# Patient Record
Sex: FEMALE | Race: WHITE | Employment: OTHER | URBAN - METROPOLITAN AREA
[De-identification: names, ages, dates, MRNs, and addresses within clinical notes are randomized per-mention and may not be internally consistent; named-entity substitution may affect disease eponyms.]

---

## 2017-01-24 ENCOUNTER — TELEPHONE (OUTPATIENT)
Dept: INTERNAL MEDICINE CLINIC | Age: 72
End: 2017-01-24

## 2017-01-24 RX ORDER — OXYCODONE HYDROCHLORIDE 10 MG/1
10 TABLET ORAL 2 TIMES DAILY PRN
Qty: 60 TABLET | Refills: 0 | Status: SHIPPED | OUTPATIENT
Start: 2017-01-24 | End: 2017-02-28 | Stop reason: SDUPTHER

## 2017-02-02 ENCOUNTER — OFFICE VISIT (OUTPATIENT)
Dept: INTERNAL MEDICINE CLINIC | Age: 72
End: 2017-02-02

## 2017-02-02 VITALS
DIASTOLIC BLOOD PRESSURE: 50 MMHG | TEMPERATURE: 99.4 F | RESPIRATION RATE: 16 BRPM | HEIGHT: 62 IN | BODY MASS INDEX: 25.98 KG/M2 | WEIGHT: 141.2 LBS | SYSTOLIC BLOOD PRESSURE: 100 MMHG | HEART RATE: 57 BPM | OXYGEN SATURATION: 98 %

## 2017-02-02 DIAGNOSIS — J40 BRONCHITIS: Primary | ICD-10-CM

## 2017-02-02 PROCEDURE — G8400 PT W/DXA NO RESULTS DOC: HCPCS | Performed by: NURSE PRACTITIONER

## 2017-02-02 PROCEDURE — G8484 FLU IMMUNIZE NO ADMIN: HCPCS | Performed by: NURSE PRACTITIONER

## 2017-02-02 PROCEDURE — G8598 ASA/ANTIPLAT THER USED: HCPCS | Performed by: NURSE PRACTITIONER

## 2017-02-02 PROCEDURE — 1036F TOBACCO NON-USER: CPT | Performed by: NURSE PRACTITIONER

## 2017-02-02 PROCEDURE — 3014F SCREEN MAMMO DOC REV: CPT | Performed by: NURSE PRACTITIONER

## 2017-02-02 PROCEDURE — 4040F PNEUMOC VAC/ADMIN/RCVD: CPT | Performed by: NURSE PRACTITIONER

## 2017-02-02 PROCEDURE — 1090F PRES/ABSN URINE INCON ASSESS: CPT | Performed by: NURSE PRACTITIONER

## 2017-02-02 PROCEDURE — 99214 OFFICE O/P EST MOD 30 MIN: CPT | Performed by: NURSE PRACTITIONER

## 2017-02-02 PROCEDURE — 3017F COLORECTAL CA SCREEN DOC REV: CPT | Performed by: NURSE PRACTITIONER

## 2017-02-02 PROCEDURE — 1123F ACP DISCUSS/DSCN MKR DOCD: CPT | Performed by: NURSE PRACTITIONER

## 2017-02-02 PROCEDURE — G8427 DOCREV CUR MEDS BY ELIG CLIN: HCPCS | Performed by: NURSE PRACTITIONER

## 2017-02-02 PROCEDURE — G8420 CALC BMI NORM PARAMETERS: HCPCS | Performed by: NURSE PRACTITIONER

## 2017-02-02 RX ORDER — DOXYCYCLINE HYCLATE 100 MG
100 TABLET ORAL 2 TIMES DAILY
Qty: 20 TABLET | Refills: 0 | Status: SHIPPED | OUTPATIENT
Start: 2017-02-02 | End: 2017-02-12

## 2017-02-02 RX ORDER — DOXYCYCLINE HYCLATE 100 MG
100 TABLET ORAL 2 TIMES DAILY
Qty: 14 TABLET | Refills: 0 | Status: SHIPPED | OUTPATIENT
Start: 2017-02-02 | End: 2017-02-02

## 2017-02-02 ASSESSMENT — ENCOUNTER SYMPTOMS
CHEST TIGHTNESS: 1
SHORTNESS OF BREATH: 1
COUGH: 1
SINUS PRESSURE: 0
WHEEZING: 1
SORE THROAT: 1

## 2017-02-03 RX ORDER — ALBUTEROL SULFATE 90 UG/1
2 AEROSOL, METERED RESPIRATORY (INHALATION) 4 TIMES DAILY
COMMUNITY
End: 2017-08-02 | Stop reason: SDUPTHER

## 2017-02-15 RX ORDER — AMIODARONE HYDROCHLORIDE 200 MG/1
TABLET ORAL
Qty: 30 TABLET | Refills: 6 | Status: SHIPPED | OUTPATIENT
Start: 2017-02-15 | End: 2018-01-19 | Stop reason: SDUPTHER

## 2017-02-28 ENCOUNTER — TELEPHONE (OUTPATIENT)
Dept: INTERNAL MEDICINE CLINIC | Age: 72
End: 2017-02-28

## 2017-02-28 RX ORDER — OXYCODONE HYDROCHLORIDE 10 MG/1
10 TABLET ORAL 2 TIMES DAILY PRN
Qty: 60 TABLET | Refills: 0 | Status: SHIPPED | OUTPATIENT
Start: 2017-02-28 | End: 2017-03-27 | Stop reason: SDUPTHER

## 2017-03-01 ENCOUNTER — ANTI-COAG VISIT (OUTPATIENT)
Dept: INTERNAL MEDICINE CLINIC | Age: 72
End: 2017-03-01

## 2017-03-01 DIAGNOSIS — I48.20 CHRONIC ATRIAL FIBRILLATION (HCC): ICD-10-CM

## 2017-03-01 LAB
INR BLD: 1.36 (ref 0.85–1.15)
PROTHROMBIN TIME: 15.4 SEC (ref 9.6–13)

## 2017-03-09 ENCOUNTER — ANTI-COAG VISIT (OUTPATIENT)
Dept: INTERNAL MEDICINE CLINIC | Age: 72
End: 2017-03-09

## 2017-03-09 DIAGNOSIS — I48.0 PAROXYSMAL ATRIAL FIBRILLATION (HCC): ICD-10-CM

## 2017-03-09 LAB
INR BLD: 2.23 (ref 0.85–1.15)
PROTHROMBIN TIME: 25.2 SEC (ref 9.6–13)

## 2017-03-16 ENCOUNTER — ANTI-COAG VISIT (OUTPATIENT)
Dept: INTERNAL MEDICINE CLINIC | Age: 72
End: 2017-03-16

## 2017-03-16 DIAGNOSIS — I48.0 PAROXYSMAL ATRIAL FIBRILLATION (HCC): ICD-10-CM

## 2017-03-16 LAB
INR BLD: 2.63 (ref 0.85–1.15)
PROTHROMBIN TIME: 29.7 SEC (ref 9.6–13)

## 2017-03-17 RX ORDER — CARVEDILOL 6.25 MG/1
TABLET ORAL
Qty: 60 TABLET | Refills: 0 | Status: SHIPPED | OUTPATIENT
Start: 2017-03-17 | End: 2017-04-14 | Stop reason: SDUPTHER

## 2017-03-27 ENCOUNTER — TELEPHONE (OUTPATIENT)
Dept: INTERNAL MEDICINE CLINIC | Age: 72
End: 2017-03-27

## 2017-03-27 RX ORDER — OXYCODONE HYDROCHLORIDE 10 MG/1
10 TABLET ORAL 2 TIMES DAILY PRN
Qty: 60 TABLET | Refills: 0 | Status: SHIPPED | OUTPATIENT
Start: 2017-03-27 | End: 2017-04-24 | Stop reason: SDUPTHER

## 2017-03-28 ENCOUNTER — TELEPHONE (OUTPATIENT)
Dept: INTERNAL MEDICINE CLINIC | Age: 72
End: 2017-03-28

## 2017-03-30 ENCOUNTER — ANTI-COAG VISIT (OUTPATIENT)
Dept: INTERNAL MEDICINE CLINIC | Age: 72
End: 2017-03-30

## 2017-03-30 DIAGNOSIS — I48.20 CHRONIC ATRIAL FIBRILLATION (HCC): ICD-10-CM

## 2017-03-30 LAB
INR BLD: 2.71 (ref 0.85–1.15)
PROTHROMBIN TIME: 30.6 SEC (ref 9.6–13)

## 2017-04-14 RX ORDER — CARVEDILOL 6.25 MG/1
TABLET ORAL
Qty: 60 TABLET | Refills: 3 | Status: SHIPPED | OUTPATIENT
Start: 2017-04-14 | End: 2017-08-14 | Stop reason: SDUPTHER

## 2017-04-20 DIAGNOSIS — I48.0 PAROXYSMAL ATRIAL FIBRILLATION (HCC): ICD-10-CM

## 2017-04-20 LAB
INR BLD: 2.49 (ref 0.85–1.15)
PROTHROMBIN TIME: 28.1 SEC (ref 9.6–13)

## 2017-04-21 ENCOUNTER — ANTI-COAG VISIT (OUTPATIENT)
Dept: INTERNAL MEDICINE CLINIC | Age: 72
End: 2017-04-21

## 2017-04-24 ENCOUNTER — TELEPHONE (OUTPATIENT)
Dept: INTERNAL MEDICINE CLINIC | Age: 72
End: 2017-04-24

## 2017-04-24 RX ORDER — OXYCODONE HYDROCHLORIDE 10 MG/1
10 TABLET ORAL 2 TIMES DAILY PRN
Qty: 60 TABLET | Refills: 0 | Status: SHIPPED | OUTPATIENT
Start: 2017-04-27 | End: 2017-06-05 | Stop reason: SDUPTHER

## 2017-05-05 RX ORDER — ATORVASTATIN CALCIUM 80 MG/1
TABLET, FILM COATED ORAL
Qty: 30 TABLET | Refills: 3 | Status: SHIPPED | OUTPATIENT
Start: 2017-05-05 | End: 2017-08-28 | Stop reason: SDUPTHER

## 2017-05-14 PROBLEM — D50.9 MICROCYTIC ANEMIA: Status: ACTIVE | Noted: 2017-05-14

## 2017-05-15 PROBLEM — D64.9 SYMPTOMATIC ANEMIA: Status: ACTIVE | Noted: 2017-05-14

## 2017-05-17 ENCOUNTER — TELEPHONE (OUTPATIENT)
Dept: CARDIOLOGY CLINIC | Age: 72
End: 2017-05-17

## 2017-05-17 ENCOUNTER — CARE COORDINATION (OUTPATIENT)
Dept: CASE MANAGEMENT | Age: 72
End: 2017-05-17

## 2017-05-18 ENCOUNTER — OFFICE VISIT (OUTPATIENT)
Age: 72
End: 2017-05-18

## 2017-05-18 VITALS
SYSTOLIC BLOOD PRESSURE: 126 MMHG | BODY MASS INDEX: 24.99 KG/M2 | DIASTOLIC BLOOD PRESSURE: 60 MMHG | OXYGEN SATURATION: 98 % | HEIGHT: 62 IN | HEART RATE: 50 BPM | WEIGHT: 135.8 LBS

## 2017-05-18 DIAGNOSIS — I48.0 PAF (PAROXYSMAL ATRIAL FIBRILLATION) (HCC): ICD-10-CM

## 2017-05-18 DIAGNOSIS — I50.32 CHRONIC DIASTOLIC CHF (CONGESTIVE HEART FAILURE), NYHA CLASS 2 (HCC): Primary | ICD-10-CM

## 2017-05-18 DIAGNOSIS — I25.10 CORONARY ARTERY DISEASE INVOLVING NATIVE CORONARY ARTERY OF NATIVE HEART WITHOUT ANGINA PECTORIS: ICD-10-CM

## 2017-05-18 DIAGNOSIS — E78.5 HYPERLIPIDEMIA LDL GOAL <70: ICD-10-CM

## 2017-05-18 PROCEDURE — 4040F PNEUMOC VAC/ADMIN/RCVD: CPT | Performed by: INTERNAL MEDICINE

## 2017-05-18 PROCEDURE — 1036F TOBACCO NON-USER: CPT | Performed by: INTERNAL MEDICINE

## 2017-05-18 PROCEDURE — G8420 CALC BMI NORM PARAMETERS: HCPCS | Performed by: INTERNAL MEDICINE

## 2017-05-18 PROCEDURE — 3017F COLORECTAL CA SCREEN DOC REV: CPT | Performed by: INTERNAL MEDICINE

## 2017-05-18 PROCEDURE — 1123F ACP DISCUSS/DSCN MKR DOCD: CPT | Performed by: INTERNAL MEDICINE

## 2017-05-18 PROCEDURE — G8427 DOCREV CUR MEDS BY ELIG CLIN: HCPCS | Performed by: INTERNAL MEDICINE

## 2017-05-18 PROCEDURE — 1090F PRES/ABSN URINE INCON ASSESS: CPT | Performed by: INTERNAL MEDICINE

## 2017-05-18 PROCEDURE — 99214 OFFICE O/P EST MOD 30 MIN: CPT | Performed by: INTERNAL MEDICINE

## 2017-05-18 PROCEDURE — 3014F SCREEN MAMMO DOC REV: CPT | Performed by: INTERNAL MEDICINE

## 2017-05-18 PROCEDURE — 1111F DSCHRG MED/CURRENT MED MERGE: CPT | Performed by: INTERNAL MEDICINE

## 2017-05-18 PROCEDURE — G8598 ASA/ANTIPLAT THER USED: HCPCS | Performed by: INTERNAL MEDICINE

## 2017-05-18 PROCEDURE — 93000 ELECTROCARDIOGRAM COMPLETE: CPT | Performed by: INTERNAL MEDICINE

## 2017-05-18 PROCEDURE — G8400 PT W/DXA NO RESULTS DOC: HCPCS | Performed by: INTERNAL MEDICINE

## 2017-05-19 ENCOUNTER — OFFICE VISIT (OUTPATIENT)
Dept: INTERNAL MEDICINE CLINIC | Age: 72
End: 2017-05-19

## 2017-05-19 VITALS
DIASTOLIC BLOOD PRESSURE: 50 MMHG | BODY MASS INDEX: 24.73 KG/M2 | WEIGHT: 134.4 LBS | SYSTOLIC BLOOD PRESSURE: 140 MMHG | HEART RATE: 51 BPM | OXYGEN SATURATION: 96 % | HEIGHT: 62 IN

## 2017-05-19 DIAGNOSIS — D50.0 ANEMIA DUE TO GI BLOOD LOSS: Primary | ICD-10-CM

## 2017-05-19 DIAGNOSIS — I73.9 PVD (PERIPHERAL VASCULAR DISEASE) (HCC): ICD-10-CM

## 2017-05-19 DIAGNOSIS — I65.21 STENOSIS OF RIGHT CAROTID ARTERY: ICD-10-CM

## 2017-05-19 DIAGNOSIS — J43.2 CENTRILOBULAR EMPHYSEMA (HCC): ICD-10-CM

## 2017-05-19 PROCEDURE — 1036F TOBACCO NON-USER: CPT | Performed by: INTERNAL MEDICINE

## 2017-05-19 PROCEDURE — 1111F DSCHRG MED/CURRENT MED MERGE: CPT | Performed by: INTERNAL MEDICINE

## 2017-05-19 PROCEDURE — 3023F SPIROM DOC REV: CPT | Performed by: INTERNAL MEDICINE

## 2017-05-19 PROCEDURE — 99214 OFFICE O/P EST MOD 30 MIN: CPT | Performed by: INTERNAL MEDICINE

## 2017-05-19 PROCEDURE — 3017F COLORECTAL CA SCREEN DOC REV: CPT | Performed by: INTERNAL MEDICINE

## 2017-05-19 PROCEDURE — G8420 CALC BMI NORM PARAMETERS: HCPCS | Performed by: INTERNAL MEDICINE

## 2017-05-19 PROCEDURE — G8427 DOCREV CUR MEDS BY ELIG CLIN: HCPCS | Performed by: INTERNAL MEDICINE

## 2017-05-19 PROCEDURE — 3014F SCREEN MAMMO DOC REV: CPT | Performed by: INTERNAL MEDICINE

## 2017-05-19 PROCEDURE — G8598 ASA/ANTIPLAT THER USED: HCPCS | Performed by: INTERNAL MEDICINE

## 2017-05-19 PROCEDURE — 4040F PNEUMOC VAC/ADMIN/RCVD: CPT | Performed by: INTERNAL MEDICINE

## 2017-05-19 PROCEDURE — 1123F ACP DISCUSS/DSCN MKR DOCD: CPT | Performed by: INTERNAL MEDICINE

## 2017-05-19 PROCEDURE — G8926 SPIRO NO PERF OR DOC: HCPCS | Performed by: INTERNAL MEDICINE

## 2017-05-19 PROCEDURE — 1090F PRES/ABSN URINE INCON ASSESS: CPT | Performed by: INTERNAL MEDICINE

## 2017-05-19 PROCEDURE — G8400 PT W/DXA NO RESULTS DOC: HCPCS | Performed by: INTERNAL MEDICINE

## 2017-05-19 ASSESSMENT — PATIENT HEALTH QUESTIONNAIRE - PHQ9
1. LITTLE INTEREST OR PLEASURE IN DOING THINGS: 0
SUM OF ALL RESPONSES TO PHQ QUESTIONS 1-9: 0
SUM OF ALL RESPONSES TO PHQ9 QUESTIONS 1 & 2: 0
2. FEELING DOWN, DEPRESSED OR HOPELESS: 0

## 2017-05-22 ASSESSMENT — ENCOUNTER SYMPTOMS: SHORTNESS OF BREATH: 1

## 2017-05-23 ENCOUNTER — CARE COORDINATION (OUTPATIENT)
Dept: CASE MANAGEMENT | Age: 72
End: 2017-05-23

## 2017-05-25 ENCOUNTER — HOSPITAL ENCOUNTER (OUTPATIENT)
Dept: VASCULAR LAB | Age: 72
Discharge: HOME | End: 2017-05-25
Attending: INTERNAL MEDICINE | Admitting: INTERNAL MEDICINE

## 2017-05-25 ENCOUNTER — ANTI-COAG VISIT (OUTPATIENT)
Dept: INTERNAL MEDICINE CLINIC | Age: 72
End: 2017-05-25

## 2017-05-25 DIAGNOSIS — I48.0 PAROXYSMAL ATRIAL FIBRILLATION (HCC): ICD-10-CM

## 2017-05-25 DIAGNOSIS — I73.9 PERIPHERAL VASCULAR DISEASE (HCC): ICD-10-CM

## 2017-05-25 LAB
INR BLD: 1.29 (ref 0.85–1.15)
PROTHROMBIN TIME: 14.6 SEC (ref 9.6–13)

## 2017-05-26 ENCOUNTER — CARE COORDINATION (OUTPATIENT)
Dept: CASE MANAGEMENT | Age: 72
End: 2017-05-26

## 2017-05-31 ENCOUNTER — TELEPHONE (OUTPATIENT)
Dept: INTERNAL MEDICINE CLINIC | Age: 72
End: 2017-05-31

## 2017-06-05 ENCOUNTER — TELEPHONE (OUTPATIENT)
Dept: INTERNAL MEDICINE CLINIC | Age: 72
End: 2017-06-05

## 2017-06-05 DIAGNOSIS — I48.0 PAROXYSMAL ATRIAL FIBRILLATION (HCC): ICD-10-CM

## 2017-06-05 LAB
INR BLD: 1.9 (ref 0.85–1.15)
PROTHROMBIN TIME: 21.5 SEC (ref 9.6–13)

## 2017-06-05 RX ORDER — OXYCODONE HYDROCHLORIDE 10 MG/1
10 TABLET ORAL 2 TIMES DAILY PRN
Qty: 60 TABLET | Refills: 0 | Status: SHIPPED | OUTPATIENT
Start: 2017-06-05 | End: 2017-06-29 | Stop reason: SDUPTHER

## 2017-06-05 RX ORDER — OXYCODONE HYDROCHLORIDE 10 MG/1
10 TABLET ORAL 2 TIMES DAILY PRN
Qty: 60 TABLET | Refills: 0 | Status: CANCELLED | OUTPATIENT
Start: 2017-06-05 | End: 2017-07-05

## 2017-06-07 ENCOUNTER — TELEPHONE (OUTPATIENT)
Dept: CARDIOLOGY CLINIC | Age: 72
End: 2017-06-07

## 2017-06-08 ENCOUNTER — ANTI-COAG VISIT (OUTPATIENT)
Dept: INTERNAL MEDICINE CLINIC | Age: 72
End: 2017-06-08

## 2017-06-16 RX ORDER — PANTOPRAZOLE SODIUM 40 MG/1
TABLET, DELAYED RELEASE ORAL
Qty: 30 TABLET | Refills: 5 | Status: SHIPPED | OUTPATIENT
Start: 2017-06-16 | End: 2017-12-11 | Stop reason: SDUPTHER

## 2017-06-16 RX ORDER — WARFARIN SODIUM 2 MG/1
TABLET ORAL
Qty: 60 TABLET | Refills: 5 | Status: SHIPPED | OUTPATIENT
Start: 2017-06-16 | End: 2017-12-11 | Stop reason: SDUPTHER

## 2017-06-22 ENCOUNTER — HOSPITAL ENCOUNTER (OUTPATIENT)
Dept: ENDOSCOPY | Age: 72
Discharge: HOME | End: 2017-06-22
Attending: INTERNAL MEDICINE | Admitting: INTERNAL MEDICINE

## 2017-06-22 VITALS
WEIGHT: 125.66 LBS | TEMPERATURE: 97.8 F | OXYGEN SATURATION: 97 % | RESPIRATION RATE: 18 BRPM | HEART RATE: 54 BPM | BODY MASS INDEX: 23.12 KG/M2 | DIASTOLIC BLOOD PRESSURE: 87 MMHG | SYSTOLIC BLOOD PRESSURE: 152 MMHG | HEIGHT: 62 IN

## 2017-06-22 ASSESSMENT — PAIN - FUNCTIONAL ASSESSMENT: PAIN_FUNCTIONAL_ASSESSMENT: 0-10

## 2017-06-22 ASSESSMENT — PAIN DESCRIPTION - DESCRIPTORS: DESCRIPTORS: ACHING

## 2017-06-29 ENCOUNTER — OFFICE VISIT (OUTPATIENT)
Age: 72
End: 2017-06-29

## 2017-06-29 VITALS
BODY MASS INDEX: 24.48 KG/M2 | SYSTOLIC BLOOD PRESSURE: 130 MMHG | HEIGHT: 62 IN | DIASTOLIC BLOOD PRESSURE: 70 MMHG | HEART RATE: 58 BPM | OXYGEN SATURATION: 98 % | WEIGHT: 133 LBS

## 2017-06-29 DIAGNOSIS — Z95.2 S/P AORTIC VALVE REPLACEMENT: ICD-10-CM

## 2017-06-29 DIAGNOSIS — I50.32 CHRONIC DIASTOLIC CHF (CONGESTIVE HEART FAILURE), NYHA CLASS 2 (HCC): Primary | ICD-10-CM

## 2017-06-29 DIAGNOSIS — I10 ESSENTIAL HYPERTENSION: ICD-10-CM

## 2017-06-29 DIAGNOSIS — E78.5 HYPERLIPIDEMIA LDL GOAL <70: ICD-10-CM

## 2017-06-29 DIAGNOSIS — I25.10 CORONARY ARTERY DISEASE INVOLVING NATIVE CORONARY ARTERY OF NATIVE HEART WITHOUT ANGINA PECTORIS: ICD-10-CM

## 2017-06-29 DIAGNOSIS — I48.0 PAF (PAROXYSMAL ATRIAL FIBRILLATION) (HCC): ICD-10-CM

## 2017-06-29 DIAGNOSIS — I48.0 PAROXYSMAL ATRIAL FIBRILLATION (HCC): ICD-10-CM

## 2017-06-29 LAB
INR BLD: 2.36 (ref 0.85–1.15)
PROTHROMBIN TIME: 26.7 SEC (ref 9.6–13)

## 2017-06-29 PROCEDURE — 1090F PRES/ABSN URINE INCON ASSESS: CPT | Performed by: INTERNAL MEDICINE

## 2017-06-29 PROCEDURE — G8420 CALC BMI NORM PARAMETERS: HCPCS | Performed by: INTERNAL MEDICINE

## 2017-06-29 PROCEDURE — 3017F COLORECTAL CA SCREEN DOC REV: CPT | Performed by: INTERNAL MEDICINE

## 2017-06-29 PROCEDURE — 93000 ELECTROCARDIOGRAM COMPLETE: CPT | Performed by: INTERNAL MEDICINE

## 2017-06-29 PROCEDURE — G8427 DOCREV CUR MEDS BY ELIG CLIN: HCPCS | Performed by: INTERNAL MEDICINE

## 2017-06-29 PROCEDURE — G8598 ASA/ANTIPLAT THER USED: HCPCS | Performed by: INTERNAL MEDICINE

## 2017-06-29 PROCEDURE — 4040F PNEUMOC VAC/ADMIN/RCVD: CPT | Performed by: INTERNAL MEDICINE

## 2017-06-29 PROCEDURE — 99214 OFFICE O/P EST MOD 30 MIN: CPT | Performed by: INTERNAL MEDICINE

## 2017-06-29 PROCEDURE — G8400 PT W/DXA NO RESULTS DOC: HCPCS | Performed by: INTERNAL MEDICINE

## 2017-06-29 PROCEDURE — 1036F TOBACCO NON-USER: CPT | Performed by: INTERNAL MEDICINE

## 2017-06-29 PROCEDURE — 1123F ACP DISCUSS/DSCN MKR DOCD: CPT | Performed by: INTERNAL MEDICINE

## 2017-06-29 PROCEDURE — 3014F SCREEN MAMMO DOC REV: CPT | Performed by: INTERNAL MEDICINE

## 2017-06-30 ENCOUNTER — ANTI-COAG VISIT (OUTPATIENT)
Dept: INTERNAL MEDICINE CLINIC | Age: 72
End: 2017-06-30

## 2017-06-30 RX ORDER — OXYCODONE HYDROCHLORIDE 10 MG/1
10 TABLET ORAL 2 TIMES DAILY PRN
Qty: 60 TABLET | Refills: 0 | Status: SHIPPED | OUTPATIENT
Start: 2017-06-30 | End: 2017-07-28 | Stop reason: SDUPTHER

## 2017-06-30 RX ORDER — ISOSORBIDE MONONITRATE 30 MG/1
TABLET, EXTENDED RELEASE ORAL
Qty: 60 TABLET | Refills: 11 | Status: SHIPPED | OUTPATIENT
Start: 2017-06-30 | End: 2017-12-14 | Stop reason: ALTCHOICE

## 2017-07-28 ENCOUNTER — TELEPHONE (OUTPATIENT)
Dept: INTERNAL MEDICINE CLINIC | Age: 72
End: 2017-07-28

## 2017-07-28 RX ORDER — OXYCODONE HYDROCHLORIDE 10 MG/1
10 TABLET ORAL 2 TIMES DAILY PRN
Qty: 60 TABLET | Refills: 0 | Status: SHIPPED | OUTPATIENT
Start: 2017-07-28 | End: 2017-08-28 | Stop reason: SDUPTHER

## 2017-08-02 ENCOUNTER — ANTI-COAG VISIT (OUTPATIENT)
Dept: INTERNAL MEDICINE CLINIC | Age: 72
End: 2017-08-02

## 2017-08-02 DIAGNOSIS — I48.0 PAROXYSMAL ATRIAL FIBRILLATION (HCC): ICD-10-CM

## 2017-08-02 LAB
INR BLD: 2.16 (ref 0.85–1.15)
PROTHROMBIN TIME: 24.4 SEC (ref 9.6–13)

## 2017-08-14 RX ORDER — CARVEDILOL 6.25 MG/1
TABLET ORAL
Qty: 60 TABLET | Refills: 3 | Status: SHIPPED | OUTPATIENT
Start: 2017-08-14 | End: 2017-12-11 | Stop reason: SDUPTHER

## 2017-08-28 ENCOUNTER — ANTI-COAG VISIT (OUTPATIENT)
Dept: INTERNAL MEDICINE CLINIC | Age: 72
End: 2017-08-28

## 2017-08-28 ENCOUNTER — OFFICE VISIT (OUTPATIENT)
Dept: INTERNAL MEDICINE CLINIC | Age: 72
End: 2017-08-28

## 2017-08-28 VITALS
WEIGHT: 135.8 LBS | OXYGEN SATURATION: 98 % | HEIGHT: 62 IN | DIASTOLIC BLOOD PRESSURE: 68 MMHG | TEMPERATURE: 98 F | BODY MASS INDEX: 24.99 KG/M2 | HEART RATE: 49 BPM | SYSTOLIC BLOOD PRESSURE: 150 MMHG

## 2017-08-28 DIAGNOSIS — I65.23 BILATERAL CAROTID ARTERY STENOSIS: ICD-10-CM

## 2017-08-28 DIAGNOSIS — H43.811 PVD (POSTERIOR VITREOUS DETACHMENT), RIGHT: ICD-10-CM

## 2017-08-28 DIAGNOSIS — D50.0 IRON DEFICIENCY ANEMIA DUE TO CHRONIC BLOOD LOSS: Primary | ICD-10-CM

## 2017-08-28 DIAGNOSIS — I48.20 CHRONIC ATRIAL FIBRILLATION (HCC): ICD-10-CM

## 2017-08-28 DIAGNOSIS — E78.00 HIGH CHOLESTEROL: ICD-10-CM

## 2017-08-28 DIAGNOSIS — E78.00 HYPERCHOLESTEROLEMIA: ICD-10-CM

## 2017-08-28 LAB
A/G RATIO: 1.7 (ref 1.1–2.2)
ALBUMIN SERPL-MCNC: 4.3 G/DL (ref 3.4–5)
ALP BLD-CCNC: 110 U/L (ref 40–129)
ALT SERPL-CCNC: 23 U/L (ref 10–40)
ANION GAP SERPL CALCULATED.3IONS-SCNC: 13 MMOL/L (ref 3–16)
AST SERPL-CCNC: 24 U/L (ref 15–37)
BASOPHILS ABSOLUTE: 0.1 K/UL (ref 0–0.2)
BASOPHILS RELATIVE PERCENT: 0.9 %
BILIRUB SERPL-MCNC: <0.2 MG/DL (ref 0–1)
BUN BLDV-MCNC: 20 MG/DL (ref 7–20)
CALCIUM SERPL-MCNC: 9.3 MG/DL (ref 8.3–10.6)
CHLORIDE BLD-SCNC: 99 MMOL/L (ref 99–110)
CHOLESTEROL, TOTAL: 208 MG/DL (ref 0–199)
CO2: 29 MMOL/L (ref 21–32)
CREAT SERPL-MCNC: 0.7 MG/DL (ref 0.6–1.2)
EOSINOPHILS ABSOLUTE: 0.4 K/UL (ref 0–0.6)
EOSINOPHILS RELATIVE PERCENT: 5.9 %
FERRITIN: 50.8 NG/ML (ref 15–150)
GFR AFRICAN AMERICAN: >60
GFR NON-AFRICAN AMERICAN: >60
GLOBULIN: 2.5 G/DL
GLUCOSE BLD-MCNC: 105 MG/DL (ref 70–99)
HCT VFR BLD CALC: 38 % (ref 36–48)
HDLC SERPL-MCNC: 61 MG/DL (ref 40–60)
HEMOGLOBIN: 12.6 G/DL (ref 12–16)
INR BLD: 1.9 (ref 0.85–1.15)
IRON SATURATION: 30 % (ref 15–50)
IRON: 99 UG/DL (ref 37–145)
LDL CHOLESTEROL CALCULATED: 133 MG/DL
LYMPHOCYTES ABSOLUTE: 1.9 K/UL (ref 1–5.1)
LYMPHOCYTES RELATIVE PERCENT: 26.5 %
MCH RBC QN AUTO: 29.4 PG (ref 26–34)
MCHC RBC AUTO-ENTMCNC: 33.1 G/DL (ref 31–36)
MCV RBC AUTO: 88.8 FL (ref 80–100)
MONOCYTES ABSOLUTE: 0.8 K/UL (ref 0–1.3)
MONOCYTES RELATIVE PERCENT: 10.4 %
NEUTROPHILS ABSOLUTE: 4.1 K/UL (ref 1.7–7.7)
NEUTROPHILS RELATIVE PERCENT: 56.3 %
PDW BLD-RTO: 17.7 % (ref 12.4–15.4)
PLATELET # BLD: 222 K/UL (ref 135–450)
PMV BLD AUTO: 8.9 FL (ref 5–10.5)
POTASSIUM SERPL-SCNC: 4.1 MMOL/L (ref 3.5–5.1)
PROTHROMBIN TIME: 21.5 SEC (ref 9.6–13)
RBC # BLD: 4.28 M/UL (ref 4–5.2)
SODIUM BLD-SCNC: 141 MMOL/L (ref 136–145)
TOTAL IRON BINDING CAPACITY: 327 UG/DL (ref 260–445)
TOTAL PROTEIN: 6.8 G/DL (ref 6.4–8.2)
TRIGL SERPL-MCNC: 70 MG/DL (ref 0–150)
TSH SERPL DL<=0.05 MIU/L-ACNC: 3.72 UIU/ML (ref 0.27–4.2)
VLDLC SERPL CALC-MCNC: 14 MG/DL
WBC # BLD: 7.2 K/UL (ref 4–11)

## 2017-08-28 PROCEDURE — 3017F COLORECTAL CA SCREEN DOC REV: CPT | Performed by: INTERNAL MEDICINE

## 2017-08-28 PROCEDURE — G8400 PT W/DXA NO RESULTS DOC: HCPCS | Performed by: INTERNAL MEDICINE

## 2017-08-28 PROCEDURE — 3014F SCREEN MAMMO DOC REV: CPT | Performed by: INTERNAL MEDICINE

## 2017-08-28 PROCEDURE — 4004F PT TOBACCO SCREEN RCVD TLK: CPT | Performed by: INTERNAL MEDICINE

## 2017-08-28 PROCEDURE — 1123F ACP DISCUSS/DSCN MKR DOCD: CPT | Performed by: INTERNAL MEDICINE

## 2017-08-28 PROCEDURE — 1090F PRES/ABSN URINE INCON ASSESS: CPT | Performed by: INTERNAL MEDICINE

## 2017-08-28 PROCEDURE — G8428 CUR MEDS NOT DOCUMENT: HCPCS | Performed by: INTERNAL MEDICINE

## 2017-08-28 PROCEDURE — G8598 ASA/ANTIPLAT THER USED: HCPCS | Performed by: INTERNAL MEDICINE

## 2017-08-28 PROCEDURE — 99214 OFFICE O/P EST MOD 30 MIN: CPT | Performed by: INTERNAL MEDICINE

## 2017-08-28 PROCEDURE — 4040F PNEUMOC VAC/ADMIN/RCVD: CPT | Performed by: INTERNAL MEDICINE

## 2017-08-28 PROCEDURE — G8420 CALC BMI NORM PARAMETERS: HCPCS | Performed by: INTERNAL MEDICINE

## 2017-08-28 PROCEDURE — 90670 PCV13 VACCINE IM: CPT | Performed by: INTERNAL MEDICINE

## 2017-08-28 PROCEDURE — 90471 IMMUNIZATION ADMIN: CPT | Performed by: INTERNAL MEDICINE

## 2017-08-28 RX ORDER — OXYCODONE HYDROCHLORIDE 10 MG/1
10 TABLET ORAL 2 TIMES DAILY PRN
Qty: 60 TABLET | Refills: 0 | Status: SHIPPED | OUTPATIENT
Start: 2017-08-28 | End: 2017-09-27 | Stop reason: SDUPTHER

## 2017-08-28 RX ORDER — TORSEMIDE 20 MG/1
TABLET ORAL
Qty: 60 TABLET | Refills: 1 | Status: SHIPPED | OUTPATIENT
Start: 2017-08-28 | End: 2017-10-23 | Stop reason: SDUPTHER

## 2017-08-28 RX ORDER — ATORVASTATIN CALCIUM 80 MG/1
TABLET, FILM COATED ORAL
Qty: 30 TABLET | Refills: 3 | Status: ON HOLD | OUTPATIENT
Start: 2017-08-28 | End: 2017-12-19 | Stop reason: HOSPADM

## 2017-08-28 RX ORDER — LEVOTHYROXINE SODIUM 0.07 MG/1
TABLET ORAL
Qty: 30 TABLET | Refills: 1 | Status: SHIPPED | OUTPATIENT
Start: 2017-08-28 | End: 2017-10-23 | Stop reason: SDUPTHER

## 2017-08-28 RX ORDER — DOCUSATE SODIUM 100 MG/1
CAPSULE ORAL
Qty: 60 CAPSULE | Refills: 1 | Status: SHIPPED | OUTPATIENT
Start: 2017-08-28 | End: 2017-10-23 | Stop reason: SDUPTHER

## 2017-08-28 RX ORDER — DOXYCYCLINE HYCLATE 50 MG/1
324 CAPSULE, GELATIN COATED ORAL
Qty: 90 TABLET | Refills: 11
Start: 2017-08-28 | End: 2017-09-11 | Stop reason: SDUPTHER

## 2017-09-11 RX ORDER — DOXYCYCLINE HYCLATE 50 MG/1
324 CAPSULE, GELATIN COATED ORAL
Qty: 90 TABLET | Refills: 11 | Status: SHIPPED | OUTPATIENT
Start: 2017-09-11 | End: 2018-10-24 | Stop reason: SDUPTHER

## 2017-09-27 RX ORDER — OXYCODONE HYDROCHLORIDE 10 MG/1
10 TABLET ORAL 2 TIMES DAILY PRN
Qty: 60 TABLET | Refills: 0 | Status: SHIPPED | OUTPATIENT
Start: 2017-09-27 | End: 2017-10-27 | Stop reason: SDUPTHER

## 2017-09-28 ENCOUNTER — ANTI-COAG VISIT (OUTPATIENT)
Dept: INTERNAL MEDICINE CLINIC | Age: 72
End: 2017-09-28

## 2017-09-28 DIAGNOSIS — I48.0 PAROXYSMAL ATRIAL FIBRILLATION (HCC): ICD-10-CM

## 2017-09-28 LAB
INR BLD: 2.42 (ref 0.85–1.15)
PROTHROMBIN TIME: 27.4 SEC (ref 9.6–13)

## 2017-10-23 RX ORDER — LEVOTHYROXINE SODIUM 0.07 MG/1
TABLET ORAL
Qty: 30 TABLET | Refills: 5 | Status: SHIPPED | OUTPATIENT
Start: 2017-10-23 | End: 2018-09-06

## 2017-10-23 RX ORDER — DOCUSATE SODIUM 100 MG/1
CAPSULE ORAL
Qty: 60 CAPSULE | Refills: 5 | Status: SHIPPED | OUTPATIENT
Start: 2017-10-23 | End: 2018-04-19 | Stop reason: SDUPTHER

## 2017-10-23 RX ORDER — TORSEMIDE 20 MG/1
TABLET ORAL
Qty: 60 TABLET | Refills: 5 | Status: SHIPPED | OUTPATIENT
Start: 2017-10-23 | End: 2018-04-10 | Stop reason: SDUPTHER

## 2017-10-27 ENCOUNTER — TELEPHONE (OUTPATIENT)
Dept: INTERNAL MEDICINE CLINIC | Age: 72
End: 2017-10-27

## 2017-10-27 RX ORDER — OXYCODONE HYDROCHLORIDE 10 MG/1
10 TABLET ORAL 2 TIMES DAILY PRN
Qty: 60 TABLET | Refills: 0 | Status: SHIPPED | OUTPATIENT
Start: 2017-10-27 | End: 2017-11-29 | Stop reason: SDUPTHER

## 2017-10-30 ENCOUNTER — ANTI-COAG VISIT (OUTPATIENT)
Dept: INTERNAL MEDICINE CLINIC | Age: 72
End: 2017-10-30

## 2017-10-30 DIAGNOSIS — I48.20 CHRONIC ATRIAL FIBRILLATION (HCC): ICD-10-CM

## 2017-10-30 LAB
INR BLD: 2.56 (ref 0.85–1.15)
PROTHROMBIN TIME: 28.9 SEC (ref 9.6–13)

## 2017-11-27 ENCOUNTER — TELEPHONE (OUTPATIENT)
Dept: INTERNAL MEDICINE CLINIC | Age: 72
End: 2017-11-27

## 2017-11-27 NOTE — TELEPHONE ENCOUNTER
Pt is requesting a written script for her pain med listed below, pt can be reached at [phone] when script is ready, pt states she has to pick it up at the office.     oxyCODONE HCl (OXY-IR) 10 MG immediate release tablet Take 1 tablet by mouth 2 times daily as needed for Pain

## 2017-11-29 ENCOUNTER — TELEPHONE (OUTPATIENT)
Dept: INTERNAL MEDICINE CLINIC | Age: 72
End: 2017-11-29

## 2017-11-29 ENCOUNTER — OFFICE VISIT (OUTPATIENT)
Dept: INTERNAL MEDICINE CLINIC | Age: 72
End: 2017-11-29

## 2017-11-29 VITALS
OXYGEN SATURATION: 98 % | HEART RATE: 59 BPM | BODY MASS INDEX: 25.51 KG/M2 | HEIGHT: 62 IN | DIASTOLIC BLOOD PRESSURE: 72 MMHG | TEMPERATURE: 98 F | WEIGHT: 138.6 LBS | SYSTOLIC BLOOD PRESSURE: 120 MMHG

## 2017-11-29 DIAGNOSIS — I48.0 PAROXYSMAL ATRIAL FIBRILLATION (HCC): ICD-10-CM

## 2017-11-29 DIAGNOSIS — I50.32 CHRONIC DIASTOLIC CONGESTIVE HEART FAILURE (HCC): ICD-10-CM

## 2017-11-29 DIAGNOSIS — J44.1 COPD EXACERBATION (HCC): Primary | ICD-10-CM

## 2017-11-29 DIAGNOSIS — I10 BENIGN ESSENTIAL HTN: ICD-10-CM

## 2017-11-29 LAB
INR BLD: 2.81 (ref 0.85–1.15)
PROTHROMBIN TIME: 31.7 SEC (ref 9.6–13)

## 2017-11-29 PROCEDURE — 1123F ACP DISCUSS/DSCN MKR DOCD: CPT | Performed by: INTERNAL MEDICINE

## 2017-11-29 PROCEDURE — G8427 DOCREV CUR MEDS BY ELIG CLIN: HCPCS | Performed by: INTERNAL MEDICINE

## 2017-11-29 PROCEDURE — 3017F COLORECTAL CA SCREEN DOC REV: CPT | Performed by: INTERNAL MEDICINE

## 2017-11-29 PROCEDURE — G8400 PT W/DXA NO RESULTS DOC: HCPCS | Performed by: INTERNAL MEDICINE

## 2017-11-29 PROCEDURE — 99214 OFFICE O/P EST MOD 30 MIN: CPT | Performed by: INTERNAL MEDICINE

## 2017-11-29 PROCEDURE — G8926 SPIRO NO PERF OR DOC: HCPCS | Performed by: INTERNAL MEDICINE

## 2017-11-29 PROCEDURE — G8598 ASA/ANTIPLAT THER USED: HCPCS | Performed by: INTERNAL MEDICINE

## 2017-11-29 PROCEDURE — 3014F SCREEN MAMMO DOC REV: CPT | Performed by: INTERNAL MEDICINE

## 2017-11-29 PROCEDURE — 4040F PNEUMOC VAC/ADMIN/RCVD: CPT | Performed by: INTERNAL MEDICINE

## 2017-11-29 PROCEDURE — 4004F PT TOBACCO SCREEN RCVD TLK: CPT | Performed by: INTERNAL MEDICINE

## 2017-11-29 PROCEDURE — 3023F SPIROM DOC REV: CPT | Performed by: INTERNAL MEDICINE

## 2017-11-29 PROCEDURE — G8419 CALC BMI OUT NRM PARAM NOF/U: HCPCS | Performed by: INTERNAL MEDICINE

## 2017-11-29 PROCEDURE — G8484 FLU IMMUNIZE NO ADMIN: HCPCS | Performed by: INTERNAL MEDICINE

## 2017-11-29 PROCEDURE — 1090F PRES/ABSN URINE INCON ASSESS: CPT | Performed by: INTERNAL MEDICINE

## 2017-11-29 RX ORDER — AZITHROMYCIN 250 MG/1
TABLET, FILM COATED ORAL
Qty: 10 TABLET | Refills: 0 | Status: SHIPPED | OUTPATIENT
Start: 2017-11-29 | End: 2017-12-14 | Stop reason: ALTCHOICE

## 2017-11-29 RX ORDER — DOXYCYCLINE HYCLATE 100 MG
100 TABLET ORAL 2 TIMES DAILY
Qty: 20 TABLET | Refills: 0 | Status: SHIPPED | OUTPATIENT
Start: 2017-11-29 | End: 2017-12-09

## 2017-11-29 RX ORDER — PREDNISONE 10 MG/1
TABLET ORAL
Qty: 37 TABLET | Refills: 0 | Status: SHIPPED | OUTPATIENT
Start: 2017-11-29 | End: 2017-12-14 | Stop reason: ALTCHOICE

## 2017-11-29 RX ORDER — OXYCODONE HYDROCHLORIDE 10 MG/1
10 TABLET ORAL 2 TIMES DAILY PRN
Qty: 75 TABLET | Refills: 0 | Status: SHIPPED | OUTPATIENT
Start: 2017-11-29 | End: 2018-01-02 | Stop reason: SDUPTHER

## 2017-11-29 ASSESSMENT — ENCOUNTER SYMPTOMS: COUGH: 1

## 2017-11-29 NOTE — TELEPHONE ENCOUNTER
[facility] at [phone] has questions regarding 3 medications that were e-scribed this morning regarding pt.

## 2017-11-29 NOTE — PROGRESS NOTES
Smoker     Packs/day: 0.25     Years: 45.00     Types: Cigarettes     Last attempt to quit: 6/11/2015    Smokeless tobacco: Never Used    Alcohol use No      Comment: Socially      Family History   Problem Relation Age of Onset    Breast Cancer Daughter           Review of Systems   Respiratory: Positive for cough. Cardiovascular: Positive for leg swelling. All other systems reviewed and are negative. Objective:   Physical Exam   Constitutional: She is oriented to person, place, and time and well-developed, well-nourished, and in no distress. No distress. HENT:   Head: Normocephalic and atraumatic. Right Ear: External ear normal.   Left Ear: External ear normal.   Nose: Nose normal.   Mouth/Throat: No oropharyngeal exudate. Eyes: Conjunctivae and EOM are normal. Pupils are equal, round, and reactive to light. Right eye exhibits no discharge. Left eye exhibits no discharge. No scleral icterus. Neck: Normal range of motion. Neck supple. No JVD present. No tracheal deviation present. No thyromegaly present. Cardiovascular: Normal rate, regular rhythm and normal heart sounds. Exam reveals no gallop. No murmur heard. Normal neck veins   Pulmonary/Chest: Effort normal and breath sounds normal. No stridor. No respiratory distress. She has no wheezes. She has no rales. She exhibits no tenderness. RR increased no accessory muscles  Decrease bs and insp and exp wheezing no crackles   Abdominal: Soft. Bowel sounds are normal. She exhibits no distension. There is no tenderness. Musculoskeletal: Normal range of motion. She exhibits no edema or tenderness. Lymphadenopathy:     She has no cervical adenopathy. Neurological: She is alert and oriented to person, place, and time. She has normal reflexes. No cranial nerve deficit. She exhibits normal muscle tone. Gait normal. Coordination normal. GCS score is 15. Skin: Skin is warm and dry. No rash noted. She is not diaphoretic. No pallor. Psychiatric: Mood, memory, affect and judgment normal.   Nursing note and vitals reviewed. Assessment/Plan     1. COPD exacerbation: will treat   zithromycin and steroids  2. CHF diastolic , not in chf now  Plan : cont same  3. HTN severe controlled, cont same  4.  Chronic back pain : pain not controlled on current  Agreed to increase 75 a month        Raven Love MD

## 2017-11-30 ENCOUNTER — ANTI-COAG VISIT (OUTPATIENT)
Dept: INTERNAL MEDICINE CLINIC | Age: 72
End: 2017-11-30

## 2017-12-11 RX ORDER — PANTOPRAZOLE SODIUM 40 MG/1
TABLET, DELAYED RELEASE ORAL
Qty: 30 TABLET | Refills: 5 | Status: ON HOLD | OUTPATIENT
Start: 2017-12-11 | End: 2017-12-19 | Stop reason: HOSPADM

## 2017-12-11 RX ORDER — CARVEDILOL 6.25 MG/1
TABLET ORAL
Qty: 60 TABLET | Refills: 3 | Status: ON HOLD | OUTPATIENT
Start: 2017-12-11 | End: 2018-02-21 | Stop reason: HOSPADM

## 2017-12-11 RX ORDER — WARFARIN SODIUM 2 MG/1
TABLET ORAL
Qty: 60 TABLET | Refills: 5 | Status: ON HOLD | OUTPATIENT
Start: 2017-12-11 | End: 2017-12-19

## 2017-12-14 ENCOUNTER — OFFICE VISIT (OUTPATIENT)
Age: 72
End: 2017-12-14

## 2017-12-14 VITALS
HEIGHT: 62 IN | SYSTOLIC BLOOD PRESSURE: 134 MMHG | HEART RATE: 62 BPM | OXYGEN SATURATION: 94 % | DIASTOLIC BLOOD PRESSURE: 62 MMHG | BODY MASS INDEX: 26.39 KG/M2 | WEIGHT: 143.38 LBS

## 2017-12-14 DIAGNOSIS — I10 ESSENTIAL HYPERTENSION: ICD-10-CM

## 2017-12-14 DIAGNOSIS — I25.10 CORONARY ARTERY DISEASE INVOLVING NATIVE HEART WITHOUT ANGINA PECTORIS, UNSPECIFIED VESSEL OR LESION TYPE: Primary | ICD-10-CM

## 2017-12-14 DIAGNOSIS — Z95.2 S/P AORTIC VALVE REPLACEMENT: ICD-10-CM

## 2017-12-14 DIAGNOSIS — E78.2 MIXED HYPERLIPIDEMIA: ICD-10-CM

## 2017-12-14 DIAGNOSIS — I50.32 CHRONIC DIASTOLIC HF (HEART FAILURE) (HCC): ICD-10-CM

## 2017-12-14 PROCEDURE — 4004F PT TOBACCO SCREEN RCVD TLK: CPT | Performed by: INTERNAL MEDICINE

## 2017-12-14 PROCEDURE — 4040F PNEUMOC VAC/ADMIN/RCVD: CPT | Performed by: INTERNAL MEDICINE

## 2017-12-14 PROCEDURE — 99212 OFFICE O/P EST SF 10 MIN: CPT | Performed by: INTERNAL MEDICINE

## 2017-12-14 PROCEDURE — G8419 CALC BMI OUT NRM PARAM NOF/U: HCPCS | Performed by: INTERNAL MEDICINE

## 2017-12-14 PROCEDURE — G8598 ASA/ANTIPLAT THER USED: HCPCS | Performed by: INTERNAL MEDICINE

## 2017-12-14 PROCEDURE — 93000 ELECTROCARDIOGRAM COMPLETE: CPT | Performed by: INTERNAL MEDICINE

## 2017-12-14 PROCEDURE — G8484 FLU IMMUNIZE NO ADMIN: HCPCS | Performed by: INTERNAL MEDICINE

## 2017-12-14 PROCEDURE — 3017F COLORECTAL CA SCREEN DOC REV: CPT | Performed by: INTERNAL MEDICINE

## 2017-12-14 PROCEDURE — 1090F PRES/ABSN URINE INCON ASSESS: CPT | Performed by: INTERNAL MEDICINE

## 2017-12-14 PROCEDURE — G8400 PT W/DXA NO RESULTS DOC: HCPCS | Performed by: INTERNAL MEDICINE

## 2017-12-14 PROCEDURE — 3014F SCREEN MAMMO DOC REV: CPT | Performed by: INTERNAL MEDICINE

## 2017-12-14 PROCEDURE — 1123F ACP DISCUSS/DSCN MKR DOCD: CPT | Performed by: INTERNAL MEDICINE

## 2017-12-14 PROCEDURE — G8427 DOCREV CUR MEDS BY ELIG CLIN: HCPCS | Performed by: INTERNAL MEDICINE

## 2017-12-14 NOTE — PROGRESS NOTES
[address]   Office Note            Ms. Khushi Hernandez is a 71 y.o. patient with a past medical history significant for atrial fibrillation, pulmonary hypertension, aortic stenosis and CAD s/p CHILANGO to mid circ 5/2014. She is s/p AVR, PVI and RENETTA exclusion by Dr Marilou Arauz on 6/16. Pre-op angiogram revealed no significant restenosis in the prior stents. She returns to the office today in follow-up. Since we last saw Deboraha Opitz she reports that she is feeling \"a little depressed. \" She has been compensating with eating \"too many cookies. \" She has gained some weight which she is unsure if it is related to the cookies or not. She reports feeling very fatigued, lightheaded and short of breath today. She has resumed smoking again. She continues to work but once she gets back home she has to rest. She is very tearful on presentation and states that she \"feels horrible. \" She believes that she may need to be seen in the ED for further evaluation. She denies any chest pain or shortness of breath at this time. She states she can not lay flat to sleep but never has been able to. She has had no awareness of her heart racing. There is no swelling in her legs. She reports that she is working full time with no restrictions. She is monitoring the salt in her diet. She is not participating in any regular aerobic exercise activity. Current Outpatient Prescriptions   Medication Sig Dispense Refill    pantoprazole (PROTONIX) 40 MG tablet TAKE ONE TABLET BY MOUTH DAILY 30 tablet 5    carvedilol (COREG) 6.25 MG tablet TAKE ONE TABLET BY MOUTH TWO TIMES A DAY WITH MEALS 60 tablet 3    warfarin (COUMADIN) 2 MG tablet TAKE 2 TABLETS BY MOUTH DAILY OR AS DIRECTED. 60 tablet 5    oxyCODONE HCl (OXY-IR) 10 MG immediate release tablet Take 1 tablet by mouth 2 times daily as needed for Pain .  75 tablet 0    azithromycin (ZITHROMAX Z-MAGUE) 250 MG tablet One a day for 10 days 10 tablet 0    predniSONE (DELTASONE) 10 MG tablet 3 tabs bid x 3d then 2 tabs bid x 3 d then 1 tab bid x 3d then 1 tab for 1 d 37 tablet 0    torsemide (DEMADEX) 20 MG tablet TAKE ONE TABLET BY MOUTH TWO TIMES A DAY 60 tablet 5    levothyroxine (SYNTHROID) 75 MCG tablet TAKE 1 TABLET BY MOUTH DAILY FOR THYROID *INSTEAD OF THE 100MCGS* 30 tablet 5    DOCQLACE 100 MG capsule TAKE ONE CAPSULE BY MOUTH TWO TIMES A DAY 60 capsule 5    ferrous gluconate (FERGON) 324 (38 Fe) MG tablet Take 1 tablet by mouth 3 times daily (with meals) 90 tablet 11    atorvastatin (LIPITOR) 80 MG tablet TAKE 1 TABLET BY MOUTH EVERY NIGHT 30 tablet 3    PROAIR  (90 Base) MCG/ACT inhaler INHALE 2 PUFFS INTO THE LUNGS EVERY 6 HOURS AS NEEDED FOR WHEEZING 8.5 g 0    isosorbide mononitrate (IMDUR) 30 MG extended release tablet TAKE ONE TABLET BY MOUTH TWO TIMES A DAY FOR SYSTOLIC BLOOD PRESSURE (TOP NUMBER) OVER 140 60 tablet 11    potassium chloride (KLOR-CON M) 10 MEQ extended release tablet Take 1 tablet by mouth 2 times daily 60 tablet 11    amiodarone (CORDARONE) 200 MG tablet TAKE ONE-HALF TABLET BY MOUTH DAILY 30 tablet 6    cyclobenzaprine (FLEXERIL) 5 MG tablet One po tid for muscle pain/spasm instead of tizanadine 90 tablet 3    aspirin 81 MG tablet Take 1 tablet by mouth daily 30 tablet 0     No current facility-administered medications for this visit. Objective:   Vitals:    12/14/17 1548   BP: 134/62   Site: Left Arm   Position: Sitting   Cuff Size: Large Adult   Pulse: 62   SpO2: 94%   Weight: 143 lb 6 oz (65 kg)   Height: 5' 2\" (1.575 m)     Physical Exam:  General:  Awake, alert, oriented in NAD  Skin:  Warm and dry, color pale  Neck:  Supple.  No JVD or carotid bruits  Chest:  CTA bilaterally, No wheezes/crackles or ronchi  Cardiovascular:  Regular rate, rhythm, M3/W4, + 1/6 Systolic ejection murmur  Abdomen:  Soft, nontender, +bowel sounds  Extremities:  Trace bilateral pedal edema  Neurological: Grossly intact        Lab Results   Component Value Date CHOL 208 08/28/2017    HDL 61 08/28/2017    HDL 42 06/21/2010    TRIG 70 08/28/2017     Lab Results   Component Value Date     08/28/2017     Lab Results   Component Value Date    K 4.1 08/28/2017       Lab Results   Component Value Date    BUN 20 08/28/2017    CREATININE 0.7 08/28/2017     Diagnostics:    EKG 10/22/2015: Sinus bradycardia with LAFB  EKG 5/18/17: Sinus rhythm with PAC, PVC's, LAFB      ECHO 7/30/2016:  Left ventricle size is normal. Normal left ventricular wall thickness. Ejection fraction is visually estimated to be 55-60%. Diastolic filling parameters suggests grade III (restrictive) diastolic dysfunction. The right ventricle appears mildly enlarged. Right ventricular systolic function is normal.  The left atrium is severely dilated. The right atrium is mildly dilated.   At least moderate mitral regurgitation with a central and eccentric jet.   Mild mitral stenosis. BP reported as 171/51 at time of imaging.   A bioprosthetic aortic valve has a maximum velocity of 2.8 m/s and a mean gradient of 17 mmHg. Para-valvular regurgitation. There is moderate tricuspid regurgitation.   Mild pulmonic regurgitation.   Estimated pulmonary artery systolic pressure is 72 mmHg assuming a rightatrial pressure of 10 mmHg.     [facility] 6/15/2015:   1. Right dominant coronary arterial system with mild calcification of the RCA. There is 40% serial lesions in the mid RCA. In the left system there is 25% distal left main disease. There is 50% mid LAD disease and 50% ostial second diagonal branch disease. There is no significant restenosis identified in the prior previously placed mid circumflex stent. 2. Systemic hypertension. Lower Extremity Arterial Studies 5/25/17:  Right Impression   There is an NIKOS of .70 in the DP and .77 in the PT. on the right. This is in   the mild claudication range. Occlusion of the SFA artery in the right lower   extremity.    Left Impression   There is an NIKOS of 1.06 in the DP and 1.08 in the PT. on the left. This is in   the normal range. Elevated velocity of the SFA. Carotid Studies 5/25/17:  Right Impression   The right internal carotid artery appears to have a calcified 50-79% diameter   reducing stenosis based on velocity criteria. Left Impression   The left internal carotid artery appears to have a <50% diameter reducing   stenosis based on velocity criteria.           Assessment/Plan:    1. CAD of native coronary arteries without angina  2. S/p Aortic Valve Replacement with bioprosthesis for nonrheumatic aortic stenosis  3. Chronic Diastolic CHF, class 2  4. Hyperlipidemia with goal LDL <70mg/dL  5. Essential Hypertension    Ms. Srinivas Seay feels strongly that something is not right with her and she feels that she needs to be evaluated in the ED. I see nothing that indicates an acute issue, however, I am in agreement that further workup may be indicated. At this time, I will not make any changes in her medical regimen. I will see her back in the office in follow-up in 6 months.

## 2017-12-14 NOTE — PATIENT INSTRUCTIONS
help certain people lower their risk of a heart attack or stroke. But taking aspirin isn't right for everyone, because it can cause serious bleeding. Do not start taking daily aspirin unless your doctor knows about it. How is coronary artery disease treated? · Your doctor will suggest that you make lifestyle changes. For example, your doctor may ask you to eat healthy foods, quit smoking, lose extra weight, and be more active. · You will have to take medicines. · Your doctor may suggest a procedure to open narrowed or blocked arteries. This is called angioplasty. Or your doctor may suggest using healthy blood vessels to create detours around narrowed or blocked arteries. This is called bypass surgery. Follow-up care is a key part of your treatment and safety. Be sure to make and go to all appointments, and call your doctor if you are having problems. It's also a good idea to know your test results and keep a list of the medicines you take. Where can you learn more? Go to https://Accumulate.[employer]lex. org and sign in to your [employer] account. Enter [phone] in the Inside Social box to learn more about \"Learning About Coronary Artery Disease (CAD). \"     If you do not have an account, please click on the \"Sign Up Now\" link. Current as of: September 21, 2016  Content Version: 11.4  © 3695-2059 Healthwise, Incorporated. Care instructions adapted under license by [facility] ([facility]). If you have questions about a medical condition or this instruction, always ask your healthcare professional. [place] [address] any warranty or liability for your use of this information.

## 2017-12-15 PROBLEM — D50.9 IRON DEFICIENCY ANEMIA: Status: ACTIVE | Noted: 2017-12-15

## 2017-12-16 PROBLEM — D50.0 IRON DEFICIENCY ANEMIA DUE TO CHRONIC BLOOD LOSS: Status: ACTIVE | Noted: 2017-12-15

## 2017-12-18 PROBLEM — J98.11 ATELECTASIS OF LEFT LUNG: Status: ACTIVE | Noted: 2017-12-18

## 2017-12-20 ENCOUNTER — CARE COORDINATION (OUTPATIENT)
Dept: CASE MANAGEMENT | Age: 72
End: 2017-12-20

## 2017-12-20 RX ORDER — AZITHROMYCIN 250 MG/1
TABLET, FILM COATED ORAL
Qty: 1 PACKET | Refills: 0 | Status: SHIPPED | OUTPATIENT
Start: 2017-12-20 | End: 2018-03-19 | Stop reason: SDUPTHER

## 2017-12-20 NOTE — CARE COORDINATION
[facility] [facility] Transitions Initial Follow Up Call    Call within 2 business days of discharge: Yes    Patient: Marylen Cadet Patient : 1945   MRN: [id number]  Reason for Admission: There are no discharge diagnoses documented for the most recent discharge. Discharge Date: 17 RARS: [place]er Risk Score: 26         Facility: [facility] Transitions 24 Hour Call    Do you have all of your prescriptions and are they filled?:  Yes  Care Transitions Interventions         Follow Up : Attempted to reach patient for initial care transition call. Unable to reach patient, left a voice message with my contact information and return call requested. No future appointments.     Ghislaine Jin RN

## 2017-12-21 ENCOUNTER — CARE COORDINATION (OUTPATIENT)
Dept: CASE MANAGEMENT | Age: 72
End: 2017-12-21

## 2017-12-21 NOTE — CARE COORDINATION
[facility] Transitions Initial Follow Up Call    Call within 2 business days of discharge: Yes    Patient: Indira Saldivar Patient : 1945   MRN: <[id number]>  Reason for Admission: anemia  Discharge Date: 17 RARS: Geisinger Risk Score: 32     Spoke with: Kai Clay (the patient)    Facility: [place]    Non-face-to-face services provided:  Obtained and reviewed discharge summary and/or continuity of care documents      Care Transitions 24 Hour Call    Schedule Follow Up Appointment with PCP:  Completed  Do you have any ongoing symptoms?:  No  Do you have a copy of your discharge instructions?:  Yes  Do you have all of your prescriptions and are they filled?:  Yes  Have you been contacted by a [facility] Pharmacist?:  No  Have you scheduled your follow up appointment?:  Yes  How are you going to get to your appointment?:  Car - drive self  Were you discharged with any Home Care or Post Acute Services:  No  Do you feel like you have everything you need to keep you well at home?:  Yes  Care Transitions Interventions  No Identified Needs       Reached patient on first attempt. She asked me to call back later because she is driving. Reached patient later per her request. Reports she is \"95% better\". Denies s/s bleeding (all reviewed with her). Denies SOB. Her UE is not as painful but still sore, red, hard. She is applying ice and taking pain med prn. She is able to state all the changes to her medications and the rational for it. Her PCP called her after her discharge and notified her to decrease her KCl to one tab/day. She has a scale but has not weighed daily but says she will start. Without prompting, she is able to state the rationale and weight gain parameters, low sodium diet and fluid restriction. She admits to having h/o ignoring symptoms and hoping they'll go away. We discussed that she has plan now with PCP to monitor h/h monthly and she is able to state s/s to report.  She was informed that iron and blood transfusions can be scheduled outpatient to avoid ED/hospital visits. She is thankful for this. Denies needs during this call. Sees GI on 12/28/2017. Follow Up  No future appointments.     Daria Chung RN

## 2017-12-28 DIAGNOSIS — E78.00 HYPERCHOLESTEROLEMIA: ICD-10-CM

## 2017-12-28 DIAGNOSIS — D50.0 IRON DEFICIENCY ANEMIA DUE TO CHRONIC BLOOD LOSS: ICD-10-CM

## 2017-12-28 DIAGNOSIS — I48.0 PAROXYSMAL ATRIAL FIBRILLATION (HCC): ICD-10-CM

## 2017-12-28 LAB
A/G RATIO: 1.7 (ref 1.1–2.2)
ALBUMIN SERPL-MCNC: 3.9 G/DL (ref 3.4–5)
ALP BLD-CCNC: 177 U/L (ref 40–129)
ALT SERPL-CCNC: 44 U/L (ref 10–40)
ANION GAP SERPL CALCULATED.3IONS-SCNC: 15 MMOL/L (ref 3–16)
AST SERPL-CCNC: 23 U/L (ref 15–37)
BASOPHILS ABSOLUTE: 0 K/UL (ref 0–0.2)
BASOPHILS RELATIVE PERCENT: 0.7 %
BILIRUB SERPL-MCNC: <0.2 MG/DL (ref 0–1)
BUN BLDV-MCNC: 13 MG/DL (ref 7–20)
CALCIUM SERPL-MCNC: 8.8 MG/DL (ref 8.3–10.6)
CHLORIDE BLD-SCNC: 100 MMOL/L (ref 99–110)
CHOLESTEROL, TOTAL: 194 MG/DL (ref 0–199)
CO2: 28 MMOL/L (ref 21–32)
CREAT SERPL-MCNC: 0.8 MG/DL (ref 0.6–1.2)
EOSINOPHILS ABSOLUTE: 0.3 K/UL (ref 0–0.6)
EOSINOPHILS RELATIVE PERCENT: 5.4 %
GFR AFRICAN AMERICAN: >60
GFR NON-AFRICAN AMERICAN: >60
GLOBULIN: 2.3 G/DL
GLUCOSE BLD-MCNC: 103 MG/DL (ref 70–99)
HCT VFR BLD CALC: 35.4 % (ref 36–48)
HDLC SERPL-MCNC: 43 MG/DL (ref 40–60)
HEMOGLOBIN: 11.5 G/DL (ref 12–16)
INR BLD: 1.11 (ref 0.85–1.15)
LDL CHOLESTEROL CALCULATED: 132 MG/DL
LYMPHOCYTES ABSOLUTE: 1.5 K/UL (ref 1–5.1)
LYMPHOCYTES RELATIVE PERCENT: 23.3 %
MCH RBC QN AUTO: 31.8 PG (ref 26–34)
MCHC RBC AUTO-ENTMCNC: 32.5 G/DL (ref 31–36)
MCV RBC AUTO: 97.7 FL (ref 80–100)
MONOCYTES ABSOLUTE: 0.9 K/UL (ref 0–1.3)
MONOCYTES RELATIVE PERCENT: 13.3 %
NEUTROPHILS ABSOLUTE: 3.7 K/UL (ref 1.7–7.7)
NEUTROPHILS RELATIVE PERCENT: 57.3 %
PDW BLD-RTO: 15.9 % (ref 12.4–15.4)
PLATELET # BLD: 330 K/UL (ref 135–450)
PMV BLD AUTO: 9.2 FL (ref 5–10.5)
POTASSIUM SERPL-SCNC: 4.2 MMOL/L (ref 3.5–5.1)
PROTHROMBIN TIME: 12.5 SEC (ref 9.6–13)
RBC # BLD: 3.62 M/UL (ref 4–5.2)
SODIUM BLD-SCNC: 143 MMOL/L (ref 136–145)
TOTAL PROTEIN: 6.2 G/DL (ref 6.4–8.2)
TRIGL SERPL-MCNC: 93 MG/DL (ref 0–150)
TSH SERPL DL<=0.05 MIU/L-ACNC: 2.68 UIU/ML (ref 0.27–4.2)
VLDLC SERPL CALC-MCNC: 19 MG/DL
WBC # BLD: 6.4 K/UL (ref 4–11)

## 2017-12-29 ENCOUNTER — ANTI-COAG VISIT (OUTPATIENT)
Dept: INTERNAL MEDICINE CLINIC | Age: 72
End: 2017-12-29

## 2018-01-02 ENCOUNTER — OFFICE VISIT (OUTPATIENT)
Dept: INTERNAL MEDICINE CLINIC | Age: 73
End: 2018-01-02

## 2018-01-02 VITALS
TEMPERATURE: 98.1 F | OXYGEN SATURATION: 96 % | HEART RATE: 61 BPM | HEIGHT: 62 IN | SYSTOLIC BLOOD PRESSURE: 160 MMHG | DIASTOLIC BLOOD PRESSURE: 62 MMHG | BODY MASS INDEX: 26.06 KG/M2 | WEIGHT: 141.6 LBS

## 2018-01-02 DIAGNOSIS — J44.0 COPD (CHRONIC OBSTRUCTIVE PULMONARY DISEASE) WITH ACUTE BRONCHITIS (HCC): ICD-10-CM

## 2018-01-02 DIAGNOSIS — I50.22 CHRONIC SYSTOLIC CONGESTIVE HEART FAILURE (HCC): ICD-10-CM

## 2018-01-02 DIAGNOSIS — Q27.30 AVM (ARTERIOVENOUS MALFORMATION): ICD-10-CM

## 2018-01-02 DIAGNOSIS — R06.09 DOE (DYSPNEA ON EXERTION): Primary | ICD-10-CM

## 2018-01-02 DIAGNOSIS — I73.9 PERIPHERAL VASCULAR DISEASE (HCC): ICD-10-CM

## 2018-01-02 DIAGNOSIS — I48.20 CHRONIC ATRIAL FIBRILLATION (HCC): ICD-10-CM

## 2018-01-02 DIAGNOSIS — I71.20 THORACIC AORTIC ANEURYSM WITHOUT RUPTURE: ICD-10-CM

## 2018-01-02 DIAGNOSIS — J20.9 COPD (CHRONIC OBSTRUCTIVE PULMONARY DISEASE) WITH ACUTE BRONCHITIS (HCC): ICD-10-CM

## 2018-01-02 PROCEDURE — 3014F SCREEN MAMMO DOC REV: CPT | Performed by: INTERNAL MEDICINE

## 2018-01-02 PROCEDURE — 1111F DSCHRG MED/CURRENT MED MERGE: CPT | Performed by: INTERNAL MEDICINE

## 2018-01-02 PROCEDURE — 1090F PRES/ABSN URINE INCON ASSESS: CPT | Performed by: INTERNAL MEDICINE

## 2018-01-02 PROCEDURE — G8926 SPIRO NO PERF OR DOC: HCPCS | Performed by: INTERNAL MEDICINE

## 2018-01-02 PROCEDURE — 4040F PNEUMOC VAC/ADMIN/RCVD: CPT | Performed by: INTERNAL MEDICINE

## 2018-01-02 PROCEDURE — 3017F COLORECTAL CA SCREEN DOC REV: CPT | Performed by: INTERNAL MEDICINE

## 2018-01-02 PROCEDURE — G8598 ASA/ANTIPLAT THER USED: HCPCS | Performed by: INTERNAL MEDICINE

## 2018-01-02 PROCEDURE — 99214 OFFICE O/P EST MOD 30 MIN: CPT | Performed by: INTERNAL MEDICINE

## 2018-01-02 PROCEDURE — 1123F ACP DISCUSS/DSCN MKR DOCD: CPT | Performed by: INTERNAL MEDICINE

## 2018-01-02 PROCEDURE — 4004F PT TOBACCO SCREEN RCVD TLK: CPT | Performed by: INTERNAL MEDICINE

## 2018-01-02 PROCEDURE — G8400 PT W/DXA NO RESULTS DOC: HCPCS | Performed by: INTERNAL MEDICINE

## 2018-01-02 PROCEDURE — G8427 DOCREV CUR MEDS BY ELIG CLIN: HCPCS | Performed by: INTERNAL MEDICINE

## 2018-01-02 PROCEDURE — 93000 ELECTROCARDIOGRAM COMPLETE: CPT | Performed by: INTERNAL MEDICINE

## 2018-01-02 PROCEDURE — 3023F SPIROM DOC REV: CPT | Performed by: INTERNAL MEDICINE

## 2018-01-02 PROCEDURE — G8419 CALC BMI OUT NRM PARAM NOF/U: HCPCS | Performed by: INTERNAL MEDICINE

## 2018-01-02 PROCEDURE — G8484 FLU IMMUNIZE NO ADMIN: HCPCS | Performed by: INTERNAL MEDICINE

## 2018-01-02 RX ORDER — ESCITALOPRAM OXALATE 10 MG/1
TABLET ORAL
Qty: 30 TABLET | Refills: 3 | Status: SHIPPED | OUTPATIENT
Start: 2018-01-02 | End: 2018-07-16

## 2018-01-02 RX ORDER — OXYCODONE HYDROCHLORIDE 10 MG/1
10 TABLET ORAL 2 TIMES DAILY PRN
Qty: 75 TABLET | Refills: 0 | Status: SHIPPED | OUTPATIENT
Start: 2018-01-02 | End: 2018-02-02 | Stop reason: SDUPTHER

## 2018-01-02 ASSESSMENT — ENCOUNTER SYMPTOMS: BACK PAIN: 1

## 2018-01-02 NOTE — PROGRESS NOTES
left    Sacral fracture, closed ([address] 2014    Syncope 5/2014     Past Surgical History:   Procedure Laterality Date    AORTIC VALVE REPLACEMENT  6/16/15    Dr. Venecia Randle. Hernandez - PVI, RENETTA exclusion. 19mm Maki pericardial PFAFFING    APPENDECTOMY      CYSTOSCOPY  Feb 2014    dr Navi Queen      THR Right    UPPER GASTROINTESTINAL ENDOSCOPY  12/15/2017     Social History   Substance Use Topics    Smoking status: Current Some Day Smoker     Packs/day: 0.25     Years: 45.00     Types: Cigarettes     Last attempt to quit: 6/11/2015    Smokeless tobacco: Current User    Alcohol use No      Comment: Socially      Family History   Problem Relation Age of Onset    Breast Cancer Daughter           Review of Systems   Cardiovascular: Positive for palpitations. Musculoskeletal: Positive for back pain. Psychiatric/Behavioral: Positive for depression. She has been more sad, crying more when she doesn't feel good   All other systems reviewed and are negative. pain meds not helping for longer than 3 years    Objective:   Physical Exam   Constitutional: She is oriented to person, place, and time and well-developed, well-nourished, and in no distress. No distress. HENT:   Head: Normocephalic and atraumatic. Right Ear: External ear normal.   Left Ear: External ear normal.   Nose: Nose normal.   Mouth/Throat: No oropharyngeal exudate. Eyes: Conjunctivae and EOM are normal. Pupils are equal, round, and reactive to light. Right eye exhibits no discharge. Left eye exhibits no discharge. No scleral icterus. Neck: Normal range of motion. Neck supple. No JVD present. No tracheal deviation present. No thyromegaly present. Cardiovascular: Normal rate, regular rhythm and normal heart sounds. Exam reveals no gallop. No murmur heard. Regularly irregular, pos sys and mayers murmur   Pulmonary/Chest: Effort normal and breath sounds normal. No stridor. No respiratory distress. She has no wheezes.  She has no rales. She exhibits no tenderness. Abdominal: Soft. Bowel sounds are normal. She exhibits no distension. There is no tenderness. Musculoskeletal: Normal range of motion. She exhibits no edema or tenderness. Lymphadenopathy:     She has no cervical adenopathy. Neurological: She is alert and oriented to person, place, and time. She has normal reflexes. No cranial nerve deficit. She exhibits normal muscle tone. Gait normal. Coordination normal. GCS score is 15. Skin: Skin is warm and dry. No rash noted. She is not diaphoretic. No pallor. Psychiatric: Mood, memory, affect and judgment normal.   Nursing note and vitals reviewed. Assessment/Plan     1. Increase HOLLOWAY and SOB:  May be due to arrhythmia,due for repeat Psychiatric holter  Plan : check EKG to determine current rhythm  2. AVMs last cbc good : cont to follow closely  3. History Afib. Coumadin been too low, follow inr closely  4.  Depression:  Seems to be getting depressed, lexapro 5mg try          Melba Caldwell MD

## 2018-01-03 ENCOUNTER — CARE COORDINATION (OUTPATIENT)
Dept: CASE MANAGEMENT | Age: 73
End: 2018-01-03

## 2018-01-04 ENCOUNTER — TELEPHONE (OUTPATIENT)
Dept: CARDIOLOGY CLINIC | Age: 73
End: 2018-01-04

## 2018-01-04 NOTE — TELEPHONE ENCOUNTER
Patient feels fine at the moment. Would like to take an extra Torsemide for the next 2 days ( she has done this in the past) I adv that is fine, however if continues to have swelling needs to call.

## 2018-01-08 ENCOUNTER — HOSPITAL ENCOUNTER (OUTPATIENT)
Dept: NON INVASIVE DIAGNOSTICS | Age: 73
Discharge: HOME | End: 2018-01-08
Attending: INTERNAL MEDICINE | Admitting: INTERNAL MEDICINE

## 2018-01-08 DIAGNOSIS — R06.09 OTHER FORMS OF DYSPNEA: ICD-10-CM

## 2018-01-08 LAB
LV EF: 60 %
LVEF MODALITY: NORMAL

## 2018-01-15 ENCOUNTER — TELEPHONE (OUTPATIENT)
Dept: INTERNAL MEDICINE CLINIC | Age: 73
End: 2018-01-15

## 2018-01-15 ENCOUNTER — TELEPHONE (OUTPATIENT)
Dept: CARDIOLOGY CLINIC | Age: 73
End: 2018-01-15

## 2018-01-15 DIAGNOSIS — I50.32 CHRONIC DIASTOLIC CHF (CONGESTIVE HEART FAILURE), NYHA CLASS 2 (HCC): Primary | ICD-10-CM

## 2018-01-15 LAB
ACQUISITION DURATION: NORMAL S
AVERAGE HEART RATE: 44 BPM
EKG DIAGNOSIS: NORMAL
FASTEST SUPRAVENTRICULAR RATE: 76 BPM
FASTEST VENTRICULAR RATE: 126 BPM
HOLTER MAX HEART RATE: 68 BPM
HOOKUP DATE: NORMAL
HOOKUP TIME: NORMAL
LONGEST SUPRAVENTRICULAR RATE: 61 BPM
LONGEST VE RUN RATE: 126 BPM
Lab: NORMAL
MAX HEART RATE TIME/DATE: NORMAL
MIN HEART RATE TIME/DATE: NORMAL
MIN HEART RATE: 38 BPM
NUMBER OF FASTEST SUPRAVENTRICULAR BEATS: 3
NUMBER OF FASTEST VENTRICULAR BEATS: 3
NUMBER OF LONGEST SUPRAVENTRICULAR BEATS: 3
NUMBER OF LONGEST VENTRICULAR BEATS: 3
NUMBER OF QRS COMPLEXES: NORMAL
NUMBER OF SUPRAVENTRICULAR BEATS IN RUNS: 69
NUMBER OF SUPRAVENTRICULAR COUPLETS: 209
NUMBER OF SUPRAVENTRICULAR ECTOPICS: 1642
NUMBER OF SUPRAVENTRICULAR ISOLATED BEATS: 1152
NUMBER OF SUPRAVENTRICULAR RUNS: 23
NUMBER OF VENTRICULAR BEATS IN RUNS: 3
NUMBER OF VENTRICULAR BIGEMINAL CYCLES: 1323
NUMBER OF VENTRICULAR COUPLETS: 74
NUMBER OF VENTRICULAR ECTOPICS: NORMAL
NUMBER OF VENTRICULAR ISOLATED BEATS: NORMAL
NUMBER OF VENTRICULAR RUNS: 1

## 2018-01-15 NOTE — TELEPHONE ENCOUNTER
I would have Shonda increase her morning torsemide to 40mg for the next three days and keep the evening at 20mg daily. She should resume 20mg bid thereafter. She is hypertensive and this will make her mitral regurgitation worse. She should be seen by either myself or Jeremy Busby in the office in the next week or so for this and repeat BMP/BNP.

## 2018-01-15 NOTE — TELEPHONE ENCOUNTER
Dr. Ni Tejada     Patient had echo done 1/8/18: Summary   Normal LV size and systolic function. Estimated ejection fraction is 60%. Moderate to severe mitral regurgitation is present. Severely dilated left atrium. Normally functioning bioprosthetic valve in aortic position. Trivial   regurgitation noted. The right ventricle is mildly enlarged. Moderate tricuspid regurgitation. Doppler derived PA systolic pressure is 52 mm Hg suggestive of moderate   pulmonary hypertension. Saw Dr. Manolo Clark in office 1/2/18 and stated she was having increased shortness of breath with exertion. Dr. Manolo Clark called patient and informed her of moderate to severe MR seen on echo. Chaney Meigs would like to know what if anything you would like to do at this point.

## 2018-01-18 DIAGNOSIS — I50.32 CHRONIC DIASTOLIC CHF (CONGESTIVE HEART FAILURE), NYHA CLASS 2 (HCC): ICD-10-CM

## 2018-01-18 LAB
ANION GAP SERPL CALCULATED.3IONS-SCNC: 15 MMOL/L (ref 3–16)
BUN BLDV-MCNC: 14 MG/DL (ref 7–20)
CALCIUM SERPL-MCNC: 9.1 MG/DL (ref 8.3–10.6)
CHLORIDE BLD-SCNC: 98 MMOL/L (ref 99–110)
CO2: 28 MMOL/L (ref 21–32)
CREAT SERPL-MCNC: 0.9 MG/DL (ref 0.6–1.2)
GFR AFRICAN AMERICAN: >60
GFR NON-AFRICAN AMERICAN: >60
GLUCOSE BLD-MCNC: 104 MG/DL (ref 70–99)
POTASSIUM SERPL-SCNC: 4 MMOL/L (ref 3.5–5.1)
PRO-BNP: 849 PG/ML (ref 0–124)
SODIUM BLD-SCNC: 141 MMOL/L (ref 136–145)

## 2018-01-18 NOTE — TELEPHONE ENCOUNTER
Called [place] [facility] and left a message to remind her to complete labs today and of her appointment with Dr. Slim Hurd tomorrow 1/19/18 at 10:15am.

## 2018-01-19 ENCOUNTER — OFFICE VISIT (OUTPATIENT)
Dept: CARDIOLOGY CLINIC | Age: 73
End: 2018-01-19

## 2018-01-19 VITALS
WEIGHT: 133 LBS | BODY MASS INDEX: 24.48 KG/M2 | DIASTOLIC BLOOD PRESSURE: 80 MMHG | OXYGEN SATURATION: 95 % | HEIGHT: 62 IN | HEART RATE: 52 BPM | SYSTOLIC BLOOD PRESSURE: 138 MMHG

## 2018-01-19 DIAGNOSIS — I34.0 NON-RHEUMATIC MITRAL REGURGITATION: ICD-10-CM

## 2018-01-19 DIAGNOSIS — I25.10 CORONARY ARTERY DISEASE INVOLVING NATIVE CORONARY ARTERY OF NATIVE HEART WITHOUT ANGINA PECTORIS: ICD-10-CM

## 2018-01-19 DIAGNOSIS — I50.32 CHRONIC DIASTOLIC CHF (CONGESTIVE HEART FAILURE), NYHA CLASS 2 (HCC): Primary | ICD-10-CM

## 2018-01-19 DIAGNOSIS — I10 ESSENTIAL HYPERTENSION: ICD-10-CM

## 2018-01-19 DIAGNOSIS — E78.5 HYPERLIPIDEMIA LDL GOAL <70: ICD-10-CM

## 2018-01-19 DIAGNOSIS — I50.32 CHRONIC DIASTOLIC CHF (CONGESTIVE HEART FAILURE), NYHA CLASS 2 (HCC): ICD-10-CM

## 2018-01-19 DIAGNOSIS — Z95.2 S/P AORTIC VALVE REPLACEMENT: ICD-10-CM

## 2018-01-19 LAB
ANION GAP SERPL CALCULATED.3IONS-SCNC: 15 MMOL/L (ref 3–16)
BUN BLDV-MCNC: 15 MG/DL (ref 7–20)
CALCIUM SERPL-MCNC: 9.2 MG/DL (ref 8.3–10.6)
CHLORIDE BLD-SCNC: 100 MMOL/L (ref 99–110)
CO2: 28 MMOL/L (ref 21–32)
CREAT SERPL-MCNC: 0.8 MG/DL (ref 0.6–1.2)
GFR AFRICAN AMERICAN: >60
GFR NON-AFRICAN AMERICAN: >60
GLUCOSE BLD-MCNC: 113 MG/DL (ref 70–99)
HCT VFR BLD CALC: 41.1 % (ref 36–48)
HEMOGLOBIN: 13.8 G/DL (ref 12–16)
INR BLD: 2.82 (ref 0.85–1.15)
MAGNESIUM: 2.4 MG/DL (ref 1.8–2.4)
POTASSIUM SERPL-SCNC: 3.8 MMOL/L (ref 3.5–5.1)
PROTHROMBIN TIME: 31.9 SEC (ref 9.6–13)
SODIUM BLD-SCNC: 143 MMOL/L (ref 136–145)

## 2018-01-19 PROCEDURE — G8427 DOCREV CUR MEDS BY ELIG CLIN: HCPCS | Performed by: INTERNAL MEDICINE

## 2018-01-19 PROCEDURE — 4040F PNEUMOC VAC/ADMIN/RCVD: CPT | Performed by: INTERNAL MEDICINE

## 2018-01-19 PROCEDURE — G8400 PT W/DXA NO RESULTS DOC: HCPCS | Performed by: INTERNAL MEDICINE

## 2018-01-19 PROCEDURE — 3017F COLORECTAL CA SCREEN DOC REV: CPT | Performed by: INTERNAL MEDICINE

## 2018-01-19 PROCEDURE — G8598 ASA/ANTIPLAT THER USED: HCPCS | Performed by: INTERNAL MEDICINE

## 2018-01-19 PROCEDURE — 1090F PRES/ABSN URINE INCON ASSESS: CPT | Performed by: INTERNAL MEDICINE

## 2018-01-19 PROCEDURE — 99214 OFFICE O/P EST MOD 30 MIN: CPT | Performed by: INTERNAL MEDICINE

## 2018-01-19 PROCEDURE — G8420 CALC BMI NORM PARAMETERS: HCPCS | Performed by: INTERNAL MEDICINE

## 2018-01-19 PROCEDURE — G8484 FLU IMMUNIZE NO ADMIN: HCPCS | Performed by: INTERNAL MEDICINE

## 2018-01-19 PROCEDURE — 3014F SCREEN MAMMO DOC REV: CPT | Performed by: INTERNAL MEDICINE

## 2018-01-19 PROCEDURE — 1123F ACP DISCUSS/DSCN MKR DOCD: CPT | Performed by: INTERNAL MEDICINE

## 2018-01-19 PROCEDURE — 4004F PT TOBACCO SCREEN RCVD TLK: CPT | Performed by: INTERNAL MEDICINE

## 2018-01-19 RX ORDER — AMIODARONE HYDROCHLORIDE 200 MG/1
200 TABLET ORAL DAILY
Qty: 30 TABLET | Refills: 6 | Status: SHIPPED | OUTPATIENT
Start: 2018-01-19 | End: 2018-08-21 | Stop reason: SDUPTHER

## 2018-01-19 NOTE — PROGRESS NOTES
01/18/2018    CREATININE 0.9 01/18/2018 12/28/17: TSH and LFT's within normal limits      Diagnostics:    EKG 10/22/2015: Sinus bradycardia with LAFB  EKG 5/18/17: Sinus rhythm with PAC, PVC's, LAFB      ECHO 7/30/2016:  Left ventricle size is normal. Normal left ventricular wall thickness. Ejection fraction is visually estimated to be 55-60%. Diastolic filling parameters suggests grade III (restrictive) diastolic dysfunction. The right ventricle appears mildly enlarged. Right ventricular systolic function is normal.  The left atrium is severely dilated. The right atrium is mildly dilated.   At least moderate mitral regurgitation with a central and eccentric jet.   Mild mitral stenosis. BP reported as 171/51 at time of imaging.   A bioprosthetic aortic valve has a maximum velocity of 2.8 m/s and a mean gradient of 17 mmHg. Para-valvular regurgitation. There is moderate tricuspid regurgitation.   Mild pulmonic regurgitation.   Estimated pulmonary artery systolic pressure is 72 mmHg assuming a rightatrial pressure of 10 mmHg.     [facility] 6/15/2015:   1. Right dominant coronary arterial system with mild calcification of the RCA. There is 40% serial lesions in the mid RCA. In the left system there is 25% distal left main disease. There is 50% mid LAD disease and 50% ostial second diagonal branch disease. There is no significant restenosis identified in the prior previously placed mid circumflex stent. 2. Systemic hypertension. Lower Extremity Arterial Studies 5/25/17:  Right Impression   There is an NIKOS of .70 in the DP and .77 in the PT. on the right. This is in   the mild claudication range. Occlusion of the SFA artery in the right lower   extremity. Left Impression   There is an NIKOS of 1.06 in the DP and 1.08 in the PT. on the left. This is in   the normal range. Elevated velocity of the SFA.      Carotid Studies 5/25/17:  Right Impression   The right internal carotid artery appears to have a calcified also have other routine labs completed today. Given the palpitations she notices, I will increase her amiodarone to 200 mg daily. Her mitral regurgitation has probably not worsened as the echo suggests but represents fluid overload and hypertension. The degree of regurgitation is likely better now. Her dry weight should be <135 pounds. I will see her in office for follow up in 6-8 weeks.

## 2018-02-02 ENCOUNTER — TELEPHONE (OUTPATIENT)
Dept: INTERNAL MEDICINE CLINIC | Age: 73
End: 2018-02-02

## 2018-02-02 DIAGNOSIS — R06.09 DOE (DYSPNEA ON EXERTION): ICD-10-CM

## 2018-02-02 RX ORDER — OXYCODONE HYDROCHLORIDE 10 MG/1
10 TABLET ORAL 2 TIMES DAILY PRN
Qty: 75 TABLET | Refills: 0 | Status: SHIPPED | OUTPATIENT
Start: 2018-02-02 | End: 2018-03-08 | Stop reason: SDUPTHER

## 2018-02-02 NOTE — TELEPHONE ENCOUNTER
Pt is requesting a written script for her pain med listed below, pt can be reached at [phone] when script is ready, pt states she has to pick it up at the office.     oxyCODONE HCl (OXY-IR) 10 MG immediate release tablet Take 1 tablet by mouth 2 times daily as needed for Pain          LOV 1/2/18

## 2018-02-18 PROBLEM — K62.5 BRIGHT RED RECTAL BLEEDING: Status: ACTIVE | Noted: 2018-02-18

## 2018-02-18 PROBLEM — K62.5 RECTAL BLEEDING: Status: ACTIVE | Noted: 2018-02-18

## 2018-02-18 PROBLEM — D62 ACUTE BLOOD LOSS ANEMIA: Status: ACTIVE | Noted: 2018-02-18

## 2018-02-22 ENCOUNTER — CARE COORDINATION (OUTPATIENT)
Dept: CASE MANAGEMENT | Age: 73
End: 2018-02-22

## 2018-02-22 DIAGNOSIS — K92.2 ACUTE LOWER GASTROINTESTINAL BLEEDING: Primary | ICD-10-CM

## 2018-02-22 PROCEDURE — 1111F DSCHRG MED/CURRENT MED MERGE: CPT

## 2018-02-27 ENCOUNTER — CARE COORDINATION (OUTPATIENT)
Dept: CASE MANAGEMENT | Age: 73
End: 2018-02-27

## 2018-02-28 ENCOUNTER — TELEPHONE (OUTPATIENT)
Dept: INTERNAL MEDICINE CLINIC | Age: 73
End: 2018-02-28

## 2018-02-28 NOTE — TELEPHONE ENCOUNTER
Patient is calling regarding insurance will not cover inhalers ALBUTEROL and ATROVENT that was ordered. Patient is checking with [facility] if they have inhalers that her insurance will cover that are less expensive. Patient is seeing if Dr. Autumn Hicks have any inhalers in office ( not samples)    [id number] [facility] [place] [address]osey     Please advise: [phone]

## 2018-03-08 DIAGNOSIS — R06.09 DOE (DYSPNEA ON EXERTION): ICD-10-CM

## 2018-03-08 RX ORDER — OXYCODONE HYDROCHLORIDE 10 MG/1
10 TABLET ORAL 2 TIMES DAILY PRN
Qty: 75 TABLET | Refills: 0 | Status: SHIPPED | OUTPATIENT
Start: 2018-03-08 | End: 2018-03-09 | Stop reason: SDUPTHER

## 2018-03-09 ENCOUNTER — TELEPHONE (OUTPATIENT)
Dept: INTERNAL MEDICINE CLINIC | Age: 73
End: 2018-03-09

## 2018-03-09 DIAGNOSIS — R06.09 DOE (DYSPNEA ON EXERTION): ICD-10-CM

## 2018-03-09 RX ORDER — OXYCODONE HYDROCHLORIDE 10 MG/1
TABLET ORAL
Qty: 75 TABLET | Refills: 0 | Status: SHIPPED | OUTPATIENT
Start: 2018-03-09 | End: 2018-04-10 | Stop reason: SDUPTHER

## 2018-03-09 NOTE — TELEPHONE ENCOUNTER
[facility] called patient they will not refill  Oxycodone  10 mg prescription   Pharmacy states it reads incorrectly  It should read 2 or 3 times a day as needed    Please advise

## 2018-03-15 ENCOUNTER — OFFICE VISIT (OUTPATIENT)
Age: 73
End: 2018-03-15

## 2018-03-15 VITALS
DIASTOLIC BLOOD PRESSURE: 50 MMHG | BODY MASS INDEX: 24.84 KG/M2 | HEART RATE: 47 BPM | HEIGHT: 62 IN | WEIGHT: 135 LBS | OXYGEN SATURATION: 98 % | SYSTOLIC BLOOD PRESSURE: 130 MMHG

## 2018-03-15 DIAGNOSIS — E78.00 HYPERCHOLESTEROLEMIA: ICD-10-CM

## 2018-03-15 DIAGNOSIS — E78.5 HYPERLIPIDEMIA LDL GOAL <70: ICD-10-CM

## 2018-03-15 DIAGNOSIS — I10 ESSENTIAL HYPERTENSION: ICD-10-CM

## 2018-03-15 DIAGNOSIS — Z95.2 S/P AVR (AORTIC VALVE REPLACEMENT): ICD-10-CM

## 2018-03-15 DIAGNOSIS — I25.10 CORONARY ARTERY DISEASE INVOLVING NATIVE CORONARY ARTERY OF NATIVE HEART WITHOUT ANGINA PECTORIS: Primary | ICD-10-CM

## 2018-03-15 DIAGNOSIS — I50.32 CHRONIC DIASTOLIC CHF (CONGESTIVE HEART FAILURE), NYHA CLASS 2 (HCC): ICD-10-CM

## 2018-03-15 DIAGNOSIS — I48.0 PAROXYSMAL ATRIAL FIBRILLATION (HCC): ICD-10-CM

## 2018-03-15 DIAGNOSIS — D50.0 IRON DEFICIENCY ANEMIA DUE TO CHRONIC BLOOD LOSS: ICD-10-CM

## 2018-03-15 LAB
A/G RATIO: 1.9 (ref 1.1–2.2)
ALBUMIN SERPL-MCNC: 4.3 G/DL (ref 3.4–5)
ALP BLD-CCNC: 82 U/L (ref 40–129)
ALT SERPL-CCNC: 11 U/L (ref 10–40)
ANION GAP SERPL CALCULATED.3IONS-SCNC: 15 MMOL/L (ref 3–16)
AST SERPL-CCNC: 17 U/L (ref 15–37)
BASOPHILS ABSOLUTE: 0.1 K/UL (ref 0–0.2)
BASOPHILS RELATIVE PERCENT: 1.3 %
BILIRUB SERPL-MCNC: 0.3 MG/DL (ref 0–1)
BUN BLDV-MCNC: 21 MG/DL (ref 7–20)
CALCIUM SERPL-MCNC: 9.2 MG/DL (ref 8.3–10.6)
CHLORIDE BLD-SCNC: 100 MMOL/L (ref 99–110)
CHOLESTEROL, TOTAL: 263 MG/DL (ref 0–199)
CO2: 27 MMOL/L (ref 21–32)
CREAT SERPL-MCNC: 1 MG/DL (ref 0.6–1.2)
EOSINOPHILS ABSOLUTE: 0.8 K/UL (ref 0–0.6)
EOSINOPHILS RELATIVE PERCENT: 10.8 %
GFR AFRICAN AMERICAN: >60
GFR NON-AFRICAN AMERICAN: 54
GLOBULIN: 2.3 G/DL
GLUCOSE BLD-MCNC: 112 MG/DL (ref 70–99)
HCT VFR BLD CALC: 38.2 % (ref 36–48)
HDLC SERPL-MCNC: 64 MG/DL (ref 40–60)
HEMOGLOBIN: 12.8 G/DL (ref 12–16)
LDL CHOLESTEROL CALCULATED: 180 MG/DL
LYMPHOCYTES ABSOLUTE: 1.7 K/UL (ref 1–5.1)
LYMPHOCYTES RELATIVE PERCENT: 23.2 %
MCH RBC QN AUTO: 31.2 PG (ref 26–34)
MCHC RBC AUTO-ENTMCNC: 33.6 G/DL (ref 31–36)
MCV RBC AUTO: 92.9 FL (ref 80–100)
MONOCYTES ABSOLUTE: 0.9 K/UL (ref 0–1.3)
MONOCYTES RELATIVE PERCENT: 12.3 %
NEUTROPHILS ABSOLUTE: 3.8 K/UL (ref 1.7–7.7)
NEUTROPHILS RELATIVE PERCENT: 52.4 %
PDW BLD-RTO: 15.7 % (ref 12.4–15.4)
PLATELET # BLD: 240 K/UL (ref 135–450)
PMV BLD AUTO: 9.1 FL (ref 5–10.5)
POTASSIUM SERPL-SCNC: 4.7 MMOL/L (ref 3.5–5.1)
RBC # BLD: 4.11 M/UL (ref 4–5.2)
SODIUM BLD-SCNC: 142 MMOL/L (ref 136–145)
TOTAL PROTEIN: 6.6 G/DL (ref 6.4–8.2)
TRIGL SERPL-MCNC: 97 MG/DL (ref 0–150)
TSH SERPL DL<=0.05 MIU/L-ACNC: 4.75 UIU/ML (ref 0.27–4.2)
VLDLC SERPL CALC-MCNC: 19 MG/DL
WBC # BLD: 7.2 K/UL (ref 4–11)

## 2018-03-15 PROCEDURE — 93000 ELECTROCARDIOGRAM COMPLETE: CPT | Performed by: INTERNAL MEDICINE

## 2018-03-15 PROCEDURE — 3017F COLORECTAL CA SCREEN DOC REV: CPT | Performed by: INTERNAL MEDICINE

## 2018-03-15 PROCEDURE — 4004F PT TOBACCO SCREEN RCVD TLK: CPT | Performed by: INTERNAL MEDICINE

## 2018-03-15 PROCEDURE — 1111F DSCHRG MED/CURRENT MED MERGE: CPT | Performed by: INTERNAL MEDICINE

## 2018-03-15 PROCEDURE — G8427 DOCREV CUR MEDS BY ELIG CLIN: HCPCS | Performed by: INTERNAL MEDICINE

## 2018-03-15 PROCEDURE — G8484 FLU IMMUNIZE NO ADMIN: HCPCS | Performed by: INTERNAL MEDICINE

## 2018-03-15 PROCEDURE — 4040F PNEUMOC VAC/ADMIN/RCVD: CPT | Performed by: INTERNAL MEDICINE

## 2018-03-15 PROCEDURE — 3014F SCREEN MAMMO DOC REV: CPT | Performed by: INTERNAL MEDICINE

## 2018-03-15 PROCEDURE — 1123F ACP DISCUSS/DSCN MKR DOCD: CPT | Performed by: INTERNAL MEDICINE

## 2018-03-15 PROCEDURE — 99214 OFFICE O/P EST MOD 30 MIN: CPT | Performed by: INTERNAL MEDICINE

## 2018-03-15 PROCEDURE — G8598 ASA/ANTIPLAT THER USED: HCPCS | Performed by: INTERNAL MEDICINE

## 2018-03-15 PROCEDURE — G8420 CALC BMI NORM PARAMETERS: HCPCS | Performed by: INTERNAL MEDICINE

## 2018-03-15 PROCEDURE — G8400 PT W/DXA NO RESULTS DOC: HCPCS | Performed by: INTERNAL MEDICINE

## 2018-03-15 PROCEDURE — 1090F PRES/ABSN URINE INCON ASSESS: CPT | Performed by: INTERNAL MEDICINE

## 2018-03-15 NOTE — PROGRESS NOTES
limits      Diagnostics:    EKG 10/22/2015: Sinus bradycardia with LAFB  EKG 3/15/18: Sinus rhonda, LAFB      ECHO 7/30/2016:  Left ventricle size is normal. Normal left ventricular wall thickness. Ejection fraction is visually estimated to be 55-60%. Diastolic filling parameters suggests grade III (restrictive) diastolic dysfunction. The right ventricle appears mildly enlarged. Right ventricular systolic function is normal.  The left atrium is severely dilated. The right atrium is mildly dilated.   At least moderate mitral regurgitation with a central and eccentric jet.   Mild mitral stenosis. BP reported as 171/51 at time of imaging.   A bioprosthetic aortic valve has a maximum velocity of 2.8 m/s and a mean gradient of 17 mmHg. Para-valvular regurgitation. There is moderate tricuspid regurgitation.   Mild pulmonic regurgitation.   Estimated pulmonary artery systolic pressure is 72 mmHg assuming a rightatrial pressure of 10 mmHg.     [facility] 6/15/2015:   1. Right dominant coronary arterial system with mild calcification of the RCA. There is 40% serial lesions in the mid RCA. In the left system there is 25% distal left main disease. There is 50% mid LAD disease and 50% ostial second diagonal branch disease. There is no significant restenosis identified in the prior previously placed mid circumflex stent. 2. Systemic hypertension. Lower Extremity Arterial Studies 5/25/17:  Right Impression   There is an NIKOS of .70 in the DP and .77 in the PT. on the right. This is in   the mild claudication range. Occlusion of the SFA artery in the right lower   extremity. Left Impression   There is an NIKOS of 1.06 in the DP and 1.08 in the PT. on the left. This is in   the normal range. Elevated velocity of the SFA. Carotid Studies 5/25/17:  Right Impression   The right internal carotid artery appears to have a calcified 50-79% diameter   reducing stenosis based on velocity criteria.    Left Impression   The left closure at the time of her open heart surgery. She is still a bit anemic as well. She appears euvolemic on exam and is not endorsing any symptoms of CHF or angina at the present time. I will see her in office for follow up in 6 months.

## 2018-03-19 ENCOUNTER — OFFICE VISIT (OUTPATIENT)
Dept: INTERNAL MEDICINE CLINIC | Age: 73
End: 2018-03-19

## 2018-03-19 ENCOUNTER — TELEPHONE (OUTPATIENT)
Dept: INTERNAL MEDICINE CLINIC | Age: 73
End: 2018-03-19

## 2018-03-19 VITALS
BODY MASS INDEX: 25.4 KG/M2 | DIASTOLIC BLOOD PRESSURE: 60 MMHG | WEIGHT: 138 LBS | HEART RATE: 52 BPM | TEMPERATURE: 98 F | OXYGEN SATURATION: 93 % | HEIGHT: 62 IN | SYSTOLIC BLOOD PRESSURE: 138 MMHG

## 2018-03-19 DIAGNOSIS — I48.0 PAF (PAROXYSMAL ATRIAL FIBRILLATION) (HCC): ICD-10-CM

## 2018-03-19 DIAGNOSIS — E03.2 HYPOTHYROIDISM DUE TO MEDICATION: ICD-10-CM

## 2018-03-19 DIAGNOSIS — D50.0 IRON DEFICIENCY ANEMIA DUE TO CHRONIC BLOOD LOSS: ICD-10-CM

## 2018-03-19 DIAGNOSIS — J44.1 COPD EXACERBATION (HCC): Primary | ICD-10-CM

## 2018-03-19 DIAGNOSIS — E78.00 HIGH CHOLESTEROL: ICD-10-CM

## 2018-03-19 DIAGNOSIS — I10 ESSENTIAL HYPERTENSION: ICD-10-CM

## 2018-03-19 LAB
IRON SATURATION: 23 % (ref 15–50)
IRON: 84 UG/DL (ref 37–145)
TOTAL IRON BINDING CAPACITY: 363 UG/DL (ref 260–445)

## 2018-03-19 PROCEDURE — 1090F PRES/ABSN URINE INCON ASSESS: CPT | Performed by: INTERNAL MEDICINE

## 2018-03-19 PROCEDURE — G8427 DOCREV CUR MEDS BY ELIG CLIN: HCPCS | Performed by: INTERNAL MEDICINE

## 2018-03-19 PROCEDURE — G8419 CALC BMI OUT NRM PARAM NOF/U: HCPCS | Performed by: INTERNAL MEDICINE

## 2018-03-19 PROCEDURE — 4004F PT TOBACCO SCREEN RCVD TLK: CPT | Performed by: INTERNAL MEDICINE

## 2018-03-19 PROCEDURE — G8400 PT W/DXA NO RESULTS DOC: HCPCS | Performed by: INTERNAL MEDICINE

## 2018-03-19 PROCEDURE — G8598 ASA/ANTIPLAT THER USED: HCPCS | Performed by: INTERNAL MEDICINE

## 2018-03-19 PROCEDURE — G8484 FLU IMMUNIZE NO ADMIN: HCPCS | Performed by: INTERNAL MEDICINE

## 2018-03-19 PROCEDURE — 3023F SPIROM DOC REV: CPT | Performed by: INTERNAL MEDICINE

## 2018-03-19 PROCEDURE — 1111F DSCHRG MED/CURRENT MED MERGE: CPT | Performed by: INTERNAL MEDICINE

## 2018-03-19 PROCEDURE — 3017F COLORECTAL CA SCREEN DOC REV: CPT | Performed by: INTERNAL MEDICINE

## 2018-03-19 PROCEDURE — G8926 SPIRO NO PERF OR DOC: HCPCS | Performed by: INTERNAL MEDICINE

## 2018-03-19 PROCEDURE — 3014F SCREEN MAMMO DOC REV: CPT | Performed by: INTERNAL MEDICINE

## 2018-03-19 PROCEDURE — 1123F ACP DISCUSS/DSCN MKR DOCD: CPT | Performed by: INTERNAL MEDICINE

## 2018-03-19 PROCEDURE — 4040F PNEUMOC VAC/ADMIN/RCVD: CPT | Performed by: INTERNAL MEDICINE

## 2018-03-19 PROCEDURE — 99214 OFFICE O/P EST MOD 30 MIN: CPT | Performed by: INTERNAL MEDICINE

## 2018-03-19 RX ORDER — AZITHROMYCIN 250 MG/1
TABLET, FILM COATED ORAL
Qty: 10 TABLET | Refills: 0 | Status: SHIPPED | OUTPATIENT
Start: 2018-03-19 | End: 2018-03-29

## 2018-03-19 RX ORDER — ATORVASTATIN CALCIUM 80 MG/1
80 TABLET, FILM COATED ORAL NIGHTLY
Qty: 30 TABLET | Refills: 5 | Status: SHIPPED | OUTPATIENT
Start: 2018-03-19 | End: 2018-09-06 | Stop reason: SDUPTHER

## 2018-03-19 RX ORDER — PREDNISONE 20 MG/1
TABLET ORAL
Qty: 13 TABLET | Refills: 0 | Status: SHIPPED | OUTPATIENT
Start: 2018-03-19 | End: 2018-12-20 | Stop reason: CLARIF

## 2018-03-19 RX ORDER — LEVOTHYROXINE SODIUM 88 UG/1
88 TABLET ORAL DAILY
Qty: 30 TABLET | Refills: 5 | Status: SHIPPED | OUTPATIENT
Start: 2018-03-19 | End: 2018-09-06 | Stop reason: SDUPTHER

## 2018-03-19 RX ORDER — ALBUTEROL SULFATE 90 UG/1
2 AEROSOL, METERED RESPIRATORY (INHALATION) 4 TIMES DAILY
Qty: 1 INHALER | Refills: 3 | Status: SHIPPED | OUTPATIENT
Start: 2018-03-19 | End: 2018-12-20 | Stop reason: CLARIF

## 2018-03-19 ASSESSMENT — ENCOUNTER SYMPTOMS: BACK PAIN: 1

## 2018-03-19 NOTE — PROGRESS NOTES
MCG/ACT inhaler Inhale 2 puffs into the lungs 4 times daily 1 Inhaler 3    ipratropium (ATROVENT HFA) 17 MCG/ACT inhaler Inhale 2 puffs into the lungs 4 times daily 1 Inhaler 3    pantoprazole (PROTONIX) 40 MG tablet Take 1 tablet by mouth every morning (before breakfast) 30 tablet 3    isosorbide mononitrate (IMDUR) 30 MG extended release tablet Take 30 mg by mouth 2 times daily      torsemide (DEMADEX) 20 MG tablet TAKE ONE TABLET BY MOUTH TWO TIMES A DAY 60 tablet 5    levothyroxine (SYNTHROID) 75 MCG tablet TAKE 1 TABLET BY MOUTH DAILY FOR THYROID *INSTEAD OF THE 100MCGS* 30 tablet 5    DOCQLACE 100 MG capsule TAKE ONE CAPSULE BY MOUTH TWO TIMES A DAY 60 capsule 5    ferrous gluconate (FERGON) 324 (38 Fe) MG tablet Take 1 tablet by mouth 3 times daily (with meals) 90 tablet 11    potassium chloride (KLOR-CON M) 10 MEQ extended release tablet Take 1 tablet by mouth 2 times daily 60 tablet 11    aspirin 81 MG tablet Take 1 tablet by mouth daily 30 tablet 0     No current facility-administered medications for this visit. 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 since 26.     Chronic back pain     Chronic diastolic CHF (congestive heart failure) ([address] 2016    grade II    COPD, mild (HCC)     CVA (cerebral infarction) 2013    left cerebral cortex x2/expressive

## 2018-04-10 DIAGNOSIS — R06.09 DOE (DYSPNEA ON EXERTION): ICD-10-CM

## 2018-04-10 RX ORDER — TORSEMIDE 20 MG/1
TABLET ORAL
Qty: 60 TABLET | Refills: 5 | Status: SHIPPED | OUTPATIENT
Start: 2018-04-10 | End: 2018-09-26 | Stop reason: SDUPTHER

## 2018-04-10 RX ORDER — OXYCODONE HYDROCHLORIDE 10 MG/1
TABLET ORAL
Qty: 75 TABLET | Refills: 0 | Status: SHIPPED | OUTPATIENT
Start: 2018-04-10 | End: 2018-05-08 | Stop reason: SDUPTHER

## 2018-04-20 RX ORDER — DOCUSATE SODIUM 100 MG/1
CAPSULE, LIQUID FILLED ORAL
Qty: 60 CAPSULE | Refills: 5 | Status: SHIPPED | OUTPATIENT
Start: 2018-04-20 | End: 2021-10-26

## 2018-05-08 ENCOUNTER — HOSPITAL ENCOUNTER (OUTPATIENT)
Dept: OTHER | Age: 73
Discharge: HOME | End: 2018-05-08
Attending: INTERNAL MEDICINE | Admitting: INTERNAL MEDICINE

## 2018-05-08 ENCOUNTER — OFFICE VISIT (OUTPATIENT)
Dept: INTERNAL MEDICINE CLINIC | Age: 73
End: 2018-05-08

## 2018-05-08 VITALS
WEIGHT: 140 LBS | OXYGEN SATURATION: 97 % | TEMPERATURE: 97.9 F | BODY MASS INDEX: 25.76 KG/M2 | HEIGHT: 62 IN | SYSTOLIC BLOOD PRESSURE: 140 MMHG | HEART RATE: 49 BPM | DIASTOLIC BLOOD PRESSURE: 70 MMHG

## 2018-05-08 DIAGNOSIS — M54.9 MID-BACK PAIN, ACUTE: ICD-10-CM

## 2018-05-08 DIAGNOSIS — R06.09 DOE (DYSPNEA ON EXERTION): ICD-10-CM

## 2018-05-08 DIAGNOSIS — M54.9 MID-BACK PAIN, ACUTE: Primary | ICD-10-CM

## 2018-05-08 DIAGNOSIS — Z87.19 HISTORY OF GI BLEED: ICD-10-CM

## 2018-05-08 DIAGNOSIS — J43.2 CENTRILOBULAR EMPHYSEMA (HCC): ICD-10-CM

## 2018-05-08 LAB
BASOPHILS ABSOLUTE: 0.1 K/UL (ref 0–0.2)
BASOPHILS RELATIVE PERCENT: 1 %
EOSINOPHILS ABSOLUTE: 0.7 K/UL (ref 0–0.6)
EOSINOPHILS RELATIVE PERCENT: 5 %
HCT VFR BLD CALC: 37.7 % (ref 36–48)
HEMOGLOBIN: 12.6 G/DL (ref 12–16)
LYMPHOCYTES ABSOLUTE: 1.9 K/UL (ref 1–5.1)
LYMPHOCYTES RELATIVE PERCENT: 14 %
MCH RBC QN AUTO: 31.3 PG (ref 26–34)
MCHC RBC AUTO-ENTMCNC: 33.3 G/DL (ref 31–36)
MCV RBC AUTO: 93.9 FL (ref 80–100)
MONOCYTES ABSOLUTE: 1.1 K/UL (ref 0–1.3)
MONOCYTES RELATIVE PERCENT: 8 %
NEUTROPHILS ABSOLUTE: 9.9 K/UL (ref 1.7–7.7)
NEUTROPHILS RELATIVE PERCENT: 72 %
OVALOCYTES: ABNORMAL
PDW BLD-RTO: 14.6 % (ref 12.4–15.4)
PLATELET # BLD: 213 K/UL (ref 135–450)
PMV BLD AUTO: 8.8 FL (ref 5–10.5)
POIKILOCYTES: ABNORMAL
RBC # BLD: 4.02 M/UL (ref 4–5.2)
WBC # BLD: 13.8 K/UL (ref 4–11)

## 2018-05-08 PROCEDURE — 99214 OFFICE O/P EST MOD 30 MIN: CPT | Performed by: INTERNAL MEDICINE

## 2018-05-08 PROCEDURE — 3017F COLORECTAL CA SCREEN DOC REV: CPT | Performed by: INTERNAL MEDICINE

## 2018-05-08 PROCEDURE — G8400 PT W/DXA NO RESULTS DOC: HCPCS | Performed by: INTERNAL MEDICINE

## 2018-05-08 PROCEDURE — 4040F PNEUMOC VAC/ADMIN/RCVD: CPT | Performed by: INTERNAL MEDICINE

## 2018-05-08 PROCEDURE — 4004F PT TOBACCO SCREEN RCVD TLK: CPT | Performed by: INTERNAL MEDICINE

## 2018-05-08 PROCEDURE — 1123F ACP DISCUSS/DSCN MKR DOCD: CPT | Performed by: INTERNAL MEDICINE

## 2018-05-08 PROCEDURE — G8926 SPIRO NO PERF OR DOC: HCPCS | Performed by: INTERNAL MEDICINE

## 2018-05-08 PROCEDURE — 3023F SPIROM DOC REV: CPT | Performed by: INTERNAL MEDICINE

## 2018-05-08 PROCEDURE — 1090F PRES/ABSN URINE INCON ASSESS: CPT | Performed by: INTERNAL MEDICINE

## 2018-05-08 PROCEDURE — G8427 DOCREV CUR MEDS BY ELIG CLIN: HCPCS | Performed by: INTERNAL MEDICINE

## 2018-05-08 PROCEDURE — G8598 ASA/ANTIPLAT THER USED: HCPCS | Performed by: INTERNAL MEDICINE

## 2018-05-08 PROCEDURE — G8419 CALC BMI OUT NRM PARAM NOF/U: HCPCS | Performed by: INTERNAL MEDICINE

## 2018-05-08 RX ORDER — DOXYCYCLINE HYCLATE 100 MG
100 TABLET ORAL 2 TIMES DAILY
Qty: 20 TABLET | Refills: 0 | Status: SHIPPED | OUTPATIENT
Start: 2018-05-08 | End: 2018-10-15 | Stop reason: SDUPTHER

## 2018-05-08 RX ORDER — OXYCODONE HYDROCHLORIDE 10 MG/1
TABLET ORAL
Qty: 75 TABLET | Refills: 0 | Status: SHIPPED | OUTPATIENT
Start: 2018-05-08 | End: 2018-06-08

## 2018-05-08 ASSESSMENT — ENCOUNTER SYMPTOMS: COUGH: 1

## 2018-05-10 ENCOUNTER — TELEPHONE (OUTPATIENT)
Dept: INTERNAL MEDICINE CLINIC | Age: 73
End: 2018-05-10

## 2018-06-14 DIAGNOSIS — G89.29 CHRONIC MIDLINE LOW BACK PAIN, WITH SCIATICA PRESENCE UNSPECIFIED: Primary | ICD-10-CM

## 2018-06-14 DIAGNOSIS — M54.5 CHRONIC MIDLINE LOW BACK PAIN, WITH SCIATICA PRESENCE UNSPECIFIED: Primary | ICD-10-CM

## 2018-06-15 RX ORDER — VALSARTAN 80 MG/1
TABLET ORAL
Qty: 60 TABLET | Refills: 3 | Status: SHIPPED | OUTPATIENT
Start: 2018-06-15 | End: 2018-06-18 | Stop reason: SDUPTHER

## 2018-06-15 RX ORDER — POTASSIUM CHLORIDE 750 MG/1
TABLET, FILM COATED, EXTENDED RELEASE ORAL
Qty: 60 TABLET | Refills: 11 | Status: SHIPPED | OUTPATIENT
Start: 2018-06-15 | End: 2019-06-26 | Stop reason: SDUPTHER

## 2018-06-15 RX ORDER — OXYCODONE HYDROCHLORIDE 10 MG/1
TABLET ORAL
Qty: 75 TABLET | Refills: 0 | Status: SHIPPED | OUTPATIENT
Start: 2018-06-15 | End: 2018-07-13 | Stop reason: SDUPTHER

## 2018-06-15 RX ORDER — CARVEDILOL 3.12 MG/1
TABLET ORAL
Qty: 60 TABLET | Refills: 3 | Status: SHIPPED | OUTPATIENT
Start: 2018-06-15 | End: 2018-10-05 | Stop reason: SDUPTHER

## 2018-06-15 RX ORDER — ISOSORBIDE MONONITRATE 30 MG/1
TABLET, EXTENDED RELEASE ORAL
Qty: 60 TABLET | Refills: 11 | Status: SHIPPED | OUTPATIENT
Start: 2018-06-15 | End: 2019-06-26 | Stop reason: SDUPTHER

## 2018-06-18 ENCOUNTER — OFFICE VISIT (OUTPATIENT)
Dept: INTERNAL MEDICINE CLINIC | Age: 73
End: 2018-06-18

## 2018-06-18 VITALS
BODY MASS INDEX: 25.96 KG/M2 | SYSTOLIC BLOOD PRESSURE: 158 MMHG | HEART RATE: 69 BPM | OXYGEN SATURATION: 99 % | WEIGHT: 141.1 LBS | DIASTOLIC BLOOD PRESSURE: 62 MMHG | HEIGHT: 62 IN

## 2018-06-18 DIAGNOSIS — F17.218 NICOTINE DEPENDENCE, CIGARETTES, WITH OTHER NICOTINE-INDUCED DISORDERS: ICD-10-CM

## 2018-06-18 DIAGNOSIS — J44.1 COPD EXACERBATION (HCC): Primary | ICD-10-CM

## 2018-06-18 DIAGNOSIS — E78.00 HIGH CHOLESTEROL: ICD-10-CM

## 2018-06-18 DIAGNOSIS — H35.30 MACULAR DEGENERATION: ICD-10-CM

## 2018-06-18 PROCEDURE — G8400 PT W/DXA NO RESULTS DOC: HCPCS | Performed by: INTERNAL MEDICINE

## 2018-06-18 PROCEDURE — 4004F PT TOBACCO SCREEN RCVD TLK: CPT | Performed by: INTERNAL MEDICINE

## 2018-06-18 PROCEDURE — G8598 ASA/ANTIPLAT THER USED: HCPCS | Performed by: INTERNAL MEDICINE

## 2018-06-18 PROCEDURE — G8427 DOCREV CUR MEDS BY ELIG CLIN: HCPCS | Performed by: INTERNAL MEDICINE

## 2018-06-18 PROCEDURE — 99213 OFFICE O/P EST LOW 20 MIN: CPT | Performed by: INTERNAL MEDICINE

## 2018-06-18 PROCEDURE — 4040F PNEUMOC VAC/ADMIN/RCVD: CPT | Performed by: INTERNAL MEDICINE

## 2018-06-18 PROCEDURE — G8419 CALC BMI OUT NRM PARAM NOF/U: HCPCS | Performed by: INTERNAL MEDICINE

## 2018-06-18 PROCEDURE — 1090F PRES/ABSN URINE INCON ASSESS: CPT | Performed by: INTERNAL MEDICINE

## 2018-06-18 PROCEDURE — 3017F COLORECTAL CA SCREEN DOC REV: CPT | Performed by: INTERNAL MEDICINE

## 2018-06-18 PROCEDURE — G0296 VISIT TO DETERM LDCT ELIG: HCPCS | Performed by: INTERNAL MEDICINE

## 2018-06-18 PROCEDURE — G8926 SPIRO NO PERF OR DOC: HCPCS | Performed by: INTERNAL MEDICINE

## 2018-06-18 PROCEDURE — 3023F SPIROM DOC REV: CPT | Performed by: INTERNAL MEDICINE

## 2018-06-18 PROCEDURE — 1123F ACP DISCUSS/DSCN MKR DOCD: CPT | Performed by: INTERNAL MEDICINE

## 2018-06-18 RX ORDER — AMOXICILLIN AND CLAVULANATE POTASSIUM 875; 125 MG/1; MG/1
0.5 TABLET, FILM COATED ORAL 2 TIMES DAILY
Qty: 10 TABLET | Refills: 0 | Status: SHIPPED | OUTPATIENT
Start: 2018-06-18 | End: 2018-06-28

## 2018-06-18 RX ORDER — VALSARTAN 160 MG/1
TABLET ORAL
Qty: 60 TABLET | Refills: 3 | Status: SHIPPED | OUTPATIENT
Start: 2018-06-18 | End: 2018-10-15 | Stop reason: SDUPTHER

## 2018-06-18 ASSESSMENT — PATIENT HEALTH QUESTIONNAIRE - PHQ9
2. FEELING DOWN, DEPRESSED OR HOPELESS: 0
1. LITTLE INTEREST OR PLEASURE IN DOING THINGS: 0
SUM OF ALL RESPONSES TO PHQ QUESTIONS 1-9: 0
SUM OF ALL RESPONSES TO PHQ9 QUESTIONS 1 & 2: 0

## 2018-06-21 DIAGNOSIS — E78.00 HIGH CHOLESTEROL: ICD-10-CM

## 2018-06-21 LAB
CHOLESTEROL, TOTAL: 193 MG/DL (ref 0–199)
HDLC SERPL-MCNC: 61 MG/DL (ref 40–60)
LDL CHOLESTEROL CALCULATED: 116 MG/DL
TRIGL SERPL-MCNC: 81 MG/DL (ref 0–150)
VLDLC SERPL CALC-MCNC: 16 MG/DL

## 2018-07-13 DIAGNOSIS — M54.5 CHRONIC MIDLINE LOW BACK PAIN, WITH SCIATICA PRESENCE UNSPECIFIED: ICD-10-CM

## 2018-07-13 DIAGNOSIS — G89.29 CHRONIC MIDLINE LOW BACK PAIN, WITH SCIATICA PRESENCE UNSPECIFIED: ICD-10-CM

## 2018-07-13 RX ORDER — OXYCODONE HYDROCHLORIDE 10 MG/1
TABLET ORAL
Qty: 75 TABLET | Refills: 0 | Status: SHIPPED | OUTPATIENT
Start: 2018-07-13 | End: 2018-08-17 | Stop reason: SDUPTHER

## 2018-07-16 ENCOUNTER — OFFICE VISIT (OUTPATIENT)
Dept: INTERNAL MEDICINE CLINIC | Age: 73
End: 2018-07-16

## 2018-07-16 VITALS
OXYGEN SATURATION: 97 % | TEMPERATURE: 98 F | DIASTOLIC BLOOD PRESSURE: 60 MMHG | BODY MASS INDEX: 26.2 KG/M2 | HEIGHT: 62 IN | SYSTOLIC BLOOD PRESSURE: 152 MMHG | WEIGHT: 142.4 LBS | HEART RATE: 46 BPM

## 2018-07-16 DIAGNOSIS — J43.2 CENTRILOBULAR EMPHYSEMA (HCC): ICD-10-CM

## 2018-07-16 DIAGNOSIS — J44.9 CHRONIC OBSTRUCTIVE PULMONARY DISEASE, UNSPECIFIED COPD TYPE (HCC): ICD-10-CM

## 2018-07-16 DIAGNOSIS — I48.0 PAF (PAROXYSMAL ATRIAL FIBRILLATION) (HCC): ICD-10-CM

## 2018-07-16 DIAGNOSIS — F43.21 ADJUSTMENT DISORDER WITH DEPRESSED MOOD: ICD-10-CM

## 2018-07-16 DIAGNOSIS — I50.42 CHRONIC COMBINED SYSTOLIC AND DIASTOLIC CHF, NYHA CLASS 2 (HCC): ICD-10-CM

## 2018-07-16 DIAGNOSIS — I10 BENIGN ESSENTIAL HTN: Primary | ICD-10-CM

## 2018-07-16 PROCEDURE — 4004F PT TOBACCO SCREEN RCVD TLK: CPT | Performed by: INTERNAL MEDICINE

## 2018-07-16 PROCEDURE — 3017F COLORECTAL CA SCREEN DOC REV: CPT | Performed by: INTERNAL MEDICINE

## 2018-07-16 PROCEDURE — G8419 CALC BMI OUT NRM PARAM NOF/U: HCPCS | Performed by: INTERNAL MEDICINE

## 2018-07-16 PROCEDURE — 3023F SPIROM DOC REV: CPT | Performed by: INTERNAL MEDICINE

## 2018-07-16 PROCEDURE — 99214 OFFICE O/P EST MOD 30 MIN: CPT | Performed by: INTERNAL MEDICINE

## 2018-07-16 PROCEDURE — 1123F ACP DISCUSS/DSCN MKR DOCD: CPT | Performed by: INTERNAL MEDICINE

## 2018-07-16 PROCEDURE — G8598 ASA/ANTIPLAT THER USED: HCPCS | Performed by: INTERNAL MEDICINE

## 2018-07-16 PROCEDURE — 1101F PT FALLS ASSESS-DOCD LE1/YR: CPT | Performed by: INTERNAL MEDICINE

## 2018-07-16 PROCEDURE — G8427 DOCREV CUR MEDS BY ELIG CLIN: HCPCS | Performed by: INTERNAL MEDICINE

## 2018-07-16 PROCEDURE — 4040F PNEUMOC VAC/ADMIN/RCVD: CPT | Performed by: INTERNAL MEDICINE

## 2018-07-16 PROCEDURE — G8400 PT W/DXA NO RESULTS DOC: HCPCS | Performed by: INTERNAL MEDICINE

## 2018-07-16 PROCEDURE — 1090F PRES/ABSN URINE INCON ASSESS: CPT | Performed by: INTERNAL MEDICINE

## 2018-07-16 PROCEDURE — G8926 SPIRO NO PERF OR DOC: HCPCS | Performed by: INTERNAL MEDICINE

## 2018-07-16 NOTE — PROGRESS NOTES
([address] 2013    at presentation of cva. since 26.  Chronic back pain     Chronic diastolic CHF (congestive heart failure) ([address] 2016    grade II    COPD, mild (HCC)     CVA (cerebral infarction) 2013    left cerebral cortex x2/expressive aphasia/resolved    Diverticulosis     Epistaxis, recurrent     when inr high. last 3-4 months ago    Former smoker     Gallbladder sludge     HTN (hypertension)     Hyperlipidemia     Hypothyroidism     Macular degeneration 2018    left worse than right , dry : progressive loss of sight    Neuropathy ([address]     Osteoarthritis     Pulmonary HTN 2014    primary, responsive to nitrates    PVD (peripheral vascular disease) ([address]     occluded right SFA::: asymptomatic NIKOS 0.7 right and 1.0 left    Sacral fracture, closed ([address] 2014    Syncope 5/2014     Past Surgical History:   Procedure Laterality Date    AORTIC VALVE REPLACEMENT  6/16/15     [address] [place] [address]. Hernandez - PVI, RENETTA exclusion. 19mm Maki pericardial PFAFFING    APPENDECTOMY      CYSTOSCOPY  Feb 2014    dr Woodruff Shape      THR Right    OTHER SURGICAL HISTORY  02/20/2018     EGD and colonoscopy.  UPPER GASTROINTESTINAL ENDOSCOPY  12/15/2017     Social History   Substance Use Topics    Smoking status: Current Some Day Smoker     Packs/day: 0.25     Years: 45.00     Types: Cigarettes     Last attempt to quit: 6/11/2015    Smokeless tobacco: Current User    Alcohol use No      Comment: Socially      Family History   Problem Relation Age of Onset    Breast Cancer Daughter           Review of Systems   All other systems reviewed and are negative. Objective:   Physical Exam   Constitutional: She is oriented to person, place, and time and well-developed, well-nourished, and in no distress. No distress. HENT:   Head: Normocephalic and atraumatic. Right Ear: External ear normal.   Left Ear: External ear normal.   Nose: Nose normal.   Mouth/Throat: No oropharyngeal exudate.    Eyes:

## 2018-08-07 ENCOUNTER — TELEPHONE (OUTPATIENT)
Dept: INTERNAL MEDICINE CLINIC | Age: 73
End: 2018-08-07

## 2018-08-07 DIAGNOSIS — R91.1 LUNG NODULE: Primary | ICD-10-CM

## 2018-08-17 DIAGNOSIS — G89.29 CHRONIC MIDLINE LOW BACK PAIN, WITH SCIATICA PRESENCE UNSPECIFIED: ICD-10-CM

## 2018-08-17 DIAGNOSIS — M54.5 CHRONIC MIDLINE LOW BACK PAIN, WITH SCIATICA PRESENCE UNSPECIFIED: ICD-10-CM

## 2018-08-17 RX ORDER — OXYCODONE HYDROCHLORIDE 10 MG/1
TABLET ORAL
Qty: 75 TABLET | Refills: 0 | Status: SHIPPED | OUTPATIENT
Start: 2018-08-17 | End: 2018-09-18 | Stop reason: SDUPTHER

## 2018-08-21 RX ORDER — AMIODARONE HYDROCHLORIDE 200 MG/1
200 TABLET ORAL DAILY
Qty: 90 TABLET | Refills: 2 | Status: SHIPPED | OUTPATIENT
Start: 2018-08-21 | End: 2018-12-21 | Stop reason: SDUPTHER

## 2018-08-30 RX ORDER — AMIODARONE HYDROCHLORIDE 200 MG/1
200 TABLET ORAL DAILY
Qty: 30 TABLET | Refills: 5 | Status: SHIPPED | OUTPATIENT
Start: 2018-08-30 | End: 2018-12-20 | Stop reason: CLARIF

## 2018-09-06 RX ORDER — ATORVASTATIN CALCIUM 80 MG/1
80 TABLET, FILM COATED ORAL NIGHTLY
Qty: 30 TABLET | Refills: 11 | Status: SHIPPED | OUTPATIENT
Start: 2018-09-06 | End: 2018-12-20 | Stop reason: ALTCHOICE

## 2018-09-06 RX ORDER — LEVOTHYROXINE SODIUM 88 UG/1
88 TABLET ORAL DAILY
Qty: 30 TABLET | Refills: 11 | Status: SHIPPED | OUTPATIENT
Start: 2018-09-06 | End: 2019-08-26 | Stop reason: SDUPTHER

## 2018-09-26 RX ORDER — TORSEMIDE 20 MG/1
TABLET ORAL
Qty: 60 TABLET | Refills: 11 | Status: SHIPPED | OUTPATIENT
Start: 2018-09-26 | End: 2019-01-11 | Stop reason: SDUPTHER

## 2018-10-05 RX ORDER — PANTOPRAZOLE SODIUM 40 MG/1
TABLET, DELAYED RELEASE ORAL
Qty: 30 TABLET | Refills: 11 | Status: SHIPPED | OUTPATIENT
Start: 2018-10-05 | End: 2019-09-26 | Stop reason: SDUPTHER

## 2018-10-05 RX ORDER — CARVEDILOL 3.12 MG/1
TABLET ORAL
Qty: 60 TABLET | Refills: 11 | Status: SHIPPED | OUTPATIENT
Start: 2018-10-05 | End: 2019-09-26 | Stop reason: SDUPTHER

## 2018-10-25 RX ORDER — FERROUS GLUCONATE 324(37.5)
324 TABLET ORAL
Qty: 90 TABLET | Refills: 11 | Status: ON HOLD | OUTPATIENT
Start: 2018-10-25 | End: 2020-07-06

## 2018-12-20 ENCOUNTER — OFFICE VISIT (OUTPATIENT)
Dept: CARDIOLOGY CLINIC | Age: 73
End: 2018-12-20
Payer: COMMERCIAL

## 2018-12-20 VITALS
SYSTOLIC BLOOD PRESSURE: 120 MMHG | BODY MASS INDEX: 25.95 KG/M2 | HEART RATE: 48 BPM | DIASTOLIC BLOOD PRESSURE: 64 MMHG | HEIGHT: 62 IN | WEIGHT: 141 LBS | OXYGEN SATURATION: 94 %

## 2018-12-20 DIAGNOSIS — I25.10 CORONARY ARTERY DISEASE INVOLVING NATIVE CORONARY ARTERY OF NATIVE HEART WITHOUT ANGINA PECTORIS: ICD-10-CM

## 2018-12-20 DIAGNOSIS — E78.5 HYPERLIPIDEMIA LDL GOAL <70: ICD-10-CM

## 2018-12-20 DIAGNOSIS — I10 ESSENTIAL HYPERTENSION: ICD-10-CM

## 2018-12-20 DIAGNOSIS — Z95.2 S/P AVR: ICD-10-CM

## 2018-12-20 DIAGNOSIS — I50.32 CHRONIC DIASTOLIC CHF (CONGESTIVE HEART FAILURE), NYHA CLASS 2 (HCC): ICD-10-CM

## 2018-12-20 DIAGNOSIS — I48.0 PAF (PAROXYSMAL ATRIAL FIBRILLATION) (HCC): Primary | ICD-10-CM

## 2018-12-20 PROCEDURE — G8400 PT W/DXA NO RESULTS DOC: HCPCS | Performed by: INTERNAL MEDICINE

## 2018-12-20 PROCEDURE — G8419 CALC BMI OUT NRM PARAM NOF/U: HCPCS | Performed by: INTERNAL MEDICINE

## 2018-12-20 PROCEDURE — G8598 ASA/ANTIPLAT THER USED: HCPCS | Performed by: INTERNAL MEDICINE

## 2018-12-20 PROCEDURE — 99214 OFFICE O/P EST MOD 30 MIN: CPT | Performed by: INTERNAL MEDICINE

## 2018-12-20 PROCEDURE — G8484 FLU IMMUNIZE NO ADMIN: HCPCS | Performed by: INTERNAL MEDICINE

## 2018-12-20 PROCEDURE — 1123F ACP DISCUSS/DSCN MKR DOCD: CPT | Performed by: INTERNAL MEDICINE

## 2018-12-20 PROCEDURE — G8427 DOCREV CUR MEDS BY ELIG CLIN: HCPCS | Performed by: INTERNAL MEDICINE

## 2018-12-20 PROCEDURE — 3017F COLORECTAL CA SCREEN DOC REV: CPT | Performed by: INTERNAL MEDICINE

## 2018-12-20 PROCEDURE — 1090F PRES/ABSN URINE INCON ASSESS: CPT | Performed by: INTERNAL MEDICINE

## 2018-12-20 PROCEDURE — 1101F PT FALLS ASSESS-DOCD LE1/YR: CPT | Performed by: INTERNAL MEDICINE

## 2018-12-20 PROCEDURE — 4004F PT TOBACCO SCREEN RCVD TLK: CPT | Performed by: INTERNAL MEDICINE

## 2018-12-20 PROCEDURE — 4040F PNEUMOC VAC/ADMIN/RCVD: CPT | Performed by: INTERNAL MEDICINE

## 2018-12-20 PROCEDURE — 93000 ELECTROCARDIOGRAM COMPLETE: CPT | Performed by: INTERNAL MEDICINE

## 2018-12-20 RX ORDER — ROSUVASTATIN CALCIUM 40 MG/1
40 TABLET, COATED ORAL DAILY
Qty: 30 TABLET | Refills: 3 | Status: SHIPPED | OUTPATIENT
Start: 2018-12-20 | End: 2019-04-15 | Stop reason: SDUPTHER

## 2018-12-20 NOTE — PROGRESS NOTES
[facility]   Office Note    CC: CAD    Ms. Ho Lopez is a 71 y.o. patient with a past medical history significant for atrial fibrillation, pulmonary hypertension, aortic stenosis and CAD s/p CHILANGO to mid circ 5/2014. She is s/p AVR, PVI and RENETTA exclusion by Dr Sita Goldman on 6/16. Pre-op angiogram revealed no significant restenosis in the prior stents. She was hospitalized in December 2017 with fatigue. She was anemic to a hemoglobin of 8 and received PRBC transfusions. Repeat EGD demonstrated bleeding AVM's which were cauterized. Her coumadin was restarted. She returns to the office today in follow-up. Since we last saw Kt Push she reports that she is feeling well. Her breathing has been well overall. She denies any chest pain. She attributes the weight gain to her dietary choices. She has had no chest pain or shortness of breath. She still works two jobs. She is not currently on any anticoagulation due to her chronic anemia and GI bleeds. She reports no blood in her stool or black tarry stools. She is able to lay flat to sleep. She is monitoring the salt in her diet. She is not participating in any regular aerobic exercise activity. She did start smoking again. Current Outpatient Prescriptions   Medication Sig Dispense Refill    oxyCODONE HCl (OXY-IR) 10 MG immediate release tablet TAKE ONE TABLET BY MOUTH THREE TIMES A DAY IF NEEDED FOR PAIN.  75 tablet 0    ferrous gluconate 324 (37.5 Fe) MG TABS TAKE 1 TABLET BY MOUTH 3 TIMES DAILY (WITH MEALS) 90 tablet 11    valsartan (DIOVAN) 160 MG tablet One a day 30 tablet 3    carvedilol (COREG) 3.125 MG tablet TAKE 1 TABLET BY MOUTH 2 TIMES DAILY (WITH MEALS) (LOWER DOSE OF COREG) 60 tablet 11    pantoprazole (PROTONIX) 40 MG tablet TAKE ONE TABLET BY MOUTH DAILY 30 tablet 11    torsemide (DEMADEX) 20 MG tablet TAKE ONE TABLET BY MOUTH TWO TIMES A DAY 60 tablet 11    levothyroxine (SYNTHROID) 88 MCG tablet TAKE 1 TABLET BY MOUTH DAILY 30 fasting lipid profile as well as AST and ALT in 2-3 months. She remains in a regular rhythm today and is stable from a CHF standpoint. We will reduce her amiodarone to 100mg daily given her bradycardia. I will see her in office for follow up in 6 months. This note was scribed in the presence of Steven Ballesteros MD by [employer], RN. Physician Attestation:  The scribes documentation has been prepared under my direction and personally reviewed by me in its entirety. I, Dr. Susi Bolivar personally performed the services described in this documentation as scribed by my RN,  [employer] in my presence, and I confirm that the note above accurately reflects all work, treatment, procedures, and medical decision making performed by me.

## 2018-12-21 ENCOUNTER — TELEPHONE (OUTPATIENT)
Dept: FAMILY MEDICINE CLINIC | Age: 73
End: 2018-12-21

## 2018-12-21 ENCOUNTER — TELEPHONE (OUTPATIENT)
Dept: CARDIOLOGY CLINIC | Age: 73
End: 2018-12-21

## 2018-12-21 RX ORDER — AMIODARONE HYDROCHLORIDE 200 MG/1
100 TABLET ORAL DAILY
Qty: 90 TABLET | Refills: 2
Start: 2018-12-21 | End: 2019-04-15 | Stop reason: CLARIF

## 2018-12-21 NOTE — TELEPHONE ENCOUNTER
----- Message from Guido Pena MD sent at 12/21/2018  7:59 AM EST -----  Can we have Shonda reduce her amiodarone to 100mg daily with her bradycardia?  Thanks

## 2019-01-09 DIAGNOSIS — E78.5 HYPERLIPIDEMIA LDL GOAL <70: ICD-10-CM

## 2019-01-09 LAB
ALT SERPL-CCNC: 17 U/L (ref 10–40)
AST SERPL-CCNC: 22 U/L (ref 15–37)
CHOLESTEROL, FASTING: 222 MG/DL (ref 0–199)
HDLC SERPL-MCNC: 66 MG/DL (ref 40–60)
LDL CHOLESTEROL CALCULATED: 143 MG/DL
TRIGLYCERIDE, FASTING: 65 MG/DL (ref 0–150)
VLDLC SERPL CALC-MCNC: 13 MG/DL

## 2019-01-11 RX ORDER — TORSEMIDE 20 MG/1
TABLET ORAL
Qty: 60 TABLET | Refills: 11 | Status: SHIPPED | OUTPATIENT
Start: 2019-01-11 | End: 2019-04-15 | Stop reason: ALTCHOICE

## 2019-01-17 ENCOUNTER — HOSPITAL ENCOUNTER (OUTPATIENT)
Dept: MRI IMAGING | Age: 74
Discharge: HOME | End: 2019-01-17
Payer: COMMERCIAL

## 2019-01-17 DIAGNOSIS — M47.816 OSTEOARTHRITIS OF LUMBAR SPINE, UNSPECIFIED SPINAL OSTEOARTHRITIS COMPLICATION STATUS: ICD-10-CM

## 2019-01-17 DIAGNOSIS — M47.894 OTHER OSTEOARTHRITIS OF SPINE, THORACIC REGION: ICD-10-CM

## 2019-01-17 PROCEDURE — 72146 MRI CHEST SPINE W/O DYE: CPT

## 2019-01-17 PROCEDURE — 72148 MRI LUMBAR SPINE W/O DYE: CPT

## 2019-01-24 ENCOUNTER — TELEPHONE (OUTPATIENT)
Dept: CARDIOLOGY CLINIC | Age: 74
End: 2019-01-24

## 2019-02-15 ENCOUNTER — TELEPHONE (OUTPATIENT)
Dept: CARDIOLOGY CLINIC | Age: 74
End: 2019-02-15

## 2019-02-20 RX ORDER — EZETIMIBE 10 MG/1
10 TABLET ORAL DAILY
Qty: 30 TABLET | Refills: 5 | Status: SHIPPED | OUTPATIENT
Start: 2019-02-20 | End: 2019-08-08 | Stop reason: SDUPTHER

## 2019-03-15 ENCOUNTER — HOSPITAL ENCOUNTER (OUTPATIENT)
Age: 74
Setting detail: OUTPATIENT SURGERY
Discharge: HOME | End: 2019-03-15
Attending: ANESTHESIOLOGY | Admitting: ANESTHESIOLOGY
Payer: COMMERCIAL

## 2019-03-15 ENCOUNTER — APPOINTMENT (OUTPATIENT)
Dept: GENERAL RADIOLOGY | Age: 74
End: 2019-03-15
Attending: ANESTHESIOLOGY
Payer: COMMERCIAL

## 2019-03-15 ENCOUNTER — ANESTHESIA EVENT (OUTPATIENT)
Dept: OPERATING ROOM | Age: 74
End: 2019-03-15
Payer: COMMERCIAL

## 2019-03-15 ENCOUNTER — ANESTHESIA (OUTPATIENT)
Dept: OPERATING ROOM | Age: 74
End: 2019-03-15
Payer: COMMERCIAL

## 2019-03-15 VITALS
HEIGHT: 62 IN | OXYGEN SATURATION: 100 % | SYSTOLIC BLOOD PRESSURE: 185 MMHG | BODY MASS INDEX: 26.31 KG/M2 | DIASTOLIC BLOOD PRESSURE: 67 MMHG | RESPIRATION RATE: 14 BRPM | HEART RATE: 58 BPM | TEMPERATURE: 99 F | WEIGHT: 143 LBS

## 2019-03-15 VITALS — DIASTOLIC BLOOD PRESSURE: 52 MMHG | SYSTOLIC BLOOD PRESSURE: 108 MMHG | OXYGEN SATURATION: 91 %

## 2019-03-15 DIAGNOSIS — R52 PAIN: ICD-10-CM

## 2019-03-15 LAB
ANION GAP SERPL CALCULATED.3IONS-SCNC: 12 MMOL/L (ref 3–16)
BUN BLDV-MCNC: 14 MG/DL (ref 7–20)
CALCIUM SERPL-MCNC: 9.1 MG/DL (ref 8.3–10.6)
CHLORIDE BLD-SCNC: 107 MMOL/L (ref 99–110)
CO2: 25 MMOL/L (ref 21–32)
CREAT SERPL-MCNC: 0.9 MG/DL (ref 0.6–1.2)
GFR AFRICAN AMERICAN: >60
GFR NON-AFRICAN AMERICAN: >60
GLUCOSE BLD-MCNC: 108 MG/DL (ref 70–99)
HCT VFR BLD CALC: 38.6 % (ref 36–48)
HEMOGLOBIN: 13 G/DL (ref 12–16)
MCH RBC QN AUTO: 30.9 PG (ref 26–34)
MCHC RBC AUTO-ENTMCNC: 33.6 G/DL (ref 31–36)
MCV RBC AUTO: 92 FL (ref 80–100)
PDW BLD-RTO: 14.2 % (ref 12.4–15.4)
PLATELET # BLD: 199 K/UL (ref 135–450)
PMV BLD AUTO: 8.3 FL (ref 5–10.5)
POTASSIUM SERPL-SCNC: 4.2 MMOL/L (ref 3.5–5.1)
RBC # BLD: 4.2 M/UL (ref 4–5.2)
SODIUM BLD-SCNC: 144 MMOL/L (ref 136–145)
WBC # BLD: 8.4 K/UL (ref 4–11)

## 2019-03-15 PROCEDURE — 85027 COMPLETE CBC AUTOMATED: CPT

## 2019-03-15 PROCEDURE — 7100000001 HC PACU RECOVERY - ADDTL 15 MIN: Performed by: ANESTHESIOLOGY

## 2019-03-15 PROCEDURE — 6360000002 HC RX W HCPCS: Performed by: ANESTHESIOLOGY

## 2019-03-15 PROCEDURE — 3600000012 HC SURGERY LEVEL 2 ADDTL 15MIN: Performed by: ANESTHESIOLOGY

## 2019-03-15 PROCEDURE — 3600000002 HC SURGERY LEVEL 2 BASE: Performed by: ANESTHESIOLOGY

## 2019-03-15 PROCEDURE — 2500000003 HC RX 250 WO HCPCS: Performed by: NURSE ANESTHETIST, CERTIFIED REGISTERED

## 2019-03-15 PROCEDURE — 6370000000 HC RX 637 (ALT 250 FOR IP): Performed by: ANESTHESIOLOGY

## 2019-03-15 PROCEDURE — 3700000001 HC ADD 15 MINUTES (ANESTHESIA): Performed by: ANESTHESIOLOGY

## 2019-03-15 PROCEDURE — 7100000010 HC PHASE II RECOVERY - FIRST 15 MIN: Performed by: ANESTHESIOLOGY

## 2019-03-15 PROCEDURE — 3700000000 HC ANESTHESIA ATTENDED CARE: Performed by: ANESTHESIOLOGY

## 2019-03-15 PROCEDURE — 7100000011 HC PHASE II RECOVERY - ADDTL 15 MIN: Performed by: ANESTHESIOLOGY

## 2019-03-15 PROCEDURE — 6360000002 HC RX W HCPCS: Performed by: NURSE ANESTHETIST, CERTIFIED REGISTERED

## 2019-03-15 PROCEDURE — 2580000003 HC RX 258: Performed by: ANESTHESIOLOGY

## 2019-03-15 PROCEDURE — 80048 BASIC METABOLIC PNL TOTAL CA: CPT

## 2019-03-15 PROCEDURE — 2580000003 HC RX 258: Performed by: NURSE ANESTHETIST, CERTIFIED REGISTERED

## 2019-03-15 PROCEDURE — 2780000010 HC IMPLANT OTHER: Performed by: ANESTHESIOLOGY

## 2019-03-15 PROCEDURE — 2500000003 HC RX 250 WO HCPCS: Performed by: ANESTHESIOLOGY

## 2019-03-15 PROCEDURE — 3209999900 FLUORO FOR SURGICAL PROCEDURES

## 2019-03-15 PROCEDURE — 2709999900 HC NON-CHARGEABLE SUPPLY: Performed by: ANESTHESIOLOGY

## 2019-03-15 PROCEDURE — 7100000000 HC PACU RECOVERY - FIRST 15 MIN: Performed by: ANESTHESIOLOGY

## 2019-03-15 DEVICE — IMPLANTABLE DEVICE: Type: IMPLANTABLE DEVICE | Site: BACK | Status: FUNCTIONAL

## 2019-03-15 RX ORDER — OXYCODONE HYDROCHLORIDE AND ACETAMINOPHEN 5; 325 MG/1; MG/1
1 TABLET ORAL EVERY 4 HOURS PRN
Status: DISCONTINUED | OUTPATIENT
Start: 2019-03-15 | End: 2019-03-15 | Stop reason: HOSPADM

## 2019-03-15 RX ORDER — CEFAZOLIN SODIUM 2 G/100ML
2 INJECTION, SOLUTION INTRAVENOUS ONCE
Status: COMPLETED | OUTPATIENT
Start: 2019-03-15 | End: 2019-03-15

## 2019-03-15 RX ORDER — HYDROCODONE BITARTRATE AND ACETAMINOPHEN 5; 325 MG/1; MG/1
1 TABLET ORAL
Status: DISCONTINUED | OUTPATIENT
Start: 2019-03-15 | End: 2019-03-15

## 2019-03-15 RX ORDER — HYDROMORPHONE HCL 110MG/55ML
0.5 PATIENT CONTROLLED ANALGESIA SYRINGE INTRAVENOUS EVERY 5 MIN PRN
Status: DISCONTINUED | OUTPATIENT
Start: 2019-03-15 | End: 2019-03-15 | Stop reason: HOSPADM

## 2019-03-15 RX ORDER — LIDOCAINE HYDROCHLORIDE 10 MG/ML
1 INJECTION, SOLUTION EPIDURAL; INFILTRATION; INTRACAUDAL; PERINEURAL
Status: DISCONTINUED | OUTPATIENT
Start: 2019-03-15 | End: 2019-03-15 | Stop reason: HOSPADM

## 2019-03-15 RX ORDER — SODIUM CHLORIDE 9 MG/ML
INJECTION, SOLUTION INTRAVENOUS CONTINUOUS PRN
Status: DISCONTINUED | OUTPATIENT
Start: 2019-03-15 | End: 2019-03-15 | Stop reason: SDUPTHER

## 2019-03-15 RX ORDER — LIDOCAINE HYDROCHLORIDE 20 MG/ML
INJECTION, SOLUTION INFILTRATION; PERINEURAL PRN
Status: DISCONTINUED | OUTPATIENT
Start: 2019-03-15 | End: 2019-03-15 | Stop reason: SDUPTHER

## 2019-03-15 RX ORDER — EPHEDRINE SULFATE 50 MG/ML
INJECTION INTRAVENOUS PRN
Status: DISCONTINUED | OUTPATIENT
Start: 2019-03-15 | End: 2019-03-15 | Stop reason: SDUPTHER

## 2019-03-15 RX ORDER — ONDANSETRON 2 MG/ML
4 INJECTION INTRAMUSCULAR; INTRAVENOUS
Status: DISCONTINUED | OUTPATIENT
Start: 2019-03-15 | End: 2019-03-15 | Stop reason: HOSPADM

## 2019-03-15 RX ORDER — BACITRACIN 50000 [USP'U]/1
INJECTION, POWDER, LYOPHILIZED, FOR SOLUTION INTRAMUSCULAR
Status: COMPLETED | OUTPATIENT
Start: 2019-03-15 | End: 2019-03-15

## 2019-03-15 RX ORDER — VALSARTAN 160 MG/1
320 TABLET ORAL ONCE
Status: COMPLETED | OUTPATIENT
Start: 2019-03-15 | End: 2019-03-15

## 2019-03-15 RX ORDER — HYDROMORPHONE HCL 110MG/55ML
0.25 PATIENT CONTROLLED ANALGESIA SYRINGE INTRAVENOUS EVERY 5 MIN PRN
Status: DISCONTINUED | OUTPATIENT
Start: 2019-03-15 | End: 2019-03-15 | Stop reason: HOSPADM

## 2019-03-15 RX ORDER — SODIUM CHLORIDE 9 MG/ML
INJECTION, SOLUTION INTRAVENOUS CONTINUOUS
Status: DISCONTINUED | OUTPATIENT
Start: 2019-03-15 | End: 2019-03-15 | Stop reason: HOSPADM

## 2019-03-15 RX ORDER — PROPOFOL 10 MG/ML
INJECTION, EMULSION INTRAVENOUS CONTINUOUS PRN
Status: DISCONTINUED | OUTPATIENT
Start: 2019-03-15 | End: 2019-03-15 | Stop reason: SDUPTHER

## 2019-03-15 RX ORDER — OXYCODONE HYDROCHLORIDE AND ACETAMINOPHEN 5; 325 MG/1; MG/1
TABLET ORAL
Status: DISCONTINUED
Start: 2019-03-15 | End: 2019-03-15 | Stop reason: HOSPADM

## 2019-03-15 RX ADMIN — PROPOFOL 140 MCG/KG/MIN: 10 INJECTION, EMULSION INTRAVENOUS at 09:33

## 2019-03-15 RX ADMIN — HYDROMORPHONE HYDROCHLORIDE 0.5 MG: 2 INJECTION, SOLUTION INTRAMUSCULAR; INTRAVENOUS; SUBCUTANEOUS at 10:45

## 2019-03-15 RX ADMIN — CEFAZOLIN SODIUM 2 G: 2 INJECTION, SOLUTION INTRAVENOUS at 09:24

## 2019-03-15 RX ADMIN — OXYCODONE AND ACETAMINOPHEN 1 TABLET: 5; 325 TABLET ORAL at 11:02

## 2019-03-15 RX ADMIN — HYDROMORPHONE HYDROCHLORIDE 0.5 MG: 2 INJECTION, SOLUTION INTRAMUSCULAR; INTRAVENOUS; SUBCUTANEOUS at 10:28

## 2019-03-15 RX ADMIN — VALSARTAN 320 MG: 160 TABLET, FILM COATED ORAL at 08:52

## 2019-03-15 RX ADMIN — SODIUM CHLORIDE: 9 INJECTION, SOLUTION INTRAVENOUS at 09:42

## 2019-03-15 RX ADMIN — LIDOCAINE HYDROCHLORIDE 100 MG: 20 INJECTION, SOLUTION INFILTRATION; PERINEURAL at 09:34

## 2019-03-15 RX ADMIN — HYDROMORPHONE HYDROCHLORIDE 0.5 MG: 2 INJECTION, SOLUTION INTRAMUSCULAR; INTRAVENOUS; SUBCUTANEOUS at 11:11

## 2019-03-15 RX ADMIN — SODIUM CHLORIDE: 9 INJECTION, SOLUTION INTRAVENOUS at 09:13

## 2019-03-15 RX ADMIN — EPHEDRINE SULFATE 10 MG: 50 INJECTION, SOLUTION INTRAVENOUS at 09:56

## 2019-03-15 ASSESSMENT — LIFESTYLE VARIABLES: SMOKING_STATUS: 1

## 2019-03-15 ASSESSMENT — PAIN SCALES - GENERAL
PAINLEVEL_OUTOF10: 6
PAINLEVEL_OUTOF10: 7
PAINLEVEL_OUTOF10: 8
PAINLEVEL_OUTOF10: 6
PAINLEVEL_OUTOF10: 8

## 2019-03-15 ASSESSMENT — PULMONARY FUNCTION TESTS
PIF_VALUE: 1
PIF_VALUE: 0
PIF_VALUE: 1
PIF_VALUE: 0
PIF_VALUE: 1
PIF_VALUE: 1

## 2019-03-15 ASSESSMENT — PAIN - FUNCTIONAL ASSESSMENT: PAIN_FUNCTIONAL_ASSESSMENT: 0-10

## 2019-03-15 ASSESSMENT — PAIN DESCRIPTION - ORIENTATION
ORIENTATION: LOWER
ORIENTATION: LOWER

## 2019-03-15 ASSESSMENT — PAIN DESCRIPTION - PAIN TYPE
TYPE: CHRONIC PAIN
TYPE: CHRONIC PAIN

## 2019-03-15 ASSESSMENT — PAIN DESCRIPTION - LOCATION
LOCATION: BACK
LOCATION: BACK

## 2019-03-18 RX ORDER — AMIODARONE HYDROCHLORIDE 200 MG/1
200 TABLET ORAL DAILY
Qty: 30 TABLET | Refills: 11 | Status: SHIPPED | OUTPATIENT
Start: 2019-03-18 | End: 2019-06-20

## 2019-03-19 DIAGNOSIS — I25.10 CORONARY ARTERY DISEASE INVOLVING NATIVE CORONARY ARTERY OF NATIVE HEART WITHOUT ANGINA PECTORIS: Primary | ICD-10-CM

## 2019-04-15 RX ORDER — FUROSEMIDE 40 MG/1
TABLET ORAL
Qty: 64 TABLET | Refills: 5 | Status: ON HOLD | OUTPATIENT
Start: 2019-04-15 | End: 2019-04-26 | Stop reason: SDUPTHER

## 2019-04-15 RX ORDER — FUROSEMIDE 40 MG/1
40 TABLET ORAL 2 TIMES DAILY
COMMUNITY
End: 2019-04-15 | Stop reason: SDUPTHER

## 2019-04-15 RX ORDER — ROSUVASTATIN CALCIUM 40 MG/1
40 TABLET, COATED ORAL DAILY
Qty: 30 TABLET | Refills: 11 | Status: SHIPPED | OUTPATIENT
Start: 2019-04-15 | End: 2020-04-07

## 2019-04-15 NOTE — TELEPHONE ENCOUNTER
Geovanna    I would have her increase her dose of Lasix to 60 mg BID for the next 2-3 days depending on her weight. She can monitor this and decide if she needs the increased dose on the third day. I would have her repeat BMP by the end of the week and call with an update.

## 2019-04-15 NOTE — TELEPHONE ENCOUNTER
Patient states she has gained 8 pds in the past 2 weeks, she is asymptomatic, but  also states she has not been moving around much due to her back. She is taking furosemide 40 mg BID. Please call her at [phone].   Thank you

## 2019-04-15 NOTE — TELEPHONE ENCOUNTER
Last OV 12/20/18 PAF/CAD/CHF/HLD  Echo 1/8/18  Estimated ejection fraction is 60%. Currently taking Amiodarone, Zetia, Valsartan, Asa, Imdur, Potassium, Carvedilol, Furosemide 40 BID    States she has some abdominal tightness/swelling  She eats foods such as yogurt, canned fruits/ vegetables, tv dinners every other night    Please advise per further recommendations?

## 2019-04-24 ENCOUNTER — TELEPHONE (OUTPATIENT)
Dept: CARDIOLOGY CLINIC | Age: 74
End: 2019-04-24

## 2019-04-24 ENCOUNTER — HOSPITAL ENCOUNTER (INPATIENT)
Age: 74
LOS: 2 days | Discharge: HOME | DRG: 418 | End: 2019-04-26
Attending: EMERGENCY MEDICINE | Admitting: INTERNAL MEDICINE
Payer: COMMERCIAL

## 2019-04-24 ENCOUNTER — APPOINTMENT (OUTPATIENT)
Dept: CT IMAGING | Age: 74
DRG: 418 | End: 2019-04-24
Payer: COMMERCIAL

## 2019-04-24 ENCOUNTER — APPOINTMENT (OUTPATIENT)
Dept: GENERAL RADIOLOGY | Age: 74
DRG: 418 | End: 2019-04-24
Payer: COMMERCIAL

## 2019-04-24 DIAGNOSIS — K81.9 CHOLECYSTITIS: Primary | ICD-10-CM

## 2019-04-24 DIAGNOSIS — K81.0 ACUTE CHOLECYSTITIS: ICD-10-CM

## 2019-04-24 PROBLEM — R10.13 EPIGASTRIC PAIN: Status: ACTIVE | Noted: 2019-04-24

## 2019-04-24 LAB
A/G RATIO: 1.6 (ref 1.1–2.2)
ALBUMIN SERPL-MCNC: 4.4 G/DL (ref 3.4–5)
ALP BLD-CCNC: 62 U/L (ref 40–129)
ALT SERPL-CCNC: 21 U/L (ref 10–40)
ANION GAP SERPL CALCULATED.3IONS-SCNC: 11 MMOL/L (ref 3–16)
AST SERPL-CCNC: 23 U/L (ref 15–37)
BACTERIA: ABNORMAL /HPF
BASOPHILS ABSOLUTE: 0.1 K/UL (ref 0–0.2)
BASOPHILS RELATIVE PERCENT: 1.1 %
BILIRUB SERPL-MCNC: 0.3 MG/DL (ref 0–1)
BILIRUBIN URINE: NEGATIVE
BLOOD, URINE: NEGATIVE
BUN BLDV-MCNC: 25 MG/DL (ref 7–20)
CALCIUM SERPL-MCNC: 9.6 MG/DL (ref 8.3–10.6)
CHLORIDE BLD-SCNC: 103 MMOL/L (ref 99–110)
CLARITY: CLEAR
CO2: 28 MMOL/L (ref 21–32)
COLOR: YELLOW
CREAT SERPL-MCNC: 1 MG/DL (ref 0.6–1.2)
EOSINOPHILS ABSOLUTE: 1.2 K/UL (ref 0–0.6)
EOSINOPHILS RELATIVE PERCENT: 12.8 %
EPITHELIAL CELLS, UA: ABNORMAL /HPF
GFR AFRICAN AMERICAN: >60
GFR NON-AFRICAN AMERICAN: 54
GLOBULIN: 2.8 G/DL
GLUCOSE BLD-MCNC: 101 MG/DL (ref 70–99)
GLUCOSE URINE: NEGATIVE MG/DL
HCT VFR BLD CALC: 37.3 % (ref 36–48)
HEMOGLOBIN: 12.5 G/DL (ref 12–16)
KETONES, URINE: NEGATIVE MG/DL
LACTIC ACID: 0.8 MMOL/L (ref 0.4–2)
LEUKOCYTE ESTERASE, URINE: ABNORMAL
LIPASE: 59 U/L (ref 13–60)
LYMPHOCYTES ABSOLUTE: 2.1 K/UL (ref 1–5.1)
LYMPHOCYTES RELATIVE PERCENT: 22.7 %
MCH RBC QN AUTO: 30.9 PG (ref 26–34)
MCHC RBC AUTO-ENTMCNC: 33.5 G/DL (ref 31–36)
MCV RBC AUTO: 92.3 FL (ref 80–100)
MICROSCOPIC EXAMINATION: YES
MONOCYTES ABSOLUTE: 0.9 K/UL (ref 0–1.3)
MONOCYTES RELATIVE PERCENT: 9.9 %
NEUTROPHILS ABSOLUTE: 4.9 K/UL (ref 1.7–7.7)
NEUTROPHILS RELATIVE PERCENT: 53.5 %
NITRITE, URINE: NEGATIVE
PDW BLD-RTO: 14.5 % (ref 12.4–15.4)
PH UA: 7.5 (ref 5–8)
PLATELET # BLD: 200 K/UL (ref 135–450)
PMV BLD AUTO: 8.4 FL (ref 5–10.5)
POTASSIUM REFLEX MAGNESIUM: 4.5 MMOL/L (ref 3.5–5.1)
PROTEIN UA: NEGATIVE MG/DL
RBC # BLD: 4.04 M/UL (ref 4–5.2)
RBC UA: ABNORMAL /HPF (ref 0–2)
SODIUM BLD-SCNC: 142 MMOL/L (ref 136–145)
SPECIFIC GRAVITY UA: 1.01 (ref 1–1.03)
TOTAL PROTEIN: 7.2 G/DL (ref 6.4–8.2)
TROPONIN: <0.01 NG/ML
URINE REFLEX TO CULTURE: YES
URINE TYPE: ABNORMAL
UROBILINOGEN, URINE: 0.2 E.U./DL
WBC # BLD: 9.2 K/UL (ref 4–11)
WBC UA: ABNORMAL /HPF (ref 0–5)

## 2019-04-24 PROCEDURE — 81001 URINALYSIS AUTO W/SCOPE: CPT

## 2019-04-24 PROCEDURE — 87086 URINE CULTURE/COLONY COUNT: CPT

## 2019-04-24 PROCEDURE — 6360000004 HC RX CONTRAST MEDICATION: Performed by: EMERGENCY MEDICINE

## 2019-04-24 PROCEDURE — 99285 EMERGENCY DEPT VISIT HI MDM: CPT

## 2019-04-24 PROCEDURE — 2580000003 HC RX 258: Performed by: INTERNAL MEDICINE

## 2019-04-24 PROCEDURE — 80053 COMPREHEN METABOLIC PANEL: CPT

## 2019-04-24 PROCEDURE — 93010 ELECTROCARDIOGRAM REPORT: CPT | Performed by: INTERNAL MEDICINE

## 2019-04-24 PROCEDURE — 83605 ASSAY OF LACTIC ACID: CPT

## 2019-04-24 PROCEDURE — 85025 COMPLETE CBC W/AUTO DIFF WBC: CPT

## 2019-04-24 PROCEDURE — 6360000002 HC RX W HCPCS: Performed by: SURGERY

## 2019-04-24 PROCEDURE — 96374 THER/PROPH/DIAG INJ IV PUSH: CPT

## 2019-04-24 PROCEDURE — 2580000003 HC RX 258: Performed by: SURGERY

## 2019-04-24 PROCEDURE — 71046 X-RAY EXAM CHEST 2 VIEWS: CPT

## 2019-04-24 PROCEDURE — 84484 ASSAY OF TROPONIN QUANT: CPT

## 2019-04-24 PROCEDURE — 74177 CT ABD & PELVIS W/CONTRAST: CPT

## 2019-04-24 PROCEDURE — 6370000000 HC RX 637 (ALT 250 FOR IP): Performed by: INTERNAL MEDICINE

## 2019-04-24 PROCEDURE — 2060000000 HC ICU INTERMEDIATE R&B

## 2019-04-24 PROCEDURE — 99222 1ST HOSP IP/OBS MODERATE 55: CPT | Performed by: SURGERY

## 2019-04-24 PROCEDURE — APPSS60 APP SPLIT SHARED TIME 46-60 MINUTES: Performed by: PHYSICIAN ASSISTANT

## 2019-04-24 PROCEDURE — 83690 ASSAY OF LIPASE: CPT

## 2019-04-24 PROCEDURE — 93005 ELECTROCARDIOGRAM TRACING: CPT | Performed by: EMERGENCY MEDICINE

## 2019-04-24 PROCEDURE — APPNB60 APP NON BILLABLE TIME 46-60 MINS: Performed by: PHYSICIAN ASSISTANT

## 2019-04-24 PROCEDURE — 6360000002 HC RX W HCPCS: Performed by: EMERGENCY MEDICINE

## 2019-04-24 PROCEDURE — 6360000002 HC RX W HCPCS: Performed by: INTERNAL MEDICINE

## 2019-04-24 RX ORDER — ROSUVASTATIN CALCIUM 10 MG/1
40 TABLET, COATED ORAL DAILY
Status: DISCONTINUED | OUTPATIENT
Start: 2019-04-25 | End: 2019-04-26 | Stop reason: HOSPADM

## 2019-04-24 RX ORDER — ASPIRIN 81 MG/1
81 TABLET, CHEWABLE ORAL DAILY
Status: DISCONTINUED | OUTPATIENT
Start: 2019-04-25 | End: 2019-04-26 | Stop reason: HOSPADM

## 2019-04-24 RX ORDER — ALBUTEROL SULFATE 90 UG/1
2 AEROSOL, METERED RESPIRATORY (INHALATION) 4 TIMES DAILY
Status: DISCONTINUED | OUTPATIENT
Start: 2019-04-24 | End: 2019-04-26 | Stop reason: HOSPADM

## 2019-04-24 RX ORDER — FUROSEMIDE 40 MG/1
40 TABLET ORAL 2 TIMES DAILY
Status: DISCONTINUED | OUTPATIENT
Start: 2019-04-25 | End: 2019-04-26

## 2019-04-24 RX ORDER — SODIUM CHLORIDE 0.9 % (FLUSH) 0.9 %
10 SYRINGE (ML) INJECTION EVERY 12 HOURS SCHEDULED
Status: DISCONTINUED | OUTPATIENT
Start: 2019-04-24 | End: 2019-04-26 | Stop reason: HOSPADM

## 2019-04-24 RX ORDER — MORPHINE SULFATE 2 MG/ML
2 INJECTION, SOLUTION INTRAMUSCULAR; INTRAVENOUS
Status: DISCONTINUED | OUTPATIENT
Start: 2019-04-24 | End: 2019-04-26 | Stop reason: HOSPADM

## 2019-04-24 RX ORDER — PANTOPRAZOLE SODIUM 40 MG/1
40 TABLET, DELAYED RELEASE ORAL DAILY
Status: DISCONTINUED | OUTPATIENT
Start: 2019-04-25 | End: 2019-04-26 | Stop reason: HOSPADM

## 2019-04-24 RX ORDER — CARVEDILOL 3.12 MG/1
3.12 TABLET ORAL 2 TIMES DAILY WITH MEALS
Status: DISCONTINUED | OUTPATIENT
Start: 2019-04-25 | End: 2019-04-26 | Stop reason: HOSPADM

## 2019-04-24 RX ORDER — MORPHINE SULFATE 2 MG/ML
2 INJECTION, SOLUTION INTRAMUSCULAR; INTRAVENOUS ONCE
Status: COMPLETED | OUTPATIENT
Start: 2019-04-24 | End: 2019-04-24

## 2019-04-24 RX ORDER — ONDANSETRON 2 MG/ML
4 INJECTION INTRAMUSCULAR; INTRAVENOUS EVERY 6 HOURS PRN
Status: DISCONTINUED | OUTPATIENT
Start: 2019-04-24 | End: 2019-04-26 | Stop reason: HOSPADM

## 2019-04-24 RX ORDER — FLUTICASONE PROPIONATE 110 UG/1
1 AEROSOL, METERED RESPIRATORY (INHALATION) 2 TIMES DAILY
Status: DISCONTINUED | OUTPATIENT
Start: 2019-04-25 | End: 2019-04-26 | Stop reason: HOSPADM

## 2019-04-24 RX ORDER — TIZANIDINE 4 MG/1
4 TABLET ORAL 3 TIMES DAILY
Status: DISCONTINUED | OUTPATIENT
Start: 2019-04-24 | End: 2019-04-26 | Stop reason: HOSPADM

## 2019-04-24 RX ORDER — VALSARTAN 160 MG/1
320 TABLET ORAL DAILY
Status: DISCONTINUED | OUTPATIENT
Start: 2019-04-25 | End: 2019-04-26 | Stop reason: HOSPADM

## 2019-04-24 RX ORDER — AMIODARONE HYDROCHLORIDE 200 MG/1
200 TABLET ORAL DAILY
Status: DISCONTINUED | OUTPATIENT
Start: 2019-04-25 | End: 2019-04-26 | Stop reason: HOSPADM

## 2019-04-24 RX ORDER — ACETAMINOPHEN 325 MG/1
650 TABLET ORAL EVERY 4 HOURS PRN
Status: DISCONTINUED | OUTPATIENT
Start: 2019-04-24 | End: 2019-04-26 | Stop reason: HOSPADM

## 2019-04-24 RX ORDER — FORMOTEROL FUMARATE 20 UG/2ML
20 SOLUTION RESPIRATORY (INHALATION) 2 TIMES DAILY
Status: DISCONTINUED | OUTPATIENT
Start: 2019-04-25 | End: 2019-04-26 | Stop reason: HOSPADM

## 2019-04-24 RX ORDER — LEVOTHYROXINE SODIUM 88 UG/1
88 TABLET ORAL DAILY
Status: DISCONTINUED | OUTPATIENT
Start: 2019-04-25 | End: 2019-04-26 | Stop reason: HOSPADM

## 2019-04-24 RX ORDER — SODIUM CHLORIDE 0.9 % (FLUSH) 0.9 %
10 SYRINGE (ML) INJECTION PRN
Status: DISCONTINUED | OUTPATIENT
Start: 2019-04-24 | End: 2019-04-26 | Stop reason: HOSPADM

## 2019-04-24 RX ORDER — SODIUM CHLORIDE 9 MG/ML
INJECTION, SOLUTION INTRAVENOUS CONTINUOUS
Status: DISCONTINUED | OUTPATIENT
Start: 2019-04-24 | End: 2019-04-25

## 2019-04-24 RX ADMIN — MORPHINE SULFATE 2 MG: 2 INJECTION, SOLUTION INTRAMUSCULAR; INTRAVENOUS at 23:19

## 2019-04-24 RX ADMIN — TIZANIDINE 4 MG: 4 TABLET ORAL at 23:07

## 2019-04-24 RX ADMIN — SODIUM CHLORIDE: 9 INJECTION, SOLUTION INTRAVENOUS at 23:11

## 2019-04-24 RX ADMIN — MORPHINE SULFATE 2 MG: 2 INJECTION, SOLUTION INTRAMUSCULAR; INTRAVENOUS at 16:28

## 2019-04-24 RX ADMIN — PIPERACILLIN SODIUM,TAZOBACTAM SODIUM 3.38 G: 3; .375 INJECTION, POWDER, FOR SOLUTION INTRAVENOUS at 23:07

## 2019-04-24 RX ADMIN — Medication 10 ML: at 23:12

## 2019-04-24 RX ADMIN — IOPAMIDOL 75 ML: 755 INJECTION, SOLUTION INTRAVENOUS at 15:12

## 2019-04-24 ASSESSMENT — PAIN DESCRIPTION - DESCRIPTORS
DESCRIPTORS: DISCOMFORT
DESCRIPTORS: PRESSURE;DISCOMFORT
DESCRIPTORS: SHARP
DESCRIPTORS: PRESSURE;DISCOMFORT

## 2019-04-24 ASSESSMENT — PAIN DESCRIPTION - PAIN TYPE
TYPE: ACUTE PAIN

## 2019-04-24 ASSESSMENT — PAIN - FUNCTIONAL ASSESSMENT
PAIN_FUNCTIONAL_ASSESSMENT: PREVENTS OR INTERFERES SOME ACTIVE ACTIVITIES AND ADLS
PAIN_FUNCTIONAL_ASSESSMENT: PREVENTS OR INTERFERES SOME ACTIVE ACTIVITIES AND ADLS
PAIN_FUNCTIONAL_ASSESSMENT: ACTIVITIES ARE NOT PREVENTED

## 2019-04-24 ASSESSMENT — PAIN DESCRIPTION - LOCATION
LOCATION: ABDOMEN

## 2019-04-24 ASSESSMENT — PAIN DESCRIPTION - ONSET
ONSET: ON-GOING
ONSET: ON-GOING

## 2019-04-24 ASSESSMENT — PAIN SCALES - GENERAL
PAINLEVEL_OUTOF10: 8
PAINLEVEL_OUTOF10: 0
PAINLEVEL_OUTOF10: 3
PAINLEVEL_OUTOF10: 4
PAINLEVEL_OUTOF10: 3
PAINLEVEL_OUTOF10: 7
PAINLEVEL_OUTOF10: 4
PAINLEVEL_OUTOF10: 4

## 2019-04-24 ASSESSMENT — PAIN DESCRIPTION - FREQUENCY
FREQUENCY: CONTINUOUS

## 2019-04-24 ASSESSMENT — PAIN DESCRIPTION - PROGRESSION
CLINICAL_PROGRESSION: NOT CHANGED
CLINICAL_PROGRESSION: NOT CHANGED

## 2019-04-24 ASSESSMENT — PAIN DESCRIPTION - ORIENTATION
ORIENTATION: MID
ORIENTATION: ANTERIOR

## 2019-04-24 NOTE — CONSULTS
Consults     Surgery Consult Note     Corie Garcias PA-C  Pt Name: Alfredo Costa  MRN: [id number]  Jesusgfurt: 1945  Date of evaluation: 4/24/2019  Primary Care Physician: Bienvenido Meyer MD  IMPRESSIONS:   1. Epigastric abdominal pain  2. Possible cholecystitis  3. WBC count WNL  4. LFTs WNL  5. Back pain when lying flat: Had an insertion of an interspinous spacer at lumbar 4 - lumbar 5 on 3/15/19  6. has Hyperlipidemia LDL goal <70; Epistaxis, recurrent; Pulmonary emphysema ([address] Closed sacral fracture ([address] Stenosis of right carotid artery; CAD (coronary artery disease); Intermittent claudication ([address] Adjustment reaction with anxiety; Pulmonary HTN ([address] Acute on chronic diastolic heart failure ([address] Left hand weakness; S/P AVR (aortic valve replacement); Acute kidney injury ([address] Acute metabolic encephalopathy; and Sinus bradycardia on their pertinent problem list.  PLANS:   1. Monitor and control pain  2. IVF- will monitor; History of CHF and on lasix  3. Antibiotics  4. NPO  5. Will need clearance for surgery. Should be ok- had clearance last month for back surgery  6. Will plan for cholecystectomy once cleared for surgery, possibly as early as tomorrow. SUBJECTIVE:   History of Chief Complaint:    Alfredo Costa is a 68 y.o. female who presents with abdominal pain. She stated that the pain is located in her epigastric area and radiates up into her chest. She also has had associated nausea. The pain is aggravated after eating. She came into the ED and a CT scan was performed that showed her to have mild distention of the gallbladder with mild biliary ductal dilatation and subtle injection of the fat surrounding the gallbladder raising the question  of underlying cholecystitis. She denies having any other pain. Denies any CP, SOB, cough, lightheadedness, or dizziness.    Past Medical History  Reviewed  has a past medical history of Anticoagulant long-term use, Atrial fibrillation ([address] AVM (arteriovenous malformation) of duodenum, acquired, AVM (arteriovenous malformation) of stomach, acquired, Bladder cancer ([facility] CAD (coronary artery disease), CAP (community acquired pneumonia), Carotid stenosis, Chronic atrial fibrillation ([address] Chronic back pain, Chronic diastolic CHF (congestive heart failure) ([address] COPD, mild ([address] CVA (cerebral infarction), Diverticulosis, Epistaxis, recurrent, Former smoker, Gallbladder sludge, HTN (hypertension), Hyperlipidemia, Hypothyroidism, Macular degeneration, Neuropathy, Osteoarthritis, Pulmonary HTN ([address] PVD (peripheral vascular disease) ([address] Sacral fracture, closed ([address] and Syncope. Past Surgical History  Reviewed has a past surgical history that includes Cystoscopy (Feb 2014); joint replacement; Appendectomy; Aortic valve replacement (6/16/15); Upper gastrointestinal endoscopy (12/15/2017); other surgical history (02/20/2018); Coronary angioplasty (2014); and Spine surgery (N/A, 3/15/2019). Medications  Prior to Admission medications    Medication Sig Start Date End Date Taking?  Authorizing Provider   rosuvastatin (CRESTOR) 40 MG tablet TAKE 1 TABLET BY MOUTH DAILY 4/15/19   Karissa Ochoa MD   ATROVENT HFA 17 MCG/ACT inhaler INHALE TWO PUFFS INTO THE LUNGS FOUR TIMES A DAY. 4/15/19   HOWARD Irving - CNP   furosemide (LASIX) 40 MG tablet Take 60 mg BID or 3 days then resume 40 mg BID 4/15/19   Karissa Ochoa MD   amiodarone (CORDARONE) 200 MG tablet TAKE 1 TABLET BY MOUTH DAILY 3/18/19   Karissa Ochoa MD   Acetaminophen (TYLENOL EXTRA STRENGTH PO) Take 1,500 mg by mouth 3 times daily as needed    Historical Provider, MD   ezetimibe (ZETIA) 10 MG tablet Take 1 tablet by mouth daily 2/20/19   Karissa Ochoa MD   valsartan (DIOVAN) 320 MG tablet Take 1 tablet by mouth daily 2/11/19   Kiran Jacques MD   tiZANidine (ZANAFLEX) 4 MG tablet Take 1 tablet by mouth 3 times daily 1/9/19   Kiran Jacques MD   ferrous gluconate 324 (37.5 Fe) MG TABS TAKE 1 TABLET BY MOUTH 3 TIMES DAILY (WITH MEALS)  Patient taking differently: Take 324 mg by mouth 2 times daily  10/25/18   Francois Melendrez MD   carvedilol (COREG) 3.125 MG tablet TAKE 1 TABLET BY MOUTH 2 TIMES DAILY (WITH MEALS) (LOWER DOSE OF COREG) 10/5/18   Francois Melendrez MD   pantoprazole (PROTONIX) 40 MG tablet TAKE ONE TABLET BY MOUTH DAILY 10/5/18   Francois Melendrez MD   levothyroxine (SYNTHROID) 88 MCG tablet TAKE 1 TABLET BY MOUTH DAILY 9/6/18   HOWARD Hurtado - CNP   Fluticasone-Umeclidin-Vilant (TRELEGY ELLIPTA) 647-23.3-66 MCG/INH AEPB Inhale 100 mcg into the lungs daily Lot# [id number]  Qty 2 7/17/18   Francois Melendrez MD   isosorbide mononitrate (IMDUR) 30 MG extended release tablet TAKE ONE TABLET BY MOUTH TWO TIMES A DAY FOR SYSTOLIC BLOOD PRESSURE (TOP NUMBER) OVER 140 6/15/18   Francois Melendrez MD   potassium chloride (KLOR-CON) 10 MEQ extended release tablet TAKE ONE TABLET BY MOUTH TWO TIMES A DAY. 6/15/18   Francois Melendrez MD    MG capsule TAKE ONE CAPSULE BY MOUTH TWO TIMES A DAY 4/20/18   Francois Melendrez MD   albuterol sulfate  (90 Base) MCG/ACT inhaler Inhale 2 puffs into the lungs 4 times daily 12/19/17   Francois Melendrez MD   aspirin 81 MG tablet Take 1 tablet by mouth daily 5/14/15   Camillo Hashimoto, MD    Scheduled Meds:  Continuous Infusions:  PRN Meds:. Allergies  is allergic to benadryl [diphenhydramine]; doxylamine; mucinex [guaifenesin er]; spiriva handihaler [tiotropium bromide monohydrate]; and adhesive tape. Family History  Reviewedfamily history includes Breast Cancer in her daughter. Social History   reports that she has been smoking cigarettes. She has a 22.50 pack-year smoking history. She uses smokeless tobacco. She reports that she does not drink alcohol or use drugs. EDUCATION  Patient educated about their illness/diagnosis, stated above, and all questions answered.  We discussed the importance of nutrition, medications they are taking, and healthy 67 yo female with acute cholecystitis    Admission per medicine given significant comorbidities. OK for clears, NPO after midnight  Will proceed with laparoscopic cholecystectomy with cholangiogram, possible open tomorrow. The risks, benefits, and alternatives were discussed with the patient and she is willing to consent for the operation.     Zosyn ordered due to drug interactions with Angella Burton MD

## 2019-04-24 NOTE — TELEPHONE ENCOUNTER
Monika Whaley called in the office stating she has been experiencing swelling in her abdomen that is causing her to be uncomfortable and have some pain across her abdomen. She is also having some shortness of breath. She states her weight has gone up some but she has also not been eating much due to feeling uncomfortable. She did call our office last week with similar complaints and her Lasix was increased for 3 days. She denies any relief with this increase. I discussed with her that it was unlikely due to fluid retention in the increased dose of Lasix did not help her. She did state that Dr. Jay Palafox is also OOT this week. I encouraged her to call Dr. Christine Holman office as someone was likely covering her patient's while she was out of town. Instructed to call if anything worsened or she needs more guidance.

## 2019-04-24 NOTE — ED PROVIDER NOTES
CHIEF COMPLAINT  Abdominal Pain (upper abd x 1 week. (+) nausea. no vomiting or diarrhea)      HISTORY OF PRESENT ILLNESS  Vaishali Serrato is a 68 y.o. female with history of CHF, A. fib, pulmonary hypertension and hypertension who presents to the ED complaining of upper abdominal bloating sensation with associated nausea over the past week. States she feels as though her abdomen is distended. Denies any associated vomiting. States she is tolerating oral intake but eating makes her bloating feel worse. Denies any diarrhea or constipation. States normal bowel movement this morning. No previous abdominal surgery. .   No other complaints, modifying factors or associated symptoms. Nursing notes reviewed. 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 since 26.  Chronic back pain     Chronic diastolic CHF (congestive heart failure) ([address] 2016    grade II    COPD, mild (HCC)     CVA (cerebral infarction) 2013    left cerebral cortex x2/expressive aphasia/resolved    Diverticulosis     Epistaxis, recurrent     when inr high.  last 3-4 months ago    Former smoker     Gallbladder sludge     HTN (hypertension)     Hyperlipidemia     Hypothyroidism     Macular degeneration 2018    left worse than right , dry : progressive loss of sight    Neuropathy     Osteoarthritis     [facility] HTN ([facility] 2014    primary, responsive to nitrates    PVD (peripheral vascular disease) [facility]     occluded right SFA::: asymptomatic NIKOS 0.7 right and 1.0 left    Sacral fracture, closed ([facility] 2014    Syncope 5/2014     Past Surgical History:   Procedure Laterality Date    AORTIC VALVE REPLACEMENT  6/16/15    Dr. Celina Worthy. Hernandez - PVI, RENETTA exclusion. 19mm Maki pericardial Magna    APPENDECTOMY      CORONARY ANGIOPLASTY  2014    stent x 1    CYSTOSCOPY  Feb 2014    dr Corinna Jones      THR Right    OTHER SURGICAL HISTORY  02/20/2018     EGD and colonoscopy. 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     Social Needs    Financial resource strain: Not on file    Food insecurity:     Worry: Not on file     Inability: Not on file    Transportation needs:     Medical: Not on file     Non-medical: Not on file   Tobacco Use    Smoking status: Current Some Day Smoker     Packs/day: 0.50     Years: 45.00     Pack years: 22.50     Types: Cigarettes     Last attempt to quit: 6/11/2015     Years since quitting: 3.8    Smokeless tobacco: Current User    Tobacco comment: smoked 1.5 pp for over 30 years  over 45pk year hx   Substance and Sexual Activity    Alcohol use: No     Alcohol/week: 0.0 oz     Comment: Socially     Drug use: No     Comment: never    Sexual activity: Not Currently   Lifestyle    Physical activity:     Days per week: Not on file     Minutes per session: Not on file  Stress: Not on file   Relationships    Social connections:     Talks on phone: Not on file     Gets together: Not on file     Attends [employer] service: Not on file     Active member of club or organization: Not on file     Attends meetings of clubs or organizations: Not on file     Relationship status: Not on file    Intimate partner violence:     Fear of current or ex partner: Not on file     Emotionally abused: Not on file     Physically abused: Not on file     Forced sexual activity: Not on file   Other Topics Concern    Not on file   Social History Narrative    Not on file     No current facility-administered medications for this encounter. Current Outpatient Medications   Medication Sig Dispense Refill    rosuvastatin (CRESTOR) 40 MG tablet TAKE 1 TABLET BY MOUTH DAILY 30 tablet 11    ATROVENT HFA 17 MCG/ACT inhaler INHALE TWO PUFFS INTO THE LUNGS FOUR TIMES A DAY.  12.9 g 3    furosemide (LASIX) 40 MG tablet Take 60 mg BID or 3 days then resume 40 mg BID 64 tablet 5    amiodarone (CORDARONE) 200 MG tablet TAKE 1 TABLET BY MOUTH DAILY 30 tablet 11    ezetimibe (ZETIA) 10 MG tablet Take 1 tablet by mouth daily 30 tablet 5    valsartan (DIOVAN) 320 MG tablet Take 1 tablet by mouth daily 90 tablet 3    tiZANidine (ZANAFLEX) 4 MG tablet Take 1 tablet by mouth 3 times daily 90 tablet 2    ferrous gluconate 324 (37.5 Fe) MG TABS TAKE 1 TABLET BY MOUTH 3 TIMES DAILY (WITH MEALS) (Patient taking differently: Take 324 mg by mouth 2 times daily ) 90 tablet 11    carvedilol (COREG) 3.125 MG tablet TAKE 1 TABLET BY MOUTH 2 TIMES DAILY (WITH MEALS) (LOWER DOSE OF COREG) 60 tablet 11    pantoprazole (PROTONIX) 40 MG tablet TAKE ONE TABLET BY MOUTH DAILY 30 tablet 11    levothyroxine (SYNTHROID) 88 MCG tablet TAKE 1 TABLET BY MOUTH DAILY 30 tablet 11    Fluticasone-Umeclidin-Vilant (TRELEGY ELLIPTA) 100-62.5-25 MCG/INH AEPB Inhale 100 mcg into the lungs daily Lot# [id number]  Qty 2 1 each 0    isosorbide mononitrate (IMDUR) 30 MG extended release tablet TAKE ONE TABLET BY MOUTH TWO TIMES A DAY FOR SYSTOLIC BLOOD PRESSURE (TOP NUMBER) OVER 140 60 tablet 11    potassium chloride (KLOR-CON) 10 MEQ extended release tablet TAKE ONE TABLET BY MOUTH TWO TIMES A DAY. 60 tablet 11     MG capsule TAKE ONE CAPSULE BY MOUTH TWO TIMES A DAY 60 capsule 5    albuterol sulfate  (90 Base) MCG/ACT inhaler Inhale 2 puffs into the lungs 4 times daily 1 Inhaler 3    aspirin 81 MG tablet Take 1 tablet by mouth daily 30 tablet 0     Allergies   Allergen Reactions    Benadryl [Diphenhydramine] Swelling    Doxylamine     Mucinex [Guaifenesin Er]      hallucinations    Spiriva Handihaler [Tiotropium Bromide Monohydrate]      Nausea      Adhesive Tape Rash and Swelling         REVIEW OF SYSTEMS  10 systems reviewed, pertinent positives per HPI otherwise noted to be negative    PHYSICAL EXAM  BP (!) 150/66   Pulse 57   Temp 98.7 °F (37.1 °C)   Resp 14   Ht 5' 1\" (1.549 m)   Wt 152 lb 8.9 oz (69.2 kg)   SpO2 100%   BMI 28.83 kg/m²      CONSTITUTIONAL: AOx4, NAD, cooperative with exam, afebrile   HEAD: normocephalic, atraumatic   EYES: PERRL, EOMI, anicteric sclera   ENT: Moist mucous membranes, uvula midline   NECK: Supple, symmetric, trachea midline   LUNGS: Bilateral breath sounds, CTAB, no rales/ronchi/wheezes   CARDIOVASCULAR: RRR, normal S1/S2, no m/r/g, 2+ pulses throughout   ABDOMEN: Soft, epigastric and suprapubic tenderness, no rebound tenderness or guarding, non-distended, +BS   NEUROLOGIC:  MAEx4, 5/5 strength throughout; fine touch sensation intact throughout; normal gait; GCS 15   MUSCULOSKELETAL: No clubbing, cyanosis or edema   SKIN: No rash, pallor or wounds         RADIOLOGY  X-RAYS:  I have reviewed radiologic plain film image(s). ALL OTHER NON-PLAIN FILM IMAGES SUCH AS CT, ULTRASOUND AND MRI HAVE BEEN READ BY THE RADIOLOGIST.   CT ABDOMEN PELVIS W IV CONTRAST Additional Contrast? None Final Result   Mild distention of the gallbladder with mild biliary ductal dilatation and   subtle injection of the fat surrounding the gallbladder raising the question   of underlying cholecystitis         XR CHEST STANDARD (2 VW)   Final Result   Mild vascular congestion, similar to baseline. No acute cardiopulmonary disease otherwise identified. EKG INTERPRETATION  The Ekg interpreted by me shows  normal sinus rhythm with a rate of 60  Axis is   Left axis deviation  QTc is  normal  Intervals and Durations are unremarkable. ST Segments: nonspecific changes  No significant change from prior EKG dated 12/20/18      PROCEDURES    ED COURSE/MDM  GERD, PUD, pancreatitis, cholecystitis, GB colic, cholangitis, [id number], ACS/ MI, pneumonia, SBO, DKA, AAA, mesenteric ischemia, perforated viscous, acute gastroenteritis, NSAP, pyelonephritis, kidney stone, appendicitis, hernia, UTI, constipation    68year-old nontoxic, afebrile female with epigastric discomfort. Does have some mild distention in the area. No peritoneal signs on abdominal exam.  No vomiting. Patient's complaints are vague. We will obtain basic labs, urinalysis, lipase, troponin, lactic acid, EKG, CT abdomen and pelvis and chest x-ray. 4:20 PM  Consult placed to general surgery to discuss patient's CT abdomen and pelvis. 5:56 PM  Spoke with Dr. Ollie Bosch, surgeon, to discuss care plan. He recommends admission with medicine team and states they will take the patient to the operating room tomorrow for cholecystectomy. Nothing by mouth at midnight. Consult placed the patient's PCP for admission. 6:27 PM  Spoke with Dr. Magdi Hernandez, physician on call for Dr. Shayy Dugan, did accept the admission. Discussed the cholecystitis and the patient will be on scheduled for surgery tomorrow. Admission accepted      Patient was given scripts for the following medications. I counseled patient how to take these medications.    New Prescriptions    No medications on file           CLINICAL IMPRESSION  1. Cholecystitis        Blood pressure (!) 150/66, pulse 57, temperature 98.7 °F (37.1 °C), resp. rate 14, height 5' 1\" (1.549 m), weight 152 lb 8.9 oz (69.2 kg), SpO2 100 %, not currently breastfeeding. DISPOSITION  Admitted- Medicine with surgery consult    Disclaimer: All medical record entries made by [employer] dictation.       (Please note that this note was completed with a voice recognition program. Every attempt was made to edit the dictations, but inevitably there remain words that are mis-transcribed.)          Joe Grant MD  04/24/19 2014

## 2019-04-24 NOTE — ED NOTES
Pharmacy Medication Reconciliation Note     List of medications patient is currently taking is complete. Allergies:  Benadryl [diphenhydramine]; Doxylamine; Mucinex [guaifenesin er]; Spiriva handihaler [tiotropium bromide monohydrate]; and Adhesive tape    Source of information:   1. EMR  2. patient    Notes regarding home medications:   1. Reports taking her AM meds  2. Counseled on using her Trelegy inhaler daily as a maintenance - was only using it as needed.      Denies taking any other OTC or herbal medications    Geraldine Thomas PharmD, BCPS  4/24/2019  6:16 PM

## 2019-04-25 ENCOUNTER — APPOINTMENT (OUTPATIENT)
Dept: GENERAL RADIOLOGY | Age: 74
DRG: 418 | End: 2019-04-25
Payer: COMMERCIAL

## 2019-04-25 ENCOUNTER — ANESTHESIA EVENT (OUTPATIENT)
Dept: OPERATING ROOM | Age: 74
DRG: 418 | End: 2019-04-25
Payer: COMMERCIAL

## 2019-04-25 ENCOUNTER — ANESTHESIA (OUTPATIENT)
Dept: OPERATING ROOM | Age: 74
DRG: 418 | End: 2019-04-25
Payer: COMMERCIAL

## 2019-04-25 VITALS
OXYGEN SATURATION: 100 % | RESPIRATION RATE: 8 BRPM | SYSTOLIC BLOOD PRESSURE: 175 MMHG | TEMPERATURE: 98.2 F | DIASTOLIC BLOOD PRESSURE: 80 MMHG

## 2019-04-25 PROBLEM — I50.32 CHRONIC DIASTOLIC CHF (CONGESTIVE HEART FAILURE) (HCC): Status: ACTIVE | Noted: 2019-04-25

## 2019-04-25 LAB
A/G RATIO: 1.5 (ref 1.1–2.2)
ALBUMIN SERPL-MCNC: 3.8 G/DL (ref 3.4–5)
ALP BLD-CCNC: 57 U/L (ref 40–129)
ALT SERPL-CCNC: 19 U/L (ref 10–40)
ANION GAP SERPL CALCULATED.3IONS-SCNC: 11 MMOL/L (ref 3–16)
AST SERPL-CCNC: 22 U/L (ref 15–37)
BASOPHILS ABSOLUTE: 0.1 K/UL (ref 0–0.2)
BASOPHILS RELATIVE PERCENT: 1 %
BILIRUB SERPL-MCNC: 0.4 MG/DL (ref 0–1)
BUN BLDV-MCNC: 19 MG/DL (ref 7–20)
CALCIUM SERPL-MCNC: 9.3 MG/DL (ref 8.3–10.6)
CHLORIDE BLD-SCNC: 103 MMOL/L (ref 99–110)
CO2: 27 MMOL/L (ref 21–32)
CREAT SERPL-MCNC: 1.1 MG/DL (ref 0.6–1.2)
EKG ATRIAL RATE: 60 BPM
EKG DIAGNOSIS: NORMAL
EKG P AXIS: 84 DEGREES
EKG P-R INTERVAL: 150 MS
EKG Q-T INTERVAL: 454 MS
EKG QRS DURATION: 116 MS
EKG QTC CALCULATION (BAZETT): 454 MS
EKG R AXIS: -35 DEGREES
EKG T AXIS: 101 DEGREES
EKG VENTRICULAR RATE: 60 BPM
EOSINOPHILS ABSOLUTE: 1.2 K/UL (ref 0–0.6)
EOSINOPHILS RELATIVE PERCENT: 14.6 %
GFR AFRICAN AMERICAN: 59
GFR NON-AFRICAN AMERICAN: 49
GLOBULIN: 2.5 G/DL
GLUCOSE BLD-MCNC: 93 MG/DL (ref 70–99)
HCT VFR BLD CALC: 34.9 % (ref 36–48)
HEMOGLOBIN: 11.9 G/DL (ref 12–16)
LYMPHOCYTES ABSOLUTE: 2.2 K/UL (ref 1–5.1)
LYMPHOCYTES RELATIVE PERCENT: 27 %
MCH RBC QN AUTO: 31.6 PG (ref 26–34)
MCHC RBC AUTO-ENTMCNC: 34.2 G/DL (ref 31–36)
MCV RBC AUTO: 92.4 FL (ref 80–100)
MONOCYTES ABSOLUTE: 0.9 K/UL (ref 0–1.3)
MONOCYTES RELATIVE PERCENT: 10.7 %
NEUTROPHILS ABSOLUTE: 3.8 K/UL (ref 1.7–7.7)
NEUTROPHILS RELATIVE PERCENT: 46.7 %
PDW BLD-RTO: 14.1 % (ref 12.4–15.4)
PLATELET # BLD: 177 K/UL (ref 135–450)
PMV BLD AUTO: 8 FL (ref 5–10.5)
POTASSIUM SERPL-SCNC: 4.3 MMOL/L (ref 3.5–5.1)
RBC # BLD: 3.78 M/UL (ref 4–5.2)
SODIUM BLD-SCNC: 141 MMOL/L (ref 136–145)
TOTAL PROTEIN: 6.3 G/DL (ref 6.4–8.2)
URINE CULTURE, ROUTINE: NORMAL
WBC # BLD: 8 K/UL (ref 4–11)

## 2019-04-25 PROCEDURE — 94760 N-INVAS EAR/PLS OXIMETRY 1: CPT

## 2019-04-25 PROCEDURE — 36415 COLL VENOUS BLD VENIPUNCTURE: CPT

## 2019-04-25 PROCEDURE — 6360000002 HC RX W HCPCS: Performed by: INTERNAL MEDICINE

## 2019-04-25 PROCEDURE — 74300 X-RAY BILE DUCTS/PANCREAS: CPT

## 2019-04-25 PROCEDURE — 80053 COMPREHEN METABOLIC PANEL: CPT

## 2019-04-25 PROCEDURE — 6360000004 HC RX CONTRAST MEDICATION: Performed by: SURGERY

## 2019-04-25 PROCEDURE — 88304 TISSUE EXAM BY PATHOLOGIST: CPT

## 2019-04-25 PROCEDURE — 7100000001 HC PACU RECOVERY - ADDTL 15 MIN: Performed by: SURGERY

## 2019-04-25 PROCEDURE — 2580000003 HC RX 258: Performed by: INTERNAL MEDICINE

## 2019-04-25 PROCEDURE — 7100000000 HC PACU RECOVERY - FIRST 15 MIN: Performed by: SURGERY

## 2019-04-25 PROCEDURE — 99223 1ST HOSP IP/OBS HIGH 75: CPT | Performed by: INTERNAL MEDICINE

## 2019-04-25 PROCEDURE — 6360000002 HC RX W HCPCS: Performed by: ANESTHESIOLOGY

## 2019-04-25 PROCEDURE — 2580000003 HC RX 258: Performed by: SURGERY

## 2019-04-25 PROCEDURE — 2500000003 HC RX 250 WO HCPCS

## 2019-04-25 PROCEDURE — 2500000003 HC RX 250 WO HCPCS: Performed by: ANESTHESIOLOGY

## 2019-04-25 PROCEDURE — 0FT44ZZ RESECTION OF GALLBLADDER, PERCUTANEOUS ENDOSCOPIC APPROACH: ICD-10-PCS | Performed by: SURGERY

## 2019-04-25 PROCEDURE — 3700000000 HC ANESTHESIA ATTENDED CARE: Performed by: SURGERY

## 2019-04-25 PROCEDURE — 6360000002 HC RX W HCPCS: Performed by: NURSE ANESTHETIST, CERTIFIED REGISTERED

## 2019-04-25 PROCEDURE — BF101ZZ FLUOROSCOPY OF BILE DUCTS USING LOW OSMOLAR CONTRAST: ICD-10-PCS | Performed by: SURGERY

## 2019-04-25 PROCEDURE — 6360000002 HC RX W HCPCS

## 2019-04-25 PROCEDURE — 2500000003 HC RX 250 WO HCPCS: Performed by: SURGERY

## 2019-04-25 PROCEDURE — 2709999900 HC NON-CHARGEABLE SUPPLY: Performed by: SURGERY

## 2019-04-25 PROCEDURE — 47563 LAPARO CHOLECYSTECTOMY/GRAPH: CPT | Performed by: SURGERY

## 2019-04-25 PROCEDURE — 6360000002 HC RX W HCPCS: Performed by: SURGERY

## 2019-04-25 PROCEDURE — 85025 COMPLETE CBC W/AUTO DIFF WBC: CPT

## 2019-04-25 PROCEDURE — 71045 X-RAY EXAM CHEST 1 VIEW: CPT

## 2019-04-25 PROCEDURE — 6370000000 HC RX 637 (ALT 250 FOR IP): Performed by: SURGERY

## 2019-04-25 PROCEDURE — 3700000001 HC ADD 15 MINUTES (ANESTHESIA): Performed by: SURGERY

## 2019-04-25 PROCEDURE — 3600000004 HC SURGERY LEVEL 4 BASE: Performed by: SURGERY

## 2019-04-25 PROCEDURE — 6370000000 HC RX 637 (ALT 250 FOR IP): Performed by: INTERNAL MEDICINE

## 2019-04-25 PROCEDURE — 94664 DEMO&/EVAL PT USE INHALER: CPT

## 2019-04-25 PROCEDURE — 2500000003 HC RX 250 WO HCPCS: Performed by: NURSE ANESTHETIST, CERTIFIED REGISTERED

## 2019-04-25 PROCEDURE — 1200000000 HC SEMI PRIVATE

## 2019-04-25 PROCEDURE — 3600000014 HC SURGERY LEVEL 4 ADDTL 15MIN: Performed by: SURGERY

## 2019-04-25 PROCEDURE — 94640 AIRWAY INHALATION TREATMENT: CPT

## 2019-04-25 RX ORDER — MEPERIDINE HYDROCHLORIDE 25 MG/ML
12.5 INJECTION INTRAMUSCULAR; INTRAVENOUS; SUBCUTANEOUS EVERY 5 MIN PRN
Status: DISCONTINUED | OUTPATIENT
Start: 2019-04-25 | End: 2019-04-25 | Stop reason: HOSPADM

## 2019-04-25 RX ORDER — EPHEDRINE SULFATE/0.9% NACL/PF 50 MG/5 ML
SYRINGE (ML) INTRAVENOUS PRN
Status: DISCONTINUED | OUTPATIENT
Start: 2019-04-25 | End: 2019-04-25 | Stop reason: SDUPTHER

## 2019-04-25 RX ORDER — SODIUM CHLORIDE 0.9 % (FLUSH) 0.9 %
10 SYRINGE (ML) INJECTION EVERY 12 HOURS SCHEDULED
Status: DISCONTINUED | OUTPATIENT
Start: 2019-04-25 | End: 2019-04-25 | Stop reason: HOSPADM

## 2019-04-25 RX ORDER — TRAMADOL HYDROCHLORIDE 50 MG/1
100 TABLET ORAL EVERY 6 HOURS PRN
Status: DISCONTINUED | OUTPATIENT
Start: 2019-04-25 | End: 2019-04-26 | Stop reason: HOSPADM

## 2019-04-25 RX ORDER — MORPHINE SULFATE 2 MG/ML
1 INJECTION, SOLUTION INTRAMUSCULAR; INTRAVENOUS EVERY 5 MIN PRN
Status: DISCONTINUED | OUTPATIENT
Start: 2019-04-25 | End: 2019-04-25 | Stop reason: HOSPADM

## 2019-04-25 RX ORDER — FENTANYL CITRATE 50 UG/ML
25 INJECTION, SOLUTION INTRAMUSCULAR; INTRAVENOUS EVERY 5 MIN PRN
Status: DISCONTINUED | OUTPATIENT
Start: 2019-04-25 | End: 2019-04-25 | Stop reason: HOSPADM

## 2019-04-25 RX ORDER — FENTANYL CITRATE 50 UG/ML
INJECTION, SOLUTION INTRAMUSCULAR; INTRAVENOUS PRN
Status: DISCONTINUED | OUTPATIENT
Start: 2019-04-25 | End: 2019-04-25 | Stop reason: SDUPTHER

## 2019-04-25 RX ORDER — OXYCODONE HYDROCHLORIDE AND ACETAMINOPHEN 5; 325 MG/1; MG/1
2 TABLET ORAL PRN
Status: DISCONTINUED | OUTPATIENT
Start: 2019-04-25 | End: 2019-04-25 | Stop reason: HOSPADM

## 2019-04-25 RX ORDER — SODIUM CHLORIDE 0.9 % (FLUSH) 0.9 %
10 SYRINGE (ML) INJECTION PRN
Status: DISCONTINUED | OUTPATIENT
Start: 2019-04-25 | End: 2019-04-25 | Stop reason: HOSPADM

## 2019-04-25 RX ORDER — SODIUM CHLORIDE 9 MG/ML
INJECTION, SOLUTION INTRAVENOUS CONTINUOUS
Status: DISCONTINUED | OUTPATIENT
Start: 2019-04-25 | End: 2019-04-25

## 2019-04-25 RX ORDER — FENTANYL CITRATE 50 UG/ML
50 INJECTION, SOLUTION INTRAMUSCULAR; INTRAVENOUS EVERY 5 MIN PRN
Status: COMPLETED | OUTPATIENT
Start: 2019-04-25 | End: 2019-04-25

## 2019-04-25 RX ORDER — ONDANSETRON 2 MG/ML
4 INJECTION INTRAMUSCULAR; INTRAVENOUS
Status: DISCONTINUED | OUTPATIENT
Start: 2019-04-25 | End: 2019-04-25 | Stop reason: HOSPADM

## 2019-04-25 RX ORDER — DEXAMETHASONE SODIUM PHOSPHATE 4 MG/ML
INJECTION, SOLUTION INTRA-ARTICULAR; INTRALESIONAL; INTRAMUSCULAR; INTRAVENOUS; SOFT TISSUE PRN
Status: DISCONTINUED | OUTPATIENT
Start: 2019-04-25 | End: 2019-04-25 | Stop reason: SDUPTHER

## 2019-04-25 RX ORDER — ETOMIDATE 2 MG/ML
INJECTION INTRAVENOUS PRN
Status: DISCONTINUED | OUTPATIENT
Start: 2019-04-25 | End: 2019-04-25 | Stop reason: SDUPTHER

## 2019-04-25 RX ORDER — ROCURONIUM BROMIDE 10 MG/ML
INJECTION, SOLUTION INTRAVENOUS PRN
Status: DISCONTINUED | OUTPATIENT
Start: 2019-04-25 | End: 2019-04-25 | Stop reason: SDUPTHER

## 2019-04-25 RX ORDER — BUPIVACAINE HYDROCHLORIDE 5 MG/ML
INJECTION, SOLUTION EPIDURAL; INTRACAUDAL
Status: COMPLETED | OUTPATIENT
Start: 2019-04-25 | End: 2019-04-25

## 2019-04-25 RX ORDER — OXYCODONE HYDROCHLORIDE AND ACETAMINOPHEN 5; 325 MG/1; MG/1
1 TABLET ORAL PRN
Status: DISCONTINUED | OUTPATIENT
Start: 2019-04-25 | End: 2019-04-25 | Stop reason: HOSPADM

## 2019-04-25 RX ORDER — MORPHINE SULFATE 2 MG/ML
2 INJECTION, SOLUTION INTRAMUSCULAR; INTRAVENOUS EVERY 5 MIN PRN
Status: DISCONTINUED | OUTPATIENT
Start: 2019-04-25 | End: 2019-04-25 | Stop reason: HOSPADM

## 2019-04-25 RX ORDER — GLYCOPYRROLATE 0.2 MG/ML
INJECTION INTRAMUSCULAR; INTRAVENOUS PRN
Status: DISCONTINUED | OUTPATIENT
Start: 2019-04-25 | End: 2019-04-25 | Stop reason: SDUPTHER

## 2019-04-25 RX ORDER — TRAMADOL HYDROCHLORIDE 50 MG/1
50 TABLET ORAL EVERY 6 HOURS PRN
Status: DISCONTINUED | OUTPATIENT
Start: 2019-04-25 | End: 2019-04-26 | Stop reason: HOSPADM

## 2019-04-25 RX ORDER — ONDANSETRON 2 MG/ML
INJECTION INTRAMUSCULAR; INTRAVENOUS PRN
Status: DISCONTINUED | OUTPATIENT
Start: 2019-04-25 | End: 2019-04-25 | Stop reason: SDUPTHER

## 2019-04-25 RX ORDER — SUCCINYLCHOLINE/SOD CL,ISO/PF 200MG/10ML
SYRINGE (ML) INTRAVENOUS PRN
Status: DISCONTINUED | OUTPATIENT
Start: 2019-04-25 | End: 2019-04-25 | Stop reason: SDUPTHER

## 2019-04-25 RX ADMIN — AMIODARONE HYDROCHLORIDE 200 MG: 200 TABLET ORAL at 09:19

## 2019-04-25 RX ADMIN — ROSUVASTATIN CALCIUM 40 MG: 10 TABLET, FILM COATED ORAL at 09:18

## 2019-04-25 RX ADMIN — FLUTICASONE PROPIONATE 1 PUFF: 110 AEROSOL, METERED RESPIRATORY (INHALATION) at 08:26

## 2019-04-25 RX ADMIN — FORMOTEROL FUMARATE DIHYDRATE 20 MCG: 20 SOLUTION RESPIRATORY (INHALATION) at 21:15

## 2019-04-25 RX ADMIN — CARVEDILOL 3.12 MG: 3.12 TABLET, FILM COATED ORAL at 09:19

## 2019-04-25 RX ADMIN — TRAMADOL HYDROCHLORIDE 100 MG: 50 TABLET, FILM COATED ORAL at 20:02

## 2019-04-25 RX ADMIN — LEVOTHYROXINE SODIUM 88 MCG: 88 TABLET ORAL at 06:38

## 2019-04-25 RX ADMIN — MORPHINE SULFATE 2 MG: 2 INJECTION, SOLUTION INTRAMUSCULAR; INTRAVENOUS at 16:48

## 2019-04-25 RX ADMIN — MORPHINE SULFATE 2 MG: 2 INJECTION, SOLUTION INTRAMUSCULAR; INTRAVENOUS at 17:03

## 2019-04-25 RX ADMIN — ROCURONIUM BROMIDE 50 MG: 10 INJECTION INTRAVENOUS at 15:21

## 2019-04-25 RX ADMIN — GLYCOPYRROLATE 0.2 MG: 0.2 INJECTION, SOLUTION INTRAMUSCULAR; INTRAVENOUS at 15:20

## 2019-04-25 RX ADMIN — IPRATROPIUM BROMIDE 2 PUFF: 17 AEROSOL, METERED RESPIRATORY (INHALATION) at 21:14

## 2019-04-25 RX ADMIN — FUROSEMIDE 40 MG: 40 TABLET ORAL at 17:56

## 2019-04-25 RX ADMIN — FENTANYL CITRATE 50 MCG: 50 INJECTION INTRAMUSCULAR; INTRAVENOUS at 15:01

## 2019-04-25 RX ADMIN — IPRATROPIUM BROMIDE 2 PUFF: 17 AEROSOL, METERED RESPIRATORY (INHALATION) at 08:23

## 2019-04-25 RX ADMIN — FORMOTEROL FUMARATE DIHYDRATE 20 MCG: 20 SOLUTION RESPIRATORY (INHALATION) at 08:29

## 2019-04-25 RX ADMIN — FAMOTIDINE 20 MG: 10 INJECTION, SOLUTION INTRAVENOUS at 14:13

## 2019-04-25 RX ADMIN — FENTANYL CITRATE 50 MCG: 50 INJECTION, SOLUTION INTRAMUSCULAR; INTRAVENOUS at 16:11

## 2019-04-25 RX ADMIN — SUGAMMADEX 200 MG: 100 INJECTION, SOLUTION INTRAVENOUS at 15:49

## 2019-04-25 RX ADMIN — MORPHINE SULFATE 2 MG: 2 INJECTION, SOLUTION INTRAMUSCULAR; INTRAVENOUS at 09:19

## 2019-04-25 RX ADMIN — PANTOPRAZOLE SODIUM 40 MG: 40 TABLET, DELAYED RELEASE ORAL at 06:38

## 2019-04-25 RX ADMIN — FENTANYL CITRATE 50 MCG: 50 INJECTION, SOLUTION INTRAMUSCULAR; INTRAVENOUS at 16:18

## 2019-04-25 RX ADMIN — Medication 120 MG: at 15:02

## 2019-04-25 RX ADMIN — ONDANSETRON 4 MG: 2 INJECTION INTRAMUSCULAR; INTRAVENOUS at 15:38

## 2019-04-25 RX ADMIN — PIPERACILLIN SODIUM AND TAZOBACTAM SODIUM 3.38 G: 3; .375 INJECTION, POWDER, FOR SOLUTION INTRAVENOUS at 14:45

## 2019-04-25 RX ADMIN — FENTANYL CITRATE 50 MCG: 50 INJECTION, SOLUTION INTRAMUSCULAR; INTRAVENOUS at 16:30

## 2019-04-25 RX ADMIN — GLYCOPYRROLATE 0.2 MG: 0.2 INJECTION, SOLUTION INTRAMUSCULAR; INTRAVENOUS at 15:17

## 2019-04-25 RX ADMIN — MORPHINE SULFATE 2 MG: 2 INJECTION, SOLUTION INTRAMUSCULAR; INTRAVENOUS at 17:56

## 2019-04-25 RX ADMIN — Medication 10 ML: at 20:10

## 2019-04-25 RX ADMIN — SODIUM CHLORIDE: 9 INJECTION, SOLUTION INTRAVENOUS at 15:25

## 2019-04-25 RX ADMIN — Medication 10 MG: at 15:24

## 2019-04-25 RX ADMIN — ALBUTEROL SULFATE 2 PUFF: 90 AEROSOL, METERED RESPIRATORY (INHALATION) at 08:20

## 2019-04-25 RX ADMIN — IPRATROPIUM BROMIDE 2 PUFF: 17 AEROSOL, METERED RESPIRATORY (INHALATION) at 12:12

## 2019-04-25 RX ADMIN — VALSARTAN 320 MG: 160 TABLET, FILM COATED ORAL at 09:27

## 2019-04-25 RX ADMIN — MORPHINE SULFATE 2 MG: 2 INJECTION, SOLUTION INTRAMUSCULAR; INTRAVENOUS at 21:44

## 2019-04-25 RX ADMIN — ETOMIDATE 11 MG: 2 INJECTION INTRAVENOUS at 15:02

## 2019-04-25 RX ADMIN — FENTANYL CITRATE 50 MCG: 50 INJECTION, SOLUTION INTRAMUSCULAR; INTRAVENOUS at 16:25

## 2019-04-25 RX ADMIN — CARVEDILOL 3.12 MG: 3.12 TABLET, FILM COATED ORAL at 17:56

## 2019-04-25 RX ADMIN — ALBUTEROL SULFATE 2 PUFF: 90 AEROSOL, METERED RESPIRATORY (INHALATION) at 21:14

## 2019-04-25 RX ADMIN — Medication 10 ML: at 09:22

## 2019-04-25 RX ADMIN — DEXAMETHASONE SODIUM PHOSPHATE 8 MG: 4 INJECTION, SOLUTION INTRAMUSCULAR; INTRAVENOUS at 15:10

## 2019-04-25 RX ADMIN — FUROSEMIDE 40 MG: 40 TABLET ORAL at 09:19

## 2019-04-25 RX ADMIN — FENTANYL CITRATE 50 MCG: 50 INJECTION INTRAMUSCULAR; INTRAVENOUS at 15:11

## 2019-04-25 RX ADMIN — ALBUTEROL SULFATE 2 PUFF: 90 AEROSOL, METERED RESPIRATORY (INHALATION) at 12:09

## 2019-04-25 RX ADMIN — FLUTICASONE PROPIONATE 1 PUFF: 110 AEROSOL, METERED RESPIRATORY (INHALATION) at 21:14

## 2019-04-25 RX ADMIN — ASPIRIN 81 MG 81 MG: 81 TABLET ORAL at 09:19

## 2019-04-25 RX ADMIN — MORPHINE SULFATE 2 MG: 2 INJECTION, SOLUTION INTRAMUSCULAR; INTRAVENOUS at 12:17

## 2019-04-25 RX ADMIN — PIPERACILLIN SODIUM,TAZOBACTAM SODIUM 3.38 G: 3; .375 INJECTION, POWDER, FOR SOLUTION INTRAVENOUS at 06:39

## 2019-04-25 ASSESSMENT — PAIN DESCRIPTION - LOCATION
LOCATION: ABDOMEN

## 2019-04-25 ASSESSMENT — PULMONARY FUNCTION TESTS
PIF_VALUE: 34
PIF_VALUE: 25
PIF_VALUE: 30
PIF_VALUE: 29
PIF_VALUE: 18
PIF_VALUE: 28
PIF_VALUE: 3
PIF_VALUE: 0
PIF_VALUE: 1
PIF_VALUE: 35
PIF_VALUE: 33
PIF_VALUE: 35
PIF_VALUE: 34
PIF_VALUE: 35
PIF_VALUE: 36
PIF_VALUE: 2
PIF_VALUE: 35
PIF_VALUE: 0
PIF_VALUE: 21
PIF_VALUE: 26
PIF_VALUE: 25
PIF_VALUE: 32
PIF_VALUE: 1
PIF_VALUE: 34
PIF_VALUE: 36
PIF_VALUE: 34
PIF_VALUE: 18
PIF_VALUE: 29
PIF_VALUE: 33
PIF_VALUE: 2
PIF_VALUE: 0
PIF_VALUE: 35
PIF_VALUE: 22
PIF_VALUE: 1
PIF_VALUE: 25
PIF_VALUE: 0
PIF_VALUE: 0
PIF_VALUE: 1
PIF_VALUE: 28
PIF_VALUE: 21
PIF_VALUE: 37
PIF_VALUE: 1
PIF_VALUE: 35
PIF_VALUE: 28
PIF_VALUE: 0
PIF_VALUE: 36
PIF_VALUE: 26
PIF_VALUE: 0
PIF_VALUE: 30
PIF_VALUE: 39
PIF_VALUE: 1
PIF_VALUE: 36
PIF_VALUE: 0
PIF_VALUE: 38
PIF_VALUE: 1
PIF_VALUE: 22
PIF_VALUE: 25
PIF_VALUE: 27
PIF_VALUE: 27
PIF_VALUE: 0
PIF_VALUE: 21
PIF_VALUE: 18
PIF_VALUE: 35
PIF_VALUE: 0
PIF_VALUE: 27
PIF_VALUE: 28

## 2019-04-25 ASSESSMENT — PAIN DESCRIPTION - PROGRESSION
CLINICAL_PROGRESSION: GRADUALLY WORSENING
CLINICAL_PROGRESSION: GRADUALLY IMPROVING
CLINICAL_PROGRESSION: NOT CHANGED
CLINICAL_PROGRESSION: GRADUALLY WORSENING
CLINICAL_PROGRESSION: GRADUALLY IMPROVING
CLINICAL_PROGRESSION: GRADUALLY IMPROVING
CLINICAL_PROGRESSION: NOT CHANGED
CLINICAL_PROGRESSION: NOT CHANGED
CLINICAL_PROGRESSION: GRADUALLY IMPROVING
CLINICAL_PROGRESSION: GRADUALLY WORSENING
CLINICAL_PROGRESSION: NOT CHANGED
CLINICAL_PROGRESSION: NOT CHANGED
CLINICAL_PROGRESSION: GRADUALLY IMPROVING
CLINICAL_PROGRESSION: NOT CHANGED
CLINICAL_PROGRESSION: GRADUALLY IMPROVING
CLINICAL_PROGRESSION: NOT CHANGED
CLINICAL_PROGRESSION: GRADUALLY WORSENING
CLINICAL_PROGRESSION: GRADUALLY IMPROVING

## 2019-04-25 ASSESSMENT — PAIN DESCRIPTION - DESCRIPTORS
DESCRIPTORS: PENETRATING
DESCRIPTORS: PRESSURE;DISCOMFORT
DESCRIPTORS: STABBING;ACHING
DESCRIPTORS: ACHING;CRAMPING
DESCRIPTORS: DISCOMFORT
DESCRIPTORS: STABBING;ACHING
DESCRIPTORS: ACHING;DISCOMFORT
DESCRIPTORS: ACHING;CRAMPING;DISCOMFORT
DESCRIPTORS: ACHING;CRAMPING;DISCOMFORT
DESCRIPTORS: STABBING;ACHING
DESCRIPTORS: ACHING;CRAMPING
DESCRIPTORS: STABBING
DESCRIPTORS: ACHING;CRAMPING;DISCOMFORT
DESCRIPTORS: DISCOMFORT;PRESSURE
DESCRIPTORS: ACHING;DISCOMFORT
DESCRIPTORS: DISCOMFORT
DESCRIPTORS: PRESSURE;DISCOMFORT
DESCRIPTORS: STABBING;ACHING

## 2019-04-25 ASSESSMENT — PAIN DESCRIPTION - ONSET
ONSET: AWAKENED FROM SLEEP
ONSET: ON-GOING
ONSET: AWAKENED FROM SLEEP
ONSET: ON-GOING
ONSET: AWAKENED FROM SLEEP
ONSET: AWAKENED FROM SLEEP
ONSET: ON-GOING
ONSET: AWAKENED FROM SLEEP
ONSET: AWAKENED FROM SLEEP
ONSET: ON-GOING
ONSET: AWAKENED FROM SLEEP
ONSET: ON-GOING
ONSET: ON-GOING
ONSET: AWAKENED FROM SLEEP
ONSET: AWAKENED FROM SLEEP

## 2019-04-25 ASSESSMENT — PAIN - FUNCTIONAL ASSESSMENT
PAIN_FUNCTIONAL_ASSESSMENT: ACTIVITIES ARE NOT PREVENTED
PAIN_FUNCTIONAL_ASSESSMENT: ACTIVITIES ARE NOT PREVENTED
PAIN_FUNCTIONAL_ASSESSMENT: PREVENTS OR INTERFERES SOME ACTIVE ACTIVITIES AND ADLS
PAIN_FUNCTIONAL_ASSESSMENT: ACTIVITIES ARE NOT PREVENTED
PAIN_FUNCTIONAL_ASSESSMENT: 0-10
PAIN_FUNCTIONAL_ASSESSMENT: ACTIVITIES ARE NOT PREVENTED

## 2019-04-25 ASSESSMENT — PAIN DESCRIPTION - ORIENTATION
ORIENTATION: RIGHT
ORIENTATION: LEFT
ORIENTATION: LEFT
ORIENTATION: MID
ORIENTATION: MID;RIGHT
ORIENTATION: LEFT
ORIENTATION: RIGHT;MID
ORIENTATION: MID
ORIENTATION: RIGHT;MID
ORIENTATION: MID;RIGHT
ORIENTATION: RIGHT;MID
ORIENTATION: MID
ORIENTATION: LEFT
ORIENTATION: RIGHT
ORIENTATION: RIGHT;MID
ORIENTATION: RIGHT;MID
ORIENTATION: MID
ORIENTATION: MID
ORIENTATION: RIGHT
ORIENTATION: RIGHT;MID
ORIENTATION: MID;RIGHT
ORIENTATION: RIGHT;MID

## 2019-04-25 ASSESSMENT — PAIN DESCRIPTION - FREQUENCY
FREQUENCY: CONTINUOUS

## 2019-04-25 ASSESSMENT — PAIN DESCRIPTION - PAIN TYPE
TYPE: ACUTE PAIN
TYPE: SURGICAL PAIN
TYPE: ACUTE PAIN;SURGICAL PAIN
TYPE: ACUTE PAIN;SURGICAL PAIN
TYPE: ACUTE PAIN
TYPE: SURGICAL PAIN
TYPE: ACUTE PAIN
TYPE: SURGICAL PAIN
TYPE: SURGICAL PAIN
TYPE: ACUTE PAIN;SURGICAL PAIN
TYPE: SURGICAL PAIN
TYPE: ACUTE PAIN
TYPE: ACUTE PAIN
TYPE: SURGICAL PAIN
TYPE: ACUTE PAIN;SURGICAL PAIN
TYPE: SURGICAL PAIN
TYPE: ACUTE PAIN;SURGICAL PAIN
TYPE: SURGICAL PAIN

## 2019-04-25 ASSESSMENT — PAIN SCALES - GENERAL
PAINLEVEL_OUTOF10: 7
PAINLEVEL_OUTOF10: 6
PAINLEVEL_OUTOF10: 6
PAINLEVEL_OUTOF10: 7
PAINLEVEL_OUTOF10: 3
PAINLEVEL_OUTOF10: 6
PAINLEVEL_OUTOF10: 8
PAINLEVEL_OUTOF10: 6
PAINLEVEL_OUTOF10: 6
PAINLEVEL_OUTOF10: 7
PAINLEVEL_OUTOF10: 2
PAINLEVEL_OUTOF10: 7
PAINLEVEL_OUTOF10: 5
PAINLEVEL_OUTOF10: 8
PAINLEVEL_OUTOF10: 4
PAINLEVEL_OUTOF10: 8
PAINLEVEL_OUTOF10: 4

## 2019-04-25 ASSESSMENT — LIFESTYLE VARIABLES: SMOKING_STATUS: 1

## 2019-04-25 ASSESSMENT — ENCOUNTER SYMPTOMS: SHORTNESS OF BREATH: 1

## 2019-04-25 NOTE — ED NOTES
Report called to [facility]. All questions answered. Transport request placed.       Leeann Lorenzo RN  04/24/19 2032

## 2019-04-25 NOTE — PLAN OF CARE
Problem: Pain:  Goal: Pain level will decrease  Description  Pain level will decrease  4/25/2019 1106 by Pat Lezama RN  Outcome: Ongoing  Note:   Pain/discomfort being managed with PRN analgesics per MD orders. Pt able to express presence and absence of pain and rate pain appropriately using numerical scale. Problem: Falls - Risk of:  Goal: Will remain free from falls  Description  Will remain free from falls  Outcome: Ongoing  Note:   Bed alarm kept on. Call light in reach. Side rails up x2. Pt reminded to use call light for assistance getting out of bed. Hourly rounding done to anticipate pt needs.

## 2019-04-25 NOTE — ANESTHESIA PRE PROCEDURE
Department of Anesthesiology  Preprocedure Note       Name:  Ankit Penaloza   Age:  68 y.o.  :  1945                                          MRN:  [id number]         Date:  2019      Surgeon: Kevin Farris):  Ray Mueller MD    Procedure: EMERGENT; LAPAROSCOPIC CHOLECYSTECTOMY WITH GRAM (N/A Abdomen)    Medications prior to admission:   Prior to Admission medications    Medication Sig Start Date End Date Taking?  Authorizing Provider   rosuvastatin (CRESTOR) 40 MG tablet TAKE 1 TABLET BY MOUTH DAILY 4/15/19  Yes Juan Small MD   ATROVENT HFA 17 MCG/ACT inhaler INHALE TWO PUFFS INTO THE LUNGS FOUR TIMES A DAY. 4/15/19  Yes Lacey Broussard APRN - CNP   furosemide (LASIX) 40 MG tablet Take 60 mg BID or 3 days then resume 40 mg BID 4/15/19  Yes Juan Small MD   amiodarone (CORDARONE) 200 MG tablet TAKE 1 TABLET BY MOUTH DAILY 3/18/19  Yes Juan Small MD   ezetimibe (ZETIA) 10 MG tablet Take 1 tablet by mouth daily 19  Yes Juan Small MD   valsartan (DIOVAN) 320 MG tablet Take 1 tablet by mouth daily 19  Yes Monika Hwang MD   tiZANidine (ZANAFLEX) 4 MG tablet Take 1 tablet by mouth 3 times daily 19  Yes Monika Hwang MD   ferrous gluconate 324 (37.5 Fe) MG TABS TAKE 1 TABLET BY MOUTH 3 TIMES DAILY (WITH MEALS)  Patient taking differently: Take 324 mg by mouth 2 times daily  10/25/18  Yes Monika Hwang MD   carvedilol (COREG) 3.125 MG tablet TAKE 1 TABLET BY MOUTH 2 TIMES DAILY (WITH MEALS) (LOWER DOSE OF COREG) 10/5/18  Yes Monika Hwang MD   pantoprazole (PROTONIX) 40 MG tablet TAKE ONE TABLET BY MOUTH DAILY 10/5/18  Yes Monika Hwang MD   levothyroxine (SYNTHROID) 88 MCG tablet TAKE 1 TABLET BY MOUTH DAILY 18  Yes Lacey Broussard, APRN - CNP   Fluticasone-Umeclidin-Vilant (TRELEGY ELLIPTA) 100-62.5-25 MCG/INH AEPB Inhale 100 mcg into the lungs daily Lot# [id number]  Qty 2 18  Yes Monika Hwang MD   isosorbide mononitrate (IMDUR) 30 MG extended release tablet TAKE ONE TABLET BY MOUTH TWO TIMES A DAY FOR SYSTOLIC BLOOD PRESSURE (TOP NUMBER) OVER 140 6/15/18  Yes Ainsley Courtney MD   potassium chloride (KLOR-CON) 10 MEQ extended release tablet TAKE ONE TABLET BY MOUTH TWO TIMES A DAY. 6/15/18  Yes Ainsley Courtney MD    MG capsule TAKE ONE CAPSULE BY MOUTH TWO TIMES A DAY 4/20/18  Yes Ainsley Courtney MD   albuterol sulfate  (90 Base) MCG/ACT inhaler Inhale 2 puffs into the lungs 4 times daily 12/19/17  Yes Ainsley Courtney MD   aspirin 81 MG tablet Take 1 tablet by mouth daily 5/14/15  Yes Concetta Crooks MD       Current medications:    Current Facility-Administered Medications   Medication Dose Route Frequency Provider Last Rate Last Dose    0.9 % sodium chloride infusion   Intravenous Continuous Brittney Bhakta MD        sodium chloride flush 0.9 % injection 10 mL  10 mL Intravenous 2 times per day Brittney Bhakta MD        sodium chloride flush 0.9 % injection 10 mL  10 mL Intravenous PRN Brittney Bhakta MD        piperacillin-tazobactam (ZOSYN) 3.375 g in dextrose 5 % 50 mL IVPB (mini-bag)  3.375 g Intravenous Once Brian Head Route, MD        morphine (PF) injection 2 mg  2 mg Intravenous Q3H PRN Cecilia Freitas MD   2 mg at 04/25/19 1217    albuterol sulfate  (90 Base) MCG/ACT inhaler 2 puff  2 puff Inhalation 4x daily Cecilia Freitas MD   2 puff at 04/25/19 1209    amiodarone (CORDARONE) tablet 200 mg  200 mg Oral Daily Cecilia Freitas MD   200 mg at 04/25/19 0919    aspirin chewable tablet 81 mg  81 mg Oral Daily Cecilia Freitas MD   81 mg at 04/25/19 0919    ipratropium (ATROVENT HFA) 17 MCG/ACT inhaler 2 puff  2 puff Inhalation 4x daily Cecilia Freitas MD   2 puff at 04/25/19 1212    carvedilol (COREG) tablet 3.125 mg  3.125 mg Oral BID WC Cecilia Freitas MD   3.125 mg at 04/25/19 0919    furosemide (LASIX) tablet 40 mg  40 mg Oral BID Cecilia Freitas MD   40 mg at 04/25/19 0919    levothyroxine (SYNTHROID) tablet 88 mcg  88 mcg Oral Daily Katya Cam MD   88 mcg at 04/25/19 5161    pantoprazole (PROTONIX) tablet 40 mg  40 mg Oral Daily Katya Cam MD   40 mg at 04/25/19 0596    rosuvastatin (CRESTOR) tablet 40 mg  40 mg Oral Daily Katya Cam MD   40 mg at 04/25/19 9653    tiZANidine (ZANAFLEX) tablet 4 mg  4 mg Oral TID Katya Cam MD   4 mg at 04/24/19 2307    valsartan (DIOVAN) tablet 320 mg  320 mg Oral Daily Katya Cam MD   320 mg at 04/25/19 9463    sodium chloride flush 0.9 % injection 10 mL  10 mL Intravenous 2 times per day Katya Cam MD   10 mL at 04/25/19 7522    sodium chloride flush 0.9 % injection 10 mL  10 mL Intravenous PRN Katya Cam MD        magnesium hydroxide (MILK OF MAGNESIA) 400 MG/5ML suspension 30 mL  30 mL Oral Daily PRN Katya Cam MD        ondansetron [facility]) injection 4 mg  4 mg Intravenous Q6H PRN Katya Cam MD        enoxaparin (LOVENOX) injection 40 mg  40 mg Subcutaneous Daily Katya Cam MD        0.9 % sodium chloride infusion   Intravenous Continuous Katya Cam MD 75 mL/hr at 04/24/19 2311      acetaminophen (TYLENOL) tablet 650 mg  650 mg Oral Q4H PRN Katya Cam MD        piperacillin-tazobactam (ZOSYN) 3.375 g in dextrose 5 % 100 mL IVPB extended infusion (mini-bag)  3.375 g Intravenous Q8H Mamta Dueñas MD   Stopped at 04/25/19 1111    fluticasone (FLOVENT HFA) 110 MCG/ACT inhaler 1 puff  1 puff Inhalation BID Katya Cam MD   1 puff at 04/25/19 0826    formoterol (PERFOROMIST) nebulizer solution 20 mcg  20 mcg Nebulization BID Katya Cam MD   20 mcg at 04/25/19 5018       Allergies:     Allergies   Allergen Reactions    Benadryl [Diphenhydramine] Swelling    Doxylamine     Mucinex [Guaifenesin Er]      hallucinations    Spiriva Handihaler [Tiotropium Bromide Monohydrate]      Nausea      Adhesive Tape Rash and Swelling       Problem List:    Patient Active loss anemia 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    Rectal bleeding K62.5    Sinus bradycardia R00.1    Acute lower gastrointestinal bleeding K92.2    Gastrointestinal hemorrhage K92.2    Macular degeneration H35.30    Epigastric pain R10.13    Acute cholecystitis K81.0    Chronic diastolic CHF (congestive heart failure) (HCC) I50.32    Cholecystitis K81.9    Preop cardiovascular exam Z01.810       Past Medical History:        Diagnosis Date    Anticoagulant long-term use     Atrial fibrillation (HCC)     AVM (arteriovenous malformation) of duodenum, 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 since 26.  Chronic back pain     Chronic diastolic CHF (congestive heart failure) ([address] 2016    grade II    COPD, mild (HCC)     CVA (cerebral infarction) 2013    left cerebral cortex x2/expressive aphasia/resolved    Diverticulosis     Epistaxis, recurrent     when inr high. last 3-4 months ago    Former smoker     Gallbladder sludge     HTN (hypertension)     Hyperlipidemia     Hypothyroidism     Macular degeneration 2018    left worse than right , dry : progressive loss of sight    Neuropathy     Osteoarthritis     Pulmonary HTN ([address] 2014    primary, responsive to nitrates    PVD (peripheral vascular disease) ([address]     occluded right SFA::: asymptomatic NIKOS 0.7 right and 1.0 left    Sacral fracture, closed ([address] 2014    Syncope 5/2014       Past Surgical History:        Procedure Laterality Date    AORTIC VALVE REPLACEMENT  6/16/15    Dr. Farrukh Callahan. [facility] - PVI, RENETTA exclusion. 19mm Maki pericardial Magna    APPENDECTOMY      CORONARY ANGIOPLASTY  2014    stent x 1    CYSTOSCOPY  Feb 2014    dr Coronado Pacer      THR Right    OTHER SURGICAL HISTORY  02/20/2018     EGD and colonoscopy.     SPINE SURGERY N/A 3/15/2019    INSERTION OF INTERSPINOUS SPACER SUPERION VERTIFLEX AT LUMBAR FOUR-LUMBAR FIVE performed by Ramses Day MD at [address] [place] [address]  12/15/2017       Social History:    Social History     Tobacco Use    Smoking status: Current Some Day Smoker     Packs/day: 0.50     Years: 45.00     Pack years: 22.50     Types: Cigarettes     Last attempt to quit: 6/11/2015     Years since quitting: 3.8    Smokeless tobacco: Current User    Tobacco comment: smoked 1.5 pp for over 30 years  over 45pk year hx   Substance Use Topics    Alcohol use: No     Alcohol/week: 0.0 oz     Comment: Socially                                 Ready to quit: No  Counseling given: No  Comment: smoked 1.5 pp for over 30 years  over 45pk year hx      Vital Signs (Current):   Vitals:    04/25/19 0822 04/25/19 1209 04/25/19 1210 04/25/19 1350   BP: 132/61  (!) 154/59 (!) 158/92   Pulse: 54  (!) 49 (!) 46   Resp: 16  20 18   Temp: 98 °F (36.7 °C)  98.3 °F (36.8 °C) 98 °F (36.7 °C)   TempSrc: Oral  Oral Temporal   SpO2: 97% 99% 93% 96%   Weight:       Height:                                                  BP Readings from Last 3 Encounters:   04/25/19 (!) 158/92   03/15/19 (!) 185/67   03/15/19 (!) 108/52       NPO Status: Time of last liquid consumption: 0900(sips with meds)                        Time of last solid consumption: 2350                        Date of last liquid consumption: 04/25/19                        Date of last solid food consumption: 04/24/19    BMI:   Wt Readings from Last 3 Encounters:   04/25/19 152 lb 8.9 oz (69.2 kg)   03/15/19 143 lb (64.9 kg)   02/11/19 145 lb 6.4 oz (66 kg)     Body mass index is 28.36 kg/m². CBC:   Lab Results   Component Value Date    WBC 8.0 04/25/2019    RBC 3.78 04/25/2019    HGB 11.9 04/25/2019    HCT 34.9 04/25/2019    MCV 92.4 04/25/2019    RDW 14.1 04/25/2019     04/25/2019       CMP:   Lab Results   Component Value Date     04/25/2019    K 4.3 04/25/2019    K 4.5 04/24/2019     04/25/2019    CO2 27 04/25/2019    BUN 19 04/25/2019    CREATININE 1.1 04/25/2019    GFRAA 59 04/25/2019    GFRAA >60 04/22/2013    AGRATIO 1.5 04/25/2019    LABGLOM 49 04/25/2019    LABGLOM 84.0 06/20/2011    GLUCOSE 93 04/25/2019    GLUCOSE 106 06/20/2011    PROT 6.3 04/25/2019    PROT 6.8 01/07/2013    CALCIUM 9.3 04/25/2019    BILITOT 0.4 04/25/2019    ALKPHOS 57 04/25/2019    AST 22 04/25/2019    ALT 19 04/25/2019       POC Tests: No results for input(s): POCGLU, POCNA, POCK, POCCL, POCBUN, POCHEMO, POCHCT in the last 72 hours. Coags:   Lab Results   Component Value Date    PROTIME 11.7 02/19/2018    INR 1.04 02/19/2018    APTT 57.6 02/18/2018       HCG (If Applicable): No results found for: PREGTESTUR, PREGSERUM, HCG, HCGQUANT     ABGs:   Lab Results   Component Value Date    PHART 7.435 11/27/2015    PO2ART 58.8 11/27/2015    LPT7VNL 35.5 11/27/2015    GAL6RNW 23.4 11/27/2015    BEART 0.0 11/27/2015    Z6FFHCZE 89.7 11/27/2015        Type & Screen (If Applicable):  Lab Results   Component Value Date    LABABO A 04/20/2013    [address] Positive 04/20/2013       Anesthesia Evaluation  Patient summary reviewed and Nursing notes reviewed no history of anesthetic complications:   Airway: Mallampati: II  TM distance: >3 FB     Mouth opening: > = 3 FB Dental:    (+) upper dentures and lower dentures      Pulmonary: breath sounds clear to auscultation  (+) COPD (NO O2 REQ, PRN INHALER):  shortness of breath:  current smoker (1/2-1 PPD)          Patient did not smoke on day of surgery.                  Cardiovascular:    (+) hypertension:, valvular problems/murmurs (S/P AVR 6/15): MR, CAD: obstructive, CABG/stent (STENTS 2014):, dysrhythmias: atrial fibrillation, CHF: diastolic, HOLLOWAY:, murmur (1/6 SYST.), pulmonary hypertension:, hyperlipidemia    (-) pacemaker    ECG reviewed    Rate: normal           Beta Blocker:  Dose within 24 Hrs         Neuro/Psych:   (+) CVA (DISTANT, NO RESIDUAL):, neuromuscular disease (LBP):, depression/anxiety    (-) seizures           GI/Hepatic/Renal:        (-) GERD, liver disease, no renal disease and no morbid obesity       Endo/Other:    (+) hypothyroidism, blood dyscrasia: anticoagulation therapy, arthritis: OA., malignancy/cancer ([place]AD[place]). Abdominal:   (+) obese,     Abdomen: soft. Vascular:   + PVD, aortic or cerebral, . Anesthesia Plan      general     ASA 3       Induction: intravenous. MIPS: Postoperative opioids intended and Prophylactic antiemetics administered. Anesthetic plan and risks discussed with patient. Plan discussed with CRNA. This pre-anesthesia assessment may be used as a history and physical.    DOS STAFF ADDENDUM:    Pt seen and examined, chart reviewed (including anesthesia, drug and allergy history). No interval changes to history and physical examination. Anesthetic plan, risks, benefits, alternatives, and personnel involved discussed with patient. Patient verbalized an understanding and agrees to proceed.       Urszula Gray MD  April 25, 2019  2:14 PM      Urszula Gray MD   4/25/2019

## 2019-04-25 NOTE — PROGRESS NOTES
Pt here from PACU. Awake and alert, stab wounds clean dry and intact. Pt complains of 6/10 surgical pain. VSS, Pt wanted to sit up in chair. Assisted x 1 up to chair, pt tolerated well. Call light in reach.   Electronically signed by Geovanny Mckeon RN on 4/25/2019 at 7:03 PM

## 2019-04-25 NOTE — PROGRESS NOTES
Patient stated she only took breathing treatments at home as necessary. She stated no shortness of breath. Albuterol and atrovent not given.

## 2019-04-25 NOTE — PROGRESS NOTES
Patient arrived to unit from ED via stretcher. Patient was alert and oriented, no skin issues. Patient was oriented to room, call light within reach, bed in lowest position. Patient preferred to sleep in chair instead of bed. Patient up ad monica and moved about the room independently.

## 2019-04-25 NOTE — H&P
Principal Problem:    Acute cholecystitis  Active Problems:    COPD, mild (HCC)    Chronic diastolic CHF (congestive heart failure) (HCC)    Hypothyroidism    Essential hypertension    CAD (coronary artery disease)    Pulmonary HTN (HCC)    S/P AVR (aortic valve replacement)    Chronic bilateral low back pain with bilateral sciatica    History and Physical Dictated, #   [id number]    Time > 35 minutes reviewing chart and patient data, examining and interviewing patient, and discussing with nursing staff, family, etc.

## 2019-04-25 NOTE — PLAN OF CARE
Problem: Pain:  Goal: Pain level will decrease  Description  Pain level will decrease  Outcome: Ongoing  Goal: Control of acute pain  Description  Control of acute pain  Outcome: Ongoing   Patient complained of pain but refused med. Will continue to monitor and pain medication will be given when requested.

## 2019-04-25 NOTE — PROGRESS NOTES
4 Eyes Skin Assessment     The patient is being assess for  Admission    I agree that 2 RN's have performed a thorough Head to Toe Skin Assessment on the patient. ALL assessment sites listed below have been assessed. Areas assessed by both nurses:  [x]   Head, Face, and Ears   [x]   Shoulders, Back, and Chest  [x]   Arms, Elbows, and Hands   [x]   Coccyx, Sacrum, and IschIum  [x]   Legs, Feet, and Heels        Does the Patient have Skin Breakdown?   No         Dipesh Prevention initiated:  Yes   Wound Care Orders initiated:  No      [employer] nurse consulted for Pressure Injury (Stage 3,4, Unstageable, DTI, NWPT, and Complex wounds), New and Established Ostomies:  No      Nurse 1 eSignature: Electronically signed by Ramses Day RN on 4/25/19 at 12:56 AM    **SHARE this note so that the co-signing nurse is able to place an eSignature**    Nurse 2 eSignature: Electronically signed by Jewels Pearce RN on 4/25/19 at 12:57 AM

## 2019-04-25 NOTE — PROGRESS NOTES
Dr. Raven Flood notified of pt's shortness of breath, Dr. Raven Flood at bedside to see pt. Order received.

## 2019-04-25 NOTE — OP NOTE
Laparoscopic Cholecystectomy Operative Report      Patient: Odessa Williamson MRN: [id number]     YOB: 1945  Age: 68 y.o. Sex: female        Primary Care Physician: Isreal West MD         DATE OF OPERATION: 4/25/2019     PREOPERATIVE DIAGNOSIS: acute cholecystitis    POSTOPERATIVE DIAGNOSIS: same    PROCEDURE PERFORMED: Laparoscopic cholecystectomy with cholangiogram    SURGEON: Luis M Coleman MD    ANESTHESIA: General endotracheal    ASA CLASS: 3    DVT PROPHYLAXIS: bilateral SCDs    ANTIBIOTICS: therapeutic zosyn IV    INDICATIONS: Odessa Williamson presented with a history of epigastric and right upper quadrant pain. She had an CT that showed a distended gallbladder with pericholecystic stranding. The patient's symptoms were felt to be secondary to acute cholecystitis and she presented as an inpatient for removal after the risks, benefits and alternatives were discussed with her. Procedure Details:       After informed consent was obtained, the patient was brought to the operating room and placed on the table in the supine position. The patient was on therapeutic antibiotics. Once under general endotracheal anesthesia, the abdomen was prepped and sterilely draped in the standard fashion. A time-out was performed for patient and procedure verification. A small vertical incision was made just at the umbilicus and a Veress needle inserted into peritoneal cavity. The abdomen was insufflated with carbon dioxide to a pressure of 15 mmHg. A 5mm trocar was introduced and a camera placed. Under direct vision, a 10mm port was placed in the subxiphoid location and two 5 mm ports placed in the right upper quadrant. The dome of the gallbladder was grasped and elevated up and over the liver. The patient had moderate inflammation and adhesions. We bluntly took down the peritoneum over the infundibulum of the gallbladder. The infundibulum was then grasped and retracted laterally exposing the triangle of Calot. We performed our dissection until the anatomy was very clear as we could see two and only two structures entering the gallbladder and completing our critical view of safety. A clip was placed on the gallbladder side of the cystic duct. A small stab incision was made just below the costal margin in the RUQ and the cholangiogram catheter was introduced. A ductotomy was made in the cystic duct and the catheter inserted. Cholangiogram showed contrast flow into the duodenum. Common bile duct, common hepatic duct, cystic duct and junction of the left and right hepatic ducts were well visualized. No filling defects were noted. The catheter was removed. The cystic duct was further cleared circumferentially was clipped proximally with 3 clips and divided. The artery was likewise identified, clipped and divided. The gallbladder was removed from the liver bed with cautery and removed through the epigastric port site. There was a lot of edema in the tissue. The abdomen was reinspected and found to have good hemostasis. The epigastric trocar site fascia was closed with a figure of eight suture of 0-Vicryl using a laparoscopic suture passer. The trocars were withdrawn and the skin closed with 4-0 Vicryl. Skin affix was applied after injecting local anesthetic. The patient was awoken and extubated without complication. All instrument counts were said to be correct.         Findings: distended and inflamed gallbladder, normal cholangiogram    Estimated Blood Loss: less than 50 ml    Complications: none           Specimen: gallbladder and contents                  Electronically signed by Ray Mueller MD on 4/25/2019 at 3:52 PM

## 2019-04-25 NOTE — H&P
[address] [facility]                              HISTORY AND PHYSICAL    PATIENT NAME: Klaudia Ames                          :        1945  MED REC NO:   [id number]                          ROOM:       3405  ACCOUNT NO:   [de-identified]                           ADMIT DATE: 2019  PROVIDER:     Jennifer Vogt MD    REASON FOR ADMISSION:  Acute cholecystitis. HISTORY OF PRESENT ILLNESS:  This is a 27-year-old white female with  multiple medical problems who presented to the emergency room with  approximately one week of epigastric pain. The pain was constant  throughout, did not change with eating, and was escalating and worsening  over the course of the last week. The patient had had some associated  weight gain at this time and had called Dr. Bill Claudio, her cardiologist,  last week, and had her diuretics increased. This did not affect her  weight and her pain did not improve. With weight gain, she denied any  lower extremity edema. She had no PND or orthopnea. No chest pain or  shortness of breath. She just had this discomfort in her epigastrium. Eventually, the patient presented to the emergency room yesterday and  she was found to have inflammatory changes around her gallbladder on CAT  scan. She had no nausea and vomiting. Again, eating did not affect her  pain. No cough or sputum. No dysuria. No diarrhea was noted. Again,  there were no real aggravating or alleviating factors, and the pain just  gradually worsened. REVIEW OF SYSTEMS:  The remainder of the review of systems is negative. PAST MEDICAL HISTORY:  Significant for peripheral vascular disease,  pulmonary hypertension, severe mitral regurgitation, status post an  aortic valve replacement.   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 Chronic diastolic  congestive heart failure. Her last ejection fraction was 60%  approximately one year ago. Bilateral carotid stenosis, history of  community acquired pneumonia in the past, admitted approximately four  years ago, history of coronary artery disease and status post stent  placement. She had claudication in the past.  She had a stroke in 2013  as well. She also has hypothyroidism, hyperlipidemia, hypertension, and  macular degeneration. PAST SURGICAL HISTORY:  Significant for coronary angioplasty with stent  in 2014, a cystoscopy in 2014 as well by Dr. Dariana Chow, aortic valve  replacement in June of 2015, appendectomy in the remote past, right hip  replacement in the remote past, and just a month ago, she received  spinal stimulator placement. ALLERGIES:  The patient is allergic to BENADRYL which causes swelling,  DOXYLAMINE, unclear reaction, ADHESIVE TAPE causes a rash, SPIRIVA  causes nausea, and MUCINEX causes hallucinations. HOME MEDICATIONS:  Crestor 40 mg daily, Atrovent inhaler, Lasix 40 mg  b.i.d., amiodarone 200 mg daily, Zetia 10 mg daily, valsartan 320 mg  daily, tizanidine p.r.n., ferrous sulfate 324 mg three times a day,  Coreg 3.125 mg b.i.d., Protonix 40 mg daily, Synthroid 88 mg daily,  Trelegy inhaler once daily, Imdur 30 mg daily, potassium 10 mEq b.i.d,  Colace twice a day, and aspirin 81 mg daily. SOCIAL HISTORY:  She is a . She has three children. She does not  drink alcohol and she is still about a half a pack a day smoker. FAMILY HISTORY:  Reviewed and noncontributory. PHYSICAL EXAMINATION:  VITAL SIGNS:  The patient has been afebrile since admission. Current  blood pressure is 132/61, pulse 51, respiratory rate 16, and oxygen  saturation is 97% on room air. GENERAL:  Generally speaking, this is an elderly white female, sitting  in the chair, in no acute distress. HEENT:  Pupils are equal, round, and reactive to light. Extraocular  movements are intact. Mucous membranes are moist.  Tongue and uvula are  midline. NECK:  Without lymphadenopathy. There is no carotid bruit. Carotid  upstroke is normal.  Thyroid is without goiter or nodule. CARDIOVASCULAR:  Regular underlying rhythm with occasional premature  beats. PMI is nondisplaced. PULMONARY:  Clear equal breath sounds bilaterally without wheezes,  rhonchi, or crackles. ABDOMEN:  Bowel sounds are positive. The abdomen is soft, nondistended. She has some epigastric tenderness to palpation without peritoneal  signs. EXTREMITIES:  No clubbing, cyanosis, edema, or tenderness. Pulses are  2+ radially. Pedal pulses are not palpable, but her extremities are  warm and well perfused. SKIN:  No rashes or lesions. LYMPH:  No supraclavicular or cervical adenopathy is noted. DIAGNOSTIC STUDIES:  Chest x-ray showed no acute disease, but mild  vascular congestion which was similar to her baseline. White blood cell  count 9.2, hemoglobin 12.5, and platelet count of 667. Urinalysis shows  specific gravity of 1.007. PH is 7.5, but otherwise has been negative  for infection. Lactic acid 0.8. Troponin was undetectable. Lipase was  59. Comprehensive metabolic panel showed a sodium of 142, a potassium  of 4.5, chloride of 103, a CO2 of 28, glucose of 101, BUN 25, and  creatinine of 1.0. Hepatic profile is unremarkable. CT scan of the  abdomen and pelvis showed mild distension of the gallbladder with mild  biliary ductal dilatation and subtle injection of fat surrounding the  gallbladder region with a question of underlying cholecystitis. EKG  showed sinus rhythm with sinus arrhythmia and left axis deviation. CBC  this morning showed white count of 8, hemoglobin 11.9, and platelet  count 404. Comprehensive metabolic panel was unremarkable this morning. Repeat chest x-ray showed no acute cardiopulmonary disease today.   The  patient's last TSH was in October and that was 2.59.  Clinically, she is  euthyroid. Her last echocardiogram as mentioned earlier was in January of 2018 showing moderate-to-severe mitral regurgitation. There was a  normally functioning bioprosthetic aortic valve. There is trivial  regurgitation noted there and her ejection fraction was 60% with  estimated pulmonary artery pressure of 52 mmHg. ASSESSMENT:  1. Acute cholecystitis. 2.  Mild COPD. 3.  Chronic diastolic heart failure without any evidence of any  decompensation. 4.  Hypothyroidism. 5.  Hypertension. 6.  Coronary artery disease without any active signs of acute coronary  syndrome. 7.  Pulmonary hypertension. 8.  Status post aortic valve replacement. 9.  Chronic bilaterally low back pain. PLAN:  At this point, the patient is planned for surgery this afternoon. She is in intermediate risk for an intermediate risk procedure. She has  no signs of acute coronary syndrome or congestive heart failure. She  has no angina. She may proceed with planned surgical intervention  without delay. She will be continued on her home medicines  postoperatively and she will be monitored for fluid volume overload  postop as well. Again, the patient is okay to proceed with the planned  intervention.         Rica Viramontes MD    D: 04/25/2019 9:03:08       T: 04/25/2019 9:41:24     [email or web address]  Job#: [id number]     Doc#: [id number]    CC:  <>

## 2019-04-25 NOTE — CONSULTS
[facility] [address]  Cardiology Consult Note        CC:      Preoperative clearance         HPI:   This is a 68 y.o. female with history of by percentage aortic valve placement single-vessel coronary disease, status post stent who is here for gallbladder disease requiring surgery. We are asked to see the patient for preoperative clearance. The patient has no chest pain or shortness of breath. She is very active, still works and walks 5-7 miles on weekends. 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 since 26.  Chronic back pain     Chronic diastolic CHF (congestive heart failure) ([address] 2016    grade II    COPD, mild (HCC)     CVA (cerebral infarction) 2013    left cerebral cortex x2/expressive aphasia/resolved    Diverticulosis     Epistaxis, recurrent     when inr high.  last 3-4 months ago    Former smoker     Gallbladder sludge     HTN (hypertension)     Hyperlipidemia     Hypothyroidism     Macular degeneration 2018    left worse than right , dry : progressive loss of sight    Neuropathy     Osteoarthritis     Pulmonary HTN ([address] 2014    primary, responsive to nitrates    PVD (peripheral vascular disease) ([address]     occluded right SFA::: asymptomatic NIKOS 0.7 right and 1.0 left    Sacral fracture, closed ([address] 2014    Syncope 5/2014      Past Surgical History:   Procedure Laterality Date    AORTIC VALVE REPLACEMENT  6/16/15    Dr. Danika Rivera. Hernandez - PVI, RENETTA exclusion. 19mm Maki pericardial Magna    APPENDECTOMY      CORONARY ANGIOPLASTY  2014    stent x 1    CYSTOSCOPY  Feb 2014    dr Wendie Galvez      THR Right    OTHER SURGICAL HISTORY  02/20/2018     EGD and colonoscopy.     SPINE SURGERY N/A 3/15/2019    INSERTION OF INTERSPINOUS SPACER SUPERION VERTIFLEX AT LUMBAR FOUR-LUMBAR FIVE performed by Iam Granger MD at [address] [place] [address]  12/15/2017      Family History   Problem Relation Age of Onset    Breast Cancer Daughter       Social History     Tobacco Use    Smoking status: Current Some Day Smoker     Packs/day: 0.50     Years: 45.00     Pack years: 22.50     Types: Cigarettes     Last attempt to quit: 6/11/2015     Years since quitting: 3.8    Smokeless tobacco: Current User    Tobacco comment: smoked 1.5 pp for over 30 years  over 45pk year hx   Substance Use Topics    Alcohol use: No     Alcohol/week: 0.0 oz     Comment: Socially     Drug use: No     Comment: never     Allergies   Allergen Reactions    Benadryl [Diphenhydramine] Swelling    Doxylamine     Mucinex [Guaifenesin Er]      hallucinations    Spiriva Handihaler [Tiotropium Bromide Monohydrate]      Nausea      Adhesive Tape Rash and Swelling      albuterol sulfate HFA  2 puff Inhalation 4x daily    amiodarone  200 mg Oral Daily    aspirin  81 mg Oral Daily    ipratropium  2 puff Inhalation 4x daily    carvedilol  3.125 mg Oral BID WC    furosemide  40 mg Oral BID    levothyroxine  88 mcg Oral Daily    pantoprazole  40 mg Oral Daily    rosuvastatin  40 mg Oral Daily    tiZANidine  4 mg Oral TID    valsartan  320 mg Oral Daily    sodium chloride flush  10 mL Intravenous 2 times per day    enoxaparin  40 mg Subcutaneous Daily    piperacillin-tazobactam  3.375 g Intravenous Q8H    fluticasone  1 puff Inhalation BID  formoterol  20 mcg Nebulization BID       Review of Systems -   Constitutional: Negative for weight gain/loss; malaise, fever  Respiratory: Negative for Asthma;  cough and hemoptysis  Cardiovascular: Negative for palpitations,dizziness   Gastrointestinal: Negative for abd.pain; constipation/diarrhea;    Genitourinary: Negative for stones; hematuria; frequency hesitancy  Integumentt: Negative for rash or pruritis  Hematologic/lymphatic: Negative for blood dyscrasia; leukemia/lymphoma  Musculoskeletal: Negative for Connective tissue disease  Neurological:  Negative for Seizure   Behavioral/Psych:Negative for Bipolar disorder, Schizophrenia; Dementia  Endocrine: negative for thyroid, parathyroid disease      Intake/Output Summary (Last 24 hours) at 4/25/2019 1105  Last data filed at 4/25/2019 0702  Gross per 24 hour   Intake 398.8 ml   Output 850 ml   Net -451.2 ml       Physical Examination:    /61   Pulse 54   Temp 98 °F (36.7 °C) (Oral)   Resp 16   Ht 5' 1.5\" (1.562 m)   Wt 152 lb 8.9 oz (69.2 kg)   SpO2 97%   BMI 28.36 kg/m²    HEENT:  Face: Atraumatic, Conjunctiva: Pink; non icteric,  Mucous Memb:  Moist, No thyromegaly or Lymphadenopathy  Respiratory:  Resp Assessment: normal, Resp Auscultation: clear   Cardiovascular: Auscultation: nl S1 & S2, Palpation:  Nl PMI;  No heaves or thrills, JVP:  normal  Abdomen: Soft, non-tender, Normal bowel sounds,  No organomegaly  Extremities: No Cyanosis or Clubbing; Edema none  Neurological: Oriented to time, place, and person, Non-anxious  Psychiatric: Normal mood and affect  Skin: Warm and dry,  No rash seen      Current Facility-Administered Medications: morphine (PF) injection 2 mg, 2 mg, Intravenous, Q3H PRN  albuterol sulfate  (90 Base) MCG/ACT inhaler 2 puff, 2 puff, Inhalation, 4x daily  amiodarone (CORDARONE) tablet 200 mg, 200 mg, Oral, Daily  aspirin chewable tablet 81 mg, 81 mg, Oral, Daily  ipratropium (ATROVENT HFA) 17 MCG/ACT inhaler 2 sinus arrhythmia  Left axis deviation  Pulmonary disease pattern  Left ventricular hypertrophy with QRS widening and repolarization abnormality  Septal infarct      ECHO:   Normal LV size and systolic function. Estimated ejection fraction is 60%. Moderate to severe mitral regurgitation is present. Severely dilated left atrium. Normally functioning bioprosthetic valve in aortic position. Trivial  regurgitation noted. The right ventricle is mildly enlarged. Moderate tricuspid regurgitation. Doppler derived PA systolic pressure is 52 mm Hg suggestive of moderate  pulmonary hypertension. ASSESSMENT AND PLAN:      63-year-old patient scheduled for gallbladder surgery  Patient is status post aortic valve replacement with a bio-prostatic valve  Has no chest pain, shortness of breath or cardiac symptoms. She is active and walks 5-7 miles on weekends without chest pain    The surgery itself is low risk. Risk of perioperative Event is low.   She may proceed with it      Brittny Lerma M.D  4/25/2019

## 2019-04-25 NOTE — ANESTHESIA POSTPROCEDURE EVALUATION
Department of Anesthesiology  Postprocedure Note    Patient: Narendra Mares  MRN: [id number]  YOB: 1945  Date of evaluation: 4/25/2019  Time:  5:29 PM     Procedure Summary     Date:  04/25/19 Room / Location:  [facility] OR 04 / [facility] OR    Anesthesia Start:  1450 Anesthesia Stop:  9528    Procedure:  EMERGENT; LAPAROSCOPIC CHOLECYSTECTOMY WITH GRAM (N/A Abdomen) Diagnosis:  (cholecystitis)    Surgeon:  Rommie Dakin, MD Responsible Provider:  Steve Melgoza MD    Anesthesia Type:  general ASA Status:  3          Anesthesia Type: general    Stacey Phase I: Stacey Score: 8    Stacey Phase II:      Last vitals: Reviewed and per EMR flowsheets.    Vitals:    04/25/19 1627 04/25/19 1634 04/25/19 1637 04/25/19 1647   BP: (!) 118/106 (!) 164/58 (!) 174/59 (!) 176/70   Pulse: 61 63  59   Resp: 18 17  25   Temp:   96.9 °F (36.1 °C)    TempSrc:       SpO2: 99% 100% 100% 100%   Weight:       Height:           Anesthesia Post Evaluation    Patient location during evaluation: bedside  Patient participation: complete - patient participated  Level of consciousness: awake and alert  Airway patency: patent  Nausea & Vomiting: no nausea  Complications: no  Cardiovascular status: hemodynamically stable  Respiratory status: acceptable  Hydration status: euvolemic

## 2019-04-26 VITALS
BODY MASS INDEX: 27.66 KG/M2 | DIASTOLIC BLOOD PRESSURE: 40 MMHG | WEIGHT: 150.3 LBS | SYSTOLIC BLOOD PRESSURE: 104 MMHG | HEART RATE: 69 BPM | TEMPERATURE: 98.4 F | OXYGEN SATURATION: 94 % | RESPIRATION RATE: 18 BRPM | HEIGHT: 62 IN

## 2019-04-26 LAB
ANION GAP SERPL CALCULATED.3IONS-SCNC: 13 MMOL/L (ref 3–16)
BUN BLDV-MCNC: 15 MG/DL (ref 7–20)
CALCIUM SERPL-MCNC: 9 MG/DL (ref 8.3–10.6)
CHLORIDE BLD-SCNC: 99 MMOL/L (ref 99–110)
CO2: 24 MMOL/L (ref 21–32)
CREAT SERPL-MCNC: 0.8 MG/DL (ref 0.6–1.2)
GFR AFRICAN AMERICAN: >60
GFR NON-AFRICAN AMERICAN: >60
GLUCOSE BLD-MCNC: 113 MG/DL (ref 70–99)
POTASSIUM SERPL-SCNC: 4.4 MMOL/L (ref 3.5–5.1)
SODIUM BLD-SCNC: 136 MMOL/L (ref 136–145)

## 2019-04-26 PROCEDURE — 99239 HOSP IP/OBS DSCHRG MGMT >30: CPT | Performed by: INTERNAL MEDICINE

## 2019-04-26 PROCEDURE — 6370000000 HC RX 637 (ALT 250 FOR IP): Performed by: INTERNAL MEDICINE

## 2019-04-26 PROCEDURE — 6360000002 HC RX W HCPCS: Performed by: SURGERY

## 2019-04-26 PROCEDURE — 94760 N-INVAS EAR/PLS OXIMETRY 1: CPT

## 2019-04-26 PROCEDURE — 36415 COLL VENOUS BLD VENIPUNCTURE: CPT

## 2019-04-26 PROCEDURE — APPNB30 APP NON BILLABLE TIME 0-30 MINS: Performed by: NURSE PRACTITIONER

## 2019-04-26 PROCEDURE — 94640 AIRWAY INHALATION TREATMENT: CPT

## 2019-04-26 PROCEDURE — 6370000000 HC RX 637 (ALT 250 FOR IP): Performed by: SURGERY

## 2019-04-26 PROCEDURE — 80048 BASIC METABOLIC PNL TOTAL CA: CPT

## 2019-04-26 PROCEDURE — 99024 POSTOP FOLLOW-UP VISIT: CPT | Performed by: NURSE PRACTITIONER

## 2019-04-26 PROCEDURE — 6360000002 HC RX W HCPCS: Performed by: INTERNAL MEDICINE

## 2019-04-26 PROCEDURE — APPSS15 APP SPLIT SHARED TIME 0-15 MINUTES: Performed by: NURSE PRACTITIONER

## 2019-04-26 PROCEDURE — 2580000003 HC RX 258: Performed by: SURGERY

## 2019-04-26 PROCEDURE — 99024 POSTOP FOLLOW-UP VISIT: CPT | Performed by: SURGERY

## 2019-04-26 PROCEDURE — 6370000000 HC RX 637 (ALT 250 FOR IP): Performed by: NURSE PRACTITIONER

## 2019-04-26 RX ORDER — HYDROCODONE BITARTRATE AND ACETAMINOPHEN 5; 325 MG/1; MG/1
1 TABLET ORAL EVERY 6 HOURS PRN
Qty: 25 TABLET | Refills: 0 | Status: SHIPPED | OUTPATIENT
Start: 2019-04-26 | End: 2019-05-03

## 2019-04-26 RX ORDER — HYDROCODONE BITARTRATE AND ACETAMINOPHEN 5; 325 MG/1; MG/1
1 TABLET ORAL EVERY 4 HOURS PRN
Status: DISCONTINUED | OUTPATIENT
Start: 2019-04-26 | End: 2019-04-26 | Stop reason: HOSPADM

## 2019-04-26 RX ORDER — FUROSEMIDE 40 MG/1
TABLET ORAL
Qty: 60 TABLET | Refills: 1 | Status: SHIPPED | OUTPATIENT
Start: 2019-04-26 | End: 2019-06-20 | Stop reason: ALTCHOICE

## 2019-04-26 RX ORDER — FUROSEMIDE 10 MG/ML
40 INJECTION INTRAMUSCULAR; INTRAVENOUS ONCE
Status: COMPLETED | OUTPATIENT
Start: 2019-04-26 | End: 2019-04-26

## 2019-04-26 RX ORDER — FUROSEMIDE 40 MG/1
40 TABLET ORAL 2 TIMES DAILY
Status: DISCONTINUED | OUTPATIENT
Start: 2019-04-26 | End: 2019-04-26 | Stop reason: HOSPADM

## 2019-04-26 RX ORDER — DOCUSATE SODIUM 100 MG/1
100 CAPSULE, LIQUID FILLED ORAL 2 TIMES DAILY
Status: DISCONTINUED | OUTPATIENT
Start: 2019-04-26 | End: 2019-04-26 | Stop reason: HOSPADM

## 2019-04-26 RX ORDER — HYDROCODONE BITARTRATE AND ACETAMINOPHEN 5; 325 MG/1; MG/1
2 TABLET ORAL EVERY 4 HOURS PRN
Status: DISCONTINUED | OUTPATIENT
Start: 2019-04-26 | End: 2019-04-26 | Stop reason: HOSPADM

## 2019-04-26 RX ADMIN — ALBUTEROL SULFATE 2 PUFF: 90 AEROSOL, METERED RESPIRATORY (INHALATION) at 09:50

## 2019-04-26 RX ADMIN — FLUTICASONE PROPIONATE 1 PUFF: 110 AEROSOL, METERED RESPIRATORY (INHALATION) at 09:50

## 2019-04-26 RX ADMIN — LEVOTHYROXINE SODIUM 88 MCG: 88 TABLET ORAL at 05:56

## 2019-04-26 RX ADMIN — VALSARTAN 320 MG: 160 TABLET, FILM COATED ORAL at 08:11

## 2019-04-26 RX ADMIN — Medication 10 ML: at 08:14

## 2019-04-26 RX ADMIN — TRAMADOL HYDROCHLORIDE 100 MG: 50 TABLET, FILM COATED ORAL at 03:55

## 2019-04-26 RX ADMIN — DOCUSATE SODIUM 100 MG: 100 CAPSULE, LIQUID FILLED ORAL at 10:36

## 2019-04-26 RX ADMIN — PANTOPRAZOLE SODIUM 40 MG: 40 TABLET, DELAYED RELEASE ORAL at 05:56

## 2019-04-26 RX ADMIN — IPRATROPIUM BROMIDE 2 PUFF: 17 AEROSOL, METERED RESPIRATORY (INHALATION) at 09:50

## 2019-04-26 RX ADMIN — FORMOTEROL FUMARATE DIHYDRATE 20 MCG: 20 SOLUTION RESPIRATORY (INHALATION) at 09:50

## 2019-04-26 RX ADMIN — ENOXAPARIN SODIUM 40 MG: 40 INJECTION SUBCUTANEOUS at 08:13

## 2019-04-26 RX ADMIN — FUROSEMIDE 40 MG: 40 TABLET ORAL at 13:59

## 2019-04-26 RX ADMIN — IPRATROPIUM BROMIDE 2 PUFF: 17 AEROSOL, METERED RESPIRATORY (INHALATION) at 12:49

## 2019-04-26 RX ADMIN — ASPIRIN 81 MG 81 MG: 81 TABLET ORAL at 08:11

## 2019-04-26 RX ADMIN — AMIODARONE HYDROCHLORIDE 100 MG: 200 TABLET ORAL at 08:12

## 2019-04-26 RX ADMIN — CARVEDILOL 3.12 MG: 3.12 TABLET, FILM COATED ORAL at 08:25

## 2019-04-26 RX ADMIN — ALBUTEROL SULFATE 2 PUFF: 90 AEROSOL, METERED RESPIRATORY (INHALATION) at 12:49

## 2019-04-26 RX ADMIN — HYDROCODONE BITARTRATE AND ACETAMINOPHEN 2 TABLET: 5; 325 TABLET ORAL at 13:59

## 2019-04-26 RX ADMIN — HYDROCODONE BITARTRATE AND ACETAMINOPHEN 2 TABLET: 5; 325 TABLET ORAL at 08:12

## 2019-04-26 RX ADMIN — ROSUVASTATIN CALCIUM 40 MG: 10 TABLET, FILM COATED ORAL at 08:11

## 2019-04-26 RX ADMIN — FUROSEMIDE 40 MG: 10 INJECTION, SOLUTION INTRAMUSCULAR; INTRAVENOUS at 08:13

## 2019-04-26 ASSESSMENT — PAIN DESCRIPTION - ORIENTATION
ORIENTATION: RIGHT
ORIENTATION: LEFT
ORIENTATION: LEFT
ORIENTATION: RIGHT

## 2019-04-26 ASSESSMENT — PAIN DESCRIPTION - PAIN TYPE
TYPE: ACUTE PAIN;SURGICAL PAIN

## 2019-04-26 ASSESSMENT — PAIN DESCRIPTION - PROGRESSION
CLINICAL_PROGRESSION: GRADUALLY IMPROVING
CLINICAL_PROGRESSION: NOT CHANGED
CLINICAL_PROGRESSION: NOT CHANGED
CLINICAL_PROGRESSION: GRADUALLY IMPROVING

## 2019-04-26 ASSESSMENT — PAIN DESCRIPTION - LOCATION
LOCATION: ABDOMEN

## 2019-04-26 ASSESSMENT — PAIN DESCRIPTION - DESCRIPTORS
DESCRIPTORS: ACHING;STABBING
DESCRIPTORS: PRESSURE
DESCRIPTORS: STABBING;ACHING
DESCRIPTORS: PRESSURE

## 2019-04-26 ASSESSMENT — PAIN DESCRIPTION - ONSET
ONSET: ON-GOING

## 2019-04-26 ASSESSMENT — PAIN - FUNCTIONAL ASSESSMENT
PAIN_FUNCTIONAL_ASSESSMENT: PREVENTS OR INTERFERES SOME ACTIVE ACTIVITIES AND ADLS
PAIN_FUNCTIONAL_ASSESSMENT: ACTIVITIES ARE NOT PREVENTED

## 2019-04-26 ASSESSMENT — PAIN SCALES - GENERAL
PAINLEVEL_OUTOF10: 7
PAINLEVEL_OUTOF10: 3
PAINLEVEL_OUTOF10: 7
PAINLEVEL_OUTOF10: 3
PAINLEVEL_OUTOF10: 4
PAINLEVEL_OUTOF10: 4
PAINLEVEL_OUTOF10: 7
PAINLEVEL_OUTOF10: 5
PAINLEVEL_OUTOF10: 7
PAINLEVEL_OUTOF10: 4

## 2019-04-26 ASSESSMENT — PAIN DESCRIPTION - FREQUENCY
FREQUENCY: CONTINUOUS

## 2019-04-26 NOTE — PLAN OF CARE
Problem: Pain:  Goal: Pain level will decrease  Description  Pain level will decrease  4/26/2019 1119 by Giovanni Garcia RN  Outcome: Ongoing  Note:   Pain/discomfort being managed with PRN analgesics per MD orders. Pt able to express presence and absence of pain and rate pain appropriately using numerical scale.       Problem: MOBILITY  Goal: Early mobilization is achieved  4/26/2019 1119 by Giovanni Garcia RN  Outcome: Ongoing  Note:         Problem: ELIMINATION  Goal: Elimination patterns are normal or improving  Description  Elimination patterns return to pre-surgery normal patterns  4/26/2019 1119 by Giovanni Garcia RN  Outcome: Ongoing  Note:

## 2019-04-26 NOTE — PROGRESS NOTES
[address] [facility] Internal Medicine Note      Chief Complaint: I feel okay, but I am having a lot of pain    Subjective:    Post-op #1 cholecystectomy. She looks good sitting up in a chair. She complains only of incisional pain and right upper quadrant pain. She denies shortness of breath, denies PND or orthopnea. No new problems overnight. Was able to eat a little dinner last night. Discussed with nursing. No new problems. No chest pain or shortness breath. No cough or sputum. No nausea, vomiting, diarrhea. No abdominal pain. No dysuria. The remainder of the review of systems is negative. PMH, PSH, FH/SH reviewed and unchanged as documented in the H&P personally documented at admission    Medication list reviewed    Objective:    /62   Pulse 54   Temp 98.8 °F (37.1 °C) (Oral)   Resp 18   Ht 5' 1.5\" (1.562 m)   Wt 150 lb 4.8 oz (68.2 kg)   SpO2 93%   BMI 27.94 kg/m²   Temp  Av.6 °F (37 °C)  Min: 96.9 °F (36.1 °C)  Max: 99 °F (37.2 °C)    RRR  Chest-respirations easy, bilateral posterior crackles in the lower half of the lung fields. Abd-bowel sounds positive, soft, nondistended  Ext-no edema noted    The Following Labs Were Reviewed Today:    BMP pending today    No results found for this or any previous visit (from the past 24 hour(s)). ASSESSMENT/PLAN:      Principal Problem:    Acute cholecystitis-doing reasonably well, postop day #1. Continue usual postop care. Active Problems:    COPD, mild (HCC)-no wheezing on exam. Continue with the current respiratory treatments    Chronic diastolic CHF (congestive heart failure) (HCC)-bibasilar crackles today, give a single dose of IV Lasix this morning and monitor. No hypoxia at this point. Hypothyroidism- likely euthyroid. Essential hypertension-blood pressure currently stable. Monitor. CAD (coronary artery disease)-asymptomatic. No chest pain noted    Chronic bilateral low back pain with bilateral sciatica-stable chronic pain.  No changes      Disposition- await surgery's repeat evaluation today. Nursing will call with an update later this morning or this afternoon. She may be mildly volume overloaded, so single dose of IV Lasix today. She is not hypoxic and she is not having any shortness of breath. Possible discharge home later today      Joel Chavez MD, FACP  7:30 AM  4/26/2019     Addendum:  Discussed with Nadira Rubin RN nurse for the patient today. She had a good diuresis and is ambulating about her room on room air. She is not short of breath. She feels well and wants to go home. Surgery is okay with her going as well. Discharge to home on [address].  Follow-up here next week with Dr. Luh Paz and with surgery. Discharge orders entered.     Electronically signed by Joel Chavez MD on 4/26/2019 at 2:07 PM

## 2019-04-26 NOTE — DISCHARGE SUMMARY
[address]                               DISCHARGE SUMMARY    PATIENT NAME: Juana Melo                          :        1945  MED REC NO:   [id number]                          ROOM:       1292  ACCOUNT NO:   [de-identified]                           ADMIT DATE: 2019  PROVIDER:     Cornelia Reddy MD                  DISCHARGE DATE:        DISCHARGE DATE:  2019    REASON FOR ADMISSION:  Acute cholecystitis. HISTORY OF PRESENT ILLNESS:  This is a 72-year-old white female with  multiple medical problems who presented to the emergency room with  approximately 1 week of epigastric pain. The pain was constant  throughout, did not change with eating, and was escalating and worsening  over the course of the last week. The patient had some associated  weight gain at this time. She called her cardiologist and was placed on  an increased dose of diuretics. This did not help her, did not improve  her weight, so she came in to the emergency room for evaluation. Evaluation in the ER revealed acute cholecystitis as this was found on a  CT scan of the abdomen. The patient was admitted for medical clearance  and urgent cholecystectomy. HOSPITAL COURSE:  1. Acute cholecystitis. The patient was seen in consultation by Dr. Jonathan Carter. He cleared the patient for surgery from Cardiology  standpoint. The patient went to surgery yesterday afternoon with Dr. Nilsa Kaplan and had an uneventful postop course. This morning, she  had some crackles in both lungs. She received one IV dose of Lasix 40  mg and had a 1200-mL diuresis. She is doing well and ambulating in her  room. She has no signs of heart failure. She is not having any chest  pain. Her surgical pain is improving and she is ready for discharge  from a surgery standpoint.   From a Medical standpoint, hemodynamically,  she is stable and she

## 2019-04-26 NOTE — PLAN OF CARE
Problem: Pain:  Goal: Pain level will decrease  Description  Pain level will decrease  Outcome: Ongoing  Note:   Pain/discomfort being managed with PRN analgesics per MD orders. Pt able to express presence and absence of pain and rate pain appropriately using numerical scale. Problem: Falls - Risk of:  Goal: Will remain free from falls  Description  Will remain free from falls  Outcome: Ongoing  Note:   Fall risk assessment completed every shift. All precautions in place. Pt has call light within reach at all times. Room clear of clutter. Pt aware to call for assistance when getting up. Problem: MOBILITY  Goal: Early mobilization is achieved  Outcome: Ongoing     Problem: ELIMINATION  Goal: Elimination patterns are normal or improving  Description  Elimination patterns return to pre-surgery normal patterns  Outcome: Ongoing     Problem: SKIN INTEGRITY  Goal: Skin integrity is maintained or improved  Outcome: Ongoing  Note:   Patient educated on pain management for abdominal surgery. Patient educated on splinting for pain, positioning, activity. Patient educated safety and fall precautions. Patient educated on SCD's. Assess pain, vitals, bowel sounds, hydration, I/O's. Assess surgery sites, signs of bleeding, educate on infection prevention. Problem: SAFETY  Goal: Free from accidental physical injury  Outcome: Ongoing  Note:   Patient assessed for fall risk; fall precautions initiated. Patient and family instructed about safety devices. Environment kept free of clutter and adequate lighting provided. Bed locked and in lowest position. Call light within reach. Will continue to monitor.

## 2019-04-26 NOTE — DISCHARGE SUMMARY
Discharge Summary Dictated    Dictation #  [id number]    Time > 30 minutes coordinating discharge,  reviewing chart and patient data, examining and interviewing patient, and discussing with nursing staff, case management/social work, family, etc.

## 2019-04-26 NOTE — PROGRESS NOTES
[facility]                                     Daily Progress Note                                                         Pt Name: Pee Bennett Record Number: [id number]  Date of Birth 1945   Today's Date: 4/26/2019  Chief Complaint   Patient presents with    Abdominal Pain     upper abd x 1 week. (+) nausea. no vomiting or diarrhea        ASSESSMENT/PLAN  Acute cholecystitis  POD # 1 (4/25) lap juju with normal gram.  -RUQ trocar pain. Tramadol not very effective. Trial norco.   -OOB, cough and deep breathe, IS, ambulate  -DVT prophy lovenox, SCDs  -GI prophy-pepcid preop  -afebrile, VSS      COPD    Chronic diastolic CHF  -diuresis per Dr. Cordell Byrnes lasix x 1 dose today      Discharge Planning: Home later if pain controlled and OK with Dr. Sharon Gonzalez  Patient educated about Disease Process, Medications, Smoking Cessation, Oxygenation, Incentive Spirometry and Deep Breath and Cough, Diabetes, Hyperlipidemia, Smoking Cessation, Nutrition, Exercise and Hypertension    SUBJECTIVE  Wesley Epperson has improved from yesterday. Sitting up to chair. No distress. No dyspnea. Pain is not well controlled. She has no nausea and no vomiting. She has passed flatus and has not had a bowel movement. She is tolerating regular diet. Current activity is up with assistance    OBJECTIVE  VITALS:  height is 5' 1.5\" (1.562 m) and weight is 150 lb 4.8 oz (68.2 kg). Her oral temperature is 98.5 °F (36.9 °C). Her blood pressure is 118/47 (abnormal) and her pulse is 52. Her respiration is 18 and oxygen saturation is 94%. INTAKE/OUTPUT:    Intake/Output Summary (Last 24 hours) at 4/26/2019 0917  Last data filed at 4/26/2019 0557  Gross per 24 hour   Intake 1610 ml   Output 1250 ml   Net 360 ml     GENERAL: alert, no distress  LUNGS: clear to ausculation, without wheezes, rales or rhonci. Fine crackles BLL.    HEART: normal rate and regular rhythm  ABDOMEN: soft, appropriately-tender, non-distended and bowel sounds present in all 4 quadrants. Trocar sites c/d/i surgical glue present. EXTREMITY: no cyanosis, clubbing or edema  I/O last 3 completed shifts: In: 2170 [P.O.:410; I.V.:1100; IV Piggyback:100]  Out: 0239 [Urine:1650]  No intake/output data recorded. LABS  Recent Labs     04/24/19  1435 04/24/19  1455  04/25/19  0516 04/26/19  0829   WBC  --   --   --  8.0  --    HGB  --   --   --  11.9*  --    HCT  --   --   --  34.9*  --    PLT  --   --   --  177  --    NA  --   --   --  141 136   K  --   --    < > 4.3 4.4   CL  --   --   --  103 99   CO2  --   --   --  27 24   BUN  --   --   --  19 15   CREATININE  --   --   --  1.1 0.8   CALCIUM  --   --   --  9.3 9.0   AST  --   --   --  22  --    ALT  --   --   --  19  --    BILITOT  --   --   --  0.4  --    NITRU Negative  --   --   --   --    COLORU YELLOW  --   --   --   --    BACTERIA  --  1+*  --   --   --     < > = values in this interval not displayed. Electronically signed by HOWARD Johns CNP on 4/26/19 at 9:13 AM     [facility]   [phone] Office  [phone] Cell     Agree with above note. The patient was personally seen and examined. Donny Car is doing well. Pain present but controlled. Tolerating diet. Breathing without difficulty.     Abd soft, ND, appropriately tender, incisions c/d/i    Cr 0.8    A/P: 69 yo female s/p lap cholecystectomy for acute calculous cholecystitis     Doing well  OK for discharge home  Follow up in 2 weeks, call for appointment    Deni Trejo MD

## 2019-04-26 NOTE — PROGRESS NOTES
Spoke with Susan LAWRENCE pt ok for discharge per her standpoint. Will call Dr Rockney Osler to update.    Electronically signed by Lázaro Schmitt RN on 4/26/2019 at 1:46 PM

## 2019-04-26 NOTE — CARE COORDINATION
INITIAL CASE MANAGEMENT ASSESSMENT    Reviewed chart, met with patient to assess possible discharge needs. Explained Case Management role/services. Living Situation: Lives in a house with her children. ADLs: She is independent with her care. DME: None. PT/OT Recs: Not ordered     Active Services: None. Transportation: She drives, family will take her home. Medications: Is able to afford all medications. PCP: Dr. Mana Ignacio       HD/PD: N/A    PLAN/COMMENTS: Plans on returning home, expressed no needs at this time. SW/CM provided contact information for patient or family to call with any questions. SW/CM will follow and assist as needed.     Georgie Howell, [address] [place] [address]  [phone]  4/26/19 at 10:42 AM

## 2019-04-27 ENCOUNTER — CARE COORDINATION (OUTPATIENT)
Dept: CASE MANAGEMENT | Age: 74
End: 2019-04-27

## 2019-04-27 DIAGNOSIS — K82.8 GALLBLADDER SLUDGE: Primary | ICD-10-CM

## 2019-04-27 NOTE — CARE COORDINATION
[facility] Transitions Initial Follow Up Call    Call within 2 business days of discharge: Yes    Patient: Gerry Escamilla Patient : 1945   MRN: <[id number]>  Reason for Admission: Acute cholecystitis  Discharge Date: 19 RARS: Readmission Risk Score: 15      Last Discharge [facility]       Complaint Diagnosis Description Type Department Provider    19 Abdominal Pain Cholecystitis . .. ED to Hosp-Admission (Discharged) (ADMITTED) Dariel Ordoñez MD; Merlin Apgar, MD... Spoke with: Gerry Escamilla  Patient stated she is doing fine. She was not able to fill her pain medication due to getting pain medication from a previous injury. She does have some pain medication at home she is using. That is the same. Incision are not draining bus have slight small bruises. Reviewed medications and 1111F completed. Reviewed appts. Patient has transportation. Denies any questions or condensers.       Non-face-to-face services provided:  Obtained and reviewed discharge summary and/or continuity of care documents    Care Transitions 24 Hour Call    Do you have all of your prescriptions and are they filled?:  No  Care Transitions Interventions         Follow Up  Future Appointments   Date Time Provider Vickie Wilson   2019  9:20 AM Francois Melendrez MD [facility]   2019  2:00 PM Camillo Hashimoto, MD [facility] CARD Derrick Rausch RN

## 2019-05-01 ENCOUNTER — CARE COORDINATION (OUTPATIENT)
Dept: CASE MANAGEMENT | Age: 74
End: 2019-05-01

## 2019-05-01 NOTE — CARE COORDINATION
[facility] Transitions Follow Up Call    2019    Patient: Tato Mario  Patient : 1945   MRN: [id number]  Reason for Admission: acute cholecystitis S/P laparoscopic cholecystectomy  Discharge Date: 19 RARS: Readmission Risk Score: 15         Spoke with: patient, Cecilio    Care Transitions Subsequent and Final Call    Subsequent and Final Calls  Do you have any ongoing symptoms?:  No  Do you have any needs or concerns that I can assist you with?:  No  Care Transitions Interventions  Other Interventions:        Spoke with patient, Gracia Jauregui. She states doing \"much better\" and that the pain pills were controlling her pain. She states has taken a shower today and incisions are intact with no drainage. She states she is still taking it easy, that her appetite is less than normal but better. She states has had a \"normal\" bowel movement. Denies any needs at present time. Agreeable to f/u calls. Gave reminder that if they have any questions/concerns at anytime, they can always call PCP/specialist as they always have an MD on call . Called patient back to remind  her to make appointments with PCP (F/U within in week)  and Dr. Paul Simpson (F/U in 2 weeks). Patient stated she would make calls tomorrow. Patient stated she had an appointment already scheduled for May 13, but would call to see if they wanted to see her sooner.             Follow Up  Future Appointments   Date Time Provider Vickie Wilson   2019  9:20 AM Stanley Colindres MD [facility]   2019  2:00 PM Chester Rogers MD [facility]       Leidy Cheng RN

## 2019-05-03 ENCOUNTER — TELEPHONE (OUTPATIENT)
Dept: SURGERY | Age: 74
End: 2019-05-03

## 2019-05-07 ENCOUNTER — OFFICE VISIT (OUTPATIENT)
Dept: SURGERY | Age: 74
End: 2019-05-07

## 2019-05-07 VITALS
HEART RATE: 54 BPM | WEIGHT: 153 LBS | DIASTOLIC BLOOD PRESSURE: 68 MMHG | SYSTOLIC BLOOD PRESSURE: 134 MMHG | BODY MASS INDEX: 28.44 KG/M2

## 2019-05-07 DIAGNOSIS — Z90.49 S/P LAPAROSCOPIC CHOLECYSTECTOMY: ICD-10-CM

## 2019-05-07 DIAGNOSIS — K80.00 ACUTE CALCULOUS CHOLECYSTITIS: Primary | ICD-10-CM

## 2019-05-07 PROCEDURE — 99024 POSTOP FOLLOW-UP VISIT: CPT | Performed by: SURGERY

## 2019-05-07 NOTE — PROGRESS NOTES
Post Operative Visit    [facility]  [facility]   Anuj Voss MD    [address], [address]  [place]  Phone: [phone]  Fax: [phone]    Santino Maldonado   YOB: 1945    Date of Visit:  5/7/2019    No ref. provider found  Demian Cardenas MD    HPI:     Gallbladder: Santino Maldonado is 68 y.o. female presenting for her 2 week follow up visit after laparoscopic cholecystectomy, which was performed for acute cholecystitis. Overall her pain has improved. She is not taking any medications for her pain. She is eating and drinking without difficulty. BMs are normal.  No jaundice. No fevers or chills. Vitals:    05/07/19 1143   BP: 134/68   Pulse: 54   Weight: 153 lb (69.4 kg)     Body mass index is 28.44 kg/m². PHYSICAL EXAM:    CONSTITUTIONAL:  alert, no apparent distress  LUNGS:  Resp easy and unlabored  ABDOMEN:  Incisions healing well, no erythema or induration, soft, non-distended, non-tender, voluntary guarding absent, and hernia absent  MUSCULOSKELETAL: No edema  NEUROLOGIC:  Mental Status Exam:  Level of Alertness:   awake  Orientation:   person, place, time        Pathology:  FINAL DIAGNOSIS:    Gallbladder, cholecystectomy:  - Acute cholecystitis, moderate.     JIAJA/KRIS    ASSESSMENT:     Diagnosis Orders   1. Acute calculous cholecystitis     2. S/P laparoscopic cholecystectomy           PLAN:    Overall Santino Maldonado is doing well. Abdominal pain has improved. She is tolerating a regular diet with normal bowel function. Incisions are healing well. OK to resume work on 5/14/19. Will have her follow up prn.       Electronically signed by Elise Juarez MD on 5/7/2019 at 12:05 PM

## 2019-06-20 ENCOUNTER — OFFICE VISIT (OUTPATIENT)
Dept: CARDIOLOGY CLINIC | Age: 74
End: 2019-06-20
Payer: COMMERCIAL

## 2019-06-20 VITALS
DIASTOLIC BLOOD PRESSURE: 60 MMHG | SYSTOLIC BLOOD PRESSURE: 132 MMHG | OXYGEN SATURATION: 98 % | HEART RATE: 48 BPM | HEIGHT: 62 IN | BODY MASS INDEX: 27.97 KG/M2 | WEIGHT: 152 LBS

## 2019-06-20 DIAGNOSIS — Z95.2 S/P AVR: ICD-10-CM

## 2019-06-20 DIAGNOSIS — I25.10 CORONARY ARTERY DISEASE INVOLVING NATIVE CORONARY ARTERY OF NATIVE HEART WITHOUT ANGINA PECTORIS: ICD-10-CM

## 2019-06-20 DIAGNOSIS — I50.32 CHRONIC DIASTOLIC CHF (CONGESTIVE HEART FAILURE), NYHA CLASS 2 (HCC): ICD-10-CM

## 2019-06-20 DIAGNOSIS — R60.0 LOCALIZED EDEMA: ICD-10-CM

## 2019-06-20 DIAGNOSIS — I10 ESSENTIAL HYPERTENSION: ICD-10-CM

## 2019-06-20 DIAGNOSIS — E78.5 HYPERLIPIDEMIA LDL GOAL <70: ICD-10-CM

## 2019-06-20 DIAGNOSIS — I48.0 PAF (PAROXYSMAL ATRIAL FIBRILLATION) (HCC): Primary | ICD-10-CM

## 2019-06-20 PROCEDURE — 1090F PRES/ABSN URINE INCON ASSESS: CPT | Performed by: INTERNAL MEDICINE

## 2019-06-20 PROCEDURE — 93000 ELECTROCARDIOGRAM COMPLETE: CPT | Performed by: INTERNAL MEDICINE

## 2019-06-20 PROCEDURE — 1123F ACP DISCUSS/DSCN MKR DOCD: CPT | Performed by: INTERNAL MEDICINE

## 2019-06-20 PROCEDURE — 99214 OFFICE O/P EST MOD 30 MIN: CPT | Performed by: INTERNAL MEDICINE

## 2019-06-20 PROCEDURE — G8598 ASA/ANTIPLAT THER USED: HCPCS | Performed by: INTERNAL MEDICINE

## 2019-06-20 PROCEDURE — G8428 CUR MEDS NOT DOCUMENT: HCPCS | Performed by: INTERNAL MEDICINE

## 2019-06-20 PROCEDURE — G8400 PT W/DXA NO RESULTS DOC: HCPCS | Performed by: INTERNAL MEDICINE

## 2019-06-20 PROCEDURE — 4040F PNEUMOC VAC/ADMIN/RCVD: CPT | Performed by: INTERNAL MEDICINE

## 2019-06-20 PROCEDURE — G8419 CALC BMI OUT NRM PARAM NOF/U: HCPCS | Performed by: INTERNAL MEDICINE

## 2019-06-20 PROCEDURE — 4004F PT TOBACCO SCREEN RCVD TLK: CPT | Performed by: INTERNAL MEDICINE

## 2019-06-20 PROCEDURE — 3017F COLORECTAL CA SCREEN DOC REV: CPT | Performed by: INTERNAL MEDICINE

## 2019-06-20 RX ORDER — AMIODARONE HYDROCHLORIDE 200 MG/1
100 TABLET ORAL DAILY
Qty: 30 TABLET | Refills: 11
Start: 2019-06-20 | End: 2020-04-07

## 2019-06-20 RX ORDER — TORSEMIDE 20 MG/1
TABLET ORAL
Qty: 60 TABLET | Refills: 11 | Status: SHIPPED | OUTPATIENT
Start: 2019-06-20 | End: 2020-05-19

## 2019-06-20 RX ORDER — METOLAZONE 5 MG/1
5 TABLET ORAL WEEKLY
Qty: 8 TABLET | Refills: 3 | Status: ON HOLD | OUTPATIENT
Start: 2019-06-20 | End: 2020-07-06

## 2019-06-20 NOTE — PROGRESS NOTES
[address]   Office Note    CC: CAD    Ms. Julienne Salcedo is a 71 y.o. patient with a past medical history significant for atrial fibrillation, pulmonary hypertension, aortic stenosis and CAD s/p CHILANGO to mid circ 5/2014. She is s/p AVR, PVI and RENETTA exclusion by Dr Karin Singh on 6/16. Pre-op angiogram revealed no significant restenosis in the prior stents. She was hospitalized in December 2017 with fatigue. She was anemic to a hemoglobin of 8 and received PRBC transfusions. Repeat EGD demonstrated bleeding AVM's which were cauterized. Her coumadin was restarted. She presented to [facility] on 4/24/19 with abdominal distention and nausea. She was found to have acute cholecystitis and underwent cholecystectomy with Dr. Holly Cantu. She returns to the office today in follow-up. Today, she states she has been dealing with lower extremity edema for the past 2 weeks. She has been taking her Lasix twice daily and even taken extra doses in addition to this. She does have compression stockings and wore them yesterday for a while. She is not currently on any anticoagulation due to her chronic anemia and GI bleeds. She reports no blood in her stool or black tarry stools. She is monitoring the salt in her diet. She is not participating in any regular aerobic exercise activity. She did start smoking again. She expresses the desire to quit but not the motivation. Current Outpatient Medications   Medication Sig Dispense Refill    doxycycline hyclate (VIBRA-TABS) 100 MG tablet Take 1 tablet by mouth 2 times daily for 10 days 20 tablet 0    oxyCODONE HCl 5 MG TABA Take 5 mg by mouth 2 times daily as needed (pain) for up to 30 days.  60 each 0    amLODIPine (NORVASC) 5 MG tablet Take 1 tablet by mouth daily 30 tablet 5    furosemide (LASIX) 40 MG tablet Take 40 mg po BID 60 tablet 1    rosuvastatin (CRESTOR) 40 MG tablet TAKE 1 TABLET BY MOUTH DAILY 30 tablet 11    ATROVENT HFA 17 MCG/ACT inhaler INHALE TWO PUFFS INTO THE LUNGS FOUR TIMES A DAY. 12.9 g 3    amiodarone (CORDARONE) 200 MG tablet TAKE 1 TABLET BY MOUTH DAILY 30 tablet 11    ezetimibe (ZETIA) 10 MG tablet Take 1 tablet by mouth daily 30 tablet 5    valsartan (DIOVAN) 320 MG tablet Take 1 tablet by mouth daily 90 tablet 3    tiZANidine (ZANAFLEX) 4 MG tablet Take 1 tablet by mouth 3 times daily 90 tablet 2    ferrous gluconate 324 (37.5 Fe) MG TABS TAKE 1 TABLET BY MOUTH 3 TIMES DAILY (WITH MEALS) (Patient taking differently: Take 324 mg by mouth 2 times daily ) 90 tablet 11    carvedilol (COREG) 3.125 MG tablet TAKE 1 TABLET BY MOUTH 2 TIMES DAILY (WITH MEALS) (LOWER DOSE OF COREG) 60 tablet 11    pantoprazole (PROTONIX) 40 MG tablet TAKE ONE TABLET BY MOUTH DAILY 30 tablet 11    levothyroxine (SYNTHROID) 88 MCG tablet TAKE 1 TABLET BY MOUTH DAILY 30 tablet 11    Fluticasone-Umeclidin-Vilant (TRELEGY ELLIPTA) 100-62.5-25 MCG/INH AEPB Inhale 100 mcg into the lungs daily Lot# [id number]  Qty 2 1 each 0    isosorbide mononitrate (IMDUR) 30 MG extended release tablet TAKE ONE TABLET BY MOUTH TWO TIMES A DAY FOR SYSTOLIC BLOOD PRESSURE (TOP NUMBER) OVER 140 60 tablet 11    potassium chloride (KLOR-CON) 10 MEQ extended release tablet TAKE ONE TABLET BY MOUTH TWO TIMES A DAY. 60 tablet 11     MG capsule TAKE ONE CAPSULE BY MOUTH TWO TIMES A DAY 60 capsule 5    albuterol sulfate  (90 Base) MCG/ACT inhaler Inhale 2 puffs into the lungs 4 times daily 1 Inhaler 3    aspirin 81 MG tablet Take 1 tablet by mouth daily 30 tablet 0     No current facility-administered medications for this visit. Objective:   Vitals:    06/20/19 1358   BP: 132/60   Site: Right Upper Arm   Position: Sitting   Cuff Size: Small Adult   Pulse: (!) 48   SpO2: 98%   Weight: 152 lb (68.9 kg)   Height: 5' 2\" (1.575 m)     Physical Exam:  General:  Awake, alert, oriented in NAD  Skin:  Warm and dry, color pale  Neck:  Supple.  No JVD or carotid bruits  Chest:  CTA bilaterally, No wheezes/crackles or ronchi  Cardiovascular:  Regular rate, rhythm, I7/F1, + 1/6 Systolic ejection murmur  Abdomen:  Soft, nontender, +bowel sounds  Extremities:  1+ bilateral LE edema  Neurological: Grossly intact    Lab Results   Component Value Date    CHOL 106 06/12/2019    HDL 42 06/12/2019    HDL 42 06/21/2010    TRIG 59 06/12/2019   LDL 52  Lab Results   Component Value Date     06/12/2019     Lab Results   Component Value Date    K 4.4 06/12/2019    K 4.5 04/24/2019       Lab Results   Component Value Date    BUN 24 06/12/2019    CREATININE 1.2 06/12/2019     10/15/18: TSH and LFT's within normal limits      EKG 10/22/2015: Sinus bradycardia with LAFB  EKG 12/20/18: Sinus rhonda, LAFB      Procedures:     LHC 6/15/2015   1. Right dominant coronary arterial system with mild calcification of the RCA. There is 40% serial lesions in the mid RCA. In the left system there is 25% distal left main disease. There is 50% mid LAD disease and 50% ostial second diagonal branch disease. There is no significant restenosis identified in the prior previously placed mid circumflex stent. 2. Systemic hypertension. Imaging:     Echo 7/30/2016  Left ventricle size is normal. Normal left ventricular wall thickness. Ejection fraction is visually estimated to be 55-60%. Diastolic filling parameters suggests grade III (restrictive) diastolic dysfunction. The right ventricle appears mildly enlarged. Right ventricular systolic function is normal.  The left atrium is severely dilated. The right atrium is mildly dilated.   At least moderate mitral regurgitation with a central and eccentric jet.   Mild mitral stenosis. BP reported as 171/51 at time of imaging.   A bioprosthetic aortic valve has a maximum velocity of 2.8 m/s and a mean gradient of 17 mmHg. Para-valvular regurgitation.   There is moderate tricuspid regurgitation.   Mild pulmonic regurgitation.   Estimated pulmonary artery systolic pressure is 72 mmHg assuming a rightatrial pressure of 10 mmHg.     Lower Extremity Arterial Studies 5/25/17  Right Impression   There is an NIKOS of .70 in the DP and .77 in the PT. on the right. This is in   the mild claudication range. Occlusion of the SFA artery in the right lower   extremity. Left Impression   There is an NIKOS of 1.06 in the DP and 1.08 in the PT. on the left. This is in   the normal range. Elevated velocity of the SFA. Carotid Studies 5/25/17  Right Impression   The right internal carotid artery appears to have a calcified 50-79% diameter   reducing stenosis based on velocity criteria. Left Impression   The left internal carotid artery appears to have a <50% diameter reducing   stenosis based on velocity criteria.         Echo 1/8/18  Normal LV size and systolic function. Estimated ejection fraction is 60%.   Moderate to severe mitral regurgitation is present.   Severely dilated left atrium.   Normally functioning bioprosthetic valve in aortic position. Trivial   regurgitation noted.   The right ventricle is mildly enlarged.   Moderate tricuspid regurgitation.   Doppler derived PA systolic pressure is 52 mm Hg suggestive of moderate   pulmonary hypertension. Holter 1/8/18 ([facility])  1. The rhythm was sinus with sinus arrhythmia. Average TX interval 0.20,   average QRS duration 0.14 [IVCD]. Average daily heart rate 44 ranging 38 to 68 bpm.   There were 129 pauses greater than 2.0 seconds with Max R-R 2.1 seconds occurring 05:13:11.   2. Frequent premature supraventricular ectopic beats total 1,642 consisting   of 1,152 isolated PACs, 209 atrial pairs and 3 atrial runs. The longest atrial run 3 beats   HR-61 bpm occurring 17:56:40. The fastest atrial run 3 beats HR-76 bpm occurring 09:49:41.   3. Very frequent premature ventricular ectopic beats total 16,163 consisting of 16,008 isolated   PVCs, 74 ventricular couplets and 1 ventricular triplet.  The ventricular triplet HR-126 bpm   occurring 15:36:29.   4.

## 2019-06-24 ENCOUNTER — TELEPHONE (OUTPATIENT)
Dept: CARDIOLOGY CLINIC | Age: 74
End: 2019-06-24

## 2019-06-24 NOTE — TELEPHONE ENCOUNTER
Jovanna Frye called in this morning and wanted to give an update on how she was feeling. She stated she was in the office on 6/20 for legs and feet swelling, and Dr. Portia Granger started her on Torsemide 20 mg. She said her legs and feet are back to normal and she feels great.      Any questions/ concerns you can reach Jovanna Frye at #[phone]

## 2019-06-24 NOTE — TELEPHONE ENCOUNTER
Odessa Coffey    Please return the call to the patient. Let her know she can hold on to the metolazone and use this only as needed. If she feels she needs to use this more often or his she experiences swelling or weight gain again she can call the office.

## 2019-06-24 NOTE — TELEPHONE ENCOUNTER
Pt was seen 6/20/19 switched pt to Torsemide 20mg. Pt currently holding dose of Metolozone 5mg until labs are reviewed.     Pt had labs completed on 6/20/19  BNP: 584  BMP:  Cr: 1.2  BUN:42

## 2019-06-26 RX ORDER — ISOSORBIDE MONONITRATE 30 MG/1
TABLET, EXTENDED RELEASE ORAL
Qty: 60 TABLET | Refills: 11 | Status: SHIPPED | OUTPATIENT
Start: 2019-06-26 | End: 2020-06-15

## 2019-06-26 RX ORDER — POTASSIUM CHLORIDE 750 MG/1
TABLET, FILM COATED, EXTENDED RELEASE ORAL
Qty: 60 TABLET | Refills: 11 | Status: SHIPPED | OUTPATIENT
Start: 2019-06-26 | End: 2020-06-15

## 2019-08-08 RX ORDER — EZETIMIBE 10 MG/1
10 TABLET ORAL DAILY
Qty: 30 TABLET | Refills: 5 | Status: SHIPPED | OUTPATIENT
Start: 2019-08-08 | End: 2020-02-04

## 2019-08-26 RX ORDER — LEVOTHYROXINE SODIUM 88 UG/1
88 TABLET ORAL DAILY
Qty: 30 TABLET | Refills: 11 | Status: SHIPPED | OUTPATIENT
Start: 2019-08-26 | End: 2020-08-10

## 2019-10-07 PROBLEM — K81.0 ACUTE CHOLECYSTITIS: Status: RESOLVED | Noted: 2019-04-24 | Resolved: 2019-10-07

## 2019-11-26 RX ORDER — PANTOPRAZOLE SODIUM 40 MG/1
TABLET, DELAYED RELEASE ORAL
Qty: 30 TABLET | Refills: 11 | Status: SHIPPED | OUTPATIENT
Start: 2019-11-26 | End: 2020-12-10

## 2019-11-26 RX ORDER — CARVEDILOL 3.12 MG/1
TABLET ORAL
Qty: 60 TABLET | Refills: 11 | Status: ON HOLD
Start: 2019-11-26 | End: 2020-07-07 | Stop reason: HOSPADM

## 2019-12-01 ENCOUNTER — HOSPITAL ENCOUNTER (EMERGENCY)
Age: 74
Discharge: HOME | End: 2019-12-01
Attending: EMERGENCY MEDICINE
Payer: COMMERCIAL

## 2019-12-01 ENCOUNTER — APPOINTMENT (OUTPATIENT)
Dept: CT IMAGING | Age: 74
End: 2019-12-01
Payer: COMMERCIAL

## 2019-12-01 VITALS
OXYGEN SATURATION: 93 % | HEIGHT: 62 IN | SYSTOLIC BLOOD PRESSURE: 129 MMHG | BODY MASS INDEX: 28.07 KG/M2 | DIASTOLIC BLOOD PRESSURE: 46 MMHG | WEIGHT: 152.56 LBS | HEART RATE: 49 BPM | TEMPERATURE: 98.3 F | RESPIRATION RATE: 18 BRPM

## 2019-12-01 DIAGNOSIS — M54.9 MID BACK PAIN: Primary | ICD-10-CM
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
## 2019-12-01 LAB
BILIRUBIN URINE: NEGATIVE
BLOOD, URINE: NEGATIVE
CLARITY: CLEAR
COLOR: YELLOW
GLUCOSE URINE: NEGATIVE MG/DL
KETONES, URINE: NEGATIVE MG/DL
LEUKOCYTE ESTERASE, URINE: NEGATIVE
MICROSCOPIC EXAMINATION: NORMAL
NITRITE, URINE: NEGATIVE
PH UA: 5.5 (ref 5–8)
PROTEIN UA: NEGATIVE MG/DL
SPECIFIC GRAVITY UA: 1.01 (ref 1–1.03)
URINE REFLEX TO CULTURE: NORMAL
URINE TYPE: NORMAL
UROBILINOGEN, URINE: 0.2 E.U./DL

## 2019-12-01 PROCEDURE — 99284 EMERGENCY DEPT VISIT MOD MDM: CPT

## 2019-12-01 PROCEDURE — 72125 CT NECK SPINE W/O DYE: CPT

## 2019-12-01 PROCEDURE — 72131 CT LUMBAR SPINE W/O DYE: CPT

## 2019-12-01 PROCEDURE — 70450 CT HEAD/BRAIN W/O DYE: CPT

## 2019-12-01 PROCEDURE — 72128 CT CHEST SPINE W/O DYE: CPT

## 2019-12-01 PROCEDURE — 81003 URINALYSIS AUTO W/O SCOPE: CPT

## 2019-12-01 ASSESSMENT — PAIN DESCRIPTION - FREQUENCY: FREQUENCY: CONTINUOUS

## 2019-12-01 ASSESSMENT — ENCOUNTER SYMPTOMS
RESPIRATORY NEGATIVE: 1
EYES NEGATIVE: 1
BACK PAIN: 1
GASTROINTESTINAL NEGATIVE: 1

## 2019-12-01 ASSESSMENT — PAIN SCALES - GENERAL: PAINLEVEL_OUTOF10: 5

## 2019-12-01 ASSESSMENT — PAIN DESCRIPTION - PROGRESSION: CLINICAL_PROGRESSION: NOT CHANGED

## 2019-12-01 ASSESSMENT — PAIN DESCRIPTION - PAIN TYPE: TYPE: ACUTE PAIN

## 2019-12-01 ASSESSMENT — PAIN - FUNCTIONAL ASSESSMENT: PAIN_FUNCTIONAL_ASSESSMENT: ACTIVITIES ARE NOT PREVENTED

## 2019-12-01 ASSESSMENT — PAIN DESCRIPTION - ONSET: ONSET: SUDDEN

## 2019-12-01 ASSESSMENT — PAIN DESCRIPTION - LOCATION: LOCATION: BACK

## 2019-12-01 ASSESSMENT — PAIN DESCRIPTION - ORIENTATION: ORIENTATION: MID

## 2019-12-01 ASSESSMENT — PAIN DESCRIPTION - DESCRIPTORS: DESCRIPTORS: ACHING

## 2019-12-31 NOTE — PROGRESS NOTES
[address]   Office Note  Referring provider: Vitaliy Sanchez MD  CC: CAD    Ms. Kymberly Lorenzana is a 71 y.o. patient with a past medical history significant for atrial fibrillation, pulmonary hypertension, aortic stenosis and CAD s/p CHILANGO to mid circ 5/2014. She is s/p AVR, PVI and RENETTA exclusion by Dr Shade Martinez on 6/16. Pre-op angiogram revealed no significant restenosis in the prior stents. She was hospitalized in December 2017 with fatigue. She was anemic to a hemoglobin of 8 and received PRBC transfusions. Repeat EGD demonstrated bleeding AVM's which were cauterized. Her coumadin was restarted. She presented to [facility] on 4/24/19 with abdominal distention and nausea. She was found to have acute cholecystitis and underwent cholecystectomy with Dr. Raquel Pino. She returns to the office today in follow-up. Today, she states she is doing well. She denies any new cardiac complaints. She reports compliance with her medications and tolerating. She denies any chest pains, palpitations or worsening shortness of breath. She denies any abnormal bleeding. She is not currently on any anticoagulation due to her chronic anemia and GI bleeds. She reports no blood in her stool or black tarry stools. She is monitoring the salt in her diet. She is not participating in any regular aerobic exercise activity. 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 She did start smoking again. She expresses the desire to quit but not the motivation. Patient denies exertional chest pain/pressure, dyspnea at rest, HOLLOWAY, PND, orthopnea, palpitations, lightheadedness, weight changes, changes in LE edema, and syncope.     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 since 1983.  Chronic diastolic CHF (congestive heart failure) ([facility] 2016    grade II    COPD, mild (HCC)     CVA (cerebral infarction) 2013    left cerebral cortex x2/expressive aphasia/resolved    Diverticulosis     Epistaxis, recurrent     when inr high. last 3-4 months ago    Former smoker     Gallbladder sludge     HTN (hypertension)     Hyperlipidemia     Hypothyroidism     Lumbar stenosis without neurogenic claudication 2017    pain across low back worse with standing and walking, nonradiating    Macular degeneration 2018    left worse than right , dry : progressive loss of sight    Neuropathy     Osteoarthritis     Pulmonary HTN ([facility] 2014    primary, responsive to nitrates    PVD (peripheral vascular disease) ([facility]     occluded right SFA::: asymptomatic NIKOS 0.7 right and 1.0 left    Sacral fracture, closed ([facility] 2014    Syncope 5/2014       Current Outpatient Medications   Medication Sig Dispense Refill    fluticasone-umeclidin-vilant (TRELEGY ELLIPTA) 100-62.5-25 MCG/INH AEPB Inhale 1 puff into the lungs daily 1 each 0    doxycycline hyclate (VIBRA-TABS) 100 MG tablet Take 1 tablet by mouth 2 times daily for 10 days 20 tablet 0    benzonatate (TESSALON) 100 MG capsule Take 1 capsule by mouth 3 times daily as needed for Cough 30 capsule 0    oxyCODONE HCl (OXY-IR) 10 MG immediate release tablet Take 1 tablet by mouth 3 times daily as needed for Pain for up to 30 days.  75 tablet 0    naloxone 4 MG/0.1ML LIQD nasal spray 1 spray by Nasal route as needed for Opioid Reversal 1 each 5    pantoprazole (PROTONIX) 40 MG tablet TAKE ONE TABLET BY MOUTH DAILY 30 tablet 11    carvedilol (COREG) 3.125 MG tablet TAKE 1 TABLET BY MOUTH 2 TIMES DAILY (WITH MEALS) (LOWER DOSE OF COREG) 60 tablet 11    amLODIPine (NORVASC) 5 MG tablet TAKE 1 TABLET BY MOUTH DAILY 30 tablet 1    naloxone 4 MG/0.1ML LIQD nasal spray 1 spray by Nasal route as needed for Opioid Reversal 1 each 5    tiZANidine (ZANAFLEX) 4 MG tablet Take 1 tablet by mouth 3 times daily 90 tablet 2    levothyroxine (SYNTHROID) 88 MCG tablet TAKE 1 TABLET BY MOUTH DAILY 30 tablet 11    ezetimibe (ZETIA) 10 MG tablet TAKE 1 TABLET BY MOUTH DAILY 30 tablet 5    amLODIPine (NORVASC) 5 MG tablet Take 1 tablet by mouth daily 90 tablet 0    potassium chloride (KLOR-CON) 10 MEQ extended release tablet TAKE ONE TABLET BY MOUTH TWO TIMES A DAY. 60 tablet 11    isosorbide mononitrate (IMDUR) 30 MG extended release tablet TAKE ONE TABLET BY MOUTH TWO TIMES A DAY FOR SYSTOLIC BLOOD PRESSURE (TOP NUMBER) OVER 140 60 tablet 11    amiodarone (CORDARONE) 200 MG tablet Take 0.5 tablets by mouth daily 30 tablet 11    metolazone (ZAROXOLYN) 5 MG tablet Take 1 tablet by mouth once a week 8 tablet 3    torsemide (DEMADEX) 20 MG tablet TAKE ONE TABLET BY MOUTH TWO TIMES A DAY 60 tablet 11    rosuvastatin (CRESTOR) 40 MG tablet TAKE 1 TABLET BY MOUTH DAILY 30 tablet 11    valsartan (DIOVAN) 320 MG tablet Take 1 tablet by mouth daily 90 tablet 3    ferrous gluconate 324 (37.5 Fe) MG TABS TAKE 1 TABLET BY MOUTH 3 TIMES DAILY (WITH MEALS) (Patient taking differently: Take 324 mg by mouth 2 times daily ) 90 tablet 11     MG capsule TAKE ONE CAPSULE BY MOUTH TWO TIMES A DAY 60 capsule 5    albuterol sulfate  (90 Base) MCG/ACT inhaler Inhale 2 puffs into the lungs 4 times daily 1 Inhaler 3    aspirin 81 MG tablet Take 1 tablet by mouth daily 30 tablet 0     No current facility-administered medications for this visit.       Family History   Problem Relation Age of Onset    Breast Cancer Daughter      Objective:   Vitals:    01/02/20 1415 01/02/20 1424   BP: (!) 148/54 (!) 144/50   Site: Right Upper Arm    Position: Sitting    Cuff Size: Medium Adult    Pulse: (!) 47    SpO2: 94%    Weight: 142 lb (64.4 kg)    Height: 5' 2\" (1.575 m)      Physical Exam:  General:  Awake, alert, oriented in NAD  Skin:  Warm and dry, color pale  Neck:  Supple. No JVD or carotid bruits  Chest:  CTA bilaterally, No wheezes/crackles or ronchi  Cardiovascular:  Regular rate, rhythm, R9/Y9, + 1/6 Systolic ejection murmur  Abdomen:  Soft, nontender, +bowel sounds  Extremities:  1+ bilateral LE edema  Neurological: Grossly intact    Lab Results   Component Value Date    CHOL 106 06/12/2019    HDL 42 06/12/2019    HDL 42 06/21/2010    TRIG 59 06/12/2019   LDL 52  Lab Results   Component Value Date     06/20/2019     Lab Results   Component Value Date    K 4.4 06/20/2019    K 4.5 04/24/2019       Lab Results   Component Value Date    BUN 42 06/20/2019    CREATININE 1.2 06/20/2019     10/15/18: TSH and LFT's within normal limits    EKG 10/22/2015: Sinus bradycardia with LAFB  EKG 1/2/20: Sinus rhonda, LAFB    Procedures:     C 6/15/2015   1. Right dominant coronary arterial system with mild calcification of the RCA. There is 40% serial lesions in the mid RCA. In the left system there is 25% distal left main disease. There is 50% mid LAD disease and 50% ostial second diagonal branch disease. There is no significant restenosis identified in the prior previously placed mid circumflex stent. 2. Systemic hypertension. Imaging:     Echo 7/30/2016  Left ventricle size is normal. Normal left ventricular wall thickness. Ejection fraction is visually estimated to be 55-60%. Diastolic filling parameters suggests grade III (restrictive) diastolic dysfunction. The right ventricle appears mildly enlarged.    Right ventricular systolic function is normal.  The left atrium is severely dilated. The right atrium is mildly dilated.   At least moderate mitral regurgitation with a central and eccentric jet.   Mild mitral stenosis. BP reported as 171/51 at time of imaging.   A bioprosthetic aortic valve has a maximum velocity of 2.8 m/s and a mean gradient of 17 mmHg. Para-valvular regurgitation. There is moderate tricuspid regurgitation.   Mild pulmonic regurgitation.   Estimated pulmonary artery systolic pressure is 72 mmHg assuming a rightatrial pressure of 10 mmHg.     Lower Extremity Arterial Studies 5/25/17  Right Impression   There is an NIKOS of .70 in the DP and .77 in the PT. on the right. This is in   the mild claudication range. Occlusion of the SFA artery in the right lower   extremity. Left Impression   There is an NIKOS of 1.06 in the DP and 1.08 in the PT. on the left. This is in   the normal range. Elevated velocity of the SFA. Carotid Studies 5/25/17  Right Impression   The right internal carotid artery appears to have a calcified 50-79% diameter   reducing stenosis based on velocity criteria. Left Impression   The left internal carotid artery appears to have a <50% diameter reducing   stenosis based on velocity criteria.         Echo 1/8/18  Normal LV size and systolic function. Estimated ejection fraction is 60%. Moderate to severe mitral regurgitation is present. Severely dilated left atrium. Normally functioning bioprosthetic valve in aortic position. Trivial regurgitation noted. The right ventricle is mildly enlarged.   Moderate tricuspid regurgitation.   Doppler derived PA systolic pressure is 52 mm Hg suggestive of moderate   pulmonary hypertension. [facility] 1/8/18 ([facility]  1. The rhythm was sinus with sinus arrhythmia. Average UT interval 0.20,   average QRS duration 0.14 [IVCD].  Average daily heart rate 44 ranging 38 to 68 bpm.   There were 129 pauses greater than 2.0 seconds with Max R-R 2.1 seconds occurring 05:13:11.   2. Frequent premature supraventricular ectopic beats total 1,642 consisting   of 1,152 isolated PACs, 209 atrial pairs and 3 atrial runs. The longest atrial run 3 beats   HR-61 bpm occurring 17:56:40. The fastest atrial run 3 beats HR-76 bpm occurring 09:49:41.   3. Very frequent premature ventricular ectopic beats total 16,163 consisting of 16,008 isolated   PVCs, 74 ventricular couplets and 1 ventricular triplet. The ventricular triplet HR-126 bpm   occurring 15:36:29.   4. Diary returned with an entry of \"irregular heart\",  isolated PACs, PVCs and   ventricular couplets noted. Assessment:  1. CAD of native coronary arteries without angina  2. S/p Aortic Valve Replacement with bioprosthesis for nonrheumatic aortic stenosis  3. Chronic Diastolic CHF, class 2  4. Hyperlipidemia with goal LDL <70mg/dL  5. Paroxysmal Atrial fibrillation  6. Asymptomatic Bradycardia  7. Carotid stenosis, right     Plan:  Rickey Burch is entirely stable from a cardiovascular standpoint. I see no need to make any changes currently in his medical regimen. She appears compensated today and denies any worsening symptoms. I will have her complete repeat carotid studies to evaluated further progression of carotid stenosis. She remains asymptomatic from her a-fib but is in sinus rhythm. She is not anticoagulated because of her prior GI bleeding. I will see her in office for follow up in 6 months. This note was scribed in the presence of Dr Remy Granados MD by Oh Hoyt RN. Physician Attestation:  The scribes documentation has been prepared under my direction and personally reviewed by me in its entirety. I, Dr. Remy Granados personally performed the services described in this documentation as scribed by my RN,  Oh Hoyt in my presence, and I confirm that the note above accurately reflects all work, treatment, procedures, and medical decision making performed by me.

## 2020-01-02 ENCOUNTER — OFFICE VISIT (OUTPATIENT)
Dept: CARDIOLOGY CLINIC | Age: 75
End: 2020-01-02
Payer: COMMERCIAL

## 2020-01-02 VITALS
HEART RATE: 47 BPM | HEIGHT: 62 IN | DIASTOLIC BLOOD PRESSURE: 50 MMHG | WEIGHT: 142 LBS | SYSTOLIC BLOOD PRESSURE: 144 MMHG | OXYGEN SATURATION: 94 % | BODY MASS INDEX: 26.13 KG/M2

## 2020-01-02 PROCEDURE — G8428 CUR MEDS NOT DOCUMENT: HCPCS | Performed by: INTERNAL MEDICINE

## 2020-01-02 PROCEDURE — 99214 OFFICE O/P EST MOD 30 MIN: CPT | Performed by: INTERNAL MEDICINE

## 2020-01-02 PROCEDURE — G8400 PT W/DXA NO RESULTS DOC: HCPCS | Performed by: INTERNAL MEDICINE

## 2020-01-02 PROCEDURE — 4040F PNEUMOC VAC/ADMIN/RCVD: CPT | Performed by: INTERNAL MEDICINE

## 2020-01-02 PROCEDURE — G8482 FLU IMMUNIZE ORDER/ADMIN: HCPCS | Performed by: INTERNAL MEDICINE

## 2020-01-02 PROCEDURE — 1123F ACP DISCUSS/DSCN MKR DOCD: CPT | Performed by: INTERNAL MEDICINE

## 2020-01-02 PROCEDURE — G8417 CALC BMI ABV UP PARAM F/U: HCPCS | Performed by: INTERNAL MEDICINE

## 2020-01-02 PROCEDURE — 93000 ELECTROCARDIOGRAM COMPLETE: CPT | Performed by: INTERNAL MEDICINE

## 2020-01-02 PROCEDURE — 4004F PT TOBACCO SCREEN RCVD TLK: CPT | Performed by: INTERNAL MEDICINE

## 2020-01-02 PROCEDURE — 1090F PRES/ABSN URINE INCON ASSESS: CPT | Performed by: INTERNAL MEDICINE

## 2020-01-02 PROCEDURE — 3017F COLORECTAL CA SCREEN DOC REV: CPT | Performed by: INTERNAL MEDICINE

## 2020-01-02 NOTE — PATIENT INSTRUCTIONS
sodium\" may still have too much sodium. Be sure to read the label to see how much sodium you are getting. · Buy fresh vegetables, or frozen vegetables without added sauces. Buy low-sodium versions of canned vegetables, soups, and other canned goods. Prepare low-sodium meals  · Cut back on the amount of salt you use in cooking. This will help you adjust to the taste. Do not add salt after cooking. One teaspoon of salt has about 2,300 mg of sodium. · Take the salt shaker off the table. · Flavor your food with garlic, lemon juice, onion, vinegar, herbs, and spices. Do not use soy sauce, lite soy sauce, steak sauce, onion salt, garlic salt, celery salt, mustard, or ketchup on your food. · Use low-sodium salad dressings, sauces, and ketchup. Or make your own salad dressings and sauces without adding salt. · Use less salt (or none) when recipes call for it. You can often use half the salt a recipe calls for without losing flavor. Other foods such as rice, pasta, and grains do not need added salt. · Rinse canned vegetables, and cook them in fresh water. This removes some--but not all--of the salt. · Avoid water that is naturally high in sodium or that has been treated with water softeners, which add sodium. Call your local water company to find out the sodium content of your water supply. If you buy bottled water, read the label and choose a sodium-free brand. Avoid high-sodium foods  · Avoid eating:  ? Smoked, cured, salted, and canned meat, fish, and poultry. ? Ham, jackson, hot dogs, and luncheon meats. ? Regular, hard, and processed cheese and regular peanut butter. ? Crackers with salted tops, and other salted snack foods such as pretzels, chips, and salted popcorn. ? Frozen prepared meals, unless labeled low-sodium. ? Canned and dried soups, broths, and bouillon, unless labeled sodium-free or low-sodium. ? Canned vegetables, unless labeled sodium-free or low-sodium. ?  [place] fries, pizza, tacos, and other fast foods. ? Pickles, olives, ketchup, and other condiments, especially soy sauce, unless labeled sodium-free or low-sodium. Where can you learn more? Go to https://F[employer]peais[employer]neb.[email or web address] org and sign in to your [employer] account. Enter Y518 in the [facility] box to learn more about \"Low Sodium Diet (2,000 Milligram): Care Instructions. \"     If you do not have an account, please click on the \"Sign Up Now\" link. Current as of: November 7, 2018  Content Version: 12.1  © 5857-7929 Healthwise, Incorporated. Care instructions adapted under license by [facility] ([facility]). If you have questions about a medical condition or this instruction, always ask your healthcare professional. [address] any warranty or liability for your use of this information.

## 2020-01-07 ENCOUNTER — TELEPHONE (OUTPATIENT)
Dept: CARDIOLOGY CLINIC | Age: 75
End: 2020-01-07

## 2020-01-07 NOTE — TELEPHONE ENCOUNTER
Attempted to call patient to schedule carotid test, no answer, LVM for pt to call back and schedule testing.

## 2020-01-08 NOTE — TELEPHONE ENCOUNTER
I have Shonda scheduled for her carotid on Thursday, January 16th at [address] [place] [address] with a [phone] arrival time. I called patient and told her date/time of test and explained instructions. She v/u.

## 2020-01-16 ENCOUNTER — HOSPITAL ENCOUNTER (OUTPATIENT)
Dept: VASCULAR LAB | Age: 75
Discharge: HOME | End: 2020-01-16
Payer: COMMERCIAL

## 2020-01-16 PROCEDURE — 93880 EXTRACRANIAL BILAT STUDY: CPT

## 2020-01-27 ENCOUNTER — HOSPITAL ENCOUNTER (OUTPATIENT)
Dept: CT IMAGING | Age: 75
Discharge: HOME | End: 2020-01-27
Payer: COMMERCIAL

## 2020-01-27 PROCEDURE — G0297 LDCT FOR LUNG CA SCREEN: HCPCS

## 2020-02-04 RX ORDER — EZETIMIBE 10 MG/1
10 TABLET ORAL DAILY
Qty: 30 TABLET | Refills: 5 | Status: SHIPPED | OUTPATIENT
Start: 2020-02-04 | End: 2020-06-29

## 2020-04-09 RX ORDER — AMIODARONE HYDROCHLORIDE 200 MG/1
TABLET ORAL
Qty: 30 TABLET | Refills: 11 | Status: ON HOLD
Start: 2020-04-09 | End: 2020-07-09 | Stop reason: HOSPADM

## 2020-04-09 RX ORDER — ROSUVASTATIN CALCIUM 40 MG/1
40 TABLET, COATED ORAL DAILY
Qty: 30 TABLET | Refills: 11 | Status: SHIPPED | OUTPATIENT
Start: 2020-04-09 | End: 2021-04-19

## 2020-05-19 RX ORDER — TORSEMIDE 20 MG/1
TABLET ORAL
Qty: 60 TABLET | Refills: 11 | Status: SHIPPED | OUTPATIENT
Start: 2020-05-19 | End: 2021-05-18

## 2020-06-15 RX ORDER — ISOSORBIDE MONONITRATE 30 MG/1
TABLET, EXTENDED RELEASE ORAL
Qty: 60 TABLET | Refills: 11 | Status: SHIPPED | OUTPATIENT
Start: 2020-06-15 | End: 2021-06-21

## 2020-06-15 RX ORDER — POTASSIUM CHLORIDE 750 MG/1
TABLET, FILM COATED, EXTENDED RELEASE ORAL
Qty: 60 TABLET | Refills: 11 | Status: SHIPPED | OUTPATIENT
Start: 2020-06-15 | End: 2021-06-21

## 2020-06-30 RX ORDER — EZETIMIBE 10 MG/1
10 TABLET ORAL DAILY
Qty: 30 TABLET | Refills: 11 | Status: ON HOLD
Start: 2020-06-30 | End: 2020-07-09 | Stop reason: HOSPADM

## 2020-07-05 ENCOUNTER — APPOINTMENT (OUTPATIENT)
Dept: CT IMAGING | Age: 75
DRG: 193 | End: 2020-07-05
Payer: COMMERCIAL

## 2020-07-05 ENCOUNTER — APPOINTMENT (OUTPATIENT)
Dept: GENERAL RADIOLOGY | Age: 75
DRG: 193 | End: 2020-07-05
Payer: COMMERCIAL

## 2020-07-05 ENCOUNTER — HOSPITAL ENCOUNTER (INPATIENT)
Age: 75
LOS: 4 days | Discharge: HOME | DRG: 193 | End: 2020-07-09
Attending: EMERGENCY MEDICINE | Admitting: INTERNAL MEDICINE
Payer: COMMERCIAL

## 2020-07-05 LAB
A/G RATIO: 1.4 (ref 1.1–2.2)
ALBUMIN SERPL-MCNC: 4.1 G/DL (ref 3.4–5)
ALP BLD-CCNC: 85 U/L (ref 40–129)
ALT SERPL-CCNC: 15 U/L (ref 10–40)
ANION GAP SERPL CALCULATED.3IONS-SCNC: 12 MMOL/L (ref 3–16)
ANION GAP SERPL CALCULATED.3IONS-SCNC: 14 MMOL/L (ref 3–16)
AST SERPL-CCNC: 17 U/L (ref 15–37)
BASOPHILS ABSOLUTE: 0 K/UL (ref 0–0.2)
BASOPHILS RELATIVE PERCENT: 0.6 %
BILIRUB SERPL-MCNC: 0.3 MG/DL (ref 0–1)
BUN BLDV-MCNC: 70 MG/DL (ref 7–20)
BUN BLDV-MCNC: 76 MG/DL (ref 7–20)
CALCIUM SERPL-MCNC: 8.9 MG/DL (ref 8.3–10.6)
CALCIUM SERPL-MCNC: 9 MG/DL (ref 8.3–10.6)
CHLORIDE BLD-SCNC: 93 MMOL/L (ref 99–110)
CHLORIDE BLD-SCNC: 99 MMOL/L (ref 99–110)
CO2: 24 MMOL/L (ref 21–32)
CO2: 25 MMOL/L (ref 21–32)
CREAT SERPL-MCNC: 1.9 MG/DL (ref 0.6–1.2)
CREAT SERPL-MCNC: 2.2 MG/DL (ref 0.6–1.2)
D DIMER: 250 NG/ML DDU (ref 0–229)
EOSINOPHILS ABSOLUTE: 0.4 K/UL (ref 0–0.6)
EOSINOPHILS RELATIVE PERCENT: 5.1 %
GFR AFRICAN AMERICAN: 26
GFR AFRICAN AMERICAN: 31
GFR NON-AFRICAN AMERICAN: 22
GFR NON-AFRICAN AMERICAN: 26
GLOBULIN: 3 G/DL
GLUCOSE BLD-MCNC: 118 MG/DL (ref 70–99)
GLUCOSE BLD-MCNC: 99 MG/DL (ref 70–99)
HCT VFR BLD CALC: 37.1 % (ref 36–48)
HEMOGLOBIN: 12.4 G/DL (ref 12–16)
LACTIC ACID: 0.7 MMOL/L (ref 0.4–2)
LYMPHOCYTES ABSOLUTE: 1.1 K/UL (ref 1–5.1)
LYMPHOCYTES RELATIVE PERCENT: 14.4 %
MCH RBC QN AUTO: 30.3 PG (ref 26–34)
MCHC RBC AUTO-ENTMCNC: 33.5 G/DL (ref 31–36)
MCV RBC AUTO: 90.5 FL (ref 80–100)
MONOCYTES ABSOLUTE: 0.8 K/UL (ref 0–1.3)
MONOCYTES RELATIVE PERCENT: 10.6 %
NEUTROPHILS ABSOLUTE: 5.3 K/UL (ref 1.7–7.7)
NEUTROPHILS RELATIVE PERCENT: 69.3 %
PDW BLD-RTO: 14.4 % (ref 12.4–15.4)
PLATELET # BLD: 215 K/UL (ref 135–450)
PMV BLD AUTO: 8.6 FL (ref 5–10.5)
POTASSIUM REFLEX MAGNESIUM: 5 MMOL/L (ref 3.5–5.1)
POTASSIUM SERPL-SCNC: 4.3 MMOL/L (ref 3.5–5.1)
PRO-BNP: 345 PG/ML (ref 0–449)
RBC # BLD: 4.1 M/UL (ref 4–5.2)
SARS-COV-2, NAAT: NOT DETECTED
SODIUM BLD-SCNC: 132 MMOL/L (ref 136–145)
SODIUM BLD-SCNC: 135 MMOL/L (ref 136–145)
TOTAL PROTEIN: 7.1 G/DL (ref 6.4–8.2)
TROPONIN: <0.01 NG/ML
TROPONIN: <0.01 NG/ML
WBC # BLD: 7.7 K/UL (ref 4–11)

## 2020-07-05 PROCEDURE — 96375 TX/PRO/DX INJ NEW DRUG ADDON: CPT

## 2020-07-05 PROCEDURE — 6360000002 HC RX W HCPCS: Performed by: INTERNAL MEDICINE

## 2020-07-05 PROCEDURE — 99285 EMERGENCY DEPT VISIT HI MDM: CPT

## 2020-07-05 PROCEDURE — 36415 COLL VENOUS BLD VENIPUNCTURE: CPT

## 2020-07-05 PROCEDURE — U0002 COVID-19 LAB TEST NON-CDC: HCPCS

## 2020-07-05 PROCEDURE — 93005 ELECTROCARDIOGRAM TRACING: CPT | Performed by: PHYSICIAN ASSISTANT

## 2020-07-05 PROCEDURE — 85025 COMPLETE CBC W/AUTO DIFF WBC: CPT

## 2020-07-05 PROCEDURE — 71250 CT THORAX DX C-: CPT

## 2020-07-05 PROCEDURE — 94640 AIRWAY INHALATION TREATMENT: CPT

## 2020-07-05 PROCEDURE — 96367 TX/PROPH/DG ADDL SEQ IV INF: CPT

## 2020-07-05 PROCEDURE — 87040 BLOOD CULTURE FOR BACTERIA: CPT

## 2020-07-05 PROCEDURE — 2580000003 HC RX 258: Performed by: PHYSICIAN ASSISTANT

## 2020-07-05 PROCEDURE — 71045 X-RAY EXAM CHEST 1 VIEW: CPT

## 2020-07-05 PROCEDURE — 85379 FIBRIN DEGRADATION QUANT: CPT

## 2020-07-05 PROCEDURE — 83880 ASSAY OF NATRIURETIC PEPTIDE: CPT

## 2020-07-05 PROCEDURE — 2060000000 HC ICU INTERMEDIATE R&B

## 2020-07-05 PROCEDURE — 6360000002 HC RX W HCPCS: Performed by: PHYSICIAN ASSISTANT

## 2020-07-05 PROCEDURE — 96365 THER/PROPH/DIAG IV INF INIT: CPT

## 2020-07-05 PROCEDURE — 80053 COMPREHEN METABOLIC PANEL: CPT

## 2020-07-05 PROCEDURE — 84484 ASSAY OF TROPONIN QUANT: CPT

## 2020-07-05 PROCEDURE — 96361 HYDRATE IV INFUSION ADD-ON: CPT

## 2020-07-05 PROCEDURE — G0378 HOSPITAL OBSERVATION PER HR: HCPCS

## 2020-07-05 PROCEDURE — 83605 ASSAY OF LACTIC ACID: CPT

## 2020-07-05 PROCEDURE — 2580000003 HC RX 258: Performed by: INTERNAL MEDICINE

## 2020-07-05 PROCEDURE — 96360 HYDRATION IV INFUSION INIT: CPT

## 2020-07-05 PROCEDURE — 2500000003 HC RX 250 WO HCPCS: Performed by: INTERNAL MEDICINE

## 2020-07-05 PROCEDURE — 84300 ASSAY OF URINE SODIUM: CPT

## 2020-07-05 PROCEDURE — 72128 CT CHEST SPINE W/O DYE: CPT

## 2020-07-05 PROCEDURE — 96366 THER/PROPH/DIAG IV INF ADDON: CPT

## 2020-07-05 PROCEDURE — 6370000000 HC RX 637 (ALT 250 FOR IP): Performed by: INTERNAL MEDICINE

## 2020-07-05 RX ORDER — 0.9 % SODIUM CHLORIDE 0.9 %
500 INTRAVENOUS SOLUTION INTRAVENOUS ONCE
Status: COMPLETED | OUTPATIENT
Start: 2020-07-05 | End: 2020-07-05

## 2020-07-05 RX ORDER — IPRATROPIUM BROMIDE 21 UG/1
2 SPRAY, METERED NASAL 3 TIMES DAILY PRN
Status: DISCONTINUED | OUTPATIENT
Start: 2020-07-05 | End: 2020-07-09 | Stop reason: HOSPADM

## 2020-07-05 RX ORDER — PANTOPRAZOLE SODIUM 40 MG/1
40 TABLET, DELAYED RELEASE ORAL
Status: DISCONTINUED | OUTPATIENT
Start: 2020-07-06 | End: 2020-07-09 | Stop reason: HOSPADM

## 2020-07-05 RX ORDER — ISOSORBIDE MONONITRATE 30 MG/1
30 TABLET, EXTENDED RELEASE ORAL DAILY
Status: DISCONTINUED | OUTPATIENT
Start: 2020-07-06 | End: 2020-07-09 | Stop reason: HOSPADM

## 2020-07-05 RX ORDER — AMIODARONE HYDROCHLORIDE 200 MG/1
200 TABLET ORAL DAILY
Status: DISCONTINUED | OUTPATIENT
Start: 2020-07-06 | End: 2020-07-08

## 2020-07-05 RX ORDER — EZETIMIBE 10 MG/1
10 TABLET ORAL DAILY
Status: DISCONTINUED | OUTPATIENT
Start: 2020-07-06 | End: 2020-07-07

## 2020-07-05 RX ORDER — POTASSIUM CHLORIDE 750 MG/1
10 TABLET, FILM COATED, EXTENDED RELEASE ORAL DAILY
Status: CANCELLED | OUTPATIENT
Start: 2020-07-05

## 2020-07-05 RX ORDER — SODIUM CHLORIDE 9 MG/ML
INJECTION, SOLUTION INTRAVENOUS CONTINUOUS
Status: DISCONTINUED | OUTPATIENT
Start: 2020-07-05 | End: 2020-07-06

## 2020-07-05 RX ORDER — ACETAMINOPHEN 325 MG/1
650 TABLET ORAL EVERY 6 HOURS PRN
Status: DISCONTINUED | OUTPATIENT
Start: 2020-07-05 | End: 2020-07-09 | Stop reason: HOSPADM

## 2020-07-05 RX ORDER — PROMETHAZINE HYDROCHLORIDE 25 MG/1
12.5 TABLET ORAL EVERY 6 HOURS PRN
Status: DISCONTINUED | OUTPATIENT
Start: 2020-07-05 | End: 2020-07-09 | Stop reason: HOSPADM

## 2020-07-05 RX ORDER — ROSUVASTATIN CALCIUM 40 MG/1
40 TABLET, COATED ORAL DAILY
Status: DISCONTINUED | OUTPATIENT
Start: 2020-07-06 | End: 2020-07-09 | Stop reason: HOSPADM

## 2020-07-05 RX ORDER — SODIUM CHLORIDE 0.9 % (FLUSH) 0.9 %
10 SYRINGE (ML) INJECTION PRN
Status: DISCONTINUED | OUTPATIENT
Start: 2020-07-05 | End: 2020-07-09 | Stop reason: HOSPADM

## 2020-07-05 RX ORDER — BUPIVACAINE HYDROCHLORIDE AND EPINEPHRINE 5; .0091 MG/ML; MG/ML
10 INJECTION, SOLUTION DENTAL; INFILTRATION ONCE
Status: DISCONTINUED | OUTPATIENT
Start: 2020-07-05 | End: 2020-07-05

## 2020-07-05 RX ORDER — DOXYCYCLINE HYCLATE 50 MG/1
324 CAPSULE, GELATIN COATED ORAL
Status: DISCONTINUED | OUTPATIENT
Start: 2020-07-05 | End: 2020-07-07

## 2020-07-05 RX ORDER — NICOTINE 21 MG/24HR
1 PATCH, TRANSDERMAL 24 HOURS TRANSDERMAL DAILY
Status: DISCONTINUED | OUTPATIENT
Start: 2020-07-05 | End: 2020-07-09 | Stop reason: HOSPADM

## 2020-07-05 RX ORDER — AMLODIPINE BESYLATE 5 MG/1
5 TABLET ORAL DAILY
Status: DISCONTINUED | OUTPATIENT
Start: 2020-07-06 | End: 2020-07-08

## 2020-07-05 RX ORDER — LEVOTHYROXINE SODIUM 88 UG/1
88 TABLET ORAL
Status: DISCONTINUED | OUTPATIENT
Start: 2020-07-06 | End: 2020-07-09 | Stop reason: HOSPADM

## 2020-07-05 RX ORDER — SODIUM CHLORIDE 0.9 % (FLUSH) 0.9 %
10 SYRINGE (ML) INJECTION EVERY 12 HOURS SCHEDULED
Status: DISCONTINUED | OUTPATIENT
Start: 2020-07-05 | End: 2020-07-09 | Stop reason: HOSPADM

## 2020-07-05 RX ORDER — ASPIRIN 81 MG/1
81 TABLET, CHEWABLE ORAL DAILY
Status: DISCONTINUED | OUTPATIENT
Start: 2020-07-06 | End: 2020-07-09 | Stop reason: HOSPADM

## 2020-07-05 RX ORDER — HEPARIN SODIUM 5000 [USP'U]/ML
5000 INJECTION, SOLUTION INTRAVENOUS; SUBCUTANEOUS EVERY 8 HOURS SCHEDULED
Status: DISCONTINUED | OUTPATIENT
Start: 2020-07-05 | End: 2020-07-09 | Stop reason: HOSPADM

## 2020-07-05 RX ORDER — ACETAMINOPHEN 650 MG/1
650 SUPPOSITORY RECTAL EVERY 6 HOURS PRN
Status: DISCONTINUED | OUTPATIENT
Start: 2020-07-05 | End: 2020-07-09 | Stop reason: HOSPADM

## 2020-07-05 RX ORDER — CARVEDILOL 3.12 MG/1
3.12 TABLET ORAL 2 TIMES DAILY WITH MEALS
Status: DISCONTINUED | OUTPATIENT
Start: 2020-07-05 | End: 2020-07-07

## 2020-07-05 RX ORDER — ONDANSETRON 2 MG/ML
4 INJECTION INTRAMUSCULAR; INTRAVENOUS EVERY 6 HOURS PRN
Status: DISCONTINUED | OUTPATIENT
Start: 2020-07-05 | End: 2020-07-09 | Stop reason: HOSPADM

## 2020-07-05 RX ORDER — OXYCODONE HYDROCHLORIDE 10 MG/1
10 TABLET ORAL 3 TIMES DAILY PRN
Status: DISCONTINUED | OUTPATIENT
Start: 2020-07-05 | End: 2020-07-09 | Stop reason: HOSPADM

## 2020-07-05 RX ORDER — BUDESONIDE AND FORMOTEROL FUMARATE DIHYDRATE 80; 4.5 UG/1; UG/1
1 AEROSOL RESPIRATORY (INHALATION) 2 TIMES DAILY
Status: DISCONTINUED | OUTPATIENT
Start: 2020-07-06 | End: 2020-07-09 | Stop reason: HOSPADM

## 2020-07-05 RX ORDER — TIZANIDINE 4 MG/1
4 TABLET ORAL 3 TIMES DAILY
Status: DISCONTINUED | OUTPATIENT
Start: 2020-07-05 | End: 2020-07-09 | Stop reason: HOSPADM

## 2020-07-05 RX ORDER — DOCUSATE SODIUM 100 MG/1
100 CAPSULE, LIQUID FILLED ORAL DAILY
Status: DISCONTINUED | OUTPATIENT
Start: 2020-07-06 | End: 2020-07-09 | Stop reason: HOSPADM

## 2020-07-05 RX ORDER — IPRATROPIUM BROMIDE AND ALBUTEROL SULFATE 2.5; .5 MG/3ML; MG/3ML
1 SOLUTION RESPIRATORY (INHALATION)
Status: DISCONTINUED | OUTPATIENT
Start: 2020-07-05 | End: 2020-07-05 | Stop reason: CLARIF

## 2020-07-05 RX ADMIN — HEPARIN SODIUM 5000 UNITS: 5000 INJECTION INTRAVENOUS; SUBCUTANEOUS at 21:51

## 2020-07-05 RX ADMIN — CEFTRIAXONE 1 G: 1 INJECTION, POWDER, FOR SOLUTION INTRAMUSCULAR; INTRAVENOUS at 17:58

## 2020-07-05 RX ADMIN — IPRATROPIUM BROMIDE AND ALBUTEROL 1 PUFF: 20; 100 SPRAY, METERED RESPIRATORY (INHALATION) at 21:19

## 2020-07-05 RX ADMIN — SODIUM CHLORIDE: 9 INJECTION, SOLUTION INTRAVENOUS at 20:31

## 2020-07-05 RX ADMIN — DOXYCYCLINE 100 MG: 100 INJECTION, POWDER, LYOPHILIZED, FOR SOLUTION INTRAVENOUS at 21:50

## 2020-07-05 RX ADMIN — SODIUM CHLORIDE 500 ML: 9 INJECTION, SOLUTION INTRAVENOUS at 14:41

## 2020-07-05 RX ADMIN — SODIUM CHLORIDE, PRESERVATIVE FREE 10 ML: 5 INJECTION INTRAVENOUS at 20:31

## 2020-07-05 ASSESSMENT — ENCOUNTER SYMPTOMS
COUGH: 1
SHORTNESS OF BREATH: 1
ABDOMINAL PAIN: 0
RHINORRHEA: 0
VOMITING: 0
NAUSEA: 1

## 2020-07-05 ASSESSMENT — PAIN DESCRIPTION - ORIENTATION: ORIENTATION: MID;UPPER

## 2020-07-05 ASSESSMENT — PAIN DESCRIPTION - PAIN TYPE: TYPE: ACUTE PAIN;CHRONIC PAIN

## 2020-07-05 ASSESSMENT — PAIN SCALES - GENERAL
PAINLEVEL_OUTOF10: 2
PAINLEVEL_OUTOF10: 2
PAINLEVEL_OUTOF10: 0
PAINLEVEL_OUTOF10: 0

## 2020-07-05 ASSESSMENT — PAIN DESCRIPTION - LOCATION: LOCATION: BACK

## 2020-07-05 ASSESSMENT — PAIN DESCRIPTION - DESCRIPTORS: DESCRIPTORS: TIGHTNESS;CRAMPING

## 2020-07-05 ASSESSMENT — PAIN DESCRIPTION - ONSET: ONSET: ON-GOING

## 2020-07-05 ASSESSMENT — PAIN DESCRIPTION - PROGRESSION: CLINICAL_PROGRESSION: NOT CHANGED

## 2020-07-05 ASSESSMENT — PAIN DESCRIPTION - FREQUENCY: FREQUENCY: CONTINUOUS

## 2020-07-05 NOTE — ED PROVIDER NOTES
[facility]  [address] [address]  Dept: [phone]  Loc: [phone]  eMERGENCYdEPARTMENT eNCOUnter      Pt Name: Madiha Pandey  MRN: [id number]  [birth year][place]ory 1945  Date of evaluation: 7/5/2020  Provider:Khadijah Casillas PA-C    CHIEF COMPLAINT       Chief Complaint   Patient presents with    Back Pain     upper back with nausea, lower chronic back pain    Leg Swelling     bilateral       HISTORY OF PRESENT ILLNESS  (Location/Symptom, Timing/Onset, Context/Setting, Quality, Duration,Modifying Factors, Severity.)   Madiha Pandey is a 76 y.o. female with past medical history of hypertension hyperlipidemia, CAD, CVA, chronic atrial fibrillation, pulmonary hypertension, carotid stenosis, CHF, PVD, who presents to the emergency department by private vehicle complaining of upper back pain, leg swelling, cough and shortness of breath. Patient states she has pain throughout her upper back to presents with walking. She denies any pain at rest.  Denies any pain with movement while sitting down. Denies any trauma or injury to upper back. States pain is describes an aching sensation throughout her upper back that only occurs when she is walking around. States she has associated mild shortness of breath and nausea. Pain resolves with rest.  Denies any associated chest pain, diaphoresis or radiation of pain. Denies ever having pain like this previously. She had to leave work yesterday given severity of symptoms. Secondly, patient planing of bilateral lower extremity swelling for the past 5 days. States she is been taking extra Lasix due to edema. Left lower extremity appears more edematous than right per patient. She denies any trauma or injury to legs. Denies any local pain. Finally, she has mildly productive cough over the past week. 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   Denies any fevers, chills, diarrhea, headaches, body aches. She has pain rated 2/10 to lower and upper back described as tightness and cramping sensation. Lower back pain is chronic, upper back pain is acute. Upper back pain not present when resting. Nursing Notes were reviewedand agreed with or any disagreements were addressed in the HPI. REVIEW OF SYSTEMS    (2-9 systems for level 4, 10 or more for level 5)     Review of Systems   Constitutional: Negative for chills, fatigue and fever. HENT: Negative for congestion and rhinorrhea. Respiratory: Positive for cough and shortness of breath. Cardiovascular: Positive for leg swelling. Negative for chest pain and palpitations. Gastrointestinal: Positive for nausea. Negative for abdominal pain and vomiting. Genitourinary: Negative. Musculoskeletal: Positive for myalgias. Negative for arthralgias. Skin: Negative. Neurological: Negative for dizziness and light-headedness. Psychiatric/Behavioral: Negative for behavioral problems and confusion. Except as noted above the remainder of the review of systems was reviewed and negative. 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 since 1983.     Chronic diastolic CHF (congestive heart failure) ([address] 2016    grade II    COPD, mild (HCC)     CVA (cerebral infarction) 2013    left cerebral cortex x2/expressive aphasia/resolved    Diverticulosis     Epistaxis, recurrent     when inr high. last 3-4 months ago    Former smoker     Gallbladder sludge     HTN (hypertension)     Hyperlipidemia     Hypothyroidism     Lumbar stenosis without neurogenic claudication     pain across low back worse with standing and walking, nonradiating    Macular degeneration 2018    left worse than right , dry : progressive loss of sight: just barely able to see to drive    Neuropathy     Osteoarthritis     Pulmonary HTN ([address]     primary, responsive to nitrates    PVD (peripheral vascular disease) ([address]     occluded right SFA::: asymptomatic NIKOS 0.7 right and 1.0 left    Sacral fracture, closed ([address]     Syncope 2014       SURGICAL HISTORY           Procedure Laterality Date    AORTIC VALVE REPLACEMENT  6/16/15    Dr. Sujey Cisneros. Hernandez - PVI, RENETTA exclusion. 19mm Maki pericardial Magna    APPENDECTOMY      BACK SURGERY  03/15/2019    superion inserted to keep back from hurtin [address] N/A 2019    EMERGENT; LAPAROSCOPIC CHOLECYSTECTOMY WITH GRAM performed by Toi Higginbotham MD at [address]      stent x 1    CYSTOSCOPY  2014    dr Jose Canela      THR Right    OTHER SURGICAL HISTORY  2018     EGD and colonoscopy.  SPINE SURGERY N/A 3/15/2019    INSERTION OF INTERSPINOUS SPACER SUPERION VERTIFLEX AT LUMBAR FOUR-LUMBAR FIVE performed by Michael Sotelo MD at [address]  12/15/2017       CURRENT MEDICATIONS     [unfilled]    ALLERGIES     Benadryl [diphenhydramine]; Doxylamine; Mucinex [guaifenesin er];  Spiriva handihaler [tiotropium bromide monohydrate]; and Adhesive tape    FAMILY HISTORY           Problem Relation Age of Onset    Breast Cancer Daughter      Family Status   Relation Name Status    Mother      Father      Joselyn  (Not Specified)        SOCIAL HISTORY      reports that she has been smoking cigarettes. She has a 45.00 pack-year smoking history. She uses smokeless tobacco. She reports that she does not drink alcohol or use drugs. PHYSICAL EXAM    (up to 7 for level 4, 8 or more for level 5)     ED Triage Vitals [07/05/20 1200]   Enc Vitals Group      BP (!) 137/46      Pulse (!) 49      Resp 17      Temp 97.8 °F (36.6 °C)      Temp Source Temporal      SpO2 (!) 9 %      Weight 148 lb 9.4 oz (67.4 kg)      Height       Head Circumference       Peak Flow       Pain Score       Pain Loc       Pain Edu? Excl. in [address]? Physical Exam  Vitals signs reviewed. Constitutional:       Appearance: Normal appearance. HENT:      Head: Normocephalic and atraumatic. Neck:      Musculoskeletal: Normal range of motion and neck supple. Cardiovascular:      Rate and Rhythm: Normal rate and regular rhythm. Pulmonary:      Effort: Pulmonary effort is normal. No respiratory distress. Comments: Diffusely diminished, no respiratory distress, no conversational tachypnea  Abdominal:      Palpations: Abdomen is soft. Tenderness: There is no abdominal tenderness. Musculoskeletal: Normal range of motion. Comments: Bilateral pitting edema, left lower extremity > right lower extremity, edema to mid calf in LLE, edema to ankle in RLE   Skin:     General: Skin is warm. Neurological:      General: No focal deficit present. Mental Status: She is alert and oriented to person, place, and time.    Psychiatric:         Mood and Affect: Mood normal.         Behavior: Behavior normal.           DIAGNOSTIC RESULTS     EKG: All EKG's are interpreted by the Emergency Department Physician who either signs or Co-signs this chart in the absence of a cardiologist.    RADIOLOGY:   Non-plain film images such as CT, Ultrasound and MRI are read by the radiologist. Plain radiographic images are visualized and preliminarilyinterpreted by the emergency physician with the below findings:    Interpretation per the Radiologist below,if available at the time of this note:    CT CHEST [address]   Final Result   Small amount of opacity deep in the posterior right costophrenic angle   possibly a small pneumonia. Multiple tiny punctate subpleural nodules   compatible with old granulomatous disease. Moderate thoracic spondylosis. No evident thoracic fracture. CT THORACIC SPINE WO CONTRAST   Final Result   No acute fracture or traumatic malalignment. Multilevel degenerative disc   and facet disease. No central canal stenosis identified. Mild airspace disease in the right costophrenic angle, atelectasis versus   pneumonia. XR CHEST PORTABLE   Final Result   No acute airspace disease identified. Probable external artifact projecting over the upper abdomen.                LABS:  Labs Reviewed   COMPREHENSIVE METABOLIC PANEL W/ REFLEX TO MG FOR LOW K - Abnormal; Notable for the following components:       Result Value    Sodium 132 (*)     Chloride 93 (*)     Glucose 118 (*)     BUN 76 (*)     CREATININE 2.2 (*)     GFR Non- 22 (*)     GFR  26 (*)     All other components within normal limits    Narrative:     Performed at:  [address] [address]   Phone [phone]   D-DIMER, QUANTITATIVE - Abnormal; Notable for the following components:    D-Dimer, Quant 250 (*)     All other components within normal limits    Narrative:     Performed at:  [address] [address]   Phone [phone]   BASIC METABOLIC PANEL - Abnormal; Notable for the following components:    Sodium 135 (*)     BUN 70 (*)     CREATININE 1.9 (*)     GFR Non- 26 (*)     GFR  31 (*)     All other components within normal limits    Narrative:     Performed at:  [facility]  [address]  [place] [address]   Phone [phone]   CULTURE, BLOOD 1   CULTURE, BLOOD 2   CBC WITH AUTO DIFFERENTIAL    Narrative:     Performed at:  [facility]  [address] [place], [place] [address]   Phone [phone]   TROPONIN    Narrative:     Performed at:  [facility]  [address] [facility] [place], [place] [address]   Phone [phone]   BRAIN NATRIURETIC PEPTIDE    Narrative:     Performed at:  [facility]  [address] [facility] [place], [place] [address]   Phone [phone]   LACTIC ACID, PLASMA    Narrative:     Performed at:  [facility]  [address] [facility] [place] [address]   Phone [phone]   COVID-19   SODIUM, URINE, RANDOM   TROPONIN   TROPONIN   BASIC METABOLIC PANEL   CBC WITH AUTO DIFFERENTIAL   BRAIN NATRIURETIC PEPTIDE   PROCALCITONIN       All other labs were within normal range or not returned as of this dictation. EMERGENCY DEPARTMENT COURSE and DIFFERENTIAL DIAGNOSIS/MDM:   Vitals:    Vitals:    07/05/20 1200 07/05/20 1420 07/05/20 1439 07/05/20 1447   BP: (!) 137/46  (!) 115/91 (!) 110/34   Pulse: (!) 49      Resp: 17      Temp: 97.8 °F (36.6 °C)      TempSrc: Temporal      SpO2: (!) 9% 97% 100% 95%   Weight: 148 lb 9.4 oz (67.4 kg)          MDM     Patient presents ED with HPI noted above. Pulse in the 50s, patient is on carvedilol. She is afebrile nontoxic-appearing. Oxygen saturation 98% on room air. Differential diagnosis includes PE, ACS, TAD, CHF exacerbation, pneumonia, COVID, DVT, renal failure, other. EKG obtained, please see my attendings note regarding official interpretation. Troponin with normal limits. We are slightly concerned that back pain is cardiac in nature as it only presents with exertion. Do believe patient needs further cardiac evaluation.      Bilateral lower extremity edema noted, left greater than right. Unable to obtain Doppler ultrasound in the ED as it is Sunday, patient may need further imaging as inpatient. Patient has had shortness of breath and this back pain previously discussed, did considered PE. Patient with acute on chronic renal insufficiency. Unable to obtain a CTA with contrast at this time given renal impairment. She has been taking extra Lasix over the past couple days. Do believe renal function will improve with IV fluid rehydration. IV fluid rehydration began in the ED. Remainder of labs as above. Did obtain a chest x-ray pending discussion with hospitalist regarding further imaging. Chest x-ray showed no acute process. Consultation made to patient's PCP. Dr. Lois Lucas kindly spoke with me. After discussion CT chest without contrast pending renal function improvement. CT chest without contrast and CT thoracic spine reconfiguration as above. There was a focal opacity in the right posterior costophrenic angle possibly a small pneumonia. No evidence of thoracic fracture. Given this CT finding patient started on IV azithromycin and ceftriaxone. Blood cultures obtained prior, lactic acid obtained within normal limits. COVID-19 pending at this time, low suspicion for COVID-19, however did obtain since patient works in a nursing home. Rediscussed case with Dr. Lois Lucas who kindly admitted patient. The patient tolerated their visit well. They were seen and evaluated by the attending physician, Dr. Greyson Méndez who agreed with the assessment and plan. The patient and / or the family were informed of the results of any tests, a time was given to answer questions, a plan was proposed and they agreed with plan. CONSULTS:  IP CONSULT TO PRIMARY CARE PROVIDER  IP CONSULT TO PRIMARY CARE PROVIDER    PROCEDURES:  Procedures    FINAL IMPRESSION      1. Anginal equivalent ([address]    2.  Left leg swelling          DISPOSITION/PLAN

## 2020-07-05 NOTE — ED PROVIDER NOTES
[address]      Pt Name: Jelena Rangel  MRN: [id number]  [place]ory 1945  Date of evaluation: 7/5/2020  Provider: Jennifer Mcnamara, [address] [place] [address]  Chief Complaint   Patient presents with    Back Pain     upper back with nausea, lower chronic back pain    Leg Swelling     bilateral       I have fully participated in the care of Jelena Rangel and have had a face-to-face evaluation. I have reviewed and agree with all pertinent clinical information, and midlevel provider's history, and physical exam. I have also reviewed the labs, EKG, and imaging studies and treatment plan. I have also reviewed and agree with the medications, allergies and past medical history section for this Jelena Rangel. I agree with the diagnosis, and I concur. I wore personal protective equipment when I was in the room the entire time. This includes gloves, N95 mask, face shield, and a glove over my stethoscope for protection. 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 since 1983.  Chronic diastolic CHF (congestive heart failure) ([address] 2016    grade II    COPD, mild (HCC)     CVA (cerebral infarction) 2013    left cerebral cortex x2/expressive aphasia/resolved    Diverticulosis     Epistaxis, recurrent     when inr high.  last 3-4 months ago    Former smoker  Gallbladder sludge     HTN (hypertension)     Hyperlipidemia     Hypothyroidism     Lumbar stenosis without neurogenic claudication 2017    pain across low back worse with standing and walking, nonradiating    Macular degeneration 2018    left worse than right , dry : progressive loss of sight: just barely able to see to drive    Neuropathy     Osteoarthritis     Pulmonary HTN ([address] 2014    primary, responsive to nitrates    PVD (peripheral vascular disease) ([address]     occluded right SFA::: asymptomatic NIKOS 0.7 right and 1.0 left    Sacral fracture, closed ([address] 2014    Syncope 5/2014       MDM:  Margie De La Rosa is a 76 y.o. female who presents with multiple complaints including pain in her bilateral shoulders with exertion. She is having left leg pain swelling and redness but no pain. Mildly short of breath with exertion. She is been coughing but is been nonproductive. She always has a cough but increased. Denies any fevers or chills. Denies any nausea vomiting. She describes her symptoms as moderate. She is never had a history of DVT or pulmonary embolus. Physical exam reveals a swollen left leg only. Heart is regular rate and rhythm with a 3/6 systolic murmur. Lungs are clear to auscultation equal bilaterally. Laboratory work and testing revealed no cause for her symptoms. Her troponin was negative. Her BNP was normal.  Her GFR was low at 37. This appears to be a CKD. Therefore, patient was admitted to the hospital for anginal equivalent and leg swelling. This could be the cause of her dependent edema. Dr. Jennifer Ramírez was notified and she agreed to meet the patient to the hospital for further evaluation treatment.     Vitals:    07/06/20 0325   BP: (!) 142/48   Pulse: (!) 47   Resp: 12   Temp: 97.8 °F (36.6 °C)   SpO2: 97%       Lab results  Labs Reviewed   COMPREHENSIVE METABOLIC PANEL W/ REFLEX TO MG FOR LOW K - Abnormal; Notable for the following components:       Result Value    Sodium 132 (*) Chloride 93 (*)     Glucose 118 (*)     BUN 76 (*)     CREATININE 2.2 (*)     GFR Non- 22 (*)     GFR  26 (*)     All other components within normal limits    Narrative:     Performed at:  [place] [address], [place]   Phone [phone]   D-DIMER, QUANTITATIVE - Abnormal; Notable for the following components:    D-Dimer, Quant 250 (*)     All other components within normal limits    Narrative:     Performed at:  [address] [facility]   Phone [phone]   BASIC METABOLIC PANEL - Abnormal; Notable for the following components:    Sodium 135 (*)     BUN 70 (*)     CREATININE 1.9 (*)     GFR Non- 26 (*)     GFR  31 (*)     All other components within normal limits    Narrative:     Performed at:  [address] [place]   Phone [phone]   BASIC METABOLIC PANEL - Abnormal; Notable for the following components:    BUN 55 (*)     CREATININE 1.4 (*)     GFR Non- 37 (*)     GFR  44 (*)     All other components within normal limits    Narrative:     Performed at:  [place] [address], [place]   Phone [phone]   CBC WITH AUTO DIFFERENTIAL - Abnormal; Notable for the following components:    RBC 3.72 (*)     Hemoglobin 11.4 (*)     Hematocrit 33.8 (*)     All other components within normal limits    Narrative:     Performed at:  [address] [place]   Phone [phone]   CULTURE, BLOOD 1   CULTURE, BLOOD 2   CBC WITH AUTO DIFFERENTIAL    Narrative:     Performed at:  [address] [place]   Phone [phone]   TROPONIN Narrative:     Performed at:  [facility]  [address] [place], [place] [address]   Phone [phone]   BRAIN NATRIURETIC PEPTIDE    Narrative:     Performed at:  [facility]  [address] [facility] [place] [address]   Phone [phone]   LACTIC ACID, PLASMA    Narrative:     Performed at:  [facility]  [address] [facility] [place] [address]   Phone [phone]   SODIUM, URINE, RANDOM    Narrative:     Performed at:  [facility]  [address] [place], [place] [address]   Phone [phone]   TROPONIN    Narrative:     Performed at:  [address] [place] [address]  [address] [facility] [place] [address]   Phone [phone]   TROPONIN    Narrative:     Collection has been rescheduled by AKASH at 07/06/2020 03:56 Reason:   Patient 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  Performed at:  [facility]  [address] [facility] [place] [address]   Phone [phone]   BRAIN NATRIURETIC PEPTIDE    Narrative:     Performed at:  [facility]  [address] [facility] [place] [address]   Phone [phone]   PROCALCITONIN    Narrative:     Performed at:  [address] [place] [address] Laboratory  [address] [facility] [place] [address]   Phone [phone]    Narrative:     Performed at:  [address] [place] [address]  [address] [facility] [place] [address]   Phone [phone]   TROPONIN       EKG Results  EKG Interpretation    Interpreted by emergency department physician  Time read: 1232     Rhythm: Sinus bradycardia (patient on beta-blockers)  Ventricular Rate: 48  QRS Axis: -32  Ectopy: None noted  Conduction: Sinus bradycardia with left bundle branch block, left ventricular hypertrophy, and left axis deviation. Sgarbosi criteria is negative.   ST Segments: Consistent with left bundle branch block  T Waves: Consistent with left bundle branch block  Q Waves: Consistent with left bundle branch block    Other findings: Very mild motion artifact    Compared to EKG on: 01/20/2020 and appears unchanged which is bradycardic at that time and rate of 47. Clinical Impression: Sinus bradycardia with left bundle branch block, left ventricular hypertrophy, and left axis deviation. There are no ischemic changes noted Sgarbosi criteria negative. This is unchanged from her previous EKG on 01/20/2020 in which she also had sinus bradycardia. [address]      Radiology results  CT CHEST WO CONTRAST   Final Result   Small amount of opacity deep in the posterior right costophrenic angle   possibly a small pneumonia. Multiple tiny punctate subpleural nodules   compatible with old granulomatous disease. Moderate thoracic spondylosis. No evident thoracic fracture. CT THORACIC SPINE WO CONTRAST   Final Result   No acute fracture or traumatic malalignment. Multilevel degenerative disc   and facet disease. No central canal stenosis identified. Mild airspace disease in the right costophrenic angle, atelectasis versus   pneumonia. XR CHEST PORTABLE   Final Result   No acute airspace disease identified. Probable external artifact projecting over the upper abdomen.                  Medications   amiodarone (CORDARONE) tablet 200 mg (has no administration in time range)   amLODIPine (NORVASC) tablet 5 mg (has no administration in time range)   aspirin chewable tablet 81 mg (has no administration in time range)   carvedilol (COREG) tablet 3.125 mg (3.125 mg Oral Not Given 7/5/20 2108)   docusate sodium (COLACE) capsule 100 mg (has no administration in time range)   ezetimibe (ZETIA) tablet 10 mg (has no administration in time range)   ferrous gluconate (FERGON) tablet 324 mg (324 mg Oral Not Given 7/5/20 2210)   ipratropium (ATROVENT) 0.03 % pressure could occur because of pain or anxiety or other reasons and does not mean that they need to have their blood pressure treated or medications otherwise adjusted. However, this could also be a sign that they will need to have their blood pressure treated or medications changed. The patient was instructed to follow up closely with their personal physician to have their blood pressure rechecked. The patient was instructed to take a list of recent blood pressure readings to their next visit with their personal physician. IMPRESSIONS:  1. Anginal equivalent ([address]    2.  Left leg swelling               [facility], DO  07/06/20 5152

## 2020-07-06 PROBLEM — R10.13 EPIGASTRIC PAIN: Status: RESOLVED | Noted: 2019-04-24 | Resolved: 2020-07-06

## 2020-07-06 PROBLEM — D50.0 IRON DEFICIENCY ANEMIA DUE TO CHRONIC BLOOD LOSS: Status: RESOLVED | Noted: 2017-12-15 | Resolved: 2020-07-06

## 2020-07-06 PROBLEM — M54.6 ACUTE MIDLINE THORACIC BACK PAIN: Status: ACTIVE | Noted: 2020-07-06

## 2020-07-06 PROBLEM — J98.11 ATELECTASIS OF LEFT LUNG: Status: RESOLVED | Noted: 2017-12-18 | Resolved: 2020-07-06

## 2020-07-06 PROBLEM — I50.32 CHRONIC DIASTOLIC CHF (CONGESTIVE HEART FAILURE) (HCC): Status: RESOLVED | Noted: 2019-04-25 | Resolved: 2020-07-06

## 2020-07-06 PROBLEM — D64.9 SYMPTOMATIC ANEMIA: Status: RESOLVED | Noted: 2017-05-14 | Resolved: 2020-07-06

## 2020-07-06 LAB
ANION GAP SERPL CALCULATED.3IONS-SCNC: 14 MMOL/L (ref 3–16)
BASOPHILS ABSOLUTE: 0 K/UL (ref 0–0.2)
BASOPHILS RELATIVE PERCENT: 0.7 %
BUN BLDV-MCNC: 55 MG/DL (ref 7–20)
CALCIUM SERPL-MCNC: 8.8 MG/DL (ref 8.3–10.6)
CHLORIDE BLD-SCNC: 102 MMOL/L (ref 99–110)
CO2: 23 MMOL/L (ref 21–32)
CREAT SERPL-MCNC: 1.4 MG/DL (ref 0.6–1.2)
EKG ATRIAL RATE: 48 BPM
EKG DIAGNOSIS: NORMAL
EKG P AXIS: 73 DEGREES
EKG P-R INTERVAL: 190 MS
EKG Q-T INTERVAL: 500 MS
EKG QRS DURATION: 130 MS
EKG QTC CALCULATION (BAZETT): 446 MS
EKG R AXIS: -32 DEGREES
EKG T AXIS: 86 DEGREES
EKG VENTRICULAR RATE: 48 BPM
EOSINOPHILS ABSOLUTE: 0.4 K/UL (ref 0–0.6)
EOSINOPHILS RELATIVE PERCENT: 7.2 %
GFR AFRICAN AMERICAN: 44
GFR NON-AFRICAN AMERICAN: 37
GLUCOSE BLD-MCNC: 88 MG/DL (ref 70–99)
HCT VFR BLD CALC: 33.8 % (ref 36–48)
HEMOGLOBIN: 11.4 G/DL (ref 12–16)
LYMPHOCYTES ABSOLUTE: 1.3 K/UL (ref 1–5.1)
LYMPHOCYTES RELATIVE PERCENT: 22.3 %
MCH RBC QN AUTO: 30.7 PG (ref 26–34)
MCHC RBC AUTO-ENTMCNC: 33.8 G/DL (ref 31–36)
MCV RBC AUTO: 90.9 FL (ref 80–100)
MONOCYTES ABSOLUTE: 0.7 K/UL (ref 0–1.3)
MONOCYTES RELATIVE PERCENT: 12.9 %
NEUTROPHILS ABSOLUTE: 3.2 K/UL (ref 1.7–7.7)
NEUTROPHILS RELATIVE PERCENT: 56.9 %
PDW BLD-RTO: 14.2 % (ref 12.4–15.4)
PLATELET # BLD: 197 K/UL (ref 135–450)
PMV BLD AUTO: 9.1 FL (ref 5–10.5)
POTASSIUM SERPL-SCNC: 4 MMOL/L (ref 3.5–5.1)
PRO-BNP: 417 PG/ML (ref 0–449)
PROCALCITONIN: 0.03 NG/ML (ref 0–0.15)
RBC # BLD: 3.72 M/UL (ref 4–5.2)
SARS-COV-2, PCR: NOT DETECTED
SODIUM BLD-SCNC: 139 MMOL/L (ref 136–145)
SODIUM URINE: 37 MMOL/L
TROPONIN: <0.01 NG/ML
TROPONIN: <0.01 NG/ML
WBC # BLD: 5.7 K/UL (ref 4–11)

## 2020-07-06 PROCEDURE — 84145 PROCALCITONIN (PCT): CPT

## 2020-07-06 PROCEDURE — 96375 TX/PRO/DX INJ NEW DRUG ADDON: CPT

## 2020-07-06 PROCEDURE — 2500000003 HC RX 250 WO HCPCS: Performed by: INTERNAL MEDICINE

## 2020-07-06 PROCEDURE — G0378 HOSPITAL OBSERVATION PER HR: HCPCS

## 2020-07-06 PROCEDURE — 85025 COMPLETE CBC W/AUTO DIFF WBC: CPT

## 2020-07-06 PROCEDURE — 6370000000 HC RX 637 (ALT 250 FOR IP): Performed by: INTERNAL MEDICINE

## 2020-07-06 PROCEDURE — 96366 THER/PROPH/DIAG IV INF ADDON: CPT

## 2020-07-06 PROCEDURE — 93010 ELECTROCARDIOGRAM REPORT: CPT | Performed by: INTERNAL MEDICINE

## 2020-07-06 PROCEDURE — 6360000002 HC RX W HCPCS: Performed by: INTERNAL MEDICINE

## 2020-07-06 PROCEDURE — U0003 INFECTIOUS AGENT DETECTION BY NUCLEIC ACID (DNA OR RNA); SEVERE ACUTE RESPIRATORY SYNDROME CORONAVIRUS 2 (SARS-COV-2) (CORONAVIRUS DISEASE [COVID-19]), AMPLIFIED PROBE TECHNIQUE, MAKING USE OF HIGH THROUGHPUT TECHNOLOGIES AS DESCRIBED BY CMS-2020-01-R: HCPCS

## 2020-07-06 PROCEDURE — 96376 TX/PRO/DX INJ SAME DRUG ADON: CPT

## 2020-07-06 PROCEDURE — 80048 BASIC METABOLIC PNL TOTAL CA: CPT

## 2020-07-06 PROCEDURE — 87205 SMEAR GRAM STAIN: CPT

## 2020-07-06 PROCEDURE — 94761 N-INVAS EAR/PLS OXIMETRY MLT: CPT

## 2020-07-06 PROCEDURE — 99222 1ST HOSP IP/OBS MODERATE 55: CPT | Performed by: INTERNAL MEDICINE

## 2020-07-06 PROCEDURE — 83880 ASSAY OF NATRIURETIC PEPTIDE: CPT

## 2020-07-06 PROCEDURE — 36415 COLL VENOUS BLD VENIPUNCTURE: CPT

## 2020-07-06 PROCEDURE — 2060000000 HC ICU INTERMEDIATE R&B

## 2020-07-06 PROCEDURE — 94640 AIRWAY INHALATION TREATMENT: CPT

## 2020-07-06 PROCEDURE — 99223 1ST HOSP IP/OBS HIGH 75: CPT | Performed by: INTERNAL MEDICINE

## 2020-07-06 PROCEDURE — 84484 ASSAY OF TROPONIN QUANT: CPT

## 2020-07-06 PROCEDURE — 92610 EVALUATE SWALLOWING FUNCTION: CPT

## 2020-07-06 PROCEDURE — 2580000003 HC RX 258: Performed by: INTERNAL MEDICINE

## 2020-07-06 PROCEDURE — 87070 CULTURE OTHR SPECIMN AEROBIC: CPT

## 2020-07-06 RX ORDER — AMOXICILLIN AND CLAVULANATE POTASSIUM 500; 125 MG/1; MG/1
1 TABLET, FILM COATED ORAL EVERY 8 HOURS SCHEDULED
Status: DISCONTINUED | OUTPATIENT
Start: 2020-07-06 | End: 2020-07-09 | Stop reason: HOSPADM

## 2020-07-06 RX ORDER — MORPHINE SULFATE 2 MG/ML
2 INJECTION, SOLUTION INTRAMUSCULAR; INTRAVENOUS EVERY 4 HOURS PRN
Status: DISCONTINUED | OUTPATIENT
Start: 2020-07-06 | End: 2020-07-09 | Stop reason: HOSPADM

## 2020-07-06 RX ORDER — LANOLIN ALCOHOL/MO/W.PET/CERES
3 CREAM (GRAM) TOPICAL NIGHTLY PRN
Status: DISCONTINUED | OUTPATIENT
Start: 2020-07-06 | End: 2020-07-09 | Stop reason: HOSPADM

## 2020-07-06 RX ORDER — METHYLPREDNISOLONE SODIUM SUCCINATE 40 MG/ML
40 INJECTION, POWDER, LYOPHILIZED, FOR SOLUTION INTRAMUSCULAR; INTRAVENOUS EVERY 8 HOURS
Status: DISCONTINUED | OUTPATIENT
Start: 2020-07-06 | End: 2020-07-06

## 2020-07-06 RX ADMIN — TIZANIDINE 4 MG: 4 TABLET ORAL at 13:57

## 2020-07-06 RX ADMIN — HEPARIN SODIUM 5000 UNITS: 5000 INJECTION INTRAVENOUS; SUBCUTANEOUS at 13:56

## 2020-07-06 RX ADMIN — LEVOTHYROXINE SODIUM 88 MCG: 0.09 TABLET ORAL at 05:37

## 2020-07-06 RX ADMIN — EZETIMIBE 10 MG: 10 TABLET ORAL at 21:43

## 2020-07-06 RX ADMIN — AMOXICILLIN AND CLAVULANATE POTASSIUM 1 TABLET: 500; 125 TABLET, FILM COATED ORAL at 21:39

## 2020-07-06 RX ADMIN — HEPARIN SODIUM 5000 UNITS: 5000 INJECTION INTRAVENOUS; SUBCUTANEOUS at 05:37

## 2020-07-06 RX ADMIN — PANTOPRAZOLE SODIUM 40 MG: 40 TABLET, DELAYED RELEASE ORAL at 05:37

## 2020-07-06 RX ADMIN — DOCUSATE SODIUM 100 MG: 100 CAPSULE, LIQUID FILLED ORAL at 08:39

## 2020-07-06 RX ADMIN — ASPIRIN 81 MG: 81 TABLET, CHEWABLE ORAL at 08:39

## 2020-07-06 RX ADMIN — TIZANIDINE 4 MG: 4 TABLET ORAL at 08:39

## 2020-07-06 RX ADMIN — Medication 3 MG: at 21:40

## 2020-07-06 RX ADMIN — OXYCODONE HYDROCHLORIDE 10 MG: 10 TABLET ORAL at 03:52

## 2020-07-06 RX ADMIN — SODIUM CHLORIDE, PRESERVATIVE FREE 10 ML: 5 INJECTION INTRAVENOUS at 08:40

## 2020-07-06 RX ADMIN — ROSUVASTATIN CALCIUM 40 MG: 40 TABLET, FILM COATED ORAL at 21:43

## 2020-07-06 RX ADMIN — IPRATROPIUM BROMIDE AND ALBUTEROL 1 PUFF: 20; 100 SPRAY, METERED RESPIRATORY (INHALATION) at 09:19

## 2020-07-06 RX ADMIN — SODIUM CHLORIDE, PRESERVATIVE FREE 10 ML: 5 INJECTION INTRAVENOUS at 21:40

## 2020-07-06 RX ADMIN — BUDESONIDE AND FORMOTEROL FUMARATE DIHYDRATE 1 PUFF: 80; 4.5 AEROSOL RESPIRATORY (INHALATION) at 09:19

## 2020-07-06 RX ADMIN — OXYCODONE HYDROCHLORIDE 10 MG: 10 TABLET ORAL at 10:07

## 2020-07-06 RX ADMIN — AMOXICILLIN AND CLAVULANATE POTASSIUM 1 TABLET: 500; 125 TABLET, FILM COATED ORAL at 13:57

## 2020-07-06 RX ADMIN — BUDESONIDE AND FORMOTEROL FUMARATE DIHYDRATE 1 PUFF: 80; 4.5 AEROSOL RESPIRATORY (INHALATION) at 20:45

## 2020-07-06 RX ADMIN — METHYLPREDNISOLONE SODIUM SUCCINATE 40 MG: 40 INJECTION, POWDER, FOR SOLUTION INTRAMUSCULAR; INTRAVENOUS at 10:07

## 2020-07-06 RX ADMIN — AMIODARONE HYDROCHLORIDE 200 MG: 200 TABLET ORAL at 08:39

## 2020-07-06 RX ADMIN — HEPARIN SODIUM 5000 UNITS: 5000 INJECTION INTRAVENOUS; SUBCUTANEOUS at 21:39

## 2020-07-06 RX ADMIN — DOXYCYCLINE 100 MG: 100 INJECTION, POWDER, LYOPHILIZED, FOR SOLUTION INTRAVENOUS at 10:07

## 2020-07-06 ASSESSMENT — PAIN SCALES - GENERAL
PAINLEVEL_OUTOF10: 2
PAINLEVEL_OUTOF10: 6
PAINLEVEL_OUTOF10: 5
PAINLEVEL_OUTOF10: 5
PAINLEVEL_OUTOF10: 6
PAINLEVEL_OUTOF10: 2
PAINLEVEL_OUTOF10: 3

## 2020-07-06 ASSESSMENT — PAIN DESCRIPTION - DESCRIPTORS
DESCRIPTORS: CONSTANT;DISCOMFORT
DESCRIPTORS: THROBBING
DESCRIPTORS: CONSTANT;DISCOMFORT;THROBBING
DESCRIPTORS: THROBBING

## 2020-07-06 ASSESSMENT — PAIN DESCRIPTION - ONSET
ONSET: ON-GOING

## 2020-07-06 ASSESSMENT — PAIN DESCRIPTION - PAIN TYPE
TYPE: CHRONIC PAIN
TYPE: ACUTE PAIN
TYPE: CHRONIC PAIN

## 2020-07-06 ASSESSMENT — PAIN DESCRIPTION - LOCATION
LOCATION: BACK

## 2020-07-06 ASSESSMENT — PAIN DESCRIPTION - PROGRESSION
CLINICAL_PROGRESSION: NOT CHANGED
CLINICAL_PROGRESSION: GRADUALLY IMPROVING
CLINICAL_PROGRESSION: NOT CHANGED

## 2020-07-06 ASSESSMENT — PAIN DESCRIPTION - FREQUENCY
FREQUENCY: CONTINUOUS

## 2020-07-06 ASSESSMENT — PAIN DESCRIPTION - ORIENTATION
ORIENTATION: MID;UPPER
ORIENTATION: MID
ORIENTATION: MID;UPPER

## 2020-07-06 ASSESSMENT — PAIN - FUNCTIONAL ASSESSMENT
PAIN_FUNCTIONAL_ASSESSMENT: ACTIVITIES ARE NOT PREVENTED
PAIN_FUNCTIONAL_ASSESSMENT: ACTIVITIES ARE NOT PREVENTED
PAIN_FUNCTIONAL_ASSESSMENT: PREVENTS OR INTERFERES SOME ACTIVE ACTIVITIES AND ADLS

## 2020-07-06 NOTE — PLAN OF CARE
Pt BP 94/34 with HR of 47 at the beginning of the shift. Pt denied symptoms at that time. She says she intermittently gets lightheaded when getting up to walk. Pt states she knows her limits and takes times between repositioning. Pt started on MIVF @ 100 mL/hr. Dr. Hanna Addison called and made aware. Dr. Hanna Addison stated to hold the pt's Imdur, amlodipine, and coreg if her SBP < 100. Dr. Hanna Addison did not want to give parameters for the pt's amiodarone, stating \"she needs to take that\" when scheduled. Orders modified accordingly. At that time, Dr. Hanna Addison was made aware that pt had yet to receive her azithromycin. Dr. Hanna Addison stated to discontinue the azithromycin without giving it and order doxycycline (see MAR). Dr. Hanna Addison also requested that the pt's COVID test be run as a rapid COVID test. Lab contacted and said the [facility] sample they had from earlier today could not be run as a rapid. The lab said that the pt would need to be swabbed again using a different testing kit designed for a rapid test. Order entered, supply obtained, pt updated and swab obtained/sent. Pt has a negative rapid COVID but a pending PCR. Pt left in Droplet isolation until provider available to discontinue order. Pt denies pain at rest. However, she says her back pain does increase drastically with movement. Pt has prn oxycodone ordered but RN explained to pt that she wanted to see how her BP trended before giving it. Pt VU that pain medication can cause further drops in her BP. Pt offered tylenol but states she cannot take it because it could \"damage her kidneys. \" Rn explained to pt that tylenol is processed through the liver so it is safe to take for pt's with kidneys issues. Rn explained that the other medications the pt listed (Alieve, ibuprofen, etc) are medications that are processed through the kidneys and should generally be avoided in pt's with kidney dysfunction. Pt acknowledged what RN said but still did not want to take the tylenol.      Pt refused her Zanaflex and iron because she says she doesn't take them. She said she doesn't take iron anymore since she is no longer on coumadin. Pharmacy called asking about her home inhaler stating that the hospital does not have it in the formulary. Pharmacy stated they had an alternative but it was listed as an allergy in the pt's chart with the side effect of nausea. Pt states she doesn't feel like she needs the inhaler and doesn't want to take the one we have in our formulary because of the side effects. Pt is a medium fall risk. She is oriented to her ability and compliant with using her call light. Pt requesting to sleep in chair as opposed to bed because she is more comfortable that way. Fall contract reviewed with pt. She is unable to sign because she says she cannot see. Nonskid socks, fall sign and fall bracelet in place.

## 2020-07-06 NOTE — CARE COORDINATION
INITIAL CASE MANAGEMENT ASSESSMENT    Reviewed chart, met with patient to assess possible discharge needs. Explained Case Management role/services. Living Situation: Confirmed address, her dtr and 2 grandchildren live with her in a 1 story home with basement, few steps to enter, usually no issues with the steps but she has railings to use if needed    ADLs: Independent, still works in activities at [employer]     DME: None    PT/OT Recs: not ordered at this time     Active Services: None     Transportation: Active -short distances only, dtr and grandchildren drive her as needed     Medications: Confirmed Medicare and [facility], uses [address] without issues    PCP: Ashtyn Yanez MD      HD/PD: N/A    PLAN/COMMENTS: Plan is to return home, denies D/C needs. CM provided contact information for patient or family to call with any questions. CM will follow and assist as needed.     Electronically signed by Micaela Cornell RN on 7/6/2020 at 1:12 PM

## 2020-07-06 NOTE — PROGRESS NOTES
4 Eyes Skin Assessment     The patient is being assess for  Admission    I agree that 2 RN's have performed a thorough Head to Toe Skin Assessment on the patient. ALL assessment sites listed below have been assessed. Areas assessed by both nurses: Deronda Whiteoak &   [x]   Head, Face, and Ears   [x]   Shoulders, Back, and Chest  [x]   Arms, Elbows, and Hands   [x]   Coccyx, Sacrum, and IschIum  [x]   Legs, Feet, and Heels        Does the Patient have Skin Breakdown?   No         Dipesh Prevention initiated:  Yes   Wound Care Orders initiated:  NA      [facility] nurse consulted for Pressure Injury (Stage 3,4, Unstageable, DTI, NWPT, and Complex wounds), New and Established Ostomies:  NA      Nurse 1 eSignature: Electronically signed by Yajaira Strong RN on 7/6/20 at 3:57 AM EDT    **SHARE this note so that the co-signing nurse is able to place an eSignature**    Nurse 2 eSignature: Electronically signed by Rand Shone, RN on 7/6/20 at 7:49 AM EDT

## 2020-07-06 NOTE — PROGRESS NOTES
environment. Impression  Dysphagia Diagnosis: Mild pharyngeal stage dysphagia  · Accepted and tolerated evaluation at bedside. · Patient alert, cooperative, pleasant, Ox4, conversant, and follows complex dx. · Pulmonology notes indicate concern for potential silent aspiration based on Chest CT; order for MBS received. · Clinical evaluation completed prior to MBS: Appears to present with mild pharyngeal dysphagia characterized by intermittent delayed swallow and decreased laryngeal elevation without overt sign/symptoms of penetration/aspiration. · Will plan to proceed with MBS for continued assessment. Dysphagia Outcome Severity Scale: Level 6: Within functional limits/Modified independence     Treatment Plan  Requires SLP Intervention: Yes  Duration/Frequency of Treatment: pending MBS  D/C Recommendations: To be determined       Recommended Diet and Intervention  Diet Solids Recommendation: Regular(pending MBS)  Liquid Consistency Recommendation: Thin(pending MBS)  Recommended Form of Meds: PO  Compensatory Swallowing Strategies: Upright as possible for all oral intake;Remain upright for 30-45 minutes after meals    Therapeutic Interventions: (pending MBS)    Treatment/Goals  Dysphagia Goals: The patient will tolerate instrumental swallowing procedure    General  Chart Reviewed: Yes  Subjective: Accepted and tolerated evaluation at bedside  Behavior/Cognition: Alert; Cooperative;Pleasant mood  Communication Observation: Functional  Follows Directions: Complex  Dentition: Adequate  Patient Positioning: Upright in chair  Baseline Vocal Quality: Normal  Volitional Cough: Strong(productive)  Volitional Swallow: Delayed  Consistencies Administered: Dysphagia Pureed (Dysphagia I); Honey - cup;Nectar - cup; Thin - cup; Thin - straw;Reg solid    Vision/Hearing  Vision: Within Functional Limits  Hearing: Within functional limits    Oral Motor Deficits  Oral Motor:  Within functional limits    Oral Phase Dysfunction  Oral Phase: WFL     Indicators of Pharyngeal Phase Dysfunction  Delayed Swallow: (intermittent)  Decreased Laryngeal Elevation: All    Prognosis  Prognosis for safe diet advancement: good  Consulted and agree with results and recommendations: Patient;RN    Education  Patient Education: completed on results/recs/plan  Patient Education Response: Verbalizes understanding         Therapy Time  SLP Individual Minutes  Time In: 6883  Time Out: [facility]  [facility]: [facility]  Darryn Barajas, #[id number]  Speech-Language Pathologist  7/6/2020 4:15 PM

## 2020-07-06 NOTE — CONSULTS
REASON FOR CONSULTATION/CC:  Abnormal radiograph      Consult at request of Maye Toribio MD for abnormal radiograph    PCP: Mejia Lemus MD  Established Pulmonologist:  None    HISTORY OF PRESENT ILLNESS: Onesimo Ortiz is a 76y.o. year old female with a history of back pain and copd who presents with increased complaints of back pain that is higher in the thoracic region than normal.  She has chronic back pain that waxes and wanes over years. She did have a slight pain on her left side but was very brief. She denies any change in her baseline shortness of breath cough phlegm wheeze. No fevers. No chills. Therefore, she is refusing scheduled Combivent. 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 since 1983.  Chronic diastolic CHF (congestive heart failure) ([address] 2016    grade II    COPD, mild (HCC)     CVA (cerebral infarction) 2013    left cerebral cortex x2/expressive aphasia/resolved    Diverticulosis     Epistaxis, recurrent     when inr high.  last 3-4 months ago    Former smoker     Gallbladder sludge     HTN (hypertension)     Hyperlipidemia     Hypothyroidism     Lumbar stenosis without neurogenic claudication 2017    pain across low back worse with standing and walking, nonradiating    Macular degeneration 2018    left worse than right , dry : progressive loss of sight: just barely able to see to drive    Neuropathy     Osteoarthritis     [facility]monary HTN [facility]     primary, responsive to nitrates    PVD (peripheral vascular disease) ([facility]     occluded right SFA::: asymptomatic NIKOS 0.7 right and 1.0 left    Sacral fracture, closed ([facility]     Syncope 2014       PAST SURGICAL HISTORY:  Past Surgical History:   Procedure Laterality Date    AORTIC VALVE REPLACEMENT  6/16/15    Dr. Dinah Lee. Hernandez - PVI, RENETTA exclusion. 19mm Maki pericardial Magna    APPENDECTOMY      BACK SURGERY  03/15/2019    superion inserted to keep back from hurtin [address] N/A 2019    EMERGENT; LAPAROSCOPIC CHOLECYSTECTOMY WITH GRAM performed by Sukhwinder Martinez MD at [facility]      stent x 1    CYSTOSCOPY  2014    dr Tea Lua      THR Right    OTHER SURGICAL HISTORY  2018     EGD and colonoscopy.  SPINE SURGERY N/A 3/15/2019    INSERTION OF INTERSPINOUS SPACER SUPERION VERTIFLEX AT LUMBAR FOUR-LUMBAR FIVE performed by Genesis Conte MD at [facility]  12/15/2017       FAMILY HISTORY:  family history includes Breast Cancer in her daughter. SOCIAL HISTORY:   reports that she has been smoking cigarettes. She has a 45.00 pack-year smoking history.  She uses smokeless tobacco.    Scheduled Meds:   methylPREDNISolone  40 mg Intravenous Q8H    amiodarone  200 mg Oral Daily    amLODIPine  5 mg Oral Daily    aspirin  81 mg Oral Daily    carvedilol  3.125 mg Oral BID WC    docusate sodium  100 mg Oral Daily    ezetimibe  10 mg Oral Daily    ferrous gluconate  324 mg Oral TID WC    isosorbide mononitrate  30 mg Oral Daily    levothyroxine  88 mcg Oral QAM AC    pantoprazole  40 mg Oral QAM AC    rosuvastatin  40 mg Oral Daily    tiZANidine  4 mg Oral TID    sodium chloride flush  10 mL Intravenous 2 times per day    heparin (porcine)  5,000 Units Subcutaneous 3 times per day    nicotine  1 patch Transdermal Daily    albuterol-ipratropium  1 puff Inhalation Q4H WA    doxycycline (VIBRAMYCIN) IV  100 mg Intravenous Q12H    budesonide-formoterol  1 puff Inhalation BID       Continuous Infusions:      PRN Meds:  morphine, ipratropium, oxyCODONE HCl, sodium chloride flush, acetaminophen **OR** acetaminophen, promethazine **OR** ondansetron    ALLERGIES:  Patient is allergic to benadryl [diphenhydramine]; doxylamine; mucinex [guaifenesin er]; spiriva handihaler [tiotropium bromide monohydrate]; and adhesive tape. REVIEW OF SYSTEMS:  Constitutional: Negative for fever   HENT: Negative for sore throat  Eyes: Negative for redness   Respiratory: Negative for dyspnea, cough  Cardiovascular: Negative for chest pain  Gastrointestinal: Negative for vomiting, diarrhea   Genitourinary: Negative for hematuria   Musculoskeletal: Negative for arthralgias complains of back pain. Skin: Negative for rash  Neurological: Negative for syncope  Hematological: Negative for adenopathy  Psychiatric/Behavorial: Negative for anxiety    Objective:   PHYSICAL EXAM:  Blood pressure (!) 120/40, pulse (!) 46, temperature 98 °F (36.7 °C), temperature source Oral, resp. rate 16, height 5' 2\" (1.575 m), weight 147 lb 0.8 oz (66.7 kg), SpO2 98 %, not currently breastfeeding.'  Gen: No distress. Eyes: PERRL. No sclera icterus. No conjunctival injection. ENT: No discharge. Pharynx clear. External appearance of ears and nose normal.  Neck: Trachea midline. No obvious mass. Resp: No accessory muscle use. No crackles. No wheezes. No rhonchi. CV: Regular rate. Regular rhythm. + murmur - rub. No edema. GI: Non-tender. Non-distended. No hernia. Skin: Warm, dry, normal texture and turgor. No nodule on exposed extremities. Lymph: No cervical LAD. No supraclavicular LAD. M/S: No cyanosis. No clubbing. No joint deformity. Neuro:  Moves all four extremities. Psych: Oriented x 3. No anxiety. Awake. Alert. Intact judgement and insight. Data Reviewed:   LABS:  CBC:   Recent Labs     07/05/20  1253 07/06/20  0330   WBC 7.7 5.7   HGB 12.4 11.4*   HCT 37.1 33.8*   MCV 90.5 90.9    197     BMP:   Recent Labs     07/05/20  1253 07/05/20  1554 07/06/20  0330   * 135* 139   K 5.0 4.3 4.0   CL 93* 99 102   CO2 25 24 23   BUN 76* 70* 55*   CREATININE 2.2* 1.9* 1.4*     LIVER PROFILE:   Recent Labs     07/05/20  1253   AST 17   ALT 15   BILITOT 0.3   ALKPHOS 85     PT/INR: No results for input(s): PROTIME, INR in the last 72 hours. APTT: No results for input(s): APTT in the last 72 hours. UA:No results for input(s): NITRITE, COLORU, PHUR, LABCAST, WBCUA, RBCUA, MUCUS, TRICHOMONAS, YEAST, BACTERIA, CLARITYU, SPECGRAV, LEUKOCYTESUR, UROBILINOGEN, BILIRUBINUR, BLOODU, GLUCOSEU, AMORPHOUS in the last 72 hours. Invalid input(s): KETONESU  No results for input(s): PHART, EMM3EIL, PO2ART in the last 72 hours. Vent Information  SpO2: 98 %    Radiology Review:  Pertinent images / reports were reviewed as a part of this visit. CT Chest w/ contrast: No results found for this or any previous visit. CT Chest w/o contrast:   Results for orders placed during the hospital encounter of 07/05/20   CT CHEST WO CONTRAST    Narrative EXAMINATION:  CT OF THE CHEST WITHOUT CONTRAST 7/5/2020 2:48 pm    TECHNIQUE:  CT of the chest was performed without the administration of intravenous  contrast. Multiplanar reformatted images are provided for review. Dose  modulation, iterative reconstruction, and/or weight based adjustment of the  mA/kV was utilized to reduce the radiation dose to as low as reasonably  achievable. COMPARISON:  Portable chest 07/05/2020. Low-dose cancer screening. CT chest 01/27/2020.     HISTORY:  ORDERING SYSTEM PROVIDED HISTORY: back pain with exertion, sob  TECHNOLOGIST PROVIDED HISTORY:  Reason for exam:->back pain with exertion, sob  Reason for Exam: back pain with exertion, sob  Acuity: Acute  Type of Exam: Initial    FINDINGS:  Mediastinum: Lower portions of the thyroid appear normal.  Small precarinal  lymph nodes significantly decreased in size when compared with 01/27/2020. No mass, adenopathy, or pericardial effusion. Mild cardiomegaly. Normal  size of the thoracic aorta. Lungs/pleura: Scattered tiny punctate subpleural nodules most likely due to  old granulomatous disease. Nodules less than 2 mm in size. No suspicious  pulmonary nodule identified. No bullous changes or significant interstitial  lung disease. Small amount of opacity deep in the posterior right  costophrenic angle possibly representing pneumonia. No pleural effusion. Lungs otherwise clear. Airways also clear. Upper Abdomen: No acute abnormality. Gallbladder surgically absent. No  lesions seen in the adrenal glands or upper liver. Soft Tissues/Bones: Moderate thoracic spondylosis present. No acute fracture  or destructive bony lesion. No significant thoracic disc disease or spinal  stenosis. Sternal wires noted. Posterior ribs appear intact. Impression Small amount of opacity deep in the posterior right costophrenic angle  possibly a small pneumonia. Multiple tiny punctate subpleural nodules  compatible with old granulomatous disease. Moderate thoracic spondylosis. No evident thoracic fracture. CTPA: No results found for this or any previous visit. CXR PA/LAT:   Results for orders placed during the hospital encounter of 04/24/19   XR CHEST STANDARD (2 VW)    Narrative EXAMINATION:  TWO VIEWS OF THE CHEST    4/24/2019 2:16 pm    COMPARISON:  05/08/2018    HISTORY:  ORDERING SYSTEM PROVIDED HISTORY: SOB  TECHNOLOGIST PROVIDED HISTORY:  Reason for exam:->SOB    FINDINGS:  Sternotomy wires, aortic valve and left atrial appendage clip are present. The cardiac silhouette is globally prominent.   Hilar contours are is stable,  mildly prominent a 12 month interval.  There are chronic interstitial septal  lines. No focal airspace disease or pleural effusion is identified. No  acute osseous abnormality. Impression Mild vascular congestion, similar to baseline. No acute cardiopulmonary disease otherwise identified. CXR portable:   Results for orders placed during the hospital encounter of 07/05/20   XR CHEST PORTABLE    Narrative EXAMINATION:  ONE XRAY VIEW OF THE CHEST    7/5/2020 1:38 pm    COMPARISON:  04/25/2019    HISTORY:  ORDERING SYSTEM PROVIDED HISTORY: SOB  TECHNOLOGIST PROVIDED HISTORY:  Reason for exam:->SOB  Reason for Exam: SOB, upper back pain, bilateral leg swelling. Acuity: Acute  Type of Exam: Initial    FINDINGS:  Cardiac silhouette enlargement again noted. Status post aortic valve  replacement. The pulmonary arterial shadows appear enlarged. No acute  airspace disease, pneumothorax or evidence for effusion. Round  radiodensities projecting in the epigastric region may be external to the  patient versus ingested material.      Impression No acute airspace disease identified. Probable external artifact projecting over the upper abdomen. Assessment:        COPD, moderate FEV1 56%, DLCO 57%, 2015 -  Trelegy at home   Abnormal radiograph  Chest pain   Chronic rhinitis    MR, mod to severe with severe dilated LA, EF 60%elevated PASP to 52 mm Hg 2018    Plan:      Abnormal radiograph  -There are 2 areas of abnormality. Right upper lobe and lower lobes. She has had 4 CAT scans of the chest that I am able to find: 2015, 2017, January 2020, July 2020  Right upper lobe abnormality can be traced back to earliest CAT scan, 2015. There is very mild tree-in-bud opacity in 2015. This progressed to 2017 and has been stable since 2017, January 2020, yesterday. Therefore, no further work-up. Unclear etiology.   ALVARO is possible but with no progression, no work-up needed.  -She has lower lobe opacities that have been seen since 2017. 2017 was noted in the right lower lobe, January 2020 left lower lobe, and now July 2020 in the right lower lobe. This fleeting lower lobe posterior opacities would raise a question of silent aspiration. I will order a modified barium swallow to assess this possibility.  -With possibility of aspiration, switch doxycycline to Augmentin 3 times daily x7 days.  -Recommend follow-up chest x-ray PA and lateral in 1 month to ensure clearance    COPD  -Home treatment of Trelegy.  -Currently denies any issues with breathing and denies breathing issues at presentation. Therefore, continue baseline medications. Symbicort and Combivent as needed.  -Discontinue steroids      Pains  -Defer to Dr. Elinor Fernandes. Currently cardiac work-up ongoing. Tobacco abuse   -Advised to quit             This note was transcribed using [id number]. Please disregard any translational errors.     Thank you for the consult    Mauricio Traore Pulmonary, [facility] Care  [phone]

## 2020-07-06 NOTE — PLAN OF CARE
Problem: Pain:  Goal: Pain level will decrease  Description: Pain level will decrease  Outcome: Ongoing   Pain/discomfort being managed with PRN analgesics per MD orders. Pt able to express presence and absence of pain and rate pain appropriately using numerical scale. Oxy and Morphine ordered PRN as pt tolerates    Problem: Falls - Risk of:  Goal: Will remain free from falls  Description: Will remain free from falls  Outcome: Ongoing   Fall risk assessment completed every shift. All precautions in place. Pt has call light within reach at all times. Room clear of clutter. Pt aware to call for assistance when getting up.

## 2020-07-07 ENCOUNTER — APPOINTMENT (OUTPATIENT)
Dept: GENERAL RADIOLOGY | Age: 75
DRG: 193 | End: 2020-07-07
Payer: COMMERCIAL

## 2020-07-07 LAB
ANION GAP SERPL CALCULATED.3IONS-SCNC: 10 MMOL/L (ref 3–16)
BUN BLDV-MCNC: 32 MG/DL (ref 7–20)
CALCIUM SERPL-MCNC: 9.5 MG/DL (ref 8.3–10.6)
CHLORIDE BLD-SCNC: 104 MMOL/L (ref 99–110)
CO2: 26 MMOL/L (ref 21–32)
CREAT SERPL-MCNC: 0.9 MG/DL (ref 0.6–1.2)
GFR AFRICAN AMERICAN: >60
GFR NON-AFRICAN AMERICAN: >60
GLUCOSE BLD-MCNC: 96 MG/DL (ref 70–99)
POTASSIUM SERPL-SCNC: 4.1 MMOL/L (ref 3.5–5.1)
SODIUM BLD-SCNC: 140 MMOL/L (ref 136–145)

## 2020-07-07 PROCEDURE — 80048 BASIC METABOLIC PNL TOTAL CA: CPT

## 2020-07-07 PROCEDURE — G0378 HOSPITAL OBSERVATION PER HR: HCPCS

## 2020-07-07 PROCEDURE — 6360000002 HC RX W HCPCS: Performed by: INTERNAL MEDICINE

## 2020-07-07 PROCEDURE — 94761 N-INVAS EAR/PLS OXIMETRY MLT: CPT

## 2020-07-07 PROCEDURE — 92611 MOTION FLUOROSCOPY/SWALLOW: CPT

## 2020-07-07 PROCEDURE — 6370000000 HC RX 637 (ALT 250 FOR IP): Performed by: INTERNAL MEDICINE

## 2020-07-07 PROCEDURE — 97165 OT EVAL LOW COMPLEX 30 MIN: CPT

## 2020-07-07 PROCEDURE — 2580000003 HC RX 258: Performed by: INTERNAL MEDICINE

## 2020-07-07 PROCEDURE — 94640 AIRWAY INHALATION TREATMENT: CPT

## 2020-07-07 PROCEDURE — 96376 TX/PRO/DX INJ SAME DRUG ADON: CPT

## 2020-07-07 PROCEDURE — 74230 X-RAY XM SWLNG FUNCJ C+: CPT

## 2020-07-07 PROCEDURE — 2060000000 HC ICU INTERMEDIATE R&B

## 2020-07-07 PROCEDURE — 97161 PT EVAL LOW COMPLEX 20 MIN: CPT

## 2020-07-07 PROCEDURE — 97530 THERAPEUTIC ACTIVITIES: CPT

## 2020-07-07 PROCEDURE — 99233 SBSQ HOSP IP/OBS HIGH 50: CPT | Performed by: INTERNAL MEDICINE

## 2020-07-07 RX ORDER — ALBUTEROL SULFATE 90 UG/1
2 AEROSOL, METERED RESPIRATORY (INHALATION) 4 TIMES DAILY
Qty: 1 INHALER | Refills: 3 | Status: ON HOLD | OUTPATIENT
Start: 2020-07-07 | End: 2021-04-01

## 2020-07-07 RX ORDER — LACTOBACILLUS RHAMNOSUS GG 10B CELL
1 CAPSULE ORAL 2 TIMES DAILY WITH MEALS
Status: DISCONTINUED | OUTPATIENT
Start: 2020-07-07 | End: 2020-07-09 | Stop reason: HOSPADM

## 2020-07-07 RX ORDER — VALSARTAN 80 MG/1
160 TABLET ORAL DAILY
Status: DISCONTINUED | OUTPATIENT
Start: 2020-07-07 | End: 2020-07-09 | Stop reason: HOSPADM

## 2020-07-07 RX ORDER — LACTOBACILLUS RHAMNOSUS GG 10B CELL
1 CAPSULE ORAL 2 TIMES DAILY WITH MEALS
Qty: 20 CAPSULE | Refills: 0 | Status: SHIPPED | OUTPATIENT
Start: 2020-07-07 | End: 2021-10-26

## 2020-07-07 RX ORDER — TORSEMIDE 20 MG/1
20 TABLET ORAL DAILY
Status: DISCONTINUED | OUTPATIENT
Start: 2020-07-08 | End: 2020-07-08

## 2020-07-07 RX ORDER — VALSARTAN 160 MG/1
160 TABLET ORAL DAILY
Qty: 30 TABLET | Refills: 3 | Status: SHIPPED | OUTPATIENT
Start: 2020-07-07 | End: 2020-08-10 | Stop reason: SDUPTHER

## 2020-07-07 RX ORDER — AMOXICILLIN AND CLAVULANATE POTASSIUM 500; 125 MG/1; MG/1
1 TABLET, FILM COATED ORAL EVERY 8 HOURS SCHEDULED
Qty: 30 TABLET | Refills: 0 | Status: SHIPPED | OUTPATIENT
Start: 2020-07-07 | End: 2020-07-09

## 2020-07-07 RX ADMIN — LEVOTHYROXINE SODIUM 88 MCG: 0.09 TABLET ORAL at 05:05

## 2020-07-07 RX ADMIN — ROSUVASTATIN CALCIUM 40 MG: 40 TABLET, FILM COATED ORAL at 20:58

## 2020-07-07 RX ADMIN — AMLODIPINE BESYLATE 5 MG: 5 TABLET ORAL at 08:42

## 2020-07-07 RX ADMIN — BUDESONIDE AND FORMOTEROL FUMARATE DIHYDRATE 1 PUFF: 80; 4.5 AEROSOL RESPIRATORY (INHALATION) at 11:01

## 2020-07-07 RX ADMIN — AMOXICILLIN AND CLAVULANATE POTASSIUM 1 TABLET: 500; 125 TABLET, FILM COATED ORAL at 20:58

## 2020-07-07 RX ADMIN — EZETIMIBE 10 MG: 10 TABLET ORAL at 20:58

## 2020-07-07 RX ADMIN — AMOXICILLIN AND CLAVULANATE POTASSIUM 1 TABLET: 500; 125 TABLET, FILM COATED ORAL at 05:05

## 2020-07-07 RX ADMIN — AMOXICILLIN AND CLAVULANATE POTASSIUM 1 TABLET: 500; 125 TABLET, FILM COATED ORAL at 13:01

## 2020-07-07 RX ADMIN — CARVEDILOL 3.12 MG: 3.12 TABLET, FILM COATED ORAL at 08:42

## 2020-07-07 RX ADMIN — OXYCODONE HYDROCHLORIDE 10 MG: 10 TABLET ORAL at 05:11

## 2020-07-07 RX ADMIN — HEPARIN SODIUM 5000 UNITS: 5000 INJECTION INTRAVENOUS; SUBCUTANEOUS at 13:04

## 2020-07-07 RX ADMIN — Medication 3 MG: at 20:58

## 2020-07-07 RX ADMIN — TIZANIDINE 4 MG: 4 TABLET ORAL at 08:42

## 2020-07-07 RX ADMIN — HEPARIN SODIUM 5000 UNITS: 5000 INJECTION INTRAVENOUS; SUBCUTANEOUS at 05:06

## 2020-07-07 RX ADMIN — SODIUM CHLORIDE, PRESERVATIVE FREE 10 ML: 5 INJECTION INTRAVENOUS at 20:58

## 2020-07-07 RX ADMIN — DOCUSATE SODIUM 100 MG: 100 CAPSULE, LIQUID FILLED ORAL at 08:42

## 2020-07-07 RX ADMIN — ISOSORBIDE MONONITRATE 30 MG: 30 TABLET, EXTENDED RELEASE ORAL at 08:42

## 2020-07-07 RX ADMIN — Medication 1 CAPSULE: at 08:57

## 2020-07-07 RX ADMIN — AMIODARONE HYDROCHLORIDE 200 MG: 200 TABLET ORAL at 08:42

## 2020-07-07 RX ADMIN — Medication 1 CAPSULE: at 16:09

## 2020-07-07 RX ADMIN — ASPIRIN 81 MG: 81 TABLET, CHEWABLE ORAL at 08:42

## 2020-07-07 RX ADMIN — VALSARTAN 160 MG: 80 TABLET, FILM COATED ORAL at 14:53

## 2020-07-07 RX ADMIN — BUDESONIDE AND FORMOTEROL FUMARATE DIHYDRATE 1 PUFF: 80; 4.5 AEROSOL RESPIRATORY (INHALATION) at 21:33

## 2020-07-07 RX ADMIN — SODIUM CHLORIDE, PRESERVATIVE FREE 10 ML: 5 INJECTION INTRAVENOUS at 08:42

## 2020-07-07 RX ADMIN — HEPARIN SODIUM 5000 UNITS: 5000 INJECTION INTRAVENOUS; SUBCUTANEOUS at 20:59

## 2020-07-07 RX ADMIN — OXYCODONE HYDROCHLORIDE 10 MG: 10 TABLET ORAL at 13:03

## 2020-07-07 RX ADMIN — PANTOPRAZOLE SODIUM 40 MG: 40 TABLET, DELAYED RELEASE ORAL at 05:05

## 2020-07-07 ASSESSMENT — PAIN SCALES - GENERAL
PAINLEVEL_OUTOF10: 0
PAINLEVEL_OUTOF10: 6
PAINLEVEL_OUTOF10: 0
PAINLEVEL_OUTOF10: 4
PAINLEVEL_OUTOF10: 0
PAINLEVEL_OUTOF10: 0
PAINLEVEL_OUTOF10: 5
PAINLEVEL_OUTOF10: 4
PAINLEVEL_OUTOF10: 0

## 2020-07-07 ASSESSMENT — PAIN DESCRIPTION - PROGRESSION: CLINICAL_PROGRESSION: GRADUALLY WORSENING

## 2020-07-07 NOTE — H&P
Hyperlipidemia     Hypothyroidism     Lumbar stenosis without neurogenic claudication     pain across low back worse with standing and walking, nonradiating    Macular degeneration 2018    left worse than right , dry : progressive loss of sight: just barely able to see to drive    Neuropathy     Osteoarthritis     Pulmonary HTN ([address]     primary, responsive to nitrates    PVD (peripheral vascular disease) ([address]     occluded right SFA::: asymptomatic NIKOS 0.7 right and 1.0 left    Sacral fracture, closed ([address]     Syncope 2014      Past Surgical History:   Procedure Laterality Date    AORTIC VALVE REPLACEMENT  6/16/15    Dr. Nilo Olivo. Hernandez - PVI, RENETTA exclusion. 19mm Maki pericardial Magna    APPENDECTOMY      BACK SURGERY  03/15/2019    superion inserted to keep back from hurtin [address] N/A 2019    EMERGENT; LAPAROSCOPIC CHOLECYSTECTOMY WITH GRAM performed by Maureen Matos MD at [address]      stent x 1    CYSTOSCOPY  2014    dr Gricelda Webb      THR Right    OTHER SURGICAL HISTORY  2018     EGD and colonoscopy.  SPINE SURGERY N/A 3/15/2019    INSERTION OF INTERSPINOUS SPACER SUPERION VERTIFLEX AT LUMBAR FOUR-LUMBAR FIVE performed by Adonis Garcia MD at [address] [facility]  12/15/2017      Medications Prior to Admission: ezetimibe (ZETIA) 10 MG tablet, TAKE 1 TABLET BY MOUTH DAILY  oxyCODONE HCl (OXY-IR) 10 MG immediate release tablet, Take 1 tablet by mouth 3 times daily as needed for Pain for up to 30 days.   ipratropium (ATROVENT) 0.03 % nasal spray, 2 sprays by Nasal route 3 times daily as needed for Rhinitis  isosorbide mononitrate (IMDUR) 30 MG extended release tablet, TAKE ONE TABLET BY MOUTH TWO TIMES A DAY FOR SYSTOLIC BLOOD PRESSURE (TOP NUMBER) OVER 140  potassium chloride (KLOR-CON) 10 MEQ extended release tablet, TAKE ONE TABLET BY MOUTH TWO TIMES A DAY  torsemide (DEMADEX) 20 MG tablet, TAKE ONE TABLET BY MOUTH TWO TIMES A DAY  amiodarone (CORDARONE) 200 MG tablet, TAKE 1 TABLET BY MOUTH DAILY  rosuvastatin (CRESTOR) 40 MG tablet, TAKE 1 TABLET BY MOUTH DAILY  valsartan (DIOVAN) 160 MG tablet, TAKE 2 TABLETS BY MOUTH DAILY.   fluticasone-umeclidin-vilant (TRELEGY ELLIPTA) 100-62.5-25 MCG/INH AEPB, Inhale 1 puff into the lungs daily  pantoprazole (PROTONIX) 40 MG tablet, TAKE ONE TABLET BY MOUTH DAILY  carvedilol (COREG) 3.125 MG tablet, TAKE 1 TABLET BY MOUTH 2 TIMES DAILY (WITH MEALS) (LOWER DOSE OF COREG)  tiZANidine (ZANAFLEX) 4 MG tablet, Take 1 tablet by mouth 3 times daily  levothyroxine (SYNTHROID) 88 MCG tablet, TAKE 1 TABLET BY MOUTH DAILY  amLODIPine (NORVASC) 5 MG tablet, Take 1 tablet by mouth daily   MG capsule, TAKE ONE CAPSULE BY MOUTH TWO TIMES A DAY  aspirin 81 MG tablet, Take 1 tablet by mouth daily  [DISCONTINUED] predniSONE (DELTASONE) 10 MG tablet, 3 po bid x 3 d then 2 po bid x3 d then 1 po bid x1 d then 1 po qam x 1 d  [DISCONTINUED] fluticasone-umeclidin-vilant (TRELEGY ELLIPTA) 100-62.5-25 MCG/INH AEPB, Lot# [id number] Qty 2  [DISCONTINUED] amLODIPine (NORVASC) 5 MG tablet, TAKE 1 TABLET BY MOUTH DAILY  naloxone 4 MG/0.1ML LIQD nasal spray, 1 spray by Nasal route as needed for Opioid Reversal  naloxone 4 MG/0.1ML LIQD nasal spray, 1 spray by Nasal route as needed for Opioid Reversal  [DISCONTINUED] metolazone (ZAROXOLYN) 5 MG tablet, Take 1 tablet by mouth once a week  [DISCONTINUED] valsartan (DIOVAN) 320 MG tablet, Take 1 tablet by mouth daily  [DISCONTINUED] ferrous gluconate 324 (37.5 Fe) MG TABS, TAKE 1 TABLET BY MOUTH 3 TIMES DAILY (WITH MEALS) (Patient taking differently: Take 324 mg by mouth 2 times daily )  [DISCONTINUED] albuterol sulfate  (90 Base) MCG/ACT inhaler, Inhale 2 puffs into the lungs 4 times daily  Allergies   Allergen Reactions    Benadryl [Diphenhydramine] Swelling    Doxylamine     Mucinex [Guaifenesin Er]      hallucinations    Spiriva Handihaler [Tiotropium Bromide Monohydrate]      Nausea      Adhesive Tape Rash and Swelling      Social History     Tobacco Use    Smoking status: Current Some Day Smoker     Packs/day: 1.00     Years: 45.00     Pack years: 45.00     Types: Cigarettes     Last attempt to quit: 2015     Years since quittin.0    Smokeless tobacco: Current User    Tobacco comment: smoked 1.5 pp for over 30 years  over 45pk year hx   Substance Use Topics    Alcohol use: No     Alcohol/week: 0.0 standard drinks     Comment: Socially       Family History   Problem Relation Age of Onset    Breast Cancer Daughter         Prior to Admission medications    Medication Sig Start Date End Date Taking? Authorizing Provider   ezetimibe (ZETIA) 10 MG tablet TAKE 1 TABLET BY MOUTH DAILY 20  Yes August Sever, MD   oxyCODONE HCl (OXY-IR) 10 MG immediate release tablet Take 1 tablet by mouth 3 times daily as needed for Pain for up to 30 days. 20 Yes Kadi Ceron MD   ipratropium (ATROVENT) 0.03 % nasal spray 2 sprays by Nasal route 3 times daily as needed for Rhinitis 6/15/20 6/15/21 Yes Kadi Ceron MD   isosorbide mononitrate (IMDUR) 30 MG extended release tablet TAKE ONE TABLET BY MOUTH TWO TIMES A DAY FOR SYSTOLIC BLOOD PRESSURE (TOP NUMBER) OVER 140 6/15/20  Yes Kadi Ceron MD   potassium chloride (KLOR-CON) 10 MEQ extended release tablet TAKE ONE TABLET BY MOUTH TWO TIMES A DAY 6/15/20  Yes Kadi Ceron MD   torsemide (DEMADEX) 20 MG tablet TAKE ONE TABLET BY MOUTH TWO TIMES A DAY 20  Yes August Sever, MD   amiodarone (CORDARONE) 200 MG tablet TAKE 1 TABLET BY MOUTH DAILY 20  Yes August Sever, MD   rosuvastatin (CRESTOR) 40 MG tablet TAKE 1 TABLET BY MOUTH DAILY 20  Yes August Sever, MD   valsartan (DIOVAN) 160 MG tablet TAKE 2 TABLETS BY MOUTH DAILY.  20  Yes Luis M Palacios,    fluticasone-umeclidin-vilant (TRELEGY ELLIPTA) 100-62.5-25 MCG/INH AEPB Inhale 1 puff into the lungs daily 12/23/19  Yes HOWARD Elizabeth CNP   pantoprazole (PROTONIX) 40 MG tablet TAKE ONE TABLET BY MOUTH DAILY 11/26/19  Yes Oscar Meza MD   carvedilol (COREG) 3.125 MG tablet TAKE 1 TABLET BY MOUTH 2 TIMES DAILY (WITH MEALS) (LOWER DOSE OF COREG) 11/26/19  Yes Oscar Meza MD   tiZANidine (ZANAFLEX) 4 MG tablet Take 1 tablet by mouth 3 times daily 10/7/19  Yes Oscar Meza MD   levothyroxine (SYNTHROID) 88 MCG tablet TAKE 1 TABLET BY MOUTH DAILY 8/26/19  Yes Oscar Meza MD   amLODIPine (NORVASC) 5 MG tablet Take 1 tablet by mouth daily 8/2/19  Yes Arvind Turner MD    MG capsule TAKE ONE CAPSULE BY MOUTH TWO TIMES A DAY 4/20/18  Yes Oscar Meza MD   aspirin 81 MG tablet Take 1 tablet by mouth daily 5/14/15  Yes Yajaira Christopher MD   naloxone 4 MG/0.1ML LIQD nasal spray 1 spray by Nasal route as needed for Opioid Reversal 12/5/19   Oscar Meza MD   naloxone 4 MG/0.1ML LIQD nasal spray 1 spray by Nasal route as needed for Opioid Reversal 10/7/19   Oscar Meza MD     Review of Systems  A comprehensive review of systems was negative except for: sob back pain    Objective:     Patient Vitals for the past 8 hrs:   BP Temp Temp src Pulse Resp SpO2   07/06/20 2045 -- -- -- -- 16 96 %   07/06/20 2028 (!) 124/49 98.5 °F (36.9 °C) Oral (!) 48 14 95 %   07/06/20 1505 (!) 128/45 98.1 °F (36.7 °C) Oral 50 17 95 %     I/O last 3 completed shifts: In: 1651.2 [P.O.:900;  I.V.:601.2; IV Piggyback:150]  Out: 1700 [Urine:1700]  I/O this shift:  In: 120 [P.O.:120]  Out: 700 [Urine:700]    VITALS:  BP (!) 124/49   Pulse (!) 48   Temp 98.5 °F (36.9 °C) (Oral)   Resp 16   Ht 5' 2\" (1.575 m)   Wt 147 lb 0.8 oz (66.7 kg)   SpO2 96%   BMI 26.90 kg/m²   General Appearance: alert and oriented to person, place and time, well-developed and well-nourished, in no acute distress  Skin: warm and dry, no rash or erythema  Head: normocephalic and 197 135 - 450 K/uL    MPV 9.1 5.0 - 10.5 fL    Neutrophils % 56.9 %    Lymphocytes % 22.3 %    Monocytes % 12.9 %    Eosinophils % 7.2 %    Basophils % 0.7 %    Neutrophils Absolute 3.2 1.7 - 7.7 K/uL    Lymphocytes Absolute 1.3 1.0 - 5.1 K/uL    Monocytes Absolute 0.7 0.0 - 1.3 K/uL    Eosinophils Absolute 0.4 0.0 - 0.6 K/uL    Basophils Absolute 0.0 0.0 - 0.2 K/uL   Brain Natriuretic Peptide    Collection Time: 07/06/20  3:30 AM   Result Value Ref Range    Pro- 0 - 449 pg/mL   Procalcitonin    Collection Time: 07/06/20  3:30 AM   Result Value Ref Range    Procalcitonin 0.03 0.00 - 0.15 ng/mL   COVID-19    Collection Time: 07/06/20 10:18 AM   Result Value Ref Range    SARS-CoV-2, PCR Not Detected Not Detected   Troponin    Collection Time: 07/06/20 10:52 AM   Result Value Ref Range    Troponin <0.01 <0.01 ng/mL      Ct Chest Wo Contrast    Result Date: 7/5/2020  Small amount of opacity deep in the posterior right costophrenic angle possibly a small pneumonia. Multiple tiny punctate subpleural nodules compatible with old granulomatous disease. Moderate thoracic spondylosis. No evident thoracic fracture. Ct Thoracic Spine Wo Contrast    Result Date: 7/5/2020  No acute fracture or traumatic malalignment. Multilevel degenerative disc and facet disease. No central canal stenosis identified. Mild airspace disease in the right costophrenic angle, atelectasis versus pneumonia. Xr Chest Portable    Result Date: 7/5/2020  No acute airspace disease identified. Probable external artifact projecting over the upper abdomen.              Assessment:     Active Problems:    Pulmonary emphysema (HCC)  Plan: moderate on last pfts, continue same inhalers    Sinus bradycardia  Plan: chronic, asymptomatic , continue coreg    Essential hypertension  Plan: controlled, cont same    Osteoarthritis of spine with radiculopathy, lumbar region  Plan: chronic stable, cont same    COPD exacerbation ([facility]  Plan: seems to explain her increased sob especially with the wheezing  Add steroid    Lumbar stenosis  Plan: etiology of lbp problem, cont same    Acute midline thoracic back pain  Plan: etiology unclear  But she does have arthritis there on ct  Abnormal CT chest  Could represent airspace disease but with no elevation in wbc, procalcitonin etc  Plan continue abx, ask pulmonary to evaluate, give their opinion of the CT and clinical picture            Dylon Gil MD

## 2020-07-07 NOTE — PROGRESS NOTES
Occupational Therapy   Occupational Therapy Initial Assessment and Discharge    Date: 2020   Patient Name: Emilie Mckeon  MRN: [id number]     : 1945    Date of Service: 2020    Discharge Recommendations: Emilie Mckeon scored a 24/24 on the AM-[facility] ADL Inpatient form. At this time, no further OT is recommended upon discharge due to pt functioning at/close to baseline. Recommend patient returns to prior setting with prior services. Home with assist PRN  OT Equipment Recommendations  Equipment Needed: No    Assessment   Assessment: Emilie Mckeon is a 76 y.o. female with past medical history of hypertension hyperlipidemia, CAD, CVA, chronic atrial fibrillation, pulmonary hypertension, carotid stenosis, CHF, PVD, who presents to the emergency department by private vehicle complaining of upper back pain, leg swelling, cough and shortness of breath. Patient states she has pain throughout her upper back to presents with walking. She denies any pain at rest.  Denies any pain with movement while sitting down. Denies any trauma or injury to upper back. States pain is describes an aching sensation throughout her upper back that only occurs when she is walking around. States she has associated mild shortness of breath and nausea. Pain resolves with rest.  Denies any associated chest pain, diaphoresis or radiation of pain. Denies ever having pain like this previously. She had to leave work yesterday given severity of symptoms. Secondly, patient planing of bilateral lower extremity swelling for the past 5 days. States she is been taking extra Lasix due to edema. Left lower extremity appears more edematous than right per patient. She denies any trauma or injury to legs. Denies any local pain. Finally, she has mildly productive cough over the past week. 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 Denies any fevers, chills, diarrhea, headaches, body aches.   She has pain described as tightness and cramping sensation. Lower back pain is chronic, upper back pain is acute. Upper back pain not present when resting. Family / Caregiver Present: No  Referring Practitioner: Edmond Pompa MD  Subjective  Subjective: Pt seated in chair upon arrival and agreeable to OT evaluation. Pt reports 3/10 pain in back  General Comment  Comments: okay for therapy per RN. Vital Signs  Temp: 98.4 °F (36.9 °C)  Temp Source: Oral  Pulse: 53  Heart Rate Source: Monitor  Resp: 16  BP: (!) 162/75  BP Location: Left Arm  BP Upper/Lower: Upper  MAP (mmHg): 104  Level of Consciousness: Alert  MEWS Score: 1  Oxygen Therapy  SpO2: 98 %  O2 Device: None (Room air)  Social/Functional History  Social/Functional History  Lives With: (kids with her; daughter and 2 grandchildren)  Type of Home: House  Home Layout: One level  Home Access: Stairs to enter with rails  Entrance Stairs - Number of Steps: 13  Bathroom Shower/Tub: Walk-in shower  Bathroom Toilet: Handicap height  Bathroom Equipment: Shower chair, Grab bars in shower  Home Equipment: Rolling walker  ADL Assistance: [address] [place] [address] Avenue: Independent  Homemaking Responsibilities: Yes  Ambulation Assistance: Independent  Transfer Assistance: Independent  Active : Yes  Occupation: Full time employment  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 Pt also c/o pain to palpation of (B) scapula. Observation: Kyphotic posture. Pt reports difficulty achieving upright stance at work. Balance  Sitting Balance: Independent  Standing Balance: Independent  Functional Mobility  Functional - Mobility Device: No device  Activity: Other; To/from bathroom(household distances in room and [facility])  Assist Level: Supervision  Wheelchair Bed Transfers  Wheelchair/Bed - Technique: Ambulating  Equipment Used: Other(chair)  Level of Asssistance: Independent  ADL  Feeding: Independent  Grooming: Independent  UE Bathing: Independent  LE Bathing: Independent  UE Dressing: Independent  LE Dressing: Independent(pt able to don/doff bilateral socks seated in chair)  Toileting: Independent(pt reports toileting Ind)  Additional Comments: Anticipate pt Ind with all ADLs  Tone RUE  RUE Tone: Normotonic  Tone LUE  LUE Tone: Normotonic  Coordination  Movements Are Fluid And Coordinated: Yes        Transfers  Sit to stand: Independent  Stand to sit:  Independent     Cognition  Overall Cognitive Status: WFL        Sensation  Overall Sensation Status: WFL        LUE AROM (degrees)  LUE AROM : WFL  RUE AROM (degrees)  RUE AROM : WFL  LUE Strength  Gross LUE Strength: WFL  L Hand General: 4+/5  RUE Strength  Gross RUE Strength: WFL  R Hand General: 4+/5                   Plan   Plan  Times per week: D/C from OT      AM-PAC Score        [facility] Inpatient Daily Activity Raw Score: 24 (07/07/20 1014)  AM-[facility]AC Inpatient ADL T-Scale Score : 57.54 (07/07/20 1014)  ADL Inpatient CMS 0-100% Score: 0 (07/07/20 1014)  ADL Inpatient CMS G-Code Modifier : [address] (07/07/20 1014)    Goals  Short term goals  Time Frame for Short term goals: no ongoing OT goals  Short term goal 1: complete mobility and ADLs with supervision- goal met 7/7  Patient Goals   Patient goals : return home       Therapy Time   Individual Concurrent Group Co-treatment   Time In 0950         Time Out 1014         Minutes 24         Timed Code Treatment Minutes: 9 Minutes(15 minute eval )       Jenny Larry OTR/L

## 2020-07-07 NOTE — PLAN OF CARE
Problem: Pain:  Goal: Pain level will decrease  Description: Pain level will decrease  7/7/2020 1047 by Sita Crawford RN  Outcome: Ongoing     Problem: Pain:  Goal: Control of acute pain  Description: Control of acute pain  7/7/2020 1047 by Sita Crawford RN  Outcome: Ongoing     Problem: Pain:  Goal: Control of chronic pain  Description: Control of chronic pain  7/7/2020 1047 by Sita Crawford RN  Outcome: Ongoing     Problem: Falls - Risk of:  Goal: Will remain free from falls  Description: Will remain free from falls  7/7/2020 1047 by Sita Crawford RN  Outcome: Ongoing     Problem: Falls - Risk of:  Goal: Absence of physical injury  Description: Absence of physical injury  7/7/2020 1047 by Sita Crawford RN  Outcome: Ongoing

## 2020-07-07 NOTE — PLAN OF CARE
Pt c/o minor back pain. She stated she did not want to take her prn oxycodone. Pt states she did not rest well the night before and was asking for something to help her sleep overnight. Pt states she has never tried OTC melatonin but would be open to trying it. Provider paged and made aware. Orders entered accordingly. Pt noted to be resting in chair with eyes closed during rounds t/o shift. Continuous pulse ox discontinued with provider approval since pt is on RA and no longer a r/o COVID case. Pt is a medium fall risk. All required precautions are in place. She is up ad monica and oriented to her abilities. Pt with echocardiogram and modified barium swallow ordered for tomorrow. Reminder written on white board for the pt. Will continue to monitor.

## 2020-07-07 NOTE — PROCEDURES
[facility] [place]   Speech Therapy   MODIFIED BARIUM SWALLOW      Margie Pump  AGE: 76 y.o. GENDER: female  : 1945  [id number]  EPISODE DATE:  2020  Ordering MD:  Ordering Physician: Dr. Jennifer Ramírez  Radiologist:  Radiologist: Dr. Adonay Alonzo  Date of Eval:  2020     Type of Study: Modified Barium Swallow    ONSET DATE: 2020       MEDICAL DIAGNOSIS: The primary encounter diagnosis was Anginal equivalent ([address] Diagnoses of Left leg swelling and Stage 3 chronic kidney disease ([address] were also pertinent to this visit. TREATMENT DIAGNOSIS: Oropharyngeal Dysphagia    PAST MEDICAL HISTORY   has a past medical history of Anticoagulant long-term use, Atrial fibrillation ([address] AVM (arteriovenous malformation) of duodenum, acquired (2017), AVM (arteriovenous malformation) of stomach, acquired (2017), Bladder cancer ([address] (2014), CAD (coronary artery disease) (), CAP (community acquired pneumonia) (2015), Carotid stenosis (2014), Chronic atrial fibrillation (), Chronic diastolic CHF (congestive heart failure) ([address] (), COPD, mild ([address] CVA (cerebral infarction) (), Diverticulosis, Epistaxis, recurrent, Former smoker, Gallbladder sludge, HTN (hypertension), Hyperlipidemia, Hypothyroidism, Lumbar stenosis without neurogenic claudication (), Macular degeneration (), Neuropathy, Osteoarthritis, Pulmonary HTN ([address] (), PVD (peripheral vascular disease) ([address] Sacral fracture, closed ([address] (), and Syncope (2014). PAST SURGICAL HISTORY  Past Surgical History:   Procedure Laterality Date    AORTIC VALVE REPLACEMENT  6/16/15    Dr. Francia Hernandez - PVI, RENETTA exclusion.  19mm Maki pericardial Magna    APPENDECTOMY      BACK SURGERY  03/15/2019    superion inserted to keep back from [address], LAPAROSCOPIC N/A 2019    EMERGENT; LAPAROSCOPIC CHOLECYSTECTOMY WITH GRAM performed by Emelyn Khan MD at [place] [address] ANGIOPLASTY  2014    stent x 1    CYSTOSCOPY  Feb 2014    dr Crawley Hard      THR Right    OTHER SURGICAL HISTORY  02/20/2018     EGD and colonoscopy.  SPINE SURGERY N/A 3/15/2019    INSERTION OF INTERSPINOUS SPACER SUPERION VERTIFLEX AT LUMBAR FOUR-LUMBAR FIVE performed by Shan Oliveros MD at [address] [place] [address]  12/15/2017       ALLERGIES  Allergies   Allergen Reactions    Benadryl [Diphenhydramine] Swelling    Doxylamine     Mucinex [Guaifenesin Er]      hallucinations    Spiriva Handihaler [Tiotropium Bromide Monohydrate]      Nausea      Adhesive Tape Rash and Swelling         Subjective:     Reason for referral: Baldo Angelucci was referred for a Modified Barium Swallow study to assess  the efficiency of swallow function, rule out aspiration and make recommendations regarding safe dietary consistencies, effective compensatory strategies, and safe eating environment. PATIENT AND FAMILY / STAFF COMPLAINTS:  · Pt was seen for Clinical Evaluation of Swallowing as MD has Ordered a MBSS  · SLP did recommend proceeding with MBSS      Diet prior to study:   Current Diet Solid Consistency: Regular  Current Diet Liquid Consistency: Thin    Objective: Impressions and Recommendations     IMPRESSIONS:    1. Behavior/Cognition  Behavior/Cognition: Alert, Cooperative, Pleasant mood, Distractible (pt often talking during mastication etc). Pt was able to follow one step commands but repetition/re-direct warranted. 2.Dysphagia Diagnosis:   Oropharyngeal Dysphagia characterized by prolonged mastications of regular solid foods; disorganized but functional A-P oral transit most symptomatic of regular sold foods; delayed initiation of swallow. This SLP offered recommendation for downgrade trial to soft/bite size; but pt declined stating that food (SLP presented) was dry.     Symptoms:  · Inconsistent premature loss of material to the pharynx to the level of the valleculae  · Lingual mashing A-P oral transit of foods  · Piecemeal swallows of food consistencies  · Post swallow oral residue mid to posterior tongue with trace to minimal collection in the valleculae again most symptomatic of regular solid foods  · Pt did have one episode of penetration early on in the procedure (second presentation of nectar thick) which cleared with swallow. No further episodes were observed during procedure  · Post swallow regular food residue lining the pharyngeal wall; pt did clear with clearance swallow    3. Analysis of compensatory strategies  · Pt was able to clear oral and pharyngeal residue with clearance swallow     Dysphagia Score: Dysphagia Outcome Severity Scale: Level 6: Within functional limits/Modified independence    Aspiration/Penetration Risk:   · one episode of shallow penetration which cleared with swallows (occurred early in procedure). · Otherwise no further penetration observed  · No aspiration was observed    Diet Recommendations:  Solid consistency: Regular(pt declined need for soft food consistencies)   Liquid consistency: Thin   Medication administration: (as desired; close monitor if with thin liquids)     Compensatory Swallow Strategies:   Upright as possible for all oral intake, Small bites/sips, Swallow 2 times per bite/sip, Remain upright for 30-45 minutes after meals       Therapy:  Requires SLP Intervention: Yes(f/u x 1-2)  Duration/Frequency of Treatment: 1-3 times f/u for diet tolerance and ed    Therapy Interventions:   Therapeutic Interventions: Diet tolerance monitoring, Patient/Family education    Goals:   Dysphagia Goals: The patient will tolerate recommended diet without observed clinical signs of aspiration, The patient/caregiver will demonstrate understanding of compensatory strategies for improved swallowing safety.     Prognosis:  Prognosis for safe diet advancement: good    Education:  Consulted and agree with results and recommendations: Patient, RN  Patient Education: completed on results/recs/plan  Patient Education Response: Verbalizes understanding, Needs reinforcement    Assessment: Test Data   Pain:  Denied    General  Cognitive/Behavior  Behavior/Cognition  Behavior/Cognition: Alert; Cooperative;Pleasant mood;Distractible    Vision/Hearing  Vision  Vision: Within Functional Limits(for procedure)  Hearing  Hearing: Within functional limits(for procedure)    Consistencies Assessed    Consistencies Administered: Reg solid, Dysphagia Soft and Bite-Sized (Dysphagia III), Dysphagia Minced and Moist (Dysphagia II), Dysphagia Pureed (Dysphagia I), Honey cup, Nectar  teaspoon, Nectar cup, Thin cup, Thin straw(isolated sips and consecutive cup and straw thin liquid presentations)    Positioning   Upright in Videofluoroscopic Chair; Lateral Plane view    Indicators of Oral Phase Dysfunction   Oral Phase - Major Contributing Deficits  Poor Mastication: Reg solid(prolonged)  Reduced Posterior Propulsion: Reg solid  Reduced Bolus Control: (episodes of premature loss)  Decreased Bolus Cohesion: Reg solid, Soft solid  Lingual / Palatal Residue: Reg solid, Soft solid, Mechanical soft solid  Premature Bolus Loss to Pharynx: (inconsistent across consistencies)  Reduced Tongue Base Retraction: Reg solid      Indicators of Pharyngeal Phase Dysfunction  Pharyngeal Phase - Major Contributing Deficits  Delayed Swallow Initiation: All  Premature Spillage to Valleculae: (noted across consistencies)  Premature Spillage to Pyriform: (one episode premature loss beyond the valleculae early in procedure)  Reduced Pharyngeal Peristalsis: Reg solid  Pooling Valleculae: (inconsistent across consistencies)  Pooling Pyriform: (x 1 with thick liquid)  Shallow Penetration Before: (one episode early in procedure)  Complete Self-clearing (shallow): (cleared with swallow)  Pharyngeal Residue - Valleculae: Reg solid(collection)  Pharyngeal Residue - Posterior Pharynx: Reg solid      Upper Esophageal Phase   Upper Esophageal Screen  Esophageal Screen: (DNT)      MINUTES/CHARGES  SLP Individual Minutes  Time In: 1300  Time Out: 5195  Minutes: 40  Coded treatment time  0          Electronically signed by   Vicky Bolivar. MS [place],CCC,SLP [id number]  Speech and Language Pathologist  on 7/7/2020 at 2:35 PM

## 2020-07-07 NOTE — PROGRESS NOTES
Physical Therapy    Facility/Department: [address] PROGRESSIVE CARE  Initial Assessment/Treatment Session    NAME: Madiha Pandey  : 1945  MRN: [id number]    Date of Service: 2020    Discharge Recommendations:  Outpatient PT, Home with assist PRN   PT Equipment Recommendations  Equipment Needed: No    Assessment   Body structures, Functions, Activity limitations: Increased pain;Decreased balance  Assessment: Pt is a 76 y.o. F. with hx of chronic low back pain, admitted for mid-thoracic pain, COPD exacerbation, and pulmonary emphysema. Pt presents pleasant and agreeable to evaluation, and reports her back pain is decreased since receiving oxycodone this morning (she takes this regularly at home). Upon evaluation she demonstrated functional (B) UE/LE strength/ROM, but limited thoracic/lumbar ROM d/t pain, and stiffness. She would likely benefit from continued therapy in the OP setting to address her back pain, but was uninterested in this option this morning. Will continue to follow in the acute setting, but anticipate safe return home with prn assist, no equipment needs. Madiha Pandey scored a 22/24 on the AM-PAC short mobility form. See above for discharge recommendations. If patient discharges prior to next session this note will serve as a discharge summary. Please see below for the latest assessment towards goals. Specific instructions for Next Treatment: Improve back pain; Improve balance and safety awareness  Prognosis: Good  Decision Making: Low Complexity  History: See below  Exam: Strength; ROM; Balance; Ambulation  Clinical Presentation: Stable  Barriers to Learning: Pain  REQUIRES PT FOLLOW UP: Yes  Activity Tolerance  Activity Tolerance: Patient Tolerated treatment well;Patient limited by pain       Patient Diagnosis(es): The primary encounter diagnosis was Anginal equivalent ([address]  Diagnoses of Left leg swelling and Stage 3 chronic kidney disease ([address] were also pertinent to this visit.     has a past medical history of Anticoagulant long-term use, Atrial fibrillation ([address] AVM (arteriovenous malformation) of duodenum, acquired, AVM (arteriovenous malformation) of stomach, acquired, Bladder cancer ([address] CAD (coronary artery disease), CAP (community acquired pneumonia), Carotid stenosis, Chronic atrial fibrillation, Chronic diastolic CHF (congestive heart failure) ([address] COPD, mild ([address] CVA (cerebral infarction), Diverticulosis, Epistaxis, recurrent, Former smoker, Gallbladder sludge, HTN (hypertension), Hyperlipidemia, Hypothyroidism, Lumbar stenosis without neurogenic claudication, Macular degeneration, Neuropathy, Osteoarthritis, Pulmonary HTN ([address] PVD (peripheral vascular disease) ([address] Sacral fracture, closed ([address] and Syncope.   has a past surgical history that includes Cystoscopy (Feb 2014); joint replacement; Appendectomy; Aortic valve replacement (6/16/15); Upper gastrointestinal endoscopy (12/15/2017); other surgical history (02/20/2018); Coronary angioplasty (2014); Spine surgery (N/A, 3/15/2019); Cholecystectomy, laparoscopic (N/A, 4/25/2019); and back surgery (03/15/2019). Restrictions  Restrictions/Precautions  Restrictions/Precautions: Up as Tolerated  Position Activity Restriction  Other position/activity restrictions: Hx of chronic back pain    Subjective  General  Chart Reviewed: Yes  Patient assessed for rehabilitation services?: Yes  Additional Pertinent Hx: Per Dr. Armida Herrera, Serene Londono is a 76 y.o. female who presents with multiple complaints including pain in her bilateral shoulders with exertion. She is having left leg pain swelling and redness but no pain. Mildly short of breath with exertion. She is been coughing but is been nonproductive. She always has a cough but increased. Denies any fevers or chills. Denies any nausea vomiting. She describes her symptoms as moderate. She is never had a history of DVT or pulmonary embolus.   Physical exam reveals a swollen left leg only. Heart is regular rate and rhythm with a 3/6 systolic murmur. Lungs are clear to auscultation equal bilaterally. Laboratory work and testing revealed no cause for her symptoms. Her troponin was negative. Her BNP was normal.  Her GFR was low at 37. This appears to be a CKD. Therefore, patient was admitted to the hospital for anginal equivalent and leg swelling. This could be the cause of her dependent edema. Dr. Taylor Enriquez was notified and she agreed to meet the patient to the hospital for further evaluation treatment. \"  Response To Previous Treatment: Not applicable  Referring Practitioner: Dr. Taylor Enriquez  Referral Date : 07/07/20  Diagnosis: Upper back pain; Chronic lower back pain; COPD; Pulmonary Emphysema  Follows Commands: Within Functional Limits  Subjective  Subjective: Pt agreeable to evaluation. Reports mild back pain after taking oxycodone (takes at home). Pain Screening  Patient Currently in Pain: Denies    Orientation  Orientation  Overall Orientation Status: Within Normal Limits     Social/Functional History  Social/Functional History  Lives With: (kids with her; daughter and 2 grandchildren)  Type of Home: House  Home Layout: One level  Home Access: Stairs to enter with rails  Entrance Stairs - Number of Steps: 13  Bathroom Shower/Tub: Walk-in shower  Bathroom Toilet: Handicap height  Bathroom Equipment: Shower chair, Grab bars in shower  Home Equipment: Rolling walker  ADL Assistance: Independent  Homemaking Assistance: Independent  Homemaking Responsibilities: Yes  Ambulation Assistance: Independent  Transfer Assistance: Independent  Active : Yes  Occupation: Full time employment  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 Pt also c/o pain to palpation of (B) scapula. Observation: Kyphotic posture.   Pt reports difficulty achieving upright stance at work. AROM RLE (degrees)  RLE AROM: WFL  RLE General AROM: Hip Flex, Knee Flex/Ext, and Ankle PF/DF WFL  AROM LLE (degrees)  LLE AROM : WFL  LLE General AROM: Hip Flex, Knee Flex/Ext, and Ankle PF/DF WFL  AROM RUE (degrees)  RUE AROM : WFL  RUE General AROM: Shoulder Flex, Elbow Flex/Ext WFL  AROM LUE (degrees)  LUE AROM : WFL  LUE General AROM: Shoulder Flex, Elbow Flex/Ext [facility]  Spine  Special Tests: Trunk flex AROM WNL. Ext moderately limited d/t pain. Trunk lateral flexion to R mildly limited d/t pain. lateral flex to L moderately limited d/t pain. Strength RLE  Strength RLE: WFL  Comment: Hip Flex, Knee Flex/Ext, and Ankle PF/DF WFL  Strength LLE  Strength LLE: WFL  Comment: Hip Flex, Knee Flex/Ext, and [facility]le PF/DF [facility]  Strength RUE  Strength RUE: WFL  Comment: [place] Flex, Elbow FLex/Ext WFL  Strength LUE  Strength LUE: WFL  Comment: [place] Flex, Elbow FLex/Ext WFL     Bed mobility  Supine to Sit: Independent  Sit to Supine: Independent     Transfers  Sit to Stand: Independent  Stand to sit: Independent     Ambulation  Ambulation?: Yes  Ambulation 1  Surface: level tile  Device: No Device  Assistance: Supervision  Quality of Gait: Pt ambulated quickly through room, and in lao, moving with step-through pattern, initially doing well to maintain balance without difficulty, but then experiencing 2 mild lateral LOB (able to self-correct). Distance: 120'  Stairs/Curb  Stairs?: No      Plan   Plan  Times per week: 2-3x  Specific instructions for Next Treatment: Improve back pain; Improve balance and safety awareness  Safety Devices  Type of devices:  All fall risk precautions in place, Call light within reach, Left in chair  Restraints  Initially in place: No    AM-PAC Score  AM-PAC Inpatient Mobility Raw Score : 22 (07/07/20 1012)  AM-PAC Inpatient T-Scale Score : 53.28 (07/07/20 1012)  Mobility Inpatient CMS 0-100% Score: 20.91 (07/07/20 1012)  Mobility Inpatient CMS G-Code Modifier : Daquan Mohs (07/07/20 1012)          Goals  Short term goals  Time Frame for Short term goals: In 2-3 days pt will perform  Short term goal 1: Ambulation 150' without device over even and uneven surfaces, independently  Short term goal 2: Ascend/descend 12 steps with HR, Mod I  Long term goals  Time Frame for Long term goals : LTG = STG  Patient Goals   Patient goals : To return to work       Therapy Time   Individual Concurrent Group Co-treatment   Time In       0950   Time Out       1014   Minutes       24   Timed Code Treatment Minutes: 9 Minutes   Evaluation time: 15 min.     Vlad Azevedo PT    Electronically signed by Vlad Azevedo PT [id number] on 7/7/2020 at 10:19 AM

## 2020-07-08 ENCOUNTER — APPOINTMENT (OUTPATIENT)
Dept: GENERAL RADIOLOGY | Age: 75
DRG: 193 | End: 2020-07-08
Payer: COMMERCIAL

## 2020-07-08 LAB
CULTURE, RESPIRATORY: NORMAL
EKG ATRIAL RATE: 61 BPM
EKG DIAGNOSIS: NORMAL
EKG P AXIS: 74 DEGREES
EKG P-R INTERVAL: 152 MS
EKG Q-T INTERVAL: 488 MS
EKG QRS DURATION: 128 MS
EKG QTC CALCULATION (BAZETT): 491 MS
EKG R AXIS: -38 DEGREES
EKG T AXIS: 88 DEGREES
EKG VENTRICULAR RATE: 61 BPM
GRAM STAIN RESULT: NORMAL
LV EF: 58 %
LVEF MODALITY: NORMAL
TROPONIN: <0.01 NG/ML

## 2020-07-08 PROCEDURE — 6360000002 HC RX W HCPCS: Performed by: INTERNAL MEDICINE

## 2020-07-08 PROCEDURE — 6370000000 HC RX 637 (ALT 250 FOR IP): Performed by: INTERNAL MEDICINE

## 2020-07-08 PROCEDURE — 2580000003 HC RX 258: Performed by: INTERNAL MEDICINE

## 2020-07-08 PROCEDURE — 2060000000 HC ICU INTERMEDIATE R&B

## 2020-07-08 PROCEDURE — 99233 SBSQ HOSP IP/OBS HIGH 50: CPT | Performed by: INTERNAL MEDICINE

## 2020-07-08 PROCEDURE — 36415 COLL VENOUS BLD VENIPUNCTURE: CPT

## 2020-07-08 PROCEDURE — 93306 TTE W/DOPPLER COMPLETE: CPT

## 2020-07-08 PROCEDURE — 84484 ASSAY OF TROPONIN QUANT: CPT

## 2020-07-08 PROCEDURE — 93005 ELECTROCARDIOGRAM TRACING: CPT | Performed by: INTERNAL MEDICINE

## 2020-07-08 PROCEDURE — 2700000000 HC OXYGEN THERAPY PER DAY

## 2020-07-08 PROCEDURE — 99222 1ST HOSP IP/OBS MODERATE 55: CPT | Performed by: INTERNAL MEDICINE

## 2020-07-08 PROCEDURE — G0378 HOSPITAL OBSERVATION PER HR: HCPCS

## 2020-07-08 PROCEDURE — 94640 AIRWAY INHALATION TREATMENT: CPT

## 2020-07-08 PROCEDURE — 96376 TX/PRO/DX INJ SAME DRUG ADON: CPT

## 2020-07-08 PROCEDURE — 96375 TX/PRO/DX INJ NEW DRUG ADDON: CPT

## 2020-07-08 PROCEDURE — 93010 ELECTROCARDIOGRAM REPORT: CPT | Performed by: INTERNAL MEDICINE

## 2020-07-08 PROCEDURE — 71045 X-RAY EXAM CHEST 1 VIEW: CPT

## 2020-07-08 PROCEDURE — 94761 N-INVAS EAR/PLS OXIMETRY MLT: CPT

## 2020-07-08 PROCEDURE — 6370000000 HC RX 637 (ALT 250 FOR IP): Performed by: NURSE PRACTITIONER

## 2020-07-08 RX ORDER — IPRATROPIUM BROMIDE AND ALBUTEROL SULFATE 2.5; .5 MG/3ML; MG/3ML
1 SOLUTION RESPIRATORY (INHALATION) ONCE
Status: COMPLETED | OUTPATIENT
Start: 2020-07-08 | End: 2020-07-08

## 2020-07-08 RX ORDER — NITROGLYCERIN 0.4 MG/1
0.4 TABLET SUBLINGUAL EVERY 5 MIN PRN
Status: DISCONTINUED | OUTPATIENT
Start: 2020-07-08 | End: 2020-07-09 | Stop reason: HOSPADM

## 2020-07-08 RX ORDER — TORSEMIDE 20 MG/1
20 TABLET ORAL 2 TIMES DAILY
Status: DISCONTINUED | OUTPATIENT
Start: 2020-07-08 | End: 2020-07-09 | Stop reason: HOSPADM

## 2020-07-08 RX ORDER — FUROSEMIDE 10 MG/ML
40 INJECTION INTRAMUSCULAR; INTRAVENOUS ONCE
Status: COMPLETED | OUTPATIENT
Start: 2020-07-08 | End: 2020-07-08

## 2020-07-08 RX ORDER — POTASSIUM CHLORIDE 20 MEQ/1
20 TABLET, EXTENDED RELEASE ORAL 2 TIMES DAILY WITH MEALS
Status: DISCONTINUED | OUTPATIENT
Start: 2020-07-08 | End: 2020-07-09 | Stop reason: HOSPADM

## 2020-07-08 RX ORDER — LANOLIN ALCOHOL/MO/W.PET/CERES
3 CREAM (GRAM) TOPICAL ONCE
Status: COMPLETED | OUTPATIENT
Start: 2020-07-08 | End: 2020-07-08

## 2020-07-08 RX ADMIN — FUROSEMIDE 40 MG: 10 INJECTION, SOLUTION INTRAMUSCULAR; INTRAVENOUS at 09:58

## 2020-07-08 RX ADMIN — OXYCODONE HYDROCHLORIDE 10 MG: 10 TABLET ORAL at 18:37

## 2020-07-08 RX ADMIN — Medication 1 CAPSULE: at 10:04

## 2020-07-08 RX ADMIN — NITROGLYCERIN 0.4 MG: 0.4 TABLET, ORALLY DISINTEGRATING SUBLINGUAL at 10:05

## 2020-07-08 RX ADMIN — ROSUVASTATIN CALCIUM 40 MG: 40 TABLET, FILM COATED ORAL at 21:07

## 2020-07-08 RX ADMIN — IPRATROPIUM BROMIDE AND ALBUTEROL SULFATE 1 AMPULE: .5; 3 SOLUTION RESPIRATORY (INHALATION) at 09:01

## 2020-07-08 RX ADMIN — Medication 3 MG: at 21:07

## 2020-07-08 RX ADMIN — PANTOPRAZOLE SODIUM 40 MG: 40 TABLET, DELAYED RELEASE ORAL at 05:34

## 2020-07-08 RX ADMIN — TORSEMIDE 20 MG: 20 TABLET ORAL at 16:07

## 2020-07-08 RX ADMIN — SODIUM CHLORIDE, PRESERVATIVE FREE 10 ML: 5 INJECTION INTRAVENOUS at 21:07

## 2020-07-08 RX ADMIN — ISOSORBIDE MONONITRATE 30 MG: 30 TABLET, EXTENDED RELEASE ORAL at 10:04

## 2020-07-08 RX ADMIN — LEVOTHYROXINE SODIUM 88 MCG: 0.09 TABLET ORAL at 05:33

## 2020-07-08 RX ADMIN — DOCUSATE SODIUM 100 MG: 100 CAPSULE, LIQUID FILLED ORAL at 10:04

## 2020-07-08 RX ADMIN — POTASSIUM CHLORIDE 20 MEQ: 1500 TABLET, EXTENDED RELEASE ORAL at 16:07

## 2020-07-08 RX ADMIN — ASPIRIN 81 MG: 81 TABLET, CHEWABLE ORAL at 10:04

## 2020-07-08 RX ADMIN — TIZANIDINE 4 MG: 4 TABLET ORAL at 21:07

## 2020-07-08 RX ADMIN — HEPARIN SODIUM 5000 UNITS: 5000 INJECTION INTRAVENOUS; SUBCUTANEOUS at 21:07

## 2020-07-08 RX ADMIN — BUDESONIDE AND FORMOTEROL FUMARATE DIHYDRATE 1 PUFF: 80; 4.5 AEROSOL RESPIRATORY (INHALATION) at 19:44

## 2020-07-08 RX ADMIN — VALSARTAN 160 MG: 80 TABLET, FILM COATED ORAL at 10:03

## 2020-07-08 RX ADMIN — POTASSIUM CHLORIDE 20 MEQ: 1500 TABLET, EXTENDED RELEASE ORAL at 10:04

## 2020-07-08 RX ADMIN — Medication 3 MG: at 02:59

## 2020-07-08 RX ADMIN — OXYCODONE HYDROCHLORIDE 10 MG: 10 TABLET ORAL at 10:03

## 2020-07-08 RX ADMIN — AMOXICILLIN AND CLAVULANATE POTASSIUM 1 TABLET: 500; 125 TABLET, FILM COATED ORAL at 12:06

## 2020-07-08 RX ADMIN — AMOXICILLIN AND CLAVULANATE POTASSIUM 1 TABLET: 500; 125 TABLET, FILM COATED ORAL at 05:33

## 2020-07-08 RX ADMIN — HEPARIN SODIUM 5000 UNITS: 5000 INJECTION INTRAVENOUS; SUBCUTANEOUS at 12:06

## 2020-07-08 RX ADMIN — TORSEMIDE 20 MG: 20 TABLET ORAL at 10:05

## 2020-07-08 RX ADMIN — AMOXICILLIN AND CLAVULANATE POTASSIUM 1 TABLET: 500; 125 TABLET, FILM COATED ORAL at 21:07

## 2020-07-08 RX ADMIN — SODIUM CHLORIDE, PRESERVATIVE FREE 10 ML: 5 INJECTION INTRAVENOUS at 10:06

## 2020-07-08 RX ADMIN — HEPARIN SODIUM 5000 UNITS: 5000 INJECTION INTRAVENOUS; SUBCUTANEOUS at 05:34

## 2020-07-08 RX ADMIN — Medication 1 CAPSULE: at 16:07

## 2020-07-08 RX ADMIN — NITROGLYCERIN 0.4 MG: 0.4 TABLET, ORALLY DISINTEGRATING SUBLINGUAL at 10:40

## 2020-07-08 ASSESSMENT — PAIN DESCRIPTION - PROGRESSION
CLINICAL_PROGRESSION: GRADUALLY WORSENING
CLINICAL_PROGRESSION: GRADUALLY IMPROVING
CLINICAL_PROGRESSION: NOT CHANGED

## 2020-07-08 ASSESSMENT — PAIN - FUNCTIONAL ASSESSMENT
PAIN_FUNCTIONAL_ASSESSMENT: PREVENTS OR INTERFERES SOME ACTIVE ACTIVITIES AND ADLS

## 2020-07-08 ASSESSMENT — PAIN DESCRIPTION - PAIN TYPE
TYPE: CHRONIC PAIN
TYPE: ACUTE PAIN

## 2020-07-08 ASSESSMENT — PAIN SCALES - GENERAL
PAINLEVEL_OUTOF10: 5
PAINLEVEL_OUTOF10: 4
PAINLEVEL_OUTOF10: 0
PAINLEVEL_OUTOF10: 4
PAINLEVEL_OUTOF10: 0
PAINLEVEL_OUTOF10: 3
PAINLEVEL_OUTOF10: 3
PAINLEVEL_OUTOF10: 4
PAINLEVEL_OUTOF10: 3

## 2020-07-08 ASSESSMENT — PAIN DESCRIPTION - LOCATION
LOCATION: BACK;CHEST
LOCATION: BACK;CHEST
LOCATION: BACK

## 2020-07-08 ASSESSMENT — PAIN DESCRIPTION - FREQUENCY
FREQUENCY: CONTINUOUS

## 2020-07-08 ASSESSMENT — PAIN DESCRIPTION - ONSET
ONSET: ON-GOING

## 2020-07-08 ASSESSMENT — PAIN DESCRIPTION - ORIENTATION
ORIENTATION: LOWER
ORIENTATION: MID
ORIENTATION: MID

## 2020-07-08 ASSESSMENT — PAIN DESCRIPTION - DESCRIPTORS
DESCRIPTORS: ACHING;DISCOMFORT
DESCRIPTORS: ACHING;SHARP
DESCRIPTORS: ACHING;SHARP

## 2020-07-08 NOTE — PROGRESS NOTES
claudication 2017    pain across low back worse with standing and walking, nonradiating    Macular degeneration 2018    left worse than right , dry : progressive loss of sight: just barely able to see to drive    Neuropathy     Osteoarthritis     Pulmonary HTN ([facility]     primary, responsive to nitrates    PVD (peripheral vascular disease) ([facility]     occluded right SFA::: asymptomatic NIKOS 0.7 right and 1.0 left    Sacral fracture, closed ([facility]     Syncope 2014     Past Surgical History:   Procedure Laterality Date    AORTIC VALVE REPLACEMENT  6/16/15    Dr. Joce Harmon. Hernandez - PVI, RENETTA exclusion. 19mm Maki pericardial Magna    APPENDECTOMY      BACK SURGERY  03/15/2019    superion inserted to keep back from hurtin [address] N/A 2019    EMERGENT; LAPAROSCOPIC CHOLECYSTECTOMY WITH GRAM performed by Lisbeth Zuleta MD at [address] [place] [address]      stent x 1    CYSTOSCOPY  2014    dr Sylwia Villegas      THR Right    OTHER SURGICAL HISTORY  2018     EGD and colonoscopy.  SPINE SURGERY N/A 3/15/2019    INSERTION OF INTERSPINOUS SPACER SUPERION VERTIFLEX AT LUMBAR FOUR-LUMBAR FIVE performed by Nimesh Garza MD at [facility]  12/15/2017      Family History   Problem Relation Age of Onset    Breast Cancer Daughter      Social History     Tobacco Use    Smoking status: Current Some Day Smoker     Packs/day: 1.00     Years: 45.00     Pack years: 45.00     Types: Cigarettes     Last attempt to quit: 2015     Years since quittin.0    Smokeless tobacco: Current User    Tobacco comment: smoked 1.5 pp for over 30 years  over 45pk year hx   Substance Use Topics    Alcohol use: No     Alcohol/week: 0.0 standard drinks     Comment: Socially     Drug use: No     Comment: never       Prior to Admission medications    Medication Sig Start Date End Date Taking?  Authorizing Provider   albuterol sulfate  (90 Base) MCG/ACT inhaler Inhale 2 puffs into the lungs 4 times daily 7/7/20  Yes Ledy Kirby MD   lactobacillus (CULTURELLE) capsule Take 1 capsule by mouth 2 times daily (with meals) 7/7/20  Yes Ledy Kirby MD   amoxicillin-clavulanate (AUGMENTIN) 500-125 MG per tablet Take 1 tablet by mouth every 8 hours for 10 days 7/7/20 7/17/20 Yes Ledy Kirby MD   valsartan (DIOVAN) 160 MG tablet Take 1 tablet by mouth daily 7/7/20  Yes Ledy Kirby MD   ezetimibe (ZETIA) 10 MG tablet TAKE 1 TABLET BY MOUTH DAILY 6/30/20  Yes Yesi Vargas MD   oxyCODONE HCl (OXY-IR) 10 MG immediate release tablet Take 1 tablet by mouth 3 times daily as needed for Pain for up to 30 days. 6/29/20 7/29/20 Yes Ledy Kirby MD   ipratropium (ATROVENT) 0.03 % nasal spray 2 sprays by Nasal route 3 times daily as needed for Rhinitis 6/15/20 6/15/21 Yes Ledy Kirby MD   isosorbide mononitrate (IMDUR) 30 MG extended release tablet TAKE ONE TABLET BY MOUTH TWO TIMES A DAY FOR SYSTOLIC BLOOD PRESSURE (TOP NUMBER) OVER 140 6/15/20  Yes Ledy Kirby MD   potassium chloride (KLOR-CON) 10 MEQ extended release tablet TAKE ONE TABLET BY MOUTH TWO TIMES A DAY 6/15/20  Yes Ledy Kirby MD   torsemide (DEMADEX) 20 MG tablet TAKE ONE TABLET BY MOUTH TWO TIMES A DAY 5/19/20  Yes Yesi Vargas MD   amiodarone (CORDARONE) 200 MG tablet TAKE 1 TABLET BY MOUTH DAILY 4/9/20  Yes Yesi Vargas MD   rosuvastatin (CRESTOR) 40 MG tablet TAKE 1 TABLET BY MOUTH DAILY 4/9/20  Yes Yesi Vargas MD   valsartan (DIOVAN) 160 MG tablet TAKE 2 TABLETS BY MOUTH DAILY.  2/18/20  Yes Clayton Nikia,    fluticasone-umeclidin-vilant (TRELEGY ELLIPTA) 851-16.8-84 MCG/INH AEPB Inhale 1 puff into the lungs daily 12/23/19  Yes HOWARD Elizabeth - CNP   pantoprazole (PROTONIX) 40 MG tablet TAKE ONE TABLET BY MOUTH DAILY 11/26/19  Yes Ledy Kirby MD   levothyroxine (SYNTHROID) 88 MCG tablet TAKE 1 TABLET BY MOUTH DAILY 8/26/19  Yes Maye Toribio MD   amLODIPine (NORVASC) 5 MG tablet Take 1 tablet by mouth daily 8/2/19  Yes Froilan Crawley MD    MG capsule TAKE ONE CAPSULE BY MOUTH TWO TIMES A DAY 4/20/18  Yes Maye Toribio MD   aspirin 81 MG tablet Take 1 tablet by mouth daily 5/14/15  Yes Ciro Arrington MD         ROS:  A comprehensive review of systems was negative except for: fatigue low back pain    OBJECTIVE:      PHYSICAL:  Intake/Output:   Patient Vitals for the past 8 hrs:   BP Temp Temp src Pulse Resp SpO2   07/07/20 2003 -- 98.1 °F (36.7 °C) -- (!) 44 -- 95 %   07/07/20 1539 124/71 97.9 °F (36.6 °C) Oral (!) 45 17 97 %   07/07/20 1446 (!) 120/41 98.1 °F (36.7 °C) Oral (!) 45 18 98 %     I/O last 3 completed shifts: In: 200 [P.O.:420; I.V.:10]  Out: 2800 [Urine:2800]  I/O this shift:  In: 310 [P.O.:300;  I.V.:10]  Out: -   VITALS:    /71   Pulse (!) 44   Temp 98.1 °F (36.7 °C)   Resp 17   Ht 5' 2\" (1.575 m)   Wt 148 lb 13 oz (67.5 kg)   SpO2 95%   BMI 27.22 kg/m²     General Appearance: alert and oriented to person, place and time, well-developed and well-nourished, in no acute distress  Skin: warm and dry, no rash or erythema  Head: normocephalic and atraumatic  Eyes: conjunctivae normal and sclera anicteric  ENT: hearing grossly normal bilaterally and sinuses non-tender  Neck: neck supple and non tender without mass, no thyromegaly or thyroid nodules, no cervical lymphadenopathy   Pulmonary/Chest: clear to auscultation bilaterally- no wheezes, rales or rhonchi, normal air movement, no respiratory distress decreased breath sounds  Cardiovascular: normal rate, normal S1 and S2, no gallops and no carotid bruits holo sys murmur  Abdomen: soft, non-tender, non-distended, normal bowel sounds, no masses or organomegaly  Extremities: no cyanosis, clubbing or edema  Musculoskeletal: reproducible tenderness over lumbar and trapezius  Neurologic: coordination normal and speech normal, moves all 4 extremities    DATA:   Labs:  CBC:   Recent Labs     07/05/20  1253 07/06/20  0330   WBC 7.7 5.7   HGB 12.4 11.4*    197     BMP:    Recent Labs     07/05/20  1554 07/06/20  0330 07/07/20  0849   * 139 140   K 4.3 4.0 4.1   CL 99 102 104   CO2 24 23 26   BUN 70* 55* 32*   CREATININE 1.9* 1.4* 0.9   GLUCOSE 99 88 96     POC GLUCOSE:  No results for input(s): POCGLU in the last 72 hours. Ca/Mg/Phos:   Recent Labs     07/05/20  1554 07/06/20  0330 07/07/20  0849   CALCIUM 8.9 8.8 9.5     Hepatic:   Recent Labs     07/05/20  1253   AST 17   ALT 15   BILITOT 0.3   ALKPHOS 85     Troponin:   Recent Labs     07/05/20  1938 07/06/20  0330 07/06/20  1052   TROPONINI <0.01 <0.01 <0.01     ProBNP:   Recent Labs     07/05/20  1253 07/06/20  0330   PROBNP [phone]       DIAGNOSTICS:  Ct Chest Wo Contrast    Result Date: 7/5/2020  Small amount of opacity deep in the posterior right costophrenic angle possibly a small pneumonia. Multiple tiny punctate subpleural nodules compatible with old granulomatous disease. Moderate thoracic spondylosis. No evident thoracic fracture. Ct Thoracic Spine Wo Contrast    Result Date: 7/5/2020  No acute fracture or traumatic malalignment. Multilevel degenerative disc and facet disease. No central canal stenosis identified. Mild airspace disease in the right costophrenic angle, atelectasis versus pneumonia. Xr Chest Portable    Result Date: 7/5/2020  No acute airspace disease identified. Probable external artifact projecting over the upper abdomen. Fl Modified Barium Swallow W Video    Result Date: 7/7/2020  Swallowing mechanism grossly within normal limits without evidence of aspiration. Please see separate speech pathology report for full discussion of findings and recommendations.          ASSESSMENT AND PLAN    Active Problems:    Pulmonary emphysema ([address]  Plan: improving with inhalers and away from cigarettes    Sinus bradycardia  Plan: worse after restarting coreg, Stop coreg, recheck echo, and resume valsartan,   Consult cardiology to see patient in the hospital    Essential hypertension  Plan: worse again today, adding back valsartan    COPD exacerbation ([facility]  Plan: improving, cont same    Severe mitral regurgitation  Plan: ? Current state, recheck ECHO    Lumbar stenosis  Plan: chronic problem, fairly controlled    ANITA (acute kidney injury) ([facility] [address]  Plan: improving , follow tomorrow    Acute midline thoracic back pain  Plan: improved, more muscular over trapezius  Possible aspiration   Normal swallow test   Continue augmentin and observe        Appropriate diagnostic studies and consults ordered  Keeley Harmon MD

## 2020-07-08 NOTE — PLAN OF CARE
Pt has denied pain t/o shift. She says she only really has the pain when she is up moving around and that it generally eases with rest. She has denied needing her pain medicine. Pt expressed concern that her oxycodone order was written as T.I.D. She says she likes to have it available Q6H PRN if she needs it. Pt encouraged to talk to provider about order and reminder written on white board. Pt is medium fall risk. She is up ad monica. She wears her nonskid socks and has all required precautions in place. She spends most of her time in the chair. Chair alarm deferred because pt is oriented to her abilities. Pt c/o difficulty sleeping. She was given melatonin 3mg @ HS. Around 0100, pt still awake and walked out to nursing station looking for RN. RN reached out to in-house NP for melatonin 3mg PO X 1. RN received order around 0300. Pt made aware that she will need to talk to her day shift provider to have melatonin dose increased if she felt it was necessary. Reminder written in white board. Pt updated that it's planned for her to have an echocardiogram but a time has yet to be scheduled. Pt has had a couple nonsustained drops < 37bpm overnight. For majority of shift, pt's HR has remained in the 40's with nonsustained drops into the upper 30's. Will continue to monitor.

## 2020-07-08 NOTE — PROGRESS NOTES
Pulmonary Progress Note    Date of Admission: 7/5/2020   LOS: 3 days       CC:  copd    Subjective:  She complains of increased shortness of breath with laying down earlier today. Lasted for ~ 15 minutes then resolved. associated with chest pain . Resolved     ROS:   No nausea  No Vomiting  + chest pain         PHYSICAL EXAM:   Blood pressure (!) 158/61, pulse 56, temperature 98.3 °F (36.8 °C), temperature source Oral, resp. rate 24, height 5' 2\" (1.575 m), weight 148 lb 9.6 oz (67.4 kg), SpO2 95 %, not currently breastfeeding.'  Gen: No distress. Eyes: PERRL. No sclera icterus. No conjunctival injection. ENT: No discharge. Pharynx clear. External appearance of ears and nose normal.  Neck: Trachea midline. No obvious mass. Resp: No accessory muscle use. No crackles. No wheezes. No rhonchi. CV: Regular rate. Regular rhythm. No murmur or rub. No edema. GI: Non-tender. Non-distended. No hernia. Skin: Warm, dry, normal texture and turgor. No nodule on exposed extremities. Lymph: No cervical LAD. No supraclavicular LAD. M/S: No cyanosis. No clubbing. No joint deformity. Neuro: Moves all four extremities. Psych: Oriented x 3. No anxiety. Awake. Alert. Intact judgement and insight.       Medications:    Scheduled Meds:   torsemide  20 mg Oral BID    potassium chloride  20 mEq Oral BID WC    lactobacillus  1 capsule Oral BID WC    valsartan  160 mg Oral Daily    amoxicillin-clavulanate  1 tablet Oral 3 times per day    aspirin  81 mg Oral Daily    docusate sodium  100 mg Oral Daily    isosorbide mononitrate  30 mg Oral Daily    levothyroxine  88 mcg Oral QAM AC    pantoprazole  40 mg Oral QAM AC    rosuvastatin  40 mg Oral Daily    tiZANidine  4 mg Oral TID    sodium chloride flush  10 mL Intravenous 2 times per day    heparin (porcine)  5,000 Units Subcutaneous 3 times per day    nicotine  1 patch Transdermal Daily    budesonide-formoterol  1 puff Inhalation BID       Continuous Infusions:      PRN Meds:  nitroGLYCERIN, morphine, albuterol-ipratropium, melatonin, ipratropium, oxyCODONE HCl, sodium chloride flush, acetaminophen **OR** acetaminophen, promethazine **OR** ondansetron    Labs reviewed:  CBC:   Recent Labs     07/05/20  1253 07/06/20  0330   WBC 7.7 5.7   HGB 12.4 11.4*   HCT 37.1 33.8*   MCV 90.5 90.9    197     BMP:   Recent Labs     07/05/20  1554 07/06/20  0330 07/07/20  0849   * 139 140   K 4.3 4.0 4.1   CL 99 102 104   CO2 24 23 26   BUN 70* 55* 32*   CREATININE 1.9* 1.4* 0.9     LIVER PROFILE:   Recent Labs     07/05/20  1253   AST 17   ALT 15   BILITOT 0.3   ALKPHOS 85     PT/INR: No results for input(s): PROTIME, INR in the last 72 hours. APTT: No results for input(s): APTT in the last 72 hours. UA:No results for input(s): NITRITE, COLORU, PHUR, LABCAST, WBCUA, RBCUA, MUCUS, TRICHOMONAS, YEAST, BACTERIA, CLARITYU, SPECGRAV, LEUKOCYTESUR, UROBILINOGEN, BILIRUBINUR, BLOODU, GLUCOSEU, AMORPHOUS in the last 72 hours. Invalid input(s): Tommy Ramal  No results for input(s): PH, PCO2, PO2 in the last 72 hours. Cx:      Films:  Radiology Review:  Pertinent images / reports were reviewed as a part of this visit. CT Chest w/ contrast: No results found for this or any previous visit. CT Chest w/o contrast:   Results for orders placed during the hospital encounter of 07/05/20   CT CHEST WO CONTRAST    Narrative EXAMINATION:  CT OF THE CHEST WITHOUT CONTRAST 7/5/2020 2:48 pm    TECHNIQUE:  CT of the chest was performed without the administration of intravenous  contrast. Multiplanar reformatted images are provided for review. Dose  modulation, iterative reconstruction, and/or weight based adjustment of the  mA/kV was utilized to reduce the radiation dose to as low as reasonably  achievable. COMPARISON:  Portable chest 07/05/2020. Low-dose cancer screening. CT chest 01/27/2020.     HISTORY:  ORDERING SYSTEM PROVIDED HISTORY: back pain with exertion, sob  TECHNOLOGIST PROVIDED HISTORY:  Reason for exam:->back pain with exertion, sob  Reason for Exam: back pain with exertion, sob  Acuity: Acute  Type of Exam: Initial    FINDINGS:  Mediastinum: Lower portions of the thyroid appear normal.  Small precarinal  lymph nodes significantly decreased in size when compared with 01/27/2020. No mass, adenopathy, or pericardial effusion. Mild cardiomegaly. Normal  size of the thoracic aorta. Lungs/pleura: Scattered tiny punctate subpleural nodules most likely due to  old granulomatous disease. Nodules less than 2 mm in size. No suspicious  pulmonary nodule identified. No bullous changes or significant interstitial  lung disease. Small amount of opacity deep in the posterior right  costophrenic angle possibly representing pneumonia. No pleural effusion. Lungs otherwise clear. Airways also clear. Upper Abdomen: No acute abnormality. Gallbladder surgically absent. No  lesions seen in the adrenal glands or upper liver. Soft Tissues/Bones: Moderate thoracic spondylosis present. No acute fracture  or destructive bony lesion. No significant thoracic disc disease or spinal  stenosis. Sternal wires noted. Posterior ribs appear intact. Impression Small amount of opacity deep in the posterior right costophrenic angle  possibly a small pneumonia. Multiple tiny punctate subpleural nodules  compatible with old granulomatous disease. Moderate thoracic spondylosis. No evident thoracic fracture. CTPA: No results found for this or any previous visit. CXR PA/LAT:   Results for orders placed during the hospital encounter of 04/24/19   XR CHEST STANDARD (2 VW)    Narrative EXAMINATION:  TWO VIEWS OF THE CHEST    4/24/2019 2:16 pm    COMPARISON:  05/08/2018    HISTORY:  ORDERING SYSTEM PROVIDED HISTORY: SOB  TECHNOLOGIST PROVIDED HISTORY:  Reason for exam:->SOB    FINDINGS:  Sternotomy wires, aortic valve and left atrial appendage clip are present.   The cardiac silhouette is globally prominent. Hilar contours are is stable,  mildly prominent a 12 month interval.  There are chronic interstitial septal  lines. No focal airspace disease or pleural effusion is identified. No  acute osseous abnormality. Impression Mild vascular congestion, similar to baseline. No acute cardiopulmonary disease otherwise identified. CXR portable:   Results for orders placed during the hospital encounter of 07/05/20   XR CHEST PORTABLE    Narrative EXAMINATION:  ONE XRAY VIEW OF THE CHEST    7/5/2020 1:38 pm    COMPARISON:  04/25/2019    HISTORY:  ORDERING SYSTEM PROVIDED HISTORY: SOB  TECHNOLOGIST PROVIDED HISTORY:  Reason for exam:->SOB  Reason for Exam: SOB, upper back pain, bilateral leg swelling. Acuity: Acute  Type of Exam: Initial    FINDINGS:  Cardiac silhouette enlargement again noted. Status post aortic valve  replacement. The pulmonary arterial shadows appear enlarged. No acute  airspace disease, pneumothorax or evidence for effusion. Round  radiodensities projecting in the epigastric region may be external to the  patient versus ingested material.      Impression No acute airspace disease identified. Probable external artifact projecting over the upper abdomen. Assessment:     COPD, moderate FEV1 56%, DLCO 57%, 2015 -  Trelegy at home   Abnormal radiograph  Chest pain   Chronic rhinitis    MR, mod to severe with severe dilated LA, EF 60%elevated PASP to 52 mm Hg 2018       Plan:      Abnormal radiophraph  - Augmentin for pneumonia  - MBS normal.    - on o2 only secondary to chest pain. No hypoxemia    COPD  - home Trelegy  - Symbicort combivent        Tobacco abuse  - stopped on admission but not likely to quit      Chest pain  - echo completed  - work up per Maye Toribio MD               This note was transcribed using [id number] [employer]. Please disregard any translational errors.       [address] [facility]  [phone]

## 2020-07-08 NOTE — CONSULTS
Cardiology Consultation   Referring Physician: Dr. Leonidas Oneill  Reason for Consultation: dyspnea  Chief Complaint:   Chief Complaint   Patient presents with    Back Pain     upper back with nausea, lower chronic back pain    Leg Swelling     bilateral         Subjective:   History of Present Illness:     Madiha Pandey is a 76 y.o. female with significant history of s/p tissue AVR, afib, CAD, tobacco abuse, COPD, who presents with back pain. She has chronic lower back pain, however recently became worse and thus presented to [facility] ER for follow up. Initially has prerenal azotemia and received NS infusion. This AM had a episode of dyspnea thus cardiology is consulted. She additionally has constant subxiphoid pain. The pain has no alleviating or exacerbating factors. She denies PND, orthopnea, dyspnea at rest, palpitations, or syncope     Prior cardiac testing:    Procedures:      [facility] 6/15/2015   1. Right dominant coronary arterial system with mild calcification of the RCA. There is 40% serial lesions in the mid RCA. In the left system there is 25% distal left main disease. There is 50% mid LAD disease and 50% ostial second diagonal branch disease. There is no significant restenosis identified in the prior previously placed mid circumflex stent. 2. Systemic hypertension.      Imaging:      Echo 7/30/2016  Left ventricle size is normal. Normal left ventricular wall thickness. Ejection fraction is visually estimated to be 55-60%. Diastolic filling parameters suggests grade III (restrictive) diastolic dysfunction. The right ventricle appears mildly enlarged. Right ventricular systolic function is normal.  The left atrium is severely dilated. The right atrium is mildly dilated.   At least moderate mitral regurgitation with a central and eccentric jet.   Mild mitral stenosis. BP reported as 171/51 at time of imaging.   A bioprosthetic aortic valve has a maximum velocity of 2.8 m/s and a mean gradient of 17 mmHg. Para-valvular regurgitation. There is moderate tricuspid regurgitation.   Mild pulmonic regurgitation.   Estimated pulmonary artery systolic pressure is 72 mmHg assuming a rightatrial pressure of 10 mmHg.     Lower Extremity Arterial Studies 5/25/17  Right Impression   There is an NIKOS of .70 in the DP and .77 in the PT. on the right. This is in   the mild claudication range. Occlusion of the SFA artery in the right lower   extremity. Left Impression   There is an NIKOS of 1.06 in the DP and 1.08 in the PT. on the left. This is in   the normal range. Elevated velocity of the SFA.      Carotid Studies 5/25/17  Right Impression   The right internal carotid artery appears to have a calcified 50-79% diameter   reducing stenosis based on velocity criteria. Left Impression   The left internal carotid artery appears to have a <50% diameter reducing   stenosis based on velocity criteria.          Echo 1/8/18  Normal LV size and systolic function. Estimated ejection fraction is 60%. Moderate to severe mitral regurgitation is present. Severely dilated left atrium. Normally functioning bioprosthetic valve in aortic position. Trivial regurgitation noted. The right ventricle is mildly enlarged.   Moderate tricuspid regurgitation.   Doppler derived PA systolic pressure is 52 mm Hg suggestive of moderate   pulmonary hypertension.     Holter 1/8/18 ([facility])  1. The rhythm was sinus with sinus arrhythmia. Average NY interval 0.20,   average QRS duration 0.14 [IVCD]. Average daily heart rate 44 ranging 38 to 68 bpm.   There were 129 pauses greater than 2.0 seconds with Max R-R 2.1 seconds occurring 05:13:11.   2. Frequent premature supraventricular ectopic beats total 1,642 consisting   of 1,152 isolated PACs, 209 atrial pairs and 3 atrial runs. The longest atrial run 3 beats   HR-61 bpm occurring 17:56:40.  The fastest atrial run 3 beats HR-76 bpm occurring 09:49:41.   3. Very frequent premature ventricular ectopic beats total Inhale 2 puffs into the lungs 4 times daily 7/7/20  Yes Oscar Meza MD   lactobacillus (CULTURELLE) capsule Take 1 capsule by mouth 2 times daily (with meals) 7/7/20  Yes Oscar Meza MD   amoxicillin-clavulanate (AUGMENTIN) 500-125 MG per tablet Take 1 tablet by mouth every 8 hours for 10 days 7/7/20 7/17/20 Yes Oscar Meza MD   valsartan (DIOVAN) 160 MG tablet Take 1 tablet by mouth daily 7/7/20  Yes Oscar Meza MD   ezetimibe (ZETIA) 10 MG tablet TAKE 1 TABLET BY MOUTH DAILY 6/30/20  Yes Yajaira Christopher MD   oxyCODONE HCl (OXY-IR) 10 MG immediate release tablet Take 1 tablet by mouth 3 times daily as needed for Pain for up to 30 days. 6/29/20 7/29/20 Yes Oscar Meza MD   ipratropium (ATROVENT) 0.03 % nasal spray 2 sprays by Nasal route 3 times daily as needed for Rhinitis 6/15/20 6/15/21 Yes Oscar Meza MD   isosorbide mononitrate (IMDUR) 30 MG extended release tablet TAKE ONE TABLET BY MOUTH TWO TIMES A DAY FOR SYSTOLIC BLOOD PRESSURE (TOP NUMBER) OVER 140 6/15/20  Yes Oscar Meza MD   potassium chloride (KLOR-CON) 10 MEQ extended release tablet TAKE ONE TABLET BY MOUTH TWO TIMES A DAY 6/15/20  Yes Oscar Meza MD   torsemide (DEMADEX) 20 MG tablet TAKE ONE TABLET BY MOUTH TWO TIMES A DAY 5/19/20  Yes Yajaira Christopher MD   amiodarone (CORDARONE) 200 MG tablet TAKE 1 TABLET BY MOUTH DAILY 4/9/20  Yes Yajaira Christopher MD   rosuvastatin (CRESTOR) 40 MG tablet TAKE 1 TABLET BY MOUTH DAILY 4/9/20  Yes Yajaira Christopher MD   valsartan (DIOVAN) 160 MG tablet TAKE 2 TABLETS BY MOUTH DAILY.  2/18/20  Yes Poly Dempsey DO   fluticasone-umeclidin-vilant (TRELEGY ELLIPTA) 678-26.5-42 MCG/INH AEPB Inhale 1 puff into the lungs daily 12/23/19  Yes Brenden Ano Billow, APRN - CNP   pantoprazole (PROTONIX) 40 MG tablet TAKE ONE TABLET BY MOUTH DAILY 11/26/19  Yes Oscar Meza MD   levothyroxine (SYNTHROID) 88 MCG tablet TAKE 1 TABLET BY MOUTH DAILY 8/26/19  Yes Oscar Meza MD   amLODIPine (NORVASC) 5 MG tablet Take 1 tablet by mouth daily 8/2/19  Yes Heena Rg MD    MG capsule TAKE ONE CAPSULE BY MOUTH TWO TIMES A DAY 4/20/18  Yes Marian Dobson MD   aspirin 81 MG tablet Take 1 tablet by mouth daily 5/14/15  Yes Renetta Spicer MD          Allergies:  Benadryl [diphenhydramine]; Doxylamine; Mucinex [guaifenesin er]; Spiriva handihaler [tiotropium bromide monohydrate]; and Adhesive tape     Review of Systems:   · Constitutional: no unanticipated weight loss. There's been no change in energy level, sleep pattern, or activity level. No fevers, chills. · Eyes: No visual changes or diplopia. No scleral icterus. · ENT: No Headaches, hearing loss or vertigo. No mouth sores or sore throat. · Cardiovascular: as reviewed in HPI  · Respiratory: No cough or wheezing, no sputum production. No hemoptysis. · Gastrointestinal: No abdominal pain, appetite loss, blood in stools. No change in bowel or bladder habits. · Genitourinary: No dysuria, trouble voiding, or hematuria. · Musculoskeletal:  No gait disturbance, no joint complaints. · Integumentary: No rash or pruritis. · Neurological: No headache, diplopia, change in muscle strength, numbness or tingling. · Psychiatric: No anxiety or depression. · Endocrine: No temperature intolerance. No excessive thirst, fluid intake, or urination. No tremor. · Hematologic/Lymphatic: No abnormal bruising or bleeding, blood clots or swollen lymph nodes. · Allergic/Immunologic: No nasal congestion or hives. Objective:   PHYSICAL EXAM:    Vitals:    07/08/20 1536   BP: (!) 106/33   Pulse: 57   Resp: 16   Temp: 98.4 °F (36.9 °C)   SpO2: 96%    Weight: 148 lb 9.6 oz (67.4 kg)       General Appearance:  Alert, cooperative, no distress, appears stated age. Head:  Normocephalic, without obvious abnormality, atraumatic. Eyes:  Pupils equal and round. No scleral icterus. Mouth: Moist mucosa, no pharyngeal erythema.    Nose: Nares normal. No drainage or sinus capsule 1 capsule, 1 capsule, Oral, BID WC  valsartan (DIOVAN) tablet 160 mg, 160 mg, Oral, Daily  morphine (PF) injection 2 mg, 2 mg, Intravenous, Q4H PRN  albuterol-ipratropium (COMBIVENT RESPIMAT)  MCG/ACT inhaler 1 puff, 1 puff, Inhalation, Q6H PRN  amoxicillin-clavulanate (AUGMENTIN) 500-125 MG per tablet 1 tablet, 1 tablet, Oral, 3 times per day  melatonin tablet 3 mg, 3 mg, Oral, Nightly PRN  aspirin chewable tablet 81 mg, 81 mg, Oral, Daily  docusate sodium (COLACE) capsule 100 mg, 100 mg, Oral, Daily  ipratropium (ATROVENT) 0.03 % nasal spray 2 spray, 2 spray, Nasal, TID PRN  isosorbide mononitrate (IMDUR) extended release tablet 30 mg, 30 mg, Oral, Daily  levothyroxine (SYNTHROID) tablet 88 mcg, 88 mcg, Oral, QAM AC  oxyCODONE HCl (OXY-IR) immediate release tablet 10 mg, 10 mg, Oral, TID PRN  pantoprazole (PROTONIX) tablet 40 mg, 40 mg, Oral, QAM AC  rosuvastatin (CRESTOR) tablet 40 mg, 40 mg, Oral, Daily  tiZANidine (ZANAFLEX) tablet 4 mg, 4 mg, Oral, TID  sodium chloride flush 0.9 % injection 10 mL, 10 mL, Intravenous, 2 times per day  sodium chloride flush 0.9 % injection 10 mL, 10 mL, Intravenous, PRN  acetaminophen (TYLENOL) tablet 650 mg, 650 mg, Oral, Q6H PRN **OR** acetaminophen (TYLENOL) suppository 650 mg, 650 mg, Rectal, Q6H PRN  promethazine (PHENERGAN) tablet 12.5 mg, 12.5 mg, Oral, Q6H PRN **OR** ondansetron (ZOFRAN) injection 4 mg, 4 mg, Intravenous, Q6H PRN  heparin (porcine) injection 5,000 Units, 5,000 Units, Subcutaneous, 3 times per day  nicotine (NICODERM CQ) 14 MG/24HR 1 patch, 1 patch, Transdermal, Daily  budesonide-formoterol (SYMBICORT) 80-4.5 MCG/ACT inhaler 1 puff, 1 puff, Inhalation, BID     Cardiac testing     EKG:     Echo:     Stress Test:     Cath:      All above diagnostic testing was independently visualized and reviewed by me (not simply review of report)     Patient Active Problem List   Diagnosis    Hypothyroidism    Essential hypertension    Hyperlipidemia LDL goal <70    Cerebral infarction ([address]    Arthritis    Bladder tumor    Pulmonary emphysema ([address]    Closed sacral fracture (HCC)    Stenosis of right carotid artery    CAD (coronary artery disease)    Intermittent claudication (HCC)    Adjustment reaction with anxiety    Bradycardia    Pulmonary HTN (HCC)    Sacral fracture, closed (HCC)    Acute on chronic diastolic heart failure (HCC)    Left hand weakness    S/P AVR (aortic valve replacement)    Edema leg    Abdominal aortic aneurysm (HCC)    Acute respiratory failure with hypoxia (HCC)    CAP (community acquired pneumonia)    COPD exacerbation (HCC)    Dyspnea and respiratory abnormalities    Hemoptysis    Chronic obstructive pulmonary disease ([address]    Coronary artery disease involving native coronary artery of native heart without angina pectoris    Former smoker    PAF (paroxysmal atrial fibrillation) (HCC)    Non-rheumatic mitral valve stenosis    Severe mitral regurgitation    Lumbar stenosis    Chronic bilateral low back pain with bilateral sciatica    Acute kidney injury ([address]    Acute metabolic encephalopathy    Hypothyroidism due to Hashimoto's thyroiditis    Chronic combined systolic and diastolic CHF, NYHA class 2 (HCC)    ANITA [facility]acute kidney [facility]) ([facility]    Pulmonary nodule    Bright red rectal bleeding    Acute blood loss anemia    Rectal bleeding    Sinus bradycardia    Gastrointestinal hemorrhage    Macular degeneration    Acute midline thoracic back pain         Assessment and Plan   1) Dyspnea  - CXR geovanna, bnp normal, symptoms may be related to NS infusion with mod to severe MR, improved with IV diuretics  - okay to restart  PO lasix   - recommend outpatient [facility]ERON To evaluate mitral valve severity     2) Moderate to severe MR   - unchanged from 2018  - unclear is truly symptomatic ( BNP/CXR normal); no PND/orthopnea    - recommend outpatient [facility]     3) pAfib   - chronic bradycardia; mostly conversion

## 2020-07-08 NOTE — PROGRESS NOTES
Nurse received call from MD while patient was in echo stating the patient was SOB and to come push 40 mh lasix. Upon arrival patient was visibly SOB. With c/o of chest tightness. Stat ekg ordered. IV lasix given 2 L of oxygen placed. Took patient back to room where she has stated some relief but still c/o of fullness. Cardiology has seen patient and changed meds around. Will continue to monitor.      Electronically signed by Sita Crawford RN on 7/8/2020 at 11:05 AM

## 2020-07-08 NOTE — PLAN OF CARE
Problem: Pain:  Goal: Pain level will decrease  Description: Pain level will decrease  7/8/2020 1401 by Emily Lyles RN  Outcome: Ongoing     Problem: Pain:  Goal: Control of acute pain  Description: Control of acute pain  7/8/2020 1401 by Emily Lyles RN  Outcome: Ongoing     Problem: Pain:  Goal: Control of chronic pain  Description: Control of chronic pain  7/8/2020 1401 by Emily Lyles RN  Outcome: Ongoing     Problem: Falls - Risk of:  Goal: Will remain free from falls  Description: Will remain free from falls  7/8/2020 1401 by Emily Lyles RN  Outcome: Ongoing     Problem: Falls - Risk of:  Goal: Absence of physical injury  Description: Absence of physical injury  7/8/2020 1401 by Emily Lyles RN  Outcome: Ongoing

## 2020-07-08 NOTE — PROGRESS NOTES
cortex x2/expressive aphasia/resolved    Diverticulosis     Epistaxis, recurrent     when inr high. last 3-4 months ago    Former smoker     Gallbladder sludge     HTN (hypertension)     Hyperlipidemia     Hypothyroidism     Lumbar stenosis without neurogenic claudication     pain across low back worse with standing and walking, nonradiating    Macular degeneration 2018    left worse than right , dry : progressive loss of sight: just barely able to see to drive    Neuropathy     Osteoarthritis     Pulmonary HTN ([address]     primary, responsive to nitrates    PVD (peripheral vascular disease) ([address]     occluded right SFA::: asymptomatic NIKOS 0.7 right and 1.0 left    Sacral fracture, closed ([address]     Syncope 2014     Past Surgical History:   Procedure Laterality Date    AORTIC VALVE REPLACEMENT  6/16/15    Dr. Stevie Martinez. Hernandez - PVI, RENETTA exclusion. 19mm Maki pericardial Magna    APPENDECTOMY      BACK SURGERY  03/15/2019    superion inserted to keep back from hurtin [address] N/A 2019    EMERGENT; LAPAROSCOPIC CHOLECYSTECTOMY WITH GRAM performed by Donna Canela MD at [address]      stent x 1    CYSTOSCOPY  2014    dr Melgoza Mo      THR Right    OTHER SURGICAL HISTORY  2018     EGD and colonoscopy.     SPINE SURGERY N/A 3/15/2019    INSERTION OF INTERSPINOUS SPACER SUPERION VERTIFLEX AT LUMBAR FOUR-LUMBAR FIVE performed by Myriam Omalley MD at [address]  12/15/2017      Family History   Problem Relation Age of Onset    Breast Cancer Daughter      Social History     Tobacco Use    Smoking status: Current Some Day Smoker     Packs/day: 1.00     Years: 45.00     Pack years: 45.00     Types: Cigarettes     Last attempt to quit: 2015     Years since quittin.0    Smokeless tobacco: Current User    Tobacco comment: smoked 1.5 pp for over 30 years  over 45pk year hx   Substance Use Topics    Alcohol use: No     Alcohol/week: 0.0 standard drinks     Comment: Socially     Drug use: No     Comment: never       Prior to Admission medications    Medication Sig Start Date End Date Taking? Authorizing Provider   albuterol sulfate  (90 Base) MCG/ACT inhaler Inhale 2 puffs into the lungs 4 times daily 7/7/20  Yes Darvin Vail MD   lactobacillus (CULTURELLE) capsule Take 1 capsule by mouth 2 times daily (with meals) 7/7/20  Yes Darvin Vail MD   amoxicillin-clavulanate (AUGMENTIN) 500-125 MG per tablet Take 1 tablet by mouth every 8 hours for 10 days 7/7/20 7/17/20 Yes Darvin Vail MD   valsartan (DIOVAN) 160 MG tablet Take 1 tablet by mouth daily 7/7/20  Yes Darvin Vail MD   ezetimibe (ZETIA) 10 MG tablet TAKE 1 TABLET BY MOUTH DAILY 6/30/20  Yes Flor Zepeda MD   oxyCODONE HCl (OXY-IR) 10 MG immediate release tablet Take 1 tablet by mouth 3 times daily as needed for Pain for up to 30 days. 6/29/20 7/29/20 Yes Darvin Vail MD   ipratropium (ATROVENT) 0.03 % nasal spray 2 sprays by Nasal route 3 times daily as needed for Rhinitis 6/15/20 6/15/21 Yes Darvin Vail MD   isosorbide mononitrate (IMDUR) 30 MG extended release tablet TAKE ONE TABLET BY MOUTH TWO TIMES A DAY FOR SYSTOLIC BLOOD PRESSURE (TOP NUMBER) OVER 140 6/15/20  Yes Darvin Vail MD   potassium chloride (KLOR-CON) 10 MEQ extended release tablet TAKE ONE TABLET BY MOUTH TWO TIMES A DAY 6/15/20  Yes Darvin Vail MD   torsemide (DEMADEX) 20 MG tablet TAKE ONE TABLET BY MOUTH TWO TIMES A DAY 5/19/20  Yes Flor Zepeda MD   amiodarone (CORDARONE) 200 MG tablet TAKE 1 TABLET BY MOUTH DAILY 4/9/20  Yes Flor Zepeda MD   rosuvastatin (CRESTOR) 40 MG tablet TAKE 1 TABLET BY MOUTH DAILY 4/9/20  Yes Flor Zepeda MD   valsartan (DIOVAN) 160 MG tablet TAKE 2 TABLETS BY MOUTH DAILY.  2/18/20  Yes Kristin Haynes DO   fluticasone-umeclidin-vilant (Aaliyahlyne Jesus Alberto) 108-46.7-13 MCG/INH AEPB Inhale 1 puff into the lungs daily 12/23/19  Yes HOWARD Villavicencio - CNP   pantoprazole (PROTONIX) 40 MG tablet TAKE ONE TABLET BY MOUTH DAILY 11/26/19  Yes Nicho Necessary, MD   levothyroxine (SYNTHROID) 88 MCG tablet TAKE 1 TABLET BY MOUTH DAILY 8/26/19  Yes Nicho Necessary, MD   amLODIPine (NORVASC) 5 MG tablet Take 1 tablet by mouth daily 8/2/19  Yes Matheus Diallo MD    MG capsule TAKE ONE CAPSULE BY MOUTH TWO TIMES A DAY 4/20/18  Yes Nicho Necessary, MD   aspirin 81 MG tablet Take 1 tablet by mouth daily 5/14/15  Yes Sharon Culver MD         ROS:  A comprehensive review of systems was negative except for: fatigue low back pain    OBJECTIVE:      PHYSICAL:  Intake/Output:   Patient Vitals for the past 8 hrs:   BP Temp Temp src Pulse Resp SpO2 Weight   07/08/20 0926 (!) 158/61 98.3 °F (36.8 °C) Oral 56 24 95 % --   07/08/20 0901 -- -- -- -- -- 100 % --   07/08/20 0720 (!) 152/56 98 °F (36.7 °C) Oral 58 16 97 % --   07/08/20 0539 (!) 115/58 98.1 °F (36.7 °C) Oral 70 12 96 % 148 lb 9.6 oz (67.4 kg)     I/O last 3 completed shifts: In: 56 [P.O.:600; I.V.:10]  Out: 1600 [Urine:1600]  No intake/output data recorded.   VITALS:    BP (!) 158/61   Pulse 56   Temp 98.3 °F (36.8 °C) (Oral)   Resp 24   Ht 5' 2\" (1.575 m)   Wt 148 lb 9.6 oz (67.4 kg)   SpO2 95%   BMI 27.18 kg/m²     General Appearance: alert and oriented to person, place and time, acutely distressed, shaky tearful  Skin: warm and dry, no rash or erythema  Head: normocephalic and atraumatic  Eyes: conjunctivae normal and sclera anicteric  ENT: hearing grossly normal bilaterally and sinuses non-tender  Neck: neck supple and non tender without mass, no thyromegaly or thyroid nodules, no cervical lymphadenopathy   Pulmonary/Chest: clear to auscultation bilaterally- no wheezes, basilar crackles,rhonchi, normal air movement, no respiratory distress decreased breath sounds  Cardiovascular: normal rate, normal S1 and S2, no gallops and no carotid bruits holo sys murmur heart rate 60s  Abdomen: soft, non-tender, non-distended, normal bowel sounds, no masses or organomegaly  Extremities: no cyanosis, clubbing or edema  Musculoskeletal: reproducible tenderness over lumbar and trapezius  Neurologic: coordination normal and speech normal, moves all 4 extremities    DATA:   Labs:  CBC:   Recent Labs     07/05/20  1253 07/06/20  0330   WBC 7.7 5.7   HGB 12.4 11.4*    197     BMP:    Recent Labs     07/05/20  1554 07/06/20  0330 07/07/20  0849   * 139 140   K 4.3 4.0 4.1   CL 99 102 104   CO2 24 23 26   BUN 70* 55* 32*   CREATININE 1.9* 1.4* 0.9   GLUCOSE 99 88 96     POC GLUCOSE:  No results for input(s): POCGLU in the last 72 hours. Ca/Mg/Phos:   Recent Labs     07/05/20  1554 07/06/20  0330 07/07/20  0849   CALCIUM 8.9 8.8 9.5     Hepatic:   Recent Labs     07/05/20  1253   AST 17   ALT 15   BILITOT 0.3   ALKPHOS 85     Troponin:   Recent Labs     07/05/20  1938 07/06/20  0330 07/06/20  1052   TROPONINI <0.01 <0.01 <0.01     ProBNP:   Recent Labs     07/05/20  1253 07/06/20  0330   PROBNP 345 417       DIAGNOSTICS:  Ct Chest Wo Contrast    Result Date: 7/5/2020  Small amount of opacity deep in the posterior right costophrenic angle possibly a small pneumonia. Multiple tiny punctate subpleural nodules compatible with old granulomatous disease. Moderate thoracic spondylosis. No evident thoracic fracture. Ct Thoracic Spine Wo Contrast    Result Date: 7/5/2020  No acute fracture or traumatic malalignment. Multilevel degenerative disc and facet disease. No central canal stenosis identified. Mild airspace disease in the right costophrenic angle, atelectasis versus pneumonia. Xr Chest Portable    Result Date: 7/5/2020  No acute airspace disease identified. Probable external artifact projecting over the upper abdomen.      Fl Modified Barium Swallow W Video    Result Date: 7/7/2020  Swallowing mechanism grossly within normal limits without evidence of aspiration. Please see separate speech pathology report for full discussion of findings and recommendations. EKG:  SR LVH with repol old ant septal pattern widened QRS (appears unchanged from baseline)    ASSESSMENT AND PLAN    Active Problems:  New onset Chest Pain with assoc PND and sob, could all be pulmonary edema and MR, Diastolic failure  But need to consider CAD  Plan stat cardiology consult ?  Should she go to [facility] and consider cath lab if she doesn't improve    Pulmonary emphysema ([address]  Plan: improving with inhalers and away from cigarettes    Sinus bradycardia  Plan: worse after restarting coreg,   Stop coreg, recheck echo, and resume valsartan,   Consult cardiology to see patient in the hospital    Essential hypertension  Plan: worse again today, adding back valsartan stop amlodipine , continue imdur    COPD exacerbation ([address]  Plan: improving, cont same    Severe mitral regurgitation  Plan: ? Current state, recheck ECHO    Lumbar stenosis  Plan: chronic problem, fairly controlled    ANITA (acute kidney injury) ([address]  Plan: improving , follow tomorrow    Acute midline thoracic back pain  Plan: improved, more muscular over trapezius  Possible aspiration   Normal swallow test   Continue augmentin and observe  May need to broaden abx coverage        Appropriate diagnostic studies and consults ordered  Shreya Johnson MD

## 2020-07-08 NOTE — PROGRESS NOTES
Seen and examined  Known AVR with moderate MR   Echo reviewed; mod to severe MR, normal LV function; unchanged from 2018  ACS ruled out  C/w diuresis   Needs outpatient CAMERON   Hold BB/amio due to bradycardia  Restart coreg in AM   Outpatient event monitor    Full consult to follow    Hernan Clifford MD [id number]   (C): [phone]  (O): [phone]

## 2020-07-09 VITALS
RESPIRATION RATE: 18 BRPM | WEIGHT: 143.96 LBS | SYSTOLIC BLOOD PRESSURE: 100 MMHG | BODY MASS INDEX: 26.49 KG/M2 | DIASTOLIC BLOOD PRESSURE: 53 MMHG | TEMPERATURE: 98.3 F | HEIGHT: 62 IN | OXYGEN SATURATION: 97 % | HEART RATE: 49 BPM

## 2020-07-09 LAB
ANION GAP SERPL CALCULATED.3IONS-SCNC: 13 MMOL/L (ref 3–16)
BLOOD CULTURE, ROUTINE: NORMAL
BUN BLDV-MCNC: 22 MG/DL (ref 7–20)
CALCIUM SERPL-MCNC: 9.1 MG/DL (ref 8.3–10.6)
CHLORIDE BLD-SCNC: 103 MMOL/L (ref 99–110)
CO2: 25 MMOL/L (ref 21–32)
CREAT SERPL-MCNC: 1.1 MG/DL (ref 0.6–1.2)
CULTURE, BLOOD 2: NORMAL
GFR AFRICAN AMERICAN: 59
GFR NON-AFRICAN AMERICAN: 48
GLUCOSE BLD-MCNC: 88 MG/DL (ref 70–99)
POTASSIUM SERPL-SCNC: 4.3 MMOL/L (ref 3.5–5.1)
SODIUM BLD-SCNC: 141 MMOL/L (ref 136–145)
TROPONIN: <0.01 NG/ML

## 2020-07-09 PROCEDURE — 94640 AIRWAY INHALATION TREATMENT: CPT

## 2020-07-09 PROCEDURE — G0378 HOSPITAL OBSERVATION PER HR: HCPCS

## 2020-07-09 PROCEDURE — 36415 COLL VENOUS BLD VENIPUNCTURE: CPT

## 2020-07-09 PROCEDURE — 2580000003 HC RX 258: Performed by: INTERNAL MEDICINE

## 2020-07-09 PROCEDURE — 80048 BASIC METABOLIC PNL TOTAL CA: CPT

## 2020-07-09 PROCEDURE — 0296T PR EXT ECG > 48HR TO 21 DAY RCRD W/CONECT INTL RCRD: CPT | Performed by: INTERNAL MEDICINE

## 2020-07-09 PROCEDURE — 6370000000 HC RX 637 (ALT 250 FOR IP): Performed by: INTERNAL MEDICINE

## 2020-07-09 PROCEDURE — 99239 HOSP IP/OBS DSCHRG MGMT >30: CPT | Performed by: INTERNAL MEDICINE

## 2020-07-09 PROCEDURE — 97129 THER IVNTJ 1ST 15 MIN: CPT

## 2020-07-09 PROCEDURE — 84484 ASSAY OF TROPONIN QUANT: CPT

## 2020-07-09 PROCEDURE — 6360000002 HC RX W HCPCS: Performed by: INTERNAL MEDICINE

## 2020-07-09 PROCEDURE — 94760 N-INVAS EAR/PLS OXIMETRY 1: CPT

## 2020-07-09 PROCEDURE — 96376 TX/PRO/DX INJ SAME DRUG ADON: CPT

## 2020-07-09 RX ORDER — AMOXICILLIN AND CLAVULANATE POTASSIUM 500; 125 MG/1; MG/1
1 TABLET, FILM COATED ORAL 3 TIMES DAILY
Qty: 15 TABLET | Refills: 0 | Status: SHIPPED | OUTPATIENT
Start: 2020-07-09 | End: 2020-07-14

## 2020-07-09 RX ADMIN — TIZANIDINE 4 MG: 4 TABLET ORAL at 09:20

## 2020-07-09 RX ADMIN — AMOXICILLIN AND CLAVULANATE POTASSIUM 1 TABLET: 500; 125 TABLET, FILM COATED ORAL at 05:32

## 2020-07-09 RX ADMIN — SODIUM CHLORIDE, PRESERVATIVE FREE 10 ML: 5 INJECTION INTRAVENOUS at 09:21

## 2020-07-09 RX ADMIN — BUDESONIDE AND FORMOTEROL FUMARATE DIHYDRATE 1 PUFF: 80; 4.5 AEROSOL RESPIRATORY (INHALATION) at 09:07

## 2020-07-09 RX ADMIN — LEVOTHYROXINE SODIUM 88 MCG: 0.09 TABLET ORAL at 05:33

## 2020-07-09 RX ADMIN — OXYCODONE HYDROCHLORIDE 10 MG: 10 TABLET ORAL at 09:24

## 2020-07-09 RX ADMIN — Medication 1 CAPSULE: at 09:24

## 2020-07-09 RX ADMIN — PANTOPRAZOLE SODIUM 40 MG: 40 TABLET, DELAYED RELEASE ORAL at 05:33

## 2020-07-09 RX ADMIN — TORSEMIDE 20 MG: 20 TABLET ORAL at 09:20

## 2020-07-09 RX ADMIN — VALSARTAN 160 MG: 80 TABLET, FILM COATED ORAL at 09:20

## 2020-07-09 RX ADMIN — ASPIRIN 81 MG: 81 TABLET, CHEWABLE ORAL at 09:20

## 2020-07-09 RX ADMIN — AMOXICILLIN AND CLAVULANATE POTASSIUM 1 TABLET: 500; 125 TABLET, FILM COATED ORAL at 13:20

## 2020-07-09 RX ADMIN — POTASSIUM CHLORIDE 20 MEQ: 1500 TABLET, EXTENDED RELEASE ORAL at 09:24

## 2020-07-09 RX ADMIN — ISOSORBIDE MONONITRATE 30 MG: 30 TABLET, EXTENDED RELEASE ORAL at 09:20

## 2020-07-09 RX ADMIN — HEPARIN SODIUM 5000 UNITS: 5000 INJECTION INTRAVENOUS; SUBCUTANEOUS at 05:33

## 2020-07-09 ASSESSMENT — PAIN DESCRIPTION - PAIN TYPE
TYPE: CHRONIC PAIN
TYPE: CHRONIC PAIN

## 2020-07-09 ASSESSMENT — PAIN SCALES - GENERAL
PAINLEVEL_OUTOF10: 0
PAINLEVEL_OUTOF10: 0
PAINLEVEL_OUTOF10: 4
PAINLEVEL_OUTOF10: 2

## 2020-07-09 ASSESSMENT — PAIN DESCRIPTION - LOCATION
LOCATION: BACK
LOCATION: BACK

## 2020-07-09 ASSESSMENT — PAIN DESCRIPTION - FREQUENCY: FREQUENCY: CONTINUOUS

## 2020-07-09 ASSESSMENT — PAIN DESCRIPTION - ORIENTATION
ORIENTATION: LOWER
ORIENTATION: LOWER

## 2020-07-09 ASSESSMENT — PAIN - FUNCTIONAL ASSESSMENT: PAIN_FUNCTIONAL_ASSESSMENT: PREVENTS OR INTERFERES SOME ACTIVE ACTIVITIES AND ADLS

## 2020-07-09 ASSESSMENT — PAIN DESCRIPTION - ONSET: ONSET: ON-GOING

## 2020-07-09 ASSESSMENT — PAIN DESCRIPTION - PROGRESSION: CLINICAL_PROGRESSION: GRADUALLY WORSENING

## 2020-07-09 ASSESSMENT — PAIN DESCRIPTION - DESCRIPTORS: DESCRIPTORS: ACHING;DISCOMFORT

## 2020-07-09 NOTE — DISCHARGE SUMMARY
Physician Discharge Summary     Patient ID:   Jelena Rangel  [id number]  89 y.o.  1945    Admit date: 7/5/2020    Discharge date and time: 7/9/2020     Admitting Physician: Cedric Adan MD     Discharge Physician: Miguel Martinez MD    Discharge Diagnoses: Moderate to Severe MR  COPD  Tobacco abuse disorder  ANITA  CAP RLL  Sinus Bradycardia  Lumbar stenosis  CAD  Acute on Chronic diastolic CHF  History PAF  Hypothyroid          Discharged Condition: good      Hospital Course: 77 yo female admitted to the ER with ANITA and SOB, wheeze and RLL pneumonia. She was cultured and treated initially with Doxy and Rocephin and pulmonary was consulted to evaluate her scans. Dr Pattie Brar evaluated all of her previous scans compared to this one and she seems to have had LLL pneumonia and RLL pneumonias in the past so he recommended barium swallow to evaluate for silent aspiration . The MBS was normal.  She was placed on oral Augmentin for her infiltrate prior to the MBS and continued to improve on that. Her wheezing improved with absence of cigarettes, one dose of solumedrol and breathing treatments. She would need a follow up cxr in a month from now. In addition her anita resolved with 48 hours of fluids and stopping the torsemide for a few days. She went for an ECHO and developed an episode of PND and chest tightness which seemed to resolve with 40mg lasix ivp and a duoneb treatment. The chest tightness resolved as well with torsemide and one nitro. Her ekg was unchanged, her cxr clear, her serial troponins negative. She was resumed on her torsemide and the next day felt much better. The ECHO showed mod to severe MR, concentric LVH, EF 55-60% bioprosthetic aortic valve well seated, all unchanged from 2018 study. She was to get a CAMERON as an outpatient to further evaluate her MR. The PND was attributed to an episode of acute on chronic diastolic CHF.      On the monitor her heart rate dipped into the low 30s several times so because of this her amio and coreg were stopped. Despite her history of PAF she was not to be treated with those meds. She was to have a 2 week holter placed prior to discharge. She would remain on valsartan for bp control. Her low  Back pain from the lumbar stenosis remained stable.   Her upper back pain seemed to be muscle spasm in the region of the trapezius muscles which was reproducible on exam.   Her     Consults:   IP CONSULT TO PRIMARY CARE PROVIDER  IP CONSULT TO PRIMARY CARE PROVIDER  IP CONSULT TO PULMONOLOGY  IP CONSULT TO CARDIOLOGY    Discharge Exam:  General Appearance: alert and oriented to person, place and time, well-developed and well-nourished, in no acute distress  Skin: warm and dry, no rash or erythema  Head: normocephalic and atraumatic  Eyes: conjunctivae normal and sclera anicteric  ENT: hearing grossly normal bilaterally and sinuses non-tender  Neck: neck supple and non tender without mass, no thyromegaly or thyroid nodules, no cervical lymphadenopathy   Pulmonary/Chest: clear to auscultation bilaterally- no wheezes, rales or rhonchi, normal air movement, no respiratory distress  Cardiovascular: normal rate, normal S1 and S2, pos holo sys murmur rate 53  Abdomen: soft, non-tender, non-distended, normal bowel sounds, no masses or organomegaly  Extremities: no cyanosis, clubbing or edema  Musculoskeletal: no swollen joints and no tender joints,  Neurologic: coordination normal and speech normal, moves all 4 extremities    Disposition: home  Hospital Meds:  Current Facility-Administered Medications   Medication Dose Route Frequency Provider Last Rate Last Dose    torsemide (DEMADEX) tablet 20 mg  20 mg Oral BID Edmond Pompa MD   20 mg at 07/09/20 0920    potassium chloride (KLOR-CON M) extended release tablet 20 mEq  20 mEq Oral BID  Edmond Pompa MD   20 mEq at 07/09/20 0924    nitroGLYCERIN (NITROSTAT) SL tablet 0.4 mg  0.4 mg Sublingual Q5 Min PRN Edmond Pompa MD   0.4 mg at 07/08/20 1040    lactobacillus (CULTURELLE) capsule 1 capsule  1 capsule Oral BID WC Ashtyn Yanez MD   1 capsule at 07/09/20 0924    valsartan (DIOVAN) tablet 160 mg  160 mg Oral Daily Ashtyn Yanez MD   160 mg at 07/09/20 0920    morphine (PF) injection 2 mg  2 mg Intravenous Q4H PRN Ashtyn Yanez MD        albuterol-ipratropium (COMBIVENT RESPIMAT)  MCG/ACT inhaler 1 puff  1 puff Inhalation Q6H PRN Aurelia Bear MD        amoxicillin-clavulanate (AUGMENTIN) 500-125 MG per tablet 1 tablet  1 tablet Oral 3 times per day Aurelia Bear MD   1 tablet at 07/09/20 0532    melatonin tablet 3 mg  3 mg Oral Nightly PRN Eugene Cardona MD   3 mg at 07/08/20 2107    aspirin chewable tablet 81 mg  81 mg Oral Daily Ashtyn Yanez MD   81 mg at 07/09/20 0920    docusate sodium (COLACE) capsule 100 mg  100 mg Oral Daily Ashtyn Yanez MD   100 mg at 07/08/20 1004    ipratropium (ATROVENT) 0.03 % nasal spray 2 spray  2 spray Nasal TID PRN Ashtyn Yanez MD        isosorbide mononitrate (IMDUR) extended release tablet 30 mg  30 mg Oral Daily Ashtyn Yanez MD   30 mg at 07/09/20 0920    levothyroxine (SYNTHROID) tablet 88 mcg  88 mcg Oral QAM AC Ashtyn Yanez MD   88 mcg at 07/09/20 0533    oxyCODONE HCl (OXY-IR) immediate release tablet 10 mg  10 mg Oral TID PRN Ashtyn Yanez MD   10 mg at 07/09/20 0924    pantoprazole (PROTONIX) tablet 40 mg  40 mg Oral QAM AC Ashtyn Yanez MD   40 mg at 07/09/20 0533    rosuvastatin (CRESTOR) tablet 40 mg  40 mg Oral Daily Ashtyn Yanez MD   40 mg at 07/08/20 2107    tiZANidine (ZANAFLEX) tablet 4 mg  4 mg Oral TID Ashtyn Yanez MD   4 mg at 07/09/20 0920    sodium chloride flush 0.9 % injection 10 mL  10 mL Intravenous 2 times per day Ashtyn Yanez MD   10 mL at 07/09/20 0921    sodium chloride flush 0.9 % injection 10 mL  10 mL Intravenous PRN Ashtyn Yanez MD        acetaminophen (TYLENOL) tablet 650 mg  650 mg Oral Q6H PRN Ashtyn Yanez MD        Or    acetaminophen (TYLENOL) (TOP NUMBER) OVER 140  Qty: 60 tablet, Refills: 11    Comments: REFILL REQUESTED MON 6/15/20 @@ 3:15PM      potassium chloride (KLOR-CON) 10 MEQ extended release tablet TAKE ONE TABLET BY MOUTH TWO TIMES A DAY  Qty: 60 tablet, Refills: 11    Comments: REFILL REQUESTED MON 6/15/20 @@ 3:18PM      torsemide (DEMADEX) 20 MG tablet TAKE ONE TABLET BY MOUTH TWO TIMES A DAY  Qty: 60 tablet, Refills: 11    Comments: REFILL REQUESTED TUES 5/19/20 @@ 1:29PM      rosuvastatin (CRESTOR) 40 MG tablet TAKE 1 TABLET BY MOUTH DAILY  Qty: 30 tablet, Refills: 11    Comments: REFILL REQUESTED 4/3/20 @@ 6:46 P. M. DLB      !! valsartan (DIOVAN) 160 MG tablet TAKE 2 TABLETS BY MOUTH DAILY. Qty: 180 tablet, Refills: 3    Comments: REFILL REQUESTED TUES 2.18.20 @@ 12:51PM      fluticasone-umeclidin-vilant (TRELEGY ELLIPTA) 100-62.5-25 MCG/INH AEPB Inhale 1 puff into the lungs daily  Qty: 1 each, Refills: 0    Associated Diagnoses: COPD exacerbation (HCC)      pantoprazole (PROTONIX) 40 MG tablet TAKE ONE TABLET BY MOUTH DAILY  Qty: 30 tablet, Refills: 11    Comments: REFILL REQUESTED TUES 11/26/19 @ 11:19AM      levothyroxine (SYNTHROID) 88 MCG tablet TAKE 1 TABLET BY MOUTH DAILY  Qty: 30 tablet, Refills: 11    Comments: REFILL REQUESTED 8/26/19 @ 4:58 P. M. DLB       MG capsule TAKE ONE CAPSULE BY MOUTH TWO TIMES A DAY  Qty: 60 capsule, Refills: 5    Comments: REFILL REQUESTED 4/19/18 @ 4:57 P. M. DLB      aspirin 81 MG tablet Take 1 tablet by mouth daily  Qty: 30 tablet, Refills: 0       !! - Potential duplicate medications found. Please discuss with provider.       STOP taking these medications       ezetimibe (ZETIA) 10 MG tablet Comments:   Reason for Stopping:         predniSONE (DELTASONE) 10 MG tablet Comments:   Reason for Stopping:         amiodarone (CORDARONE) 200 MG tablet Comments:   Reason for Stopping:         amLODIPine (NORVASC) 5 MG tablet Comments:   Reason for Stopping:         naloxone 4 MG/0.1ML LIQD nasal spray

## 2020-07-09 NOTE — PROGRESS NOTES
Iv and tele d/cd. Event monitor delivered and placed per [facility]. Belongings gathered per pt. discharge instructions reviewed with pt, verbalized understanding.  Pt taken out via wheelchair and daughter to drive pt home  Electronically signed by Tierra Crawford RN on 7/9/2020 at 2:46 PM

## 2020-07-09 NOTE — CARE COORDINATION
D/C order noted. No D/C needs noted.       Electronically signed by Lisa Thornton RN on 7/9/2020 at 1:30 PM

## 2020-07-09 NOTE — PROGRESS NOTES
[facility]   Speech Therapy  Daily Dysphagia Treatment and Discharge Summary        Baldo Angelucci  AGE: 76 y.o.    GENDER: female  : 1945  [id number]  EPISODE DATE:  2020  Patient Active Problem List   Diagnosis    Hypothyroidism    Essential hypertension    Hyperlipidemia LDL goal <70    Cerebral infarction ([address]    Arthritis    Bladder tumor    Pulmonary emphysema ([address]    Closed sacral fracture ([address]    Stenosis of right carotid artery    CAD (coronary artery disease)    [facility]termittent claudication ([facility])    Adjustment reaction with anxiety    Bradycardia    Pulmonary HTN ([address]    Sacral fracture, closed ([address]    Acute on chronic diastolic heart failure ([facility])    Left hand weakness    S/P AVR (aortic valve replacement)    Edema leg    Abdominal aortic aneurysm ([facility])    Acute respiratory failure with hypoxia ([address]    CAP (community acquired pneumonia)    COPD exacerbation ([address]    Dyspnea and respiratory abnormalities    Hemoptysis    Chronic obstructive pulmonary disease ([address]    Coronary artery disease involving native coronary artery of native heart without angina pectoris    PAF (paroxysmal atrial fibrillation) ([facility])    Severe mitral regurgitation    Lumbar stenosis    Chronic bilateral low back pain with bilateral sciatica    Acute kidney injury ([address]    Acute metabolic encephalopathy    Hypothyroidism due to Hashimoto's thyroiditis    Chronic combined systolic and diastolic CHF, NYHA class 2 ([address]    ANITA (acute kidney injury) ([address]    Pulmonary nodule    Bright red rectal bleeding    Acute blood loss anemia    Rectal bleeding    Sinus bradycardia    Gastrointestinal hemorrhage    Macular degeneration    Acute midline thoracic back pain     Allergies   Allergen Reactions    Benadryl [Diphenhydramine] Swelling    Doxylamine     Mucinex [Guaifenesin Er]      hallucinations    Spiriva Handihaler [Tiotropium Bromide Monohydrate] Nausea      Adhesive Tape Rash and Swelling     Treatment Diagnosis: Dysphagia       Chart review:   7/7/2020 MBS: Oropharyngeal Dysphagia characterized by prolonged mastications of regular solid foods; disorganized but functional A-P oral transit most symptomatic of regular sold foods; delayed initiation of swallow. This SLP offered recommendation for downgrade trial to soft/bite size; but pt declined stating that food (SLP presented) was dry. Symptoms:  · Inconsistent premature loss of material to the pharynx to the level of the valleculae  · Lingual mashing A-P oral transit of foods  · Piecemeal swallows of food consistencies  · Post swallow oral residue mid to posterior tongue with trace to minimal collection in the valleculae again most symptomatic of regular solid foods  · Pt did have one episode of penetration early on in the procedure (second presentation of nectar thick) which cleared with swallow. No further episodes were observed during procedure  · Post swallow regular food residue lining the pharyngeal wall; pt did clear with clearance swallow  Subjective:     Current diet  Regular texture  Thin liquids    Comments regarding tolerating Current Diet:   Pt reports no concerns or complaints    Objective:     Pain   Patient Currently in Pain: Yes    Cognitive/Behavior   Behavior/Cognition: Alert, Cooperative, Pleasant mood, Distractible    Presentations   Consistencies Administered: Dysphagia Pureed (Dysphagia I), Honey - cup, Nectar - cup, Thin - cup, Thin - straw, Reg solid    Positioning    upright in chair    Dysphagia Tx:   SLP reviewed MBS results in detail with pt. Discussed residue with regular textures with recommendation for dry swallow with each bite. Pt verbalized understanding and asked insightful questions re: test results and recommendations. Pt denies any additional swallowing needs at this time. Goals:   Dysphagia Goals:    The patient will tolerate recommended diet without observed

## 2020-07-09 NOTE — PROGRESS NOTES
Nutrition Assessment (Low Risk)    Type and Reason for Visit: Initial(los)    Nutrition Recommendations:   Continue cardiac diet     Nutrition Assessment:   Pt with pmh of COPD, HTN, HLD, CVA, adm r/t sob, LLE swelling & upper back pain. Found to have pneumonia & ANITA. Noted an MBS completed this adm, r/t concern for aspiration, but results were normal. Diet adv to cardiac with good intake noted. Patient assessed for nutritional risk. Deemed to be at low risk at this time. Will continue to monitor for changes in status. Malnutrition Assessment:  · Malnutrition Status: Insufficient data  Due to current CDC guidelines recommending 6-ft distancing for social isolation for COVID19 prevention, NFPE/malnutrition assessment was deferred at this time.       Nutrition Risk Level   Risk Level: Low    Nutrition Diagnosis:   · Problem: No nutrition diagnosis at this time    Nutrition Intervention:  Food and/or Delivery: Continue current diet  Nutrition Education/Counseling/Coordination of Care:  Continued Inpatient Monitoring      Electronically signed by Babak Stanford RD, LD on 7/9/20 at 12:32 PM EDT    Contact Number: [phone]

## 2020-07-09 NOTE — PROGRESS NOTES
Physician Progress Note      Krzysztof Nuñez  CSN #:                  [id number]  :                       1945  ADMIT DATE:       2020 11:57 AM  DISCH DATE:  RESPONDING  PROVIDER #:        Aren Dolan MD          QUERY TEXT:    CHF when CHF is not documented [facility]    Pt admitted with COPD exac/Pulm emphysema. Pt noted to have Hx CHF noted in   ED, c/o SOB and leg swelling. If possible, please document in progress notes   and discharge summary if you are evaluating and/or treating any of the   following: The medical record reflects the following:  Risk Factors: Hx HTN, COPD, CHF  Clinical Indicators: ED notes Hx of CHF, Presents with c/o SOB, bilateral   lower ext edema x 5 days and increased Lasix dose, with assessment noting   lungs Diffusely diminished Pulm notes MR, mod to severe with severe dilated   LA, EF 60%elevated PASP to 52 mm Hg   Treatment: Demadex on Home med list, I and O  Options provided:  -- Acute on Chronic Systolic CHF  -- Acute on Chronic Diastolic CHF  -- Acute on Chronic Systolic and Diastolic CHF  -- Acute Systolic CHF  -- Acute Diastolic CHF  -- Acute Systolic and Diastolic CHF  -- Chronic Systolic CHF  -- Chronic Diastolic CHF  -- Chronic Systolic and Diastolic CHF  -- Refer to Clinical Documentation Reviewer    PROVIDER RESPONSE TEXT:    This patient is in diastolic CHF exacerbation. Query created by:  Rafi Watt on 2020 9:54 AM      Electronically signed by:  Aren Dolan MD 2020 9:32 AM

## 2020-07-09 NOTE — PROGRESS NOTES
Pt admitted with acute exacerbation of COPD. Went into some HF, diuresed. Cardiology was consulted. Pt will need an outpt CAMERON. I spoke with I Cardiology RN and they will set that up. Both HF and COPD instructions placed on AVS. Pt has received > 60 mins of HF education. Follow up appt scheduled with Dr Mauricio Emanuel on 7/14 at 11:40.

## 2020-07-10 ENCOUNTER — CARE COORDINATION (OUTPATIENT)
Dept: CASE MANAGEMENT | Age: 75
End: 2020-07-10

## 2020-07-10 ENCOUNTER — TELEPHONE (OUTPATIENT)
Dept: CARDIOLOGY CLINIC | Age: 75
End: 2020-07-10

## 2020-07-10 PROCEDURE — 1111F DSCHRG MED/CURRENT MED MERGE: CPT

## 2020-07-10 NOTE — TELEPHONE ENCOUNTER
Patient was seen by Dr. Mary Ann Hoang at [facility], discharged yesterday. Per   Dr. Mary Ann Hoang patient will need CAMERON by   Dr. Clint Byrnes when he returns from vacation. \"Moderate to severe MR   - unchanged from 2018  - unclear is truly symptomatic ( BNP/CXR normal); no PND/orthopnea    - recommend outpatient CAMERON \"    Prior to discharge, the patient was told we would contact her next week to schedule and provide instructions. A 2 week monitor was placed prior to discharge.     May want to check with Alexandra next week about timing of procedure, thanks

## 2020-07-10 NOTE — CARE COORDINATION
[facility] Transitions Initial Follow Up Call    Call within 2 business days of discharge: Yes    Patient: Christiane Alvarenga Patient : 1945   MRN: [id number]  Reason for Admission: Anginal Equivalent  Discharge Date: 20 RARS: Readmission Risk Score: 15      Last Discharge [facility]       Complaint Diagnosis Description Type Department Provider    20 Back Pain; Leg Swelling Anginal equivalent ([address] . .. ED to Hosp-Admission (Discharged) (ADMITTED) Demetra Maradiaga MD; Rachel Kat. .. Spoke with: Christiane Alvarenga (patient)    [facility]    Non-face-to-face services provided:  Obtained and reviewed discharge summary and/or continuity of care documents  Assessment and support for treatment adherence and medication management-medication list reviewed & 1111f completed. Care Transitions 24 Hour Call    Do you have any ongoing symptoms?:  No  Do you have all of your prescriptions and are they filled?:  Yes  Have you been contacted by a [address]?:  No  Have you scheduled your follow up appointment?:  Yes  How are you going to get to your appointment?:  Car - drive self  Were you discharged with any Home Care or Post Acute Services:  No  Do you feel like you have everything you need to keep you well at home?:  Yes  Care Transitions Interventions        Initial attempt at [facility] discharge phone call. Spoke with Sol Kellogg. She states she is \"much better\". Sol Kellogg states she received a copy of her discharge instructions and they were reviewed with her prior to her discharge. She has a follow up appointment with her PCP 2020. Sol Kellogg states she will drive herself. If she does not feel up to driving she has family members and/or friends she can call for assistance. Discussed Healthcare Decision Makers. Sol Kellogg states her chart documentation is accurate & up to date: Simba Anoop (daughter) and Teresa Brooks (daughter). Sol Kellogg states she continues to be an active smoker.  She states she knows she is supposed to weigh herself daily - she stated the parameters for when to call the physician. Giovanny Choudhury states she has not weighed herself today. She denies any SOB, chest pain, or edema. Giovanny Choudhury states she follows a low sodium/no added salt diet and monitors/restricts her fluids on a daily basis. Discussed the heart failure zones. Giovanny Choudhury places herself in the \"green\" zone today. Giovanny Choudhury states she currently has the cardiac event monitor on. Discussed role of CTN. Giovanny Choudhury is agreeable to follow up from CTN.       Follow Up  Future Appointments   Date Time Provider Vickie Wilson   7/14/2020 11:40 AM Darvin Vail MD [facility]       Dariela Michaels RN

## 2020-07-14 NOTE — TELEPHONE ENCOUNTER
Scheduled for CAMERON on 7/27/20 at 10:00 am with 9:00 am arrival time. The morning of your procedure you will park in the hospital parking lot and report directly to the cath lab to check in.     Pre-Procedure Instructions   1. Do not eat or drink anything after midnight the night before your procedure. 2. All medications can be taken in the morning with sips of water. 3. Do not use any lotions, creams or perfume the morning of procedure. 4. Pre-procedure lab work can be completed same day as COVID testing. Patient will ask PCP if COVID test can be completed at their office and let me know by the end of the day. 5. Please have a responsible adult to drive you home after procedure. It is recommended you do not drive for 24 hours after procedure and that someone stay with you for precautionary measures. 6. Cath lab will provide you with all post procedure instructions. If you have any questions regarding the procedure itself or medications please call [phone] and ask to speak with a nurse. Case published to [facility] and form emailed to [place] in cath lab.

## 2020-07-16 ENCOUNTER — CARE COORDINATION (OUTPATIENT)
Dept: CASE MANAGEMENT | Age: 75
End: 2020-07-16

## 2020-07-16 NOTE — CARE COORDINATION
Shay [facility] Transitions Follow Up Call    2020    Patient: Josephine Abdalla  Patient : 1945   MRN: [id number]  Reason for Admission: Anginal Equivalent  Discharge Date: 20 RARS: Readmission Risk Score: 15         Spoke with: Josephine Abdalla (patient)    Care Transitions Subsequent and Final Call    Subsequent and Final Calls  Care Transitions Interventions  Other Interventions:          Spoke with Wanda Jeong. She states she is doing well. Karislaurence Jean-Paul states she is returning to work tomorrow. Denies the need for additional CTN outreach. Wanda Jeong states she has writer's contact info and will reach out if she needs us.     Follow Up  Future Appointments   Date Time Provider Vickie Wilson   2020 11:00 AM SCHEDULE, [address] FLU [facility] Ezequiel Mohs, RN

## 2020-07-23 ENCOUNTER — OFFICE VISIT (OUTPATIENT)
Dept: PRIMARY CARE CLINIC | Age: 75
End: 2020-07-23
Payer: COMMERCIAL

## 2020-07-23 LAB — SARS-COV-2, PCR: NOT DETECTED

## 2020-07-23 PROCEDURE — 99211 OFF/OP EST MAY X REQ PHY/QHP: CPT | Performed by: NURSE PRACTITIONER

## 2020-07-23 PROCEDURE — G8417 CALC BMI ABV UP PARAM F/U: HCPCS | Performed by: NURSE PRACTITIONER

## 2020-07-23 PROCEDURE — G8428 CUR MEDS NOT DOCUMENT: HCPCS | Performed by: NURSE PRACTITIONER

## 2020-07-23 NOTE — PROGRESS NOTES
Patient presented to [facility] drive up clinic for preop testing. Patient was swabbed and given information advising them to remain isolated until procedure date.

## 2020-07-24 NOTE — PRE-PROCEDURE INSTRUCTIONS
Called patient about procedure in cath lab, instructed to arrive at  0900 for procedure at 1000. Patient should be NPO after midnight, but can take morning medication with sips of water. Instructed to have a responsible adult be with patient to take them home and stay with them afterwards. Patient asked to bring current list of medications. All questions and concerns addressed.

## 2020-07-27 ENCOUNTER — HOSPITAL ENCOUNTER (OUTPATIENT)
Dept: CARDIAC CATH/INVASIVE PROCEDURES | Age: 75
Discharge: HOME | End: 2020-07-27
Payer: COMMERCIAL

## 2020-07-27 VITALS
WEIGHT: 146 LBS | DIASTOLIC BLOOD PRESSURE: 60 MMHG | TEMPERATURE: 98.2 F | HEIGHT: 62 IN | SYSTOLIC BLOOD PRESSURE: 159 MMHG | OXYGEN SATURATION: 100 % | BODY MASS INDEX: 26.87 KG/M2 | HEART RATE: 59 BPM | RESPIRATION RATE: 10 BRPM

## 2020-07-27 LAB
LV EF: 58 %
LVEF MODALITY: NORMAL

## 2020-07-27 PROCEDURE — 93325 DOPPLER ECHO COLOR FLOW MAPG: CPT

## 2020-07-27 PROCEDURE — 2580000003 HC RX 258

## 2020-07-27 PROCEDURE — 93312 ECHO TRANSESOPHAGEAL: CPT

## 2020-07-27 PROCEDURE — 6360000002 HC RX W HCPCS

## 2020-07-27 PROCEDURE — 99152 MOD SED SAME PHYS/QHP 5/>YRS: CPT

## 2020-07-27 PROCEDURE — 93312 ECHO TRANSESOPHAGEAL: CPT | Performed by: INTERNAL MEDICINE

## 2020-07-27 PROCEDURE — 6370000000 HC RX 637 (ALT 250 FOR IP)

## 2020-07-27 PROCEDURE — 93321 DOPPLER ECHO F-UP/LMTD STD: CPT

## 2020-07-27 PROCEDURE — 93325 DOPPLER ECHO COLOR FLOW MAPG: CPT | Performed by: INTERNAL MEDICINE

## 2020-07-27 RX ORDER — SODIUM CHLORIDE 0.9 % (FLUSH) 0.9 %
10 SYRINGE (ML) INJECTION EVERY 12 HOURS SCHEDULED
Status: DISCONTINUED | OUTPATIENT
Start: 2020-07-27 | End: 2020-07-27 | Stop reason: ALTCHOICE

## 2020-07-27 RX ORDER — SODIUM CHLORIDE 9 MG/ML
INJECTION, SOLUTION INTRAVENOUS CONTINUOUS
Status: DISCONTINUED | OUTPATIENT
Start: 2020-07-27 | End: 2020-07-28 | Stop reason: HOSPADM

## 2020-07-27 RX ORDER — SODIUM CHLORIDE 0.9 % (FLUSH) 0.9 %
10 SYRINGE (ML) INJECTION EVERY 12 HOURS SCHEDULED
Status: DISCONTINUED | OUTPATIENT
Start: 2020-07-27 | End: 2020-07-28 | Stop reason: HOSPADM

## 2020-07-27 RX ORDER — SODIUM CHLORIDE 0.9 % (FLUSH) 0.9 %
10 SYRINGE (ML) INJECTION PRN
Status: DISCONTINUED | OUTPATIENT
Start: 2020-07-27 | End: 2020-07-27 | Stop reason: ALTCHOICE

## 2020-07-27 RX ORDER — SODIUM CHLORIDE 0.9 % (FLUSH) 0.9 %
10 SYRINGE (ML) INJECTION PRN
Status: DISCONTINUED | OUTPATIENT
Start: 2020-07-27 | End: 2020-07-28 | Stop reason: HOSPADM

## 2020-07-27 NOTE — H&P
 Leg Swelling       bilateral           Subjective:   History of Present Illness:      Kali Garcia is a 76 y.o. female with significant history of s/p tissue AVR, afib, CAD, tobacco abuse, COPD, who presents with back pain. She has chronic lower back pain, however recently became worse and thus presented to [facility] ER for follow up. Initially has prerenal azotemia and received NS infusion. This AM had a episode of dyspnea thus cardiology is consulted. She additionally has constant subxiphoid pain. The pain has no alleviating or exacerbating factors. She denies PND, orthopnea, dyspnea at rest, palpitations, or syncope      Prior cardiac testing:     Procedures:      C 6/15/2015   1. Right dominant coronary arterial system with mild calcification of the RCA. There is 40% serial lesions in the mid RCA. In the left system there is 25% distal left main disease. There is 50% mid LAD disease and 50% ostial second diagonal branch disease. There is no significant restenosis identified in the prior previously placed mid circumflex stent. 2. Systemic hypertension.      Imaging:      Echo 7/30/2016  Left ventricle size is normal. Normal left ventricular wall thickness. Ejection fraction is visually estimated to be 55-60%. Diastolic filling parameters suggests grade III (restrictive) diastolic dysfunction. The right ventricle appears mildly enlarged. Right ventricular systolic function is normal.  The left atrium is severely dilated. The right atrium is mildly dilated.   At least moderate mitral regurgitation with a central and eccentric jet.   Mild mitral stenosis. BP reported as 171/51 at time of imaging.   A bioprosthetic aortic valve has a maximum velocity of 2.8 m/s and a mean gradient of 17 mmHg. Para-valvular regurgitation.   There is moderate tricuspid regurgitation.   Mild pulmonic regurgitation.   Estimated pulmonary artery systolic pressure is 72 mmHg assuming a rightatrial pressure of 10 mmHg.     Lower Extremity Arterial Studies 5/25/17  Right Impression   There is an NIKOS of .70 in the DP and .77 in the PT. on the right. This is in   the mild claudication range. Occlusion of the SFA artery in the right lower   extremity. Left Impression   There is an NIKOS of 1.06 in the DP and 1.08 in the PT. on the left. This is in   the normal range. Elevated velocity of the SFA.      Carotid Studies 5/25/17  Right Impression   The right internal carotid artery appears to have a calcified 50-79% diameter   reducing stenosis based on velocity criteria. Left Impression   The left internal carotid artery appears to have a <50% diameter reducing   stenosis based on velocity criteria.          Echo 1/8/18  Normal LV size and systolic function. Estimated ejection fraction is 60%. Moderate to severe mitral regurgitation is present. Severely dilated left atrium. Normally functioning bioprosthetic valve in aortic position. Trivial regurgitation noted. The right ventricle is mildly enlarged.   Moderate tricuspid regurgitation.   Doppler derived PA systolic pressure is 52 mm Hg suggestive of moderate   pulmonary hypertension.     Holter 1/8/18 ([facility])  1. The rhythm was sinus with sinus arrhythmia. Average WI interval 0.20,   average QRS duration 0.14 [IVCD]. Average daily heart rate 44 ranging 38 to 68 bpm.   There were 129 pauses greater than 2.0 seconds with Max R-R 2.1 seconds occurring 05:13:11.   2. Frequent premature supraventricular ectopic beats total 1,642 consisting   of 1,152 isolated PACs, 209 atrial pairs and 3 atrial runs. The longest atrial run 3 beats   HR-61 bpm occurring 17:56:40. The fastest atrial run 3 beats HR-76 bpm occurring 09:49:41.   3. Very frequent premature ventricular ectopic beats total 16,163 consisting of 16,008 isolated   PVCs, 74 ventricular couplets and 1 ventricular triplet.  The ventricular triplet HR-126 bpm   occurring 15:36:29.   4. Diary returned with an entry of \"irregular heart\",  isolated PACs, PVCs and   ventricular couplets noted.         Past Medical History:   has a past medical history of Anticoagulant long-term use, Atrial fibrillation ([address] AVM (arteriovenous malformation) of duodenum, acquired, AVM (arteriovenous malformation) of stomach, acquired, Bladder cancer ([address] CAD (coronary artery disease), CAP (community acquired pneumonia), Carotid stenosis, Chronic atrial fibrillation, Chronic diastolic CHF (congestive heart failure) ([address] COPD, mild ([address] CVA (cerebral infarction), Diverticulosis, Epistaxis, recurrent, Former smoker, Gallbladder sludge, HTN (hypertension), Hyperlipidemia, Hypothyroidism, Lumbar stenosis without neurogenic claudication, Macular degeneration, Neuropathy, Osteoarthritis, Pulmonary HTN ([address] PVD (peripheral vascular disease) ([address] Sacral fracture, closed ([address] and Syncope.     Surgical History:   has a past surgical history that includes Cystoscopy (Feb 2014); joint replacement; Appendectomy; Aortic valve replacement (6/16/15); Upper gastrointestinal endoscopy (12/15/2017); other surgical history (02/20/2018); Coronary angioplasty (2014); Spine surgery (N/A, 3/15/2019); Cholecystectomy, laparoscopic (N/A, 4/25/2019); and back surgery (03/15/2019).    Social History:   reports that she has been smoking cigarettes. She has a 45.00 pack-year smoking history. She uses smokeless tobacco. She reports that she does not drink alcohol or use drugs.      Family History:  family history includes Breast Cancer in her daughter.     Home Medications:  Were reviewed and are listed in nursing record and/or below  Home Medications           Prior to Admission medications    Medication Sig Start Date End Date Taking?  Authorizing Provider   albuterol sulfate  (90 Base) MCG/ACT inhaler Inhale 2 puffs into the lungs 4 times daily 7/7/20   Yes Esther White MD   lactobacillus (CULTURELLE) capsule Take 1 capsule by mouth 2 times daily (with meals) 7/7/20   Yes Esther White MD amoxicillin-clavulanate (AUGMENTIN) 500-125 MG per tablet Take 1 tablet by mouth every 8 hours for 10 days 7/7/20 7/17/20 Yes Sarai May MD   valsartan (DIOVAN) 160 MG tablet Take 1 tablet by mouth daily 7/7/20   Yes Sarai May MD   ezetimibe (ZETIA) 10 MG tablet TAKE 1 TABLET BY MOUTH DAILY 6/30/20   Yes Laurel Blakely MD   oxyCODONE HCl (OXY-IR) 10 MG immediate release tablet Take 1 tablet by mouth 3 times daily as needed for Pain for up to 30 days. 6/29/20 7/29/20 Yes Sarai May MD   ipratropium (ATROVENT) 0.03 % nasal spray 2 sprays by Nasal route 3 times daily as needed for Rhinitis 6/15/20 6/15/21 Yes Sarai May MD   isosorbide mononitrate (IMDUR) 30 MG extended release tablet TAKE ONE TABLET BY MOUTH TWO TIMES A DAY FOR SYSTOLIC BLOOD PRESSURE (TOP NUMBER) OVER 140 6/15/20   Yes Sarai May MD   potassium chloride (KLOR-CON) 10 MEQ extended release tablet TAKE ONE TABLET BY MOUTH TWO TIMES A DAY 6/15/20   Yes Sarai May MD   torsemide (DEMADEX) 20 MG tablet TAKE ONE TABLET BY MOUTH TWO TIMES A DAY 5/19/20   Yes Laurel Blakely MD   amiodarone (CORDARONE) 200 MG tablet TAKE 1 TABLET BY MOUTH DAILY 4/9/20   Yes Laurel Blakely MD   rosuvastatin (CRESTOR) 40 MG tablet TAKE 1 TABLET BY MOUTH DAILY 4/9/20   Yes Laurel Blakely MD   valsartan (DIOVAN) 160 MG tablet TAKE 2 TABLETS BY MOUTH DAILY.  2/18/20   Yes Awais Julio,    fluticasone-umeclidin-vilant (TRELEGY ELLIPTA) 100-62.5-25 MCG/INH AEPB Inhale 1 puff into the lungs daily 12/23/19   Yes HOWARD Bailey - CNP   pantoprazole (PROTONIX) 40 MG tablet TAKE ONE TABLET BY MOUTH DAILY 11/26/19   Yes Sarai May MD   levothyroxine (SYNTHROID) 88 MCG tablet TAKE 1 TABLET BY MOUTH DAILY 8/26/19   Yes Sarai May MD   amLODIPine (NORVASC) 5 MG tablet Take 1 tablet by mouth daily 8/2/19   Yes Dagmar Navarrete MD    MG capsule TAKE ONE CAPSULE BY MOUTH TWO TIMES A DAY 4/20/18   Yes Sarai May MD   aspirin 81 MG auscultation bilaterally, respirations unlabored. No wheeze, rales, or rhonchi. Chest Wall:  No tenderness or deformity. Heart:  irregular rate. S1/S2 normal.JUVE 3/6 apex,no  rub, or gallop. Abdomen:   Soft, non-tender, bowel sounds active. Musculoskeletal: No muscle wasting or digital clubbing. Extremities: Extremities normal, atraumatic. No cyanosis or edema. Pulses: 2+ radial and carotid pulses, symmetric. Skin: No rashes or lesions. Pysch: Normal mood and affect. Alert and oriented x 4.    Neurologic: Normal gross motor and sensory exam.       Labs      CBC:         Lab Results   Component Value Date     WBC 5.7 07/06/2020     RBC 3.72 07/06/2020     HGB 11.4 07/06/2020     HCT 33.8 07/06/2020     MCV 90.9 07/06/2020     RDW 14.2 07/06/2020      07/06/2020     CMP:        Lab Results   Component Value Date      07/07/2020     K 4.1 07/07/2020     K 5.0 07/05/2020      07/07/2020     CO2 26 07/07/2020     BUN 32 07/07/2020     CREATININE 0.9 07/07/2020     GFRAA >60 07/07/2020     GFRAA >60 04/22/2013     AGRATIO 1.4 07/05/2020     LABGLOM >60 07/07/2020     LABGLOM 84.0 06/20/2011     GLUCOSE 96 07/07/2020     GLUCOSE 106 06/20/2011     PROT 7.1 07/05/2020     PROT 6.8 01/07/2013     CALCIUM 9.5 07/07/2020     BILITOT 0.3 07/05/2020     ALKPHOS 85 07/05/2020     AST 17 07/05/2020     ALT 15 07/05/2020     PT/INR:  No results found for: PTINR  HgBA1c:        Lab Results   Component Value Date     LABA1C 5.8 06/15/2015     @RESUFAST(CKTOTAL,CKMB,CKMBINDEX,TROPONINI        CURRENT Medications:    Current Hospital Medications   Current Facility-Administered Medications: torsemide (DEMADEX) tablet 20 mg, 20 mg, Oral, BID  potassium chloride (KLOR-CON M) extended release tablet 20 mEq, 20 mEq, Oral, BID WC  nitroGLYCERIN (NITROSTAT) SL tablet 0.4 mg, 0.4 mg, Sublingual, Q5 Min PRN  lactobacillus (CULTURELLE) capsule 1 capsule, 1 capsule, Oral, BID WC  valsartan (DIOVAN) tablet 160 mg, 160 mg, Oral, Daily  morphine (PF) injection 2 mg, 2 mg, Intravenous, Q4H PRN  albuterol-ipratropium (COMBIVENT RESPIMAT)  MCG/ACT inhaler 1 puff, 1 puff, Inhalation, Q6H PRN  amoxicillin-clavulanate (AUGMENTIN) 500-125 MG per tablet 1 tablet, 1 tablet, Oral, 3 times per day  melatonin tablet 3 mg, 3 mg, Oral, Nightly PRN  aspirin chewable tablet 81 mg, 81 mg, Oral, Daily  docusate sodium (COLACE) capsule 100 mg, 100 mg, Oral, Daily  ipratropium (ATROVENT) 0.03 % nasal spray 2 spray, 2 spray, Nasal, TID PRN  isosorbide mononitrate (IMDUR) extended release tablet 30 mg, 30 mg, Oral, Daily  levothyroxine (SYNTHROID) tablet 88 mcg, 88 mcg, Oral, QAM AC  oxyCODONE HCl (OXY-IR) immediate release tablet 10 mg, 10 mg, Oral, TID PRN  pantoprazole (PROTONIX) tablet 40 mg, 40 mg, Oral, QAM AC  rosuvastatin (CRESTOR) tablet 40 mg, 40 mg, Oral, Daily  tiZANidine (ZANAFLEX) tablet 4 mg, 4 mg, Oral, TID  sodium chloride flush 0.9 % injection 10 mL, 10 mL, Intravenous, 2 times per day  sodium chloride flush 0.9 % injection 10 mL, 10 mL, Intravenous, PRN  acetaminophen (TYLENOL) tablet 650 mg, 650 mg, Oral, Q6H PRN **OR** acetaminophen (TYLENOL) suppository 650 mg, 650 mg, Rectal, Q6H PRN  promethazine (PHENERGAN) tablet 12.5 mg, 12.5 mg, Oral, Q6H PRN **OR** ondansetron (ZOFRAN) injection 4 mg, 4 mg, Intravenous, Q6H PRN  heparin (porcine) injection 5,000 Units, 5,000 Units, Subcutaneous, 3 times per day  nicotine (NICODERM CQ) 14 MG/24HR 1 patch, 1 patch, Transdermal, Daily  budesonide-formoterol (SYMBICORT) 80-4.5 MCG/ACT inhaler 1 puff, 1 puff, Inhalation, BID          Assessment and Plan   1) Dyspnea  - CXR geovanna, bnp normal, symptoms may be related to NS infusion with mod to severe MR, improved with IV diuretics  - okay to restart  PO lasix   - recommend outpatient CAMERON To evaluate mitral valve severity      2) Moderate to severe MR   - unchanged from 2018  - unclear is truly symptomatic ( BNP/CXR normal); no PND/orthopnea    - recommend outpatient CAMERON      3) pAfib   - chronic bradycardia; mostly conversion pauses   - okay to d/c amio   - okay to restart coreg   - no OAC due to prior GI bleeds, RENETTA clipped during prior AVR   - 2 week event monitor on d/c     4) AVR   - valve normal on echo     Vitals:    07/27/20 0915   BP: (!) 159/60   Pulse: 59   Resp: 10   Temp: 98.2 °F (36.8 °C)   SpO2: 100%     I have reviewed the most recent H&P for this patient and independently examined the patient. There have been no changes. We will proceed with the planned procedure. Echo 7/5/2020: There is mild concentric left ventricular hypertrophy. Ejection fraction is visually estimated to be 55-60%. diastology cannot be determined   Moderate to severe mitral regurgitation is present. A bioprosthetic artificial aortic valve appears well seated with a maximum   velocity of 273 m/s and a mean gradient of 16 mmHg.    Study is unchanged from previous dated 1/8/2018    Regulo Mojica MD

## 2020-07-27 NOTE — ANESTHESIA PRE-OP
H&P Update    I have reviewed the history and physical and examined the patient and find no relevant changes. I have reviewed with the patient and/or family the risks, benefits, and alternatives to the procedure. Pre-sedation Assessment    Patient:  Leeanna Pierson   :   1945  Intended Procedure: Transesophageal Echocardiogram    [facility] nurse's notes reviewed and agreed. Medications reviewed  Allergies: Allergies   Allergen Reactions    Benadryl [Diphenhydramine] Swelling    Doxylamine     Mucinex [Guaifenesin Er]      hallucinations    Spiriva Handihaler [Tiotropium Bromide Monohydrate]      Nausea      Adhesive Tape Rash and Swelling         Pre-Procedure Assessment/Plan:  ASA 3 - Patient with moderate systemic disease with functional limitations  Mallampati 2  Level of Sedation Plan: Moderate sedation    Anginal Classification w/in 2 weeks: 0    Heart Failure w/in 2 weeks:  If yes NYHA class: 2    Post Procedure plan: Return to same level of care

## 2020-07-27 NOTE — PROCEDURES
[address] [place] [address]                            CARDIAC CATHETERIZATION    PATIENT NAME: NICHELLE LORENZO                          :        1945  MED REC NO:   [id number]                          ROOM:  ACCOUNT NO:   [de-identified]                           ADMIT DATE: 2020  PROVIDER:     Ximena Conti MD    DATE OF PROCEDURE:  2020    This is a [facility] cardiology catheterization report. INDICATIONS:  Mitral regurgitation. PROCEDURE:  The risks and benefits of the procedure were explained to  the patient and informed consent was obtained. She was placed in the  supine position and emergency equipment was in place. The patient had  an ASA grade of III and a Mallampati score of II. She received IV  sedation with 2 mg of IV Versed and 50 mcg of fentanyl as well as  topical anesthesia with oral viscous lidocaine. When the patient was sedated, the transesophageal echocardiography probe  was passed without difficulty into the patient's distal oropharynx and  into the distal esophagus. Image acquisition was performed. The probe  was then withdrawn. The patient recovered without any complications. There was no blood loss for this procedure. Total duration of sedation was 12 minutes. Vital signs were monitored  throughout the sedation period and there were no complications from  sedation. FINDINGS:  1.  Moderate mitral regurgitation with thickening of the mitral valve  leaflets. There is no flow reversal seen in the pulmonary veins. 2.  Moderate tricuspid regurgitation. 3.  Normal-appearing bioprosthetic aortic valve replacement with no  significant aortic regurgitation or stenosis identified visually. 4.  Normal left ventricular and right ventricular size and function. LV  ejection fraction 60%.   5.  Moderate left atrial enlargement with successful ligation in the  past of the left atrial appendage. No flow was seen by color Doppler. Normal right atrial size.         Terese Abraham MD    D: 07/27/2020 10:15:57       T: 07/27/2020 10:19:51     [email or web address]  Job#: [id number]     Doc#: [id number]    CC:  Chico Aaron MD

## 2020-07-28 PROCEDURE — 0298T PR EXT ECG > 48HR TO 21 DAY REVIEW AND INTERPRETATN: CPT | Performed by: INTERNAL MEDICINE

## 2020-07-29 ENCOUNTER — TELEPHONE (OUTPATIENT)
Dept: CARDIOLOGY CLINIC | Age: 75
End: 2020-07-29

## 2020-08-10 RX ORDER — LEVOTHYROXINE SODIUM 88 UG/1
88 TABLET ORAL DAILY
Qty: 30 TABLET | Refills: 11 | Status: SHIPPED | OUTPATIENT
Start: 2020-08-10 | End: 2021-08-26

## 2020-10-12 ENCOUNTER — TELEPHONE (OUTPATIENT)
Dept: CARDIOLOGY CLINIC | Age: 75
End: 2020-10-12

## 2020-10-12 NOTE — TELEPHONE ENCOUNTER
Pt called, she is currently fine however she had an episode on Friday 10/9. .. Her Heart rate normally runs 50-60 yet on Friday she shot up to 110 then 140. She 100mg of AMIODARONE and  Then an hour later she took another 100mg and it went down. I did not see this med on her medication list? She would like to be contacted to discuss this episode.     Shonda # [phone]

## 2020-10-12 NOTE — TELEPHONE ENCOUNTER
Patient states Friday night her HR was very fast and irregular. She took Amiodarone 100 mg and then it slowed down but the symptoms didn't go away. An hour later she took another 100 mg and since then has felt fine. Denies any cardiac symptoms currently.

## 2020-12-10 RX ORDER — PANTOPRAZOLE SODIUM 40 MG/1
TABLET, DELAYED RELEASE ORAL
Qty: 30 TABLET | Refills: 0 | Status: SHIPPED | OUTPATIENT
Start: 2020-12-10 | End: 2021-01-08

## 2020-12-10 NOTE — TELEPHONE ENCOUNTER
Due for fu visit with dr Fozia Iyer please ask him/her schedule before med runs out January  In order to keep getting refills

## 2021-01-08 RX ORDER — TIZANIDINE 4 MG/1
4 TABLET ORAL 3 TIMES DAILY
Qty: 90 TABLET | Refills: 2 | Status: SHIPPED | OUTPATIENT
Start: 2021-01-08 | End: 2021-08-31

## 2021-01-08 RX ORDER — PANTOPRAZOLE SODIUM 40 MG/1
TABLET, DELAYED RELEASE ORAL
Qty: 30 TABLET | Refills: 0 | Status: SHIPPED | OUTPATIENT
Start: 2021-01-08 | End: 2021-02-12

## 2021-01-25 ENCOUNTER — HOSPITAL ENCOUNTER (OUTPATIENT)
Dept: PHYSICAL THERAPY | Age: 76
Setting detail: THERAPIES SERIES
Discharge: HOME | End: 2021-01-25
Payer: COMMERCIAL

## 2021-01-25 PROCEDURE — 97140 MANUAL THERAPY 1/> REGIONS: CPT

## 2021-01-25 PROCEDURE — 97162 PT EVAL MOD COMPLEX 30 MIN: CPT

## 2021-01-25 PROCEDURE — 97110 THERAPEUTIC EXERCISES: CPT

## 2021-01-25 ASSESSMENT — PAIN SCALES - QUEBEC BACK PAIN DISABILITY SCALE
SIT IN A CHAIR FOR SEVERAL HOURS: 0
SLEEP THROUGH THE NIGHT: 0
RIDE IN A CAR: 2
PUT ON SOCKS OR PANYHOSE: 4
REACH UP TO HIGH SHELVES: 2
WALK A FEW BLOCKS OR 300 TO 400M: 4
QUEBEC DISABILITY INDEX: 40-59%
CLIMB ONE FLIGHT OF STAIRS: 4
TURN OVER IN BED: 0
BEND OVER TO CLEAN THE BATHTUB: 4
MAKE YOUR BED: 0
PULL OR PUSH HEAVY DOORS: 4
CARRY TWO BAGS OF GROCERIES: 4
QUEBEC CMS MODIFIER: CK

## 2021-01-25 ASSESSMENT — PAIN SCALES - GENERAL: PAINLEVEL_OUTOF10: 4

## 2021-01-25 ASSESSMENT — PAIN DESCRIPTION - LOCATION: LOCATION: BACK

## 2021-01-25 ASSESSMENT — PAIN DESCRIPTION - PAIN TYPE: TYPE: CHRONIC PAIN

## 2021-01-25 NOTE — PLAN OF CARE
[address] [facility] [place]. [address] [address]  Phone: [phone]   Fax:     [phone]                                                       Physical Therapy Certification    Dear Referring Practitioner: Sharon Green MD,    We had the pleasure of evaluating the following patient for physical therapy services at [facility]. A summary of our findings can be found in the initial assessment below. This includes our plan of care. If you have any questions or concerns regarding these findings, please do not hesitate to contact me at the office phone number checked above.   Thank you for the referral.       Physician Signature:_______________________________Date:__________________  By signing above (or electronic signature), therapists plan is approved by physician                  Patient: Gavino Mason   : 1945   MRN: [id number]  Referring Physician: Referring Practitioner: Sharon Green MD      Evaluation Date: 2021      Medical Diagnosis Information:  Diagnosis: M54.9 (ICD-10-CM) - Back pain   Treatment Diagnosis: Back pain, difficulty with walking, core weakness, poor posture, Right hip rotation S/P THR in                                          Insurance information: PT Insurance Information: BCBS - [place]     Precautions/ Contra-indications: Latex Allergy:  [x]NO      []YES  Preferred Language for Healthcare:   [x]English       []other:    C-SSRS Triggered by Intake questionnaire (Past 2 wk assessment ):   [x] No, Questionnaire did not trigger screening.   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 Sits on forward edge of chair.   Functional Mobility/Transfers:  Difficulty to do transfers such as getting in/out of recliner and in/out of car    Palpation: Not tender to touch    Gait: (include devices/WB status) Absence of trunk rotation, excessive hip rotation    Bandages/Dressings/Incisions:  1 inch incision on lumbar area due to implant    Repeated Movements:     ROM  Comments   Lumbar Flex 92    Lumbar Ext 2 degrees      ROM LEFT RIGHT Comments   Lumbar Side Bend 22 26    Lumbar Rotation WNL WNL    Quadrant      Hip Flexion 102 92    Hip Abd 35 40    Hip ER      Hip IR      Hip Extension      Knee Ext 0 0    Knee Flex 120 115    Hamstring Flex tight Right is more flexible Tested in sitting as pt cannot tolerate lying supine   Piriformis Not tight tight    Avelino test       Note: Right hip and LE = excessive internal rotation         Myotomes/Strength Normal Abnormal Comments   [x]ALL NORMAL      Hip Abd      Hip Ext      Hip flexion (L1-L2 femoral) [] [x] Right hip flexor = 3/5   Knee extension (L2-L4 femoral) [] [x] Right quads= 3/5   Knee flexion (S1 sciatic)   Right  Hamstrings = 3/5   Dorsiflexion (L4-L5 deep peroneal) [] []    Great Toe Ext (L5 deep peroneal nerve) [] []    Ankle Eversion (S1-S2 super peroneal) [] []    Ankle PF(S1-S2 tibial) [] []    Multifidus [] []    Transverse Ab [] []      Dermatomes Normal Abnormal Comments   [x]ALL NORMAL            inguinal area (L1)  [] []    anterior mid-thigh (L2) [] []    distal ant thigh/med knee (L3) [] []    medial lower leg and foot (L4) [] [] []Hypothyroid (E03.9)  []Hyperthyroid Gastrointestinal conditions   []Constipation (T32.42)   Metabolic conditions   []Morbid obesity (E66.01)  []Diabetes type 1(E10.65) or 2 (E11.65)   []Neuropathy (G60.9)     Pulmonary conditions   [x]Asthma (J45)  []Coughing   [x]COPD (J44.9)   Psychological Disorders  [x]Anxiety (F41.9)  []Depression (F32.9)   []Other:   [x]Other:   Cancer, R hip replacement in 2006, vision difficulty (macular degeneration)      Barriers to/and or personal factors that will affect rehab potential:              []Age  []Sex    [x]Smoker              []Motivation/Lack of Motivation                        [x]Co-Morbidities              []Cognitive Function, education/learning barriers              []Environmental, home barriers              []profession/work barriers  []past PT/medical experience  []other:  Justification:     Falls Risk Assessment (30 days):   [x] Falls Risk assessed and no intervention required.   [] Falls Risk assessed and Patient requires intervention due to being higher risk   TUG score (>12s at risk):     [] Falls education provided, including:         ASSESSMENT:   Functional Impairments:     [x]Noted lumbar/proximal hip hypomobility   [x]Noted lumbosacral and/or generalized hypermobility   [x]Decreased Lumbosacral/hip/LE functional ROM   [x]Decreased core/proximal hip strength and neuromuscular control    [x]Decreased LE functional strength    [x]Abnormal reflexes/sensation/myotomal/dermatomal deficits  []Reduced balance/proprioceptive control    []other:      Functional Activity Limitations (from functional questionnaire and intake)   [x]Reduced ability to tolerate prolonged functional positions   [x]Reduced ability or difficulty with changes of positions or transfers between positions   [x]Reduced ability to maintain good posture and demonstrate good body mechanics with sitting, bending, and lifting   []Reduced ability to sleep   [] Reduced ability or tolerance with driving and/or computer work   []Reduced ability to perform lifting, reaching, carrying tasks   []Reduced ability to squat   []Reduced ability to forward bend   [x]Reduced ability to ambulate prolonged functional periods/distances/surfaces   []Reduced ability to ascend/descend stairs   []other:       Participation Restrictions   []Reduced participation in self care activities   []Reduced participation in home management activities   [x]Reduced participation in work activities   []Reduced participation in social activities. []Reduced participation in sport/recreational activities. Classification:   [x]Signs/symptoms consistent with Lumbar instability/stabilization subgroup. []Signs/symptoms consistent with Lumbar mobilization/manipulation subgroup, myotomes and dermatomes intact. Meets manipulation criteria. []Signs/symptoms consistent with Lumbar direction specific/centralization subgroup   []Signs/symptoms consistent with Lumbar traction subgroup       [x]Signs/symptoms consistent with lumbar facet dysfunction   []Signs/symptoms consistent with lumbar stenosis type dysfunction   []Signs/symptoms consistent with nerve root involvement including myotome & dermatome dysfunction   []Signs/symptoms consistent with post-surgical status including: decreased ROM, strength and function.    []signs/symptoms consistent with pathology which may benefit from Dry needling     []other:      Prognosis/Rehab Potential:      []Excellent   []Good    [x]Fair   []Poor    Tolerance of evaluation/treatment:    []Excellent   []Good    [x]Fair   []Poor     Physical Therapy Evaluation Complexity Justification  [x] A history of present problem with:  [] no personal factors and/or comorbidities that impact the plan of care;  []1-2 personal factors and/or comorbidities that impact the plan of care  [x]3 personal factors and/or comorbidities that impact the plan of care  [x] An examination of body systems using standardized tests and measures addressing any of the following: body structures and functions (impairments), activity limitations, and/or participation restrictions;:  [] a total of 1-2 or more elements   [x] a total of 3 or more elements   [] a total of 4 or more elements   [x] A clinical presentation with:  [] stable and/or uncomplicated characteristics   [x] evolving clinical presentation with changing characteristics  [] unstable and unpredictable characteristics;   [x] Clinical decision making of [] low, [x] moderate, [] high complexity using standardized patient assessment instrument and/or measurable assessment of functional outcome. [] EVAL (LOW) 63859 (typically 20 minutes face-to-face)  [x] EVAL (MOD) 48453 (typically 30 minutes face-to-face)  [] EVAL (HIGH) 89571 (typically 45 minutes face-to-face)  [] RE-EVAL     PLAN: Begin PT focusing on: proximal hip mobilizations, LB mobs, LB core activation, proximal hip activation, and HEP    Frequency/Duration: 2 days per week for 8 Weeks:  Interventions:  [x]  Therapeutic exercise including: strength training, ROM, for LE, Glutes and core   [x]  NMR activation and proprioception for glutes , LE and Core   [x]  Manual therapy as indicated for Hip complex, LE and spine to include: Dry Needling/IASTM, STM, PROM, Gr I-IV mobilizations, manipulation. [x]  Modalities as needed that may include: thermal agents, E-stim, Biofeedback, US, iontophoresis as indicated  [x]  Patient education on joint protection, postural re-education, activity modification, progression of HEP. HEP instruction:   GOALS:  Patient stated goal: \"No pain\"  [] Progressing: [] Met: [] Not Met: [] Adjusted  Therapist goals for Patient:   Short Term Goals: To be achieved in: 2 weeks  1. Independent in HEP and progression per patient tolerance, in order to prevent re-injury. [] Progressing: [] Met: [] Not Met: [] Adjusted  2.  Patient will have a decrease in pain to facilitate improvement in movement, function, and ADLs as indicated by Functional Deficits. [] Progressing: [] Met: [] Not Met: [] Adjusted    Long Term Goals: To be achieved in: 8 weeks  1. Disability index score of 30% or less for the QDI to assist with reaching prior level of function. [] Progressing: [] Met: [] Not Met: [] Adjusted  2. Patient will demonstrate increased AROM to WNL, good LS mobility, good hip ROM to allow for proper joint functioning as indicated by patients Functional Deficits. [] Progressing: [] Met: [] Not Met: [] Adjusted  3. Patient will demonstrate an increase in Strength to good proximal hip and core activation to allow for proper functional mobility as indicated by patients Functional Deficits. [] Progressing: [] Met: [] Not Met: [] Adjusted  4. Patient will return to 70% functional activities without increased symptoms or restriction. [] Progressing: [] Met: [] Not Met: [] Adjusted  5. Pt will be able to ambulate with upright posture and no pain(patient specific functional goal)    [] Progressing: [] Met: [] Not Met: [] Adjusted     Electronically signed by:  Catherine Alfaro PT        Note: If patient does not return for scheduled/recommended follow up visits, this note will serve as a discharge from care along with the most recent update on progress.

## 2021-02-01 ENCOUNTER — HOSPITAL ENCOUNTER (OUTPATIENT)
Dept: PHYSICAL THERAPY | Age: 76
Setting detail: THERAPIES SERIES
Discharge: HOME | End: 2021-02-01
Payer: COMMERCIAL

## 2021-02-01 NOTE — FLOWSHEET NOTE
Physical Therapy  Cancellation/No-show Note  Patient Name:  Vinayak Coreas  :  1945   Date:  2021  Cancelled visits to date: 1  No-shows to date: 0    For today's appointment patient:  [x]  Cancelled  []  Rescheduled appointment  []  No-show     Reason given by patient:  []  Patient ill  []  Conflicting appointment  []  No transportation    []  Conflict with work  [x]  No reason given  []  Other:     Comments:      Electronically signed by:  Donta Doran PT

## 2021-02-04 ENCOUNTER — HOSPITAL ENCOUNTER (OUTPATIENT)
Dept: PHYSICAL THERAPY | Age: 76
Setting detail: THERAPIES SERIES
Discharge: HOME | End: 2021-02-04
Payer: COMMERCIAL

## 2021-02-04 PROCEDURE — 97110 THERAPEUTIC EXERCISES: CPT

## 2021-02-04 PROCEDURE — G0283 ELEC STIM OTHER THAN WOUND: HCPCS

## 2021-02-04 PROCEDURE — 97140 MANUAL THERAPY 1/> REGIONS: CPT

## 2021-02-04 NOTE — FLOWSHEET NOTE
[address] [facility] [place]. [address]  Phone: [phone]   Fax:     [phone]    Physical Therapy Treatment Note/ Progress Report:     Date:  2021    Patient Name:  Andreea Akins    :  1945  MRN: [id number]    Pertinent Medical History:      Medical/Treatment Diagnosis Information:  · Diagnosis: M54.9 (ICD-10-CM) - Back pain  · Treatment Diagnosis: Back pain, difficulty with walking, core weakness, poor posture    Insurance/Certification information:  PT Insurance Information: [address] [place] [address] #[id number] approved   5 visits  Physician Information:  Referring Practitioner: Stone Orantes MD  Plan of care signed (Y/N): Sent to Dr. Ximena Trevizo on 21    Date of Patient follow up with Physician:      Progress Report: []  Yes  [x]  No     Date Range for reporting period:  Beginnin2021  Ending:    Progress report due (10 Rx/or 30 days whichever is less):      Recertification due (POC duration/ or 90 days whichever is less):    Visit # POC/ Insurance Allowable Auth Needed   2 5 [x]Yes    []No     Functional Scale:       Date Assessed: at eval  Test: [place]  Score: 56/CK  Pain level:  410     History of Injury:   Pt had no injury or accidient. It started wtih pain in the back when she walked standing erectly. Pain is also aggravated by getting up from the floor. 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 Sitting eases the pain. Pt had an epidural steroid injection in 2018 and this was helpful only for a few days. After this, she  had the spinal implant and this was helpful for only 2 months. She is currently taking Oxycodone and so the doctor recommended PT because she cannot take any  more meds. SUBJECTIVE:  See eval  21: Pt reports that she feels better with the ball roll , but is sore after some of the exercises.     OBJECTIVE:    Observation:  Test measurements:      RESTRICTIONS/PRECAUTIONS:  Right THR in 2006    Exercises/Interventions:     Therapeutic Ex (44851)   Min: Resistance/Reps Notes/Cues   Seated HS stretch 3 x 30 sec    5 sec  x10    2 min    5 sec x 10    Seated forward flexion with hands on lg exercise ball 10x 5 sec each    Nu Step 5 min                   Manual Intervention (88455) Min:      Seated for DTM to lumbar paraspinals with Pedro x 12 min         NMR re-education (99444)   Min:     Mf Activation- re-ed     TrA Re-ed activation     Glute Max re-ed activation          Therapeutic Activity (70437) Min:               Modalities  Min:      IFC with MHP SEATED, X 15 MIN TO LUMBAR SPINE      Other Therapeutic Activities:  Pt was educated on PT POC, Diagnosis, Prognosis, pathomechanics as well as frequency and duration of scheduling future physical therapy appointments. Time was also taken on this day to answer all patient questions and participation in PT. Reviewed appointment policy in detail with patient and patient verbalized understanding. Home Exercise Program: Ajay Temple [id number] issued    Therapeutic Exercise and NMR EXR  [x] (63930) Provided verbal/tactile cueing for activities related to strengthening, flexibility, endurance, ROM  for improvements in proximal hip and core control with self care, mobility, lifting and ambulation.  [] (03979) Provided verbal/tactile cueing for activities related to improving balance, coordination, kinesthetic sense, posture, motor skill, proprioception  to assist with core control in self care, mobility, lifting, and ambulation.      Therapeutic Activities:    [] (59474 or 86832) Provided verbal/tactile cueing for activities related to improving balance, coordination, kinesthetic sense, posture, motor skill, proprioception and motor activation to allow for proper function  with self care and ADLs  [] (76987) Provided training and instruction to the patient for proper core and proximal hip recruitment and positioning with ambulation re-education     Home Exercise Program:    [x] (23924) Reviewed/Progressed HEP activities related to strengthening, flexibility, endurance, ROM of core, proximal hip and LE for functional self-care, mobility, lifting and ambulation   [] (54943) Reviewed/Progressed HEP activities related to improving balance, coordination, kinesthetic sense, posture, motor skill, proprioception of core, proximal hip and LE for self care, mobility, lifting, and ambulation      Manual Treatments:  PROM / STM / Oscillations-Mobs:  G-I, II, III, IV (PA's, Inf., Post.)  [x] (89515) Provided manual therapy to mobilize proximal hip and LS spine soft tissue/joints for the purpose of modulating pain, promoting relaxation,  increasing ROM, reducing/eliminating soft tissue swelling/inflammation/restriction, improving soft tissue extensibility and allowing for proper ROM for normal function with self care, mobility, lifting and ambulation. Charges:  Timed Code Treatment Minutes: 35   Total Treatment Minutes: 50     [] EVAL (LOW) 92504 (typically 20 minutes face-to-face)  [] EVAL (MOD) 23490 (typically 30 minutes face-to-face)  [] EVAL (HIGH) 86219 (typically 45 minutes face-to-face)  [] RE-EVAL     [x] CX(17273) x 1    [] Dry needle 1 or 2 Muscles (24899)  [] NMR (79530) x     [] Dry needle 3+ Muscles (97140)  [x] Manual (38214) x  1   [] Ultrasound (93463) x  [] TA (95200) x     [] Mech Traction (14327)  [] ES(attended) (65777)     [x] ES (un) (86162): 15 min  [] Vasopump (81271) [] Ionto (79161)   [] Other:    GOALS:  Patient stated goal: \"No pain\"  []? Progressing: []? Met: []? Not Met: []? Adjusted  Therapist goals for Patient:   Short Term Goals: To be achieved in: 2 weeks  1. Independent in HEP and progression per patient tolerance, in order to prevent re-injury. []? Progressing: []? Met: []? Not Met: []? Adjusted  2.  Patient will have a decrease in pain to facilitate improvement in movement, function, and ADLs as indicated by Functional Deficits. []? Progressing: []? Met: []? Not Met: []? Adjusted     Long Term Goals: To be achieved in: 8 weeks  1. Disability index score of 30% or less for the QDI to assist with reaching prior level of function. []? Progressing: []? Met: []? Not Met: []? Adjusted  2. Patient will demonstrate increased AROM to WNL, good LS mobility, good hip ROM to allow for proper joint functioning as indicated by patients Functional Deficits. []? Progressing: []? Met: []? Not Met: []? Adjusted  3. Patient will demonstrate an increase in Strength to good proximal hip and core activation to allow for proper functional mobility as indicated by patients Functional Deficits. []? Progressing: []? Met: []? Not Met: []? Adjusted  4. Patient will return to 70% functional activities without increased symptoms or restriction. []? Progressing: []? Met: []? Not Met: []? Adjusted  5. Pt will be able to ambulate with upright posture and no pain(patient specific functional goal)    []? Progressing: []? Met: []? Not Met: []? Adjusted          ASSESSMENT:  Pt had a R THR in 2006. Her R LE rotates internally and as she ambulates, she lacks trunk rotation but compensates with hip rotation. She has core weakness and is deconditioned . Will benefit from lumbar/core stabilization and some flexibility stretching as well. Treatment/Activity Tolerance:  [x] Patient tolerated treatment well [] Patient limited by fatique  [] Patient limited by pain  [] Patient limited by other medical complications  [] Other:     Overall Progression Towards Functional goals/ Treatment Progress Update:  [] Patient is progressing as expected towards functional goals listed. [] Progression is slowed due to complexities/Impairments listed. [] Progression has been slowed due to co-morbidities.   [x] Plan just implemented, too soon to assess goals progression <30days   [] Goals require adjustment due to lack of progress  [] Patient is not progressing as expected and requires additional follow up with physician  [] Other:    Prognosis for POC: [x] Good [] Fair  [] Poor    Patient requires continued skilled intervention: [x] Yes  [] No        PLAN: See eval  [x] Continue per plan of care [] Alter current plan (see comments)  [] Plan of care initiated [] Hold pending MD visit [] Discharge    Electronically signed by: Nick Sims PT    Note: If patient does not return for scheduled/recommended follow up visits, this note will serve as a discharge from care along with the most recent update on progress.

## 2021-02-08 ENCOUNTER — HOSPITAL ENCOUNTER (OUTPATIENT)
Dept: PHYSICAL THERAPY | Age: 76
Setting detail: THERAPIES SERIES
Discharge: HOME | End: 2021-02-08
Payer: COMMERCIAL

## 2021-02-08 PROCEDURE — G0283 ELEC STIM OTHER THAN WOUND: HCPCS

## 2021-02-08 PROCEDURE — 97110 THERAPEUTIC EXERCISES: CPT

## 2021-02-08 PROCEDURE — 97140 MANUAL THERAPY 1/> REGIONS: CPT

## 2021-02-08 NOTE — FLOWSHEET NOTE
[facility] [place]. [address]  Phone: [phone]   Fax:     [phone]    Physical Therapy Treatment Note/ Progress Report:     Date:  2021    Patient Name:  Carrolyn Essex    :  1945  MRN: [id number]    Pertinent Medical History:      Medical/Treatment Diagnosis Information:  · Diagnosis: M54.9 (ICD-10-CM) - Back pain  · Treatment Diagnosis: Back pain, difficulty with walking, core weakness, poor posture    Insurance/Certification information:  PT Insurance Information: [address] [place] [address] #[id number] approved   5 visits  Physician Information:  Referring Practitioner: Redmond Severin, MD  Plan of care signed (Y/N): Sent to Dr. Marti Young on 21    Date of Patient follow up with Physician:      Progress Report: []  Yes  [x]  No     Date Range for reporting period:  Beginnin2021  Ending:    Progress report due (10 Rx/or 30 days whichever is less):      Recertification due (POC duration/ or 90 days whichever is less):    Visit # POC/ Insurance Allowable Auth Needed   3 5 [x]Yes    []No     Functional Scale:       Date Assessed: at eval  Test: [place]  Score: 56/CK  Pain level:  3/10     History of Injury:   Pt had no injury or accidient. It started wtih pain in the back when she walked standing erectly. Pain is also aggravated by getting up from the floor. 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 Sitting eases the pain. Pt had an epidural steroid injection in 2018 and this was helpful only for a few days. After this, she  had the spinal implant and this was helpful for only 2 months. She is currently taking Oxycodone and so the doctor recommended PT because she cannot take any  more meds. SUBJECTIVE:  See eval  21: Pt reports that she feels better with the ball roll , but is sore after some of the exercises.   21: Pt reports that she did not have the same toothache pain as she did previously, but  she felt a heaviness across the back. She feels as if she is improving. OBJECTIVE:    Observation:    Test measurements:      RESTRICTIONS/PRECAUTIONS:  Right THR in 2006    Exercises/Interventions:     Therapeutic Ex (78842)   Min: Resistance/Reps Notes/Cues    3 x 30 sec    PPT5 sec  x10    LTR2 min    Bridging5 sec x 10    Seated forward flexion with hands on lg exercise ball 10x 5 sec each    Nu Step 5 min    Scap retraction 5 sec x 10    Seated thoracic rows 5 sec x 12 Cues to tighten abds and squeeze interscapular area          Manual Intervention (15155) Min:      Seated for DTM to lumbar paraspinals with Pdero x 12 min         NMR re-education (69864)   Min:     Mf Activation- re-ed     TrA Re-ed activation     Glute Max re-ed activation          Therapeutic Activity (81387) Min:               Modalities  Min:      IFC with MHP SEATED, X 15 MIN TO LUMBAR SPINE      Other Therapeutic Activities:  Pt was educated on PT POC, Diagnosis, Prognosis, pathomechanics as well as frequency and duration of scheduling future physical therapy appointments. Time was also taken on this day to answer all patient questions and participation in PT. Reviewed appointment policy in detail with patient and patient verbalized understanding. Home Exercise Program: [address] [place] [address] [id number] issued    Therapeutic Exercise and NMR EXR  [x] (89464) Provided verbal/tactile cueing for activities related to strengthening, flexibility, endurance, ROM  for improvements in proximal hip and core control with self care, mobility, lifting and ambulation.  [] (80056) Provided verbal/tactile cueing for activities related to improving balance, coordination, kinesthetic sense, posture, motor skill, proprioception  to assist with core control in self care, mobility, lifting, and ambulation.      Therapeutic Activities:    [] (69831 or ) Provided verbal/tactile cueing for activities related to improving balance, coordination, kinesthetic sense, posture, motor skill, proprioception and motor activation to allow for proper function  with self care and ADLs  [] (07147) Provided training and instruction to the patient for proper core and proximal hip recruitment and positioning with ambulation re-education     Home Exercise Program:    [x] (89794) Reviewed/Progressed HEP activities related to strengthening, flexibility, endurance, ROM of core, proximal hip and LE for functional self-care, mobility, lifting and ambulation   [] (28501) Reviewed/Progressed HEP activities related to improving balance, coordination, kinesthetic sense, posture, motor skill, proprioception of core, proximal hip and LE for self care, mobility, lifting, and ambulation      Manual Treatments:  PROM / STM / Oscillations-Mobs:  G-I, II, III, IV (PA's, Inf., Post.)  [x] (57375) Provided manual therapy to mobilize proximal hip and LS spine soft tissue/joints for the purpose of modulating pain, promoting relaxation,  increasing ROM, reducing/eliminating soft tissue swelling/inflammation/restriction, improving soft tissue extensibility and allowing for proper ROM for normal function with self care, mobility, lifting and ambulation. Charges:  Timed Code Treatment Minutes: 45   Total Treatment Minutes: 60     [] EVAL (LOW) 76885 (typically 20 minutes face-to-face)  [] EVAL (MOD) 95834 (typically 30 minutes face-to-face)  [] EVAL (HIGH) 19989 (typically 45 minutes face-to-face)  [] RE-EVAL     [x] MH(86692) x 2    [] Dry needle 1 or 2 Muscles (11611)  [] NMR (73852) x     [] Dry needle 3+ Muscles (72447)  [x] Manual (45212) x  1   [] Ultrasound (81298) x  [] TA (36447) x     [] Mech Traction (00281)  [] ES(attended) (84497)     [x] ES (un) (31284): 15 min  [] Vasopump (45315) [] Ionto (96640)   [] Other:    GOALS:  Patient stated goal: \"No pain\"  []? Progressing: []? Met: []? Not Met: []?  Adjusted  Therapist goals for Patient: Short Term Goals: To be achieved in: 2 weeks  1. Independent in HEP and progression per patient tolerance, in order to prevent re-injury. []? Progressing: []? Met: []? Not Met: []? Adjusted  2. Patient will have a decrease in pain to facilitate improvement in movement, function, and ADLs as indicated by Functional Deficits. []? Progressing: []? Met: []? Not Met: []? Adjusted     Long Term Goals: To be achieved in: 8 weeks  1. Disability index score of 30% or less for the QDI to assist with reaching prior level of function. []? Progressing: []? Met: []? Not Met: []? Adjusted  2. Patient will demonstrate increased AROM to WNL, good LS mobility, good hip ROM to allow for proper joint functioning as indicated by patients Functional Deficits. []? Progressing: []? Met: []? Not Met: []? Adjusted  3. Patient will demonstrate an increase in Strength to good proximal hip and core activation to allow for proper functional mobility as indicated by patients Functional Deficits. []? Progressing: []? Met: []? Not Met: []? Adjusted  4. Patient will return to 70% functional activities without increased symptoms or restriction. []? Progressing: []? Met: []? Not Met: []? Adjusted  5. Pt will be able to ambulate with upright posture and no pain(patient specific functional goal)    []? Progressing: []? Met: []? Not Met: []? Adjusted          ASSESSMENT:  Pt had a R THR in 2006. Her R LE rotates internally and as she ambulates, she lacks trunk rotation but compensates with hip rotation. She has core weakness and is deconditioned . Will benefit from lumbar/core stabilization and some flexibility stretching as well.     Treatment/Activity Tolerance:  [x] Patient tolerated treatment well [] Patient limited by fatique  [] Patient limited by pain  [] Patient limited by other medical complications  [] Other:     Overall Progression Towards Functional goals/ Treatment Progress Update:  [] Patient is progressing as expected towards functional goals listed. [] Progression is slowed due to complexities/Impairments listed. [] Progression has been slowed due to co-morbidities. [x] Plan just implemented, too soon to assess goals progression <30days   [] Goals require adjustment due to lack of progress  [] Patient is not progressing as expected and requires additional follow up with physician  [] Other:    Prognosis for POC: [x] Good [] Fair  [] Poor    Patient requires continued skilled intervention: [x] Yes  [] No        PLAN: See eval  [x] Continue per plan of care [] Alter current plan (see comments)  [] Plan of care initiated [] Hold pending MD visit [] Discharge    Electronically signed by: Adebayo Alejandro PT    Note: If patient does not return for scheduled/recommended follow up visits, this note will serve as a discharge from care along with the most recent update on progress.

## 2021-02-11 ENCOUNTER — HOSPITAL ENCOUNTER (OUTPATIENT)
Dept: PHYSICAL THERAPY | Age: 76
Setting detail: THERAPIES SERIES
Discharge: HOME | End: 2021-02-11
Payer: COMMERCIAL

## 2021-02-11 NOTE — FLOWSHEET NOTE
Physical Therapy  Cancellation/No-show Note  Patient Name:  Vinayak Coreas  :  1945   Date:  2021  Cancelled visits to date: 2  No-shows to date: 0    For today's appointment patient:  [x]  Cancelled  []  Rescheduled appointment  []  No-show     Reason given by patient:  []  Patient ill  []  Conflicting appointment  []  No transportation    []  Conflict with work  []  No reason given  [x]  Other:     Comments:  Weather is the reason why she cancelled.   Electronically signed by: Donta Doran PT

## 2021-02-12 RX ORDER — PANTOPRAZOLE SODIUM 40 MG/1
TABLET, DELAYED RELEASE ORAL
Qty: 30 TABLET | Refills: 11 | Status: SHIPPED | OUTPATIENT
Start: 2021-02-12 | End: 2022-03-07 | Stop reason: SDUPTHER

## 2021-02-15 ENCOUNTER — HOSPITAL ENCOUNTER (OUTPATIENT)
Dept: PHYSICAL THERAPY | Age: 76
Setting detail: THERAPIES SERIES
Discharge: HOME | End: 2021-02-15
Payer: COMMERCIAL

## 2021-02-15 NOTE — FLOWSHEET NOTE
Physical Therapy  Cancellation/No-show Note  Patient Name:  Robb Goodson  :  1945   Date:  2/15/2021  Cancelled visits to date: 3  No-shows to date: 0    For today's appointment patient:  [x]  Cancelled  []  Rescheduled appointment  []  No-show     Reason given by patient:  []  Patient ill  []  Conflicting appointment  []  No transportation    []  Conflict with work  []  No reason given  [x]  Other:     Comments:  Weather (snow) is the reason why she cancelled.   Electronically signed by: Anai Padgett PT

## 2021-02-18 ENCOUNTER — HOSPITAL ENCOUNTER (OUTPATIENT)
Dept: PHYSICAL THERAPY | Age: 76
Setting detail: THERAPIES SERIES
Discharge: HOME | End: 2021-02-18
Payer: COMMERCIAL

## 2021-02-18 NOTE — FLOWSHEET NOTE
Physical Therapy  Cancellation/No-show Note  Patient Name:  Grecia Johnson  :  1945   Date:  2021  Cancelled visits to date: 4  No-shows to date: 0    For today's appointment patient:  [x]  Cancelled  []  Rescheduled appointment  []  No-show     Reason given by patient:  []  Patient ill  []  Conflicting appointment  []  No transportation    []  Conflict with work  []  No reason given  [x]  Other:     Comments:  Weather (snow) is the reason why she cancelled.   Electronically signed by: Scout Cornell PT

## 2021-02-22 ENCOUNTER — HOSPITAL ENCOUNTER (OUTPATIENT)
Dept: PHYSICAL THERAPY | Age: 76
Setting detail: THERAPIES SERIES
Discharge: HOME | End: 2021-02-22
Payer: COMMERCIAL

## 2021-02-22 PROCEDURE — G0283 ELEC STIM OTHER THAN WOUND: HCPCS

## 2021-02-22 PROCEDURE — 97110 THERAPEUTIC EXERCISES: CPT

## 2021-02-22 PROCEDURE — 97140 MANUAL THERAPY 1/> REGIONS: CPT

## 2021-02-22 NOTE — FLOWSHEET NOTE
[address] [facility] [place]. [address]  Phone: [phone]   Fax:     [phone]    Physical Therapy Treatment Note/ Progress Report:     Date:  2021    Patient Name:  Jaelyn Schmitt    :  1945  MRN: [id number]    Pertinent Medical History:      Medical/Treatment Diagnosis Information:  · Diagnosis: M54.9 (ICD-10-CM) - Back pain  · Treatment Diagnosis: Back pain, difficulty with walking, core weakness, poor posture    Insurance/Certification information:  PT Insurance Information: [address] [place] [address] #[id number] approved   5 visits  Physician Information:  Referring Practitioner: Champ Cordova MD  Plan of care signed (Y/N): Sent to Dr. Ana Maria Butler on 21    Date of Patient follow up with Physician:      Progress Report: []  Yes  [x]  No     Date Range for reporting period:  Beginnin2021  Ending:    Progress report due (10 Rx/or 30 days whichever is less):      Recertification due (POC duration/ or 90 days whichever is less):    Visit # POC/ Insurance Allowable Auth Needed   4 5 [x]Yes    []No     Functional Scale:       Date Assessed: at eval  Test: [place]  Score: 56/CK  Pain level:  4-5/10     History of Injury:   Pt had no injury or accidient. It started wtih pain in the back when she walked standing erectly. Pain is also aggravated by getting up from the floor. 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 Sitting eases the pain. Pt had an epidural steroid injection in 2018 and this was helpful only for a few days. After this, she  had the spinal implant and this was helpful for only 2 months. She is currently taking Oxycodone and so the doctor recommended PT because she cannot take any  more meds. SUBJECTIVE:  See eval  21: Pt reports that she feels better with the ball roll , but is sore after some of the exercises.   21: Pt reports that she did not have the same toothache pain as she did previously, but  she felt a heaviness across the back. She feels as if she is improving.   2/22/21: Pt states that her back is more sore today, but she will see Dr. Nargis Norton on March 2. THEY WILL DISCUSS EPIDURALS. OBJECTIVE:    Observation:    Test measurements:      RESTRICTIONS/PRECAUTIONS:  Right THR in 2006    Exercises/Interventions:     Therapeutic Ex (77561)   Min: Resistance/Reps Notes/Cues    3 x 30 sec    PPT5 sec  x10    LTR2 min    Bridging5 sec x 10    Seated forward flexion with hands on lg exercise ball 10x 5 sec each    Nu Step 5 min    Scap retraction 5 sec x 10    Seated thoracic rows 5 sec x 12 Cues to tighten abds and squeeze interscapular area     SKTC on left 20 - 30 Sec x 3 2/22/21: Added to HEP   Manual Intervention (13969) Min:      Seated for DTM to lumbar paraspinals with Pedro x 12 min         NMR re-education (49605)   Min:     Mf Activation- re-ed     TrA Re-ed activation     Glute Max re-ed activation          Therapeutic Activity (66308) Min:               Modalities  Min:      IFC with MHP SEATED, X 15 MIN TO LUMBAR SPINE      Other Therapeutic Activities:  Pt was educated on PT POC, Diagnosis, Prognosis, pathomechanics as well as frequency and duration of scheduling future physical therapy appointments. Time was also taken on this day to answer all patient questions and participation in PT. Reviewed appointment policy in detail with patient and patient verbalized understanding.      Home Exercise Program: [address] [place] [address] issued    Therapeutic Exercise and NMR EXR  [x] (94601) Provided verbal/tactile cueing for activities related to strengthening, flexibility, endurance, ROM  for improvements in proximal hip and core control with self care, mobility, lifting and ambulation.  [] (05034) Provided verbal/tactile cueing for activities related to improving balance, coordination, kinesthetic sense, posture, motor skill, proprioception  to assist with core control in self care, mobility, lifting, and ambulation. Therapeutic Activities:    [] (22511 or 22031) Provided verbal/tactile cueing for activities related to improving balance, coordination, kinesthetic sense, posture, motor skill, proprioception and motor activation to allow for proper function  with self care and ADLs  [] (68879) Provided training and instruction to the patient for proper core and proximal hip recruitment and positioning with ambulation re-education     Home Exercise Program:    [x] (08867) Reviewed/Progressed HEP activities related to strengthening, flexibility, endurance, ROM of core, proximal hip and LE for functional self-care, mobility, lifting and ambulation   [] (75227) Reviewed/Progressed HEP activities related to improving balance, coordination, kinesthetic sense, posture, motor skill, proprioception of core, proximal hip and LE for self care, mobility, lifting, and ambulation      Manual Treatments:  PROM / STM / Oscillations-Mobs:  G-I, II, III, IV (PA's, Inf., Post.)  [x] (78921) Provided manual therapy to mobilize proximal hip and LS spine soft tissue/joints for the purpose of modulating pain, promoting relaxation,  increasing ROM, reducing/eliminating soft tissue swelling/inflammation/restriction, improving soft tissue extensibility and allowing for proper ROM for normal function with self care, mobility, lifting and ambulation.      Charges:  Timed Code Treatment Minutes: 35   Total Treatment Minutes: 50     [] EVAL (LOW) 82212 (typically 20 minutes face-to-face)  [] EVAL (MOD) 60205 (typically 30 minutes face-to-face)  [] EVAL (HIGH) 19516 (typically 45 minutes face-to-face)  [] RE-EVAL     [x] YW(14721) x 1    [] Dry needle 1 or 2 Muscles (53572)  [] NMR (44045) x     [] Dry needle 3+ Muscles (56634)  [x] Manual (41313) x  1   [] Ultrasound (83664) x  [] TA (20486) x     [] Mech Traction (12853)  [] ES(attended) (34734)     [x] ES (un) (44665): 15 min  [] Vasopump (98503) [] Ionto (79895)   [] Other:    GOALS:  Patient stated goal: \"No pain\"  []? Progressing: []? Met: []? Not Met: []? Adjusted  Therapist goals for Patient:   Short Term Goals: To be achieved in: 2 weeks  1. Independent in HEP and progression per patient tolerance, in order to prevent re-injury. []? Progressing: []? Met: []? Not Met: []? Adjusted  2. Patient will have a decrease in pain to facilitate improvement in movement, function, and ADLs as indicated by Functional Deficits. []? Progressing: []? Met: []? Not Met: []? Adjusted     Long Term Goals: To be achieved in: 8 weeks  1. Disability index score of 30% or less for the QDI to assist with reaching prior level of function. []? Progressing: []? Met: []? Not Met: []? Adjusted  2. Patient will demonstrate increased AROM to WNL, good LS mobility, good hip ROM to allow for proper joint functioning as indicated by patients Functional Deficits. []? Progressing: []? Met: []? Not Met: []? Adjusted  3. Patient will demonstrate an increase in Strength to good proximal hip and core activation to allow for proper functional mobility as indicated by patients Functional Deficits. []? Progressing: []? Met: []? Not Met: []? Adjusted  4. Patient will return to 70% functional activities without increased symptoms or restriction. []? Progressing: []? Met: []? Not Met: []? Adjusted  5. Pt will be able to ambulate with upright posture and no pain(patient specific functional goal)    []? Progressing: []? Met: []? Not Met: []? Adjusted          ASSESSMENT:  Pt had a R THR in 2006. Her R LE rotates internally and as she ambulates, she lacks trunk rotation but compensates with hip rotation. She has core weakness and is deconditioned . Will benefit from lumbar/core stabilization and some flexibility stretching as well.     Treatment/Activity Tolerance:  [x] Patient tolerated treatment well [] Patient limited by fatique  [] Patient limited by pain  [] Patient limited by other medical complications  [] Other:     Overall Progression Towards Functional goals/ Treatment Progress Update:  [] Patient is progressing as expected towards functional goals listed. [] Progression is slowed due to complexities/Impairments listed. [] Progression has been slowed due to co-morbidities. [x] Plan just implemented, too soon to assess goals progression <30days   [] Goals require adjustment due to lack of progress  [] Patient is not progressing as expected and requires additional follow up with physician  [] Other:    Prognosis for POC: [x] Good [] Fair  [] Poor    Patient requires continued skilled intervention: [x] Yes  [] No        PLAN: See eval  [x] Continue per plan of care [] Alter current plan (see comments)  [] Plan of care initiated [] Hold pending MD visit [] Discharge    Electronically signed by: Wendy Ayoub PT    Note: If patient does not return for scheduled/recommended follow up visits, this note will serve as a discharge from care along with the most recent update on progress.

## 2021-02-25 ENCOUNTER — HOSPITAL ENCOUNTER (OUTPATIENT)
Dept: PHYSICAL THERAPY | Age: 76
Setting detail: THERAPIES SERIES
Discharge: HOME | End: 2021-02-25
Payer: COMMERCIAL

## 2021-02-25 PROCEDURE — G0283 ELEC STIM OTHER THAN WOUND: HCPCS

## 2021-02-25 PROCEDURE — 97140 MANUAL THERAPY 1/> REGIONS: CPT

## 2021-02-25 PROCEDURE — 97110 THERAPEUTIC EXERCISES: CPT

## 2021-02-25 PROCEDURE — 97530 THERAPEUTIC ACTIVITIES: CPT

## 2021-02-25 ASSESSMENT — PAIN SCALES - QUEBEC BACK PAIN DISABILITY SCALE
TAKE FOOD OUT OF THE REFRIGERATOR: 0
WALK A FEW BLOCKS OR 300 TO 400M: 4
RUN ONE BLOCK OR 100M: 4
BEND OVER TO CLEAN THE BATHTUB: 4
GET OUT OF BED: 4
QUEBEC DISABILITY INDEX: 60-79%
LIFT AND CARRY A HEAVY SUITCASE: 4
CLIMB ONE FLIGHT OF STAIRS: 4

## 2021-02-25 NOTE — FLOWSHEET NOTE
[facility]. Timote[address] Singh [address]  Phone: [phone]   Fax:     [phone]     Physical Therapy Discharge Summary    Dear  Loretta Johnston,,    We had the pleasure of treating the following patient for physical therapy services at [address] [place] [address]. A summary of our findings can be found in the discharge summary below. If you have any questions or concerns regarding these findings, please do not hesitate to contact me at the office phone number above. Thank you for the referral.     Physician Signature:________________________________Date:__________________  By signing above (or electronic signature), therapists plan is approved by physician      Overall Response to Treatment:   [x]Patient is responding well to treatment and improvement is noted with regards  to goals   []Patient should continue to improve in reasonable time if they continue HEP   []Patient has plateaued and is no longer responding to skilled PT intervention    []Patient is getting worse and would benefit from return to referring MD   []Patient unable to adhere to initial POC   [x]Other: Pt will return to you for appt on  and discuss future course of care.      Date range of Visits: 21-21  Total Visits: 5                                                    Date:  2021    Patient Name:  Francella Phalen    :  1945  MRN: [id number]    Pertinent Medical History:      Medical/Treatment Diagnosis Information:  · Diagnosis: M54.9 (ICD-10-CM) - Back pain  · Treatment Diagnosis: Back pain, difficulty with walking, core weakness, poor posture    Insurance/Certification information:  PT Insurance Information: [address] [place] [address] #[id number] approved   5 visits  Physician Information:  Referring Practitioner: Hernan Croft MD  Plan of care signed (Y/N): Sent to Dr. Loretta Johnston on 21    Date of Patient follow up with Physician:  3/2/21 Progress Report: [x]  Yes  []  No     Date Range for reporting period:  Beginnin21  Endin21    Progress report due (10 Rx/or 30 days whichever is less):      Recertification due (POC duration/ or 90 days whichever is less):    Visit # POC/ Insurance Allowable Auth Needed   5 5 [x]Yes    []No     Functional Scale:       Date Assessed: at eval  Test: [place]  Score: 62/CK  Pain level:  4/10     History of Injury:   Pt had no injury or accidient. It started wtih pain in the back when she walked standing erectly. Pain is also aggravated by getting up from the floor. 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 Sitting eases the pain. Pt had an epidural steroid injection in  and this was helpful only for a few days. After this, she  had the spinal implant and this was helpful for only 2 months. She is currently taking Oxycodone and so the doctor recommended PT because she cannot take any  more meds. SUBJECTIVE:  See eval  21: Pt reports that she feels better with the ball roll , but is sore after some of the exercises. 21: Pt reports that she did not have the same toothache pain as she did previously, but  she felt a heaviness across the back. She feels as if she is improving.   21: Pt states that her back is more sore today, but she will see Dr. Immanuel Friedman on . THEY WILL DISCUSS EPIDURALS. 21: Pt reports that her back pain is about the same as last session. Pt reports \"less tightness\" on her back.     OBJECTIVE:    Observation:    Test measurements:  Strength: Right hip flexor: 3/5  R hamstrings: 4/5  Right quad:  4/5   Right DF: 4/5                                                         Left LE:  4+/5     Trunk mobility:  Flex: 92  Ext: 15  SB L: 24  SB R:   28    RESTRICTIONS/PRECAUTIONS:  Right THR in     Exercises/Interventions:     Therapeutic Ex (06293)   Min: Resistance/Reps Notes/Cues    3 x 30 sec    PPT5 sec  x10    LTR2 min Bridging5 sec x 10    Seated forward flexion with hands on lg exercise ball 10x 5 sec each    Nu Step 5 min    Scap retraction 5 sec x 10    Seated thoracic rows 5 sec x 12 Cues to tighten abds and squeeze interscapular area     SKTC on left 20 - 30 Sec x 3 2/22/21: Added to HEP   Manual Intervention (60555) Min:      Seated for DTM to lumbar paraspinals with Pedro x 10 min         NMR re-education (98774)   Min:     Mf Activation- re-ed     TrA Re-ed activation     Glute Max re-ed activation          Therapeutic Activity (56392) Min:               Modalities  Min:      IFC with MHP SEATED, X 15 MIN TO LUMBAR SPINE      Other Therapeutic Activities:  Pt was educated on PT POC, Diagnosis, Prognosis, pathomechanics as well as frequency and duration of scheduling future physical therapy appointments. Time was also taken on this day to answer all patient questions and participation in PT. Reviewed appointment policy in detail with patient and patient verbalized understanding. 2/25/21: Pt and PT reviewed goals and [place] and took measurements. Home Exercise Program: [address] [place], [place] [id number] issued    Therapeutic Exercise and NMR EXR  [x] (18914) Provided verbal/tactile cueing for activities related to strengthening, flexibility, endurance, ROM  for improvements in proximal hip and core control with self care, mobility, lifting and ambulation.  [] (25333) Provided verbal/tactile cueing for activities related to improving balance, coordination, kinesthetic sense, posture, motor skill, proprioception  to assist with core control in self care, mobility, lifting, and ambulation.      Therapeutic Activities:    [x] (71347 or 39034) Provided verbal/tactile cueing for activities related to improving balance, coordination, kinesthetic sense, posture, motor skill, proprioception and motor activation to allow for proper function  with self care and ADLs  [] (22971) Provided training and instruction to the patient for proper core and proximal hip recruitment and positioning with ambulation re-education     Home Exercise Program:    [x] (03392) Reviewed/Progressed HEP activities related to strengthening, flexibility, endurance, ROM of core, proximal hip and LE for functional self-care, mobility, lifting and ambulation   [] (00032) Reviewed/Progressed HEP activities related to improving balance, coordination, kinesthetic sense, posture, motor skill, proprioception of core, proximal hip and LE for self care, mobility, lifting, and ambulation      Manual Treatments:  PROM / STM / Oscillations-Mobs:  G-I, II, III, IV (PA's, Inf., Post.)  [x] (47188) Provided manual therapy to mobilize proximal hip and LS spine soft tissue/joints for the purpose of modulating pain, promoting relaxation,  increasing ROM, reducing/eliminating soft tissue swelling/inflammation/restriction, improving soft tissue extensibility and allowing for proper ROM for normal function with self care, mobility, lifting and ambulation. Charges:  Timed Code Treatment Minutes: 45   Total Treatment Minutes: 60     [] EVAL (LOW) 02151 (typically 20 minutes face-to-face)  [] EVAL (MOD) 65491 (typically 30 minutes face-to-face)  [] EVAL (HIGH) 84609 (typically 45 minutes face-to-face)  [] RE-EVAL     [x] GR(83038) x 1    [] Dry needle 1 or 2 Muscles (25698)  [] NMR (64748) x     [] Dry needle 3+ Muscles (01732)  [x] Manual (18145) x  1   [] Ultrasound (29804) x  [x] TA (12676) x 1    [] Mech Traction (19083)  [] ES(attended) (12149)     [x] ES (un) (26355): 15 min  [] Vasopump (63959) [] Ionto (38432)   [] Other:    GOALS:  Patient stated goal: \"No pain\"  [x]? Progressing: []? Met: []? Not Met: []? Adjusted  Therapist goals for Patient:   Short Term Goals: To be achieved in: 2 weeks  1. Independent in HEP and progression per patient tolerance, in order to prevent re-injury. []? Progressing: [x]? Met: []? Not Met: []? Adjusted  2.  Patient will have a decrease in pain to facilitate improvement in movement, function, and ADLs as indicated by Functional Deficits. [x]? Progressing: []? Met: []? Not Met: []? Adjusted     Long Term Goals: To be achieved in: 8 weeks  1. Disability index score of 30% or less for the QDI to assist with reaching prior level of function. []? Progressing: []? Met: [x]? Not Met: []? Adjusted  2. Patient will demonstrate increased AROM to WNL, good LS mobility, good hip ROM to allow for proper joint functioning as indicated by patients Functional Deficits. [x]? Progressing: []? Met: []? Not Met: []? Adjusted  3. Patient will demonstrate an increase in Strength to good proximal hip and core activation to allow for proper functional mobility as indicated by patients Functional Deficits. []? Progressing: [x]? Met: []? Not Met: []? Adjusted  4. Patient will return to 70% functional activities without increased symptoms or restriction. []? Progressing: []? Met: [x]? Not Met: []? Adjusted  5. Pt will be able to ambulate with upright posture and no pain(patient specific functional goal)    []? Progressing: []? Met: [x]? Not Met: []? Adjusted          ASSESSMENT:  Pt had a R THR in 2006. Her R LE rotates internally and as she ambulates, she lacks trunk rotation but compensates with hip rotation. She has core weakness and is deconditioned . Will benefit from continued home exercises for lumbar/core stabilization and some flexibility stretching as well. Treatment/Activity Tolerance:  [x] Patient tolerated treatment well [] Patient limited by fatique  [] Patient limited by pain  [] Patient limited by other medical complications  [] Other:     Overall Progression Towards Functional goals/ Treatment Progress Update:  [] Patient is progressing as expected towards functional goals listed. [x] Progression is slowed due to complexities/Impairments listed. [] Progression has been slowed due to co-morbidities.   [] Plan just implemented, too soon to assess goals progression <30days   [] Goals require adjustment due to lack of progress  [] Patient is not progressing as expected and requires additional follow up with physician  [] Other:    Prognosis for POC: [x] Good [] Fair  [] Poor    Patient requires continued skilled intervention: [x] Yes  [] No        PLAN:   [] Continue per plan of care [] Alter current plan (see comments)  [] Plan of care initiated [] Hold pending MD visit [x] Discharge - to see Dr. Mihir Barlow on 3/2/21    Electronically signed by: Jake Zarate PT    Note: If patient does not return for scheduled/recommended follow up visits, this note will serve as a discharge from care along with the most recent update on progress.

## 2021-03-26 NOTE — PROGRESS NOTES
PATIENT REACHED   YES____NO__X__    PREOP INSTRUCTIONS LEFT ON VM [id number]__[phone]_____________      DATE_4/1/21________ TIME_1400________ARRIVAL__1300______PLACE__masc__________  NOTHING TO EAT OR DRINK  AFTER MIDNIGHT THE EVENING PRIOR OR AS INSTRUCTED BY YOUR DR.  Oumou Blanton NEED A RESPONSIBLE ADULT AGE 25 OR OLDER TO DRIVE YOU HOME  PLEASE BRING INSURANCE CARD. PICTURE ID AND COMPLETE LIST OF MEDS  WEAR LOOSE COMFORTABLE CLOTHING  FOLLOW ANY INSTRUCTIONS YOUR DRS OFFICE HAS GIVEN YOU,INCLUDING WHAT MEDICATIONS TO TAKE THE AM OF PROCEDURE AND WHEN AND IF YOU NEED TO STOP ANY BLOOD THINNERS. IF YOU HAVE QUESTIONS REGARDING THIS CALL THE OFFICE  THE GOAL BLOOD SUGAR THE AM OF PROCEDURE  OR LESS ABOVE THAT THE PROCEDURE MAY BE CANCELLED  ANY QUESTIONS CALL YOUR DOCTOR. ALSO,PLEASE READ THE INSTRUCTION PACKET FROM YOUR DR IF YOU RECEIVED ONE. SPINE INTERVENTION NUMBER [phone]      OTHER___________________________________      VISITOR POLICY(subject to change)      There is a one visitor policy at [facility] for all surgeries and endoscopies. Whether the visitor can stay or will be asked to wait in the car will depend on the current policy and if social distancing can be maintained. The policy is subject to change at any time. Please make sure the visitor has a cell phone that is on,charged and able to accept calls, as this may be the way that the staff communicates with them. Pain management is NO VISITOR policyThe patients ride is expected to remain in the car with a cell phone for communication. If the ride is leaving the hospital grounds please make sure they are back in time for pickup. Have the patient inform the staff on arrival what their rides plans are while the patient is in the facility. At the MAIN there is one visitor allowed. Please note that the visitor policy is subject to change.

## 2021-04-01 ENCOUNTER — HOSPITAL ENCOUNTER (OUTPATIENT)
Age: 76
Setting detail: OUTPATIENT SURGERY
Discharge: HOME | End: 2021-04-01
Attending: PHYSICAL MEDICINE & REHABILITATION | Admitting: PHYSICAL MEDICINE & REHABILITATION
Payer: COMMERCIAL

## 2021-04-01 ENCOUNTER — APPOINTMENT (OUTPATIENT)
Dept: GENERAL RADIOLOGY | Age: 76
End: 2021-04-01
Attending: PHYSICAL MEDICINE & REHABILITATION
Payer: COMMERCIAL

## 2021-04-01 VITALS
SYSTOLIC BLOOD PRESSURE: 160 MMHG | DIASTOLIC BLOOD PRESSURE: 60 MMHG | TEMPERATURE: 98.8 F | OXYGEN SATURATION: 100 % | WEIGHT: 145 LBS | HEIGHT: 62 IN | HEART RATE: 58 BPM | BODY MASS INDEX: 26.68 KG/M2 | RESPIRATION RATE: 16 BRPM

## 2021-04-01 PROCEDURE — 2709999900 HC NON-CHARGEABLE SUPPLY: Performed by: PHYSICAL MEDICINE & REHABILITATION

## 2021-04-01 PROCEDURE — 2500000003 HC RX 250 WO HCPCS: Performed by: PHYSICAL MEDICINE & REHABILITATION

## 2021-04-01 PROCEDURE — 3610000058 HC PAIN LEVEL 5 BASE (NON-OR): Performed by: PHYSICAL MEDICINE & REHABILITATION

## 2021-04-01 PROCEDURE — 77003 FLUOROGUIDE FOR SPINE INJECT: CPT

## 2021-04-01 RX ORDER — LIDOCAINE HYDROCHLORIDE 10 MG/ML
INJECTION, SOLUTION EPIDURAL; INFILTRATION; INTRACAUDAL; PERINEURAL
Status: COMPLETED | OUTPATIENT
Start: 2021-04-01 | End: 2021-04-01

## 2021-04-01 RX ORDER — BUPIVACAINE HYDROCHLORIDE 5 MG/ML
INJECTION, SOLUTION EPIDURAL; INTRACAUDAL
Status: COMPLETED | OUTPATIENT
Start: 2021-04-01 | End: 2021-04-01

## 2021-04-01 ASSESSMENT — PAIN - FUNCTIONAL ASSESSMENT: PAIN_FUNCTIONAL_ASSESSMENT: 0-10

## 2021-04-01 ASSESSMENT — PAIN DESCRIPTION - DESCRIPTORS: DESCRIPTORS: DULL;HEAVINESS

## 2021-04-01 ASSESSMENT — PAIN SCALES - GENERAL: PAINLEVEL_OUTOF10: 0

## 2021-04-01 NOTE — H&P
HISTORY AND PHYSICAL/PRE-SEDATION ASSESSMENT    Patient:  Katerina Gonzalez   :  1945  Medical Record No.:  [id number]   Date:  2021  Physician:  Sugar Rodriguez MD  Facility: [address] [place] [address] Drive:                 The patient is a 76 y.o. female whom presents with low back pain. Review of the imaging and physical exam of the patient confirmed the pre-procedure diagnosis. After a thorough discussion of risks, benefits and alternatives informed consent was obtained. Diagnosis:  M47.896  LUMBAR SPONDYLOSIS    Past Medical History:   Past Medical History:   Diagnosis Date    Anticoagulant long-term use     Atrial fibrillation ([facility]     went rhonda on amiodarone and coreg both stopped 2020    AVM (arteriovenous malformation) of duodenum, acquired 2017    presented w sob and anemia    AVM (arteriovenous malformation) of stomach, acquired 2017    presented w sob and anemia    Bladder cancer ([facility] 2014    yearly cystoscopy    CAD (coronary artery disease)     L cx mid stenotic region/rx elut stent    CAP (community acquired pneumonia) 2015    [facility] pn admitted 3 days    Carotid stenosis 2014    R ICA 50-79%/carotid every year, less than 50% L ICA : check every     Chronic atrial fibrillation ([facility]     at presentation of cva. since .  Chronic diastolic CHF (congestive heart failure) ([facility] 2016    grade II    COPD, mild (HCC)     CVA (cerebral infarction)     left cerebral cortex x2/expressive aphasia/resolved    Diverticulosis     Epistaxis, recurrent     when inr high.  last 3-4 months ago    Former smoker     Gallbladder sludge     HTN (hypertension)     Hyperlipidemia     Hypothyroidism     Lumbar stenosis without neurogenic claudication     pain across low back worse with standing and walking, nonradiating    Macular degeneration 2018    left worse than right , dry : progressive loss of sight: just barely able to see to drive    Neuropathy     Osteoarthritis     Pulmonary HTN ([facility]     primary, responsive to nitrates    PVD (peripheral vascular disease) ([facility]     occluded right SFA::: asymptomatic NIKOS 0.7 right and 1.0 left    Sacral fracture, closed ([facility]     Syncope 2014        Past Surgical History:     Past Surgical History:   Procedure Laterality Date    AORTIC VALVE REPLACEMENT  6/16/15    Dr. Pantera Johnston. Hernandez - PVI, RENETTA exclusion. 19mm Maki pericardial Magna    APPENDECTOMY      BACK SURGERY  03/15/2019    superion inserted to keep back from hurtin [address] N/A 2019    EMERGENT; LAPAROSCOPIC CHOLECYSTECTOMY WITH GRAM performed by Jacek Ruiz MD at [address] [place] [address]      stent x 1    CYSTOSCOPY  2014    dr Lopez Cast      THR Right    OTHER SURGICAL HISTORY  2018     EGD and colonoscopy.  SPINE SURGERY N/A 3/15/2019    INSERTION OF INTERSPINOUS SPACER SUPERION VERTIFLEX AT LUMBAR FOUR-LUMBAR FIVE performed by Zuly Wilkinson MD at [place] [address]  12/15/2017       Current Medications:   Prior to Admission medications    Medication Sig Start Date End Date Taking?  Authorizing Provider   valsartan (DIOVAN) 160 MG tablet TAKE ONE TABLET BY MOUTH DAILY 3/16/21  Yes Jesse Andersen MD   pantoprazole (PROTONIX) 40 MG tablet TAKE ONE TABLET BY MOUTH DAILY 21  Yes Jesse Andersen MD   tiZANidine (ZANAFLEX) 4 MG tablet TAKE 1 TABLET BY MOUTH 3 TIMES DAILY 21  Yes Jesse Andersen MD   ipratropium (ATROVENT) 0.03 % nasal spray INHALE 2 DOSES INTO EACH NOSTRIL THREE TIMES A DAY IF NEEDED FOR RHINITIS 20  Yes Jesse Andersen MD   levothyroxine (SYNTHROID) 88 MCG tablet TAKE 1 TABLET BY MOUTH DAILY 8/10/20  Yes Jesse Andersen MD   isosorbide mononitrate (IMDUR) 30 MG extended release tablet TAKE ONE TABLET BY MOUTH TWO TIMES A DAY FOR SYSTOLIC BLOOD PRESSURE (TOP NUMBER) OVER 140 6/15/20  Yes Lawanda Marlow MD   potassium chloride (KLOR-CON) 10 MEQ extended release tablet TAKE ONE TABLET BY MOUTH TWO TIMES A DAY 6/15/20  Yes Lawanda Marlow MD   torsemide (DEMADEX) 20 MG tablet TAKE ONE TABLET BY MOUTH TWO TIMES A DAY 5/19/20  Yes Elliott Handley MD   rosuvastatin (CRESTOR) 40 MG tablet TAKE 1 TABLET BY MOUTH DAILY 4/9/20  Yes Elliott Handley MD   lactobacillus (CULTURELLE) capsule Take 1 capsule by mouth 2 times daily (with meals) 7/7/20   Lawanda Marlow MD   fluticasone-umeclidin-vilant (TRELEGY ELLIPTA) 449-48.5-14 MCG/INH AEPB Inhale 1 puff into the lungs daily 12/23/19   Alverna Shaheen, APRN - CNP    MG capsule TAKE ONE CAPSULE BY MOUTH TWO TIMES A DAY 4/20/18   Lawanda Marlow MD   aspirin 81 MG tablet Take 1 tablet by mouth daily 5/14/15   Elliott Handley MD       Allergies:  Benadryl [diphenhydramine], Doxylamine, Mucinex [guaifenesin er], Spiriva handihaler [tiotropium bromide monohydrate], and Adhesive tape    Social History:    reports that she has been smoking cigarettes. She has a 45.00 pack-year smoking history. She uses smokeless tobacco. She reports that she does not drink alcohol or use drugs. Family History:   Family History   Problem Relation Age of Onset    Breast Cancer Daughter         Vitals: Blood pressure (!) 152/61, pulse 62, temperature 98.8 °F (37.1 °C), temperature source Temporal, resp. rate 16, height 5' 2\" (1.575 m), weight 145 lb (65.8 kg), SpO2 98 %, not currently breastfeeding. PHYSICAL EXAM:  HENT: Airway patent and reviewed  Cardiovascular: Normal rate, regular rhythm, normal heart sounds. Pulmonary/Chest: No wheezes. No rhonchi. No rales. Abdominal: Soft. Bowel sounds are normal. No distension. Extremities: Moves all extremities equally  Lumbar Spine: Painful range of motion, no midline tenderness     ASA CLASS:         []   I.  Normal, healthy adult           [x]   II.  Mild systemic disease            [] III.  Severe systemic disease    Mallampati: Mallampati Class II - (soft palate, fauces & uvula are visible)      Sedation plan:   []  Local              [x]  Minimal                  []  General anesthesia    Treatment plan:  Patient's condition acceptable for planned procedure/sedation. Proceed with planned procedure   Post Procedure Plan   Return to same level of care   ______________________     The risks and benefits as well as alternatives to the procedure have been discussed with the patient and or family. The patient and/or next of kin understands and agrees to proceed.     Lizett Peres MD

## 2021-04-01 NOTE — OP NOTE
PATIENT:  Shonda Gloria  AGE:  76 yrs  MEDICAL RECORD #:  [id number]  YOB: 1945     DATE:  4/1/2021  PHYSICIAN: Saniya Edouard M.D. PROCEDURE: Bilateral L3, L4, L5 diagnostic medial branch blocks under fluoroscopy. PRE-OP DIAGNOSIS:  Low Back Pain/ Lumbar Spondylosis     POST-OP DIAGNOSIS:  same     HISTORY OF PRESENT ILLNESS:  See office notes. Patient has failed previous less-invasive treatments. Pre-op pain score: 4    Post-op pain score: 0     ALLERGIES:  Benadryl [diphenhydramine], Doxylamine, Mucinex [guaifenesin er], Spiriva handihaler [tiotropium bromide monohydrate], and Adhesive tape     MEDICATIONS:    No current facility-administered medications for this encounter. PHYSICAL EXAMINATION:              General:  Awake, alert              Heart:  No audible murmurs, extremities well perfused              Lungs:  No increased WOB or audible wheezing              Extremities:  Normal tone. No swelling. Anesthesia: None    Estimated blood loss: None    DESCRIPTION OF PROCEDURE:     Components of the procedure were again reviewed with the patient prior to the procedure. She is aware of risks including infection, bleeding, allergic reaction, and nerve injury. She had ample opportunity for additional questions. She elected to proceed with treatment. The patient was placed in the prone position. Utilizing fluoroscopy, the L3, L4, and L5 areas were identified, target points for the diagnostic medial branch blocks were at the roots of the transverse processes and the superior articular processes with the exception of the L5 dorsal ramus at the sacral ala. Appropriate entry sites were selected over the skin. The skin was prepared in sterile fashion. Local anesthesia was carried out at each of the 4 entry sites with 1-2 ml lidocaine 1%. Utilizing a 22 gauge needle x 3.50 inch under fluoroscopy, each of the 6 target sites were approached.   AP and oblique viewpoints were utilized to ensure appropriate needle localization. Each of the 6 diagnostic blocks were carried out utilizing 0.5 cc of Marcaine 0.5%. The patient tolerated the procedure well. DISPOSITION:  The patient was transported to recovery. The patient was monitored for 15 to 20 minutes post-procedure. Precautions were discussed and written instructions provided. .  Patient was provided and reminded to keep a pain diary and follow-up in the clinic as previously arranged to discuss efficacy and future treatments. Comment: Significant L5-S1 arthropathy. Also at L3-4 on the right.

## 2021-04-19 RX ORDER — ROSUVASTATIN CALCIUM 40 MG/1
40 TABLET, COATED ORAL DAILY
Qty: 30 TABLET | Refills: 1 | Status: SHIPPED | OUTPATIENT
Start: 2021-04-19 | End: 2021-06-21

## 2021-04-19 NOTE — TELEPHONE ENCOUNTER
Last OV: 01/02/20  Next OV: X  Labs:07/14/20 HEPATIC   Last EKG (if needed):    Please schedule follow up

## 2021-04-26 PROBLEM — I20.8 ANGINAL EQUIVALENT (HCC): Status: ACTIVE | Noted: 2021-04-26

## 2021-04-26 PROBLEM — I20.89 ANGINAL EQUIVALENT: Status: ACTIVE | Noted: 2021-04-26

## 2021-05-18 RX ORDER — TORSEMIDE 20 MG/1
TABLET ORAL
Qty: 60 TABLET | Refills: 11 | Status: ON HOLD | OUTPATIENT
Start: 2021-05-18 | End: 2021-11-01 | Stop reason: HOSPADM

## 2021-06-10 ENCOUNTER — OFFICE VISIT (OUTPATIENT)
Dept: CARDIOLOGY CLINIC | Age: 76
End: 2021-06-10
Payer: COMMERCIAL

## 2021-06-10 VITALS
OXYGEN SATURATION: 97 % | BODY MASS INDEX: 27.05 KG/M2 | HEIGHT: 62 IN | HEART RATE: 70 BPM | DIASTOLIC BLOOD PRESSURE: 70 MMHG | WEIGHT: 147 LBS | SYSTOLIC BLOOD PRESSURE: 142 MMHG

## 2021-06-10 DIAGNOSIS — I48.0 PAROXYSMAL ATRIAL FIBRILLATION (HCC): ICD-10-CM

## 2021-06-10 DIAGNOSIS — Z95.2 S/P AVR (AORTIC VALVE REPLACEMENT): ICD-10-CM

## 2021-06-10 DIAGNOSIS — I25.10 CORONARY ARTERY DISEASE INVOLVING NATIVE CORONARY ARTERY OF NATIVE HEART WITHOUT ANGINA PECTORIS: Primary | ICD-10-CM

## 2021-06-10 DIAGNOSIS — E78.5 HYPERLIPIDEMIA LDL GOAL <70: ICD-10-CM

## 2021-06-10 DIAGNOSIS — I50.32 CHRONIC DIASTOLIC CHF (CONGESTIVE HEART FAILURE), NYHA CLASS 2 (HCC): ICD-10-CM

## 2021-06-10 DIAGNOSIS — I65.21 CAROTID STENOSIS, RIGHT: ICD-10-CM

## 2021-06-10 PROCEDURE — 4004F PT TOBACCO SCREEN RCVD TLK: CPT | Performed by: INTERNAL MEDICINE

## 2021-06-10 PROCEDURE — G8417 CALC BMI ABV UP PARAM F/U: HCPCS | Performed by: INTERNAL MEDICINE

## 2021-06-10 PROCEDURE — 4040F PNEUMOC VAC/ADMIN/RCVD: CPT | Performed by: INTERNAL MEDICINE

## 2021-06-10 PROCEDURE — 99214 OFFICE O/P EST MOD 30 MIN: CPT | Performed by: INTERNAL MEDICINE

## 2021-06-10 PROCEDURE — 1123F ACP DISCUSS/DSCN MKR DOCD: CPT | Performed by: INTERNAL MEDICINE

## 2021-06-10 PROCEDURE — G8400 PT W/DXA NO RESULTS DOC: HCPCS | Performed by: INTERNAL MEDICINE

## 2021-06-10 PROCEDURE — G8427 DOCREV CUR MEDS BY ELIG CLIN: HCPCS | Performed by: INTERNAL MEDICINE

## 2021-06-10 PROCEDURE — 3017F COLORECTAL CA SCREEN DOC REV: CPT | Performed by: INTERNAL MEDICINE

## 2021-06-10 PROCEDURE — 1090F PRES/ABSN URINE INCON ASSESS: CPT | Performed by: INTERNAL MEDICINE

## 2021-06-15 ENCOUNTER — CLINICAL DOCUMENTATION (OUTPATIENT)
Dept: OTHER | Age: 76
End: 2021-06-15

## 2021-06-17 DIAGNOSIS — I50.32 CHRONIC DIASTOLIC CHF (CONGESTIVE HEART FAILURE), NYHA CLASS 2 (HCC): ICD-10-CM

## 2021-06-17 DIAGNOSIS — E78.5 HYPERLIPIDEMIA LDL GOAL <70: ICD-10-CM

## 2021-06-17 LAB
A/G RATIO: 2 (ref 1.1–2.2)
ALBUMIN SERPL-MCNC: 4.5 G/DL (ref 3.4–5)
ALP BLD-CCNC: 71 U/L (ref 40–129)
ALT SERPL-CCNC: 10 U/L (ref 10–40)
ANION GAP SERPL CALCULATED.3IONS-SCNC: 16 MMOL/L (ref 3–16)
AST SERPL-CCNC: 17 U/L (ref 15–37)
BILIRUB SERPL-MCNC: 0.3 MG/DL (ref 0–1)
BUN BLDV-MCNC: 27 MG/DL (ref 7–20)
CALCIUM SERPL-MCNC: 9.4 MG/DL (ref 8.3–10.6)
CHLORIDE BLD-SCNC: 101 MMOL/L (ref 99–110)
CHOLESTEROL, FASTING: 161 MG/DL (ref 0–199)
CO2: 23 MMOL/L (ref 21–32)
CREAT SERPL-MCNC: 1.1 MG/DL (ref 0.6–1.2)
GFR AFRICAN AMERICAN: 58
GFR NON-AFRICAN AMERICAN: 48
GLOBULIN: 2.3 G/DL
GLUCOSE BLD-MCNC: 103 MG/DL (ref 70–99)
HDLC SERPL-MCNC: 39 MG/DL (ref 40–60)
LDL CHOLESTEROL CALCULATED: 98 MG/DL
LDL CHOLESTEROL DIRECT: 103 MG/DL
POTASSIUM SERPL-SCNC: 4.5 MMOL/L (ref 3.5–5.1)
SODIUM BLD-SCNC: 140 MMOL/L (ref 136–145)
TOTAL PROTEIN: 6.8 G/DL (ref 6.4–8.2)
TRIGLYCERIDE, FASTING: 120 MG/DL (ref 0–150)
VLDLC SERPL CALC-MCNC: 24 MG/DL

## 2021-06-21 RX ORDER — ROSUVASTATIN CALCIUM 40 MG/1
40 TABLET, COATED ORAL DAILY
Qty: 30 TABLET | Refills: 11 | Status: SHIPPED | OUTPATIENT
Start: 2021-06-21 | End: 2022-03-07 | Stop reason: SDUPTHER

## 2021-06-21 RX ORDER — POTASSIUM CHLORIDE 750 MG/1
TABLET, FILM COATED, EXTENDED RELEASE ORAL
Qty: 60 TABLET | Refills: 11 | Status: ON HOLD | OUTPATIENT
Start: 2021-06-21 | End: 2021-11-01 | Stop reason: HOSPADM

## 2021-06-21 RX ORDER — ISOSORBIDE MONONITRATE 30 MG/1
TABLET, EXTENDED RELEASE ORAL
Qty: 60 TABLET | Refills: 11 | Status: ON HOLD | OUTPATIENT
Start: 2021-06-21 | End: 2021-11-01 | Stop reason: HOSPADM

## 2021-06-23 ENCOUNTER — TELEPHONE (OUTPATIENT)
Dept: CARDIOLOGY CLINIC | Age: 76
End: 2021-06-23

## 2021-06-23 RX ORDER — EZETIMIBE 10 MG/1
10 TABLET ORAL DAILY
Qty: 90 TABLET | Refills: 1 | Status: SHIPPED | OUTPATIENT
Start: 2021-06-23 | End: 2022-01-07

## 2021-06-25 NOTE — FLOWSHEET NOTE
[facility]. [address]  Phone: [phone]   Fax:     [phone]    Physical Therapy Treatment Note/ Progress Report:     Date:  2021    Patient Name:  Jennifer Dugan    :  1945  MRN: [id number]    Pertinent Medical History: Additional Pertinent Hx: PLOF: Inependent    Medical/Treatment Diagnosis Information:  · Diagnosis: M54.9 (ICD-10-CM) - Back pain  · Treatment Diagnosis: Back pain, difficulty with walking, core weakness, poor posture    Insurance/Certification information:  PT Insurance Information: [address] [place] [address] #[id number] approved   5 visits  Physician Information:  Referring Practitioner: Alesha Ochoa MD  Plan of care signed (Y/N): Sent to Dr. Mattie Norris on 21    Date of Patient follow up with Physician:      Progress Report: []  Yes  [x]  No     Date Range for reporting period:  Beginnin2021  Ending:    Progress report due (10 Rx/or 30 days whichever is less):      Recertification due (POC duration/ or 90 days whichever is less):    Visit # POC/ Insurance Allowable Auth Needed   1 5 [x]Yes    []No     Functional Scale:       Date Assessed: at eval  Test: [place]  Score: 56/CK  Pain level:  4/10     History of Injury:   Pt had no injury or accidient. It started wtih pain in the back when she walked standing erectly. Pain is also aggravated by getting up from the floor. 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 Sitting eases the pain. Pt had an epidural steroid injection in 2018 and this was helpful only for a few days. After this, she  had the spinal implant and this was helpful for only 2 months. She is currently taking Oxycodone and so the doctor recommended PT because she cannot take any  more meds.     SUBJECTIVE:  See eval    OBJECTIVE:    Observation:    Test measurements:      RESTRICTIONS/PRECAUTIONS:  Right THR in 2006    Exercises/Interventions:     Therapeutic Ex (93919)   Min: Resistance/Reps Notes/Cues   Seated HS stretch 3 x 30 sec    PPT 5 sec  x10    LTR 2 min    Bridging 5 sec x 10    Seated forward flexion with hands on lg exercise ball 10x 5 sec each                        Manual Intervention (33734) Min:      Seated for DTM to lumbar paraspinals with Pedro x 10 min         NMR re-education (21925)   Min:     Mf Activation- re-ed     TrA Re-ed activation     Glute Max re-ed activation          Therapeutic Activity (48662) Min:               Modalities  Min:             Other Therapeutic Activities:  Pt was educated on PT POC, Diagnosis, Prognosis, pathomechanics as well as frequency and duration of scheduling future physical therapy appointments. Time was also taken on this day to answer all patient questions and participation in PT. Reviewed appointment policy in detail with patient and patient verbalized understanding. Home Exercise Program: Raúl Kothari [id number] issued    Therapeutic Exercise and NMR EXR  [x] (55929) Provided verbal/tactile cueing for activities related to strengthening, flexibility, endurance, ROM  for improvements in proximal hip and core control with self care, mobility, lifting and ambulation.  [] (68623) Provided verbal/tactile cueing for activities related to improving balance, coordination, kinesthetic sense, posture, motor skill, proprioception  to assist with core control in self care, mobility, lifting, and ambulation.      Therapeutic Activities:    [] (22465 or 57264) Provided verbal/tactile cueing for activities related to improving balance, coordination, kinesthetic sense, posture, motor skill, proprioception and motor activation to allow for proper function  with self care and ADLs  [] (01552) Provided training and instruction to the patient for proper core and proximal hip recruitment and positioning with ambulation re-education     Home Exercise Program:    [x] (02342) Reviewed/Progressed HEP activities related to strengthening, flexibility, endurance, ROM of core, proximal hip and LE for functional self-care, mobility, lifting and ambulation   [] (18761) Reviewed/Progressed HEP activities related to improving balance, coordination, kinesthetic sense, posture, motor skill, proprioception of core, proximal hip and LE for self care, mobility, lifting, and ambulation      Manual Treatments:  PROM / STM / Oscillations-Mobs:  G-I, II, III, IV (PA's, Inf., Post.)  [x] (91691) Provided manual therapy to mobilize proximal hip and LS spine soft tissue/joints for the purpose of modulating pain, promoting relaxation,  increasing ROM, reducing/eliminating soft tissue swelling/inflammation/restriction, improving soft tissue extensibility and allowing for proper ROM for normal function with self care, mobility, lifting and ambulation. Charges:  Timed Code Treatment Minutes: 40   Total Treatment Minutes: 65     [] EVAL (LOW) 26670 (typically 20 minutes face-to-face)  [x] EVAL (MOD) 73458 (typically 30 minutes face-to-face)  [] EVAL (HIGH) 24130 (typically 45 minutes face-to-face)  [] RE-EVAL     [x] WF(74742) x 2    [] Dry needle 1 or 2 Muscles (96670)  [] NMR (39895) x     [] Dry needle 3+ Muscles (14261)  [x] Manual (68905) x  1   [] Ultrasound (28530) x  [] TA (21527) x     [] Mech Traction (17113)  [] ES(attended) (10221)     [] ES (un) (73285):   [] Vasopump (35232) [] Ionto (13757)   [] Other:    GOALS:  Patient stated goal: \"No pain\"  []? Progressing: []? Met: []? Not Met: []? Adjusted  Therapist goals for Patient:   Short Term Goals: To be achieved in: 2 weeks  1. Independent in HEP and progression per patient tolerance, in order to prevent re-injury. []? Progressing: []? Met: []? Not Met: []? Adjusted  2. Patient will have a decrease in pain to facilitate improvement in movement, function, and ADLs as indicated by Functional Deficits. []? Progressing: []? Met: []?  Not Met: []? Adjusted     Long Term Goals: To be achieved in: 8 weeks  1. Disability index score of 30% or less for the QDI to assist with reaching prior level of function. []? Progressing: []? Met: []? Not Met: []? Adjusted  2. Patient will demonstrate increased AROM to WNL, good LS mobility, good hip ROM to allow for proper joint functioning as indicated by patients Functional Deficits. []? Progressing: []? Met: []? Not Met: []? Adjusted  3. Patient will demonstrate an increase in Strength to good proximal hip and core activation to allow for proper functional mobility as indicated by patients Functional Deficits. []? Progressing: []? Met: []? Not Met: []? Adjusted  4. Patient will return to 70% functional activities without increased symptoms or restriction. []? Progressing: []? Met: []? Not Met: []? Adjusted  5. Pt will be able to ambulate with upright posture and no pain(patient specific functional goal)    []? Progressing: []? Met: []? Not Met: []? Adjusted          ASSESSMENT:  Pt had a R THR in 2006. Her R LE rotates internally and as she ambulates, she lacks trunk rotation but compensates with hip rotation. She has core weakness and is deconditioned . Will benefit from lumbar/core stabilization and some flexibility stretching as well. Treatment/Activity Tolerance:  [x] Patient tolerated treatment well [] Patient limited by fatique  [] Patient limited by pain  [] Patient limited by other medical complications  [] Other:     Overall Progression Towards Functional goals/ Treatment Progress Update:  [] Patient is progressing as expected towards functional goals listed. [] Progression is slowed due to complexities/Impairments listed. [] Progression has been slowed due to co-morbidities.   [x] Plan just implemented, too soon to assess goals progression <30days   [] Goals require adjustment due to lack of progress  [] Patient is not progressing as expected and requires additional follow up with physician  [] Other:    Prognosis for POC: [x] Good [] Fair  [] Poor    Patient requires continued skilled intervention: [x] Yes  [] No        PLAN: See eval  [] Continue per plan of care [] Alter current plan (see comments)  [x] Plan of care initiated [] Hold pending MD visit [] Discharge    Electronically signed by: Gabrielle Barlow PT    Note: If patient does not return for scheduled/recommended follow up visits, this note will serve as a discharge from care along with the most recent update on progress. sad

## 2021-06-26 PROCEDURE — 93000 ELECTROCARDIOGRAM COMPLETE: CPT | Performed by: INTERNAL MEDICINE

## 2021-06-29 NOTE — PROGRESS NOTES
PATIENT REACHED   YES_X___NO____    PREOP INSTRUCTIONS LEFT ON VM NUMBER_______________      DATE__7/8/21_______ TIME_1430_______ARRIVAL_1330_______PLACE__masc__________  NOTHING TO EAT OR DRINK  AFTER MIDNIGHT THE EVENING PRIOR OR AS INSTRUCTED BY YOUR DR.   Janessarochelle NEED A RESPONSIBLE ADULT AGE 18 OR OLDER TO DRIVE YOU HOME  PLEASE BRING INSURANCE CARD. PICTURE ID AND COMPLETE LIST OF MEDS  WEAR LOOSE COMFORTABLE CLOTHING  FOLLOW ANY INSTRUCTIONS YOUR DRS OFFICE HAS GIVEN YOU,INCLUDING WHAT MEDICATIONS TO TAKE THE AM OF PROCEDURE AND WHEN AND IF YOU NEED TO STOP ANY BLOOD THINNERS. IF YOU HAVE QUESTIONS REGARDING THIS CALL THE OFFICE  THE GOAL BLOOD SUGAR THE AM OF PROCEDURE  OR LESS ABOVE THAT THE PROCEDURE MAY BE CANCELLED  ANY QUESTIONS CALL YOUR DOCTOR. ALSO,PLEASE READ THE INSTRUCTION PACKET FROM YOUR DR IF YOU RECEIVED ONE. SPINE INTERVENTION NUMBER [phone]      OTHER___________________________________      VISITOR POLICY(subject to change)      There is a one visitor policy at [facility] for all surgeries and endoscopies. Whether the visitor can stay or will be asked to wait in the car will depend on the current policy and if social distancing can be maintained. The policy is subject to change at any time. Please make sure the visitor has a cell phone that is on,charged and able to accept calls, as this may be the way that the staff communicates with them. Pain management is NO VISITOR policyThe patients ride is expected to remain in the car with a cell phone for communication. If the ride is leaving the hospital grounds please make sure they are back in time for pickup. Have the patient inform the staff on arrival what their rides plans are while the patient is in the facility. At the MAIN there is one visitor allowed. Please note that the visitor policy is subject to change.

## 2021-07-08 ENCOUNTER — HOSPITAL ENCOUNTER (OUTPATIENT)
Age: 76
Setting detail: OUTPATIENT SURGERY
Discharge: HOME | End: 2021-07-08
Attending: PHYSICAL MEDICINE & REHABILITATION | Admitting: PHYSICAL MEDICINE & REHABILITATION
Payer: COMMERCIAL

## 2021-07-08 ENCOUNTER — APPOINTMENT (OUTPATIENT)
Dept: GENERAL RADIOLOGY | Age: 76
End: 2021-07-08
Attending: PHYSICAL MEDICINE & REHABILITATION
Payer: COMMERCIAL

## 2021-07-08 ENCOUNTER — HOSPITAL ENCOUNTER (OUTPATIENT)
Dept: CT IMAGING | Age: 76
Discharge: HOME | End: 2021-07-08
Payer: COMMERCIAL

## 2021-07-08 VITALS
BODY MASS INDEX: 26.68 KG/M2 | HEIGHT: 62 IN | HEART RATE: 68 BPM | WEIGHT: 145 LBS | DIASTOLIC BLOOD PRESSURE: 56 MMHG | RESPIRATION RATE: 20 BRPM | SYSTOLIC BLOOD PRESSURE: 132 MMHG | TEMPERATURE: 98.5 F | OXYGEN SATURATION: 99 %

## 2021-07-08 DIAGNOSIS — F17.200 SMOKER: ICD-10-CM

## 2021-07-08 PROCEDURE — 2500000003 HC RX 250 WO HCPCS: Performed by: PHYSICAL MEDICINE & REHABILITATION

## 2021-07-08 PROCEDURE — 71271 CT THORAX LUNG CANCER SCR C-: CPT

## 2021-07-08 PROCEDURE — 3209999900 FLUORO FOR SURGICAL PROCEDURES

## 2021-07-08 PROCEDURE — 3610000058 HC PAIN LEVEL 5 BASE (NON-OR): Performed by: PHYSICAL MEDICINE & REHABILITATION

## 2021-07-08 PROCEDURE — 2709999900 HC NON-CHARGEABLE SUPPLY: Performed by: PHYSICAL MEDICINE & REHABILITATION

## 2021-07-08 RX ORDER — LIDOCAINE HYDROCHLORIDE 10 MG/ML
INJECTION, SOLUTION EPIDURAL; INFILTRATION; INTRACAUDAL; PERINEURAL
Status: COMPLETED | OUTPATIENT
Start: 2021-07-08 | End: 2021-07-08

## 2021-07-08 RX ORDER — BUPIVACAINE HYDROCHLORIDE 5 MG/ML
INJECTION, SOLUTION EPIDURAL; INTRACAUDAL
Status: COMPLETED | OUTPATIENT
Start: 2021-07-08 | End: 2021-07-08

## 2021-07-08 ASSESSMENT — PAIN DESCRIPTION - DESCRIPTORS: DESCRIPTORS: ACHING

## 2021-07-08 ASSESSMENT — PAIN - FUNCTIONAL ASSESSMENT: PAIN_FUNCTIONAL_ASSESSMENT: 0-10

## 2021-07-08 ASSESSMENT — PAIN SCALES - GENERAL
PAINLEVEL_OUTOF10: 0
PAINLEVEL_OUTOF10: 0

## 2021-07-08 NOTE — H&P
HISTORY AND PHYSICAL/PRE-SEDATION ASSESSMENT    Patient:  Ricardo Leonardo   :  1945  Medical Record No.:  [id number]   Date:  2021  Physician:  Nayan Cook MD  Facility: [address] [place] [address] Drive:                 The patient is a 76 y.o. female whom presents with low back pain. Review of the imaging and physical exam of the patient confirmed the pre-procedure diagnosis. After a thorough discussion of risks, benefits and alternatives informed consent was obtained. Diagnosis:  M47.896  LUMBAR SPONDYLOSIS    Past Medical History:   Past Medical History:   Diagnosis Date    Anticoagulant long-term use     Atrial fibrillation ([facility]     went rhonda on amiodarone and coreg both stopped 2020    AVM (arteriovenous malformation) of duodenum, acquired 2017    presented w sob and anemia    AVM (arteriovenous malformation) of stomach, acquired 2017    presented w sob and anemia    Bladder cancer ([facility] 2014    yearly cystoscopy    CAD (coronary artery disease)     L cx mid stenotic region/rx elut stent    CAP (community acquired pneumonia) 2015    [facility] pn admitted 3 days    Carotid stenosis 2014    R ICA 50-79%/carotid every year, less than 50% L ICA : check every     Chronic atrial fibrillation ([facility]     at presentation of cva. since .  Chronic diastolic CHF (congestive heart failure) ([facility] 2016    grade II    COPD, mild (HCC)     CVA (cerebral infarction)     left cerebral cortex x2/expressive aphasia/resolved    Diverticulosis     Epistaxis, recurrent     when inr high.  last 3-4 months ago    Former smoker     Gallbladder sludge     HTN (hypertension)     Hyperlipidemia     Hypothyroidism     Lumbar stenosis without neurogenic claudication     pain across low back worse with standing and walking, nonradiating    Macular degeneration 2018    left worse than right , dry : progressive loss of sight: just barely able to see to drive    Neuropathy     Osteoarthritis     Pulmonary HTN ([facility]     primary, responsive to nitrates    PVD (peripheral vascular disease) ([facility]     occluded right SFA::: asymptomatic NIKOS 0.7 right and 1.0 left    Sacral fracture, closed ([facility]     Syncope 2014        Past Surgical History:     Past Surgical History:   Procedure Laterality Date    AORTIC VALVE REPLACEMENT  6/16/15    Dr. Angle Christensen. Hernandez - PVI, RENETTA exclusion. 19mm Maki pericardial Magna    APPENDECTOMY      BACK SURGERY  03/15/2019    superion inserted to keep back from hurtin [address] N/A 2019    EMERGENT; LAPAROSCOPIC CHOLECYSTECTOMY WITH GRAM performed by Martha Pham MD at [facility]      stent x 1    CYSTOSCOPY  2014    dr Sabas Palacios      THR Right    OTHER SURGICAL HISTORY  2018     EGD and colonoscopy.  PAIN MANAGEMENT PROCEDURE Bilateral 2021    BILATERAL L3, L4, L5 MEDIAL BRANCH BLOCK WITH FLUOROSCOPY performed by Richard Ashley MD at [address] [facility] N/A 3/15/2019    INSERTION OF INTERSPINOUS SPACER Kevin Lacey AT LUMBAR FOUR-LUMBAR FIVE performed by Brenden Conti MD at [address] ENDOSCOPY  12/15/2017       Current Medications:   Prior to Admission medications    Medication Sig Start Date End Date Taking?  Authorizing Provider   ezetimibe (ZETIA) 10 MG tablet Take 1 tablet by mouth daily 21   Shalom Doyle MD   potassium chloride (KLOR-CON) 10 MEQ extended release tablet TAKE ONE TABLET BY MOUTH TWO TIMES A DAY 21   Asya Maciel MD   rosuvastatin (CRESTOR) 40 MG tablet TAKE 1 TABLET BY MOUTH DAILY 21   Shalom Doyle MD   isosorbide mononitrate (IMDUR) 30 MG extended release tablet TAKE ONE TABLET BY MOUTH TWO TIMES A DAY FOR SYSTOLIC BLOOD PRESSURE (TOP NUMBER) OVER 140 21   Asya Maciel MD   torsemide (DEMADEX) 20 MG tablet TAKE ONE TABLET BY MOUTH TWO TIMES A DAY 5/18/21   Jonny Colorado MD   DULoxetine (CYMBALTA) 30 MG extended release capsule Take 1 capsule by mouth daily Every morning 4/26/21   Tee Starr MD   valsartan (DIOVAN) 160 MG tablet TAKE ONE TABLET BY mouth bid for htn  Patient taking differently: 2 times daily TAKE ONE TABLET BY mouth bid for htn 4/26/21   Tee Starr MD   ipratropium (ATROVENT) 0.03 % nasal spray INHALE 2 DOSES INTO EACH NOSTRIL THREE TIMES A DAY IF NEEDED FOR RHINITIS 4/5/21   Tee Starr MD   pantoprazole (PROTONIX) 40 MG tablet TAKE ONE TABLET BY MOUTH DAILY 2/12/21   Tee Starr MD   tiZANidine (ZANAFLEX) 4 MG tablet TAKE 1 TABLET BY MOUTH 3 TIMES DAILY 1/8/21   Tee Starr MD   levothyroxine (SYNTHROID) 88 MCG tablet TAKE 1 TABLET BY MOUTH DAILY 8/10/20   Tee Starr MD   lactobacillus (CULTURELLE) capsule Take 1 capsule by mouth 2 times daily (with meals) 7/7/20   Tee Starr MD   fluticasone-umeclidin-vilant (TRELEGY ELLIPTA) 380-15.7-18 MCG/INH AEPB Inhale 1 puff into the lungs daily 12/23/19   HOWARD Higgins - CNP    MG capsule TAKE ONE CAPSULE BY MOUTH TWO TIMES A DAY 4/20/18   Tee Starr MD   aspirin 81 MG tablet Take 1 tablet by mouth daily 5/14/15   Jonny Colorado MD       Allergies:  Benadryl [diphenhydramine], Doxylamine, Mucinex [guaifenesin er], Spiriva handihaler [tiotropium bromide monohydrate], and Adhesive tape    Social History:    reports that she has been smoking cigarettes. She has a 45.00 pack-year smoking history. She uses smokeless tobacco. She reports that she does not drink alcohol and does not use drugs. Family History:   Family History   Problem Relation Age of Onset    Breast Cancer Daughter         Vitals: Height 5' 2\" (1.575 m), weight 145 lb (65.8 kg), not currently breastfeeding. PHYSICAL EXAM:  HENT: Airway patent and reviewed  Cardiovascular: Normal rate, regular rhythm, normal heart sounds. Pulmonary/Chest: No wheezes. No rhonchi. No rales. Abdominal: Soft. Bowel sounds are normal. No distension. Extremities: Moves all extremities equally  Lumbar Spine: Painful range of motion, no midline tenderness     ASA CLASS:         []   I. Normal, healthy adult           [x]   II.  Mild systemic disease            []   III. Severe systemic disease    Mallampati: Mallampati Class II - (soft palate, fauces & uvula are visible)      Sedation plan:   []  Local              [x]  Minimal                  []  General anesthesia    Treatment plan:  Patient's condition acceptable for planned procedure/sedation. Proceed with planned procedure   Post Procedure Plan   Return to same level of care   ______________________     The risks and benefits as well as alternatives to the procedure have been discussed with the patient and or family. The patient and/or next of kin understands and agrees to proceed.     Bennie Gonzales MD

## 2021-07-08 NOTE — OP NOTE
PATIENT:  Archie Gross  AGE:  76 yrs  MEDICAL RECORD #:  [id number]  YOB: 1945     DATE:  7/8/2021  PHYSICIAN: Lore Aldrich M.D. PROCEDURE: Bilateral L3, L4, L5 diagnostic medial branch blocks under fluoroscopy. PRE-OP DIAGNOSIS:  Low Back Pain/ Lumbar Spondylosis     POST-OP DIAGNOSIS:  same     HISTORY OF PRESENT ILLNESS:  See office notes. Patient has failed previous less-invasive treatments. Pre-op pain score: 7    Post-op pain score: 0     ALLERGIES:  Benadryl [diphenhydramine], Doxylamine, Mucinex [guaifenesin er], Spiriva handihaler [tiotropium bromide monohydrate], and Adhesive tape     MEDICATIONS:    No current facility-administered medications for this encounter. PHYSICAL EXAMINATION:              General:  Awake, alert              Heart:  No audible murmurs, extremities well perfused              Lungs:  No increased WOB or audible wheezing              Extremities:  Normal tone. No swelling. Anesthesia: None    Estimated blood loss: None    DESCRIPTION OF PROCEDURE:     Components of the procedure were again reviewed with the patient prior to the procedure. She is aware of risks including infection, bleeding, allergic reaction, and nerve injury. She had ample opportunity for additional questions. She elected to proceed with treatment. The patient was placed in the prone position. Utilizing fluoroscopy, the L3, L4, and L5 areas were identified, target points for the diagnostic medial branch blocks were at the roots of the transverse processes and the superior articular processes with the exception of the L5 dorsal ramus at the sacral ala. Appropriate entry sites were selected over the skin. The skin was prepared in sterile fashion. Local anesthesia was carried out at each of the 4 entry sites with 1-2 ml lidocaine 1%. Utilizing a 22 gauge needle x 3.50 inch under fluoroscopy, each of the 6 target sites were approached.   AP and oblique viewpoints were utilized to ensure appropriate needle localization. Each of the 6 diagnostic blocks were carried out utilizing 0.5 cc of Marcaine 0.5%. The patient tolerated the procedure well. DISPOSITION:  The patient was transported to recovery. The patient was monitored for 15 to 20 minutes post-procedure. Precautions were discussed and written instructions provided. .  Patient was provided and reminded to keep a pain diary and follow-up in the clinic as previously arranged to discuss efficacy and future treatments.     Comment: None

## 2021-07-08 NOTE — PROGRESS NOTES
Arrived to phase 2. Awake, alert and oriented. VSS. Denies pain. No distress noted. Back tegaderm dressing clean and dry. Bilateral leg strength strong and equal.  No sedation given to patient.

## 2021-07-08 NOTE — PROGRESS NOTES
Discharged to home with all personal items with patient. Bilateral legs strong and equal.  Assisted to car via this RN with no difficulty. Copy of instructions with patient.

## 2021-07-13 NOTE — PROGRESS NOTES
Post Procedure Call    Patient reached by phone at 2:10 PM on 7/13/2021. Patient states pain is \"better for 4 hours and was walking straighter\". Patient has no complaints of headache or fever. Patient was instructed to removed dressing (if not already done). Patient reminded to call if any problems.

## 2021-07-16 ENCOUNTER — TELEPHONE (OUTPATIENT)
Dept: CASE MANAGEMENT | Age: 76
End: 2021-07-16

## 2021-07-16 NOTE — TELEPHONE ENCOUNTER
CT Lung Cancer Screening completed on 7/8/2021. Dr Spencer Cordero reviewed results with recommendations & the office notified patient of results with recommendations. Result letter mailed to patient. Most recent smoking history reviewed.

## 2021-08-26 RX ORDER — LEVOTHYROXINE SODIUM 88 UG/1
88 TABLET ORAL DAILY
Qty: 30 TABLET | Refills: 11 | Status: SHIPPED | OUTPATIENT
Start: 2021-08-26 | End: 2021-11-10 | Stop reason: SDUPTHER

## 2021-08-31 RX ORDER — TIZANIDINE 4 MG/1
TABLET ORAL
Qty: 90 TABLET | Refills: 3 | Status: SHIPPED | OUTPATIENT
Start: 2021-08-31 | End: 2022-03-07 | Stop reason: SDUPTHER

## 2021-09-07 NOTE — PROGRESS NOTES
PATIENT REACHED   YES__X__NO____    PREOP INSTRUCTIONS LEFT ON VM [id number]__[phone]_____________      DATE__9/9/21_______ TIME_1400________ARRIVAL__1300______PLACE__masc__________  NOTHING TO EAT OR DRINK  AFTER MIDNIGHT THE EVENING PRIOR OR AS INSTRUCTED BY YOUR DR.  Spenser Senior NEED A RESPONSIBLE ADULT AGE 18 OR OLDER TO DRIVE YOU HOME  PLEASE BRING INSURANCE CARD. PICTURE ID AND COMPLETE LIST OF MEDS  WEAR LOOSE COMFORTABLE CLOTHING  FOLLOW ANY INSTRUCTIONS YOUR DRS OFFICE HAS GIVEN YOU,INCLUDING WHAT MEDICATIONS TO TAKE THE AM OF PROCEDURE AND WHEN AND IF YOU NEED TO STOP ANY BLOOD THINNERS. IF YOU HAVE QUESTIONS REGARDING THIS CALL THE OFFICE  THE GOAL BLOOD SUGAR THE AM OF PROCEDURE  OR LESS ABOVE THAT THE PROCEDURE MAY BE CANCELLED  ANY QUESTIONS CALL YOUR DOCTOR. ALSO,PLEASE READ THE INSTRUCTION PACKET FROM YOUR DR IF YOU RECEIVED ONE. SPINE INTERVENTION NUMBER [phone]      OTHER___________________________________      VISITOR POLICY(subject to change)      There is a one visitor policy at [facility] for all surgeries and endoscopies. Whether the visitor can stay or will be asked to wait in the car will depend on the current policy and if social distancing can be maintained. The policy is subject to change at any time. Please make sure the visitor has a cell phone that is on,charged and able to accept calls, as this may be the way that the staff communicates with them. Pain management is NO VISITOR policyThe patients ride is expected to remain in the car with a cell phone for communication. If the ride is leaving the hospital grounds please make sure they are back in time for pickup. Have the patient inform the staff on arrival what their rides plans are while the patient is in the facility. At the MAIN there is one visitor allowed. Please note that the visitor policy is subject to change.

## 2021-09-09 ENCOUNTER — HOSPITAL ENCOUNTER (OUTPATIENT)
Age: 76
Setting detail: OUTPATIENT SURGERY
Discharge: HOME | End: 2021-09-09
Attending: PHYSICAL MEDICINE & REHABILITATION | Admitting: PHYSICAL MEDICINE & REHABILITATION
Payer: COMMERCIAL

## 2021-09-09 ENCOUNTER — APPOINTMENT (OUTPATIENT)
Dept: GENERAL RADIOLOGY | Age: 76
End: 2021-09-09
Attending: PHYSICAL MEDICINE & REHABILITATION
Payer: COMMERCIAL

## 2021-09-09 VITALS
HEIGHT: 60 IN | TEMPERATURE: 97 F | BODY MASS INDEX: 27.48 KG/M2 | WEIGHT: 140 LBS | RESPIRATION RATE: 16 BRPM | HEART RATE: 52 BPM | OXYGEN SATURATION: 100 % | DIASTOLIC BLOOD PRESSURE: 60 MMHG | SYSTOLIC BLOOD PRESSURE: 160 MMHG

## 2021-09-09 PROCEDURE — 3610000059 HC PAIN LEVEL 5 ADDL 15 MIN (NON-OR): Performed by: PHYSICAL MEDICINE & REHABILITATION

## 2021-09-09 PROCEDURE — 2500000003 HC RX 250 WO HCPCS: Performed by: PHYSICAL MEDICINE & REHABILITATION

## 2021-09-09 PROCEDURE — 3610000058 HC PAIN LEVEL 5 BASE (NON-OR): Performed by: PHYSICAL MEDICINE & REHABILITATION

## 2021-09-09 PROCEDURE — 3209999900 FLUORO FOR SURGICAL PROCEDURES

## 2021-09-09 PROCEDURE — 6360000002 HC RX W HCPCS: Performed by: PHYSICAL MEDICINE & REHABILITATION

## 2021-09-09 PROCEDURE — 2709999900 HC NON-CHARGEABLE SUPPLY: Performed by: PHYSICAL MEDICINE & REHABILITATION

## 2021-09-09 PROCEDURE — 99152 MOD SED SAME PHYS/QHP 5/>YRS: CPT | Performed by: PHYSICAL MEDICINE & REHABILITATION

## 2021-09-09 PROCEDURE — 99153 MOD SED SAME PHYS/QHP EA: CPT | Performed by: PHYSICAL MEDICINE & REHABILITATION

## 2021-09-09 RX ORDER — MIDAZOLAM HYDROCHLORIDE 1 MG/ML
INJECTION INTRAMUSCULAR; INTRAVENOUS
Status: COMPLETED | OUTPATIENT
Start: 2021-09-09 | End: 2021-09-09

## 2021-09-09 RX ORDER — LIDOCAINE HYDROCHLORIDE 20 MG/ML
INJECTION, SOLUTION INFILTRATION; PERINEURAL
Status: COMPLETED | OUTPATIENT
Start: 2021-09-09 | End: 2021-09-09

## 2021-09-09 RX ORDER — LIDOCAINE HYDROCHLORIDE 10 MG/ML
INJECTION, SOLUTION EPIDURAL; INFILTRATION; INTRACAUDAL; PERINEURAL
Status: COMPLETED | OUTPATIENT
Start: 2021-09-09 | End: 2021-09-09

## 2021-09-09 RX ORDER — BUPIVACAINE HYDROCHLORIDE 5 MG/ML
INJECTION, SOLUTION EPIDURAL; INTRACAUDAL
Status: COMPLETED | OUTPATIENT
Start: 2021-09-09 | End: 2021-09-09

## 2021-09-09 RX ORDER — FENTANYL CITRATE 50 UG/ML
INJECTION, SOLUTION INTRAMUSCULAR; INTRAVENOUS
Status: COMPLETED | OUTPATIENT
Start: 2021-09-09 | End: 2021-09-09

## 2021-09-09 ASSESSMENT — PAIN SCALES - GENERAL
PAINLEVEL_OUTOF10: 0
PAINLEVEL_OUTOF10: 0

## 2021-09-09 ASSESSMENT — PAIN - FUNCTIONAL ASSESSMENT: PAIN_FUNCTIONAL_ASSESSMENT: 0-10

## 2021-09-09 ASSESSMENT — PAIN DESCRIPTION - DESCRIPTORS: DESCRIPTORS: DULL;CONSTANT

## 2021-09-09 NOTE — OP NOTE
PATIENT:  Archie Gross  AGE:  76 yrs  MEDICAL RECORD #:  [id number]  YOB: 1945     DATE:  9/9/2021  PHYSICIAN: Lore Aldrich M.D. PROCEDURE: Bilateral L3, L4, L5 radiofrequency ablation/neurotomy under fluoroscopy. PRE-OP DIAGNOSIS:  Low Back Pain/ Lumbar Spondylosis     POST-OP DIAGNOSIS:  same     HISTORY OF PRESENT ILLNESS:  See office notes. Patient has failed previous less-invasive treatments. Appropriate response to previous medial branch blocks at the same anatomic locations. ALLERGIES:  Benadryl [diphenhydramine], Doxylamine, Mucinex [guaifenesin er], Spiriva handihaler [tiotropium bromide monohydrate], and Adhesive tape     MEDICATIONS:    No current facility-administered medications for this encounter. PHYSICAL EXAMINATION:              General:  Awake, alert              Heart:  No audible murmurs, extremities well perfused              Lungs:  No increased WOB or audible wheezing              Extremities:  Normal tone. Warm. No swelling. Anesthesia: 1 mcg Versed 50 mg Fentanyl    Estimated blood loss: none    DESCRIPTION OF PROCEDURE:     Components of the procedure were again reviewed with the patient prior to the procedure. She is aware of risks including infection, bleeding, allergic reaction, and nerve injury. She had ample opportunity for additional questions. She elected to proceed with treatment. The patient was placed in the prone position. Utilizing fluoroscopy, the L3, L4, and L5 areas were identified, target points for the accompanying medial branch nerves were identified. Appropriate entry sites were selected over the skin. The skin was prepared in an aseptic fashion. Local anesthesia was carried out at each of the 6 entry sites with 1-2 ml lidocaine 1%.      Under fluoroscopic guidance (AP and oblique views) a curved 20 gauge RFA spinal needle was advanced to the bilateral 6 medial branches (at junction of SAP and transverse process, L5 dorsal ramus at sacral ala). The RFA probe was then inserted through the cannula and impedances were checked. Then motor (2Hz) testing was done and did not cause muscle contractions in the limb. Next, 0.5 ml of lidocaine 2% was injected at each site and 60 seconds were observed prior to ablation initiation. Radiofrequency ablation was then done at 80° for 90 seconds. The needles were then minorly rotated ensuring bony contact at all times and a repeat radiofrequency ablation was then done at 80° for 90 seconds. Following the procedure and prior to needle retraction, 0.5 ml of a combination of 0.5% bupivacaine (2ml), 1% lidocaine (3ml), and 10 mg dexamethasone was injected at each site. The patient tolerated the procedure well. DISPOSITION:  The patient was transported to recovery. The patient was monitored for 15 to 20 minutes post-procedure. Precautions were discussed and written instructions provided.     Comment: None

## 2021-09-09 NOTE — H&P
HISTORY AND PHYSICAL/PRE-SEDATION ASSESSMENT    Patient:  Celena Ortega   :  1945  Medical Record No.:  [id number]   Date:  2021  Physician:  Cheyenne Mattson MD  Facility: [address] [place] [address] Drive:                 The patient is a 76 y.o. female whom presents with low back pain. Review of the imaging and physical exam of the patient confirmed the pre-procedure diagnosis. After a thorough discussion of risks, benefits and alternatives informed consent was obtained. Diagnosis:  M47.896  LUMBAR SPONDYLOSIS    Past Medical History:   Past Medical History:   Diagnosis Date    Anticoagulant long-term use     Atrial fibrillation ([facility] [address]     went rhonda on amiodarone and coreg both stopped 2020    AVM (arteriovenous malformation) of duodenum, acquired 2017    presented w sob and anemia    AVM (arteriovenous malformation) of stomach, acquired 2017    presented w sob and anemia    Bladder cancer ([facility] [address] 2014    yearly cystoscopy    CAD (coronary artery disease)     L cx mid stenotic region/rx elut stent    CAP (community acquired pneumonia) 2015    [facility] pn admitted 3 days    Carotid stenosis 2014    R ICA 50-79%/carotid every year, less than 50% L ICA : check every     Chronic atrial fibrillation ([facility] [address]     at presentation of cva. since .  Chronic diastolic CHF (congestive heart failure) ([facility] [address] 2016    grade II    COPD, mild (HCC)     CVA (cerebral infarction)     left cerebral cortex x2/expressive aphasia/resolved    Diverticulosis     Epistaxis, recurrent     when inr high.  last 3-4 months ago    Former smoker     Gallbladder sludge     HTN (hypertension)     Hyperlipidemia     Hypothyroidism     Lumbar stenosis without neurogenic claudication     pain across low back worse with standing and walking, nonradiating    Macular degeneration 2018    left worse than right , dry : progressive loss of sight: just barely able to see to drive    Neuropathy     Osteoarthritis     Pulmonary HTN ([facility] [address]     primary, responsive to nitrates    PVD (peripheral vascular disease) ([facility] [address]     occluded right SFA::: asymptomatic NIKOS 0.7 right and 1.0 left    Sacral fracture, closed ([facility] [address]     Syncope 2014        Past Surgical History:     Past Surgical History:   Procedure Laterality Date    AORTIC VALVE REPLACEMENT  6/16/15    Dr. Dorian Maguire. Hernandez - PVI, RENETTA exclusion. 19mm Maki pericardial Magna    APPENDECTOMY      BACK SURGERY  03/15/2019    superion inserted to keep back from hurtin [facility] [address] N/A 2019    EMERGENT; LAPAROSCOPIC CHOLECYSTECTOMY WITH GRAM performed by Liza Fernández MD at [address]      stent x 1    CYSTOSCOPY  2014    dr Annette Horan      THR Right    OTHER SURGICAL HISTORY  2018     EGD and colonoscopy.  PAIN MANAGEMENT PROCEDURE Bilateral 2021    BILATERAL L3, L4, L5 MEDIAL BRANCH BLOCK WITH FLUOROSCOPY performed by Nikita Ann MD at [address] Bilateral 2021    BILATERAL L3, L4, L5 MEDIAL BRANCH BLOCKS WITH FLUOROSCOPY (29223, 39002) performed by Nikita Ann MD at [facility] N/A 3/15/2019    INSERTION OF INTERSPINOUS SPACER SUPERION VERTIFLEX AT LUMBAR FOUR-LUMBAR FIVE performed by Michel Sellers MD at [address]  12/15/2017       Current Medications:   Prior to Admission medications    Medication Sig Start Date End Date Taking?  Authorizing Provider   tiZANidine (ZANAFLEX) 4 MG tablet TAKE 1 TABLET BY MOUTH THREE TIMES A DAY 21  Yes Bernice Caldwell MD   levothyroxine (SYNTHROID) 88 MCG tablet TAKE 1 TABLET BY MOUTH DAILY 21  Yes Jaycob Dias MD   DULoxetine (CYMBALTA) 30 MG extended release capsule TAKE 1 CAPSULE BY MOUTH DAILY EVERY MORNING 21  Yes Bernice Caldwell MD   valsartan (DIOVAN) 160 MG tablet Take 1 tablet by mouth 2 times daily TAKE ONE TABLET BY mouth bid for htn 7/28/21  Yes Griffin Coello MD   ezetimibe (ZETIA) 10 MG tablet Take 1 tablet by mouth daily 6/23/21  Yes Sherine Malik MD   potassium chloride (KLOR-CON) 10 MEQ extended release tablet TAKE ONE TABLET BY MOUTH TWO TIMES A DAY 6/21/21  Yes Griffin Coello MD   rosuvastatin (CRESTOR) 40 MG tablet TAKE 1 TABLET BY MOUTH DAILY 6/21/21  Yes Sherine Malik MD   isosorbide mononitrate (IMDUR) 30 MG extended release tablet TAKE ONE TABLET BY MOUTH TWO TIMES A DAY FOR SYSTOLIC BLOOD PRESSURE (TOP NUMBER) OVER 140 6/21/21  Yes Griffin Coello MD   torsemide (DEMADEX) 20 MG tablet TAKE ONE TABLET BY MOUTH TWO TIMES A DAY 5/18/21  Yes Sherine Malik MD   ipratropium (ATROVENT) 0.03 % nasal spray INHALE 2 DOSES INTO EACH NOSTRIL THREE TIMES A DAY IF NEEDED FOR RHINITIS 4/5/21  Yes Griffin Coello MD   pantoprazole (PROTONIX) 40 MG tablet TAKE ONE TABLET BY MOUTH DAILY 2/12/21  Yes Griffin Coello MD   lactobacillus (CULTURELLE) capsule Take 1 capsule by mouth 2 times daily (with meals) 7/7/20  Yes Griffin Coello MD    MG capsule TAKE ONE CAPSULE BY MOUTH TWO TIMES A DAY 4/20/18  Yes Griffin Coello MD   aspirin 81 MG tablet Take 1 tablet by mouth daily 5/14/15  Yes Sherine Malik MD       Allergies:  Benadryl [diphenhydramine], Doxylamine, Mucinex [guaifenesin er], Spiriva handihaler [tiotropium bromide monohydrate], and Adhesive tape    Social History:    reports that she has been smoking cigarettes. She has a 45.00 pack-year smoking history. She uses smokeless tobacco. She reports that she does not drink alcohol and does not use drugs. Family History:   Family History   Problem Relation Age of Onset    Breast Cancer Daughter         Vitals: Blood pressure (!) 133/56, pulse 69, temperature 97 °F (36.1 °C), temperature source Temporal, resp.  rate 14, height 5' (1.524 m), weight 140 lb (63.5 kg), SpO2 94 %, not

## 2021-10-26 ENCOUNTER — APPOINTMENT (OUTPATIENT)
Dept: GENERAL RADIOLOGY | Age: 76
DRG: 286 | End: 2021-10-26
Payer: COMMERCIAL

## 2021-10-26 ENCOUNTER — HOSPITAL ENCOUNTER (INPATIENT)
Age: 76
LOS: 6 days | Discharge: HOME | DRG: 286 | End: 2021-11-01
Attending: STUDENT IN AN ORGANIZED HEALTH CARE EDUCATION/TRAINING PROGRAM | Admitting: INTERNAL MEDICINE
Payer: COMMERCIAL

## 2021-10-26 ENCOUNTER — TELEPHONE (OUTPATIENT)
Dept: CARDIOLOGY CLINIC | Age: 76
End: 2021-10-26

## 2021-10-26 DIAGNOSIS — I48.91 ATRIAL FIBRILLATION WITH RAPID VENTRICULAR RESPONSE (HCC): Primary | ICD-10-CM

## 2021-10-26 DIAGNOSIS — I50.9 ACUTE ON CHRONIC CONGESTIVE HEART FAILURE, UNSPECIFIED HEART FAILURE TYPE (HCC): ICD-10-CM

## 2021-10-26 LAB
ANION GAP SERPL CALCULATED.3IONS-SCNC: 12 MMOL/L (ref 3–16)
BASOPHILS ABSOLUTE: 0.1 K/UL (ref 0–0.2)
BASOPHILS RELATIVE PERCENT: 0.6 %
BUN BLDV-MCNC: 34 MG/DL (ref 7–20)
CALCIUM SERPL-MCNC: 8.9 MG/DL (ref 8.3–10.6)
CHLORIDE BLD-SCNC: 101 MMOL/L (ref 99–110)
CO2: 24 MMOL/L (ref 21–32)
CREAT SERPL-MCNC: 1.6 MG/DL (ref 0.6–1.2)
EKG ATRIAL RATE: 156 BPM
EKG DIAGNOSIS: NORMAL
EKG Q-T INTERVAL: 338 MS
EKG QRS DURATION: 132 MS
EKG QTC CALCULATION (BAZETT): 539 MS
EKG R AXIS: -39 DEGREES
EKG T AXIS: 117 DEGREES
EKG VENTRICULAR RATE: 153 BPM
EOSINOPHILS ABSOLUTE: 0.7 K/UL (ref 0–0.6)
EOSINOPHILS RELATIVE PERCENT: 8.6 %
GFR AFRICAN AMERICAN: 38
GFR NON-AFRICAN AMERICAN: 31
GLUCOSE BLD-MCNC: 85 MG/DL (ref 70–99)
HCT VFR BLD CALC: 31.3 % (ref 36–48)
HEMOGLOBIN: 10.2 G/DL (ref 12–16)
LYMPHOCYTES ABSOLUTE: 2.2 K/UL (ref 1–5.1)
LYMPHOCYTES RELATIVE PERCENT: 25.2 %
MCH RBC QN AUTO: 29.1 PG (ref 26–34)
MCHC RBC AUTO-ENTMCNC: 32.5 G/DL (ref 31–36)
MCV RBC AUTO: 89.6 FL (ref 80–100)
MONOCYTES ABSOLUTE: 0.8 K/UL (ref 0–1.3)
MONOCYTES RELATIVE PERCENT: 9.7 %
NEUTROPHILS ABSOLUTE: 4.8 K/UL (ref 1.7–7.7)
NEUTROPHILS RELATIVE PERCENT: 55.9 %
PDW BLD-RTO: 14.5 % (ref 12.4–15.4)
PLATELET # BLD: 208 K/UL (ref 135–450)
PMV BLD AUTO: 8.1 FL (ref 5–10.5)
POTASSIUM REFLEX MAGNESIUM: 5 MMOL/L (ref 3.5–5.1)
PRO-BNP: 8960 PG/ML (ref 0–449)
RBC # BLD: 3.49 M/UL (ref 4–5.2)
SODIUM BLD-SCNC: 137 MMOL/L (ref 136–145)
TROPONIN: <0.01 NG/ML
WBC # BLD: 8.6 K/UL (ref 4–11)

## 2021-10-26 PROCEDURE — 99285 EMERGENCY DEPT VISIT HI MDM: CPT

## 2021-10-26 PROCEDURE — 84484 ASSAY OF TROPONIN QUANT: CPT

## 2021-10-26 PROCEDURE — 85025 COMPLETE CBC W/AUTO DIFF WBC: CPT

## 2021-10-26 PROCEDURE — 93010 ELECTROCARDIOGRAM REPORT: CPT | Performed by: INTERNAL MEDICINE

## 2021-10-26 PROCEDURE — 83880 ASSAY OF NATRIURETIC PEPTIDE: CPT

## 2021-10-26 PROCEDURE — 93005 ELECTROCARDIOGRAM TRACING: CPT | Performed by: EMERGENCY MEDICINE

## 2021-10-26 PROCEDURE — 96365 THER/PROPH/DIAG IV INF INIT: CPT

## 2021-10-26 PROCEDURE — 6360000002 HC RX W HCPCS: Performed by: STUDENT IN AN ORGANIZED HEALTH CARE EDUCATION/TRAINING PROGRAM

## 2021-10-26 PROCEDURE — 96375 TX/PRO/DX INJ NEW DRUG ADDON: CPT

## 2021-10-26 PROCEDURE — 6370000000 HC RX 637 (ALT 250 FOR IP): Performed by: STUDENT IN AN ORGANIZED HEALTH CARE EDUCATION/TRAINING PROGRAM

## 2021-10-26 PROCEDURE — 71045 X-RAY EXAM CHEST 1 VIEW: CPT

## 2021-10-26 PROCEDURE — 96361 HYDRATE IV INFUSION ADD-ON: CPT

## 2021-10-26 PROCEDURE — 80048 BASIC METABOLIC PNL TOTAL CA: CPT

## 2021-10-26 PROCEDURE — 2580000003 HC RX 258: Performed by: STUDENT IN AN ORGANIZED HEALTH CARE EDUCATION/TRAINING PROGRAM

## 2021-10-26 PROCEDURE — 2060000000 HC ICU INTERMEDIATE R&B

## 2021-10-26 RX ORDER — 0.9 % SODIUM CHLORIDE 0.9 %
1000 INTRAVENOUS SOLUTION INTRAVENOUS ONCE
Status: DISCONTINUED | OUTPATIENT
Start: 2021-10-26 | End: 2021-10-26

## 2021-10-26 RX ORDER — DIGOXIN 250 MCG
250 TABLET ORAL DAILY
COMMUNITY
End: 2021-10-26

## 2021-10-26 RX ORDER — MIDODRINE HYDROCHLORIDE 10 MG/1
10 TABLET ORAL ONCE
Status: COMPLETED | OUTPATIENT
Start: 2021-10-26 | End: 2021-10-26

## 2021-10-26 RX ORDER — ORPHENADRINE CITRATE 30 MG/ML
60 INJECTION INTRAMUSCULAR; INTRAVENOUS ONCE
Status: COMPLETED | OUTPATIENT
Start: 2021-10-26 | End: 2021-10-26

## 2021-10-26 RX ORDER — OXYCODONE HYDROCHLORIDE 10 MG/1
10 TABLET ORAL EVERY 6 HOURS PRN
COMMUNITY

## 2021-10-26 RX ORDER — HYDROCODONE BITARTRATE AND ACETAMINOPHEN 5; 325 MG/1; MG/1
1 TABLET ORAL ONCE
Status: COMPLETED | OUTPATIENT
Start: 2021-10-26 | End: 2021-10-26

## 2021-10-26 RX ORDER — OXYCODONE HYDROCHLORIDE 10 MG/1
10 TABLET ORAL ONCE
Status: COMPLETED | OUTPATIENT
Start: 2021-10-26 | End: 2021-10-26

## 2021-10-26 RX ORDER — 0.9 % SODIUM CHLORIDE 0.9 %
500 INTRAVENOUS SOLUTION INTRAVENOUS ONCE
Status: COMPLETED | OUTPATIENT
Start: 2021-10-26 | End: 2021-10-26

## 2021-10-26 RX ADMIN — AMIODARONE HYDROCHLORIDE 1 MG/MIN: 50 INJECTION, SOLUTION INTRAVENOUS at 18:36

## 2021-10-26 RX ADMIN — HYDROCODONE BITARTRATE AND ACETAMINOPHEN 1 TABLET: 5; 325 TABLET ORAL at 16:55

## 2021-10-26 RX ADMIN — MIDODRINE HYDROCHLORIDE 10 MG: 10 TABLET ORAL at 18:00

## 2021-10-26 RX ADMIN — DEXTROSE MONOHYDRATE 150 MG: 50 INJECTION, SOLUTION INTRAVENOUS at 18:10

## 2021-10-26 RX ADMIN — SODIUM CHLORIDE 500 ML: 9 INJECTION, SOLUTION INTRAVENOUS at 17:24

## 2021-10-26 RX ADMIN — ORPHENADRINE CITRATE 60 MG: 30 INJECTION INTRAMUSCULAR; INTRAVENOUS at 16:56

## 2021-10-26 RX ADMIN — OXYCODONE HYDROCHLORIDE 10 MG: 10 TABLET ORAL at 23:08

## 2021-10-26 ASSESSMENT — PAIN DESCRIPTION - LOCATION
LOCATION: BACK
LOCATION: BACK

## 2021-10-26 ASSESSMENT — PAIN DESCRIPTION - PROGRESSION
CLINICAL_PROGRESSION: NOT CHANGED
CLINICAL_PROGRESSION: NOT CHANGED

## 2021-10-26 ASSESSMENT — PAIN SCALES - GENERAL
PAINLEVEL_OUTOF10: 3
PAINLEVEL_OUTOF10: 3
PAINLEVEL_OUTOF10: 9
PAINLEVEL_OUTOF10: 8
PAINLEVEL_OUTOF10: 7
PAINLEVEL_OUTOF10: 7

## 2021-10-26 ASSESSMENT — PAIN DESCRIPTION - ONSET
ONSET: ON-GOING
ONSET: ON-GOING

## 2021-10-26 ASSESSMENT — PAIN DESCRIPTION - FREQUENCY
FREQUENCY: CONTINUOUS
FREQUENCY: CONTINUOUS

## 2021-10-26 ASSESSMENT — PAIN - FUNCTIONAL ASSESSMENT
PAIN_FUNCTIONAL_ASSESSMENT: PREVENTS OR INTERFERES SOME ACTIVE ACTIVITIES AND ADLS
PAIN_FUNCTIONAL_ASSESSMENT: PREVENTS OR INTERFERES SOME ACTIVE ACTIVITIES AND ADLS

## 2021-10-26 ASSESSMENT — PAIN DESCRIPTION - DESCRIPTORS
DESCRIPTORS: ACHING
DESCRIPTORS: ACHING

## 2021-10-26 ASSESSMENT — PAIN DESCRIPTION - ORIENTATION: ORIENTATION: LOWER

## 2021-10-26 ASSESSMENT — PAIN DESCRIPTION - PAIN TYPE
TYPE: ACUTE PAIN
TYPE: ACUTE PAIN

## 2021-10-26 NOTE — ED NOTES
Pt to ED with c/o back pain, irregualr HR and ble edema. Pt tachycardic with hr in the 130's, ED MD Mcpherson at bedside.       Maxime Howe, RN  10/26/21 Elyssa Farias, RADHA  10/26/21 [id number]

## 2021-10-26 NOTE — LETTER
[facility]  [address]  Phone: [phone]             November 1, 2021    Patient: Lady Hoyos   YOB: 1945   Date of Visit: 10/26/2021       To Whom It May Concern:    Lady Hoyos was seen and treated in our facility  beginning 10/26/2021 until 11/1/21. She may return to work on 11/5/21.       Sincerely,       David Doyle RN         Signature:__________________________________

## 2021-10-27 ENCOUNTER — APPOINTMENT (OUTPATIENT)
Dept: CARDIAC CATH/INVASIVE PROCEDURES | Age: 76
DRG: 286 | End: 2021-10-27
Payer: COMMERCIAL

## 2021-10-27 PROBLEM — Z87.19 HISTORY OF GI BLEED: Status: ACTIVE | Noted: 2021-10-27

## 2021-10-27 LAB
ANION GAP SERPL CALCULATED.3IONS-SCNC: 12 MMOL/L (ref 3–16)
APTT: 32.8 SEC (ref 26.2–38.6)
APTT: 59.5 SEC (ref 26.2–38.6)
BUN BLDV-MCNC: 35 MG/DL (ref 7–20)
CALCIUM SERPL-MCNC: 8.4 MG/DL (ref 8.3–10.6)
CHLORIDE BLD-SCNC: 105 MMOL/L (ref 99–110)
CO2: 21 MMOL/L (ref 21–32)
CREAT SERPL-MCNC: 1.4 MG/DL (ref 0.6–1.2)
EKG ATRIAL RATE: 127 BPM
EKG ATRIAL RATE: 52 BPM
EKG ATRIAL RATE: 94 BPM
EKG DIAGNOSIS: NORMAL
EKG P AXIS: 85 DEGREES
EKG P-R INTERVAL: 176 MS
EKG P-R INTERVAL: 196 MS
EKG Q-T INTERVAL: 372 MS
EKG Q-T INTERVAL: 380 MS
EKG Q-T INTERVAL: 524 MS
EKG QRS DURATION: 128 MS
EKG QRS DURATION: 134 MS
EKG QRS DURATION: 136 MS
EKG QTC CALCULATION (BAZETT): 487 MS
EKG QTC CALCULATION (BAZETT): 532 MS
EKG QTC CALCULATION (BAZETT): 539 MS
EKG R AXIS: -35 DEGREES
EKG R AXIS: -35 DEGREES
EKG R AXIS: 225 DEGREES
EKG T AXIS: 10 DEGREES
EKG T AXIS: 117 DEGREES
EKG T AXIS: 122 DEGREES
EKG VENTRICULAR RATE: 121 BPM
EKG VENTRICULAR RATE: 123 BPM
EKG VENTRICULAR RATE: 52 BPM
GFR AFRICAN AMERICAN: 44
GFR NON-AFRICAN AMERICAN: 37
GLUCOSE BLD-MCNC: 89 MG/DL (ref 70–99)
HCT VFR BLD CALC: 32.1 % (ref 36–48)
HEMOGLOBIN: 10.6 G/DL (ref 12–16)
LV EF: 48 %
LVEF MODALITY: NORMAL
MCH RBC QN AUTO: 29.5 PG (ref 26–34)
MCHC RBC AUTO-ENTMCNC: 32.9 G/DL (ref 31–36)
MCV RBC AUTO: 89.8 FL (ref 80–100)
PDW BLD-RTO: 14.7 % (ref 12.4–15.4)
PLATELET # BLD: 230 K/UL (ref 135–450)
PMV BLD AUTO: 8.8 FL (ref 5–10.5)
POTASSIUM SERPL-SCNC: 5 MMOL/L (ref 3.5–5.1)
PRO-BNP: 7729 PG/ML (ref 0–449)
PRO-BNP: ABNORMAL PG/ML (ref 0–449)
RBC # BLD: 3.58 M/UL (ref 4–5.2)
SODIUM BLD-SCNC: 138 MMOL/L (ref 136–145)
TROPONIN: <0.01 NG/ML
TSH REFLEX: 2.44 UIU/ML (ref 0.27–4.2)
WBC # BLD: 9.6 K/UL (ref 4–11)

## 2021-10-27 PROCEDURE — 93451 RIGHT HEART CATH: CPT

## 2021-10-27 PROCEDURE — 6370000000 HC RX 637 (ALT 250 FOR IP): Performed by: INTERNAL MEDICINE

## 2021-10-27 PROCEDURE — 36415 COLL VENOUS BLD VENIPUNCTURE: CPT

## 2021-10-27 PROCEDURE — 84443 ASSAY THYROID STIM HORMONE: CPT

## 2021-10-27 PROCEDURE — 2500000003 HC RX 250 WO HCPCS: Performed by: INTERNAL MEDICINE

## 2021-10-27 PROCEDURE — C1894 INTRO/SHEATH, NON-LASER: HCPCS

## 2021-10-27 PROCEDURE — 94640 AIRWAY INHALATION TREATMENT: CPT

## 2021-10-27 PROCEDURE — 84484 ASSAY OF TROPONIN QUANT: CPT

## 2021-10-27 PROCEDURE — 83880 ASSAY OF NATRIURETIC PEPTIDE: CPT

## 2021-10-27 PROCEDURE — 93010 ELECTROCARDIOGRAM REPORT: CPT | Performed by: INTERNAL MEDICINE

## 2021-10-27 PROCEDURE — 2500000003 HC RX 250 WO HCPCS

## 2021-10-27 PROCEDURE — 94761 N-INVAS EAR/PLS OXIMETRY MLT: CPT

## 2021-10-27 PROCEDURE — 2580000003 HC RX 258: Performed by: INTERNAL MEDICINE

## 2021-10-27 PROCEDURE — 4A023N6 MEASUREMENT OF CARDIAC SAMPLING AND PRESSURE, RIGHT HEART, PERCUTANEOUS APPROACH: ICD-10-PCS | Performed by: INTERNAL MEDICINE

## 2021-10-27 PROCEDURE — B24BZZ4 ULTRASONOGRAPHY OF HEART WITH AORTA, TRANSESOPHAGEAL: ICD-10-PCS | Performed by: INTERNAL MEDICINE

## 2021-10-27 PROCEDURE — 2060000000 HC ICU INTERMEDIATE R&B

## 2021-10-27 PROCEDURE — 93451 RIGHT HEART CATH: CPT | Performed by: INTERNAL MEDICINE

## 2021-10-27 PROCEDURE — 85730 THROMBOPLASTIN TIME PARTIAL: CPT

## 2021-10-27 PROCEDURE — 93306 TTE W/DOPPLER COMPLETE: CPT

## 2021-10-27 PROCEDURE — 80048 BASIC METABOLIC PNL TOTAL CA: CPT

## 2021-10-27 PROCEDURE — 2580000003 HC RX 258: Performed by: STUDENT IN AN ORGANIZED HEALTH CARE EDUCATION/TRAINING PROGRAM

## 2021-10-27 PROCEDURE — 6360000002 HC RX W HCPCS: Performed by: STUDENT IN AN ORGANIZED HEALTH CARE EDUCATION/TRAINING PROGRAM

## 2021-10-27 PROCEDURE — 99223 1ST HOSP IP/OBS HIGH 75: CPT | Performed by: INTERNAL MEDICINE

## 2021-10-27 PROCEDURE — C1751 CATH, INF, PER/CENT/MIDLINE: HCPCS

## 2021-10-27 PROCEDURE — 6360000002 HC RX W HCPCS: Performed by: INTERNAL MEDICINE

## 2021-10-27 PROCEDURE — 6360000002 HC RX W HCPCS

## 2021-10-27 PROCEDURE — 85027 COMPLETE CBC AUTOMATED: CPT

## 2021-10-27 PROCEDURE — 93005 ELECTROCARDIOGRAM TRACING: CPT | Performed by: INTERNAL MEDICINE

## 2021-10-27 RX ORDER — SODIUM CHLORIDE 9 MG/ML
25 INJECTION, SOLUTION INTRAVENOUS PRN
Status: DISCONTINUED | OUTPATIENT
Start: 2021-10-27 | End: 2021-11-01 | Stop reason: HOSPADM

## 2021-10-27 RX ORDER — DOCUSATE SODIUM 100 MG/1
100 CAPSULE, LIQUID FILLED ORAL 2 TIMES DAILY
Status: DISCONTINUED | OUTPATIENT
Start: 2021-10-27 | End: 2021-11-01 | Stop reason: HOSPADM

## 2021-10-27 RX ORDER — HEPARIN SODIUM 1000 [USP'U]/ML
2000 INJECTION, SOLUTION INTRAVENOUS; SUBCUTANEOUS ONCE
Status: COMPLETED | OUTPATIENT
Start: 2021-10-27 | End: 2021-10-27

## 2021-10-27 RX ORDER — POLYETHYLENE GLYCOL 3350 17 G/17G
17 POWDER, FOR SOLUTION ORAL DAILY PRN
Status: DISCONTINUED | OUTPATIENT
Start: 2021-10-27 | End: 2021-11-01 | Stop reason: HOSPADM

## 2021-10-27 RX ORDER — FUROSEMIDE 10 MG/ML
40 INJECTION INTRAMUSCULAR; INTRAVENOUS ONCE
Status: COMPLETED | OUTPATIENT
Start: 2021-10-27 | End: 2021-10-27

## 2021-10-27 RX ORDER — LEVALBUTEROL TARTRATE 45 UG/1
2 AEROSOL, METERED ORAL 3 TIMES DAILY
Status: DISCONTINUED | OUTPATIENT
Start: 2021-10-27 | End: 2021-10-28

## 2021-10-27 RX ORDER — AMIODARONE HYDROCHLORIDE 200 MG/1
200 TABLET ORAL 2 TIMES DAILY
Status: DISCONTINUED | OUTPATIENT
Start: 2021-10-27 | End: 2021-10-30

## 2021-10-27 RX ORDER — DULOXETIN HYDROCHLORIDE 30 MG/1
30 CAPSULE, DELAYED RELEASE ORAL DAILY
Status: DISCONTINUED | OUTPATIENT
Start: 2021-10-27 | End: 2021-11-01 | Stop reason: HOSPADM

## 2021-10-27 RX ORDER — LACTOBACILLUS RHAMNOSUS GG 10B CELL
1 CAPSULE ORAL 2 TIMES DAILY WITH MEALS
Status: DISCONTINUED | OUTPATIENT
Start: 2021-10-27 | End: 2021-11-01 | Stop reason: HOSPADM

## 2021-10-27 RX ORDER — ONDANSETRON 2 MG/ML
4 INJECTION INTRAMUSCULAR; INTRAVENOUS EVERY 6 HOURS PRN
Status: DISCONTINUED | OUTPATIENT
Start: 2021-10-27 | End: 2021-11-01 | Stop reason: HOSPADM

## 2021-10-27 RX ORDER — HEPARIN SODIUM 1000 [USP'U]/ML
60 INJECTION, SOLUTION INTRAVENOUS; SUBCUTANEOUS PRN
Status: DISCONTINUED | OUTPATIENT
Start: 2021-10-27 | End: 2021-10-27

## 2021-10-27 RX ORDER — EZETIMIBE 10 MG/1
10 TABLET ORAL EVERY EVENING
Status: DISCONTINUED | OUTPATIENT
Start: 2021-10-27 | End: 2021-11-01 | Stop reason: HOSPADM

## 2021-10-27 RX ORDER — ROSUVASTATIN CALCIUM 40 MG/1
40 TABLET, COATED ORAL EVERY EVENING
Status: DISCONTINUED | OUTPATIENT
Start: 2021-10-27 | End: 2021-11-01 | Stop reason: HOSPADM

## 2021-10-27 RX ORDER — SODIUM CHLORIDE 0.9 % (FLUSH) 0.9 %
5-40 SYRINGE (ML) INJECTION PRN
Status: DISCONTINUED | OUTPATIENT
Start: 2021-10-27 | End: 2021-11-01 | Stop reason: HOSPADM

## 2021-10-27 RX ORDER — ACETAMINOPHEN 325 MG/1
650 TABLET ORAL EVERY 6 HOURS PRN
Status: DISCONTINUED | OUTPATIENT
Start: 2021-10-27 | End: 2021-11-01 | Stop reason: HOSPADM

## 2021-10-27 RX ORDER — HEPARIN SODIUM 1000 [USP'U]/ML
30 INJECTION, SOLUTION INTRAVENOUS; SUBCUTANEOUS PRN
Status: DISCONTINUED | OUTPATIENT
Start: 2021-10-27 | End: 2021-10-27

## 2021-10-27 RX ORDER — OXYCODONE HYDROCHLORIDE 10 MG/1
10 TABLET ORAL 4 TIMES DAILY
Status: DISCONTINUED | OUTPATIENT
Start: 2021-10-27 | End: 2021-11-01 | Stop reason: HOSPADM

## 2021-10-27 RX ORDER — TIZANIDINE 4 MG/1
4 TABLET ORAL EVERY 8 HOURS PRN
Status: DISCONTINUED | OUTPATIENT
Start: 2021-10-27 | End: 2021-11-01 | Stop reason: HOSPADM

## 2021-10-27 RX ORDER — ASPIRIN 81 MG/1
81 TABLET, CHEWABLE ORAL DAILY
Status: DISCONTINUED | OUTPATIENT
Start: 2021-10-27 | End: 2021-11-01 | Stop reason: HOSPADM

## 2021-10-27 RX ORDER — HEPARIN SODIUM 10000 [USP'U]/100ML
0-3000 INJECTION, SOLUTION INTRAVENOUS CONTINUOUS
Status: DISCONTINUED | OUTPATIENT
Start: 2021-10-27 | End: 2021-10-27

## 2021-10-27 RX ORDER — SODIUM CHLORIDE 0.9 % (FLUSH) 0.9 %
5-40 SYRINGE (ML) INJECTION EVERY 12 HOURS SCHEDULED
Status: DISCONTINUED | OUTPATIENT
Start: 2021-10-27 | End: 2021-11-01 | Stop reason: HOSPADM

## 2021-10-27 RX ORDER — BUDESONIDE AND FORMOTEROL FUMARATE DIHYDRATE 160; 4.5 UG/1; UG/1
2 AEROSOL RESPIRATORY (INHALATION) 2 TIMES DAILY
Status: DISCONTINUED | OUTPATIENT
Start: 2021-10-27 | End: 2021-11-01 | Stop reason: HOSPADM

## 2021-10-27 RX ORDER — ONDANSETRON 4 MG/1
4 TABLET, ORALLY DISINTEGRATING ORAL EVERY 8 HOURS PRN
Status: DISCONTINUED | OUTPATIENT
Start: 2021-10-27 | End: 2021-11-01 | Stop reason: HOSPADM

## 2021-10-27 RX ORDER — PANTOPRAZOLE SODIUM 40 MG/1
40 TABLET, DELAYED RELEASE ORAL DAILY
Status: DISCONTINUED | OUTPATIENT
Start: 2021-10-27 | End: 2021-11-01 | Stop reason: HOSPADM

## 2021-10-27 RX ORDER — LEVOTHYROXINE SODIUM 88 UG/1
88 TABLET ORAL DAILY
Status: DISCONTINUED | OUTPATIENT
Start: 2021-10-27 | End: 2021-11-01 | Stop reason: HOSPADM

## 2021-10-27 RX ORDER — ACETAMINOPHEN 650 MG/1
650 SUPPOSITORY RECTAL EVERY 6 HOURS PRN
Status: DISCONTINUED | OUTPATIENT
Start: 2021-10-27 | End: 2021-11-01 | Stop reason: HOSPADM

## 2021-10-27 RX ADMIN — LEVOTHYROXINE SODIUM 88 MCG: 0.09 TABLET ORAL at 05:09

## 2021-10-27 RX ADMIN — LEVALBUTEROL TARTRATE 2 PUFF: 45 AEROSOL, METERED ORAL at 13:06

## 2021-10-27 RX ADMIN — HEPARIN SODIUM 2000 UNITS: 1000 INJECTION INTRAVENOUS; SUBCUTANEOUS at 10:52

## 2021-10-27 RX ADMIN — HEPARIN SODIUM 850 UNITS/HR: 10000 INJECTION, SOLUTION INTRAVENOUS at 02:15

## 2021-10-27 RX ADMIN — ROSUVASTATIN CALCIUM 40 MG: 40 TABLET, FILM COATED ORAL at 16:45

## 2021-10-27 RX ADMIN — OXYCODONE HYDROCHLORIDE 10 MG: 10 TABLET ORAL at 16:45

## 2021-10-27 RX ADMIN — OXYCODONE HYDROCHLORIDE 10 MG: 10 TABLET ORAL at 20:47

## 2021-10-27 RX ADMIN — SODIUM CHLORIDE, PRESERVATIVE FREE 10 ML: 5 INJECTION INTRAVENOUS at 21:05

## 2021-10-27 RX ADMIN — BUDESONIDE AND FORMOTEROL FUMARATE DIHYDRATE 2 PUFF: 160; 4.5 AEROSOL RESPIRATORY (INHALATION) at 20:16

## 2021-10-27 RX ADMIN — LEVALBUTEROL TARTRATE 2 PUFF: 45 AEROSOL, METERED ORAL at 20:15

## 2021-10-27 RX ADMIN — DOCUSATE SODIUM 100 MG: 100 CAPSULE ORAL at 08:30

## 2021-10-27 RX ADMIN — PANTOPRAZOLE SODIUM 40 MG: 40 TABLET, DELAYED RELEASE ORAL at 05:09

## 2021-10-27 RX ADMIN — DOCUSATE SODIUM 100 MG: 100 CAPSULE ORAL at 20:47

## 2021-10-27 RX ADMIN — OXYCODONE HYDROCHLORIDE 10 MG: 10 TABLET ORAL at 09:16

## 2021-10-27 RX ADMIN — BUDESONIDE AND FORMOTEROL FUMARATE DIHYDRATE 2 PUFF: 160; 4.5 AEROSOL RESPIRATORY (INHALATION) at 08:46

## 2021-10-27 RX ADMIN — AMIODARONE HYDROCHLORIDE 200 MG: 200 TABLET ORAL at 20:47

## 2021-10-27 RX ADMIN — IPRATROPIUM BROMIDE 2 PUFF: 17 AEROSOL, METERED RESPIRATORY (INHALATION) at 13:06

## 2021-10-27 RX ADMIN — AMIODARONE HYDROCHLORIDE 0.5 MG/MIN: 50 INJECTION, SOLUTION INTRAVENOUS at 05:09

## 2021-10-27 RX ADMIN — EZETIMIBE 10 MG: 10 TABLET ORAL at 16:45

## 2021-10-27 RX ADMIN — DULOXETINE HYDROCHLORIDE 30 MG: 30 CAPSULE, DELAYED RELEASE ORAL at 08:30

## 2021-10-27 RX ADMIN — ROSUVASTATIN CALCIUM 40 MG: 40 TABLET, FILM COATED ORAL at 00:53

## 2021-10-27 RX ADMIN — Medication 1 CAPSULE: at 08:30

## 2021-10-27 RX ADMIN — Medication 1 CAPSULE: at 16:45

## 2021-10-27 RX ADMIN — ASPIRIN 81 MG: 81 TABLET, CHEWABLE ORAL at 08:30

## 2021-10-27 RX ADMIN — FUROSEMIDE 40 MG: 10 INJECTION, SOLUTION INTRAMUSCULAR; INTRAVENOUS at 11:54

## 2021-10-27 RX ADMIN — SODIUM CHLORIDE, PRESERVATIVE FREE 10 ML: 5 INJECTION INTRAVENOUS at 20:47

## 2021-10-27 RX ADMIN — IPRATROPIUM BROMIDE 2 PUFF: 17 AEROSOL, METERED RESPIRATORY (INHALATION) at 20:15

## 2021-10-27 RX ADMIN — AMIODARONE HYDROCHLORIDE 200 MG: 200 TABLET ORAL at 11:54

## 2021-10-27 RX ADMIN — AMIODARONE HYDROCHLORIDE 0.5 MG/MIN: 50 INJECTION, SOLUTION INTRAVENOUS at 00:31

## 2021-10-27 ASSESSMENT — PAIN SCALES - GENERAL
PAINLEVEL_OUTOF10: 0
PAINLEVEL_OUTOF10: 5
PAINLEVEL_OUTOF10: 7

## 2021-10-27 ASSESSMENT — PAIN - FUNCTIONAL ASSESSMENT
PAIN_FUNCTIONAL_ASSESSMENT: PREVENTS OR INTERFERES SOME ACTIVE ACTIVITIES AND ADLS

## 2021-10-27 ASSESSMENT — PAIN DESCRIPTION - ORIENTATION
ORIENTATION: LOWER

## 2021-10-27 ASSESSMENT — PAIN DESCRIPTION - DESCRIPTORS
DESCRIPTORS: ACHING

## 2021-10-27 ASSESSMENT — PAIN DESCRIPTION - FREQUENCY
FREQUENCY: CONTINUOUS

## 2021-10-27 ASSESSMENT — PAIN DESCRIPTION - ONSET
ONSET: ON-GOING

## 2021-10-27 ASSESSMENT — PAIN DESCRIPTION - PROGRESSION
CLINICAL_PROGRESSION: NOT CHANGED

## 2021-10-27 ASSESSMENT — PAIN DESCRIPTION - LOCATION
LOCATION: BACK

## 2021-10-27 ASSESSMENT — PAIN DESCRIPTION - PAIN TYPE
TYPE: ACUTE PAIN
TYPE: CHRONIC PAIN
TYPE: ACUTE PAIN

## 2021-10-27 NOTE — PROGRESS NOTES
Patient admitted to room 5118 @ 0003. A&O x4, oriented to surroundings. Skin warm and dry, dime sized burn lolly to posterior L forearm. Resp easy and diminished on RA. Took meds whole, tolerated well. In bed, call light in reach.

## 2021-10-27 NOTE — H&P
Internal Medicine History and Physical  CC:sob  HPI:77 yo female with history of chronic lbp from lumbar stenosis, copd, PAF, CAD and s/p AVR who was started on a new med for pain last week which she didn't tolerate and caused her to hurt all over. She quit taking it, and started to notice ankle swelling, worsening sob, worsening back pain radiating up and out from her lumbar area. She came to the ER for evaluation of her leg swelling and while in the waiting area her sob got much worse and she believes her paf got much faster. Once in the ER her bp went as low as sys 81 and her vent rate up to 144. She was started on amio drip. This morning she feels a lot less sob shah. Her little stabbing cps are gone, her feet are still swollen. She cannot really tell her heart rate is decreased. She wonders if some of her discomfort in her back was from her heart. Also reports copd pretty stable, some wheezing and cough no phlegm. Tried inhaler didn't help. Doesn't normally take inhalers. No f/c. No problems with gi bleeds eversince been off coumadin. Principal Problem:    Atrial fibrillation with RVR ([facility])  Active Problems:    Pulmonary emphysema ([facility])    Acute on chronic diastolic heart failure (HCC)    S/P AVR (aortic valve replacement)    Bladder tumor    PAF (paroxysmal atrial fibrillation) ([facility])    Essential hypertension    Coronary artery disease involving native coronary artery of native heart without angina pectoris    Lumbar stenosis    Hypothyroidism due to Hashimoto's thyroiditis    History of GI bleed  Resolved Problems:    * No resolved hospital problems.  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 since .  Chronic diastolic CHF (congestive heart failure) ([facility]     grade II    COPD, mild (HCC)     CVA (cerebral infarction)     left cerebral cortex x2/expressive aphasia/resolved    Diverticulosis     Epistaxis, recurrent     when inr high. last 3-4 months ago    Former smoker     Gallbladder sludge     HTN (hypertension)     Hyperlipidemia     Hypothyroidism     Lumbar stenosis without neurogenic claudication     pain across low back worse with standing and walking, nonradiating    Macular degeneration 2018    left worse than right , dry : progressive loss of sight: just barely able to see to drive    Neuropathy     Osteoarthritis     Pulmonary HTN ([facility] 2014    primary, responsive to nitrates    PVD (peripheral vascular disease) ([facility]     occluded right SFA::: asymptomatic NIKOS 0.7 right and 1.0 left    Sacral fracture, closed ([address]     Syncope 2014      Past Surgical History:   Procedure Laterality Date    AORTIC VALVE REPLACEMENT  6/16/15     [address] [place] [address]. Hernandez - PVI, RENETTA exclusion. 19mm Maki pericardial Magna    APPENDECTOMY      BACK SURGERY  03/15/2019    superion inserted to keep back from hurtin [address] N/A 2019    EMERGENT; LAPAROSCOPIC CHOLECYSTECTOMY WITH GRAM performed by Sai Valle MD at [address] [place] [address]      stent x 1    CYSTOSCOPY  2014    dr Charlie Reed      THR Right    OTHER SURGICAL HISTORY  2018     EGD and colonoscopy.     PAIN MANAGEMENT PROCEDURE Bilateral 4/1/2021    BILATERAL L3, L4, L5 MEDIAL BRANCH BLOCK WITH FLUOROSCOPY performed by Noemy Stanford MD at [address] Bilateral 7/8/2021    BILATERAL L3, L4, L5 MEDIAL BRANCH BLOCKS WITH FLUOROSCOPY (75862, 34631) performed by Noemy Stanford MD at [address] Bilateral 9/9/2021    BILATERAL L3, L4, L5 RADIOFREQUENCY ABLATION WITH FLUOROSCOPY (10480, 15297) performed by Noemy Stanford MD at [address] [place] [address] N/A 3/15/2019    INSERTION OF INTERSPINOUS SPACER Marsa Adarsh AT LUMBAR FOUR-LUMBAR FIVE performed by Deborah Ganser, MD at [address] [place] [address] Avenue  12/15/2017      Medications Prior to Admission: diclofenac sodium (VOLTAREN) 1 % GEL, Apply 2 g topically daily  oxyCODONE HCl (OXY-IR) 10 MG immediate release tablet, Take 10 mg by mouth every 6 hours as needed for Pain.  valsartan (DIOVAN) 160 MG tablet, TAKE ONE TABLET BY MOUTH TWO TIMES A DAY FOR HYPERTENSION  ipratropium (ATROVENT) 0.03 % nasal spray, INHALE 2 DOSES INTO EACH NOSTRIL THREE TIMES A DAY IF NEEDED FOR RHINITIS  tiZANidine (ZANAFLEX) 4 MG tablet, TAKE 1 TABLET BY MOUTH THREE TIMES A DAY (Patient taking differently: Take 2 mg by mouth every 8 hours as needed )  levothyroxine (SYNTHROID) 88 MCG tablet, TAKE 1 TABLET BY MOUTH DAILY  DULoxetine (CYMBALTA) 30 MG extended release capsule, TAKE 1 CAPSULE BY MOUTH DAILY EVERY MORNING  ezetimibe (ZETIA) 10 MG tablet, Take 1 tablet by mouth daily (Patient taking differently: Take 10 mg by mouth every evening )  potassium chloride (KLOR-CON) 10 MEQ extended release tablet, TAKE ONE TABLET BY MOUTH TWO TIMES A DAY  rosuvastatin (CRESTOR) 40 MG tablet, TAKE 1 TABLET BY MOUTH DAILY (Patient taking differently: Take 40 mg by mouth every evening )  isosorbide mononitrate (IMDUR) 30 MG extended release tablet, TAKE ONE TABLET BY MOUTH TWO TIMES A DAY FOR SYSTOLIC BLOOD PRESSURE (TOP NUMBER) OVER 140  torsemide (DEMADEX) 20 MG tablet, TAKE ONE TABLET BY MOUTH TWO TIMES A DAY  pantoprazole (PROTONIX) 40 MG tablet, TAKE ONE TABLET BY MOUTH DAILY  aspirin 81 MG tablet, Take 1 tablet by mouth daily  Allergies   Allergen Reactions    Benadryl [Diphenhydramine] Swelling    Doxylamine     Mucinex [Guaifenesin Er]      hallucinations    Spiriva Handihaler [Tiotropium Bromide Monohydrate]      Nausea      Adhesive Tape Rash and Swelling      Social History     Tobacco Use    Smoking status: Current Some Day Smoker     Packs/day: 1.00     Years: 45.00     Pack years: 45.00     Types: Cigarettes     Last attempt to quit: 2015     Years since quittin.3    Smokeless tobacco: Current User    Tobacco comment: smoked 1.5 pp for over 30 years  over 45pk year hx   Substance Use Topics    Alcohol use: No     Alcohol/week: 0.0 standard drinks     Comment: Socially       Family History   Problem Relation Age of Onset    Breast Cancer Daughter         Prior to Admission medications    Medication Sig Start Date End Date Taking? Authorizing Provider   diclofenac sodium (VOLTAREN) 1 % GEL Apply 2 g topically daily   Yes Historical Provider, MD   oxyCODONE HCl (OXY-IR) 10 MG immediate release tablet Take 10 mg by mouth every 6 hours as needed for Pain.    Yes Historical Provider, MD   valsartan (DIOVAN) 160 MG tablet TAKE ONE TABLET BY MOUTH TWO TIMES A DAY FOR HYPERTENSION 10/19/21  Yes Cyndi Martínez MD   ipratropium (ATROVENT) 0.03 % nasal spray INHALE 2 DOSES INTO EACH NOSTRIL THREE TIMES A DAY IF NEEDED FOR RHINITIS 10/19/21  Yes Cyndi Martínez MD   tiZANidine (ZANAFLEX) 4 MG tablet TAKE 1 TABLET BY MOUTH THREE TIMES A DAY  Patient taking differently: Take 2 mg by mouth every 8 hours as needed  21  Yes Cyndi Martínez MD   levothyroxine (SYNTHROID) 88 MCG tablet TAKE 1 TABLET BY MOUTH DAILY 21  Yes Sharon Garcia MD   DULoxetine (CYMBALTA) 30 MG extended release capsule TAKE 1 CAPSULE BY MOUTH DAILY EVERY MORNING 8/17/21  Yes Dorothy Griffith MD   ezetimibe (ZETIA) 10 MG tablet Take 1 tablet by mouth daily  Patient taking differently: Take 10 mg by mouth every evening  6/23/21  Yes Toma Benítez MD   potassium chloride (KLOR-CON) 10 MEQ extended release tablet TAKE ONE TABLET BY MOUTH TWO TIMES A DAY 6/21/21  Yes Dorothy Griffith MD   rosuvastatin (CRESTOR) 40 MG tablet TAKE 1 TABLET BY MOUTH DAILY  Patient taking differently: Take 40 mg by mouth every evening  6/21/21  Yes Toma Benítez MD   isosorbide mononitrate (IMDUR) 30 MG extended release tablet TAKE ONE TABLET BY MOUTH TWO TIMES A DAY FOR SYSTOLIC BLOOD PRESSURE (TOP NUMBER) OVER 140 6/21/21  Yes Dorothy Griffith MD   torsemide (DEMADEX) 20 MG tablet TAKE ONE TABLET BY MOUTH TWO TIMES A DAY 5/18/21  Yes Toma Benítez MD   pantoprazole (PROTONIX) 40 MG tablet TAKE ONE TABLET BY MOUTH DAILY 2/12/21  Yes Dorothy Griffith MD   aspirin 81 MG tablet Take 1 tablet by mouth daily 5/14/15  Yes Toma Benítez MD     Review of Systems  A comprehensive review of systems was negative except for: sob back pain    Objective:     Patient Vitals for the past 8 hrs:   BP Temp Temp src Pulse Resp SpO2 Height Weight   10/27/21 0855 -- -- -- -- -- 97 % -- --   10/27/21 0746 (!) 121/45 97.6 °F (36.4 °C) Oral 57 16 -- -- --   10/27/21 0337 (!) 100/55 97.5 °F (36.4 °C) Oral 62 16 95 % -- --   10/27/21 0315 -- -- -- -- -- -- 5' 2\" (1.575 m) 153 lb (69.4 kg)     I/O last 3 completed shifts:   In: 80 [P.O.:90]  Out: -   I/O this shift:  In: 120 [P.O.:120]  Out: -     VITALS:  BP (!) 121/45   Pulse 57   Temp 97.6 °F (36.4 °C) (Oral)   Resp 16   Ht 5' 2\" (1.575 m)   Wt 153 lb (69.4 kg)   SpO2 97%   BMI 27.98 kg/m²   General Appearance: alert and oriented to person, place and time, well-developed and well-nourished, in no acute distress  Skin: warm and dry, no rash or erythema  Head: normocephalic and atraumatic  Eyes: conjunctivae normal and sclera anicteric  ENT: hearing grossly normal bilaterally and sinuses non-tender  Neck: neck supple and non tender without mass, no thyromegaly or thyroid nodules, no cervical lymphadenopathy   Pulmonary/Chest: crackles half way up exp wheeze diffusely decreased bs  Cardiovascular: irreg irreg pos holosys m rhonda   Abdomen: soft, non-tender, non-distended, normal bowel sounds, no masses or organomegaly  Extremities: no cyanosis, clubbing pos 1-2+ edema to area above ankles  Musculoskeletal: no swollen joints and no tender joints,  Neurologic: coordination normal and speech normal, moves all 4 extremities        Data Review:     Recent Results (from the past 24 hour(s))   EKG 12 Lead    Collection Time: 10/26/21  3:07 PM   Result Value Ref Range    Ventricular Rate 153 BPM    Atrial Rate 156 BPM    QRS Duration 132 ms    Q-T Interval 338 ms    QTc Calculation (Bazett) 539 ms    R Axis -39 degrees    T Axis 117 degrees    Diagnosis       Atrial fibrillation with rapid ventricular response with premature ventricular or aberrantly conducted complexesLeft axis deviationLeft bundle branch blockAbnormal ECGWhen compared with ECG of 08-JUL-2020 09:08,Atrial fibrillation has replaced Sinus rhythmVent.  rate has increased BY  92 BPMLeft bundle branch block is now PresentConfirmed by KANIKA YANG, Annmarie Haque ([id number]) on 10/26/2021 5:06:25 PM   CBC Auto Differential    Collection Time: 10/26/21  4:51 PM   Result Value Ref Range    WBC 8.6 4.0 - 11.0 K/uL    RBC 3.49 (L) 4.00 - 5.20 M/uL    Hemoglobin 10.2 (L) 12.0 - 16.0 g/dL    Hematocrit 31.3 (L) 36.0 - 48.0 %    MCV 89.6 80.0 - 100.0 fL    MCH 29.1 26.0 - 34.0 pg    MCHC 32.5 31.0 - 36.0 g/dL    RDW 14.5 12.4 - 15.4 %    Platelets 492 030 - 595 K/uL    MPV 8.1 5.0 - 10.5 fL    Neutrophils % 55.9 %    Lymphocytes % 25.2 %    Monocytes % 9.7 %    Eosinophils % 8.6 %    Basophils % 0.6 %    Neutrophils Absolute 4.8 1.7 - 7.7 K/uL    Lymphocytes Absolute 2.2 1.0 - 5.1 K/uL    Monocytes Absolute 0.8 0.0 - 1.3 K/uL    Eosinophils Absolute 0.7 (H) 0.0 - 0.6 K/uL    Basophils Absolute 0.1 0.0 - 0.2 K/uL   Basic Metabolic Panel w/ Reflex to MG    Collection Time: 10/26/21  4:51 PM   Result Value Ref Range    Sodium 137 136 - 145 mmol/L    Potassium reflex Magnesium 5.0 3.5 - 5.1 mmol/L    Chloride 101 99 - 110 mmol/L    CO2 24 21 - 32 mmol/L    Anion Gap 12 3 - 16    Glucose 85 70 - 99 mg/dL    BUN 34 (H) 7 - 20 mg/dL    CREATININE 1.6 (H) 0.6 - 1.2 mg/dL    GFR Non- 31 (A) >60    GFR  38 (A) >60    Calcium 8.9 8.3 - 10.6 mg/dL   Troponin    Collection Time: 10/26/21  4:51 PM   Result Value Ref Range    Troponin <0.01 <0.01 ng/mL   Brain Natriuretic Peptide    Collection Time: 10/26/21  4:51 PM   Result Value Ref Range    Pro-BNP 8,960 (H) 0 - 449 pg/mL   EKG 12 Lead    Collection Time: 10/26/21  7:18 PM   Result Value Ref Range    Ventricular Rate 123 BPM    Atrial Rate 94 BPM    QRS Duration 136 ms    Q-T Interval 372 ms    QTc Calculation (Bazett) 532 ms    R Axis -35 degrees    T Axis 122 degrees    Diagnosis       Atrial fibrillation with rapid ventricular response with premature ventricular or aberrantly conducted complexesLeft axis deviationLeft bundle branch blockAbnormal ECGWhen compared with ECG of 26-OCT-2021 15:07,No significant change was found   EKG 12 Lead    Collection Time: 10/26/21  7:20 PM   Result Value Ref Range    Ventricular Rate 121 BPM    Atrial Rate 127 BPM    P-R Interval 196 ms    QRS Duration 128 ms    Q-T Interval 380 ms    QTc Calculation (Bazett) 539 ms    R Axis -35 degrees    T Axis 117 degrees    Diagnosis       Sinus tachycardia with occasional Premature ventricular complexesLeft axis deviationLeft bundle branch blockAbnormal ECGWhen compared with ECG of 26-OCT-2021 19:18, (unconfirmed)Sinus rhythm has replaced Atrial fibrillation   TSH with Reflex    Collection Time: 10/27/21 12:37 AM   Result Value Ref Range    TSH 2.44 0.27 - 4.20 uIU/mL CBC    Collection Time: 10/27/21 12:37 AM   Result Value Ref Range    WBC 9.6 4.0 - 11.0 K/uL    RBC 3.58 (L) 4.00 - 5.20 M/uL    Hemoglobin 10.6 (L) 12.0 - 16.0 g/dL    Hematocrit 32.1 (L) 36.0 - 48.0 %    MCV 89.8 80.0 - 100.0 fL    MCH 29.5 26.0 - 34.0 pg    MCHC 32.9 31.0 - 36.0 g/dL    RDW 14.7 12.4 - 15.4 %    Platelets 025 782 - 676 K/uL    MPV 8.8 5.0 - 10.5 fL   APTT    Collection Time: 10/27/21 12:37 AM   Result Value Ref Range    aPTT 32.8 26.2 - 38.6 sec   Troponin    Collection Time: 10/27/21  3:19 AM   Result Value Ref Range    Troponin <0.01 <0.01 ng/mL   Basic Metabolic Panel    Collection Time: 10/27/21  3:19 AM   Result Value Ref Range    Sodium 138 136 - 145 mmol/L    Potassium 5.0 3.5 - 5.1 mmol/L    Chloride 105 99 - 110 mmol/L    CO2 21 21 - 32 mmol/L    Anion Gap 12 3 - 16    Glucose 89 70 - 99 mg/dL    BUN 35 (H) 7 - 20 mg/dL    CREATININE 1.4 (H) 0.6 - 1.2 mg/dL    GFR Non- 37 (A) >60    GFR  44 (A) >60    Calcium 8.4 8.3 - 10.6 mg/dL   APTT    Collection Time: 10/27/21  9:09 AM   Result Value Ref Range    aPTT 59.5 (H) 26.2 - 38.6 sec   EKG 12 Lead    Collection Time: 10/27/21  9:26 AM   Result Value Ref Range    Ventricular Rate 52 BPM    Atrial Rate 52 BPM    P-R Interval 176 ms    QRS Duration 134 ms    Q-T Interval 524 ms    QTc Calculation (Bazett) 487 ms    P Axis 85 degrees    R Axis 225 degrees    T Axis 10 degrees    Diagnosis       Sinus bradycardia with Premature atrial complexesNon-specific intra-ventricular conduction blockCannot rule out Septal infarct (cited on or before 27-OCT-2021)Lateral infarct (cited on or before 27-OCT-2021)Abnormal ECGWhen compared with ECG of 27-OCT-2021 09:25, (unconfirmed)Serial changes of Septal infarct Present      XR CHEST PORTABLE    Result Date: 10/26/2021  1. No evidence of pneumonia 2.  Cardiomegaly with no findings of pulmonary edema             Assessment:     Principal Problem:    Atrial fibrillation with RVR ([facility]  Plan: much improved with amio  Active Problems:    Pulmonary emphysema (HCC)  Plan: and suspect continued smoker, plan inhalers    Acute on chronic diastolic heart failure ([facility]  Plan: repeat bnp, check echo, await cardio consult    S/P AVR (aortic valve replacement)  Plan: done well post op, also had RENETTA exclusion    Bladder tumor  Plan: fu with gu    PAF (paroxysmal atrial fibrillation) ([facility]  Plan: prior history    Essential hypertension  Plan: was low on presentation, monitor    Coronary artery disease involving native coronary artery of native heart without angina pectoris  Plan: no symptoms currently    Lumbar stenosis  Plan: worsening pain, cont meds fu with pain managment    Hypothyroidism due to Hashimoto's thyroiditis  Plan: check labs    History of GI bleed  Plan: no further since off warfarin            Beck Centeno MD

## 2021-10-27 NOTE — PROGRESS NOTES
4 Eyes Skin Assessment     NAME:  Alice Quintana  YOB: 1945  MEDICAL RECORD NUMBER:  [id number]    The patient is being assess for  Admission    I agree that 2 RN's have performed a thorough Head to Toe Skin Assessment on the patient. ALL assessment sites listed below have been assessed. Areas assessed by both nurses:    Head, Face, Ears, Shoulders, Back, Chest, Arms, Elbows, Hands, Sacrum. Buttock, Coccyx, Ischium and Legs. Feet and Heels        Does the Patient have a Wound?  No noted wound(s)       Dipesh Prevention initiated:  Yes   Wound Care Orders initiated:  No    Pressure Injury (Stage 3,4, Unstageable, DTI, NWPT, and Complex wounds) if present place consult order under [de-identified] No    New and Established Ostomies if present place consult order under : No      Nurse 1 eSignature: Electronically signed by Jus Stokes RN on 10/27/21 at [address] AM EDT    **SHARE this note so that the co-signing nurse is able to place an eSignature**    Nurse 2 eSignature: Electronically signed by Socorro Flores RN on 10/27/21 at 6:34 AM EDT

## 2021-10-27 NOTE — PROCEDURES
[address] [place] [address]                            CARDIAC CATHETERIZATION    PATIENT NAME: NICHELLE LORENZO                          :        1945  MED REC NO:   [id number]                          ROOM:       [id number]  ACCOUNT NO:   [de-identified]                           ADMIT DATE: 10/26/2021  PROVIDER:     Greer Diaz MD    DATE OF PROCEDURE:  10/27/2021    PROCEDURE PERFORMED:  Right heart catheterization. INDICATION:  Acute heart failure. Informed consent obtained. ASA is II. Mallampati score is II. No complication. Estimated blood loss less than 20 mL. PROCEDURE IN DETAIL:  The patient was brought to the cardiac cath  laboratory. Right brachial area prepped and draped in sterile fashion,  gave lidocaine. Accessed right brachial vein, inserted a slender  sheath. 5-Eritre[place] balloon-tip Glouster-Jordan catheter advanced in the right  atrium, right ventricle, and right pulmonary artery recording pressures  and hemodynamics. After that, all the catheters, wires removed. Sheath  removed. Manual pressure applied to achieve hemostasis. HEMODYNAMICS:  1. Right atrial pressure was 12 mmHg. 2.  RV pressure 65/-4. 3.  Pulmonary capillary wedge pressure was 24 mmHg. 4.  Pulmonary artery pressure 71/19 with a mean of 41 mmHg. 5.  Cardiac output 4.9 liters per minute. 6.  Mixed venous saturation 52%. 7.  Pulmonary capillary wedge saturation 90%. 8.  Right atrial saturation 51%. In summary, elevated right and left filling pressure with tall V waves  seen in pulmonary capillary wedge pressure tracing suggestive of severe  mitral regurgitation. RECOMMENDATION:  Continue postop post cath care.         Lorenzo Black MD    D: 10/27/2021 14:27:17       T: 10/27/2021 16:51:34     [id number]  Job#: [id number]     Doc#: [id number]    CC:

## 2021-10-27 NOTE — ED PROVIDER NOTES
[address] [facility]      Pt Name: Yessy Lees  MRN: [id number]  [place] 1945  Date of evaluation: 10/26/2021  Provider: Mary Vivar MD    CHIEF COMPLAINT       Chief Complaint   Patient presents with    Palpitations     irregualr HR at home. bilateral LE Edema. yellow productive cough per pt .  denies fever    Back Pain     Shortness of breath, back pain, palpitations. HISTORY OF PRESENT ILLNESS   (Location/Symptom, Timing/Onset,Context/Setting, Quality, Duration, Modifying Factors, Severity)  Note limiting factors. Yessy Lees is a 68 y.o. female who presents to the emergency department c/o shortness of breath and palpitations for the past 1-2 days. Onset gradual, worsening, exacerbated by exertion, partially alleviated by rest, worse with lying down. Associated with bilateral lower extremity edema. Back pain described as chronic but worse today, associated with movement. Of the same character as her chronic back pain. Denies chest pain, nausea, vomiting, diaphoresis, fevers, chills. Symptoms not otherwise alleviated or exacerbated by other factors. Patient has history of paroxysmal atrial fibrillation, not on anticoagulation. NursingNotes were reviewed. REVIEW OF SYSTEMS    (2-9 systems for level 4, 10 or more for level 5)       Constitutional: No fever or chills. Eye: No visual disturbances. No eye pain. Ear/Nose/Mouth/Throat: No nasal congestion. No sore throat. Respiratory: No cough, + shortness of breath, No sputum production. Cardiovascular: No chest pain. + palpitations. Gastrointestinal: No abdominal pain. No nausea or vomiting  Genitourinary: No dysuria. No hematuria. Hematology/Lymphatics: No bleeding or bruising tendency. Immunologic: No malaise. No swollen glands. Musculoskeletal: +back pain. No joint pain. Integumentary: No rash. No abrasions. Neurologic: No headache.  No focal numbness or weakness. 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 since 26.  Chronic diastolic CHF (congestive heart failure) ([address] 2016    grade II    COPD, mild (HCC)     CVA (cerebral infarction) 2013    left cerebral cortex x2/expressive aphasia/resolved    Diverticulosis     Epistaxis, recurrent     when inr high. last 3-4 months ago    Former smoker     Gallbladder sludge     HTN (hypertension)     Hyperlipidemia     Hypothyroidism     Lumbar stenosis without neurogenic claudication 2017    pain across low back worse with standing and walking, nonradiating    Macular degeneration 2018    left worse than right , dry : progressive loss of sight: just barely able to see to drive    Neuropathy     Osteoarthritis     Pulmonary HTN ([address] 2014    primary, responsive to nitrates    PVD (peripheral vascular disease) ([address]     occluded right SFA::: asymptomatic NIKOS 0.7 right and 1.0 left    Sacral fracture, closed ([address] 2014    Syncope 05/2014         SURGICALHISTORY       Past Surgical History:   Procedure Laterality Date    AORTIC VALVE REPLACEMENT  6/16/15    Dr. Randall Barker. Hernandez - PVI, RENETTA exclusion.  19mm Maki pericardial Magna    APPENDECTOMY      BACK SURGERY  03/15/2019    superion inserted to keep back from hurting: [place]    CHOLECYSTECTOMY, LAPAROSCOPIC N/A 4/25/2019    EMERGENT; LAPAROSCOPIC CHOLECYSTECTOMY WITH GRAM performed by Sadie Joya MD at [address]  2014    stent x 1    CYSTOSCOPY  Feb 2014    dr Arley Rayo      THR Right    OTHER SURGICAL HISTORY  02/20/2018     EGD and colonoscopy.  PAIN MANAGEMENT PROCEDURE Bilateral 4/1/2021    BILATERAL L3, L4, L5 MEDIAL BRANCH BLOCK WITH FLUOROSCOPY performed by Karine Philippe MD at [address] Bilateral 7/8/2021    BILATERAL L3, L4, L5 MEDIAL BRANCH BLOCKS WITH FLUOROSCOPY (90113, [id number]) performed by Karine Philippe MD at [address] Bilateral 9/9/2021    BILATERAL L3, L4, L5 RADIOFREQUENCY ABLATION WITH FLUOROSCOPY (64415, [id number]) performed by Karine Philippe MD at [address] [place] [address] N/A 3/15/2019    INSERTION OF INTERSPINOUS SPACER Gaurav Mcgregor AT [address] performed by Cornelia Carrera MD at [address]  12/15/2017         CURRENT MEDICATIONS       Previous Medications    ASPIRIN 81 MG TABLET    Take 1 tablet by mouth daily    DICLOFENAC SODIUM (VOLTAREN) 1 % GEL    Apply 2 g topically daily    DULOXETINE (CYMBALTA) 30 MG EXTENDED RELEASE CAPSULE    TAKE 1 CAPSULE BY MOUTH DAILY EVERY MORNING    EZETIMIBE (ZETIA) 10 MG TABLET    Take 1 tablet by mouth daily    IPRATROPIUM (ATROVENT) 0.03 % NASAL SPRAY    INHALE 2 DOSES INTO EACH NOSTRIL THREE TIMES A DAY IF NEEDED FOR RHINITIS    ISOSORBIDE MONONITRATE (IMDUR) 30 MG EXTENDED RELEASE TABLET    TAKE ONE TABLET BY MOUTH TWO TIMES A DAY FOR SYSTOLIC BLOOD PRESSURE (TOP NUMBER) OVER 140    LEVOTHYROXINE (SYNTHROID) 88 MCG TABLET    TAKE 1 TABLET BY MOUTH DAILY    OXYCODONE HCL (OXY-IR) 10 MG IMMEDIATE RELEASE TABLET    Take 10 mg by mouth every 6 hours as needed for Pain.     PANTOPRAZOLE (PROTONIX) 40 MG TABLET    TAKE ONE TABLET BY MOUTH DAILY    POTASSIUM CHLORIDE (KLOR-CON) 10 MEQ EXTENDED RELEASE TABLET    TAKE ONE TABLET BY MOUTH TWO TIMES A DAY    ROSUVASTATIN (CRESTOR) 40 MG TABLET    TAKE 1 TABLET BY MOUTH DAILY    TIZANIDINE (ZANAFLEX) 4 MG TABLET    TAKE 1 TABLET BY MOUTH THREE TIMES A DAY    TORSEMIDE (DEMADEX) 20 MG TABLET    TAKE ONE TABLET BY MOUTH TWO TIMES A DAY    VALSARTAN (DIOVAN) 160 MG TABLET    TAKE ONE TABLET BY MOUTH TWO TIMES A DAY FOR HYPERTENSION       ALLERGIES     Benadryl [diphenhydramine], Doxylamine, Mucinex [guaifenesin er], Spiriva handihaler [tiotropium bromide monohydrate], and Adhesive tape    FAMILY HISTORY       Family History   Problem Relation Age of Onset    Breast Cancer Daughter           SOCIAL HISTORY       Social History     Socioeconomic History    Marital status:      Spouse name: None    Number of children: 3    Years of education: None    Highest education level: None   Occupational History    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 Tobacco Use    Smoking status: Current Some Day Smoker     Packs/day: 1.00     Years: 45.00     Pack years: 45.00     Types: Cigarettes     Last attempt to quit: 2015     Years since quittin.3    Smokeless tobacco: Current User    Tobacco comment: smoked 1.5 pp for over 30 years  over 45pk year hx   Vaping Use    Vaping Use: Never used   Substance and Sexual Activity    Alcohol use: No     Alcohol/week: 0.0 standard drinks     Comment: Socially     Drug use: No     Comment: never    Sexual activity: Not Currently   Other Topics Concern    None   Social History Narrative    None     Social Determinants of Health     Financial Resource Strain:     Difficulty of Paying Living Expenses:    Food Insecurity:     Worried About Running Out of Food in the Last Year:     Ran Out of Food in the Last Year:    Transportation Needs:     Lack of Transportation (Medical):      Lack of Transportation (Non-Medical):    Physical Activity:     Days of Exercise per Week:     Minutes of Exercise per Session:    Stress:     Feeling of Stress :    Social Connections:     Frequency of Communication with Friends and Family:     Frequency of Social Gatherings with Friends and Family:     Attends [employer] Services:     Active Member of Clubs or Organizations:     Attends Club or Organization Meetings:     Marital Status:    Intimate Partner Violence:     Fear of Current or Ex-Partner:     Emotionally Abused:     Physically Abused:     Sexually Abused:        SCREENINGS             PHYSICAL EXAM    (up to 7 for level 4, 8 or more for level 5)     ED Triage Vitals   BP Temp Temp Source Pulse Resp SpO2 Height Weight   10/26/21 1505 10/26/21 1505 10/26/21 1505 10/26/21 1505 10/26/21 1505 10/26/21 1505 10/26/21 1901 10/26/21 1901   (!) 94/50 98 °F (36.7 °C) Tympanic 124 18 95 % 5' (1.524 m) 155 lb 9.6 oz (70.6 kg)       General: Alert and oriented appropriately for age, No acute distress. Eye: Normal conjunctiva. Pupils equal and reactive. HENT: Oral mucosa is moist.  Respiratory: Respirations even and non-labored. Bibasilar crackles. Cardiovascular: Tachycardic, irregular rhythm. Intact peripheral pulses. Bilateral lower extremity pitting edema. Gastrointestinal: Soft, Non-tender, Non-distended. Musculoskeletal: No swelling. No midline back ttp, bilateral paraspinal ttp. No stepoffs. Integumentary: Warm, Dry. Neurologic: Alert and appropriate for age. No focal deficits. Psychiatric: Cooperative. DIAGNOSTIC RESULTS     EKG: All EKG's are interpreted by the Emergency Department Physician who either signs or Co-signsthis chart in the absence of a cardiologist.    The Ekg interpreted by me shows  atrial fibrillation with RVR and a rate of 153 bpm  Axis is   Left axis deviation  QTc is  539 ms  Intervals and Durations are remarkable for LBBB and prolonged QT.       ST Segments: nonspecific changes  Compared to prior EKG dated 6/10/21, pt no longer in sinus rhythm. The Ekg interpreted by me shows  atrial fibrillation with RVR and a rate of 121 bpm  Axis is   Left axis deviation  QTc is  539 ms  Intervals and Durations are remarkable for LBBB and prolonged QT. ST Segments: no acute change  Improvement in rate compared to prior EKG. RADIOLOGY:   Non-plain filmimages such as CT, Ultrasound and MRI are read by the radiologist. Plain radiographic images are visualized and preliminarily interpreted by the emergency physician with the below findings:      Interpretation per the Radiologist below, if available at the time ofthis note:    XR CHEST PORTABLE   Final Result   1. No evidence of pneumonia   2.  Cardiomegaly with no findings of pulmonary edema               ED BEDSIDE ULTRASOUND:   Performed by ED Physician - none    LABS:  Labs Reviewed   CBC WITH AUTO DIFFERENTIAL - Abnormal; Notable for the following components:       Result Value    RBC 3.49 (*)     Hemoglobin 10.2 (*)     Hematocrit 31.3 (*)     Eosinophils Absolute 0.7 (*)     All other components within normal limits    Narrative:     Performed at:  [facility]  [address] [facility] [place] [address]   Phone [phone]   BASIC METABOLIC PANEL W/ REFLEX TO MG FOR LOW K - Abnormal; Notable for the following components:    BUN 34 (*)     CREATININE 1.6 (*)     GFR Non- 31 (*)     GFR  38 (*)     All other components within normal limits    Narrative:     Performed at:  [facility]  [address] [place] [address]   Phone [phone]   BRAIN NATRIURETIC PEPTIDE - Abnormal; Notable for the following components:    Pro-BNP 8,960 (*)     All other components within normal limits    Narrative:     Performed at:  [facility]  [address] [facility] [place] [address]   Phone [phone]   TROPONIN    Narrative: Performed at:  [place] [address] [place] [address]   Phone [phone]   POCT GLUCOSE       All other labs were within normal range or not returned as of this dictation. EMERGENCY DEPARTMENT COURSE and DIFFERENTIAL DIAGNOSIS/MDM:   Vitals:    Vitals:    10/26/21 2130 10/26/21 2145 10/26/21 2200 10/26/21 2215   BP: (!) 117/90 97/63 99/76 91/68   Pulse: 121 110 57 54   Resp: 20 19 20 20   Temp:       TempSrc:       SpO2: 95% 94% 95% 95%   Weight:       Height:             Medical decision making:  Christina Malave is a 69 yo F with PMHx of paroxysmal Afib, chronic back pain, CHF , CAD, CVA who p/w exacerbation of her chronic back pain as well as SOB, palpitations for the past 1-2 days. Found to be in Afib with RVR, hypotensive BP initially, as pt not requiring O2, giving small IVF bolus to support hemodynamics as pt likely volume overloaded, BSUS shows plethoric IVC, small ventricles with ineffective diastole, scant B-lines bilaterally, c/w CHF exacerbation. BP improved with 500 mL bolus, midodrine, started on amiodarone gtt for rate control as metoprolol and cardizem contraindicated in acute CHF exacerbation with hypotensive BP. Pt with improvement in rate after amio bolus and gtt, eventually converting to sinus bradycardia, BP improves 110s/60s. Will need diuresis as inpt. After she did cardiovert, she remains neuro intact, normoxic at rest, admitted to her PCP, given her home meds PO for her chronic back pain with improvement.       Medications   amiodarone (CORDARONE) 150 mg in dextrose 5 % 100 mL bolus (0 mg IntraVENous Stopped 10/26/21 1834)     Followed by   amiodarone (CORDARONE) 450 mg in dextrose 5 % 250 mL infusion (1 mg/min IntraVENous New Bag 10/26/21 1836)     Followed by   amiodarone (CORDARONE) 450 mg in dextrose 5 % 250 mL infusion (has no administration in time range)   orphenadrine (NORFLEX) injection 60 mg (60 mg IntraVENous Given 10/26/21 1656)   HYDROcodone-acetaminophen (NORCO) 5-325 MG per tablet 1 tablet (1 tablet Oral Given 10/26/21 1655)   0.9 % sodium chloride bolus (0 mLs IntraVENous Stopped 10/26/21 1917)   midodrine (PROAMATINE) tablet 10 mg (10 mg Oral Given 10/26/21 1800)   oxyCODONE HCl (OXY-IR) immediate release tablet 10 mg (10 mg Oral Given 10/26/21 2308)         CRITICAL CARE TIME   Total Critical Care time was 45 minutes, excluding separately reportable procedures. There was a high probability of clinically significant/life threatening deterioration in the patient's condition which required my urgent intervention. Frequent reassessments of patient's hemodynamic status, respiratory status, administration of IV amiodarone for treatment of atrial fibrillation with rapid ventricular response contributing to decompensated heart failure. CONSULTS:  IP CONSULT TO PRIMARY CARE PROVIDER  IP CONSULT TO CARDIOLOGY             FINAL IMPRESSION      1. Atrial fibrillation with rapid ventricular response ([address]    2.  Acute on chronic congestive heart failure, unspecified heart failure type [facility])          DISPOSITION/PLAN   DISPOSITION Admitted 10/26/2021 08:29:08 PM        (Please note that portions of this note were completed with a voice recognition program.Efforts were made to edit the dictations but occasionally words are mis-transcribed.)    Mohan Decker MD (electronically signed)  Attending Emergency Physician          Mohan Decker MD  10/27/21 6259

## 2021-10-27 NOTE — PLAN OF CARE
Problem: Pain:  Goal: Control of chronic pain  Description: Control of chronic pain  Outcome: Ongoing     Problem: Falls - Risk of:  Goal: Will remain free from falls  Description: Will remain free from falls  Outcome: Ongoing     Problem: Falls - Risk of:  Goal: Absence of physical injury  Description: Absence of physical injury  Outcome: Ongoing

## 2021-10-27 NOTE — PROGRESS NOTES
Clinical Pharmacy Note  Heparin Dosing       Lab Results   Component Value Date    APTT 59.5 10/27/2021     Lab Results   Component Value Date    HGB 10.6 10/27/2021    HCT 32.1 10/27/2021     10/27/2021    INR 1.04 02/19/2018       Plan:  2000 unit bolus  Infusion rate: 1000 units/hr  Next aPTT: 1630 10/27/21  Kathya Carolina [facility], 10/27/2021 10:16 AM      Pharmacy will continue to monitor and adjust based on aPTT results.   Shaniqua Wynne, PharmD

## 2021-10-27 NOTE — CONSULTS
[facility]    Stephanie Keenan  1945    October 27, 2021    Reason for Consult: Atrial fibrillation    CC: Shortness of breath    HPI:  The patient is 68 y.o. female with a past medical history significant for paroxysmal atrial fibrillation, pulmonary hypertension, aortic stenosis and CAD s/p CHILANGO to mid circ 5/2014. She is s/p AVR, PVI and RENETTA exclusion by Dr Shiva Joel on 6/16. Pre-op angiogram revealed no significant restenosis in the prior stents. She was hospitalized in December 2017 with fatigue. She was anemic to a hemoglobin of 8 and received PRBC transfusions. Repeat EGD demonstrated bleeding AVM's which were cauterized. Her coumadin was restarted. She presented to [facility] with complaints of back pain and while inpatient had an episode of dyspnea. An echocardiogram was completed revealing moderate to severe MR. Subsequently, a CAMERON was completed on 7/27/20 showing moderate MRMari Harkins states that she thinks that she overworked herself. She has been on pain medications for chronic back pain. She was having difficulty breathing at work or walking to her car. She was fine at rest. Her daughter states that her ankles were swollen as well. ON Monday, she took two extra torsemide and there was no change. She did that again yesterday with no improvement and no increase in her urination. She had no real chest pain or pressure. She does describe a \"needle\" feeling in her chest at times though. She was started on Gabapentin a week ago for back pain (300mg tid). She has received injections for her back pain. Her weight when we saw her on 6/10/21 was 147lbs and she was feeling well. Today, she is in sinus rhythm and feels \"much better\". Review of Systems:  Constitutional: No fatigue, weakness, night sweats or fever. HEENT: No new vision difficulties or ringing in the ears. Respiratory: Positive for new onset SOB. No PND, orthopnea or cough.    Cardiovascular: See HPI GI: No n/v, diarrhea, constipation, abdominal pain or changes in bowel habits. No melena, no hematochezia  : No urinary frequency, urgency, incontinence, hematuria or dysuria. Skin: No cyanosis or skin lesions. Musculoskeletal: No new muscle or joint pain. Neurological: No syncope or TIA-like symptoms. 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 since 26.  Chronic diastolic CHF (congestive heart failure) ([address] 2016    grade II    COPD, mild (HCC)     CVA (cerebral infarction) 2013    left cerebral cortex x2/expressive aphasia/resolved    Diverticulosis     Epistaxis, recurrent     when inr high.  last 3-4 months ago    Former smoker     Gallbladder sludge     HTN (hypertension)     Hyperlipidemia     Hypothyroidism     Lumbar stenosis without neurogenic claudication 2017    pain across low back worse with standing and walking, nonradiating    Macular degeneration 2018    left worse than right , dry : progressive loss of sight: just barely able to see to drive    Neuropathy     Osteoarthritis     Pulmonary HTN ([address] 2014    primary, responsive to nitrates    PVD (peripheral vascular disease) ([address]     occluded right SFA::: asymptomatic NIKOS 0.7 right and 1.0 left    Sacral fracture, closed ([address] 2014    Syncope 2014     Past Surgical History:   Procedure Laterality Date    AORTIC VALVE REPLACEMENT  6/16/15    Dr. Diaz Ureña. Hernandez - PVI, RENETTA exclusion. 19mm Maki pericardial Magna    APPENDECTOMY      BACK SURGERY  03/15/2019    superion inserted to keep back from hurtin [address] N/A 2019    EMERGENT; LAPAROSCOPIC CHOLECYSTECTOMY WITH GRAM performed by Diane Vazquez MD at [address] [facility]      stent x 1    CYSTOSCOPY  2014    dr Claudia Gee      THR Right    OTHER SURGICAL HISTORY  2018     EGD and colonoscopy.     PAIN MANAGEMENT PROCEDURE Bilateral 2021    BILATERAL L3, L4, L5 MEDIAL BRANCH BLOCK WITH FLUOROSCOPY performed by Manju Vitale MD at [address] Bilateral 2021    BILATERAL L3, L4, L5 MEDIAL BRANCH BLOCKS WITH FLUOROSCOPY (34307, [id number]) performed by Manju Vitale MD at [address] Bilateral 2021    BILATERAL L3, L4, L5 RADIOFREQUENCY ABLATION WITH FLUOROSCOPY (14459, [id number]) performed by Manju Vitale MD at [address] [facility] N/A 3/15/2019    INSERTION OF INTERSPINOUS SPACER [place] AT [address] performed by Andrew Ponce MD at [address] ENDOSCOPY  12/15/2017     Family History   Problem Relation Age of Onset    Breast Cancer Daughter      Social History     Tobacco Use    Smoking status: Current Some Day Smoker     Packs/day: 1.00     Years: 45.00     Pack years: 45.00     Types: Cigarettes     Last attempt to quit: 2015     Years since quittin.3    Smokeless tobacco: Current User    Tobacco comment: smoked 1.5 pp for over 30 years  over 45pk year hx   Vaping Use    Vaping Use: Never used   Substance Use Topics    Alcohol use: No     Alcohol/week: 0.0 standard drinks     Comment: Socially     Drug use: No     Comment: never       Allergies   Allergen Reactions    Benadryl [Diphenhydramine] Swelling    Doxylamine     Mucinex [Guaifenesin Er]      hallucinations    Spiriva Handihaler [Tiotropium Bromide Monohydrate]      Nausea      Adhesive Tape Rash and Swelling     Current Facility-Administered Medications   Medication Dose Route Frequency Provider Last Rate Last Admin    aspirin chewable tablet 81 mg  81 mg Oral Daily Mirian Najera MD   81 mg at 10/27/21 0830    docusate sodium (COLACE) capsule 100 mg  100 mg Oral BID Mirian Najera MD   100 mg at 10/27/21 0830    DULoxetine (CYMBALTA) extended release capsule 30 mg  30 mg Oral Daily Mirian Najera MD   30 mg at 10/27/21 0830    ezetimibe (ZETIA) tablet 10 mg  10 mg Oral QPM Mirian Najera MD        lactobacillus (CULTURELLE) capsule 1 capsule  1 capsule Oral BID  Mirian Najera MD   1 capsule at 10/27/21 0830    levothyroxine (SYNTHROID) tablet 88 mcg  88 mcg Oral Daily Mirian Najera MD   88 mcg at 10/27/21 0509    pantoprazole (PROTONIX) tablet 40 mg  40 mg Oral Daily Mirian Najera MD   40 mg at 10/27/21 0509    rosuvastatin (CRESTOR) tablet 40 mg  40 mg Oral QPM Mirian Najera MD   40 mg at 10/27/21 0053    tiZANidine (ZANAFLEX) tablet 4 mg  4 mg Oral Q8H PRN Mirian Najera MD        sodium chloride flush 0.9 % injection 5-40 mL  5-40 mL IntraVENous 2 times per day Mirian Najera MD        sodium chloride flush 0.9 % injection 5-40 mL  5-40 mL IntraVENous PRN Mirian Najera MD        0.9 % sodium chloride infusion  25 mL IntraVENous PRN Mirian Najera MD        ondansetron (ZOFRAN-ODT) disintegrating tablet 4 mg  4 mg Oral Q8H PRN Mirian Najera MD        Or    ondansetron [facility] injection 4 mg  4 mg IntraVENous Q6H PRN Mirian Najera MD        polyethylene glycol [facility]) packet 17 g  17 g Oral Daily PRN Mirian Najera MD        acetaminophen (TYLENOL) tablet 650 mg  650 mg Oral Q6H PRN Mirian Najera MD        Or    acetaminophen (TYLENOL) suppository 650 mg  650 mg Rectal Q6H PRN Anali Barry MD        perflutren lipid microspheres (DEFINITY) injection 1.65 mg  1.5 mL IntraVENous ONCE PRN Anali Barry MD        heparin 25,000 unit in sodium chloride 0.45% 250 mL (premix) infusion  0-3,000 Units/hr IntraVENous Continuous Anali Barry MD 8.5 mL/hr at 10/27/21 0215 850 Units/hr at 10/27/21 0215    oxyCODONE HCl (OXY-IR) immediate release tablet 10 mg  10 mg Oral 4x Daily Anali Barry MD   10 mg at 10/27/21 0916    budesonide-formoterol (SYMBICORT) 160-4.5 MCG/ACT inhaler 2 puff  2 puff Inhalation BID Anali Barry MD   2 puff at 10/27/21 0846    levalbuterol (XOPENEX HFA) inhaler 2 puff  2 puff Inhalation TID Anali Barry MD        ipratropium (ATROVENT HFA) 17 MCG/ACT inhaler 2 puff  2 puff Inhalation TID Anali Barry MD        amiodarone (CORDARONE) 450 mg in dextrose 5 % 250 mL infusion  0.5 mg/min IntraVENous Continuous Alfonso Pascual MD 16.7 mL/hr at 10/27/21 0509 0.5 mg/min at 10/27/21 0509       Physical Exam:   BP (!) 121/45   Pulse 57   Temp 97.6 °F (36.4 °C) (Oral)   Resp 16   Ht 5' 2\" (1.575 m)   Wt 153 lb (69.4 kg)   SpO2 97%   BMI 27.98 kg/m²     Intake/Output Summary (Last 24 hours) at 10/27/2021 2017  Last data filed at 10/27/2021 0854  Gross per 24 hour   Intake 210 ml   Output --   Net 210 ml     Wt Readings from Last 2 Encounters:   10/27/21 153 lb (69.4 kg)   09/09/21 140 lb (63.5 kg)     Constitutional: She is oriented to person, place, and time. She appears well-developed and well-nourished. In no acute distress. Head: Normocephalic and atraumatic. Neck: Neck supple. No JVD present. Carotid bruit is not present. No mass and no thyromegaly present. No lymphadenopathy present. Cardiovascular: Normal rate, regular rhythm, normal heart sounds and intact distal pulses. Exam reveals no gallop and no friction rub. No murmur heard. Pulmonary/Chest: Effort normal and breath sounds normal. No respiratory distress. She has no wheezes, rhonchi or rales. Abdominal: Soft, non-tender. Bowel sounds and aorta are normal. She exhibits no organomegaly, mass or bruit. Extremities: Trace bilateral LE edema. No cyanosis, or clubbing. Pulses are 2+ radial/carotid/dorsalis pedis and posterior tibial bilaterally. Neurological: She is alert and oriented to person, place, and time. She has normal reflexes. No cranial nerve deficit. Coordination normal.   Skin: Skin is warm and dry. There is no rash or diaphoresis. Psychiatric: She has a normal mood and affect. Her speech is normal and behavior is normal.     Personally reviewed and interpreted   EKG Interpretation:  Atrial fibrillation with rapid ventricular response, LBBB    Lab Review:   Lab Results   Component Value Date    TRIG 79 07/14/2020    HDL 39 06/17/2021    HDL 42 06/21/2010    LDLCALC 98 06/17/2021    LDLDIRECT 103 06/17/2021    LABVLDL 24 06/17/2021     Lab Results   Component Value Date     10/27/2021    K 5.0 10/27/2021    K 5.0 10/26/2021    BUN 35 10/27/2021    CREATININE 1.4 10/27/2021     Recent Labs     10/26/21  1651 10/26/21  1651 10/27/21  0037   WBC 8.6   < > 9.6   HGB 10.2*  --  10.6*   HCT 31.3*  --  32.1*     --  230    < > = values in this interval not displayed. Echo 1/8/18  Normal LV size and systolic function. Estimated ejection fraction is 60%. Moderate to severe mitral regurgitation is present. Severely dilated left atrium. Normally functioning bioprosthetic valve in aortic position. Trivial regurgitation noted. The right ventricle is mildly enlarged.   Moderate tricuspid regurgitation.   Doppler derived PA systolic pressure is 52 mm Hg suggestive of moderate   pulmonary hypertension.     Holter 1/8/18 ([facility])  1. The rhythm was sinus with sinus arrhythmia. Average AZ interval 0.20,   average QRS duration 0.14 [IVCD].  Average daily heart rate 44 ranging 38 to 68 bpm.   There were 129 pauses greater than 2.0 seconds with Max R-R 2.1 seconds occurring 05:13:11.   2. Frequent premature supraventricular ectopic beats total 1,642 consisting   of 1,152 isolated PACs, 209 atrial pairs and 3 atrial runs. The longest atrial run 3 beats   HR-61 bpm occurring 17:56:40. The fastest atrial run 3 beats HR-76 bpm occurring 09:49:41.   3. Very frequent premature ventricular ectopic beats total 16,163 consisting of 16,008 isolated   PVCs, 74 ventricular couplets and 1 ventricular triplet. The ventricular triplet HR-126 bpm   occurring 15:36:29.   4. Diary returned with an entry of \"irregular heart\",  isolated PACs, PVCs and   ventricular couplets noted.      Carotid duplex 1/16/20  Right Impression   The right internal carotid artery appears to have a 50-79% diameter reducing   stenosis based on velocity criteria. The right vertebral artery demonstrates normal antegrade flow. Left Impression   The left internal carotid artery appears to have a <50% diameter reducing   stenosis based on velocity criteria. The left vertebral artery demonstrates normal antegrade flow.          Echo 7/8/20  There is mild concentric left ventricular hypertrophy. Ejection fraction is visually estimated to be 55-60%. diastology cannot be determined  Moderate to severe mitral regurgitation is present. A bioprosthetic artificial aortic valve appears well seated with a maximum  velocity of 273 m/s and a mean gradient of 16 mmHg. Study is unchanged from previous dated 1/8/2018     CAMERON 7/27/20  Left ventricular cavity size is normal in size with normal wall thickness. Overall left ventricular systolic function appears normal.  Ejection fraction is visually estimated to be 55-60%. Normal right ventricular size and function. Left atrium is moderately dilated. Mild thickening of the mitral valve leaflets. There is moderate mitral regurgitation appreciated. Moderate tricuspid regurgitation. A bioprosthetic valve appears well seated. There is no appreciable leaflet restriction or stenosis.   There is no aortic insufficiency appreciated.            Assessment / Plan:      1. Atrial fibrillation with RVR  Jorge Casillas has converted to sinus rhythm now on the amiodarone drip. I would start oral amiodarone 200mg bid now. I would hold off on nticoagulation now as she had a RENETTA exclusion with her aortic valve replacement. We can stop the heparin drip now. 2. CAD of native coronary arteries without angina  Stable. Continue beta blockade and statin therapy. No angina at this time. 3. S/p Aortic Valve Replacement with bioprosthesis  Continue aspirin therapy    4. Acute on Chronic Diastolic CHF, class 2  She is not terribly volume overloaded but her weight is up about 6 pounds. I would give her an additional dose of IV lasix 40mg once now and follow urine output. 5. PAD   Asymptomatic  I will continue to follow her in the office. She will likely be fine for discharge in the morning.

## 2021-10-27 NOTE — PROGRESS NOTES
Pharmacy Medication Reconciliation Note     List of medications Lakshmi Raines is currently taking is complete. Source of information:   1. Conversation with patient and family at bedside  2. EMR    Notes regarding home medications:   1. Patient reports taking all AM meds PTA in the ED  2. Patient reports taking rosuvastatin and ezetemibe QHS   3.  Reports also taking oxycodone 10 mg QID and tizanadine 2 mg TID PRN for pain she is having--requests these be given before bedtime     Patient denies taking any other OTC or herbal medications    Lennox Ped, Pharmacy Intern  10/26/2021  8:34 PM

## 2021-10-28 LAB
ANION GAP SERPL CALCULATED.3IONS-SCNC: 11 MMOL/L (ref 3–16)
BUN BLDV-MCNC: 26 MG/DL (ref 7–20)
CALCIUM SERPL-MCNC: 8.5 MG/DL (ref 8.3–10.6)
CHLORIDE BLD-SCNC: 102 MMOL/L (ref 99–110)
CO2: 20 MMOL/L (ref 21–32)
CREAT SERPL-MCNC: 1.1 MG/DL (ref 0.6–1.2)
GFR AFRICAN AMERICAN: 58
GFR NON-AFRICAN AMERICAN: 48
GLUCOSE BLD-MCNC: 112 MG/DL (ref 70–99)
HCT VFR BLD CALC: 31.2 % (ref 36–48)
HEMOGLOBIN: 10.4 G/DL (ref 12–16)
MCH RBC QN AUTO: 29.4 PG (ref 26–34)
MCHC RBC AUTO-ENTMCNC: 33.2 G/DL (ref 31–36)
MCV RBC AUTO: 88.7 FL (ref 80–100)
PDW BLD-RTO: 14.8 % (ref 12.4–15.4)
PLATELET # BLD: 207 K/UL (ref 135–450)
PMV BLD AUTO: 8.7 FL (ref 5–10.5)
POTASSIUM SERPL-SCNC: 4.7 MMOL/L (ref 3.5–5.1)
PRO-BNP: 8734 PG/ML (ref 0–449)
RBC # BLD: 3.52 M/UL (ref 4–5.2)
SODIUM BLD-SCNC: 133 MMOL/L (ref 136–145)
WBC # BLD: 7.1 K/UL (ref 4–11)

## 2021-10-28 PROCEDURE — 6370000000 HC RX 637 (ALT 250 FOR IP): Performed by: INTERNAL MEDICINE

## 2021-10-28 PROCEDURE — 2500000003 HC RX 250 WO HCPCS: Performed by: INTERNAL MEDICINE

## 2021-10-28 PROCEDURE — 94640 AIRWAY INHALATION TREATMENT: CPT

## 2021-10-28 PROCEDURE — 99233 SBSQ HOSP IP/OBS HIGH 50: CPT | Performed by: INTERNAL MEDICINE

## 2021-10-28 PROCEDURE — 2580000003 HC RX 258: Performed by: INTERNAL MEDICINE

## 2021-10-28 PROCEDURE — 85027 COMPLETE CBC AUTOMATED: CPT

## 2021-10-28 PROCEDURE — 80048 BASIC METABOLIC PNL TOTAL CA: CPT

## 2021-10-28 PROCEDURE — 83880 ASSAY OF NATRIURETIC PEPTIDE: CPT

## 2021-10-28 PROCEDURE — 6360000002 HC RX W HCPCS: Performed by: INTERNAL MEDICINE

## 2021-10-28 PROCEDURE — 2060000000 HC ICU INTERMEDIATE R&B

## 2021-10-28 PROCEDURE — 94761 N-INVAS EAR/PLS OXIMETRY MLT: CPT

## 2021-10-28 PROCEDURE — 36415 COLL VENOUS BLD VENIPUNCTURE: CPT

## 2021-10-28 RX ORDER — AMIODARONE HYDROCHLORIDE 200 MG/1
200 TABLET ORAL ONCE
Status: COMPLETED | OUTPATIENT
Start: 2021-10-28 | End: 2021-10-28

## 2021-10-28 RX ORDER — FUROSEMIDE 10 MG/ML
60 INJECTION INTRAMUSCULAR; INTRAVENOUS 2 TIMES DAILY
Status: DISCONTINUED | OUTPATIENT
Start: 2021-10-28 | End: 2021-10-30

## 2021-10-28 RX ORDER — METOPROLOL TARTRATE 5 MG/5ML
5 INJECTION INTRAVENOUS ONCE
Status: COMPLETED | OUTPATIENT
Start: 2021-10-28 | End: 2021-10-28

## 2021-10-28 RX ADMIN — BUDESONIDE AND FORMOTEROL FUMARATE DIHYDRATE 2 PUFF: 160; 4.5 AEROSOL RESPIRATORY (INHALATION) at 08:56

## 2021-10-28 RX ADMIN — EZETIMIBE 10 MG: 10 TABLET ORAL at 17:43

## 2021-10-28 RX ADMIN — FUROSEMIDE 60 MG: 10 INJECTION, SOLUTION INTRAMUSCULAR; INTRAVENOUS at 11:22

## 2021-10-28 RX ADMIN — OXYCODONE HYDROCHLORIDE 10 MG: 10 TABLET ORAL at 20:51

## 2021-10-28 RX ADMIN — IPRATROPIUM BROMIDE 2 PUFF: 17 AEROSOL, METERED RESPIRATORY (INHALATION) at 08:56

## 2021-10-28 RX ADMIN — ASPIRIN 81 MG: 81 TABLET, CHEWABLE ORAL at 08:41

## 2021-10-28 RX ADMIN — SODIUM CHLORIDE, PRESERVATIVE FREE 10 ML: 5 INJECTION INTRAVENOUS at 20:51

## 2021-10-28 RX ADMIN — LEVOTHYROXINE SODIUM 88 MCG: 0.09 TABLET ORAL at 06:27

## 2021-10-28 RX ADMIN — SODIUM CHLORIDE, PRESERVATIVE FREE 10 ML: 5 INJECTION INTRAVENOUS at 20:50

## 2021-10-28 RX ADMIN — METOPROLOL TARTRATE 5 MG: 1 INJECTION, SOLUTION INTRAVENOUS at 18:20

## 2021-10-28 RX ADMIN — AMIODARONE HYDROCHLORIDE 200 MG: 200 TABLET ORAL at 18:20

## 2021-10-28 RX ADMIN — ROSUVASTATIN CALCIUM 40 MG: 40 TABLET, FILM COATED ORAL at 17:43

## 2021-10-28 RX ADMIN — SODIUM CHLORIDE, PRESERVATIVE FREE 10 ML: 5 INJECTION INTRAVENOUS at 08:41

## 2021-10-28 RX ADMIN — Medication 1 CAPSULE: at 17:43

## 2021-10-28 RX ADMIN — OXYCODONE HYDROCHLORIDE 10 MG: 10 TABLET ORAL at 17:43

## 2021-10-28 RX ADMIN — FUROSEMIDE 60 MG: 10 INJECTION, SOLUTION INTRAMUSCULAR; INTRAVENOUS at 17:43

## 2021-10-28 RX ADMIN — OXYCODONE HYDROCHLORIDE 10 MG: 10 TABLET ORAL at 08:41

## 2021-10-28 RX ADMIN — LEVALBUTEROL TARTRATE 2 PUFF: 45 AEROSOL, METERED ORAL at 08:56

## 2021-10-28 RX ADMIN — PANTOPRAZOLE SODIUM 40 MG: 40 TABLET, DELAYED RELEASE ORAL at 06:27

## 2021-10-28 RX ADMIN — BUDESONIDE AND FORMOTEROL FUMARATE DIHYDRATE 2 PUFF: 160; 4.5 AEROSOL RESPIRATORY (INHALATION) at 20:10

## 2021-10-28 RX ADMIN — DULOXETINE HYDROCHLORIDE 30 MG: 30 CAPSULE, DELAYED RELEASE ORAL at 08:41

## 2021-10-28 RX ADMIN — OXYCODONE HYDROCHLORIDE 10 MG: 10 TABLET ORAL at 12:37

## 2021-10-28 RX ADMIN — Medication 1 CAPSULE: at 08:41

## 2021-10-28 RX ADMIN — AMIODARONE HYDROCHLORIDE 200 MG: 200 TABLET ORAL at 20:50

## 2021-10-28 RX ADMIN — DOCUSATE SODIUM 100 MG: 100 CAPSULE ORAL at 20:50

## 2021-10-28 RX ADMIN — DOCUSATE SODIUM 100 MG: 100 CAPSULE ORAL at 08:41

## 2021-10-28 ASSESSMENT — PAIN SCALES - GENERAL
PAINLEVEL_OUTOF10: 7
PAINLEVEL_OUTOF10: 0
PAINLEVEL_OUTOF10: 8
PAINLEVEL_OUTOF10: 8
PAINLEVEL_OUTOF10: 7
PAINLEVEL_OUTOF10: 8

## 2021-10-28 ASSESSMENT — PAIN DESCRIPTION - ORIENTATION
ORIENTATION: LOWER

## 2021-10-28 ASSESSMENT — PAIN DESCRIPTION - PROGRESSION
CLINICAL_PROGRESSION: NOT CHANGED

## 2021-10-28 ASSESSMENT — PAIN DESCRIPTION - DESCRIPTORS
DESCRIPTORS: ACHING;CONSTANT
DESCRIPTORS: ACHING;CONSTANT
DESCRIPTORS: ACHING
DESCRIPTORS: ACHING;CONSTANT
DESCRIPTORS: ACHING;CONSTANT

## 2021-10-28 ASSESSMENT — PAIN DESCRIPTION - ONSET
ONSET: ON-GOING

## 2021-10-28 ASSESSMENT — PAIN DESCRIPTION - PAIN TYPE
TYPE: CHRONIC PAIN

## 2021-10-28 ASSESSMENT — PAIN DESCRIPTION - FREQUENCY
FREQUENCY: CONTINUOUS

## 2021-10-28 ASSESSMENT — PAIN DESCRIPTION - LOCATION
LOCATION: BACK

## 2021-10-28 NOTE — FLOWSHEET NOTE
10/28/21 1800   Vital Signs   Pulse 125   Heart Rate Source Monitor   /68   BP Location Left upper arm       Beginning at 1800, pt went into a-fib RVR with HR between 120-130s. BP stable. Pt states she can feel her heart racing, but denies any CP or discomfort. STAT EKG ordered per protocol. Call placed to on-call cardiologist. Jace Jacobson call back.      Electronically signed by Maura Tuttle RN on 10/28/2021 at 6:10 PM

## 2021-10-28 NOTE — PROGRESS NOTES
[address]   Daily Progress Note      Admit Date:  10/26/2021      Subjective:   Ms. Aleah Davis is a 74yo female with a past medical history significant for paroxysmal atrial fibrillation, pulmonary hypertension, aortic stenosis and CAD s/p CHILANGO to mid circ 5/2014. She is s/p AVR, PVI and RENETTA exclusion by Dr Therese Laughlin on 6/16. Pre-op angiogram revealed no significant restenosis in the prior stents. She was hospitalized in December 2017 with fatigue. She was anemic to a hemoglobin of 8 and received PRBC transfusions. Repeat EGD demonstrated bleeding AVM's which were cauterized. Her coumadin was restarted. She presented to [facility] with complaints of back pain and while inpatient had an episode of dyspnea. An echocardiogram was completed revealing moderate to severe MR. Subsequently, a CAMERON was completed on 7/27/20 showing moderate MR.      Magaly Maria states that she thinks that she overworked herself. She has been on pain medications for chronic back pain. She was having difficulty breathing at work or walking to her car. She was fine at rest. Her daughter states that her ankles were swollen as well. Interval History:  Magaly Maria reports feeling okay. She has a nonproductive cough. Sinus bradycardia on telemetry. Objective:     BP (!) 108/43   Pulse 61   Temp (!) 49.8 °F (9.9 °C) (Oral)   Resp 18   Ht 5' 2\" (1.575 m)   Wt 152 lb 5.4 oz (69.1 kg)   SpO2 98%   BMI 27.86 kg/m²      Intake/Output Summary (Last 24 hours) at 10/28/2021 1228  Last data filed at 10/28/2021 1124  Gross per 24 hour   Intake 870 ml   Output 1850 ml   Net -980 ml       Physical Exam:  General:  Awake, alert, NAD  Skin:  Warm and dry  Neck:  JVD<8, no carotid bruits  Chest:  Clear to auscultation, no wheezes/rhonchi/rales  Cardiovascular:  RRR, normal O6/W1, 2/6 holosystolic murmur at apex.  No R/G  Abdomen:  Soft, nontender, +bowel sounds  Extremities:  Trace bilateral LE edema  Pulses: 2+ bilat carotid    2+ bilat radial    2+ bilat femoral        Medications:    furosemide  60 mg IntraVENous BID    aspirin  81 mg Oral Daily    docusate sodium  100 mg Oral BID    DULoxetine  30 mg Oral Daily    ezetimibe  10 mg Oral QPM    lactobacillus  1 capsule Oral BID     levothyroxine  88 mcg Oral Daily    pantoprazole  40 mg Oral Daily    rosuvastatin  40 mg Oral QPM    sodium chloride flush  5-40 mL IntraVENous 2 times per day    oxyCODONE  10 mg Oral 4x Daily    budesonide-formoterol  2 puff Inhalation BID    ipratropium  2 puff Inhalation TID    amiodarone  200 mg Oral BID    sodium chloride flush  5-40 mL IntraVENous 2 times per day      sodium chloride      sodium chloride         Lab Data:  CBC:   Recent Labs     10/26/21  1651 10/27/21  0037 10/28/21  0459   WBC 8.6 9.6 7.1   HGB 10.2* 10.6* 10.4*    230 207     BMP:    Recent Labs     10/26/21  1651 10/27/21  0319 10/28/21  0459    138 133*   K 5.0 5.0 4.7   CO2 24 21 20*   BUN 34* 35* 26*   CREATININE 1.6* 1.4* 1.1     LIVR: No results for input(s): AST, ALT in the last 72 hours. PT/INR: No results for input(s): PROT, INR in the last 72 hours. APTT:   Recent Labs     10/27/21  0037 10/27/21  0909   APTT 32.8 59.5*     BNP:  No results for input(s): BNP in the last 72 hours. CARDIAC ENZYMES:  Recent Labs     10/27/21  0319 10/27/21  0909 10/27/21  1355   TROPONINI <0.01 <0.01 <0.01     FASTING LIPID PANEL:  Lab Results   Component Value Date    CHOL 147 07/14/2020    HDL 39 06/17/2021    HDL 42 06/21/2010    TRIG 79 07/14/2020       Carotid duplex 1/16/20  Right Impression   The right internal carotid artery appears to have a 50-79% diameter reducing   stenosis based on velocity criteria. The right vertebral artery demonstrates normal antegrade flow. Left Impression   The left internal carotid artery appears to have a <50% diameter reducing   stenosis based on velocity criteria.    The left vertebral artery demonstrates normal antegrade flow.          Echo 7/8/20  There is mild concentric left ventricular hypertrophy. Ejection fraction is visually estimated to be 55-60%. diastology cannot be determined  Moderate to severe mitral regurgitation is present. A bioprosthetic artificial aortic valve appears well seated with a maximum  velocity of 273 m/s and a mean gradient of 16 mmHg. Study is unchanged from previous dated 1/8/2018     CAMERON 7/27/20  Left ventricular cavity size is normal in size with normal wall thickness. Overall left ventricular systolic function appears normal.  Ejection fraction is visually estimated to be 55-60%. Normal right ventricular size and function. Left atrium is moderately dilated. Mild thickening of the mitral valve leaflets. There is moderate mitral regurgitation appreciated. Moderate tricuspid regurgitation. A bioprosthetic valve appears well seated. There is no appreciable leaflet restriction or stenosis. There is no aortic insufficiency appreciated.              Assessment / Plan:        1. Atrial fibrillation with RVR  Shonda remains in sinus rhythm now on oral amiodarone. Continue oral amiodarone 200mg bid now. I would hold off on anticoagulation now as she had a RENETTA exclusion with her aortic valve replacement.      2. CAD of native coronary arteries without angina  Stable. Continue beta blockade and statin therapy. No angina at this time.     3. S/p Aortic Valve Replacement with bioprosthesis  Continue aspirin therapy     4. Acute on Chronic Diastolic CHF, class 2  She is not terribly volume overloaded but her weight is up about 6 pounds. Increase IV lasix to 60mg bid now. Follow urine output.      5. PAD   Asymptomatic  I will continue to follow her in the office. 6. Mitral Regurgitation, nonrheumatic  Will need a repeat CAMERON as this appears severe. Potentially tomorrow.              Signed:  Lacy Avendaño MD

## 2021-10-28 NOTE — PLAN OF CARE
Problem: Pain:  Goal: Pain level will decrease  Description: Pain level will decrease  Outcome: Ongoing  Goal: Control of acute pain  Description: Control of acute pain  Outcome: Ongoing  Goal: Control of chronic pain  Description: Control of chronic pain  Outcome: Ongoing     Problem: Falls - Risk of:  Goal: Will remain free from falls  Description: Will remain free from falls  Outcome: Ongoing  Goal: Absence of physical injury  Description: Absence of physical injury  Outcome: Ongoing     Problem: Skin Integrity:  Goal: Will show no infection signs and symptoms  Description: Will show no infection signs and symptoms  Outcome: Ongoing  Goal: Absence of new skin breakdown  Description: Absence of new skin breakdown  Outcome: Ongoing     Problem: OXYGENATION/RESPIRATORY FUNCTION  Goal: Patient will maintain patent airway  Outcome: Ongoing  Goal: Patient will achieve/maintain normal respiratory rate/effort  Description: Respiratory rate and effort will be within normal limits for the patient  Outcome: Ongoing     Problem: HEMODYNAMIC STATUS  Goal: Patient has stable vital signs and fluid balance  Outcome: Ongoing     Problem: FLUID AND ELECTROLYTE IMBALANCE  Goal: Fluid and electrolyte balance are achieved/maintained  Outcome: Ongoing     Problem: ACTIVITY INTOLERANCE/IMPAIRED MOBILITY  Goal: Mobility/activity is maintained at optimum level for patient  Outcome: Ongoing

## 2021-10-28 NOTE — ACP (ADVANCE CARE PLANNING)
Advance Care Planning     Advance Care Planning Clinical Specialist  Conversation Note      Date of ACP Conversation: 10/28/2021    Conversation Conducted with: Patient with Decision Making Capacity    ACP Clinical Specialist: Carlos Pena RN    Healthcare Decision Maker:  Pt is currently her own decision-maker. Current Designated Healthcare Decision Maker: Self    Today we discussed code status, advanced directives, and HCPOA. Pt stated she signed a new [facility] form yesterday. Care Preferences    Hospitalization: \"If your health worsens and it becomes clear that your chance of recovery is unlikely, what would your preference be regarding hospitalization? \"    Choice:  [x] The patient wants hospitalization. [] The patient prefers comfort-focused treatment without hospitalization. Ventilation: \"If you were in your present state of health and suddenly became very ill and were unable to breathe on your own, what would your preference be about the use of a ventilator (breathing machine) if it were available to you? \"      If the patient would desire the use of ventilator (breathing machine), answer \"yes\". If not, \"no\": no    \"If your health worsens and it becomes clear that your chance of recovery is unlikely, what would your preference be about the use of a ventilator (breathing machine) if it were available to you? \"     Would the patient desire the use of ventilator (breathing machine)?: No      Resuscitation  \"CPR works best to restart the heart when there is a sudden event, like a heart attack, in someone who is otherwise healthy. Unfortunately, CPR does not typically restart the heart for people who have serious health conditions or who are very sick. \"    \"In the event your heart stopped as a result of an underlying serious health condition, would you want attempts to be made to restart your heart (answer \"yes\" for attempt to resuscitate) or would you prefer a natural death (answer \"no\" for do not attempt to resuscitate)? \" no       [x] Yes   [] No   Educated Patient / Daysi Neighbors regarding differences between Advance Directives and portable DNR orders.     Length of ACP Conversation in minutes:  5    Conversation Outcomes:  [x] ACP discussion completed  [x] Existing advance directive reviewed with patient; no changes to patient's previously recorded wishes  [] New Advance Directive completed  [] Portable Do Not Rescitate prepared for Provider review and signature  [] POLST/POST/MOLST/MOST prepared for Provider review and signature      Follow-up plan:    [] Schedule follow-up conversation to continue planning  [] Referred individual to Provider for additional questions/concerns   [] Advised patient/agent/surrogate to review completed ACP document and update if needed with changes in condition, patient preferences or care setting    [x] This note routed to one or more involved healthcare providers

## 2021-10-28 NOTE — PLAN OF CARE
Problem: Pain:  Goal: Pain level will decrease  Description: Pain level will decrease  Outcome: Ongoing  Goal: Control of acute pain  Description: Control of acute pain  Outcome: Ongoing  Goal: Control of chronic pain  Description: Control of chronic pain  Outcome: Ongoing     Problem: Falls - Risk of:  Goal: Will remain free from falls  Description: Will remain free from falls  Outcome: Ongoing  Goal: Absence of physical injury  Description: Absence of physical injury  Outcome: Ongoing     Problem: Skin Integrity:  Goal: Will show no infection signs and symptoms  Description: Will show no infection signs and symptoms  Outcome: Ongoing  Goal: Absence of new skin breakdown  Description: Absence of new skin breakdown  Outcome: Ongoing     Problem: HEMODYNAMIC STATUS  Goal: Patient has stable vital signs and fluid balance  Outcome: Ongoing     Problem: FLUID AND ELECTROLYTE IMBALANCE  Goal: Fluid and electrolyte balance are achieved/maintained  Outcome: Ongoing     Problem: ACTIVITY INTOLERANCE/IMPAIRED MOBILITY  Goal: Mobility/activity is maintained at optimum level for patient  Outcome: Ongoing

## 2021-10-28 NOTE — CARE COORDINATION
(daughter)  Contact Number: [phone]    Copy present: No     In paper Chart: No    Scanned into EMR Yes    Financial  Payor: [employer] [address] / Plan: BCBS - [place] PPO / Product Type: *No Product type* /     Pre-cert required for SNF: No    Pharmacy    [address] [facility] [address] [facility] [address]  Phone: [phone] Fax: [phone]     Evon Keys, [address] [facility] [phone] - F [phone]  [facility] [address]  Phone: [phone] Fax: [phone] [facility], [address] [phone] [facility] [phone]  [address] [facility]  [address] [address]  Phone: [phone] Fax: [phone]      Potential assistance Purchasing Medications: Potential Assistance Purchasing Medications: No  Does Patient want to participate in local refill/ meds to beds program?: Yes    Meds To Beds General Rules:  1. Can ONLY be done Monday- Friday between 8:30am-5pm  2. Prescription(s) must be in pharmacy by 3pm to be filled same day  3. Copy of patient's insurance/ prescription drug card and patient face sheet must be sent along with the prescription(s)  4. Cost of Rx cannot be added to hospital bill. If financial assistance is needed, please contact unit  or ;  or  CANNOT provide pharmacy voucher for patients co-pays  5. Patients can then  the prescription on their way out of the hospital at discharge, or pharmacy can deliver to the bedside if staff is available. (payment due at time of pick-up or delivery - cash, check, or card accepted)     Able to afford home medications/ co-pay costs: Yes    [employer] without assistive devices, active , works 23S a week as an  at a nursing facility [employer]).    Support Systems: Children, Family Members    HOUSING  Home Environment: ranch-style house with a basement  Steps: 13 steps    [address]  Currently ACTIVE with Home Health Care: No    Durable Medical Equipment  No DME     Dialysis  Active with HD/PD prior to admission: No    DISCHARGE PLAN:  Disposition: Home- No Services Needed    Transportation PLAN for discharge: family     The Plan for Transition of Care is related to the following treatment goals of Atrial fibrillation with rapid ventricular response ([address] [I48.91]  Atrial fibrillation with RVR ([address] [I48.91]  Acute on chronic congestive heart failure, unspecified heart failure type ([address] [I50.9]    The Patient and/or patient representative Margarita Barrett and her family were provided with a choice of provider and agrees with the discharge plan Yes    Freedom of choice list was provided with basic dialogue that supports the patient's individualized plan of care/goals and shares the quality data associated with the providers.  Yes    Baldo Aguayo RN  Case Management  [phone]

## 2021-10-28 NOTE — PROGRESS NOTES
Department of Internal Medicine  General Internal Medicine  Attending Progress Note    Chief Complaint:sob      HPI:   SUBJECTIVE:  69 yo female admitted with afib with rvr, and pulmonary edema and low back pain reports improving with diuresis and rate control. Has not wanted to use inhalers because doesn't use them at home. Also is still smoking 1ppd at home. Had echo and right heart cath yesterday which she understands and is getting CAMERON tomorrow for possible MV repair in the future. 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 since 26.  Chronic diastolic CHF (congestive heart failure) ([address] 2016    grade II    COPD, mild (HCC)     CVA (cerebral infarction) 2013    left cerebral cortex x2/expressive aphasia/resolved    Diverticulosis     Epistaxis, recurrent     when inr high.  last 3-4 months ago    Former smoker     Gallbladder sludge     HTN (hypertension)     Hyperlipidemia     Hypothyroidism     Lumbar stenosis without neurogenic claudication 2017    pain across low back worse with standing and walking, nonradiating    Macular degeneration 2018    left worse than right , dry : progressive loss of sight: just barely able to see to drive    Neuropathy     Nonrheumatic mitral valve regurgitation     Osteoarthritis     Pulmonary HTN ([facility]     primary, responsive to nitrates    PVD (peripheral vascular disease) ([facility]     occluded right SFA::: asymptomatic NIKOS 0.7 right and 1.0 left    Sacral fracture, closed ([facility]     Syncope 2014     Past Surgical History:   Procedure Laterality Date    AORTIC VALVE REPLACEMENT  6/16/15    Dr. Acosta Bosch. Hernandez - PVI, RENETTA exclusion. 19mm Maki pericardial Magna    APPENDECTOMY      BACK SURGERY  03/15/2019    superion inserted to keep back from hurtin [address] N/A 2019    EMERGENT; LAPAROSCOPIC CHOLECYSTECTOMY WITH GRAM performed by Ferdie Gilford, MD at [address] [place] [address]      stent x 1    CYSTOSCOPY  2014    dr Yasmeen White      THR Right    OTHER SURGICAL HISTORY  2018     EGD and colonoscopy.     PAIN MANAGEMENT PROCEDURE Bilateral 2021    BILATERAL L3, L4, L5 MEDIAL BRANCH BLOCK WITH FLUOROSCOPY performed by Sabino Orozco MD at [address] Bilateral 2021    BILATERAL L3, L4, L5 MEDIAL BRANCH BLOCKS WITH FLUOROSCOPY (92225, 73974) performed by Sabino Orozco MD at [address] Bilateral 2021    BILATERAL L3, L4, L5 RADIOFREQUENCY ABLATION WITH FLUOROSCOPY (89106, 13987) performed by Sabino Orozco MD at [address] [place] [address] N/A 3/15/2019    INSERTION OF INTERSPINOUS SPACER Sunny Sera AT LUMBAR FOUR-LUMBAR FIVE performed by Romayne Ravel, MD at [address] ENDOSCOPY  12/15/2017      Family History   Problem Relation Age of Onset    Breast Cancer Daughter      Social History     Tobacco Use    Smoking status: Current Some Day Smoker     Packs/day: 1.00     Years: 45.00     Pack years: 45.00     Types: Cigarettes     Last attempt to quit: 2015     Years since quittin.3    Smokeless tobacco: Current User    Tobacco comment: smoked 1.5 pp for over 30 years  over 45pk year hx   Vaping Use    Vaping Use: Never used   Substance Use Topics    Alcohol use: No     Alcohol/week: 0.0 standard drinks     Comment: Socially     Drug use: No     Comment: never       Prior to Admission medications    Medication Sig Start Date End Date Taking? Authorizing Provider   diclofenac sodium (VOLTAREN) 1 % GEL Apply 2 g topically daily   Yes Historical Provider, MD   oxyCODONE HCl (OXY-IR) 10 MG immediate release tablet Take 10 mg by mouth every 6 hours as needed for Pain.    Yes Historical Provider, MD   valsartan (DIOVAN) 160 MG tablet TAKE ONE TABLET BY MOUTH TWO TIMES A DAY FOR HYPERTENSION 10/19/21  Yes Jarrett Michelle MD   ipratropium (ATROVENT) 0.03 % nasal spray INHALE 2 DOSES INTO EACH NOSTRIL THREE TIMES A DAY IF NEEDED FOR RHINITIS 10/19/21  Yes Jarrett Michelle MD   tiZANidine (ZANAFLEX) 4 MG tablet TAKE 1 TABLET BY MOUTH THREE TIMES A DAY  Patient taking differently: Take 2 mg by mouth every 8 hours as needed  8/31/21  Yes Jarrett Michelle MD   levothyroxine (SYNTHROID) 88 MCG tablet TAKE 1 TABLET BY MOUTH DAILY 8/26/21  Yes Mitchel Barcenas MD   DULoxetine (CYMBALTA) 30 MG extended release capsule TAKE 1 CAPSULE BY MOUTH DAILY EVERY MORNING 8/17/21  Yes Jarrett Michelle MD   ezetimibe (ZETIA) 10 MG tablet Take 1 tablet by mouth daily  Patient taking differently: Take 10 mg by mouth every evening  6/23/21  Yes Mary Lou Rodriguez MD   potassium chloride (KLOR-CON) 10 MEQ extended release tablet TAKE ONE TABLET BY MOUTH TWO TIMES A DAY 6/21/21  Yes Jarrett Michelle MD   rosuvastatin (CRESTOR) 40 MG tablet TAKE 1 TABLET BY MOUTH DAILY  Patient taking differently: Take 40 mg by mouth every evening  6/21/21  Yes Mary Lou Rodriguez MD   isosorbide mononitrate (IMDUR) 30 MG extended release tablet TAKE ONE TABLET BY MOUTH TWO TIMES A DAY FOR SYSTOLIC BLOOD PRESSURE (TOP NUMBER) OVER 140 6/21/21  Yes Jarrett Michelle MD   torsemide (DEMADEX) 20 MG tablet TAKE ONE TABLET BY MOUTH TWO TIMES A DAY 5/18/21  Yes Ramez Vega MD   pantoprazole (PROTONIX) 40 MG tablet TAKE ONE TABLET BY MOUTH DAILY 2/12/21  Yes Magali Lisa MD   aspirin 81 MG tablet Take 1 tablet by mouth daily 5/14/15  Yes Ramez Vega MD         ROS:  A comprehensive review of systems was negative except for: low back pain, decreased leg/foot swelling    OBJECTIVE:      PHYSICAL:  Intake/Output:   Patient Vitals for the past 8 hrs:   BP Temp Temp src Pulse Resp SpO2 Weight   10/28/21 1124 (!) 108/43 (!) 49.8 °F (9.9 °C) Oral 61 18 98 % --   10/28/21 0945 -- -- -- 59 -- -- --   10/28/21 0857 -- -- -- -- -- 97 % --   10/28/21 0739 (!) 105/38 97.8 °F (36.6 °C) Oral 53 18 97 % --   10/28/21 0404 (!) 124/59 97.7 °F (36.5 °C) Oral 53 16 97 % 152 lb 5.4 oz (69.1 kg)     I/O last 3 completed shifts: In: 540 [P.O.:540]  Out: 1200 [Urine:1200]  I/O this shift:   In: 12 [P.O.:450]  Out: 650 [Urine:650]  VITALS:    BP (!) 108/43   Pulse 61   Temp (!) 49.8 °F (9.9 °C) (Oral)   Resp 18   Ht 5' 2\" (1.575 m)   Wt 152 lb 5.4 oz (69.1 kg)   SpO2 98%   BMI 27.86 kg/m²     General Appearance: alert and oriented to person, place and time, well-developed and well-nourished, in no acute distress  Skin: warm and dry, no rash or erythema  Head: normocephalic and atraumatic  Eyes: conjunctivae normal and sclera anicteric  ENT: hearing grossly normal bilaterally and sinuses non-tender  Neck: neck supple and non tender without mass, no thyromegaly or thyroid nodules, no cervical lymphadenopathy   Pulmonary/Chest: decreased breath sounds and continued insp crackles half way up  Cardiovascular: normal rate, normal S1 and S2, no gallops and no carotid bruits, sb  Abdomen: soft, non-tender, non-distended, normal bowel sounds, no masses or organomegaly  Extremities: no cyanosis, clubbing or trace pedal and leg edema  Musculoskeletal: no swollen joints and no tender joints,  Neurologic: coordination normal and speech normal, moves all 4 extremities    DATA:   Labs:  CBC:   Recent Labs     10/26/21  1651 10/27/21  0037 10/28/21  0459   WBC 8.6 9.6 7.1   HGB 10.2* 10.6* 10.4*    230 207     BMP:    Recent Labs     10/26/21  1651 10/27/21  0319 10/28/21  0459    138 133*   K 5.0 5.0 4.7    105 102   CO2 24 21 20*   BUN 34* 35* 26*   CREATININE 1.6* 1.4* 1.1   GLUCOSE 85 89 112*     POC GLUCOSE:  No results for input(s): POCGLU in the last 72 hours. Ca/Mg/Phos:   Recent Labs     10/26/21  1651 10/27/21  0319 10/28/21  0459   CALCIUM 8.9 8.4 8.5     Hepatic: No results for input(s): AST, ALT, ALB, BILITOT, ALKPHOS in the last 72 hours. Troponin:   Recent Labs     10/27/21  0319 10/27/21  0909 10/27/21  1355   TROPONINI <0.01 <0.01 <0.01     ProBNP:   Recent Labs     10/27/21  0319 10/27/21  2007 10/28/21  0459   PROBNP 7,729* 10,121* 8,734*     Lipids: No results for input(s): CHOL, TRIG, HDL, LDLCALC, LABVLDL in the last 72 hours. ABGs: No results for input(s): PHART, PO2ART, [id number] in the last 72 hours. PT/INR: No results for input(s): PROTIME, INR in the last 72 hours. APTT:   Recent Labs     10/27/21  0037 10/27/21  0909   APTT 32.8 59.5*         DIAGNOSTICS:  XR CHEST PORTABLE    Result Date: 10/26/2021  1. No evidence of pneumonia 2.  Cardiomegaly with no findings of pulmonary edema         ASSESSMENT AND PLAN    Principal Problem:    Atrial fibrillation with RVR (HCC)  Plan: likely ppt by worsening MR and LAE, improving on amiodarone, still loading orally, she understands her dose wiill decrease, no need for anticoag since had RENETTA exclusion at the time of her AVr  Active Problems:    Pulmonary emphysema ([address]  Plan: stable, discussed cigarettes    Acute on chronic diastolic heart failure (HCC)  Plan: improving    S/P AVR (aortic valve replacement)  Plan: stable    Bladder tumor  Plan: op fu    PAF (paroxysmal atrial fibrillation) (HCC)  Plan: improved    Essential hypertension  Plan: controlled     Coronary artery disease involving native coronary artery of native heart without angina pectoris  Plan: stable    Lumbar stenosis  Plan: improving    Hypothyroidism due to Hashimoto's thyroiditis  Plan: therapeutic yesterday     History of GI bleed  Plan: no further issues, stable hb        Appropriate diagnostic studies and consults ordered  Kieran West MD

## 2021-10-28 NOTE — PROGRESS NOTES
Spoke with Dr. Swati Morocho from cards. MD ordered one time dose of IV metoprolol 5mg and one time dose of Amiodarone 200mg d/t pt not taking her AM dose of Amio. Meds administered at 1820. HR at 1834: 124 a-fib. Pt still reports her heart racing, but denies pain or discomfort. Will continue to monitor.      Electronically signed by Fernando Gregorio RN on 10/28/2021 at 6:34 PM

## 2021-10-28 NOTE — PROGRESS NOTES
HF RN following peripherally. Pt receiving HF education at bedside.  HF dc instructions are on AVS. Card f/u appt is in place for 11/3 at 9:40 with Antonio Nice HF NP.

## 2021-10-28 NOTE — PROGRESS NOTES
Call placed to [facility] requesting information on possible CAMERON tomorrow. RN states she will most likely have CAMERON procedure tomorrow, Friday 10/29. NPO order effective at midnight placed per protocol. Pt updated on POC and voices no concerns or questions.      Electronically signed by Diana Dawson RN on 10/28/2021 at 4:17 PM

## 2021-10-29 ENCOUNTER — APPOINTMENT (OUTPATIENT)
Dept: CT IMAGING | Age: 76
DRG: 286 | End: 2021-10-29
Payer: COMMERCIAL

## 2021-10-29 ENCOUNTER — HOSPITAL ENCOUNTER (INPATIENT)
Dept: CARDIAC CATH/INVASIVE PROCEDURES | Age: 76
Discharge: HOME | DRG: 286 | End: 2021-10-29
Payer: COMMERCIAL

## 2021-10-29 ENCOUNTER — APPOINTMENT (OUTPATIENT)
Dept: GENERAL RADIOLOGY | Age: 76
DRG: 286 | End: 2021-10-29
Payer: COMMERCIAL

## 2021-10-29 LAB
ANION GAP SERPL CALCULATED.3IONS-SCNC: 14 MMOL/L (ref 3–16)
BUN BLDV-MCNC: 20 MG/DL (ref 7–20)
CALCIUM SERPL-MCNC: 9.1 MG/DL (ref 8.3–10.6)
CHLORIDE BLD-SCNC: 103 MMOL/L (ref 99–110)
CO2: 23 MMOL/L (ref 21–32)
CREAT SERPL-MCNC: 1 MG/DL (ref 0.6–1.2)
GFR AFRICAN AMERICAN: >60
GFR NON-AFRICAN AMERICAN: 54
GLUCOSE BLD-MCNC: 100 MG/DL (ref 70–99)
LV EF: 43 %
LVEF MODALITY: NORMAL
POTASSIUM SERPL-SCNC: 4.5 MMOL/L (ref 3.5–5.1)
SARS-COV-2, NAAT: NOT DETECTED
SODIUM BLD-SCNC: 140 MMOL/L (ref 136–145)

## 2021-10-29 PROCEDURE — 87635 SARS-COV-2 COVID-19 AMP PRB: CPT

## 2021-10-29 PROCEDURE — 99152 MOD SED SAME PHYS/QHP 5/>YRS: CPT | Performed by: INTERNAL MEDICINE

## 2021-10-29 PROCEDURE — 2060000000 HC ICU INTERMEDIATE R&B

## 2021-10-29 PROCEDURE — 6370000000 HC RX 637 (ALT 250 FOR IP)

## 2021-10-29 PROCEDURE — 6370000000 HC RX 637 (ALT 250 FOR IP): Performed by: INTERNAL MEDICINE

## 2021-10-29 PROCEDURE — 6360000002 HC RX W HCPCS: Performed by: INTERNAL MEDICINE

## 2021-10-29 PROCEDURE — 99233 SBSQ HOSP IP/OBS HIGH 50: CPT | Performed by: INTERNAL MEDICINE

## 2021-10-29 PROCEDURE — 2500000003 HC RX 250 WO HCPCS: Performed by: INTERNAL MEDICINE

## 2021-10-29 PROCEDURE — 6360000004 HC RX CONTRAST MEDICATION: Performed by: INTERNAL MEDICINE

## 2021-10-29 PROCEDURE — 36415 COLL VENOUS BLD VENIPUNCTURE: CPT

## 2021-10-29 PROCEDURE — 72070 X-RAY EXAM THORAC SPINE 2VWS: CPT

## 2021-10-29 PROCEDURE — 99152 MOD SED SAME PHYS/QHP 5/>YRS: CPT

## 2021-10-29 PROCEDURE — 71250 CT THORAX DX C-: CPT

## 2021-10-29 PROCEDURE — 2580000003 HC RX 258

## 2021-10-29 PROCEDURE — 93312 ECHO TRANSESOPHAGEAL: CPT

## 2021-10-29 PROCEDURE — 71275 CT ANGIOGRAPHY CHEST: CPT

## 2021-10-29 PROCEDURE — 93321 DOPPLER ECHO F-UP/LMTD STD: CPT

## 2021-10-29 PROCEDURE — 80048 BASIC METABOLIC PNL TOTAL CA: CPT

## 2021-10-29 PROCEDURE — 93325 DOPPLER ECHO COLOR FLOW MAPG: CPT

## 2021-10-29 PROCEDURE — 2580000003 HC RX 258: Performed by: INTERNAL MEDICINE

## 2021-10-29 PROCEDURE — 71046 X-RAY EXAM CHEST 2 VIEWS: CPT

## 2021-10-29 PROCEDURE — 6360000002 HC RX W HCPCS

## 2021-10-29 PROCEDURE — 94761 N-INVAS EAR/PLS OXIMETRY MLT: CPT

## 2021-10-29 PROCEDURE — 94640 AIRWAY INHALATION TREATMENT: CPT

## 2021-10-29 RX ORDER — SODIUM CHLORIDE 9 MG/ML
25 INJECTION, SOLUTION INTRAVENOUS PRN
Status: DISCONTINUED | OUTPATIENT
Start: 2021-10-29 | End: 2021-10-30 | Stop reason: HOSPADM

## 2021-10-29 RX ORDER — VALSARTAN 80 MG/1
80 TABLET ORAL DAILY
Status: DISCONTINUED | OUTPATIENT
Start: 2021-10-29 | End: 2021-11-01 | Stop reason: HOSPADM

## 2021-10-29 RX ORDER — SODIUM CHLORIDE 9 MG/ML
INJECTION, SOLUTION INTRAVENOUS CONTINUOUS
Status: DISCONTINUED | OUTPATIENT
Start: 2021-10-29 | End: 2021-11-01 | Stop reason: HOSPADM

## 2021-10-29 RX ORDER — MORPHINE SULFATE 2 MG/ML
2 INJECTION, SOLUTION INTRAMUSCULAR; INTRAVENOUS EVERY 4 HOURS PRN
Status: DISCONTINUED | OUTPATIENT
Start: 2021-10-29 | End: 2021-11-01 | Stop reason: HOSPADM

## 2021-10-29 RX ORDER — SODIUM CHLORIDE 0.9 % (FLUSH) 0.9 %
5-40 SYRINGE (ML) INJECTION PRN
Status: DISCONTINUED | OUTPATIENT
Start: 2021-10-29 | End: 2021-10-30 | Stop reason: HOSPADM

## 2021-10-29 RX ORDER — SODIUM CHLORIDE 0.9 % (FLUSH) 0.9 %
5-40 SYRINGE (ML) INJECTION EVERY 12 HOURS SCHEDULED
Status: DISCONTINUED | OUTPATIENT
Start: 2021-10-29 | End: 2021-10-29 | Stop reason: SDUPTHER

## 2021-10-29 RX ORDER — SODIUM CHLORIDE 0.9 % (FLUSH) 0.9 %
5-40 SYRINGE (ML) INJECTION PRN
Status: DISCONTINUED | OUTPATIENT
Start: 2021-10-29 | End: 2021-10-29 | Stop reason: SDUPTHER

## 2021-10-29 RX ORDER — METOPROLOL TARTRATE 5 MG/5ML
5 INJECTION INTRAVENOUS ONCE
Status: DISCONTINUED | OUTPATIENT
Start: 2021-10-29 | End: 2021-10-29

## 2021-10-29 RX ORDER — SODIUM CHLORIDE 9 MG/ML
25 INJECTION, SOLUTION INTRAVENOUS PRN
Status: DISCONTINUED | OUTPATIENT
Start: 2021-10-29 | End: 2021-10-29 | Stop reason: SDUPTHER

## 2021-10-29 RX ORDER — SODIUM CHLORIDE 0.9 % (FLUSH) 0.9 %
5-40 SYRINGE (ML) INJECTION EVERY 12 HOURS SCHEDULED
Status: DISCONTINUED | OUTPATIENT
Start: 2021-10-29 | End: 2021-10-30 | Stop reason: HOSPADM

## 2021-10-29 RX ORDER — METOPROLOL SUCCINATE 25 MG/1
25 TABLET, EXTENDED RELEASE ORAL DAILY
Status: DISCONTINUED | OUTPATIENT
Start: 2021-10-29 | End: 2021-10-29

## 2021-10-29 RX ADMIN — EZETIMIBE 10 MG: 10 TABLET ORAL at 17:55

## 2021-10-29 RX ADMIN — BUDESONIDE AND FORMOTEROL FUMARATE DIHYDRATE 2 PUFF: 160; 4.5 AEROSOL RESPIRATORY (INHALATION) at 08:35

## 2021-10-29 RX ADMIN — AMIODARONE HYDROCHLORIDE 0.5 MG/MIN: 50 INJECTION, SOLUTION INTRAVENOUS at 15:25

## 2021-10-29 RX ADMIN — OXYCODONE HYDROCHLORIDE 10 MG: 10 TABLET ORAL at 21:11

## 2021-10-29 RX ADMIN — ASPIRIN 81 MG: 81 TABLET, CHEWABLE ORAL at 08:04

## 2021-10-29 RX ADMIN — AMIODARONE HYDROCHLORIDE 200 MG: 200 TABLET ORAL at 21:11

## 2021-10-29 RX ADMIN — AMIODARONE HYDROCHLORIDE 200 MG: 200 TABLET ORAL at 08:04

## 2021-10-29 RX ADMIN — ROSUVASTATIN CALCIUM 40 MG: 40 TABLET, FILM COATED ORAL at 17:12

## 2021-10-29 RX ADMIN — MORPHINE SULFATE 2 MG: 2 INJECTION, SOLUTION INTRAMUSCULAR; INTRAVENOUS at 08:44

## 2021-10-29 RX ADMIN — BUDESONIDE AND FORMOTEROL FUMARATE DIHYDRATE 2 PUFF: 160; 4.5 AEROSOL RESPIRATORY (INHALATION) at 20:45

## 2021-10-29 RX ADMIN — DULOXETINE HYDROCHLORIDE 30 MG: 30 CAPSULE, DELAYED RELEASE ORAL at 08:04

## 2021-10-29 RX ADMIN — OXYCODONE HYDROCHLORIDE 10 MG: 10 TABLET ORAL at 08:04

## 2021-10-29 RX ADMIN — MORPHINE SULFATE 2 MG: 2 INJECTION, SOLUTION INTRAMUSCULAR; INTRAVENOUS at 17:55

## 2021-10-29 RX ADMIN — OXYCODONE HYDROCHLORIDE 10 MG: 10 TABLET ORAL at 15:09

## 2021-10-29 RX ADMIN — DOCUSATE SODIUM 100 MG: 100 CAPSULE ORAL at 21:11

## 2021-10-29 RX ADMIN — PANTOPRAZOLE SODIUM 40 MG: 40 TABLET, DELAYED RELEASE ORAL at 06:49

## 2021-10-29 RX ADMIN — FUROSEMIDE 60 MG: 10 INJECTION, SOLUTION INTRAMUSCULAR; INTRAVENOUS at 17:13

## 2021-10-29 RX ADMIN — IPRATROPIUM BROMIDE 2 PUFF: 17 AEROSOL, METERED RESPIRATORY (INHALATION) at 08:35

## 2021-10-29 RX ADMIN — IOPAMIDOL 75 ML: 755 INJECTION, SOLUTION INTRAVENOUS at 10:14

## 2021-10-29 RX ADMIN — VALSARTAN 80 MG: 80 TABLET, FILM COATED ORAL at 09:44

## 2021-10-29 RX ADMIN — AMIODARONE HYDROCHLORIDE 150 MG: 1.5 INJECTION, SOLUTION INTRAVENOUS at 09:16

## 2021-10-29 RX ADMIN — OXYCODONE HYDROCHLORIDE 10 MG: 10 TABLET ORAL at 17:12

## 2021-10-29 RX ADMIN — Medication 1 CAPSULE: at 17:12

## 2021-10-29 RX ADMIN — AMIODARONE HYDROCHLORIDE 1 MG/MIN: 50 INJECTION, SOLUTION INTRAVENOUS at 09:39

## 2021-10-29 RX ADMIN — FUROSEMIDE 60 MG: 10 INJECTION, SOLUTION INTRAMUSCULAR; INTRAVENOUS at 08:04

## 2021-10-29 RX ADMIN — LEVOTHYROXINE SODIUM 88 MCG: 0.09 TABLET ORAL at 06:48

## 2021-10-29 ASSESSMENT — PAIN DESCRIPTION - LOCATION: LOCATION: BACK;CHEST

## 2021-10-29 ASSESSMENT — PAIN SCALES - GENERAL
PAINLEVEL_OUTOF10: 8
PAINLEVEL_OUTOF10: 9
PAINLEVEL_OUTOF10: 4
PAINLEVEL_OUTOF10: 4
PAINLEVEL_OUTOF10: 10
PAINLEVEL_OUTOF10: 7
PAINLEVEL_OUTOF10: 10
PAINLEVEL_OUTOF10: 10

## 2021-10-29 NOTE — PLAN OF CARE
Problem: Pain:  Goal: Pain level will decrease  Description: Pain level will decrease  10/28/2021 2324 by Vidhi Cuba RN  Outcome: Ongoing  10/28/2021 1424 by Gabriella Perez RN  Outcome: Ongoing  Goal: Control of acute pain  Description: Control of acute pain  10/28/2021 2324 by Vidhi Cuba RN  Outcome: Ongoing  10/28/2021 1424 by Gabriella Perez RN  Outcome: Ongoing  Goal: Control of chronic pain  Description: Control of chronic pain  10/28/2021 2324 by Vidhi Cuba RN  Outcome: Ongoing  10/28/2021 1424 by Gabriella Perez RN  Outcome: Ongoing     Problem: Falls - Risk of:  Goal: Will remain free from falls  Description: Will remain free from falls  10/28/2021 2324 by Vidhi Cuba RN  Outcome: Ongoing  10/28/2021 1424 by Gabriella Perez RN  Outcome: Ongoing  Goal: Absence of physical injury  Description: Absence of physical injury  10/28/2021 2324 by Vidhi Cuba RN  Outcome: Ongoing  10/28/2021 1424 by Gabriella Perez RN  Outcome: Ongoing     Problem: Skin Integrity:  Goal: Will show no infection signs and symptoms  Description: Will show no infection signs and symptoms  10/28/2021 2324 by Vidhi Cuba RN  Outcome: Ongoing  10/28/2021 1424 by Gabriella Perez RN  Outcome: Ongoing  Goal: Absence of new skin breakdown  Description: Absence of new skin breakdown  10/28/2021 2324 by Vidhi Cuba RN  Outcome: Ongoing  10/28/2021 1424 by Gabriella Perez RN  Outcome: Ongoing     Problem: OXYGENATION/RESPIRATORY FUNCTION  Goal: Patient will maintain patent airway  10/28/2021 2324 by Vidhi Cuba RN  Outcome: Ongoing  10/28/2021 1424 by Gabriella Perez RN  Outcome: Ongoing  Goal: Patient will achieve/maintain normal respiratory rate/effort  Description: Respiratory rate and effort will be within normal limits for the patient  10/28/2021 2324 by Vidhi Cuba RN  Outcome: Ongoing  10/28/2021 1424 by Gabriella Perez RN  Outcome: Ongoing     Problem: HEMODYNAMIC STATUS  Goal: Patient has stable vital signs and fluid balance  10/28/2021 2324

## 2021-10-29 NOTE — ANESTHESIA PRE-OP
H&P Update    I have reviewed the history and physical and examined the patient and find no relevant changes. I have reviewed with the patient and/or family the risks, benefits, and alternatives to the procedure. Pre-sedation Assessment    Patient:  Tony Sotomayor   :   1945  Intended Procedure: Transesophageal Echocardiogram    [facility] nurse's notes reviewed and agreed. Medications reviewed  Allergies: Allergies   Allergen Reactions    Benadryl [Diphenhydramine] Swelling    Doxylamine     Mucinex [Guaifenesin Er]      hallucinations    Spiriva Handihaler [Tiotropium Bromide Monohydrate]      Nausea      Adhesive Tape Rash and Swelling         Pre-Procedure Assessment/Plan:  ASA 2 - Patient with mild systemic disease with no functional limitations  Mallampati 2  Level of Sedation Plan: Moderate sedation    Anginal Classification w/in 2 weeks: 0    Heart Failure w/in 2 weeks:  If yes NYHA class: 3    Post Procedure plan: Return to same level of care

## 2021-10-29 NOTE — PROGRESS NOTES
Physician Progress Note      Bryan Zurita  CSN #:                  [id number]  :                       1945  ADMIT DATE:       10/26/2021 3:37 PM  DISCH DATE:  RESPONDING  PROVIDER #:        Whit Casillas MD          QUERY TEXT:    Patient admitted with Acute on chronic diastolic HF and Afib RVR  , noted to   have elevated BUN, Cr and low gfr. If possible, please document in progress   notes and discharge summary if you are evaluating and/or treating any of the   following: The medical record reflects the following:  Risk Factors: HTN Afib Diastolic CHF  Clinical Indicators: labs on admission 10/26/21 Bun 34/ Cr 1.6/gfr 31, 10/2721   Bun 35/Cr 1.4/gfr 37,  10/28 Bun 26/Cr 1.1/gfr 48  Treatment: In ED 1.5 L NS , and lab monitoring  Options provided:  -- CKD, please specify stage. -- ANITA  -- Elevated BUN/Cr and low gfr not clinically significant  -- Other - I will add my own diagnosis  -- Disagree - Not applicable / Not valid  -- Disagree - Clinically unable to determine / Unknown  -- Refer to Clinical Documentation Reviewer    PROVIDER RESPONSE TEXT:    This patient has ANITA.     Query created by: Roberto Carlos Juares on 10/28/2021 10:07 AM      Electronically signed by:  Whit Casillas MD 10/29/2021 8:07 AM

## 2021-10-29 NOTE — PROGRESS NOTES
Department of Internal Medicine  General Internal Medicine  Attending Progress Note    Chief Complaint:sob      HPI:   SUBJECTIVE:  67 yo female admitted with afib with rvr, and pulmonary edema and low back pain reports improving with diuresis and rate control. Has not wanted to use inhalers because doesn't use them at home. Also is still smoking 1ppd at home. Had echo and right heart cath yesterday which she understands and is getting CAMERON tomorrow for possible MV repair in the future. No other new complaints    Flipped back into afib last night and has remained there at 120. She feels palpitations, but not hypotensive or more sob this time. Her back hurts in the mid spine in addition to her usual LS pain. Seems to happen worse when she is in afib. 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 since 26.  Chronic diastolic CHF (congestive heart failure) ([address] 2016    grade II    COPD, mild (HCC)     CVA (cerebral infarction) 2013    left cerebral cortex x2/expressive aphasia/resolved    Diverticulosis     Epistaxis, recurrent     when inr high.  last 3-4 months ago    Former smoker     Gallbladder sludge     HTN (hypertension)     Hyperlipidemia     Hypothyroidism     Lumbar stenosis without neurogenic claudication 2017    pain across low back worse with standing and walking, nonradiating    Macular degeneration 2018    left worse than right , dry : progressive loss of sight: just barely able to see to drive    Neuropathy     Nonrheumatic mitral valve regurgitation     Osteoarthritis     Pulmonary HTN ([address]     primary, responsive to nitrates    PVD (peripheral vascular disease) ([address]     occluded right SFA::: asymptomatic NIKOS 0.7 right and 1.0 left    Sacral fracture, closed ([address]     Syncope 2014     Past Surgical History:   Procedure Laterality Date    AORTIC VALVE REPLACEMENT  6/16/15    Dr. Airam Lira. Hernandez - PVI, RENETTA exclusion. 19mm Maki pericardial Magna    APPENDECTOMY      BACK SURGERY  03/15/2019    superion inserted to keep back from hurtin [facility] [address] N/A 2019    EMERGENT; LAPAROSCOPIC CHOLECYSTECTOMY WITH GRAM performed by Goyo Stewart MD at [address] [place] [address]      stent x 1    CYSTOSCOPY  2014    dr Bond Cue      THR Right    OTHER SURGICAL HISTORY  2018     EGD and colonoscopy.     PAIN MANAGEMENT PROCEDURE Bilateral 2021    BILATERAL L3, L4, L5 MEDIAL BRANCH BLOCK WITH FLUOROSCOPY performed by Mike Conner MD at [address] Bilateral 2021    BILATERAL L3, L4, L5 MEDIAL BRANCH BLOCKS WITH FLUOROSCOPY (31781, 90907) performed by Mike Conner MD at [facility] [address] Bilateral 2021    BILATERAL L3, L4, L5 RADIOFREQUENCY ABLATION WITH FLUOROSCOPY (99030, 92056) performed by Mike Conner MD at [address] [facility] N/A 3/15/2019    INSERTION OF INTERSPINOUS SPACER Mike Coast AT LUMBAR FOUR-LUMBAR FIVE performed by Jonathan Thomas MD at [address] [facility] ENDOSCOPY  12/15/2017      Family History   Problem Relation Age of Onset    Breast Cancer Daughter      Social History Tobacco Use    Smoking status: Current Some Day Smoker     Packs/day: 1.00     Years: 45.00     Pack years: 45.00     Types: Cigarettes     Last attempt to quit: 2015     Years since quittin.3    Smokeless tobacco: Current User    Tobacco comment: smoked 1.5 pp for over 30 years  over 45pk year hx   Vaping Use    Vaping Use: Never used   Substance Use Topics    Alcohol use: No     Alcohol/week: 0.0 standard drinks     Comment: Socially     Drug use: No     Comment: never       Prior to Admission medications    Medication Sig Start Date End Date Taking? Authorizing Provider   diclofenac sodium (VOLTAREN) 1 % GEL Apply 2 g topically daily   Yes Historical Provider, MD   oxyCODONE HCl (OXY-IR) 10 MG immediate release tablet Take 10 mg by mouth every 6 hours as needed for Pain.    Yes Historical Provider, MD   valsartan (DIOVAN) 160 MG tablet TAKE ONE TABLET BY MOUTH TWO TIMES A DAY FOR HYPERTENSION 10/19/21  Yes Madeline Billingsley MD   ipratropium (ATROVENT) 0.03 % nasal spray INHALE 2 DOSES INTO EACH NOSTRIL THREE TIMES A DAY IF NEEDED FOR RHINITIS 10/19/21  Yes Madeline Billingsley MD   tiZANidine (ZANAFLEX) 4 MG tablet TAKE 1 TABLET BY MOUTH THREE TIMES A DAY  Patient taking differently: Take 2 mg by mouth every 8 hours as needed  21  Yes Madeline Billingsley MD   levothyroxine (SYNTHROID) 88 MCG tablet TAKE 1 TABLET BY MOUTH DAILY 21  Yes Nissa Malave MD   DULoxetine (CYMBALTA) 30 MG extended release capsule TAKE 1 CAPSULE BY MOUTH DAILY EVERY MORNING 21  Yes Madeline Billingsley MD   ezetimibe (ZETIA) 10 MG tablet Take 1 tablet by mouth daily  Patient taking differently: Take 10 mg by mouth every evening  21  Yes Catalina Wells MD   potassium chloride (KLOR-CON) 10 MEQ extended release tablet TAKE ONE TABLET BY MOUTH TWO TIMES A DAY 21  Yes Madeline Billingsley MD   rosuvastatin (CRESTOR) 40 MG tablet TAKE 1 TABLET BY MOUTH DAILY  Patient taking differently: Take 40 mg by mouth every evening  21 Yes Waleska Delaney MD   isosorbide mononitrate (IMDUR) 30 MG extended release tablet TAKE ONE TABLET BY MOUTH TWO TIMES A DAY FOR SYSTOLIC BLOOD PRESSURE (TOP NUMBER) OVER 140 6/21/21  Yes Pandora Leyden, MD   torsemide (DEMADEX) 20 MG tablet TAKE ONE TABLET BY MOUTH TWO TIMES A DAY 5/18/21  Yes Waleska Delaney MD   pantoprazole (PROTONIX) 40 MG tablet TAKE ONE TABLET BY MOUTH DAILY 2/12/21  Yes Pandora Leyden, MD   aspirin 81 MG tablet Take 1 tablet by mouth daily 5/14/15  Yes Waleska Delaney MD         ROS:  A comprehensive review of systems was negative except for: low back pain, decreased leg/foot swelling    OBJECTIVE:      PHYSICAL:  Intake/Output:   Patient Vitals for the past 8 hrs:   BP Temp Temp src Pulse Resp SpO2 Weight   10/29/21 0838 -- -- -- -- -- 98 % --   10/29/21 0730 (!) 142/79 97.9 °F (36.6 °C) Oral 129 18 97 % --   10/29/21 0606 -- -- -- -- -- -- 149 lb 7.6 oz (67.8 kg)   10/29/21 0434 99/67 98.4 °F (36.9 °C) Oral 70 14 96 % --     I/O last 3 completed shifts:   In: 4191 [P.O.:1290]  Out: 2550 [Urine:2550]  I/O this shift:  In: -   Out: 500 [Urine:500]  VITALS:    BP (!) 142/79   Pulse 129   Temp 97.9 °F (36.6 °C) (Oral)   Resp 18   Ht 5' 2\" (1.575 m)   Wt 149 lb 7.6 oz (67.8 kg)   SpO2 98%   BMI 27.34 kg/m²   wght down 6 lbs from admission  General Appearance: alert and oriented to person, place and time, well-developed and well-nourished, in no acute distress  Skin: warm and dry, no rash or erythema  Head: normocephalic and atraumatic  Eyes: conjunctivae normal and sclera anicteric  ENT: hearing grossly normal bilaterally and sinuses non-tender  Neck: neck supple and non tender without mass, no thyromegaly or thyroid nodules, no cervical lymphadenopathy   Pulmonary/Chest: decreased breath sounds and continued insp crackles half way up  Cardiovascular: normal S1 and S2, no gallops and no carotid bruits, irreg irreg tachy with holosys m  Abdomen: soft, non-tender, non-distended, normal bowel sounds, no masses or organomegaly  Extremities: no cyanosis, clubbing or trace pedal and ankle edema  Musculoskeletal: no swollen joints and no tender joints,  Neurologic: coordination normal and speech normal, moves all 4 extremities    DATA:   Labs:  CBC:   Recent Labs     10/26/21  1651 10/27/21  0037 10/28/21  0459   WBC 8.6 9.6 7.1   HGB 10.2* 10.6* 10.4*    230 207     BMP:    Recent Labs     10/27/21  0319 10/28/21  0459 10/29/21  0456    133* 140   K 5.0 4.7 4.5    102 103   CO2 21 20* 23   BUN 35* 26* 20   CREATININE 1.4* 1.1 1.0   GLUCOSE 89 112* 100*     POC GLUCOSE:  No results for input(s): POCGLU in the last 72 hours. Ca/Mg/Phos:   Recent Labs     10/27/21  0319 10/28/21  0459 10/29/21  0456   CALCIUM 8.4 8.5 9.1     Hepatic: No results for input(s): AST, ALT, ALB, BILITOT, ALKPHOS in the last 72 hours. Troponin:   Recent Labs     10/27/21  0319 10/27/21  0909 10/27/21  1355   TROPONINI <0.01 <0.01 <0.01     ProBNP:   Recent Labs     10/27/21  0319 10/27/21  2007 10/28/21  0459   PROBNP 7,729* 10,121* 8,734*     Lipids: No results for input(s): CHOL, TRIG, HDL, LDLCALC, LABVLDL in the last 72 hours. ABGs: No results for input(s): PHART, PO2ART, NDB5DGR in the last 72 hours. PT/INR: No results for input(s): PROTIME, INR in the last 72 hours. APTT:   Recent Labs     10/27/21  0037 10/27/21  0909   APTT 32.8 59.5*         DIAGNOSTICS:  XR CHEST PORTABLE    Result Date: 10/26/2021  1. No evidence of pneumonia 2. Cardiomegaly with no findings of pulmonary edema         ASSESSMENT AND PLAN    Principal Problem:    Atrial fibrillation with RVR (HCC)  Plan: back in afib with rvr not as bad but occurring on the 200mg bid amio. D/w Dr Carmela Lui who wishes to resume the amio iv.   Still getting CAMERON this afternoon    Active Problems:    Pulmonary emphysema (HCC)  Plan: stable, discussed cigarettes    Acute on chronic diastolic heart failure ([facility]  Plan: improving but still fluid overloaded, cont lasix    S/P AVR (aortic valve replacement)  Plan: stable    Bladder tumor  Plan: op fu    PAF (paroxysmal atrial fibrillation) ([facility]  Plan: improved    Essential hypertension  Plan:  A little high so will resume diovan    Coronary artery disease involving native coronary artery of native heart without angina pectoris  Plan: stable    Lumbar stenosis  Plan: pain worse mostly in mid spine    Hypothyroidism due to Hashimoto's thyroiditis  Plan: therapeutic yesterday     History of GI bleed  Plan: no further issues, stable hb  Thoracic spine pain ?  Etiology seems to be reproducible  Plan :  Check T spine xray  ANITA was prerenal due to CHF  Resolved with diuresis       Appropriate diagnostic studies and consults ordered  Magdalene Hutchins MD

## 2021-10-29 NOTE — PROGRESS NOTES
[address]   Daily Progress Note      Admit Date:  10/26/2021      Subjective:   Ms. Twyla Delatorre is a 76yo female with a past medical history significant for paroxysmal atrial fibrillation, pulmonary hypertension, aortic stenosis and CAD s/p CHILANGO to mid circ 5/2014. She is s/p AVR, PVI and RENETTA exclusion by Dr Dominik Quiroz on 6/16. Pre-op angiogram revealed no significant restenosis in the prior stents. She was hospitalized in December 2017 with fatigue. She was anemic to a hemoglobin of 8 and received PRBC transfusions. Repeat EGD demonstrated bleeding AVM's which were cauterized. Her coumadin was restarted. She presented to [facility] with complaints of back pain and while inpatient had an episode of dyspnea. An echocardiogram was completed revealing moderate to severe MR. Subsequently, a CAMERON was completed on 7/27/20 showing moderate MRMari Goel states that she thinks that she overworked herself. She has been on pain medications for chronic back pain. She was having difficulty breathing at work or walking to her car. She was fine at rest. Her daughter states that her ankles were swollen as well. Interval History:  Vipin Goel reports feeling better today/ She still has a nonproductive cough. Sinus bradycardia on telemetry. Objective:     BP (!) 91/40   Pulse (!) 49   Temp 97.5 °F (36.4 °C) (Oral)   Resp 16   Ht 5' 2\" (1.575 m)   Wt 149 lb 7.6 oz (67.8 kg)   SpO2 96%   BMI 27.34 kg/m²      Intake/Output Summary (Last 24 hours) at 10/29/2021 1659  Last data filed at 10/29/2021 1200  Gross per 24 hour   Intake 480 ml   Output 1800 ml   Net -1320 ml       Physical Exam:  General:  Awake, alert, NAD  Skin:  Warm and dry  Neck:  JVD<8, no carotid bruits  Chest:  Clear to auscultation, no wheezes/rhonchi/rales  Cardiovascular:  RRR, normal G8/H9, 2/6 holosystolic murmur at apex.  No R/G  Abdomen:  Soft, nontender, +bowel sounds  Extremities:  Trace bilateral LE edema  Pulses: 2+ bilat carotid    2+ bilat radial    2+ bilat femoral        Medications:    valsartan  80 mg Oral Daily    furosemide  60 mg IntraVENous BID    aspirin  81 mg Oral Daily    docusate sodium  100 mg Oral BID    DULoxetine  30 mg Oral Daily    ezetimibe  10 mg Oral QPM    lactobacillus  1 capsule Oral BID WC    levothyroxine  88 mcg Oral Daily    pantoprazole  40 mg Oral Daily    rosuvastatin  40 mg Oral QPM    sodium chloride flush  5-40 mL IntraVENous 2 times per day    oxyCODONE  10 mg Oral 4x Daily    budesonide-formoterol  2 puff Inhalation BID    amiodarone  200 mg Oral BID    sodium chloride flush  5-40 mL IntraVENous 2 times per day      amiodarone      sodium chloride      sodium chloride      sodium chloride         Lab Data:  CBC:   Recent Labs     10/27/21  0037 10/28/21  0459   WBC 9.6 7.1   HGB 10.6* 10.4*    207     BMP:    Recent Labs     10/27/21  0319 10/28/21  0459 10/29/21  0456    133* 140   K 5.0 4.7 4.5   CO2 21 20* 23   BUN 35* 26* 20   CREATININE 1.4* 1.1 1.0     LIVR: No results for input(s): AST, ALT in the last 72 hours. PT/INR: No results for input(s): PROT, INR in the last 72 hours. APTT:   Recent Labs     10/27/21  0037 10/27/21  0909   APTT 32.8 59.5*     BNP:  No results for input(s): BNP in the last 72 hours. CARDIAC ENZYMES:  Recent Labs     10/27/21  0319 10/27/21  0909 10/27/21  1355   TROPONINI <0.01 <0.01 <0.01     FASTING LIPID PANEL:  Lab Results   Component Value Date    CHOL 147 07/14/2020    HDL 39 06/17/2021    HDL 42 06/21/2010    TRIG 79 07/14/2020       Carotid duplex 1/16/20  Right Impression   The right internal carotid artery appears to have a 50-79% diameter reducing   stenosis based on velocity criteria. The right vertebral artery demonstrates normal antegrade flow. Left Impression   The left internal carotid artery appears to have a <50% diameter reducing   stenosis based on velocity criteria.    The left vertebral artery demonstrates normal antegrade flow.          Echo 7/8/20  There is mild concentric left ventricular hypertrophy. Ejection fraction is visually estimated to be 55-60%. diastology cannot be determined  Moderate to severe mitral regurgitation is present. A bioprosthetic artificial aortic valve appears well seated with a maximum  velocity of 273 m/s and a mean gradient of 16 mmHg. Study is unchanged from previous dated 1/8/2018     CAMERON 7/27/20  Left ventricular cavity size is normal in size with normal wall thickness. Overall left ventricular systolic function appears normal.  Ejection fraction is visually estimated to be 55-60%. Normal right ventricular size and function. Left atrium is moderately dilated. Mild thickening of the mitral valve leaflets. There is moderate mitral regurgitation appreciated. Moderate tricuspid regurgitation. A bioprosthetic valve appears well seated. There is no appreciable leaflet restriction or stenosis. There is no aortic insufficiency appreciated.              Assessment / Plan:        1. Atrial fibrillation with RVR  Shonda went back into atrial fibrillation with RVR. Change IV amiodarone back to oral with 200mg tid now. I would hold off on anticoagulation now as she had a RENETTA exclusion with her aortic valve replacement.      2. CAD of native coronary arteries without angina  Stable. Continue beta blockade and statin therapy. No angina at this time.     3. S/p Aortic Valve Replacement with bioprosthesis  Continue aspirin therapy     4. Acute on Chronic Diastolic CHF, class 2  She is still a little volume overloaded and her weight is still up. Continue IV lasix to 60mg bid now. Follow urine output.      5. PAD   Asymptomatic  I will continue to follow her in the office. 6. Mitral Regurgitation, nonrheumatic    Moderate by repeat CAMERON today.            Signed:  Ward Morales MD

## 2021-10-30 LAB
ANION GAP SERPL CALCULATED.3IONS-SCNC: 13 MMOL/L (ref 3–16)
BUN BLDV-MCNC: 19 MG/DL (ref 7–20)
CALCIUM SERPL-MCNC: 9 MG/DL (ref 8.3–10.6)
CHLORIDE BLD-SCNC: 102 MMOL/L (ref 99–110)
CO2: 22 MMOL/L (ref 21–32)
CREAT SERPL-MCNC: 1.4 MG/DL (ref 0.6–1.2)
GFR AFRICAN AMERICAN: 44
GFR NON-AFRICAN AMERICAN: 37
GLUCOSE BLD-MCNC: 110 MG/DL (ref 70–99)
HCT VFR BLD CALC: 30.4 % (ref 36–48)
HEMOGLOBIN: 10 G/DL (ref 12–16)
MCH RBC QN AUTO: 29.6 PG (ref 26–34)
MCHC RBC AUTO-ENTMCNC: 32.9 G/DL (ref 31–36)
MCV RBC AUTO: 89.8 FL (ref 80–100)
PDW BLD-RTO: 14.7 % (ref 12.4–15.4)
PLATELET # BLD: 206 K/UL (ref 135–450)
PMV BLD AUTO: 8.7 FL (ref 5–10.5)
POTASSIUM SERPL-SCNC: 5 MMOL/L (ref 3.5–5.1)
PRO-BNP: 7274 PG/ML (ref 0–449)
RBC # BLD: 3.39 M/UL (ref 4–5.2)
SODIUM BLD-SCNC: 137 MMOL/L (ref 136–145)
WBC # BLD: 9.1 K/UL (ref 4–11)

## 2021-10-30 PROCEDURE — 2060000000 HC ICU INTERMEDIATE R&B

## 2021-10-30 PROCEDURE — 6370000000 HC RX 637 (ALT 250 FOR IP): Performed by: INTERNAL MEDICINE

## 2021-10-30 PROCEDURE — 99233 SBSQ HOSP IP/OBS HIGH 50: CPT | Performed by: NURSE PRACTITIONER

## 2021-10-30 PROCEDURE — 6360000002 HC RX W HCPCS: Performed by: INTERNAL MEDICINE

## 2021-10-30 PROCEDURE — 83880 ASSAY OF NATRIURETIC PEPTIDE: CPT

## 2021-10-30 PROCEDURE — 94640 AIRWAY INHALATION TREATMENT: CPT

## 2021-10-30 PROCEDURE — 36415 COLL VENOUS BLD VENIPUNCTURE: CPT

## 2021-10-30 PROCEDURE — 80048 BASIC METABOLIC PNL TOTAL CA: CPT

## 2021-10-30 PROCEDURE — 94760 N-INVAS EAR/PLS OXIMETRY 1: CPT

## 2021-10-30 PROCEDURE — 85027 COMPLETE CBC AUTOMATED: CPT

## 2021-10-30 PROCEDURE — 6370000000 HC RX 637 (ALT 250 FOR IP): Performed by: NURSE PRACTITIONER

## 2021-10-30 PROCEDURE — 99233 SBSQ HOSP IP/OBS HIGH 50: CPT | Performed by: INTERNAL MEDICINE

## 2021-10-30 RX ORDER — IPRATROPIUM BROMIDE 21 UG/1
2 SPRAY, METERED NASAL 3 TIMES DAILY PRN
Status: DISCONTINUED | OUTPATIENT
Start: 2021-10-30 | End: 2021-11-01 | Stop reason: HOSPADM

## 2021-10-30 RX ORDER — TORSEMIDE 20 MG/1
20 TABLET ORAL 2 TIMES DAILY
Status: DISCONTINUED | OUTPATIENT
Start: 2021-10-30 | End: 2021-11-01

## 2021-10-30 RX ORDER — AMIODARONE HYDROCHLORIDE 200 MG/1
200 TABLET ORAL DAILY
Status: DISCONTINUED | OUTPATIENT
Start: 2021-10-31 | End: 2021-11-01 | Stop reason: HOSPADM

## 2021-10-30 RX ADMIN — DOCUSATE SODIUM 100 MG: 100 CAPSULE ORAL at 08:18

## 2021-10-30 RX ADMIN — TORSEMIDE 20 MG: 20 TABLET ORAL at 17:15

## 2021-10-30 RX ADMIN — Medication 1 CAPSULE: at 08:18

## 2021-10-30 RX ADMIN — PANTOPRAZOLE SODIUM 40 MG: 40 TABLET, DELAYED RELEASE ORAL at 06:05

## 2021-10-30 RX ADMIN — OXYCODONE HYDROCHLORIDE 10 MG: 10 TABLET ORAL at 17:15

## 2021-10-30 RX ADMIN — DULOXETINE HYDROCHLORIDE 30 MG: 30 CAPSULE, DELAYED RELEASE ORAL at 08:18

## 2021-10-30 RX ADMIN — LEVOTHYROXINE SODIUM 88 MCG: 0.09 TABLET ORAL at 06:05

## 2021-10-30 RX ADMIN — OXYCODONE HYDROCHLORIDE 10 MG: 10 TABLET ORAL at 12:59

## 2021-10-30 RX ADMIN — BUDESONIDE AND FORMOTEROL FUMARATE DIHYDRATE 2 PUFF: 160; 4.5 AEROSOL RESPIRATORY (INHALATION) at 09:11

## 2021-10-30 RX ADMIN — EZETIMIBE 10 MG: 10 TABLET ORAL at 17:16

## 2021-10-30 RX ADMIN — OXYCODONE HYDROCHLORIDE 10 MG: 10 TABLET ORAL at 08:18

## 2021-10-30 RX ADMIN — MORPHINE SULFATE 2 MG: 2 INJECTION, SOLUTION INTRAMUSCULAR; INTRAVENOUS at 09:15

## 2021-10-30 RX ADMIN — OXYCODONE HYDROCHLORIDE 10 MG: 10 TABLET ORAL at 20:40

## 2021-10-30 RX ADMIN — DOCUSATE SODIUM 100 MG: 100 CAPSULE ORAL at 20:40

## 2021-10-30 RX ADMIN — ASPIRIN 81 MG: 81 TABLET, CHEWABLE ORAL at 08:18

## 2021-10-30 RX ADMIN — ROSUVASTATIN CALCIUM 40 MG: 40 TABLET, FILM COATED ORAL at 17:15

## 2021-10-30 RX ADMIN — FUROSEMIDE 60 MG: 10 INJECTION, SOLUTION INTRAMUSCULAR; INTRAVENOUS at 08:21

## 2021-10-30 RX ADMIN — Medication 1 CAPSULE: at 17:15

## 2021-10-30 RX ADMIN — BUDESONIDE AND FORMOTEROL FUMARATE DIHYDRATE 2 PUFF: 160; 4.5 AEROSOL RESPIRATORY (INHALATION) at 20:33

## 2021-10-30 RX ADMIN — VALSARTAN 80 MG: 80 TABLET, FILM COATED ORAL at 08:18

## 2021-10-30 RX ADMIN — AMIODARONE HYDROCHLORIDE 200 MG: 200 TABLET ORAL at 08:20

## 2021-10-30 ASSESSMENT — PAIN SCALES - GENERAL
PAINLEVEL_OUTOF10: 9
PAINLEVEL_OUTOF10: 0
PAINLEVEL_OUTOF10: 5
PAINLEVEL_OUTOF10: 8
PAINLEVEL_OUTOF10: 9

## 2021-10-30 NOTE — PROGRESS NOTES
[facility] Progress Note  10/30/2021 7:42 AM  Subjective:   Admit Date: 10/26/2021      Chief Complaint: I feel OK     Interval History: CAMERON yestr--report pending   Card did restart oral amio last nite   Diuresis of 1.5 liter past 2 days--creat starting to rise at 1.4   She just feel weak   Some c/o low back pain     Diet: ADULT DIET; Regular; Low Sodium (2 gm); 1500 ml  Medications:   Scheduled Meds:   valsartan  80 mg Oral Daily    furosemide  60 mg IntraVENous BID    aspirin  81 mg Oral Daily    docusate sodium  100 mg Oral BID    DULoxetine  30 mg Oral Daily    ezetimibe  10 mg Oral QPM    lactobacillus  1 capsule Oral BID WC    levothyroxine  88 mcg Oral Daily    pantoprazole  40 mg Oral Daily    rosuvastatin  40 mg Oral QPM    sodium chloride flush  5-40 mL IntraVENous 2 times per day    oxyCODONE  10 mg Oral 4x Daily    budesonide-formoterol  2 puff Inhalation BID    amiodarone  200 mg Oral BID    sodium chloride flush  5-40 mL IntraVENous 2 times per day     Continuous Infusions:   amiodarone 0.5 mg/min (10/29/21 1525)    sodium chloride      sodium chloride      sodium chloride         Review of Systems -   General ROS: afebrile  Respiratory ROS: positive for - shortness of breath  Cardiovascular ROS: no chest pain  Musculoskeletal ROS:positive for - low back p[ain   Neurological ROS: no TIA or stroke symptoms    Objective:   Vitals: BP (!) 116/57   Pulse 51   Temp 98 °F (36.7 °C) (Oral)   Resp 14   Ht 5' 2\" (1.575 m)   Wt 150 lb 2.1 oz (68.1 kg)   SpO2 100%   BMI 27.46 kg/m²   General appearance: alert and cooperative with exam  HEENT: Head: Normocephalic, no lesions, without obvious abnormality.   Neck: no adenopathy, no carotid bruit, no JVD, supple, symmetrical, trachea midline and thyroid not enlarged, symmetric, no tenderness/mass/nodules  Lungs: rales bibasilar  Heart: irregularly irregular rhythm  Abdomen: soft, non-tender; bowel sounds normal; no masses,  no organomegaly  Extremities: tr edema   Neurologic: Mental status: Alert, oriented, thought content appropriate          Assessment & Plan:   Principal Problem:    Atrial fibrillation with RVR ([facility])--back on oral amio per cardiology   Active Problems:    Pulmonary emphysema ([address]    Acute on chronic diastolic heart failure ([facility])    S/P AVR (aortic valve replacement)    Bladder tumor    PAF (paroxysmal atrial fibrillation) ([facility])    Essential hypertension--Continue current therapy      Coronary artery disease involving native coronary artery of native heart without angina pectoris    Nonrheumatic mitral valve regurgitation    Lumbar stenosis--inr activity as netta     Hypothyroidism due to Hashimoto's thyroiditis    ANITA (acute kidney injury) ([facility])--cr 1.0 to 1.4     Thoracic spine pain    History of GI bleed  Resolved Problems:    * No resolved hospital problems.  *  Cont IV lasix--incr activity as netta--await echo results   Please note that over 35 minutes was spent in evaluating the patient, review of records and results, discussion with staff/family, etc.    Sapphire Day MD

## 2021-10-30 NOTE — PROCEDURES
[address] [place] [address] 16                                 PROCEDURE NOTE    PATIENT NAME: NICHELLE LORENOZ                          :        1945  MED REC NO:   [id number]                          ROOM:       5118  ACCOUNT NO:   [de-identified]                           ADMIT DATE: 10/26/2021  PROVIDER:     Jose Coe MD      DATE OF PROCEDURE:  10/29/2021    INDICATION FOR PROCEDURE:  Mitral regurgitation. PROCEDURE:  The risks and benefits of the procedure were explained to  the patient. Informed consent was obtained. She was brought to the  catheterization lab recovery area and placed in the supine position. Emergency equipment was in place. She had an ASA grade of II and a  Mallampati score of II. She received deep sedation with 2 mg of IV  Versed and 100 mcg of fentanyl. When moderate sedation was achieved,  she had the transesophageal echocardiography probe passed without  difficulty into her posterior oropharynx and into the distal esophagus. Image acquisition was performed including transgastric imaging. The  probe was then withdrawn and the patient recovered. There were no  complications from the procedure and there was no blood loss. Total duration of sedation was 10 minutes. Vital signs were monitored  throughout the sedation period and remained stable. Sedation was  administered by an independent agent at my direction and supervision. I  was present the entire time. Detailed echocardiography report to follow.         Ousmane Mullins MD    D: 10/29/2021 13:38:02       T: 10/29/2021 13:40:25     [email or web address]  Job#: [id number]     Doc#: [id number]    CC:  Flaquito Fernandes MD

## 2021-10-30 NOTE — PROGRESS NOTES
Cardiology Progress Note     Admit Date: 10/26/2021     Reason for follow up: A fib with RVR, MR    HPI and Interval History:   Ms. Lillie Rolon is a 76yo female with a past medical history significant for paroxysmal atrial fibrillation, pulmonary hypertension, aortic stenosis and CAD s/p CHILANGO to mid circ 2014. She is s/p AVR, PVI and RENETTA exclusion by Dr Neeta Vasquez on . Pre-op angiogram revealed no significant restenosis in the prior stents. She presented to [facility] with complaints of back pain and while inpatient had an episode of dyspnea. An echocardiogram was completed revealing moderate to severe MR. Subsequently, a CAMERON was completed on 20 showing moderate MR. She has been diuresed while admitted. CAMERON done on 10/29/21 showed moderate MR. Back on amiodarone as she had A fib with RVR while admitted. No anticoagulation due to past RENETTA exclusion. She is feeling somewhat better today. Still has back pain which she thinks causes dyspnea. Denies any CP or palpitations. Physical Examination:  Vitals:    10/30/21 0413   BP: (!) 116/57   Pulse: 51   Resp: 14   Temp: 98 °F (36.7 °C)   SpO2: 100%        Intake/Output Summary (Last 24 hours) at 10/30/2021 0815  Last data filed at 10/29/2021 2114  Gross per 24 hour   Intake 202 ml   Output --   Net 202 ml     In: 82 [I.V.:82]  Out: -    Wt Readings from Last 3 Encounters:   10/30/21 150 lb 2.1 oz (68.1 kg)   21 140 lb (63.5 kg)   21 145 lb (65.8 kg)     Temp  Av.8 °F (36.6 °C)  Min: 97.5 °F (36.4 °C)  Max: 98 °F (36.7 °C)  Pulse  Av  Min: 49  Max: 53  BP  Min: 91/40  Max: 116/57  SpO2  Av %  Min: 95 %  Max: 100 %    · Telemetry: SB at 50s. · Constitutional: Alert, in no acute distress. Appears stated age. · Head: Normocephalic and atraumatic. · Eyes: Conjunctivae normal. EOM are normal.   · Neck: Neck supple. No lymphadenopathy. No rigidity. No JVD present. · Cardiovascular: Normal rate, regular rhythm.  2/6 systolic murmur, no rubs or gallops. No S3 or S4.  · Pulmonary/Chest: Clear breath sounds on left and RUL/RML, crackles in R base bilaterally. No wheezes or rhonchi. No respiratory accessory muscle use. · Abdominal: Soft. Normal bowel sounds present. No distension, No tenderness. · Musculoskeletal: No tenderness. 1+ BLE edema    · Lymphadenopathy: Has no cervical adenopathy. · Neurological: Alert and oriented. No gross deficits. · Skin: Skin is warm and dry. No rash, lesions, ulcerations noted. · Psychiatric: No anxiety or agitation. Labs, diagnostic and imaging results reviewed. Reviewed. Recent Labs     10/28/21  0459 10/29/21  0456 10/30/21  0504   * 140 137   K 4.7 4.5 5.0    103 102   CO2 20* 23 22   BUN 26* 20 19   CREATININE 1.1 1.0 1.4*     Recent Labs     10/28/21  0459 10/30/21  0504   WBC 7.1 9.1   HGB 10.4* 10.0*   HCT 31.2* 30.4*   MCV 88.7 89.8    206     Lab Results   Component Value Date    TROPONINI <0.01 10/27/2021     Estimated Creatinine Clearance: 31 mL/min (A) (based on SCr of 1.4 mg/dL (H)).    Lab Results   Component Value Date    .2 04/22/2013     Lab Results   Component Value Date    PROTIME 11.7 02/19/2018    PROTIME 16.0 02/18/2018    PROTIME 94.4 02/18/2018    INR 1.04 02/19/2018    INR 1.42 02/18/2018    INR 8.35 02/18/2018     Lab Results   Component Value Date    CHOL 147 07/14/2020    HDL 39 06/17/2021    HDL 42 06/21/2010    TRIG 79 07/14/2020       Scheduled Meds:   valsartan  80 mg Oral Daily    furosemide  60 mg IntraVENous BID    aspirin  81 mg Oral Daily    docusate sodium  100 mg Oral BID    DULoxetine  30 mg Oral Daily    ezetimibe  10 mg Oral QPM    lactobacillus  1 capsule Oral BID WC    levothyroxine  88 mcg Oral Daily    pantoprazole  40 mg Oral Daily    rosuvastatin  40 mg Oral QPM    sodium chloride flush  5-40 mL IntraVENous 2 times per day    oxyCODONE  10 mg Oral 4x Daily    budesonide-formoterol  2 puff Inhalation BID    amiodarone  200 mg Oral BID    sodium chloride flush  5-40 mL IntraVENous 2 times per day     Continuous Infusions:   amiodarone 0.5 mg/min (10/29/21 1525)    sodium chloride      sodium chloride      sodium chloride       PRN Meds:morphine, tiZANidine, sodium chloride flush, sodium chloride, ondansetron **OR** ondansetron, polyethylene glycol, acetaminophen **OR** acetaminophen, perflutren lipid microspheres, sodium chloride flush, sodium chloride     ECG: 10/27/21  SB at 52 BPM. IVCD. PVCs. Lead reversal.     Echo:10/27/21   Left ventricular cavity size is normal.   Normal left ventricular wall thickness. Ejection fraction is visually estimated to be 45-50%. There is mild global   hypokinesis appreciated. Grade III diastolic dysfunction with elevated LV filling pressures. Moderately dilated right ventricle with mildly reduced function. The left atrium is severely dilated. The right atrium is mildly dilated. Moderately severe tricuspid regurgitation. Moderate pulmonic regurgitation present. Moderately severe mitral regurgitation appreciated. A bioprosthetic artificial aortic valve appears well seated with a maximum   velocity of 2.4m/s and a mean gradient of 12mmHg. Trivial aortic regurgitation. A bubble study was performed and fails to show evidence of right to left   shunting. CAMERON: 7/30/20  FINDINGS:  1.  Moderate mitral regurgitation with thickening of the mitral valve  leaflets. There is no flow reversal seen in the pulmonary veins. 2.  Moderate tricuspid regurgitation. 3.  Normal-appearing bioprosthetic aortic valve replacement with no  significant aortic regurgitation or stenosis identified visually. 4.  Normal left ventricular and right ventricular size and function. LV  ejection fraction 60%. 5.  Moderate left atrial enlargement with successful ligation in the  past of the left atrial appendage. No flow was seen by color Doppler. Normal right atrial size.     [facility]: 7/1/15  FINDINGS: 1.  Right dominant coronary arterial system with mild calcification of the  RCA. There is 40% serial lesions in the mid RCA. In the left system there  is 25% distal left main disease. There is 50% mid LAD disease and 50% ostial  second diagonal branch disease. There is movement significant restenosis  identified in the prior previously placed mid circumflex stent. 2.  Systemic hypertension.      Assessment and Plan:     Paroxysmal atrial fibrillation with RVR   - now back in SR/SB   - on amiodarone 200mg BID, will decrease to 200mg QD   - CHADS2-VASc 4 (age, gender, HF) no anticoagulation due to past RENETTA exclusion with no flow seen on CAMERON in 2020    Acute on chronic diastolic heart failure   - EF 45-50% on recent echo   - net -2L, weight down 5lbs since admission   - now with rising creatinine, did get IV Lasix this morning, will change to PO torsemide for tonight    Mitral regurgitation, non rheumatic   - moderate on CAMERON per Dr. Alexus Mills progress note (no CAMERON note at this time)   - continue to monitor    CAD   - on statin, aspirin, no beta blocker due to bradycardia   - no angina    S/p bioprosthetic AVR   - in 2015   - on aspirin    HOWARD Celis  [employer]  [facility], [address] [place] [address]  Phone: [phone]  Fax: [phone]    Electronically signed by HOWARD Montejo - CNP on 10/30/2021 at 8:15 AM

## 2021-10-31 LAB
ANION GAP SERPL CALCULATED.3IONS-SCNC: 12 MMOL/L (ref 3–16)
BUN BLDV-MCNC: 17 MG/DL (ref 7–20)
CALCIUM SERPL-MCNC: 9 MG/DL (ref 8.3–10.6)
CHLORIDE BLD-SCNC: 104 MMOL/L (ref 99–110)
CO2: 25 MMOL/L (ref 21–32)
CREAT SERPL-MCNC: 1.1 MG/DL (ref 0.6–1.2)
GFR AFRICAN AMERICAN: 58
GFR NON-AFRICAN AMERICAN: 48
GLUCOSE BLD-MCNC: 95 MG/DL (ref 70–99)
POTASSIUM SERPL-SCNC: 4.5 MMOL/L (ref 3.5–5.1)
SODIUM BLD-SCNC: 141 MMOL/L (ref 136–145)

## 2021-10-31 PROCEDURE — 6370000000 HC RX 637 (ALT 250 FOR IP): Performed by: NURSE PRACTITIONER

## 2021-10-31 PROCEDURE — 36415 COLL VENOUS BLD VENIPUNCTURE: CPT

## 2021-10-31 PROCEDURE — 99233 SBSQ HOSP IP/OBS HIGH 50: CPT | Performed by: NURSE PRACTITIONER

## 2021-10-31 PROCEDURE — 6360000002 HC RX W HCPCS: Performed by: INTERNAL MEDICINE

## 2021-10-31 PROCEDURE — 6370000000 HC RX 637 (ALT 250 FOR IP): Performed by: INTERNAL MEDICINE

## 2021-10-31 PROCEDURE — 2580000003 HC RX 258: Performed by: INTERNAL MEDICINE

## 2021-10-31 PROCEDURE — 94760 N-INVAS EAR/PLS OXIMETRY 1: CPT

## 2021-10-31 PROCEDURE — 2060000000 HC ICU INTERMEDIATE R&B

## 2021-10-31 PROCEDURE — 99233 SBSQ HOSP IP/OBS HIGH 50: CPT | Performed by: INTERNAL MEDICINE

## 2021-10-31 PROCEDURE — 80048 BASIC METABOLIC PNL TOTAL CA: CPT

## 2021-10-31 PROCEDURE — 94640 AIRWAY INHALATION TREATMENT: CPT

## 2021-10-31 RX ORDER — BENZOCAINE/MENTHOL 6 MG-10 MG
LOZENGE MUCOUS MEMBRANE 4 TIMES DAILY
Status: DISCONTINUED | OUTPATIENT
Start: 2021-10-31 | End: 2021-11-01 | Stop reason: HOSPADM

## 2021-10-31 RX ADMIN — Medication 1 CAPSULE: at 17:57

## 2021-10-31 RX ADMIN — DULOXETINE HYDROCHLORIDE 30 MG: 30 CAPSULE, DELAYED RELEASE ORAL at 08:07

## 2021-10-31 RX ADMIN — ASPIRIN 81 MG: 81 TABLET, CHEWABLE ORAL at 08:07

## 2021-10-31 RX ADMIN — BUDESONIDE AND FORMOTEROL FUMARATE DIHYDRATE 2 PUFF: 160; 4.5 AEROSOL RESPIRATORY (INHALATION) at 19:32

## 2021-10-31 RX ADMIN — EZETIMIBE 10 MG: 10 TABLET ORAL at 17:57

## 2021-10-31 RX ADMIN — MORPHINE SULFATE 2 MG: 2 INJECTION, SOLUTION INTRAMUSCULAR; INTRAVENOUS at 09:10

## 2021-10-31 RX ADMIN — ROSUVASTATIN CALCIUM 40 MG: 40 TABLET, FILM COATED ORAL at 17:56

## 2021-10-31 RX ADMIN — OXYCODONE HYDROCHLORIDE 10 MG: 10 TABLET ORAL at 17:56

## 2021-10-31 RX ADMIN — DOCUSATE SODIUM 100 MG: 100 CAPSULE ORAL at 08:07

## 2021-10-31 RX ADMIN — HYDROCORTISONE: 0.01 CREAM TOPICAL at 21:02

## 2021-10-31 RX ADMIN — BUDESONIDE AND FORMOTEROL FUMARATE DIHYDRATE 2 PUFF: 160; 4.5 AEROSOL RESPIRATORY (INHALATION) at 08:20

## 2021-10-31 RX ADMIN — MORPHINE SULFATE 2 MG: 2 INJECTION, SOLUTION INTRAMUSCULAR; INTRAVENOUS at 01:51

## 2021-10-31 RX ADMIN — HYDROCORTISONE: 0.01 CREAM TOPICAL at 17:00

## 2021-10-31 RX ADMIN — AMIODARONE HYDROCHLORIDE 200 MG: 200 TABLET ORAL at 08:07

## 2021-10-31 RX ADMIN — VALSARTAN 80 MG: 80 TABLET, FILM COATED ORAL at 08:07

## 2021-10-31 RX ADMIN — LEVOTHYROXINE SODIUM 88 MCG: 0.09 TABLET ORAL at 08:13

## 2021-10-31 RX ADMIN — MORPHINE SULFATE 2 MG: 2 INJECTION, SOLUTION INTRAMUSCULAR; INTRAVENOUS at 12:54

## 2021-10-31 RX ADMIN — OXYCODONE HYDROCHLORIDE 10 MG: 10 TABLET ORAL at 21:02

## 2021-10-31 RX ADMIN — PANTOPRAZOLE SODIUM 40 MG: 40 TABLET, DELAYED RELEASE ORAL at 08:13

## 2021-10-31 RX ADMIN — Medication 1 CAPSULE: at 08:08

## 2021-10-31 RX ADMIN — DOCUSATE SODIUM 100 MG: 100 CAPSULE ORAL at 21:02

## 2021-10-31 RX ADMIN — SODIUM CHLORIDE, PRESERVATIVE FREE 10 ML: 5 INJECTION INTRAVENOUS at 08:08

## 2021-10-31 RX ADMIN — HYDROCORTISONE: 0.01 CREAM TOPICAL at 10:30

## 2021-10-31 RX ADMIN — TORSEMIDE 20 MG: 20 TABLET ORAL at 08:07

## 2021-10-31 RX ADMIN — TORSEMIDE 20 MG: 20 TABLET ORAL at 21:02

## 2021-10-31 RX ADMIN — OXYCODONE HYDROCHLORIDE 10 MG: 10 TABLET ORAL at 08:07

## 2021-10-31 RX ADMIN — OXYCODONE HYDROCHLORIDE 10 MG: 10 TABLET ORAL at 12:54

## 2021-10-31 ASSESSMENT — PAIN SCALES - GENERAL
PAINLEVEL_OUTOF10: 4
PAINLEVEL_OUTOF10: 8
PAINLEVEL_OUTOF10: 6
PAINLEVEL_OUTOF10: 9
PAINLEVEL_OUTOF10: 6
PAINLEVEL_OUTOF10: 8

## 2021-10-31 NOTE — PROGRESS NOTES
[facility] Progress Note  10/31/2021 9:04 AM  Subjective:   Admit Date: 10/26/2021      Chief Complaint: I feel OK --rt forearm bruised--iv amio infiltrated--pain and swelling     Interval History: Changed to po torsemide yest--cr improved 1.4 to 1.1  C/o pain rt forearm--bruising and edema --iv infiltration --recc ice and elevation     Diet: ADULT DIET; Regular; Low Sodium (2 gm); 2000 ml  Medications:   Scheduled Meds:   amiodarone  200 mg Oral Daily    torsemide  20 mg Oral BID    valsartan  80 mg Oral Daily    aspirin  81 mg Oral Daily    docusate sodium  100 mg Oral BID    DULoxetine  30 mg Oral Daily    ezetimibe  10 mg Oral QPM    lactobacillus  1 capsule Oral BID WC    levothyroxine  88 mcg Oral Daily    pantoprazole  40 mg Oral Daily    rosuvastatin  40 mg Oral QPM    sodium chloride flush  5-40 mL IntraVENous 2 times per day    oxyCODONE  10 mg Oral 4x Daily    budesonide-formoterol  2 puff Inhalation BID    sodium chloride flush  5-40 mL IntraVENous 2 times per day     Continuous Infusions:   sodium chloride      sodium chloride      sodium chloride         Review of Systems -   General ROS: afebrile  Respiratory ROS: positive for - shortness of breath  Cardiovascular ROS: no chest pain  Musculoskeletal ROS:positive for - rt forearm pain and bruising   Neurological ROS: no TIA or stroke symptoms    Objective:   Vitals: BP (!) 112/48   Pulse 56   Temp 98.3 °F (36.8 °C) (Oral)   Resp 16   Ht 5' 2\" (1.575 m)   Wt 150 lb 2.1 oz (68.1 kg)   SpO2 96%   BMI 27.46 kg/m²   General appearance: alert and cooperative with exam  HEENT: Head: Normocephalic, no lesions, without obvious abnormality.   Neck: no adenopathy, no carotid bruit, no JVD, supple, symmetrical, trachea midline and thyroid not enlarged, symmetric, no tenderness/mass/nodules  Lungs: rales bibasilar  Heart: regular rate and rhythm, S1, S2 normal, no murmur, click, rub or gallop--irreg rhythm to exam   Abdomen: soft, non-tender; bowel sounds normal; no masses,  no organomegaly  Extremities: rt forearm--bruised and quite tender--no dvt--site of IV   Neurologic: Mental status: Alert, oriented, thought content appropriate          Assessment & Plan:   Principal Problem:    Atrial fibrillation with RVR (HCC)--stable--on decr po amio dose   Active Problems:    Pulmonary emphysema (HCC)--Continue current therapy      Acute on chronic diastolic heart failure (HCC)    S/P AVR (aortic valve replacement)    Bladder tumor    PAF (paroxysmal atrial fibrillation) ([facility]    Essential hypertension    Coronary artery disease involving native coronary artery of native heart without angina pectoris    Nonrheumatic mitral valve regurgitation    Lumbar stenosis--incr activity     Hypothyroidism due to Hashimoto's thyroiditis    ANITA (acute kidney injury) ([facility]    Thoracic spine pain    History of GI bleed  Resolved Problems:    * No resolved hospital problems. *  Will incr activity  Use ice and elevation to rt forearm bruise--??  Sec to iv infiltration   Probabaly home tomorrow if cont to improve   Please note that over 35 minutes was spent in evaluating the patient, review of records and results, discussion with staff/family, etc.    Wenceslao Estrada MD

## 2021-10-31 NOTE — PROGRESS NOTES
Cardiology Progress Note     Admit Date: 10/26/2021     Reason for follow up: A fib with RVR, MR    HPI and Interval History:   Ms. Monica Stanford is a 74yo female with a past medical history significant for paroxysmal atrial fibrillation, pulmonary hypertension, aortic stenosis and CAD s/p CHILANGO to mid circ 2014. She is s/p AVR, PVI and RENETTA exclusion by Dr April Bruce on . Pre-op angiogram revealed no significant restenosis in the prior stents. She presented to [facility] with complaints of back pain and while inpatient had an episode of dyspnea. An echocardiogram was completed revealing moderate to severe MR. Subsequently, a CAMERON was completed on 20 showing moderate MR. She has been diuresed while admitted. CAMERON done on 10/29/21 showed moderate MR. Back on amiodarone as she had A fib with RVR while admitted. No anticoagulation due to past RENETTA exclusion. She is not feeling great today, mostly because of back pain. Also sore to R arm due to IV extravasation with IV amiodarone, using ice but the area is still pretty sore. Denies any SOB, CP or palpitations. Says she feels like she is getting dehydrated. Physical Examination:  Vitals:    10/31/21 0807   BP: (!) 112/48   Pulse: 56   Resp: 16   Temp: 98.3 °F (36.8 °C)   SpO2:         Intake/Output Summary (Last 24 hours) at 10/31/2021 0823  Last data filed at 10/31/2021 0811  Gross per 24 hour   Intake 580 ml   Output 1500 ml   Net -920 ml     In: 50 [P.O.:50]  Out: 1000    Wt Readings from Last 3 Encounters:   10/31/21 150 lb 2.1 oz (68.1 kg)   21 140 lb (63.5 kg)   21 145 lb (65.8 kg)     Temp  Av.3 °F (36.8 °C)  Min: 98.1 °F (36.7 °C)  Max: 98.5 °F (36.9 °C)  Pulse  Av.2  Min: 50  Max: 57  BP  Min: 106/48  Max: 112/48  SpO2  Av.8 %  Min: 95 %  Max: 96 %    · Telemetry: SB at 50s. · Constitutional: Alert, in no acute distress. Appears stated age. · Head: Normocephalic and atraumatic.    · Eyes: Conjunctivae normal. EOM are normal.   · Neck: Neck supple. No lymphadenopathy. No rigidity. No JVD present. · Cardiovascular: Normal rate, regular rhythm. 2/6 systolic murmur, no rubs or gallops. No S3 or S4.  · Pulmonary/Chest: Clear breath sounds bilaterally. No crackles, wheezes or rhonchi. No respiratory accessory muscle use. · Abdominal: Soft. Normal bowel sounds present. No distension, No tenderness. · Musculoskeletal: No tenderness. No edema    · Lymphadenopathy: Has no cervical adenopathy. · Neurological: Alert and oriented. No gross deficits. · Skin: Skin is warm and dry. No rash, lesions, ulcerations noted. · Psychiatric: No anxiety or agitation. Labs, diagnostic and imaging results reviewed. Reviewed. Recent Labs     10/29/21  0456 10/30/21  0504 10/31/21  0532    137 141   K 4.5 5.0 4.5    102 104   CO2 23 22 25   BUN 20 19 17   CREATININE 1.0 1.4* 1.1     Recent Labs     10/30/21  0504   WBC 9.1   HGB 10.0*   HCT 30.4*   MCV 89.8        Lab Results   Component Value Date    TROPONINI <0.01 10/27/2021     Estimated Creatinine Clearance: 39 mL/min (based on SCr of 1.1 mg/dL).    Lab Results   Component Value Date    .2 04/22/2013     Lab Results   Component Value Date    PROTIME 11.7 02/19/2018    PROTIME 16.0 02/18/2018    PROTIME 94.4 02/18/2018    INR 1.04 02/19/2018    INR 1.42 02/18/2018    INR 8.35 02/18/2018     Lab Results   Component Value Date    CHOL 147 07/14/2020    HDL 39 06/17/2021    HDL 42 06/21/2010    TRIG 79 07/14/2020       Scheduled Meds:   amiodarone  200 mg Oral Daily    torsemide  20 mg Oral BID    valsartan  80 mg Oral Daily    aspirin  81 mg Oral Daily    docusate sodium  100 mg Oral BID    DULoxetine  30 mg Oral Daily    ezetimibe  10 mg Oral QPM    lactobacillus  1 capsule Oral BID WC    levothyroxine  88 mcg Oral Daily    pantoprazole  40 mg Oral Daily    rosuvastatin  40 mg Oral QPM    sodium chloride flush  5-40 mL IntraVENous 2 times per day    oxyCODONE  10 mg Oral 4x Daily    budesonide-formoterol  2 puff Inhalation BID    sodium chloride flush  5-40 mL IntraVENous 2 times per day     Continuous Infusions:   sodium chloride      sodium chloride      sodium chloride       PRN Meds:ipratropium, morphine, tiZANidine, sodium chloride flush, sodium chloride, ondansetron **OR** ondansetron, polyethylene glycol, acetaminophen **OR** acetaminophen, perflutren lipid microspheres, sodium chloride flush, sodium chloride     ECG: 10/27/21  SB at 52 BPM. IVCD. PVCs. Lead reversal.     Echo:10/27/21   Left ventricular cavity size is normal.   Normal left ventricular wall thickness. Ejection fraction is visually estimated to be 45-50%. There is mild global   hypokinesis appreciated. Grade III diastolic dysfunction with elevated LV filling pressures. Moderately dilated right ventricle with mildly reduced function. The left atrium is severely dilated. The right atrium is mildly dilated. Moderately severe tricuspid regurgitation. Moderate pulmonic regurgitation present. Moderately severe mitral regurgitation appreciated. A bioprosthetic artificial aortic valve appears well seated with a maximum   velocity of 2.4m/s and a mean gradient of 12mmHg. Trivial aortic regurgitation. A bubble study was performed and fails to show evidence of right to left   shunting. CAMERON: 7/30/20  FINDINGS:  1.  Moderate mitral regurgitation with thickening of the mitral valve  leaflets. There is no flow reversal seen in the pulmonary veins. 2.  Moderate tricuspid regurgitation. 3.  Normal-appearing bioprosthetic aortic valve replacement with no  significant aortic regurgitation or stenosis identified visually. 4.  Normal left ventricular and right ventricular size and function. LV  ejection fraction 60%. 5.  Moderate left atrial enlargement with successful ligation in the  past of the left atrial appendage. No flow was seen by color Doppler.    Normal right atrial size.    [facility]: 7/1/15  FINDINGS:    1. Right dominant coronary arterial system with mild calcification of the  RCA. There is 40% serial lesions in the mid RCA. In the left system there  is 25% distal left main disease. There is 50% mid LAD disease and 50% ostial  second diagonal branch disease. There is movement significant restenosis  identified in the prior previously placed mid circumflex stent. 2.  Systemic hypertension. Assessment and Plan:     Paroxysmal atrial fibrillation with RVR   - now back in SR/SB   - on amiodarone 200mg QD, had IV extravasation with IV amio now with reddened area to R forearm, discussed with pharmacy and their policy is icing and hydrocortisone 1% cream to area   - CHADS2-VASc 4 (age, gender, HF) no anticoagulation due to past RENETTA exclusion with no flow seen on CAMERON in 2020    Acute on chronic diastolic heart failure   - EF 45-50% on recent echo   - net -3L, weight down 5lbs since admission   - back on PO diuretics    Mitral regurgitation, non rheumatic   - moderate on CAMERON per Dr. Christel Persaud progress note (no CAMERON note at this time)   - continue to monitor    CAD   - on statin, aspirin, no beta blocker due to bradycardia   - no angina    S/p bioprosthetic AVR   - in 2015   - on aspirin    Pt is stable from a cardiac perspective for d/c. Follow up scheduled for 11/3.      HOWARD Simon  [facility], [address] [facility], [id number] [place] [address]  Phone: [phone]  Fax: [phone]    Electronically signed by HOWARD Stark - CNP on 10/31/2021 at 8:23 AM

## 2021-10-31 NOTE — PROGRESS NOTES
Patient requesting one more dose of PRN morphine- order received to remove IV- patient notified.      Will update

## 2021-10-31 NOTE — PROGRESS NOTES
Discussed with MD patients right arm redness/swelling/pain/tenderness - cream ordered as directed by cardio team and patient/family concern of peripheral IV causing irritation on left arm- will update     Cream applied- ice bag at bedside- pillows in room for elevation.

## 2021-11-01 VITALS
BODY MASS INDEX: 27.87 KG/M2 | HEIGHT: 62 IN | WEIGHT: 151.46 LBS | DIASTOLIC BLOOD PRESSURE: 52 MMHG | OXYGEN SATURATION: 94 % | RESPIRATION RATE: 16 BRPM | HEART RATE: 48 BPM | SYSTOLIC BLOOD PRESSURE: 109 MMHG | TEMPERATURE: 98.1 F

## 2021-11-01 LAB
ANION GAP SERPL CALCULATED.3IONS-SCNC: 9 MMOL/L (ref 3–16)
BUN BLDV-MCNC: 18 MG/DL (ref 7–20)
CALCIUM SERPL-MCNC: 8.8 MG/DL (ref 8.3–10.6)
CHLORIDE BLD-SCNC: 104 MMOL/L (ref 99–110)
CO2: 28 MMOL/L (ref 21–32)
CREAT SERPL-MCNC: 1.1 MG/DL (ref 0.6–1.2)
GFR AFRICAN AMERICAN: 58
GFR NON-AFRICAN AMERICAN: 48
GLUCOSE BLD-MCNC: 100 MG/DL (ref 70–99)
HCT VFR BLD CALC: 29 % (ref 36–48)
HEMOGLOBIN: 9.7 G/DL (ref 12–16)
MCH RBC QN AUTO: 29.6 PG (ref 26–34)
MCHC RBC AUTO-ENTMCNC: 33.3 G/DL (ref 31–36)
MCV RBC AUTO: 88.8 FL (ref 80–100)
PDW BLD-RTO: 14.5 % (ref 12.4–15.4)
PLATELET # BLD: 211 K/UL (ref 135–450)
PMV BLD AUTO: 8.9 FL (ref 5–10.5)
POTASSIUM SERPL-SCNC: 5 MMOL/L (ref 3.5–5.1)
PRO-BNP: 5283 PG/ML (ref 0–449)
RBC # BLD: 3.27 M/UL (ref 4–5.2)
SODIUM BLD-SCNC: 141 MMOL/L (ref 136–145)
WBC # BLD: 9.4 K/UL (ref 4–11)

## 2021-11-01 PROCEDURE — 6370000000 HC RX 637 (ALT 250 FOR IP): Performed by: INTERNAL MEDICINE

## 2021-11-01 PROCEDURE — 94640 AIRWAY INHALATION TREATMENT: CPT

## 2021-11-01 PROCEDURE — 6370000000 HC RX 637 (ALT 250 FOR IP): Performed by: NURSE PRACTITIONER

## 2021-11-01 PROCEDURE — 80048 BASIC METABOLIC PNL TOTAL CA: CPT

## 2021-11-01 PROCEDURE — 94760 N-INVAS EAR/PLS OXIMETRY 1: CPT

## 2021-11-01 PROCEDURE — 85027 COMPLETE CBC AUTOMATED: CPT

## 2021-11-01 PROCEDURE — 99239 HOSP IP/OBS DSCHRG MGMT >30: CPT | Performed by: INTERNAL MEDICINE

## 2021-11-01 PROCEDURE — 36415 COLL VENOUS BLD VENIPUNCTURE: CPT

## 2021-11-01 PROCEDURE — 83880 ASSAY OF NATRIURETIC PEPTIDE: CPT

## 2021-11-01 RX ORDER — AMIODARONE HYDROCHLORIDE 200 MG/1
200 TABLET ORAL DAILY
Qty: 30 TABLET | Refills: 5 | Status: SHIPPED | OUTPATIENT
Start: 2021-11-01 | End: 2021-11-03

## 2021-11-01 RX ORDER — TORSEMIDE 20 MG/1
30 TABLET ORAL 2 TIMES DAILY
Status: DISCONTINUED | OUTPATIENT
Start: 2021-11-01 | End: 2021-11-01 | Stop reason: HOSPADM

## 2021-11-01 RX ORDER — TORSEMIDE 10 MG/1
30 TABLET ORAL 2 TIMES DAILY
Qty: 30 TABLET | Refills: 3 | Status: SHIPPED | OUTPATIENT
Start: 2021-11-01 | End: 2021-11-03

## 2021-11-01 RX ORDER — VALSARTAN 80 MG/1
80 TABLET ORAL DAILY
Qty: 30 TABLET | Refills: 3 | Status: SHIPPED | OUTPATIENT
Start: 2021-11-01 | End: 2021-11-30

## 2021-11-01 RX ORDER — BENZOCAINE/MENTHOL 6 MG-10 MG
LOZENGE MUCOUS MEMBRANE 4 TIMES DAILY
Qty: 1 EACH | Refills: 0 | Status: ON HOLD | OUTPATIENT
Start: 2021-11-01 | End: 2021-11-26 | Stop reason: HOSPADM

## 2021-11-01 RX ORDER — LACTOBACILLUS RHAMNOSUS GG 10B CELL
1 CAPSULE ORAL 2 TIMES DAILY WITH MEALS
Qty: 20 CAPSULE | Refills: 0 | Status: SHIPPED | OUTPATIENT
Start: 2021-11-01 | End: 2021-11-23

## 2021-11-01 RX ORDER — DOCUSATE SODIUM 100 MG/1
CAPSULE, LIQUID FILLED ORAL
Qty: 60 CAPSULE | Refills: 5 | Status: SHIPPED | OUTPATIENT
Start: 2021-11-01

## 2021-11-01 RX ADMIN — AMIODARONE HYDROCHLORIDE 200 MG: 200 TABLET ORAL at 08:53

## 2021-11-01 RX ADMIN — Medication 1 CAPSULE: at 08:53

## 2021-11-01 RX ADMIN — LEVOTHYROXINE SODIUM 88 MCG: 0.09 TABLET ORAL at 06:24

## 2021-11-01 RX ADMIN — DOCUSATE SODIUM 100 MG: 100 CAPSULE ORAL at 08:53

## 2021-11-01 RX ADMIN — VALSARTAN 80 MG: 80 TABLET, FILM COATED ORAL at 08:53

## 2021-11-01 RX ADMIN — TORSEMIDE 30 MG: 20 TABLET ORAL at 10:52

## 2021-11-01 RX ADMIN — PANTOPRAZOLE SODIUM 40 MG: 40 TABLET, DELAYED RELEASE ORAL at 06:24

## 2021-11-01 RX ADMIN — HYDROCORTISONE: 0.01 CREAM TOPICAL at 08:55

## 2021-11-01 RX ADMIN — DULOXETINE HYDROCHLORIDE 30 MG: 30 CAPSULE, DELAYED RELEASE ORAL at 08:53

## 2021-11-01 RX ADMIN — ASPIRIN 81 MG: 81 TABLET, CHEWABLE ORAL at 08:53

## 2021-11-01 RX ADMIN — BUDESONIDE AND FORMOTEROL FUMARATE DIHYDRATE 2 PUFF: 160; 4.5 AEROSOL RESPIRATORY (INHALATION) at 08:14

## 2021-11-01 RX ADMIN — OXYCODONE HYDROCHLORIDE 10 MG: 10 TABLET ORAL at 08:53

## 2021-11-01 RX ADMIN — OXYCODONE HYDROCHLORIDE 10 MG: 10 TABLET ORAL at 12:52

## 2021-11-01 RX ADMIN — HYDROCORTISONE: 0.01 CREAM TOPICAL at 12:41

## 2021-11-01 ASSESSMENT — PAIN DESCRIPTION - ORIENTATION
ORIENTATION: LOWER
ORIENTATION_2: RIGHT

## 2021-11-01 ASSESSMENT — PAIN DESCRIPTION - LOCATION
LOCATION_2: ARM
LOCATION: BACK

## 2021-11-01 ASSESSMENT — PAIN SCALES - GENERAL
PAINLEVEL_OUTOF10: 7
PAINLEVEL_OUTOF10: 0
PAINLEVEL_OUTOF10: 6
PAINLEVEL_OUTOF10: 5
PAINLEVEL_OUTOF10: 6

## 2021-11-01 ASSESSMENT — PAIN DESCRIPTION - DESCRIPTORS
DESCRIPTORS_2: BURNING
DESCRIPTORS: ACHING;CONSTANT

## 2021-11-01 ASSESSMENT — PAIN DESCRIPTION - ONSET
ONSET: ON-GOING
ONSET_2: SUDDEN

## 2021-11-01 ASSESSMENT — PAIN DESCRIPTION - INTENSITY: RATING_2: 7

## 2021-11-01 ASSESSMENT — PAIN DESCRIPTION - FREQUENCY: FREQUENCY: CONTINUOUS

## 2021-11-01 ASSESSMENT — PAIN - FUNCTIONAL ASSESSMENT: PAIN_FUNCTIONAL_ASSESSMENT: PREVENTS OR INTERFERES SOME ACTIVE ACTIVITIES AND ADLS

## 2021-11-01 ASSESSMENT — PAIN DESCRIPTION - PROGRESSION
CLINICAL_PROGRESSION_2: GRADUALLY IMPROVING
CLINICAL_PROGRESSION: NOT CHANGED

## 2021-11-01 ASSESSMENT — PAIN DESCRIPTION - PAIN TYPE
TYPE_2: ACUTE PAIN
TYPE: CHRONIC PAIN

## 2021-11-01 ASSESSMENT — PAIN DESCRIPTION - DURATION: DURATION_2: INTERMITTENT

## 2021-11-01 NOTE — CONSULTS
Pharmacy Heart Failure Medication Reconciliation Note    Pt discharged from [facility] today. Chief Complaint   Patient presents with    Palpitations     irregualr HR at home. bilateral LE Edema. yellow productive cough per pt .  denies fever    Back Pain       Marisol Pitt has a diagnosis of diastolic heart failure (last EF = 45-50% on 10/27/21). Pertinent Labs:  BMP:   Lab Results   Component Value Date     11/01/2021    K 5.0 11/01/2021    K 5.0 10/26/2021     11/01/2021    CO2 28 11/01/2021    BUN 18 11/01/2021    CREATININE 1.1 11/01/2021     BNP:   Lab Results   Component Value Date    PROBNP 5,283 11/01/2021    PROBNP 7,274 10/30/2021    PROBNP 8,734 10/28/2021       Patient taking an ACEI / ARB / Entresto:  Yes     Patient taking a BETA BLOCKER:  No: HFpEF  Patient taking a LOOP DIURETIC: Yes  Patient taking a ALDOSTERONE RECEPTOR ANTAGONIST: No: HFpEF    Patient has a diagnosis of Atrial Fibrillation: Yes. If yes, patient is on appropriate anticoagulation: No: \"I would hold off on anticoagulation now as she had a RENETTA exclusion with her aortic valve replacement. \" per cardiology    Patient has a diagnosis of type 2 diabetes:  No. If yes, patient prescribed the following anti-hyperglycemic medication at discharge: N/A      Corrections to discharge medications include:  none    Discharge Medications:         Medication List      START taking these medications    amiodarone 200 MG tablet  Commonly known as: CORDARONE  Take 1 tablet by mouth daily     hydrocortisone-aloe 1 % Crea cream  Apply topically 4 times daily        CHANGE how you take these medications    ezetimibe 10 MG tablet  Commonly known as: ZETIA  Take 1 tablet by mouth daily  What changed: when to take this     rosuvastatin 40 MG tablet  Commonly known as: CRESTOR  TAKE 1 TABLET BY MOUTH DAILY  What changed: when to take this     tiZANidine 4 MG tablet  Commonly known as: ZANAFLEX  TAKE 1 TABLET BY MOUTH THREE TIMES A DAY  What changed: See the new instructions. torsemide 10 MG tablet  Commonly known as: DEMADEX  Take 3 tablets by mouth 2 times daily  What changed:   · medication strength  · See the new instructions. valsartan 80 MG tablet  Commonly known as: DIOVAN  Take 1 tablet by mouth daily  What changed:   · medication strength  · See the new instructions.         CONTINUE taking these medications    aspirin 81 MG tablet     docusate sodium 100 MG capsule  Commonly known as: DOK  TAKE ONE CAPSULE BY MOUTH TWO TIMES A DAY     DULoxetine 30 MG extended release capsule  Commonly known as: CYMBALTA  TAKE 1 CAPSULE BY MOUTH DAILY EVERY MORNING     ipratropium 0.03 % nasal spray  Commonly known as: ATROVENT  INHALE 2 DOSES INTO EACH NOSTRIL THREE TIMES A DAY IF NEEDED FOR RHINITIS     lactobacillus capsule  Take 1 capsule by mouth 2 times daily (with meals)     levothyroxine 88 MCG tablet  Commonly known as: SYNTHROID  TAKE 1 TABLET BY MOUTH DAILY     oxyCODONE HCl 10 MG immediate release tablet  Commonly known as: OXY-IR     pantoprazole 40 MG tablet  Commonly known as: PROTONIX  TAKE ONE TABLET BY MOUTH DAILY     Voltaren 1 % Gel  Generic drug: diclofenac sodium        STOP taking these medications    isosorbide mononitrate 30 MG extended release tablet  Commonly known as: IMDUR     potassium chloride 10 MEQ extended release tablet  Commonly known as: KLOR-CON           Where to Get Your Medications      [facility] [address] [place] [id number]    Phone: [phone]   · amiodarone 200 MG tablet  · docusate sodium 100 MG capsule  · hydrocortisone-aloe 1 % Crea cream  · lactobacillus capsule  · torsemide 10 MG tablet  · valsartan 80 MG tablet         Edwige Cowan, [employer]  Heart Failure Discharge Medication Reconciliation Program  [phone]

## 2021-11-01 NOTE — CARE COORDINATION
Case Management:  Follow up with patient re discharge needs or concerns and she tells cm she will return home with no anticipated needs. Daughter will transport home after work.   Electronically signed by Ky Elena on 11/1/2021 at 12:13 PM

## 2021-11-01 NOTE — PROGRESS NOTES
DC order noted. Pt has received > 60 mins of HF education at bedside. HF instructions are on AVS. Plan is home, denies needs. Cardiology follow up appt in place for 11/3 at 9:40 am with Elver Constant, HF NP. PCP appt on 11/15 with Dr Diamond Tao. Pt is close to dry weight of 147 lbs (DC wt 151).

## 2021-11-01 NOTE — PROGRESS NOTES
The [address] [place] [address], [address] [place] [address] [address] [address]  [phone]    PrimaryCare Doctor:  Rayne Olivera MD  Primary Cardiologist: Dr. Dave Bui    Chief Complaint   Patient presents with    Congestive Heart Failure    Atrial Fibrillation    Shortness of Breath         History of Present Illness:  Gisele Curtis is a 68 y.o. female with PMH PAF, Pulm HTN, Aortic stenosis, CAD s/p CHILANGO to mid CX 5/2014. S/P AVR, PVI and RENETTA exclusion by Dr Neeta Vasquez on 6/16/20. Pre-op angiogram revealed no significant restenosis in the prior stents. ECHO revealed mod - severe MR. CAMERON post discharged showed moderate MR. Admitted to [facility] with AF RVR 10/26-11/1/2021. Tx with Amiodarone gtt and then po. Converted to NSR and had some bradycardia. In/out AF while hospitalized. She was also diuresed for HF/ hypervoemia. She had back pain concerning for aneurysm - R/O with Chest CT. Patient presents to [facility] cardiology for follow up for AF and heart failure. Called office yesterday with C/O increased HR. She was instructed to take Amiodarone 200mg TID and she started that yesterday. Today she admits to intermittent tachycardia overnight. Her EKG remains in AF. . C/O increased BLE pedal/ankel edema. + fatigue. Home wt 146lbs- stable since DC. Denies SOB, CP, LH, dizziness, syncope. She has ongoing chronic back pain. Review of Systems:   General: Denies fever, chills  Skin: Denies skin changes, rash, itching, lesions.   HEENT: Denies headache, dizziness, vision changes, nosebleeds, sore throat, nasal drainage  RESP: Denies cough, sputum, dyspnea, wheeze, snoring  CARD: Denies palpitations,  murmur  GI:Denies nausea, vomiting, heartburn, loss of appetite, change in bowels  : Denies frequency, pain, incontinence, polyuria  VASC: Denies claudication, leg cramps, clots  MUSC/SKEL: Denies pain, stiffness, arthritis  PSYCH: Denies anxiety, depression, stress  NEURO: Denies numbness, tingling, weakness,change in mood or memory  HEME: Denies abn bruising, bleeding, anemia  ENDO: Denies intolerance to heat, cold, excessive thirst or hunger, hx thyroid disease    /80 (Site: Left Upper Arm, Position: Sitting, Cuff Size: Medium Adult)   Pulse 72   Ht 5' 2\" (1.575 m)   Wt 144 lb 12.8 oz (65.7 kg)   BMI 26.48 kg/m²   Wt Readings from Last 3 Encounters:   11/03/21 144 lb 12.8 oz (65.7 kg)   11/01/21 151 lb 7.3 oz (68.7 kg)   09/09/21 140 lb (63.5 kg)       Physical Exam:  GEN: Appears frail, no acute distress  SKIN: Pink, warm, dry. Nails without clubbing. HEENT: PERRLA. Normocephalic, atraumatic. Neck supple. No adenopathy. LUNG: AP diameter normal. Clear bilateral. No wheeze, rales, or ronchi. Respiratory effort normal.  HEART: S1S2 A/R. No JVD. No carotid bruit. No murmur, rub or gallop. ABD: Soft, nontender. +BS X 4 quads. No hepatomegaly. EXT: Radial and pedal pulses 2+ and symmetric. Without varicosities. Trace pedal/ankle edema. MUSCSKEL: Good ROM X4 extremities. No deformity. NEURO: A/O X3. Calm and cooperative. Past Medical History:   has a past medical history of Anticoagulant long-term use, Atrial fibrillation ([address] AVM (arteriovenous malformation) of duodenum, acquired, AVM (arteriovenous malformation) of stomach, acquired, Bladder cancer ([address] CAD (coronary artery disease), CAP (community acquired pneumonia), Carotid stenosis, Chronic atrial fibrillation ([address] Chronic diastolic CHF (congestive heart failure) ([address] COPD, mild ([address] CVA (cerebral infarction), Diverticulosis, Epistaxis, recurrent, Former smoker, Gallbladder sludge, HTN (hypertension), Hyperlipidemia, Hypothyroidism, Lumbar stenosis without neurogenic claudication, Macular degeneration, Neuropathy, Nonrheumatic mitral valve regurgitation, Osteoarthritis, Pulmonary HTN ([address] PVD (peripheral vascular disease) ([address] Sacral fracture, closed ([address] and Syncope.   Surgical History:   has a past surgical history that includes Cystoscopy (Feb 2014); joint replacement; Appendectomy; Aortic valve replacement (6/16/15); Upper gastrointestinal endoscopy (12/15/2017); other surgical history (02/20/2018); Coronary angioplasty (2014); Spine surgery (N/A, 3/15/2019); Cholecystectomy, laparoscopic (N/A, 4/25/2019); back surgery (03/15/2019); Pain management procedure (Bilateral, 4/1/2021); Pain management procedure (Bilateral, 7/8/2021); and Pain management procedure (Bilateral, 9/9/2021). Social History:   reports that she has been smoking cigarettes. She has a 45.00 pack-year smoking history. She uses smokeless tobacco. She reports that she does not drink alcohol and does not use drugs. Family History:   Family History   Problem Relation Age of Onset    Breast Cancer Daughter        HomeMedications:  Prior to Admission medications    Medication Sig Start Date End Date Taking? Authorizing Provider   amiodarone (CORDARONE) 200 MG tablet Take 1 tablet by mouth daily 11/1/21  Yes Grecia Samuels MD   lactobacillus (CULTURELLE) capsule Take 1 capsule by mouth 2 times daily (with meals) 11/1/21  Yes Grecia Samuels MD   valsartan (DIOVAN) 80 MG tablet Take 1 tablet by mouth daily 11/1/21  Yes Grecia Samuels MD   hydrocortisone-aloe 1 % CREA cream Apply topically 4 times daily 11/1/21  Yes Grecia Samuels MD   torsemide (DEMADEX) 10 MG tablet Take 3 tablets by mouth 2 times daily 11/1/21  Yes Grecia Samuels MD   docusate sodium (DOK) 100 MG capsule TAKE ONE CAPSULE BY MOUTH TWO TIMES A DAY 11/1/21  Yes Grecia Samuels MD   diclofenac sodium (VOLTAREN) 1 % GEL Apply 2 g topically daily   Yes Historical Provider, MD   oxyCODONE HCl (OXY-IR) 10 MG immediate release tablet Take 10 mg by mouth every 6 hours as needed for Pain.    Yes Historical Provider, MD   ipratropium (ATROVENT) 0.03 % nasal spray INHALE 2 DOSES INTO EACH NOSTRIL THREE TIMES A DAY IF NEEDED FOR RHINITIS 10/19/21  Yes Grecia Samuels MD   tiZANidine (ZANAFLEX) 4 MG tablet TAKE 1 TABLET BY MOUTH THREE TIMES A DAY  Patient taking differently: Take 2 mg by mouth every 8 hours as needed  8/31/21  Yes Mamta Meier MD   levothyroxine (SYNTHROID) 88 MCG tablet TAKE 1 TABLET BY MOUTH DAILY 8/26/21  Yes Griselda Marlow MD   DULoxetine (CYMBALTA) 30 MG extended release capsule TAKE 1 CAPSULE BY MOUTH DAILY EVERY MORNING 8/17/21  Yes Mamta Meier MD   ezetimibe (ZETIA) 10 MG tablet Take 1 tablet by mouth daily  Patient taking differently: Take 10 mg by mouth every evening  6/23/21  Yes Pan Montejo MD   rosuvastatin (CRESTOR) 40 MG tablet TAKE 1 TABLET BY MOUTH DAILY  Patient taking differently: Take 40 mg by mouth every evening  6/21/21  Yes Pan Montejo MD   pantoprazole (PROTONIX) 40 MG tablet TAKE ONE TABLET BY MOUTH DAILY 2/12/21  Yes Mamta Meier MD   aspirin 81 MG tablet Take 1 tablet by mouth daily 5/14/15  Yes Pan Montejo MD        Allergies:  Benadryl [diphenhydramine], Doxylamine, Mucinex [guaifenesin er], Spiriva handihaler [tiotropium bromide monohydrate], and Adhesive tape       LABS: Results reviewed with patient today.     CBC:   Lab Results   Component Value Date    WBC 9.4 11/01/2021    WBC 9.1 10/30/2021    WBC 7.1 10/28/2021    RBC 3.27 11/01/2021    RBC 3.39 10/30/2021    RBC 3.52 10/28/2021    HGB 9.7 11/01/2021    HGB 10.0 10/30/2021    HGB 10.4 10/28/2021    HCT 29.0 11/01/2021    HCT 30.4 10/30/2021    HCT 31.2 10/28/2021    MCV 88.8 11/01/2021    MCV 89.8 10/30/2021    MCV 88.7 10/28/2021    RDW 14.5 11/01/2021    RDW 14.7 10/30/2021    RDW 14.8 10/28/2021     11/01/2021     10/30/2021     10/28/2021     BMP:  Lab Results   Component Value Date     11/01/2021     10/31/2021     10/30/2021    K 5.0 11/01/2021    K 4.5 10/31/2021    K 5.0 10/30/2021    K 5.0 10/26/2021    K 5.0 07/05/2020    K 4.5 04/24/2019     11/01/2021     10/31/2021     10/30/2021    CO2 28 11/01/2021    CO2 25 10/31/2021    CO2 22 10/30/2021    PHOS 3.0 02/21/2018    PHOS 2.6 02/20/2018    PHOS 2.9 11/30/2015    BUN 18 11/01/2021    BUN 17 10/31/2021    BUN 19 10/30/2021    CREATININE 1.1 11/01/2021    CREATININE 1.1 10/31/2021    CREATININE 1.4 10/30/2021     BNP:   Lab Results   Component Value Date    PROBNP 5,283 11/01/2021    PROBNP 7,274 10/30/2021    PROBNP 8,734 10/28/2021       Parameters:   > 450 pg/mL under age 48  > 900 pg/mL ages 54-65  > 1800 pg/mL over age 76    Iron Studies:    Lab Results   Component Value Date    TIBC 363 03/19/2018    TIBC 280 02/21/2018    TIBC 272 12/15/2017    FERRITIN 221.1 02/21/2018    FERRITIN 43.9 12/15/2017    FERRITIN 50.8 08/28/2017     GLUCOSE:   Recent Labs     11/01/21  0435   GLUCOSE 100*     LIVER PROFILE:   Lab Results   Component Value Date    AST 17 06/17/2021    ALT 10 06/17/2021    LIPASE 59.0 04/24/2019    LABALBU 4.5 06/17/2021    BILIDIR <0.2 07/14/2020    BILITOT 0.3 06/17/2021    ALKPHOS 71 06/17/2021     PT/INR:   Lab Results   Component Value Date    PROTIME 11.7 02/19/2018    INR 1.04 02/19/2018     Cardiac Enzymes:  Lab Results   Component Value Date    TROPONINI <0.01 10/27/2021     FASTING LIPID PANEL:  Lab Results   Component Value Date    CHOL 147 07/14/2020    HDL 39 06/17/2021    HDL 42 06/21/2010    LDLCALC 98 06/17/2021    TRIG 79 07/14/2020     TSH:   Lab Results   Component Value Date    TSH 0.79 04/26/2021       Cardiac Imaging: Reports interpreted by me and reviewed with patient today. EKG:  10/29/2021 CAMERON  Summary  Overall left ventricular size and wall thickness appear normal with systolic function mildly reduced. LVEF 40-45%. Normal right ventricular size and function. Left atrium is moderately dilated. The left atrial appendage appears to be ligated with no flow into it. Mitral valve leaflets appear thickened/calcified. Restricted posterior leaflet with malcoaptation id the leaflet tips. Moderate mitral regurgitation is present.   Moderate tricuspid regurgitation. ECHO: 10/27/21   Left ventricular cavity size is normal.   Normal left ventricular wall thickness.   Ejection fraction is visually estimated to be 45-50%. There is mild global   hypokinesis appreciated.   Grade III diastolic dysfunction with elevated LV filling pressures.   Moderately dilated right ventricle with mildly reduced function.   The left atrium is severely dilated.   The right atrium is mildly dilated.   Moderately severe tricuspid regurgitation.   Moderate pulmonic regurgitation present.   Moderately severe mitral regurgitation appreciated.   A bioprosthetic artificial aortic valve appears well seated with a maximum   velocity of 2.4m/s and a mean gradient of 12mmHg.   Trivial aortic regurgitation.   A bubble study was performed and fails to show evidence of right to left   shunting. ECHO 7/27/2020 CAMERON  Summary   Left ventricular cavity size is normal in size with normal wall thickness. Overall left ventricular systolic function appears normal.   Ejection fraction is visually estimated to be 55-60%. Normal right ventricular size and function. Left atrium is moderately dilated. Mild thickening of the mitral valve leaflets. There is moderate mitral regurgitation appreciated. Moderate tricuspid regurgitation. A bioprosthetic valve appears well seated. There is no appreciable leaflet restriction or stenosis. There is no aortic insufficiency appreciated. 7/8/2020 ECHO   Summary   There is mild concentric left ventricular hypertrophy. Ejection fraction is visually estimated to be 55-60%. diastology cannot be determined   Moderate to severe mitral regurgitation is present. A bioprosthetic artificial aortic valve appears well seated with a maximum   velocity of 273 m/s and a mean gradient of 16 mmHg.    Study is unchanged from previous dated 1/8/2018    [facility]: 7/1/15  FINDINGS:    1.  Right dominant coronary arterial system with mild calcification of the  RCA. Belinda Skelton is 40% serial lesions in the mid RCA.  In the left system there  is 25% distal left main disease.  There is 50% mid LAD disease and 50% ostial  second diagonal branch disease.  There is movement significant restenosis  identified in the prior previously placed mid circumflex stent.    2.  Systemic hypertension. [address] 10/27/2021  HEMODYNAMICS:  1. Right atrial pressure was 12 mmHg. 2.  RV pressure 65/-4. 3.  Pulmonary capillary wedge pressure was 24 mmHg. 4.  Pulmonary artery pressure 71/19 with a mean of 41 mmHg. 5.  Cardiac output 4.9 liters per minute. 6.  Mixed venous saturation 52%. 7.  Pulmonary capillary wedge saturation 90%. 8.  Right atrial saturation 51%.     In summary, elevated right and left filling pressure with tall V waves  seen in pulmonary capillary wedge pressure tracing suggestive of severe  mitral regurgitation. Assessment/Plan:      1.) Acute on chronic diastolic Gr 3 heart failure, EF 45-50% with mod/severe MR, mod dilated RV: RHC with elevated R & L heart pressures. She continues with intermittent pedal /ankle edema. Instructed to take extra torsemide as needed. NYHA Class III   Stage C  Diuretic:torsemide  Beta Blocker: none, Not indicated for EF>35%  ACEi/ARB/ARNI: valsartan  Aldosterone Antagonist:Not indicated for EF>35%  SGLT2 Inhibitor: Not indicated for EF>35%  2gm Na diet, daily weight, 64 oz fluid restriction  Avoid NSAIDS and other nephrotoxic meds  Cardiac Rehab: Not indicated for EF>35%  ICD:Not indicated for EF>35%    2.) Aortic Stenosis s/p bioprosthetic AVR: Stable   A bioprosthetic artificial aortic valve appears well seated with a maximum   velocity of 2.4m/s and a mean gradient of 12mmHg. 3.) Paroxysmal Atrial Fib: Converted to NSR with AMio gtt while hospitalized> now in/out AF.    CHADSVASC-  4   HASBLED-  Thromboembolic risk reduction: stopped D/T RENETTA ligation  Rate Control/ Rhythm Control: Amio- increased to 200mg TID per Dr. Irish Barrett yesterday - changed order today  Repeat EKG 3-4 weeks    4.) CAD: Stable, no ischemia/angina  DAPT: asa  Beta Blocker: none  ACEi/ARB: valsartan  Anti anginal:   Lipid management/high intensity statin: zetia + crestor  Risk factor management: high blood pressure, high cholesterol, Diabetes, smoking, obesity, family hx  Lifestyle modification: Heart healthy diet, regular exercise, weight loss, smoking cessation, stress reduction    I appreciate the opportunity of cooperating in the care of this individual.    HOWARD Browning - CNP, CNP, 11/3/2021,10:32 AM

## 2021-11-01 NOTE — PROGRESS NOTES
Patient discharged to home via wheelchair with daughter at 12. Discharge instructions reviewed with patient who verbalized understanding. Telemetry box removed and returned to [facility]. Patient sent with belongings.   Electronically signed by Pamela Florez RN on 11/1/2021 at 3:32 PM

## 2021-11-01 NOTE — FLOWSHEET NOTE
Pt getting ready to be discharged today. Denies any throat discomfort or swallowing difficulties since CAMERON on Friday.

## 2021-11-02 ENCOUNTER — CARE COORDINATION (OUTPATIENT)
Dept: CASE MANAGEMENT | Age: 76
End: 2021-11-02

## 2021-11-02 ENCOUNTER — TELEPHONE (OUTPATIENT)
Dept: CARDIOLOGY CLINIC | Age: 76
End: 2021-11-02

## 2021-11-02 NOTE — CARE COORDINATION
[facility] Transitions Initial Follow Up Call    Call within 2 business days of discharge: Yes    Patient: Emily Maldonado Patient : 1945   MRN: [id number]  Reason for Admission: afib RVR  Discharge Date: 21 RARS: Readmission Risk Score: 13.1      Last Discharge [address] [address]       Complaint Diagnosis Description Type Department Provider    10/26/21 Palpitations; Back Pain Atrial fibrillation with rapid ventricular response ([address] . .. ED to Hosp-Admission (Discharged) (ADMITTED) Alvino Luther MD; Lashell Bonilla MD           Spoke with: [facility]    Non-face-to-face services provided:  Obtained and reviewed discharge summary and/or continuity of care documents     Transitions of Care Initial Call    Was this an external facility discharge? No     Challenges to be reviewed by the provider   Additional needs identified to be addressed with provider: No  none             Method of communication with provider : none      Was this a readmission? No  Patient stated reason for admission: palpitations  Patients top risk factors for readmission: medical condition-.    Care Transition Nurse (CTN) contacted the patient by telephone to perform post hospital discharge assessment. Verified name and  with patient as identifiers. Provided introduction to self, and explanation of the CTN role. CTN reviewed discharge instructions, medical action plan and red flags with patient who verbalized understanding. Patient given an opportunity to ask questions and does not have any further questions or concerns at this time. Were discharge instructions available to patient? Yes. Reviewed appropriate site of care based on symptoms and resources available to patient including: PCP and Specialist. The patient agrees to contact the PCP office for questions related to their healthcare.      Medication reconciliation was performed with patient, who verbalizes understanding of administration of home medications. Advised obtaining a 90-day supply of all daily and as-needed medications. Reviewed and educated patient on any new and changed medications related to discharge diagnosis. CTN provided contact information. Plan for next call: symptom management-., self management-., follow up appointment-. and medication management-. med rec          Care Transitions 24 Hour Call    Do you have any ongoing symptoms?: No  Do you have a copy of your discharge instructions?: Yes  Do you have all of your prescriptions and are they filled?: Yes  Have you been contacted by a [address]?: No  Have you scheduled your follow up appointment?: No  Were you discharged with any Home Care or Post Acute Services: No  Do you feel like you have everything you need to keep you well at home?: Yes  Care Transitions Interventions         Follow Up  Future Appointments   Date Time Provider Vickie Wilson   11/3/2021  9:40 AM HOWARD Odell - CNP [facility]   11/15/2021  2:00 PM Mirian Najera MD [address]   12/8/2021  2:00 PM Kern Boast, MD [facility]       Pt reports to be doing well today. States that she went in for heart palpitations. She was found to be in afib RVR. Her right forearm is still red and swollen from an infiltrated IV but she states pain has decreased from an 8 to a 6 now. States she is no longer using ice but still using hydrocortisone cream as prescribed by Dr. Nubia Roe. Denies dizziness, lightheadedness, fevers, chills, sob or cp. States she does not check weight and gauges weight gain by how her pants fit daily. Upon further questioning, pt states she cannot see the numbers on her scale and is currently working on obtaining a scale that verbally tells pt her weight. Pt denies swelling of hands but states she does still have swelling in legs and ankles. She is breathing easy and states she feels like she is in the green zone of CHF at this time.  Pt verbalizes that she does watch salt intake and manages fluid restrictions well. Pt reports she has chronic back pain. She is to see dr Theo Harry tomorrow and then will make PCP appointment after that. No needs at this time from CTN. Will continue to follow.     JACKY PhilipN, RN   Care Transition Nurse  Mobile: [phone]

## 2021-11-02 NOTE — TELEPHONE ENCOUNTER
Kellie Cole is calling in wanting to notify Dr. Tony Parks that her heart rate was 110-120 last night. She took half an amiodarone and then 45 minutes later she took the other half of the amiodarone. Kellielorri Cole wanted to make sure this was okay and to ask if she can do it tonight if it happens again. Kellie Cole can be reached at [phone].

## 2021-11-02 NOTE — TELEPHONE ENCOUNTER
Spoke with Sol Kellogg. She denies any other cardiac symptoms and reports feeling well overall. Her HR continues to be in the low 100s this morning and she only took 200 mg of amiodarone. Will discuss with Dr. James Guerrero during office regarding any recommendations.

## 2021-11-02 NOTE — TELEPHONE ENCOUNTER
Called patient to let her know of message below. She understood. She will be her tomorrow for her appointment.

## 2021-11-02 NOTE — TELEPHONE ENCOUNTER
Discussed with Dr. Black Oneill and she should increase her dose of amiodarone to three times daily. Please call to discuss and encourage follow up with MELINA Gibbons tomorrow 11/3/21.

## 2021-11-02 NOTE — DISCHARGE SUMMARY
Physician Discharge Summary     Patient ID:   Carlito Aguayo  [id number]  68 y.o.  1945    Admit date: 10/26/2021    Discharge date and time: 11/1/2021  3:10 PM     Admitting Physician: Micky Lala MD     Discharge Physician: Kristi Tapia MD    Discharge Diagnoses:   afib with rvr  Copd  Acute on chronic diastolic failure  Moderate MR  S/p AVR  S/P RENETTA exclusion  HTN  Ascending thoracic Aortic aneurysm  Nonobs CAD  Smoker  Lumbar stenosis  Thoracic spine DJD        Discharged Condition: fair      Hospital Course: 69 yo female admitted through the er with afib with rvr and acute on chronic chf and hypotension. She was treated with iv amiodarone and once her rvr was controlled and bp increased she was diuresed. She only lost 4lbs during her hospitalization and still had some leg swelling on discharge but her breathing was much improved and she was to continue with her diureses as an op on torsemide. On the amiodarone drip her afib converted into sinus rhonda running 40-50s. She was changed to amio p.o. While on p.o the afib with rvr broke through again and the amio drip was resumed. Unfortunately the day prior to discharge the amio infiltrated and her right forearm became red and swollen and quite tender. It was treated with ice packs and topical HC1% per pharmacy suggestions and was starting to improve by the time of discharge. Mrs Huma Dowell had MR on her TTE read as severe so she underwent a CAMERON which showed moderate MR which would be fu as an op. She also had mildly reduced sys function 40-45%, normal RV size and function, moderate TR. Mrs Huma Dowell had severe pain in her mid back as well as her low back. Because there was concern about the origin of the mid t spine pain she had a tspine xray which showed djd no fx. Cxr showed rll atelectasis. So CTA chest was pursued to rule out aortic dissection or aneurysm.   There was no evidence of dissection but the ascending thoracic aorta was 3.5 cm which would need to be fu as an op. No PE seen   Her  bp started to run a little higher so her diovan was restarted at a lower dose than she had been on 80mg a day instead of 160mg bid and would need to be fu as an op as well    She was encouraged to stay off cigarettes since she went without during the hospitalization. Consults:   IP CONSULT TO PRIMARY CARE PROVIDER  IP CONSULT TO CARDIOLOGY    Discharge Exam:  General Appearance: alert and oriented to person, place and time, well-developed and well-nourished, in no acute distress  Skin: right forearm very red swollen and tender at site of prior iv  Head: normocephalic and atraumatic  Eyes: conjunctivae normal and sclera anicteric  ENT: hearing grossly normal bilaterally and sinuses non-tender  Neck: neck supple and non tender without mass, no thyromegaly or thyroid nodules, no cervical lymphadenopathy   Pulmonary/Chest: clear to auscultation bilaterally- no wheezes, rales or rhonchi, normal air movement, no respiratory distress decreased bs   Cardiovascular: normal rate, normal S1 and S2, no gallops and no carotid bruits pos holo sys m  Abdomen: soft, non-tender, non-distended, normal bowel sounds, no masses or organomegaly  Extremities: no cyanosis, clubbing,  1+ pretib edema 2/3 up to calves  Musculoskeletal: no swollen joints and no tender joints,  Neurologic: coordination normal and speech normal, moves all 4 extremities    Disposition: home  Hospital Meds:  No current facility-administered medications for this encounter.      Current Outpatient Medications   Medication Sig Dispense Refill    amiodarone (CORDARONE) 200 MG tablet Take 1 tablet by mouth daily 30 tablet 5    lactobacillus (CULTURELLE) capsule Take 1 capsule by mouth 2 times daily (with meals) 20 capsule 0    valsartan (DIOVAN) 80 MG tablet Take 1 tablet by mouth daily 30 tablet 3    hydrocortisone-aloe 1 % CREA cream Apply topically 4 times daily 1 each 0    torsemide (DEMADEX) 10 MG tablet Take 3 tablets by mouth 2 times daily 30 tablet 3    docusate sodium (DOK) 100 MG capsule TAKE ONE CAPSULE BY MOUTH TWO TIMES A DAY 60 capsule 5    diclofenac sodium (VOLTAREN) 1 % GEL Apply 2 g topically daily      oxyCODONE HCl (OXY-IR) 10 MG immediate release tablet Take 10 mg by mouth every 6 hours as needed for Pain.       ipratropium (ATROVENT) 0.03 % nasal spray INHALE 2 DOSES INTO EACH NOSTRIL THREE TIMES A DAY IF NEEDED FOR RHINITIS 30 mL 5    tiZANidine (ZANAFLEX) 4 MG tablet TAKE 1 TABLET BY MOUTH THREE TIMES A DAY (Patient taking differently: Take 2 mg by mouth every 8 hours as needed ) 90 tablet 3    levothyroxine (SYNTHROID) 88 MCG tablet TAKE 1 TABLET BY MOUTH DAILY 30 tablet 11    DULoxetine (CYMBALTA) 30 MG extended release capsule TAKE 1 CAPSULE BY MOUTH DAILY EVERY MORNING 30 capsule 11    ezetimibe (ZETIA) 10 MG tablet Take 1 tablet by mouth daily (Patient taking differently: Take 10 mg by mouth every evening ) 90 tablet 1    rosuvastatin (CRESTOR) 40 MG tablet TAKE 1 TABLET BY MOUTH DAILY (Patient taking differently: Take 40 mg by mouth every evening ) 30 tablet 11    pantoprazole (PROTONIX) 40 MG tablet TAKE ONE TABLET BY MOUTH DAILY 30 tablet 11    aspirin 81 MG tablet Take 1 tablet by mouth daily 30 tablet 0       Take Home Meds:  Discharge Medication List as of 11/1/2021 11:57 AM      START taking these medications    Details   amiodarone (CORDARONE) 200 MG tablet Take 1 tablet by mouth daily, Disp-30 tablet, R-5Normal      hydrocortisone-aloe 1 % CREA cream Apply topically 4 times daily, Topical, 4 TIMES DAILY Starting Mon 11/1/2021, Disp-1 each, R-0, Normal         CONTINUE these medications which have CHANGED    Details   lactobacillus (CULTURELLE) capsule Take 1 capsule by mouth 2 times daily (with meals), Disp-20 capsule, R-0Normal      valsartan (DIOVAN) 80 MG tablet Take 1 tablet by mouth daily, Disp-30 tablet, R-3Normal      torsemide (DEMADEX) 10 MG tablet Take 3 tablets by mouth 2 times daily, Disp-30 tablet, R-3Normal      docusate sodium (DOK) 100 MG capsule TAKE ONE CAPSULE BY MOUTH TWO TIMES A DAY, Disp-60 capsule, R-5REFILL REQUESTED 4/19/18 @ 4:57 P. Ferny Anderson         CONTINUE these medications which have NOT CHANGED    Details   diclofenac sodium (VOLTAREN) 1 % GEL Apply 2 g topically daily, Topical, DAILY, Historical Med      oxyCODONE HCl (OXY-IR) 10 MG immediate release tablet Take 10 mg by mouth every 6 hours as needed for Pain. Historical Med      ipratropium (ATROVENT) 0.03 % nasal spray INHALE 2 DOSES INTO EACH NOSTRIL THREE TIMES A DAY IF NEEDED FOR RHINITIS, Disp-30 mL, R-5REFILL REQUESTED TUES 10/19/21 @@ 10:35AMNormal      tiZANidine (ZANAFLEX) 4 MG tablet TAKE 1 TABLET BY MOUTH THREE TIMES A DAY, Disp-90 tablet, R-3REFILL REQUESTED TUES 8/31/21 @@ 10:54AMNormal      levothyroxine (SYNTHROID) 88 MCG tablet TAKE 1 TABLET BY MOUTH DAILY, Disp-30 tablet, R-11REFILL REQUESTED 8/26/21 @@ 3:47 P. M. DLBNormal      DULoxetine (CYMBALTA) 30 MG extended release capsule TAKE 1 CAPSULE BY MOUTH DAILY EVERY MORNING, Disp-30 capsule, R-11REFILL REQUESTED TUES 8/17/21 @@ 2:33PMNormal      ezetimibe (ZETIA) 10 MG tablet Take 1 tablet by mouth daily, Disp-90 tablet, R-1Normal      rosuvastatin (CRESTOR) 40 MG tablet TAKE 1 TABLET BY MOUTH DAILY, Disp-30 tablet, R-11REFILL REQUESTED 6/21/21 @@ 1:04 P. M. DLBNormal      pantoprazole (PROTONIX) 40 MG tablet TAKE ONE TABLET BY MOUTH DAILY, Disp-30 tablet, R-11REFILL REQUESTED 2/11/21 @@ 11:52 A. Ferny Anderson      aspirin 81 MG tablet Take 1 tablet by mouth daily, Disp-30 tablet, R-0         STOP taking these medications       digoxin (LANOXIN) 250 MCG tablet Comments:   Reason for Stopping:         potassium chloride (KLOR-CON) 10 MEQ extended release tablet Comments:   Reason for Stopping:         isosorbide mononitrate (IMDUR) 30 MG extended release tablet Comments:   Reason for Stopping:                 Activity: activity as tolerated  Diet: cardiac diet  Wound Care: as directed    Follow-up with Sax in 7 days and cardiology within a month   Time Spent: over 35 minutes spent performing hospital discharge  Signed:  Nicolas Emery MD  11/1/2021  8:44 PM

## 2021-11-03 ENCOUNTER — OFFICE VISIT (OUTPATIENT)
Dept: CARDIOLOGY CLINIC | Age: 76
End: 2021-11-03
Payer: COMMERCIAL

## 2021-11-03 VITALS
HEIGHT: 62 IN | DIASTOLIC BLOOD PRESSURE: 80 MMHG | WEIGHT: 144.8 LBS | HEART RATE: 72 BPM | BODY MASS INDEX: 26.65 KG/M2 | SYSTOLIC BLOOD PRESSURE: 110 MMHG

## 2021-11-03 DIAGNOSIS — I50.33 ACUTE ON CHRONIC DIASTOLIC HEART FAILURE (HCC): ICD-10-CM

## 2021-11-03 DIAGNOSIS — I48.0 PAF (PAROXYSMAL ATRIAL FIBRILLATION) (HCC): ICD-10-CM

## 2021-11-03 DIAGNOSIS — I25.10 CORONARY ARTERY DISEASE INVOLVING NATIVE CORONARY ARTERY OF NATIVE HEART WITHOUT ANGINA PECTORIS: ICD-10-CM

## 2021-11-03 DIAGNOSIS — R00.1 SINUS BRADYCARDIA: ICD-10-CM

## 2021-11-03 DIAGNOSIS — Z95.2 S/P AVR (AORTIC VALVE REPLACEMENT): Primary | ICD-10-CM

## 2021-11-03 PROCEDURE — 4004F PT TOBACCO SCREEN RCVD TLK: CPT | Performed by: NURSE PRACTITIONER

## 2021-11-03 PROCEDURE — 4040F PNEUMOC VAC/ADMIN/RCVD: CPT | Performed by: NURSE PRACTITIONER

## 2021-11-03 PROCEDURE — G8417 CALC BMI ABV UP PARAM F/U: HCPCS | Performed by: NURSE PRACTITIONER

## 2021-11-03 PROCEDURE — 93000 ELECTROCARDIOGRAM COMPLETE: CPT | Performed by: NURSE PRACTITIONER

## 2021-11-03 PROCEDURE — 1123F ACP DISCUSS/DSCN MKR DOCD: CPT | Performed by: NURSE PRACTITIONER

## 2021-11-03 PROCEDURE — 99214 OFFICE O/P EST MOD 30 MIN: CPT | Performed by: NURSE PRACTITIONER

## 2021-11-03 PROCEDURE — 1090F PRES/ABSN URINE INCON ASSESS: CPT | Performed by: NURSE PRACTITIONER

## 2021-11-03 PROCEDURE — G8427 DOCREV CUR MEDS BY ELIG CLIN: HCPCS | Performed by: NURSE PRACTITIONER

## 2021-11-03 PROCEDURE — G8484 FLU IMMUNIZE NO ADMIN: HCPCS | Performed by: NURSE PRACTITIONER

## 2021-11-03 PROCEDURE — G8400 PT W/DXA NO RESULTS DOC: HCPCS | Performed by: NURSE PRACTITIONER

## 2021-11-03 PROCEDURE — 1111F DSCHRG MED/CURRENT MED MERGE: CPT | Performed by: NURSE PRACTITIONER

## 2021-11-03 RX ORDER — AMIODARONE HYDROCHLORIDE 200 MG/1
200 TABLET ORAL 3 TIMES DAILY
Qty: 90 TABLET | Refills: 5 | Status: ON HOLD | OUTPATIENT
Start: 2021-11-03 | End: 2021-11-26 | Stop reason: HOSPADM

## 2021-11-03 RX ORDER — TORSEMIDE 10 MG/1
TABLET ORAL
Qty: 90 TABLET | Refills: 3 | Status: ON HOLD | OUTPATIENT
Start: 2021-11-03 | End: 2021-11-26 | Stop reason: SDUPTHER

## 2021-11-04 DIAGNOSIS — E78.00 HIGH CHOLESTEROL: ICD-10-CM

## 2021-11-04 DIAGNOSIS — R73.02 IGT (IMPAIRED GLUCOSE TOLERANCE): ICD-10-CM

## 2021-11-04 DIAGNOSIS — E03.8 HYPOTHYROIDISM DUE TO HASHIMOTO'S THYROIDITIS: ICD-10-CM

## 2021-11-04 DIAGNOSIS — E06.3 HYPOTHYROIDISM DUE TO HASHIMOTO'S THYROIDITIS: ICD-10-CM

## 2021-11-04 DIAGNOSIS — E55.9 VITAMIN D DEFICIENCY: ICD-10-CM

## 2021-11-04 LAB
A/G RATIO: 1.6 (ref 1.1–2.2)
ALBUMIN SERPL-MCNC: 4.4 G/DL (ref 3.4–5)
ALP BLD-CCNC: 107 U/L (ref 40–129)
ALT SERPL-CCNC: 18 U/L (ref 10–40)
ANION GAP SERPL CALCULATED.3IONS-SCNC: 21 MMOL/L (ref 3–16)
AST SERPL-CCNC: 21 U/L (ref 15–37)
BASOPHILS ABSOLUTE: 0.1 K/UL (ref 0–0.2)
BASOPHILS RELATIVE PERCENT: 1.3 %
BILIRUB SERPL-MCNC: 0.4 MG/DL (ref 0–1)
BUN BLDV-MCNC: 25 MG/DL (ref 7–20)
CALCIUM SERPL-MCNC: 9.8 MG/DL (ref 8.3–10.6)
CHLORIDE BLD-SCNC: 99 MMOL/L (ref 99–110)
CHOLESTEROL, TOTAL: 114 MG/DL (ref 0–199)
CO2: 26 MMOL/L (ref 21–32)
CREAT SERPL-MCNC: 1.5 MG/DL (ref 0.6–1.2)
EOSINOPHILS ABSOLUTE: 0.2 K/UL (ref 0–0.6)
EOSINOPHILS RELATIVE PERCENT: 2.4 %
GFR AFRICAN AMERICAN: 41
GFR NON-AFRICAN AMERICAN: 34
GLUCOSE BLD-MCNC: 130 MG/DL (ref 70–99)
HCT VFR BLD CALC: 36.5 % (ref 36–48)
HDLC SERPL-MCNC: 43 MG/DL (ref 40–60)
HEMOGLOBIN: 11.9 G/DL (ref 12–16)
LDL CHOLESTEROL CALCULATED: 56 MG/DL
LYMPHOCYTES ABSOLUTE: 1.5 K/UL (ref 1–5.1)
LYMPHOCYTES RELATIVE PERCENT: 19.3 %
MCH RBC QN AUTO: 28.6 PG (ref 26–34)
MCHC RBC AUTO-ENTMCNC: 32.7 G/DL (ref 31–36)
MCV RBC AUTO: 87.4 FL (ref 80–100)
MONOCYTES ABSOLUTE: 1 K/UL (ref 0–1.3)
MONOCYTES RELATIVE PERCENT: 13.1 %
NEUTROPHILS ABSOLUTE: 5.1 K/UL (ref 1.7–7.7)
NEUTROPHILS RELATIVE PERCENT: 63.9 %
PDW BLD-RTO: 14.4 % (ref 12.4–15.4)
PLATELET # BLD: 286 K/UL (ref 135–450)
PMV BLD AUTO: 9.2 FL (ref 5–10.5)
POTASSIUM SERPL-SCNC: 4 MMOL/L (ref 3.5–5.1)
RBC # BLD: 4.17 M/UL (ref 4–5.2)
SODIUM BLD-SCNC: 146 MMOL/L (ref 136–145)
TOTAL PROTEIN: 7.1 G/DL (ref 6.4–8.2)
TRIGL SERPL-MCNC: 76 MG/DL (ref 0–150)
TSH SERPL DL<=0.05 MIU/L-ACNC: 1.22 UIU/ML (ref 0.27–4.2)
VITAMIN B-12: 559 PG/ML (ref 211–911)
VITAMIN D 25-HYDROXY: 14.8 NG/ML
VLDLC SERPL CALC-MCNC: 15 MG/DL
WBC # BLD: 8 K/UL (ref 4–11)

## 2021-11-05 LAB
ESTIMATED AVERAGE GLUCOSE: 131.2 MG/DL
HBA1C MFR BLD: 6.2 %

## 2021-11-08 ENCOUNTER — CARE COORDINATION (OUTPATIENT)
Dept: CASE MANAGEMENT | Age: 76
End: 2021-11-08

## 2021-11-08 NOTE — CARE COORDINATION
[facility] Transitions Follow Up Call    2021    Patient: Caio Francis  Patient : 1945   MRN: [id number]  Reason for Admission: afib RVR  Discharge Date: 21 RARS: Readmission Risk Score: 13.1         Spoke with: [facility] Transitions Follow Up Call    Needs to be reviewed by the provider   Additional needs identified to be addressed with provider: No  none             Method of communication with provider : phone      Care Transition Nurse (CTN) contacted the patient by telephone to follow up after admission. Verified name and  with patient as identifiers. Addressed changes since last contact: none  Discussed follow-up appointments. If no appointment was previously scheduled, appointment scheduling offered: Yes. Is follow up appointment scheduled within 7 days of discharge? Yes. Advance Care Planning:   Does patient have an Advance Directive: reviewed and current. CTN reviewed discharge instructions, medical action plan and red flags with patient and discussed any barriers to care and/or understanding of plan of care after discharge. Discussed appropriate site of care based on symptoms and resources available to patient including: PCP and Specialist. The patient agrees to contact the PCP office for questions related to their healthcare. Patients top risk factors for readmission: medical condition-afib RVR    Patient verified  and was very pleasant and agreeable to transition calls. States she is very well, except that she pulled a muscle in her back. 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 States she is using heat and ice and OTC meds for pain. HR has been \"wonderful\", no values given. BP \"120/70. \" Denies CP, palpitations. States arm is back to normal r/t infiltrated IV. States she is watching her Na intake. Patient received scale that tells her her weight as a gift.  Weight 142 this AM. Denies edema. [facility] cardiology f/u went alright. Scheduled for ECHO 11/11 and f/u 11/15. Patient to see PCP 11/15 as well. [facility] amiodarone is now ordered for 200 TID. Denies any acute needs at present time. Agreeable to f/u calls. Educated on the use of urgent care or physicians 24 hr access line if assistance is needed after hours. CTN provided contact information for future needs. Plan for follow-up call in 7-10 days based on severity of symptoms and risk factors. Alexandra Charles LPN [address] [place] [address]  Care Transitions  [phone]    Care Transitions Subsequent and Final Call    Subsequent and Final Calls  Do you have any ongoing symptoms?: No  Have your medications changed?: Yes  Do you have any questions related to your medications?: No  Do you currently have any active services?: No  Do you have any needs or concerns that I can assist you with?: No  Identified Barriers: None  Care Transitions Interventions  Other Interventions:            Follow Up  Future Appointments   Date Time Provider Vickie Wilson   11/11/2021 10:00 AM SCHEDULE, JACOBO CARDIO [facility]   11/15/2021 11:40 AM HOWARD Salas - CNP [facility]   11/15/2021  2:00 PM Veronica Lopez MD [facility]   12/8/2021  2:00 PM Vitor Villafuerte MD [facility]       Kirsty Putnam LPN

## 2021-11-17 ENCOUNTER — TELEPHONE (OUTPATIENT)
Dept: CARDIOLOGY CLINIC | Age: 76
End: 2021-11-17

## 2021-11-17 ENCOUNTER — CARE COORDINATION (OUTPATIENT)
Dept: CASE MANAGEMENT | Age: 76
End: 2021-11-17

## 2021-11-17 NOTE — TELEPHONE ENCOUNTER
Pt's daughter called in wanting to move Shonda's appointment with Dr. Valeriy River up. Pt has not been discharged and once she is will can move her appointment up with the okay from Vidya Tripp.

## 2021-11-17 NOTE — CARE COORDINATION
[facility] Transitions Follow Up Call    2021    Patient: Bev Frost  Patient : 1945   MRN: [id number]  Reason for Admission: afib RVR  Discharge Date: 21 RARS: Readmission Risk Score: 13.1         Spoke with: no one    Care Transitions Subsequent and Final Call    Subsequent and Final Calls  Care Transitions Interventions  Other Interventions: Follow Up  Future Appointments   Date Time Provider Vickie Wilson   2021  2:00 PM Ramez Vega MD [facility]       Follow up outreach call attempt, no answer. CTN left  with contact information and request for return call. CTN will continue with outreach call attempts.     MARIA ESTHER Loyd, RN   Care Transition Nurse  Mobile: [phone]

## 2021-11-22 NOTE — TELEPHONE ENCOUNTER
Discussed with MELINA Alvarez. She recommends patient taking amiodarone and she can be seen in the office with her tomorrow. Left message for Anali Cai to return the call to the office.

## 2021-11-22 NOTE — TELEPHONE ENCOUNTER
She can be double booked on 12/1/21 at 1:30 pm. If this does not work we can find an alternative day.

## 2021-11-22 NOTE — TELEPHONE ENCOUNTER
I called Shonda's daughter Adamaris Gore and we got her scheduled with Dr. Neville Jackson for Tuesday 11/30 at 3:30. Adamaris Gore did state that Shonda's BP this morning was 119/67, . Philip Mckeon did take 1/2 table of Amiodarone, and is wanting to know if she should do anything else?      You can reach Adamaris Gore at #[phone]

## 2021-11-22 NOTE — TELEPHONE ENCOUNTER
Spoke with Kp Lino regarding recommendations. She v/u.  She is scheduled to see [address] Watt CNP tomorrow 11/23/21 at 10:20 am.

## 2021-11-23 ENCOUNTER — TELEPHONE (OUTPATIENT)
Dept: OTHER | Facility: CLINIC | Age: 76
End: 2021-11-23

## 2021-11-23 ENCOUNTER — CARE COORDINATION (OUTPATIENT)
Dept: CASE MANAGEMENT | Age: 76
End: 2021-11-23

## 2021-11-23 ENCOUNTER — HOSPITAL ENCOUNTER (INPATIENT)
Age: 76
LOS: 3 days | Discharge: HOME HEALTH | DRG: 308 | End: 2021-11-26
Attending: EMERGENCY MEDICINE | Admitting: INTERNAL MEDICINE
Payer: COMMERCIAL

## 2021-11-23 ENCOUNTER — OFFICE VISIT (OUTPATIENT)
Dept: CARDIOLOGY CLINIC | Age: 76
End: 2021-11-23
Payer: COMMERCIAL

## 2021-11-23 ENCOUNTER — APPOINTMENT (OUTPATIENT)
Dept: CT IMAGING | Age: 76
DRG: 308 | End: 2021-11-23
Payer: COMMERCIAL

## 2021-11-23 ENCOUNTER — APPOINTMENT (OUTPATIENT)
Dept: GENERAL RADIOLOGY | Age: 76
DRG: 308 | End: 2021-11-23
Payer: COMMERCIAL

## 2021-11-23 VITALS
OXYGEN SATURATION: 97 % | WEIGHT: 144.6 LBS | HEART RATE: 144 BPM | HEIGHT: 62 IN | SYSTOLIC BLOOD PRESSURE: 130 MMHG | BODY MASS INDEX: 26.61 KG/M2 | DIASTOLIC BLOOD PRESSURE: 82 MMHG

## 2021-11-23 DIAGNOSIS — Z95.2 S/P AVR (AORTIC VALVE REPLACEMENT): ICD-10-CM

## 2021-11-23 DIAGNOSIS — I48.91 RAPID ATRIAL FIBRILLATION (HCC): Primary | ICD-10-CM

## 2021-11-23 DIAGNOSIS — I48.91 ATRIAL FIBRILLATION WITH RVR (HCC): Primary | ICD-10-CM

## 2021-11-23 DIAGNOSIS — I48.91 ATRIAL FIBRILLATION WITH RVR (HCC): ICD-10-CM

## 2021-11-23 DIAGNOSIS — I82.619 BASILIC VEIN THROMBOSIS: ICD-10-CM

## 2021-11-23 DIAGNOSIS — I50.42 CHRONIC COMBINED SYSTOLIC AND DIASTOLIC CHF, NYHA CLASS 2 (HCC): ICD-10-CM

## 2021-11-23 DIAGNOSIS — I50.43 ACUTE ON CHRONIC COMBINED SYSTOLIC AND DIASTOLIC HF (HEART FAILURE) (HCC): ICD-10-CM

## 2021-11-23 DIAGNOSIS — I25.10 CORONARY ARTERY DISEASE INVOLVING NATIVE CORONARY ARTERY OF NATIVE HEART WITHOUT ANGINA PECTORIS: ICD-10-CM

## 2021-11-23 DIAGNOSIS — I48.0 PAF (PAROXYSMAL ATRIAL FIBRILLATION) (HCC): ICD-10-CM

## 2021-11-23 DIAGNOSIS — I50.9 ACUTE ON CHRONIC CONGESTIVE HEART FAILURE, UNSPECIFIED HEART FAILURE TYPE (HCC): ICD-10-CM

## 2021-11-23 LAB
A/G RATIO: 1.3 (ref 1.1–2.2)
ALBUMIN SERPL-MCNC: 3.8 G/DL (ref 3.4–5)
ALP BLD-CCNC: 89 U/L (ref 40–129)
ALT SERPL-CCNC: 57 U/L (ref 10–40)
ANION GAP SERPL CALCULATED.3IONS-SCNC: 10 MMOL/L (ref 3–16)
AST SERPL-CCNC: 47 U/L (ref 15–37)
BASOPHILS ABSOLUTE: 0.1 K/UL (ref 0–0.2)
BASOPHILS RELATIVE PERCENT: 0.8 %
BILIRUB SERPL-MCNC: 0.4 MG/DL (ref 0–1)
BUN BLDV-MCNC: 10 MG/DL (ref 7–20)
CALCIUM SERPL-MCNC: 8.9 MG/DL (ref 8.3–10.6)
CHLORIDE BLD-SCNC: 104 MMOL/L (ref 99–110)
CO2: 23 MMOL/L (ref 21–32)
CREAT SERPL-MCNC: 0.9 MG/DL (ref 0.6–1.2)
EKG ATRIAL RATE: 170 BPM
EKG DIAGNOSIS: NORMAL
EKG Q-T INTERVAL: 346 MS
EKG QRS DURATION: 142 MS
EKG QTC CALCULATION (BAZETT): 524 MS
EKG R AXIS: 46 DEGREES
EKG T AXIS: 157 DEGREES
EKG VENTRICULAR RATE: 138 BPM
EOSINOPHILS ABSOLUTE: 0.3 K/UL (ref 0–0.6)
EOSINOPHILS RELATIVE PERCENT: 3.2 %
GFR AFRICAN AMERICAN: >60
GFR NON-AFRICAN AMERICAN: >60
GLUCOSE BLD-MCNC: 129 MG/DL (ref 70–99)
HCT VFR BLD CALC: 33.2 % (ref 36–48)
HEMOGLOBIN: 10.5 G/DL (ref 12–16)
LYMPHOCYTES ABSOLUTE: 1.2 K/UL (ref 1–5.1)
LYMPHOCYTES RELATIVE PERCENT: 13.8 %
MCH RBC QN AUTO: 28 PG (ref 26–34)
MCHC RBC AUTO-ENTMCNC: 31.6 G/DL (ref 31–36)
MCV RBC AUTO: 88.5 FL (ref 80–100)
MONOCYTES ABSOLUTE: 0.9 K/UL (ref 0–1.3)
MONOCYTES RELATIVE PERCENT: 9.9 %
NEUTROPHILS ABSOLUTE: 6.6 K/UL (ref 1.7–7.7)
NEUTROPHILS RELATIVE PERCENT: 72.3 %
PDW BLD-RTO: 16.3 % (ref 12.4–15.4)
PLATELET # BLD: 239 K/UL (ref 135–450)
PMV BLD AUTO: 8.8 FL (ref 5–10.5)
POTASSIUM REFLEX MAGNESIUM: 5.2 MMOL/L (ref 3.5–5.1)
PRO-BNP: 9992 PG/ML (ref 0–449)
RBC # BLD: 3.75 M/UL (ref 4–5.2)
SODIUM BLD-SCNC: 137 MMOL/L (ref 136–145)
TOTAL PROTEIN: 6.8 G/DL (ref 6.4–8.2)
TROPONIN: <0.01 NG/ML
TROPONIN: <0.01 NG/ML
WBC # BLD: 9.1 K/UL (ref 4–11)

## 2021-11-23 PROCEDURE — 2580000003 HC RX 258: Performed by: EMERGENCY MEDICINE

## 2021-11-23 PROCEDURE — 6360000002 HC RX W HCPCS: Performed by: INTERNAL MEDICINE

## 2021-11-23 PROCEDURE — 80053 COMPREHEN METABOLIC PANEL: CPT

## 2021-11-23 PROCEDURE — 1111F DSCHRG MED/CURRENT MED MERGE: CPT | Performed by: NURSE PRACTITIONER

## 2021-11-23 PROCEDURE — G8427 DOCREV CUR MEDS BY ELIG CLIN: HCPCS | Performed by: NURSE PRACTITIONER

## 2021-11-23 PROCEDURE — 96365 THER/PROPH/DIAG IV INF INIT: CPT

## 2021-11-23 PROCEDURE — 36415 COLL VENOUS BLD VENIPUNCTURE: CPT

## 2021-11-23 PROCEDURE — 96376 TX/PRO/DX INJ SAME DRUG ADON: CPT

## 2021-11-23 PROCEDURE — 2500000003 HC RX 250 WO HCPCS: Performed by: EMERGENCY MEDICINE

## 2021-11-23 PROCEDURE — 2060000000 HC ICU INTERMEDIATE R&B

## 2021-11-23 PROCEDURE — 93010 ELECTROCARDIOGRAM REPORT: CPT | Performed by: INTERNAL MEDICINE

## 2021-11-23 PROCEDURE — 85025 COMPLETE CBC W/AUTO DIFF WBC: CPT

## 2021-11-23 PROCEDURE — 99214 OFFICE O/P EST MOD 30 MIN: CPT | Performed by: NURSE PRACTITIONER

## 2021-11-23 PROCEDURE — G8400 PT W/DXA NO RESULTS DOC: HCPCS | Performed by: NURSE PRACTITIONER

## 2021-11-23 PROCEDURE — 84484 ASSAY OF TROPONIN QUANT: CPT

## 2021-11-23 PROCEDURE — 83880 ASSAY OF NATRIURETIC PEPTIDE: CPT

## 2021-11-23 PROCEDURE — 1036F TOBACCO NON-USER: CPT | Performed by: NURSE PRACTITIONER

## 2021-11-23 PROCEDURE — 71045 X-RAY EXAM CHEST 1 VIEW: CPT

## 2021-11-23 PROCEDURE — 4040F PNEUMOC VAC/ADMIN/RCVD: CPT | Performed by: NURSE PRACTITIONER

## 2021-11-23 PROCEDURE — 99284 EMERGENCY DEPT VISIT MOD MDM: CPT

## 2021-11-23 PROCEDURE — 93000 ELECTROCARDIOGRAM COMPLETE: CPT | Performed by: NURSE PRACTITIONER

## 2021-11-23 PROCEDURE — 2580000003 HC RX 258: Performed by: INTERNAL MEDICINE

## 2021-11-23 PROCEDURE — G8417 CALC BMI ABV UP PARAM F/U: HCPCS | Performed by: NURSE PRACTITIONER

## 2021-11-23 PROCEDURE — G8484 FLU IMMUNIZE NO ADMIN: HCPCS | Performed by: NURSE PRACTITIONER

## 2021-11-23 PROCEDURE — 96366 THER/PROPH/DIAG IV INF ADDON: CPT

## 2021-11-23 PROCEDURE — 93005 ELECTROCARDIOGRAM TRACING: CPT | Performed by: EMERGENCY MEDICINE

## 2021-11-23 PROCEDURE — 6370000000 HC RX 637 (ALT 250 FOR IP): Performed by: INTERNAL MEDICINE

## 2021-11-23 PROCEDURE — 6370000000 HC RX 637 (ALT 250 FOR IP): Performed by: EMERGENCY MEDICINE

## 2021-11-23 PROCEDURE — 1090F PRES/ABSN URINE INCON ASSESS: CPT | Performed by: NURSE PRACTITIONER

## 2021-11-23 PROCEDURE — 1123F ACP DISCUSS/DSCN MKR DOCD: CPT | Performed by: NURSE PRACTITIONER

## 2021-11-23 RX ORDER — DILTIAZEM HYDROCHLORIDE 5 MG/ML
10 INJECTION INTRAVENOUS ONCE
Status: COMPLETED | OUTPATIENT
Start: 2021-11-23 | End: 2021-11-23

## 2021-11-23 RX ORDER — ROSUVASTATIN CALCIUM 20 MG/1
40 TABLET, COATED ORAL NIGHTLY
Status: DISCONTINUED | OUTPATIENT
Start: 2021-11-23 | End: 2021-11-26 | Stop reason: HOSPADM

## 2021-11-23 RX ORDER — PANTOPRAZOLE SODIUM 40 MG/1
40 TABLET, DELAYED RELEASE ORAL
Status: DISCONTINUED | OUTPATIENT
Start: 2021-11-24 | End: 2021-11-26 | Stop reason: HOSPADM

## 2021-11-23 RX ORDER — POLYETHYLENE GLYCOL 3350 17 G/17G
17 POWDER, FOR SOLUTION ORAL DAILY PRN
Status: DISCONTINUED | OUTPATIENT
Start: 2021-11-23 | End: 2021-11-26 | Stop reason: HOSPADM

## 2021-11-23 RX ORDER — TIZANIDINE 4 MG/1
2 TABLET ORAL EVERY 8 HOURS PRN
Status: DISCONTINUED | OUTPATIENT
Start: 2021-11-23 | End: 2021-11-26 | Stop reason: HOSPADM

## 2021-11-23 RX ORDER — SODIUM CHLORIDE 0.9 % (FLUSH) 0.9 %
5-40 SYRINGE (ML) INJECTION EVERY 12 HOURS SCHEDULED
Status: DISCONTINUED | OUTPATIENT
Start: 2021-11-23 | End: 2021-11-26 | Stop reason: HOSPADM

## 2021-11-23 RX ORDER — LEVOTHYROXINE SODIUM 88 UG/1
88 TABLET ORAL DAILY
Status: DISCONTINUED | OUTPATIENT
Start: 2021-11-24 | End: 2021-11-26 | Stop reason: HOSPADM

## 2021-11-23 RX ORDER — AMIODARONE HYDROCHLORIDE 200 MG/1
200 TABLET ORAL 3 TIMES DAILY
Status: DISCONTINUED | OUTPATIENT
Start: 2021-11-23 | End: 2021-11-24

## 2021-11-23 RX ORDER — LORAZEPAM 0.5 MG/1
0.5 TABLET ORAL ONCE
Status: COMPLETED | OUTPATIENT
Start: 2021-11-23 | End: 2021-11-23

## 2021-11-23 RX ORDER — DOCUSATE SODIUM 100 MG/1
100 CAPSULE, LIQUID FILLED ORAL 2 TIMES DAILY
Status: DISCONTINUED | OUTPATIENT
Start: 2021-11-23 | End: 2021-11-26 | Stop reason: HOSPADM

## 2021-11-23 RX ORDER — OXYCODONE HYDROCHLORIDE 10 MG/1
10 TABLET ORAL 4 TIMES DAILY
Status: DISCONTINUED | OUTPATIENT
Start: 2021-11-23 | End: 2021-11-26 | Stop reason: HOSPADM

## 2021-11-23 RX ORDER — TORSEMIDE 20 MG/1
30 TABLET ORAL 2 TIMES DAILY
Status: DISCONTINUED | OUTPATIENT
Start: 2021-11-23 | End: 2021-11-24

## 2021-11-23 RX ORDER — ONDANSETRON 2 MG/ML
4 INJECTION INTRAMUSCULAR; INTRAVENOUS EVERY 6 HOURS PRN
Status: DISCONTINUED | OUTPATIENT
Start: 2021-11-23 | End: 2021-11-26 | Stop reason: HOSPADM

## 2021-11-23 RX ORDER — ACETAMINOPHEN 325 MG/1
650 TABLET ORAL EVERY 6 HOURS PRN
Status: DISCONTINUED | OUTPATIENT
Start: 2021-11-23 | End: 2021-11-26 | Stop reason: HOSPADM

## 2021-11-23 RX ORDER — EZETIMIBE 10 MG/1
10 TABLET ORAL NIGHTLY
Status: DISCONTINUED | OUTPATIENT
Start: 2021-11-23 | End: 2021-11-26 | Stop reason: HOSPADM

## 2021-11-23 RX ORDER — ASPIRIN 81 MG/1
81 TABLET, CHEWABLE ORAL DAILY
Status: DISCONTINUED | OUTPATIENT
Start: 2021-11-24 | End: 2021-11-26 | Stop reason: HOSPADM

## 2021-11-23 RX ORDER — ACETAMINOPHEN 650 MG/1
650 SUPPOSITORY RECTAL EVERY 6 HOURS PRN
Status: DISCONTINUED | OUTPATIENT
Start: 2021-11-23 | End: 2021-11-26 | Stop reason: HOSPADM

## 2021-11-23 RX ORDER — SODIUM CHLORIDE 9 MG/ML
25 INJECTION, SOLUTION INTRAVENOUS PRN
Status: DISCONTINUED | OUTPATIENT
Start: 2021-11-23 | End: 2021-11-26 | Stop reason: HOSPADM

## 2021-11-23 RX ORDER — VALSARTAN 80 MG/1
80 TABLET ORAL DAILY
Status: DISCONTINUED | OUTPATIENT
Start: 2021-11-24 | End: 2021-11-26 | Stop reason: HOSPADM

## 2021-11-23 RX ORDER — ONDANSETRON 4 MG/1
4 TABLET, ORALLY DISINTEGRATING ORAL EVERY 8 HOURS PRN
Status: DISCONTINUED | OUTPATIENT
Start: 2021-11-23 | End: 2021-11-26 | Stop reason: HOSPADM

## 2021-11-23 RX ORDER — DULOXETIN HYDROCHLORIDE 30 MG/1
30 CAPSULE, DELAYED RELEASE ORAL DAILY
Status: DISCONTINUED | OUTPATIENT
Start: 2021-11-24 | End: 2021-11-26 | Stop reason: HOSPADM

## 2021-11-23 RX ORDER — SODIUM CHLORIDE 0.9 % (FLUSH) 0.9 %
5-40 SYRINGE (ML) INJECTION PRN
Status: DISCONTINUED | OUTPATIENT
Start: 2021-11-23 | End: 2021-11-26 | Stop reason: HOSPADM

## 2021-11-23 RX ORDER — IPRATROPIUM BROMIDE 21 UG/1
2 SPRAY, METERED NASAL 2 TIMES DAILY PRN
Status: DISCONTINUED | OUTPATIENT
Start: 2021-11-23 | End: 2021-11-26 | Stop reason: HOSPADM

## 2021-11-23 RX ADMIN — LORAZEPAM 0.5 MG: 0.5 TABLET ORAL at 13:35

## 2021-11-23 RX ADMIN — ROSUVASTATIN CALCIUM 40 MG: 20 TABLET, FILM COATED ORAL at 20:26

## 2021-11-23 RX ADMIN — OXYCODONE HYDROCHLORIDE 10 MG: 10 TABLET ORAL at 20:25

## 2021-11-23 RX ADMIN — TIZANIDINE 2 MG: 4 TABLET ORAL at 20:26

## 2021-11-23 RX ADMIN — DILTIAZEM HYDROCHLORIDE 10 MG: 5 INJECTION INTRAVENOUS at 12:32

## 2021-11-23 RX ADMIN — SODIUM CHLORIDE, PRESERVATIVE FREE 10 ML: 5 INJECTION INTRAVENOUS at 20:28

## 2021-11-23 RX ADMIN — DILTIAZEM HYDROCHLORIDE 5 MG/HR: 5 INJECTION INTRAVENOUS at 12:59

## 2021-11-23 RX ADMIN — ENOXAPARIN SODIUM 40 MG: 100 INJECTION SUBCUTANEOUS at 20:26

## 2021-11-23 RX ADMIN — EZETIMIBE 10 MG: 10 TABLET ORAL at 22:14

## 2021-11-23 RX ADMIN — DOCUSATE SODIUM 100 MG: 100 CAPSULE ORAL at 20:26

## 2021-11-23 ASSESSMENT — PAIN DESCRIPTION - PROGRESSION: CLINICAL_PROGRESSION: GRADUALLY WORSENING

## 2021-11-23 ASSESSMENT — PAIN DESCRIPTION - FREQUENCY: FREQUENCY: CONTINUOUS

## 2021-11-23 ASSESSMENT — PAIN DESCRIPTION - ONSET: ONSET: GRADUAL

## 2021-11-23 ASSESSMENT — PAIN DESCRIPTION - LOCATION: LOCATION: BACK

## 2021-11-23 ASSESSMENT — PAIN SCALES - GENERAL
PAINLEVEL_OUTOF10: 0
PAINLEVEL_OUTOF10: 5
PAINLEVEL_OUTOF10: 0

## 2021-11-23 ASSESSMENT — PAIN DESCRIPTION - PAIN TYPE: TYPE: CHRONIC PAIN

## 2021-11-23 ASSESSMENT — PAIN DESCRIPTION - ORIENTATION: ORIENTATION: LOWER

## 2021-11-23 ASSESSMENT — PAIN DESCRIPTION - DIRECTION: RADIATING_TOWARDS: NON

## 2021-11-23 ASSESSMENT — PAIN - FUNCTIONAL ASSESSMENT: PAIN_FUNCTIONAL_ASSESSMENT: ACTIVITIES ARE NOT PREVENTED

## 2021-11-23 ASSESSMENT — PAIN DESCRIPTION - DESCRIPTORS: DESCRIPTORS: ACHING;CONSTANT

## 2021-11-23 NOTE — TELEPHONE ENCOUNTER
Writer contacted ED provider to inform of 30 day readmission risk. ED provider informed writer of readmission.     Call Back: If you need to call back to inform of disposition you can contact me at [phone]

## 2021-11-23 NOTE — CARE COORDINATION
[facility] Transitions Follow Up Call    2021    Patient: Claudean Gasser  Patient : 1945   · MRN: <[id number]>  Reason for Admission: afib rvr, hypotension, CHF  Discharge Date: 21 RARS: Readmission Risk Score: 13.1      Spoke with: na    Patient noted to currently be in ED will continue to monitor and follow up at another time. Care Transitions Subsequent and Final Call    Subsequent and Final Calls  Care Transitions Interventions  Other Interventions: Follow Up  No future appointments.     Vidhi James LPN

## 2021-11-23 NOTE — CARE COORDINATION
INITIAL CASE MANAGEMENT ASSESSMENT    Reviewed chart, met with patient to assess possible discharge needs. Explained Case Management role/services. Living Situation: Verified address. Been staying at her daughter Ingris [place] home since her last discharge 11/11/21. Her daughter works remotely and has a ranch style home. Thee is one step to enter the home. ADLs: Independent. Was working prior to last admission. DME: RW, and a lift chair. PT/OT Recs: None. TBD. Active Services: At last discharge on 11/11/21 she was supposed to follow up with  [address] (phone). Called [facility] and patient has been accepted to start services. [facility] has been trying to reach patient to set up a time to come out. [facility] will follow patient. SW provided patient with a list of [place] [address] agencies and SNFs. Transportation: Active  but does not drive far distances. Her daughter Popeye Tian will transport her at discharge. Medications: [facility]. Reports that she is having difficulty affording some of her information. Gave patient a [facility] RX voucher. Patient already has a GOOD RX coupon. PCP: Vincent Kang -[phone] (phone)      HD/PD: No.     PLAN/COMMENTS: Return home with family and [facility] through [facility]. Daughter Popeye Tian will pick her up at discharge. ACP completed. RQS completed. The Plan for Transition of Care is related to the following treatment goals: return home. The Patient was provided with a choice of provider and agrees   with the discharge plan. [x] Yes [] No    Freedom of choice list was provided with basic dialogue that supports the patient's individualized plan of care/goals, treatment preferences and shares the quality data associated with the providers. [x] Yes [] No    SW/CM provided contact information for patient or family to call with any questions. SW/CM will follow and assist as needed.

## 2021-11-23 NOTE — ACP (ADVANCE CARE PLANNING)
Advance Care Planning     Advance Care Planning Activator (Inpatient)  Conversation Note      Date of ACP Conversation: 11/23/2021     Conversation Conducted with: Patient with Decision Making Capacity    ACP Activator: [address] [place] Decision Maker:     Current Designated Health Care Decision Maker:     Primary Decision Maker: Holger Bonilla - [phone]    Primary Decision Maker: Cuba Bhatia - Child - [phone]    Primary Decision Maker: Belle Leyva - Child - [phone]    Today we documented Decision Maker(s) consistent with Legal Next of Kin hierarchy. Care Preferences    Ventilation: \"If you were in your present state of health and suddenly became very ill and were unable to breathe on your own, what would your preference be about the use of a ventilator (breathing machine) if it were available to you? \"      Would the patient desire the use of ventilator (breathing machine)?: yes    \"If your health worsens and it becomes clear that your chance of recovery is unlikely, what would your preference be about the use of a ventilator (breathing machine) if it were available to you? \"     Would the patient desire the use of ventilator (breathing machine)?: No      Resuscitation  \"CPR works best to restart the heart when there is a sudden event, like a heart attack, in someone who is otherwise healthy. Unfortunately, CPR does not typically restart the heart for people who have serious health conditions or who are very sick. \"    \"In the event your heart stopped as a result of an underlying serious health condition, would you want attempts to be made to restart your heart (answer \"yes\" for attempt to resuscitate) or would you prefer a natural death (answer \"no\" for do not attempt to resuscitate)? \" yes       [x] Yes   [] No   Educated Patient / [place] Cons regarding differences between Advance Directives and portable DNR orders.     Length of ACP Conversation in minutes: 5    Conversation Outcomes:  [x] ACP discussion completed  [] Existing advance directive reviewed with patient; no changes to patient's previously recorded wishes  [] New Advance Directive completed  [] Portable Do Not Rescitate prepared for Provider review and signature  [] POLST/POST/MOLST/MOST prepared for Provider review and signature      Follow-up plan:    [] Schedule follow-up conversation to continue planning  [] Referred individual to Provider for additional questions/concerns   [] Advised patient/agent/surrogate to review completed ACP document and update if needed with changes in condition, patient preferences or care setting    [x] This note routed to one or more involved healthcare providers

## 2021-11-23 NOTE — PROGRESS NOTES
Medication Reconciliation    List of medications patient is currently taking is complete. Source of information: 1. Conversation with patient at bedside                                      2. EPIC records      Allergies  Benadryl [diphenhydramine], Doxylamine, Mucinex [guaifenesin er], Spiriva handihaler [tiotropium bromide monohydrate], and Adhesive tape     Notes regarding home medications:   1. Patient received all of her morning home medications prior to arrival to the emergency department today. 2. Patient's Amiodarone was increased from 200 mg po daily to 300 mg po daily yesterday.     Mara Gomez RP, PharmD, BCPS  11/23/2021 1:16 PM

## 2021-11-23 NOTE — PROGRESS NOTES
[address]  Office Visit    Cirilo Mchugh  1945    November 23, 2021    CC:   Chief Complaint   Patient presents with    Follow-up     edema on both legs and sob on exertion      HPI:  The patient is 68 y.o. female with a past medical history significant for PAF, pulmonary HTN, Aortic stenosis, CAD/CHILANGO to mid circ 5/2014, S/P AVR, PVI and RENETTA exclusion by Dr. Amalia Montano on 6/16/2020 who is here for follow up of atrial fib. She was admitted to [facility] from 10/26/2021-11/1/2021 and treated with amiodarone gtt and then converted to po once NSR obtained. She had issues with bradycardia and amiodarone adjusted. Since discharge she has had issues with paroxysmal and rapid atrial fib. She was seen in the office on 11/3/2021 by HOWARD Nick and amiodaarone adjusted. She presented to [facility] and was hospitalized from 11/11/2021-11/20/2021 with acute encephalopathy, ANITA on CKD stage 3, chronic DHF and atrial fibrillation. She had successful DCCV on 11/17/2021 and remained in SR. Has H/O L atrial appendage clip. She had her amiodarone decreased d/t bradycardia and treated with IV lasix for CHF. CTPA negative for PE despite elevated D-Dimer. Her daughter called yesterday c/o increased HR and recurrent atrial fibrillation and wanting her seen in office on urgent basis. Here today for follow up. She was discharged from [facility] and remained in [place]  with HR in the 50's over the weekend. Yesterday AM when checked HR pulse was up to 120. She denies any associated chest pain or cough. She has been trying to increase her activity at home. Was maintained on bedrest at hospital with last admit. She is aggravated with her atrial fib issues. Weight stable but continues to have issues with increased edema. Denies chest pain, cough, dizziness, palpitations, syncope. Sleeps chronically in a recliner. SOB at rest      Review of Systems:  Constitutional: Denies  weakness, night sweats or fever.  + increasing Neuropathy     Nonrheumatic mitral valve regurgitation     Osteoarthritis     Pulmonary HTN ([address]     primary, responsive to nitrates    PVD (peripheral vascular disease) ([address]     occluded right SFA::: asymptomatic NIKOS 0.7 right and 1.0 left    Sacral fracture, closed ([address] 2014    Syncope 2014     Past Surgical History:   Procedure Laterality Date    AORTIC VALVE REPLACEMENT  6/16/15     [facility]. [facility] - PVI, RENETTA exclusion. 19mm Maki pericardial Magna    APPENDECTOMY      BACK SURGERY  03/15/2019    superion inserted to keep back from hurtin [address] N/A 2019    EMERGENT; LAPAROSCOPIC CHOLECYSTECTOMY WITH GRAM performed by Skyler Lozoya MD [address]      stent x 1    CYSTOSCOPY  2014    dr Ciara Shin      THR Right    OTHER SURGICAL HISTORY  2018     EGD and colonoscopy.     PAIN MANAGEMENT PROCEDURE Bilateral 2021    BILATERAL L3, L4, L5 MEDIAL BRANCH BLOCK WITH FLUOROSCOPY performed by Jone Dick MD at [address] Bilateral 2021    BILATERAL L3, L4, L5 MEDIAL BRANCH BLOCKS WITH FLUOROSCOPY (40260, 39273) performed by Jone Dick MD at [address] Bilateral 2021    BILATERAL L3, L4, L5 RADIOFREQUENCY ABLATION WITH FLUOROSCOPY (73235, 72724) performed by Jone Dick MD at [address] [place] [address] N/A 3/15/2019    INSERTION OF INTERSPINOUS SPACER Pan Mcnair AT LUMBAR FOUR-LUMBAR FIVE performed by Alexx Kan MD at [address] ENDOSCOPY  12/15/2017     Family History   Problem Relation Age of Onset    Breast Cancer Daughter      Social History     Tobacco Use    Smoking status: Former Smoker     Packs/day: 1.00     Years: 45.00     Pack years: 45.00     Types: Cigarettes     Quit date: 2015     Years since quittin.4    Smokeless tobacco: Former User    Tobacco comment: smoked 1.5 pp for over 30 years  over 45pk year hx   Vaping Use    Vaping Use: Never used   Substance Use Topics    Alcohol use: No     Alcohol/week: 0.0 standard drinks     Comment: Socially     Drug use: No     Comment: never       Allergies   Allergen Reactions    Benadryl [Diphenhydramine] Swelling    Doxylamine     Mucinex [Guaifenesin Er]      hallucinations    Spiriva Handihaler [Tiotropium Bromide Monohydrate]      Nausea      Adhesive Tape Rash and Swelling     No current facility-administered medications for this visit. Current Outpatient Medications   Medication Sig Dispense Refill    levothyroxine (SYNTHROID) 88 MCG tablet Take 1 tablet by mouth Daily 30 tablet 11    amiodarone (CORDARONE) 200 MG tablet Take 1 tablet by mouth 3 times daily (Patient taking differently: Take 300 mg by mouth daily ) 90 tablet 5    torsemide (DEMADEX) 10 MG tablet 30mg twice daily and extra dose as needed for swelling, weight gain and shortness of breath 90 tablet 3    lactobacillus (CULTURELLE) capsule Take 1 capsule by mouth 2 times daily (with meals) 20 capsule 0    hydrocortisone-aloe 1 % CREA cream Apply topically 4 times daily 1 each 0    docusate sodium (DOK) 100 MG capsule TAKE ONE CAPSULE BY MOUTH TWO TIMES A DAY 60 capsule 5    diclofenac sodium (VOLTAREN) 1 % GEL Apply 2 g topically daily      oxyCODONE HCl (OXY-IR) 10 MG immediate release tablet Take 10 mg by mouth every 6 hours as needed for Pain.       ipratropium (ATROVENT) 0.03 % nasal spray INHALE 2 DOSES INTO EACH NOSTRIL THREE TIMES A DAY IF NEEDED FOR RHINITIS 30 mL 5    tiZANidine (ZANAFLEX) 4 MG tablet TAKE 1 TABLET BY MOUTH THREE TIMES A DAY (Patient taking differently: Take 2 mg by mouth every 8 hours as needed ) 90 tablet 3    DULoxetine (CYMBALTA) 30 MG extended release capsule TAKE 1 CAPSULE BY MOUTH DAILY EVERY MORNING 30 capsule 11    ezetimibe (ZETIA) 10 MG tablet Take 1 tablet by mouth daily (Patient taking differently: Take 10 mg by mouth every evening ) 90 tablet 1    rosuvastatin (CRESTOR) 40 MG tablet TAKE 1 TABLET BY MOUTH DAILY (Patient taking differently: Take 40 mg by mouth every evening ) 30 tablet 11    pantoprazole (PROTONIX) 40 MG tablet TAKE ONE TABLET BY MOUTH DAILY 30 tablet 11    aspirin 81 MG tablet Take 1 tablet by mouth daily 30 tablet 0    valsartan (DIOVAN) 80 MG tablet Take 1 tablet by mouth daily (Patient not taking: Reported on 11/23/2021) 30 tablet 3     Facility-Administered Medications Ordered in Other Visits   Medication Dose Route Frequency Provider Last Rate Last Admin    dilTIAZem 125 mg in dextrose 5 % 125 mL infusion  5-15 mg/hr IntraVENous Continuous Candido Bui MD           Physical Exam:   /82   Pulse 144   Ht 5' 2\" (1.575 m)   Wt 144 lb 9.6 oz (65.6 kg)   SpO2 97%   BMI 26.45 kg/m²   Wt Readings from Last 2 Encounters:   11/23/21 145 lb (65.8 kg)   11/23/21 144 lb 9.6 oz (65.6 kg)     Constitutional: She is oriented to person, place, and time. She is elderly, pale and dyspneic   HEENT: Normocephalic and atraumatic. Sclerae anicteric. No xanthelasmas. EOM's intact. Neck: Supple. + moderate JVD. Carotids without bruits. No thyromegaly present. No lymphadenopathy present. Cardiovascular: tachycardic; irreg, irreg; normal S1 and S2;  II/VI systolic murmur. 's on auscultation with getting onto exam table  Pulmonary/Chest: Tachypneic. Dyspneic with talking. Lungs with rales up 1/2 on right and L posterior base. Chest wall nontender  Abdominal: soft, nontender, nondistended. + bowel sounds; no hepatomegaly   Extremities: No cyanosis, or clubbing. Pulses are 2+ radial/carotid/Dp/PT bilaterally. Cap refill brisk. 2-3+ bilateral edema from above knees down to feet bilaterally  Neurological: No focal deficit. Skin: Skin is warm and dry. Psychiatric: She has a normal mood and affect.  Her speech is normal and behavior is normal.     Lab Review:   Lab Results   Component Value Date    TRIG 76 11/04/2021    HDL 43 11/04/2021    HDL 42 06/21/2010    LDLCALC 56 11/04/2021    LDLDIRECT 103 06/17/2021    LABVLDL 15 11/04/2021     Lab Results   Component Value Date     11/04/2021    K 4.0 11/04/2021    K 5.0 10/26/2021    CL 99 11/04/2021    CO2 26 11/04/2021    BUN 25 11/04/2021    CREATININE 1.5 11/04/2021    GLUCOSE 130 11/04/2021    GLUCOSE 106 06/20/2011    CALCIUM 9.8 11/04/2021      Lab Results   Component Value Date    WBC 8.0 11/04/2021    HGB 11.9 (L) 11/04/2021    HCT 36.5 11/04/2021    MCV 87.4 11/04/2021     11/04/2021     ECG 11/23/2021:  Atrial fib with RVR    11/17/2021 DCCV ([facility]):  Converted to SR    11/1/2021 CAMERON:   Overall left ventricular size and wall thickness appear normal with systolic   function mildly reduced. LVEF 40-45%. Normal right ventricular size and function. Left atrium is moderately dilated. The left atrial appendage appears to be ligated with no flow into it. Mitral valve leaflets appear thickened/calcified. Restricted posterior leaflet with malcoaptation id the leaflet tips. Moderate mitral regurgitation is present. Moderate tricuspid regurgitation. 10/27/2021 RHC:  1. Right atrial pressure was 12 mmHg. 2.  RV pressure 65/-4. 3.  Pulmonary capillary wedge pressure was 24 mmHg. 4.  Pulmonary artery pressure 71/19 with a mean of 41 mmHg. 5.  Cardiac output 4.9 liters per minute. 6.  Mixed venous saturation 52%. 7.  Pulmonary capillary wedge saturation 90%. 8.  Right atrial saturation 51%.     In summary, elevated right and left filling pressure with tall V waves  seen in pulmonary capillary wedge pressure tracing suggestive of severe  mitral regurgitation. 10/27/2021 Echo:   Left ventricular cavity size is normal.   Normal left ventricular wall thickness. Ejection fraction is visually estimated to be 45-50%. There is mild global   hypokinesis appreciated.    Grade III diastolic dysfunction with elevated LV filling pressures. Moderately dilated right ventricle with mildly reduced function. The left atrium is severely dilated. The right atrium is mildly dilated. Moderately severe tricuspid regurgitation. Moderate pulmonic regurgitation present. Moderately severe mitral regurgitation appreciated. A bioprosthetic artificial aortic valve appears well seated with a maximum   velocity of 2.4m/s and a mean gradient of 12mmHg. Trivial aortic regurgitation. A bubble study was performed and fails to show evidence of right to left   shunting. 7/30/2020 CAMERON:  1.  Moderate mitral regurgitation with thickening of the mitral valve  leaflets. There is no flow reversal seen in the pulmonary veins. 2.  Moderate tricuspid regurgitation. 3.  Normal-appearing bioprosthetic aortic valve replacement with no  significant aortic regurgitation or stenosis identified visually. 4.  Normal left ventricular and right ventricular size and function. LV  ejection fraction 60%. 5.  Moderate left atrial enlargement with successful ligation in the  past of the left atrial appendage. No flow was seen by color Doppler. Normal right atrial size. [facility]: 7/1/15  FINDINGS:    1.  Right dominant coronary arterial system with mild calcification of the  RCA.  There is 40% serial lesions in the mid RCA.  In the left system there  is 25% distal left main disease.  There is 50% mid LAD disease and 50% ostial  second diagonal branch disease.  There is movement significant restenosis  identified in the prior previously placed mid circumflex stent.    2.  Systemic hypertension. Assessment:    1. 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 Ambika Ann)    2.  Acute on chronic combined systolic and diastolic HF (heart failure) (HCC)  -NYHA class III-IV on exam today; LVEF 40-45% on recent CAMERON  -on torsemide  -not on BB d/t bradycardia in the past when tried  -not on ARB d/t recent ANITA  -she is volume overloaded on exam and needs IV diuresis    4. Coronary artery disease involving native coronary artery of native heart without angina pectoris  -prior stent to mid circ in 2014  -multivessel disease  -no recurrent angina  -continue ASA, statin, zetia    5. S/P AVR (aortic valve replacement)  -S/P AVR with RENETTA exclusion in June 2020    6. Chronic kidney disease, stage III  -recent ANITA and diurtic adjusted  -ARB on hold  -has seen nephrology in the past        Plan:  She is tachycardic, tachypneic and volume overloaded at present. Her rate is 130's with increase to 150's with minimal exertion. She will need close monitoring on IV amiodarone (given her recent tachy-rhonda syndrome). Given her tenuous renal function she will close monitoring of renal function with IV lasix. Lengthy discussion with pt and feel best achieved under close monitoring in hospital. She is agreeable and transported to ED. ED called with report on patient. Return for Follow up pending hospital evaluation. Thanks for allowing me to participate in the care of this patient.       SOHAIL Engle  [facility], [address] [place] [address] [address] [place] [address] [address], [address] [address]  Office: [phone]  Fax: [phone]      Electronically signed by HOWARD Sanders CNP on 11/23/2021 at 12:38 PM

## 2021-11-23 NOTE — PROGRESS NOTES
Pt arrived from into ED into room 5125. Pts heart monitor is on and verified with [facility]. pts vitals are taken and within normal limits. Pts IV is clean dry and intact with cardizem infusing. Nonskid socks are on with bed alarm. Pt is instructed on how to call when needing assistance.      Electronically signed by Nabil Gupta RN on 11/23/2021 at 7:00 PM

## 2021-11-23 NOTE — FLOWSHEET NOTE
11/23/21 1602   Readmission Assessment   Number of Days since last admission? 8-30 days   Previous Disposition Home with Family   Who is being Interviewed Patient   What was the patient's/caregiver's perception as to why they think they needed to return back to the hospital? Other (Comment)  (Same issues she was experincing at last admission)   Did you visit your Primary Care Physician after you left the hospital, before you returned this time? No   Why weren't you able to visit your PCP? Did not have an appointment   Did you see a specialist, such as Cardiac, Pulmonary, Orthopedic Physician, etc. after you left the hospital? Yes   Who advised the patient to return to the hospital? Self-referral   Does the patient report anything that got in the way of taking their medications? No   In our efforts to provide the best possible care to you and others like you, can you think of anything that we could have done to help you after you left the hospital the first time, so that you might not have needed to return so soon?  Other (Comment)  (No.)

## 2021-11-24 ENCOUNTER — CARE COORDINATION (OUTPATIENT)
Dept: CASE MANAGEMENT | Age: 76
End: 2021-11-24

## 2021-11-24 PROBLEM — I82.619 BASILIC VEIN THROMBOSIS: Chronic | Status: ACTIVE | Noted: 2021-11-23

## 2021-11-24 LAB
ANION GAP SERPL CALCULATED.3IONS-SCNC: 9 MMOL/L (ref 3–16)
BASOPHILS ABSOLUTE: 0.1 K/UL (ref 0–0.2)
BASOPHILS RELATIVE PERCENT: 1.5 %
BUN BLDV-MCNC: 9 MG/DL (ref 7–20)
CALCIUM SERPL-MCNC: 8.8 MG/DL (ref 8.3–10.6)
CHLORIDE BLD-SCNC: 107 MMOL/L (ref 99–110)
CO2: 22 MMOL/L (ref 21–32)
CREAT SERPL-MCNC: 0.8 MG/DL (ref 0.6–1.2)
EOSINOPHILS ABSOLUTE: 0.5 K/UL (ref 0–0.6)
EOSINOPHILS RELATIVE PERCENT: 6.7 %
GFR AFRICAN AMERICAN: >60
GFR NON-AFRICAN AMERICAN: >60
GLUCOSE BLD-MCNC: 96 MG/DL (ref 70–99)
HCT VFR BLD CALC: 32 % (ref 36–48)
HEMOGLOBIN: 10.1 G/DL (ref 12–16)
LYMPHOCYTES ABSOLUTE: 1.6 K/UL (ref 1–5.1)
LYMPHOCYTES RELATIVE PERCENT: 24.3 %
MCH RBC QN AUTO: 28.5 PG (ref 26–34)
MCHC RBC AUTO-ENTMCNC: 31.7 G/DL (ref 31–36)
MCV RBC AUTO: 90.1 FL (ref 80–100)
MONOCYTES ABSOLUTE: 0.6 K/UL (ref 0–1.3)
MONOCYTES RELATIVE PERCENT: 9.4 %
NEUTROPHILS ABSOLUTE: 4 K/UL (ref 1.7–7.7)
NEUTROPHILS RELATIVE PERCENT: 58.1 %
PDW BLD-RTO: 16.7 % (ref 12.4–15.4)
PLATELET # BLD: 202 K/UL (ref 135–450)
PMV BLD AUTO: 8.8 FL (ref 5–10.5)
POTASSIUM SERPL-SCNC: 4 MMOL/L (ref 3.5–5.1)
PRO-BNP: 6460 PG/ML (ref 0–449)
RBC # BLD: 3.55 M/UL (ref 4–5.2)
SODIUM BLD-SCNC: 138 MMOL/L (ref 136–145)
TROPONIN: <0.01 NG/ML
WBC # BLD: 6.8 K/UL (ref 4–11)

## 2021-11-24 PROCEDURE — 99222 1ST HOSP IP/OBS MODERATE 55: CPT | Performed by: INTERNAL MEDICINE

## 2021-11-24 PROCEDURE — 84484 ASSAY OF TROPONIN QUANT: CPT

## 2021-11-24 PROCEDURE — 6370000000 HC RX 637 (ALT 250 FOR IP): Performed by: INTERNAL MEDICINE

## 2021-11-24 PROCEDURE — 94761 N-INVAS EAR/PLS OXIMETRY MLT: CPT

## 2021-11-24 PROCEDURE — 6360000002 HC RX W HCPCS: Performed by: INTERNAL MEDICINE

## 2021-11-24 PROCEDURE — 94640 AIRWAY INHALATION TREATMENT: CPT

## 2021-11-24 PROCEDURE — 2580000003 HC RX 258: Performed by: INTERNAL MEDICINE

## 2021-11-24 PROCEDURE — 85025 COMPLETE CBC W/AUTO DIFF WBC: CPT

## 2021-11-24 PROCEDURE — 2060000000 HC ICU INTERMEDIATE R&B

## 2021-11-24 PROCEDURE — 80048 BASIC METABOLIC PNL TOTAL CA: CPT

## 2021-11-24 PROCEDURE — 36415 COLL VENOUS BLD VENIPUNCTURE: CPT

## 2021-11-24 PROCEDURE — 2500000003 HC RX 250 WO HCPCS: Performed by: INTERNAL MEDICINE

## 2021-11-24 PROCEDURE — 83880 ASSAY OF NATRIURETIC PEPTIDE: CPT

## 2021-11-24 PROCEDURE — 99223 1ST HOSP IP/OBS HIGH 75: CPT | Performed by: INTERNAL MEDICINE

## 2021-11-24 RX ORDER — AMIODARONE HYDROCHLORIDE 200 MG/1
150 TABLET ORAL 2 TIMES DAILY
Status: DISCONTINUED | OUTPATIENT
Start: 2021-11-24 | End: 2021-11-24

## 2021-11-24 RX ORDER — FUROSEMIDE 40 MG/1
40 TABLET ORAL DAILY
Status: DISCONTINUED | OUTPATIENT
Start: 2021-11-25 | End: 2021-11-24

## 2021-11-24 RX ORDER — AMIODARONE HYDROCHLORIDE 200 MG/1
200 TABLET ORAL DAILY
Status: DISCONTINUED | OUTPATIENT
Start: 2021-11-25 | End: 2021-11-25

## 2021-11-24 RX ORDER — METOPROLOL SUCCINATE 50 MG/1
100 TABLET, EXTENDED RELEASE ORAL DAILY
Status: DISCONTINUED | OUTPATIENT
Start: 2021-11-24 | End: 2021-11-25

## 2021-11-24 RX ORDER — FUROSEMIDE 40 MG/1
40 TABLET ORAL 2 TIMES DAILY
Status: DISCONTINUED | OUTPATIENT
Start: 2021-11-25 | End: 2021-11-25

## 2021-11-24 RX ORDER — AMIODARONE HYDROCHLORIDE 200 MG/1
100 TABLET ORAL 3 TIMES DAILY
Status: DISCONTINUED | OUTPATIENT
Start: 2021-11-24 | End: 2021-11-24

## 2021-11-24 RX ORDER — FUROSEMIDE 10 MG/ML
40 INJECTION INTRAMUSCULAR; INTRAVENOUS DAILY
Status: DISCONTINUED | OUTPATIENT
Start: 2021-11-24 | End: 2021-11-24

## 2021-11-24 RX ORDER — BUDESONIDE AND FORMOTEROL FUMARATE DIHYDRATE 160; 4.5 UG/1; UG/1
2 AEROSOL RESPIRATORY (INHALATION) 2 TIMES DAILY
Status: DISCONTINUED | OUTPATIENT
Start: 2021-11-24 | End: 2021-11-26 | Stop reason: HOSPADM

## 2021-11-24 RX ADMIN — AMIODARONE HYDROCHLORIDE 100 MG: 200 TABLET ORAL at 12:51

## 2021-11-24 RX ADMIN — POLYETHYLENE GLYCOL 3350 17 G: 17 POWDER, FOR SOLUTION ORAL at 18:32

## 2021-11-24 RX ADMIN — OXYCODONE HYDROCHLORIDE 10 MG: 10 TABLET ORAL at 16:51

## 2021-11-24 RX ADMIN — DULOXETINE HYDROCHLORIDE 30 MG: 30 CAPSULE, DELAYED RELEASE ORAL at 08:44

## 2021-11-24 RX ADMIN — OXYCODONE HYDROCHLORIDE 10 MG: 10 TABLET ORAL at 12:51

## 2021-11-24 RX ADMIN — FUROSEMIDE 40 MG: 10 INJECTION, SOLUTION INTRAMUSCULAR; INTRAVENOUS at 10:36

## 2021-11-24 RX ADMIN — OXYCODONE HYDROCHLORIDE 10 MG: 10 TABLET ORAL at 08:45

## 2021-11-24 RX ADMIN — ROSUVASTATIN CALCIUM 40 MG: 20 TABLET, FILM COATED ORAL at 21:45

## 2021-11-24 RX ADMIN — AMIODARONE HYDROCHLORIDE 100 MG: 200 TABLET ORAL at 10:29

## 2021-11-24 RX ADMIN — DILTIAZEM HYDROCHLORIDE 7.5 MG/HR: 5 INJECTION INTRAVENOUS at 00:44

## 2021-11-24 RX ADMIN — PANTOPRAZOLE SODIUM 40 MG: 40 TABLET, DELAYED RELEASE ORAL at 06:14

## 2021-11-24 RX ADMIN — BUDESONIDE AND FORMOTEROL FUMARATE DIHYDRATE 2 PUFF: 160; 4.5 AEROSOL RESPIRATORY (INHALATION) at 20:30

## 2021-11-24 RX ADMIN — DOCUSATE SODIUM 100 MG: 100 CAPSULE ORAL at 08:44

## 2021-11-24 RX ADMIN — OXYCODONE HYDROCHLORIDE 10 MG: 10 TABLET ORAL at 21:45

## 2021-11-24 RX ADMIN — METOPROLOL SUCCINATE 100 MG: 50 TABLET, EXTENDED RELEASE ORAL at 16:51

## 2021-11-24 RX ADMIN — EZETIMIBE 10 MG: 10 TABLET ORAL at 21:45

## 2021-11-24 RX ADMIN — ENOXAPARIN SODIUM 40 MG: 100 INJECTION SUBCUTANEOUS at 21:45

## 2021-11-24 RX ADMIN — ASPIRIN 81 MG: 81 TABLET, CHEWABLE ORAL at 08:45

## 2021-11-24 RX ADMIN — SODIUM CHLORIDE, PRESERVATIVE FREE 10 ML: 5 INJECTION INTRAVENOUS at 21:53

## 2021-11-24 RX ADMIN — BUDESONIDE AND FORMOTEROL FUMARATE DIHYDRATE 2 PUFF: 160; 4.5 AEROSOL RESPIRATORY (INHALATION) at 11:55

## 2021-11-24 RX ADMIN — SODIUM CHLORIDE, PRESERVATIVE FREE 10 ML: 5 INJECTION INTRAVENOUS at 08:46

## 2021-11-24 RX ADMIN — LEVOTHYROXINE SODIUM 88 MCG: 0.09 TABLET ORAL at 06:14

## 2021-11-24 ASSESSMENT — PAIN DESCRIPTION - FREQUENCY
FREQUENCY: CONTINUOUS

## 2021-11-24 ASSESSMENT — PAIN DESCRIPTION - PAIN TYPE
TYPE: CHRONIC PAIN

## 2021-11-24 ASSESSMENT — PAIN DESCRIPTION - ORIENTATION
ORIENTATION: LOWER

## 2021-11-24 ASSESSMENT — PAIN SCALES - GENERAL
PAINLEVEL_OUTOF10: 0
PAINLEVEL_OUTOF10: 8
PAINLEVEL_OUTOF10: 6
PAINLEVEL_OUTOF10: 7
PAINLEVEL_OUTOF10: 0
PAINLEVEL_OUTOF10: 7
PAINLEVEL_OUTOF10: 0

## 2021-11-24 ASSESSMENT — PAIN DESCRIPTION - LOCATION
LOCATION: BACK

## 2021-11-24 ASSESSMENT — PAIN DESCRIPTION - PROGRESSION
CLINICAL_PROGRESSION: GRADUALLY WORSENING

## 2021-11-24 ASSESSMENT — PAIN DESCRIPTION - ONSET
ONSET: ON-GOING

## 2021-11-24 ASSESSMENT — PAIN DESCRIPTION - DESCRIPTORS
DESCRIPTORS: ACHING;CONSTANT

## 2021-11-24 ASSESSMENT — PAIN - FUNCTIONAL ASSESSMENT
PAIN_FUNCTIONAL_ASSESSMENT: PREVENTS OR INTERFERES SOME ACTIVE ACTIVITIES AND ADLS

## 2021-11-24 NOTE — H&P
acquired 2017    presented w sob and anemia    AVM (arteriovenous malformation) of stomach, acquired 2017    presented w sob and anemia    Bladder cancer ([address] 2014    yearly cystoscopy    CAD (coronary artery disease)     L cx mid stenotic region/rx elut stent    CAP (community acquired pneumonia) 2015    [facility] pn admitted 3 days    Carotid stenosis 2014    R ICA 50-79%/carotid every year, less than 50% L ICA : check every     Chronic atrial fibrillation ([address]     at presentation of cva. since .  Chronic diastolic CHF (congestive heart failure) ([address]     grade II    COPD, mild (HCC)     CVA (cerebral infarction)     left cerebral cortex x2/expressive aphasia/resolved    Diverticulosis     Epistaxis, recurrent     when inr high. last 3-4 months ago    Former smoker     Gallbladder sludge     HTN (hypertension)     Hyperlipidemia     Hypothyroidism     Lumbar stenosis without neurogenic claudication     pain across low back worse with standing and walking, nonradiating    Macular degeneration 2018    left worse than right , dry : progressive loss of sight: just barely able to see to drive    Neuropathy     Nonrheumatic mitral valve regurgitation     Osteoarthritis     Pulmonary HTN ([address]     primary, responsive to nitrates    PVD (peripheral vascular disease) ([address]     occluded right SFA::: asymptomatic NIKOS 0.7 right and 1.0 left    Sacral fracture, closed ([address]     Syncope 2014      Past Surgical History:   Procedure Laterality Date    AORTIC VALVE REPLACEMENT  6/16/15    Dr. Lopes. Hernandez - PVI, RENETTA exclusion.  19mm Maki pericardial Magna    APPENDECTOMY      BACK SURGERY  03/15/2019    superion inserted to keep back from hurtin [address] N/A 2019    EMERGENT; LAPAROSCOPIC CHOLECYSTECTOMY WITH GRAM performed by April Joel MD at [address] [place] [address]      stent x 1    CYSTOSCOPY  Feb 2014    dr Alvin Hoang      THR Right    OTHER SURGICAL HISTORY  02/20/2018     EGD and colonoscopy.  PAIN MANAGEMENT PROCEDURE Bilateral 4/1/2021    BILATERAL L3, L4, L5 MEDIAL BRANCH BLOCK WITH FLUOROSCOPY performed by Noemy Stanford MD at [address] Bilateral 7/8/2021    BILATERAL L3, L4, L5 MEDIAL BRANCH BLOCKS WITH FLUOROSCOPY (82557, 57777) performed by Noemy Stanford MD at [address] Bilateral 9/9/2021    BILATERAL L3, L4, L5 RADIOFREQUENCY ABLATION WITH FLUOROSCOPY (21156, [id number]) performed by Noemy Stanford MD at [address] [place] [address] N/A 3/15/2019    INSERTION OF INTERSPINOUS SPACER Marsa Adarsh AT [address] performed by Deborah Ganser, MD at [address] [place] [address]  12/15/2017      Medications Prior to Admission: levothyroxine (SYNTHROID) 88 MCG tablet, Take 1 tablet by mouth Daily  amiodarone (CORDARONE) 200 MG tablet, Take 1 tablet by mouth 3 times daily (Patient taking differently: Take 300 mg by mouth daily )  torsemide (DEMADEX) 10 MG tablet, 30mg twice daily and extra dose as needed for swelling, weight gain and shortness of breath (Patient taking differently: Take 10-20 mg by mouth 2 times daily as needed 10-20 mg po BID prn)  hydrocortisone-aloe 1 % CREA cream, Apply topically 4 times daily (Patient taking differently: Apply 1 each topically 4 times daily as needed (rash/irritation) )  docusate sodium (DOK) 100 MG capsule, TAKE ONE CAPSULE BY MOUTH TWO TIMES A DAY  diclofenac sodium (VOLTAREN) 1 % GEL, Apply 2 g topically daily  oxyCODONE HCl (OXY-IR) 10 MG immediate release tablet, Take 10 mg by mouth every 6 hours as needed for Pain.   tiZANidine (ZANAFLEX) 4 MG tablet, TAKE 1 TABLET BY MOUTH THREE TIMES A DAY (Patient taking differently: Take 2 mg by mouth every 8 hours as needed )  DULoxetine (CYMBALTA) 30 MG extended release capsule, TAKE 1 CAPSULE BY MOUTH DAILY EVERY MORNING  ezetimibe (ZETIA) 10 MG tablet, Take 1 tablet by mouth daily (Patient taking differently: Take 10 mg by mouth every evening )  rosuvastatin (CRESTOR) 40 MG tablet, TAKE 1 TABLET BY MOUTH DAILY (Patient taking differently: Take 40 mg by mouth every evening )  pantoprazole (PROTONIX) 40 MG tablet, TAKE ONE TABLET BY MOUTH DAILY  aspirin 81 MG tablet, Take 1 tablet by mouth daily  valsartan (DIOVAN) 80 MG tablet, Take 1 tablet by mouth daily  ipratropium (ATROVENT) 0.03 % nasal spray, INHALE 2 DOSES INTO EACH NOSTRIL THREE TIMES A DAY IF NEEDED FOR RHINITIS  Allergies   Allergen Reactions    Benadryl [Diphenhydramine] Swelling    Doxylamine     Mucinex [Guaifenesin Er]      hallucinations    Spiriva Handihaler [Tiotropium Bromide Monohydrate]      Nausea      Adhesive Tape Rash and Swelling      Social History     Tobacco Use    Smoking status: Former Smoker     Packs/day: 1.00     Years: 45.00     Pack years: 45.00     Types: Cigarettes     Quit date: 2015     Years since quittin.4    Smokeless tobacco: Former User    Tobacco comment: smoked 1.5 pp for over 30 years  over 45pk year hx   Substance Use Topics    Alcohol use: No     Alcohol/week: 0.0 standard drinks     Comment: Socially       Family History   Problem Relation Age of Onset    Breast Cancer Daughter         Prior to Admission medications    Medication Sig Start Date End Date Taking?  Authorizing Provider   levothyroxine (SYNTHROID) 88 MCG tablet Take 1 tablet by mouth Daily 11/10/21  Yes Matthias Knox MD   amiodarone (CORDARONE) 200 MG tablet Take 1 tablet by mouth 3 times daily  Patient taking differently: Take 300 mg by mouth daily  11/3/21  Yes HOWARD Rios - CNP   torsemide (DEMADEX) 10 MG tablet 30mg twice daily and extra dose as needed for swelling, weight gain and shortness of breath  Patient taking differently: Take 10-20 mg by mouth 2 times daily as needed 10-20 mg po BID prn 11/3/21  Yes Edwige He APRN - CNP   hydrocortisone-aloe 1 % CREA cream Apply topically 4 times daily  Patient taking differently: Apply 1 each topically 4 times daily as needed (rash/irritation)  11/1/21  Yes Ev Fregoso MD   docusate sodium (DOK) 100 MG capsule TAKE ONE CAPSULE BY MOUTH TWO TIMES A DAY 11/1/21  Yes Ev Fregoso MD   diclofenac sodium (VOLTAREN) 1 % GEL Apply 2 g topically daily   Yes Historical Provider, MD   oxyCODONE HCl (OXY-IR) 10 MG immediate release tablet Take 10 mg by mouth every 6 hours as needed for Pain.    Yes Historical Provider, MD   tiZANidine (ZANAFLEX) 4 MG tablet TAKE 1 TABLET BY MOUTH THREE TIMES A DAY  Patient taking differently: Take 2 mg by mouth every 8 hours as needed  8/31/21  Yes Ev Fregoso MD   DULoxetine (CYMBALTA) 30 MG extended release capsule TAKE 1 CAPSULE BY MOUTH DAILY EVERY MORNING 8/17/21  Yes Ev Fregoso MD   ezetimibe (ZETIA) 10 MG tablet Take 1 tablet by mouth daily  Patient taking differently: Take 10 mg by mouth every evening  6/23/21  Yes Ulysses Martinez MD   rosuvastatin (CRESTOR) 40 MG tablet TAKE 1 TABLET BY MOUTH DAILY  Patient taking differently: Take 40 mg by mouth every evening  6/21/21  Yes Ulysses Martinez MD   pantoprazole (PROTONIX) 40 MG tablet TAKE ONE TABLET BY MOUTH DAILY 2/12/21  Yes Ev Fregoso MD   aspirin 81 MG tablet Take 1 tablet by mouth daily 5/14/15  Yes Ulysses Martinez MD   valsartan (DIOVAN) 80 MG tablet Take 1 tablet by mouth daily 11/1/21   Ev Fregoso MD   ipratropium (ATROVENT) 0.03 % nasal spray INHALE 2 DOSES INTO EACH NOSTRIL THREE TIMES A DAY IF NEEDED FOR RHINITIS 10/19/21   Ev Fregoso MD     Review of Systems  A comprehensive review of systems was negative except for: sob, palpitations    Objective:     Patient Vitals for the past 8 hrs:   BP Temp Temp src Pulse Resp SpO2 Weight   11/24/21 0724 106/65 98 °F (36.7 °C) Oral 91 14 94 % --   11/24/21 0521 (!) 95/56 98.2 °F (36.8 °C) Oral 93 14 95 % 145 lb 8.1 oz (66 kg)     I/O last 3 completed shifts:   In: 120 [P.O.:120]  Out: 600 [Urine:600]  I/O this shift:  In: 120 [P.O.:120]  Out: -     VITALS:  /65   Pulse 91   Temp 98 °F (36.7 °C) (Oral)   Resp 14   Ht 5' 2\" (1.575 m)   Wt 145 lb 8.1 oz (66 kg)   SpO2 94%   BMI 26.61 kg/m²   General Appearance: alert and oriented to person, place and time, well-developed and well-nourished,appears sob, tachypneic   Skin: warm and dry, no rash or erythema  Head: normocephalic and atraumatic  Eyes: conjunctivae normal and sclera anicteric  ENT: hearing grossly normal bilaterally and sinuses non-tender  Neck: neck supple and non tender without mass, no thyromegaly or thyroid nodules, no cervical lymphadenopathy   Pulmonary/Chest: scm's,  Exp wheeze decreased bs, crackles in bases  Cardiovascular: normal rate, normal S1 and S2 irreg irreg, no gallops and no carotid bruits  Abdomen: soft, non-tender, non-distended, normal bowel sounds, no masses or organomegaly  Extremities: no cyanosis, clubbing or edema  Musculoskeletal: no swollen joints and no tender joints,  Neurologic: coordination normal and speech normal, moves all 4 extremities        Data Review:     Recent Results (from the past 24 hour(s))   EKG 12 Lead    Collection Time: 11/23/21 11:52 AM   Result Value Ref Range    Ventricular Rate 138 BPM    Atrial Rate 170 BPM    QRS Duration 142 ms    Q-T Interval 346 ms    QTc Calculation (Bazett) 524 ms    R Axis 46 degrees    T Axis 157 degrees    Diagnosis       Atrial fibrillation / atrial flutter  with rapid ventricular responseLeft bundle branch blockAbnormal ECGWhen compared with ECG of 27-OCT-2021 09:26,Atrial fibrillation /atrial flutterNo significant change noted from 10/26/21Confirmed by [facility] MD, [address] ([id number]) on 11/23/2021 3:25:38 PM   CBC Auto Differential    Collection Time: 11/23/21 12:20 PM   Result Value Ref Range    WBC 9.1 4.0 - 11.0 K/uL    RBC 3.75 (L) 4.00 - 5.20 M/uL    Hemoglobin 10.5 (L) 12.0 - 16.0 g/dL    Hematocrit 33.2 (L) 36.0 - 48.0 %    MCV 88.5 80.0 - 100.0 fL    MCH 28.0 26.0 - 34.0 pg    MCHC 31.6 31.0 - 36.0 g/dL    RDW 16.3 (H) 12.4 - 15.4 %    Platelets 062 102 - 821 K/uL    MPV 8.8 5.0 - 10.5 fL    Neutrophils % 72.3 %    Lymphocytes % 13.8 %    Monocytes % 9.9 %    Eosinophils % 3.2 %    Basophils % 0.8 %    Neutrophils Absolute 6.6 1.7 - 7.7 K/uL    Lymphocytes Absolute 1.2 1.0 - 5.1 K/uL    Monocytes Absolute 0.9 0.0 - 1.3 K/uL    Eosinophils Absolute 0.3 0.0 - 0.6 K/uL    Basophils Absolute 0.1 0.0 - 0.2 K/uL   Comprehensive Metabolic Panel w/ Reflex to MG    Collection Time: 11/23/21 12:20 PM   Result Value Ref Range    Sodium 137 136 - 145 mmol/L    Potassium reflex Magnesium 5.2 (H) 3.5 - 5.1 mmol/L    Chloride 104 99 - 110 mmol/L    CO2 23 21 - 32 mmol/L    Anion Gap 10 3 - 16    Glucose 129 (H) 70 - 99 mg/dL    BUN 10 7 - 20 mg/dL    CREATININE 0.9 0.6 - 1.2 mg/dL    GFR Non-African American >60 >60    GFR African American >60 >60    Calcium 8.9 8.3 - 10.6 mg/dL    Total Protein 6.8 6.4 - 8.2 g/dL    Albumin 3.8 3.4 - 5.0 g/dL    Albumin/Globulin Ratio 1.3 1.1 - 2.2    Total Bilirubin 0.4 0.0 - 1.0 mg/dL    Alkaline Phosphatase 89 40 - 129 U/L    ALT 57 (H) 10 - 40 U/L    AST 47 (H) 15 - 37 U/L   Troponin    Collection Time: 11/23/21 12:20 PM   Result Value Ref Range    Troponin <0.01 <0.01 ng/mL   Brain Natriuretic Peptide    Collection Time: 11/23/21 12:20 PM   Result Value Ref Range    Pro-BNP 9,992 (H) 0 - 449 pg/mL   Troponin    Collection Time: 11/23/21  9:09 PM   Result Value Ref Range    Troponin <0.01 <0.01 ng/mL   Troponin    Collection Time: 11/24/21  4:58 AM   Result Value Ref Range    Troponin <0.01 <0.01 ng/mL   CBC Auto Differential    Collection Time: 11/24/21  4:58 AM   Result Value Ref Range    WBC 6.8 4.0 - 11.0 K/uL    RBC 3.55 (L) 4.00 - 5.20 M/uL    Hemoglobin 10.1 (L) 12.0 - 16.0 g/dL    Hematocrit 32.0 (L) 36.0 - 48.0 %    MCV 90.1 80.0 - 100.0 fL    MCH 28.5 26.0 - 34.0 pg    MCHC 31.7 31.0 - 36.0 g/dL    RDW 16.7 (H) 12.4 - 15.4 %    Platelets 229 305 - 327 K/uL    MPV 8.8 5.0 - 10.5 fL    Neutrophils % 58.1 %    Lymphocytes % 24.3 %    Monocytes % 9.4 %    Eosinophils % 6.7 %    Basophils % 1.5 %    Neutrophils Absolute 4.0 1.7 - 7.7 K/uL    Lymphocytes Absolute 1.6 1.0 - 5.1 K/uL    Monocytes Absolute 0.6 0.0 - 1.3 K/uL    Eosinophils Absolute 0.5 0.0 - 0.6 K/uL    Basophils Absolute 0.1 0.0 - 0.2 K/uL   Basic Metabolic Panel    Collection Time: 11/24/21  4:58 AM   Result Value Ref Range    Sodium 138 136 - 145 mmol/L    Potassium 4.0 3.5 - 5.1 mmol/L    Chloride 107 99 - 110 mmol/L    CO2 22 21 - 32 mmol/L    Anion Gap 9 3 - 16    Glucose 96 70 - 99 mg/dL    BUN 9 7 - 20 mg/dL    CREATININE 0.8 0.6 - 1.2 mg/dL    GFR Non-African American >60 >60    GFR African American >60 >60    Calcium 8.8 8.3 - 10.6 mg/dL   Brain Natriuretic Peptide    Collection Time: 11/24/21  4:58 AM   Result Value Ref Range    Pro-BNP 6,460 (H) 0 - 449 pg/mL      XR CHEST PORTABLE    Result Date: 11/23/2021  CHF with cardiomegaly and interstitial pulmonary edema. Bibasilar opacification with bibasilar atelectasis, left greater than right.              Assessment:     Principal Problem:    Atrial fibrillation with RVR (HCC)  Plan: on diltiazem and amio, rate slowed defer to cardio regarding optimal tx  Active Problems:    Pulmonary emphysema (HCC)  Plan: resume inhalers    Acute on chronic diastolic heart failure (HCC)  Plan: some failure runs high on BNP chronically although did go down some    Nonrheumatic mitral valve regurgitation  Plan: moderate severity fu with cardiology    Pulmonary HTN ([address]  Plan: chronic    S/P AVR (aortic valve replacement)  Plan: in good shape    Hypothyroidism due to Hashimoto's thyroiditis  Plan: fine checked recently    Basilic vein thrombosis  Plan: no futher treatment needed            Nicole Hair MD

## 2021-11-24 NOTE — CARE COORDINATION
[facility] Nurse  Consult Note       NAME:  Anmol Velazquez  MEDICAL RECORD NUMBER:  [id number]  AGE: 68 y.o. GENDER: female  : 1945  TODAY'S DATE:  2021    Subjective   Reason for WOCN Evaluation and Assessment: pressure injuries on buttocks. Pt has avelina 2 pressure injury, POA      Anmol Velazquez is a 68 y.o. female referred by:   [] Physician  [x] Nursing  [] Other:     Wound Identification:  Wound Type: pressure  Contributing Factors: chronic pressure    Wound History: pt tells me that she had wounds from last admit, but that they are \"95% better. \"  Current Wound Care Treatment:  zinc    Patient Goal of Care:  [x] Wound Healing  [] Odor Control  [] Palliative Care  [] Pain Control   [] Other:         PAST MEDICAL HISTORY        Diagnosis Date    Anticoagulant long-term use     Atrial fibrillation ([facility]     went rhonda on amiodarone and coreg both stopped 2020    AVM (arteriovenous malformation) of duodenum, acquired 2017    presented w sob and anemia    AVM (arteriovenous malformation) of stomach, acquired 2017    presented w sob and anemia    Bladder cancer ([facility] 2014    yearly cystoscopy    CAD (coronary artery disease)     L cx mid stenotic region/rx elut stent    CAP (community acquired pneumonia) 2015    [facility] pn admitted 3 days    Carotid stenosis 2014    R ICA 50-79%/carotid every year, less than 50% L ICA : check every     Chronic atrial fibrillation ([facility]     at presentation of cva. since .  Chronic diastolic CHF (congestive heart failure) ([facility] 2016    grade II    COPD, mild (HCC)     CVA (cerebral infarction)     left cerebral cortex x2/expressive aphasia/resolved    Diverticulosis     Epistaxis, recurrent     when inr high.  last 3-4 months ago    Former smoker     Gallbladder sludge     HTN (hypertension)     Hyperlipidemia     Hypothyroidism     Lumbar stenosis without neurogenic claudication     pain across low back worse with standing and walking, nonradiating    Macular degeneration     left worse than right , dry : progressive loss of sight: just barely able to see to drive    Neuropathy     Nonrheumatic mitral valve regurgitation     Osteoarthritis     Pulmonary HTN ([address]     primary, responsive to nitrates    PVD (peripheral vascular disease) ([address]     occluded right SFA::: asymptomatic NIKOS 0.7 right and 1.0 left    Sacral fracture, closed ([address]     Syncope 2014       PAST SURGICAL HISTORY    Past Surgical History:   Procedure Laterality Date    AORTIC VALVE REPLACEMENT  6/16/15    Dr. Eli Michaels. Hernandez - PVI, RENETTA exclusion. 19mm Maki pericardial Magna    APPENDECTOMY      BACK SURGERY  03/15/2019    superion inserted to keep back from hurtin [facility] [address] N/A 2019    EMERGENT; LAPAROSCOPIC CHOLECYSTECTOMY WITH GRAM performed by Héctor Hall MD at [address] [place] [address]      stent x 1    CYSTOSCOPY  2014    dr Yumi Aldana      THR Right    OTHER SURGICAL HISTORY  2018     EGD and colonoscopy.     PAIN MANAGEMENT PROCEDURE Bilateral 2021    BILATERAL L3, L4, L5 MEDIAL BRANCH BLOCK WITH FLUOROSCOPY performed by Brittney Chow MD at [address] Bilateral 2021    BILATERAL L3, L4, L5 MEDIAL BRANCH BLOCKS WITH FLUOROSCOPY (74511, [id number]) performed by Brittney Chow MD at [address] Bilateral 2021    BILATERAL L3, L4, L5 RADIOFREQUENCY ABLATION WITH FLUOROSCOPY (73571, 98072) performed by Brittney Chow MD at [address] [facility] N/A 3/15/2019    INSERTION OF INTERSPINOUS SPACER [facility] AT LUMBAR FOUR-LUMBAR FIVE performed by Cornelious Habermann, MD at [address] ENDOSCOPY  12/15/2017       FAMILY HISTORY    Family History   Problem Relation Age of Onset    Breast Cancer Daughter        SOCIAL HISTORY    Social History     Tobacco Use    Smoking status: Former Smoker     Packs/day: 1.00     Years: 45.00     Pack years: 45.00     Types: Cigarettes     Quit date: 2015     Years since quittin.4    Smokeless tobacco: Former User    Tobacco comment: smoked 1.5 pp for over 30 years  over 45pk year hx   Vaping Use    Vaping Use: Never used   Substance Use Topics    Alcohol use: No     Alcohol/week: 0.0 standard drinks     Comment: Socially     Drug use: No     Comment: never       ALLERGIES    Allergies   Allergen Reactions    Benadryl [Diphenhydramine] Swelling    Doxylamine     Mucinex [Guaifenesin Er]      hallucinations    Spiriva Handihaler [Tiotropium Bromide Monohydrate]      Nausea      Adhesive Tape Rash and Swelling       MEDICATIONS    No current facility-administered medications on file prior to encounter. Current Outpatient Medications on File Prior to Encounter   Medication Sig Dispense Refill    levothyroxine (SYNTHROID) 88 MCG tablet Take 1 tablet by mouth Daily 30 tablet 11    amiodarone (CORDARONE) 200 MG tablet Take 1 tablet by mouth 3 times daily (Patient taking differently: Take 300 mg by mouth daily ) 90 tablet 5    torsemide (DEMADEX) 10 MG tablet 30mg twice daily and extra dose as needed for swelling, weight gain and shortness of breath (Patient taking differently: Take 10-20 mg by mouth 2 times daily as needed 10-20 mg po BID prn) 90 tablet 3    hydrocortisone-aloe 1 % CREA cream Apply topically 4 times daily (Patient taking differently: Apply 1 each topically 4 times daily as needed (rash/irritation) ) 1 each 0    docusate sodium (DOK) 100 MG capsule TAKE ONE CAPSULE BY MOUTH TWO TIMES A DAY 60 capsule 5    diclofenac sodium (VOLTAREN) 1 % GEL Apply 2 g topically daily      oxyCODONE HCl (OXY-IR) 10 MG immediate release tablet Take 10 mg by mouth every 6 hours as needed for Pain.       tiZANidine (ZANAFLEX) 4 MG tablet TAKE 1 TABLET BY MOUTH THREE TIMES A DAY (Patient taking differently: Take 2 mg by mouth every 8 hours as needed ) 90 tablet 3    DULoxetine (CYMBALTA) 30 MG extended release capsule TAKE 1 CAPSULE BY MOUTH DAILY EVERY MORNING 30 capsule 11    ezetimibe (ZETIA) 10 MG tablet Take 1 tablet by mouth daily (Patient taking differently: Take 10 mg by mouth every evening ) 90 tablet 1    rosuvastatin (CRESTOR) 40 MG tablet TAKE 1 TABLET BY MOUTH DAILY (Patient taking differently: Take 40 mg by mouth every evening ) 30 tablet 11    pantoprazole (PROTONIX) 40 MG tablet TAKE ONE TABLET BY MOUTH DAILY 30 tablet 11    aspirin 81 MG tablet Take 1 tablet by mouth daily 30 tablet 0    valsartan (DIOVAN) 80 MG tablet Take 1 tablet by mouth daily 30 tablet 3    ipratropium (ATROVENT) 0.03 % nasal spray INHALE 2 DOSES INTO EACH NOSTRIL THREE TIMES A DAY IF NEEDED FOR RHINITIS 30 mL 5       Objective    /80   Pulse 91   Temp 98.3 °F (36.8 °C) (Oral)   Resp 14   Ht 5' 2\" (1.575 m)   Wt 145 lb 8.1 oz (66 kg)   SpO2 94%   BMI 26.61 kg/m²     LABS:  WBC:    Lab Results   Component Value Date    WBC 6.8 11/24/2021     H/H:    Lab Results   Component Value Date    HGB 10.1 11/24/2021    HCT 32.0 11/24/2021     PTT:    Lab Results   Component Value Date    APTT 59.5 10/27/2021   [APTT}  PT/INR:    Lab Results   Component Value Date    PROTIME 11.7 02/19/2018    INR 1.04 02/19/2018     HgBA1c:    Lab Results   Component Value Date    LABA1C 6.2 11/04/2021       Assessment   Dipesh Risk Score: Dipesh Scale Score: 18    Patient Active Problem List   Diagnosis Code    Hypothyroidism E03.9    Essential hypertension I10    Hyperlipidemia LDL goal <70 E78.5    Cerebral infarction ([address] I63.9    Arthritis M19.90    Bladder tumor D49.4    Pulmonary emphysema ([facility] J43.9    Closed sacral fracture ([facility] S32.10XA    Stenosis of right carotid artery I65.21    Intermittent claudication (HCC) I73.9    Adjustment reaction with anxiety F43.22    Bradycardia R00.1  Pulmonary HTN ([facility]) I27.20    Sacral fracture, closed ([facility]) S32.10XA    Acute on chronic diastolic heart failure ([facility]) I50.33    Left hand weakness R29.898    S/P AVR (aortic valve replacement) Z95.2    Edema leg R60.0    Abdominal aortic aneurysm ([facility]) I71.4    Acute respiratory failure with hypoxia ([facility]) J96.01    CAP (community acquired pneumonia) J18.9    COPD exacerbation ([facility]) J44.1    Dyspnea and respiratory abnormalities R06.00, R06.89    Hemoptysis R04.2    Chronic obstructive pulmonary disease ([facility]) J44.9    Coronary artery disease involving native coronary artery of native heart without angina pectoris I25.10    PAF (paroxysmal atrial fibrillation) ([facility]) I48.0    Nonrheumatic mitral valve regurgitation I34.0    Lumbar stenosis M48.061    Chronic bilateral low back pain with bilateral sciatica M54.42, M54.41, G89.29    Acute metabolic encephalopathy I15.07    Hypothyroidism due to Hashimoto's thyroiditis E03.8, E06.3    Chronic combined systolic and diastolic CHF, NYHA class 2 ([facility]) I50.42    ANITA (acute kidney injury) ([facility]) N17.9    Pulmonary nodule R91.1    Bright red rectal bleeding K62.5    Acute blood loss anemia D62    Rectal bleeding K62.5    Sinus bradycardia R00.1    Gastrointestinal hemorrhage K92.2    Macular degeneration H35.30    Thoracic spine pain M54.6    Anginal equivalent ([facility]) I20.8    Atrial fibrillation with RVR ([facility]) I48.91    History of GI bleed V89.59    Basilic vein thrombosis P58.916       Measurements:          Wound 11/23/21 Buttocks Left; Medial open area (Active)   Wound Etiology Pressure Stage  2 11/24/21 0724   Dressing Status Clean;  Intact; Dry 11/24/21 0724   Wound Cleansed Cleansed with saline 11/23/21 2025   Dressing/Treatment Zinc paste 11/23/21 2025   Wound Length (cm) 1.3 cm 11/23/21 2025   Wound Width (cm) 1.2 cm 11/23/21 2025   Wound Depth (cm) 0.2 cm 11/23/21 2025   Wound Surface Area (cm^2) 1.56 cm^2 11/23/21 2025   Wound Volume patient/caregiver understanding able to:   [] Indicates understanding       [] Needs reinforcement  [] Unsuccessful      [x] Verbal Understanding  [] Demonstrated understanding       [] No evidence of learning  [] Refused teaching         [] N/A       Electronically signed by Je Orozco on 11/24/2021 at 12:52 PM

## 2021-11-24 NOTE — PLAN OF CARE
Problem: Pain:  Description: Pain management should include both nonpharmacologic and pharmacologic interventions. Goal: Pain level will decrease  Description: Pain level will decrease  Outcome: Ongoing  Goal: Control of acute pain  Description: Control of acute pain  Outcome: Ongoing  Goal: Control of chronic pain  Description: Control of chronic pain  Outcome: Ongoing     Problem: Falls - Risk of:  Goal: Will remain free from falls  Description: Will remain free from falls  Outcome: Ongoing  Goal: Absence of physical injury  Description: Absence of physical injury  Outcome: Ongoing     Problem: Skin Integrity:  Goal: Will show no infection signs and symptoms  Description: Will show no infection signs and symptoms  Outcome: Ongoing  Goal: Absence of new skin breakdown  Description: Absence of new skin breakdown  Outcome: Ongoing     Problem:  Activity:  Goal: Ability to tolerate increased activity will improve  Description: Ability to tolerate increased activity will improve  Outcome: Ongoing  Goal: Expression of feelings of increased energy will increase  Description: Expression of feelings of increased energy will increase  Outcome: Ongoing     Problem: Cardiac:  Goal: Ability to maintain an adequate cardiac output will improve  Description: Ability to maintain an adequate cardiac output will improve  Outcome: Ongoing  Goal: Complications related to the disease process, condition or treatment will be avoided or minimized  Description: Complications related to the disease process, condition or treatment will be avoided or minimized  Outcome: Ongoing     Problem: Coping:  Goal: Level of anxiety will decrease  Description: Level of anxiety will decrease  Outcome: Ongoing  Goal: General experience of comfort will improve  Description: General experience of comfort will improve  Outcome: Ongoing     Problem: Health Behavior:  Goal: Ability to manage health-related needs will improve  Description: Ability to manage health-related needs will improve  Outcome: Ongoing     Problem: Safety:  Goal: Ability to remain free from injury will improve  Description: Ability to remain free from injury will improve  Outcome: Ongoing  Goal: Will show no signs and symptoms of excessive bleeding  Description: Will show no signs and symptoms of excessive bleeding  Outcome: Ongoing

## 2021-11-24 NOTE — CARE COORDINATION
Upon chart review, pt was found to be in hospital. Will resolve episode and follow upon discharge.     JACKY MonteroN, RN   Care Transition Nurse  Mobile: [phone]

## 2021-11-24 NOTE — PROGRESS NOTES
Patient hesitant on taking torsemide and amiodarone due to dosing. Wants MD to verify and explain reasoning on change.  Electronically signed by Tip Amin RN on 11/23/2021 at 8:40 PM

## 2021-11-24 NOTE — DISCHARGE INSTR - COC
Continuity of Care Form    Patient Name: Bridget Davila   :  1945  MRN:  [id number]    Admit date:  2021  Discharge date:  ***    Code Status Order: Full Code   Advance Directives:      Admitting Physician:  Mamta Meier MD  PCP: Ana Lake MD    Discharging Nurse: [facility] Unit/Room#: D5I-5975/1647-31  Discharging Unit Phone Number: ***    Emergency Contact:   Extended Emergency Contact Information  Primary Emergency Contact: [facility]  Address: [address]            [address] [place] [address] Phone: [phone]  Work Phone: [phone]  Relation: Child  Secondary Emergency Contact: [id number] [place] [address] Phone: [phone]  Relation: Child    Past Surgical History:  Past Surgical History:   Procedure Laterality Date    AORTIC VALVE REPLACEMENT  6/16/15    Dr. Rae Wilburn. Hernandez - PVI, RENETTA exclusion. 19mm Maki pericardial Magna    APPENDECTOMY      BACK SURGERY  03/15/2019    superion inserted to keep back from hurtin [address] N/A 2019    EMERGENT; LAPAROSCOPIC CHOLECYSTECTOMY WITH GRAM performed by Daysi Arauz MD at [place] [address]      stent x 1    CYSTOSCOPY  2014    dr Vincent Chimera      THR Right    OTHER SURGICAL HISTORY  2018     EGD and colonoscopy.     PAIN MANAGEMENT PROCEDURE Bilateral 2021    BILATERAL L3, L4, L5 MEDIAL BRANCH BLOCK WITH FLUOROSCOPY performed by Noemy Stanford MD at [address] Bilateral 2021    BILATERAL L3, L4, L5 MEDIAL BRANCH BLOCKS WITH FLUOROSCOPY (43504, 04893) performed by Noemy Stanford MD at [address] Bilateral 2021    BILATERAL L3, L4, L5 RADIOFREQUENCY ABLATION WITH FLUOROSCOPY (16818, 40650) performed by Noemy Stanford MD at [address] 3/15/2019    INSERTION OF INTERSPINOUS SPACER [address] performed by [facility] Kristi Francois MD at [facility] ENDOSCOPY  12/15/2017       Immunization History:   Immunization History   Administered Date(s) Administered    COVID-19, Pfizer, PF, 30mcg/0.3mL 10/20/2021    Influenza, High Dose (Fluzone 65 yrs and older) 12/10/2018, 10/04/2019    Influenza, Quadv, adjuvanted, 65 yrs +, IM, PF (Fluad) 12/22/2020    Pneumococcal Conjugate 13-valent (Maggi Sermon) 08/28/2017    Pneumococcal Polysaccharide ([id number]) 12/09/2015       Active Problems:  Patient Active Problem List   Diagnosis Code    Hypothyroidism E03.9    Essential hypertension I10    Hyperlipidemia LDL goal <70 E78.5    Cerebral infarction ([facility] I63.9    Arthritis M19.90    Bladder tumor D49.4    Pulmonary emphysema ([facility] J43.9    Closed sacral fracture ([facility] S32.10XA    Stenosis of right carotid artery I65.21    Intermittent claudication ([facility]) I73.9    Adjustment reaction with anxiety F43.22    Bradycardia R00.1    Pulmonary HTN ([facility]) I27.20    Sacral fracture, closed ([facility]) S32.10XA    Acute on chronic diastolic heart failure ([facility]) I50.33    Left hand weakness R29.898    S/P AVR (aortic valve replacement) Z95.2    Edema leg R60.0    Abdominal aortic aneurysm ([facility]) I71.4    Acute respiratory failure with hypoxia ([facility]) J96.01    CAP (community acquired pneumonia) J18.9    COPD exacerbation ([facility]) J44.1    Dyspnea and respiratory abnormalities R06.00, R06.89    Hemoptysis R04.2    Chronic obstructive pulmonary disease ([facility]) J44.9    Coronary artery disease involving native coronary artery of native heart without angina pectoris I25.10    PAF (paroxysmal atrial fibrillation) ([facility]) I48.0    Nonrheumatic mitral valve regurgitation I34.0    Lumbar stenosis M48.061    Chronic bilateral low back pain with bilateral sciatica M54.42, M54.41, P23.85    Acute metabolic encephalopathy S29.39    Hypothyroidism due to Hashimoto's thyroiditis E03.8, E06.3    Chronic combined systolic and diastolic CHF, NYHA class 2 ([facility] I50.42    ANITA (acute kidney injury) ([facility] N17.9    Pulmonary nodule R91.1    Bright red rectal bleeding K62.5    Acute blood loss anemia D62    Rectal bleeding K62.5    Sinus bradycardia R00.1    Gastrointestinal hemorrhage K92.2    Macular degeneration H35.30    Thoracic spine pain M54.6    Anginal equivalent (HCC) I20.8    Atrial fibrillation with RVR (HCC) I48.91    History of GI bleed V43.39    Basilic vein thrombosis F20.481       Isolation/Infection:   Isolation            No Isolation          Patient Infection Status       Infection Onset Added Last Indicated Last Indicated By Review Planned Expiration Resolved Resolved By    None active    Resolved    COVID-19 (Rule Out) 10/29/21 10/29/21 10/29/21 COVID-19, Rapid (Ordered)   10/29/21 Rule-Out Test Resulted    COVID-19 (Rule Out) 07/06/20 07/06/20 07/06/20 COVID-19 (Ordered)   07/06/20 Rule-Out Test Resulted    COVID-19 (Rule Out) 07/05/20 07/05/20 07/05/20 COVID-19 (Ordered)   07/05/20 Rule-Out Test Resulted            Nurse Assessment:  Last Vital Signs: /80   Pulse 91   Temp 98.3 °F (36.8 °C) (Oral)   Resp 14   Ht 5' 2\" (1.575 m)   Wt 145 lb 8.1 oz (66 kg)   SpO2 94%   BMI 26.61 kg/m²     Last documented pain score (0-10 scale): Pain Level: 8  Last Weight:   Wt Readings from Last 1 Encounters:   11/24/21 145 lb 8.1 oz (66 kg)     Mental Status:  {IP PT MENTAL STATUS:20030}    IV Access:  { JOE IV ACCESS:627001788}    Nursing Mobility/ADLs:  Walking   {P DME FLLA:708642899}  Transfer  {P DME IXIO:888005847}  Bathing  {P DME LLQU:369216818}  Dressing  {P DME ZUBM:328579355}  Toileting  {P DME MTPW:817299610}  Feeding  {[facility] DME HNQT:297374552}  Med Admin  {[facility] DME [facility]:475453660}  Med Delivery   {MH JOE MED Delivery:406576673}    Wound Care Documentation and Therapy:  Wound 11/23/21 Buttocks Left; Medial open area (Active)   Wound Etiology Pressure Stage  2 11/24/21 0724   Dressing Status Clean;  Intact; Dry 11/24/21 0724   Wound Cleansed Cleansed with saline 21   Dressing/Treatment Zinc paste 21   Wound Length (cm) 1.3 cm 21   Wound Width (cm) 1.2 cm 21   Wound Depth (cm) 0.2 cm 21   Wound Surface Area (cm^2) 1.56 cm^2 21   Wound Volume (cm^3) 0.312 cm^3 21   Wound Assessment Dry; Pink/red 21 1000   Drainage Description Serosanguinous 21   Odor None 21   Margins Defined edges 21   Wound Thickness Description not for Pressure Injury Partial thickness 21   Number of days: 0       Wound 21 Buttocks Right; Medial (Active)   Wound Image   21 1000   Wound Etiology Pressure Stage  2 21 1000   Dressing Status Clean; Dry; Intact 2124   Dressing/Treatment Zinc paste 21 1000   Wound Length (cm) 0.5 cm 21 1000   Wound Width (cm) 0.5 cm 21 1000   Wound Depth (cm) 0.2 cm 21 1000   Wound Surface Area (cm^2) 0.25 cm^2 21 1000   Change in Wound Size % (l*w) 91.88 21 1000   Wound Volume (cm^3) 0.05 cm^3 21 1000   Wound Assessment Pink/red; Dry 21 1000   Drainage Amount None 21 1000   Odor None 21 1000   Diana-wound Assessment Blanchable erythema 21 1000   Margins Defined edges 21 1000   Number of days: 0        Elimination:  Continence: Bowel: {YES / PT:73739}  Bladder: {YES / YQ:12505}  Urinary Catheter: {Urinary Catheter:299119655}   Colostomy/Ileostomy/Ileal Conduit: {YES / A}       Date of Last BM: ***    Intake/Output Summary (Last 24 hours) at 2021 1428  Last data filed at 2021 1239  Gross per 24 hour   Intake 240 ml   Output 1700 ml   Net -1460 ml     I/O last 3 completed shifts:   In: 120 [P.O.:120]  Out: 600 [Urine:600]    Safety Concerns:     [address] Safety Concerns:158068547}    Impairments/Disabilities:      [address] Impairments/Disabilities:817663265}    Nutrition Therapy:  Current Nutrition Therapy:   Davian[address] Jacquelyn Lam JOE Diet List:107861465}    Routes of Feeding: {CHP DME Other Feedings:055582356}  Liquids: {Slp liquid thickness:60157}  Daily Fluid Restriction: {CHP DME Yes amt example:177264290}  Last Modified Barium Swallow with Video (Video Swallowing Test): {Done Not Done [facility]:499888751}    Treatments at the Time of Hospital Discharge:   Respiratory Treatments: ***  Oxygen Therapy:  {Therapy; copd oxygen:50810}  Ventilator:    { CC Vent PQLB:668209562}    Rehab Therapies: {THERAPEUTIC INTERVENTION:2995560341}  Weight Bearing Status/Restrictions: { CC Weight Bearin}  Other Medical Equipment (for information only, NOT a DME order):  {EQUIPMENT:278385770}  Other Treatments: ***    Heart Failure Instructions for Daily Management  Patient was treated for acute on chronic combined systolic and diastolic heart failure. she  will require the following:    Please weigh daily on the same scale and approximately the same time of day. Report weight gain of 3 pounds/day or 5 pounds/week to : Sylvia Hinds [phone] and Carl 81 [phone]. Please use hospital discharge weight as baseline reference. Please monitor for signs and symptoms of and report to MD:  Worsening Heart Failure: sudden weight gain, shortness of breath, lower extremity or general edema/swelling, abdominal bloating/swelling, inability to lie flat, intolerance to usual activity, or cough (especially at night). Report these finding even if no increase in weight. Dehydration:  having difficulty or a decrease in urination, dizziness, worsening fatigue, or new onset/worsening of generalized weakness. Please continue a LOW SODIUM diet and LIMIT fluid intake to 48 - 64 ounces ( 1.5 - 2 liters) per day. Call Sylvia Hinds [phone] and Carl 81 [phone] and/or Alexandre Keys @ [phone] with any questions or concerns.    Please continue heart failure education to patient and family/support system. See After Visit Summary for hospital follow up appointment details. Consider spiritual care referral for support and/or completion of advance directives [phone]. Consider: Dave  telehealth program if patient agreeable and able to participate, palliative care consult for ongoing goals of care, end of life, and/or chronic disease management discussions, and referral to [facility] [phone]) once SNF/HHC complete. Patient's personal belongings (please select all that are sent with patient):  {CHP DME Belongings:871915520}    RN SIGNATURE:  {Esignature:046599320}    CASE MANAGEMENT/SOCIAL WORK SECTION    Inpatient Status Date: ***    Readmission Risk Assessment Score:  Readmission Risk              Risk of Unplanned Readmission:  17           Discharging to Facility/ Agency   Name:   Address:  Phone:  Fax:    Dialysis Facility (if applicable)   Name:  Address:  Dialysis Schedule:  Phone:  Fax:    / signature: {Esignature:415197754}    PHYSICIAN SECTION    Prognosis: {Prognosis:4677698366}    Condition at Discharge: [address] [place] [address] Patient Condition:984172818}    Rehab Potential (if transferring to Rehab): {Prognosis:5581922461}    Recommended Labs or Other Treatments After Discharge: ***    Physician Certification: I certify the above information and transfer of Katrine Cogan  is necessary for the continuing treatment of the diagnosis listed and that she requires {Admit to Appropriate Level of Care:33820} for {GREATER/LESS:052051186} 30 days.      Update Admission H&P: {CHP DME Changes in BJALI:968922195}    PHYSICIAN SIGNATURE:  {Esignature:155830723}

## 2021-11-24 NOTE — PROGRESS NOTES
4 Eyes Skin Assessment     NAME:  Carlito Aguayo  YOB: 1945  MEDICAL RECORD NUMBER:  [id number]    The patient is being assess for  Admission    I agree that 2 RN's have performed a thorough Head to Toe Skin Assessment on the patient. ALL assessment sites listed below have been assessed. Areas assessed by both nurses:    Head, Face, Ears, Shoulders, Back, Chest, Arms, Elbows, Hands, Sacrum. Buttock, Coccyx, Ischium and Legs. Feet and Heels        Does the Patient have a Wound? Yes wound(s) were present on assessment.  LDA wound assessment was Initiated and completed        Dipesh Prevention initiated:  Yes   Wound Care Orders initiated:  Yes    Pressure Injury (Stage 3,4, Unstageable, DTI, NWPT, and Complex wounds) if present place consult order under [de-identified] No    New and Established Ostomies if present place consult order under : No      Nurse 1 eSignature: Electronically signed by Jinny Kennedy RN on 11/24/21 at 1:08 AM EST    **SHARE this note so that the co-signing nurse is able to place an eSignature**    Nurse 2 eSignature: Electronically signed by Erica Randhawa RN on 11/24/21 at 1:30 AM EST

## 2021-11-24 NOTE — PLAN OF CARE
Problem: Pain:  Goal: Pain level will decrease  Description: Pain level will decrease  11/24/2021 0850 by Luis Dominguez RN  Outcome: Ongoing     Problem: Falls - Risk of:  Goal: Will remain free from falls  Description: Will remain free from falls  11/24/2021 0850 by Luis Dominguez RN  Outcome: Ongoing     Problem:  Activity:  Goal: Expression of feelings of increased energy will increase  Description: Expression of feelings of increased energy will increase  11/24/2021 0850 by Luis Dominguez RN  Outcome: Ongoing     Problem: Safety:  Goal: Ability to remain free from injury will improve  Description: Ability to remain free from injury will improve  11/24/2021 0850 by Luis Dominguez RN  Outcome: Ongoing     Problem: FLUID AND ELECTROLYTE IMBALANCE  Goal: Fluid and electrolyte balance are achieved/maintained  Outcome: Ongoing

## 2021-11-24 NOTE — PROGRESS NOTES
A perfectserve was sent to CNP regarding pt's hesitance on medications. Awaiting response.  Electronically signed by Timmy Pak RN on 11/23/2021 at 9:04 PM

## 2021-11-24 NOTE — ED PROVIDER NOTES
Emergency Physician Note        Note Open Time: 9:12 PM EST    Chief Complaint  Atrial Fibrillation (sent from cardiologist for afib RVR)       History of Present Illness  Caio Francis is a 68 y.o. female who presents to the ED for atrial fibrillation. Patient reports that she had a recent A. fib cardioversion and was doing well and then yesterday morning developed A. fib again. She took her medicines, amiodarone, as prescribed throughout the day yesterday and this morning but continued to have symptoms so she came to the ER. She did call her cardiologist.  She follows with Dr. Zina Carmen but he is currently out of town. She denies any chest pain but does feel dyspneic and has noticed increased leg swelling bilaterally. She denies any fevers. No other complaints. She does state that once in the past she received IV amiodarone and there was an extravasation event and she does not wish to receive IV amiodarone again and in fact will refuse it if I order it. 10 systems reviewed, pertinent positives per HPI otherwise noted to be negative    I have reviewed the following from the nursing documentation:      Prior to Admission medications    Medication Sig Start Date End Date Taking?  Authorizing Provider   levothyroxine (SYNTHROID) 88 MCG tablet Take 1 tablet by mouth Daily 11/10/21  Yes Matthias Knox MD   amiodarone (CORDARONE) 200 MG tablet Take 1 tablet by mouth 3 times daily  Patient taking differently: Take 300 mg by mouth daily  11/3/21  Yes HOWARD Rios CNP   torsemide (DEMADEX) 10 MG tablet 30mg twice daily and extra dose as needed for swelling, weight gain and shortness of breath  Patient taking differently: Take 10-20 mg by mouth 2 times daily as needed 10-20 mg po BID prn 11/3/21  Yes HOWARD Rios CNP   hydrocortisone-aloe 1 % CREA cream Apply topically 4 times daily  Patient taking differently: Apply 1 each topically 4 times daily as needed (rash/irritation)  11/1/21  Yes Aristeo Acosta Mariah Duque MD   docusate sodium (DOK) 100 MG capsule TAKE ONE CAPSULE BY MOUTH TWO TIMES A DAY 11/1/21  Yes Avelino Anthony MD   diclofenac sodium (VOLTAREN) 1 % GEL Apply 2 g topically daily   Yes Historical Provider, MD   oxyCODONE HCl (OXY-IR) 10 MG immediate release tablet Take 10 mg by mouth every 6 hours as needed for Pain.    Yes Historical Provider, MD   tiZANidine (ZANAFLEX) 4 MG tablet TAKE 1 TABLET BY MOUTH THREE TIMES A DAY  Patient taking differently: Take 2 mg by mouth every 8 hours as needed  8/31/21  Yes Avelino Anthony MD   DULoxetine (CYMBALTA) 30 MG extended release capsule TAKE 1 CAPSULE BY MOUTH DAILY EVERY MORNING 8/17/21  Yes Avelino Anthony MD   ezetimibe (ZETIA) 10 MG tablet Take 1 tablet by mouth daily  Patient taking differently: Take 10 mg by mouth every evening  6/23/21  Yes Judy Broussard MD   rosuvastatin (CRESTOR) 40 MG tablet TAKE 1 TABLET BY MOUTH DAILY  Patient taking differently: Take 40 mg by mouth every evening  6/21/21  Yes Judy Broussard MD   pantoprazole (PROTONIX) 40 MG tablet TAKE ONE TABLET BY MOUTH DAILY 2/12/21  Yes Avelino Anthony MD   aspirin 81 MG tablet Take 1 tablet by mouth daily 5/14/15  Yes Judy Broussard MD   valsartan (DIOVAN) 80 MG tablet Take 1 tablet by mouth daily 11/1/21   Avelino Anthony MD   ipratropium (ATROVENT) 0.03 % nasal spray INHALE 2 DOSES INTO EACH NOSTRIL THREE TIMES A DAY IF NEEDED FOR RHINITIS 10/19/21   Avelino Anthony MD       Allergies as of 11/23/2021 - Fully Reviewed 11/23/2021   Allergen Reaction Noted    Benadryl [diphenhydramine] Swelling 06/16/2015    Doxylamine  06/16/2015    Mucinex [guaifenesin er]  11/25/2015    Spiriva handihaler [tiotropium bromide monohydrate]  11/25/2015    Adhesive tape Rash and Swelling 04/27/2014       Past Medical History:   Diagnosis Date    Anticoagulant long-term use     Atrial fibrillation ([facility]     went rhonda on amiodarone and coreg both stopped 7/2020    AVM (arteriovenous malformation) of duodenum, acquired 2017    presented w sob and anemia    AVM (arteriovenous malformation) of stomach, acquired 2017    presented w sob and anemia    Bladder cancer ([address] 2014    yearly cystoscopy    CAD (coronary artery disease)     L cx mid stenotic region/rx elut stent    CAP (community acquired pneumonia) 2015    [facility] pn admitted 3 days    Carotid stenosis 2014    R ICA 50-79%/carotid every year, less than 50% L ICA : check every     Chronic atrial fibrillation ([address]     at presentation of cva. since .  Chronic diastolic CHF (congestive heart failure) ([address]     grade II    COPD, mild (HCC)     CVA (cerebral infarction)     left cerebral cortex x2/expressive aphasia/resolved    Diverticulosis     Epistaxis, recurrent     when inr high. last 3-4 months ago    Former smoker     Gallbladder sludge     HTN (hypertension)     Hyperlipidemia     Hypothyroidism     Lumbar stenosis without neurogenic claudication     pain across low back worse with standing and walking, nonradiating    Macular degeneration 2018    left worse than right , dry : progressive loss of sight: just barely able to see to drive    Neuropathy     Nonrheumatic mitral valve regurgitation     Osteoarthritis     Pulmonary HTN ([address]     primary, responsive to nitrates    PVD (peripheral vascular disease) ([address]     occluded right SFA::: asymptomatic NIKOS 0.7 right and 1.0 left    Sacral fracture, closed ([address]     Syncope 2014        Surgical History:   Past Surgical History:   Procedure Laterality Date    AORTIC VALVE REPLACEMENT  6/16/15    Dr. Eli Michaels. Hernandez - PVI, RENETTA exclusion.  19mm Maki pericardial Magna    APPENDECTOMY      BACK SURGERY  03/15/2019    superion inserted to keep back from hurtin [address], LAPAROSCOPIC N/A 2019    EMERGENT; LAPAROSCOPIC CHOLECYSTECTOMY WITH GRAM performed by Héctor Hall MD at [place] [address] ANGIOPLASTY  2014    stent x 1    CYSTOSCOPY  2014    dr Karin Gil      THR Right    OTHER SURGICAL HISTORY  2018     EGD and colonoscopy.  PAIN MANAGEMENT PROCEDURE Bilateral 2021    BILATERAL L3, L4, L5 MEDIAL BRANCH BLOCK WITH FLUOROSCOPY performed by Ginny Escobar MD at [address] Bilateral 2021    BILATERAL L3, L4, L5 MEDIAL BRANCH BLOCKS WITH FLUOROSCOPY (58136, 79706) performed by Ginny Escobar MD at [address] Bilateral 2021    BILATERAL L3, L4, L5 RADIOFREQUENCY ABLATION WITH FLUOROSCOPY (47905, [id number]) performed by Ginny Escobar MD at [address] [place] [address] N/A 3/15/2019    INSERTION OF INTERSPINOUS SPACER [place] AT [address] performed by Delfina Kayser, MD at [address] ENDOSCOPY  12/15/2017        Family History:    Family History   Problem Relation Age of Onset    Breast Cancer Daughter        Social History     Socioeconomic History    Marital status:      Spouse name: Not on file    Number of children: 3    Years of education: Not on file    Highest education level: Not on file   Occupational History    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     Tobacco Use    Smoking status: Former Smoker     Packs/day: 1.00     Years: 45.00     Pack years: 45.00     Types: Cigarettes     Quit date: 2015     Years since quittin.4    Smokeless tobacco: Former User    Tobacco comment: smoked 1.5 pp for over 30 years  over 45pk year hx   Vaping Use    Vaping Use: Never used   Substance and Sexual Activity    Alcohol use: No     Alcohol/week: 0.0 standard drinks     Comment: Socially     Drug use: No     Comment: never    Sexual activity: Not Currently   Other Topics Concern    Not on file   Social History Narrative    Not on file     Social Determinants of Health     Financial Resource Strain:     Difficulty of Soft, non-tender, no rebound, rigidity or guarding, non-distended, normal bowel sounds. No costovertebral angle tenderness to palpation. BACK:  No tenderness. EXTREMITIES:  Normal range of motion, pitting bilateral lower extremity edema, no bony tenderness, no deformity, distal pulses present. SKIN: Warm, dry and intact. NEUROLOGIC: Normal mental status. Moving all extremities to command. LABS and DIAGNOSTIC RESULTS  EKG  The Ekg interpreted by me shows  atrial fibrillation with a rate of 138  Axis is   Normal  QTc is  524ms  LBBB   ST Segments: no acute change  No significant change from prior EKG dated 11/3/21    RADIOLOGY  X-RAYS:  I have reviewed radiologic plain film image(s). ALL OTHER NON-PLAIN FILM IMAGES SUCH AS CT, ULTRASOUND AND MRI HAVE BEEN READ BY THE RADIOLOGIST. XR CHEST PORTABLE   Final Result   CHF with cardiomegaly and interstitial pulmonary edema. Bibasilar   opacification with bibasilar atelectasis, left greater than right.               LABS  Labs Reviewed   CBC WITH AUTO DIFFERENTIAL - Abnormal; Notable for the following components:       Result Value    RBC 3.75 (*)     Hemoglobin 10.5 (*)     Hematocrit 33.2 (*)     RDW 16.3 (*)     All other components within normal limits    Narrative:     Performed at:  [facility]  [address] [place], [place] [address]   Phone [phone]   COMPREHENSIVE METABOLIC PANEL W/ REFLEX TO MG FOR LOW K - Abnormal; Notable for the following components:    Potassium reflex Magnesium 5.2 (*)     Glucose 129 (*)     ALT 57 (*)     AST 47 (*)     All other components within normal limits    Narrative:     Performed at:  [facility]  [address] [place], [place] [address]   Phone [phone]   BRAIN NATRIURETIC PEPTIDE - Abnormal; Notable for the following components:    Pro-BNP 9,992 (*)     All other components within normal limits    Narrative:     Performed at:  [facility]  [address] [place], [place] [address]   Phone [phone]   TROPONIN    Narrative:     Performed at:  [facility]  [address] [place], [place] [address]   Phone [phone]   TROPONIN   TROPONIN   BASIC METABOLIC PANEL   BRAIN NATRIURETIC PEPTIDE   CBC WITH AUTO DIFFERENTIAL   TROPONIN       MEDICAL DECISION MAKING        I spoke with Dr. Jace Fischer the on-call cardiologist and he recommended IV amiodarone and after the discussion of the patient's refusal he advised getting a PICC line placed. 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 For this reason they were unable to perform PICC line placement. I then discussed central line placement with the patient and she declined it at this time given the fact that her heart rate was already improving on diltiazem drip. Patient was given her oral amiodarone dose as prescribed. Given that she has basilic DVT I initially ordered a CT PE study but the patient states she does not feel any worse than when one was obtained a few days ago at [facility]. I reviewed that image and given her report that she does not feel any worse than when the CT was obtained I do not believe she would benefit from any further chest imaging. The total Critical Care time is 90 minutes which excludes separately billable procedures. The critical care was concerning treatment with IV diltiazem for rapid atrial fibrillation. This time is exclusive of any time documented by any other providers. Patient's blood pressure was too low in my opinion to treat with Lasix for her CHF. I spoke with Dr. Ramya Forrester. We thoroughly discussed the history, physical exam, laboratory and imaging studies, as well as, emergency department course. Based upon that discussion, we've decided to admit Bridget Davila for further observation and evaluation of Shonda Les's dyspnea.   As I have deemed necessary from their history, physical, and studies, I have considered and evaluated Negar Vivar for the following diagnoses:  ACUTE CORONARY SYNDROME, CHRONIC OBSTRUCTIVE PULMONARY DISEASE, CONGESTIVE HEART FAILURE, PERICARDIAL TAMPONADE, PNEUMONIA, PNEUMOTHORAX, PULMONARY EMBOLISM, SEPSIS, and THORACIC DISSECTION. FINAL IMPRESSION  1. Rapid atrial fibrillation ([address]    2. Acute on chronic congestive heart failure, unspecified heart failure type (HCC)    3. Basilic vein thrombosis        Vitals:  Blood pressure 97/64, pulse 95, temperature 97.9 °F (36.6 °C), temperature source Oral, resp. rate 16, height 5' 2\" (1.575 m), weight 145 lb (65.8 kg), SpO2 95 %, not currently breastfeeding. Disposition  Pt is in stable condition upon Admit to telemetry. This chart was generated using the [address] [place] [address] dictation system. I created this record but it may contain dictation errors.           Shayla Horan MD  11/23/21 0003

## 2021-11-24 NOTE — CONSULTS
Cardiology Consultation     Lady Hoyos  1945      Referring Physician: afib  Reason for Referral:  [address]   Chief Complaint:   Chief Complaint   Patient presents with   [place]ricks Atrial Fibrillation     sent from cardiologist for afib RVR       Subjective:     History of Present Illness: The patient is 68 y.o. female with a past medical history significant for pAFib,CAD, prior AVR ( 6/2020),  who presents with the above complaint. Underwent prior surgical ablation during AVR and appendage clipping. Multiple prior cardioversion's to NSR. Seen in the office yesterday and was noted to be in afib with RVR and admitted for parenteral therapy. She currently has no complaints. 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 since 26.  Chronic diastolic CHF (congestive heart failure) ([address] 2016    grade II    COPD, mild (HCC)     CVA (cerebral infarction) 2013    left cerebral cortex x2/expressive aphasia/resolved    Diverticulosis     Epistaxis, recurrent     when inr high.  last 3-4 months ago    Former smoker     Gallbladder sludge     HTN (hypertension)     Hyperlipidemia     Hypothyroidism     Lumbar stenosis without neurogenic claudication     pain across low back worse with standing and walking, nonradiating    Macular degeneration 2018    left worse than right , dry : progressive loss of sight: just barely able to see to drive    Neuropathy     Nonrheumatic mitral valve regurgitation     Osteoarthritis     Pulmonary HTN ([address]     primary, responsive to nitrates    PVD (peripheral vascular disease) ([address]     occluded right SFA::: asymptomatic NIKOS 0.7 right and 1.0 left    Sacral fracture, closed ([address]     Syncope 2014     Past Surgical History:   Procedure Laterality Date    AORTIC VALVE REPLACEMENT  6/16/15     [facility]. [facility] - PVI, RENETTA exclusion. 19mm Maki pericardial Magna    APPENDECTOMY      BACK SURGERY  03/15/2019    superion inserted to keep back from hurtin [facility] [address] N/A 2019    EMERGENT; LAPAROSCOPIC CHOLECYSTECTOMY WITH GRAM performed by Cm Delaney MD at [address] [place] [address]      stent x 1    CYSTOSCOPY  2014    dr Lindsay Gates      THR Right    OTHER SURGICAL HISTORY  2018     EGD and colonoscopy.     PAIN MANAGEMENT PROCEDURE Bilateral 2021    BILATERAL L3, L4, L5 MEDIAL BRANCH BLOCK WITH FLUOROSCOPY performed by Sandra Reid MD at [address] Bilateral 2021    BILATERAL L3, L4, L5 MEDIAL BRANCH BLOCKS WITH FLUOROSCOPY (18561, 82247) performed by Sandra Reid MD at [address] Bilateral 2021    BILATERAL L3, L4, L5 RADIOFREQUENCY ABLATION WITH FLUOROSCOPY (51097, 94126) performed by Sandra Reid MD at [address] N/A 3/15/2019    INSERTION OF INTERSPINOUS SPACER Britney Patee AT LUMBAR FOUR-LUMBAR FIVE performed by Stephy Hughes MD at [address] ENDOSCOPY  12/15/2017     Family History   Problem Relation Age of Onset    Breast Cancer Daughter      Social History     Tobacco Use    Smoking status: Former Smoker     Packs/day: 1.00     Years: 45.00     Pack years: 45.00     Types: Cigarettes     Quit date: 2015     Years since quittin.4    Smokeless tobacco: Former User    Tobacco comment: smoked 1.5 pp for over 30 years  over 45pk year hx   Vaping Use    Vaping Use: Never used   Substance Use Topics    Alcohol use: No     Alcohol/week: 0.0 standard drinks     Comment: Socially     Drug use: No     Comment: never       Allergies   Allergen Reactions    Benadryl [Diphenhydramine] Swelling    Doxylamine     Mucinex [Guaifenesin Er]      hallucinations    Spiriva Handihaler [Tiotropium Bromide Monohydrate]      Nausea      Adhesive Tape Rash and Swelling     Current Facility-Administered Medications   Medication Dose Route Frequency Provider Last Rate Last Admin    furosemide (LASIX) injection 40 mg  40 mg IntraVENous Daily Flaquito Fernandes MD   40 mg at 21 1036    budesonide-formoterol (SYMBICORT) 160-4.5 MCG/ACT inhaler 2 puff  2 puff Inhalation BID Flaquito Fernandes MD   2 puff at 21 1155    metoprolol succinate (TOPROL XL) extended release tablet 100 mg  100 mg Oral Daily Emil Dominguez MD   100 mg at 21 1651    [START ON 2021] amiodarone (CORDARONE) tablet 200 mg  200 mg Oral Daily Emil Dominguez MD        aspirin chewable tablet 81 mg  81 mg Oral Daily Flaquito Fernandes MD   81 mg at 21 0845    diclofenac sodium (VOLTAREN) 1 % gel 2 g  2 g Topical 4x Daily PRN Flaquito Fernandes MD        docusate sodium (COLACE) capsule 100 mg  100 mg Oral BID Flaquito Fernandes MD   100 mg at 21 0844    DULoxetine (CYMBALTA) extended release capsule 30 mg  30 mg Oral Daily Flaquito Fernandes MD   30 mg at 21 0844    ezetimibe (ZETIA) tablet 10 mg  10 mg Oral Nightly Flaquito Fernandes MD   10 mg at 21 2214    ipratropium (ATROVENT) 0.03 % nasal spray 2 spray  2 spray Each Nostril BID PRN Flaquito Fernandes MD        levothyroxine (SYNTHROID) tablet 88 mcg  88 mcg Oral Daily Starlet Ou Jacklyn Morton MD   88 mcg at 11/24/21 5379    oxyCODONE HCl (OXY-IR) immediate release tablet 10 mg  10 mg Oral 4x Daily Mahesh Deluna MD   10 mg at 11/24/21 1651    pantoprazole (PROTONIX) tablet 40 mg  40 mg Oral QAM AC Mahesh Deluna MD   40 mg at 11/24/21 5302    rosuvastatin (CRESTOR) tablet 40 mg  40 mg Oral Nightly Mahesh Deluna MD   40 mg at 11/23/21 2026    tiZANidine (ZANAFLEX) tablet 2 mg  2 mg Oral Q8H PRN Mahesh Deluna MD   2 mg at 11/23/21 2026    valsartan (DIOVAN) tablet 80 mg  80 mg Oral Daily Mahesh Deluna MD        sodium chloride flush 0.9 % injection 5-40 mL  5-40 mL IntraVENous 2 times per day Mahesh Deluna MD   10 mL at 11/24/21 0846    sodium chloride flush 0.9 % injection 5-40 mL  5-40 mL IntraVENous PRN Mahesh Deluna MD        0.9 % sodium chloride infusion  25 mL IntraVENous PRN Mahesh Deluna MD        ondansetron (ZOFRAN-ODT) disintegrating tablet 4 mg  4 mg Oral Q8H PRN Mahesh Deluna MD        Or    ondansetron [facility] injection 4 mg  4 mg IntraVENous Q6H PRN Mahesh Deluna MD        polyethylene glycol [facility]) packet 17 g  17 g Oral Daily PRN Mahesh Deluna MD        acetaminophen (TYLENOL) tablet 650 mg  650 mg Oral Q6H PRN Mahesh Deluna MD        Or    acetaminophen (TYLENOL) suppository 650 mg  650 mg Rectal Q6H PRN Mahesh Deluna MD        enoxaparin (LOVENOX) injection 40 mg  40 mg SubCUTAneous Nightly Mahesh Deluna MD   40 mg at 11/23/21 2026    iopamidol (ISOVUE-370) 76 % injection 75 mL  75 mL IntraVENous ONCE PRN Krystyna Reyez MD           Review of Systems:  · Constitutional: No unanticipated weight loss. There's been no change in energy level, sleep pattern, or activity level. No fevers, chills. · Eyes: No visual changes or diplopia. No scleral icterus. · ENT: No Headaches, hearing loss or vertigo. No mouth sores or sore throat. · Cardiovascular: as reviewed in HPI  · Respiratory: No cough or wheezing, no sputum production. No hemoptysis.      · Gastrointestinal: No abdominal pain, appetite loss, blood in stools. No change in bowel or bladder habits. · Genitourinary: No dysuria, trouble voiding, or hematuria. · Musculoskeletal:  No gait disturbance, no joint complaints. · Integumentary: No rash or pruritis. · Neurological: No headache, diplopia, change in muscle strength, numbness or tingling. · Psychiatric: No anxiety or depression. · Endocrine: No temperature intolerance. No excessive thirst, fluid intake, or urination. No tremor. · Hematologic/Lymphatic: No abnormal bruising or bleeding, blood clots or swollen lymph nodes. · Allergic/Immunologic: No nasal congestion or hives. Physical Exam:   BP (!) 100/56   Pulse 100   Temp 98.1 °F (36.7 °C) (Oral)   Resp 18   Ht 5' 2\" (1.575 m)   Wt 145 lb 8.1 oz (66 kg)   SpO2 94%   BMI 26.61 kg/m²   Wt Readings from Last 3 Encounters:   11/24/21 145 lb 8.1 oz (66 kg)   11/23/21 144 lb 9.6 oz (65.6 kg)   11/03/21 144 lb 12.8 oz (65.7 kg)     Constitutional: She is oriented to person, place, and time. She appears well-developed and well-nourished. In no acute distress. Head: Normocephalic and atraumatic. Pupils equal and round. Neck: Neck supple. No JVP or carotid bruit appreciated. No mass and no thyromegaly present. No lymphadenopathy present. Cardiovascular: irregular rate. Normal heart sounds. JUVE 2/6 RUSB  Pulmonary/Chest: Effort normal and breath sounds normal. No respiratory distress. She has no wheezes, rhonchi or rales. Abdominal: Soft, non-tender. Bowel sounds are normal. She exhibits no organomegaly, mass or bruit. Extremities: No edema. No cyanosis or clubbing. Pulses are 2+ radial/carotid bilaterally. Neurological: No gross cranial nerve deficit. Coordination normal.   Skin: Skin is warm and dry. There is no rash or diaphoresis. Psychiatric: She has a normal mood and affect.  Her speech is normal and behavior is normal.     Lab Review:   FLP:    Lab Results   Component Value Date    TRIG 76 11/04/2021    HDL 43 11/04/2021 HDL 42 06/21/2010    LDLCALC 56 11/04/2021    LDLDIRECT 103 06/17/2021    LABVLDL 15 11/04/2021     BUN/Creatinine:    Lab Results   Component Value Date    BUN 9 11/24/2021    CREATININE 0.8 11/24/2021     PT/INR, TNI, HGB A1C:   Lab Results   Component Value Date/Time    TROPONINI <0.01 11/24/2021 04:58 AM    LABA1C 6.2 11/04/2021 11:04 AM      No results found for: CBCAUTODIF    ECG 11/23/2021:  Atrial fib with RVR     11/17/2021 DCCV ([facility]):  Converted to SR     11/1/2021 CAMERON:   Overall left ventricular size and wall thickness appear normal with systolic   function mildly reduced.  LVEF 40-45%.   Normal right ventricular size and function.   Left atrium is moderately dilated.   The left atrial appendage appears to be ligated with no flow into it.   Mitral valve leaflets appear thickened/calcified.   Restricted posterior leaflet with malcoaptation id the leaflet tips.   Moderate mitral regurgitation is present.   Moderate tricuspid regurgitation.     10/27/2021 RHC:  1.  Right atrial pressure was 12 mmHg. 2.  RV pressure 65/-4. 3.  Pulmonary capillary wedge pressure was 24 mmHg. 4.  Pulmonary artery pressure 71/19 with a mean of 41 mmHg. 5.  Cardiac output 4.9 liters per minute. 6.  Mixed venous saturation 52%. 7.  Pulmonary capillary wedge saturation 90%. 8.  Right atrial saturation 51%.     In summary, elevated right and left filling pressure with tall V waves  seen in pulmonary capillary wedge pressure tracing suggestive of severe  mitral regurgitation.     10/27/2021 Echo:   Left ventricular cavity size is normal.   Normal left ventricular wall thickness.   Ejection fraction is visually estimated to be 45-50%.  There is mild global   hypokinesis appreciated.   Grade III diastolic dysfunction with elevated LV filling pressures.   Moderately dilated right ventricle with mildly reduced function.   The left atrium is severely dilated.   The right atrium is mildly dilated.   Moderately severe tricuspid regurgitation.   Moderate pulmonic regurgitation present.   Moderately severe mitral regurgitation appreciated.   A bioprosthetic artificial aortic valve appears well seated with a maximum   velocity of 2.4m/s and a mean gradient of 12mmHg.   Trivial aortic regurgitation.   A bubble study was performed and fails to show evidence of right to left   shunting.     7/30/2020 CAMERON:  1.  Moderate mitral regurgitation with thickening of the mitral valve  leaflets.  There is no flow reversal seen in the pulmonary veins. 2.  Moderate tricuspid regurgitation. 3.  Normal-appearing bioprosthetic aortic valve replacement with no  significant aortic regurgitation or stenosis identified visually. 4.  Normal left ventricular and right ventricular size and function.  LV  ejection fraction 60%. 5.  Moderate left atrial enlargement with successful ligation in the  past of the left atrial appendage.  No flow was seen by color Doppler. Normal right atrial size.     [facility]: 7/1/15  FINDINGS:    1.  Right dominant coronary arterial system with mild calcification of the  RCA.  There is 40% serial lesions in the mid RCA.  In the left system there  is 25% distal left main disease.  There is 50% mid LAD disease and 50% ostial  second diagonal branch disease.  There is movement significant restenosis  identified in the prior previously placed mid circumflex stent.    2.  Systemic hypertension.      All above diagnostic testing and laboratory data was independently visualized and reviewed by me (not simply review of report)       Assessment and Plan      1. Atrial fibrillation with RVR  - controlled on cardizem gtt  - add toprol XL 100mg po qd  and d/c cardizem   - decrease amio to 200mg po qd   - RENETTA is clipped      2.  Acute on chronic combined systolic and diastolic HF (heart failure) (HCC)  -NYHA class II-III on exam today; LVEF 40-45% on recent CAMERON  - volume status improved   -d/c IV lasix and start PO lasix 40mg BID  tomorrow    4. Coronary artery disease involving native coronary artery of native heart without angina pectoris  -continue ASA, statin, zetia     5.  S/P AVR (aortic valve replacement)  -S/P AVR with RENETTA exclusion in June 2020     Can be d/c home tomorrow     Venessa Ramos MD [facility], Interventional Cardiology, and Peripheral Vascular [address] [facility]   (O): [phone]  (F): [phone]

## 2021-11-24 NOTE — PROGRESS NOTES
To room to assess for PICC placement. First assessed patient's right arm with ultrasound where she had recent PICC removed. Found noncompressible clot in R basilic vein. (Picture below)    When I went to assess patient's left arm she told me she had a doppler study during her [facility] admission in her left Cephalic. I was able to pull that record from [facility] and verified that the patient did have a clot in the distal cephalic. I assessed the vein and found it to be noncompressible below the a/c, compressible above. Dr. Navarrete Fat the ED attending was notified that the patient is not a PICC candidate due to bilateral clots. He states that alternate plans will be made for vascular access if she requires a central line for amiodarone.  Pt currently has two patient PIVs.       NONCOMPRESSIBLE R BASILIC VEIN

## 2021-11-25 LAB
ANION GAP SERPL CALCULATED.3IONS-SCNC: 11 MMOL/L (ref 3–16)
BUN BLDV-MCNC: 11 MG/DL (ref 7–20)
CALCIUM SERPL-MCNC: 9.2 MG/DL (ref 8.3–10.6)
CHLORIDE BLD-SCNC: 103 MMOL/L (ref 99–110)
CO2: 24 MMOL/L (ref 21–32)
CREAT SERPL-MCNC: 1.1 MG/DL (ref 0.6–1.2)
GFR AFRICAN AMERICAN: 58
GFR NON-AFRICAN AMERICAN: 48
GLUCOSE BLD-MCNC: 103 MG/DL (ref 70–99)
POTASSIUM REFLEX MAGNESIUM: 5.2 MMOL/L (ref 3.5–5.1)
SODIUM BLD-SCNC: 138 MMOL/L (ref 136–145)

## 2021-11-25 PROCEDURE — 94761 N-INVAS EAR/PLS OXIMETRY MLT: CPT

## 2021-11-25 PROCEDURE — 36415 COLL VENOUS BLD VENIPUNCTURE: CPT

## 2021-11-25 PROCEDURE — 6360000002 HC RX W HCPCS: Performed by: INTERNAL MEDICINE

## 2021-11-25 PROCEDURE — 80048 BASIC METABOLIC PNL TOTAL CA: CPT

## 2021-11-25 PROCEDURE — 2060000000 HC ICU INTERMEDIATE R&B

## 2021-11-25 PROCEDURE — 6370000000 HC RX 637 (ALT 250 FOR IP): Performed by: INTERNAL MEDICINE

## 2021-11-25 PROCEDURE — 99232 SBSQ HOSP IP/OBS MODERATE 35: CPT | Performed by: INTERNAL MEDICINE

## 2021-11-25 PROCEDURE — 2580000003 HC RX 258: Performed by: INTERNAL MEDICINE

## 2021-11-25 PROCEDURE — 99233 SBSQ HOSP IP/OBS HIGH 50: CPT | Performed by: INTERNAL MEDICINE

## 2021-11-25 PROCEDURE — 94640 AIRWAY INHALATION TREATMENT: CPT

## 2021-11-25 RX ORDER — FUROSEMIDE 10 MG/ML
60 INJECTION INTRAMUSCULAR; INTRAVENOUS 2 TIMES DAILY
Status: COMPLETED | OUTPATIENT
Start: 2021-11-25 | End: 2021-11-25

## 2021-11-25 RX ORDER — METOPROLOL SUCCINATE 50 MG/1
50 TABLET, EXTENDED RELEASE ORAL DAILY
Status: DISCONTINUED | OUTPATIENT
Start: 2021-11-26 | End: 2021-11-26 | Stop reason: HOSPADM

## 2021-11-25 RX ORDER — AMIODARONE HYDROCHLORIDE 200 MG/1
200 TABLET ORAL NIGHTLY
Status: DISCONTINUED | OUTPATIENT
Start: 2021-11-25 | End: 2021-11-26 | Stop reason: HOSPADM

## 2021-11-25 RX ADMIN — BUDESONIDE AND FORMOTEROL FUMARATE DIHYDRATE 2 PUFF: 160; 4.5 AEROSOL RESPIRATORY (INHALATION) at 20:32

## 2021-11-25 RX ADMIN — LEVOTHYROXINE SODIUM 88 MCG: 0.09 TABLET ORAL at 05:00

## 2021-11-25 RX ADMIN — OXYCODONE HYDROCHLORIDE 10 MG: 10 TABLET ORAL at 08:26

## 2021-11-25 RX ADMIN — BUDESONIDE AND FORMOTEROL FUMARATE DIHYDRATE 2 PUFF: 160; 4.5 AEROSOL RESPIRATORY (INHALATION) at 08:51

## 2021-11-25 RX ADMIN — ASPIRIN 81 MG: 81 TABLET, CHEWABLE ORAL at 08:26

## 2021-11-25 RX ADMIN — METOPROLOL SUCCINATE 100 MG: 50 TABLET, EXTENDED RELEASE ORAL at 08:26

## 2021-11-25 RX ADMIN — FUROSEMIDE 60 MG: 10 INJECTION, SOLUTION INTRAMUSCULAR; INTRAVENOUS at 11:30

## 2021-11-25 RX ADMIN — SODIUM CHLORIDE, PRESERVATIVE FREE 10 ML: 5 INJECTION INTRAVENOUS at 20:39

## 2021-11-25 RX ADMIN — SODIUM CHLORIDE, PRESERVATIVE FREE 10 ML: 5 INJECTION INTRAVENOUS at 08:27

## 2021-11-25 RX ADMIN — OXYCODONE HYDROCHLORIDE 10 MG: 10 TABLET ORAL at 20:38

## 2021-11-25 RX ADMIN — DOCUSATE SODIUM 100 MG: 100 CAPSULE ORAL at 08:26

## 2021-11-25 RX ADMIN — DULOXETINE HYDROCHLORIDE 30 MG: 30 CAPSULE, DELAYED RELEASE ORAL at 08:26

## 2021-11-25 RX ADMIN — FUROSEMIDE 60 MG: 10 INJECTION, SOLUTION INTRAMUSCULAR; INTRAVENOUS at 17:19

## 2021-11-25 RX ADMIN — OXYCODONE HYDROCHLORIDE 10 MG: 10 TABLET ORAL at 14:35

## 2021-11-25 RX ADMIN — ROSUVASTATIN CALCIUM 40 MG: 20 TABLET, FILM COATED ORAL at 20:39

## 2021-11-25 RX ADMIN — AMIODARONE HYDROCHLORIDE 200 MG: 200 TABLET ORAL at 20:44

## 2021-11-25 RX ADMIN — EZETIMIBE 10 MG: 10 TABLET ORAL at 20:39

## 2021-11-25 ASSESSMENT — PAIN DESCRIPTION - ONSET
ONSET: ON-GOING
ONSET: ON-GOING

## 2021-11-25 ASSESSMENT — PAIN DESCRIPTION - LOCATION
LOCATION: BACK
LOCATION: BACK

## 2021-11-25 ASSESSMENT — PAIN SCALES - GENERAL
PAINLEVEL_OUTOF10: 7
PAINLEVEL_OUTOF10: 0
PAINLEVEL_OUTOF10: 7
PAINLEVEL_OUTOF10: 0
PAINLEVEL_OUTOF10: 0
PAINLEVEL_OUTOF10: 7

## 2021-11-25 ASSESSMENT — PAIN DESCRIPTION - ORIENTATION
ORIENTATION: LOWER
ORIENTATION: LOWER

## 2021-11-25 ASSESSMENT — PAIN DESCRIPTION - DESCRIPTORS
DESCRIPTORS: ACHING;CONSTANT
DESCRIPTORS: ACHING;CONSTANT;DISCOMFORT

## 2021-11-25 ASSESSMENT — PAIN DESCRIPTION - FREQUENCY
FREQUENCY: CONTINUOUS
FREQUENCY: CONTINUOUS

## 2021-11-25 ASSESSMENT — PAIN DESCRIPTION - PROGRESSION: CLINICAL_PROGRESSION: NOT CHANGED

## 2021-11-25 ASSESSMENT — PAIN DESCRIPTION - PAIN TYPE
TYPE: CHRONIC PAIN
TYPE: CHRONIC PAIN

## 2021-11-25 ASSESSMENT — PAIN - FUNCTIONAL ASSESSMENT: PAIN_FUNCTIONAL_ASSESSMENT: PREVENTS OR INTERFERES SOME ACTIVE ACTIVITIES AND ADLS

## 2021-11-25 NOTE — PROGRESS NOTES
Progress Note  11/25/2021 10:58 AM  Subjective:   Admit Date: 11/23/2021  PCP: Adi Faith MD  Code:Full Code    Interval History: No acute events overnight. Telemetry reviewed and patient remains in NSR. She is sitting up in bed eating breakfast. She had a BM yesterday. Denies CP, SOB, palpitations, abdominal pain, nausea, vomiting, diarrhea. She feels slightly SOB with ambulation. Diet: ADULT DIET; Regular; Low Fat/Low Chol/High Fiber/2 gm Na; 1800 ml    Data:   Scheduled Meds:   budesonide-formoterol  2 puff Inhalation BID    metoprolol succinate  100 mg Oral Daily    amiodarone  200 mg Oral Daily    furosemide  40 mg Oral BID    aspirin  81 mg Oral Daily    docusate sodium  100 mg Oral BID    DULoxetine  30 mg Oral Daily    ezetimibe  10 mg Oral Nightly    levothyroxine  88 mcg Oral Daily    oxyCODONE HCl  10 mg Oral 4x Daily    pantoprazole  40 mg Oral QAM AC    rosuvastatin  40 mg Oral Nightly    valsartan  80 mg Oral Daily    sodium chloride flush  5-40 mL IntraVENous 2 times per day    enoxaparin  40 mg SubCUTAneous Nightly       Continuous Infusions:   sodium chloride         PRN Meds:diclofenac sodium, ipratropium, tiZANidine, sodium chloride flush, sodium chloride, ondansetron **OR** ondansetron, polyethylene glycol, acetaminophen **OR** acetaminophen, iopamidol  I/O last 3 completed shifts: In: 240 [P.O.:240]  Out: 1750 [[id number]]  I/O this shift:   In: 480 [P.O.:480]  Out: 200 [Urine:200]    Intake/Output Summary (Last 24 hours) at 11/25/2021 1058  Last data filed at 11/25/2021 1011  Gross per 24 hour   Intake 600 ml   Output 1950 ml   Net -1350 ml       CBC:   Recent Labs     11/23/21  1220 11/24/21  0458   WBC 9.1 6.8   HGB 10.5* 10.1*    202     BMP:    Recent Labs     11/23/21  1220 11/24/21  0458 11/25/21  0459    138 138   K 5.2* 4.0 5.2*    107 103   CO2 23 22 24   BUN 10 9 11   CREATININE 0.9 0.8 1.1   GLUCOSE 129* 96 103*     Hepatic:   Recent Labs 11/23/21  1220   AST 47*   ALT 57*   BILITOT 0.4   ALKPHOS 89     Troponin:   Recent Labs     11/23/21  1220 11/23/21  2109 11/24/21  0458   TROPONINI <0.01 <0.01 <0.01     BNP: No results for input(s): BNP in the last 72 hours. Lipids: No results for input(s): CHOL, HDL in the last 72 hours. Invalid input(s): LDLCALCU  ABGs:   Lab Results   Component Value Date    PHART 7.435 11/27/2015    PO2ART 58.8 11/27/2015    NDY3KZZ 35.5 11/27/2015     INR: No results for input(s): INR in the last 72 hours. Objective:   Vitals: BP (!) 144/54   Pulse 60   Temp 97.8 °F (36.6 °C) (Oral)   Resp 20   Ht 5' 2\" (1.575 m)   Wt 149 lb 7.6 oz (67.8 kg)   SpO2 90%   BMI 27.34 kg/m²     General appearance: alert, appears stated age and cooperative  Lungs: clear to auscultation bilaterally  Heart: regular rate and rhythm, S1, S2 normal, no murmur, click, rub or gallop  Abdomen: soft, non-tender; bowel sounds normal; no masses,  no organomegaly  Extremities: extremities normal, atraumatic, no cyanosis or edema  Neurologic: Mental status: Alert, oriented, thought content appropriate    Assessment/Plan:   Principal Problem:  Atrial fibrillation with RVR  -Currently rate controlled on amiodarone 200 mg daily and Toprol 100 mg daily.   -Pt did not feel comfortable going home today. Will continue to monitor on current regimen and plan to discharge home tomorrow if she remains clinically stable    Active Problems:  Pulmonary emphysema- continue home bronchodilators  Pulmonary HTN ([address]  Acute on chronic diastolic heart failure- appears euvolemic. Continue Lasix 40 mg PO bod  S/P AVR (aortic valve replacement)- with RENETTA exclusion June 2020  Nonrheumatic mitral valve regurgitation  Hypothyroidism due to Hashimoto's thyroiditis- stable. Continue levothyroxine  Basilic vein thrombosis  Hyperkalemia- K mildly elevated at 5.2 today. She is not on supplementation.  Recheck tomorrow AM.   Resolved Problems:    * No resolved hospital

## 2021-11-25 NOTE — PROGRESS NOTES
Patient's heart rhythm is now sinus rhonda with a HR of 57.  Electronically signed by Lety Queen RN on 11/25/2021 at 1:42 AM

## 2021-11-25 NOTE — PLAN OF CARE
Problem: Pain:  Description: Pain management should include both nonpharmacologic and pharmacologic interventions. Goal: Pain level will decrease  Description: Pain level will decrease  Outcome: Ongoing  Goal: Control of acute pain  Description: Control of acute pain  Outcome: Ongoing  Goal: Control of chronic pain  Description: Control of chronic pain  Outcome: Ongoing     Problem: Falls - Risk of:  Goal: Will remain free from falls  Description: Will remain free from falls  Outcome: Ongoing  Goal: Absence of physical injury  Description: Absence of physical injury  Outcome: Ongoing     Problem: Skin Integrity:  Goal: Will show no infection signs and symptoms  Description: Will show no infection signs and symptoms  Outcome: Ongoing  Goal: Absence of new skin breakdown  Description: Absence of new skin breakdown  Outcome: Ongoing     Problem:  Activity:  Goal: Ability to tolerate increased activity will improve  Description: Ability to tolerate increased activity will improve  Outcome: Ongoing  Goal: Expression of feelings of increased energy will increase  Description: Expression of feelings of increased energy will increase  Outcome: Ongoing     Problem: Cardiac:  Goal: Ability to maintain an adequate cardiac output will improve  Description: Ability to maintain an adequate cardiac output will improve  Outcome: Ongoing  Goal: Complications related to the disease process, condition or treatment will be avoided or minimized  Description: Complications related to the disease process, condition or treatment will be avoided or minimized  Outcome: Ongoing     Problem: Coping:  Goal: Level of anxiety will decrease  Description: Level of anxiety will decrease  Outcome: Ongoing  Goal: General experience of comfort will improve  Description: General experience of comfort will improve  Outcome: Ongoing     Problem: Health Behavior:  Goal: Ability to manage health-related needs will improve  Description: Ability to manage health-related needs will improve  Outcome: Ongoing     Problem: Safety:  Goal: Ability to remain free from injury will improve  Description: Ability to remain free from injury will improve  Outcome: Ongoing  Goal: Will show no signs and symptoms of excessive bleeding  Description: Will show no signs and symptoms of excessive bleeding  Outcome: Ongoing     Problem: OXYGENATION/RESPIRATORY FUNCTION  Goal: Patient will maintain patent airway  Outcome: Ongoing  Goal: Patient will achieve/maintain normal respiratory rate/effort  Description: Respiratory rate and effort will be within normal limits for the patient  Outcome: Ongoing     Problem: HEMODYNAMIC STATUS  Goal: Patient has stable vital signs and fluid balance  Outcome: Ongoing     Problem: FLUID AND ELECTROLYTE IMBALANCE  Goal: Fluid and electrolyte balance are achieved/maintained  Outcome: Ongoing     Problem: ACTIVITY INTOLERANCE/IMPAIRED MOBILITY  Goal: Mobility/activity is maintained at optimum level for patient  Outcome: Ongoing

## 2021-11-25 NOTE — PROGRESS NOTES
Cardiology Progress Note     Admit Date: 2021     Reason for follow up: paroxysmal Afib, acute systolic/diastolic CHF    HPI and Interval History:   74yo F h/o PAF, CAD, prior AVR with surgical Afib ablation and appendage clipping 2020 presents with dyspnea and diagnosed with acute diastolic CHF and recurrent Afib. Patient seen and examined. Clinical notes reviewed. Telemetry reviewed - SR 58 bpm. No new complaint today. No major events overnight. Denies having angina, shortness of breath, dyspnea on exertion, Orthopnea, PND at the time of this visit. Review of System:  All other systems reviewed except for that noted above. Pertinent negatives and positives are:     · General: negative for fever, chills   · Ophthalmic ROS: negative for - eye pain or loss of vision  · ENT ROS: negative for - headaches, sore throat   · Respiratory: negative for - cough, sputum  · Cardiovascular: Reviewed in HPI  · Gastrointestinal: negative for - abdominal pain, diarrhea, N/V  · Hematology: negative for - bleeding, blood clots, bruising or jaundice  · Genito-Urinary:  negative for - Dysuria or incontinence  · Musculoskeletal: negative for - Joint swelling, muscle pain  · Neurological: negative for - confusion, dizziness, headaches   · Psychiatric: No anxiety, no depression.   · Dermatological: negative for - rash      Physical Examination:  Vitals:    21 0851   BP:    Pulse:    Resp:    Temp:    SpO2: 90%        Intake/Output Summary (Last 24 hours) at 2021 1059  Last data filed at 2021 1011  Gross per 24 hour   Intake 600 ml   Output 1950 ml   Net -1350 ml     In: 600 [P.O.:600]  Out: 850    Wt Readings from Last 3 Encounters:   21 149 lb 7.6 oz (67.8 kg)   21 144 lb 9.6 oz (65.6 kg)   21 144 lb 12.8 oz (65.7 kg)     Temp  Av °F (36.7 °C)  Min: 97.7 °F (36.5 °C)  Max: 98.3 °F (36.8 °C)  Pulse  Av.1  Min: 54  Max: 100  BP  Min: 100/56  Max: 144/54  SpO2  Av.1 %  Min: 90 %  Max: 96 %    · Telemetry: Sinus rhythm with v-rates 58 bpm  · Constitutional: Alert. Oriented to person, place, and time. No distress. · Head: Normocephalic and atraumatic. · Mouth/Throat: Lips appear moist. Oropharynx is clear and moist.  · Eyes: Conjunctivae normal. EOM are normal.   · Neck: Neck supple. No lymphadenopathy. No rigidity. 18cm JVD present. · Cardiovascular: Normal rate, regular rhythm. Normal S1&S2. Carotid pulse 2+ bilaterally. · Pulmonary/Chest: Bilateral respiratory sounds present. No respiratory accessory muscle use. No wheezes, No rhonchi. L lung rales. · Abdominal: Soft. Normal bowel sounds present. No distension, No tenderness. No splenomegaly. No hernia. · Musculoskeletal: No tenderness. Full range of motion in bilateral upper and lower extremities. 1+ pitting BLE edema    · Lymphadenopathy: Has no cervical adenopathy. · Neurological: Alert and oriented. Cranial nerve II-XII grossly intact, No gross deficit to touch. · Skin: Skin is warm and dry. No rash, lesions, ulcerations noted. · Psychiatric: No anxiety nor agitation. Labs, telemetry, diagnostic and imaging results personally reviewed and interpreted. Recent Labs     11/23/21  1220 11/24/21  0458 11/25/21  0459    138 138   K 5.2* 4.0 5.2*    107 103   CO2 23 22 24   BUN 10 9 11   CREATININE 0.9 0.8 1.1     Recent Labs     11/23/21  1220 11/24/21  0458   WBC 9.1 6.8   HGB 10.5* 10.1*   HCT 33.2* 32.0*   MCV 88.5 90.1    202     Lab Results   Component Value Date    TROPONINI <0.01 11/24/2021     Estimated Creatinine Clearance: 39 mL/min (based on SCr of 1.1 mg/dL).    Lab Results   Component Value Date    .2 04/22/2013     Lab Results   Component Value Date    PROTIME 11.7 02/19/2018    PROTIME 16.0 02/18/2018    PROTIME 94.4 02/18/2018    INR 1.04 02/19/2018    INR 1.42 02/18/2018    INR 8.35 02/18/2018     Lab Results   Component Value Date    CHOL 114 11/04/2021    HDL 43 11/04/2021    HDL 42 06/21/2010    TRIG 76 11/04/2021       Scheduled Meds:   budesonide-formoterol  2 puff Inhalation BID    metoprolol succinate  100 mg Oral Daily    amiodarone  200 mg Oral Daily    furosemide  40 mg Oral BID    aspirin  81 mg Oral Daily    docusate sodium  100 mg Oral BID    DULoxetine  30 mg Oral Daily    ezetimibe  10 mg Oral Nightly    levothyroxine  88 mcg Oral Daily    oxyCODONE HCl  10 mg Oral 4x Daily    pantoprazole  40 mg Oral QAM AC    rosuvastatin  40 mg Oral Nightly    valsartan  80 mg Oral Daily    sodium chloride flush  5-40 mL IntraVENous 2 times per day    enoxaparin  40 mg SubCUTAneous Nightly     Continuous Infusions:   sodium chloride       PRN Meds:diclofenac sodium, ipratropium, tiZANidine, sodium chloride flush, sodium chloride, ondansetron **OR** ondansetron, polyethylene glycol, acetaminophen **OR** acetaminophen, iopamidol     Patient Active Problem List    Diagnosis Date Noted    Sinus bradycardia     Acute metabolic encephalopathy 37/60/4556    Left hand weakness 06/17/2015    S/P AVR (aortic valve replacement)     Acute on chronic diastolic heart failure ([facility])     Pulmonary HTN ([facility])     Adjustment reaction with anxiety 05/01/2014    Intermittent claudication ([facility]) 04/30/2014    Stenosis of right carotid artery     Pulmonary emphysema ([facility]) 04/28/2014    Closed sacral fracture ([facility]) 04/28/2014    Hyperlipidemia LDL goal <70     PAF (paroxysmal atrial fibrillation) ([facility]) 07/30/2016    Bladder tumor 02/11/2014    Hypothyroidism     Essential hypertension     Basilic vein thrombosis 26/29/9177    History of GI bleed 10/27/2021    Atrial fibrillation with RVR ([facility]) 10/26/2021    Anginal equivalent ([facility] 04/26/2021    Thoracic spine pain 07/06/2020    Gastrointestinal hemorrhage     Bright red rectal bleeding 02/18/2018    Acute blood loss anemia 02/18/2018    Rectal bleeding 02/18/2018    Macular degeneration 01/01/2018    rosuvastatin 40  -no ACS at this time. Damion negative x 3. EKG unremarkable for dynamic St-T wave changes besides that which developed with Afib. Will follow with you. Thank you for allowing me to participate in the care of this patient. If you have any questions, please do not hesitate to contact me.     Gisell Ray MD, MS, [facility], [facility]  Cardiac Electrophysiology  [address]  [address] [facility], [address] [place] [address]  Timoteo Singh [facility]  [phone]

## 2021-11-26 VITALS
HEART RATE: 53 BPM | TEMPERATURE: 98.4 F | OXYGEN SATURATION: 93 % | SYSTOLIC BLOOD PRESSURE: 129 MMHG | HEIGHT: 62 IN | WEIGHT: 140.43 LBS | DIASTOLIC BLOOD PRESSURE: 63 MMHG | BODY MASS INDEX: 25.84 KG/M2 | RESPIRATION RATE: 16 BRPM

## 2021-11-26 LAB
ANION GAP SERPL CALCULATED.3IONS-SCNC: 11 MMOL/L (ref 3–16)
BUN BLDV-MCNC: 16 MG/DL (ref 7–20)
CALCIUM SERPL-MCNC: 8.6 MG/DL (ref 8.3–10.6)
CHLORIDE BLD-SCNC: 97 MMOL/L (ref 99–110)
CO2: 26 MMOL/L (ref 21–32)
CREAT SERPL-MCNC: 1.2 MG/DL (ref 0.6–1.2)
EKG ATRIAL RATE: 43 BPM
EKG DIAGNOSIS: NORMAL
EKG P AXIS: 89 DEGREES
EKG P-R INTERVAL: 184 MS
EKG Q-T INTERVAL: 622 MS
EKG QRS DURATION: 156 MS
EKG QTC CALCULATION (BAZETT): 525 MS
EKG R AXIS: -32 DEGREES
EKG T AXIS: -11 DEGREES
EKG VENTRICULAR RATE: 43 BPM
GFR AFRICAN AMERICAN: 53
GFR NON-AFRICAN AMERICAN: 44
GLUCOSE BLD-MCNC: 91 MG/DL (ref 70–99)
POTASSIUM REFLEX MAGNESIUM: 3.9 MMOL/L (ref 3.5–5.1)
PRO-BNP: ABNORMAL PG/ML (ref 0–449)
REASON FOR REJECTION: NORMAL
REJECTED TEST: NORMAL
SODIUM BLD-SCNC: 134 MMOL/L (ref 136–145)

## 2021-11-26 PROCEDURE — 94761 N-INVAS EAR/PLS OXIMETRY MLT: CPT

## 2021-11-26 PROCEDURE — 80048 BASIC METABOLIC PNL TOTAL CA: CPT

## 2021-11-26 PROCEDURE — 36415 COLL VENOUS BLD VENIPUNCTURE: CPT

## 2021-11-26 PROCEDURE — 94640 AIRWAY INHALATION TREATMENT: CPT

## 2021-11-26 PROCEDURE — 6370000000 HC RX 637 (ALT 250 FOR IP): Performed by: INTERNAL MEDICINE

## 2021-11-26 PROCEDURE — 99233 SBSQ HOSP IP/OBS HIGH 50: CPT | Performed by: NURSE PRACTITIONER

## 2021-11-26 PROCEDURE — 93005 ELECTROCARDIOGRAM TRACING: CPT | Performed by: NURSE PRACTITIONER

## 2021-11-26 PROCEDURE — 2580000003 HC RX 258: Performed by: INTERNAL MEDICINE

## 2021-11-26 PROCEDURE — 93010 ELECTROCARDIOGRAM REPORT: CPT | Performed by: INTERNAL MEDICINE

## 2021-11-26 PROCEDURE — 83880 ASSAY OF NATRIURETIC PEPTIDE: CPT

## 2021-11-26 PROCEDURE — 6360000002 HC RX W HCPCS: Performed by: INTERNAL MEDICINE

## 2021-11-26 PROCEDURE — 99239 HOSP IP/OBS DSCHRG MGMT >30: CPT | Performed by: INTERNAL MEDICINE

## 2021-11-26 RX ORDER — METOPROLOL SUCCINATE 50 MG/1
50 TABLET, EXTENDED RELEASE ORAL DAILY
Qty: 30 TABLET | Refills: 3 | Status: SHIPPED | OUTPATIENT
Start: 2021-11-27 | End: 2022-03-07 | Stop reason: SDUPTHER

## 2021-11-26 RX ORDER — FUROSEMIDE 10 MG/ML
60 INJECTION INTRAMUSCULAR; INTRAVENOUS 2 TIMES DAILY
Status: DISCONTINUED | OUTPATIENT
Start: 2021-11-26 | End: 2021-11-26 | Stop reason: HOSPADM

## 2021-11-26 RX ORDER — AMIODARONE HYDROCHLORIDE 200 MG/1
200 TABLET ORAL NIGHTLY
Qty: 90 TABLET | Refills: 3
Start: 2021-11-26 | End: 2022-03-07 | Stop reason: SDUPTHER

## 2021-11-26 RX ORDER — TORSEMIDE 20 MG/1
TABLET ORAL
Qty: 60 TABLET | Refills: 3 | Status: SHIPPED | OUTPATIENT
Start: 2021-11-26 | End: 2022-03-07 | Stop reason: SDUPTHER

## 2021-11-26 RX ADMIN — LEVOTHYROXINE SODIUM 88 MCG: 0.09 TABLET ORAL at 06:10

## 2021-11-26 RX ADMIN — ASPIRIN 81 MG: 81 TABLET, CHEWABLE ORAL at 08:41

## 2021-11-26 RX ADMIN — DOCUSATE SODIUM 100 MG: 100 CAPSULE ORAL at 08:40

## 2021-11-26 RX ADMIN — DULOXETINE HYDROCHLORIDE 30 MG: 30 CAPSULE, DELAYED RELEASE ORAL at 08:41

## 2021-11-26 RX ADMIN — SODIUM CHLORIDE, PRESERVATIVE FREE 10 ML: 5 INJECTION INTRAVENOUS at 08:42

## 2021-11-26 RX ADMIN — OXYCODONE HYDROCHLORIDE 10 MG: 10 TABLET ORAL at 08:41

## 2021-11-26 RX ADMIN — FUROSEMIDE 60 MG: 10 INJECTION, SOLUTION INTRAMUSCULAR; INTRAVENOUS at 11:31

## 2021-11-26 RX ADMIN — BUDESONIDE AND FORMOTEROL FUMARATE DIHYDRATE 2 PUFF: 160; 4.5 AEROSOL RESPIRATORY (INHALATION) at 08:03

## 2021-11-26 RX ADMIN — PANTOPRAZOLE SODIUM 40 MG: 40 TABLET, DELAYED RELEASE ORAL at 06:10

## 2021-11-26 RX ADMIN — METOPROLOL SUCCINATE 50 MG: 50 TABLET, EXTENDED RELEASE ORAL at 08:40

## 2021-11-26 ASSESSMENT — PAIN DESCRIPTION - PROGRESSION: CLINICAL_PROGRESSION: NOT CHANGED

## 2021-11-26 ASSESSMENT — PAIN SCALES - GENERAL
PAINLEVEL_OUTOF10: 0
PAINLEVEL_OUTOF10: 7
PAINLEVEL_OUTOF10: 0
PAINLEVEL_OUTOF10: 0

## 2021-11-26 ASSESSMENT — PAIN - FUNCTIONAL ASSESSMENT: PAIN_FUNCTIONAL_ASSESSMENT: PREVENTS OR INTERFERES SOME ACTIVE ACTIVITIES AND ADLS

## 2021-11-26 ASSESSMENT — PAIN DESCRIPTION - DESCRIPTORS: DESCRIPTORS: ACHING;CONSTANT

## 2021-11-26 ASSESSMENT — PAIN DESCRIPTION - ONSET: ONSET: ON-GOING

## 2021-11-26 ASSESSMENT — PAIN DESCRIPTION - ORIENTATION: ORIENTATION: LOWER

## 2021-11-26 ASSESSMENT — PAIN DESCRIPTION - LOCATION: LOCATION: BACK

## 2021-11-26 ASSESSMENT — PAIN DESCRIPTION - PAIN TYPE: TYPE: CHRONIC PAIN

## 2021-11-26 ASSESSMENT — PAIN DESCRIPTION - FREQUENCY: FREQUENCY: CONTINUOUS

## 2021-11-26 NOTE — PROGRESS NOTES
[facility]   Daily Progress Note      Admit Date:  11/23/2021    Reason for follow up visit: CHF; rapid AF    CC: \"I feel better today. \"    67 y/o female with PMH significant for  PAF, pulmonary HTN, Aortic stenosis, CAD/CHILANGO to mid circ 5/2014, S/P AVR, PVI and RENETTA exclusion by Dr. Valerie Lyons on 6/16/2020 admitted to [facility] with c/o SOB, edema and rapid atrial fib. Underwent prior surgical ablation during AVR and appendage clipping. Multiple prior cardioversion's to NSR. She has received IV diltiazem, IV lasix and increase po Amiodarone since admit. (she had extravasation with IV amiodarone in the past). Has had basilic DVT and therefore no PICC line could be placed. Now back in sinus rhythm. Interval History:  Pt. seen and examined; records reviewed  BP stable  Remains in sinus rhythm: rate 40-50's with frequent PAC's, PVC's  Net diuresis -3 L since admit  Wt 140# today  IV lasix yesterday and repeated this AM  Up in room and no complaints  Denies chest pain or SOB    Subjective:  Pt with no acute overnight cardiac events. SOB improved. Ambulated in room without complaints  Denies chest pain, cough, PND, orthopnea, dizziness or palpitations    Review of Systems:   · Constitutional: no unanticipated weight loss. There's been no change in energy level, sleep pattern, or activity level. No fevers, chills. · Eyes: No visual changes or diplopia. No scleral icterus. · ENT: No Headaches, hearing loss or vertigo. No mouth sores or sore throat. · Cardiovascular: as reviewed in HPI  · Respiratory: No cough or wheezing, no sputum production. No hematemesis. · Gastrointestinal: No abdominal pain, appetite loss, blood in stools. No change in bowel or bladder habits. · Genitourinary: No dysuria, trouble voiding, or hematuria. · Musculoskeletal:  No gait disturbance, no joint complaints. · Integumentary: No rash or pruritis.   · Neurological: No headache, diplopia, change in muscle strength, numbness or tingling. · Psychiatric: No anxiety or depression. · Endocrine: No temperature intolerance. No excessive thirst, fluid intake, or urination. No tremor. · Hematologic/Lymphatic: No abnormal bruising or bleeding, blood clots or swollen lymph nodes. · Allergic/Immunologic: No nasal congestion or hives. Objective:   BP (!) 126/48   Pulse 58   Temp 98.5 °F (36.9 °C) (Oral)   Resp 16   Ht 5' 2\" (1.575 m)   Wt 140 lb 6.9 oz (63.7 kg)   SpO2 91%   BMI 25.69 kg/m²     Intake/Output Summary (Last 24 hours) at 11/26/2021 1042  Last data filed at 11/25/2021 2309  Gross per 24 hour   Intake 360 ml   Output 1700 ml   Net -1340 ml     Wt Readings from Last 3 Encounters:   11/26/21 140 lb 6.9 oz (63.7 kg)   11/23/21 144 lb 9.6 oz (65.6 kg)   11/03/21 144 lb 12.8 oz (65.7 kg)       Physical Exam:  General: In no acute distress. Awake, alert, and oriented X4. Up in room  Skin:  Warm and dry. No new appearing rashes or lesions. Neck:  Supple. No JVD or carotid bruit appreciated. Chest: Lungs with fine crackles in R posterior base. No wheezes/rhonchi/rales  Cardiovascular:  RRR. Normal S1 and S2. Soft systolic murmur  Abdomen:  soft, nontender, nontender, +bowel sounds. No hepatomegaly  Extremities:  No LE edema. No clubbing or cyanosis. 2+ bilateral radial/DP/PT pulses. Cap refill brisk.      Medications:    metoprolol succinate  50 mg Oral Daily    amiodarone  200 mg Oral Nightly    budesonide-formoterol  2 puff Inhalation BID    aspirin  81 mg Oral Daily    docusate sodium  100 mg Oral BID    DULoxetine  30 mg Oral Daily    ezetimibe  10 mg Oral Nightly    levothyroxine  88 mcg Oral Daily    oxyCODONE HCl  10 mg Oral 4x Daily    pantoprazole  40 mg Oral QAM AC    rosuvastatin  40 mg Oral Nightly    valsartan  80 mg Oral Daily    sodium chloride flush  5-40 mL IntraVENous 2 times per day    enoxaparin  40 mg SubCUTAneous Nightly      sodium chloride       diclofenac sodium, ipratropium, tiZANidine, wedge pressure tracing suggestive of severe  mitral regurgitation.     10/27/2021 Echo:   Left ventricular cavity size is normal.   Normal left ventricular wall thickness.   Ejection fraction is visually estimated to be 45-50%. There is mild global   hypokinesis appreciated.   Grade III diastolic dysfunction with elevated LV filling pressures.   Moderately dilated right ventricle with mildly reduced function.   The left atrium is severely dilated.   The right atrium is mildly dilated.   Moderately severe tricuspid regurgitation.   Moderate pulmonic regurgitation present.   Moderately severe mitral regurgitation appreciated.   A bioprosthetic artificial aortic valve appears well seated with a maximum   velocity of 2.4m/s and a mean gradient of 12mmHg.   Trivial aortic regurgitation.   A bubble study was performed and fails to show evidence of right to left   shunting.     7/30/2020 CAMERON:  1.  Moderate mitral regurgitation with thickening of the mitral valve  leaflets.  There is no flow reversal seen in the pulmonary veins. 2.  Moderate tricuspid regurgitation. 3.  Normal-appearing bioprosthetic aortic valve replacement with no  significant aortic regurgitation or stenosis identified visually. 4.  Normal left ventricular and right ventricular size and function.  LV  ejection fraction 60%. 5.  Moderate left atrial enlargement with successful ligation in the  past of the left atrial appendage.  No flow was seen by color Doppler.    Normal right atrial size.     [facility]: 7/1/15  FINDINGS:    1.  Right dominant coronary arterial system with mild calcification of the  RCA.  There is 40% serial lesions in the mid RCA.  In the left system there  is 25% distal left main disease.  There is 50% mid LAD disease and 50% ostial  second diagonal branch disease.  There is movement significant restenosis  identified in the prior previously placed mid circumflex stent.    2.  Systemic hypertension.     Telemetry: Sinus bradycardia/sinus rhythm with frequent PAC's; PVC; HR 40-60    Assessment/Plan:    1. Acute on chronic combined systolic and diastolic HF  -NYHA class II on exam today; LVEF 40-45%  -will resume po diuretic  -continue Toprol and valsartan  -low sodium diet and fluid restriction    2. Paroxysmal atrial fib with RVR  -now back in Sinus rhonda (asx)  -will continue Amiodarone and Toprol (can reevaluate dosing as outpt)  -RENETTA clipped with AVR in June 2020    3. Coronary artery disease  -prior stent to mid circ in 2014  -multivessel disease  -angina quiet  -continue ASA, statin and zetia    4. S/P AVR (aortic valve replacement)  -S/P AVR with RENETTA exclusion in June 2020    Plan for discharge today    Discussed with Dr. Fer Meza and Dr. Jacklyn Morton regarding meds, follow up, etc.    Follow up with Dr. Demian Baptiste as scheduled on 11/30/2021 @ 3:30 PM    Follow up with Dr. Fer Meza regarding atrial fib therapies (? Ablation)    Emphasized and discussed low-fat/low sodium diet, monitoring of daily weights, fluid restriction, worsening signs and symptoms of heart failure and when to call, and the importance of regular exercise and activity.     Check BMP with return office visit    Discharge Cardiac Meds:  Amiodarone 200 mg daily  ASA 81 mg daily  Zetia 10 mg nightly  Torsemide 20 mg daily; take additional 20 mg daily if weight gain > 3#, increased swelling or shortness of breath  Toprol Xl 50 mg daily  Crestor 40 mg daily  Valsartan 80 mg daily    Electronically signed by HOWARD Gallardo - CNP on 11/26/2021 at 10:42 AM

## 2021-11-26 NOTE — CARE COORDINATION
Case Management Assessment            Discharge Note                    Date / Time of Note: 11/26/2021 1:31 PM                  Discharge Note Completed by: Breanne Patel RN    Patient Name: Stephanie Keenan   YOB: 1945  Diagnosis: Basilic vein thrombosis [Y35.381]  Rapid atrial fibrillation ([address] [I48.91]  Atrial fibrillation with RVR ([address] [I48.91]  Acute on chronic congestive heart failure, unspecified heart failure type ([address] [I50.9]   Date / Time: 11/23/2021 11:59 AM    Current PCP: Raf Chambers MD    Advance Directives:  Code Status: Full Code  [place] DNR form completed and on chart: Not Indicated    Financial:  Payor: Marilynn Zhang / Plan: [facility] [place] PPO / Product Type: *No Product type* /      Pharmacy:    [address] [place] [address] [address] [address]  Phone: [phone] Fax: [phone]    [facility] [address] [facility], [address] [place] [id number] -  [phone]  [address] [place] [address]  Phone: [phone] Fax: [address] [facility], [address] [facility] [phone] [facility] [phone] [address] [facility] [address] [address]  Phone: [phone] Fax: [phone]      Assistance purchasing medications?: Potential Assistance Purchasing Medications: No      DISCHARGE Disposition: [facility]: [facility]     JOE Completed: Yes    IMM Completed:   Yes, Case management has presented and reviewed IMM letter #2 to the patient and/or family/ POA. Patient and/or family/POA verbalized understanding of their medicare rights and appeal process if needed. Patient and/or family/POA has signed, initialed and placed today's date (11/26/21) and time (1200) on IMM letter #2 on the the appropriate lines.  Patient and/or family/POA, copy of letter offered and they are aware that this original copy of IMM letter #2 is available prior to discharge from the paper chart on the unit. Electronic documentation has been entered into epic for IMM letter #2 and original paper copy has been added to the paper chart at the nurses station. Transportation:  Transportation PLAN for discharge: family   Mode of Transport: [address]:  [address] ordered at discharge: Yes  [address]: Donnie Keys  Phone: [phone]  Fax: [phone]  Waiting for Dr. Chicho Reynoso to enter orders    The Patient and/or patient representative Philip Mckeon and her family were provided with a choice of provider and agrees with the discharge plan Yes    Freedom of choice list was provided with basic dialogue that supports the patient's individualized plan of care/goals and shares the quality data associated with the providers.  Yes    Kadi Brandt, RADHA  Case Management  [phone]

## 2021-11-26 NOTE — PROGRESS NOTES
Discharge order noted. Pt has received over 60 minutes of heart failure instruction. Her med rec has been sent to pharmacy, she has discharge instructions inplace on her AVS, and she has a follow up appointment arranged within 7 days.

## 2021-11-26 NOTE — PROGRESS NOTES
IV and telemetry monitor removed. Discharge paperwork completed and discussed with the patient and the patient's daughter at bedside. Patient has been made aware of follow up appointment with Dr. Valeriy River scheduled for Tuesday 11/30 @ 3:30 PM, and to have lab drawn prior to visit (paper order requisition provided). Patient verbalized understanding. Patient educated on new medications at discharge, as well as changes to current medications. All belongings have been packed up and sent with the patient. Patient transported to discharge area by RN with no complications and patient will be driven home by daughter.

## 2021-11-26 NOTE — PLAN OF CARE
Problem: Pain:  Goal: Pain level will decrease  11/26/2021 0723 by [facility] Staff, RN  Outcome: Ongoing     Problem: Pain:  Goal: Control of acute pain  11/26/2021 0723 by [facility] Staff, RN  Outcome: Ongoing     Problem: Pain:  Goal: Control of chronic pain  11/26/2021 0723 by [facility] Staff, RN  Outcome: Ongoing     Problem: Falls - Risk of:  Goal: Will remain free from falls  11/26/2021 0723 by [facility] Staff, RN  Outcome: Ongoing     Problem: Falls - Risk of:  Goal: Absence of physical injury  11/26/2021 0723 by [facility] Staff, RN  Outcome: Ongoing     Problem: Skin Integrity:  Goal: Will show no infection signs and symptoms  11/26/2021 0723 by [facility] Staff, RN  Outcome: Ongoing     Problem: Skin Integrity:  Goal: Absence of new skin breakdown  11/26/2021 0723 by [facility] Staff, RN  Outcome: Ongoing     Problem: Activity:  Goal: Ability to tolerate increased activity will improve  11/26/2021 0723 by [facility] Staff, RN  Outcome: Ongoing     Problem:  Activity:  Goal: Expression of feelings of increased energy will increase  11/26/2021 0723 by [facility] Staff, RN  Outcome: Ongoing     Problem: Cardiac:  Goal: Ability to maintain an adequate cardiac output will improve  11/26/2021 0723 by [facility] Staff, RN  Outcome: Ongoing     Problem: Cardiac:  Goal: Complications related to the disease process, condition or treatment will be avoided or minimized  11/26/2021 0723 by [facility] Staff, RN  Outcome: Ongoing     Problem: Coping:  Goal: Level of anxiety will decrease  11/26/2021 0723 by [facility] Staff, RN  Outcome: Ongoing     Problem: Coping:  Goal: General experience of comfort will improve  11/26/2021 0723 by [facility] Staff, RN  Outcome: Ongoing     Problem: Health Behavior:  Goal: Ability to manage health-related needs will improve  11/26/2021 0723 by [facility] Staff, RN  Outcome: Ongoing     Problem: Safety:  Goal: Ability to remain free from injury will improve  11/26/2021 0723 by [facility] Staff, RN  Outcome: Ongoing     Problem: Safety:  Goal: Will show no signs and symptoms of excessive bleeding  11/26/2021 0723 by Phil Chaudhary RN  Outcome: Ongoing     Problem: OXYGENATION/RESPIRATORY FUNCTION  Goal: Patient will maintain patent airway  11/26/2021 0723 by Phil Chaudhary RN  Outcome: Ongoing     Problem: OXYGENATION/RESPIRATORY FUNCTION  Goal: Patient will achieve/maintain normal respiratory rate/effort  11/26/2021 0723 by Phil Chaudhary RN  Outcome: Ongoing     Problem: HEMODYNAMIC STATUS  Goal: Patient has stable vital signs and fluid balance  11/26/2021 0723 by Phil Chaudhary RN  Outcome: Ongoing     Problem: FLUID AND ELECTROLYTE IMBALANCE  Goal: Fluid and electrolyte balance are achieved/maintained  11/26/2021 0723 by Phil Chaudhary RN  Outcome: Ongoing     Problem: ACTIVITY INTOLERANCE/IMPAIRED MOBILITY  Goal: Mobility/activity is maintained at optimum level for patient  11/26/2021 0723 by Phil Chaudhary RN  Outcome: Ongoing

## 2021-11-26 NOTE — DISCHARGE SUMMARY
Physician Discharge Summary     Patient ID:   Tony Sotomayor  [id number]  68 y.o.  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  Her troponins were negative, her bnp stayed high, her renal function bumped a little with diuresis and she lost 5 lbs. She felt better after some med adjustment including changing her to toprol and amio instead of cardizem and amio. She also got some relief in sob from symbicort inhaler. She was followed by cardiology. By the time of discharge she was back in sinus rhythm and ranging 50-60 with 45 while asleep. She had no further leg swelling. Mild crackles in the bases. Could walk around the room wo increased dyspnea. Dr Andrea Nichols of Cardiology did see her and wanted to do an ablation in the near future as an op but for now aim to keep her in sb.         Consults:   IP CONSULT TO PRIMARY CARE PROVIDER  IP CONSULT TO CARDIOLOGY    Discharge Exam:  General Appearance: alert and oriented to person, place and time, well-developed and well-nourished, in no acute distress  Skin: warm and dry, no rash or erythema  Head: normocephalic and atraumatic  Eyes: conjunctivae normal and sclera anicteric  ENT: hearing grossly normal bilaterally and sinuses non-tender  Neck: neck supple and non tender without mass, no thyromegaly or thyroid nodules, no cervical lymphadenopathy   Pulmonary/Chest: insp crackles in the bases  Cardiovascular: normal rate and rhythm  JVD and holosys m  Abdomen: soft, non-tender, non-distended, normal bowel sounds, no masses or organomegaly  Extremities: no cyanosis, clubbing or edema  Musculoskeletal: no swollen joints and no tender joints,  Neurologic: coordination normal and speech normal, moves all 4 extremities    Disposition: home  Hospital Meds:  Current Facility-Administered Medications   Medication Dose Route Frequency Provider Last Rate Last Admin    metoprolol succinate (TOPROL XL) extended release tablet 50 mg  50 mg Oral Daily Giuseppe Oropeza MD   50 mg at 11/26/21 0840    amiodarone (CORDARONE) tablet 200 mg  200 mg Oral Nightly Faizan Booker MD   200 mg at 11/25/21 2044    budesonide-formoterol (SYMBICORT) 160-4.5 MCG/ACT inhaler 2 puff  2 puff Inhalation BID Torie Mueller MD   2 puff at 11/26/21 0803    aspirin chewable tablet 81 mg  81 mg Oral Daily Torie Mueller MD   81 mg at 11/26/21 0841    diclofenac sodium (VOLTAREN) 1 % gel 2 g  2 g Topical 4x Daily PRN Torie Mueller MD        docusate sodium (COLACE) capsule 100 mg  100 mg Oral BID Torie Mueller MD   100 mg at 11/26/21 0840    DULoxetine (CYMBALTA) extended release capsule 30 mg  30 mg Oral Daily Torie Mueller MD   30 mg at 11/26/21 0841    ezetimibe (ZETIA) tablet 10 mg  10 mg Oral Nightly Torie Mueller MD   10 mg at 11/25/21 2039    ipratropium (ATROVENT) 0.03 % nasal spray 2 spray  2 spray Each Nostril BID PRN Torie Mueller MD        levothyroxine (SYNTHROID) tablet 88 mcg  88 mcg Oral Daily Torie Mueller MD   88 mcg at 11/26/21 0610    oxyCODONE HCl (OXY-IR) immediate release tablet 10 mg  10 mg Oral 4x Daily Torie Mueller MD   10 mg at 11/26/21 0841    pantoprazole (PROTONIX) tablet 40 mg  40 mg Oral QAM AC Torie Mueller MD   40 mg at 11/26/21 0610    rosuvastatin (CRESTOR) tablet 40 mg  40 mg Oral Nightly Torie Mueller MD   40 mg at 11/25/21 2039    tiZANidine (ZANAFLEX) tablet 2 mg  2 mg Oral Q8H PRN Torie Mueller MD   2 mg at 11/23/21 2026    valsartan (DIOVAN) tablet 80 mg  80 mg Oral Daily Torie Mueller MD        sodium chloride flush 0.9 % injection 5-40 mL  5-40 mL IntraVENous 2 times per day Billy Parks MD   10 mL at 11/26/21 0842    sodium chloride flush 0.9 % injection 5-40 mL  5-40 mL IntraVENous PRN Billy Parks MD        0.9 % sodium chloride infusion  25 mL IntraVENous PRN Billy Parks MD        ondansetron (ZOFRAN-ODT) disintegrating tablet 4 mg  4 mg Oral Q8H PRN Billy Parks MD        Or    ondansetron [facility]) injection 4 mg  4 mg IntraVENous Q6H PRN Billy Parks MD        polyethylene glycol [facility]) packet 17 g  17 g Oral Daily PRN Billy Parks MD   17 g at 11/24/21 1832    acetaminophen (TYLENOL) tablet 650 mg  650 mg Oral Q6H PRN Billy Parks MD        Or    acetaminophen (TYLENOL) suppository 650 mg  650 mg Rectal Q6H PRN Billy Parks MD        enoxaparin (LOVENOX) injection 40 mg  40 mg SubCUTAneous Nightly Billy Parks MD   40 mg at 11/24/21 2145    iopamidol (ISOVUE-370) 76 % injection 75 mL  75 mL IntraVENous ONCE PRN Meka Burkett MD           Take Home Meds:  Current Discharge Medication List      CONTINUE these medications which have NOT CHANGED    Details   levothyroxine (SYNTHROID) 88 MCG tablet Take 1 tablet by mouth Daily  Qty: 30 tablet, Refills: 11    Comments: REFILL REQUESTED 8/26/21 @@ 3:47 P. M. DLB      amiodarone (CORDARONE) 200 MG tablet Take 1 tablet by mouth 3 times daily  Qty: 90 tablet, Refills: 5      torsemide (DEMADEX) 10 MG tablet 30mg twice daily and extra dose as needed for swelling, weight gain and shortness of breath  Qty: 90 tablet, Refills: 3      hydrocortisone-aloe 1 % CREA cream Apply topically 4 times daily  Qty: 1 each, Refills: 0      docusate sodium (DOK) 100 MG capsule TAKE ONE CAPSULE BY MOUTH TWO TIMES A DAY  Qty: 60 capsule, Refills: 5    Comments: REFILL REQUESTED 4/19/18 @ 4:57 P. M. DLB      diclofenac sodium (VOLTAREN) 1 % GEL Apply 2 g topically daily      oxyCODONE HCl (OXY-IR) 10 MG immediate release tablet Take 10 mg by mouth every 6 hours as needed for Pain.       tiZANidine (ZANAFLEX) 4 MG tablet TAKE 1 TABLET BY MOUTH THREE TIMES A DAY  Qty: 90 tablet, Refills: 3    Comments: REFILL REQUESTED TUES 8/31/21 @@ 10:54AM      DULoxetine (CYMBALTA) 30 MG extended release capsule TAKE 1 CAPSULE BY MOUTH DAILY EVERY MORNING  Qty: 30 capsule, Refills: 11    Comments: REFILL REQUESTED TUES 8/17/21 @@ 2:33PM      ezetimibe (ZETIA) 10 MG tablet Take 1 tablet by mouth daily  Qty: 90 tablet, Refills: 1      rosuvastatin (CRESTOR) 40 MG tablet TAKE 1 TABLET BY MOUTH DAILY  Qty: 30 tablet, Refills: 11    Comments: REFILL REQUESTED 6/21/21 @@ 1:04 P. M. DLB      pantoprazole (PROTONIX) 40 MG tablet TAKE ONE TABLET BY MOUTH DAILY  Qty: 30 tablet, Refills: 11    Comments: REFILL REQUESTED 2/11/21 @@ 11:52 A. M. DLB      aspirin 81 MG tablet Take 1 tablet by mouth daily  Qty: 30 tablet, Refills: 0      valsartan (DIOVAN) 80 MG tablet Take 1 tablet by mouth daily  Qty: 30 tablet, Refills: 3      ipratropium (ATROVENT) 0.03 % nasal spray INHALE 2 DOSES INTO EACH NOSTRIL THREE TIMES A DAY IF NEEDED FOR RHINITIS  Qty: 30 mL, Refills: 5    Comments: REFILL REQUESTED TUES 10/19/21 @@ 10:35AM         STOP taking these medications       lactobacillus (CULTURELLE) capsule Comments:   Reason for Stopping:                 Activity: activity as tolerated  Diet: cardiac diet       Follow-up with Dr Sarah Chung regarding ablation and Dr Pepe Patiño and Dr Diamond Tao   Time Spent: over 35 minutes spent performing hospital discharge  Signed:  Cat Medeiros MD  11/26/2021  10:54 AM

## 2021-11-26 NOTE — PROGRESS NOTES
Pharmacy Heart Failure Medication Reconciliation Note    Pt discharged from [facility] today. Chief Complaint   Patient presents with    Atrial Fibrillation     sent from cardiologist for [address] has a diagnosis of combined systolic and diastolic heart failure (last EF = 40-45% on 10-26-21). Pertinent Labs:  BMP:   Lab Results   Component Value Date     11/26/2021    K 3.9 11/26/2021    CL 97 11/26/2021    CO2 26 11/26/2021    BUN 16 11/26/2021    CREATININE 1.2 11/26/2021     BNP:   Lab Results   Component Value Date    PROBNP 10,599 11/26/2021    PROBNP 6,460 11/24/2021    PROBNP 9,992 11/23/2021       Patient taking an ACEI / ARB / Entresto for EF </= 40%:  Yes     Patient taking a BETA BLOCKER (Coreg, Toprol XL or bisoprolol) for EF </= 40%:  Yes  Patient taking a LOOP DIURETIC: Yes  Patient taking a ALDOSTERONE RECEPTOR ANTAGONIST for EF </= 35%: No: HFpEF  Patient taking an SGLT2I for EF </= 40%: No, HFpEF    Patient has a diagnosis of Atrial Fibrillation: Yes.  If yes, patient is on appropriate anticoagulation: No: RENETTA clipped    Patient has a diagnosis of type 2 diabetes:  No. If yes, patient prescribed the following anti-hyperglycemic medication at discharge:       Corrections to discharge medications include:  none    Discharge Medications:         Medication List      START taking these medications    Fluticasone-Salmeterol(sensor) 232-14 MCG/ACT Aepb  2 puffs bid     metoprolol succinate 50 MG extended release tablet  Commonly known as: TOPROL XL  Take 1 tablet by mouth daily  Start taking on: November 27, 2021        CHANGE how you take these medications    amiodarone 200 MG tablet  Commonly known as: CORDARONE  Take 1 tablet by mouth nightly  What changed: when to take this     ezetimibe 10 MG tablet  Commonly known as: ZETIA  Take 1 tablet by mouth daily  What changed: when to take this     rosuvastatin 40 MG tablet  Commonly known as: CRESTOR  TAKE 1 TABLET BY MOUTH DAILY  What

## 2021-11-29 ENCOUNTER — FOLLOWUP TELEPHONE ENCOUNTER (OUTPATIENT)
Dept: INPATIENT UNIT | Age: 76
End: 2021-11-29

## 2021-11-29 ENCOUNTER — CARE COORDINATION (OUTPATIENT)
Dept: CASE MANAGEMENT | Age: 76
End: 2021-11-29

## 2021-11-29 NOTE — PROGRESS NOTES
Pt was admitted at [facility] 11/23 thru 11/26. This HF RN made x2 attempts today  (10:30 am & 2:00 pm) to reach pt for a 72 hour call. I left a brief message with my purpose of call and requested a return call. Addendum:  Care Transition RN attempted to reach pt also at around 3:30 pm as well. Three attempts have been made. CTN also left a VM message. At this time I will not make any further attempts. Pt does have a Cardiology hospital follow up appt for tomorrow on 11/30 at 3:30 with Dr Wendy Owen.

## 2021-11-29 NOTE — CARE COORDINATION
Care Transitions Outreach Attempt    Call within 2 business days of discharge: Yes   Attempted to reach patient for 1st attempt at 24hr transitions of care follow up. Unable to reach patient. Left HIPPA Compliant VM. Spoke with Queen Juanita and Holly Velasquez at [facility], [facility] has not started because orders have not been sent over. Writer routed to PCP. Brittney Holguin LPN, [facility]: [phone]      Patient: Lady Hoyos Patient : 1945 MRN: [id number]    Last Discharge  [facility]       Complaint Diagnosis Description Type Department Provider    21 Atrial Fibrillation Rapid atrial fibrillation ([facility] [address] . .. ED to Hosp-Admission (Discharged) (ADMITTED) Avinash Hutchins MD; Harper Mcbride MD            Was this an external facility discharge?  No Discharge Facility: [facility]    Noted following upcoming appointments from discharge chart review:   [facility] follow up appointment(s):   Future Appointments   Date Time Provider Vickie Wilson   2021  3:30 PM MD VERNON Tucker   2021  3:00 PM MD VERNON [facility] [place] GER[place]     Non-[facility] follow up appointment(s):

## 2021-11-30 ENCOUNTER — OFFICE VISIT (OUTPATIENT)
Dept: CARDIOLOGY CLINIC | Age: 76
End: 2021-11-30
Payer: COMMERCIAL

## 2021-11-30 ENCOUNTER — CARE COORDINATION (OUTPATIENT)
Dept: CASE MANAGEMENT | Age: 76
End: 2021-11-30

## 2021-11-30 VITALS
WEIGHT: 140 LBS | HEART RATE: 45 BPM | SYSTOLIC BLOOD PRESSURE: 118 MMHG | HEIGHT: 62 IN | DIASTOLIC BLOOD PRESSURE: 70 MMHG | BODY MASS INDEX: 25.76 KG/M2 | OXYGEN SATURATION: 97 %

## 2021-11-30 DIAGNOSIS — E78.5 HYPERLIPIDEMIA LDL GOAL <70: ICD-10-CM

## 2021-11-30 DIAGNOSIS — I25.10 CORONARY ARTERY DISEASE INVOLVING NATIVE CORONARY ARTERY OF NATIVE HEART WITHOUT ANGINA PECTORIS: Primary | ICD-10-CM

## 2021-11-30 DIAGNOSIS — I48.0 PAROXYSMAL ATRIAL FIBRILLATION (HCC): ICD-10-CM

## 2021-11-30 DIAGNOSIS — I50.32 CHRONIC DIASTOLIC CHF (CONGESTIVE HEART FAILURE), NYHA CLASS 2 (HCC): ICD-10-CM

## 2021-11-30 DIAGNOSIS — Z95.2 S/P AVR (AORTIC VALVE REPLACEMENT): ICD-10-CM

## 2021-11-30 PROCEDURE — G8484 FLU IMMUNIZE NO ADMIN: HCPCS | Performed by: INTERNAL MEDICINE

## 2021-11-30 PROCEDURE — 1123F ACP DISCUSS/DSCN MKR DOCD: CPT | Performed by: INTERNAL MEDICINE

## 2021-11-30 PROCEDURE — 1111F DSCHRG MED/CURRENT MED MERGE: CPT | Performed by: INTERNAL MEDICINE

## 2021-11-30 PROCEDURE — 4040F PNEUMOC VAC/ADMIN/RCVD: CPT | Performed by: INTERNAL MEDICINE

## 2021-11-30 PROCEDURE — 1036F TOBACCO NON-USER: CPT | Performed by: INTERNAL MEDICINE

## 2021-11-30 PROCEDURE — 1090F PRES/ABSN URINE INCON ASSESS: CPT | Performed by: INTERNAL MEDICINE

## 2021-11-30 PROCEDURE — 99214 OFFICE O/P EST MOD 30 MIN: CPT | Performed by: INTERNAL MEDICINE

## 2021-11-30 PROCEDURE — G8427 DOCREV CUR MEDS BY ELIG CLIN: HCPCS | Performed by: INTERNAL MEDICINE

## 2021-11-30 PROCEDURE — G8400 PT W/DXA NO RESULTS DOC: HCPCS | Performed by: INTERNAL MEDICINE

## 2021-11-30 PROCEDURE — G8417 CALC BMI ABV UP PARAM F/U: HCPCS | Performed by: INTERNAL MEDICINE

## 2021-11-30 NOTE — CARE COORDINATION
[facility] Transitions Initial Follow Up Call    Call within 2 business days of discharge: Yes    Patient: Marylee Abrahams Patient : 1945   MRN: [id number]  Reason for Admission: afib RVR  Discharge Date: 21 RARS: Readmission Risk Score: 18.9 ( )      Last Discharge [facility]       Complaint Diagnosis Description Type Department Provider    21 Atrial Fibrillation Rapid atrial fibrillation ([address] . .. ED to Hosp-Admission (Discharged) (ADMITTED) Awais Pickens MD; Mega Beck MD           Spoke with: [facility]    Non-face-to-face services provided:  Obtained and reviewed discharge summary and/or continuity of care documents     Transitions of Care Initial Call    Was this an external facility discharge? No     Challenges to be reviewed by the provider   Additional needs identified to be addressed with provider: No  none             Method of communication with provider : none       Was this a readmission? Yes  Patient stated reason for admission: Afib RVR  Patients top risk factors for readmission: medical condition-.    Care Transition Nurse (CTN) contacted the patient by telephone to perform post hospital discharge assessment. Verified name and  with patient as identifiers. Provided introduction to self, and explanation of the CTN role. CTN reviewed discharge instructions, medical action plan and red flags with patient who verbalized understanding. Patient given an opportunity to ask questions and does not have any further questions or concerns at this time. Were discharge instructions available to patient? Yes. Reviewed appropriate site of care based on symptoms and resources available to patient including: PCP and Specialist. The patient agrees to contact the PCP office for questions related to their healthcare. Medication reconciliation was performed with patient, who verbalizes understanding of administration of home medications.  Advised obtaining a 90-day supply of all daily and as-needed medications. Reviewed and educated patient on any new and changed medications related to discharge diagnosis. CTN provided contact information. Plan for next call: symptom management-., self management-. and follow up appointment-.          Care Transitions 24 Hour Call    Do you have any ongoing symptoms?: No  Do you have a copy of your discharge instructions?: Yes  Do you have all of your prescriptions and are they filled?: Yes  Have you been contacted by a [facility] Pharmacist?: No  Have you scheduled your follow up appointment?: Yes  How are you going to get to your appointment?: Car - family or friend to transport  Were you discharged with any Home Care or Post Acute Services: Yes  Post Acute Services: [address] you feel like you have everything you need to keep you well at home?: Yes  Care Transitions Interventions         Follow Up  Future Appointments   Date Time Provider Vickie Wilson   11/30/2021  3:30 PM MD VERNON Aguirre   12/6/2021  3:00 PM Nghia Goyal MD JACOBO TOUSSAINT [facility]       Pt stated she was doing well and staying with her daughter. Pt noted to have an appointment today with Dr. Irish Barrtet. Stated she was fine when multiple questions were asked about her health. Current weight this am was 146 lbs. BP was 129/67 and HR 49/51. States she is taking walks around the first floor and trying to be more active. No complains of SOB or CP. States she is taking medication as prescribed, was able to pick new medication up from pharmacy and stop/change the meds listed on her AVS. States [facility] has not seen pt due to her staying with daughter, then home for a few days, then to her other daughters house. With all the help pt doesn't feel the need for [facility] [place], [facility]. at this point. When asked if she had any questions or concerns for CTN at this time pt asked if she could hang up so she could get to her appointment.  Interview could not be completed due to pt needing to go to meet appointment time. Will discuss med rec and ACP next interview.     Bo Tate, BSN, RN   Care Transition Nurse  Mobile: [phone]

## 2021-11-30 NOTE — PROGRESS NOTES
[address]   Office Note  Referring provider: Maria Dolores Angulo MD  CC: \"I feel better\"     HPI: Negar Vivar  is a 68 y.o. patient with a past medical history significant for atrial fibrillation, pulmonary hypertension, aortic stenosis and CAD s/p CHILANGO to mid circ 5/2014. She is s/p AVR, PVI and RENETTA exclusion by Dr Arvind Tejada on 6/16. Pre-op angiogram revealed no significant restenosis in the prior stents. She was hospitalized in December 2017 with fatigue. She was anemic to a hemoglobin of 8 and received PRBC transfusions. Repeat EGD demonstrated bleeding AVM's which were cauterized. Her coumadin was restarted. She presented to [facility] with complaints of back pain and while inpatient had an episode of dyspnea. An echocardiogram was completed revealing moderate to severe MR. Subsequently, a CAMERON was completed on 7/27/20 showing moderate MR. She was admitted 10/26/21-11/1/21 with afib with RVR and acute CHF. She converted to a regular rhythm and was diuresed prior to discharge. She was admitted to [facility]11/21 with acute encephalopathy and found to have rhabdomyolysis and afib with RVR again. She underwent successful cardioversion on 11/17/21. She did experience bradycardia when in sinus rhythm with heart rate between 40-50 bpm. She was seen in the office with Johanna Pandey CNP for follow up on 11/23/21 and then sent to the ED for admission. She was again found in afib with RVR and fluid overloaded. She was diuresed and discharged home on both amiodarone and Toprol. She presents today for follow up. She reports feeling \"okay\". Her breathing has been comfortable without shortness of breath. She denies any lightheadedness or dizziness. Patient denies exertional chest pain/pressure, dyspnea at rest, HOLLOWAY, PND, orthopnea, palpitations, lightheadedness, weight changes, changes in LE edema, and syncope. She reports medication compliance and is tolerating.  She has been monitoring her weight at home and reports stable readings. Review of Systems:  Constitutional: No fatigue, weakness, night sweats or fever. HEENT: No new vision difficulties or ringing in the ears. Respiratory: No new SOB, PND, orthopnea or cough. Cardiovascular: See HPI   GI: No n/v, diarrhea, constipation, abdominal pain or changes in bowel habits. No melena, no hematochezia  : No urinary frequency, urgency, incontinence, hematuria or dysuria. Skin: No cyanosis or skin lesions. Musculoskeletal: No new muscle or joint pain. Neurological: No syncope or TIA-like symptoms. 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 since 26.  Chronic diastolic CHF (congestive heart failure) ([address] 2016    grade II    COPD, mild (HCC)     CVA (cerebral infarction) 2013    left cerebral cortex x2/expressive aphasia/resolved    Diverticulosis     Epistaxis, recurrent     when inr high.  last 3-4 months ago    Former smoker     Gallbladder sludge     HTN (hypertension)     Hyperlipidemia     Hypothyroidism     Lumbar stenosis without neurogenic claudication 2017    pain across low back worse with standing and walking, nonradiating    Macular degeneration 2018    left worse than right , dry : progressive loss of sight: just barely able to see to drive    Neuropathy     Nonrheumatic mitral valve regurgitation     Osteoarthritis     Pulmonary HTN ([facility] 2014    primary, responsive to nitrates    PVD (peripheral vascular disease) ([facility]     occluded right SFA::: asymptomatic NIKOS 0.7 right and 1.0 left    Sacral fracture, closed ([facility] 2014    Syncope 05/2014       Current Outpatient Medications   Medication Sig Dispense Refill    Fluticasone-Salmeterol,sensor, 232-14 MCG/ACT AEPB 2 puffs bid 1 each 5    metoprolol succinate (TOPROL XL) 50 MG extended release tablet Take 1 tablet by mouth daily 30 tablet 3    torsemide (DEMADEX) 20 MG tablet 20mg qam water pill take xtra 20mg on days wght increases by 2 or more lbs 60 tablet 3    amiodarone (CORDARONE) 200 MG tablet Take 1 tablet by mouth nightly 90 tablet 3    levothyroxine (SYNTHROID) 88 MCG tablet Take 1 tablet by mouth Daily 30 tablet 11    docusate sodium (DOK) 100 MG capsule TAKE ONE CAPSULE BY MOUTH TWO TIMES A DAY 60 capsule 5    diclofenac sodium (VOLTAREN) 1 % GEL Apply 2 g topically daily      oxyCODONE HCl (OXY-IR) 10 MG immediate release tablet Take 10 mg by mouth every 6 hours as needed for Pain.       ipratropium (ATROVENT) 0.03 % nasal spray INHALE 2 DOSES INTO EACH NOSTRIL THREE TIMES A DAY IF NEEDED FOR RHINITIS 30 mL 5    tiZANidine (ZANAFLEX) 4 MG tablet TAKE 1 TABLET BY MOUTH THREE TIMES A DAY (Patient taking differently: Take 2 mg by mouth every 8 hours as needed ) 90 tablet 3    DULoxetine (CYMBALTA) 30 MG extended release capsule TAKE 1 CAPSULE BY MOUTH DAILY EVERY MORNING 30 capsule 11    ezetimibe (ZETIA) 10 MG tablet Take 1 tablet by mouth daily (Patient taking differently: Take 10 mg by mouth every evening ) 90 tablet 1    rosuvastatin (CRESTOR) 40 MG tablet TAKE 1 TABLET BY MOUTH DAILY (Patient taking differently: Take 40 mg by mouth every evening ) 30 tablet 11    pantoprazole (PROTONIX) 40 MG tablet TAKE ONE TABLET BY MOUTH DAILY 30 tablet 11    aspirin 81 MG tablet Take 1 tablet by mouth daily 30 tablet 0     No current facility-administered medications for this visit. Family History   Problem Relation Age of Onset    Breast Cancer Daughter      Objective:   Vitals:    11/30/21 1537   BP: 118/70   Pulse: (!) 45   SpO2: 97%   Weight: 140 lb (63.5 kg)   Height: 5' 2\" (1.575 m)     Physical Exam:   Constitutional: The patient is oriented to person, place, and time. Appears well-developed and well-nourished. In no acute distress. Head: Normocephalic and atraumatic. Pupils equal and round. Neck: Neck supple. No JVP or carotid bruit appreciated. No mass and no thyromegaly present. No lymphadenopathy present. Cardiovascular: Normal rate. Normal heart sounds. Exam reveals no gallop and no friction rub. No murmur heard. Pulmonary/Chest: Effort normal and breath sounds normal. No respiratory distress. No wheezes, rhonchi or rales. Abdominal: Soft, non-tender. Bowel sounds are normal. Exhibits no organomegaly, mass or bruit. Extremities: No edema. No cyanosis or clubbing. Pulses are 2+ radial and carotid bilaterally. Neurological: No gross cranial nerve deficit. Coordination normal.   Skin: Skin is warm and dry. There is no rash or diaphoresis. Psychiatric: Patient has a normal mood and affect. Speech is normal and behavior is normal.     Lab Results   Component Value Date    CHOL 114 11/04/2021    HDL 43 11/04/2021    HDL 42 06/21/2010    TRIG 76 11/04/2021   LDL 52  Lab Results   Component Value Date     11/26/2021     Lab Results   Component Value Date    K 3.9 11/26/2021       Lab Results   Component Value Date    BUN 16 11/26/2021    CREATININE 1.2 11/26/2021     Personally reviewed and interpreted   EKG 10/22/15: Sinus bradycardia with LAFB  EKG 1/2/20: Sinus rhonda, LAFB  EKG 6/10/21:  Sinus rhythm with LBBB  EKG 11/30/21: Sinus rhythm with LBBB      Procedures:     [facility] 6/15/2015   1.  Right dominant coronary arterial system with mild calcification of the RCA. There is 40% serial lesions in the mid RCA. In the left system there is 25% distal left main disease. There is 50% mid LAD disease and 50% ostial second diagonal branch disease. There is no significant restenosis identified in the prior previously placed mid circumflex stent. 2. Systemic hypertension. [address] 10/27/21  HEMODYNAMICS:  1. Right atrial pressure was 12 mmHg. 2.  RV pressure 65/-4. 3.  Pulmonary capillary wedge pressure was 24 mmHg. 4.  Pulmonary artery pressure 71/19 with a mean of 41 mmHg. 5.  Cardiac output 4.9 liters per minute. 6.  Mixed venous saturation 52%. 7.  Pulmonary capillary wedge saturation 90%. 8.  Right atrial saturation 51%.     In summary, elevated right and left filling pressure with tall V waves  seen in pulmonary capillary wedge pressure tracing suggestive of severe  mitral regurgitation. Imaging:     Echo 7/30/2016  Left ventricle size is normal. Normal left ventricular wall thickness. Ejection fraction is visually estimated to be 55-60%. Diastolic filling parameters suggests grade III (restrictive) diastolic dysfunction. The right ventricle appears mildly enlarged. Right ventricular systolic function is normal.  The left atrium is severely dilated. The right atrium is mildly dilated.   At least moderate mitral regurgitation with a central and eccentric jet.   Mild mitral stenosis. BP reported as 171/51 at time of imaging.   A bioprosthetic aortic valve has a maximum velocity of 2.8 m/s and a mean gradient of 17 mmHg. Para-valvular regurgitation. There is moderate tricuspid regurgitation.   Mild pulmonic regurgitation.   Estimated pulmonary artery systolic pressure is 72 mmHg assuming a rightatrial pressure of 10 mmHg.     Lower Extremity Arterial Studies 5/25/17  Right Impression   There is an NIKOS of .70 in the DP and .77 in the PT. on the right. This is in   the mild claudication range.  Occlusion of the SFA artery in the right lower   extremity. Left Impression   There is an NIKOS of 1.06 in the DP and 1.08 in the PT. on the left. This is in   the normal range. Elevated velocity of the SFA. Carotid Studies 5/25/17  Right Impression   The right internal carotid artery appears to have a calcified 50-79% diameter   reducing stenosis based on velocity criteria. Left Impression   The left internal carotid artery appears to have a <50% diameter reducing   stenosis based on velocity criteria.         Echo 1/8/18  Normal LV size and systolic function. Estimated ejection fraction is 60%. Moderate to severe mitral regurgitation is present. Severely dilated left atrium. Normally functioning bioprosthetic valve in aortic position. Trivial regurgitation noted. The right ventricle is mildly enlarged.   Moderate tricuspid regurgitation.   Doppler derived PA systolic pressure is 52 mm Hg suggestive of moderate   pulmonary hypertension. Holter 1/8/18 ([facility])  1. The rhythm was sinus with sinus arrhythmia. Average NY interval 0.20,   average QRS duration 0.14 [IVCD]. Average daily heart rate 44 ranging 38 to 68 bpm.   There were 129 pauses greater than 2.0 seconds with Max R-R 2.1 seconds occurring 05:13:11.   2. Frequent premature supraventricular ectopic beats total 1,642 consisting   of 1,152 isolated PACs, 209 atrial pairs and 3 atrial runs. The longest atrial run 3 beats   HR-61 bpm occurring 17:56:40. The fastest atrial run 3 beats HR-76 bpm occurring 09:49:41.   3. Very frequent premature ventricular ectopic beats total 16,163 consisting of 16,008 isolated   PVCs, 74 ventricular couplets and 1 ventricular triplet. The ventricular triplet HR-126 bpm   occurring 15:36:29.   4. Diary returned with an entry of \"irregular heart\",  isolated PACs, PVCs and   ventricular couplets noted.      Carotid duplex 1/16/20  Right Impression   The right internal carotid artery appears to have a 50-79% diameter reducing   stenosis based on velocity criteria. The right vertebral artery demonstrates normal antegrade flow. Left Impression   The left internal carotid artery appears to have a <50% diameter reducing   stenosis based on velocity criteria. The left vertebral artery demonstrates normal antegrade flow.         Echo 7/8/20  There is mild concentric left ventricular hypertrophy. Ejection fraction is visually estimated to be 55-60%. diastology cannot be determined  Moderate to severe mitral regurgitation is present. A bioprosthetic artificial aortic valve appears well seated with a maximum  velocity of 273 m/s and a mean gradient of 16 mmHg. Study is unchanged from previous dated 1/8/2018    CAMERON 7/27/20  Left ventricular cavity size is normal in size with normal wall thickness. Overall left ventricular systolic function appears normal.  Ejection fraction is visually estimated to be 55-60%. Normal right ventricular size and function. Left atrium is moderately dilated. Mild thickening of the mitral valve leaflets. There is moderate mitral regurgitation appreciated. Moderate tricuspid regurgitation. A bioprosthetic valve appears well seated. There is no appreciable leaflet restriction or stenosis. There is no aortic insufficiency appreciated. Echo 10/27/21  Left ventricular cavity size is normal.  Normal left ventricular wall thickness. Ejection fraction is visually estimated to be 45-50%. There is mild global  hypokinesis appreciated. Grade III diastolic dysfunction with elevated LV filling pressures. Moderately dilated right ventricle with mildly reduced function. The left atrium is severely dilated. The right atrium is mildly dilated. Moderately severe tricuspid regurgitation. Moderate pulmonic regurgitation present. Moderately severe mitral regurgitation appreciated. A bioprosthetic artificial aortic valve appears well seated with a maximum  velocity of 2.4m/s and a mean gradient of 12mmHg.   Trivial aortic regurgitation. A bubble study was performed and fails to show evidence of right to left  shunting. CAMERON 10/29/21  Overall left ventricular size and wall thickness appear normal with systolic  function mildly reduced. LVEF 40-45%. Normal right ventricular size and function. Left atrium is moderately dilated. The left atrial appendage appears to be ligated with no flow into it. Mitral valve leaflets appear thickened/calcified. Restricted posterior leaflet with malcoaptation id the leaflet tips. Moderate mitral regurgitation is present. Moderate tricuspid regurgitation. Upper extremity venous duplex 11/18/21 ([facility])     o No evidence of acute deep vein thrombosis in the left upper extremity .     o There is superficial venous thrombosis identified in the left upper        extremity that is of indeterminate age as described above. Assessment:  1. CAD of native coronary arteries without angina  2. S/p Aortic Valve Replacement with bioprosthesis for nonrheumatic aortic stenosis  3. Chronic combined systolic and diastolic CHF, class 2  4. Hyperlipidemia with goal LDL <70 mg/dL  5. Paroxysmal Atrial fibrillation  6. Asymptomatic Bradycardia  7. Carotid stenosis, right   8. Mitral regurgitation, moderate    Plan:  She continues in a regular rhythm today by EKG and will remain her current dose of Toprol and amiodarone. She is overall well compensated on exam from a heart failure standpoint. She continues to be asymptomatic with her baseline bradycardia. I have placed a referral for her to see Dr. Alex Dexter regarding possible ablation therapy. I have personally reviewed all previous testing for this visit today including imaging, lab results and EKG as detailed above. I will see her in office for follow up in 2 weeks. This note was scribed in the presence of Rosemarie Jones MD by General Reeves, RN.    Physician Attestation:  The scribes documentation has been prepared under my direction and personally reviewed by me in its entirety. I, Dr. Eva Powers personally performed the services described in this documentation as scribed by my RN in my presence, and I confirm that the note above accurately reflects all work, treatment, procedures, and medical decision making performed by me.

## 2021-12-03 PROCEDURE — 93000 ELECTROCARDIOGRAM COMPLETE: CPT | Performed by: INTERNAL MEDICINE

## 2021-12-07 ENCOUNTER — CARE COORDINATION (OUTPATIENT)
Dept: CASE MANAGEMENT | Age: 76
End: 2021-12-07

## 2021-12-07 NOTE — CARE COORDINATION
[facility] Transitions Follow Up Call    2021            Patient: Luis M Rudolph  Patient : 1945   MRN: [id number]  Reason for Admission: SOB  Discharge Date: 21 RARS: Readmission Risk Score: 18.9 ( )         Spoke with: Veronica Swenson Transitions Follow Up Call    Needs to be reviewed by the provider   Additional needs identified to be addressed with provider: No  [facility]             Method of communication with provider : none      Care Transition Nurse (CTN) contacted the patient by telephone to follow up after admission on 2021. Verified name and  with patient as identifiers. Addressed changes since last contact: F/u with cardio on 21  Discussed follow-up appointments. If no appointment was previously scheduled, appointment scheduling offered: Yes. Is follow up appointment scheduled within 7 days of discharge? Yes. Advance Care Planning:   Does patient have an Advance Directive: Reviewed and current  CTN reviewed discharge instructions, medical action plan and red flags with patient and discussed any barriers to care and/or understanding of plan of care after discharge. Discussed appropriate site of care based on symptoms and resources available to patient including: PCP, Specialist, Home health, When to call 911 and [address]. The patient agrees to contact the PCP office for questions related to their healthcare. Patients top risk factors for readmission: ineffective coping  Interventions to address risk factors: Obtained and reviewed discharge summary and/or continuity of care documents      Non-[facility] follow up appointment(s):     CTN provided contact information for future needs. Plan for follow-up call in 5-7 days based on severity of symptoms and risk factors. Plan for next call: self management-Heart healthy diet? This [facility] spoke with pt and pt stated that she was doing well. Patient denied any worsening symptoms.  Denied fever, chills, N/V and any difficulty breathing at this time. Denied difficulty with urination, BMs or appetite. Denied chest pain, edema, palpitations and SOB. Pt's f/u with cardio went well on 11/30/21 and no med changes made. Pt denied any needs and concerns at this time. Advised pt to immediately report any worsening symptoms to the PCP. Patient verbalized understanding and agreed. Isabell Barroso LPN, [facility]  PH: [phone]                Care Transitions Subsequent and Final Call    Schedule Follow Up Appointment with PCP: Completed  Subsequent and Final Calls  Do you have any ongoing symptoms?: No  Have your medications changed?: No  Do you have any questions related to your medications?: No  Do you currently have any active services?: Yes  Are you currently active with any services?: Home Health  Do you have any needs or concerns that I can assist you with?: No  Identified Barriers: None  Care Transitions Interventions   Home Care Waiver: Completed     Other Interventions:            Follow Up  Future Appointments   Date Time Provider Vickie Wilson   12/8/2021  5:00 PM Satinder Khan MD [facility]   12/15/2021  4:15 PM Barbie Rojas MD [facility]   1/21/2022  3:00 PM Nevin Gore MD [facility]       Isabell Barroso LPN

## 2021-12-15 ENCOUNTER — OFFICE VISIT (OUTPATIENT)
Dept: CARDIOLOGY CLINIC | Age: 76
End: 2021-12-15
Payer: COMMERCIAL

## 2021-12-15 VITALS
SYSTOLIC BLOOD PRESSURE: 120 MMHG | DIASTOLIC BLOOD PRESSURE: 64 MMHG | HEIGHT: 62 IN | BODY MASS INDEX: 24.66 KG/M2 | WEIGHT: 134 LBS | HEART RATE: 54 BPM | OXYGEN SATURATION: 95 %

## 2021-12-15 DIAGNOSIS — I34.0 NONRHEUMATIC MITRAL VALVE REGURGITATION: ICD-10-CM

## 2021-12-15 DIAGNOSIS — I25.10 CORONARY ARTERY DISEASE INVOLVING NATIVE CORONARY ARTERY OF NATIVE HEART WITHOUT ANGINA PECTORIS: Primary | ICD-10-CM

## 2021-12-15 DIAGNOSIS — I50.42 CHRONIC COMBINED SYSTOLIC AND DIASTOLIC CHF, NYHA CLASS 2 (HCC): ICD-10-CM

## 2021-12-15 DIAGNOSIS — Z95.2 S/P AVR (AORTIC VALVE REPLACEMENT): ICD-10-CM

## 2021-12-15 DIAGNOSIS — R40.0 DAYTIME SOMNOLENCE: ICD-10-CM

## 2021-12-15 DIAGNOSIS — I48.0 PAROXYSMAL ATRIAL FIBRILLATION (HCC): ICD-10-CM

## 2021-12-15 DIAGNOSIS — E78.5 HYPERLIPIDEMIA LDL GOAL <70: ICD-10-CM

## 2021-12-15 PROCEDURE — 99214 OFFICE O/P EST MOD 30 MIN: CPT | Performed by: INTERNAL MEDICINE

## 2021-12-15 PROCEDURE — G8484 FLU IMMUNIZE NO ADMIN: HCPCS | Performed by: INTERNAL MEDICINE

## 2021-12-15 PROCEDURE — G8427 DOCREV CUR MEDS BY ELIG CLIN: HCPCS | Performed by: INTERNAL MEDICINE

## 2021-12-15 PROCEDURE — G8400 PT W/DXA NO RESULTS DOC: HCPCS | Performed by: INTERNAL MEDICINE

## 2021-12-15 PROCEDURE — 1111F DSCHRG MED/CURRENT MED MERGE: CPT | Performed by: INTERNAL MEDICINE

## 2021-12-15 PROCEDURE — 1090F PRES/ABSN URINE INCON ASSESS: CPT | Performed by: INTERNAL MEDICINE

## 2021-12-15 PROCEDURE — G8420 CALC BMI NORM PARAMETERS: HCPCS | Performed by: INTERNAL MEDICINE

## 2021-12-15 PROCEDURE — 4040F PNEUMOC VAC/ADMIN/RCVD: CPT | Performed by: INTERNAL MEDICINE

## 2021-12-15 PROCEDURE — 1123F ACP DISCUSS/DSCN MKR DOCD: CPT | Performed by: INTERNAL MEDICINE

## 2021-12-15 PROCEDURE — 4004F PT TOBACCO SCREEN RCVD TLK: CPT | Performed by: INTERNAL MEDICINE

## 2021-12-15 NOTE — PROGRESS NOTES
[address]   Office Note  Referring provider: Marv Bradley MD  CC: \"I get tired\"     HPI: Tony Sotomayor  is a 68 y.o. patient with a past medical history significant for atrial fibrillation, pulmonary hypertension, aortic stenosis and CAD s/p CHILANGO to mid circ 5/2014. She is s/p AVR, PVI and RENETTA exclusion by Dr Brittney Cox on 6/16. Pre-op angiogram revealed no significant restenosis in the prior stents. She was hospitalized in December 2017 with fatigue. She was anemic to a hemoglobin of 8 and received PRBC transfusions. Repeat EGD demonstrated bleeding AVM's which were cauterized. Her coumadin was restarted. She presented to [facility] with complaints of back pain and while inpatient had an episode of dyspnea. An echocardiogram was completed revealing moderate to severe MR. Subsequently, a CAMERON was completed on 7/27/20 showing moderate MR. She was admitted 10/26/21-11/1/21 with afib with RVR and acute CHF. She converted to a regular rhythm and was diuresed prior to discharge. She was admitted to [facility] 11/11/21 with acute encephalopathy and found to have rhabdomyolysis and afib with RVR again. She underwent successful cardioversion on 11/17/21. She did experience bradycardia when in sinus rhythm with heart rate between 40-50 bpm. She was seen in the office with Lawrence Sandhoff, CNP for follow up on 11/23/21 and then sent to the ED for admission. She was again found in afib with RVR and fluid overloaded. She was diuresed and discharged home on both amiodarone and Toprol. She presents today for follow up. She reports feeling improved than previously but continues with fatigue. She does not feel she could return to work at this time. She reports difficulty sleeping at times but other times sleeping too much. She has slept in a recliner for several years and attributes this to chronic back pain. She denies chest pain with rest or exertion. She reports medication compliance and is tolerating.      Review of Systems:  Constitutional: No fatigue, weakness, night sweats or fever. HEENT: No new vision difficulties or ringing in the ears. Respiratory: No new SOB, PND, orthopnea or cough. Cardiovascular: See HPI   GI: No n/v, diarrhea, constipation, abdominal pain or changes in bowel habits. No melena, no hematochezia  : No urinary frequency, urgency, incontinence, hematuria or dysuria. Skin: No cyanosis or skin lesions. Musculoskeletal: No new muscle or joint pain. Neurological: No syncope or TIA-like symptoms. 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 since 26.  Chronic diastolic CHF (congestive heart failure) ([address] 2016    grade II    COPD, mild (HCC)     CVA (cerebral infarction) 2013    left cerebral cortex x2/expressive aphasia/resolved    Diverticulosis     Epistaxis, recurrent     when inr high.  last 3-4 months ago    Former smoker     Gallbladder sludge     HTN (hypertension)     Hyperlipidemia     Hypothyroidism     Lumbar stenosis without neurogenic claudication 2017    pain across low back worse with standing and walking, nonradiating    Macular degeneration 2018    left worse than right , dry : progressive loss of sight: just barely able to see to drive    Neuropathy     Nonrheumatic mitral valve regurgitation     Osteoarthritis     Pulmonary HTN [facility] 2014    primary, responsive to nitrates    PVD (peripheral vascular disease) ([facility]     occluded right SFA::: asymptomatic NIKOS 0.7 right and 1.0 left    Sacral fracture, closed ([facility] 2014    Syncope 05/2014       Current Outpatient Medications   Medication Sig Dispense Refill    Fluticasone-Salmeterol,sensor, 232-14 MCG/ACT AEPB 2 puffs bid 1 each 5    metoprolol succinate (TOPROL XL) 50 MG extended release tablet Take 1 tablet by mouth daily 30 tablet 3    torsemide (DEMADEX) 20 MG tablet 20mg qam water pill take xtra 20mg on days wght increases by 2 or more lbs 60 tablet 3    amiodarone (CORDARONE) 200 MG tablet Take 1 tablet by mouth nightly 90 tablet 3    levothyroxine (SYNTHROID) 88 MCG tablet Take 1 tablet by mouth Daily 30 tablet 11    docusate sodium (DOK) 100 MG capsule TAKE ONE CAPSULE BY MOUTH TWO TIMES A DAY 60 capsule 5    diclofenac sodium (VOLTAREN) 1 % GEL Apply 2 g topically daily      oxyCODONE HCl (OXY-IR) 10 MG immediate release tablet Take 10 mg by mouth every 6 hours as needed for Pain.       ipratropium (ATROVENT) 0.03 % nasal spray INHALE 2 DOSES INTO EACH NOSTRIL THREE TIMES A DAY IF NEEDED FOR RHINITIS 30 mL 5    tiZANidine (ZANAFLEX) 4 MG tablet TAKE 1 TABLET BY MOUTH THREE TIMES A DAY (Patient taking differently: Take 2 mg by mouth every 8 hours as needed ) 90 tablet 3    DULoxetine (CYMBALTA) 30 MG extended release capsule TAKE 1 CAPSULE BY MOUTH DAILY EVERY MORNING 30 capsule 11    ezetimibe (ZETIA) 10 MG tablet Take 1 tablet by mouth daily (Patient taking differently: Take 10 mg by mouth every evening ) 90 tablet 1    rosuvastatin (CRESTOR) 40 MG tablet TAKE 1 TABLET BY MOUTH DAILY (Patient taking differently: Take 40 mg by mouth every evening ) 30 tablet 11    pantoprazole (PROTONIX) 40 MG tablet TAKE ONE TABLET BY MOUTH DAILY 30 tablet 11    aspirin 81 MG tablet Take 1 tablet by mouth daily 30 tablet 0     No current facility-administered medications for this visit. Family History   Problem Relation Age of Onset    Breast Cancer Daughter      Objective:   Vitals:    12/15/21 1614   BP: 120/64   Pulse: 54   SpO2: 95%   Weight: 134 lb (60.8 kg)   Height: 5' 1.5\" (1.562 m)     Physical Exam:   Constitutional: The patient is oriented to person, place, and time. Appears well-developed and well-nourished. In no acute distress. Head: Normocephalic and atraumatic. Pupils equal and round. Neck: Neck supple. No JVP or carotid bruit appreciated. No mass and no thyromegaly present. No lymphadenopathy present. Cardiovascular: Normal rate. Normal heart sounds. Exam reveals no gallop and no friction rub. No murmur heard. Pulmonary/Chest: Effort normal and breath sounds normal. No respiratory distress. No wheezes, rhonchi or rales. Abdominal: Soft, non-tender. Bowel sounds are normal. Exhibits no organomegaly, mass or bruit. Extremities: No edema. No cyanosis or clubbing. Pulses are 2+ radial and carotid bilaterally. Neurological: No gross cranial nerve deficit. Coordination normal.   Skin: Skin is warm and dry. There is no rash or diaphoresis. Psychiatric: Patient has a normal mood and affect. Speech is normal and behavior is normal.     Lab Results   Component Value Date    CHOL 114 11/04/2021    HDL 43 11/04/2021    HDL 42 06/21/2010    TRIG 76 11/04/2021   LDL 52  Lab Results   Component Value Date     11/26/2021     Lab Results   Component Value Date    K 3.9 11/26/2021       Lab Results   Component Value Date    BUN 16 11/26/2021    CREATININE 1.2 11/26/2021     Personally reviewed and interpreted   EKG 10/22/15: Sinus bradycardia with LAFB  EKG 1/2/20: Sinus rhonda, LAFB  EKG 6/10/21:  Sinus rhythm with LBBB  EKG 11/30/21: Sinus rhythm with LBBB  EKG 12/15/21: Sinus bradycardia with LBBB      Procedures:     LHC 6/15/2015   1.  Right dominant coronary arterial system with mild calcification of the RCA. There is 40% serial lesions in the mid RCA. In the left system there is 25% distal left main disease. There is 50% mid LAD disease and 50% ostial second diagonal branch disease. There is no significant restenosis identified in the prior previously placed mid circumflex stent. 2. Systemic hypertension. [address] 10/27/21  HEMODYNAMICS:  1. Right atrial pressure was 12 mmHg. 2.  RV pressure 65/-4. 3.  Pulmonary capillary wedge pressure was 24 mmHg. 4.  Pulmonary artery pressure 71/19 with a mean of 41 mmHg. 5.  Cardiac output 4.9 liters per minute. 6.  Mixed venous saturation 52%. 7.  Pulmonary capillary wedge saturation 90%. 8.  Right atrial saturation 51%.     In summary, elevated right and left filling pressure with tall V waves  seen in pulmonary capillary wedge pressure tracing suggestive of severe  mitral regurgitation. Imaging:     Echo 7/30/2016  Left ventricle size is normal. Normal left ventricular wall thickness. Ejection fraction is visually estimated to be 55-60%. Diastolic filling parameters suggests grade III (restrictive) diastolic dysfunction. The right ventricle appears mildly enlarged. Right ventricular systolic function is normal.  The left atrium is severely dilated. The right atrium is mildly dilated.   At least moderate mitral regurgitation with a central and eccentric jet.   Mild mitral stenosis. BP reported as 171/51 at time of imaging.   A bioprosthetic aortic valve has a maximum velocity of 2.8 m/s and a mean gradient of 17 mmHg. Para-valvular regurgitation. There is moderate tricuspid regurgitation.   Mild pulmonic regurgitation.   Estimated pulmonary artery systolic pressure is 72 mmHg assuming a rightatrial pressure of 10 mmHg.     Lower Extremity Arterial Studies 5/25/17  Right Impression   There is an NIKOS of .70 in the DP and .77 in the PT. on the right. This is in   the mild claudication range.  Occlusion of the SFA artery in the right lower   extremity. Left Impression   There is an NIKOS of 1.06 in the DP and 1.08 in the PT. on the left. This is in   the normal range. Elevated velocity of the SFA. Carotid Studies 5/25/17  Right Impression   The right internal carotid artery appears to have a calcified 50-79% diameter   reducing stenosis based on velocity criteria. Left Impression   The left internal carotid artery appears to have a <50% diameter reducing   stenosis based on velocity criteria.         Echo 1/8/18  Normal LV size and systolic function. Estimated ejection fraction is 60%. Moderate to severe mitral regurgitation is present. Severely dilated left atrium. Normally functioning bioprosthetic valve in aortic position. Trivial regurgitation noted. The right ventricle is mildly enlarged.   Moderate tricuspid regurgitation.   Doppler derived PA systolic pressure is 52 mm Hg suggestive of moderate   pulmonary hypertension. Holter 1/8/18 ([facility])  1. The rhythm was sinus with sinus arrhythmia. Average NE interval 0.20,   average QRS duration 0.14 [IVCD]. Average daily heart rate 44 ranging 38 to 68 bpm.   There were 129 pauses greater than 2.0 seconds with Max R-R 2.1 seconds occurring 05:13:11.   2. Frequent premature supraventricular ectopic beats total 1,642 consisting   of 1,152 isolated PACs, 209 atrial pairs and 3 atrial runs. The longest atrial run 3 beats   HR-61 bpm occurring 17:56:40. The fastest atrial run 3 beats HR-76 bpm occurring 09:49:41.   3. Very frequent premature ventricular ectopic beats total 16,163 consisting of 16,008 isolated   PVCs, 74 ventricular couplets and 1 ventricular triplet. The ventricular triplet HR-126 bpm   occurring 15:36:29.   4. Diary returned with an entry of \"irregular heart\",  isolated PACs, PVCs and   ventricular couplets noted.      Carotid duplex 1/16/20  Right Impression   The right internal carotid artery appears to have a 50-79% diameter reducing   stenosis based on velocity criteria. The right vertebral artery demonstrates normal antegrade flow. Left Impression   The left internal carotid artery appears to have a <50% diameter reducing   stenosis based on velocity criteria. The left vertebral artery demonstrates normal antegrade flow.         Echo 7/8/20  There is mild concentric left ventricular hypertrophy. Ejection fraction is visually estimated to be 55-60%. diastology cannot be determined  Moderate to severe mitral regurgitation is present. A bioprosthetic artificial aortic valve appears well seated with a maximum  velocity of 273 m/s and a mean gradient of 16 mmHg. Study is unchanged from previous dated 1/8/2018    CAMERON 7/27/20  Left ventricular cavity size is normal in size with normal wall thickness. Overall left ventricular systolic function appears normal.  Ejection fraction is visually estimated to be 55-60%. Normal right ventricular size and function. Left atrium is moderately dilated. Mild thickening of the mitral valve leaflets. There is moderate mitral regurgitation appreciated. Moderate tricuspid regurgitation. A bioprosthetic valve appears well seated. There is no appreciable leaflet restriction or stenosis. There is no aortic insufficiency appreciated. Echo 10/27/21  Left ventricular cavity size is normal.  Normal left ventricular wall thickness. Ejection fraction is visually estimated to be 45-50%. There is mild global  hypokinesis appreciated. Grade III diastolic dysfunction with elevated LV filling pressures. Moderately dilated right ventricle with mildly reduced function. The left atrium is severely dilated. The right atrium is mildly dilated. Moderately severe tricuspid regurgitation. Moderate pulmonic regurgitation present. Moderately severe mitral regurgitation appreciated. A bioprosthetic artificial aortic valve appears well seated with a maximum  velocity of 2.4m/s and a mean gradient of 12mmHg.   Trivial aortic regurgitation. A bubble study was performed and fails to show evidence of right to left  shunting. CAMERON 10/29/21  Overall left ventricular size and wall thickness appear normal with systolic  function mildly reduced. LVEF 40-45%. Normal right ventricular size and function. Left atrium is moderately dilated. The left atrial appendage appears to be ligated with no flow into it. Mitral valve leaflets appear thickened/calcified. Restricted posterior leaflet with malcoaptation id the leaflet tips. Moderate mitral regurgitation is present. Moderate tricuspid regurgitation. Upper extremity venous duplex 11/18/21 ([facility])     o No evidence of acute deep vein thrombosis in the left upper extremity .     o There is superficial venous thrombosis identified in the left upper        extremity that is of indeterminate age as described above. Assessment:  1. CAD of native coronary arteries without angina  2. S/p Aortic Valve Replacement with bioprosthesis for nonrheumatic aortic stenosis  3. Chronic combined systolic and diastolic CHF, class 2  4. Hyperlipidemia with goal LDL <70 mg/dL  5. Paroxysmal Atrial fibrillation  6. Asymptomatic Bradycardia  7. Carotid stenosis, right   8. Mitral regurgitation, moderate    Plan:  She is not endorsing any symptoms representing angina and is overall well compensated on exam today. She continues in a regular rhythm today by EKG and will remain on her current dose of amiodarone. I have placed a referral to [address] [place] [address] to be evaluated for possible sleep apnea. I have personally reviewed all previous testing for this visit today including imaging, lab results and EKG as detailed above. We may consider her for a biventricular pacemaker without an AICD given her LBBB and LVEF of 40%. I will see her in office for follow up in 2 months. This note was scribed in the presence of Jessica Kenny MD by General Reeves, RN.   Physician Attestation:  The scribes

## 2021-12-22 ENCOUNTER — CARE COORDINATION (OUTPATIENT)
Dept: CASE MANAGEMENT | Age: 76
End: 2021-12-22

## 2021-12-22 RX ORDER — POTASSIUM CHLORIDE 750 MG/1
TABLET, FILM COATED, EXTENDED RELEASE ORAL
Qty: 60 TABLET | Refills: 11 | Status: ON HOLD | OUTPATIENT
Start: 2021-12-22 | End: 2022-07-10 | Stop reason: SDUPTHER

## 2021-12-23 PROCEDURE — 93000 ELECTROCARDIOGRAM COMPLETE: CPT | Performed by: INTERNAL MEDICINE

## 2022-01-07 ENCOUNTER — CARE COORDINATION (OUTPATIENT)
Dept: CASE MANAGEMENT | Age: 77
End: 2022-01-07

## 2022-01-07 RX ORDER — EZETIMIBE 10 MG/1
10 TABLET ORAL DAILY
Qty: 60 TABLET | Refills: 0 | Status: SHIPPED | OUTPATIENT
Start: 2022-01-07 | End: 2022-03-07 | Stop reason: SDUPTHER

## 2022-01-07 NOTE — CARE COORDINATION
[facility] Transitions Follow Up Call    2022    Patient: Jane House  Patient : 1945   MRN: [id number]  Reason for Admission: Afib  Discharge Date: 21 RARS: Readmission Risk Score: 18.9 ( )         Spoke with: Veronica Caputo 33 Transitions Follow Up Call    Needs to be reviewed by the provider   Additional needs identified to be addressed with provider: No  [facility] care-[facility]             Method of communication with provider : none      Care Transition Nurse (CTN) contacted the patient by telephone to follow up after admission on 21. Verified name and  with patient as identifiers. Addressed changes since last contact: none  Discussed follow-up appointments. If no appointment was previously scheduled, appointment scheduling offered: Yes. Is follow up appointment scheduled within 7 days of discharge? Yes. Advance Care Planning:   Does patient have an Advance Directive: Reviewed and current  CTN reviewed discharge instructions, medical action plan and red flags with patient and discussed any barriers to care and/or understanding of plan of care after discharge. Discussed appropriate site of care based on symptoms and resources available to patient including: PCP, Specialist, Urgent care clinics, Home health, When to call 911 and [address]. The patient agrees to contact the PCP office for questions related to their healthcare. Patients top risk factors for readmission: ineffective coping  Interventions to address risk factors: Obtained and reviewed discharge summary and/or continuity of care documents      Non-[facility] follow up appointment(s):     CTN provided contact information for future needs. No further follow-up call indicated based on severity of symptoms and risk factors. Plan for next call: N/A    This [facility] spoke with pt and pt stated that she is doing well. Patient denied any worsening symptoms.  Denied fever, chills, N/V and any difficulty breathing at this time.  Denied difficulty with urination, BMs or appetite. Denied chest pain and SOB. Denied any needs and concerns at this time. No new changes or medications to report or address. Advised pt to immediately report any worsening symptoms to the PCP. Patient verbalized understanding and agreed. Pt is stable, CT period has ended. Diony Fabian LPN, [facility]  PH: [phone]                    Care Transitions Subsequent and Final Call    Schedule Follow Up Appointment with PCP: Completed  Subsequent and Final Calls  Do you have any ongoing symptoms?: No  Have your medications changed?: No  Patient Reports: Zetia added  Do you have any questions related to your medications?: No  Do you currently have any active services?: Yes  Are you currently active with any services?: Home Health  Do you have any needs or concerns that I can assist you with?: No  Identified Barriers: None  Care Transitions Interventions   Home Care Waiver: Completed     Other Interventions:            Follow Up  Future Appointments   Date Time Provider Vickie Wilson   1/11/2022  4:00 PM Gomez Dover MD [facility]   1/18/2022  3:20 PM Monalisa Pelayo MD [facility]   1/21/2022  3:00 PM Chapo Young MD [facility]   2/15/2022  3:30 PM Irina Kitchen MD [facility]       Diony Fabian LPN

## 2022-01-07 NOTE — TELEPHONE ENCOUNTER
Last OV: 12/15/21  Next OV: 2/15/22  Last refill:6/23/21  Most recent Labs: 11/26/21  Last EKG (if needed):12/23/21

## 2022-01-18 ENCOUNTER — OFFICE VISIT (OUTPATIENT)
Dept: SLEEP MEDICINE | Age: 77
End: 2022-01-18
Payer: COMMERCIAL

## 2022-01-18 VITALS
SYSTOLIC BLOOD PRESSURE: 130 MMHG | RESPIRATION RATE: 18 BRPM | OXYGEN SATURATION: 95 % | BODY MASS INDEX: 25.34 KG/M2 | HEIGHT: 61 IN | TEMPERATURE: 97.7 F | WEIGHT: 134.2 LBS | DIASTOLIC BLOOD PRESSURE: 70 MMHG | HEART RATE: 51 BPM

## 2022-01-18 DIAGNOSIS — Z86.79 H/O CHF: ICD-10-CM

## 2022-01-18 DIAGNOSIS — I48.0 PAF (PAROXYSMAL ATRIAL FIBRILLATION) (HCC): ICD-10-CM

## 2022-01-18 DIAGNOSIS — G47.33 OBSTRUCTIVE SLEEP APNEA: Primary | ICD-10-CM

## 2022-01-18 DIAGNOSIS — J43.2 CENTRILOBULAR EMPHYSEMA (HCC): ICD-10-CM

## 2022-01-18 DIAGNOSIS — I10 ESSENTIAL HYPERTENSION: ICD-10-CM

## 2022-01-18 PROCEDURE — G8484 FLU IMMUNIZE NO ADMIN: HCPCS | Performed by: PSYCHIATRY & NEUROLOGY

## 2022-01-18 PROCEDURE — 4040F PNEUMOC VAC/ADMIN/RCVD: CPT | Performed by: PSYCHIATRY & NEUROLOGY

## 2022-01-18 PROCEDURE — G8417 CALC BMI ABV UP PARAM F/U: HCPCS | Performed by: PSYCHIATRY & NEUROLOGY

## 2022-01-18 PROCEDURE — 3023F SPIROM DOC REV: CPT | Performed by: PSYCHIATRY & NEUROLOGY

## 2022-01-18 PROCEDURE — 1090F PRES/ABSN URINE INCON ASSESS: CPT | Performed by: PSYCHIATRY & NEUROLOGY

## 2022-01-18 PROCEDURE — 4004F PT TOBACCO SCREEN RCVD TLK: CPT | Performed by: PSYCHIATRY & NEUROLOGY

## 2022-01-18 PROCEDURE — G8400 PT W/DXA NO RESULTS DOC: HCPCS | Performed by: PSYCHIATRY & NEUROLOGY

## 2022-01-18 PROCEDURE — 1123F ACP DISCUSS/DSCN MKR DOCD: CPT | Performed by: PSYCHIATRY & NEUROLOGY

## 2022-01-18 PROCEDURE — 99204 OFFICE O/P NEW MOD 45 MIN: CPT | Performed by: PSYCHIATRY & NEUROLOGY

## 2022-01-18 PROCEDURE — G8427 DOCREV CUR MEDS BY ELIG CLIN: HCPCS | Performed by: PSYCHIATRY & NEUROLOGY

## 2022-01-18 ASSESSMENT — SLEEP AND FATIGUE QUESTIONNAIRES
HOW LIKELY ARE YOU TO NOD OFF OR FALL ASLEEP WHILE SITTING INACTIVE IN A PUBLIC PLACE: 0
HOW LIKELY ARE YOU TO NOD OFF OR FALL ASLEEP WHILE WATCHING TV: 3
HOW LIKELY ARE YOU TO NOD OFF OR FALL ASLEEP WHEN YOU ARE A PASSENGER IN A CAR FOR AN HOUR WITHOUT A BREAK: 0
HOW LIKELY ARE YOU TO NOD OFF OR FALL ASLEEP WHILE SITTING QUIETLY AFTER LUNCH WITHOUT ALCOHOL: 0
HOW LIKELY ARE YOU TO NOD OFF OR FALL ASLEEP IN A CAR, WHILE STOPPED FOR A FEW MINUTES IN TRAFFIC: 0
HOW LIKELY ARE YOU TO NOD OFF OR FALL ASLEEP WHILE LYING DOWN TO REST IN THE AFTERNOON WHEN CIRCUMSTANCES PERMIT: 3
NECK CIRCUMFERENCE (INCHES): 13.5
HOW LIKELY ARE YOU TO NOD OFF OR FALL ASLEEP WHILE SITTING AND READING: 0
HOW LIKELY ARE YOU TO NOD OFF OR FALL ASLEEP WHILE SITTING AND TALKING TO SOMEONE: 0
ESS TOTAL SCORE: 6

## 2022-01-18 ASSESSMENT — ENCOUNTER SYMPTOMS
CHOKING: 1
GASTROINTESTINAL NEGATIVE: 1
SHORTNESS OF BREATH: 1
EYES NEGATIVE: 1
ALLERGIC/IMMUNOLOGIC NEGATIVE: 1

## 2022-01-18 NOTE — PROGRESS NOTES
MD JUANPABLO Rangel Board Certified in Sleep Medicine  Certified [facility] Sleep Medicine  Board Certified in Neurology [address] [facility] 57 1400 [facility], [address] [facility]  [address], [address]           [address] SLEEP MEDICINE [address] [address] [phone]    Subjective:     Patient ID: Jt Rojas is a 68 y.o. female. Chief Complaint   Patient presents with   [facility] Establish Care    Daytime Sleepiness       HPI:        Jt Rojas is a 68 y.o. female referred by Dr. Mary Melgar for a sleep evaluation. She complains of snoring, snorting, choking, tossing and turning, kicking, excessive daytime sleepiness, feels sleepy during the day, take naps during the day but she denies periods of not breathing, knees buckling with laughing, completely or partially paralyzed while falling asleep or waking up, noisy environment, uncomfortable room temperature, uncomfortable bedding. Symptoms began a few years ago, unchanged since that time. The patient's bed-partner confirmed the snoring without stopped breathing at night. SLEEP SCHEDULE: Goes to bed around 11:30 PM in the weekdays and 11:30 PM in the weekends. It usually takes the patient 60 minutes to fall asleep. The patient gets up 3-4 per night to go to the bathroom. The Patient finally gets up at 8 AM during the weekdays and 8 AM in the weekends. patient wakes up with dry mouth. The patient has restless sleep with frequent arousals in addition to the Patient has significant daytime sleepiness. The Patient scored Total score: 6 on [place] Sleepiness Scale ( more than 10 is indicative of daytime sleepiness)and 47 in fatigue scale ( more than 36 is indicative of daytime fatigue).  The patient takes 2 naps/day for 15- minutes and usually is not refreshing nap.     Previous evaluation and treatment has included- none. DOT/CDL - N/A  FAA/'tootie - N/A  The patient HTN is stable. No A-fib after the cardioversion 2 months ago. H/O CHF last CAMERON 10/26/2021  Summary   Overall left ventricular size and wall thickness appear normal with systolic   function mildly reduced. LVEF 40-45%. Normal right ventricular size and function. Left atrium is moderately dilated. The left atrial appendage appears to be ligated with no flow into it. Mitral valve leaflets appear thickened/calcified. Restricted posterior leaflet with malcoaptation id the leaflet tips. Moderate mitral regurgitation is present. 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 Tobacco Use    Smoking status: Current Every Day Smoker     Packs/day: 1.00     Years: 45.00     Pack years: 45.00     Types: Cigarettes    Smokeless tobacco: Former User    Tobacco comment: smoked 1.5 pp for over 30 years  over 45pk year hx   Vaping Use    Vaping Use: Never used   Substance and Sexual Activity    Alcohol use: No     Alcohol/week: 0.0 standard drinks     Comment: Socially     Drug use: No     Comment: never    Sexual activity: Not Currently   Other Topics Concern    Not on file   Social History Narrative    Not on file     Social Determinants of Health     Financial Resource Strain:     Difficulty of Paying Living Expenses: Not on file   Food Insecurity:     Worried About Running Out of Food in the Last Year: Not on file    Leonor of Food in the Last Year: Not on file   Transportation Needs:     Lack of Transportation (Medical):  Not on file    Lack of Transportation (Non-Medical): Not on file   Physical Activity:     Days of Exercise per Week: Not on file    Minutes of Exercise per Session: Not on file   Stress:     Feeling of Stress : Not on file   Social Connections:     Frequency of Communication with Friends and Family: Not on file    Frequency of Social Gatherings with Friends and Family: Not on file    Attends Tenriism Services: Not on file    Active Member of [address] [place] [address] or Organizations: Not on file    Attends Club or Organization Meetings: Not on file    Marital Status: Not on file   Intimate Partner Violence:     Fear of Current or Ex-Partner: Not on file    Emotionally Abused: Not on file    Physically Abused: Not on file    Sexually Abused: Not on file   Housing Stability:     Unable to Pay for Housing in the Last Year: Not on file    Number of [place] in the Last Year: Not on file    Unstable Housing in the Last Year: Not on file       Prior to Admission medications    Medication Sig Start Date End Date Taking?  Authorizing Provider   ezetimibe (ZETIA) 10 MG tablet TAKE 1 TABLET BY MOUTH DAILY 1/7/22  Yes Jacque Barahona MD   potassium chloride (KLOR-CON) 10 MEQ extended release tablet TAKE ONE TABLET BY MOUTH TWO TIMES A DAY 12/22/21  Yes Deborah Maria MD   Fluticasone-Salmeterol,sensor, 627-75 MCG/ACT AEPB 2 puffs bid 11/26/21  Yes Deborah Maria MD   metoprolol succinate (TOPROL XL) 50 MG extended release tablet Take 1 tablet by mouth daily 11/27/21  Yes Deborah Maria MD   torsemide (DEMADEX) 20 MG tablet 20mg qam water pill take xtra 20mg on days wght increases by 2 or more lbs 11/26/21  Yes Deborah Maria MD   amiodarone (CORDARONE) 200 MG tablet Take 1 tablet by mouth nightly 11/26/21  Yes Donaldo Skelton APRN - CNP   levothyroxine (SYNTHROID) 88 MCG tablet Take 1 tablet by mouth Daily 11/10/21  Yes Belen Dancer, MD   docusate sodium (DOK) 100 MG capsule TAKE ONE CAPSULE BY MOUTH TWO TIMES A DAY 11/1/21  Yes Deborah Maria MD   diclofenac sodium (VOLTAREN) 1 % GEL Apply 2 g topically daily   Yes Historical Provider, MD   oxyCODONE HCl (OXY-IR) 10 MG immediate release tablet Take 10 mg by mouth every 6 hours as needed for Pain.    Yes Historical Provider, MD   ipratropium (ATROVENT) 0.03 % nasal spray INHALE 2 DOSES INTO EACH NOSTRIL THREE TIMES A DAY IF NEEDED FOR RHINITIS 10/19/21  Yes Syd Novoa MD   tiZANidine (ZANAFLEX) 4 MG tablet TAKE 1 TABLET BY MOUTH THREE TIMES A DAY  Patient taking differently: Take 2 mg by mouth every 8 hours as needed  8/31/21  Yes Syd Novoa MD   DULoxetine (CYMBALTA) 30 MG extended release capsule TAKE 1 CAPSULE BY MOUTH DAILY EVERY MORNING 8/17/21  Yes Syd Novoa MD   rosuvastatin (CRESTOR) 40 MG tablet TAKE 1 TABLET BY MOUTH DAILY  Patient taking differently: Take 40 mg by mouth every evening  6/21/21  Yes Qasim Cotter MD   pantoprazole (PROTONIX) 40 MG tablet TAKE ONE TABLET BY MOUTH DAILY 2/12/21  Yes Syd Novoa MD   aspirin 81 MG tablet Take 1 tablet by mouth daily 5/14/15  Yes Qasim Cotter MD       Allergies as of 01/18/2022 - Fully Reviewed 01/18/2022   Allergen Reaction Noted    Benadryl [diphenhydramine] Swelling 06/16/2015    Doxylamine  06/16/2015    Mucinex [guaifenesin er]  11/25/2015    Spiriva handihaler [tiotropium bromide monohydrate]  11/25/2015    Adhesive tape Rash and Swelling 04/27/2014       Patient Active Problem List   Diagnosis    Hypothyroidism    Essential hypertension    Hyperlipidemia LDL goal <70    Cerebral infarction ([address]    Arthritis    Bladder tumor    Pulmonary emphysema ([address]    Closed sacral fracture ([address]    Stenosis of right carotid artery    Intermittent claudication ([address]    Adjustment reaction with anxiety    Bradycardia    Pulmonary HTN ([address]    Sacral fracture, closed ([address]    Acute on chronic diastolic heart failure (HCC)    Left hand weakness    S/P AVR (aortic valve replacement)    Edema leg    Abdominal aortic aneurysm ([address]    Acute respiratory failure with hypoxia (HCC)    CAP (community acquired pneumonia)    COPD exacerbation ([address]    Dyspnea and respiratory abnormalities    Hemoptysis    Chronic obstructive pulmonary disease (HCC)    Coronary artery disease involving native coronary artery of native heart without angina pectoris    PA[facility]ox[facility] fibrillation) [facility]    Nonrheumatic mitral valve regurgitation    Lumbar stenosis    Chronic bilateral low back pain with bilateral sciatica    Acute metabolic encephalopathy    Hypothyroidism due to Hashimoto's thyroiditis    Chronic combined systolic and diastolic CHF, NYHA class 2 ([address]    ANITA (acute kidney injury) ([facility]    Pulmonary nodule    Bright red rectal bleeding    Acute blood loss anemia    Rectal bleeding    Sinus bradycardia    Gastrointestinal hemorrhage    Macular degeneration    Thoracic spine pain    Anginal equivalent (HCC)    Atrial fibrillation with RVR ([facility])    History of GI bleed    Basilic vein thrombosis       Past Medical History:   Diagnosis Date    Anticoagulant long-term use     Atrial fibrillation ([facility]     went rhonda on amiodarone and coreg both stopped 7/2020    AVM (arteriovenous malformation) of duodenum, 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 since 26.     Chronic diastolic CHF (congestive heart failure) ([address] 2016    grade II    COPD, mild (HCC)     CVA (cerebral infarction) 2013    left cerebral cortex x2/expressive aphasia/resolved    Diverticulosis     Epistaxis, recurrent     when inr high. last 3-4 months ago    Former smoker     Gallbladder sludge     HTN (hypertension)     Hyperlipidemia     Hypothyroidism     Lumbar stenosis without neurogenic claudication     pain across low back worse with standing and walking, nonradiating    Macular degeneration 2018    left worse than right , dry : progressive loss of sight: just barely able to see to drive    Neuropathy     Nonrheumatic mitral valve regurgitation     Osteoarthritis     Pulmonary HTN ([address]     primary, responsive to nitrates    PVD (peripheral vascular disease) ([address]     occluded right SFA::: asymptomatic NIKOS 0.7 right and 1.0 left    Sacral fracture, closed ([address]     Syncope 2014       Past Surgical History:   Procedure Laterality Date    AORTIC VALVE REPLACEMENT  6/16/15    Dr. Cevallos Blount. Hernandez - PVI, RENETTA exclusion. 19mm Maki pericardial Magna    APPENDECTOMY      BACK SURGERY  03/15/2019    superion inserted to keep back from hurtin [facility] [address] N/A 2019    EMERGENT; LAPAROSCOPIC CHOLECYSTECTOMY WITH GRAM performed by Abhilash Malone MD at [address] [place] [address]      stent x 1    CYSTOSCOPY  2014    dr Montero Human      THR Right    OTHER SURGICAL HISTORY  2018     EGD and colonoscopy.     PAIN MANAGEMENT PROCEDURE Bilateral 2021    BILATERAL L3, L4, L5 MEDIAL BRANCH BLOCK WITH FLUOROSCOPY performed by Cary Guzmán MD at [address] Bilateral 2021    BILATERAL L3, L4, L5 MEDIAL BRANCH BLOCKS WITH FLUOROSCOPY (32365, 91496) performed by Cary Guzmán MD at [address] Bilateral 2021    BILATERAL L3, L4, L5 RADIOFREQUENCY ABLATION WITH FLUOROSCOPY (97077, 74282) performed by Cary Guzmán MD at [address] [facility] 3/15/2019    INSERTION OF INTERSPINOUS SPACER Aretha Mims AT LUMBAR FOUR-LUMBAR FIVE performed by Marlee Dugan MD at [address] [place] [address] Rd ENDOSCOPY  12/15/2017       Family History   Problem Relation Age of Onset    Breast Cancer Daughter        Review of Systems   Constitutional: Positive for diaphoresis and fatigue. HENT: Positive for congestion. Eyes: Negative. Respiratory: Positive for choking and shortness of breath. Cardiovascular: Positive for leg swelling. Gastrointestinal: Negative. Endocrine: Positive for cold intolerance. Genitourinary: Positive for frequency. Musculoskeletal: Positive for myalgias. Allergic/Immunologic: Negative. Neurological: Negative. Negative for headaches. Psychiatric/Behavioral: Positive for agitation and decreased concentration. The patient is nervous/anxious. Objective:     Vitals:  Weight BMI Neck circumference    Wt Readings from Last 3 Encounters:   01/18/22 134 lb 3.2 oz (60.9 kg)   12/15/21 134 lb (60.8 kg)   12/08/21 138 lb 9.6 oz (62.9 kg)    Body mass index is 25.36 kg/m². Neck circumference: 13.5     BP HR SaO2   BP Readings from Last 3 Encounters:   01/18/22 130/70   12/15/21 120/64   12/08/21 104/62    Pulse Readings from Last 3 Encounters:   01/18/22 51   12/15/21 54   12/08/21 59    SpO2 Readings from Last 3 Encounters:   01/18/22 95%   12/15/21 95%   12/08/21 92%        The mandibular molar Class :   [x]1 []2 []3      Mallampati I Airway Classification:   []1 []2 []3 [x]4        Physical Exam  Vitals and nursing note reviewed. Constitutional:       Appearance: Normal appearance. HENT:      Head: Atraumatic. Nose: Nose normal.      Mouth/Throat:      Comments: Mallampati class 4, no retrognathia or hypognathia , normal airflow in bilateral nostrils, no septum deviation , crowded oropharynx with low soft palate, no tonsils enlargement. Eyes:      Extraocular Movements: Extraocular movements intact.    Cardiovascular:      Rate and Rhythm: Normal rate and regular rhythm. Heart sounds: Normal heart sounds. Pulmonary:      Effort: Pulmonary effort is normal.      Breath sounds: Normal breath sounds. Musculoskeletal:         General: Normal range of motion. Cervical back: Normal range of motion and neck supple. Skin:     General: Skin is warm. Neurological:      General: No focal deficit present. Psychiatric:         Mood and Affect: Mood normal.         Assessment:   Obstructive sleep apnea especially with snoring, snorting,  daytime sleepiness,  Mallampati class of 4. Diagnosis Orders   1. Obstructive sleep apnea  Baseline Diagnostic Sleep Study    Sleep Study with PAP Titration   2. Centrilobular emphysema ([address]  Baseline Diagnostic Sleep Study   3. PAF (paroxysmal atrial fibrillation) ([facility])  Baseline Diagnostic Sleep Study   4. Essential hypertension  Baseline Diagnostic Sleep Study   5. H/O CHF  Baseline Diagnostic Sleep Study     Plan:   Patient was counseled about the pathophysiology of obstructive sleep apnea syndrome and the methods for evaluating its presence and severity. Patient was counseled to avoid driving and other potentially hazardous circumstances if the patient is experiencing excessive sleepiness. Treatment considerations include the use of nasal CPAP, oral dental appliance or a surgical intervention, which should be based on otolarygologic findings, In the meantime, the patient should be cautioned to avoid the use of alcohol or other depressant medications because of potential for increasing the duration and severity of apnea and cautioned regarding driving or operating and dangerous equipment if the patient is experiencing daytime sleepiness. .  Most likely treating the SHAVONNE will have positive impact on HTN, COPD, CHF, and A-fib.  control.         Orders Placed This Encounter   Procedures    Baseline Diagnostic Sleep Study    Sleep Study with PAP Titration       Return in about 3 months (around 4/18/2022) for to review the PSG and CPAP usage, Reveiwing CPAP usage and compliance report and tro.     Abrahan Powers MD  Medical Director - [facility]

## 2022-01-19 ENCOUNTER — TELEPHONE (OUTPATIENT)
Dept: SLEEP CENTER | Age: 77
End: 2022-01-19

## 2022-01-19 NOTE — TELEPHONE ENCOUNTER
----- Message from Stella Dixon MA sent at 1/19/2022  7:13 AM EST -----  Regarding: Need updated insurance  The [place] [id number] is inactive. Does this patient only have Medicare A & B? If not, please supply the updated insurance to ensure submission of the PSG in a timely manner.     Thank you

## 2022-01-20 NOTE — PROGRESS NOTES
Cardiac Electrophysiology Consultation   Date: 1/21/2022   Reason for Consultation: Atrial fibrillation  Consult Requesting Physician: Ifeoma Campa MD  Primary Care Physician: Lisette No MD      Chief Complaint:   Chief Complaint   Patient presents with    New Patient     afib         HPI: Florencio Sage is a 68 y.o. patient with a history of atrial fibrillation, pulmonary hypertension, aortic stenosis s/p AVR, PVI and RENETTA exclusion by Dr Jamie Hoover on 6/2020 and CAD s/p CHILANGO to mid circ 5/2014,  AVM's which were cauterized. She presented to [facility] with complaints of back pain and while inpatient had an episode of dyspnea. An echocardiogram was completed revealing moderate to severe MR. Subsequently, a CAMERON was completed on 7/27/20 showing moderate MR. She was admitted 10/26/2021-11/1/2021 with afib with RVR and acute CHF. She converted to a regular rhythm and was diuresed prior to discharge. She was admitted to [facility] 11/11/21 with acute encephalopathy and found to have rhabdomyolysis and afib with RVR again. She underwent successful cardioversion on 11/17/2021. She did experience bradycardia when in sinus rhythm with heart rate between 40-50 bpm. She was seen in the office with Phillip Jones CNP for follow up on 11/23/21 and then sent to the ED for admission. She was again found in afib with RVR and fluid overloaded. She was diuresed and discharged home on both amiodarone and Toprol. Interval History: Today, she presents to office accompanied by her daughter to establish care for her atrial fibrillation and discuss treatment options. She reports that she gets tired easily since she started on medication for afib. Patient does not endorse any identifiable exacerbating or alleviating factors for the atrial fibrillation. She reports a history of chronic back pain. She states she can tell when she is in afib and the last time she was in in for 3 days.  She is compliant with hermedications and tolerating them well. She denies chest pain/pressure, tightness, edema, shortness of breath, heart racing, palpitations, lightheadedness, dizziness, syncope, presyncope,  PND or orthopnea. 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 since 26.  Chronic diastolic CHF (congestive heart failure) ([address] 2016    grade II    COPD, mild (HCC)     CVA (cerebral infarction) 2013    left cerebral cortex x2/expressive aphasia/resolved    Diverticulosis     Epistaxis, recurrent     when inr high.  last 3-4 months ago    Former smoker     Gallbladder sludge     HTN (hypertension)     Hyperlipidemia     Hypothyroidism     Lumbar stenosis without neurogenic claudication 2017    pain across low back worse with standing and walking, nonradiating    Macular degeneration 2018    left worse than right , dry : progressive loss of sight: just barely able to see to drive    Neuropathy     Nonrheumatic mitral valve regurgitation     Osteoarthritis     Pulmonary HTN ([address] 2014    primary, responsive to nitrates    PVD (peripheral vascular disease) ([address]     occluded right SFA::: asymptomatic NIKOS 0.7 right and 1.0 left    Sacral fracture, closed ([address] 2014    Syncope 05/2014      Past Surgical History:   Procedure Laterality Date    AORTIC VALVE REPLACEMENT  6/16/15    Dr. Elizabeth Robins. Hernandez - PVI, RENETTA exclusion. 19mm Maki pericardial Magna    APPENDECTOMY      BACK SURGERY  03/15/2019    superion inserted to keep back from hurtin [address] N/A 2019    EMERGENT; LAPAROSCOPIC CHOLECYSTECTOMY WITH GRAM performed by Carmen Ontiveros MD at [address]      stent x 1    CYSTOSCOPY  2014    dr Gloria Grimes      THR Right    OTHER SURGICAL HISTORY  2018     EGD and colonoscopy.  PAIN MANAGEMENT PROCEDURE Bilateral 2021    BILATERAL L3, L4, L5 MEDIAL BRANCH BLOCK WITH FLUOROSCOPY performed by Lisbet Melendrez MD at [address] Bilateral 2021    BILATERAL L3, L4, L5 MEDIAL BRANCH BLOCKS WITH FLUOROSCOPY (11914, 21877) performed by Lisbet Melendrez MD at [address] Bilateral 2021    BILATERAL L3, L4, L5 RADIOFREQUENCY ABLATION WITH FLUOROSCOPY (62207, 43351) performed by Lisbet Melendrez MD at [facility] N/A 3/15/2019    INSERTION OF INTERSPINOUS SPACER SUPERION VERTIFLEX AT LUMBAR FOUR-LUMBAR FIVE performed by Jovanni Robins MD at [address] ENDOSCOPY  12/15/2017       Allergies: Allergies   Allergen Reactions    Benadryl [Diphenhydramine] Swelling    Doxylamine     Mucinex [Guaifenesin Er]      hallucinations    Spiriva Handihaler [Tiotropium Bromide Monohydrate]      Nausea      Adhesive Tape Rash and Swelling       Medication:   Prior to Admission medications    Medication Sig Start Date End Date Taking?  Authorizing Provider   ezetimibe (ZETIA) 10 MG tablet TAKE 1 TABLET BY MOUTH DAILY 22  Yes Napoleon Daniel MD   potassium chloride (KLOR-CON) 10 MEQ extended release tablet TAKE ONE TABLET BY MOUTH TWO TIMES A DAY 21  Yes Vandana Astorga MD   Fluticasone-Salmeterol,sensor, 496-95 MCG/ACT AEPB 2 puffs bid 21  Yes Vandana Astorga MD metoprolol succinate (TOPROL XL) 50 MG extended release tablet Take 1 tablet by mouth daily 11/27/21  Yes Perfecto Bonds MD   torsemide (DEMADEX) 20 MG tablet 20mg qam water pill take xtra 20mg on days wght increases by 2 or more lbs 11/26/21  Yes Perfecto Bonds MD   amiodarone (CORDARONE) 200 MG tablet Take 1 tablet by mouth nightly 11/26/21  Yes Maxcine Flight, APRN - CNP   levothyroxine (SYNTHROID) 88 MCG tablet Take 1 tablet by mouth Daily 11/10/21  Yes Madisyn Powers MD   docusate sodium (DOK) 100 MG capsule TAKE ONE CAPSULE BY MOUTH TWO TIMES A DAY 11/1/21  Yes Perfecto Bonds MD   diclofenac sodium (VOLTAREN) 1 % GEL Apply 2 g topically daily   Yes Historical Provider, MD   oxyCODONE HCl (OXY-IR) 10 MG immediate release tablet Take 10 mg by mouth every 6 hours as needed for Pain. Yes Historical Provider, MD   ipratropium (ATROVENT) 0.03 % nasal spray INHALE 2 DOSES INTO EACH NOSTRIL THREE TIMES A DAY IF NEEDED FOR RHINITIS 10/19/21  Yes Perfecto Bonds MD   tiZANidine (ZANAFLEX) 4 MG tablet TAKE 1 TABLET BY MOUTH THREE TIMES A DAY  Patient taking differently: Take 2 mg by mouth every 8 hours as needed  8/31/21  Yes Perfecto Bonds MD   DULoxetine (CYMBALTA) 30 MG extended release capsule TAKE 1 CAPSULE BY MOUTH DAILY EVERY MORNING 8/17/21  Yes Perfecto Bonds MD   rosuvastatin (CRESTOR) 40 MG tablet TAKE 1 TABLET BY MOUTH DAILY  Patient taking differently: Take 40 mg by mouth every evening  6/21/21  Yes Hollie Walker MD   pantoprazole (PROTONIX) 40 MG tablet TAKE ONE TABLET BY MOUTH DAILY 2/12/21  Yes Perfecto Bonds MD   aspirin 81 MG tablet Take 1 tablet by mouth daily 5/14/15  Yes Hollie Walker MD       Social History:   reports that she has been smoking cigarettes. She has a 45.00 pack-year smoking history. She has quit using smokeless tobacco. She reports that she does not drink alcohol and does not use drugs. Family History:  family history includes Breast Cancer in her daughter. Reviewed. Denies family history of sudden cardiac death, arrhythmia, premature CAD    Review of System:  Pertinent positive and negatives are in the HPI, the rest are negative. Physical Examination:  /72 (Site: Left Upper Arm, Position: Sitting)   Pulse (!) 45   Ht 5' 1\" (1.549 m)   Wt 134 lb (60.8 kg)   SpO2 94%   BMI 25.32 kg/m²      · Constitutional: Oriented. No distress. · Head: Normocephalic and atraumatic. · Mouth/Throat: Oropharynx is clear and moist.   · Eyes: Conjunctivae normal. EOM are normal.   · Neck: Normal range of motion. Neck supple. No rigidity. No JVD present. · Cardiovascular: Normal rate, regular rhythm, S1&S2 and intact distal pulses. · Pulmonary/Chest: Bilateral respiratory sounds. No wheezes. No rhonchi. · Abdominal: Soft. Bowel sounds present. No distension, No tenderness. · Musculoskeletal: No tenderness. No edema    · Lymphadenopathy: Has no cervical adenopathy. · Neurological: Alert and oriented. Cranial nerve appears intact, No Gross deficit   · Skin: Skin is warm and dry. No rash noted. · Psychiatric: Has a normal mood, affect and behavior     Labs:  Reviewed. ECG: reviewed, marked sinus bradycardia LBBB with v-rate of 48 bpm with QRS duration 160 ms. No ventricular pre-excitation, or QT prolongation. Studies:   1. Holter 1/8/2018   1. The rhythm was sinus with sinus arrhythmia. Average WA interval 0.20,   average QRS duration 0.14 [IVCD]. Average daily heart rate 44 ranging 38 to 68 bpm.   There were 129 pauses greater than 2.0 seconds with Max R-R 2.1 seconds occurring 05:13:11.   2. Frequent premature supraventricular ectopic beats total 1,642 consisting   of 1,152 isolated PACs, 209 atrial pairs and 3 atrial runs. The longest atrial run 3 beats   HR-61 bpm occurring 17:56:40.  The fastest atrial run 3 beats HR-76 bpm occurring 09:49:41.   3. Very frequent premature ventricular ectopic beats total 16,163 consisting of 16,008 isolated   PVCs, 74 ventricular couplets and 1 ventricular triplet. The ventricular triplet HR-126 bpm   occurring 15:36:29.   4. Diary returned with an entry of \"irregular heart\",  isolated PACs, PVCs and   ventricular couplets noted.           2. Echo 10/27/21  Left ventricular cavity size is normal.  Normal left ventricular wall thickness. Ejection fraction is visually estimated to be 45-50%. There is mild global  hypokinesis appreciated. Grade III diastolic dysfunction with elevated LV filling pressures. Moderately dilated right ventricle with mildly reduced function. The left atrium is severely dilated. The right atrium is mildly dilated. Moderately severe tricuspid regurgitation. Moderate pulmonic regurgitation present. Moderately severe mitral regurgitation appreciated. A bioprosthetic artificial aortic valve appears well seated with a maximum  velocity of 2.4m/s and a mean gradient of 12mmHg. Trivial aortic regurgitation. A bubble study was performed and fails to show evidence of right to left  shunting.     CAMERON 10/29/21  Overall left ventricular size and wall thickness appear normal with systolic  function mildly reduced. LVEF 40-45%. Normal right ventricular size and function. Left atrium is moderately dilated. The left atrial appendage appears to be ligated with no flow into it. Mitral valve leaflets appear thickened/calcified. Restricted posterior leaflet with malcoaptation id the leaflet tips. Moderate mitral regurgitation is present. Moderate tricuspid regurgitation. 3. Stress Test:  n/a    4. [facility] 6/15/2015   1. Right dominant coronary arterial system with mild calcification of the RCA. There is 40% serial lesions in the mid RCA. In the left system there is 25% distal left main disease. There is 50% mid LAD disease and 50% ostial second diagonal branch disease. There is no significant restenosis identified in the prior previously placed mid circumflex stent.    2. Systemic hypertension.      [facility] 10/27/21  HEMODYNAMICS:  1.  Right atrial pressure was 12 mmHg. 2.  RV pressure 65/-4. 3.  Pulmonary capillary wedge pressure was 24 mmHg. 4.  Pulmonary artery pressure 71/19 with a mean of 41 mmHg. 5.  Cardiac output 4.9 liters per minute. 6.  Mixed venous saturation 52%. 7.  Pulmonary capillary wedge saturation 90%. 8.  Right atrial saturation 51%.        I independently reviewed the ECG, MCOT, echocardiogram, stress test, and coronary angiography/PCI results and used them for my plan of care. [id number] Score for Atrial Fibrillation Stroke Risk   Risk   Factors  Component Value   C CHF Yes 1   H HTN Yes 1   A2 Age >= 76 Yes,  (77 y.o.) 2   D DM No 0   S2 Prior Stroke/TIA No 0   V Vascular Disease No 0   A Age 74-69 No,  (77 y.o.) 0   Sc Sex female 1    [id number]  Score  5   Score last updated 1/20/22 3:55 PM EST    Click here for a link to the UpToDate guideline \"Atrial Fibrillation: Anticoagulation therapy to prevent embolization    Disclaimer: Risk Score calculation is dependent on accuracy of patient problem list and past encounter diagnosis. Procedures:  1. AVR, PVI and RENETTA exclusion by Dr. Saranya Benton on 6/16/2020  2. DCCV on 11/17/2021    Assessment/Plan:     Paroxysmal atrial fibrillation  - First seen on EKG 4/27/2014.  - EKG today marked sinus bradycardia LBBB, rate 48 bpm.  · Not on OAC history of RENETTA clip. · On amiodarone 200 mg daily and Toprol XL 50 mg for rate and rhythm control. · Discussed monitoring needed while taking amiodarone, routine lab work to check thyroid and liver function, pulmonary function test and yearly eye examinations. · Discussed long term effects of atrial fibrillation which include heart failure and hospitalization. Ms. Maverick Corbin has paroxysmal atrial fibrillation and currently is in sinus rhythm feeling well. She likes the current medication regiment of amiodarone and metoprolol. She's never been on antiarrhythmics before and would like for now to continue.  If she has recurrence of atrial fibrillation, on amiodarone, she will call our office to be scheduled for ablation otherwise we can see her in the office in 6 months. She understands to continue with good heart failure regiments as this helps management of atrial fibrillation. If continues on amiodarone, LFTs, TSH and BMP every 4-6 months, ophthalmology and pulmonary function test yearly. We discussed treatment options including antiarrhythmics, rate control with anticoagulation, and ablation. We discussed progressive nature of atrial fibrillation. Treatment success decreases when AF becomes persistent and last more than 6 months. Antiarrhythmic therapy, side effects, benefits and alternative discussed. Atrial fibrillation ablation procedure was discussed. We discussed the need for repeat procedure. On average patients may need more than one ablation procedure. Risks associated with ablation include but not limited to allergic reaction to the medications, pain, bleeding, infection, nerve injury, injury to diaphragm(breathing muscle), pulmonary embolus(blood clot in lungs), deep vein blood clot, pneumothorax, hemothorax, acute renal failure, cardiac perforation,  tamponade, need for emergent surgery (open heart), permanent pacemaker, pulmonary vein stenosis, left atrial to esophageal fistula, stroke, myocardial infarction and death. Difference between atrial fibrillation and atrial flutter discussed and treatment discussed. Bradycardia  · Symptomatic with fatigue. · Advised that she can decrease her Toprol XL to 25 mg daily and if she has a reoccurrence of the afib increase back to 50 mg daily. · She states she would like to keep the medication at 50 mg daily. · Encouraged to stay active to keep up her functional capacity as lack of activity can cause a sharp and quick decline in her function. Aortic valve disease   · S/P AVR with RENETTA exclusion in June 2020.     Acute on chronic combined systolic and diastolic heart failure  · NYHA class III-IV  · LVEF 40-45 %  · Not on ACE/ARB/ARNI due to CKD. · On Beta blocker Torpol XL 50 mg daily. · Euvolemic on today's exam.  · Following with Dr. Arvin Christianson for management. Mitral regurgitation  - per last CAMERON was moderate. Personally reviewed it. Curious that LVEF has progressively declined from 55% to 45%. If remains in sinus rhythm on current antiarrhythmic regiment, will repeat TTE in 3 months to see if EF improves. Follow up with Dr. Arvin Christianson as scheduled. Follow up in 6 months. Thank you for allowing me to participate in the care of Jeff. All questions and concerns were addressed to the patient/family. Alternatives to my treatment were discussed. Physician attestation: The scribe's documentation has been prepared under my direction and has been personally reviewed by me in its entirety. I confirm that the note above reflects all work, treatment, procedures, and medical decision making performed by me.      Stanley Yeung MD  Cardiac Electrophysiology  [address]

## 2022-01-21 ENCOUNTER — OFFICE VISIT (OUTPATIENT)
Dept: CARDIOLOGY CLINIC | Age: 77
End: 2022-01-21
Payer: MEDICARE

## 2022-01-21 VITALS
DIASTOLIC BLOOD PRESSURE: 72 MMHG | BODY MASS INDEX: 25.3 KG/M2 | HEIGHT: 61 IN | HEART RATE: 45 BPM | SYSTOLIC BLOOD PRESSURE: 122 MMHG | WEIGHT: 134 LBS | OXYGEN SATURATION: 94 %

## 2022-01-21 DIAGNOSIS — I48.0 PAROXYSMAL ATRIAL FIBRILLATION (HCC): Primary | ICD-10-CM

## 2022-01-21 DIAGNOSIS — I50.42 CHRONIC COMBINED SYSTOLIC AND DIASTOLIC CHF, NYHA CLASS 2 (HCC): ICD-10-CM

## 2022-01-21 DIAGNOSIS — Z95.2 S/P AVR (AORTIC VALVE REPLACEMENT): ICD-10-CM

## 2022-01-21 DIAGNOSIS — R00.1 BRADYCARDIA: ICD-10-CM

## 2022-01-21 PROCEDURE — 4004F PT TOBACCO SCREEN RCVD TLK: CPT | Performed by: INTERNAL MEDICINE

## 2022-01-21 PROCEDURE — 1123F ACP DISCUSS/DSCN MKR DOCD: CPT | Performed by: INTERNAL MEDICINE

## 2022-01-21 PROCEDURE — G8400 PT W/DXA NO RESULTS DOC: HCPCS | Performed by: INTERNAL MEDICINE

## 2022-01-21 PROCEDURE — G8427 DOCREV CUR MEDS BY ELIG CLIN: HCPCS | Performed by: INTERNAL MEDICINE

## 2022-01-21 PROCEDURE — 1090F PRES/ABSN URINE INCON ASSESS: CPT | Performed by: INTERNAL MEDICINE

## 2022-01-21 PROCEDURE — G8484 FLU IMMUNIZE NO ADMIN: HCPCS | Performed by: INTERNAL MEDICINE

## 2022-01-21 PROCEDURE — 4040F PNEUMOC VAC/ADMIN/RCVD: CPT | Performed by: INTERNAL MEDICINE

## 2022-01-21 PROCEDURE — 99214 OFFICE O/P EST MOD 30 MIN: CPT | Performed by: INTERNAL MEDICINE

## 2022-01-21 PROCEDURE — G8417 CALC BMI ABV UP PARAM F/U: HCPCS | Performed by: INTERNAL MEDICINE

## 2022-01-21 PROCEDURE — 93000 ELECTROCARDIOGRAM COMPLETE: CPT | Performed by: INTERNAL MEDICINE

## 2022-01-24 PROBLEM — L89.302 PRESSURE INJURY OF BUTTOCK, STAGE 2, UNSPECIFIED LATERALITY (HCC): Status: ACTIVE | Noted: 2022-01-24

## 2022-01-24 PROBLEM — N18.31 STAGE 3A CHRONIC KIDNEY DISEASE (HCC): Status: ACTIVE | Noted: 2022-01-24

## 2022-02-09 NOTE — PROGRESS NOTES
[facility]   Office Note  Referring provider: Wong Del Angel MD  CC: \"I go into a little afib\"     HPI: Abner Case  is a 68 y.o. patient with a past medical history significant for atrial fibrillation, pulmonary hypertension, aortic stenosis and CAD s/p CHILANGO to mid circ 5/2014. She is s/p AVR, PVI and RENETTA exclusion by Dr Pramod Estevez on 6/16. Pre-op angiogram revealed no significant restenosis in the prior stents. She was hospitalized in December 2017 with fatigue. She was anemic to a hemoglobin of 8 and received PRBC transfusions. Repeat EGD demonstrated bleeding AVM's which were cauterized. Her coumadin was restarted. She presented to [facility] with complaints of back pain and while inpatient had an episode of dyspnea. An echocardiogram was completed revealing moderate to severe MR. Subsequently, a CAMERON was completed on 7/27/20 showing moderate MR. She was admitted 10/26/21-11/1/21 with afib with RVR and acute CHF. She converted to a regular rhythm and was diuresed prior to discharge. She was admitted to [address] [place] [address] 11/11/21 with acute encephalopathy and found to have rhabdomyolysis and afib with RVR again. She underwent successful cardioversion on 11/17/21. She did experience bradycardia when in sinus rhythm with heart rate between 40-50 bpm. She was seen in the office with Blessing Roy CNP for follow up on 11/23/21 and then sent to the ED for admission. She was again found in afib with RVR and fluid overloaded. She was diuresed and discharged home on both amiodarone and Toprol. She presents today for follow up. She does note going into atrial fibrillation for 2-3 days at a time. Her heart rate is well controlled at that time and she has taken a couple extra doses of amiodarone. She has been experiencing lower extremity swelling and has taken extra doses of torsemide. She does not recall why valsartan was discontinued for her. She reports medication compliance and is tolerating.      Review of Systems:  Constitutional: No fatigue, weakness, night sweats or fever. HEENT: No new vision difficulties or ringing in the ears. Respiratory: No new SOB, PND, orthopnea or cough. Cardiovascular: See HPI   GI: No n/v, diarrhea, constipation, abdominal pain or changes in bowel habits. No melena, no hematochezia  : No urinary frequency, urgency, incontinence, hematuria or dysuria. Skin: No cyanosis or skin lesions. Musculoskeletal: No new muscle or joint pain. Neurological: No syncope or TIA-like symptoms. 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 since 26.  Chronic diastolic CHF (congestive heart failure) ([address] 2016    grade II    COPD, mild (HCC)     CVA (cerebral infarction) 2013    left cerebral cortex x2/expressive aphasia/resolved    Diverticulosis     Epistaxis, recurrent     when inr high.  last 3-4 months ago    Former smoker     Gallbladder sludge     HTN (hypertension)     Hyperlipidemia     Hypothyroidism     Lumbar stenosis without neurogenic claudication 2017    pain across low back worse with standing and walking, nonradiating    Macular degeneration 2018    left worse than right , dry : progressive loss of sight: just barely able to see to drive    Neuropathy     Nonrheumatic mitral valve regurgitation     Obstructive sleep apnea     Osteoarthritis     Pulmonary HTN ([facility] 2014    primary, responsive to nitrates    PVD (peripheral vascular disease) ([facility]     occluded right SFA::: asymptomatic NIKOS 0.7 right and 1.0 left    Sacral fracture, closed ([facility] 2014    Syncope 05/2014       Current Outpatient Medications   Medication Sig Dispense Refill    Calcium Alginate (ROSA CA ALGINATE 2\"X2\") MISC Apply to each sore daily 30 each 1    ezetimibe (ZETIA) 10 MG tablet TAKE 1 TABLET BY MOUTH DAILY 60 tablet 0    potassium chloride (KLOR-CON) 10 MEQ extended release tablet TAKE ONE TABLET BY MOUTH TWO TIMES A DAY (Patient taking differently: Take 10 mEq by mouth daily ) 60 tablet 11    Fluticasone-Salmeterol,sensor, 232-14 MCG/ACT AEPB 2 puffs bid 1 each 5    metoprolol succinate (TOPROL XL) 50 MG extended release tablet Take 1 tablet by mouth daily 30 tablet 3    torsemide (DEMADEX) 20 MG tablet 20mg qam water pill take xtra 20mg on days wght increases by 2 or more lbs 60 tablet 3    amiodarone (CORDARONE) 200 MG tablet Take 1 tablet by mouth nightly 90 tablet 3    levothyroxine (SYNTHROID) 88 MCG tablet Take 1 tablet by mouth Daily 30 tablet 11    docusate sodium (DOK) 100 MG capsule TAKE ONE CAPSULE BY MOUTH TWO TIMES A DAY 60 capsule 5    diclofenac sodium (VOLTAREN) 1 % GEL Apply 2 g topically daily      oxyCODONE HCl (OXY-IR) 10 MG immediate release tablet Take 10 mg by mouth every 6 hours as needed for Pain.       ipratropium (ATROVENT) 0.03 % nasal spray INHALE 2 DOSES INTO EACH NOSTRIL THREE TIMES A DAY IF NEEDED FOR RHINITIS 30 mL 5    tiZANidine (ZANAFLEX) 4 MG tablet TAKE 1 TABLET BY MOUTH THREE TIMES A DAY (Patient taking differently: Take 2 mg by mouth every 8 hours as needed ) 90 tablet 3    DULoxetine (CYMBALTA) 30 MG extended release capsule TAKE 1 CAPSULE BY MOUTH DAILY EVERY MORNING 30 capsule 11    rosuvastatin (CRESTOR) 40 MG tablet TAKE 1 TABLET BY MOUTH DAILY (Patient taking differently: Take 40 mg by mouth every evening ) 30 tablet 11    pantoprazole (PROTONIX) 40 MG tablet TAKE ONE TABLET BY MOUTH DAILY 30 tablet 11    aspirin 81 MG tablet Take 1 tablet by mouth daily 30 tablet 0     No current facility-administered medications for this visit. Family History   Problem Relation Age of Onset    Breast Cancer Daughter      Objective:   Vitals:    02/15/22 1523   BP: 106/68   Pulse: 53   SpO2: 97%   Weight: 134 lb 12.8 oz (61.1 kg)   Height: 5' 1\" (1.549 m)     Physical Exam:   Constitutional: The patient is oriented to person, place, and time. Appears well-developed and well-nourished. In no acute distress. Head: Normocephalic and atraumatic. Pupils equal and round. Neck: Neck supple. No JVP or carotid bruit appreciated. No mass and no thyromegaly present. No lymphadenopathy present. Cardiovascular: Normal rate. Normal heart sounds. Exam reveals no gallop and no friction rub. No murmur heard. Pulmonary/Chest: Effort normal and breath sounds normal. No respiratory distress. No wheezes, rhonchi or rales. Abdominal: Soft, non-tender. Bowel sounds are normal. Exhibits no organomegaly, mass or bruit. Extremities: No edema. No cyanosis or clubbing. Pulses are 2+ radial and carotid bilaterally. Neurological: No gross cranial nerve deficit. Coordination normal.   Skin: Skin is warm and dry. There is no rash or diaphoresis. Psychiatric: Patient has a normal mood and affect.  Speech is normal and behavior is normal.     Lab Results   Component Value Date    CHOL 114 11/04/2021    HDL 43 11/04/2021    HDL 42 06/21/2010    TRIG 76 11/04/2021   LDL 52  Lab Results   Component Value Date     11/26/2021     Lab Results   Component Value Date    K 3.9 11/26/2021       Lab Results   Component Value Date    BUN 16 11/26/2021    CREATININE 1.2 11/26/2021     Personally reviewed and interpreted   EKG 10/22/15: Sinus bradycardia with LAFB  EKG 1/2/20: Sinus rhonda, LAFB  EKG 6/10/21:  Sinus rhythm with LBBB  EKG 11/30/21: Sinus rhythm with LBBB  EKG 12/15/21: Sinus bradycardia with LBBB  EKG 2/15/22: Sinus bradycardia with LBBB      Procedures:     [facility] 6/15/2015   1. Right dominant coronary arterial system with mild calcification of the RCA. There is 40% serial lesions in the mid RCA. In the left system there is 25% distal left main disease. There is 50% mid LAD disease and 50% ostial second diagonal branch disease. There is no significant restenosis identified in the prior previously placed mid circumflex stent. 2. Systemic hypertension. [address] 10/27/21  HEMODYNAMICS:  1. Right atrial pressure was 12 mmHg. 2.  RV pressure 65/-4. 3.  Pulmonary capillary wedge pressure was 24 mmHg. 4.  Pulmonary artery pressure 71/19 with a mean of 41 mmHg. 5.  Cardiac output 4.9 liters per minute. 6.  Mixed venous saturation 52%. 7.  Pulmonary capillary wedge saturation 90%. 8.  Right atrial saturation 51%.     In summary, elevated right and left filling pressure with tall V waves  seen in pulmonary capillary wedge pressure tracing suggestive of severe  mitral regurgitation. Imaging:     Echo 7/30/2016  Left ventricle size is normal. Normal left ventricular wall thickness. Ejection fraction is visually estimated to be 55-60%. Diastolic filling parameters suggests grade III (restrictive) diastolic dysfunction. The right ventricle appears mildly enlarged. Right ventricular systolic function is normal.  The left atrium is severely dilated. The right atrium is mildly dilated.   At least moderate mitral regurgitation with a central and eccentric jet.   Mild mitral stenosis. BP reported as 171/51 at time of imaging.   A bioprosthetic aortic valve has a maximum velocity of 2.8 m/s and a mean gradient of 17 mmHg. Para-valvular regurgitation.   There is moderate tricuspid regurgitation.   Mild pulmonic regurgitation.   Estimated HR-126 bpm   occurring 15:36:29.   4. Diary returned with an entry of \"irregular heart\",  isolated PACs, PVCs and   ventricular couplets noted. Carotid duplex 1/16/20  Right Impression   The right internal carotid artery appears to have a 50-79% diameter reducing   stenosis based on velocity criteria. The right vertebral artery demonstrates normal antegrade flow. Left Impression   The left internal carotid artery appears to have a <50% diameter reducing   stenosis based on velocity criteria. The left vertebral artery demonstrates normal antegrade flow.         Echo 7/8/20  There is mild concentric left ventricular hypertrophy. Ejection fraction is visually estimated to be 55-60%. diastology cannot be determined  Moderate to severe mitral regurgitation is present. A bioprosthetic artificial aortic valve appears well seated with a maximum  velocity of 273 m/s and a mean gradient of 16 mmHg. Study is unchanged from previous dated 1/8/2018    CAMERON 7/27/20  Left ventricular cavity size is normal in size with normal wall thickness. Overall left ventricular systolic function appears normal.  Ejection fraction is visually estimated to be 55-60%. Normal right ventricular size and function. Left atrium is moderately dilated. Mild thickening of the mitral valve leaflets. There is moderate mitral regurgitation appreciated. Moderate tricuspid regurgitation. A bioprosthetic valve appears well seated. There is no appreciable leaflet restriction or stenosis. There is no aortic insufficiency appreciated. Echo 10/27/21  Left ventricular cavity size is normal.  Normal left ventricular wall thickness. Ejection fraction is visually estimated to be 45-50%. There is mild global  hypokinesis appreciated. Grade III diastolic dysfunction with elevated LV filling pressures. Moderately dilated right ventricle with mildly reduced function. The left atrium is severely dilated.   The right atrium is mildly dilated. Moderately severe tricuspid regurgitation. Moderate pulmonic regurgitation present. Moderately severe mitral regurgitation appreciated. A bioprosthetic artificial aortic valve appears well seated with a maximum  velocity of 2.4m/s and a mean gradient of 12mmHg. Trivial aortic regurgitation. A bubble study was performed and fails to show evidence of right to left  shunting. CAMERON 10/29/21  Overall left ventricular size and wall thickness appear normal with systolic  function mildly reduced. LVEF 40-45%. Normal right ventricular size and function. Left atrium is moderately dilated. The left atrial appendage appears to be ligated with no flow into it. Mitral valve leaflets appear thickened/calcified. Restricted posterior leaflet with malcoaptation id the leaflet tips. Moderate mitral regurgitation is present. Moderate tricuspid regurgitation. Upper extremity venous duplex 11/18/21 ([facility])     o No evidence of acute deep vein thrombosis in the left upper extremity .     o There is superficial venous thrombosis identified in the left upper        extremity that is of indeterminate age as described above. Assessment:  1. CAD of native coronary arteries without angina  2. S/p Aortic Valve Replacement with bioprosthesis for nonrheumatic aortic stenosis  3. Chronic combined systolic and diastolic CHF, class 3  4. Hyperlipidemia with goal LDL <70 mg/dL  5. Paroxysmal Atrial fibrillation  6. Asymptomatic Bradycardia  7. Carotid stenosis, right   8. Mitral regurgitation, moderate    Plan:  She is not endorsing any symptoms representing angina and is overall well compensated today on exam. I have discussed that she may take an extra dose of torsemide as needed for swelling or weight gain. Should she need to take additional doses more frequently, I have encouraged her to call the office and she would need to complete labs at that time.  I will discuss further with Dr. Harpreet Silva regarding placement of biventricular pacemaker. I have personally reviewed all previous testing for this visit today including imaging, lab results and EKG as detailed above. I will see her in office for follow up in 3 months. This note was scribed in the presence of Vee Ordoñez MD by Lazaro Means RN. Physician Attestation:  The scribes documentation has been prepared under my direction and personally reviewed by me in its entirety. I, Dr. Stephan Buck personally performed the services described in this documentation as scribed by my RN in my presence, and I confirm that the note above accurately reflects all work, treatment, procedures, and medical decision making performed by me.

## 2022-02-11 ENCOUNTER — HOSPITAL ENCOUNTER (OUTPATIENT)
Dept: SLEEP CENTER | Age: 77
Discharge: HOME | End: 2022-02-11
Payer: COMMERCIAL

## 2022-02-11 DIAGNOSIS — G47.33 OBSTRUCTIVE SLEEP APNEA: ICD-10-CM

## 2022-02-11 DIAGNOSIS — I48.0 PAF (PAROXYSMAL ATRIAL FIBRILLATION) (HCC): ICD-10-CM

## 2022-02-11 DIAGNOSIS — Z86.79 H/O CHF: ICD-10-CM

## 2022-02-11 DIAGNOSIS — I10 ESSENTIAL HYPERTENSION: ICD-10-CM

## 2022-02-11 DIAGNOSIS — J43.2 CENTRILOBULAR EMPHYSEMA (HCC): ICD-10-CM

## 2022-02-11 PROCEDURE — 95810 POLYSOM 6/> YRS 4/> PARAM: CPT

## 2022-02-15 ENCOUNTER — OFFICE VISIT (OUTPATIENT)
Dept: CARDIOLOGY CLINIC | Age: 77
End: 2022-02-15
Payer: COMMERCIAL

## 2022-02-15 VITALS
SYSTOLIC BLOOD PRESSURE: 106 MMHG | HEART RATE: 53 BPM | WEIGHT: 134.8 LBS | HEIGHT: 61 IN | DIASTOLIC BLOOD PRESSURE: 68 MMHG | OXYGEN SATURATION: 97 % | BODY MASS INDEX: 25.45 KG/M2

## 2022-02-15 DIAGNOSIS — I50.42 CHRONIC COMBINED SYSTOLIC AND DIASTOLIC CHF, NYHA CLASS 2 (HCC): ICD-10-CM

## 2022-02-15 DIAGNOSIS — I34.0 NONRHEUMATIC MITRAL VALVE REGURGITATION: ICD-10-CM

## 2022-02-15 DIAGNOSIS — I25.10 CORONARY ARTERY DISEASE INVOLVING NATIVE CORONARY ARTERY OF NATIVE HEART WITHOUT ANGINA PECTORIS: Primary | ICD-10-CM

## 2022-02-15 DIAGNOSIS — E78.5 HYPERLIPIDEMIA LDL GOAL <70: ICD-10-CM

## 2022-02-15 DIAGNOSIS — I65.21 CAROTID STENOSIS, RIGHT: ICD-10-CM

## 2022-02-15 DIAGNOSIS — Z95.2 S/P AVR (AORTIC VALVE REPLACEMENT): ICD-10-CM

## 2022-02-15 DIAGNOSIS — I48.0 PAROXYSMAL ATRIAL FIBRILLATION (HCC): ICD-10-CM

## 2022-02-15 PROCEDURE — 1090F PRES/ABSN URINE INCON ASSESS: CPT | Performed by: INTERNAL MEDICINE

## 2022-02-15 PROCEDURE — 1123F ACP DISCUSS/DSCN MKR DOCD: CPT | Performed by: INTERNAL MEDICINE

## 2022-02-15 PROCEDURE — G8484 FLU IMMUNIZE NO ADMIN: HCPCS | Performed by: INTERNAL MEDICINE

## 2022-02-15 PROCEDURE — G8400 PT W/DXA NO RESULTS DOC: HCPCS | Performed by: INTERNAL MEDICINE

## 2022-02-15 PROCEDURE — 95810 POLYSOM 6/> YRS 4/> PARAM: CPT | Performed by: PSYCHIATRY & NEUROLOGY

## 2022-02-15 PROCEDURE — 4004F PT TOBACCO SCREEN RCVD TLK: CPT | Performed by: INTERNAL MEDICINE

## 2022-02-15 PROCEDURE — 4040F PNEUMOC VAC/ADMIN/RCVD: CPT | Performed by: INTERNAL MEDICINE

## 2022-02-15 PROCEDURE — 99214 OFFICE O/P EST MOD 30 MIN: CPT | Performed by: INTERNAL MEDICINE

## 2022-02-15 PROCEDURE — G8427 DOCREV CUR MEDS BY ELIG CLIN: HCPCS | Performed by: INTERNAL MEDICINE

## 2022-02-15 PROCEDURE — G8417 CALC BMI ABV UP PARAM F/U: HCPCS | Performed by: INTERNAL MEDICINE

## 2022-02-15 PROCEDURE — 93000 ELECTROCARDIOGRAM COMPLETE: CPT | Performed by: INTERNAL MEDICINE

## 2022-02-16 ENCOUNTER — TELEPHONE (OUTPATIENT)
Dept: PULMONOLOGY | Age: 77
End: 2022-02-16

## 2022-02-16 NOTE — TELEPHONE ENCOUNTER
Sleep study showed mild SHAVONNE. AHI was 12.2 per hr. And O2 Desaturations to 82%. Dr Jenifer Bhatia:    1. Follow up with the patient's sleep physician to discuss results  2. A trial of CPAP titration is recommended with supplemental oxygen per protocol if needed. 3. Avoid sedatives, alcohol and caffeinated drinks at bedtime. 4. Avoid driving while sleepy. Reminders:   Patient will need appointment 30-45 days after start PAP machine    The patient has been notified of this information and all questions answered.       Transferred to sleep lab

## 2022-03-07 RX ORDER — ROSUVASTATIN CALCIUM 40 MG/1
40 TABLET, COATED ORAL DAILY
Qty: 90 TABLET | Refills: 1 | Status: CANCELLED | OUTPATIENT
Start: 2022-03-07

## 2022-03-07 NOTE — TELEPHONE ENCOUNTER
Last ov: 2/15/22  Last labs: 11/04/21  Next appointment:5/11/22  Last RF:6/21/21 30 day 11 RF    Requesting 90 day script

## 2022-03-11 ENCOUNTER — HOSPITAL ENCOUNTER (OUTPATIENT)
Dept: SLEEP CENTER | Age: 77
Discharge: HOME | End: 2022-03-11
Payer: COMMERCIAL

## 2022-03-11 DIAGNOSIS — G47.33 OBSTRUCTIVE SLEEP APNEA: ICD-10-CM

## 2022-03-11 PROCEDURE — 95811 POLYSOM 6/>YRS CPAP 4/> PARM: CPT

## 2022-03-14 ENCOUNTER — TELEPHONE (OUTPATIENT)
Dept: CARDIOLOGY CLINIC | Age: 77
End: 2022-03-14

## 2022-03-14 DIAGNOSIS — R00.1 BRADYCARDIA: Primary | ICD-10-CM

## 2022-03-14 DIAGNOSIS — I50.33 ACUTE ON CHRONIC DIASTOLIC HEART FAILURE (HCC): ICD-10-CM

## 2022-03-14 NOTE — TELEPHONE ENCOUNTER
Gabino Lopez called in this afternoon wanting to talk to Dr. Albina Gonzalez or his RN. She stated that the past 5 days her HR has been fluctuating between 37 to 41. She said she stopped taking the Amiodarone and it's still running low. She states she feels fine. She also mentioned that both her legs and ankles are swollen, and started swelling when her HR started going low, she said she takes 20 mg Torsemide in the am and 20 mg in the evening and they are still swollen, she even wore compression stockings and watched her diet closely. She also asked if Dr. Albina Gonzalez discussed with Dr. Virgilio Kiser  about a pacemaker. Her last office visit with Dr. Albina Gonzalez 2/15/22   I will discuss further with Dr. Virgilio Kiser regarding placement of biventricular pacemaker.  I have personally reviewed all previous testing for this visit today including imaging, lab results and EKG as detailed above.

## 2022-03-14 NOTE — TELEPHONE ENCOUNTER
Ok. Yes. I would like her to get a biventricular pacemaker. I will tag him on this. Ok to take the extra torsemide. See if we can get her device scheduled soon.

## 2022-03-14 NOTE — LETTER
[facility]  Phone: [phone]  Fax: [phone]    March 17, 2022    [address]    Dear Rah Morgan:    Biventricular Pacemaker Implantation Pre procedure Instructions    Date: 4-  Arrive at: 10:00 am  Procedure time: 11:30 am    The morning of your procedure you will park in the hospital parking lot and report directly to the cath lab to check in. At the information desk stay right and go all the way to the end of the lao, this will take you directly to your check in desk for the cath lab. Pre-Procedure Instructions   1. You will need to fast (nothing to eat or drink) after midnight the day of your procedure  2. Do NOT chew gum or eat mints the day of your procedure  3. You will need to hold your Aspirin for 5 days prior to the procedure. 4. You may hold your Torsemide the morning of the procedure for comfort to decrease urinary urgency. 5. If you are a diabetic you will need to hold all diabetic medications including, Metformin the morning of the procedure. If you take Lantus/Levemir only take ½ your normal dose the evening before. 6. Do not use any lotions, creams or perfume the morning of procedure. 7. You will need to complete pre-procedure lab work 5-7 days prior to your procedure. 8. Please have a responsible adult to drive you home after procedure, you should go home same day, but there is always a possibility of an overnight stay. 9. Cath lab will provide you with all post procedure instructions                Page [address] of [address] [place] [address]/[facility] Lab services  [facility]   [address]  Phone: [phone]  The hours are Mon -Fri.   6:30 am  4:00 pm   Saturday 8:00 am  noon  No appointment necessary                  If you have any questions or concerns, please don't hesitate to call.     Sincerely,        No Restrepo RN (Dr. Carlene Cardona Nurse)

## 2022-03-14 NOTE — PROGRESS NOTES
Magdalene Valadez         : 1945  [x] [address]     [] [address]      [] [facility]     [][facility]    [] [facility]  [] [facility]   [] Other:  Diagnosis: [x] SHAVONNE (G47.33) [] CSA (G47.31) [] Apnea (G47.30)   Length of Need: [] 12 Months [x] 99 Months [] Other:    Machine (DANNIE!): [] Respironics Dream Station      Auto [x] ResMed AirSense     Auto [] Other:     []  CPAP () [x] Bilevel ()   Mode: [] Auto [] Spontaneous    Mode: [] Auto [] Spontaneous                     18/12 cm     Comfort Settings:   - Ramp Pressure: 5 cmH2O                                        - Ramp time: 15 min                                     -  Flex/EPR - 3 full time                                    - For ResMed Bilevel (TiMax-4 sec   TiMin- 0.2 sec)     Humidifier: [x] Heated ()        [x] Water chamber replacement ()/ 1 per 6 months        Mask:  Please always start with the mask the patient used during the titraion   [x] Nasal () /1 per 3 months [x] Full Face () /1 per 3 months   [x] Patient choice -Size and fit mask [x] Patient Choice - Size and fit mask   [] Dispense:   small Airfit P10 nasal pillows  [x] Dispense:    [x] Headgear () / 1 per 3 months [x] Headgear () / 1 per 3 months   [x] Replacement Nasal Cushion ()/2 per month [x] Interface Replacement ()/1 per month   [x] Replacement Nasal Pillows ()/2 per month         Tubing: [x] Heated ()/1 per 3 months    [] Standard ()/1 per 3 months [] Other:           Filters: [x] Non-disposable ()/1 per 6 months     [x] Ultra-Fine, Disposable ()/2 per month        Miscellaneous: [x] Chin Strap ()/ 1 per 6 months [] O2 bleed-in:       LPM   [] Oximetry on CPAP/Bilevel []  Other:          Start Order Date: 22    MEDICAL JUSTIFICATION:  I, the undersigned, certify that the above prescribed supplies are medically necessary for this patients wellbeing.   In my opinion, the supplies are both reasonable and necessary in reference to accepted standards of medicalpractice in treatment of this patients condition.     Ulysses Mccarthy MD      NPI: [id number]       Order Signed Date: 03/14/22    Electronically signed by Ulysses Mccarthy MD on 3/14/2022 at 10:08 AM

## 2022-03-14 NOTE — TELEPHONE ENCOUNTER
Patient having increased swelling and needing to take extra torsemide (1 week). Also her heart rate has been 37 to 41 the last 5 days (Asymptomatic). She also wanted to know if you have discussed with Dr Vincente Dubin regarding placement of biventricular pacemaker?

## 2022-03-15 ENCOUNTER — TELEPHONE (OUTPATIENT)
Dept: PULMONOLOGY | Age: 77
End: 2022-03-15

## 2022-03-15 PROCEDURE — 95811 POLYSOM 6/>YRS CPAP 4/> PARM: CPT | Performed by: PSYCHIATRY & NEUROLOGY

## 2022-03-15 NOTE — TELEPHONE ENCOUNTER
ohterHistory of mild SHAVONNE. AHI was 12.2 per hr. And O2 Desaturations to 82 %. Adequate control of the events at a CPAP pressure of 18/12 cm. The patient has been notified of this information and all questions answered.     DME: [address]- faxed     Patient will need appointment 30-45 days after starting new machine

## 2022-03-16 ENCOUNTER — CLINICAL DOCUMENTATION (OUTPATIENT)
Dept: NON INVASIVE DIAGNOSTICS | Age: 77
End: 2022-03-16

## 2022-03-17 NOTE — TELEPHONE ENCOUNTER
I spoke with Dr. Stormy Lr who instructed that patient does not have to come in for office visit prior to her procedure and we can proceed with scheduling. Spoke with patient scheduled Biventricular pacemaker (CRT-P). Biventricular pacemaker implantation Pre procedure Instructions    Date: 4-    Arrive at: 10:00 am    Procedure time: 11:30 am    The morning of your procedure you will park in the hospital parking lot and report directly to the cath lab to check in. At the information desk stay right and go all the way to the end of the lao, this will take you directly to your check in desk for the cath lab. Pre-Procedure Instructions   1. You will need to fast (nothing to eat or drink) after midnight the day of your procedure  2. Do NOT chew gum or eat mints the day of your procedure  3. You will need to hold your Aspirin for 5 days prior to the procedure. 4. You may hold your Torsemide the morning of the procedure for comfort to decrease urinary urgency. 5. If you are a diabetic you will need to hold all diabetic medications including, Metformin the morning of the procedure. If you take Lantus/Levemir only take ½ your normal dose the evening before. 6. Do not use any lotions, creams or perfume the morning of procedure. 7. You will need to complete pre-procedure lab work 5-7 days prior to your procedure. 8. Please have a responsible adult to drive you home after procedure, you should go home same day, but there is always a possibility of an overnight stay. 9. Cath lab will provide you with all post procedure instructions                                          [address] [place] [address]/[facility]. [address] [address]  Phone: [phone]  The hours are Mon -Fri.   6:30 am - 4:00 pm   Saturday 8:00 am - noon  No appointment necessary            Letter mailed to patient home address with all pre procedure instructions, including the date of the procedure. The letter also included detailed instructions regarding completing lab work as well as a copy of the lab orders to take to lab. Case published to Alina and form emailed to Geraldo Found in [facility] lab.

## 2022-03-23 ENCOUNTER — TELEPHONE (OUTPATIENT)
Dept: PULMONOLOGY | Age: 77
End: 2022-03-23

## 2022-03-23 NOTE — TELEPHONE ENCOUNTER
Daughter calls in advising she got some papers in an envelope with her mother's address but paperwork inside for Pooja Salcedo. Advised her to shred it, advised her I didn't see any paperwork we would have mailed her.  She was wondering if someone else got her paperwork

## 2022-04-07 DIAGNOSIS — R00.1 BRADYCARDIA: ICD-10-CM

## 2022-04-07 LAB
ANION GAP SERPL CALCULATED.3IONS-SCNC: 15 MMOL/L (ref 3–16)
BUN BLDV-MCNC: 18 MG/DL (ref 7–20)
CALCIUM SERPL-MCNC: 9.6 MG/DL (ref 8.3–10.6)
CHLORIDE BLD-SCNC: 102 MMOL/L (ref 99–110)
CO2: 24 MMOL/L (ref 21–32)
CREAT SERPL-MCNC: 1 MG/DL (ref 0.6–1.2)
GFR AFRICAN AMERICAN: >60
GFR NON-AFRICAN AMERICAN: 54
GLUCOSE BLD-MCNC: 96 MG/DL (ref 70–99)
HCT VFR BLD CALC: 36.7 % (ref 36–48)
HEMOGLOBIN: 11.8 G/DL (ref 12–16)
MCH RBC QN AUTO: 27 PG (ref 26–34)
MCHC RBC AUTO-ENTMCNC: 32.3 G/DL (ref 31–36)
MCV RBC AUTO: 83.6 FL (ref 80–100)
PDW BLD-RTO: 20.4 % (ref 12.4–15.4)
PLATELET # BLD: 162 K/UL (ref 135–450)
PMV BLD AUTO: 8.8 FL (ref 5–10.5)
POTASSIUM SERPL-SCNC: 3.8 MMOL/L (ref 3.5–5.1)
RBC # BLD: 4.39 M/UL (ref 4–5.2)
SODIUM BLD-SCNC: 141 MMOL/L (ref 136–145)
WBC # BLD: 6.8 K/UL (ref 4–11)

## 2022-04-11 ENCOUNTER — HOSPITAL ENCOUNTER (OUTPATIENT)
Dept: CARDIAC CATH/INVASIVE PROCEDURES | Age: 77
Setting detail: OBSERVATION
Discharge: HOME | End: 2022-04-12
Attending: INTERNAL MEDICINE | Admitting: INTERNAL MEDICINE
Payer: MEDICARE

## 2022-04-11 ENCOUNTER — HOSPITAL ENCOUNTER (OUTPATIENT)
Dept: GENERAL RADIOLOGY | Age: 77
Discharge: HOME | End: 2022-04-11
Attending: INTERNAL MEDICINE
Payer: MEDICARE

## 2022-04-11 DIAGNOSIS — Z95.0 ARTIFICIAL CARDIAC PACEMAKER: ICD-10-CM

## 2022-04-11 PROCEDURE — 6360000004 HC RX CONTRAST MEDICATION: Performed by: INTERNAL MEDICINE

## 2022-04-11 PROCEDURE — 93005 ELECTROCARDIOGRAM TRACING: CPT | Performed by: INTERNAL MEDICINE

## 2022-04-11 PROCEDURE — 2580000003 HC RX 258

## 2022-04-11 PROCEDURE — C1892 INTRO/SHEATH,FIXED,PEEL-AWAY: HCPCS

## 2022-04-11 PROCEDURE — C1887 CATHETER, GUIDING: HCPCS

## 2022-04-11 PROCEDURE — C1769 GUIDE WIRE: HCPCS

## 2022-04-11 PROCEDURE — 33225 L VENTRIC PACING LEAD ADD-ON: CPT

## 2022-04-11 PROCEDURE — 99153 MOD SED SAME PHYS/QHP EA: CPT

## 2022-04-11 PROCEDURE — 2500000003 HC RX 250 WO HCPCS

## 2022-04-11 PROCEDURE — 33208 INSRT HEART PM ATRIAL & VENT: CPT

## 2022-04-11 PROCEDURE — C2621 PMKR, OTHER THAN SING/DUAL: HCPCS

## 2022-04-11 PROCEDURE — G0379 DIRECT REFER HOSPITAL OBSERV: HCPCS

## 2022-04-11 PROCEDURE — 2580000003 HC RX 258: Performed by: INTERNAL MEDICINE

## 2022-04-11 PROCEDURE — 2700000000 HC OXYGEN THERAPY PER DAY

## 2022-04-11 PROCEDURE — 2709999900 HC NON-CHARGEABLE SUPPLY

## 2022-04-11 PROCEDURE — 6370000000 HC RX 637 (ALT 250 FOR IP): Performed by: INTERNAL MEDICINE

## 2022-04-11 PROCEDURE — 99152 MOD SED SAME PHYS/QHP 5/>YRS: CPT

## 2022-04-11 PROCEDURE — C1894 INTRO/SHEATH, NON-LASER: HCPCS

## 2022-04-11 PROCEDURE — G0378 HOSPITAL OBSERVATION PER HR: HCPCS

## 2022-04-11 PROCEDURE — 6360000002 HC RX W HCPCS

## 2022-04-11 PROCEDURE — 94760 N-INVAS EAR/PLS OXIMETRY 1: CPT

## 2022-04-11 PROCEDURE — 71046 X-RAY EXAM CHEST 2 VIEWS: CPT

## 2022-04-11 PROCEDURE — C1900 LEAD, CORONARY VENOUS: HCPCS

## 2022-04-11 PROCEDURE — C1898 LEAD, PMKR, OTHER THAN TRANS: HCPCS

## 2022-04-11 RX ORDER — SODIUM CHLORIDE 9 MG/ML
INJECTION, SOLUTION INTRAVENOUS CONTINUOUS
Status: DISCONTINUED | OUTPATIENT
Start: 2022-04-11 | End: 2022-04-12 | Stop reason: HOSPADM

## 2022-04-11 RX ORDER — ROSUVASTATIN CALCIUM 40 MG/1
40 TABLET, COATED ORAL NIGHTLY
Status: DISCONTINUED | OUTPATIENT
Start: 2022-04-11 | End: 2022-04-12 | Stop reason: HOSPADM

## 2022-04-11 RX ORDER — SODIUM CHLORIDE 9 MG/ML
25 INJECTION, SOLUTION INTRAVENOUS PRN
Status: DISCONTINUED | OUTPATIENT
Start: 2022-04-11 | End: 2022-04-12 | Stop reason: HOSPADM

## 2022-04-11 RX ORDER — LEVOTHYROXINE SODIUM 88 UG/1
88 TABLET ORAL DAILY
Status: DISCONTINUED | OUTPATIENT
Start: 2022-04-12 | End: 2022-04-12 | Stop reason: HOSPADM

## 2022-04-11 RX ORDER — DULOXETIN HYDROCHLORIDE 30 MG/1
30 CAPSULE, DELAYED RELEASE ORAL DAILY
Status: DISCONTINUED | OUTPATIENT
Start: 2022-04-12 | End: 2022-04-12 | Stop reason: HOSPADM

## 2022-04-11 RX ORDER — SODIUM CHLORIDE 0.9 % (FLUSH) 0.9 %
5-40 SYRINGE (ML) INJECTION EVERY 12 HOURS SCHEDULED
Status: DISCONTINUED | OUTPATIENT
Start: 2022-04-11 | End: 2022-04-12 | Stop reason: HOSPADM

## 2022-04-11 RX ORDER — SENNA AND DOCUSATE SODIUM 50; 8.6 MG/1; MG/1
1 TABLET, FILM COATED ORAL 2 TIMES DAILY
Status: DISCONTINUED | OUTPATIENT
Start: 2022-04-11 | End: 2022-04-12 | Stop reason: HOSPADM

## 2022-04-11 RX ORDER — PANTOPRAZOLE SODIUM 40 MG/1
40 TABLET, DELAYED RELEASE ORAL
Status: DISCONTINUED | OUTPATIENT
Start: 2022-04-12 | End: 2022-04-12 | Stop reason: HOSPADM

## 2022-04-11 RX ORDER — DOCUSATE SODIUM 100 MG/1
100 CAPSULE, LIQUID FILLED ORAL DAILY
Status: DISCONTINUED | OUTPATIENT
Start: 2022-04-12 | End: 2022-04-12 | Stop reason: HOSPADM

## 2022-04-11 RX ORDER — TORSEMIDE 20 MG/1
20 TABLET ORAL DAILY
Status: DISCONTINUED | OUTPATIENT
Start: 2022-04-11 | End: 2022-04-12 | Stop reason: HOSPADM

## 2022-04-11 RX ORDER — SODIUM CHLORIDE 0.9 % (FLUSH) 0.9 %
5-40 SYRINGE (ML) INJECTION PRN
Status: DISCONTINUED | OUTPATIENT
Start: 2022-04-11 | End: 2022-04-12 | Stop reason: HOSPADM

## 2022-04-11 RX ORDER — METOPROLOL SUCCINATE 50 MG/1
50 TABLET, EXTENDED RELEASE ORAL DAILY
Status: DISCONTINUED | OUTPATIENT
Start: 2022-04-12 | End: 2022-04-12 | Stop reason: HOSPADM

## 2022-04-11 RX ORDER — ASPIRIN 81 MG/1
81 TABLET, CHEWABLE ORAL DAILY
Status: DISCONTINUED | OUTPATIENT
Start: 2022-04-12 | End: 2022-04-12 | Stop reason: HOSPADM

## 2022-04-11 RX ORDER — OXYCODONE HYDROCHLORIDE 10 MG/1
10 TABLET ORAL EVERY 6 HOURS PRN
Status: DISCONTINUED | OUTPATIENT
Start: 2022-04-11 | End: 2022-04-12 | Stop reason: HOSPADM

## 2022-04-11 RX ORDER — EZETIMIBE 10 MG/1
10 TABLET ORAL DAILY
Status: DISCONTINUED | OUTPATIENT
Start: 2022-04-12 | End: 2022-04-12 | Stop reason: HOSPADM

## 2022-04-11 RX ORDER — AMIODARONE HYDROCHLORIDE 200 MG/1
200 TABLET ORAL NIGHTLY
Status: DISCONTINUED | OUTPATIENT
Start: 2022-04-11 | End: 2022-04-12 | Stop reason: HOSPADM

## 2022-04-11 RX ORDER — POTASSIUM CHLORIDE 750 MG/1
10 TABLET, FILM COATED, EXTENDED RELEASE ORAL DAILY
Status: DISCONTINUED | OUTPATIENT
Start: 2022-04-11 | End: 2022-04-12 | Stop reason: HOSPADM

## 2022-04-11 RX ADMIN — OXYCODONE HYDROCHLORIDE 10 MG: 10 TABLET ORAL at 21:17

## 2022-04-11 RX ADMIN — ROSUVASTATIN CALCIUM 40 MG: 40 TABLET, FILM COATED ORAL at 21:17

## 2022-04-11 RX ADMIN — IOPAMIDOL 15 ML: 755 INJECTION, SOLUTION INTRAVENOUS at 13:39

## 2022-04-11 RX ADMIN — SENNOSIDES AND DOCUSATE SODIUM 1 TABLET: 50; 8.6 TABLET ORAL at 21:17

## 2022-04-11 RX ADMIN — OXYCODONE HYDROCHLORIDE 10 MG: 10 TABLET ORAL at 15:15

## 2022-04-11 RX ADMIN — Medication 10 ML: at 21:18

## 2022-04-11 RX ADMIN — POTASSIUM CHLORIDE 10 MEQ: 750 TABLET, EXTENDED RELEASE ORAL at 15:15

## 2022-04-11 RX ADMIN — TORSEMIDE 20 MG: 20 TABLET ORAL at 15:15

## 2022-04-11 ASSESSMENT — PAIN DESCRIPTION - LOCATION
LOCATION: BACK
LOCATION_2: SHOULDER

## 2022-04-11 ASSESSMENT — PAIN DESCRIPTION - ORIENTATION
ORIENTATION: LEFT;RIGHT;LOWER
ORIENTATION: RIGHT;LEFT;LOWER
ORIENTATION: LEFT;LOWER;MID
ORIENTATION_2: LEFT

## 2022-04-11 ASSESSMENT — PAIN DESCRIPTION - ONSET
ONSET: OTHER (COMMENT)
ONSET: ON-GOING
ONSET: ON-GOING
ONSET_2: PROGRESSIVE

## 2022-04-11 ASSESSMENT — PAIN DESCRIPTION - FREQUENCY
FREQUENCY: CONTINUOUS

## 2022-04-11 ASSESSMENT — PAIN SCALES - GENERAL
PAINLEVEL_OUTOF10: 8
PAINLEVEL_OUTOF10: 6
PAINLEVEL_OUTOF10: 7
PAINLEVEL_OUTOF10: 0
PAINLEVEL_OUTOF10: 8

## 2022-04-11 ASSESSMENT — PAIN DESCRIPTION - PAIN TYPE
TYPE: CHRONIC PAIN
TYPE: CHRONIC PAIN
TYPE_2: SURGICAL;ACUTE PAIN
TYPE: CHRONIC PAIN

## 2022-04-11 ASSESSMENT — PAIN DESCRIPTION - PROGRESSION
CLINICAL_PROGRESSION_2: GRADUALLY WORSENING
CLINICAL_PROGRESSION: GRADUALLY WORSENING
CLINICAL_PROGRESSION: NOT CHANGED
CLINICAL_PROGRESSION: NOT CHANGED

## 2022-04-11 ASSESSMENT — PAIN DESCRIPTION - DESCRIPTORS
DESCRIPTORS: CONSTANT;ACHING
DESCRIPTORS: ACHING;DISCOMFORT
DESCRIPTORS: CONSTANT;ACHING
DESCRIPTORS_2: ACHING;SHOOTING

## 2022-04-11 ASSESSMENT — PAIN DESCRIPTION - DURATION: DURATION_2: INTERMITTENT

## 2022-04-11 ASSESSMENT — PAIN - FUNCTIONAL ASSESSMENT
PAIN_FUNCTIONAL_ASSESSMENT: PREVENTS OR INTERFERES SOME ACTIVE ACTIVITIES AND ADLS

## 2022-04-11 ASSESSMENT — PAIN DESCRIPTION - INTENSITY: RATING_2: 4

## 2022-04-11 NOTE — PROCEDURES
[address]     Electrophysiology Procedure Note       Date of Procedure: ,4/11/2022   Patient's Name: Liz Newman  YOB: 1945   Medical Record Number: [id number]  Procedure Performed by: Christal Mccarthy MD    Procedures performed:  · Anesthesia: Local and Monitored Anesthesia Care  · Level of sedation plan: Moderate sedation (conscious sedation) with intravenous Midazolam 4 mg and Fentanyl 100 mcg   · Sedation start time: 1203 hrs. · Sedation stop time: 1400 hrs. · Mallampati airway assessment class: 2  · ASA class: 2    Indication of the procedure:  Ms. Aminata Peres has symptomatic bradycardia. She also had a declining LVEF (55% down to 45%) in the setting of LBBB, paroxysmal atrial fibrillation and has moderate MR.      Given the symptomatic bradycardia, and requiring rate/rythm management of atrial fibrillation, pacemaker is indicated. She would pace a considerable amount, and with declining LVEF and LBBB QRS duration >150 ms, she would qualify for Biventricular pacemaker (CRT-P). The biventricular pacing would likely improve her MR and negative remodelling. Details of procedure: The patient was brought to the electrophysiology laboratory in stable condition. The patient was in a fasting and non-sedated state. The risks, benefits and alternatives of the procedure were discussed with the patient. The risks including, but not limited to, the risks of vascular injury, bleeding, infection, device malfunction, lead dislodgement, radiation exposure, injury to cardiac and surrounding structures (including pneumothorax), stroke, myocardial infarction and death were discussed in detail. The patient was also counseled at length about the risks of himanshu Covid-19 in the javier-operative and post-operative states including the recovery window of their procedure.  The patient was made aware that himanshu Covid-19 after a surgical procedure may worsen their prognosis for recovering from the virus and lend to a higher morbidity and or mortality risk. The patient was given the option of postponing their procedure. The patient was also presented reasonable alternatives to the proposed care, treatment, and services. The discussion I have had with the patient encompassed risks, benefits, and side effects related to the alternatives and the risks related to not receiving the proposed care, treatment and services. The patient opted to proceed with the device implantation. Written informed consent was signed and placed in the chart. Prophylactic antibiotic using Ancef 1mg IV was given. The patient was prepped and draped in sterile fashion. A timeout protocol was completed to identify the patient and the procedure being performed. An independent trained observer assumed the sole responsibility of administering IV sedation medication - Versed, Fentanyl - at my direction and closely monitored the patient. The patient was monitored continuously with ECG, pulse oximetry, blood pressure monitoring, and direct observation. An incision was made in the left upper pectoral area in a transverse line roughly 1 cm from the clavicle after administration of lidocaine/bupivicaine combination. Using electrocautery and blunt dissection, a pocket was created. Central venous access into the left axillary vein was obtained using the modified Seldinger technique. After central venous access was obtained, a sheath was placed in the axillary vein. A right ventricular lead was advanced into the septal position under fluoroscopic guidance and using a series of curved and straight stylets. The lead was actively fixated. After confirming appropriate function and no diaphragmatic stimulation at maximum output, the sheath was split and removed. The lead was secured to the underlying tissue using suture material.      A new sheath was advanced over a second previously placed wire into the vein.  The atrial lead was advanced to the right observed and receive the usual post-implant care, including chest x-ray, and antibiotic therapy and interrogation of the device. If there are no complications, the patient will be discharged tomorrow with a Carelink device and with a 1-week wound check with our clinic nurse and a 3-month follow-up with the EP NP. Thank you for allowing us to participate in the care of your patient. If you have any questions, please do not hesitate to contact me.     Christal Mccarthy MD  Cardiac Electrophysiology  [facility]

## 2022-04-11 NOTE — H&P
be scheduled for CRT-P.      With the Bi-V in place, it would be easier to monitor and manage her atrial fibrillation. If ineffective Bi-V pacing from atrial fibrillation, she could still be planned for an ablation down the line after the leads her matured (>6 months). 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 since 26.  Chronic diastolic CHF (congestive heart failure) ([address] 2016    grade II    COPD, mild (HCC)     CVA (cerebral infarction) 2013    left cerebral cortex x2/expressive aphasia/resolved    Diverticulosis     Epistaxis, recurrent     when inr high.  last 3-4 months ago    Former smoker     Gallbladder sludge     HTN (hypertension)     Hyperlipidemia     Hypothyroidism     Lumbar stenosis without neurogenic claudication 2017    pain across low back worse with standing and walking, nonradiating    Macular degeneration 2018    left worse than right , dry : progressive loss of sight: just barely able to see to drive    Neuropathy     Nonrheumatic mitral valve regurgitation     Obstructive sleep apnea     Osteoarthritis     Pulmonary HTN ([address] 2014    primary, responsive to nitrates    PVD (peripheral vascular disease) ([address]     occluded right SFA::: asymptomatic NIKOS 0.7 right and 1.0 left    Sacral 4/5/22   Najma Howell, APRN - CNP   sennosides-docusate sodium (SENOKOT-S) 8.6-50 MG tablet Take 1 tablet by mouth in the morning and at bedtime 3/14/22   Micky Lala MD   pantoprazole (PROTONIX) 40 MG tablet TAKE ONE TABLET BY MOUTH DAILY 3/7/22   Micky Lala MD   rosuvastatin (CRESTOR) 40 MG tablet Take 1 tablet by mouth every evening 3/7/22   Micky Lala MD   amiodarone (CORDARONE) 200 MG tablet Take 1 tablet by mouth nightly 3/7/22   Micky Lala MD   torsemide (DEMADEX) 20 MG tablet 20mg qam water pill take xtra 20mg on days wght increases by 2 or more lbs 3/7/22   Micky Lala MD   metoprolol succinate (TOPROL XL) 50 MG extended release tablet Take 1 tablet by mouth daily 3/7/22   Micky Lala MD   Calcium Alginate (ROSA CA ALGINATE 2\"X2\") MISC Apply to each sore daily 1/31/22   Micky Lala MD   potassium chloride (KLOR-CON) 10 MEQ extended release tablet TAKE ONE TABLET BY MOUTH TWO TIMES A DAY  Patient taking differently: Take 10 mEq by mouth daily  12/22/21   Micky Lala MD   Fluticasone-Salmeterol,sensor, 927-97 MCG/ACT AEPB 2 puffs bid 11/26/21   Micky Lala MD   levothyroxine (SYNTHROID) 88 MCG tablet Take 1 tablet by mouth Daily 11/10/21   Octavia Allen MD   docusate sodium (DOK) 100 MG capsule TAKE ONE CAPSULE BY MOUTH TWO TIMES A DAY 11/1/21   Micky Lala MD   diclofenac sodium (VOLTAREN) 1 % GEL Apply 2 g topically daily    Historical Provider, MD   oxyCODONE HCl (OXY-IR) 10 MG immediate release tablet Take 10 mg by mouth every 6 hours as needed for Pain.     Historical Provider, MD   ipratropium (ATROVENT) 0.03 % nasal spray INHALE 2 DOSES INTO EACH NOSTRIL THREE TIMES A DAY IF NEEDED FOR RHINITIS 10/19/21   Micky Lala MD   DULoxetine (CYMBALTA) 30 MG extended release capsule TAKE 1 CAPSULE BY MOUTH DAILY EVERY MORNING 8/17/21   Micky Lala MD   aspirin 81 MG tablet Take 1 tablet by mouth daily 5/14/15   Casi Bueno MD       Social History:   reports that she has been smoking cigarettes. She has a 45.00 pack-year smoking history. She has quit using smokeless tobacco. She reports that she does not drink alcohol and does not use drugs. Family History:  family history includes Breast Cancer in her daughter. Reviewed. Denies family history of sudden cardiac death, arrhythmia, premature CAD    Review of System:  Pertinent positive and negatives are in the HPI, the rest are negative. Physical Examination:  There were no vitals taken for this visit. · Constitutional: Oriented. No distress. · Head: Normocephalic and atraumatic. · Mouth/Throat: Oropharynx is clear and moist.   · Eyes: Conjunctivae normal. EOM are normal.   · Neck: Normal range of motion. Neck supple. No rigidity. No JVD present. · Cardiovascular: Normal rate, regular rhythm, S1&S2 and intact distal pulses. · Pulmonary/Chest: Bilateral respiratory sounds. No wheezes. No rhonchi. · Abdominal: Soft. Bowel sounds present. No distension, No tenderness. · Musculoskeletal: No tenderness. No edema    · Lymphadenopathy: Has no cervical adenopathy. · Neurological: Alert and oriented. Cranial nerve appears intact, No Gross deficit   · Skin: Skin is warm and dry. No rash noted. · Psychiatric: Has a normal mood, affect and behavior     Labs:  Reviewed. ECG: reviewed, marked sinus bradycardia LBBB with v-rate of 48 bpm with QRS duration 160 ms. No ventricular pre-excitation, or QT prolongation. Studies:   1. Holter 1/8/2018   1. The rhythm was sinus with sinus arrhythmia. Average DC interval 0.20,   average QRS duration 0.14 [IVCD]. Average daily heart rate 44 ranging 38 to 68 bpm.   There were 129 pauses greater than 2.0 seconds with Max R-R 2.1 seconds occurring 05:13:11.   2. Frequent premature supraventricular ectopic beats total 1,642 consisting   of 1,152 isolated PACs, 209 atrial pairs and 3 atrial runs. The longest atrial run 3 beats   HR-61 bpm occurring 17:56:40.  The fastest atrial run 3 beats HR-76 bpm occurring 09:49:41.   3. Very frequent premature ventricular ectopic beats total 16,163 consisting of 16,008 isolated   PVCs, 74 ventricular couplets and 1 ventricular triplet. The ventricular triplet HR-126 bpm   occurring 15:36:29.   4. Diary returned with an entry of \"irregular heart\",  isolated PACs, PVCs and   ventricular couplets noted.           2. Echo 10/27/21  Left ventricular cavity size is normal.  Normal left ventricular wall thickness. Ejection fraction is visually estimated to be 45-50%. There is mild global  hypokinesis appreciated. Grade III diastolic dysfunction with elevated LV filling pressures. Moderately dilated right ventricle with mildly reduced function. The left atrium is severely dilated. The right atrium is mildly dilated. Moderately severe tricuspid regurgitation. Moderate pulmonic regurgitation present. Moderately severe mitral regurgitation appreciated. A bioprosthetic artificial aortic valve appears well seated with a maximum  velocity of 2.4m/s and a mean gradient of 12mmHg. Trivial aortic regurgitation. A bubble study was performed and fails to show evidence of right to left  shunting.     [facility] 10/29/21  Overall left ventricular size and wall thickness appear normal with systolic  function mildly reduced. LVEF 40-45%. Normal right ventricular size and function. Left atrium is moderately dilated. The left atrial appendage appears to be ligated with no flow into it. Mitral valve leaflets appear thickened/calcified. Restricted posterior leaflet with malcoaptation id the leaflet tips. Moderate mitral regurgitation is present. Moderate tricuspid regurgitation. 3. Stress Test:  n/a    4. [facility] 6/15/2015   1. Right dominant coronary arterial system with mild calcification of the RCA. There is 40% serial lesions in the mid RCA. In the left system there is 25% distal left main disease.  There is 50% mid LAD disease and 50% ostial second diagonal branch disease. There is no significant restenosis identified in the prior previously placed mid circumflex stent. 2. Systemic hypertension.      [facility] 10/27/21  HEMODYNAMICS:  1.  Right atrial pressure was 12 mmHg. 2.  RV pressure 65/-4. 3.  Pulmonary capillary wedge pressure was 24 mmHg. 4.  Pulmonary artery pressure 71/19 with a mean of 41 mmHg. 5.  Cardiac output 4.9 liters per minute. 6.  Mixed venous saturation 52%. 7.  Pulmonary capillary wedge saturation 90%. 8.  Right atrial saturation 51%.        I independently reviewed the ECG, MCOT, echocardiogram, stress test, and coronary angiography/PCI results and used them for my plan of care. [id number] Score for Atrial Fibrillation Stroke Risk   Risk   Factors  Component Value   C CHF Yes 1   H HTN Yes 1   A2 Age >= 76 Yes,  (77 y.o.) 2   D DM No 0   S2 Prior Stroke/TIA No 0   V Vascular Disease No 0   A Age 74-69 No,  (77 y.o.) 0   Sc Sex female 1    [id number]  Score  5   Score last updated 1/20/22 3:32 PM EST    Click here for a link to the UpToDate guideline \"Atrial Fibrillation: Anticoagulation therapy to prevent embolization    Disclaimer: Risk Score calculation is dependent on accuracy of patient problem list and past encounter diagnosis. Procedures:  1. AVR, PVI and RENETTA exclusion by Dr. Gabriella Agarwal on 6/16/2020  2. DCCV on 11/17/2021    Assessment/Plan:     Bradycardia  Given the symptomatic bradycardia, and requiring rate/rythm management of atrial fibrillation, pacemaker is indicated. She would pace a considerable amount, and with declining LVEF and LBBB QRS duration >150 ms, she would qualify for Biventricular pacemaker (CRT-P). The biventricular pacing would likely improve her MR and negative remodelling. Plan:  She is here for CRT-P.        Festus Peterson MD  Cardiac Electrophysiology  [facility]

## 2022-04-11 NOTE — PLAN OF CARE
Problem: Pain:  Goal: Pain level will decrease  Description: Pain level will decrease  Outcome: Ongoing  Goal: Control of acute pain  Description: Control of acute pain  Outcome: Ongoing  Goal: Control of chronic pain  Description: Control of chronic pain  Outcome: Ongoing  Goal: Patient's pain/discomfort is manageable  Description: Patient's pain/discomfort is manageable  Outcome: Ongoing     Problem: Infection:  Goal: Will remain free from infection  Description: Will remain free from infection  Outcome: Ongoing     Problem: Safety:  Goal: Free from accidental physical injury  Description: Free from accidental physical injury  Outcome: Ongoing  Goal: Free from intentional harm  Description: Free from intentional harm  Outcome: Ongoing     Problem: Cardiac:  Goal: Ability to maintain vital signs within normal range will improve  Description: Ability to maintain vital signs within normal range will improve  Outcome: Ongoing  Goal: Cardiovascular alteration will improve  Description: Cardiovascular alteration will improve  Outcome: Ongoing     Problem: Discharge Planning:  Goal: Patients continuum of care needs are met  Description: Patients continuum of care needs are met  Outcome: Ongoing

## 2022-04-11 NOTE — PROGRESS NOTES
Patient arrive to floor from cath lab post pacemaker implant. Pressure dressing in place on left chest. Patient complaining of chronic lower back pain, otherwise in no other distress. VSS. Connected to PCU bedside monitor and conformed with CMU. Family currently at bedside.

## 2022-04-11 NOTE — PROGRESS NOTES
4 Eyes Admission Assessment     I agree as the admission nurse that 2 RN's have performed a thorough Head to Toe Skin Assessment on the patient. ALL assessment sites listed below have been assessed on admission. Areas assessed by both nurses:   [x]   Head, Face, and Ears   [x]   Shoulders, Back, and Chest  [x]   Arms, Elbows, and Hands   [x]   Coccyx, Sacrum, and Ischum  [x]   Legs, Feet, and Heels        Does the Patient have Skin Breakdown?   No         Dipesh Prevention initiated:  NA   Wound Care Orders initiated:  NA      WO nurse consulted for Pressure Injury (Stage 3,4, Unstageable, DTI, NWPT, and Complex wounds):  NA      Nurse 1 eSignature: Electronically signed by Joce Lopez RN on 4/11/22 at 4:02 PM EDT    **SHARE this note so that the co-signing nurse is able to place an eSignature**    Nurse 2 eSignature: Electronically signed by Evelyn Kent RN on 4/11/22 at [facility] EDT

## 2022-04-12 ENCOUNTER — NURSE ONLY (OUTPATIENT)
Dept: CARDIOLOGY CLINIC | Age: 77
End: 2022-04-12
Payer: MEDICARE

## 2022-04-12 VITALS
TEMPERATURE: 98.1 F | HEIGHT: 62 IN | HEART RATE: 50 BPM | RESPIRATION RATE: 23 BRPM | OXYGEN SATURATION: 99 % | BODY MASS INDEX: 23.37 KG/M2 | SYSTOLIC BLOOD PRESSURE: 121 MMHG | DIASTOLIC BLOOD PRESSURE: 83 MMHG | WEIGHT: 126.98 LBS

## 2022-04-12 DIAGNOSIS — I48.0 PAF (PAROXYSMAL ATRIAL FIBRILLATION) (HCC): ICD-10-CM

## 2022-04-12 DIAGNOSIS — R00.1 SYMPTOMATIC BRADYCARDIA: ICD-10-CM

## 2022-04-12 DIAGNOSIS — I50.42 CHRONIC COMBINED SYSTOLIC AND DIASTOLIC CHF, NYHA CLASS 2 (HCC): ICD-10-CM

## 2022-04-12 DIAGNOSIS — Z95.0 BIVENTRICULAR CARDIAC PACEMAKER IN SITU: Chronic | ICD-10-CM

## 2022-04-12 LAB
EKG ATRIAL RATE: 51 BPM
EKG DIAGNOSIS: NORMAL
EKG Q-T INTERVAL: 532 MS
EKG QRS DURATION: 158 MS
EKG QTC CALCULATION (BAZETT): 490 MS
EKG R AXIS: 133 DEGREES
EKG T AXIS: -89 DEGREES
EKG VENTRICULAR RATE: 51 BPM
GLUCOSE BLD-MCNC: 129 MG/DL (ref 70–99)
PERFORMED ON: ABNORMAL

## 2022-04-12 PROCEDURE — 99217 PR OBSERVATION CARE DISCHARGE MANAGEMENT: CPT | Performed by: NURSE PRACTITIONER

## 2022-04-12 PROCEDURE — 6370000000 HC RX 637 (ALT 250 FOR IP): Performed by: INTERNAL MEDICINE

## 2022-04-12 PROCEDURE — G0378 HOSPITAL OBSERVATION PER HR: HCPCS

## 2022-04-12 PROCEDURE — 93290 INTERROG DEV EVAL ICPMS IP: CPT | Performed by: INTERNAL MEDICINE

## 2022-04-12 PROCEDURE — 93010 ELECTROCARDIOGRAM REPORT: CPT | Performed by: INTERNAL MEDICINE

## 2022-04-12 PROCEDURE — 93281 PM DEVICE PROGR EVAL MULTI: CPT | Performed by: INTERNAL MEDICINE

## 2022-04-12 PROCEDURE — 2580000003 HC RX 258: Performed by: INTERNAL MEDICINE

## 2022-04-12 RX ADMIN — EZETIMIBE 10 MG: 10 TABLET ORAL at 08:43

## 2022-04-12 RX ADMIN — LEVOTHYROXINE SODIUM 88 MCG: 0.09 TABLET ORAL at 06:23

## 2022-04-12 RX ADMIN — DICLOFENAC SODIUM 2 G: 10 GEL TOPICAL at 08:56

## 2022-04-12 RX ADMIN — SENNOSIDES AND DOCUSATE SODIUM 1 TABLET: 50; 8.6 TABLET ORAL at 08:46

## 2022-04-12 RX ADMIN — Medication 10 ML: at 08:48

## 2022-04-12 RX ADMIN — OXYCODONE HYDROCHLORIDE 10 MG: 10 TABLET ORAL at 04:11

## 2022-04-12 RX ADMIN — OXYCODONE HYDROCHLORIDE 10 MG: 10 TABLET ORAL at 08:42

## 2022-04-12 RX ADMIN — PANTOPRAZOLE SODIUM 40 MG: 40 TABLET, DELAYED RELEASE ORAL at 06:23

## 2022-04-12 RX ADMIN — ASPIRIN 81 MG 81 MG: 81 TABLET ORAL at 08:46

## 2022-04-12 RX ADMIN — Medication 10 ML: at 08:49

## 2022-04-12 RX ADMIN — METOPROLOL SUCCINATE 50 MG: 50 TABLET, EXTENDED RELEASE ORAL at 08:49

## 2022-04-12 RX ADMIN — DULOXETINE HYDROCHLORIDE 30 MG: 30 CAPSULE, DELAYED RELEASE ORAL at 08:43

## 2022-04-12 ASSESSMENT — PAIN DESCRIPTION - FREQUENCY: FREQUENCY: CONTINUOUS

## 2022-04-12 ASSESSMENT — PAIN SCALES - GENERAL
PAINLEVEL_OUTOF10: 0
PAINLEVEL_OUTOF10: 7

## 2022-04-12 ASSESSMENT — PAIN DESCRIPTION - ONSET: ONSET: ON-GOING

## 2022-04-12 ASSESSMENT — PAIN DESCRIPTION - LOCATION: LOCATION: SHOULDER;BACK

## 2022-04-12 ASSESSMENT — PAIN DESCRIPTION - PAIN TYPE: TYPE: ACUTE PAIN;CHRONIC PAIN

## 2022-04-12 ASSESSMENT — PAIN - FUNCTIONAL ASSESSMENT: PAIN_FUNCTIONAL_ASSESSMENT: PREVENTS OR INTERFERES SOME ACTIVE ACTIVITIES AND ADLS

## 2022-04-12 ASSESSMENT — PAIN DESCRIPTION - PROGRESSION: CLINICAL_PROGRESSION: NOT CHANGED

## 2022-04-12 ASSESSMENT — PAIN DESCRIPTION - DESCRIPTORS: DESCRIPTORS: SHARP

## 2022-04-12 NOTE — DISCHARGE SUMMARY
sennosides-docusate sodium (SENOKOT-S) 8.6-50 MG tablet Take 1 tablet by mouth in the morning and at bedtime  Qty: 60 tablet, Refills: 5      pantoprazole (PROTONIX) 40 MG tablet TAKE ONE TABLET BY MOUTH DAILY  Qty: 90 tablet, Refills: 0    Comments: REFILL REQUESTED 2/11/21 @@ 11:52 A. M. DLB      rosuvastatin (CRESTOR) 40 MG tablet Take 1 tablet by mouth every evening  Qty: 90 tablet, Refills: 0    Comments: REFILL REQUESTED 6/21/21 @@ 1:04 P. M. DLB      amiodarone (CORDARONE) 200 MG tablet Take 1 tablet by mouth nightly  Qty: 90 tablet, Refills: 0      torsemide (DEMADEX) 20 MG tablet 20mg qam water pill take xtra 20mg on days wght increases by 2 or more lbs  Qty: 180 tablet, Refills: 0      metoprolol succinate (TOPROL XL) 50 MG extended release tablet Take 1 tablet by mouth daily  Qty: 90 tablet, Refills: 0      Calcium Alginate (ROSA CA ALGINATE 2\"X2\") MISC Apply to each sore daily  Qty: 30 each, Refills: 1      potassium chloride (KLOR-CON) 10 MEQ extended release tablet TAKE ONE TABLET BY MOUTH TWO TIMES A DAY  Qty: 60 tablet, Refills: 11    Comments: REFILL REQUESTED 12/22/21 @@ 11:46 A. M. DLB      Fluticasone-Salmeterol,sensor, 232-14 MCG/ACT AEPB 2 puffs bid  Qty: 1 each, Refills: 5      levothyroxine (SYNTHROID) 88 MCG tablet Take 1 tablet by mouth Daily  Qty: 30 tablet, Refills: 11    Comments: REFILL REQUESTED 8/26/21 @@ 3:47 P. M. DLB      docusate sodium (DOK) 100 MG capsule TAKE ONE CAPSULE BY MOUTH TWO TIMES A DAY  Qty: 60 capsule, Refills: 5    Comments: REFILL REQUESTED 4/19/18 @ 4:57 P. M. DLB      diclofenac sodium (VOLTAREN) 1 % GEL Apply 2 g topically daily      oxyCODONE HCl (OXY-IR) 10 MG immediate release tablet Take 10 mg by mouth every 6 hours as needed for Pain.      ipratropium (ATROVENT) 0.03 % nasal spray INHALE 2 DOSES INTO EACH NOSTRIL THREE TIMES A DAY IF NEEDED FOR RHINITIS  Qty: 30 mL, Refills: 5    Comments: REFILL REQUESTED TUES 10/19/21 @@ 10:35AM      DULoxetine (CYMBALTA) 30 MG extended release capsule TAKE 1 CAPSULE BY MOUTH DAILY EVERY MORNING  Qty: 30 capsule, Refills: 11    Comments: REFILL REQUESTED TUES 8/17/21 @@ 2:33PM      aspirin 81 MG tablet Take 1 tablet by mouth daily  Qty: 30 tablet, Refills: 0              Post device instructions given  Activity: as tolerated  Diet: cardiac diet    Follow-up with device clinic in 1 week for wound check and with HOWARD Mejia in 3 months.     HOWARD Mejia  [facility], [address] [place], [place] [address]tashi Singh, [address] [facility]  Phone: [phone]  Fax: [phone]

## 2022-04-12 NOTE — PLAN OF CARE
Problem: Daily Care:  Goal: Daily care needs are met  4/12/2022 0041 by Dari Velasquez RN  Outcome: Met This Shift     Problem: Skin Integrity:  Goal: Skin integrity will stabilize  4/12/2022 0041 by Dari Velasquez RN  Outcome: Met This Shift     Problem: Pain:  Goal: Pain level will decrease  4/12/2022 0041 by Dari Velasquez RN  Outcome: Ongoing  Note: Pain level and characteristics of pain assessed. Patient included in decisions related to pain management. Pain medications given as ordered after other non-medication management options have been attempted. Effectiveness of pain medications assessed and ineffective pain management reported to MD as needed.       Problem: Infection:  Goal: Will remain free from infection  4/12/2022 0041 by Dari Velasquez RN  Outcome: Ongoing     Problem: Safety:  Goal: Free from accidental physical injury  4/12/2022 0041 by Dari Velasquez RN  Outcome: Ongoing     Problem: Discharge Planning:  Goal: Patients continuum of care needs are met  4/12/2022 0041 by Dair Velasquez RN  Outcome: Ongoing     Problem: Cardiac:  Goal: Cardiovascular alteration will improve  4/12/2022 0041 by Dari Velasquez RN  Outcome: Ongoing     Problem: Health Behavior:  Goal: Identification of resources available to assist in meeting health care needs will improve  4/12/2022 0041 by Dari Velasquez RN  Outcome: Ongoing

## 2022-04-12 NOTE — PROGRESS NOTES
IV and telemetry monitor removed. Discharge paperwork completed and discussed with the patient and her daughter. All questions answered. Patient has been made aware of follow up appointments that have been made and patient verbalized understanding. Patient has no new  prescriptions that need to be filled  All belongings have been packed up and sent with the patient. Patient will be driven home by her daughter.

## 2022-04-12 NOTE — PROGRESS NOTES
Pt is A&OX4. Pt is in bed and in semi-fowlers position. Pt tolerated AM meds (PO, Whole) *per assigned RN. Pt tolerates food and liquids. Pt has no drains. Pt complains of pain from the incision site and is being mitigated by PRN pain medication. *Left upper chest wall just superior of the left clavicle. Patient has incision at new defibrillator site (aprox. 5cm horizontal incision.) Incision is clean, dry and intact with no drainage and incision is closed with sutures. Call light within reach. Able to make needs known. Fall precautions in place. Will monitor.  Electronically signed by Rosa Alicea on 4/12/2022 at 9:29 AM

## 2022-04-12 NOTE — CARE COORDINATION
INITIAL CASE MANAGEMENT ASSESSMENT    Reviewed chart, met with patient to assess possible discharge needs. Explained Case Management role/services. The SW Student talked to the patient and the patient's daughter at bedside today. Living Situation: The patient confirmed address. The patient lives in a ranch style home with her daughter Francy Galvan. She has two dogs. And she uses one step before entering her home. ADLs: Independent      DME: rolling walker and lift chair     PT/OT Recs: Not Ordered     Active Services: The patient has no active services at this time. Transportation: The patient states that she is not active  and the patient states that daughter Dejuan Calixto will be picking her up at discharge. Medications: The patient gets her medications from 2014 [facility]. She has no barriers    PCP: Timothy Senior [phone](MARSHALPHK) and [phone](LUANA). The patient last saw her PCP in January 2022      HD/PD: n/a     PLAN/COMMENTS: Monitor Cardiology Clearance     SW/CM provided contact information for patient or family to call with any questions. SW/CM will follow and assist as needed.     Jasmeet Adler (MSW intern)  [facility] [place]  Phone [phone]  Fax [phone]  Electronically signed by Alexandrea Anand on 4/12/2022 at 10:31 AM

## 2022-04-12 NOTE — CARE COORDINATION
DISCHARGE SUMMARY     DATE OF DISCHARGE: 4/12/22    DISCHARGE DESTINATION: Home     TRANSPORTATION: [id number] Name:  Joce Mcdermott (daughter)     Time: TBA by patient     Phone Number: [phone]    COMMENTS: The SW Student talked to the patient and the patient agree with the discharge order.      Martin Cabral (MSW intern)  [facility] [place]  Phone [phone]  Fax [phone]  Electronically signed by Tejal Doss on 4/12/2022 at 10:33 AM

## 2022-04-12 NOTE — PROGRESS NOTES
Cardiac Electrophysiology Progress Note     Admit Date: 4/11/2022     Reason for follow up: symptomatic bradycardia, declining EF s/p Bi-V PPM    HPI and Interval History:   Jose Saldana is a 68 y.o. patient with a history of atrial fibrillation, pulmonary hypertension, aortic stenosis s/p AVR, PVI and RENETTA exclusion by Dr Abram Reddy on 6/2020 and CAD s/p CHILANGO to mid circ 5/2014,  AVM's which were cauterized. She presented to [facility] with complaints of back pain and while inpatient had an episode of dyspnea. An echocardiogram was completed revealing moderate to severe MR. Subsequently, a CAMERON was completed on 7/27/20 showing moderate MR. She was admitted 10/26/2021-11/1/2021 with afib with RVR and acute CHF. She converted to a regular rhythm and was diuresed prior to discharge. She was admitted to [facility] 11/11/21 with acute encephalopathy and found to have rhabdomyolysis and afib with RVR again. She underwent successful cardioversion on 11/17/2021. She did experience bradycardia when in sinus rhythm with heart rate between 40-50 bpm. She was seen in the office with Sveta Doty CNP for follow up on 11/23/21 and then sent to the ED for admission. She was again found in afib with RVR and fluid overloaded. She was diuresed and discharged home on both amiodarone and Toprol. She underwent placement of a Medtronic Bi-V PPM on 4/11/22 with Dr. Abe Rebolledo. She is having soreness around the site, taking her usual oxycodone with an extra 5mg given this morning. No other complaints. CXR without complication following procedure. Device interrogation this morning with normal function.      Physical Examination:  Vitals:    04/12/22 0842   BP:    Pulse: 50   Resp:    Temp:    SpO2:         Intake/Output Summary (Last 24 hours) at 4/12/2022 0909  Last data filed at 4/11/2022 1603  Gross per 24 hour   Intake 120 ml   Output --   Net 120 ml     In: 120 [P.O.:120]  Out: -    Wt Readings from Last 3 Encounters:   04/12/22 126 lb 15.8 oz (57.6 kg)   02/15/22 134 lb 12.8 oz (61.1 kg)   22 132 lb 9.6 oz (60.1 kg)     Temp  Av.9 °F (36.6 °C)  Min: 97 °F (36.1 °C)  Max: 98.3 °F (36.8 °C)  Pulse  Av.5  Min: 48  Max: 52  BP  Min: 121/83  Max: 155/65  SpO2  Av.4 %  Min: 96 %  Max: 99 %    · Telemetry: V paced at 50. · Constitutional: Alert, in no acute distress. Appears stated age. · Head: Normocephalic and atraumatic. · Eyes: Conjunctivae normal. EOM are normal.   · Neck: Neck supple. No lymphadenopathy. No rigidity. No JVD present. · Cardiovascular: Normal rate, regular rhythm. 2/6 systolic murmurs, no rubs or gallops. No S3 or S4.  · Pulmonary/Chest: Clear breath sounds bilaterally. No crackles, wheezes or rhonchi. No respiratory accessory muscle use. · Abdominal: Soft. Normal bowel sounds present. No distension, No tenderness. · Musculoskeletal: No tenderness. No edema    · Lymphadenopathy: Has no cervical adenopathy. · Neurological: Alert and oriented. No gross deficits. · Skin: Skin is warm and dry. No rash, lesions, ulcerations noted. · Psychiatric: No anxiety or agitation. Labs, diagnostic and imaging results reviewed. Reviewed. No results for input(s): NA, K, CL, CO2, PHOS, BUN, CREATININE, CA in the last 72 hours. No results for input(s): WBC, HGB, HCT, MCV, PLT in the last 72 hours. Lab Results   Component Value Date    TROPONINI <0.01 2021     Estimated Creatinine Clearance: 38 mL/min (based on SCr of 1 mg/dL).    Lab Results   Component Value Date    .2 2013     Lab Results   Component Value Date    PROTIME 11.7 2018    PROTIME 16.0 2018    PROTIME 94.4 2018    INR 1.04 2018    INR 1.42 2018    INR 8.35 2018     Lab Results   Component Value Date    CHOL 114 2021    HDL 43 2021    HDL 42 2010    TRIG 76 2021       Scheduled Meds:   sodium chloride flush  5-40 mL IntraVENous 2 times per day    sodium chloride flush 5-40 mL IntraVENous 2 times per day    aspirin  81 mg Oral Daily    DULoxetine  30 mg Oral Daily    diclofenac sodium  2 g Topical Daily    docusate sodium  100 mg Oral Daily    levothyroxine  88 mcg Oral Daily    potassium chloride  10 mEq Oral Daily    pantoprazole  40 mg Oral QAM AC    rosuvastatin  40 mg Oral Nightly    amiodarone  200 mg Oral Nightly    torsemide  20 mg Oral Daily    metoprolol succinate  50 mg Oral Daily    sennosides-docusate sodium  1 tablet Oral BID    ezetimibe  10 mg Oral Daily     Continuous Infusions:   sodium chloride      sodium chloride      sodium chloride       PRN Meds:sodium chloride flush, sodium chloride, sodium chloride flush, sodium chloride, oxyCODONE HCl     EC/15/22  SB at 50 BPM. LBBB. Echo:10/27/21  Left ventricular cavity size is normal.  Normal left ventricular wall thickness. Ejection fraction is visually estimated to be 45-50%. There is mild global  hypokinesis appreciated. Grade III diastolic dysfunction with elevated LV filling pressures. Moderately dilated right ventricle with mildly reduced function. The left atrium is severely dilated. The right atrium is mildly dilated. Moderately severe tricuspid regurgitation. Moderate pulmonic regurgitation present. Moderately severe mitral regurgitation appreciated. A bioprosthetic artificial aortic valve appears well seated with a maximum  velocity of 2.4m/s and a mean gradient of 12mmHg. Trivial aortic regurgitation. A bubble study was performed and fails to show evidence of right to left  shunting.     CAMERON 10/29/21  Overall left ventricular size and wall thickness appear normal with systolic  function mildly reduced. LVEF 40-45%. Normal right ventricular size and function. Left atrium is moderately dilated. The left atrial appendage appears to be ligated with no flow into it. Mitral valve leaflets appear thickened/calcified.   Restricted posterior leaflet with malcoaptation id the leaflet tips. Moderate mitral regurgitation is present. Moderate tricuspid regurgitation. [facility]: 6/15/15  6/15/2015   1. Right dominant coronary arterial system with mild calcification of the RCA. There is 40% serial lesions in the mid RCA. In the left system there is 25% distal left main disease. There is 50% mid LAD disease and 50% ostial second diagonal branch disease. There is no significant restenosis identified in the prior previously placed mid circumflex stent. 2. Systemic hypertension. Procedures:  1. AVR, PVI and RENETTA exclusion by Dr. Austin Valente 2015  2.  DCCV on 11/17/2021    Assessment and Plan:     Symptomatic bradycardia   - likely some component of tachybrady as well given history of A fib with RVR   - has been symptomatic while on Toprol and amiodarone as HR tends to be in the 40s   - EF has also declined from 45-50% to 40-45%   - s/p Medtronic Bi-V pacemaker implanted on 4/11/22 with Dr. Jose Martin Moran   - post-procedure CXR with no complications   - device interrogation this morning with normal function   - activity restrictions discussed with pt/daughter   - 1 week and 3 month f/u will be arranged    PAF   - with recurrences in 2020, 2021 s/p DCCV in 2020   - had surgical PVI with AVR in 2020   - on Toprol 50mg QD, will resume amiodarone 200mg QD given PPM in place now   - CHADS2-VASc 5 (age, gender, HTN, CAD) not anticoagulated due to past RENETTA exclusion with no flow in appendage seen on CAMERON in 2020    Chronic systolic heart failure   - EF 40-45%, NYHA class II   - continue medical therapy      Mitral regurgitation   - being followed by Dr. Carmela Lui     CAD   - no angina   - on statin, aspirin, beta blocker    S/p bioprosthetic AVR   - in 2015   - on aspirin    HOWARD Yun  [facility], [address] [place] [address]  Phone: [phone]  Fax: [phone]    Electronically signed by HOWARD Toth - CNP on 4/12/2022 at 9:09 AM

## 2022-04-12 NOTE — ACP (ADVANCE CARE PLANNING)
Advance Care Planning     Advance Care Planning Activator (Inpatient)  Conversation Note      Date of ACP Conversation: 4/12/2022     Conversation Conducted with: Patient with Decision Making Capacity    ACP Activator: [address] Decision Maker:     Current Designated Health Care Decision Maker:     Primary Decision Maker: Lion Villalba - [phone]    Secondary Decision Maker: Michael Bautista - Child - [phone]    Supplemental (Other) Decision Maker: Magdalene Taylor - Child - [phone]      Care Preferences    Ventilation: \"If you were in your present state of health and suddenly became very ill and were unable to breathe on your own, what would your preference be about the use of a ventilator (breathing machine) if it were available to you? \"      Would the patient desire the use of ventilator (breathing machine)?: yes    \"If your health worsens and it becomes clear that your chance of recovery is unlikely, what would your preference be about the use of a ventilator (breathing machine) if it were available to you? \"     Would the patient desire the use of ventilator (breathing machine)?: UNSURE      Resuscitation  \"CPR works best to restart the heart when there is a sudden event, like a heart attack, in someone who is otherwise healthy. Unfortunately, CPR does not typically restart the heart for people who have serious health conditions or who are very sick. \"    \"In the event your heart stopped as a result of an underlying serious health condition, would you want attempts to be made to restart your heart (answer \"yes\" for attempt to resuscitate) or would you prefer a natural death (answer \"no\" for do not attempt to resuscitate)? \" yes       [] Yes   [] No   Educated Patient / Thelma Lynn regarding differences between Advance Directives and portable DNR orders.     Length of ACP Conversation in minutes:  5    Conversation Outcomes:  [x] ACP discussion completed  [] Existing advance directive reviewed with patient; no changes to patient's previously recorded wishes  [] New Advance Directive completed  [] Portable Do Not Rescitate prepared for Provider review and signature  [] POLST/POST/MOLST/MOST prepared for Provider review and signature      Follow-up plan:    [] Schedule follow-up conversation to continue planning  [] Referred individual to Provider for additional questions/concerns   [] Advised patient/agent/surrogate to review completed ACP document and update if needed with changes in condition, patient preferences or care setting    [] This note routed to one or more involved healthcare providers      Nils Mendoza[address] (MSW intern)  [facility] [place]  Phone [phone]  Fax [phone]  Electronically signed by Luiza Clement on 4/12/2022 at 10:24 AM

## 2022-04-13 ENCOUNTER — CARE COORDINATION (OUTPATIENT)
Dept: CASE MANAGEMENT | Age: 77
End: 2022-04-13

## 2022-04-13 DIAGNOSIS — Z95.0 BIVENTRICULAR CARDIAC PACEMAKER IN SITU: Primary | ICD-10-CM

## 2022-04-13 NOTE — CARE COORDINATION
[facility] Transitions Initial Follow Up Call    Call within 2 business days of discharge: Yes    Patient: Michael Merlos Patient : 1945   MRN: [id number]  Reason for Admission: Symptomatic bradycardia/pacemaker placement  Discharge Date: 22 RARS: Readmission Risk Score: 18.9 ( )      Last Discharge [facility]       Complaint Diagnosis Description Type Department Provider    22  Artificial cardiac pacemaker Admission (Discharged) from [facility] Epi           Spoke with: Michael Merlos who reports that she is doing good. Patient denies cp, sob, cough, dizziness, headache, n/v, diarrhea, abdominal pains, fever, or chills. Patient report that appetite and fluid intake is good and denies any problems with bowel or bladder. Patient is taking all medications as ordered. Writer and patient did a complete medication review and 1111f was completed. Patient reports that surgery site has dressing on and it is C/D/I not to be removed until Monday. Writer and patient reviewed s/s of infection and Covid. Denies any needs at this time. Patient instructed to continue to monitor s/s, reporting any that may present to MD immediately for early intervention. Reminded of COVID 19 precautions. Agreeable to f/u calls. [facility] provided contact information for future needs. Facility: [facility]    Transitions of Care Initial Call    Was this an external facility discharge? No Discharge     Challenges to be reviewed by the provider   Additional needs identified to be addressed with provider: No  none             Method of communication with provider : phone      Advance Care Planning:   Does patient have an Advance Directive: reviewed and needs to be updated. Advance Care Planning   Healthcare Decision Maker:    Primary Decision Maker:  Shawn Lomeli - [phone]    Secondary Decision Maker: Hannah Rios - [phone]    Supplemental (Other) Decision Maker: Alexus Palacios  [phone]    Was this a readmission? No  Patient stated reason for admission: procedure  Patients top risk factors for readmission: medical condition-CHF, AFIB, MI    Care Transition Nurse (CTN) contacted the patient by telephone to perform post hospital discharge assessment. Verified name and  with patient as identifiers. Provided introduction to self, and explanation of the CTN role. CTN reviewed discharge instructions, medical action plan and red flags with patient who verbalized understanding. Patient given an opportunity to ask questions and does not have any further questions or concerns at this time. Were discharge instructions available to patient? Yes. Reviewed appropriate site of care based on symptoms and resources available to patient including: PCP  Specialist  When to call 911. The patient agrees to contact the PCP office for questions related to their healthcare. Medication reconciliation was performed with patient, who verbalizes understanding of administration of home medications. Advised obtaining a 90-day supply of all daily and as-needed medications. Covid Risk Education     Educated patient about risk for severe COVID-19 due to risk factors according to CDC guidelines. LPN CC reviewed discharge instructions, medical action plan and red flag symptoms with the patient who verbalized understanding. Discussed COVID vaccination status: Yes. Education provided on COVID-19 vaccination as appropriate. Discussed exposure protocols and quarantine with CDC Guidelines. Patient was given an opportunity to verbalize any questions and concerns and agrees to contact LPN CC or health care provider for questions related to their healthcare. Reviewed and educated patient on any new and changed medications related to discharge diagnosis. Was patient discharged with a pulse oximeter?  No Discussed and confirmed pulse oximeter discharge instructions and when to notify provider or seek emergency care. LPN CC provided contact information. Plan for follow-up call in 5-7 days based on severity of symptoms and risk factors.   Plan for next call: symptom management-.       Care Transitions 24 Hour Call    Do you have any ongoing symptoms?: No  Do you have a copy of your discharge instructions?: Yes  Do you have all of your prescriptions and are they filled?: Yes  Have you been contacted by a [facility] Pharmacist?: No  Have you scheduled your follow up appointment?: Yes  How are you going to get to your appointment?: Car - family or friend to transport  Were you discharged with any Home Care or Post Acute Services: No  Post Acute Services: [facility] Transitions Interventions  No Identified Needs         Follow Up  Future Appointments   Date Time Provider Vickie Wilson   4/19/2022  3:15 PM SCHEDULE, [facility] DEVICE CHECK [facility]   5/11/2022  3:00 PM Aleksander Louise MD [facility]   7/12/2022  3:00 PM SCHEDULE, [facility] DEVICE CHECK [facility]   7/12/2022  3:20 PM HOWARD Mercado - CNP [facility]       Everette Meckel, LPN

## 2022-04-19 ENCOUNTER — NURSE ONLY (OUTPATIENT)
Dept: CARDIOLOGY CLINIC | Age: 77
End: 2022-04-19
Payer: COMMERCIAL

## 2022-04-19 DIAGNOSIS — I50.42 CHRONIC COMBINED SYSTOLIC AND DIASTOLIC CHF, NYHA CLASS 2 (HCC): Primary | ICD-10-CM

## 2022-04-19 DIAGNOSIS — Z95.0 BIVENTRICULAR CARDIAC PACEMAKER IN SITU: Primary | ICD-10-CM

## 2022-04-19 DIAGNOSIS — R00.1 SINUS BRADYCARDIA: ICD-10-CM

## 2022-04-19 DIAGNOSIS — I50.33 ACUTE ON CHRONIC DIASTOLIC HEART FAILURE (HCC): ICD-10-CM

## 2022-04-19 DIAGNOSIS — R00.1 SYMPTOMATIC BRADYCARDIA: ICD-10-CM

## 2022-04-19 DIAGNOSIS — I48.0 PAF (PAROXYSMAL ATRIAL FIBRILLATION) (HCC): ICD-10-CM

## 2022-04-19 DIAGNOSIS — I50.42 CHRONIC COMBINED SYSTOLIC AND DIASTOLIC CHF, NYHA CLASS 2 (HCC): ICD-10-CM

## 2022-04-19 DIAGNOSIS — I48.91 ATRIAL FIBRILLATION WITH RVR (HCC): ICD-10-CM

## 2022-04-19 PROCEDURE — 93290 INTERROG DEV EVAL ICPMS IP: CPT | Performed by: INTERNAL MEDICINE

## 2022-04-19 PROCEDURE — 93281 PM DEVICE PROGR EVAL MULTI: CPT | Performed by: INTERNAL MEDICINE

## 2022-04-19 NOTE — PROGRESS NOTES
Post op device programming interrogation by [id number] representative completed at [facility].

## 2022-04-19 NOTE — Clinical Note
Pt notes that her legs have had greater edema in the past week. PEA showed ~2sec recovery time on left and right calf. Patient is curious on current PRN criteria of her demadex dosage. RADHA Oh informed of this today as Dr. Burgess Mcnamara is pt's Cardiologist.      optivol baseline still being established.

## 2022-04-19 NOTE — PROGRESS NOTES
Pt seen in clinic today for cardiac device interrogation and site check 1 week post implant. Outer bandage removed today, steri strips left in place. No drainage apparent on either. Site appears dry, well approximated, and free of infection indications. Wound care instructions reviewed with patient and daughterto which they gave verbal indication of understanding and intent to comply. Their device is a  MDT 3 chamber BiV CRTP  Based on threshold, impedance, and intrinsic sensing tests run today, the device appears to be functioning normally. Remaining battery life is 9.7y  AP 94.11%      RVP 1.28%      effective CRTP 97.7%    RV threshold today . 75V higher than at implant. by extending PW to 1.0ms threshold was back at [email or web address]. RV pacing 1.28% with stable bi/unipolar impedance near 500ohms. no new episodes. Pt notes that her legs have had greater edema in the past week. PEA showed ~2sec recovery time on left and right calf. Patient is curious on current PRN criteria of her demadex dosage. RADHA Oh informed of this today as Dr. García Dunham is pt's Cardiologist.     optivol baseline still being established. Rx:  amiodarone (CORDARONE) 200 MG tablet Take 1 tablet by mouth nightly  torsemide (DEMADEX) 20 MG tablet 20mg qam water pill take xtra 20mg on days wght increases by 2 or more lbs  metoprolol succinate (TOPROL XL) 50 MG extended release tablet Take 1 tablet by mouth daily  potassium chloride (KLOR-CON) 10 MEQ extended release tablet TAKE ONE TABLET BY MOUTH TWO TIMES A DAYPatient taking differently: Take 10 mEq by mouth daily     Pt was informed of findings today and general questions have been answered with regard to device. Home monitoring hardware is to be an [employer] 8 sent to patient by [employer]. Pt to call our office or [employer] if they have any issues setting this up.      Results discussed with or to be reviewed by EP    Pt to see MELINA Greenberg in clinic on 7/12/2022  Dr. Chaim Arenas in clinic on 5/11/2022

## 2022-04-19 NOTE — PROGRESS NOTES
Spoke with Marina Dunne and she continues to experience lower extremity swelling. She has been alternating 20 mg and 40 mg every other day. Instructed to take 40 mg daily until Friday and complete labs on Monday. She v/u and lab order placed.

## 2022-04-20 ENCOUNTER — CARE COORDINATION (OUTPATIENT)
Dept: CASE MANAGEMENT | Age: 77
End: 2022-04-20

## 2022-04-20 NOTE — CARE COORDINATION
[facility] Transitions Follow Up Call    2022    Patient: Lucy Walters  Patient : 1945   MRN: [id number]  Reason for Admission: Symptomatic bradycardia/pacemaker placement  Discharge Date: 22 RARS: Readmission Risk Score: 18.9 ( )       Spoke with: [address] [facility] Transitions Follow Up Call    Doing well, feels so much better. Has 24 hour persons at her home that live with her. Pt feels very blessed and has a positive attitude. Needs to be reviewed by the provider   Additional needs identified to be addressed with provider: No  none             Method of communication with provider : none      Care Transition Nurse (CTN) contacted the patient by telephone to follow up after admission on 22. Verified name and  with patient as identifiers. Addressed changes since last contact: home health care-was not necessary per pt  medications-no changes  incision site looks good  Discussed follow-up appointments. If no appointment was previously scheduled, appointment scheduling offered: No.   Is follow up appointment scheduled within 7 days of discharge? Yes. CTN reviewed discharge instructions, medical action plan and red flags with patient and discussed any barriers to care and/or understanding of plan of care after discharge. Discussed appropriate site of care based on symptoms and resources available to patient including: PCP  Specialist  When to call 911. The patient agrees to contact the PCP office for questions related to their healthcare. Patients top risk factors for readmission: medical condition-Symptomatic bradycardia/pacemaker placement  Interventions to address risk factors: Obtained and reviewed discharge summary and/or continuity of care documents      Non-[facility] follow up appointment(s): na    CTN provided contact information for future needs. Plan for follow-up call in 7-10 days based on severity of symptoms and risk factors.   Plan for next call: symptom management-incision and ADLs  self management-ADLs        Care Transitions Subsequent and Final Call    Schedule Follow Up Appointment with PCP: Completed  Subsequent and Final Calls  Do you have any ongoing symptoms?: No  Have your medications changed?: No  Do you have any questions related to your medications?: No  Do you currently have any active services?: No  Are you currently active with any services?: Home Health  Do you have any needs or concerns that I can assist you with?: No  Identified Barriers: None  Care Transitions Interventions  No Identified Needs  Other Interventions:            Follow Up  Future Appointments   Date Time Provider Vickie Wilson   5/11/2022  3:00 PM Ronen Hare MD [facility]   6/8/2022  2:00 PM SCHEDULE, [facility] REMOTE TRANSMISSION [facility]   7/12/2022  3:00 PM SCHEDULE, [facility] DEVICE CHECK [facility]   7/12/2022  3:20 PM HOWARD Riley - CNP [facility]       Harish Epperson RN

## 2022-04-25 DIAGNOSIS — I50.42 CHRONIC COMBINED SYSTOLIC AND DIASTOLIC CHF, NYHA CLASS 2 (HCC): ICD-10-CM

## 2022-04-25 LAB
ANION GAP SERPL CALCULATED.3IONS-SCNC: 17 MMOL/L (ref 3–16)
BUN BLDV-MCNC: 13 MG/DL (ref 7–20)
CALCIUM SERPL-MCNC: 9.2 MG/DL (ref 8.3–10.6)
CHLORIDE BLD-SCNC: 101 MMOL/L (ref 99–110)
CO2: 23 MMOL/L (ref 21–32)
CREAT SERPL-MCNC: 0.8 MG/DL (ref 0.6–1.2)
GFR AFRICAN AMERICAN: >60
GFR NON-AFRICAN AMERICAN: >60
GLUCOSE BLD-MCNC: 101 MG/DL (ref 70–99)
POTASSIUM SERPL-SCNC: 4.1 MMOL/L (ref 3.5–5.1)
SODIUM BLD-SCNC: 141 MMOL/L (ref 136–145)

## 2022-04-26 ENCOUNTER — TELEPHONE (OUTPATIENT)
Dept: CARDIOLOGY CLINIC | Age: 77
End: 2022-04-26

## 2022-04-26 NOTE — TELEPHONE ENCOUNTER
Neither patient nor Daughter were successfull in sending in manual transmission from phone doris. Daughter was given number to [employer]  [phone] to help trouble shoot.

## 2022-04-26 NOTE — TELEPHONE ENCOUNTER
Malinda Pollard     Please advise if patient will be charged for another device interrogation as this was just completed 4/19/22. If not or if she is willing to do one, please call to have her send an interrogation. She feels as though she was in atrial fibrillation earlier today.

## 2022-04-26 NOTE — TELEPHONE ENCOUNTER
Andrey Casillas called in this afternoon and states that she thinks she might be back in afib- she says today there were 2 episodes that she recorded HR between 100 and 105 today.      She can be reached back at [phone]

## 2022-04-26 NOTE — TELEPHONE ENCOUNTER
Dr. Kash Sahu     Patient underwent Bi-V pacemaker placement on 4/11/21. She called the office today feeling as though she was back in atrial fibrillation. Please review transmission per Rome Lee and advise if further recommendations in Dr. Feliciano Cranker absence.

## 2022-04-26 NOTE — TELEPHONE ENCOUNTER
Transmission received. \"Pt sent in manual transmission today per our request as they report they believe they are in afib.    transmission shows patient is presenting in AFib episode that began at 12:42 PM today. Vrates during avg 101-106 bpm    Pt is on amio and toprol xl but no OAC. Results reported to [employer]. \"     Remote interrogation exported from steve to Dr. Freda Allison     Pt's daughter Alex Ordaz was informed of this course of action but was curious as what they should do at this point.

## 2022-04-27 ENCOUNTER — CARE COORDINATION (OUTPATIENT)
Dept: CASE MANAGEMENT | Age: 77
End: 2022-04-27

## 2022-04-27 RX ORDER — DILTIAZEM HYDROCHLORIDE 120 MG/1
120 CAPSULE, COATED, EXTENDED RELEASE ORAL DAILY
Qty: 30 CAPSULE | Refills: 5 | Status: SHIPPED | OUTPATIENT
Start: 2022-04-27 | End: 2022-05-09 | Stop reason: HOSPADM

## 2022-04-27 NOTE — TELEPHONE ENCOUNTER
Addressed in previous phone encounters. Due to amiodarone's long half life would not be beneficial to increase dose for short term management.

## 2022-04-27 NOTE — RESULT ENCOUNTER NOTE
Reviewed. Please call the patient to start taking anticoagulation. Apixaban or xarelto and book for a follow up with me in 1 month or so.

## 2022-04-27 NOTE — TELEPHONE ENCOUNTER
Spoke with [employer]. This will be discussed with Dr. Sonia Rome when he returns for afternoon office and we will return the call to her. She v/u.

## 2022-04-27 NOTE — TELEPHONE ENCOUNTER
Spoke with Dr. Karie Nascimento orders received to start Diltiazem 120 mg daily in additional to the Toprol and then schedule a follow up to see Dr. Karie Nascimento next wed.

## 2022-04-27 NOTE — CARE COORDINATION
[facility] Transitions Follow Up Call    2022    Patient: Riya Arrington  Patient : 1945   MRN: [id number]  Reason for Admission: pacemaker  Discharge Date: 22 RARS: Readmission Risk Score: 18.9 ( )         Spoke with: [address] [address] Transitions Subsequent and Final Call    Subsequent and Final Calls  Do you have any ongoing symptoms?: Yes  Have your medications changed?: No  Do you have any questions related to your medications?: Yes  Patient Reports: should I take Amiodarone while waiting for diltizem   Do you currently have any active services?: No  Are you currently active with any services?: Home Health  Do you have any needs or concerns that I can assist you with?: Yes  Identified Barriers: None  Care Transitions Interventions  Other Interventions: Follow Up  Future Appointments   Date Time Provider Vickie Wilson   2022  8:30 AM Igor Sy MD [facility]   2022  3:00 PM James Meigs, MD [facility]   2022  2:00 PM SCHEDULE, [facility] REMOTE TRANSMISSION [facility]   2022  3:00 PM SCHEDULE, [facility] DEVICE CHECK [facility]   2022  3:20 PM Khadijah Greenberg, HOWARD - CNP [facility]       Pt states she is ok besides her fast heart rate. States she is more tired than normal. Denies any sob, cp or difficulty breathing. It was noted that she is in Afib according to her reading. There are also notes stating they will start pt on Diltiazem but script is not ready a pharmacy yet. Pt states she started to become more fatigued  and yesterday noticed her heart rate faster than normal. She is able to eat and drink without difficulties. Still able to get around her house but doing less than what she initially was. Ankles are swollen but weight has not changed. Pt daughter asked if they could start pt back on amiodarone until they could get diltiazem. Stated CTN would route a message to cardiology office but to also give them a call and ask.  Will continue to follow.      MARIA ESTHER Lofton, RN   Care Transition Nurse  Mobile: [phone]

## 2022-04-27 NOTE — TELEPHONE ENCOUNTER
Shonda's daughter Naina Khan called in this morning stating that Shonda's HR is not going down, it went up yesterday around noon and hasn't came back down since. They are not sure what to do and would like to to talk to an RN.     You can reach Hannah at #[phone]

## 2022-04-27 NOTE — TELEPHONE ENCOUNTER
Shonda's daughter Bakari Sanchez called in states she called yesterday and she still hasnt gotten a return call. She would like a return call. Zonia can be reached at [phone].

## 2022-04-27 NOTE — TELEPHONE ENCOUNTER
Spoke with daughter Shirley Barboza and sent script to pharmacy and scheduled for f/u with Dr. Destin Alvarado on 5/4/22 at 8:30 am.     Please call [address] [facility] to verify they have this medication. If they do not we will need to send to [facility].

## 2022-04-28 ENCOUNTER — TELEPHONE (OUTPATIENT)
Dept: CARDIOLOGY CLINIC | Age: 77
End: 2022-04-28

## 2022-04-28 NOTE — TELEPHONE ENCOUNTER
Shonda's daughter Jenny Pisano called in said she was supposed to get a call back concerning her mom's prescription for  Diltiazem. Zonia can be reached at [phone].

## 2022-04-28 NOTE — TELEPHONE ENCOUNTER
I called [facility], Dilma Kent called me back and said medication was ready for . I called Zonia and made her aware.

## 2022-04-29 ENCOUNTER — CARE COORDINATION (OUTPATIENT)
Dept: CASE MANAGEMENT | Age: 77
End: 2022-04-29

## 2022-04-29 NOTE — CARE COORDINATION
Shay [facility] Transitions Follow Up Call    2022    Patient: Darvin Zuñiga  Patient : 1945   MRN: [id number]  Reason for Admission: bradycardia  Discharge Date: 22 RARS: Readmission Risk Score: 18.9 ( )         Spoke with: Darvin Zuñiga    Patient verified  and was pleasant and agreeable to transition calls. Call was brief. States she has been made aware that amiodarone is to be taken 1x per day at night. States she is on the diltiazem now. States HR ws 103-109 the past few days, but was 89-90 this AM. Felt better. States she will call cardiology if her HR goes to 50s. Denies needs. Betsy Dyer LPN [address] [place] [address]  [phone]    Care Transitions Subsequent and Final Call    Subsequent and Final Calls  Are you currently active with any services?: Home Health  Care Transitions Interventions  Other Interventions:            Follow Up  Future Appointments   Date Time Provider Vickie Wilson   2022  8:30 AM Shantell Valle MD [facility]   2022  3:00 PM Jonny Coloardo MD [facility]   2022  2:00 PM SCHEDULE, [facility] REMOTE TRANSMISSION [facility]   2022  3:00 PM SCHEDULE, [facility] DEVICE CHECK [facility]   2022  3:20 PM HOWARD Harper - CNP [facility]       Betsy Dyer LPN

## 2022-05-03 NOTE — PROGRESS NOTES
Cardiac Electrophysiology Consultation   Date: 5/4/2022   Reason for Consultation: atrial fibrillation  Consult Requesting Physician: Estee Sheffield MD  Primary CarePhysician: Denice Sheffield MD     Chief Complaint:   Chief Complaint   Patient presents with    Follow-up     afib - SOB and fatigue        HPI: Yang Garcia is a 68 y.o. patient with a history of atrial fibrillation, pulmonary hypertension, aortic stenosis s/p AVR, PVI and RENETTA exclusion by Dr Tunde Deutsch on 6/2020 and CAD s/p CHILANGO to mid circ 5/2014,  AVM's which were cauterized. She presented to [facility] with complaints of back pain and while inpatient had an episode of dyspnea. An echocardiogram was completed revealing moderate to severe MR. Subsequently, a CAMERON was completed on 7/27/20 showing moderate MR. She was admitted 10/26/2021-11/1/2021 with afib with RVR and acute CHF. She converted to a regular rhythm and was diuresed prior to discharge. She was admitted to [facility] 11/11/21 with acute encephalopathy and found to have rhabdomyolysis and afib with RVR again. She underwent successful cardioversion on 11/17/2021. She did experience bradycardia when in sinus rhythm with heart rate between 40-50 bpm. She was seen in the office with Lakhwinder Rodriguez CNP for follow up on 11/23/21 and then sent to the ED for admission. She was again found in afib with RVR and fluid overloaded. She was diuresed and discharged home on both amiodarone and Toprol. She underwent placement of a Medtronic Bi-V PPM on 4/11/2022 with Dr. Agus Galvez. Patient daughter called into the office on 4/26/2022 reporting that she thinks the patient may be back in atrial fibrillation and she recorded HR between 100-105 bpm. Patient sent in a remote check on her device which showed atrial fibrillation and flutter which began at 12:42 pm. Diltiazem 120 mg daily was added to her medications    Interval History:  Today, she presents to office accompanied by her daughter for management of her atrial fibrillation. She reports symptoms of palpitations and heart bounding and pacing. She reports that she started having swelling of BLE after starting Diltiazem she took Torsemide which removed the fluid but she she stopped the swelling returned. She still has steri strips in place from her Pacemaker implantation advised she can loosen strips with water while showering and remove. She reports she had a 3 lbs weight gain over night. She reports she follows a low sodium diet and weighs herself daily. She is compliant with her medications and tolerating them well. She denies chest pain/pressure, tightness, lightheadedness, dizziness, syncope, presyncope,  PND or orthopnea. 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 since 26.  Chronic diastolic CHF (congestive heart failure) ([facility] [address] 2016    grade II    COPD, mild (HCC)     CVA (cerebral infarction) 2013    left cerebral cortex x2/expressive aphasia/resolved    Diverticulosis     Epistaxis, recurrent     when inr high.  last 3-4 months ago    Former smoker     Gallbladder sludge     HTN (hypertension)     Hyperlipidemia     Hypothyroidism     Lumbar stenosis without neurogenic claudication 2017    pain across low back worse with standing and walking, nonradiating    Macular degeneration     left worse than right , dry : progressive loss of sight: just barely able to see to drive    Neuropathy     Nonrheumatic mitral valve regurgitation     Obstructive sleep apnea     Osteoarthritis     Pulmonary HTN ([facility] [address]     primary, responsive to nitrates    PVD (peripheral vascular disease) ([address]     occluded right SFA::: asymptomatic NIKOS 0.7 right and 1.0 left    Sacral fracture, closed ([address]     Syncope 2014      Past Surgical History:   Procedure Laterality Date    AORTIC VALVE REPLACEMENT  6/16/15    Dr. Juan Gilbert. [facility] - PVI, RENETTA exclusion. 19mm Maki pericardial Magna    APPENDECTOMY      BACK SURGERY  03/15/2019    superion inserted to keep back from hurtin [facility] [address] N/A 2019    EMERGENT; LAPAROSCOPIC CHOLECYSTECTOMY WITH GRAM performed by Nika Britt MD at [address] [place] [address]      stent x 1    CYSTOSCOPY  2014    dr Chan Adjutant      THR Right    OTHER SURGICAL HISTORY  2018     EGD and colonoscopy.  PAIN MANAGEMENT PROCEDURE Bilateral 2021    BILATERAL L3, L4, L5 MEDIAL BRANCH BLOCK WITH FLUOROSCOPY performed by Amber Martinez MD at [facility] Bilateral 2021    BILATERAL L3, L4, L5 MEDIAL BRANCH BLOCKS WITH FLUOROSCOPY (18673, 77356) performed by Amber Martinez MD at [facility] Bilateral 2021    BILATERAL L3, L4, L5 RADIOFREQUENCY ABLATION WITH FLUOROSCOPY (98199, 31832) performed by Amber Martinez MD at [facility] N/A 3/15/2019    INSERTION OF INTERSPINOUS SPACER SUPERION VERTIFLEX AT LUMBAR FOUR-LUMBAR FIVE performed by Flora Edwards MD at [address] ENDOSCOPY  12/15/2017       Allergies:   Allergies   Allergen Reactions    Benadryl [Diphenhydramine] Swelling    Doxylamine     Mucinex [Guaifenesin Er]      hallucinations    Spiriva Handihaler [Tiotropium Bromide Monohydrate]      Nausea      Adhesive Tape Rash and Swelling    Neosporin [Bacitracin-Polymyxin B] Other (See Comments)     Rash that spread across face with swelling       Medication:   Prior to Admission medications    Medication Sig Start Date End Date Taking?  Authorizing Provider   dilTIAZem (CARDIZEM CD) 120 MG extended release capsule Take 1 capsule by mouth daily 4/27/22  Yes Trell Wilson MD   tiZANidine (ZANAFLEX) 4 MG capsule TAKE ONE-HALF TABLET BY MOUTH EVERY 8 HOURS AS NEEDED (MUSCLE SPASM) 4/5/22  Yes HOWARD Mesa - CNP   ezetimibe (ZETIA) 10 MG tablet TAKE ONE TABLET BY MOUTH DAILY 4/5/22  Yes HOWARD Mesa - CNP   sennosides-docusate sodium (SENOKOT-S) 8.6-50 MG tablet Take 1 tablet by mouth in the morning and at bedtime 3/14/22  Yes Candido Rutledge MD   pantoprazole (PROTONIX) 40 MG tablet TAKE ONE TABLET BY MOUTH DAILY 3/7/22  Yes Candido Rutledge MD   rosuvastatin (CRESTOR) 40 MG tablet Take 1 tablet by mouth every evening 3/7/22  Yes Candido Rutledge MD   amiodarone (CORDARONE) 200 MG tablet Take 1 tablet by mouth nightly 3/7/22  Yes Candido Rutledge MD   torsemide (DEMADEX) 20 MG tablet 20mg qam water pill take xtra 20mg on days wght increases by 2 or more lbs 3/7/22  Yes Candido Rutledge MD   metoprolol succinate (TOPROL XL) 50 MG extended release tablet Take 1 tablet by mouth daily 3/7/22  Yes Candido Rutledge MD   Calcium Alginate (ROSA CA ALGINATE 2\"X2\") MISC Apply to each sore daily 1/31/22  Yes Candido Rutledge MD   potassium chloride (KLOR-CON) 10 MEQ extended release tablet TAKE ONE TABLET BY MOUTH TWO TIMES A DAY  Patient taking differently: Take 10 mEq by mouth daily  12/22/21  Yes Candido Rutledge MD   Fluticasone-Salmeterol,sensor, 883-02 MCG/ACT AEPB 2 puffs bid 11/26/21  Yes Candido Rutledge MD   levothyroxine (SYNTHROID) 88 MCG tablet Take 1 tablet by mouth Daily 11/10/21  Yes Ernestina Raymundo MD   docusate sodium (DOK) 100 MG capsule TAKE ONE CAPSULE BY MOUTH TWO TIMES A DAY 11/1/21  Yes Aretha Dominguez MD   diclofenac sodium (VOLTAREN) 1 % GEL Apply 2 g topically daily   Yes Historical Provider, MD   oxyCODONE HCl (OXY-IR) 10 MG immediate release tablet Take 10 mg by mouth every 6 hours as needed for Pain. Yes Historical Provider, MD   ipratropium (ATROVENT) 0.03 % nasal spray INHALE 2 DOSES INTO EACH NOSTRIL THREE TIMES A DAY IF NEEDED FOR RHINITIS 10/19/21  Yes Aretha Dominguez MD   DULoxetine (CYMBALTA) 30 MG extended release capsule TAKE 1 CAPSULE BY MOUTH DAILY EVERY MORNING  Patient taking differently: Take 60 mg by mouth daily Every morning 8/17/21  Yes Aretha Dominguez MD   aspirin 81 MG tablet Take 1 tablet by mouth daily 5/14/15  Yes Divine Lerner MD       Social History:   reports that she has been smoking cigarettes. She has a 45.00 pack-year smoking history. She has quit using smokeless tobacco. She reports that she does not drink alcohol and does not use drugs. Family History:  family history includes Breast Cancer in her daughter. Reviewed. Denies family history of sudden cardiac death, arrhythmia, premature CAD    Review of System:  Pertinent positive and negatives are in the HPI, the rest are negative. Physical Examination:  /60 (Site: Right Upper Arm, Position: Sitting)   Pulse 115   Ht 5' 2\" (1.575 m)   Wt 129 lb (58.5 kg)   SpO2 97%   BMI 23.59 kg/m²      · Constitutional: Oriented. No distress. · Head: Normocephalic and atraumatic. · Mouth/Throat: Oropharynx is clear and moist.   · Eyes: Conjunctivae normal. EOM are normal.   · Neck: Normal range of motion. Neck supple. No rigidity. No JVD present. · Cardiovascular: Tachycardic rate, regular, S1&S2 and intact distal pulses. · Pulmonary/Chest: Bilateral respiratory sounds. No wheezes. No rhonchi. · Abdominal: Soft. Bowel sounds present. No distension, No tenderness. · Musculoskeletal: No tenderness. No edema    · Lymphadenopathy: Has no cervical adenopathy. · Neurological: Alert and oriented. Cranial nerve appears intact, No Gross deficit   · Skin: Skin is warm and dry. No rash noted. · Psychiatric: Has a normal mood, affect and behavior     Labs:  Reviewed. ECG: reviewed, atrial flutter, 2:1 with ventricular paced beats. Studies:   1. Event monitor:   none    2. Echo: 10/27/2021  Left ventricular cavity size is normal.  Normal left ventricular wall thickness. Ejection fraction is visually estimated to be 45-50%. There is mild global  hypokinesis appreciated. Grade III diastolic dysfunction with elevated LV filling pressures. Moderately dilated right ventricle with mildly reduced function. The left atrium is severely dilated. The right atrium is mildly dilated. Moderately severe tricuspid regurgitation. Moderate pulmonic regurgitation present. Moderately severe mitral regurgitation appreciated. A bioprosthetic artificial aortic valve appears well seated with a maximum  velocity of 2.4m/s and a mean gradient of 12mmHg. Trivial aortic regurgitation. A bubble study was performed and fails to show evidence of right to left  Shunting. CAMERON 10/29/2021  Overall left ventricular size and wall thickness appear normal with systolic  function mildly reduced. LVEF 40-45%. Normal right ventricular size and function. Left atrium is moderately dilated. The left atrial appendage appears to be ligated with no flow into it. Mitral valve leaflets appear thickened/calcified. Restricted posterior leaflet with malcoaptation id the leaflet tips. Moderate mitral regurgitation is present. Moderate tricuspid regurgitation. 3. Stress Test:        4. Cath: 6/15/2015   1. Right dominant coronary arterial system with mild calcification of the RCA. There is 40% serial lesions in the mid RCA. In the left system there is 25% distal left main disease. There is 50% mid LAD disease and 50% ostial second diagonal branch disease.  There is no significant restenosis identified in the prior previously placed mid circumflex stent. 2. Systemic hypertension.      Procedures:  1.  AVR, PVI and RENETTA exclusion by Dr. Sanchez Staff 2015  2. DCCV on 11/17/2021    I independently reviewed the ECG, MCOT, echocardiogram, stress test, and coronary angiography/PCI results and used them for my plan of care. Procedures:  1.  AVR, PVI and RENETTA exclusion by Dr. Sanchez Staff 2015  2. DCCV on 11/17/2021  3. Implantation of Medtronic BI-V Pacemaker on 4/11/2022     Assessment/Plan:    Paroxsymal atrial fibrillation / Atrial Flutter   -Symptomatic with palpitations, fatigue and shortness of breath with exertion. - with recurrences in 2020, 2021 s/p DCCV in 2020              - had surgical PVI with AVR in 2020.              - Continue Toprol XL 50 mg daily and Diltiazem 120 mg daily for rate control.   - Continue amiodarone 200 mg daily.              - She has a CHADS2-VASc 5 (age, gender, HTN, CAD) not anticoagulated due to past RENETTA exclusion with no flow in appendage seen on CAMERON in 2020. Ms. Velia Humphreys has recurrent atrial fibrillation and flutter despite amiodarone, metoprolol and Cardizem. She is symptomatic with it. Previously had cardioversion but with recurrence. Next treatment option is either trial of other AAD or ablation. After informed detailed discussion as below, she opted for ablation. Can take extra dose of diltiazem if has elevated HR otherwise will wait for ablation of both fibrillation and flutter. Will then be on anticoagulation for 3 months.     -Atrial fibrillation / atrial flutter risk factors including age, HTN, obesity, inactivity and SHAVONNE were discussed with patient. Risk factor modification recommended   ~ TSH 1.22 (11/4/2021)   - Treatment options including cardioversion, rate control strategy, antiarrhythmics, anticoagulation and possible ablation were discussed with patient. Risks, benefits and alternative of each treatment options were explained.  All questions answered ~ Information given regarding ablation for pt to review                          ~ The risks, benefits and alternatives of the ablation procedure were discussed with the patient. The risks including, but not limited to, the risks of bleeding, infection, radiation exposure, injury to vascular, cardiac and surrounding structures (including pneumothorax), stroke, cardiac perforation, tamponade, need for emergent open heart surgery, need for pacemaker implantation, Injury to the phrenic nerve, injury to the esophagus, myocardial infarction and death were discussed in detail.                                       - I explained the success rate considering possible need for a second procedure is about 60-80% and with addition of anti arrhythmics is higher     · Patient verbalized understanding of procedure, risks and benefits and wishes to proceed with ablation. Symptomatic bradycardia - addressed with Bi-V pacemaker.   -Likely some component of tachybrady as well given history of A fib with RVR     -Had been symptomatic while on Toprol and amiodarone as HR tends to be in the 40s   - EF has also declined from 45-50% to 40-45%   - s/p Medtronic Bi-V pacemaker implanted on 4/11/22. Chronic combine diastolic and systolic heart failure              -NYHA class II              - EF 40-45%,   - She is not currently on guideline directed medical therapy. Beta Blocker: Toprol XL 50 mg daily               Aldosterone Antagonist: Spironolactone                       Diuretic: Torsemide 20 mg daily. ACE/ARB/ARNI: Was on valsartan previously but was stopped due to ANITA. Latest BUN/CREA 13/.08 on 4/25/2022. Will defer to Dr. Sena Pritchett for restart on GDMT. Euvolemic. Mitral regurgitation              - being followed by Dr. Sena Pritchett. Hope it improves when in sinus rhythm and with effective biventricular pacing. Repeat TTE in 3 months after ablation.                  CAD   -prior stent to mid circ 2014.              - no angina              - Continue with medical management and risk factor modification including aspirin, statin, and beta blocker. S/p bioprosthetic AVR              - in 2015              - Cotinue aspirin 81 mg daily. Thank you for allowing me to participate in the care of Jeff. All questions and concerns were addressed to the patient/family. Alternatives to my treatment were discussed. This note was scribed in the presence of Humberto Leija MD by Joy Peraza RN. Physician attestation: The scribe's documentation has been prepared under my direction and has been personally reviewed by me in its entirety. I confirm that the note above reflects all work, treatment, procedures, and medical decision making performed by me.      Humberto Leija MD  Cardiac Electrophysiology  [facility]

## 2022-05-04 ENCOUNTER — NURSE ONLY (OUTPATIENT)
Dept: CARDIOLOGY CLINIC | Age: 77
End: 2022-05-04

## 2022-05-04 ENCOUNTER — OFFICE VISIT (OUTPATIENT)
Dept: CARDIOLOGY CLINIC | Age: 77
End: 2022-05-04
Payer: COMMERCIAL

## 2022-05-04 ENCOUNTER — TELEPHONE (OUTPATIENT)
Dept: CARDIOLOGY CLINIC | Age: 77
End: 2022-05-04

## 2022-05-04 VITALS
BODY MASS INDEX: 23.74 KG/M2 | DIASTOLIC BLOOD PRESSURE: 60 MMHG | HEART RATE: 115 BPM | WEIGHT: 129 LBS | SYSTOLIC BLOOD PRESSURE: 100 MMHG | HEIGHT: 62 IN | OXYGEN SATURATION: 97 %

## 2022-05-04 DIAGNOSIS — I50.42 CHRONIC COMBINED SYSTOLIC AND DIASTOLIC CHF, NYHA CLASS 2 (HCC): ICD-10-CM

## 2022-05-04 DIAGNOSIS — R00.1 SINUS BRADYCARDIA: ICD-10-CM

## 2022-05-04 DIAGNOSIS — I48.0 PAF (PAROXYSMAL ATRIAL FIBRILLATION) (HCC): Primary | ICD-10-CM

## 2022-05-04 DIAGNOSIS — I50.42 CHRONIC COMBINED SYSTOLIC AND DIASTOLIC CHF, NYHA CLASS 2 (HCC): Primary | ICD-10-CM

## 2022-05-04 DIAGNOSIS — I48.0 PAF (PAROXYSMAL ATRIAL FIBRILLATION) (HCC): ICD-10-CM

## 2022-05-04 DIAGNOSIS — R00.1 SYMPTOMATIC BRADYCARDIA: ICD-10-CM

## 2022-05-04 DIAGNOSIS — I48.91 ATRIAL FIBRILLATION WITH RVR (HCC): ICD-10-CM

## 2022-05-04 DIAGNOSIS — Z95.0 BIVENTRICULAR CARDIAC PACEMAKER IN SITU: ICD-10-CM

## 2022-05-04 DIAGNOSIS — I50.33 ACUTE ON CHRONIC DIASTOLIC HEART FAILURE (HCC): ICD-10-CM

## 2022-05-04 DIAGNOSIS — Z95.2 S/P AVR (AORTIC VALVE REPLACEMENT): ICD-10-CM

## 2022-05-04 PROCEDURE — 4040F PNEUMOC VAC/ADMIN/RCVD: CPT | Performed by: INTERNAL MEDICINE

## 2022-05-04 PROCEDURE — 93290 INTERROG DEV EVAL ICPMS IP: CPT | Performed by: INTERNAL MEDICINE

## 2022-05-04 PROCEDURE — G8400 PT W/DXA NO RESULTS DOC: HCPCS | Performed by: INTERNAL MEDICINE

## 2022-05-04 PROCEDURE — 99215 OFFICE O/P EST HI 40 MIN: CPT | Performed by: INTERNAL MEDICINE

## 2022-05-04 PROCEDURE — G8420 CALC BMI NORM PARAMETERS: HCPCS | Performed by: INTERNAL MEDICINE

## 2022-05-04 PROCEDURE — 1090F PRES/ABSN URINE INCON ASSESS: CPT | Performed by: INTERNAL MEDICINE

## 2022-05-04 PROCEDURE — 93281 PM DEVICE PROGR EVAL MULTI: CPT | Performed by: INTERNAL MEDICINE

## 2022-05-04 PROCEDURE — 1123F ACP DISCUSS/DSCN MKR DOCD: CPT | Performed by: INTERNAL MEDICINE

## 2022-05-04 PROCEDURE — G8427 DOCREV CUR MEDS BY ELIG CLIN: HCPCS | Performed by: INTERNAL MEDICINE

## 2022-05-04 PROCEDURE — 4004F PT TOBACCO SCREEN RCVD TLK: CPT | Performed by: INTERNAL MEDICINE

## 2022-05-04 NOTE — PROGRESS NOTES
Pt seen in clinic today for cardiac device interrogation for new onset Afib/flutter    Their device is a  MDT 3 chamber BiV CRTP  Based on threshold, impedance, and intrinsic sensing tests run today, the device appears to be functioning normally. Remaining battery life is 10y5m  AP 40.56%      RVP 43.40%      effective CRTP 86.0%    effective CRTP has dropped since AT/AF burden is now 52.9% - trend graph and episode list reveals nearly 100% burden since 4/26/2022  Pt started cardizem 120mg on 4/27 but V rates are still greater than 100bpm on avg. Pt notes that her peripheral edema has not subsided despite taking larger doses of demadex as instructed. optivol baseline still being established. Rx:  amiodarone (CORDARONE) 200 MG tablet Take 1 tablet by mouth nightly  torsemide (DEMADEX) 20 MG tablet 20mg qam water pill take xtra 20mg on days wght increases by 2 or more lbs  metoprolol succinate (TOPROL XL) 50 MG extended release tablet Take 1 tablet by mouth daily  potassium chloride (KLOR-CON) 10 MEQ extended release tablet TAKE ONE TABLET BY MOUTH TWO TIMES A DAYPatient taking differently: Take 10 mEq by mouth daily   dilTIAZem (CARDIZEM CD) 120 MG extended release capsule Take 1 capsule by mouth daily    Pt was informed of findings today and general questions have been answered with regard to device. Home monitoring hardware is transmitting appropriately. Results discussed with Dr. Sylvie Reilly. Pt to see Dr. Sylvie Reilly in clinic today.

## 2022-05-04 NOTE — TELEPHONE ENCOUNTER
Patient would like to get handicap placard. Spoke with Dr. Priscila Gilmore and his nurse Hannah Alaniz. RN ok to sign for handicap placard.

## 2022-05-04 NOTE — TELEPHONE ENCOUNTER
Pt is agreeable to date/time. Went over pre-procedure instructions. Pt v/u. Published on [employer] and e-mail to Hallie Dutton. · ECHO TECH  NEEDED FOR [facility]     Dr. Lorena Ac would like to schedule ASAP I checked with [facility]  Pre cert and she stated insurance does not require prior auth.     Dr. Lorena Ac would like to move RIGHT FLUTTER ABL scheduled on 5/9 at 1130 to 2:30 pm and put her on 5/9 at 1130 am spot.      CAMERON Right Atrial Flutter & Atrial Fibrillation Ablation Pre procedure Instructions     Date: 5/9/22     Arrive at: 10:00 am    Procedure time: 11:30 am         The morning of your procedure you will park in the hospital parking lot and report directly to the cath lab to check in. At the information desk stay right and go all the way to the end of the lao, this will take you directly to your check in desk for the cath lab.     Pre-Procedure Instructions   1. You will need to fast (nothing to eat or drink) after midnight the day of your procedure  2. Do NOT chew gum or eat mints the day of your procedure  3. You may hold your Torsemide the morning of the procedure for comfort to decrease urinary urgency. 4. Do not use any lotions, creams or perfume the morning of procedure. 5. You will need to complete pre-procedure lab work 5-7 days prior to your procedure. 6. Please have a responsible adult to drive you home after procedure, you should go home same day, but there is always a possibility of an overnight stay. 7. Cath lab will provide you with all post procedure instructions           [facility]     [id number] [facility]  [address] [place] [address] Drive. [address] [address]  Phone: [phone]  The hours are Mon -Fri.   6:30 am - 4:00 pm   Saturday 8:00 am - noon  No appointment necessary

## 2022-05-04 NOTE — PATIENT INSTRUCTIONS
If you have any question regarding your ablation please contact Dr. Albaro Callejas at [phone]. If you would like to proceed with scheduling  your ablation please contact  Layarey Dorans at  [phone]    Atrial Fibrillation Ablation Pre procedure Instructions    Date:______________________________    Arrive at:__________________________    Procedure time:____________________    The morning of your procedure you will park in the hospital parking lot and report directly to the cath lab to check in. At the information desk stay right and go all the way to the end of the lao, this will take you directly to your check in desk for the cath lab. Pre-Procedure Instructions   1. You will need to fast (nothing to eat or drink) after midnight the day of your procedure  2. Do NOT chew gum or eat mints the day of your procedure  3. You may hold your Torsemide the morning of the procedure for comfort to decrease urinary urgency. 4. Do not use any lotions, creams or perfume the morning of procedure. 5. You will need to complete pre-procedure lab work 5-7 days prior to your procedure. 6. Please have a responsible adult to drive you home after procedure, you should go home same day, but there is always a possibility of an overnight stay. 7. Cath lab will provide you with all post procedure instructions                                          [address] [place] [address]/[facility] [place] [facility] services  [address] [place] [address] Drive. [address] [place] [address]  Phone: [phone]  The hours are Mon -Fri. 6:30 am - 4:00 pm   Saturday 8:00 am - noon  No appointment necessary                Patient Education        Catheter Ablation: What to Expect at [address] [place] [address] Drive had a catheter ablation to try to correct a problem with your heartbeat (heart rhythm). You may have swelling, bruising, or a small lump around the sites where the catheters went into your body.  These should go away in 3 to 4weeks. You can do light activities at home. Don't do anything strenuous until yourdoctor says it is okay. This may be for several days. This care sheet gives you a general idea about how long it will take for you to recover. But each person recovers at a different pace. Follow the steps belowto get better as quickly as possible. How can you care for yourself at home? Activity     If the doctor gave you a sedative:  ? For 24 hours, don't do anything that requires attention to detail, such as going to work, making important decisions, or signing any legal documents. It takes time for the medicine's effects to completely wear off.  ? For your safety, do not drive or operate any machinery that could be dangerous. Wait until the medicine wears off and you can think clearly and react easily.      Do not do strenuous exercise and do not lift, pull, or push anything heavy until your doctor says it is okay. This may be for several days.      Ask your doctor when it is okay to have sex. Diet     You can eat your normal diet. If your stomach is upset, try bland, low-fat foods like plain rice, broiled chicken, toast, and yogurt.      Drink plenty of fluids (unless your doctor tells you not to). Medicines     Your doctor will tell you if and when you can restart your medicines. You will also get instructions about taking any new medicines.      If you take aspirin or some other blood thinner, ask your doctor if and when to start taking it again. Make sure that you understand exactly what your doctor wants you to do.      Be safe with medicines. Take your medicines exactly as prescribed. Call your doctor if you think you are having a problem with your medicine. Catheter site care     For 1 or 2 days, keep a bandage over the spot where the catheter was inserted.  The bandage probably will fall off in this time.      Put ice or a cold pack on the area for 10 to 20 minutes at a time to help with soreness or swelling. Put a thin cloth between the ice and your skin.      You may shower 24 to 48 hours after the procedure, if your doctor okays it. Pat the incision dry.      Do not soak the catheter site until it is healed. Don't take a bath for 1 week, or until your doctor tells you it is okay.      Watch for bleeding from the site. A small amount of blood (up to the size of a quarter) on the bandage can be normal.      If you are bleeding, lie down and press on the area for 15 minutes to try to make it stop. If the bleeding does not stop, call your doctor or seek immediate medical care. Follow-up care is a key part of your treatment and safety. Be sure to make and go to all appointments, and call your doctor if you are having problems. It's also a good idea to know your test results and keep alist of the medicines you take. When should you call for help? Call 911  anytime you think you may need emergency care. For example, call if:     You passed out (lost consciousness).      You have symptoms of a heart attack. These may include:  ? Chest pain or pressure, or a strange feeling in the chest.  ? Sweating. ? Shortness of breath. ? Nausea or vomiting. ? Pain, pressure, or a strange feeling in the back, neck, jaw, or upper belly, or in one or both shoulders or arms. ? Lightheadedness or sudden weakness. ? A fast or irregular heartbeat. After you call 911, the  may tell you to chew 1 adult-strength or 2 to 4 low-dose aspirin. Wait for an ambulance. Do not try to drive yourself.      You have symptoms of a stroke. These may include:  ? Sudden numbness, tingling, weakness, or loss of movement in your face, arm, or leg, especially on only one side of your body. ? Sudden vision changes. ? Sudden trouble speaking. ? Sudden confusion or trouble understanding simple statements. ? Sudden problems with walking or balance. ? A sudden, severe headache that is different from past headaches.    Call your doctor now or seek immediate medical care if:     You are bleeding from the area where the catheter was put in your blood vessel.      You have a fast-growing, painful lump at the catheter site.      You have signs of infection, such as:  ? Increased pain, swelling, warmth, or redness. ? Red streaks leading from the catheter site. ? Pus draining from the catheter site. ? A fever.      Your leg, arm, or hand is painful, looks blue, or feels cold, numb, or tingly. Watch closely for any changes in your health, and be sure to contact yourdoctor if you have any problems. Current as of: January 10, 2022               Content Version: 13.2  © 2006-2022 [employer]. Care instructions adapted under license by [facility] ([facility]). If you have questions about a medical condition or this instruction, always ask your healthcare professional. [place] [address] any warranty or liability for your use of this information. Patient Education        Catheter Ablation: Before Your Procedure  What is a catheter ablation? A catheter ablation may be done if there is a problem with your heartbeat (heart rhythm). This procedure destroys (ablates) tiny areas of the heart that are causing your heart rhythm problem. This should not affect the heart'sability to do its job. The doctor puts thin tubes called catheters into blood vessels in your groin, arm, or neck. The tubes are guided to your heart. There is an electrode at the tip of each tube. The electrode helps the doctor find the problem areas. Then the doctor uses the electrode to send energy to destroy the areas of hearttissue that are causing the problem. You may get medicine that relaxes you or puts you in a light sleep. Or you might be asleep during the procedure. The places where the catheters go in willbe numb. After an ablation, you may stay overnight in the hospital.  How do you prepare for the procedure?   Procedures can be stressful. This information will help you understand what youcan expect. And it will help you safely prepare for your procedure. Preparing for the procedure     Be sure you have someone to take you home. Anesthesia and pain medicine will make it unsafe for you to drive or get home on your own.      Understand exactly what procedure is planned, along with the risks, benefits, and other options.      Tell your doctor ALL the medicines, vitamins, supplements, and herbal remedies you take. Some may increase the risk of problems during your procedure. Your doctor will tell you if you should stop taking any of them before the procedure and how soon to do it.      If you take aspirin or some other blood thinner, ask your doctor if you should stop taking it before your procedure. Make sure that you understand exactly what your doctor wants you to do. These medicines increase the risk of bleeding.      Make sure your doctor and the hospital have a copy of your advance directive. If you don't have one, you may want to prepare one. It lets others know your health care wishes. It's a good thing to have before any type of surgery or procedure. What happens on the day of the procedure?     Follow the instructions exactly about when to stop eating and drinking. If you don't, your surgery may be canceled. If your doctor told you to take your medicines on the day of surgery, take them with only a sip of water.      Follow your doctor's instructions about when to bathe or shower before your surgery. Do not apply lotions, perfumes, deodorants, or nail polish.      Take off all jewelry and piercings. And take out contact lenses, if you wear them. At the hospital or surgery center     Bring a picture ID.      You will be kept comfortable and safe by your anesthesia provider. The anesthesia may make you sleep. Or it may just numb the area being worked on.      This procedure can take 2 to 6 hours.  In rare cases, it can take longer.      After the procedure, pressure may be applied to the areas where a catheter was put in a blood vessel. This will help prevent bleeding. A small device may also be used to close the blood vessel. You may have a bandage or a compression device on each catheter site.      Nurses will check your heart rate and blood pressure. The nurse will also check the catheter site for bleeding.      If the catheter was put in your groin, you will need to lie still and keep your leg straight for up to a few hours. The nurse may put a weighted bag on your leg to help you keep it still.      If the catheter was put in your neck or arm, you may be able to sit up right away. If it was in your arm, you will need to keep your arm still for at least 1 hour.      You may have a bruise or a small lump where the catheter was put in your blood vessel. This is normal and will go away. When should you call your doctor?  You have questions or concerns.      You don't understand how to prepare for your procedure.      You become ill before the procedure (such as fever, flu, or a cold).      You need to reschedule or have changed your mind about having the procedure. Where can you learn more? Go to https://LibraryThing.NanoDynamics. org and sign in to your [employer] account. Enter C459 in the Lolapps box to learn more about \"Catheter Ablation: Before Your Procedure. \"     If you do not have an account, please click on the \"Sign Up Now\" link. Current as of: January 10, 2022               Content Version: 13.2  © 2006-2022 [employer]. Care instructions adapted under license by [facility] If you have questions about a medical condition or this instruction, always ask your healthcare professional. [place] [address] any warranty or liability for your use of this information.          Patient Education        Catheter Ablation: What to Expect at [address] [place] [address] You had a catheter ablation to try to correct a problem with your heartbeat (heart rhythm). You may have swelling, bruising, or a small lump around the sites where the catheters went into your body. These should go away in 3 to 4weeks. You can do light activities at home. Don't do anything strenuous until yourdoctor says it is okay. This may be for several days. This care sheet gives you a general idea about how long it will take for you to recover. But each person recovers at a different pace. Follow the steps belowto get better as quickly as possible. How can you care for yourself at home? Activity     If the doctor gave you a sedative:  ? For 24 hours, don't do anything that requires attention to detail, such as going to work, making important decisions, or signing any legal documents. It takes time for the medicine's effects to completely wear off.  ? For your safety, do not drive or operate any machinery that could be dangerous. Wait until the medicine wears off and you can think clearly and react easily.      Do not do strenuous exercise and do not lift, pull, or push anything heavy until your doctor says it is okay. This may be for several days.      Ask your doctor when it is okay to have sex. Diet     You can eat your normal diet. If your stomach is upset, try bland, low-fat foods like plain rice, broiled chicken, toast, and yogurt.      Drink plenty of fluids (unless your doctor tells you not to). Medicines     Your doctor will tell you if and when you can restart your medicines. You will also get instructions about taking any new medicines.      If you take aspirin or some other blood thinner, ask your doctor if and when to start taking it again. Make sure that you understand exactly what your doctor wants you to do.      Be safe with medicines. Take your medicines exactly as prescribed. Call your doctor if you think you are having a problem with your medicine.    Catheter site care     For 1 or 2 days, keep a bandage over the spot where the catheter was inserted. The bandage probably will fall off in this time.      Put ice or a cold pack on the area for 10 to 20 minutes at a time to help with soreness or swelling. Put a thin cloth between the ice and your skin.      You may shower 24 to 48 hours after the procedure, if your doctor okays it. Pat the incision dry.      Do not soak the catheter site until it is healed. Don't take a bath for 1 week, or until your doctor tells you it is okay.      Watch for bleeding from the site. A small amount of blood (up to the size of a quarter) on the bandage can be normal.      If you are bleeding, lie down and press on the area for 15 minutes to try to make it stop. If the bleeding does not stop, call your doctor or seek immediate medical care. Follow-up care is a key part of your treatment and safety. Be sure to make and go to all appointments, and call your doctor if you are having problems. It's also a good idea to know your test results and keep alist of the medicines you take. When should you call for help? Call 911  anytime you think you may need emergency care. For example, call if:     You passed out (lost consciousness).      You have symptoms of a heart attack. These may include:  ? Chest pain or pressure, or a strange feeling in the chest.  ? Sweating. ? Shortness of breath. ? Nausea or vomiting. ? Pain, pressure, or a strange feeling in the back, neck, jaw, or upper belly, or in one or both shoulders or arms. ? Lightheadedness or sudden weakness. ? A fast or irregular heartbeat. After you call 911, the  may tell you to chew 1 adult-strength or 2 to 4 low-dose aspirin. Wait for an ambulance. Do not try to drive yourself.      You have symptoms of a stroke. These may include:  ? Sudden numbness, tingling, weakness, or loss of movement in your face, arm, or leg, especially on only one side of your body.   ? Sudden vision changes. ? Sudden trouble speaking. ? Sudden confusion or trouble understanding simple statements. ? Sudden problems with walking or balance. ? A sudden, severe headache that is different from past headaches. Call your doctor now or seek immediate medical care if:     You are bleeding from the area where the catheter was put in your blood vessel.      You have a fast-growing, painful lump at the catheter site.      You have signs of infection, such as:  ? Increased pain, swelling, warmth, or redness. ? Red streaks leading from the catheter site. ? Pus draining from the catheter site. ? A fever.      Your leg, arm, or hand is painful, looks blue, or feels cold, numb, or tingly. Watch closely for any changes in your health, and be sure to contact yourdoctor if you have any problems. Current as of: January 10, 2022               Content Version: 13.2  © 2006-2022 [employer]. Care instructions adapted under license by [facility] ([facility]). If you have questions about a medical condition or this instruction, always ask your healthcare professional. [place] [address] any warranty or liability for your use of this information. Patient Education        Catheter Ablation: What to Expect at [address] [place] [address] Drive had a catheter ablation to try to correct a problem with your heartbeat (heart rhythm). You may have swelling, bruising, or a small lump around the sites where the catheters went into your body. These should go away in 3 to 4weeks. You can do light activities at home. Don't do anything strenuous until yourdoctor says it is okay. This may be for several days. This care sheet gives you a general idea about how long it will take for you to recover. But each person recovers at a different pace. Follow the steps belowto get better as quickly as possible. How can you care for yourself at home? Activity     If the doctor gave you a sedative:  ?  For 24 hours, don't do anything that requires attention to detail, such as going to work, making important decisions, or signing any legal documents. It takes time for the medicine's effects to completely wear off.  ? For your safety, do not drive or operate any machinery that could be dangerous. Wait until the medicine wears off and you can think clearly and react easily.      Do not do strenuous exercise and do not lift, pull, or push anything heavy until your doctor says it is okay. This may be for several days.      Ask your doctor when it is okay to have sex. Diet     You can eat your normal diet. If your stomach is upset, try bland, low-fat foods like plain rice, broiled chicken, toast, and yogurt.      Drink plenty of fluids (unless your doctor tells you not to). Medicines     Your doctor will tell you if and when you can restart your medicines. You will also get instructions about taking any new medicines.      If you take aspirin or some other blood thinner, ask your doctor if and when to start taking it again. Make sure that you understand exactly what your doctor wants you to do.      Be safe with medicines. Take your medicines exactly as prescribed. Call your doctor if you think you are having a problem with your medicine. Catheter site care     For 1 or 2 days, keep a bandage over the spot where the catheter was inserted. The bandage probably will fall off in this time.      Put ice or a cold pack on the area for 10 to 20 minutes at a time to help with soreness or swelling. Put a thin cloth between the ice and your skin.      You may shower 24 to 48 hours after the procedure, if your doctor okays it. Pat the incision dry.      Do not soak the catheter site until it is healed. Don't take a bath for 1 week, or until your doctor tells you it is okay.      Watch for bleeding from the site.  A small amount of blood (up to the size of a quarter) on the bandage can be normal.      If you are bleeding, lie down and press on the area for 15 minutes to try to make it stop. If the bleeding does not stop, call your doctor or seek immediate medical care. Follow-up care is a key part of your treatment and safety. Be sure to make and go to all appointments, and call your doctor if you are having problems. It's also a good idea to know your test results and keep alist of the medicines you take. When should you call for help? Call 911  anytime you think you may need emergency care. For example, call if:     You passed out (lost consciousness).      You have symptoms of a heart attack. These may include:  ? Chest pain or pressure, or a strange feeling in the chest.  ? Sweating. ? Shortness of breath. ? Nausea or vomiting. ? Pain, pressure, or a strange feeling in the back, neck, jaw, or upper belly, or in one or both shoulders or arms. ? Lightheadedness or sudden weakness. ? A fast or irregular heartbeat. After you call 911, the  may tell you to chew 1 adult-strength or 2 to 4 low-dose aspirin. Wait for an ambulance. Do not try to drive yourself.      You have symptoms of a stroke. These may include:  ? Sudden numbness, tingling, weakness, or loss of movement in your face, arm, or leg, especially on only one side of your body. ? Sudden vision changes. ? Sudden trouble speaking. ? Sudden confusion or trouble understanding simple statements. ? Sudden problems with walking or balance. ? A sudden, severe headache that is different from past headaches. Call your doctor now or seek immediate medical care if:     You are bleeding from the area where the catheter was put in your blood vessel.      You have a fast-growing, painful lump at the catheter site.      You have signs of infection, such as:  ? Increased pain, swelling, warmth, or redness. ? Red streaks leading from the catheter site. ? Pus draining from the catheter site.   ? A fever.      Your leg, arm, or hand is painful, looks blue, or feels cold, numb, or tingly. Watch closely for any changes in your health, and be sure to contact yourdoctor if you have any problems. Current as of: January 10, 2022               Content Version: 13.2  © 2006-2022 [employer]. Care instructions adapted under license by [facility] ([facility]). If you have questions about a medical condition or this instruction, always ask your healthcare professional. [place] [address] any warranty or liability for your use of this information. Patient Education        Learning About Catheter Ablation for Heart Rhythm Problems  What is catheter ablation? Catheter ablation is a procedure that treats heart rhythm problems. These problems include atrial fibrillation, supraventricular tachycardia (SVT),atrial flutter, and ventricular tachycardia. Your heart should have a strong, steady beat. That beat is controlled by the heart's electrical system. Sometimes that system misfires. This causes aheartbeat that is too fast and isn't steady. Catheter ablation is a way to get into your heart and fix the problem. Ablationis not surgery. How is catheter ablation done? Your doctor inserts thin tubes called catheters into a blood vessel in your groin, arm, or neck. Then your doctor feeds them into the heart. Wires in the catheters help the doctor find the problem areas. Then the doctor uses the wires to send energy to destroy the tiny areas of heart tissue that are causingthe problems. It may seem like a bad idea to destroy parts of your heart on purpose. But the areas that are destroyed are very tiny. They should not affect your heart'sability to do its job. You may be awake during the procedure. Or you may be asleep. The doctor will give you medicines to help you feel relaxed and to numb the areas where the catheters go in. You may feel a little uncomfortable, but you should not feelpain. What can you expect after catheter ablation?   You may stay overnight in the hospital. How long you stay in the hospitaldepends on the type of ablation you have. Do not exercise hard or lift anything heavy for a week. You will probably beable to go back to work and to your normal routine in 1 or 2 days. You may have swelling, bruising, or a small lump around the site where thecatheters went into your body. These should go away in 3 to 4 weeks. You may have to take some medicines for a while. Follow-up care is a key part of your treatment and safety. Be sure to make and go to all appointments, and call your doctor if you are having problems. It's also a good idea to know your test results and keep alist of the medicines you take. Where can you learn more? Go to [email or web address] org and sign in to your [employer] account. Enter Z456 in the Spotie box to learn more about \"Learning About Catheter Ablation for Heart Rhythm Problems. \"     If you do not have an account, please click on the \"Sign Up Now\" link. Current as of: January 10, 2022               Content Version: 13.2  © 8368-7707 [employer]. Care instructions adapted under license by [employer]. If you have questions about a medical condition or this instruction, always ask your healthcare professional. [address] any warranty or liability for your use of this information.

## 2022-05-04 NOTE — TELEPHONE ENCOUNTER
Please contact patient and schedule CAMERON / right atrial flutter and atrial fibrillation ablation. · [facility]  NEEDED FOR CAMERON    Dr. Shraddha Leon would like to schedule ASAP I checked with Ganga Gavin  Pre cert and she stated insurance does not require prior auth. Dr. Shraddha Leon would like to move RIGHT FLUTTER ABL scheduled on 5/9 at 1130 to 2:30 pm and put her on 5/9 at 1130 am spot. CAMERON Right Atrial Flutter & Atrial Fibrillation Ablation Pre procedure Instructions     Date:______________________________     Arrive at:__________________________     Procedure time:____________________     The morning of your procedure you will park in the hospital parking lot and report directly to the cath lab to check in. At the information desk stay right and go all the way to the end of the lao, this will take you directly to your check in desk for the cath lab.     Pre-Procedure Instructions   1. You will need to fast (nothing to eat or drink) after midnight the day of your procedure  2. Do NOT chew gum or eat mints the day of your procedure  3. You may hold your Torsemide the morning of the procedure for comfort to decrease urinary urgency. 4. Do not use any lotions, creams or perfume the morning of procedure. 5. You will need to complete pre-procedure lab work 5-7 days prior to your procedure. 6. Please have a responsible adult to drive you home after procedure, you should go home same day, but there is always a possibility of an overnight stay. 7. Cath lab will provide you with all post procedure instructions           [facility]     [facility] Lab services  [address] [place] [address] Drive. [address] [place] [address]  Phone: [phone]  The hours are Mon -Fri.   6:30 am - 4:00 pm   Saturday 8:00 am - noon  No appointment necessary

## 2022-05-04 NOTE — LETTER
Bernarda Ochoa  Phone: [phone]  Fax: [phone]    Donald Dan RN         May 9, 2022     Patient: Alessio Garcia   YOB: 1945   Date of Visit: 5/4/2022       To Whom It May Concern: It is my medical opinion that Alessio Garcia requires a disability parking placard for the following reasons:  Chronic combined systolic and diastolic CHF, NYHA class 2   Duration of need: 5/9/2022-5/9/2027    If you have any questions or concerns, please don't hesitate to call.     Sincerely,        Brigid Gong MD

## 2022-05-06 ENCOUNTER — CARE COORDINATION (OUTPATIENT)
Dept: CASE MANAGEMENT | Age: 77
End: 2022-05-06

## 2022-05-06 DIAGNOSIS — I50.42 CHRONIC COMBINED SYSTOLIC AND DIASTOLIC CHF, NYHA CLASS 2 (HCC): ICD-10-CM

## 2022-05-06 DIAGNOSIS — R00.1 SYMPTOMATIC BRADYCARDIA: ICD-10-CM

## 2022-05-06 DIAGNOSIS — I48.0 PAF (PAROXYSMAL ATRIAL FIBRILLATION) (HCC): ICD-10-CM

## 2022-05-06 DIAGNOSIS — Z95.2 S/P AVR (AORTIC VALVE REPLACEMENT): ICD-10-CM

## 2022-05-06 DIAGNOSIS — Z95.0 BIVENTRICULAR CARDIAC PACEMAKER IN SITU: ICD-10-CM

## 2022-05-06 LAB
ABO/RH: NORMAL
ANTIBODY SCREEN: NORMAL
HCT VFR BLD CALC: 36.4 % (ref 36–48)
HEMOGLOBIN: 12 G/DL (ref 12–16)
MCH RBC QN AUTO: 27.6 PG (ref 26–34)
MCHC RBC AUTO-ENTMCNC: 32.9 G/DL (ref 31–36)
MCV RBC AUTO: 84 FL (ref 80–100)
PDW BLD-RTO: 19.8 % (ref 12.4–15.4)
PLATELET # BLD: 240 K/UL (ref 135–450)
PMV BLD AUTO: 8.6 FL (ref 5–10.5)
RBC # BLD: 4.33 M/UL (ref 4–5.2)
WBC # BLD: 9.9 K/UL (ref 4–11)

## 2022-05-06 NOTE — CARE COORDINATION
[facility] Transitions Follow Up Call    2022    Patient: Leeann Acosta  Patient : 1945   MRN: [id number]  Reason for Admission: symptomatic bradycardia  Discharge Date: 22 RARS: Readmission Risk Score: 18.9 ( )         Spoke with: [address] Transitions Subsequent and Final Call    Subsequent and Final Calls  Do you have any ongoing symptoms?: Yes  Have your medications changed?: No  Do you have any questions related to your medications?: No  Do you currently have any active services?: No  Are you currently active with any services?: Home Health  Do you have any needs or concerns that I can assist you with?: No  Identified Barriers: None  Care Transitions Interventions  Other Interventions: Follow Up  Future Appointments   Date Time Provider Vickie Wilson   2022 11:30 AM [facility] ROOM 3 [facility] [place]   2022  3:00 PM Sherine Malik MD [facility]   2022  2:00 PM SCHEDULE, [facility] REMOTE TRANSMISSION [facility]   2022  3:00 PM SCHEDULE, I DEVICE CHECK [facility]   2022  3:20 PM Khadijah Greenberg, APRN - CNP [facility]     Pt states she is doing ok today. States she followed up with cardiologist and reported her HR, and leg swelling. He instructed her to take 5 days torsemide and scheduled pt for CAMERON . Pt denies new CP or sob. States she is still fatigued from time to time. Pt states the new med is managing HR \" a little\" as hr is still steady at . No concerns or questions at time time. Will continue to follow.      MARIA ESTHER Galo, RN   Care Transition Nurse  Mobile: [phone]

## 2022-05-07 LAB
ANION GAP SERPL CALCULATED.3IONS-SCNC: 26 MMOL/L (ref 3–16)
BUN BLDV-MCNC: 20 MG/DL (ref 7–20)
CALCIUM SERPL-MCNC: 10.6 MG/DL (ref 8.3–10.6)
CHLORIDE BLD-SCNC: 103 MMOL/L (ref 99–110)
CO2: 21 MMOL/L (ref 21–32)
CREAT SERPL-MCNC: 1.5 MG/DL (ref 0.6–1.2)
GFR AFRICAN AMERICAN: 41
GFR NON-AFRICAN AMERICAN: 34
GLUCOSE BLD-MCNC: 95 MG/DL (ref 70–99)
POTASSIUM SERPL-SCNC: 3.9 MMOL/L (ref 3.5–5.1)
SODIUM BLD-SCNC: 150 MMOL/L (ref 136–145)

## 2022-05-09 ENCOUNTER — HOSPITAL ENCOUNTER (OUTPATIENT)
Dept: CARDIAC CATH/INVASIVE PROCEDURES | Age: 77
Discharge: HOME | End: 2022-05-09
Payer: MEDICARE

## 2022-05-09 ENCOUNTER — ANESTHESIA (OUTPATIENT)
Dept: CARDIAC CATH/INVASIVE PROCEDURES | Age: 77
End: 2022-05-09

## 2022-05-09 ENCOUNTER — ANESTHESIA EVENT (OUTPATIENT)
Dept: CARDIAC CATH/INVASIVE PROCEDURES | Age: 77
End: 2022-05-09

## 2022-05-09 VITALS
RESPIRATION RATE: 1 BRPM | SYSTOLIC BLOOD PRESSURE: 149 MMHG | DIASTOLIC BLOOD PRESSURE: 70 MMHG | OXYGEN SATURATION: 100 % | TEMPERATURE: 97.9 F

## 2022-05-09 VITALS
HEART RATE: 72 BPM | WEIGHT: 124 LBS | BODY MASS INDEX: 22.82 KG/M2 | DIASTOLIC BLOOD PRESSURE: 66 MMHG | SYSTOLIC BLOOD PRESSURE: 135 MMHG | OXYGEN SATURATION: 95 % | TEMPERATURE: 97.6 F | HEIGHT: 62 IN | RESPIRATION RATE: 14 BRPM

## 2022-05-09 LAB
ABO/RH: NORMAL
ANTIBODY SCREEN: NORMAL
EKG ATRIAL RATE: 63 BPM
EKG DIAGNOSIS: NORMAL
EKG P AXIS: 114 DEGREES
EKG P-R INTERVAL: 224 MS
EKG Q-T INTERVAL: 422 MS
EKG QRS DURATION: 136 MS
EKG QTC CALCULATION (BAZETT): 568 MS
EKG R AXIS: 125 DEGREES
EKG T AXIS: 88 DEGREES
EKG VENTRICULAR RATE: 109 BPM
LV EF: 43 %
LVEF MODALITY: NORMAL
POC ACT LR: 177 SEC
POC ACT LR: 245 SEC
POC ACT LR: 300 SEC
POC ACT LR: 354 SEC

## 2022-05-09 PROCEDURE — 6360000002 HC RX W HCPCS

## 2022-05-09 PROCEDURE — 93657 TX L/R ATRIAL FIB ADDL: CPT

## 2022-05-09 PROCEDURE — C1730 CATH, EP, 19 OR FEW ELECT: HCPCS

## 2022-05-09 PROCEDURE — 93623 PRGRMD STIMJ&PACG IV RX NFS: CPT

## 2022-05-09 PROCEDURE — 6370000000 HC RX 637 (ALT 250 FOR IP)

## 2022-05-09 PROCEDURE — 3700000001 HC ADD 15 MINUTES (ANESTHESIA)

## 2022-05-09 PROCEDURE — 2500000003 HC RX 250 WO HCPCS: Performed by: NURSE ANESTHETIST, CERTIFIED REGISTERED

## 2022-05-09 PROCEDURE — C1766 INTRO/SHEATH,STRBLE,NON-PEEL: HCPCS

## 2022-05-09 PROCEDURE — 93005 ELECTROCARDIOGRAM TRACING: CPT | Performed by: INTERNAL MEDICINE

## 2022-05-09 PROCEDURE — C1894 INTRO/SHEATH, NON-LASER: HCPCS

## 2022-05-09 PROCEDURE — 86900 BLOOD TYPING SEROLOGIC ABO: CPT

## 2022-05-09 PROCEDURE — 2500000003 HC RX 250 WO HCPCS

## 2022-05-09 PROCEDURE — 2580000003 HC RX 258: Performed by: INTERNAL MEDICINE

## 2022-05-09 PROCEDURE — 6360000002 HC RX W HCPCS: Performed by: ANESTHESIOLOGY

## 2022-05-09 PROCEDURE — 6360000002 HC RX W HCPCS: Performed by: NURSE ANESTHETIST, CERTIFIED REGISTERED

## 2022-05-09 PROCEDURE — 93655 ICAR CATH ABLTJ DSCRT ARRHYT: CPT | Performed by: INTERNAL MEDICINE

## 2022-05-09 PROCEDURE — 86901 BLOOD TYPING SEROLOGIC RH(D): CPT

## 2022-05-09 PROCEDURE — 2500000003 HC RX 250 WO HCPCS: Performed by: INTERNAL MEDICINE

## 2022-05-09 PROCEDURE — 93656 COMPRE EP EVAL ABLTJ ATR FIB: CPT | Performed by: INTERNAL MEDICINE

## 2022-05-09 PROCEDURE — C1759 CATH, INTRA ECHOCARDIOGRAPHY: HCPCS

## 2022-05-09 PROCEDURE — 86850 RBC ANTIBODY SCREEN: CPT

## 2022-05-09 PROCEDURE — 93312 ECHO TRANSESOPHAGEAL: CPT

## 2022-05-09 PROCEDURE — 2709999900 HC NON-CHARGEABLE SUPPLY

## 2022-05-09 PROCEDURE — C1732 CATH, EP, DIAG/ABL, 3D/VECT: HCPCS

## 2022-05-09 PROCEDURE — 93010 ELECTROCARDIOGRAM REPORT: CPT | Performed by: INTERNAL MEDICINE

## 2022-05-09 PROCEDURE — 93656 COMPRE EP EVAL ABLTJ ATR FIB: CPT

## 2022-05-09 PROCEDURE — 93655 ICAR CATH ABLTJ DSCRT ARRHYT: CPT

## 2022-05-09 PROCEDURE — 7100000000 HC PACU RECOVERY - FIRST 15 MIN

## 2022-05-09 PROCEDURE — 3700000000 HC ANESTHESIA ATTENDED CARE

## 2022-05-09 PROCEDURE — C1760 CLOSURE DEV, VASC: HCPCS

## 2022-05-09 PROCEDURE — 93622 COMP EP EVAL L VENTR PAC&REC: CPT | Performed by: INTERNAL MEDICINE

## 2022-05-09 PROCEDURE — 93623 PRGRMD STIMJ&PACG IV RX NFS: CPT | Performed by: INTERNAL MEDICINE

## 2022-05-09 PROCEDURE — C1769 GUIDE WIRE: HCPCS

## 2022-05-09 PROCEDURE — 93320 DOPPLER ECHO COMPLETE: CPT

## 2022-05-09 PROCEDURE — 7100000001 HC PACU RECOVERY - ADDTL 15 MIN

## 2022-05-09 PROCEDURE — 93622 COMP EP EVAL L VENTR PAC&REC: CPT

## 2022-05-09 PROCEDURE — 93325 DOPPLER ECHO COLOR FLOW MAPG: CPT

## 2022-05-09 PROCEDURE — 93657 TX L/R ATRIAL FIB ADDL: CPT | Performed by: INTERNAL MEDICINE

## 2022-05-09 PROCEDURE — 2580000003 HC RX 258: Performed by: NURSE ANESTHETIST, CERTIFIED REGISTERED

## 2022-05-09 PROCEDURE — 85347 COAGULATION TIME ACTIVATED: CPT

## 2022-05-09 RX ORDER — PROPOFOL 10 MG/ML
INJECTION, EMULSION INTRAVENOUS PRN
Status: DISCONTINUED | OUTPATIENT
Start: 2022-05-09 | End: 2022-05-09 | Stop reason: SDUPTHER

## 2022-05-09 RX ORDER — SODIUM CHLORIDE 0.9 % (FLUSH) 0.9 %
5-40 SYRINGE (ML) INJECTION PRN
Status: DISCONTINUED | OUTPATIENT
Start: 2022-05-09 | End: 2022-05-10 | Stop reason: HOSPADM

## 2022-05-09 RX ORDER — FENTANYL CITRATE 50 UG/ML
25 INJECTION, SOLUTION INTRAMUSCULAR; INTRAVENOUS EVERY 5 MIN PRN
Status: DISCONTINUED | OUTPATIENT
Start: 2022-05-09 | End: 2022-05-10 | Stop reason: HOSPADM

## 2022-05-09 RX ORDER — DEXAMETHASONE SODIUM PHOSPHATE 4 MG/ML
INJECTION, SOLUTION INTRA-ARTICULAR; INTRALESIONAL; INTRAMUSCULAR; INTRAVENOUS; SOFT TISSUE PRN
Status: DISCONTINUED | OUTPATIENT
Start: 2022-05-09 | End: 2022-05-09 | Stop reason: SDUPTHER

## 2022-05-09 RX ORDER — PHENYLEPHRINE HCL IN 0.9% NACL 1 MG/10 ML
SYRINGE (ML) INTRAVENOUS PRN
Status: DISCONTINUED | OUTPATIENT
Start: 2022-05-09 | End: 2022-05-09 | Stop reason: SDUPTHER

## 2022-05-09 RX ORDER — ACETAMINOPHEN 325 MG/1
650 TABLET ORAL EVERY 4 HOURS PRN
Status: DISCONTINUED | OUTPATIENT
Start: 2022-05-09 | End: 2022-05-10 | Stop reason: HOSPADM

## 2022-05-09 RX ORDER — KETAMINE HCL IN NACL, ISO-OSM 100MG/10ML
SYRINGE (ML) INJECTION PRN
Status: DISCONTINUED | OUTPATIENT
Start: 2022-05-09 | End: 2022-05-09 | Stop reason: SDUPTHER

## 2022-05-09 RX ORDER — LIDOCAINE HYDROCHLORIDE 20 MG/ML
INJECTION, SOLUTION EPIDURAL; INFILTRATION; INTRACAUDAL; PERINEURAL PRN
Status: DISCONTINUED | OUTPATIENT
Start: 2022-05-09 | End: 2022-05-09 | Stop reason: SDUPTHER

## 2022-05-09 RX ORDER — FENTANYL CITRATE 50 UG/ML
50 INJECTION, SOLUTION INTRAMUSCULAR; INTRAVENOUS EVERY 5 MIN PRN
Status: DISCONTINUED | OUTPATIENT
Start: 2022-05-09 | End: 2022-05-10 | Stop reason: HOSPADM

## 2022-05-09 RX ORDER — HEPARIN SODIUM 5000 [USP'U]/ML
INJECTION, SOLUTION INTRAVENOUS; SUBCUTANEOUS PRN
Status: DISCONTINUED | OUTPATIENT
Start: 2022-05-09 | End: 2022-05-09 | Stop reason: SDUPTHER

## 2022-05-09 RX ORDER — PROTAMINE SULFATE 10 MG/ML
INJECTION, SOLUTION INTRAVENOUS PRN
Status: DISCONTINUED | OUTPATIENT
Start: 2022-05-09 | End: 2022-05-09 | Stop reason: SDUPTHER

## 2022-05-09 RX ORDER — SODIUM CHLORIDE 9 MG/ML
INJECTION, SOLUTION INTRAVENOUS CONTINUOUS PRN
Status: DISCONTINUED | OUTPATIENT
Start: 2022-05-09 | End: 2022-05-09 | Stop reason: SDUPTHER

## 2022-05-09 RX ORDER — ROCURONIUM BROMIDE 10 MG/ML
INJECTION, SOLUTION INTRAVENOUS PRN
Status: DISCONTINUED | OUTPATIENT
Start: 2022-05-09 | End: 2022-05-09 | Stop reason: SDUPTHER

## 2022-05-09 RX ORDER — SUCCINYLCHOLINE/SOD CL,ISO/PF 200MG/10ML
SYRINGE (ML) INTRAVENOUS PRN
Status: DISCONTINUED | OUTPATIENT
Start: 2022-05-09 | End: 2022-05-09 | Stop reason: SDUPTHER

## 2022-05-09 RX ORDER — OXYCODONE HYDROCHLORIDE 5 MG/1
TABLET ORAL
Status: COMPLETED
Start: 2022-05-09 | End: 2022-05-09

## 2022-05-09 RX ORDER — SODIUM CHLORIDE 9 MG/ML
INJECTION, SOLUTION INTRAVENOUS PRN
Status: DISCONTINUED | OUTPATIENT
Start: 2022-05-09 | End: 2022-05-10 | Stop reason: HOSPADM

## 2022-05-09 RX ORDER — ONDANSETRON 2 MG/ML
4 INJECTION INTRAMUSCULAR; INTRAVENOUS
Status: ACTIVE | OUTPATIENT
Start: 2022-05-09 | End: 2022-05-09

## 2022-05-09 RX ORDER — SODIUM CHLORIDE 0.9 % (FLUSH) 0.9 %
5-40 SYRINGE (ML) INJECTION EVERY 12 HOURS SCHEDULED
Status: DISCONTINUED | OUTPATIENT
Start: 2022-05-09 | End: 2022-05-10 | Stop reason: HOSPADM

## 2022-05-09 RX ORDER — ONDANSETRON 2 MG/ML
INJECTION INTRAMUSCULAR; INTRAVENOUS PRN
Status: DISCONTINUED | OUTPATIENT
Start: 2022-05-09 | End: 2022-05-09 | Stop reason: SDUPTHER

## 2022-05-09 RX ORDER — FENTANYL CITRATE 50 UG/ML
INJECTION, SOLUTION INTRAMUSCULAR; INTRAVENOUS PRN
Status: DISCONTINUED | OUTPATIENT
Start: 2022-05-09 | End: 2022-05-09 | Stop reason: SDUPTHER

## 2022-05-09 RX ORDER — OXYCODONE HYDROCHLORIDE 10 MG/1
10 TABLET ORAL PRN
Status: COMPLETED | OUTPATIENT
Start: 2022-05-09 | End: 2022-05-09

## 2022-05-09 RX ORDER — OXYCODONE HYDROCHLORIDE 5 MG/1
5 TABLET ORAL PRN
Status: COMPLETED | OUTPATIENT
Start: 2022-05-09 | End: 2022-05-09

## 2022-05-09 RX ADMIN — HEPARIN SODIUM 3000 UNITS: 5000 INJECTION INTRAVENOUS; SUBCUTANEOUS at 13:00

## 2022-05-09 RX ADMIN — OXYCODONE HYDROCHLORIDE 5 MG: 5 TABLET ORAL at 16:47

## 2022-05-09 RX ADMIN — SODIUM CHLORIDE: 9 INJECTION, SOLUTION INTRAVENOUS at 12:04

## 2022-05-09 RX ADMIN — ROCURONIUM BROMIDE 10 MG: 10 INJECTION INTRAVENOUS at 15:17

## 2022-05-09 RX ADMIN — FENTANYL CITRATE 50 MCG: 50 INJECTION INTRAMUSCULAR; INTRAVENOUS at 12:13

## 2022-05-09 RX ADMIN — SUGAMMADEX 200 MG: 100 INJECTION, SOLUTION INTRAVENOUS at 16:13

## 2022-05-09 RX ADMIN — DEXAMETHASONE SODIUM PHOSPHATE 4 MG: 4 INJECTION, SOLUTION INTRAMUSCULAR; INTRAVENOUS at 12:22

## 2022-05-09 RX ADMIN — PROTAMINE SULFATE 40 MG: 10 INJECTION, SOLUTION INTRAVENOUS at 15:57

## 2022-05-09 RX ADMIN — HEPARIN SODIUM 4000 UNITS: 5000 INJECTION INTRAVENOUS; SUBCUTANEOUS at 13:13

## 2022-05-09 RX ADMIN — HEPARIN SODIUM 4000 UNITS: 5000 INJECTION INTRAVENOUS; SUBCUTANEOUS at 14:00

## 2022-05-09 RX ADMIN — ONDANSETRON 4 MG: 2 INJECTION INTRAMUSCULAR; INTRAVENOUS at 12:22

## 2022-05-09 RX ADMIN — Medication 10 MG: at 12:15

## 2022-05-09 RX ADMIN — Medication 100 MG: at 12:16

## 2022-05-09 RX ADMIN — ROCURONIUM BROMIDE 40 MG: 10 INJECTION INTRAVENOUS at 12:20

## 2022-05-09 RX ADMIN — FENTANYL CITRATE 50 MCG: 50 INJECTION, SOLUTION INTRAMUSCULAR; INTRAVENOUS at 17:07

## 2022-05-09 RX ADMIN — Medication 100 MCG: at 13:29

## 2022-05-09 RX ADMIN — SODIUM CHLORIDE 0.5 MCG/MIN: 9 INJECTION, SOLUTION INTRAVENOUS at 15:43

## 2022-05-09 RX ADMIN — HEPARIN SODIUM 2000 UNITS: 5000 INJECTION INTRAVENOUS; SUBCUTANEOUS at 14:23

## 2022-05-09 RX ADMIN — OXYCODONE 5 MG: 5 TABLET ORAL at 16:47

## 2022-05-09 RX ADMIN — Medication 10 MG: at 13:38

## 2022-05-09 RX ADMIN — ROCURONIUM BROMIDE 10 MG: 10 INJECTION INTRAVENOUS at 14:45

## 2022-05-09 RX ADMIN — FENTANYL CITRATE 25 MCG: 50 INJECTION INTRAMUSCULAR; INTRAVENOUS at 15:57

## 2022-05-09 RX ADMIN — ROCURONIUM BROMIDE 10 MG: 10 INJECTION INTRAVENOUS at 12:15

## 2022-05-09 RX ADMIN — Medication 10 MG: at 12:36

## 2022-05-09 RX ADMIN — PROPOFOL 100 MG: 10 INJECTION, EMULSION INTRAVENOUS at 12:15

## 2022-05-09 RX ADMIN — FENTANYL CITRATE 25 MCG: 50 INJECTION INTRAMUSCULAR; INTRAVENOUS at 16:04

## 2022-05-09 RX ADMIN — LIDOCAINE HYDROCHLORIDE 50 MG: 20 INJECTION, SOLUTION EPIDURAL; INFILTRATION; INTRACAUDAL; PERINEURAL at 12:13

## 2022-05-09 ASSESSMENT — PULMONARY FUNCTION TESTS
PIF_VALUE: 17
PIF_VALUE: 19
PIF_VALUE: 22
PIF_VALUE: 21
PIF_VALUE: 20
PIF_VALUE: 21
PIF_VALUE: 20
PIF_VALUE: 21
PIF_VALUE: 4
PIF_VALUE: 20
PIF_VALUE: 2
PIF_VALUE: 20
PIF_VALUE: 20
PIF_VALUE: 21
PIF_VALUE: 23
PIF_VALUE: 20
PIF_VALUE: 20
PIF_VALUE: 21
PIF_VALUE: 21
PIF_VALUE: 1
PIF_VALUE: 23
PIF_VALUE: 21
PIF_VALUE: 20
PIF_VALUE: 20
PIF_VALUE: 22
PIF_VALUE: 20
PIF_VALUE: 21
PIF_VALUE: 20
PIF_VALUE: 20
PIF_VALUE: 21
PIF_VALUE: 20
PIF_VALUE: 20
PIF_VALUE: 21
PIF_VALUE: 20
PIF_VALUE: 20
PIF_VALUE: 21
PIF_VALUE: 21
PIF_VALUE: 19
PIF_VALUE: 21
PIF_VALUE: 20
PIF_VALUE: 20
PIF_VALUE: 21
PIF_VALUE: 20
PIF_VALUE: 21
PIF_VALUE: 17
PIF_VALUE: 20
PIF_VALUE: 20
PIF_VALUE: 23
PIF_VALUE: 20
PIF_VALUE: 21
PIF_VALUE: 22
PIF_VALUE: 21
PIF_VALUE: 26
PIF_VALUE: 20
PIF_VALUE: 21
PIF_VALUE: 20
PIF_VALUE: 20
PIF_VALUE: 21
PIF_VALUE: 21
PIF_VALUE: 20
PIF_VALUE: 21
PIF_VALUE: 20
PIF_VALUE: 22
PIF_VALUE: 21
PIF_VALUE: 20
PIF_VALUE: 22
PIF_VALUE: 20
PIF_VALUE: 20
PIF_VALUE: 1
PIF_VALUE: 20
PIF_VALUE: 21
PIF_VALUE: 21
PIF_VALUE: 20
PIF_VALUE: 20
PIF_VALUE: 21
PIF_VALUE: 4
PIF_VALUE: 20
PIF_VALUE: 21
PIF_VALUE: 21
PIF_VALUE: 1
PIF_VALUE: 20
PIF_VALUE: 21
PIF_VALUE: 22
PIF_VALUE: 21
PIF_VALUE: 20
PIF_VALUE: 21
PIF_VALUE: 21
PIF_VALUE: 22
PIF_VALUE: 19
PIF_VALUE: 23
PIF_VALUE: 20
PIF_VALUE: 21
PIF_VALUE: 25
PIF_VALUE: 4
PIF_VALUE: 20
PIF_VALUE: 21
PIF_VALUE: 21
PIF_VALUE: 22
PIF_VALUE: 1
PIF_VALUE: 22
PIF_VALUE: 21
PIF_VALUE: 20
PIF_VALUE: 21
PIF_VALUE: 20
PIF_VALUE: 20
PIF_VALUE: 22
PIF_VALUE: 21
PIF_VALUE: 1
PIF_VALUE: 21
PIF_VALUE: 20
PIF_VALUE: 20
PIF_VALUE: 19
PIF_VALUE: 21
PIF_VALUE: 21
PIF_VALUE: 23
PIF_VALUE: 21
PIF_VALUE: 21
PIF_VALUE: 23
PIF_VALUE: 21
PIF_VALUE: 20
PIF_VALUE: 21
PIF_VALUE: 20
PIF_VALUE: 19
PIF_VALUE: 21
PIF_VALUE: 20
PIF_VALUE: 21
PIF_VALUE: 19
PIF_VALUE: 21
PIF_VALUE: 21
PIF_VALUE: 20
PIF_VALUE: 21
PIF_VALUE: 21
PIF_VALUE: 23
PIF_VALUE: 21
PIF_VALUE: 20
PIF_VALUE: 20
PIF_VALUE: 21
PIF_VALUE: 22
PIF_VALUE: 21
PIF_VALUE: 21
PIF_VALUE: 19
PIF_VALUE: 21
PIF_VALUE: 21
PIF_VALUE: 20
PIF_VALUE: 20
PIF_VALUE: 21
PIF_VALUE: 21
PIF_VALUE: 22
PIF_VALUE: 21
PIF_VALUE: 20
PIF_VALUE: 20
PIF_VALUE: 21
PIF_VALUE: 22
PIF_VALUE: 21
PIF_VALUE: 19
PIF_VALUE: 21
PIF_VALUE: 20
PIF_VALUE: 26
PIF_VALUE: 21
PIF_VALUE: 21
PIF_VALUE: 20
PIF_VALUE: 1
PIF_VALUE: 20
PIF_VALUE: 21
PIF_VALUE: 20
PIF_VALUE: 20
PIF_VALUE: 21
PIF_VALUE: 21
PIF_VALUE: 20
PIF_VALUE: 22
PIF_VALUE: 21
PIF_VALUE: 20
PIF_VALUE: 21
PIF_VALUE: 20
PIF_VALUE: 17
PIF_VALUE: 21
PIF_VALUE: 20
PIF_VALUE: 21
PIF_VALUE: 21
PIF_VALUE: 20
PIF_VALUE: 21
PIF_VALUE: 22
PIF_VALUE: 20
PIF_VALUE: 21
PIF_VALUE: 21
PIF_VALUE: 22
PIF_VALUE: 21
PIF_VALUE: 6
PIF_VALUE: 21
PIF_VALUE: 20
PIF_VALUE: 1
PIF_VALUE: 21
PIF_VALUE: 20
PIF_VALUE: 20
PIF_VALUE: 21
PIF_VALUE: 21
PIF_VALUE: 1
PIF_VALUE: 22
PIF_VALUE: 19
PIF_VALUE: 21
PIF_VALUE: 19
PIF_VALUE: 20
PIF_VALUE: 21
PIF_VALUE: 21
PIF_VALUE: 20
PIF_VALUE: 21
PIF_VALUE: 21
PIF_VALUE: 20
PIF_VALUE: 22
PIF_VALUE: 21
PIF_VALUE: 22
PIF_VALUE: 20
PIF_VALUE: 20
PIF_VALUE: 22

## 2022-05-09 ASSESSMENT — PAIN SCALES - GENERAL
PAINLEVEL_OUTOF10: 5
PAINLEVEL_OUTOF10: 7
PAINLEVEL_OUTOF10: 6

## 2022-05-09 ASSESSMENT — ENCOUNTER SYMPTOMS: SHORTNESS OF BREATH: 1

## 2022-05-09 ASSESSMENT — PAIN DESCRIPTION - PAIN TYPE: TYPE: CHRONIC PAIN

## 2022-05-09 ASSESSMENT — PAIN DESCRIPTION - LOCATION: LOCATION: BACK

## 2022-05-09 NOTE — ANESTHESIA POSTPROCEDURE EVALUATION
[facility] Department of Anesthesiology  Post-Anesthesia Note       Name:  Lavinia Gomez                                  Age:  68 y.o. MRN:  [id number]     Last Vitals & Oxygen Saturation: /66   Pulse 72   Temp 97.6 °F (36.4 °C) (Infrared)   Resp 14   Ht 5' 2\" (1.575 m)   Wt 124 lb (56.2 kg)   SpO2 95%   BMI 22.68 kg/m²   Patient Vitals for the past 4 hrs:   BP Temp Temp src Pulse Resp SpO2   05/09/22 1650 135/66 -- -- 72 14 95 %   05/09/22 1639 129/67 -- -- 76 14 99 %   05/09/22 1622 (!) 147/70 97.6 °F (36.4 °C) Infrared 75 12 100 %       Level of consciousness:  Awake, alert    Respiratory: Respirations easy, no distress. Stable. Cardiovascular: Hemodynamically stable. Hydration: Adequate. PONV: Adequately managed. Post-op pain: Adequately controlled. Post-op assessment: Tolerated anesthetic well without complication. Complications:  None.     Ramiro Johnson MD  May 9, 2022   5:47 PM

## 2022-05-09 NOTE — ANESTHESIA PRE PROCEDURE
Department of Anesthesiology  Preprocedure Note       Name:  Aretha Hernandez   Age:  68 y.o.  :  1945                                          MRN:  [id number]         Date:  2022      Surgeon: * Surgery not found *    Procedure:     Medications prior to admission:   Prior to Admission medications    Medication Sig Start Date End Date Taking?  Authorizing Provider   amiodarone (CORDARONE) 200 MG tablet TAKE ONE TABLET BY MOUTH NIGHTLY 22   Lawanda Marlow MD   ezetimibe (ZETIA) 10 MG tablet TAKE ONE TABLET BY MOUTH DAILY 22   Lawanda Marlow MD   metoprolol succinate (TOPROL XL) 50 MG extended release tablet TAKE ONE TABLET BY MOUTH DAILY 22   Lawanda Marlow MD   pantoprazole (PROTONIX) 40 MG tablet TAKE ONE TABLET BY MOUTH DAILY 22   Lawanda Marlow MD   rosuvastatin (CRESTOR) 40 MG tablet TAKE ONE TABLET BY MOUTH EVERY EVENING 22   Lawanda Marlow MD   tiZANidine (ZANAFLEX) 4 MG capsule TAKE ONE-HALF TABLET BY MOUTH EVERY 8 HOURS AS NEEDED (MUSCLE SPASM) 22   Lawanda Marlow MD   torsemide (DEMADEX) 20 MG tablet TAKE 1 TABLET BY MOUTH EVERY MORNING TAKE EXTRA WATER PILL ON DAYS WEIGHT INCREASES BY 2 OR MORE LBS 22   Lawanda Marlow MD   dilTIAZem (CARDIZEM CD) 120 MG extended release capsule Take 1 capsule by mouth daily 22   Elliott Handley MD   sennosides-docusate sodium (SENOKOT-S) 8.6-50 MG tablet Take 1 tablet by mouth in the morning and at bedtime 3/14/22   Lawanda Marlow MD   Calcium Alginate (ROSA CA ALGINATE 2\"X2\") MISC Apply to each sore daily 22   Lawanda Marlow MD   potassium chloride (KLOR-CON) 10 MEQ extended release tablet TAKE ONE TABLET BY MOUTH TWO TIMES A DAY  Patient taking differently: Take 10 mEq by mouth daily  21   Lawanda Marlow MD   Fluticasone-Salmeterol,sensor, 222-78 MCG/ACT AEPB 2 puffs bid 21   Lawanda Marlow MD   levothyroxine (SYNTHROID) 88 MCG tablet Take 1 tablet by mouth Daily 11/10/21   Gio Mullins MD   docusate sodium (DOK) 100 MG capsule TAKE ONE CAPSULE BY MOUTH TWO TIMES A DAY 11/1/21   Candido Rutledge MD   diclofenac sodium (VOLTAREN) 1 % GEL Apply 2 g topically daily    Historical Provider, MD   oxyCODONE HCl (OXY-IR) 10 MG immediate release tablet Take 10 mg by mouth every 6 hours as needed for Pain.     Historical Provider, MD   ipratropium (ATROVENT) 0.03 % nasal spray INHALE 2 DOSES INTO EACH NOSTRIL THREE TIMES A DAY IF NEEDED FOR RHINITIS 10/19/21   Candido Rutledge MD   DULoxetine (CYMBALTA) 30 MG extended release capsule TAKE 1 CAPSULE BY MOUTH DAILY EVERY MORNING  Patient taking differently: Take 60 mg by mouth daily Every morning 8/17/21   Candido Rutledge MD   aspirin 81 MG tablet Take 1 tablet by mouth daily 5/14/15   Trell Wilson MD       Current medications:    Current Outpatient Medications   Medication Sig Dispense Refill    amiodarone (CORDARONE) 200 MG tablet TAKE ONE TABLET BY MOUTH NIGHTLY 30 tablet 1    ezetimibe (ZETIA) 10 MG tablet TAKE ONE TABLET BY MOUTH DAILY 30 tablet 1    metoprolol succinate (TOPROL XL) 50 MG extended release tablet TAKE ONE TABLET BY MOUTH DAILY 30 tablet 1    pantoprazole (PROTONIX) 40 MG tablet TAKE ONE TABLET BY MOUTH DAILY 30 tablet 1    rosuvastatin (CRESTOR) 40 MG tablet TAKE ONE TABLET BY MOUTH EVERY EVENING 30 tablet 1    tiZANidine (ZANAFLEX) 4 MG capsule TAKE ONE-HALF TABLET BY MOUTH EVERY 8 HOURS AS NEEDED (MUSCLE SPASM) 45 capsule 1    torsemide (DEMADEX) 20 MG tablet TAKE 1 TABLET BY MOUTH EVERY MORNING TAKE EXTRA WATER PILL ON DAYS WEIGHT INCREASES BY 2 OR MORE LBS 60 tablet 0    dilTIAZem (CARDIZEM CD) 120 MG extended release capsule Take 1 capsule by mouth daily 30 capsule 5    sennosides-docusate sodium (SENOKOT-S) 8.6-50 MG tablet Take 1 tablet by mouth in the morning and at bedtime 60 tablet 5    Calcium Alginate (ROSA CA ALGINATE 2\"X2\") MISC Apply to each sore daily 30 each 1    potassium chloride (KLOR-CON) 10 MEQ extended release tablet TAKE ONE TABLET BY MOUTH TWO TIMES A DAY (Patient taking differently: Take 10 mEq by mouth daily ) 60 tablet 11    Fluticasone-Salmeterol,sensor, 232-14 MCG/ACT AEPB 2 puffs bid 1 each 5    levothyroxine (SYNTHROID) 88 MCG tablet Take 1 tablet by mouth Daily 30 tablet 11    docusate sodium (DOK) 100 MG capsule TAKE ONE CAPSULE BY MOUTH TWO TIMES A DAY 60 capsule 5    diclofenac sodium (VOLTAREN) 1 % GEL Apply 2 g topically daily      oxyCODONE HCl (OXY-IR) 10 MG immediate release tablet Take 10 mg by mouth every 6 hours as needed for Pain.  ipratropium (ATROVENT) 0.03 % nasal spray INHALE 2 DOSES INTO EACH NOSTRIL THREE TIMES A DAY IF NEEDED FOR RHINITIS 30 mL 5    DULoxetine (CYMBALTA) 30 MG extended release capsule TAKE 1 CAPSULE BY MOUTH DAILY EVERY MORNING (Patient taking differently: Take 60 mg by mouth daily Every morning) 30 capsule 11    aspirin 81 MG tablet Take 1 tablet by mouth daily 30 tablet 0     Current Facility-Administered Medications   Medication Dose Route Frequency Provider Last Rate Last Admin    sodium chloride flush 0.9 % injection 5-40 mL  5-40 mL IntraVENous 2 times per day Adore Burns MD        sodium chloride flush 0.9 % injection 5-40 mL  5-40 mL IntraVENous PRN Adore Burns MD        0.9 % sodium chloride infusion   IntraVENous PRN Adore Burns MD        isoproterenol (ISUPREL) 250 mcg in sodium chloride 0.9 % 250 mL infusion  250 mcg IntraVENous Once Adore Bunrs MD           Allergies:     Allergies   Allergen Reactions    Benadryl [Diphenhydramine] Swelling    Doxylamine     Mucinex [Guaifenesin Er]      hallucinations    Spiriva Handihaler [Tiotropium Bromide Monohydrate]      Nausea      Adhesive Tape Rash and Swelling    Neosporin [Bacitracin-Polymyxin B] Other (See Comments)     Rash that spread across face with swelling       Problem List:    Patient Active Problem List   Diagnosis Code    Hypothyroidism E03.9    Essential hypertension I10    Hyperlipidemia LDL goal <70 E78.5    Cerebral infarction ([facility]) I63.9    Arthritis M19.90    Bladder tumor D49.4    Pulmonary emphysema ([facility]) J43.9    Closed sacral fracture ([facility]) S32.10XA    Stenosis of right carotid artery I65.21    Intermittent claudication ([facility]) I73.9    Adjustment reaction with anxiety F43.22    Symptomatic bradycardia R00.1    Pulmonary HTN ([facility]) I27.20    Sacral fracture, closed ([facility]) S32.10XA    Acute on chronic diastolic heart failure ([facility]) I50.33    Left hand weakness R29.898    S/P AVR (aortic valve replacement) Z95.2    Edema leg R60.0    Abdominal aortic aneurysm ([facility]) I71.4    Acute respiratory failure with hypoxia ([facility]) J96.01    CAP (community acquired pneumonia) J18.9    COPD exacerbation ([facility]) J44.1    Dyspnea and respiratory abnormalities R06.00, R06.89    Hemoptysis R04.2    Chronic obstructive pulmonary disease ([facility]) J44.9    Coronary artery disease involving native coronary artery of native heart without angina pectoris I25.10    PAF (paroxysmal atrial fibrillation) ([facility]) I48.0    Nonrheumatic mitral valve regurgitation I34.0    Lumbar stenosis M48.061    Chronic bilateral low back pain with bilateral sciatica M54.42, M54.41, G89.29    Acute metabolic encephalopathy I20.36    Hypothyroidism due to Hashimoto's thyroiditis E03.8, E06.3    Chronic combined systolic and diastolic CHF, NYHA class 2 ([facility]) I50.42    ANITA (acute kidney injury) ([facility]) N17.9    Pulmonary nodule R91.1    Bright red rectal bleeding K62.5    Acute blood loss anemia D62    Rectal bleeding K62.5    Sinus bradycardia R00.1    Gastrointestinal hemorrhage K92.2    Macular degeneration H35.30    Thoracic spine pain M54.6    Anginal equivalent ([facility]) I20.8    Atrial fibrillation with RVR ([facility]) I48.91    History of GI bleed H46.78    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    Pressure injury of buttock, stage 2, unspecified laterality ([facility]) L89.302    Stage 3a chronic kidney disease ([facility]) N18.31    Obstructive sleep apnea G47.33    Biventricular cardiac pacemaker in situ Z95.0       Past Medical History:        Diagnosis Date    Anticoagulant long-term use     Atrial fibrillation (HCC)     went rhonda on amiodarone and coreg both stopped 7/2020    AVM (arteriovenous malformation) of duodenum, 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 since 26.  Chronic diastolic CHF (congestive heart failure) ([address] 2016    grade II    COPD, mild (HCC)     CVA (cerebral infarction) 2013    left cerebral cortex x2/expressive aphasia/resolved    Diverticulosis     Epistaxis, recurrent     when inr high. last 3-4 months ago    Former smoker     Gallbladder sludge     HTN (hypertension)     Hyperlipidemia     Hypothyroidism     Lumbar stenosis without neurogenic claudication 2017    pain across low back worse with standing and walking, nonradiating    Macular degeneration 2018    left worse than right , dry : progressive loss of sight: just barely able to see to drive    Neuropathy     Nonrheumatic mitral valve regurgitation     Obstructive sleep apnea     Osteoarthritis     Pulmonary HTN ([address] 2014    primary, responsive to nitrates    PVD (peripheral vascular disease) ([address]     occluded right SFA::: asymptomatic NIKOS 0.7 right and 1.0 left    Sacral fracture, closed ([address] 2014    Syncope 05/2014       Past Surgical History:        Procedure Laterality Date    AORTIC VALVE REPLACEMENT  6/16/15    Dr. Jessika Terry. Hernandez - PVI, RENETTA exclusion.  19mm Chantell Michaels pericardial Magna    APPENDECTOMY      BACK SURGERY  03/15/2019    superion inserted to keep back from hurtin [address] N/A 2019    EMERGENT; LAPAROSCOPIC CHOLECYSTECTOMY WITH GRAM performed by Gracia Cruz MD [address]      stent x 1    CYSTOSCOPY  2014    dr Bunny Martinez      THR Right    OTHER SURGICAL HISTORY  2018     EGD and colonoscopy.     PAIN MANAGEMENT PROCEDURE Bilateral 2021    BILATERAL L3, L4, L5 MEDIAL BRANCH BLOCK WITH FLUOROSCOPY performed by Leatha Patrick MD at [address] [place] [address] Bilateral 2021    BILATERAL L3, L4, L5 MEDIAL BRANCH BLOCKS WITH FLUOROSCOPY (90440, 60939) performed by Leatha Patrick MD at [address] [place] [address] Bilateral 2021    BILATERAL L3, L4, L5 RADIOFREQUENCY ABLATION WITH FLUOROSCOPY (36225, 21278) performed by Leatha Patrick MD at [address] [place] [address] N/A 3/15/2019    INSERTION OF INTERSPINOUS SPACER Reji Car AT LUMBAR FOUR-LUMBAR FIVE performed by Kelly Vazquze MD at [address] [place] [address]  12/15/2017       Social History:    Social History     Tobacco Use    Smoking status: Current Every Day Smoker     Packs/day: 1.00     Years: 45.00     Pack years: 45.00     Types: Cigarettes    Smokeless tobacco: Former User    Tobacco comment: smoked 1.5 pp for over 30 years  over 45pk year hx   Substance Use Topics    Alcohol use: No     Alcohol/week: 0.0 standard drinks     Comment: Socially                                 Ready to quit: Not Answered  Counseling given: Not Answered  Comment: smoked 1.5 pp for over 30 years  over 45pk year hx      Vital Signs (Current):   Vitals:    22 1035   BP: 116/60   Pulse: 109   Resp: 16   Temp: 98.4 °F (36.9 °C)   SpO2: 95%   Weight: 124 lb (56.2 kg)   Height: 5' 2\" (1.575 m)                                              BP Readings from Last 3 Encounters:   05/09/22 116/60   05/04/22 100/60   04/12/22 121/83       NPO Status:                                                                                 BMI:   Wt Readings from Last 3 Encounters:   05/09/22 124 lb (56.2 kg)   05/04/22 129 lb (58.5 kg)   04/12/22 126 lb 15.8 oz (57.6 kg)     Body mass index is 22.68 kg/m². CBC:   Lab Results   Component Value Date    WBC 9.9 05/06/2022    RBC 4.33 05/06/2022    HGB 12.0 05/06/2022    HCT 36.4 05/06/2022    MCV 84.0 05/06/2022    RDW 19.8 05/06/2022     05/06/2022       CMP:   Lab Results   Component Value Date     05/06/2022    K 3.9 05/06/2022    K 3.9 11/26/2021     05/06/2022    CO2 21 05/06/2022    BUN 20 05/06/2022    CREATININE 1.5 05/06/2022    GFRAA 41 05/06/2022    GFRAA >60 04/22/2013    AGRATIO 1.3 11/23/2021    LABGLOM 34 05/06/2022    LABGLOM 84.0 06/20/2011    GLUCOSE 95 05/06/2022    GLUCOSE 106 06/20/2011    PROT 6.8 11/23/2021    PROT 6.8 01/07/2013    CALCIUM 10.6 05/06/2022    BILITOT 0.4 11/23/2021    ALKPHOS 89 11/23/2021    AST 47 11/23/2021    ALT 57 11/23/2021       POC Tests: No results for input(s): POCGLU, POCNA, POCK, POCCL, POCBUN, POCHEMO, POCHCT in the last 72 hours.     Coags:   Lab Results   Component Value Date    PROTIME 11.7 02/19/2018    INR 1.04 02/19/2018    APTT 59.5 10/27/2021       HCG (If Applicable): No results found for: PREGTESTUR, PREGSERUM, HCG, HCGQUANT     ABGs:   Lab Results   Component Value Date    PHART 7.435 11/27/2015    PO2ART 58.8 11/27/2015    MOL3PZY 35.5 11/27/2015    PWN5MAH 23.4 11/27/2015    BEART 0.0 11/27/2015    B3ENARTU 89.7 11/27/2015        Type & Screen (If Applicable):  Lab Results   Component Value Date    LABABO A 04/20/2013    [address] Positive 04/20/2013       Drug/Infectious Status (If Applicable):  No results found for: HIV, HEPCAB    COVID-19 Screening (If Applicable):   Lab Results   Component Value Date    COVID19 Not Detected 10/29/2021 COVID19 Not Detected 07/23/2020           Anesthesia Evaluation    Airway: Mallampati: II  TM distance: >3 FB   Neck ROM: full  Mouth opening: > = 3 FB Dental:    (+) upper dentures and lower dentures      Pulmonary:   (+) COPD:  shortness of breath:  sleep apnea:                             Cardiovascular:  Exercise tolerance: good (>4 METS),   (+) hypertension:, valvular problems/murmurs: MR, angina:, pacemaker: pacemaker, CAD:, CABG/stent (stent in past):, dysrhythmias: atrial fibrillation, CHF:, murmur, pulmonary hypertension:, hyperlipidemia        Rhythm: regular                   ROS comment: 2021 echo: Overall left ventricular size and wall thickness appear normal with systolic   function mildly reduced. LVEF 40-45%. Normal right ventricular size and function. Left atrium is moderately dilated. The left atrial appendage appears to be ligated with no flow into it. Mitral valve leaflets appear thickened/calcified. Restricted posterior leaflet with malcoaptation id the leaflet tips. Moderate mitral regurgitation is present. Moderate tricuspid regurgitation. s/p AVR     Neuro/Psych:   (+) CVA (2014 still has issues with speech/word finding sometimes):, neuromuscular disease:, psychiatric history:            GI/Hepatic/Renal:        (-) hiatal hernia, GERD, PUD, hepatitis, liver disease and no renal disease       Endo/Other:    (+) hypothyroidism: arthritis:., malignancy/cancer (h/o bladder cancer). Abdominal:             Vascular:   + PVD, aortic or cerebral, . ROS comment: AAA  PVD  Carotid stenosis. Other Findings:           Anesthesia Plan      general     ASA 4       Induction: intravenous. MIPS: Prophylactic antiemetics administered. Anesthetic plan and risks discussed with patient and child/children. Plan discussed with CRNA.                 Sharon Ovalles MD   5/9/2022        This pre-anesthesia assessment may be used as a history and physical.    DOS STAFF ADDENDUM:    Pt seen and examined, chart reviewed (including anesthesia, drug and allergy history). No interval changes to history and physical examination. Anesthetic plan, risks, benefits, alternatives, and personnel involved discussed with patient. Patient verbalized an understanding and agrees to proceed.       Kaitlynn Winslow MD  May 9, 2022  11:24 AM

## 2022-05-09 NOTE — H&P
H&P Update    I have reviewed the history and physical from  and examined the patient and find no relevant changes. I have reviewed with the patient and/or family the risks, benefits, and alternatives to the procedure. Pre-sedation Assessment    Patient:  Veronica Naik   :   1945  Intended Procedure: CAMERON, afib/flutter ablation. Nurses notes reviewed and agreed. Medications reviewed    Allergies:    Allergies   Allergen Reactions    Benadryl [Diphenhydramine] Swelling    Doxylamine     Mucinex [Guaifenesin Er]      hallucinations    Spiriva Handihaler [Tiotropium Bromide Monohydrate]      Nausea      Adhesive Tape Rash and Swelling    Neosporin [Bacitracin-Polymyxin B] Other (See Comments)     Rash that spread across face with swelling         Pre-Procedure Assessment/Plan:  ASA 3 - Patient with moderate systemic disease with functional limitations    Level of Sedation Plan:Per anesthesia    Post Procedure plan: Return to same level of care    Diane Saldaña MD  Cardiac Electrophysiology  [place] [id number]

## 2022-05-09 NOTE — PROCEDURES
[address]     Electrophysiology Procedure Note       Date of Procedure: 5/9/2022  Patient's Name: Lucy Walters  YOB: 1945   Medical Record Number: [id number]  Referring Physician: Luanne Peabody, MD  Procedure Performed by: Dorothy Mandel MD    Procedure performed:  · Electrophysiology study with radiofrequency ablation of atrial fibrillation and pulmonary vein isolation   · Additional ablation with creation of a roof line. · Additional ablation of complex fractionated atrial electrograms (for atrial fibrillation) on the posterior wall. · Additional ablation of complex fractionated atrial electrograms (for atrial fibrillation) on the septal wall. · 3-D electroanatomical mapping of the left atrium. · Transseptal puncture through an intact septum using versacross under intracardiac ultrasound guidance without fluoroscopic guidance   · Intracardiac echocardiography  · Left ventricular pacing and recording  · Drug infusion with an attempt to induce atrial tachydysrhythmia  · Transesophageal echocardiogram   · Anesthesia: General anesthesia provided by the Anesthesia service    Indications for procedure:    Lucy Walters is a 68 y.o. female who has a history of paroxysmal and recently persistent atrial fibrillation who is symptomatic with symptoms of dyspnea with minimal exertion and fatigue with >50% afib burden despite amiodarone here for an ablation for atrial fibrillation and flutter. Details of Procedure: The risks, benefits and alternatives of the ablation procedure were discussed with the patient. The risks including, but not limited to, the risks of bleeding, infection, radiation exposure, injury to vascular, cardiac and surrounding structures (including pneumothorax), stroke, cardiac perforation, tamponade, need for emergent open heart surgery, need for pacemaker implantation, esophageal injury and fistula, myocardial infarction and death were discussed in detail.  The patient opted to proceed with the ablation. Written informed consent was signed and placed in the chart. Patient was brought to the EP lab in a fasting non-sedate state. Patient underwent general anesthesia by anesthesia team. We initially performed a transesophageal echo that showed no left atrial appendage clot/thrombus. Since the patient had a biventricular pacemaker, programming of the device was done before starting the procedure to remove any biventricular pacing. Procedure was done without use of fluoroscopy. Both groins were prepped in a sterile fashion. We gained access in Right femoral vein. A 8-Citizen of [place] sheath for ICE and 6F sheath for CS catheter and a versacross baylis sheath for transseptal puncture then for ablation and mapping catheters were  placed in the right femoral vein using modified seldinger technique. Ultrasound was used for femoral venous access. We gained access modified Seldinger technique and ultrasound guidance. The femoral vein was identified with real time visualization of needle passage to the venous lumen. Using 3-D mapping system Carto, the CS catheter was not placed in the CS given recent placement of LV CS lead. Instead it was placed in the RAA appendage for recording and mapping of the left atrium. Using ICE we delineated the left pulmonary vein, left atrial appendage,  mitral valve, and right superior and right inferior pulmonary veins. Patient received a bolus of heparin prior transseptal puncture followed by continuous monitoring of the ACT and boluses of heparin during the procedure to keep the ACT between 350-400. Also an esophageal temperature probe in addition to Esophastar catheter was advanced into the esophagus for mapping of the esophagus and careful monitoring of the esophageal temperature during ablation. A transseptal puncture through intact septum was done using ICE and  pressure monitoring.  Versacross needle inside the baylis sheath was used for the transseptal puncture. The baylis sheath was advanced and left inside the left atrium. Then a pentaray catheter with deflectable curve and an irrigated ablation catheter was advanced into the left atrium sequentially and alternatively as needed. The patient presenting rhythm was atrial fibrillation. Using Penta ray and Carto navigation system a three dimensional electro anatomical mapping of the left atrium, in addition to right and left sided pulmonary vein was created. Using Penta ray catheter voltage mapping of the left atrium was created. Maps below. Also an esophageal temperature probe in addition to Esophastar catheter was advanced into the esophagus for mapping of the esophagus and careful monitoring of the esophageal temperature during ablation. The ICE was used to monitor location of temperature probe and move it close to ablation area. We stopped ablation when  temperature  increased 1 degree. Then wide area circumferential ablation for right and left sided pulmonary veins were performed. Linear RF lesions was placed around both superior and inferior pulmonary vein in right and left side well outside the pulmonary vein ostium till all four pulmonary veins were isolated. The power was limited to 28  W on the posterior wall and careful monitoring of esophageal temperature was done to prevent injury to esophagus and lesions near esophagus were given only for 10 seconds. Elsewhere power was 36 W as needed. (posterior)-400(anterior). Prior to ablating the right pulmonary anterior antrum, the ablation catheter was paced at an output of 20mA along the antrum and the zev for phenic capture confirmed by palpating the right diaphragmatic region. Any area of phenic capture was annotated on the map (marked in blue) otherwise no annotation. Ablation was done while pacing the ablation catheter to ensure the ablated region was away from the phrenic nerve.     While isolating the left upper pulmonary vein, atrial fibrillation terminated to sinus rhythm. We then evaluated the left atrium for complex fractionated atrial electrogram, a separate mechanism that would initiate and/or perpetuate atrial fibrillation apart from the pulmonary vein fascicles and antral ablations. We found 2 site(s) on the posterior aspect of the LA and ablated these foci. We then evaluated the left atrium for complex fractionated atrial electrogram, a separate mechanism that would initiate and/or perpetuate atrial fibrillation apart from the pulmonary vein fascicles and antral ablations. We found 2 site(s) on the septal aspect of the LA and ablated these foci. Given that the patient had manifested a proximal to distal CS activation flutter suggestive of CTI dependence also seen on ECG, a CTI line was done while pacing the proximal CS until a block was observed. Bidirectional block was confirmed. Transit time from the proximal CS to ablation just lateral to the line was 154 ms. While performing an EP study, the patient went into another flutter, straight up and down pattern,  ms. Activation mapping with a pentaray was suggestive of roof dependence. A roof line was done leading to termination of that tachycardia. We then performed an EP study and programmed stimulation using our CS catheter and ablation catheter to assess the cardiac conduction system and to attempt to induce atrial tachydysrhythmia. The ablation catheter were moved from the left atrium to the left ventricular apex and His bundle position. His bundle potentials was recorded and pacing and recordings were performed from right atrium, coronary sinus and LV apex with the following results:     Sinus cycle length was 700 msec  HV - 48 ms  HI interval was 236 msec  QRS - Bi-V paced.   Pacing from left atrium, BI-V pacing  Pacing from LV apex, VAD      After ablation was complete, catheters were placed in the left and right atrium, His-position, right ventricle, and left ventricle for pacing and recording. Isuprel titrated to at least 25% increase in HR was started. Arrhythmia was attempted to be induced by rapid atrial and ventricular pacing, and there was no induction of atrial tachydysrhythmia. Then both the ablation and CS catheters were removed from the body. ICE was then used to check if there was new or change in pericardial effusion. None was noted. ICE catheter was then removed. All the 3 sheaths were removed from the right femoral vein. Since patient was on anticoagulation with heparin with high ACT, we used Verscade MVP, closure of access sites on the right femoral vein. 40 mg of protamine was given to partially reverse the ACT. After completion of procedure, the biventricular pacemaker was programmed back to initial setting and all sensing and pacing threshold were checked. There was no change from the beginning of the case. This was done since the leads are relatively new. The patient tolerated the procedure well and there were no complications. Patient was extubated and transferred to the floor in stable condition. Blood loss <20 cc. No complication     Conclusion:      Pulmonary vein isolations using wide area circumferential radiofrequency ablation with confirmation of exit and entrance block. Plan:   · The patient will go for recovery and will receive usual post ablation care. · Discharge home later today if still doing well. · Pantoprazole for 4 weeks. · continue current medications. · If has pleuritic chest pain tomorrow, the patient or family member will call us so that we send colchicine prescription. · Follow up with me in 1 month then with Cesar Germain NP in 3 months. Thank you for allowing us to participate in the care of your patient. If you have any questions or concerns, please do not hesitate to contact me.       Sara Villarreal MD  Cardiac Electrophysiology  [facility]

## 2022-05-10 LAB
EKG ATRIAL RATE: 71 BPM
EKG DIAGNOSIS: NORMAL
EKG P AXIS: 84 DEGREES
EKG P-R INTERVAL: 160 MS
EKG Q-T INTERVAL: 440 MS
EKG QRS DURATION: 232 MS
EKG QTC CALCULATION (BAZETT): 478 MS
EKG R AXIS: 113 DEGREES
EKG T AXIS: -15 DEGREES
EKG VENTRICULAR RATE: 71 BPM
POC ACT LR: >400 SEC

## 2022-05-10 PROCEDURE — 93010 ELECTROCARDIOGRAM REPORT: CPT | Performed by: INTERNAL MEDICINE

## 2022-05-10 NOTE — TELEPHONE ENCOUNTER
Letter signed by Dr. Ritika Phillips. Jones Roque MA spoke with patient and she stated she will pick letter up during her appointment tomorrow with Dr. Ritika Phillips. Letter placed at [employer] for .

## 2022-05-10 NOTE — PROGRESS NOTES
[address]   Office Note  Referring provider: Ke Patten MD  CC: \"I feel better\"     HPI: Alley Amador  is a 68 y.o. patient with a past medical history significant for atrial fibrillation, pulmonary hypertension, aortic stenosis and CAD s/p CHILANGO to mid circ 5/2014. She is s/p AVR, PVI and RENETTA exclusion by Dr Jj Boyer on 6/16. An inpatient echocardiogram was completed revealing moderate to severe MR. Subsequently, a CAMERON was completed on 7/27/20 showing moderate MR. She was admitted 10/26/21-11/1/21 with afib with RVR and acute CHF. She converted to a regular rhythm and was diuresed prior to discharge. [place] [facility] with acute encephalopathy and found to have rhabdomyolysis and afib with RVR again. She underwent successful cardioversion on 11/17/21. She did experience bradycardia when in sinus rhythm with heart rate between 40-50 bpm. She was seen in the office with Ish Dash CNP for follow up on 11/23/21 and then sent to the ED for admission. She was again found in afib with RVR and fluid overloaded. She was diuresed and discharged home on both amiodarone and Toprol. She underwent implant of Bi-V pacemaker with Dr. Feroz Michelle on 4/11/22. She had recurrence of atrial fibrillation noted on interrogation and underwent ablation on 5/9/22. She presents today for follow up. She reports feeling well overall and improved since her pacemaker placement and ablation. She admits to weight gain in the past couple days and does have lower extremity swelling. She has increased her dose of torsemide with improvement in her swelling. Her breathing has been comfortable without shortness of breath. She has been monitoring her sodium intake. She has not been participating in much regular exercise and admits to being more sedentary. Review of Systems:  Constitutional: No fatigue, weakness, night sweats or fever. HEENT: No new vision difficulties or ringing in the ears.   Respiratory: No new SOB, PND, orthopnea or cough. Cardiovascular: See HPI   GI: No n/v, diarrhea, constipation, abdominal pain or changes in bowel habits. No melena, no hematochezia  : No urinary frequency, urgency, incontinence, hematuria or dysuria. Skin: No cyanosis or skin lesions. Musculoskeletal: No new muscle or joint pain. Neurological: No syncope or TIA-like symptoms. 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 since 26.  Chronic diastolic CHF (congestive heart failure) ([address] 2016    grade II    COPD, mild (HCC)     CVA (cerebral infarction) 2013    left cerebral cortex x2/expressive aphasia/resolved    Diverticulosis     Epistaxis, recurrent     when inr high.  last 3-4 months ago    Former smoker     Gallbladder sludge     HTN (hypertension)     Hyperlipidemia     Hypothyroidism     Lumbar stenosis without neurogenic claudication 2017    pain across low back worse with standing and walking, nonradiating    Macular degeneration 2018    left worse than right , dry : progressive loss of sight: just barely able to see to drive    Neuropathy     Nonrheumatic mitral valve regurgitation     Obstructive sleep apnea     Osteoarthritis     Pulmonary HTN ([address] 2014 primary, responsive to nitrates    PVD (peripheral vascular disease) ([facility]     occluded right SFA::: asymptomatic NIKOS 0.7 right and 1.0 left    Sacral fracture, closed ([facility] 2014    Syncope 05/2014       Current Outpatient Medications   Medication Sig Dispense Refill    apixaban (ELIQUIS) 5 MG TABS tablet Take 0.5 tablets by mouth 2 times daily 180 tablet 1    amiodarone (CORDARONE) 200 MG tablet TAKE ONE TABLET BY MOUTH NIGHTLY 30 tablet 1    ezetimibe (ZETIA) 10 MG tablet TAKE ONE TABLET BY MOUTH DAILY 30 tablet 1    metoprolol succinate (TOPROL XL) 50 MG extended release tablet TAKE ONE TABLET BY MOUTH DAILY 30 tablet 1    pantoprazole (PROTONIX) 40 MG tablet TAKE ONE TABLET BY MOUTH DAILY 30 tablet 1    rosuvastatin (CRESTOR) 40 MG tablet TAKE ONE TABLET BY MOUTH EVERY EVENING 30 tablet 1    tiZANidine (ZANAFLEX) 4 MG capsule TAKE ONE-HALF TABLET BY MOUTH EVERY 8 HOURS AS NEEDED (MUSCLE SPASM) 45 capsule 1    torsemide (DEMADEX) 20 MG tablet TAKE 1 TABLET BY MOUTH EVERY MORNING TAKE EXTRA WATER PILL ON DAYS WEIGHT INCREASES BY 2 OR MORE LBS 60 tablet 0    sennosides-docusate sodium (SENOKOT-S) 8.6-50 MG tablet Take 1 tablet by mouth in the morning and at bedtime 60 tablet 5    potassium chloride (KLOR-CON) 10 MEQ extended release tablet TAKE ONE TABLET BY MOUTH TWO TIMES A DAY (Patient taking differently: Take 10 mEq by mouth daily ) 60 tablet 11    Fluticasone-Salmeterol,sensor, 232-14 MCG/ACT AEPB 2 puffs bid 1 each 5    levothyroxine (SYNTHROID) 88 MCG tablet Take 1 tablet by mouth Daily 30 tablet 11    docusate sodium (DOK) 100 MG capsule TAKE ONE CAPSULE BY MOUTH TWO TIMES A DAY 60 capsule 5    diclofenac sodium (VOLTAREN) 1 % GEL Apply 2 g topically daily      oxyCODONE HCl (OXY-IR) 10 MG immediate release tablet Take 10 mg by mouth every 6 hours as needed for Pain.       ipratropium (ATROVENT) 0.03 % nasal spray INHALE 2 DOSES INTO EACH NOSTRIL THREE TIMES A DAY IF NEEDED FOR RHINITIS 30 mL 5    DULoxetine (CYMBALTA) 30 MG extended release capsule TAKE 1 CAPSULE BY MOUTH DAILY EVERY MORNING (Patient taking differently: Take 60 mg by mouth daily Every morning) 30 capsule 11     No current facility-administered medications for this visit. Family History   Problem Relation Age of Onset    Breast Cancer Daughter      Objective:   Vitals:    05/11/22 1503   BP: 122/80   Pulse: 76   SpO2: 92%   Weight: 130 lb 3.2 oz (59.1 kg)   Height: 5' 2\" (1.575 m)     Physical Exam:   Constitutional: The patient is oriented to person, place, and time. Appears well-developed and well-nourished. In no acute distress. Head: Normocephalic and atraumatic. Pupils equal and round. Neck: Neck supple. No JVP or carotid bruit appreciated. No mass and no thyromegaly present. No lymphadenopathy present. Cardiovascular: Normal rate. Normal heart sounds. Exam reveals no gallop and no friction rub. No murmur heard. Pulmonary/Chest: Effort normal and breath sounds normal. No respiratory distress. No wheezes, rhonchi or rales. Abdominal: Soft, non-tender. Bowel sounds are normal. Exhibits no organomegaly, mass or bruit. Extremities: No edema. No cyanosis or clubbing. Pulses are 2+ radial and carotid bilaterally. Neurological: No gross cranial nerve deficit. Coordination normal.   Skin: Skin is warm and dry. There is no rash or diaphoresis. Psychiatric: Patient has a normal mood and affect.  Speech is normal and behavior is normal.     Lab Results   Component Value Date    CHOL 114 11/04/2021    HDL 43 11/04/2021    HDL 42 06/21/2010    TRIG 76 11/04/2021   LDL 52  Lab Results   Component Value Date     05/06/2022     Lab Results   Component Value Date    K 3.9 05/06/2022    K 3.9 11/26/2021       Lab Results   Component Value Date    BUN 20 05/06/2022    CREATININE 1.5 05/06/2022     Personally reviewed and interpreted   EKG 10/22/15: Sinus bradycardia with LAFB  EKG 1/2/20: Sinus rhonda, LAFB  EKG 6/10/21: Sinus rhythm with LBBB  EKG 11/30/21: Sinus rhythm with LBBB  EKG 12/15/21: Sinus bradycardia with LBBB  EKG 2/15/22: Sinus bradycardia with LBBB  EKG 5/11/22: AV paced      Procedures:     [facility] 6/15/2015   1. Right dominant coronary arterial system with mild calcification of the RCA. There is 40% serial lesions in the mid RCA. In the left system there is 25% distal left main disease. There is 50% mid LAD disease and 50% ostial second diagonal branch disease. There is no significant restenosis identified in the prior previously placed mid circumflex stent. 2. Systemic hypertension. [address] 10/27/21  HEMODYNAMICS:  1. Right atrial pressure was 12 mmHg. 2.  RV pressure 65/-4. 3.  Pulmonary capillary wedge pressure was 24 mmHg. 4.  Pulmonary artery pressure 71/19 with a mean of 41 mmHg. 5.  Cardiac output 4.9 liters per minute. 6.  Mixed venous saturation 52%. 7.  Pulmonary capillary wedge saturation 90%. 8.  Right atrial saturation 51%.     In summary, elevated right and left filling pressure with tall V waves  seen in pulmonary capillary wedge pressure tracing suggestive of severe  mitral regurgitation. Imaging:     Echo 7/30/2016  Left ventricle size is normal. Normal left ventricular wall thickness. Ejection fraction is visually estimated to be 55-60%. Diastolic filling parameters suggests grade III (restrictive) diastolic dysfunction. The right ventricle appears mildly enlarged. Right ventricular systolic function is normal.  The left atrium is severely dilated. The right atrium is mildly dilated.   At least moderate mitral regurgitation with a central and eccentric jet.   Mild mitral stenosis. BP reported as 171/51 at time of imaging.   A bioprosthetic aortic valve has a maximum velocity of 2.8 m/s and a mean gradient of 17 mmHg. Para-valvular regurgitation.   There is moderate tricuspid regurgitation.   Mild pulmonic regurgitation.   Estimated pulmonary artery systolic pressure is 72 mmHg assuming a rightatrial pressure of 10 mmHg.     Lower Extremity Arterial Studies 5/25/17  Right Impression   There is an NIKOS of .70 in the DP and .77 in the PT. on the right. This is in   the mild claudication range. Occlusion of the SFA artery in the right lower   extremity. Left Impression   There is an NIKOS of 1.06 in the DP and 1.08 in the PT. on the left. This is in   the normal range. Elevated velocity of the SFA. Carotid Studies 5/25/17  Right Impression   The right internal carotid artery appears to have a calcified 50-79% diameter   reducing stenosis based on velocity criteria. Left Impression   The left internal carotid artery appears to have a <50% diameter reducing   stenosis based on velocity criteria.         Echo 1/8/18  Normal LV size and systolic function. Estimated ejection fraction is 60%. Moderate to severe mitral regurgitation is present. Severely dilated left atrium. Normally functioning bioprosthetic valve in aortic position. Trivial regurgitation noted. The right ventricle is mildly enlarged.   Moderate tricuspid regurgitation.   Doppler derived PA systolic pressure is 52 mm Hg suggestive of moderate   pulmonary hypertension. Holter 1/8/18 ([facility]  1. The rhythm was sinus with sinus arrhythmia. Average IA interval 0.20,   average QRS duration 0.14 [IVCD]. Average daily heart rate 44 ranging 38 to 68 bpm.   There were 129 pauses greater than 2.0 seconds with Max R-R 2.1 seconds occurring 05:13:11.   2. Frequent premature supraventricular ectopic beats total 1,642 consisting   of 1,152 isolated PACs, 209 atrial pairs and 3 atrial runs. The longest atrial run 3 beats   HR-61 bpm occurring 17:56:40. The fastest atrial run 3 beats HR-76 bpm occurring 09:49:41.   3. Very frequent premature ventricular ectopic beats total 16,163 consisting of 16,008 isolated   PVCs, 74 ventricular couplets and 1 ventricular triplet.  The ventricular triplet HR-126 bpm   occurring 15:36:29.   4. Diary returned with an entry of \"irregular heart\",  isolated PACs, PVCs and   ventricular couplets noted. Carotid duplex 1/16/20  Right Impression   The right internal carotid artery appears to have a 50-79% diameter reducing   stenosis based on velocity criteria. The right vertebral artery demonstrates normal antegrade flow. Left Impression   The left internal carotid artery appears to have a <50% diameter reducing   stenosis based on velocity criteria. The left vertebral artery demonstrates normal antegrade flow.         Echo 7/8/20  There is mild concentric left ventricular hypertrophy. Ejection fraction is visually estimated to be 55-60%. diastology cannot be determined  Moderate to severe mitral regurgitation is present. A bioprosthetic artificial aortic valve appears well seated with a maximum  velocity of 273 m/s and a mean gradient of 16 mmHg. Study is unchanged from previous dated 1/8/2018    CAMERON 7/27/20  Left ventricular cavity size is normal in size with normal wall thickness. Overall left ventricular systolic function appears normal.  Ejection fraction is visually estimated to be 55-60%. Normal right ventricular size and function. Left atrium is moderately dilated. Mild thickening of the mitral valve leaflets. There is moderate mitral regurgitation appreciated. Moderate tricuspid regurgitation. A bioprosthetic valve appears well seated. There is no appreciable leaflet restriction or stenosis. There is no aortic insufficiency appreciated. Echo 10/27/21  Left ventricular cavity size is normal.  Normal left ventricular wall thickness. Ejection fraction is visually estimated to be 45-50%. There is mild global  hypokinesis appreciated. Grade III diastolic dysfunction with elevated LV filling pressures. Moderately dilated right ventricle with mildly reduced function. The left atrium is severely dilated. The right atrium is mildly dilated.   Moderately severe tricuspid regurgitation. Moderate pulmonic regurgitation present. Moderately severe mitral regurgitation appreciated. A bioprosthetic artificial aortic valve appears well seated with a maximum  velocity of 2.4m/s and a mean gradient of 12mmHg. Trivial aortic regurgitation. A bubble study was performed and fails to show evidence of right to left  shunting. CAMERON 10/29/21  Overall left ventricular size and wall thickness appear normal with systolic  function mildly reduced. LVEF 40-45%. Normal right ventricular size and function. Left atrium is moderately dilated. The left atrial appendage appears to be ligated with no flow into it. Mitral valve leaflets appear thickened/calcified. Restricted posterior leaflet with malcoaptation id the leaflet tips. Moderate mitral regurgitation is present. Moderate tricuspid regurgitation. Upper extremity venous duplex 11/18/21 ([facility])     o No evidence of acute deep vein thrombosis in the left upper extremity .     o There is superficial venous thrombosis identified in the left upper        extremity that is of indeterminate age as described above. Assessment:  1. CAD of native coronary arteries without angina  2. S/p Aortic Valve Replacement with bioprosthesis for nonrheumatic aortic stenosis  3. Chronic combined systolic and diastolic CHF, class 3  4. Hyperlipidemia with goal LDL <70 mg/dL  5. Paroxysmal Atrial fibrillation  6. Asymptomatic Bradycardia  7. Carotid stenosis, right   8. Mitral regurgitation, moderate    Plan:  She is not endorsing symptoms representing angina and her blood pressure is controlled. She does have worsening lower extremity swelling. I will have her take a one time dose of metolazone 2.5 mg prior to her torsemide dose. She continues in a regular rhythm today by EKG and will remain on Eliquis 5 mg BID for three more weeks following ablation. I have encouraged her to increase her activity as tolerated.  I have personally reviewed all previous testing for this visit today including imaging, lab results and EKG as detailed above. I will see her in office for follow up in 2 weeks. This note was scribed in the presence of Candida Lewis MD by [employer], RN. Physician Attestation:  The scribes documentation has been prepared under my direction and personally reviewed by me in its entirety. I, Dr. Savi Jean personally performed the services described in this documentation as scribed by my RN in my presence, and I confirm that the note above accurately reflects all work, treatment, procedures, and medical decision making performed by me.

## 2022-05-11 ENCOUNTER — OFFICE VISIT (OUTPATIENT)
Dept: CARDIOLOGY CLINIC | Age: 77
End: 2022-05-11
Payer: MEDICARE

## 2022-05-11 VITALS
WEIGHT: 130.2 LBS | HEIGHT: 62 IN | DIASTOLIC BLOOD PRESSURE: 80 MMHG | HEART RATE: 76 BPM | SYSTOLIC BLOOD PRESSURE: 122 MMHG | BODY MASS INDEX: 23.96 KG/M2 | OXYGEN SATURATION: 92 %

## 2022-05-11 DIAGNOSIS — I25.10 CORONARY ARTERY DISEASE INVOLVING NATIVE CORONARY ARTERY OF NATIVE HEART WITHOUT ANGINA PECTORIS: Primary | ICD-10-CM

## 2022-05-11 DIAGNOSIS — I34.0 NONRHEUMATIC MITRAL VALVE REGURGITATION: ICD-10-CM

## 2022-05-11 DIAGNOSIS — I50.42 CHRONIC COMBINED SYSTOLIC AND DIASTOLIC CHF, NYHA CLASS 3 (HCC): ICD-10-CM

## 2022-05-11 DIAGNOSIS — I48.91 ATRIAL FIBRILLATION WITH RVR (HCC): ICD-10-CM

## 2022-05-11 PROCEDURE — 1090F PRES/ABSN URINE INCON ASSESS: CPT | Performed by: INTERNAL MEDICINE

## 2022-05-11 PROCEDURE — G8420 CALC BMI NORM PARAMETERS: HCPCS | Performed by: INTERNAL MEDICINE

## 2022-05-11 PROCEDURE — G8400 PT W/DXA NO RESULTS DOC: HCPCS | Performed by: INTERNAL MEDICINE

## 2022-05-11 PROCEDURE — G8427 DOCREV CUR MEDS BY ELIG CLIN: HCPCS | Performed by: INTERNAL MEDICINE

## 2022-05-11 PROCEDURE — 93000 ELECTROCARDIOGRAM COMPLETE: CPT | Performed by: INTERNAL MEDICINE

## 2022-05-11 PROCEDURE — 4040F PNEUMOC VAC/ADMIN/RCVD: CPT | Performed by: INTERNAL MEDICINE

## 2022-05-11 PROCEDURE — 99214 OFFICE O/P EST MOD 30 MIN: CPT | Performed by: INTERNAL MEDICINE

## 2022-05-11 PROCEDURE — 1123F ACP DISCUSS/DSCN MKR DOCD: CPT | Performed by: INTERNAL MEDICINE

## 2022-05-11 PROCEDURE — 4004F PT TOBACCO SCREEN RCVD TLK: CPT | Performed by: INTERNAL MEDICINE

## 2022-05-11 RX ORDER — METOLAZONE 2.5 MG/1
2.5 TABLET ORAL DAILY
Qty: 10 TABLET | Refills: 0 | Status: SHIPPED | OUTPATIENT
Start: 2022-05-11 | End: 2022-05-25 | Stop reason: ALTCHOICE

## 2022-05-12 ENCOUNTER — CARE COORDINATION (OUTPATIENT)
Dept: CASE MANAGEMENT | Age: 77
End: 2022-05-12

## 2022-05-12 NOTE — CARE COORDINATION
[facility] Transitions Follow Up Call    2022    Patient: Leonel Ahuja  Patient : 1945   MRN: [id number]  Reason for Admission: symptomatic bradycardia  Discharge Date: 22 RARS: Readmission Risk Score: 18.9 ( )         Spoke with: [id number] Transitions Subsequent and Final Call    Subsequent and Final Calls  Do you have any ongoing symptoms?: No  Have your medications changed?: No  Do you have any questions related to your medications?: No  Do you currently have any active services?: No  Are you currently active with any services?: Home Health  Do you have any needs or concerns that I can assist you with?: No  Identified Barriers: None  Care Transitions Interventions  Other Interventions: Follow Up  Future Appointments   Date Time Provider Vickie Wilson   2022  2:00 PM Britany Holguin MD [facility]   2022  2:00 PM SCHEDULE, [facility] REMOTE TRANSMISSION [facility]   6/10/2022  1:45 PM SCHEDULE, [facility] DEVICE CHECK [facility]   6/10/2022  2:00 PM Deepthi Michael MD [facility]   8/10/2022  2:45 PM SCHEDULE, [facility] DEVICE CHECK [facility]   8/10/2022  3:00 PM HOWARD Harper CNP [facility]       Care Transitions Follow Up Call    Needs to be reviewed by the provider   Additional needs identified to be addressed with provider: No  none             Method of communication with provider : none      Care Transition Nurse (CTN) contacted the patient by telephone to follow up after admission on symptomatic rhonda. Verified name and  with patient as identifiers. Addressed changes since last contact: none  Discussed follow-up appointments. If no appointment was previously scheduled, appointment scheduling offered: No.   Is follow up appointment scheduled within 7 days of discharge? Yes.     CTN reviewed discharge instructions, medical action plan and red flags with patient and discussed any barriers to care and/or understanding of plan of care after discharge. Discussed appropriate site of care based on symptoms and resources available to patient including: PCP  Specialist. The patient agrees to contact the PCP office for questions related to their healthcare. Patients top risk factors for readmission: medical condition-. Interventions to address risk factors: Obtained and reviewed discharge summary and/or continuity of care documents      Non-[facility] follow up appointment(s): n/a    CTN provided contact information for future needs. No further follow-up call indicated based on severity of symptoms and risk factors. Pt reports to be doing well today. States things are going very well after her procedure. She say Dr. Tony Parks yesterday and he has cleared her from any restrictions. She will start increasing her activity as tolerated. Reported her HR has been \"normal.\" Reports no new sob or cp. Denies any needs at this time. Encouraged pt to reach out to PCP if symptoms become worse. Pt verbalized understanding. Will resolve episode.      MARIA ESTHER Suarez, RN   Care Transition Nurse  Mobile: [phone]

## 2022-05-24 NOTE — PROGRESS NOTES
[facility]   Office Note  Referring provider: Anmol Barrientos MD  CC: \"I had a couple episodes\"    HPI: Darien Cerna  is a 68 y.o. patient with a past medical history significant for atrial fibrillation, pulmonary hypertension, aortic stenosis and CAD s/p CHILANGO to mid circ 5/2014. She is s/p AVR, PVI and RENETTA exclusion by Dr Werner Pressley on 6/16. An inpatient echocardiogram was completed revealing moderate to severe MR. Subsequently, a CAMERON was completed on 7/27/20 showing moderate MR. She was admitted 10/26/21-11/1/21 with afib with RVR and acute CHF. She converted to a regular rhythm and was diuresed prior to discharge. She was admitted to [facility] [place] [facility] 11/11/21 with acute encephalopathy and found to have rhabdomyolysis and afib with RVR again. She underwent successful cardioversion on 11/17/21. She did experience bradycardia when in sinus rhythm with heart rate between 40-50 bpm. She was seen in the office with Tierra Beltran CNP for follow up on 11/23/21 and then sent to the ED for admission. She was again found in afib with RVR and fluid overloaded. She was diuresed and discharged home on both amiodarone and Toprol. She underwent implant of Bi-V pacemaker with Dr. Lottie Medrano on 4/11/22. She had recurrence of atrial fibrillation noted on interrogation and underwent ablation on 5/9/22. She presents today for follow up. She reports feeling well overall. She neris experienced a couple episodes of feeling as though she is atrial fibrillation since her ablation. She denies chest pain with rest or exertion. Her breathing and lower extremity swelling has improved since the one time dose of metolazone. She is now able to climb the stairs without shortness of breath. She reports medication compliance and is tolerating. Review of Systems:  Constitutional: No fatigue, weakness, night sweats or fever. HEENT: No new vision difficulties or ringing in the ears. Respiratory: No new SOB, PND, orthopnea or cough. Cardiovascular: See HPI   GI: No n/v, diarrhea, constipation, abdominal pain or changes in bowel habits. No melena, no hematochezia  : No urinary frequency, urgency, incontinence, hematuria or dysuria. Skin: No cyanosis or skin lesions. Musculoskeletal: No new muscle or joint pain. Neurological: No syncope or TIA-like symptoms. 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 since 26.  Chronic diastolic CHF (congestive heart failure) ([address] 2016    grade II    COPD, mild (HCC)     CVA (cerebral infarction) 2013    left cerebral cortex x2/expressive aphasia/resolved    Diverticulosis     Epistaxis, recurrent     when inr high.  last 3-4 months ago    Former smoker     Gallbladder sludge     HTN (hypertension)     Hyperlipidemia     Hypothyroidism     Lumbar stenosis without neurogenic claudication 2017    pain across low back worse with standing and walking, nonradiating    Macular degeneration 2018    left worse than right , dry : progressive loss of sight: just barely able to see to drive    Neuropathy     Nonrheumatic mitral valve regurgitation     Obstructive sleep apnea     Osteoarthritis     Pulmonary HTN ([address] 2014    primary, responsive to nitrates    PVD (peripheral vascular disease) ([facility] [address]     occluded right SFA::: asymptomatic NIKOS 0.7 right and 1.0 left    Sacral fracture, closed ([facility] [address] 2014    Syncope 05/2014       Current Outpatient Medications   Medication Sig Dispense Refill    apixaban (ELIQUIS) 5 MG TABS tablet Take 0.5 tablets by mouth 2 times daily 180 tablet 1    amiodarone (CORDARONE) 200 MG tablet TAKE ONE TABLET BY MOUTH NIGHTLY 30 tablet 1    ezetimibe (ZETIA) 10 MG tablet TAKE ONE TABLET BY MOUTH DAILY 30 tablet 1    metoprolol succinate (TOPROL XL) 50 MG extended release tablet TAKE ONE TABLET BY MOUTH DAILY 30 tablet 1    pantoprazole (PROTONIX) 40 MG tablet TAKE ONE TABLET BY MOUTH DAILY 30 tablet 1    rosuvastatin (CRESTOR) 40 MG tablet TAKE ONE TABLET BY MOUTH EVERY EVENING 30 tablet 1    tiZANidine (ZANAFLEX) 4 MG capsule TAKE ONE-HALF TABLET BY MOUTH EVERY 8 HOURS AS NEEDED (MUSCLE SPASM) 45 capsule 1    torsemide (DEMADEX) 20 MG tablet TAKE 1 TABLET BY MOUTH EVERY MORNING TAKE EXTRA WATER PILL ON DAYS WEIGHT INCREASES BY 2 OR MORE LBS 60 tablet 0    sennosides-docusate sodium (SENOKOT-S) 8.6-50 MG tablet Take 1 tablet by mouth in the morning and at bedtime 60 tablet 5    potassium chloride (KLOR-CON) 10 MEQ extended release tablet TAKE ONE TABLET BY MOUTH TWO TIMES A DAY (Patient taking differently: Take 10 mEq by mouth daily ) 60 tablet 11    Fluticasone-Salmeterol,sensor, 232-14 MCG/ACT AEPB 2 puffs bid 1 each 5    levothyroxine (SYNTHROID) 88 MCG tablet Take 1 tablet by mouth Daily 30 tablet 11    docusate sodium (DOK) 100 MG capsule TAKE ONE CAPSULE BY MOUTH TWO TIMES A DAY 60 capsule 5    diclofenac sodium (VOLTAREN) 1 % GEL Apply 2 g topically daily      oxyCODONE HCl (OXY-IR) 10 MG immediate release tablet Take 10 mg by mouth every 6 hours as needed for Pain.       ipratropium (ATROVENT) 0.03 % nasal spray INHALE 2 DOSES INTO EACH NOSTRIL THREE TIMES A DAY IF NEEDED FOR RHINITIS 30 mL 5    DULoxetine (CYMBALTA) 30 MG extended release capsule TAKE 1 CAPSULE BY MOUTH DAILY EVERY MORNING (Patient taking differently: Take 60 mg by mouth daily Every morning) 30 capsule 11     No current facility-administered medications for this visit. Family History   Problem Relation Age of Onset    Breast Cancer Daughter      Objective:   Vitals:    05/25/22 1406   BP: 128/64   Pulse: 79   SpO2: 92%   Weight: 120 lb 9.6 oz (54.7 kg)   Height: 5' 2\" (1.575 m)     Physical Exam:   Constitutional: The patient is oriented to person, place, and time. Appears well-developed and well-nourished. In no acute distress. Head: Normocephalic and atraumatic. Pupils equal and round. Neck: Neck supple. No JVP or carotid bruit appreciated. No mass and no thyromegaly present. No lymphadenopathy present. Cardiovascular: Normal rate. Normal heart sounds. Exam reveals no gallop and no friction rub. No murmur heard. Pulmonary/Chest: Effort normal and breath sounds normal. No respiratory distress. No wheezes, rhonchi or rales. Abdominal: Soft, non-tender. Bowel sounds are normal. Exhibits no organomegaly, mass or bruit. Extremities: No edema. No cyanosis or clubbing. Pulses are 2+ radial and carotid bilaterally. Neurological: No gross cranial nerve deficit. Coordination normal.   Skin: Skin is warm and dry. There is no rash or diaphoresis. Psychiatric: Patient has a normal mood and affect.  Speech is normal and behavior is normal.     Lab Results   Component Value Date    CHOL 114 11/04/2021    HDL 43 11/04/2021    HDL 42 06/21/2010    TRIG 76 11/04/2021   LDL 52  Lab Results   Component Value Date     05/06/2022     Lab Results   Component Value Date    K 3.9 05/06/2022    K 3.9 11/26/2021       Lab Results   Component Value Date    BUN 20 05/06/2022    CREATININE 1.5 05/06/2022     Personally reviewed and interpreted   EKG 10/22/15: Sinus bradycardia with LAFB  EKG 1/2/20: Sinus rhonda, LAFB  EKG 6/10/21:  Sinus rhythm with LBBB  EKG 11/30/21: Sinus rhythm with LBBB  EKG 12/15/21: Sinus bradycardia with LBBB  EKG 2/15/22: Sinus bradycardia with LBBB  EKG 5/11/22: AV paced  EKG 5/25/22: AV paced      Procedures:     [facility] 6/15/2015   1. Right dominant coronary arterial system with mild calcification of the RCA. There is 40% serial lesions in the mid RCA. In the left system there is 25% distal left main disease. There is 50% mid LAD disease and 50% ostial second diagonal branch disease. There is no significant restenosis identified in the prior previously placed mid circumflex stent. 2. Systemic hypertension. [facility] 10/27/21  HEMODYNAMICS:  1. Right atrial pressure was 12 mmHg. 2.  RV pressure 65/-4. 3.  Pulmonary capillary wedge pressure was 24 mmHg. 4.  Pulmonary artery pressure 71/19 with a mean of 41 mmHg. 5.  Cardiac output 4.9 liters per minute. 6.  Mixed venous saturation 52%. 7.  Pulmonary capillary wedge saturation 90%. 8.  Right atrial saturation 51%.     In summary, elevated right and left filling pressure with tall V waves  seen in pulmonary capillary wedge pressure tracing suggestive of severe  mitral regurgitation. Imaging:     Echo 7/30/2016  Left ventricle size is normal. Normal left ventricular wall thickness. Ejection fraction is visually estimated to be 55-60%. Diastolic filling parameters suggests grade III (restrictive) diastolic dysfunction. The right ventricle appears mildly enlarged. Right ventricular systolic function is normal.  The left atrium is severely dilated. The right atrium is mildly dilated.   At least moderate mitral regurgitation with a central and eccentric jet.   Mild mitral stenosis. BP reported as 171/51 at time of imaging.   A bioprosthetic aortic valve has a maximum velocity of 2.8 m/s and a mean gradient of 17 mmHg. Para-valvular regurgitation.   There is moderate tricuspid regurgitation.   Mild pulmonic regurgitation.   Estimated pulmonary artery systolic pressure is 72 mmHg assuming a rightatrial pressure of 10 mmHg.     Lower Extremity Arterial Studies 5/25/17  Right Impression   There is an NIKOS of .70 in the DP and .77 in the PT. on the right. This is in   the mild claudication range. Occlusion of the SFA artery in the right lower   extremity. Left Impression   There is an NIKOS of 1.06 in the DP and 1.08 in the PT. on the left. This is in   the normal range. Elevated velocity of the SFA. Carotid Studies 5/25/17  Right Impression   The right internal carotid artery appears to have a calcified 50-79% diameter   reducing stenosis based on velocity criteria. Left Impression   The left internal carotid artery appears to have a <50% diameter reducing   stenosis based on velocity criteria.         Echo 1/8/18  Normal LV size and systolic function. Estimated ejection fraction is 60%. Moderate to severe mitral regurgitation is present. Severely dilated left atrium. Normally functioning bioprosthetic valve in aortic position. Trivial regurgitation noted. The right ventricle is mildly enlarged.   Moderate tricuspid regurgitation.   Doppler derived PA systolic pressure is 52 mm Hg suggestive of moderate   pulmonary hypertension. Holter 1/8/18 ([facility])  1. The rhythm was sinus with sinus arrhythmia. Average WA interval 0.20,   average QRS duration 0.14 [IVCD]. Average daily heart rate 44 ranging 38 to 68 bpm.   There were 129 pauses greater than 2.0 seconds with Max R-R 2.1 seconds occurring 05:13:11.   2. Frequent premature supraventricular ectopic beats total 1,642 consisting   of 1,152 isolated PACs, 209 atrial pairs and 3 atrial runs. The longest atrial run 3 beats   HR-61 bpm occurring 17:56:40. The fastest atrial run 3 beats HR-76 bpm occurring 09:49:41.   3. Very frequent premature ventricular ectopic beats total 16,163 consisting of 16,008 isolated   PVCs, 74 ventricular couplets and 1 ventricular triplet.  The ventricular triplet HR-126 bpm   occurring 15:36:29.   4. Diary returned with an entry of \"irregular heart\",  isolated PACs, PVCs and   ventricular couplets noted. Carotid duplex 1/16/20  Right Impression   The right internal carotid artery appears to have a 50-79% diameter reducing   stenosis based on velocity criteria. The right vertebral artery demonstrates normal antegrade flow. Left Impression   The left internal carotid artery appears to have a <50% diameter reducing   stenosis based on velocity criteria. The left vertebral artery demonstrates normal antegrade flow.         Echo 7/8/20  There is mild concentric left ventricular hypertrophy. Ejection fraction is visually estimated to be 55-60%. diastology cannot be determined  Moderate to severe mitral regurgitation is present. A bioprosthetic artificial aortic valve appears well seated with a maximum  velocity of 273 m/s and a mean gradient of 16 mmHg. Study is unchanged from previous dated 1/8/2018    CAMERON 7/27/20  Left ventricular cavity size is normal in size with normal wall thickness. Overall left ventricular systolic function appears normal.  Ejection fraction is visually estimated to be 55-60%. Normal right ventricular size and function. Left atrium is moderately dilated. Mild thickening of the mitral valve leaflets. There is moderate mitral regurgitation appreciated. Moderate tricuspid regurgitation. A bioprosthetic valve appears well seated. There is no appreciable leaflet restriction or stenosis. There is no aortic insufficiency appreciated. Echo 10/27/21  Left ventricular cavity size is normal.  Normal left ventricular wall thickness. Ejection fraction is visually estimated to be 45-50%. There is mild global  hypokinesis appreciated. Grade III diastolic dysfunction with elevated LV filling pressures. Moderately dilated right ventricle with mildly reduced function. The left atrium is severely dilated. The right atrium is mildly dilated. Moderately severe tricuspid regurgitation.   Moderate pulmonic regurgitation present. Moderately severe mitral regurgitation appreciated. A bioprosthetic artificial aortic valve appears well seated with a maximum  velocity of 2.4m/s and a mean gradient of 12mmHg. Trivial aortic regurgitation. A bubble study was performed and fails to show evidence of right to left  shunting. CAMERON 10/29/21  Overall left ventricular size and wall thickness appear normal with systolic  function mildly reduced. LVEF 40-45%. Normal right ventricular size and function. Left atrium is moderately dilated. The left atrial appendage appears to be ligated with no flow into it. Mitral valve leaflets appear thickened/calcified. Restricted posterior leaflet with malcoaptation id the leaflet tips. Moderate mitral regurgitation is present. Moderate tricuspid regurgitation. Upper extremity venous duplex 11/18/21 ([facility])     o No evidence of acute deep vein thrombosis in the left upper extremity .     o There is superficial venous thrombosis identified in the left upper        extremity that is of indeterminate age as described above. Assessment:  1. CAD of native coronary arteries without angina  2. S/p Aortic Valve Replacement with bioprosthesis for nonrheumatic aortic stenosis  3. Chronic combined systolic and diastolic CHF, class 3  4. Hyperlipidemia with goal LDL <70 mg/dL  5. Paroxysmal Atrial fibrillation  6. Asymptomatic Bradycardia  7. Carotid stenosis, right   8. Mitral regurgitation, moderate    Plan:  She is not endorsing symptoms representing angina and her blood pressure is well controlled. She is overall well compensated with her current medical therapy. I will have her begin valsartan 20 mg daily given her reduced LVEF. She will remain on Eliquis 5 mg BID for stroke risk reduction. She will complete a BMP in 2 weeks to assess her kidney function.  She can use metolazone as needed for lower extremity swelling but if this becomes a more frequent need I have encouraged her to call the office. I have personally reviewed all previous testing for this visit today including imaging, lab results and EKG as detailed above. I will see her in office for follow up in 3 months. This note was scribed in the presence of Rani Bishop MD by [employer], RN. Physician Attestation:  The scribes documentation has been prepared under my direction and personally reviewed by me in its entirety. I, Dr. Tremaine Oleary personally performed the services described in this documentation as scribed by my RN in my presence, and I confirm that the note above accurately reflects all work, treatment, procedures, and medical decision making performed by me.

## 2022-05-25 ENCOUNTER — OFFICE VISIT (OUTPATIENT)
Dept: CARDIOLOGY CLINIC | Age: 77
End: 2022-05-25
Payer: MEDICARE

## 2022-05-25 VITALS
DIASTOLIC BLOOD PRESSURE: 64 MMHG | WEIGHT: 120.6 LBS | HEIGHT: 62 IN | OXYGEN SATURATION: 92 % | HEART RATE: 79 BPM | SYSTOLIC BLOOD PRESSURE: 128 MMHG | BODY MASS INDEX: 22.19 KG/M2

## 2022-05-25 DIAGNOSIS — I25.10 CORONARY ARTERY DISEASE INVOLVING NATIVE CORONARY ARTERY OF NATIVE HEART WITHOUT ANGINA PECTORIS: Primary | ICD-10-CM

## 2022-05-25 DIAGNOSIS — Z95.2 S/P AVR (AORTIC VALVE REPLACEMENT): ICD-10-CM

## 2022-05-25 DIAGNOSIS — I50.42 CHRONIC COMBINED SYSTOLIC AND DIASTOLIC CHF, NYHA CLASS 2 (HCC): ICD-10-CM

## 2022-05-25 DIAGNOSIS — I65.21 CAROTID STENOSIS, RIGHT: ICD-10-CM

## 2022-05-25 DIAGNOSIS — I34.0 NONRHEUMATIC MITRAL VALVE REGURGITATION: ICD-10-CM

## 2022-05-25 DIAGNOSIS — E78.5 HYPERLIPIDEMIA LDL GOAL <70: ICD-10-CM

## 2022-05-25 DIAGNOSIS — I48.0 PAROXYSMAL ATRIAL FIBRILLATION (HCC): ICD-10-CM

## 2022-05-25 PROCEDURE — 99214 OFFICE O/P EST MOD 30 MIN: CPT | Performed by: INTERNAL MEDICINE

## 2022-05-25 PROCEDURE — G8427 DOCREV CUR MEDS BY ELIG CLIN: HCPCS | Performed by: INTERNAL MEDICINE

## 2022-05-25 PROCEDURE — 1123F ACP DISCUSS/DSCN MKR DOCD: CPT | Performed by: INTERNAL MEDICINE

## 2022-05-25 PROCEDURE — G8420 CALC BMI NORM PARAMETERS: HCPCS | Performed by: INTERNAL MEDICINE

## 2022-05-25 PROCEDURE — 4004F PT TOBACCO SCREEN RCVD TLK: CPT | Performed by: INTERNAL MEDICINE

## 2022-05-25 PROCEDURE — G8400 PT W/DXA NO RESULTS DOC: HCPCS | Performed by: INTERNAL MEDICINE

## 2022-05-25 PROCEDURE — 93000 ELECTROCARDIOGRAM COMPLETE: CPT | Performed by: INTERNAL MEDICINE

## 2022-05-25 PROCEDURE — 1090F PRES/ABSN URINE INCON ASSESS: CPT | Performed by: INTERNAL MEDICINE

## 2022-05-25 RX ORDER — VALSARTAN 40 MG/1
20 TABLET ORAL DAILY
Qty: 30 TABLET | Refills: 3 | Status: SHIPPED | OUTPATIENT
Start: 2022-05-25 | End: 2022-10-14 | Stop reason: SDUPTHER

## 2022-06-03 RX ORDER — METOLAZONE 2.5 MG/1
TABLET ORAL
Qty: 10 TABLET | Refills: 0 | OUTPATIENT
Start: 2022-06-03

## 2022-06-03 NOTE — TELEPHONE ENCOUNTER
Last OV:   Next OV:   Last refill: dc 5/25/22  Most recent Labs: 5/6/22  Last EKG (if needed):5/25/22

## 2022-06-06 DIAGNOSIS — I50.42 CHRONIC COMBINED SYSTOLIC AND DIASTOLIC CHF, NYHA CLASS 2 (HCC): ICD-10-CM

## 2022-06-06 LAB
ANION GAP SERPL CALCULATED.3IONS-SCNC: 17 MMOL/L (ref 3–16)
BUN BLDV-MCNC: 16 MG/DL (ref 7–20)
CALCIUM SERPL-MCNC: 9.4 MG/DL (ref 8.3–10.6)
CHLORIDE BLD-SCNC: 101 MMOL/L (ref 99–110)
CO2: 24 MMOL/L (ref 21–32)
CREAT SERPL-MCNC: 1 MG/DL (ref 0.6–1.2)
GFR AFRICAN AMERICAN: >60
GFR NON-AFRICAN AMERICAN: 54
GLUCOSE BLD-MCNC: 83 MG/DL (ref 70–99)
POTASSIUM SERPL-SCNC: 4.7 MMOL/L (ref 3.5–5.1)
SODIUM BLD-SCNC: 142 MMOL/L (ref 136–145)

## 2022-06-08 ENCOUNTER — NURSE ONLY (OUTPATIENT)
Dept: CARDIOLOGY CLINIC | Age: 77
End: 2022-06-08

## 2022-06-08 DIAGNOSIS — R00.1 SINUS BRADYCARDIA: ICD-10-CM

## 2022-06-08 DIAGNOSIS — I50.42 CHRONIC COMBINED SYSTOLIC AND DIASTOLIC CHF, NYHA CLASS 2 (HCC): ICD-10-CM

## 2022-06-08 DIAGNOSIS — Z95.0 BIVENTRICULAR CARDIAC PACEMAKER IN SITU: Chronic | ICD-10-CM

## 2022-06-08 DIAGNOSIS — I48.0 PAF (PAROXYSMAL ATRIAL FIBRILLATION) (HCC): ICD-10-CM

## 2022-06-08 NOTE — PROGRESS NOTES
Remote transmission received from patient's CRT-P monitor at home. Transmission shows normal sensing and pacing function. Noted AT/AF, 5.8% burden, hx AF/L ABL 05.09.22 (RENETTA exclusion by Dr Fransisca Cummings on 6/2020, Eliquis, amiodarone, Toprol XL). Ap 79.2%  BiVp 95.7%, Effective 94.9%, VSRp 3.3%    Optivol is within normal range, w slight elevation noted up to 40 and correlating TI trend below reference line. Pt has appt w device/[facility] 06.10.22. EP physician will review. See interrogation under cardiology tab in the [address] [place] [address] [address] field for more details. Will continue to monitor remotely.

## 2022-06-10 ENCOUNTER — NURSE ONLY (OUTPATIENT)
Dept: CARDIOLOGY CLINIC | Age: 77
End: 2022-06-10
Payer: MEDICARE

## 2022-06-10 ENCOUNTER — OFFICE VISIT (OUTPATIENT)
Dept: CARDIOLOGY CLINIC | Age: 77
End: 2022-06-10
Payer: MEDICARE

## 2022-06-10 VITALS
HEIGHT: 62 IN | DIASTOLIC BLOOD PRESSURE: 80 MMHG | HEART RATE: 88 BPM | OXYGEN SATURATION: 92 % | SYSTOLIC BLOOD PRESSURE: 124 MMHG | WEIGHT: 128 LBS | BODY MASS INDEX: 23.55 KG/M2

## 2022-06-10 DIAGNOSIS — Z95.0 BIVENTRICULAR CARDIAC PACEMAKER IN SITU: ICD-10-CM

## 2022-06-10 DIAGNOSIS — R00.1 SYMPTOMATIC BRADYCARDIA: ICD-10-CM

## 2022-06-10 DIAGNOSIS — I48.0 PAF (PAROXYSMAL ATRIAL FIBRILLATION) (HCC): ICD-10-CM

## 2022-06-10 DIAGNOSIS — I25.10 CORONARY ARTERY DISEASE INVOLVING NATIVE CORONARY ARTERY OF NATIVE HEART WITHOUT ANGINA PECTORIS: ICD-10-CM

## 2022-06-10 DIAGNOSIS — I50.42 CHRONIC COMBINED SYSTOLIC AND DIASTOLIC CHF, NYHA CLASS 2 (HCC): ICD-10-CM

## 2022-06-10 DIAGNOSIS — I48.91 ATRIAL FIBRILLATION WITH RVR (HCC): ICD-10-CM

## 2022-06-10 DIAGNOSIS — R00.1 SINUS BRADYCARDIA: ICD-10-CM

## 2022-06-10 DIAGNOSIS — Z95.0 BIVENTRICULAR CARDIAC PACEMAKER IN SITU: Primary | ICD-10-CM

## 2022-06-10 DIAGNOSIS — I48.0 PAF (PAROXYSMAL ATRIAL FIBRILLATION) (HCC): Primary | ICD-10-CM

## 2022-06-10 PROCEDURE — 1123F ACP DISCUSS/DSCN MKR DOCD: CPT | Performed by: INTERNAL MEDICINE

## 2022-06-10 PROCEDURE — 4004F PT TOBACCO SCREEN RCVD TLK: CPT | Performed by: INTERNAL MEDICINE

## 2022-06-10 PROCEDURE — 93296 REM INTERROG EVL PM/IDS: CPT | Performed by: INTERNAL MEDICINE

## 2022-06-10 PROCEDURE — G8420 CALC BMI NORM PARAMETERS: HCPCS | Performed by: INTERNAL MEDICINE

## 2022-06-10 PROCEDURE — 93281 PM DEVICE PROGR EVAL MULTI: CPT | Performed by: INTERNAL MEDICINE

## 2022-06-10 PROCEDURE — G8400 PT W/DXA NO RESULTS DOC: HCPCS | Performed by: INTERNAL MEDICINE

## 2022-06-10 PROCEDURE — 93294 REM INTERROG EVL PM/LDLS PM: CPT | Performed by: INTERNAL MEDICINE

## 2022-06-10 PROCEDURE — 93290 INTERROG DEV EVAL ICPMS IP: CPT | Performed by: INTERNAL MEDICINE

## 2022-06-10 PROCEDURE — 93297 REM INTERROG DEV EVAL ICPMS: CPT | Performed by: INTERNAL MEDICINE

## 2022-06-10 PROCEDURE — 1090F PRES/ABSN URINE INCON ASSESS: CPT | Performed by: INTERNAL MEDICINE

## 2022-06-10 PROCEDURE — 99215 OFFICE O/P EST HI 40 MIN: CPT | Performed by: INTERNAL MEDICINE

## 2022-06-10 PROCEDURE — G8427 DOCREV CUR MEDS BY ELIG CLIN: HCPCS | Performed by: INTERNAL MEDICINE

## 2022-06-10 RX ORDER — METOLAZONE 2.5 MG/1
2.5 TABLET ORAL DAILY
Qty: 10 TABLET | Refills: 0 | COMMUNITY
Start: 2022-06-10 | End: 2022-06-28

## 2022-06-10 NOTE — PROGRESS NOTES
Pt seen in clinic today for cardiac device interrogation 1 mo post afib ablation  Their device is a  MDT 3 chamber BiV CRTP  Based on threshold, impedance, and intrinsic sensing tests run today, the device appears to be functioning normally. Remaining battery life is 10y7m  AP 79.01%      RVP 32.43%      effective CRTP 95.9%    AT/AF burden down to 5.4% - May 17,18, and 20th are the only days with AF since ablation  Avg. Ventricular Rate >= 100 bpm during AT/AF (>= 6 hr) for 3 days    optivol is trending up and correlates with thoracic impedance trending down sharply. Pt still WNL however. Rx:  amiodarone (CORDARONE) 200 MG tablet Take 1 tablet by mouth nightly  torsemide (DEMADEX) 20 MG tablet 20mg qam water pill take xtra 20mg on days wght increases by 2 or more lbs  metoprolol succinate (TOPROL XL) 50 MG extended release tablet Take 1 tablet by mouth daily  potassium chloride (KLOR-CON) 10 MEQ extended release tablet TAKE ONE TABLET BY MOUTH TWO TIMES A DAYPatient taking differently: Take 10 mEq by mouth daily   dilTIAZem (CARDIZEM CD) 120 MG extended release capsule Take 1 capsule by mouth daily    Pt was informed of findings today and general questions have been answered with regard to device. Home monitoring hardware is transmitting appropriately. Results discussed with Dr. Moisés Torres. Pt to see Dr. Moisés Torres in clinic today.

## 2022-06-10 NOTE — PROGRESS NOTES
Cardiac Electrophysiology Consultation   Date: 6/10/2022   Reason for Consultation: atrial fibrillation  Consult Requesting Physician: Ines Lucas MD  Primary CarePhysician: Lenny Beauchamp MD     Chief Complaint:   Chief Complaint   Patient presents with    Follow-up     afib - device check        HPI: Wilber Pulido is a 68 y.o. patient with a history of atrial fibrillation, pulmonary hypertension, aortic stenosis s/p AVR, PVI and RENETTA exclusion by Dr Leeann Heredia on 6/2020 and CAD s/p CHILANGO to mid circ 5/2014,  AVM's which were cauterized. She presented to [facility] with complaints of back pain and while inpatient had an episode of dyspnea. An echocardiogram was completed revealing moderate to severe MR. Subsequently, a CAMERON was completed on 7/27/20 showing moderate MR. She was admitted 10/26/2021-11/1/2021 with afib with RVR and acute CHF. She converted to a regular rhythm and was diuresed prior to discharge. She was admitted to [facility] 11/11/21 with acute encephalopathy and found to have rhabdomyolysis and afib with RVR again. She underwent successful cardioversion on 11/17/2021. She did experience bradycardia when in sinus rhythm with heart rate between 40-50 bpm. She was seen in the office with Brenden Sunshine CNP for follow up on 11/23/21 and then sent to the ED for admission. She was again found in afib with RVR and fluid overloaded. She was diuresed and discharged home on both amiodarone and Toprol. She underwent placement of a Medtronic Bi-V PPM on 4/11/2022 with Dr. Freda Allison. Patient daughter called into the office on 4/26/2022 reporting that she thinks the patient may be back in atrial fibrillation and she recorded HR between 100-105 bpm. Patient sent in a remote check on her device which showed atrial fibrillation and flutter which began at 12:42 pm. Diltiazem 120 mg daily was added to her medications.     She was seen in office on 5/4/2022 accompanied by her daughter for management of her atrial fibrillation. She reported symptoms of palpitations and heart bounding and pacing. She reported that she started having swelling of BLE after starting Diltiazem she took Torsemide which removed the fluid but she she stopped the swelling returned. She reported she had a 3 lbs weight gain over night. Ablation was discussed as a treatment option for his atrial fibrillation and on 5/9/2022. she underwent electrophysiology study with radiofrequency ablation of atrial fibrillation and pulmonary vein isolation       Interval History: Today, she presents to office accompanied by her daughter for follow up s/p ablation. She states she is doing very well and is appreciative. She is exercising and walking up stairs without any shortness of breath. She states her lower legs are starting to swell and there are little red bumps. She continues to take 20 mg of Lasix. She states she also takes another medication as needed for the swelling but but then it goes back up. When asked the medications she is taking she showed a bottle of Diltiazem. 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 since 26.     Chronic diastolic CHF (congestive heart failure) ([address] 2016    grade II    COPD, mild (HCC)     CVA (cerebral infarction) 2013    left cerebral cortex x2/expressive aphasia/resolved    Diverticulosis     Epistaxis, recurrent     when inr high. last 3-4 months ago    Former smoker     Gallbladder sludge     HTN (hypertension)     Hyperlipidemia     Hypothyroidism     Lumbar stenosis without neurogenic claudication     pain across low back worse with standing and walking, nonradiating    Macular degeneration 2018    left worse than right , dry : progressive loss of sight: just barely able to see to drive    Neuropathy     Nonrheumatic mitral valve regurgitation     Obstructive sleep apnea     Osteoarthritis     Pulmonary HTN ([address]     primary, responsive to nitrates    PVD (peripheral vascular disease) ([address]     occluded right SFA::: asymptomatic NIKOS 0.7 right and 1.0 left    Sacral fracture, closed ([address]     Syncope 2014      Past Surgical History:   Procedure Laterality Date    ABLATION OF DYSRHYTHMIC FOCUS  2022    AORTIC VALVE REPLACEMENT  6/16/15    Dr. Adore Kauffman. Hernandez - PVI, RENETTA exclusion. 19mm Maki pericardial Magna    APPENDECTOMY      BACK SURGERY  03/15/2019    superion inserted to keep back from hurtin [address] N/A 2019    EMERGENT; LAPAROSCOPIC CHOLECYSTECTOMY WITH GRAM performed by Christina Brown MD at [address] [place] [address]      stent x 1    CYSTOSCOPY  2014    dr Oneil Lopez      THR Right    OTHER SURGICAL HISTORY  2018     EGD and colonoscopy.     PACEMAKER INSERTION  2022    PAIN MANAGEMENT PROCEDURE Bilateral 2021    BILATERAL L3, L4, L5 MEDIAL BRANCH BLOCK WITH FLUOROSCOPY performed by Livia Saldana MD at [address] Bilateral 2021    BILATERAL L3, L4, L5 MEDIAL BRANCH BLOCKS WITH FLUOROSCOPY (89657, [id number]) performed by Livia Saldana MD at [address] Bilateral 2021    BILATERAL L3, L4, L5 RADIOFREQUENCY ABLATION WITH FLUOROSCOPY ([id number], [id number]) performed by Gail Apley, MD at [address] [place] [address] N/A 3/15/2019    INSERTION OF INTERSPINOUS SPACER SUPERION VERTIFLEX AT LUMBAR FOUR-LUMBAR FIVE performed by Ike Méndez MD at [address] [place] [address]  12/15/2017       Allergies: Allergies   Allergen Reactions    Benadryl [Diphenhydramine] Swelling    Doxylamine     Mucinex [Guaifenesin Er]      hallucinations    Spiriva Handihaler [Tiotropium Bromide Monohydrate]      Nausea      Adhesive Tape Rash and Swelling    Neosporin [Bacitracin-Polymyxin B] Other (See Comments)     Rash that spread across face with swelling       Medication:   Prior to Admission medications    Medication Sig Start Date End Date Taking?  Authorizing Provider   valsartan (DIOVAN) 40 mg tablet Take 0.5 tablets by mouth daily 5/25/22  Yes Will Blake MD   amiodarone (CORDARONE) 200 MG tablet TAKE ONE TABLET BY MOUTH NIGHTLY 5/6/22  Yes Ar Chavis MD   ezetimibe (ZETIA) 10 MG tablet TAKE ONE TABLET BY MOUTH DAILY 5/6/22  Yes Ar Chavis MD   metoprolol succinate (TOPROL XL) 50 MG extended release tablet TAKE ONE TABLET BY MOUTH DAILY 5/6/22  Yes Ar Chavis MD   pantoprazole (PROTONIX) 40 MG tablet TAKE ONE TABLET BY MOUTH DAILY 5/6/22  Yes Ar Chavis MD   rosuvastatin (CRESTOR) 40 MG tablet TAKE ONE TABLET BY MOUTH EVERY EVENING 5/6/22  Yes Ar Chavis MD   tiZANidine (ZANAFLEX) 4 MG capsule TAKE ONE-HALF TABLET BY MOUTH EVERY 8 HOURS AS NEEDED (MUSCLE SPASM) 5/6/22  Yes Ar Chavis MD   torsemide (DEMADEX) 20 MG tablet TAKE 1 TABLET BY MOUTH EVERY MORNING TAKE EXTRA WATER PILL ON DAYS WEIGHT INCREASES BY 2 OR MORE LBS 5/6/22  Yes Ar Chavis MD   sennosides-docusate sodium (SENOKOT-S) 8.6-50 MG tablet Take 1 tablet by mouth in the morning and at bedtime 3/14/22  Yes Ar Chavis MD   potassium chloride (KLOR-CON) 10 MEQ extended release tablet TAKE ONE TABLET BY MOUTH TWO TIMES A DAY  Patient taking differently: Take 10 mEq by mouth daily  12/22/21  Yes Donell Lopez MD   Fluticasone-Salmeterol,sensor, 527-94 MCG/ACT AEPB 2 puffs bid 11/26/21  Yes Donell Lopez MD   levothyroxine (SYNTHROID) 88 MCG tablet Take 1 tablet by mouth Daily 11/10/21  Yes Helen Burleson MD   docusate sodium (DOK) 100 MG capsule TAKE ONE CAPSULE BY MOUTH TWO TIMES A DAY 11/1/21  Yes Donell Lopez MD   diclofenac sodium (VOLTAREN) 1 % GEL Apply 2 g topically daily   Yes Historical Provider, MD   oxyCODONE HCl (OXY-IR) 10 MG immediate release tablet Take 10 mg by mouth every 6 hours as needed for Pain. Yes Historical Provider, MD   ipratropium (ATROVENT) 0.03 % nasal spray INHALE 2 DOSES INTO EACH NOSTRIL THREE TIMES A DAY IF NEEDED FOR RHINITIS 10/19/21  Yes Donell Lopez MD   DULoxetine (CYMBALTA) 30 MG extended release capsule TAKE 1 CAPSULE BY MOUTH DAILY EVERY MORNING  Patient taking differently: Take 60 mg by mouth daily Every morning 8/17/21  Yes Donell Lopez MD       Social History:   reports that she has been smoking cigarettes. She has a 45.00 pack-year smoking history. She has quit using smokeless tobacco. She reports that she does not drink alcohol and does not use drugs. Family History:  family history includes Breast Cancer in her daughter. Reviewed. Denies family history of sudden cardiac death, arrhythmia, premature CAD    Review of System:  Pertinent positive and negatives are in the HPI, the rest are negative. Physical Examination:  /80 (Site: Left Upper Arm, Position: Sitting)   Pulse 88   Ht 5' 2\" (1.575 m)   Wt 128 lb (58.1 kg)   SpO2 92%   BMI 23.41 kg/m²      · Constitutional: Oriented. No distress. · Head: Normocephalic and atraumatic. · Mouth/Throat: Oropharynx is clear and moist.   · Eyes: Conjunctivae normal. EOM are normal.   · Neck: Normal range of motion. Neck supple. No rigidity. No JVD present. · Cardiovascular: Regular rate, regular rhythmn, S1&S2 and intact distal pulses.    · Pulmonary/Chest: Bilateral respiratory sounds. No wheezes. No rhonchi. · Abdominal: Soft. Bowel sounds present. No distension, No tenderness. · Musculoskeletal: No tenderness. +1 Bilateral lower extremity swelling. · Lymphadenopathy: Has no cervical adenopathy. · Neurological: Alert and oriented. Cranial nerve appears intact, No Gross deficit   · Skin: Skin is warm and dry. No rash noted. · Psychiatric: Has a normal mood, affect and behavior     Labs:  Reviewed. ECG: reviewed, atrial and biventricular paced rhythm. Studies:   1. Event monitor:   none    2. Echo: 10/27/2021  Left ventricular cavity size is normal.  Normal left ventricular wall thickness. Ejection fraction is visually estimated to be 45-50%. There is mild global  hypokinesis appreciated. Grade III diastolic dysfunction with elevated LV filling pressures. Moderately dilated right ventricle with mildly reduced function. The left atrium is severely dilated. The right atrium is mildly dilated. Moderately severe tricuspid regurgitation. Moderate pulmonic regurgitation present. Moderately severe mitral regurgitation appreciated. A bioprosthetic artificial aortic valve appears well seated with a maximum  velocity of 2.4m/s and a mean gradient of 12mmHg. Trivial aortic regurgitation. A bubble study was performed and fails to show evidence of right to left  Shunting. CAMERON 10/29/2021  Overall left ventricular size and wall thickness appear normal with systolic  function mildly reduced. LVEF 40-45%. Normal right ventricular size and function. Left atrium is moderately dilated. The left atrial appendage appears to be ligated with no flow into it. Mitral valve leaflets appear thickened/calcified. Restricted posterior leaflet with malcoaptation id the leaflet tips. Moderate mitral regurgitation is present. Moderate tricuspid regurgitation. 3. Stress Test:    none    4. Cath: 6/15/2015   1.  Right dominant coronary arterial system with mild calcification of the RCA. There is 40% serial lesions in the mid RCA. In the left system there is 25% distal left main disease. There is 50% mid LAD disease and 50% ostial second diagonal branch disease. There is no significant restenosis identified in the prior previously placed mid circumflex stent. 2. Systemic hypertension.      [id number] Score for Atrial Fibrillation Stroke Risk   Risk   Factors  Component Value   C CHF Yes 1   H HTN Yes 1   A2 Age >= 76 Yes,  (77 y.o.) 2   D DM No 0   S2 Prior Stroke/TIA No 0   V Vascular Disease No 0   A Age 74-69 No,  (77 y.o.) 0   Sc Sex female 1    [id number]  Score  5   Score last updated 6/10/22 5:63 PM EDT    Click here for a link to the UpToDate guideline \"Atrial Fibrillation: Anticoagulation therapy to prevent embolization    Disclaimer: Risk Score calculation is dependent on accuracy of patient problem list and past encounter diagnosis. Procedures:  1.  AVR, PVI and RENETTA exclusion by Dr. Leanna Daniels 2015  2. DCCV on 11/17/2021  3. Electrophysiology study with radiofrequency ablation of atrial fibrillation and pulmonary vein isolation     I independently reviewed the ECG, MCOT, echocardiogram, stress test, and coronary angiography/PCI results and used them for my plan of care. Procedures:  1.  AVR, PVI and RENETTA exclusion by Dr. Leanna Daniels 2015  2. DCCV on 11/17/2021  3. Implantation of Medtronic BI-V Pacemaker on 4/11/2022  4. Electrophysiology study with radiofrequency ablation of atrial fibrillation and pulmonary vein isolation 5/12/2022. Assessment/Plan:    Paroxsymal atrial fibrillation / Atrial Flutter   -Symptomatic with palpitations, fatigue and shortness of breath with exertion.  Symptoms resolved s/p RFA ablation of atrial fibrillation on 5/12/2022.   - with recurrences in 2020, 2021 s/p DCCV in 2020              - had surgical PVI with AVR in 2020.              - Continue Toprol XL 50 mg daily and Diltiazem 120 mg daily for rate control.   - Continue amiodarone 200 mg daily.              - She has a CHADS2-VASc 5 (age, gender, HTN, CAD) she was previous not anticoagulated due to past RENETTA exclusion with no flow in appendage seen on CAMERON in 2020. However she was placed on Eliquis 5 mg BID post RFA ablation of atrial fibrillation on 5/12/2022 to take for 3 weeks. She has generalized bruising and states she has been bumping into things. Platelet 558 on 4/5/0795. Ms. Riya Arrington underwent successful atrial fibrillation, typical and atypical atrial flutter ablation 5/12/2021. Device interrogation on 5/4/2022 prior to ablation showed AT/AF burden of 52.9 %, device interrogation today shows decrease in AT/AF burden to 5.4 % with last episodes occurring between 5/17-5/20/22. Effective CRT pacing on interrogation 5/4/2022 86 % up to 95.2 % on today. She is feeling great. Optival trended up during atrial fibrillation episodes but has started trending down. I asked her to take extra dose of lasix for leg swelling but to check with Dr. Alexx Dubose if recurs. TTE in 2 months (total 3 months from ablation). F/u with Jose Francisco Chen NP in 2 months when amiodarone will be stopped. -LVEF 40-45%,   -TSH 1.22 (11/4/2021)     -s/p radiofrequency ablation of atrial fibrillation and pulmonary vein isolation 5/12/2022. Symptomatic bradycardia - addressed with Bi-V pacemaker.   -Likely some component of tachybrady as well given history of A fib with RVR     -Had been symptomatic while on Toprol and amiodarone as HR tends to be in the 40s   - EF has also declined from 45-50% to 40-45%   - s/p Medtronic Bi-V pacemaker implanted on 4/11/2022. Chronic combine diastolic and systolic heart failure              -NYHA class II              - EF 40-45%,   - She is not currently on guideline directed medical therapy. Beta Blocker: Toprol XL 50 mg daily               Aldosterone Antagonist: Spironolactone                       Diuretic: Torsemide 20 mg daily.

## 2022-06-28 RX ORDER — METOLAZONE 2.5 MG/1
2.5 TABLET ORAL DAILY PRN
Qty: 10 TABLET | Refills: 0 | Status: ON HOLD | OUTPATIENT
Start: 2022-06-28 | End: 2022-07-10 | Stop reason: HOSPADM

## 2022-07-07 ENCOUNTER — HOSPITAL ENCOUNTER (INPATIENT)
Age: 77
LOS: 3 days | Discharge: HOME | DRG: 603 | End: 2022-07-10
Attending: INTERNAL MEDICINE | Admitting: INTERNAL MEDICINE
Payer: MEDICARE

## 2022-07-07 DIAGNOSIS — N17.0 ACUTE KIDNEY INJURY (AKI) WITH ACUTE TUBULAR NECROSIS (ATN) (HCC): ICD-10-CM

## 2022-07-07 DIAGNOSIS — L03.115 CELLULITIS OF BOTH LOWER EXTREMITIES: Primary | ICD-10-CM

## 2022-07-07 DIAGNOSIS — E87.6 HYPOKALEMIA: ICD-10-CM

## 2022-07-07 DIAGNOSIS — L03.116 CELLULITIS OF LEFT LOWER EXTREMITY: ICD-10-CM

## 2022-07-07 DIAGNOSIS — L03.116 CELLULITIS OF BOTH LOWER EXTREMITIES: Primary | ICD-10-CM

## 2022-07-07 PROBLEM — L03.90 CELLULITIS: Status: ACTIVE | Noted: 2022-07-07

## 2022-07-07 LAB
A/G RATIO: 1.1 (ref 1.1–2.2)
ALBUMIN SERPL-MCNC: 3.5 G/DL (ref 3.4–5)
ALP BLD-CCNC: 150 U/L (ref 40–129)
ALT SERPL-CCNC: 31 U/L (ref 10–40)
ANION GAP SERPL CALCULATED.3IONS-SCNC: 14 MMOL/L (ref 3–16)
ANISOCYTOSIS: ABNORMAL
AST SERPL-CCNC: 53 U/L (ref 15–37)
BASOPHILS ABSOLUTE: 0 K/UL (ref 0–0.2)
BASOPHILS RELATIVE PERCENT: 0 %
BILIRUB SERPL-MCNC: 0.4 MG/DL (ref 0–1)
BUN BLDV-MCNC: 42 MG/DL (ref 7–20)
CALCIUM SERPL-MCNC: 9.1 MG/DL (ref 8.3–10.6)
CHLORIDE BLD-SCNC: 89 MMOL/L (ref 99–110)
CO2: 30 MMOL/L (ref 21–32)
CREAT SERPL-MCNC: 1.8 MG/DL (ref 0.6–1.2)
EOSINOPHILS ABSOLUTE: 0.2 K/UL (ref 0–0.6)
EOSINOPHILS RELATIVE PERCENT: 2 %
GFR AFRICAN AMERICAN: 33
GFR NON-AFRICAN AMERICAN: 27
GLUCOSE BLD-MCNC: 88 MG/DL (ref 70–99)
HCT VFR BLD CALC: 36.6 % (ref 36–48)
HEMATOLOGY PATH CONSULT: NO
HEMOGLOBIN: 12.3 G/DL (ref 12–16)
LACTIC ACID: 0.9 MMOL/L (ref 0.4–2)
LYMPHOCYTES ABSOLUTE: 1.4 K/UL (ref 1–5.1)
LYMPHOCYTES RELATIVE PERCENT: 15 %
MACROCYTES: ABNORMAL
MAGNESIUM: 2.7 MG/DL (ref 1.8–2.4)
MCH RBC QN AUTO: 28.2 PG (ref 26–34)
MCHC RBC AUTO-ENTMCNC: 33.6 G/DL (ref 31–36)
MCV RBC AUTO: 83.9 FL (ref 80–100)
MONOCYTES ABSOLUTE: 0.7 K/UL (ref 0–1.3)
MONOCYTES RELATIVE PERCENT: 8 %
MYELOCYTE PERCENT: 1 %
NEUTROPHILS ABSOLUTE: 7 K/UL (ref 1.7–7.7)
NEUTROPHILS RELATIVE PERCENT: 74 %
OVALOCYTES: ABNORMAL
PDW BLD-RTO: 16.6 % (ref 12.4–15.4)
PLATELET # BLD: 186 K/UL (ref 135–450)
PMV BLD AUTO: 8.1 FL (ref 5–10.5)
POTASSIUM REFLEX MAGNESIUM: 2.6 MMOL/L (ref 3.5–5.1)
RBC # BLD: 4.36 M/UL (ref 4–5.2)
SODIUM BLD-SCNC: 133 MMOL/L (ref 136–145)
TOTAL PROTEIN: 6.8 G/DL (ref 6.4–8.2)
WBC # BLD: 9.3 K/UL (ref 4–11)

## 2022-07-07 PROCEDURE — 85025 COMPLETE CBC W/AUTO DIFF WBC: CPT

## 2022-07-07 PROCEDURE — 2580000003 HC RX 258: Performed by: PHYSICIAN ASSISTANT

## 2022-07-07 PROCEDURE — 87040 BLOOD CULTURE FOR BACTERIA: CPT

## 2022-07-07 PROCEDURE — 83735 ASSAY OF MAGNESIUM: CPT

## 2022-07-07 PROCEDURE — 80053 COMPREHEN METABOLIC PANEL: CPT

## 2022-07-07 PROCEDURE — 83605 ASSAY OF LACTIC ACID: CPT

## 2022-07-07 PROCEDURE — 6370000000 HC RX 637 (ALT 250 FOR IP): Performed by: PHYSICIAN ASSISTANT

## 2022-07-07 PROCEDURE — 2060000000 HC ICU INTERMEDIATE R&B

## 2022-07-07 PROCEDURE — 99285 EMERGENCY DEPT VISIT HI MDM: CPT

## 2022-07-07 PROCEDURE — 36415 COLL VENOUS BLD VENIPUNCTURE: CPT

## 2022-07-07 RX ORDER — CLINDAMYCIN PHOSPHATE 600 MG/50ML
600 INJECTION INTRAVENOUS ONCE
Status: COMPLETED | OUTPATIENT
Start: 2022-07-07 | End: 2022-07-08

## 2022-07-07 RX ORDER — OXYCODONE HYDROCHLORIDE 10 MG/1
10 TABLET ORAL EVERY 6 HOURS PRN
Status: DISCONTINUED | OUTPATIENT
Start: 2022-07-07 | End: 2022-07-08

## 2022-07-07 RX ORDER — PANTOPRAZOLE SODIUM 40 MG/1
40 TABLET, DELAYED RELEASE ORAL DAILY
Status: DISCONTINUED | OUTPATIENT
Start: 2022-07-08 | End: 2022-07-10 | Stop reason: HOSPADM

## 2022-07-07 RX ORDER — ROSUVASTATIN CALCIUM 10 MG/1
10 TABLET, COATED ORAL EVERY EVENING
Status: DISCONTINUED | OUTPATIENT
Start: 2022-07-08 | End: 2022-07-08

## 2022-07-07 RX ORDER — DULOXETIN HYDROCHLORIDE 60 MG/1
60 CAPSULE, DELAYED RELEASE ORAL DAILY
Status: DISCONTINUED | OUTPATIENT
Start: 2022-07-08 | End: 2022-07-10 | Stop reason: HOSPADM

## 2022-07-07 RX ORDER — TIZANIDINE HYDROCHLORIDE 4 MG/1
4 CAPSULE, GELATIN COATED ORAL 3 TIMES DAILY PRN
Status: DISCONTINUED | OUTPATIENT
Start: 2022-07-07 | End: 2022-07-08

## 2022-07-07 RX ORDER — ENOXAPARIN SODIUM 100 MG/ML
30 INJECTION SUBCUTANEOUS DAILY
Status: CANCELLED | OUTPATIENT
Start: 2022-07-08

## 2022-07-07 RX ORDER — LEVOTHYROXINE SODIUM 88 UG/1
88 TABLET ORAL DAILY
Status: DISCONTINUED | OUTPATIENT
Start: 2022-07-08 | End: 2022-07-10 | Stop reason: HOSPADM

## 2022-07-07 RX ORDER — EZETIMIBE 10 MG/1
10 TABLET ORAL DAILY
Status: DISCONTINUED | OUTPATIENT
Start: 2022-07-08 | End: 2022-07-10 | Stop reason: HOSPADM

## 2022-07-07 RX ORDER — AMIODARONE HYDROCHLORIDE 200 MG/1
200 TABLET ORAL DAILY
Status: DISCONTINUED | OUTPATIENT
Start: 2022-07-08 | End: 2022-07-10 | Stop reason: HOSPADM

## 2022-07-07 RX ORDER — METOPROLOL SUCCINATE 50 MG/1
50 TABLET, EXTENDED RELEASE ORAL DAILY
Status: DISCONTINUED | OUTPATIENT
Start: 2022-07-08 | End: 2022-07-10 | Stop reason: HOSPADM

## 2022-07-07 RX ORDER — SODIUM CHLORIDE 9 MG/ML
INJECTION, SOLUTION INTRAVENOUS ONCE
Status: COMPLETED | OUTPATIENT
Start: 2022-07-07 | End: 2022-07-08

## 2022-07-07 RX ADMIN — SODIUM CHLORIDE: 9 INJECTION, SOLUTION INTRAVENOUS at 23:40

## 2022-07-07 RX ADMIN — POTASSIUM BICARBONATE 40 MEQ: 782 TABLET, EFFERVESCENT ORAL at 22:55

## 2022-07-07 ASSESSMENT — PAIN DESCRIPTION - ORIENTATION: ORIENTATION: RIGHT;LEFT

## 2022-07-07 ASSESSMENT — ENCOUNTER SYMPTOMS
SORE THROAT: 0
BACK PAIN: 0
EYE PAIN: 0
VOMITING: 0
ABDOMINAL PAIN: 0
NAUSEA: 0
SHORTNESS OF BREATH: 0
COUGH: 0

## 2022-07-07 ASSESSMENT — PAIN - FUNCTIONAL ASSESSMENT: PAIN_FUNCTIONAL_ASSESSMENT: 0-10

## 2022-07-07 ASSESSMENT — PAIN DESCRIPTION - FREQUENCY: FREQUENCY: CONTINUOUS

## 2022-07-07 ASSESSMENT — PAIN DESCRIPTION - LOCATION: LOCATION: LEG

## 2022-07-07 ASSESSMENT — PAIN SCALES - GENERAL: PAINLEVEL_OUTOF10: 3

## 2022-07-07 ASSESSMENT — PAIN DESCRIPTION - DESCRIPTORS: DESCRIPTORS: ACHING

## 2022-07-07 ASSESSMENT — PAIN DESCRIPTION - PAIN TYPE: TYPE: ACUTE PAIN

## 2022-07-08 PROBLEM — E87.6 HYPOKALEMIA: Status: ACTIVE | Noted: 2022-07-08

## 2022-07-08 LAB
A/G RATIO: 1 (ref 1.1–2.2)
ALBUMIN SERPL-MCNC: 3 G/DL (ref 3.4–5)
ALP BLD-CCNC: 140 U/L (ref 40–129)
ALT SERPL-CCNC: 34 U/L (ref 10–40)
ANION GAP SERPL CALCULATED.3IONS-SCNC: 14 MMOL/L (ref 3–16)
ANION GAP SERPL CALCULATED.3IONS-SCNC: 9 MMOL/L (ref 3–16)
AST SERPL-CCNC: 64 U/L (ref 15–37)
BASOPHILS ABSOLUTE: 0.1 K/UL (ref 0–0.2)
BASOPHILS RELATIVE PERCENT: 0.9 %
BILIRUB SERPL-MCNC: 0.3 MG/DL (ref 0–1)
BUN BLDV-MCNC: 23 MG/DL (ref 7–20)
BUN BLDV-MCNC: 33 MG/DL (ref 7–20)
CALCIUM SERPL-MCNC: 8.6 MG/DL (ref 8.3–10.6)
CALCIUM SERPL-MCNC: 8.7 MG/DL (ref 8.3–10.6)
CHLORIDE BLD-SCNC: 94 MMOL/L (ref 99–110)
CHLORIDE BLD-SCNC: 99 MMOL/L (ref 99–110)
CO2: 27 MMOL/L (ref 21–32)
CO2: 28 MMOL/L (ref 21–32)
CREAT SERPL-MCNC: 1.1 MG/DL (ref 0.6–1.2)
CREAT SERPL-MCNC: 1.4 MG/DL (ref 0.6–1.2)
EOSINOPHILS ABSOLUTE: 0.4 K/UL (ref 0–0.6)
EOSINOPHILS RELATIVE PERCENT: 5.4 %
GFR AFRICAN AMERICAN: 44
GFR AFRICAN AMERICAN: 58
GFR NON-AFRICAN AMERICAN: 36
GFR NON-AFRICAN AMERICAN: 48
GLUCOSE BLD-MCNC: 124 MG/DL (ref 70–99)
GLUCOSE BLD-MCNC: 80 MG/DL (ref 70–99)
HCT VFR BLD CALC: 35.3 % (ref 36–48)
HEMOGLOBIN: 11.6 G/DL (ref 12–16)
LYMPHOCYTES ABSOLUTE: 1.3 K/UL (ref 1–5.1)
LYMPHOCYTES RELATIVE PERCENT: 18.7 %
MCH RBC QN AUTO: 28.2 PG (ref 26–34)
MCHC RBC AUTO-ENTMCNC: 32.8 G/DL (ref 31–36)
MCV RBC AUTO: 86 FL (ref 80–100)
MONOCYTES ABSOLUTE: 1.2 K/UL (ref 0–1.3)
MONOCYTES RELATIVE PERCENT: 17.2 %
NEUTROPHILS ABSOLUTE: 4.2 K/UL (ref 1.7–7.7)
NEUTROPHILS RELATIVE PERCENT: 57.8 %
PDW BLD-RTO: 16.6 % (ref 12.4–15.4)
PLATELET # BLD: 185 K/UL (ref 135–450)
PMV BLD AUTO: 8 FL (ref 5–10.5)
POTASSIUM SERPL-SCNC: 2.8 MMOL/L (ref 3.5–5.1)
POTASSIUM SERPL-SCNC: 3.2 MMOL/L (ref 3.5–5.1)
RBC # BLD: 4.1 M/UL (ref 4–5.2)
SODIUM BLD-SCNC: 135 MMOL/L (ref 136–145)
SODIUM BLD-SCNC: 136 MMOL/L (ref 136–145)
TOTAL PROTEIN: 6.1 G/DL (ref 6.4–8.2)
WBC # BLD: 7.2 K/UL (ref 4–11)

## 2022-07-08 PROCEDURE — 99223 1ST HOSP IP/OBS HIGH 75: CPT | Performed by: INTERNAL MEDICINE

## 2022-07-08 PROCEDURE — 94761 N-INVAS EAR/PLS OXIMETRY MLT: CPT

## 2022-07-08 PROCEDURE — 6370000000 HC RX 637 (ALT 250 FOR IP): Performed by: STUDENT IN AN ORGANIZED HEALTH CARE EDUCATION/TRAINING PROGRAM

## 2022-07-08 PROCEDURE — 6360000002 HC RX W HCPCS: Performed by: INTERNAL MEDICINE

## 2022-07-08 PROCEDURE — 93880 EXTRACRANIAL BILAT STUDY: CPT

## 2022-07-08 PROCEDURE — 36415 COLL VENOUS BLD VENIPUNCTURE: CPT

## 2022-07-08 PROCEDURE — 6360000002 HC RX W HCPCS: Performed by: STUDENT IN AN ORGANIZED HEALTH CARE EDUCATION/TRAINING PROGRAM

## 2022-07-08 PROCEDURE — 80053 COMPREHEN METABOLIC PANEL: CPT

## 2022-07-08 PROCEDURE — 2580000003 HC RX 258: Performed by: STUDENT IN AN ORGANIZED HEALTH CARE EDUCATION/TRAINING PROGRAM

## 2022-07-08 PROCEDURE — 6370000000 HC RX 637 (ALT 250 FOR IP): Performed by: INTERNAL MEDICINE

## 2022-07-08 PROCEDURE — 85025 COMPLETE CBC W/AUTO DIFF WBC: CPT

## 2022-07-08 PROCEDURE — 2500000003 HC RX 250 WO HCPCS: Performed by: INTERNAL MEDICINE

## 2022-07-08 PROCEDURE — 2500000003 HC RX 250 WO HCPCS: Performed by: PHYSICIAN ASSISTANT

## 2022-07-08 PROCEDURE — 2060000000 HC ICU INTERMEDIATE R&B

## 2022-07-08 PROCEDURE — 87081 CULTURE SCREEN ONLY: CPT

## 2022-07-08 PROCEDURE — 94640 AIRWAY INHALATION TREATMENT: CPT

## 2022-07-08 RX ORDER — HEPARIN SODIUM 5000 [USP'U]/ML
5000 INJECTION, SOLUTION INTRAVENOUS; SUBCUTANEOUS EVERY 8 HOURS SCHEDULED
Status: DISCONTINUED | OUTPATIENT
Start: 2022-07-08 | End: 2022-07-10 | Stop reason: HOSPADM

## 2022-07-08 RX ORDER — OXYCODONE HYDROCHLORIDE 10 MG/1
10 TABLET ORAL
Status: DISCONTINUED | OUTPATIENT
Start: 2022-07-08 | End: 2022-07-09

## 2022-07-08 RX ORDER — POTASSIUM CHLORIDE 7.45 MG/ML
40 INJECTION INTRAVENOUS
Status: DISCONTINUED | OUTPATIENT
Start: 2022-07-08 | End: 2022-07-08

## 2022-07-08 RX ORDER — SODIUM CHLORIDE 0.9 % (FLUSH) 0.9 %
5-40 SYRINGE (ML) INJECTION EVERY 12 HOURS SCHEDULED
Status: DISCONTINUED | OUTPATIENT
Start: 2022-07-08 | End: 2022-07-10 | Stop reason: HOSPADM

## 2022-07-08 RX ORDER — SODIUM CHLORIDE 0.9 % (FLUSH) 0.9 %
5-40 SYRINGE (ML) INJECTION PRN
Status: DISCONTINUED | OUTPATIENT
Start: 2022-07-08 | End: 2022-07-10 | Stop reason: HOSPADM

## 2022-07-08 RX ORDER — POLYETHYLENE GLYCOL 3350 17 G/17G
17 POWDER, FOR SOLUTION ORAL DAILY
Status: DISCONTINUED | OUTPATIENT
Start: 2022-07-08 | End: 2022-07-10 | Stop reason: HOSPADM

## 2022-07-08 RX ORDER — DULOXETIN HYDROCHLORIDE 60 MG/1
60 CAPSULE, DELAYED RELEASE ORAL DAILY
COMMUNITY

## 2022-07-08 RX ORDER — POLYETHYLENE GLYCOL 3350 17 G/17G
17 POWDER, FOR SOLUTION ORAL DAILY PRN
Status: DISCONTINUED | OUTPATIENT
Start: 2022-07-08 | End: 2022-07-08

## 2022-07-08 RX ORDER — LACTOBACILLUS RHAMNOSUS GG 10B CELL
1 CAPSULE ORAL
Status: DISCONTINUED | OUTPATIENT
Start: 2022-07-09 | End: 2022-07-10 | Stop reason: HOSPADM

## 2022-07-08 RX ORDER — ACETAMINOPHEN 325 MG/1
650 TABLET ORAL EVERY 6 HOURS PRN
Status: DISCONTINUED | OUTPATIENT
Start: 2022-07-08 | End: 2022-07-10 | Stop reason: HOSPADM

## 2022-07-08 RX ORDER — ROSUVASTATIN CALCIUM 40 MG/1
40 TABLET, COATED ORAL NIGHTLY
Status: DISCONTINUED | OUTPATIENT
Start: 2022-07-08 | End: 2022-07-10 | Stop reason: HOSPADM

## 2022-07-08 RX ORDER — POTASSIUM CHLORIDE 7.45 MG/ML
10 INJECTION INTRAVENOUS
Status: COMPLETED | OUTPATIENT
Start: 2022-07-08 | End: 2022-07-08

## 2022-07-08 RX ORDER — TIZANIDINE 4 MG/1
2 TABLET ORAL 3 TIMES DAILY PRN
Status: DISCONTINUED | OUTPATIENT
Start: 2022-07-08 | End: 2022-07-10 | Stop reason: HOSPADM

## 2022-07-08 RX ORDER — ACETAMINOPHEN 650 MG/1
650 SUPPOSITORY RECTAL EVERY 6 HOURS PRN
Status: DISCONTINUED | OUTPATIENT
Start: 2022-07-08 | End: 2022-07-10 | Stop reason: HOSPADM

## 2022-07-08 RX ORDER — ONDANSETRON 2 MG/ML
4 INJECTION INTRAMUSCULAR; INTRAVENOUS EVERY 6 HOURS PRN
Status: DISCONTINUED | OUTPATIENT
Start: 2022-07-08 | End: 2022-07-10 | Stop reason: HOSPADM

## 2022-07-08 RX ORDER — ONDANSETRON 4 MG/1
4 TABLET, ORALLY DISINTEGRATING ORAL EVERY 8 HOURS PRN
Status: DISCONTINUED | OUTPATIENT
Start: 2022-07-08 | End: 2022-07-10 | Stop reason: HOSPADM

## 2022-07-08 RX ORDER — SODIUM CHLORIDE 9 MG/ML
INJECTION, SOLUTION INTRAVENOUS PRN
Status: DISCONTINUED | OUTPATIENT
Start: 2022-07-08 | End: 2022-07-10 | Stop reason: HOSPADM

## 2022-07-08 RX ORDER — POTASSIUM CHLORIDE 750 MG/1
20 TABLET, FILM COATED, EXTENDED RELEASE ORAL 3 TIMES DAILY
Status: DISCONTINUED | OUTPATIENT
Start: 2022-07-08 | End: 2022-07-10 | Stop reason: HOSPADM

## 2022-07-08 RX ORDER — CLINDAMYCIN PHOSPHATE 600 MG/50ML
600 INJECTION INTRAVENOUS EVERY 8 HOURS
Status: DISCONTINUED | OUTPATIENT
Start: 2022-07-08 | End: 2022-07-10 | Stop reason: HOSPADM

## 2022-07-08 RX ADMIN — MOMETASONE FUROATE AND FORMOTEROL FUMARATE DIHYDRATE 2 PUFF: 200; 5 AEROSOL RESPIRATORY (INHALATION) at 00:46

## 2022-07-08 RX ADMIN — EZETIMIBE 10 MG: 10 TABLET ORAL at 18:27

## 2022-07-08 RX ADMIN — MOMETASONE FUROATE AND FORMOTEROL FUMARATE DIHYDRATE 2 PUFF: 200; 5 AEROSOL RESPIRATORY (INHALATION) at 19:38

## 2022-07-08 RX ADMIN — OXYCODONE HYDROCHLORIDE 10 MG: 10 TABLET ORAL at 21:11

## 2022-07-08 RX ADMIN — CLINDAMYCIN PHOSPHATE 600 MG: 600 INJECTION, SOLUTION INTRAVENOUS at 18:35

## 2022-07-08 RX ADMIN — PANTOPRAZOLE SODIUM 40 MG: 40 TABLET, DELAYED RELEASE ORAL at 10:00

## 2022-07-08 RX ADMIN — POTASSIUM CHLORIDE 20 MEQ: 750 TABLET, FILM COATED, EXTENDED RELEASE ORAL at 21:12

## 2022-07-08 RX ADMIN — HEPARIN SODIUM 5000 UNITS: 5000 INJECTION INTRAVENOUS; SUBCUTANEOUS at 14:20

## 2022-07-08 RX ADMIN — CEFAZOLIN SODIUM 1000 MG: 1 INJECTION, POWDER, FOR SOLUTION INTRAMUSCULAR; INTRAVENOUS at 02:17

## 2022-07-08 RX ADMIN — METOPROLOL SUCCINATE 50 MG: 50 TABLET, EXTENDED RELEASE ORAL at 11:28

## 2022-07-08 RX ADMIN — Medication 10 MEQ: at 16:43

## 2022-07-08 RX ADMIN — POTASSIUM CHLORIDE 20 MEQ: 750 TABLET, FILM COATED, EXTENDED RELEASE ORAL at 10:06

## 2022-07-08 RX ADMIN — ROSUVASTATIN CALCIUM 40 MG: 40 TABLET, FILM COATED ORAL at 21:11

## 2022-07-08 RX ADMIN — AMIODARONE HYDROCHLORIDE 200 MG: 200 TABLET ORAL at 10:06

## 2022-07-08 RX ADMIN — MOMETASONE FUROATE AND FORMOTEROL FUMARATE DIHYDRATE 2 PUFF: 200; 5 AEROSOL RESPIRATORY (INHALATION) at 09:17

## 2022-07-08 RX ADMIN — CEFAZOLIN SODIUM 1000 MG: 1 INJECTION, POWDER, FOR SOLUTION INTRAMUSCULAR; INTRAVENOUS at 19:11

## 2022-07-08 RX ADMIN — OXYCODONE HYDROCHLORIDE 10 MG: 10 TABLET ORAL at 10:06

## 2022-07-08 RX ADMIN — Medication 10 MEQ: at 14:26

## 2022-07-08 RX ADMIN — OXYCODONE HYDROCHLORIDE 10 MG: 10 TABLET ORAL at 18:27

## 2022-07-08 RX ADMIN — POLYETHYLENE GLYCOL 3350 17 G: 17 POWDER, FOR SOLUTION ORAL at 10:15

## 2022-07-08 RX ADMIN — Medication 10 MEQ: at 18:29

## 2022-07-08 RX ADMIN — CLINDAMYCIN PHOSPHATE 600 MG: 600 INJECTION, SOLUTION INTRAVENOUS at 11:31

## 2022-07-08 RX ADMIN — Medication 10 ML: at 21:12

## 2022-07-08 RX ADMIN — OXYCODONE HYDROCHLORIDE 10 MG: 10 TABLET ORAL at 14:20

## 2022-07-08 RX ADMIN — POTASSIUM CHLORIDE 20 MEQ: 750 TABLET, FILM COATED, EXTENDED RELEASE ORAL at 14:20

## 2022-07-08 RX ADMIN — OXYCODONE HYDROCHLORIDE 10 MG: 10 TABLET ORAL at 01:33

## 2022-07-08 RX ADMIN — DULOXETINE HYDROCHLORIDE 60 MG: 60 CAPSULE, DELAYED RELEASE ORAL at 10:07

## 2022-07-08 RX ADMIN — SODIUM CHLORIDE: 9 INJECTION, SOLUTION INTRAVENOUS at 18:33

## 2022-07-08 RX ADMIN — SODIUM CHLORIDE: 9 INJECTION, SOLUTION INTRAVENOUS at 14:24

## 2022-07-08 RX ADMIN — HEPARIN SODIUM 5000 UNITS: 5000 INJECTION INTRAVENOUS; SUBCUTANEOUS at 21:12

## 2022-07-08 RX ADMIN — CLINDAMYCIN PHOSPHATE 600 MG: 600 INJECTION, SOLUTION INTRAVENOUS at 00:12

## 2022-07-08 RX ADMIN — Medication 10 MEQ: at 10:15

## 2022-07-08 ASSESSMENT — PAIN DESCRIPTION - LOCATION
LOCATION: LEG
LOCATION: BACK;LEG
LOCATION: BACK

## 2022-07-08 ASSESSMENT — PAIN DESCRIPTION - ORIENTATION
ORIENTATION: RIGHT;LEFT
ORIENTATION: RIGHT;LEFT
ORIENTATION: RIGHT;LEFT;LOWER
ORIENTATION: LOWER;UPPER;MID
ORIENTATION: LEFT
ORIENTATION: RIGHT;LEFT
ORIENTATION: RIGHT;LEFT;LOWER

## 2022-07-08 ASSESSMENT — PAIN DESCRIPTION - ONSET
ONSET: ON-GOING

## 2022-07-08 ASSESSMENT — PAIN SCALES - GENERAL
PAINLEVEL_OUTOF10: 7
PAINLEVEL_OUTOF10: 0
PAINLEVEL_OUTOF10: 5
PAINLEVEL_OUTOF10: 9
PAINLEVEL_OUTOF10: 8
PAINLEVEL_OUTOF10: 6
PAINLEVEL_OUTOF10: 6

## 2022-07-08 ASSESSMENT — PAIN DESCRIPTION - DESCRIPTORS
DESCRIPTORS: ACHING
DESCRIPTORS: ACHING;SHARP
DESCRIPTORS: ACHING
DESCRIPTORS: ACHING

## 2022-07-08 ASSESSMENT — PAIN DESCRIPTION - PAIN TYPE
TYPE: ACUTE PAIN
TYPE: ACUTE PAIN;CHRONIC PAIN
TYPE: ACUTE PAIN;CHRONIC PAIN

## 2022-07-08 ASSESSMENT — PAIN DESCRIPTION - FREQUENCY
FREQUENCY: CONTINUOUS

## 2022-07-08 NOTE — PROGRESS NOTES
Pt sitting up in chair in afternoon, daughter at bedside. She c/o ongoing pain in BLE. Scheduled pain medication administered and pt elevating BLE. K+ replacement running, as well as IV abx. Will continue to monitor.

## 2022-07-08 NOTE — ED NOTES
Report called to [facility]. All questions answered. Jorge Alberto [facility] EDT to transport patient.       Micheal Garvey RN  07/08/22 [id number]

## 2022-07-08 NOTE — PROGRESS NOTES
Pt arrived to room 3118 from ED. Vital signs taken and were WNL. Admission and assessment completed. Pt oriented to room, bed, phone, and call light. Fall prevention contract reviewed and signed. Fall precautions in place. Call light, bedside table and phone within easy reach. Pt instructed to use call light to call for assistance.

## 2022-07-08 NOTE — PLAN OF CARE
Problem: Discharge Planning  Goal: Discharge to home or other facility with appropriate resources  Outcome: Progressing     Problem: Pain  Goal: Verbalizes/displays adequate comfort level or baseline comfort level  Outcome: Progressing     Problem: Safety - Adult  Goal: Free from fall injury  Outcome: Progressing  Note: Pt free from injury or falls at this time, fall precautions in place, bed in low position, side rail up x2, [place] Fall Risk: Medium (25-44), bed alarm not on as pt UAL, reoriented to room and call light, reminded to call if needing assistance, call light in reach, will continue to monitor. Pt verbalizes understanding of fall risk procedures. Problem: ABCDS Injury Assessment  Goal: Absence of physical injury  Outcome: Progressing  Note: Pt has not incurred any type of physical injury this shift. Problem: Chronic Conditions and Co-morbidities  Goal: Patient's chronic conditions and co-morbidity symptoms are monitored and maintained or improved  Outcome: Progressing  Note: Monitoring pt's chronic medical conditions and will report any changes to MD.     Problem: Infection - Adult  Goal: Absence of infection at discharge  Outcome: Progressing  Note: Pt has been afebrile since admission; however, BLE are red, edematous, and hot to touch. Will monitor. Goal: Absence of infection during hospitalization  Outcome: Progressing  Note: Pt has been afebrile since admission; however, BLE are red, edematous, and hot to touch. Will monitor.

## 2022-07-08 NOTE — H&P
Internal Medicine History and Physical  CC:leg pain  HPI:75 yo  Female with emphysema and PAD and hypothyroidism who saw me for left leg cellulitis and was placed on keflex however since then she has gotten worsening redness and pain in both legs. She took 4 doses of the keflex. ER boss also revealed low potassium     Principal Problem:    Cellulitis  Active Problems:    Pulmonary emphysema (HCC)    Intermittent claudication (HCC)    Pulmonary HTN (HCC)    Hypothyroidism    Abdominal aortic aneurysm (HCC)    Hypothyroidism due to Hashimoto's thyroiditis  Resolved Problems:    * No resolved hospital problems. 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 since 26.  Chronic diastolic CHF (congestive heart failure) ([address] 2016    grade II    COPD, mild (HCC)     CVA (cerebral infarction) 2013    left cerebral cortex x2/expressive aphasia/resolved    Diverticulosis     Epistaxis, recurrent     when inr high.  last 3-4 months ago    Former smoker     Gallbladder sludge     HTN (hypertension)     Hyperlipidemia     Hypothyroidism     Lumbar stenosis without neurogenic claudication 2017    pain across low back worse with standing and walking, nonradiating    Macular degeneration 2018    left worse than right , dry : progressive loss of sight: just barely able to see to drive    Neuropathy     Nonrheumatic mitral valve regurgitation     Obstructive sleep apnea     Osteoarthritis     Pulmonary HTN ([facility]     primary, responsive to nitrates    PVD (peripheral vascular disease) ([facility]     occluded right SFA::: asymptomatic NIKOS 0.7 right and 1.0 left    Sacral fracture, closed ([address]     Syncope 2014      Past Surgical History:   Procedure Laterality Date    ABLATION OF DYSRHYTHMIC FOCUS  2022    AORTIC VALVE REPLACEMENT  6/16/15    Dr. Radha Treviño. Hernandez - PVI, RENETTA exclusion. 19mm Maki pericardial Magna    APPENDECTOMY      BACK SURGERY  03/15/2019    superion inserted to keep back from hurtin [facility] [address] N/A 2019    EMERGENT; LAPAROSCOPIC CHOLECYSTECTOMY WITH GRAM [facility]      stent x 1    CYSTOSCOPY  2014    dr Kieran Abraham      THR Right    OTHER SURGICAL HISTORY  2018     EGD and colonoscopy.     PACEMAKER INSERTION  2022    PAIN MANAGEMENT PROCEDURE Bilateral 2021    BILATERAL L3, L4, L5 MEDIAL BRANCH BLOCK WITH FLUOROSCOPY performed by Akua Mckeon MD at [address] [place] [address] Bilateral 2021    BILATERAL L3, L4, L5 MEDIAL BRANCH BLOCKS WITH FLUOROSCOPY (22793, 97885) performed by Akua Mckeon MD at [address] [place] [address] Bilateral 2021    BILATERAL L3, L4, L5 RADIOFREQUENCY ABLATION WITH FLUOROSCOPY (50521, 16746) performed by Akua Mckeon MD at [address] [place] [address] N/A 3/15/2019    INSERTION OF INTERSPINOUS SPACER Arno Jose L AT LUMBAR FOUR-LUMBAR FIVE performed by Casie Forrest MD at [place] [address]  12/15/2017      Medications Prior to Admission: cephALEXin (KEFLEX) 500 MG capsule, Take 1 capsule by mouth 3 times daily  metOLazone (ZAROXOLYN) 2.5 MG tablet, Take 1 tablet by mouth daily as needed (For weight gain, swelling)  ADVAIR DISKUS 250-50 MCG/ACT AEPB diskus inhaler, INHALE 1 PUFF BY MOUTH 2 TIMES A DAY EVERY 12 HOURS  torsemide (DEMADEX) 20 MG tablet, TAKE 1 TABLET BY MOUTH EVERY MORNING.  TAKE EXTRA PILL ON DAYS WEIGHT INCREASES BY 2+ LBS  valsartan (DIOVAN) 40 mg tablet, Take 0.5 tablets by mouth daily  amiodarone (CORDARONE) 200 MG tablet, TAKE ONE TABLET BY MOUTH NIGHTLY  ezetimibe (ZETIA) 10 MG tablet, TAKE ONE TABLET BY MOUTH DAILY  metoprolol succinate (TOPROL XL) 50 MG extended release tablet, TAKE ONE TABLET BY MOUTH DAILY  pantoprazole (PROTONIX) 40 MG tablet, TAKE ONE TABLET BY MOUTH DAILY  rosuvastatin (CRESTOR) 40 MG tablet, TAKE ONE TABLET BY MOUTH EVERY EVENING  tiZANidine (ZANAFLEX) 4 MG capsule, TAKE ONE-HALF TABLET BY MOUTH EVERY 8 HOURS AS NEEDED (MUSCLE SPASM)  sennosides-docusate sodium (SENOKOT-S) 8.6-50 MG tablet, Take 1 tablet by mouth in the morning and at bedtime  potassium chloride (KLOR-CON) 10 MEQ extended release tablet, TAKE ONE TABLET BY MOUTH TWO TIMES A DAY (Patient taking differently: Take 10 mEq by mouth daily )  Fluticasone-Salmeterol,sensor, 232-14 MCG/ACT AEPB, 2 puffs bid  levothyroxine (SYNTHROID) 88 MCG tablet, Take 1 tablet by mouth Daily  docusate sodium (DOK) 100 MG capsule, TAKE ONE CAPSULE BY MOUTH TWO TIMES A DAY  diclofenac sodium (VOLTAREN) 1 % GEL, Apply 2 g topically daily  oxyCODONE HCl (OXY-IR) 10 MG immediate release tablet, Take 10 mg by mouth every 6 hours as needed for Pain.  ipratropium (ATROVENT) 0.03 % nasal spray, INHALE 2 DOSES INTO EACH NOSTRIL THREE TIMES A DAY IF NEEDED FOR RHINITIS  DULoxetine (CYMBALTA) 30 MG extended release capsule, TAKE 1 CAPSULE BY MOUTH DAILY EVERY MORNING (Patient taking differently: Take 60 mg by mouth daily Every morning)  Allergies   Allergen Reactions    Benadryl [Diphenhydramine] Swelling    Doxylamine     Mucinex [Guaifenesin Er] hallucinations    Spiriva Handihaler [Tiotropium Bromide Monohydrate]      Nausea      Adhesive Tape Rash and Swelling    Neosporin [Bacitracin-Polymyxin B] Other (See Comments)     Rash that spread across face with swelling      Social History     Tobacco Use    Smoking status: Current Every Day Smoker     Packs/day: 1.00     Years: 45.00     Pack years: 45.00     Types: Cigarettes    Smokeless tobacco: Former User    Tobacco comment: smoked 1.5 pp for over 30 years  over 45pk year hx   Substance Use Topics    Alcohol use: No     Alcohol/week: 0.0 standard drinks     Comment: Socially       Family History   Problem Relation Age of Onset    Breast Cancer Daughter         Prior to Admission medications    Medication Sig Start Date End Date Taking? Authorizing Provider   cephALEXin (KEFLEX) 500 MG capsule Take 1 capsule by mouth 3 times daily 7/5/22   Billy Parks MD   metOLazone (ZAROXOLYN) 2.5 MG tablet Take 1 tablet by mouth daily as needed (For weight gain, swelling) 6/28/22   HOWARD Harper - CNP   ADVAIR DISKUS 250-50 MCG/ACT AEPB diskus inhaler INHALE 1 PUFF BY MOUTH 2 TIMES A DAY EVERY 12 HOURS 6/28/22   Blily Parks MD   torsemide (DEMADEX) 20 MG tablet TAKE 1 TABLET BY MOUTH EVERY MORNING.  TAKE EXTRA PILL ON DAYS WEIGHT INCREASES BY 2+ LBS 6/28/22   Billy Parks MD   valsartan (DIOVAN) 40 mg tablet Take 0.5 tablets by mouth daily 5/25/22   Britany Preciado MD   amiodarone (CORDARONE) 200 MG tablet TAKE ONE TABLET BY MOUTH NIGHTLY 5/6/22   Billy Parks MD   ezetimibe (ZETIA) 10 MG tablet TAKE ONE TABLET BY MOUTH DAILY 5/6/22   Billy Parks MD   metoprolol succinate (TOPROL XL) 50 MG extended release tablet TAKE ONE TABLET BY MOUTH DAILY 5/6/22   Billy Parks MD   pantoprazole (PROTONIX) 40 MG tablet TAKE ONE TABLET BY MOUTH DAILY 5/6/22   Billy Parks MD   rosuvastatin (CRESTOR) 40 MG tablet TAKE ONE TABLET BY MOUTH EVERY EVENING 5/6/22   Billy Parks MD   tiZANidine (ZANAFLEX) 4 MG capsule TAKE ONE-HALF TABLET BY MOUTH EVERY 8 HOURS AS NEEDED (MUSCLE SPASM) 5/6/22   Thad Shi MD   sennosides-docusate sodium (SENOKOT-S) 8.6-50 MG tablet Take 1 tablet by mouth in the morning and at bedtime 3/14/22   Thad Sih MD   potassium chloride (KLOR-CON) 10 MEQ extended release tablet TAKE ONE TABLET BY MOUTH TWO TIMES A DAY  Patient taking differently: Take 10 mEq by mouth daily  12/22/21   Thad Shi MD   Fluticasone-Salmeterol,sensor, 385-18 MCG/ACT AEPB 2 puffs bid 11/26/21   Thad Shi MD   levothyroxine (SYNTHROID) 88 MCG tablet Take 1 tablet by mouth Daily 11/10/21   Yung Quinn MD   docusate sodium (DOK) 100 MG capsule TAKE ONE CAPSULE BY MOUTH TWO TIMES A DAY 11/1/21   Thad Shi MD   diclofenac sodium (VOLTAREN) 1 % GEL Apply 2 g topically daily    Historical Provider, MD   oxyCODONE HCl (OXY-IR) 10 MG immediate release tablet Take 10 mg by mouth every 6 hours as needed for Pain. Historical Provider, MD   ipratropium (ATROVENT) 0.03 % nasal spray INHALE 2 DOSES INTO EACH NOSTRIL THREE TIMES A DAY IF NEEDED FOR RHINITIS 10/19/21   Thad Shi MD   DULoxetine (CYMBALTA) 30 MG extended release capsule TAKE 1 CAPSULE BY MOUTH DAILY EVERY MORNING  Patient taking differently: Take 60 mg by mouth daily Every morning 8/17/21   Thad Shi MD     Review of Systems  A comprehensive review of systems was negative except for: leg pain  Leg redness    Objective:     Patient Vitals for the past 8 hrs:   BP Temp Temp src Pulse Resp SpO2   07/08/22 1128 (!) 130/58 97.8 °F (36.6 °C) Oral 56 16 97 %   07/08/22 0917 -- -- -- -- 16 96 %   07/08/22 0800 (!) 147/53 -- -- 52 19 --   07/08/22 0715 -- -- -- 55 19 97 %   07/08/22 0615 (!) 136/54 97.6 °F (36.4 °C) Oral 70 21 98 %     No intake/output data recorded. No intake/output data recorded.     VITALS:  BP (!) 130/58   Pulse 56   Temp 97.8 °F (36.6 °C) (Oral)   Resp 16   Ht 5' 2.5\" (1.588 m)   Wt 125 lb 10.6 oz (57 kg)   SpO2 97%   BMI 22.62 kg/m² General Appearance: alert and oriented to person, place and time, well-developed and well-nourished, in no acute distress  Skin: warm and dry, no rash or erythema  Head: normocephalic and atraumatic  Eyes: conjunctivae normal and sclera anicteric  ENT: hearing grossly normal bilaterally and sinuses non-tender  Neck: neck supple and non tender without mass, no thyromegaly or thyroid nodules, no cervical lymphadenopathy   Pulmonary/Chest: clear to auscultation bilaterally- no wheezes, rales or rhonchi, normal air movement, no respiratory distress  Cardiovascular: normal rate, normal S1 and S2, no gallops and no carotid bruits  Abdomen: soft, non-tender, non-distended, normal bowel sounds, no masses or organomegaly  Extremities: unable to feel pedal pulses both calves red and warm very tender to the touch  Musculoskeletal: no swollen joints and no tender joints,  Neurologic: coordination normal and speech normal, moves all 4 extremities        Data Review:     Recent Results (from the past 24 hour(s))   CBC with Auto Differential    Collection Time: 07/07/22  8:57 PM   Result Value Ref Range    WBC 9.3 4.0 - 11.0 K/uL    RBC 4.36 4.00 - 5.20 M/uL    Hemoglobin 12.3 12.0 - 16.0 g/dL    Hematocrit 36.6 36.0 - 48.0 %    MCV 83.9 80.0 - 100.0 fL    MCH 28.2 26.0 - 34.0 pg    MCHC 33.6 31.0 - 36.0 g/dL    RDW 16.6 (H) 12.4 - 15.4 %    Platelets 263 151 - 444 K/uL    MPV 8.1 5.0 - 10.5 fL    Path Consult No     Neutrophils % 74.0 %    Lymphocytes % 15.0 %    Monocytes % 8.0 %    Eosinophils % 2.0 %    Basophils % 0.0 %    Neutrophils Absolute 7.0 1.7 - 7.7 K/uL    Lymphocytes Absolute 1.4 1.0 - 5.1 K/uL    Monocytes Absolute 0.7 0.0 - 1.3 K/uL    Eosinophils Absolute 0.2 0.0 - 0.6 K/uL    Basophils Absolute 0.0 0.0 - 0.2 K/uL    Myelocyte Percent 1 (A) %    Anisocytosis Occasional (A)     Macrocytes Occasional (A)     Ovalocytes Occasional (A)    Comprehensive Metabolic Panel w/ Reflex to MG    Collection Time: 07/07/22 8:57 PM   Result Value Ref Range    Sodium 133 (L) 136 - 145 mmol/L    Potassium reflex Magnesium 2.6 (LL) 3.5 - 5.1 mmol/L    Chloride 89 (L) 99 - 110 mmol/L    CO2 30 21 - 32 mmol/L    Anion Gap 14 3 - 16    Glucose 88 70 - 99 mg/dL    BUN 42 (H) 7 - 20 mg/dL    CREATININE 1.8 (H) 0.6 - 1.2 mg/dL    GFR Non-African American 27 (A) >60    GFR  33 (A) >60    Calcium 9.1 8.3 - 10.6 mg/dL    Total Protein 6.8 6.4 - 8.2 g/dL    Albumin 3.5 3.4 - 5.0 g/dL    Albumin/Globulin Ratio 1.1 1.1 - 2.2    Total Bilirubin 0.4 0.0 - 1.0 mg/dL    Alkaline Phosphatase 150 (H) 40 - 129 U/L    ALT 31 10 - 40 U/L    AST 53 (H) 15 - 37 U/L   Lactic Acid    Collection Time: 07/07/22  8:57 PM   Result Value Ref Range    Lactic Acid 0.9 0.4 - 2.0 mmol/L   Magnesium    Collection Time: 07/07/22  8:57 PM   Result Value Ref Range    Magnesium 2.70 (H) 1.80 - 2.40 mg/dL   Comprehensive Metabolic Panel    Collection Time: 07/08/22  6:36 AM   Result Value Ref Range    Sodium 135 (L) 136 - 145 mmol/L    Potassium 2.8 (LL) 3.5 - 5.1 mmol/L    Chloride 94 (L) 99 - 110 mmol/L    CO2 27 21 - 32 mmol/L    Anion Gap 14 3 - 16    Glucose 80 70 - 99 mg/dL    BUN 33 (H) 7 - 20 mg/dL    CREATININE 1.4 (H) 0.6 - 1.2 mg/dL    GFR Non- 36 (A) >60    GFR  44 (A) >60    Calcium 8.7 8.3 - 10.6 mg/dL    Total Protein 6.1 (L) 6.4 - 8.2 g/dL    Albumin 3.0 (L) 3.4 - 5.0 g/dL    Albumin/Globulin Ratio 1.0 (L) 1.1 - 2.2    Total Bilirubin 0.3 0.0 - 1.0 mg/dL    Alkaline Phosphatase 140 (H) 40 - 129 U/L    ALT 34 10 - 40 U/L    AST 64 (H) 15 - 37 U/L   CBC with Auto Differential    Collection Time: 07/08/22  6:36 AM   Result Value Ref Range    WBC 7.2 4.0 - 11.0 K/uL    RBC 4.10 4.00 - 5.20 M/uL    Hemoglobin 11.6 (L) 12.0 - 16.0 g/dL    Hematocrit 35.3 (L) 36.0 - 48.0 %    MCV 86.0 80.0 - 100.0 fL    MCH 28.2 26.0 - 34.0 pg    MCHC 32.8 31.0 - 36.0 g/dL    RDW 16.6 (H) 12.4 - 15.4 %    Platelets 342 367 - 299 K/uL MPV 8.0 5.0 - 10.5 fL    Neutrophils % 57.8 %    Lymphocytes % 18.7 %    Monocytes % 17.2 %    Eosinophils % 5.4 %    Basophils % 0.9 %    Neutrophils Absolute 4.2 1.7 - 7.7 K/uL    Lymphocytes Absolute 1.3 1.0 - 5.1 K/uL    Monocytes Absolute 1.2 0.0 - 1.3 K/uL    Eosinophils Absolute 0.4 0.0 - 0.6 K/uL    Basophils Absolute 0.1 0.0 - 0.2 K/uL      No results found.           Assessment:     Principal Problem:    Cellulitis  Plan: add cephalexin and clinda iv and observe, marked erythema  Active Problems:    Pulmonary emphysema (HCC)  Plan: stable cont meds    Intermittent claudication (HCC)  Plan: doppler pulses    Pulmonary HTN ([address]  Plan: ongoing    Hypothyroidism  Plan: check lab    Abdominal aortic aneurysm ([address]  Plan: no recent imaging    Smoker  Up to 1ppd and doesn't seem to care            Bubba Méndez MD

## 2022-07-08 NOTE — ED NOTES
Requested Dr. Ramya Forrester to be paged since Dr. Dmitri Govea is off call. Unable to page Dr. Ramya Forrester at this time d/t answer service paging Delma. Was notified that Dr. Ramya Forrester should be rounding on her patients this morning.        Maria Elena Dumont RN  07/08/22 0296

## 2022-07-08 NOTE — ED PROVIDER NOTES
**ADVANCED PRACTICE PROVIDER, I HAVE EVALUATED THIS PATIENT**        [address] [facility] ENCOUNTER      Pt Name: Issac Search  IAJ:[id number]  Melia 1945  Date of evaluation: 7/7/2022  Provider: Rin Holder PA-C      Chief Complaint:    Chief Complaint   Patient presents with    Cellulitis     at pcp on tuesday for redness and swelling in both legs. pt put on antibiotics but redness has gotten worse          Nursing Notes, Past Medical Hx, Past Surgical Hx, Social Hx, Allergies, and Family Hx were all reviewed and agreed with or any disagreements were addressed in the HPI.    HPI: (Location, Duration, Timing, Severity, Quality, Assoc Sx, Context, Modifying factors)    Chief Complaint of bilateral lower extremity redness and swelling. Patient denies fever. States she was seen by her primary care physician and put on cephalexin. She has been on it for 2 days and it seems to gotten worse. Her daughter has been marking the legs. Denies lightheaded or dizziness, no numbness or tingling feet or finger, no chest pain, no weakness. No nausea vomiting. Denies fever. No other complaints. This is a  68 y.o. female who presents to emergency room with the above complaint.     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 since .  Chronic diastolic CHF (congestive heart failure) ([address] 2016    grade II    COPD, mild (HCC)     CVA (cerebral infarction)     left cerebral cortex x2/expressive aphasia/resolved    Diverticulosis     Epistaxis, recurrent     when inr high. last 3-4 months ago    Former smoker     Gallbladder sludge     HTN (hypertension)     Hyperlipidemia     Hypothyroidism     Lumbar stenosis without neurogenic claudication     pain across low back worse with standing and walking, nonradiating    Macular degeneration 2018    left worse than right , dry : progressive loss of sight: just barely able to see to drive    Neuropathy     Nonrheumatic mitral valve regurgitation     Obstructive sleep apnea     Osteoarthritis     Pulmonary HTN ([address]     primary, responsive to nitrates    PVD (peripheral vascular disease) ([address]     occluded right SFA::: asymptomatic NIKOS 0.7 right and 1.0 left    Sacral fracture, closed ([address]     Syncope 2014         Procedure Laterality Date    ABLATION OF DYSRHYTHMIC FOCUS  2022    AORTIC VALVE REPLACEMENT  6/16/15    Dr. Danie Hubbard. Hernandez - PVI, RENETTA exclusion. 19mm Maki pericardial Magna    APPENDECTOMY      BACK SURGERY  03/15/2019    superion inserted to keep back from hurtin [facility] [address] N/A 2019    EMERGENT; LAPAROSCOPIC CHOLECYSTECTOMY WITH GRAM performed by Asher Goyal MD at [address] [place] [address]      stent x 1    CYSTOSCOPY  2014    dr Jace Smith      THR Right    OTHER SURGICAL HISTORY  2018     EGD and colonoscopy.     PACEMAKER INSERTION  2022    PAIN MANAGEMENT PROCEDURE Bilateral 2021    BILATERAL L3, L4, L5 MEDIAL BRANCH BLOCK WITH FLUOROSCOPY performed by Shellie Hdz MD at [facility] Bilateral 2021    BILATERAL L3, L4, L5 MEDIAL BRANCH flat. Bowel sounds are normal. There is no distension. Palpations: Abdomen is soft. There is no mass. Tenderness: There is no abdominal tenderness. There is no guarding or rebound. Musculoskeletal:         General: Normal range of motion. Cervical back: Normal range of motion and neck supple. Right lower leg: No tenderness or bony tenderness. 1+ Edema present. Left lower leg: No tenderness or bony tenderness. 1+ Edema present. Skin:     General: Skin is warm and dry. Neurological:      General: No focal deficit present. Mental Status: She is alert and oriented to person, place, and time.    Psychiatric:         Behavior: Behavior normal.         MEDICAL DECISION MAKING    Vitals:    Vitals:    07/07/22 1918 07/07/22 2300 07/07/22 2315 07/08/22 0046   BP: (!) 111/51 (!) 138/55 (!) 142/52    Pulse: 84   50   Resp: 16   19   Temp: 97.1 °F (36.2 °C)      TempSrc: Oral      SpO2: 96% 98% 99% 94%   Weight: 125 lb 10.6 oz (57 kg)      Height: 5' 2.5\" (1.588 m)          LABS:  Labs Reviewed   CBC WITH AUTO DIFFERENTIAL - Abnormal; Notable for the following components:       Result Value    RDW 16.6 (*)     Myelocyte Percent 1 (*)     Anisocytosis Occasional (*)     Macrocytes Occasional (*)     Ovalocytes Occasional (*)     All other components within normal limits   COMPREHENSIVE METABOLIC PANEL W/ REFLEX TO MG FOR LOW K - Abnormal; Notable for the following components:    Sodium 133 (*)     Potassium reflex Magnesium 2.6 (*)     Chloride 89 (*)     BUN 42 (*)     CREATININE 1.8 (*)     GFR Non- 27 (*)     GFR African American 33 (*)     Alkaline Phosphatase 150 (*)     AST 53 (*)     All other components within normal limits    Narrative:     Liz Ramirez tel. [phone],  Chemistry results called to and read back by Schuyler CARRILLO, 07/07/2022  22:08, by YONATAN   MAGNESIUM - Abnormal; Notable for the following components:    Magnesium 2.70 (*)     All other Emergency room course: Patient on exam: Pupils are equal round and reactive to light extraocular movement is intact. Throat is clear. Nonerythematous no exudate. Cardiovascular regular rate rhythm, lungs are clear. No wheeze rales or rhonchi noted. Patient abdomen is soft nontender. Normal bowel sounds all 4 quadrant. No CVA or flank tenderness. Bilateral lower extremity shows redness from her foot up to near her knee bilaterally. Lab result from today shows:  CBC within normal limits her white count 11.3. CMP sodium 133, potassium 2.6, chloride 89 BUN of 42 and creatinine 1.8. AST slightly elevated at 53. Lactic acid 0.9. At this point I did discuss patient lab results with her and her daughter. Discussed placing a call out to Dr. Chito Guido for admission. Dr. Mirtha Montano was on call for her. And he was okay with admitting the patient. He states he will put in orders. Patient was okay with being admitted. I informed her once I get a room for her she will move upstairs. She will be admitted in stable condition. He wanted me to go ahead and start her on clindamycin. Also started her on maintenance dose of fluid. 125 mL/h. He states that he will hydrate her and recheck her renal function. Patient will be admitted in stable condition. The patient tolerated their visit well. I evaluated the patient. The physician was available for consultation as needed. The patient and / or the family were informed of the results of any tests, a time was given to answer questions, a plan was proposed and they agreed with plan. CLINICAL IMPRESSION:  1. Cellulitis of both lower extremities    2. Hypokalemia    3. Acute kidney injury (ANITA) with acute tubular necrosis (ATN) ([facility]        DISPOSITION Admitted 07/07/2022 11:29:08 PM      PATIENT REFERRED TO:  No follow-up provider specified.     DISCHARGE MEDICATIONS:  New Prescriptions    No medications on file       DISCONTINUED MEDICATIONS:  Discontinued Medications    No medications on file              (Please note the MDM and HPI sections of this note were completed with a voice recognition program.  Efforts were made to edit the dictations but occasionally words are mis-transcribed.)    Electronically signed, Nawaf Sellers PA-C,          Nawaf Sellers PA-C  07/08/22 0488

## 2022-07-08 NOTE — PROGRESS NOTES
Pharmacy Medication Reconciliation Note     List of medications patient is currently taking is complete. Source of information:   1. RN interview with patient  2. Rx disp history    Notes regarding home medications:   1. Changed Duloxetine to 60mg daily  2.  Removed Atrovent nasal     Denies taking any other OTC or herbal medications    Oscar Barreto [facility], [facility] 7/8/2022 1:30 PM

## 2022-07-09 LAB
ANION GAP SERPL CALCULATED.3IONS-SCNC: 11 MMOL/L (ref 3–16)
BUN BLDV-MCNC: 16 MG/DL (ref 7–20)
CALCIUM SERPL-MCNC: 9.1 MG/DL (ref 8.3–10.6)
CHLORIDE BLD-SCNC: 101 MMOL/L (ref 99–110)
CO2: 24 MMOL/L (ref 21–32)
CREAT SERPL-MCNC: 1 MG/DL (ref 0.6–1.2)
GFR AFRICAN AMERICAN: >60
GFR NON-AFRICAN AMERICAN: 54
GLUCOSE BLD-MCNC: 97 MG/DL (ref 70–99)
POTASSIUM SERPL-SCNC: 4 MMOL/L (ref 3.5–5.1)
SODIUM BLD-SCNC: 136 MMOL/L (ref 136–145)

## 2022-07-09 PROCEDURE — 80048 BASIC METABOLIC PNL TOTAL CA: CPT

## 2022-07-09 PROCEDURE — 6370000000 HC RX 637 (ALT 250 FOR IP): Performed by: INTERNAL MEDICINE

## 2022-07-09 PROCEDURE — 94640 AIRWAY INHALATION TREATMENT: CPT

## 2022-07-09 PROCEDURE — 36415 COLL VENOUS BLD VENIPUNCTURE: CPT

## 2022-07-09 PROCEDURE — 2060000000 HC ICU INTERMEDIATE R&B

## 2022-07-09 PROCEDURE — 99232 SBSQ HOSP IP/OBS MODERATE 35: CPT | Performed by: STUDENT IN AN ORGANIZED HEALTH CARE EDUCATION/TRAINING PROGRAM

## 2022-07-09 PROCEDURE — 6370000000 HC RX 637 (ALT 250 FOR IP): Performed by: STUDENT IN AN ORGANIZED HEALTH CARE EDUCATION/TRAINING PROGRAM

## 2022-07-09 PROCEDURE — 94760 N-INVAS EAR/PLS OXIMETRY 1: CPT

## 2022-07-09 PROCEDURE — 2580000003 HC RX 258: Performed by: STUDENT IN AN ORGANIZED HEALTH CARE EDUCATION/TRAINING PROGRAM

## 2022-07-09 PROCEDURE — 2500000003 HC RX 250 WO HCPCS: Performed by: INTERNAL MEDICINE

## 2022-07-09 PROCEDURE — 6360000002 HC RX W HCPCS: Performed by: STUDENT IN AN ORGANIZED HEALTH CARE EDUCATION/TRAINING PROGRAM

## 2022-07-09 RX ORDER — OXYCODONE HYDROCHLORIDE 10 MG/1
10 TABLET ORAL EVERY 4 HOURS PRN
Status: DISCONTINUED | OUTPATIENT
Start: 2022-07-09 | End: 2022-07-10 | Stop reason: HOSPADM

## 2022-07-09 RX ADMIN — POTASSIUM CHLORIDE 20 MEQ: 750 TABLET, FILM COATED, EXTENDED RELEASE ORAL at 13:38

## 2022-07-09 RX ADMIN — CLINDAMYCIN PHOSPHATE 600 MG: 600 INJECTION, SOLUTION INTRAVENOUS at 09:27

## 2022-07-09 RX ADMIN — SODIUM CHLORIDE: 9 INJECTION, SOLUTION INTRAVENOUS at 17:05

## 2022-07-09 RX ADMIN — CEFAZOLIN SODIUM 1000 MG: 1 INJECTION, POWDER, FOR SOLUTION INTRAMUSCULAR; INTRAVENOUS at 02:08

## 2022-07-09 RX ADMIN — Medication 1 CAPSULE: at 09:24

## 2022-07-09 RX ADMIN — CLINDAMYCIN PHOSPHATE 600 MG: 600 INJECTION, SOLUTION INTRAVENOUS at 00:44

## 2022-07-09 RX ADMIN — CEFAZOLIN SODIUM 1000 MG: 1 INJECTION, POWDER, FOR SOLUTION INTRAMUSCULAR; INTRAVENOUS at 17:46

## 2022-07-09 RX ADMIN — OXYCODONE HYDROCHLORIDE 10 MG: 10 TABLET ORAL at 22:01

## 2022-07-09 RX ADMIN — SODIUM CHLORIDE: 9 INJECTION, SOLUTION INTRAVENOUS at 09:26

## 2022-07-09 RX ADMIN — HEPARIN SODIUM 5000 UNITS: 5000 INJECTION INTRAVENOUS; SUBCUTANEOUS at 13:38

## 2022-07-09 RX ADMIN — PANTOPRAZOLE SODIUM 40 MG: 40 TABLET, DELAYED RELEASE ORAL at 09:24

## 2022-07-09 RX ADMIN — CLINDAMYCIN PHOSPHATE 600 MG: 600 INJECTION, SOLUTION INTRAVENOUS at 17:06

## 2022-07-09 RX ADMIN — METOPROLOL SUCCINATE 50 MG: 50 TABLET, EXTENDED RELEASE ORAL at 09:24

## 2022-07-09 RX ADMIN — MOMETASONE FUROATE AND FORMOTEROL FUMARATE DIHYDRATE 2 PUFF: 200; 5 AEROSOL RESPIRATORY (INHALATION) at 19:12

## 2022-07-09 RX ADMIN — TIZANIDINE 2 MG: 4 TABLET ORAL at 03:37

## 2022-07-09 RX ADMIN — OXYCODONE HYDROCHLORIDE 10 MG: 10 TABLET ORAL at 17:44

## 2022-07-09 RX ADMIN — POLYETHYLENE GLYCOL 3350 17 G: 17 POWDER, FOR SOLUTION ORAL at 09:23

## 2022-07-09 RX ADMIN — OXYCODONE HYDROCHLORIDE 10 MG: 10 TABLET ORAL at 06:04

## 2022-07-09 RX ADMIN — EZETIMIBE 10 MG: 10 TABLET ORAL at 09:23

## 2022-07-09 RX ADMIN — OXYCODONE HYDROCHLORIDE 10 MG: 10 TABLET ORAL at 13:38

## 2022-07-09 RX ADMIN — Medication 10 ML: at 09:23

## 2022-07-09 RX ADMIN — HEPARIN SODIUM 5000 UNITS: 5000 INJECTION INTRAVENOUS; SUBCUTANEOUS at 21:55

## 2022-07-09 RX ADMIN — LEVOTHYROXINE SODIUM 88 MCG: 0.09 TABLET ORAL at 06:04

## 2022-07-09 RX ADMIN — ACETAMINOPHEN 650 MG: 325 TABLET ORAL at 03:37

## 2022-07-09 RX ADMIN — CEFAZOLIN SODIUM 1000 MG: 1 INJECTION, POWDER, FOR SOLUTION INTRAMUSCULAR; INTRAVENOUS at 10:08

## 2022-07-09 RX ADMIN — AMIODARONE HYDROCHLORIDE 200 MG: 200 TABLET ORAL at 09:24

## 2022-07-09 RX ADMIN — HEPARIN SODIUM 5000 UNITS: 5000 INJECTION INTRAVENOUS; SUBCUTANEOUS at 06:04

## 2022-07-09 RX ADMIN — MOMETASONE FUROATE AND FORMOTEROL FUMARATE DIHYDRATE 2 PUFF: 200; 5 AEROSOL RESPIRATORY (INHALATION) at 08:36

## 2022-07-09 RX ADMIN — POTASSIUM CHLORIDE 20 MEQ: 750 TABLET, FILM COATED, EXTENDED RELEASE ORAL at 09:23

## 2022-07-09 RX ADMIN — OXYCODONE HYDROCHLORIDE 10 MG: 10 TABLET ORAL at 09:23

## 2022-07-09 RX ADMIN — POTASSIUM CHLORIDE 20 MEQ: 750 TABLET, FILM COATED, EXTENDED RELEASE ORAL at 21:54

## 2022-07-09 RX ADMIN — ROSUVASTATIN CALCIUM 40 MG: 40 TABLET, FILM COATED ORAL at 21:55

## 2022-07-09 RX ADMIN — DULOXETINE HYDROCHLORIDE 60 MG: 60 CAPSULE, DELAYED RELEASE ORAL at 09:23

## 2022-07-09 ASSESSMENT — PAIN DESCRIPTION - FREQUENCY
FREQUENCY: CONTINUOUS

## 2022-07-09 ASSESSMENT — PAIN SCALES - GENERAL
PAINLEVEL_OUTOF10: 0
PAINLEVEL_OUTOF10: 6
PAINLEVEL_OUTOF10: 3
PAINLEVEL_OUTOF10: 7
PAINLEVEL_OUTOF10: 0
PAINLEVEL_OUTOF10: 5
PAINLEVEL_OUTOF10: 0
PAINLEVEL_OUTOF10: 7

## 2022-07-09 ASSESSMENT — PAIN DESCRIPTION - ONSET
ONSET: ON-GOING

## 2022-07-09 ASSESSMENT — PAIN DESCRIPTION - DESCRIPTORS
DESCRIPTORS: ACHING

## 2022-07-09 ASSESSMENT — PAIN DESCRIPTION - ORIENTATION
ORIENTATION: LOWER
ORIENTATION: LOWER
ORIENTATION: RIGHT;LEFT
ORIENTATION: LOWER
ORIENTATION: LOWER

## 2022-07-09 ASSESSMENT — PAIN DESCRIPTION - PAIN TYPE
TYPE: ACUTE PAIN

## 2022-07-09 ASSESSMENT — PAIN DESCRIPTION - LOCATION
LOCATION: LEG
LOCATION: BACK

## 2022-07-09 NOTE — PROGRESS NOTES
Pt sitting up in the chair. IV antibiotic infusing. Pt states that pain medication is being effective. Family at bedside. Pt ambulating by self in the room and stable on feet. No further needs.    Electronically signed by Toni Clinton RN on 7/9/2022 at 6:04 PM

## 2022-07-09 NOTE — PROGRESS NOTES
Patient called with complaints of right shoulder pain. Patient states is came on suddenly and describes the pain as sharp. See MAR for PRN medication administration.

## 2022-07-09 NOTE — PROGRESS NOTES
Patient is alert & oriented x4, pt is UAL, 2/4 bed rails up, bed in lowest position, fall precautions in place, call light within reach. Morning medications given. Morning assessment complete. Will cont to monitor and reassess.   Electronically signed by Georgie Carlin RN on 7/9/2022 at 9:45 AM

## 2022-07-09 NOTE — PLAN OF CARE
Problem: Discharge Planning  Goal: Discharge to home or other facility with appropriate resources  7/9/2022 0945 by Jackeline Jimenez RN  Outcome: Progressing  Flowsheets (Taken 7/9/2022 0945)  Discharge to home or other facility with appropriate resources:   Identify barriers to discharge with patient and caregiver   Identify discharge learning needs (meds, wound care, etc)   Refer to discharge planning if patient needs post-hospital services based on physician order or complex needs related to functional status, cognitive ability or social support system     Problem: Pain  Goal: Verbalizes/displays adequate comfort level or baseline comfort level  7/9/2022 0945 by Jackeline Jimenez RN  Outcome: Progressing  Flowsheets (Taken 7/9/2022 0945)  Verbalizes/displays adequate comfort level or baseline comfort level:   Encourage patient to monitor pain and request assistance   Administer analgesics based on type and severity of pain and evaluate response   Consider cultural and social influences on pain and pain management     Problem: Safety - Adult  Goal: Free from fall injury  7/9/2022 0945 by Jackeline Jimenez RN  Outcome: Progressing  Flowsheets (Taken 7/9/2022 0945)  Free From Fall Injury:   Based on caregiver fall risk screen, instruct family/caregiver to ask for assistance with transferring infant if caregiver noted to have fall risk factors   Instruct family/caregiver on patient safety     Problem: ABCDS Injury Assessment  Goal: Absence of physical injury  7/9/2022 0945 by Jackeline Jimenez RN  Outcome: Progressing  Flowsheets (Taken 7/9/2022 0945)  Absence of Physical Injury: Implement safety measures based on patient assessment     Problem: Chronic Conditions and Co-morbidities  Goal: Patient's chronic conditions and co-morbidity symptoms are monitored and maintained or improved  7/9/2022 0945 by Jackeline Jimenez RN  Outcome: Progressing  Flowsheets (Taken 7/9/2022 0945)  Care Plan - Patient's Chronic Conditions and Co-Morbidity Symptoms are Monitored and Maintained or Improved:   Monitor and assess patient's chronic conditions and comorbid symptoms for stability, deterioration, or improvement   Collaborate with multidisciplinary team to address chronic and comorbid conditions and prevent exacerbation or deterioration   Update acute care plan with appropriate goals if chronic or comorbid symptoms are exacerbated and prevent overall improvement and discharge     Problem: Infection - Adult  Goal: Absence of infection at discharge  7/9/2022 0945 by Tommy Powers RN  Outcome: Progressing  Flowsheets (Taken 7/9/2022 0945)  Absence of infection at discharge:   Assess and monitor for signs and symptoms of infection   Monitor lab/diagnostic results   Monitor all insertion sites i.e., indwelling lines, tubes and drains     Problem: Infection - Adult  Goal: Absence of infection during hospitalization  7/9/2022 0945 by Tommy Powers RN  Outcome: Progressing  Flowsheets (Taken 7/9/2022 0945)  Absence of infection during hospitalization:   Assess and monitor for signs and symptoms of infection   Monitor lab/diagnostic results   Monitor all insertion sites i.e., indwelling lines, tubes and drains

## 2022-07-09 NOTE — PLAN OF CARE
Problem: Discharge Planning  Goal: Discharge to home or other facility with appropriate resources  7/9/2022 0236 by Erin Melendez RN  Outcome: Progressing  Note: Assessed patients knowledge of discharge. Will continue to work with patient on discharge planning and answer patient questions. Will consult case management and  as necessary. 7/8/2022 1703 by Villa Liu RN  Outcome: Progressing     Problem: Pain  Goal: Verbalizes/displays adequate comfort level or baseline comfort level  7/9/2022 0236 by Erin Melendez RN  Outcome: Progressing  Note: Educated patient on pain management. Will assess patients pain level per unit protocol, and provide pain management measures as needed. 7/8/2022 1703 by Villa Liu RN  Outcome: Progressing     Problem: Safety - Adult  Goal: Free from fall injury  7/9/2022 0236 by Erin Melendez RN  Outcome: Progressing  Note: Patient educated on fall prevention. Call light is within reach, bed locked in lowest position, personal items within reach, and bed alarm is on. Will round on patient per unit guidelines. 7/8/2022 1703 by Villa Liu RN  Outcome: Progressing  Note: Pt free from injury or falls at this time, fall precautions in place, bed in low position, side rail up x2, [place] Fall Risk: Medium (25-44), bed alarm not on as pt UAL, reoriented to room and call light, reminded to call if needing assistance, call light in reach, will continue to monitor. Pt verbalizes understanding of fall risk procedures. Problem: ABCDS Injury Assessment  Goal: Absence of physical injury  7/9/2022 0236 by Erin Melendez RN  Outcome: Progressing  Note: Pt assessed for fall risk and fall precautions put into place. Bed in lowest position and wheels locked, call light within reach. Nonskid footwear in place. Patient educated on appropriate method of transfer and to call for assistance.      7/8/2022 1703 by Villa Liu RN  Outcome: Progressing  Note: Pt has not incurred any type of physical injury this shift. Problem: Chronic Conditions and Co-morbidities  Goal: Patient's chronic conditions and co-morbidity symptoms are monitored and maintained or improved  7/9/2022 0236 by Luzmaria Calhoun RN  Outcome: Progressing  Note: Patient's chronic conditions and co-morbidity symptoms are monitored and maintained or improved. 7/8/2022 1703 by Juliette Baugh RN  Outcome: Progressing  Note: Monitoring pt's chronic medical conditions and will report any changes to MD.     Problem: Infection - Adult  Goal: Absence of infection at discharge  7/9/2022 0236 by Luzmaria Calhoun RN  Outcome: Progressing  Note: Patient will remain free from infection. 7/8/2022 1703 by Juliette Baugh RN  Outcome: Progressing  Note: Pt has been afebrile since admission; however, BLE are red, edematous, and hot to touch. Will monitor. Goal: Absence of infection during hospitalization  7/9/2022 0236 by Luzmaria Calhoun RN  Outcome: Progressing  Note: Patient will remain free from infection. 7/8/2022 1703 by Juliette Baugh RN  Outcome: Progressing  Note: Pt has been afebrile since admission; however, BLE are red, edematous, and hot to touch. Will monitor.

## 2022-07-09 NOTE — PROGRESS NOTES
Name: Jose Saldana  /Age/Sex: 1945 (68 y.o. female)   MRN & CSN: [id number] & [id number]  Admission Date/Time: 2022  7:14 PM   Location:  [facility] 45 Day: Hospital Day: 3   Principal Problem: Cellulitis  HPI       Patient seen and examined. Shoulder pain overnight. Having some tenderness right now. Feels that legs are improving. Appetite is good. Afebrile. Tolerating antibiotics    All other review of systems negative unless noted above.   VITALS VITALS RANGE (24 hours)    Vitals:    22 0838   BP:    Pulse:    Resp:    Temp:    SpO2: 98%         PHYSICAL EXAM     General Appearance: alert and oriented to person, place and time, well-developed and well-nourished, in no acute distress  Skin: warm and dry, no rash or erythema  Head: normocephalic and atraumatic  Eyes: conjunctivae normal and sclera anicteric  ENT: hearing grossly normal bilaterally and sinuses non-tender  Neck: neck supple and non tender without mass, no thyromegaly or thyroid nodules, no cervical lymphadenopathy   Pulmonary/Chest: clear to auscultation bilaterally- no wheezes, rales or rhonchi, normal air movement, no respiratory distress  Cardiovascular: normal rate, normal S1 and S2, no gallops and no carotid bruits  Abdomen: soft, non-tender, non-distended, normal bowel sounds, no masses or organomegaly  Extremities:  Diminished pulses bilaterally, erythema improving bilaterally, no fluctuant mass  Musculoskeletal: no swollen joints and no tender joints,  Neurologic: coordination normal and speech normal, moves all 4 extremities  LABS   BMP  Recent Labs     22  0636 22  1735 22  0453   *   < > 135* 136 136   K 2.6*  --  2.8* 3.2* 4.0   CL 89*   < > 94* 99 101   CO2 30   < > 27 28 24   BUN 42*   < > 33* 23* 16   CREATININE 1.8*   < > 1.4* 1.1 1.0   CALCIUM 9.1   < > 8.7 8.6 9.1   MG 2.70*  --   --   --   --     < > = values in this interval not displayed. CBC/COAGS  Recent Labs     07/07/22 2057 07/08/22  0636   WBC 9.3 7.2   HCT 36.6 35.3*    185     Liver & Pancreas  Recent Labs     07/07/22 2057 07/08/22  0636   AST 53* 64*   ALT 31 34   ALKPHOS 150* 140*          IMAGING   Carotid US    The right internal carotid artery appears to have a 50-79% diameter reducing    stenosis based on velocity criteria.    The right vertebral artery demonstrates normal antegrade flow.    The left internal carotid artery appears to have a <50% diameter reducing    stenosis based on velocity criteria.    The left vertebral artery demonstrates normal antegrade flow.         Above studies and images were personally reviewed by myself    MEDS   Scheduled Meds:   sodium chloride flush  5-40 mL IntraVENous 2 times per day    ceFAZolin  1,000 mg IntraVENous Q8H    heparin (porcine)  5,000 Units SubCUTAneous 3 times per day    rosuvastatin  40 mg Oral Nightly    potassium chloride  20 mEq Oral TID    clindamycin (CLEOCIN) IV  600 mg IntraVENous Q8H    oxyCODONE HCl  10 mg Oral Q4H While awake    polyethylene glycol  17 g Oral Daily    lactobacillus  1 capsule Oral Daily with breakfast    amiodarone  200 mg Oral Daily    DULoxetine  60 mg Oral Daily    ezetimibe  10 mg Oral Daily    mometasone-formoterol  2 puff Inhalation BID    levothyroxine  88 mcg Oral Daily    metoprolol succinate  50 mg Oral Daily    pantoprazole  40 mg Oral Daily     Continuous Infusions:   sodium chloride 50 mL/hr at 07/09/22 0926     PRN Meds:sodium chloride flush, sodium chloride, ondansetron **OR** ondansetron, acetaminophen **OR** acetaminophen, tiZANidine     CURRENT HOSPITAL PROBLEMS   Principal Problem:    Cellulitis  -Clinically improving, continue cephalexin and clindamycin  ANITA  -Likely prerenal, improved with fluids  - ARB and diuretic held   Active Problems:    Pulmonary emphysema ([facility] [address]  Continue home meds    Smoker  Counseled on cessation, precontemplative Intermittent claudication (HCC)  - arterial doppler pending  -Statin    Pulmonary HTN (HCC)  Atrial fibrillation  -Continue amiodarone and metoprolol  -S/p RENETTA exclusion, not on anticoagulation    Hypokalemia  -Resolved, likely secondary to diuretics    Hypothyroidism  - Continue Synthroid    Abdominal aortic aneurysm (HCC)    Hypothyroidism due to Hashimoto's thyroiditis  Chronic pain  -Continue home regimen, oxycodone changed to as needed per patient request      Dispo: Pending improvement in cellulitis      Octavia Allen MD 7/9/2022 10:01 AM

## 2022-07-10 VITALS
WEIGHT: 125.88 LBS | HEART RATE: 58 BPM | SYSTOLIC BLOOD PRESSURE: 141 MMHG | TEMPERATURE: 98.4 F | RESPIRATION RATE: 16 BRPM | HEIGHT: 62 IN | DIASTOLIC BLOOD PRESSURE: 55 MMHG | BODY MASS INDEX: 23.17 KG/M2 | OXYGEN SATURATION: 96 %

## 2022-07-10 LAB
ANION GAP SERPL CALCULATED.3IONS-SCNC: 8 MMOL/L (ref 3–16)
BUN BLDV-MCNC: 11 MG/DL (ref 7–20)
CALCIUM SERPL-MCNC: 8.8 MG/DL (ref 8.3–10.6)
CHLORIDE BLD-SCNC: 103 MMOL/L (ref 99–110)
CO2: 26 MMOL/L (ref 21–32)
CREAT SERPL-MCNC: 1 MG/DL (ref 0.6–1.2)
GFR AFRICAN AMERICAN: >60
GFR NON-AFRICAN AMERICAN: 54
GLUCOSE BLD-MCNC: 114 MG/DL (ref 70–99)
MRSA CULTURE ONLY: NORMAL
POTASSIUM SERPL-SCNC: 4.2 MMOL/L (ref 3.5–5.1)
SODIUM BLD-SCNC: 137 MMOL/L (ref 136–145)

## 2022-07-10 PROCEDURE — 6360000002 HC RX W HCPCS: Performed by: STUDENT IN AN ORGANIZED HEALTH CARE EDUCATION/TRAINING PROGRAM

## 2022-07-10 PROCEDURE — 94760 N-INVAS EAR/PLS OXIMETRY 1: CPT

## 2022-07-10 PROCEDURE — 36415 COLL VENOUS BLD VENIPUNCTURE: CPT

## 2022-07-10 PROCEDURE — 2580000003 HC RX 258: Performed by: STUDENT IN AN ORGANIZED HEALTH CARE EDUCATION/TRAINING PROGRAM

## 2022-07-10 PROCEDURE — 6370000000 HC RX 637 (ALT 250 FOR IP): Performed by: INTERNAL MEDICINE

## 2022-07-10 PROCEDURE — 80048 BASIC METABOLIC PNL TOTAL CA: CPT

## 2022-07-10 PROCEDURE — 99239 HOSP IP/OBS DSCHRG MGMT >30: CPT | Performed by: INTERNAL MEDICINE

## 2022-07-10 PROCEDURE — 94640 AIRWAY INHALATION TREATMENT: CPT

## 2022-07-10 PROCEDURE — 6370000000 HC RX 637 (ALT 250 FOR IP): Performed by: STUDENT IN AN ORGANIZED HEALTH CARE EDUCATION/TRAINING PROGRAM

## 2022-07-10 PROCEDURE — 2500000003 HC RX 250 WO HCPCS: Performed by: INTERNAL MEDICINE

## 2022-07-10 RX ORDER — IPRATROPIUM BROMIDE 21 UG/1
SPRAY, METERED NASAL
Qty: 30 ML | Refills: 5 | Status: SHIPPED | OUTPATIENT
Start: 2022-07-10

## 2022-07-10 RX ORDER — CLINDAMYCIN HYDROCHLORIDE 300 MG/1
300 CAPSULE ORAL 4 TIMES DAILY
Qty: 28 CAPSULE | Refills: 0 | Status: SHIPPED | OUTPATIENT
Start: 2022-07-10 | End: 2022-07-17

## 2022-07-10 RX ORDER — LACTOBACILLUS RHAMNOSUS GG 10B CELL
1 CAPSULE ORAL
Qty: 30 CAPSULE | Refills: 0 | Status: SHIPPED | OUTPATIENT
Start: 2022-07-11

## 2022-07-10 RX ORDER — CEPHALEXIN 500 MG/1
500 CAPSULE ORAL 3 TIMES DAILY
Qty: 21 CAPSULE | Refills: 0 | Status: SHIPPED | OUTPATIENT
Start: 2022-07-10

## 2022-07-10 RX ORDER — POTASSIUM CHLORIDE 1500 MG/1
20 TABLET, FILM COATED, EXTENDED RELEASE ORAL 2 TIMES DAILY
Qty: 60 TABLET | Refills: 3 | Status: SHIPPED | OUTPATIENT
Start: 2022-07-10 | End: 2022-08-22 | Stop reason: SDUPTHER

## 2022-07-10 RX ORDER — TORSEMIDE 20 MG/1
TABLET ORAL
Qty: 60 TABLET | Refills: 0 | Status: SHIPPED | OUTPATIENT
Start: 2022-07-10 | End: 2022-08-22 | Stop reason: SDUPTHER

## 2022-07-10 RX ADMIN — POTASSIUM CHLORIDE 20 MEQ: 750 TABLET, FILM COATED, EXTENDED RELEASE ORAL at 08:05

## 2022-07-10 RX ADMIN — LEVOTHYROXINE SODIUM 88 MCG: 0.09 TABLET ORAL at 06:01

## 2022-07-10 RX ADMIN — OXYCODONE HYDROCHLORIDE 10 MG: 10 TABLET ORAL at 08:05

## 2022-07-10 RX ADMIN — HEPARIN SODIUM 5000 UNITS: 5000 INJECTION INTRAVENOUS; SUBCUTANEOUS at 06:02

## 2022-07-10 RX ADMIN — PANTOPRAZOLE SODIUM 40 MG: 40 TABLET, DELAYED RELEASE ORAL at 08:05

## 2022-07-10 RX ADMIN — METOPROLOL SUCCINATE 50 MG: 50 TABLET, EXTENDED RELEASE ORAL at 08:05

## 2022-07-10 RX ADMIN — Medication 1 CAPSULE: at 08:05

## 2022-07-10 RX ADMIN — AMIODARONE HYDROCHLORIDE 200 MG: 200 TABLET ORAL at 08:05

## 2022-07-10 RX ADMIN — CEFAZOLIN SODIUM 1000 MG: 1 INJECTION, POWDER, FOR SOLUTION INTRAMUSCULAR; INTRAVENOUS at 03:02

## 2022-07-10 RX ADMIN — MOMETASONE FUROATE AND FORMOTEROL FUMARATE DIHYDRATE 2 PUFF: 200; 5 AEROSOL RESPIRATORY (INHALATION) at 08:16

## 2022-07-10 RX ADMIN — OXYCODONE HYDROCHLORIDE 10 MG: 10 TABLET ORAL at 02:03

## 2022-07-10 RX ADMIN — DULOXETINE HYDROCHLORIDE 60 MG: 60 CAPSULE, DELAYED RELEASE ORAL at 08:05

## 2022-07-10 RX ADMIN — POLYETHYLENE GLYCOL 3350 17 G: 17 POWDER, FOR SOLUTION ORAL at 08:05

## 2022-07-10 RX ADMIN — CLINDAMYCIN PHOSPHATE 600 MG: 600 INJECTION, SOLUTION INTRAVENOUS at 01:54

## 2022-07-10 RX ADMIN — TIZANIDINE 2 MG: 4 TABLET ORAL at 02:03

## 2022-07-10 RX ADMIN — EZETIMIBE 10 MG: 10 TABLET ORAL at 08:05

## 2022-07-10 ASSESSMENT — PAIN DESCRIPTION - PAIN TYPE: TYPE: ACUTE PAIN

## 2022-07-10 ASSESSMENT — PAIN - FUNCTIONAL ASSESSMENT: PAIN_FUNCTIONAL_ASSESSMENT: PREVENTS OR INTERFERES SOME ACTIVE ACTIVITIES AND ADLS

## 2022-07-10 ASSESSMENT — PAIN DESCRIPTION - ONSET: ONSET: ON-GOING

## 2022-07-10 ASSESSMENT — PAIN SCALES - GENERAL
PAINLEVEL_OUTOF10: 7
PAINLEVEL_OUTOF10: 4
PAINLEVEL_OUTOF10: 0

## 2022-07-10 ASSESSMENT — PAIN DESCRIPTION - FREQUENCY: FREQUENCY: CONTINUOUS

## 2022-07-10 ASSESSMENT — PAIN DESCRIPTION - LOCATION: LOCATION: BACK

## 2022-07-10 ASSESSMENT — PAIN DESCRIPTION - DESCRIPTORS: DESCRIPTORS: ACHING

## 2022-07-10 ASSESSMENT — PAIN DESCRIPTION - ORIENTATION: ORIENTATION: LOWER

## 2022-07-10 NOTE — PROGRESS NOTES
Patient is alert & oriented x4, ual no weakness noted, 2/4 bed rails up, bed in lowest position, fall precautions in place, call light within reach. Morning assessment complete. Morning medications given. PRN pain medication given. BLE still red and slightly swollen but redness has improved. Pt is very hopeful to be discharged today. Will continue to monitor and reassess.   Electronically signed by Mary Upton RN on 7/10/2022 at 8:18 AM

## 2022-07-10 NOTE — PLAN OF CARE
or improvement  Taken 7/9/2022 2000 by Shauna Billings 34 - Patient's Chronic Conditions and Co-Morbidity Symptoms are Monitored and Maintained or Improved: Monitor and assess patient's chronic conditions and comorbid symptoms for stability, deterioration, or improvement     Problem: Infection - Adult  Goal: Absence of infection at discharge  Outcome: Progressing  Flowsheets  Taken 7/10/2022 0819 by Jackeline Jimenez RN  Absence of infection at discharge: Assess and monitor for signs and symptoms of infection  Taken 7/9/2022 2000 by Courtney Swenson RN  Absence of infection at discharge:   Assess and monitor for signs and symptoms of infection   Monitor lab/diagnostic results     Problem: Infection - Adult  Goal: Absence of infection during hospitalization  Outcome: Progressing  Flowsheets  Taken 7/10/2022 0819 by Jackeline Jimenez RN  Absence of infection during hospitalization: Assess and monitor for signs and symptoms of infection  Taken 7/9/2022 2000 by Courtney Swenson RN  Absence of infection during hospitalization:   Assess and monitor for signs and symptoms of infection   Monitor lab/diagnostic results     Problem: Skin/Tissue Integrity  Goal: Absence of new skin breakdown  Description: 1. Monitor for areas of redness and/or skin breakdown  2. Assess vascular access sites hourly  3. Every 4-6 hours minimum:  Change oxygen saturation probe site  4. Every 4-6 hours:  If on nasal continuous positive airway pressure, respiratory therapy assess nares and determine need for appliance change or resting period.   Outcome: Progressing  Note: Pt absent from new skin breakdown

## 2022-07-10 NOTE — PLAN OF CARE
Problem: Discharge Planning  Goal: Discharge to home or other facility with appropriate resources  Outcome: Progressing  Flowsheets (Taken 7/9/2022 2000)  Discharge to home or other facility with appropriate resources: Identify barriers to discharge with patient and caregiver     Problem: Pain  Goal: Verbalizes/displays adequate comfort level or baseline comfort level  Outcome: Progressing     Problem: Safety - Adult  Goal: Free from fall injury  Outcome: Progressing

## 2022-07-10 NOTE — CARE COORDINATION
INITIAL CASE MANAGEMENT ASSESSMENT    Reviewed chart, spoke with patient's daughter, Charlotte Chang, to assess possible discharge needs. Explained Case Management role/services. Living Situation: The patient confirmed address. The patient lives in a ranch style home with her daughter Charlotte Chang. She has two dogs. And she uses one step before entering her home. ADLs: IPTA     DME: RW, Lift chair    PT/OT Recs: Not ordered     Active Services: None     Transportation: Daughter provides transportation     Medications: [facility]    PCP: Thad Shi MD      HD/PD: n/a    PLAN/COMMENTS: Plan is to return home with daughter. No IV ABX needed. SW/CM provided contact information for patient or family to call with any questions. SW/CM will follow and assist as needed.     Electronically signed by Sera Vee RN MSN on 7/10/2022 at 10:38 AM

## 2022-07-10 NOTE — PROGRESS NOTES
Physician Progress Note      Julio Tucker  CSN #:                  [id number]  :                       1945  ADMIT DATE:       2022 7:14 PM  DISCH DATE:  RESPONDING  PROVIDER #:        Raf Chambers MD          QUERY TEXT:    Patient admitted with BLE Cellulitis. Documentation reflects ANITA  in ED   Provider note(s) dated 22. If possible, please document in the progress   notes and discharge summary if ANITA was: The medical record reflects the following:  Risk Factors: BLE Cellulitis  Clinical Indicators:  on admit Na 133 / K 2.6/ bun 42/Cr 1.8/ gfr 27  Treatment: IV NS @ 125ml/hr x 24hrs. and lab monitoring  Options provided:  -- ANITA confirmed after study  -- ANITA treated and resolved  -- ANITA ruled out after study  -- Other - I will add my own diagnosis  -- Disagree - Not applicable / Not valid  -- Disagree - Clinically unable to determine / Unknown  -- Refer to Clinical Documentation Reviewer    PROVIDER RESPONSE TEXT:    ANITA treated and resolved.     Query created by: Hung Reis on 2022 6:56 AM      Electronically signed by:  Raf Chambers MD 7/10/2022 8:14 AM

## 2022-07-10 NOTE — FLOWSHEET NOTE
07/09/22 2000   Assessment   Charting Type Shift assessment   Psychosocial   Psychosocial (WDL) WDL   Neurological   Neuro (WDL) WDL   Level of Consciousness Alert (0)   Hope Coma Scale   Eye Opening 4   Best Verbal Response 5   Best Motor Response 6   Jessica Coma Scale Score 15   HEENT (Head, Ears, Eyes, Nose, & Throat)   HEENT (WDL) X   Teeth Dentures upper;Dentures lower   Respiratory   Respiratory (WDL) WDL   Cardiac   Cardiac (WDL) WDL   Gastrointestinal   Abdominal (WDL) WDL   Abdomen Inspection Soft;Flat   Last BM (including prior to admit) 07/08/22   RUQ Bowel Sounds Active   LUQ Bowel Sounds Active   RLQ Bowel Sounds Active   LLQ Bowel Sounds Active   Genitourinary   Genitourinary (WDL) WDL   Suprapubic Tenderness No   Dysuria (Pain/Burning w/Urination) No   Peripheral Vascular   Peripheral Vascular (WDL) X   Edema Right lower extremity; Left lower extremity   RLE Edema +1   LLE Edema +1   RLE Sensation  Pain;Full sensation   LLE Sensation  Pain;Full sensation   RLE Neurovascular Assessment   Capillary Refill Less than/Equal to 3 seconds   Color Red   Temperature Warm   R Pedal Pulse +1   R Calf Tenderness  Negative   LLE Neurovascular Assessment   Capillary Refill Less than/Equal to 3 seconds   Color Red   Temperature Warm   L Pedal Pulse +1   L Calf Tenderness Negative   Skin Integumentary    Skin Integumentary (WDL) X   Skin Color Red   Skin Condition/Temp Dry;Swollen/edematous   Skin Integrity Wound (see LDA)   Location BLE   Skin Integrity Site 2   Skin Integrity Location 2 Abrasion;Ecchymosis   Location 2 scattered   Musculoskeletal   Musculoskeletal (WDL) X  (chronic back pain, new right sholder/arm pain )   RL Extremity Swelling   LL Extremity Swelling   Wound 11/23/21 Buttocks Left; Medial open area   Date First Assessed/Time First Assessed: 11/23/21 2025   Primary Wound Type: Pressure Injury  Location: Buttocks  Wound Location Orientation: Left; Medial  Wound Description (Comments): open area   Wound Etiology Pressure Stage 1   Dressing/Treatment Open to air   Wound Assessment Dry;Non-blanchable erythema   Drainage Amount None   Odor None   Diana-wound Assessment Intact; Blanchable erythema   Wound 11/23/21 Buttocks Right; Medial   Date First Assessed/Time First Assessed: 11/23/21 2025   Present on Hospital Admission: Yes  Primary Wound Type: Pressure Injury  Location: Buttocks  Wound Location Orientation: Right; Medial   Wound Etiology Pressure Stage 1   Dressing/Treatment Open to air   Wound Assessment Dry;Non-blanchable erythema   Drainage Amount None   Odor None   Diana-wound Assessment Blanchable erythema   Care Plan - Infection Goals   Absence of infection at discharge Assess and monitor for signs and symptoms of infection;Monitor lab/diagnostic results   Absence of infection during hospitalization Assess and monitor for signs and symptoms of infection;Monitor lab/diagnostic results   Care Plan - Chronic Conditions and Co-Morbidity   Care Plan - Patient's Chronic Conditions and Co-Morbidity Symptoms are Monitored and Maintained or Improved Monitor and assess patient's chronic conditions and comorbid symptoms for stability, deterioration, or improvement   Care Plan - Discharge Planning Goals   Discharge to home or other facility with appropriate resources Identify barriers to discharge with patient and caregiver

## 2022-07-10 NOTE — DISCHARGE SUMMARY
right shoulder  Neurologic: coordination normal and speech normal, moves all 4 extremities    Disposition: home  Hospital Meds:  Current Facility-Administered Medications   Medication Dose Route Frequency Provider Last Rate Last Admin    oxyCODONE HCl (OXY-IR) immediate release tablet 10 mg  10 mg Oral Q4H PRN Lito Le MD   10 mg at 07/10/22 0805    sodium chloride flush 0.9 % injection 5-40 mL  5-40 mL IntraVENous 2 times per day Lito Le MD   10 mL at 07/09/22 0923    sodium chloride flush 0.9 % injection 5-40 mL  5-40 mL IntraVENous PRN Lito Le MD        0.9 % sodium chloride infusion   IntraVENous PRN Lito Le MD   Stopped at 07/09/22 1828    ondansetron (ZOFRAN-ODT) disintegrating tablet 4 mg  4 mg Oral Q8H PRN Lito Le MD        Or    ondansetron [facility]) injection 4 mg  4 mg IntraVENous Q6H PRN Lito Le MD        acetaminophen (TYLENOL) tablet 650 mg  650 mg Oral Q6H PRN Lito Le MD   650 mg at 07/09/22 7761    Or    acetaminophen (TYLENOL) suppository 650 mg  650 mg Rectal Q6H PRN Lito Le MD        ceFAZolin (ANCEF) 1,000 mg in dextrose 5 % 50 mL IVPB (mini-bag)  1,000 mg IntraVENous Rebecca Landrum MD   Stopped at 07/10/22 0339    heparin (porcine) injection 5,000 Units  5,000 Units SubCUTAneous 3 times per day Lito Le MD   5,000 Units at 07/10/22 0602    tiZANidine (ZANAFLEX) tablet 2 mg  2 mg Oral TID PRN Mirian Najera MD   2 mg at 07/10/22 0203    rosuvastatin (CRESTOR) tablet 40 mg  40 mg Oral Nightly Mirian Najera MD   40 mg at 07/09/22 2155    potassium chloride (KLOR-CON) extended release tablet 20 mEq  20 mEq Oral TID Mirian Najera MD   20 mEq at 07/10/22 0805    clindamycin (CLEOCIN) 600 mg in dextrose 5 % 50 mL IVPB  600 mg IntraVENous Angel Fragoso MD   Stopped at 07/10/22 0224    polyethylene glycol (GLYCOLAX) packet 17 g  17 g Oral Daily Mirian Najera MD   17 g at 07/10/22 0805    lactobacillus (CULTURELLE) capsule 1 capsule  1 capsule Oral Daily with breakfast Pandora Leyden, MD   1 capsule at 07/10/22 0805    amiodarone (CORDARONE) tablet 200 mg  200 mg Oral Daily More Buchanan MD   200 mg at 07/10/22 0805    DULoxetine (CYMBALTA) extended release capsule 60 mg  60 mg Oral Daily More Buchanan MD   60 mg at 07/10/22 0805    ezetimibe (ZETIA) tablet 10 mg  10 mg Oral Daily More Buchanan MD   10 mg at 07/10/22 0805    mometasone-formoterol (DULERA) 200-5 MCG/ACT inhaler 2 puff  2 puff Inhalation BID More Buchanan MD   2 puff at 07/10/22 0816    levothyroxine (SYNTHROID) tablet 88 mcg  88 mcg Oral Daily More Buchanan MD   88 mcg at 07/10/22 0601    metoprolol succinate (TOPROL XL) extended release tablet 50 mg  50 mg Oral Daily More Buchanan MD   50 mg at 07/10/22 0805    pantoprazole (PROTONIX) tablet 40 mg  40 mg Oral Daily More Buchanan MD   40 mg at 07/10/22 0805       Take Home Meds:  Current Discharge Medication List      START taking these medications    Details   lactobacillus (CULTURELLE) capsule Take 1 capsule by mouth daily (with breakfast)  Qty: 30 capsule, Refills: 0      clindamycin (CLEOCIN) 300 MG capsule Take 1 capsule by mouth 4 times daily for 7 days  Qty: 28 capsule, Refills: 0         CONTINUE these medications which have CHANGED    Details   cephALEXin (KEFLEX) 500 MG capsule Take 1 capsule by mouth 3 times daily  Qty: 21 capsule, Refills: 0    Associated Diagnoses: Cellulitis of left lower extremity      ipratropium (ATROVENT) 0.03 % nasal spray INHALE 2 DOSES INTO EACH NOSTRIL THREE TIMES A DAY IF NEEDED FOR RHINITIS  Qty: 30 mL, Refills: 5    Comments: REFILL REQUESTED TUES 10/19/21 @@ 10:35AM      torsemide (DEMADEX) 20 MG tablet One po bid for swelling  Qty: 60 tablet, Refills: 0    Comments: Due for fu visit with dr Alas Men please ask him/her schedule before med runs out      potassium chloride (KLOR-CON M) 20 MEQ TBCR extended release tablet Take 1 tablet by mouth 2 times daily  Qty: 60 tablet, Refills: 3    Comments: REFILL REQUESTED 12/22/21 @@ 11:46 A. M. DLB         CONTINUE these medications which have NOT CHANGED    Details   DULoxetine (CYMBALTA) 60 MG extended release capsule Take 60 mg by mouth daily      ADVAIR DISKUS 250-50 MCG/ACT AEPB diskus inhaler INHALE 1 PUFF BY MOUTH 2 TIMES A DAY EVERY 12 HOURS  Qty: 1 each, Refills: 0      valsartan (DIOVAN) 40 mg tablet Take 0.5 tablets by mouth daily  Qty: 30 tablet, Refills: 3      amiodarone (CORDARONE) 200 MG tablet TAKE ONE TABLET BY MOUTH NIGHTLY  Qty: 30 tablet, Refills: 1      ezetimibe (ZETIA) 10 MG tablet TAKE ONE TABLET BY MOUTH DAILY  Qty: 30 tablet, Refills: 1      metoprolol succinate (TOPROL XL) 50 MG extended release tablet TAKE ONE TABLET BY MOUTH DAILY  Qty: 30 tablet, Refills: 1      pantoprazole (PROTONIX) 40 MG tablet TAKE ONE TABLET BY MOUTH DAILY  Qty: 30 tablet, Refills: 1      rosuvastatin (CRESTOR) 40 MG tablet TAKE ONE TABLET BY MOUTH EVERY EVENING  Qty: 30 tablet, Refills: 1      tiZANidine (ZANAFLEX) 4 MG capsule TAKE ONE-HALF TABLET BY MOUTH EVERY 8 HOURS AS NEEDED (MUSCLE SPASM)  Qty: 45 capsule, Refills: 1      sennosides-docusate sodium (SENOKOT-S) 8.6-50 MG tablet Take 1 tablet by mouth in the morning and at bedtime  Qty: 60 tablet, Refills: 5      levothyroxine (SYNTHROID) 88 MCG tablet Take 1 tablet by mouth Daily  Qty: 30 tablet, Refills: 11    Comments: REFILL REQUESTED 8/26/21 @@ 3:47 P. M. DLB      docusate sodium (DOK) 100 MG capsule TAKE ONE CAPSULE BY MOUTH TWO TIMES A DAY  Qty: 60 capsule, Refills: 5    Comments: REFILL REQUESTED 4/19/18 @ 4:57 P. M. DLB      diclofenac sodium (VOLTAREN) 1 % GEL Apply 2 g topically daily      oxyCODONE HCl (OXY-IR) 10 MG immediate release tablet Take 10 mg by mouth every 6 hours as needed for Pain.          STOP taking these medications       metOLazone (ZAROXOLYN) 2.5 MG tablet Comments:   Reason for Stopping:         Fluticasone-Salmeterol,sensor, 232-14 MCG/ACT AEPB Comments:   Reason for Stopping:                 Activity: activity as tolerated  Diet: cardiac diet  Wound Care: keep legs elevated    Follow-up with Sax   Time Spent: over 35 minutes spent performing hospital discharge  Signed:  Bill Cantu MD  7/10/2022  9:48 AM

## 2022-07-10 NOTE — PROGRESS NOTES
Data- discharge order received, pt verbalized agreement to discharge, disposition to previous residence, no needs for HHC/DME. Action- discharge instructions prepared and given to pt, pt verbalized understanding. Medication information packet given r/t NEW and/or CHANGED prescriptions emphasizing name/purpose/side effects, pt verbalized understanding. Discharge instruction summary: Diet- low sodium, Activity- as tolerated, Primary Care Physician as followsLisa Whaley -[phone] f/u appointment on July 13th, prescription medications filled and sent to preferred pharmacy. CHF Education reviewed. Pt/ Family has had a total of 60 minutes CHF education this admission encounter. Response- Pt belongings gathered, IV removed. Disposition is home (no HHC/DME needs), transported with belongings, taken to lobby via w/c , no complications.    Electronically signed by Skylar Courtney RN on 7/10/2022 at 11:02 AM

## 2022-07-11 ENCOUNTER — CARE COORDINATION (OUTPATIENT)
Dept: CASE MANAGEMENT | Age: 77
End: 2022-07-11

## 2022-07-11 DIAGNOSIS — L03.116 CELLULITIS OF LEFT LOWER EXTREMITY: Primary | ICD-10-CM

## 2022-07-11 LAB — BLOOD CULTURE, ROUTINE: NORMAL

## 2022-07-11 PROCEDURE — 1111F DSCHRG MED/CURRENT MED MERGE: CPT | Performed by: INTERNAL MEDICINE

## 2022-07-11 NOTE — CARE COORDINATION
[facility] Transitions Initial Follow Up Call    Call within 2 business days of discharge: Yes    Patient: Carlito Aguayo Patient : 1945   MRN: [id number]  Reason for Admission: cellulitis  Discharge Date: 7/10/22 RARS: Readmission Risk Score: 17.5 ( )      Last Discharge [facility]       Complaint Diagnosis Description Type Department Provider    22 Cellulitis Cellulitis of both lower extremities . .. ED to Hosp-Admission (Discharged) (ADMITTED) Donte Maldonado MD           Spoke with: [address] [place] [address]      Non-face-to-face services provided:  Obtained and reviewed discharge summary and/or continuity of care documents and med review    Transitions of Care Initial Call    Was this an external facility discharge? No    Challenges to be reviewed by the provider   Additional needs identified to be addressed with provider: No  none             Method of communication with provider : none    Was this a readmission? No  Patient stated reason for admission: cellulitis  Patients top risk factors for readmission: medical condition-.    Care Transition Nurse (CTN) contacted the patient by telephone to perform post hospital discharge assessment. Verified name and  with patient as identifiers. Provided introduction to self, and explanation of the CTN role. CTN reviewed discharge instructions, medical action plan and red flags with patient who verbalized understanding. Patient given an opportunity to ask questions and does not have any further questions or concerns at this time. Were discharge instructions available to patient? Yes. Reviewed appropriate site of care based on symptoms and resources available to patient including: PCP. The patient agrees to contact the PCP office for questions related to their healthcare. Medication reconciliation was performed with patient, who verbalizes understanding of administration of home medications.      Reviewed and educated patient on any new and changed medications related to discharge diagnosis. CTN provided contact information. Plan for next call: symptom management-.  self management-.  follow up appointment-.      Care Transitions 24 Hour Call    Do you have a copy of your discharge instructions?: Yes  Do you have all of your prescriptions and are they filled?: Yes  Have you been contacted by a [facility] Pharmacist?: No  Have you scheduled your follow up appointment?: Yes  How are you going to get to your appointment?: Car - family or friend to transport  [address]: [address] you feel like you have everything you need to keep you well at home?: Yes  Care Transitions Interventions         Follow Up  Future Appointments   Date Time Provider [facility] Katie   7/13/2022  3:40 PM Jarrett Michelle MD [address]   8/3/2022 12:15 PM SCHEDULE, [facility] REMOTE TRANSMISSION [facility]   8/10/2022  2:45 PM SCHEDULE, I DEVICE CHECK [facility]   8/10/2022  3:00 PM HOWARD Saldivar - CNP [facility]   8/30/2022  3:00 PM Mary Lou Rodriguez MD [facility]   9/14/2022  1:30 PM SCHEDULE, [facility] TRANSMISSION [facility]       Pt states she is doing well today. Reports the redness and swelling to legs have decreased and she now feels like she is wearing stockings around her ankles. Denies weight gain and states it is the same as before hospitalization. Denies cp, sob, fever or chills. States she is resting frequently and taking things easy. Med rec reviewed and 1111F placed. Pt has a f/u with PCP 7/13. Encouraged pt to reach out to PCP if symptoms become worse. Pt verbalized understanding. Will continue to follow.      MARIA ESTHER Sharma, RN   Care Transition Nurse  Mobile: [phone]

## 2022-07-19 ENCOUNTER — CARE COORDINATION (OUTPATIENT)
Dept: CASE MANAGEMENT | Age: 77
End: 2022-07-19

## 2022-07-19 NOTE — CARE COORDINATION
[facility] Transitions Follow Up Call    2022    Patient: Kp Lopez  Patient : 1945   MRN: [id number]  Reason for Admission: Cellulitis  Discharge Date: 7/10/22 RARS: Readmission Risk Score: 17.5 ( )         Spoke with: no one    Care Transitions Subsequent and Final Call    Subsequent and Final Calls  Are you currently active with any services?: Home Health  Care Transitions Interventions  Other Interventions: Follow Up  Future Appointments   Date Time Provider Vickie Wilson   8/3/2022 12:15 PM SCHEDULE, [facility] REMOTE TRANSMISSION [facility]   8/10/2022  2:45 PM SCHEDULE, I DEVICE CHECK [facility]   8/10/2022  3:00 PM HOWARD Montejo CNP [facility]   2022  3:00 PM Faizan Navarro MD [facility]   2022  1:30 PM SCHEDULE, [facility] TRANSMISSION [facility]       Follow up outreach call attempt, no answer. CTN left  with contact information and request for return call. CTN will continue with outreach call attempts.     JACKY CoronelN, RN   Care Transition Nurse  Mobile: [phone]

## 2022-07-26 ENCOUNTER — CARE COORDINATION (OUTPATIENT)
Dept: CASE MANAGEMENT | Age: 77
End: 2022-07-26

## 2022-07-26 NOTE — CARE COORDINATION
[facility] Transitions Follow Up Call    2022    Patient: Cirilo Mchugh  Patient : 1945   MRN: [id number]  Reason for Admission: cellulitis  Discharge Date: 7/10/22 RARS: Readmission Risk Score: 17.5 ( )         Spoke with: Cirilo Mchugh    Patient verified  and was pleasant and agreeable to transition calls. Call was very brief. Fine. Some pain in legs. PCP to call r/t f/u. All the rest is ok. Doing ok. Ambulating. Patient thanked LPN CC for calling and ended call. Orion Vann  [facility]  Care Transitions  [phone]    Care Transitions Subsequent and Final Call    Subsequent and Final Calls  Care Transitions Interventions  Other Interventions:              Follow Up  Future Appointments   Date Time Provider Vickie Wilson   8/3/2022 12:15 PM SCHEDULE, [facility] REMOTE TRANSMISSION [facility]   8/10/2022  2:45 PM SCHEDULE, [facility] DEVICE CHECK [facility]   8/10/2022  3:00 PM HOWARD Mohamud - CNP I [facility]   2022 12:30 PM SCHEDULE, [facility] VASCULAR ROOM 2 [facility]   2022  3:00 PM Stephon Taylor MD [facility]   2022  1:30 PM SCHEDULE, [facility] REMOTE TRANSMISSION [facility]       Alexandra Villatoro LPN

## 2022-08-02 ENCOUNTER — CARE COORDINATION (OUTPATIENT)
Dept: CASE MANAGEMENT | Age: 77
End: 2022-08-02

## 2022-08-02 NOTE — CARE COORDINATION
[facility] Transitions Follow Up Call    2022    Patient: Lady Hoyos  Patient : 1945   MRN: [id number]  Reason for Admission: cellulitis  Discharge Date: 7/10/22 RARS: Readmission Risk Score: 17.5 ( )         Spoke with: Lady Hoyos  who reports that she is doing really good. Patient reports that her follow up with PCP on 22 went well. She did have some edema in ble and PCP increased torsemide 1 1/2 tabs and to wear compression hose. Patient states that both are being effective. She denies cp, sob, cough, dizziness, headache, n/v, diarrhea, abdominal pains, fever, or chills. Patient report that appetite and fluid intake is good and denies any problems with bowel or bladder. Patient is taking all medications as ordered. Denies any needs at this time. Patient instructed to continue to monitor s/s, reporting any that may present to MD immediately for early intervention. Agreeable to f/u calls. Care Transitions Subsequent and Final Call    Subsequent and Final Calls  Do you have any ongoing symptoms?: No  Have your medications changed?: Yes  Patient Reports: increased torsimide to 1 1/2 tabs daily  Do you have any questions related to your medications?: No  Do you currently have any active services?: No  Are you currently active with any services?: Home Health  Do you have any needs or concerns that I can assist you with?: No  Care Transitions Interventions  No Identified Needs  Other Interventions:              Follow Up  Future Appointments   Date Time Provider Vickie Wilson   8/3/2022 12:15 PM SCHEDULE, [facility] REMOTE TRANSMISSION [facility]   2022  3:00 PM Jelena Coleman MD [facility]   8/10/2022  2:45 PM SCHEDULE, [facility] DEVICE CHECK [facility]   8/10/2022  3:00 PM [address] [facility], APRN - CNP [facility]   2022 12:30 PM SCHEDULE, [facility] VASCULAR ROOM 2 [facility]   2022  3:00 PM Jaret Maki MD [facility]   2022  1:30 PM SCHEDULE,

## 2022-08-03 ENCOUNTER — NURSE ONLY (OUTPATIENT)
Dept: CARDIOLOGY CLINIC | Age: 77
End: 2022-08-03
Payer: MEDICARE

## 2022-08-03 DIAGNOSIS — R00.1 SYMPTOMATIC BRADYCARDIA: ICD-10-CM

## 2022-08-03 DIAGNOSIS — I50.42 CHRONIC COMBINED SYSTOLIC AND DIASTOLIC CHF, NYHA CLASS 2 (HCC): Primary | ICD-10-CM

## 2022-08-03 DIAGNOSIS — Z95.0 BIVENTRICULAR CARDIAC PACEMAKER IN SITU: Chronic | ICD-10-CM

## 2022-08-03 DIAGNOSIS — I48.0 PAF (PAROXYSMAL ATRIAL FIBRILLATION) (HCC): Chronic | ICD-10-CM

## 2022-08-03 PROCEDURE — 93297 REM INTERROG DEV EVAL ICPMS: CPT | Performed by: NURSE PRACTITIONER

## 2022-08-03 PROCEDURE — G2066 INTER DEVC REMOTE 30D: HCPCS | Performed by: NURSE PRACTITIONER

## 2022-08-03 NOTE — PROGRESS NOTES
Remote transmission received from patient's CRT-P monitor at home. Transmission shows normal sensing and pacing function. No new arrhythmias/events recorded. Ap 93.4%  BiVp 96.5%, Effective 96.2%, VSRp 2.0%  PVCs 50.1/hr    Possible Optivol fluid accumulation 07.16.22-ongoing, currently elevated up to >200 w correlating TI trend below reference line. TriageF Heart Failure Risk Status on 02-Aug-2022 is High*. Pt has upcoming appt w NPCP 08.10.22. End of 31-day monitoring period 8/3/22. NP will review. See interrogation under cardiology tab in the [address] [place] [address] [address] field for more details. Will continue to monitor remotely.

## 2022-08-09 ENCOUNTER — CARE COORDINATION (OUTPATIENT)
Dept: CASE MANAGEMENT | Age: 77
End: 2022-08-09

## 2022-08-09 NOTE — CARE COORDINATION
[facility] Transitions Follow Up Call    2022    Patient: Isadora Gilman  Patient : 1945   MRN: [id number]  Reason for Admission: cellulitis  Discharge Date: 7/10/22 RARS: Readmission Risk Score: 17.5 ( )         Spoke with: no one    Care Transitions Subsequent and Final Call    Subsequent and Final Calls  Are you currently active with any services?: Home Health  Care Transitions Interventions  Other Interventions: Follow Up  Future Appointments   Date Time Provider Vickie Wilson   8/10/2022  3:00 PM HOWARD Piper - CNP [facility]   2022 12:30 PM SCHEDULE, [facility] VASCULAR ROOM 2 [facility]   2022  3:00 PM Waleska Delaney MD [facility]   2022  7:30 AM SCHEDULE, [facility] REMOTE TRANSMISSION [facility]   10/12/2022  8:00 AM SCHEDULE, [facility] TRANSMISSION [facility]       Follow up outreach call attempt, no answer. CTN left  with contact information and request for return call. This does complete pts 30 days. Will resolve episode.      MARIA ESTHER Elder, RN   Care Transition Nurse  Mobile: [phone]

## 2022-08-09 NOTE — PROGRESS NOTES
[facility]   Electrophysiology      Date: 8/10/2022    Primary Cardiologist: Shamar Styles MD  PCP: Yusuf Javier MD     Chief Complaint:   Chief Complaint   Patient presents with    Follow-up     Afib - device check     History of Present Illness:    I saw Anmol Velazquez in the office for electrophysiology follow up today. She is a 68 y.o. female with a past medical history of paroxysmal atrial fibrillation, pulmonary hypertension, aortic stenosis s/p AVR, PVI and RENETTA exclusion by Dr Madison Juarez on 6/2020 and CAD s/p CHILANGO to mid circ 5/2014,  AVM's which were cauterized. She presented to [facility] with complaints of back pain and while inpatient had an episode of dyspnea. An echocardiogram was completed revealing moderate to severe MR. Subsequently, a CAMERON was completed on 7/27/20 showing moderate MR. She was admitted 10/26/2021-11/1/2021 with afib with RVR and acute CHF. She converted to a regular rhythm and was diuresed prior to discharge. She was [place] [facility] [place] with acute encephalopathy and found to have rhabdomyolysis and afib with RVR again. She underwent successful cardioversion on 11/17/2021. She did experience bradycardia when in sinus rhythm with heart rate between 40-50 bpm. She was seen in the office with [place] CNP for follow up on 11/23/21 and then sent to the ED for admission. She was again found in afib with RVR and fluid overloaded. She was diuresed and discharged home on both amiodarone and Toprol. Amiodarone later had to be stopped due to bradycardia. She underwent placement of a Medtronic Bi-V PPM on 4/11/22 with Dr. Scot Diggs. Amiodarone was resumed after device implant. She presents today for follow up. She has been feeling much better since her device implant. She has more energy, less dyspnea and feels like her mind is \"clearer. \" Denies any chest pain or palpitations. She has had recent worsening leg swelling and Dr. Hiram Stanford increased her torsemide to 30mg QD.  She has been monitoring her BMP. Her edema is essentially back to her baseline. Denies any syncope. No bleeding problems. Allergies: Allergies   Allergen Reactions    Benadryl [Diphenhydramine] Swelling    Doxylamine     Mucinex [Guaifenesin Er]      hallucinations    Spiriva Handihaler [Tiotropium Bromide Monohydrate]      Nausea      Adhesive Tape Rash and Swelling    Neosporin [Bacitracin-Polymyxin B] Other (See Comments)     Rash that spread across face with swelling     Home Medications:  Prior to Visit Medications    Medication Sig Taking? Authorizing Provider   fluticasone-salmeterol (ADVAIR) 250-50 MCG/ACT AEPB diskus inhaler INHALE 1 PUFF BY MOUTH 2 TIMES A DAY Yes Mirian Najera MD   ezetimibe (ZETIA) 10 MG tablet TAKE ONE TABLET BY MOUTH DAILY Yes Mirian Najera MD   pantoprazole (PROTONIX) 40 MG tablet TAKE ONE TABLET BY MOUTH DAILY Yes Mirian Najera MD   metoprolol succinate (TOPROL XL) 50 MG extended release tablet TAKE ONE TABLET BY MOUTH DAILY Yes Mirian Najera MD   tiZANidine (ZANAFLEX) 4 MG capsule TAKE ONE-HALF TABLET BY MOUTH EVERY 8 HOURS AS NEEDED (MUSCLE SPASM) Yes Mirian Najera MD   rosuvastatin (CRESTOR) 40 MG tablet TAKE ONE TABLET BY MOUTH EVERY EVENING Yes Mirian Najera MD   levothyroxine (SYNTHROID) 88 MCG tablet TAKE 1 TABLET BY MOUTH EVERY DAY Yes Lito Le MD   amiodarone (CORDARONE) 200 MG tablet Take 0.5 tablets by mouth in the morning. Yes HOWARD Harper - CNP   betamethasone dipropionate 0.05 % cream Apply topically 2 times daily.  Rub in well to itchy red spots on legs Yes Mirian Najera MD   lactobacillus (CULTURELLE) capsule Take 1 capsule by mouth daily (with breakfast) Yes Mirian Najera MD   cephALEXin (KEFLEX) 500 MG capsule Take 1 capsule by mouth 3 times daily Yes Mirian Najera MD   ipratropium (ATROVENT) 0.03 % nasal spray INHALE 2 DOSES INTO EACH NOSTRIL THREE TIMES A DAY IF NEEDED FOR RHINITIS Yes Mirian Najera MD   torsemide (DEMADEX) 20 MG tablet One po bid for swelling  Patient taking differently: 30 mg One po bid for swelling - per pt taking 1 - 1/2 pills in the morning Yes Thad Shi MD   potassium chloride (KLOR-CON M) 20 MEQ TBCR extended release tablet Take 1 tablet by mouth 2 times daily Yes Thad Shi MD   DULoxetine (CYMBALTA) 60 MG extended release capsule Take 60 mg by mouth daily Yes Historical Provider, MD   valsartan (DIOVAN) 40 mg tablet Take 0.5 tablets by mouth daily Yes Edilma Grace MD   sennosides-docusate sodium (SENOKOT-S) 8.6-50 MG tablet Take 1 tablet by mouth in the morning and at bedtime Yes Thad Shi MD   docusate sodium (DOK) 100 MG capsule TAKE ONE CAPSULE BY MOUTH TWO TIMES A DAY Yes Thad Shi MD   diclofenac sodium (VOLTAREN) 1 % GEL Apply 2 g topically daily Yes Historical Provider, MD   oxyCODONE HCl (OXY-IR) 10 MG immediate release tablet Take 10 mg by mouth every 6 hours as needed for Pain. 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 since 1983. Chronic diastolic CHF (congestive heart failure) ([facility] [address] 2016    grade II    COPD, mild (HCC)     CVA (cerebral infarction) 2013    left cerebral cortex x2/expressive aphasia/resolved    Diverticulosis     Epistaxis, recurrent     when inr high.  last 3-4 months ago Former smoker     Gallbladder sludge     HTN (hypertension)     Hyperlipidemia     Hypothyroidism     Lumbar stenosis without neurogenic claudication     pain across low back worse with standing and walking, nonradiating    Macular degeneration 2018    left worse than right , dry : progressive loss of sight: just barely able to see to drive    Neuropathy     Nonrheumatic mitral valve regurgitation     Obstructive sleep apnea     Osteoarthritis     Pulmonary HTN ([facility] [address]     primary, responsive to nitrates    PVD (peripheral vascular disease) ([facility] [address]     occluded right SFA::: asymptomatic NIKOS 0.7 right and 1.0 left    Sacral fracture, closed ([address] 2014    Syncope 2014       Past Surgical History:   Past Surgical History:   Procedure Laterality Date    ABLATION OF DYSRHYTHMIC FOCUS  2022    AORTIC VALVE REPLACEMENT  6/16/15    Dr. Yamil Bonilla. Hernandez - PVI, RENETTA exclusion. 19mm Maki pericardial Magna    APPENDECTOMY      BACK SURGERY  03/15/2019    superion inserted to keep back from hurtin [address] N/A 2019    EMERGENT; LAPAROSCOPIC CHOLECYSTECTOMY WITH GRAM performed by Sai Valle MD at [place] [address]      stent x 1    CYSTOSCOPY  2014    dr Moon Anderson      THR Right    OTHER SURGICAL HISTORY  2018     EGD and colonoscopy.     PACEMAKER INSERTION  2022    PAIN MANAGEMENT PROCEDURE Bilateral 2021    BILATERAL L3, L4, L5 MEDIAL BRANCH BLOCK WITH FLUOROSCOPY performed by Ham Florez MD at [facility] Bilateral 2021    BILATERAL L3, L4, L5 MEDIAL BRANCH BLOCKS WITH FLUOROSCOPY (25734, 89249) performed by Ham Florez MD at [facility] Bilateral 2021    BILATERAL L3, L4, L5 RADIOFREQUENCY ABLATION WITH FLUOROSCOPY (98484, 21050) performed by Ham Florez MD at [address] [place] [address] 3/15/2019    INSERTION OF INTERSPINOUS SPACER Ronal Acevedo AT LUMBAR FOUR-LUMBAR FIVE performed by Antonio Joseph MD at [address] [place] [address] ENDOSCOPY  12/15/2017       Social History:   reports that she has been smoking cigarettes. She has a 45.00 pack-year smoking history. She has quit using smokeless tobacco. She reports that she does not drink alcohol and does not use drugs. Family History:      Problem Relation Age of Onset    Breast Cancer Daughter        Review of Systems   Constitutional:  Negative for chills, fatigue, fever and unexpected weight change. HENT:  Negative for congestion, hearing loss, sinus pressure, sore throat and trouble swallowing. Respiratory:  Negative for cough, shortness of breath and wheezing. Cardiovascular:  Positive for leg swelling (improving). Negative for chest pain and palpitations. Gastrointestinal:  Negative for abdominal pain, blood in stool, constipation, diarrhea, nausea and vomiting. Genitourinary:  Negative for hematuria. Musculoskeletal:  Negative for arthralgias, back pain, gait problem and myalgias. Skin:  Negative for color change, rash and wound. Neurological:  Negative for dizziness, seizures, syncope, speech difficulty, weakness and light-headedness. Hematological:  Does not bruise/bleed easily. Physical Examination:  Vitals:    08/10/22 1450   BP: 120/70   Pulse: 67   SpO2: 98%      Wt Readings from Last 3 Encounters:   08/10/22 125 lb (56.7 kg)   08/09/22 126 lb (57.2 kg)   07/27/22 129 lb 9.6 oz (58.8 kg)       Physical Exam  Vitals reviewed. Constitutional:       General: She is not in acute distress. Appearance: Normal appearance. HENT:      Head: Normocephalic and atraumatic. Nose: Nose normal.      Mouth/Throat:      Mouth: Mucous membranes are moist.   Eyes:      Conjunctiva/sclera: Conjunctivae normal.      Pupils: Pupils are equal, round, and reactive to light. Cardiovascular:      Rate and Rhythm: Normal rate and regular rhythm. Heart sounds: No murmur heard. No friction rub. No gallop. Comments: ICD incision to L upper chest well healed without any erythema, drainage or swelling. Pulmonary:      Effort: No respiratory distress. Breath sounds: No wheezing, rhonchi or rales. Abdominal:      General: Abdomen is flat. Bowel sounds are normal.      Palpations: Abdomen is soft. Musculoskeletal:         General: Normal range of motion. Right lower leg: No edema. Left lower leg: No edema. Skin:     General: Skin is warm and dry. Findings: No bruising. Neurological:      General: No focal deficit present. Mental Status: She is alert and oriented to person, place, and time. Motor: No weakness. Psychiatric:         Mood and Affect: Mood normal.         Behavior: Behavior normal.        Pertinent labs, diagnostic, device, and imaging results reviewed as a part of this visit    LABS    CBC:   Lab Results   Component Value Date    WBC 9.4 2022    HGB 12.1 2022    HCT 36.7 2022    MCV 86.5 2022     2022     BMP:   Lab Results   Component Value Date    CREATININE 0.9 2022    BUN 20 2022     2022    K 4.4 2022     2022    CO2 26 2022     Estimated Creatinine Clearance: 42 mL/min (based on SCr of 0.9 mg/dL).    Lab Results   Component Value Date/Time    .2 2013 05:05 AM       Thyroid:   Lab Results   Component Value Date    TSH 0.89 2022     Lipid Panel:   Lab Results   Component Value Date/Time    CHOL 137 2022 01:08 PM    HDL 53 2022 01:08 PM    HDL 42 2010 10:02 PM    TRIG 86 2022 01:08 PM     LFTs:  Lab Results   Component Value Date    ALT 20 2022    AST 33 2022    ALKPHOS 118 2022    BILITOT 0.4 2022     Coags:   Lab Results   Component Value Date    PROTIME 11.7 2018    INR 1.04 2018    APTT 59.5 (H) 10/27/2021       EC/10/2022   AV paced at 61 BPM.    Echo: 10/27/21  Left ventricular cavity size is normal.  Normal left ventricular wall thickness. Ejection fraction is visually estimated to be 45-50%. There is mild global  hypokinesis appreciated. Grade III diastolic dysfunction with elevated LV filling pressures. Moderately dilated right ventricle with mildly reduced function. The left atrium is severely dilated. The right atrium is mildly dilated. Moderately severe tricuspid regurgitation. Moderate pulmonic regurgitation present. Moderately severe mitral regurgitation appreciated. A bioprosthetic artificial aortic valve appears well seated with a maximum  velocity of 2.4m/s and a mean gradient of 12mmHg. Trivial aortic regurgitation. A bubble study was performed and fails to show evidence of right to left  shunting. CAMERON 10/29/21  Overall left ventricular size and wall thickness appear normal with systolic  function mildly reduced. LVEF 40-45%. Normal right ventricular size and function. Left atrium is moderately dilated. The left atrial appendage appears to be ligated with no flow into it. Mitral valve leaflets appear thickened/calcified. Restricted posterior leaflet with malcoaptation id the leaflet tips. Moderate mitral regurgitation is present. Moderate tricuspid regurgitation. [facility]: 6/15/15  6/15/2015   1. Right dominant coronary arterial system with mild calcification of the RCA. There is 40% serial lesions in the mid RCA. In the left system there is 25% distal left main disease. There is 50% mid LAD disease and 50% ostial second diagonal branch disease. There is no significant restenosis identified in the prior previously placed mid circumflex stent. 2. Systemic hypertension. Procedures:  1. AVR, PVI and RENETTA exclusion by Dr. Isidro Richardson 2015  2. DCCV on 11/17/2021  3. Medtronic Bi-V PPM, 4/11/22, Dr. Ho Broussard    Device interrogation: 8/10/2022   Normal device function.   Battery life 8.8 years  A paced 93.6%  V paced 97.8% (effective 97.5%)  No episodes. Optivol is high. Assessment & Plan:    Symptomatic bradycardia              - likely some component of tachybrady as well given history of A fib with RVR              - has been symptomatic while on Toprol and amiodarone as HR tends to be in the 40s              - EF has also declined from 45-50% to 40-45%              - s/p Medtronic Bi-V pacemaker implanted on 4/11/22 with Dr. Eileen Ware - feeling much better              - device interrogation today as above, device with normal function   - continue with routine follow up with device clinic     PAF              - with recurrences in 2020, 2021 s/p DCCV in 2020              - had surgical PVI with AVR in 2020 but known recurrence since then              - on Toprol 50mg QD   - decrease amiodarone to 100mg QD              - CHADS2-VASc 5 (age, gender, HTN, CAD) not anticoagulated due to past RENETTA exclusion with no flow in appendage seen on CAMERON in 2020   - device interrogation today with no recent arrhythmias     Chronic systolic and diastolic heart failure              - EF 40-45%, NYHA class II   - Optivol is high but weight and swelling have improved and are close to baseline after increasing torsemide, will continue increased dose   - recent BMP WNL   - has f/u later this month with Dr. Jinny Tamayo              - continue medical therapy          Mitral regurgitation              - being followed by Dr. Jinny aTmayo                CAD              - no angina              - on statin, aspirin, beta blocker     S/p bioprosthetic AVR              - in 2015              - on aspirin    Thank you for allowing to us to participate in the care of Jeff. Return in about 6 months (around 2/10/2023) for for an appointment with Dr. Eileen Ware.      HOWARD Garland  [facility], [address], [address] [facility]  Phone: [phone]  Fax: [phone]    Electronically signed by HOWARD Rodriguez - CNP on 8/10/2022 at 3:27 PM

## 2022-08-10 ENCOUNTER — OFFICE VISIT (OUTPATIENT)
Dept: CARDIOLOGY CLINIC | Age: 77
End: 2022-08-10
Payer: MEDICARE

## 2022-08-10 ENCOUNTER — NURSE ONLY (OUTPATIENT)
Dept: CARDIOLOGY CLINIC | Age: 77
End: 2022-08-10

## 2022-08-10 VITALS
HEIGHT: 62 IN | HEART RATE: 67 BPM | SYSTOLIC BLOOD PRESSURE: 120 MMHG | WEIGHT: 125 LBS | OXYGEN SATURATION: 98 % | DIASTOLIC BLOOD PRESSURE: 70 MMHG | BODY MASS INDEX: 23 KG/M2

## 2022-08-10 DIAGNOSIS — I48.0 PAF (PAROXYSMAL ATRIAL FIBRILLATION) (HCC): Chronic | ICD-10-CM

## 2022-08-10 DIAGNOSIS — R00.1 SYMPTOMATIC BRADYCARDIA: Primary | ICD-10-CM

## 2022-08-10 DIAGNOSIS — I25.10 CORONARY ARTERY DISEASE INVOLVING NATIVE CORONARY ARTERY OF NATIVE HEART WITHOUT ANGINA PECTORIS: ICD-10-CM

## 2022-08-10 DIAGNOSIS — I50.42 CHRONIC COMBINED SYSTOLIC AND DIASTOLIC HEART FAILURE (HCC): ICD-10-CM

## 2022-08-10 DIAGNOSIS — I34.0 NONRHEUMATIC MITRAL VALVE REGURGITATION: ICD-10-CM

## 2022-08-10 DIAGNOSIS — Z95.2 S/P AVR (AORTIC VALVE REPLACEMENT): ICD-10-CM

## 2022-08-10 DIAGNOSIS — Z95.0 BIVENTRICULAR CARDIAC PACEMAKER IN SITU: ICD-10-CM

## 2022-08-10 PROCEDURE — 99214 OFFICE O/P EST MOD 30 MIN: CPT | Performed by: NURSE PRACTITIONER

## 2022-08-10 PROCEDURE — 93000 ELECTROCARDIOGRAM COMPLETE: CPT | Performed by: NURSE PRACTITIONER

## 2022-08-10 PROCEDURE — G8400 PT W/DXA NO RESULTS DOC: HCPCS | Performed by: NURSE PRACTITIONER

## 2022-08-10 PROCEDURE — G8420 CALC BMI NORM PARAMETERS: HCPCS | Performed by: NURSE PRACTITIONER

## 2022-08-10 PROCEDURE — 4004F PT TOBACCO SCREEN RCVD TLK: CPT | Performed by: NURSE PRACTITIONER

## 2022-08-10 PROCEDURE — 1090F PRES/ABSN URINE INCON ASSESS: CPT | Performed by: NURSE PRACTITIONER

## 2022-08-10 PROCEDURE — 1123F ACP DISCUSS/DSCN MKR DOCD: CPT | Performed by: NURSE PRACTITIONER

## 2022-08-10 PROCEDURE — G8427 DOCREV CUR MEDS BY ELIG CLIN: HCPCS | Performed by: NURSE PRACTITIONER

## 2022-08-10 RX ORDER — AMIODARONE HYDROCHLORIDE 200 MG/1
100 TABLET ORAL DAILY
Qty: 45 TABLET | Refills: 3 | Status: SHIPPED | OUTPATIENT
Start: 2022-08-10

## 2022-08-10 ASSESSMENT — ENCOUNTER SYMPTOMS
BACK PAIN: 0
NAUSEA: 0
WHEEZING: 0
BLOOD IN STOOL: 0
TROUBLE SWALLOWING: 0
CONSTIPATION: 0
ABDOMINAL PAIN: 0
SINUS PRESSURE: 0
SORE THROAT: 0
DIARRHEA: 0
COUGH: 0
VOMITING: 0
COLOR CHANGE: 0
SHORTNESS OF BREATH: 0

## 2022-08-10 NOTE — PROGRESS NOTES
Sebastian Hui 97 transmission received 08.10.22 @ 3:00pm from [facility] for patient's CRT-P. Pt seeing NPCP today. REASON FOR TRANSMISSION  Presence of cardiac device  TECHNICAL FINDINGS  No device or lead performance issue(s) observed  ADDITIONAL NOTES  Patient Name: Charli Edwards   Reason for Check: Presence of Device   DEVICE ASSESSMENT: Available daily battery/lead measurements within expected range. Lead trends stable. ARRHYTHMIA SUMMARY: No arrhythmias/high rate episodes detected since last interrogation on 2Aug2022. OBSERVATIONS: Possible intrathoracic fluid accumulation based on Optivol data. See Cardiac Compass trends. Device appears to be currently functioning as programmed. NPCP will review. See interrogation under cardiology tab in the [address] [place] [address] [phone] field for more details.

## 2022-08-18 ENCOUNTER — HOSPITAL ENCOUNTER (OUTPATIENT)
Dept: VASCULAR LAB | Age: 77
Discharge: HOME | End: 2022-08-18
Payer: MEDICARE

## 2022-08-18 DIAGNOSIS — I73.9 PAD (PERIPHERAL ARTERY DISEASE) (HCC): ICD-10-CM

## 2022-08-18 PROCEDURE — 93925 LOWER EXTREMITY STUDY: CPT

## 2022-08-30 ENCOUNTER — OFFICE VISIT (OUTPATIENT)
Dept: CARDIOLOGY CLINIC | Age: 77
End: 2022-08-30
Payer: MEDICARE

## 2022-08-30 VITALS
WEIGHT: 124 LBS | BODY MASS INDEX: 22.82 KG/M2 | OXYGEN SATURATION: 96 % | HEART RATE: 80 BPM | HEIGHT: 62 IN | DIASTOLIC BLOOD PRESSURE: 74 MMHG | SYSTOLIC BLOOD PRESSURE: 124 MMHG

## 2022-08-30 DIAGNOSIS — Z95.2 S/P AVR (AORTIC VALVE REPLACEMENT): ICD-10-CM

## 2022-08-30 DIAGNOSIS — I65.21 CAROTID STENOSIS, RIGHT: ICD-10-CM

## 2022-08-30 DIAGNOSIS — I50.42 CHRONIC COMBINED SYSTOLIC AND DIASTOLIC CHF, NYHA CLASS 2 (HCC): ICD-10-CM

## 2022-08-30 DIAGNOSIS — I48.0 PAROXYSMAL ATRIAL FIBRILLATION (HCC): ICD-10-CM

## 2022-08-30 DIAGNOSIS — E78.5 HYPERLIPIDEMIA LDL GOAL <70: ICD-10-CM

## 2022-08-30 DIAGNOSIS — I25.10 CORONARY ARTERY DISEASE INVOLVING NATIVE CORONARY ARTERY OF NATIVE HEART WITHOUT ANGINA PECTORIS: Primary | ICD-10-CM

## 2022-08-30 DIAGNOSIS — I34.0 NONRHEUMATIC MITRAL VALVE REGURGITATION: ICD-10-CM

## 2022-08-30 PROCEDURE — 99214 OFFICE O/P EST MOD 30 MIN: CPT | Performed by: INTERNAL MEDICINE

## 2022-08-30 PROCEDURE — 1090F PRES/ABSN URINE INCON ASSESS: CPT | Performed by: INTERNAL MEDICINE

## 2022-08-30 PROCEDURE — 4004F PT TOBACCO SCREEN RCVD TLK: CPT | Performed by: INTERNAL MEDICINE

## 2022-08-30 PROCEDURE — G8420 CALC BMI NORM PARAMETERS: HCPCS | Performed by: INTERNAL MEDICINE

## 2022-08-30 PROCEDURE — 93000 ELECTROCARDIOGRAM COMPLETE: CPT | Performed by: INTERNAL MEDICINE

## 2022-08-30 PROCEDURE — G8427 DOCREV CUR MEDS BY ELIG CLIN: HCPCS | Performed by: INTERNAL MEDICINE

## 2022-08-30 PROCEDURE — 1123F ACP DISCUSS/DSCN MKR DOCD: CPT | Performed by: INTERNAL MEDICINE

## 2022-08-30 PROCEDURE — G8400 PT W/DXA NO RESULTS DOC: HCPCS | Performed by: INTERNAL MEDICINE

## 2022-08-30 NOTE — PROGRESS NOTES
cough. Cardiovascular: See HPI   GI: No n/v, diarrhea, constipation, abdominal pain or changes in bowel habits. No melena, no hematochezia  : No urinary frequency, urgency, incontinence, hematuria or dysuria. Skin: No cyanosis or skin lesions. Musculoskeletal: No new muscle or joint pain. Neurological: No syncope or TIA-like symptoms. 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 since 1983. Chronic diastolic CHF (congestive heart failure) ([address] 2016    grade II    COPD, mild (HCC)     CVA (cerebral infarction) 2013    left cerebral cortex x2/expressive aphasia/resolved    Diverticulosis     Epistaxis, recurrent     when inr high.  last 3-4 months ago    Former smoker     Gallbladder sludge     HTN (hypertension)     Hyperlipidemia     Hypothyroidism     Lumbar stenosis without neurogenic claudication 2017    pain across low back worse with standing and walking, nonradiating    Macular degeneration 2018    left worse than right , dry : progressive loss of sight: just barely able to see to drive    Neuropathy     Nonrheumatic mitral valve regurgitation     Obstructive sleep apnea     Osteoarthritis     Pulmonary HTN ([address] 2014    primary, responsive to nitrates    PVD (peripheral at bedtime 60 tablet 5    docusate sodium (DOK) 100 MG capsule TAKE ONE CAPSULE BY MOUTH TWO TIMES A DAY 60 capsule 5    diclofenac sodium (VOLTAREN) 1 % GEL Apply 2 g topically daily      oxyCODONE HCl (OXY-IR) 10 MG immediate release tablet Take 10 mg by mouth every 6 hours as needed for Pain. No current facility-administered medications for this visit. Family History   Problem Relation Age of Onset    Breast Cancer Daughter      Objective:   Vitals:    08/30/22 1520   BP: 124/74   Pulse: 80   SpO2: 96%   Weight: 124 lb (56.2 kg)   Height: 5' 2\" (1.575 m)       Physical Exam:   Constitutional: The patient is oriented to person, place, and time. Appears well-developed and well-nourished. In no acute distress. Head: Normocephalic and atraumatic. Pupils equal and round. Neck: Neck supple. No JVP or carotid bruit appreciated. No mass and no thyromegaly present. No lymphadenopathy present. Cardiovascular: Normal rate. Normal heart sounds. Exam reveals no gallop and no friction rub. No murmur heard. Pulmonary/Chest: Effort normal and breath sounds normal. No respiratory distress. No wheezes, rhonchi or rales. Abdominal: Soft, non-tender. Bowel sounds are normal. Exhibits no organomegaly, mass or bruit. Extremities: No edema. No cyanosis or clubbing. Pulses are 2+ radial and carotid bilaterally. Neurological: No gross cranial nerve deficit. Coordination normal.   Skin: Skin is warm and dry. There is no rash or diaphoresis. Psychiatric: Patient has a normal mood and affect.  Speech is normal and behavior is normal.     Lab Results   Component Value Date/Time    CHOL 137 07/20/2022 01:08 PM    HDL 53 07/20/2022 01:08 PM    HDL 42 06/21/2010 10:02 PM    TRIG 86 07/20/2022 01:08 PM   LDL 52  Lab Results   Component Value Date/Time     08/09/2022 03:27 PM     Lab Results   Component Value Date/Time    K 4.4 08/09/2022 03:27 PM    K 2.6 07/07/2022 08:57 PM       Lab Results   Component Value Date/Time    BUN 20 08/09/2022 03:27 PM    CREATININE 0.9 08/09/2022 03:27 PM     Personally reviewed and interpreted   EKG 10/22/15: Sinus bradycardia with LAFB  EKG 1/2/20: Sinus rhonda, LAFB  EKG 6/10/21:  Sinus rhythm with LBBB  EKG 11/30/21: Sinus rhythm with LBBB  EKG 12/15/21: Sinus bradycardia with LBBB  EKG 2/15/22: Sinus bradycardia with LBBB  EKG 5/11/22: AV paced  EKG 5/25/22: AV paced  EKG 8/30/22: AV Paced      Procedures:     LHC 6/15/2015   1. Right dominant coronary arterial system with mild calcification of the RCA. There is 40% serial lesions in the mid RCA. In the left system there is 25% distal left main disease. There is 50% mid LAD disease and 50% ostial second diagonal branch disease. There is no significant restenosis identified in the prior previously placed mid circumflex stent. 2. Systemic hypertension. [address] 10/27/21  HEMODYNAMICS:  1. Right atrial pressure was 12 mmHg. 2.  RV pressure 65/-4. 3.  Pulmonary capillary wedge pressure was 24 mmHg. 4.  Pulmonary artery pressure 71/19 with a mean of 41 mmHg. 5.  Cardiac output 4.9 liters per minute. 6.  Mixed venous saturation 52%. 7.  Pulmonary capillary wedge saturation 90%. 8.  Right atrial saturation 51%. In summary, elevated right and left filling pressure with tall V waves  seen in pulmonary capillary wedge pressure tracing suggestive of severe  mitral regurgitation. Imaging:     Echo 7/30/2016  Left ventricle size is normal. Normal left ventricular wall thickness. Ejection fraction is visually estimated to be 55-60%. Diastolic filling parameters suggests grade III (restrictive) diastolic dysfunction. The right ventricle appears mildly enlarged. Right ventricular systolic function is normal.  The left atrium is severely dilated. The right atrium is mildly dilated. At least moderate mitral regurgitation with a central and eccentric jet. Mild mitral stenosis. BP reported as 171/51 at time of imaging.    A bioprosthetic aortic valve has a maximum velocity of 2.8 m/s and a mean gradient of 17 mmHg. Para-valvular regurgitation. There is moderate tricuspid regurgitation. Mild pulmonic regurgitation. Estimated pulmonary artery systolic pressure is 72 mmHg assuming a rightatrial pressure of 10 mmHg. Lower Extremity Arterial Studies 5/25/17  Right Impression   There is an NIKOS of .70 in the DP and .77 in the PT. on the right. This is in   the mild claudication range. Occlusion of the SFA artery in the right lower   extremity. Left Impression   There is an NIKOS of 1.06 in the DP and 1.08 in the PT. on the left. This is in   the normal range. Elevated velocity of the SFA. Carotid Studies 5/25/17  Right Impression   The right internal carotid artery appears to have a calcified 50-79% diameter   reducing stenosis based on velocity criteria. Left Impression   The left internal carotid artery appears to have a <50% diameter reducing   stenosis based on velocity criteria. Echo 1/8/18  Normal LV size and systolic function. Estimated ejection fraction is 60%. Moderate to severe mitral regurgitation is present. Severely dilated left atrium. Normally functioning bioprosthetic valve in aortic position. Trivial regurgitation noted. The right ventricle is mildly enlarged. Moderate tricuspid regurgitation. Doppler derived PA systolic pressure is 52 mm Hg suggestive of moderate   pulmonary hypertension. Holter 1/8/18 ([facility])  1. The rhythm was sinus with sinus arrhythmia. Average AK interval 0.20,   average QRS duration 0.14 [IVCD]. Average daily heart rate 44 ranging 38 to 68 bpm.   There were 129 pauses greater than 2.0 seconds with Max R-R 2.1 seconds occurring 05:13:11.   2. Frequent premature supraventricular ectopic beats total 1,642 consisting   of 1,152 isolated PACs, 209 atrial pairs and 3 atrial runs. The longest atrial run 3 beats   HR-61 bpm occurring 17:56:40.  The fastest atrial run 3 beats HR-76 bpm occurring 09:49:41.   3. Very frequent premature ventricular ectopic beats total 16,163 consisting of 16,008 isolated   PVCs, 74 ventricular couplets and 1 ventricular triplet. The ventricular triplet HR-126 bpm   occurring 15:36:29.   4. Diary returned with an entry of \"irregular heart\",  isolated PACs, PVCs and   ventricular couplets noted. Carotid duplex 1/16/20  Right Impression   The right internal carotid artery appears to have a 50-79% diameter reducing   stenosis based on velocity criteria. The right vertebral artery demonstrates normal antegrade flow. Left Impression   The left internal carotid artery appears to have a <50% diameter reducing   stenosis based on velocity criteria. The left vertebral artery demonstrates normal antegrade flow. Echo 7/8/20  There is mild concentric left ventricular hypertrophy. Ejection fraction is visually estimated to be 55-60%. diastology cannot be determined  Moderate to severe mitral regurgitation is present. A bioprosthetic artificial aortic valve appears well seated with a maximum  velocity of 273 m/s and a mean gradient of 16 mmHg. Study is unchanged from previous dated 1/8/2018    CAMERON 7/27/20  Left ventricular cavity size is normal in size with normal wall thickness. Overall left ventricular systolic function appears normal.  Ejection fraction is visually estimated to be 55-60%. Normal right ventricular size and function. Left atrium is moderately dilated. Mild thickening of the mitral valve leaflets. There is moderate mitral regurgitation appreciated. Moderate tricuspid regurgitation. A bioprosthetic valve appears well seated. There is no appreciable leaflet restriction or stenosis. There is no aortic insufficiency appreciated. Echo 10/27/21  Left ventricular cavity size is normal.  Normal left ventricular wall thickness. Ejection fraction is visually estimated to be 45-50%.  There is mild global  hypokinesis appreciated. Grade III diastolic dysfunction with elevated LV filling pressures. Moderately dilated right ventricle with mildly reduced function. The left atrium is severely dilated. The right atrium is mildly dilated. Moderately severe tricuspid regurgitation. Moderate pulmonic regurgitation present. Moderately severe mitral regurgitation appreciated. A bioprosthetic artificial aortic valve appears well seated with a maximum  velocity of 2.4m/s and a mean gradient of 12mmHg. Trivial aortic regurgitation. A bubble study was performed and fails to show evidence of right to left  shunting. CAMERON 10/29/21  Overall left ventricular size and wall thickness appear normal with systolic  function mildly reduced. LVEF 40-45%. Normal right ventricular size and function. Left atrium is moderately dilated. The left atrial appendage appears to be ligated with no flow into it. Mitral valve leaflets appear thickened/calcified. Restricted posterior leaflet with malcoaptation id the leaflet tips. Moderate mitral regurgitation is present. Moderate tricuspid regurgitation. Upper extremity venous duplex 11/18/21 ([facility])     o No evidence of acute deep vein thrombosis in the left upper extremity . o There is superficial venous thrombosis identified in the left upper        extremity that is of indeterminate age as described above. Carotid duplex 7/8/22  Right Impression   The right internal carotid artery appears to have a 50-79% diameter reducing   stenosis based on velocity criteria. The right vertebral artery demonstrates normal antegrade flow. Left Impression   The left internal carotid artery appears to have a <50% diameter reducing   stenosis based on velocity criteria. The left vertebral artery demonstrates normal antegrade flow. Lower extremity arterial duplex 8/18/22  Right Impression   Right NIKOS was not available due to noncompressible tibial vessels.    The majority of the waveforms are monophasic throughout the right lower   extremity. Occlusion of the proximal to mid femoral artery with recanalization at the   distal segment in the right lower extremity. Elevated velocity of the profunda femoral artery suggests a greater than 50%   stenosis. Left Impression   Left NIKOS was not available due to noncompressible tibial vessels. The majority of the waveforms are multiphasic throughout the left lower   extremity. Elevated velocity of the proximal femoral artery suggest a than 50-70%   stenosis. Assessment:  1. CAD of native coronary arteries without angina  2. S/p Aortic Valve Replacement with bioprosthesis for nonrheumatic aortic stenosis  3. Chronic combined systolic and diastolic CHF, class 3  4. Hyperlipidemia with goal LDL <70 mg/dL  5. Paroxysmal Atrial fibrillation  6. Asymptomatic Bradycardia  7. Carotid stenosis, right   8. Mitral regurgitation, moderate  9. PAD     Plan:  She is not endorsing symptoms representing angina and is overall well compensated on exam. Her blood pressure is well controlled today in the office. She does have peripheral disease noted on her recent testing. She is overall asymptomatic and I would not pursue intervention at this time. I have encouraged her to increase walking as tolerated as well as smoking cessation. I have personally reviewed all previous testing for this visit today including imaging, lab results and EKG as detailed above. I will see her in office for follow up in 6 months. This note was scribed in the presence of Nia Aaron MD by [employer], RN. Physician Attestation:  The scribes documentation has been prepared under my direction and personally reviewed by me in its entirety.      I, Dr. Madiha Das personally performed the services described in this documentation as scribed by my RN in my presence, and I confirm that the note above accurately reflects all work, treatment, procedures, and medical decision making performed by me.

## 2022-09-07 ENCOUNTER — NURSE ONLY (OUTPATIENT)
Dept: CARDIOLOGY CLINIC | Age: 77
End: 2022-09-07
Payer: MEDICARE

## 2022-09-07 DIAGNOSIS — R00.1 SYMPTOMATIC BRADYCARDIA: ICD-10-CM

## 2022-09-07 DIAGNOSIS — I50.42 CHRONIC COMBINED SYSTOLIC AND DIASTOLIC CHF, NYHA CLASS 2 (HCC): ICD-10-CM

## 2022-09-07 DIAGNOSIS — I48.0 PAF (PAROXYSMAL ATRIAL FIBRILLATION) (HCC): Chronic | ICD-10-CM

## 2022-09-07 DIAGNOSIS — Z95.0 BIVENTRICULAR CARDIAC PACEMAKER IN SITU: Primary | Chronic | ICD-10-CM

## 2022-09-07 PROCEDURE — G2066 INTER DEVC REMOTE 30D: HCPCS | Performed by: NURSE PRACTITIONER

## 2022-09-07 PROCEDURE — 93297 REM INTERROG DEV EVAL ICPMS: CPT | Performed by: NURSE PRACTITIONER

## 2022-09-07 NOTE — PROGRESS NOTES
Remote transmission received from patient's CRT-P monitor at home. Transmission shows normal sensing and pacing function. No new arrhythmias/events recorded. Ap 88.5%  BiVp 97.8%, Effective 97.6%, VSRp 1.8%  PVCs 1.2/hr    Possible Optivol fluid accumulation 07.16.22-ongoing, currently elevated up to 120 w decreasing trend noted; TI shows upward trend back towards reference line. [facility] Heart Failure Risk Status on 06-Sep-2022 is High*. End of 31-day monitoring period 9/7/22 NP will review. See interrogation under cardiology tab in the [address] [place] [address] [address] field for more details. Will continue to monitor remotely.

## 2022-10-12 ENCOUNTER — NURSE ONLY (OUTPATIENT)
Dept: CARDIOLOGY CLINIC | Age: 77
End: 2022-10-12
Payer: MEDICARE

## 2022-10-12 DIAGNOSIS — R00.1 SYMPTOMATIC BRADYCARDIA: ICD-10-CM

## 2022-10-12 DIAGNOSIS — Z95.0 BIVENTRICULAR CARDIAC PACEMAKER IN SITU: Chronic | ICD-10-CM

## 2022-10-12 DIAGNOSIS — I50.42 CHRONIC COMBINED SYSTOLIC AND DIASTOLIC CHF, NYHA CLASS 2 (HCC): Primary | ICD-10-CM

## 2022-10-12 PROCEDURE — 93297 REM INTERROG DEV EVAL ICPMS: CPT | Performed by: NURSE PRACTITIONER

## 2022-10-12 PROCEDURE — 93294 REM INTERROG EVL PM/LDLS PM: CPT | Performed by: INTERNAL MEDICINE

## 2022-10-12 PROCEDURE — 93296 REM INTERROG EVL PM/IDS: CPT | Performed by: INTERNAL MEDICINE

## 2022-10-12 NOTE — PROGRESS NOTES
Remote transmission received from patient's CRT-P monitor at home. Transmission shows normal sensing and pacing function. No new arrhythmias/events recorded. EP physician will review. Ap 85.3%  BiVp 98.0%, Effective 97.9%, VSRp 1.8%  PVCs 1.3/hr    Possible Optivol fluid accumulation 07.16.22-09.30.22, resolved and back to baseline. [facility] on 11-Oct-2022 is High*. NP will review. End of 91-day monitoring period 10/12/22. See interrogation under cardiology tab in the [address] [place] [address] [address] field for more details. Will continue to monitor remotely.

## 2022-11-12 ENCOUNTER — HOSPITAL ENCOUNTER (INPATIENT)
Age: 77
LOS: 4 days | Discharge: HOME | DRG: 689 | End: 2022-11-16
Attending: EMERGENCY MEDICINE | Admitting: INTERNAL MEDICINE
Payer: MEDICARE

## 2022-11-12 ENCOUNTER — APPOINTMENT (OUTPATIENT)
Dept: GENERAL RADIOLOGY | Age: 77
DRG: 689 | End: 2022-11-12
Payer: MEDICARE

## 2022-11-12 ENCOUNTER — APPOINTMENT (OUTPATIENT)
Dept: CT IMAGING | Age: 77
DRG: 689 | End: 2022-11-12
Payer: MEDICARE

## 2022-11-12 DIAGNOSIS — F11.93 OPIATE WITHDRAWAL (HCC): ICD-10-CM

## 2022-11-12 DIAGNOSIS — R55 SYNCOPE AND COLLAPSE: Primary | ICD-10-CM

## 2022-11-12 DIAGNOSIS — F05 ACUTE HYPERACTIVE DELIRIUM DUE TO ANOTHER MEDICAL CONDITION: ICD-10-CM

## 2022-11-12 DIAGNOSIS — Z79.891 CHRONIC PRESCRIPTION OPIATE USE: ICD-10-CM

## 2022-11-12 DIAGNOSIS — E87.6 HYPOKALEMIA: ICD-10-CM

## 2022-11-12 DIAGNOSIS — R94.31 PROLONGED Q-T INTERVAL ON ECG: ICD-10-CM

## 2022-11-12 DIAGNOSIS — R41.82 ALTERED MENTAL STATUS, UNSPECIFIED ALTERED MENTAL STATUS TYPE: ICD-10-CM

## 2022-11-12 DIAGNOSIS — E87.1 HYPONATREMIA: ICD-10-CM

## 2022-11-12 LAB
A/G RATIO: 1.5 (ref 1.1–2.2)
ALBUMIN SERPL-MCNC: 4.4 G/DL (ref 3.4–5)
ALP BLD-CCNC: 120 U/L (ref 40–129)
ALT SERPL-CCNC: 29 U/L (ref 10–40)
ANION GAP SERPL CALCULATED.3IONS-SCNC: 11 MMOL/L (ref 3–16)
AST SERPL-CCNC: 46 U/L (ref 15–37)
BASE EXCESS VENOUS: 7 MMOL/L
BASOPHILS ABSOLUTE: 0 K/UL (ref 0–0.2)
BASOPHILS RELATIVE PERCENT: 0.5 %
BILIRUB SERPL-MCNC: 0.5 MG/DL (ref 0–1)
BUN BLDV-MCNC: 20 MG/DL (ref 7–20)
CALCIUM SERPL-MCNC: 9.4 MG/DL (ref 8.3–10.6)
CARBOXYHEMOGLOBIN: 1.4 %
CHLORIDE BLD-SCNC: 86 MMOL/L (ref 99–110)
CO2: 29 MMOL/L (ref 21–32)
CREAT SERPL-MCNC: 1.1 MG/DL (ref 0.6–1.2)
EOSINOPHILS ABSOLUTE: 0.1 K/UL (ref 0–0.6)
EOSINOPHILS RELATIVE PERCENT: 1.9 %
ETHANOL: NORMAL MG/DL (ref 0–0.08)
GFR SERPL CREATININE-BSD FRML MDRD: 52 ML/MIN/{1.73_M2}
GLUCOSE BLD-MCNC: 124 MG/DL (ref 70–99)
GLUCOSE BLD-MCNC: 175 MG/DL (ref 70–99)
HCO3 VENOUS: 32 MMOL/L (ref 23–29)
HCT VFR BLD CALC: 39.4 % (ref 36–48)
HEMOGLOBIN: 13.2 G/DL (ref 12–16)
LYMPHOCYTES ABSOLUTE: 0.9 K/UL (ref 1–5.1)
LYMPHOCYTES RELATIVE PERCENT: 11.4 %
MAGNESIUM: 2.2 MG/DL (ref 1.8–2.4)
MCH RBC QN AUTO: 30 PG (ref 26–34)
MCHC RBC AUTO-ENTMCNC: 33.6 G/DL (ref 31–36)
MCV RBC AUTO: 89.3 FL (ref 80–100)
METHEMOGLOBIN VENOUS: 0.7 %
MONOCYTES ABSOLUTE: 0.9 K/UL (ref 0–1.3)
MONOCYTES RELATIVE PERCENT: 11.9 %
NEUTROPHILS ABSOLUTE: 5.8 K/UL (ref 1.7–7.7)
NEUTROPHILS RELATIVE PERCENT: 74.3 %
O2 SAT, VEN: 82 %
O2 THERAPY: ABNORMAL
PCO2, VEN: 44.5 MMHG (ref 40–50)
PDW BLD-RTO: 13.6 % (ref 12.4–15.4)
PERFORMED ON: ABNORMAL
PH VENOUS: 7.46 (ref 7.35–7.45)
PHOSPHORUS: 2.7 MG/DL (ref 2.5–4.9)
PLATELET # BLD: 183 K/UL (ref 135–450)
PMV BLD AUTO: 8 FL (ref 5–10.5)
PO2, VEN: 49 MMHG
POTASSIUM SERPL-SCNC: 2.9 MMOL/L (ref 3.5–5.1)
RBC # BLD: 4.41 M/UL (ref 4–5.2)
SODIUM BLD-SCNC: 126 MMOL/L (ref 136–145)
TCO2 CALC VENOUS: 33 MMOL/L
TOTAL PROTEIN: 7.4 G/DL (ref 6.4–8.2)
TROPONIN: 0.02 NG/ML
TROPONIN: 0.02 NG/ML
TROPONIN: <0.01 NG/ML
TROPONIN: <0.01 NG/ML
TSH REFLEX FT4: 0.68 UIU/ML (ref 0.27–4.2)
WBC # BLD: 7.9 K/UL (ref 4–11)

## 2022-11-12 PROCEDURE — 2500000003 HC RX 250 WO HCPCS: Performed by: EMERGENCY MEDICINE

## 2022-11-12 PROCEDURE — 82077 ASSAY SPEC XCP UR&BREATH IA: CPT

## 2022-11-12 PROCEDURE — 83735 ASSAY OF MAGNESIUM: CPT

## 2022-11-12 PROCEDURE — 96375 TX/PRO/DX INJ NEW DRUG ADDON: CPT

## 2022-11-12 PROCEDURE — 6370000000 HC RX 637 (ALT 250 FOR IP): Performed by: INTERNAL MEDICINE

## 2022-11-12 PROCEDURE — 2060000000 HC ICU INTERMEDIATE R&B

## 2022-11-12 PROCEDURE — 70450 CT HEAD/BRAIN W/O DYE: CPT

## 2022-11-12 PROCEDURE — 2580000003 HC RX 258: Performed by: EMERGENCY MEDICINE

## 2022-11-12 PROCEDURE — 82803 BLOOD GASES ANY COMBINATION: CPT

## 2022-11-12 PROCEDURE — 84484 ASSAY OF TROPONIN QUANT: CPT

## 2022-11-12 PROCEDURE — 2580000003 HC RX 258: Performed by: INTERNAL MEDICINE

## 2022-11-12 PROCEDURE — 99285 EMERGENCY DEPT VISIT HI MDM: CPT

## 2022-11-12 PROCEDURE — 85025 COMPLETE CBC W/AUTO DIFF WBC: CPT

## 2022-11-12 PROCEDURE — 84100 ASSAY OF PHOSPHORUS: CPT

## 2022-11-12 PROCEDURE — 6360000002 HC RX W HCPCS: Performed by: EMERGENCY MEDICINE

## 2022-11-12 PROCEDURE — 96374 THER/PROPH/DIAG INJ IV PUSH: CPT

## 2022-11-12 PROCEDURE — 84443 ASSAY THYROID STIM HORMONE: CPT

## 2022-11-12 PROCEDURE — 83930 ASSAY OF BLOOD OSMOLALITY: CPT

## 2022-11-12 PROCEDURE — 6370000000 HC RX 637 (ALT 250 FOR IP): Performed by: EMERGENCY MEDICINE

## 2022-11-12 PROCEDURE — 36415 COLL VENOUS BLD VENIPUNCTURE: CPT

## 2022-11-12 PROCEDURE — 96372 THER/PROPH/DIAG INJ SC/IM: CPT

## 2022-11-12 PROCEDURE — 93005 ELECTROCARDIOGRAM TRACING: CPT | Performed by: EMERGENCY MEDICINE

## 2022-11-12 PROCEDURE — 80053 COMPREHEN METABOLIC PANEL: CPT

## 2022-11-12 PROCEDURE — 71045 X-RAY EXAM CHEST 1 VIEW: CPT

## 2022-11-12 PROCEDURE — 6360000002 HC RX W HCPCS: Performed by: INTERNAL MEDICINE

## 2022-11-12 RX ORDER — SODIUM CHLORIDE 0.9 % (FLUSH) 0.9 %
5-40 SYRINGE (ML) INJECTION EVERY 12 HOURS SCHEDULED
Status: DISCONTINUED | OUTPATIENT
Start: 2022-11-12 | End: 2022-11-16 | Stop reason: HOSPADM

## 2022-11-12 RX ORDER — POTASSIUM CHLORIDE 7.45 MG/ML
10 INJECTION INTRAVENOUS PRN
Status: DISCONTINUED | OUTPATIENT
Start: 2022-11-12 | End: 2022-11-16 | Stop reason: HOSPADM

## 2022-11-12 RX ORDER — ASPIRIN 300 MG/1
300 SUPPOSITORY RECTAL ONCE
Status: COMPLETED | OUTPATIENT
Start: 2022-11-12 | End: 2022-11-12

## 2022-11-12 RX ORDER — ASPIRIN 81 MG/1
81 TABLET ORAL DAILY
COMMUNITY

## 2022-11-12 RX ORDER — ACETAMINOPHEN 650 MG/1
650 SUPPOSITORY RECTAL EVERY 6 HOURS PRN
Status: DISCONTINUED | OUTPATIENT
Start: 2022-11-12 | End: 2022-11-16 | Stop reason: HOSPADM

## 2022-11-12 RX ORDER — ACETAMINOPHEN 325 MG/1
650 TABLET ORAL EVERY 6 HOURS PRN
Status: DISCONTINUED | OUTPATIENT
Start: 2022-11-12 | End: 2022-11-16 | Stop reason: HOSPADM

## 2022-11-12 RX ORDER — MIDAZOLAM HYDROCHLORIDE 1 MG/ML
2 INJECTION INTRAMUSCULAR; INTRAVENOUS ONCE
Status: COMPLETED | OUTPATIENT
Start: 2022-11-12 | End: 2022-11-12

## 2022-11-12 RX ORDER — LEVOTHYROXINE SODIUM 88 UG/1
88 TABLET ORAL
Status: DISCONTINUED | OUTPATIENT
Start: 2022-11-13 | End: 2022-11-16 | Stop reason: HOSPADM

## 2022-11-12 RX ORDER — POTASSIUM CHLORIDE 7.45 MG/ML
10 INJECTION INTRAVENOUS ONCE
Status: DISCONTINUED | OUTPATIENT
Start: 2022-11-12 | End: 2022-11-16 | Stop reason: HOSPADM

## 2022-11-12 RX ORDER — SODIUM CHLORIDE 0.9 % (FLUSH) 0.9 %
5-40 SYRINGE (ML) INJECTION PRN
Status: DISCONTINUED | OUTPATIENT
Start: 2022-11-12 | End: 2022-11-16 | Stop reason: HOSPADM

## 2022-11-12 RX ORDER — SODIUM CHLORIDE 9 MG/ML
INJECTION, SOLUTION INTRAVENOUS CONTINUOUS
Status: DISCONTINUED | OUTPATIENT
Start: 2022-11-12 | End: 2022-11-13

## 2022-11-12 RX ORDER — SODIUM CHLORIDE 9 MG/ML
INJECTION, SOLUTION INTRAVENOUS PRN
Status: DISCONTINUED | OUTPATIENT
Start: 2022-11-12 | End: 2022-11-16 | Stop reason: HOSPADM

## 2022-11-12 RX ORDER — 0.9 % SODIUM CHLORIDE 0.9 %
500 INTRAVENOUS SOLUTION INTRAVENOUS ONCE
Status: COMPLETED | OUTPATIENT
Start: 2022-11-12 | End: 2022-11-12

## 2022-11-12 RX ORDER — ROSUVASTATIN CALCIUM 40 MG/1
40 TABLET, COATED ORAL NIGHTLY
Status: DISCONTINUED | OUTPATIENT
Start: 2022-11-12 | End: 2022-11-16 | Stop reason: HOSPADM

## 2022-11-12 RX ORDER — OXYCODONE HYDROCHLORIDE 10 MG/1
10 TABLET ORAL ONCE
Status: COMPLETED | OUTPATIENT
Start: 2022-11-12 | End: 2022-11-12

## 2022-11-12 RX ORDER — POTASSIUM CHLORIDE 7.45 MG/ML
10 INJECTION INTRAVENOUS ONCE
Status: COMPLETED | OUTPATIENT
Start: 2022-11-12 | End: 2022-11-12

## 2022-11-12 RX ORDER — ENOXAPARIN SODIUM 100 MG/ML
40 INJECTION SUBCUTANEOUS NIGHTLY
Status: DISCONTINUED | OUTPATIENT
Start: 2022-11-12 | End: 2022-11-16 | Stop reason: HOSPADM

## 2022-11-12 RX ORDER — METOPROLOL SUCCINATE 50 MG/1
50 TABLET, EXTENDED RELEASE ORAL DAILY
Status: DISCONTINUED | OUTPATIENT
Start: 2022-11-13 | End: 2022-11-16 | Stop reason: HOSPADM

## 2022-11-12 RX ORDER — DULOXETIN HYDROCHLORIDE 60 MG/1
60 CAPSULE, DELAYED RELEASE ORAL DAILY
Status: DISCONTINUED | OUTPATIENT
Start: 2022-11-13 | End: 2022-11-16 | Stop reason: HOSPADM

## 2022-11-12 RX ORDER — LIDOCAINE 4 G/G
1 PATCH TOPICAL DAILY
Status: DISCONTINUED | OUTPATIENT
Start: 2022-11-12 | End: 2022-11-16 | Stop reason: HOSPADM

## 2022-11-12 RX ORDER — MAGNESIUM SULFATE IN WATER 40 MG/ML
2000 INJECTION, SOLUTION INTRAVENOUS PRN
Status: DISCONTINUED | OUTPATIENT
Start: 2022-11-12 | End: 2022-11-16 | Stop reason: HOSPADM

## 2022-11-12 RX ORDER — PANTOPRAZOLE SODIUM 40 MG/1
40 TABLET, DELAYED RELEASE ORAL
Status: DISCONTINUED | OUTPATIENT
Start: 2022-11-13 | End: 2022-11-16 | Stop reason: HOSPADM

## 2022-11-12 RX ORDER — POLYETHYLENE GLYCOL 3350 17 G/17G
17 POWDER, FOR SOLUTION ORAL DAILY PRN
Status: DISCONTINUED | OUTPATIENT
Start: 2022-11-12 | End: 2022-11-16 | Stop reason: HOSPADM

## 2022-11-12 RX ORDER — OXYCODONE HYDROCHLORIDE 10 MG/1
10 TABLET ORAL EVERY 4 HOURS PRN
Status: DISCONTINUED | OUTPATIENT
Start: 2022-11-12 | End: 2022-11-15

## 2022-11-12 RX ORDER — DROPERIDOL 2.5 MG/ML
2.5 INJECTION, SOLUTION INTRAMUSCULAR; INTRAVENOUS EVERY 6 HOURS PRN
Status: DISCONTINUED | OUTPATIENT
Start: 2022-11-12 | End: 2022-11-12

## 2022-11-12 RX ORDER — ONDANSETRON 2 MG/ML
4 INJECTION INTRAMUSCULAR; INTRAVENOUS EVERY 6 HOURS PRN
Status: DISCONTINUED | OUTPATIENT
Start: 2022-11-12 | End: 2022-11-16 | Stop reason: HOSPADM

## 2022-11-12 RX ORDER — ONDANSETRON 4 MG/1
4 TABLET, ORALLY DISINTEGRATING ORAL EVERY 8 HOURS PRN
Status: DISCONTINUED | OUTPATIENT
Start: 2022-11-12 | End: 2022-11-16 | Stop reason: HOSPADM

## 2022-11-12 RX ORDER — DROPERIDOL 2.5 MG/ML
2.5 INJECTION, SOLUTION INTRAMUSCULAR; INTRAVENOUS ONCE
Status: COMPLETED | OUTPATIENT
Start: 2022-11-12 | End: 2022-11-12

## 2022-11-12 RX ORDER — AMIODARONE HYDROCHLORIDE 200 MG/1
100 TABLET ORAL DAILY
Status: DISCONTINUED | OUTPATIENT
Start: 2022-11-13 | End: 2022-11-16 | Stop reason: HOSPADM

## 2022-11-12 RX ORDER — KETAMINE HYDROCHLORIDE 50 MG/ML
4 INJECTION, SOLUTION, CONCENTRATE INTRAMUSCULAR; INTRAVENOUS ONCE
Status: COMPLETED | OUTPATIENT
Start: 2022-11-12 | End: 2022-11-12

## 2022-11-12 RX ADMIN — ASPIRIN 300 MG: 300 SUPPOSITORY RECTAL at 12:49

## 2022-11-12 RX ADMIN — SODIUM CHLORIDE: 9 INJECTION, SOLUTION INTRAVENOUS at 18:35

## 2022-11-12 RX ADMIN — ENOXAPARIN SODIUM 40 MG: 100 INJECTION SUBCUTANEOUS at 23:17

## 2022-11-12 RX ADMIN — POTASSIUM BICARBONATE 40 MEQ: 782 TABLET, EFFERVESCENT ORAL at 23:17

## 2022-11-12 RX ADMIN — POTASSIUM CHLORIDE 10 MEQ: 7.46 INJECTION, SOLUTION INTRAVENOUS at 12:50

## 2022-11-12 RX ADMIN — POTASSIUM CHLORIDE 10 MEQ: 7.46 INJECTION, SOLUTION INTRAVENOUS at 18:21

## 2022-11-12 RX ADMIN — OXYCODONE HYDROCHLORIDE 10 MG: 10 TABLET ORAL at 19:41

## 2022-11-12 RX ADMIN — ROSUVASTATIN CALCIUM 40 MG: 40 TABLET, FILM COATED ORAL at 23:17

## 2022-11-12 RX ADMIN — SODIUM CHLORIDE 500 ML: 9 INJECTION, SOLUTION INTRAVENOUS at 13:00

## 2022-11-12 RX ADMIN — KETAMINE HYDROCHLORIDE 230 MG: 50 INJECTION INTRAMUSCULAR; INTRAVENOUS at 10:58

## 2022-11-12 RX ADMIN — OXYCODONE HYDROCHLORIDE 10 MG: 10 TABLET ORAL at 14:18

## 2022-11-12 RX ADMIN — DROPERIDOL 2.5 MG: 2.5 INJECTION, SOLUTION INTRAMUSCULAR; INTRAVENOUS at 10:31

## 2022-11-12 RX ADMIN — MIDAZOLAM 2 MG: 1 INJECTION, SOLUTION INTRAMUSCULAR; INTRAVENOUS at 10:31

## 2022-11-12 ASSESSMENT — PAIN SCALES - GENERAL
PAINLEVEL_OUTOF10: 8
PAINLEVEL_OUTOF10: 2
PAINLEVEL_OUTOF10: 10

## 2022-11-12 ASSESSMENT — PAIN DESCRIPTION - LOCATION: LOCATION: BACK;FOOT

## 2022-11-12 ASSESSMENT — PAIN DESCRIPTION - DESCRIPTORS: DESCRIPTORS: ACHING

## 2022-11-12 ASSESSMENT — PAIN DESCRIPTION - ORIENTATION: ORIENTATION: LEFT

## 2022-11-12 NOTE — ED NOTES
EKG My Reading:  Rate: 64  Rhythm: paced  ST Segments: lateral T inversions present  STEMI: no  QTc: 548 / prolonged  Comparison to prior: rate and Qtc have increased minimally       Yamel Todd MD  11/12/22 7464

## 2022-11-12 NOTE — H&P
Hospital Medicine History & Physical      PCP: Velia Cabrera MD    Date of Admission: 11/12/2022    Date of Service: Pt seen/examined on 11/12/22 and Admitted to Inpatient with expected LOS greater than two midnights due to medical therapy. Chief Complaint:    Chief Complaint   Patient presents with    Altered Mental Status     Pt was at home with Daughter per daughter pt started slurring words, heard pt \"yell\" and then pt fell, pt was given narcan, pt is now combative upon arrival and not following commands          History Of Present Illness: 26-year-old female with past medical history significant for atrial fibrillation s/p pacemaker, status post post aortic valve replacement with bioprosthetic valve, hypertension, chronic back pain previous CVA alcohol use presents with syncope. Most of the history provided by daughter at side. Daughter reports patient today in the kitchen. She denies any prodromal symptoms such as chest pain shortness of breath. Laboratory studies in the emergency department showed significant heart natremia, sodium of 126, potassium of 2.9. Her troponin was elevated at 0.02. Her EKG showed atrial paced rhythm. Her previous echocardiogram EF of 45% with grade 3 diastolic dysfunction, severe tricuspid regurgitation and bioprosthetic aortic valve.     Past Medical History:          Diagnosis Date    Atrial fibrillation ([address]     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    Bladder cancer ([address] 02/2014    CAD (coronary artery disease) 2014    Carotid stenosis 04/30/2014    Chronic back pain     Chronic diastolic CHF (congestive heart failure) ([address] 2016    Chronic prescription opiate use     COPD ([address]     Diverticulosis     HTN (hypertension)     Hyperlipidemia     Hypothyroidism     Ischemic stroke 2013    x 2    Lumbar spine stenosis 2017    Macular degeneration 2018    Nonrheumatic mitral valve (TOPROL XL) 50 MG extended release tablet TAKE 1 TABLET BY MOUTH DAILY 10/25/22   Rufino Bonilla MD   ezetimibe (ZETIA) 10 MG tablet TAKE ONE TABLET BY MOUTH DAILY 10/25/22   Rufino Bonilla MD   pantoprazole (PROTONIX) 40 MG tablet One po qd 10/25/22   Rufino Bonilla MD   sennosides-docusate sodium (SENOKOT-S) 8.6-50 MG tablet Take 1 tablet by mouth in the morning and at bedtime 10/14/22   Rufino Bonilla MD   fluticasone-salmeterol (ADVAIR) 250-50 MCG/ACT AEPB diskus inhaler INHALE 1 PUFF BY MOUTH 2 TIMES A DAY 9/20/22   Rufino Bonilla MD   torsemide (DEMADEX) 20 MG tablet TAKE 1 TABLET BY MOUTH EVERY MORNING. TAKE EXTRA PILL ON DAYS WEIGHT INCREASES BY 2+ LBS 9/20/22   Rufino Bonilla MD   tiZANidine (ZANAFLEX) 4 MG capsule TAKE ONE-HALF TABLET BY MOUTH EVERY 8 HOURS AS NEEDED (MUSCLE SPASM) 8/24/22   Rufino Bonilla MD   levothyroxine (SYNTHROID) 88 MCG tablet TAKE 1 TABLET BY MOUTH EVERY DAY 8/24/22   Rufino Bonilla MD   potassium chloride (KLOR-CON M) 20 MEQ TBCR extended release tablet Take 1 tablet by mouth 2 times daily 8/22/22   Rufino Bonilla MD   amiodarone (CORDARONE) 200 MG tablet Take 0.5 tablets by mouth in the morning. 8/10/22   HOWARD Harper - CNP   betamethasone dipropionate 0.05 % cream Apply topically 2 times daily.  Rub in well to itchy red spots on legs 7/27/22   Rufino Bonilla MD   lactobacillus (CULTURELLE) capsule Take 1 capsule by mouth daily (with breakfast) 7/11/22   Rufino Bonilla MD   ipratropium (ATROVENT) 0.03 % nasal spray INHALE 2 DOSES INTO EACH NOSTRIL THREE TIMES A DAY IF NEEDED FOR RHINITIS 7/10/22   Rufino Bonilla MD   DULoxetine (CYMBALTA) 60 MG extended release capsule Take 60 mg by mouth daily    Historical Provider, MD   docusate sodium (DOK) 100 MG capsule TAKE ONE CAPSULE BY MOUTH TWO TIMES A DAY 11/1/21   Rufino Bonilla MD   diclofenac sodium (VOLTAREN) 1 % GEL Apply 2 g topically daily    Historical Provider, MD   oxyCODONE HCl (OXY-IR) 10 MG immediate release tablet Take 10 mg by mouth every 6 hours as needed for Pain. Historical Provider, MD       Allergies:  Benadryl [diphenhydramine], Doxylamine, Mucinex [guaifenesin er], Spiriva handihaler [tiotropium bromide monohydrate], Adhesive tape, and Neosporin [bacitracin-polymyxin b]    Social History:      The patient currently lives home    TOBACCO:   reports that she has been smoking cigarettes. She has a 45.00 pack-year smoking history. She has quit using smokeless tobacco.  ETOH:   reports current alcohol use. E-cigarette/Vaping       Questions Responses    E-cigarette/Vaping Use Never User    Start Date     Passive Exposure No    Quit Date     Counseling Given Yes    Comments               Family History:       Reviewed and negative in regards to presenting illness/complaint. Problem Relation Age of Onset    Breast Cancer Daughter        REVIEW OF SYSTEMS COMPLETED:   Pertinent positives as noted in the HPI. All other systems reviewed and negative. PHYSICAL EXAM PERFORMED:    BP (!) 150/33   Pulse 81   Resp 16   Wt 126 lb 1.7 oz (57.2 kg)   SpO2 99%   BMI 23.06 kg/m²     General appearance:  No apparent distress, appears stated age and cooperative. HEENT:  Normal cephalic, atraumatic without obvious deformity. Pupils equal, round, and reactive to light. Extra ocular muscles intact. Conjunctivae/corneas clear. Neck: Supple, with full range of motion. No jugular venous distention. Trachea midline. Respiratory:  Normal respiratory effort. Clear to auscultation, bilaterally without Rales/Wheezes/Rhonchi. Cardiovascular:  Regular rate and rhythm with normal S1/S2 without murmurs, rubs or gallops. Abdomen: Soft, non-tender, non-distended with normal bowel sounds. Musculoskeletal:  No clubbing, cyanosis or edema bilaterally. Full range of motion without deformity. Skin: Skin color, texture, turgor normal.  No rashes or lesions. Neurologic:  Neurovascularly intact without any focal sensory/motor deficits.  Cranial nerves: II-XII intact, grossly non-focal.  Psychiatric:  Alert and oriented, thought content appropriate, normal insight  Capillary Refill: Brisk,3 seconds, normal  Peripheral Pulses: +2 palpable, equal bilaterally       Labs:     Recent Labs     11/12/22  1136   WBC 7.9   HGB 13.2   HCT 39.4        Recent Labs     11/12/22  1136   *   K 2.9*   CL 86*   CO2 29   BUN 20   CREATININE 1.1   CALCIUM 9.4   PHOS 2.7     Recent Labs     11/12/22  1136   AST 46*   ALT 29   BILITOT 0.5   ALKPHOS 120     No results for input(s): INR in the last 72 hours. Recent Labs     11/12/22  1136 11/12/22  1423   TROPONINI 0.02* 0.02*       Urinalysis:      Lab Results   Component Value Date/Time    NITRU Negative 12/01/2019 02:05 PM    WBCUA 0-2 04/24/2019 02:55 PM    BACTERIA 1+ 04/24/2019 02:55 PM    RBCUA None seen 04/24/2019 02:55 PM    BLOODU Negative 12/01/2019 02:05 PM    SPECGRAV 1.010 12/01/2019 02:05 PM    GLUCOSEU Negative 12/01/2019 02:05 PM       Radiology:         CT HEAD WO CONTRAST   Final Result   No acute intracranial abnormality. Diffuse atrophic changes with findings suggesting chronic microvascular   ischemia         XR CHEST PORTABLE   Preliminary Result   Cardiomegaly with findings of chronic moderate CHF and interstitial pulmonary   edema. No acute focal process.   Stable exam.             Consults:    IP CONSULT TO CARDIOLOGY    ASSESSMENT:    Active Hospital Problems    Diagnosis Date Noted    Syncope and collapse [R55] 11/12/2022     Priority: Medium         Problem list:    Syncope  Elevated troponin  Atrial fibrillation with status post pacemaker  Status post aortic valve replacement  Severe tricuspid regurgitation  Chronic back pain    Plan    Telemetry monitoring  Check orthostatic vital signs  Hold diuretics  Check echocardiogram and carotid ultrasound  Consult cardiology    DVT Prophylaxis: +  Diet: No diet orders on file  Code Status: Prior    PT/OT Eval Status: +    Dispo - admit       Chiquis Centeno Beth Rivera MD    Thank you Ashly Corrigan MD for the opportunity to be involved in this patient's care. If you have any questions or concerns please feel free to contact me at [phone].

## 2022-11-12 NOTE — ED NOTES
Pt was at home with Daughter per daughter pt started slurring words, heard pt \"yell\" and then pt fell, pt was given narcan, pt is now combative upon arrival and not following commands        Rj Fernandez RN  11/12/22 3411

## 2022-11-12 NOTE — ED PROVIDER NOTES
[facility]    Name: Ashutosh Bradley : 1945 MRN: [id number] Date of Service: 2022    BP (!) 150/33   Pulse 81   Resp 16   Wt 126 lb 1.7 oz (57.2 kg)   SpO2 99%   BMI 23.06 kg/m²     CC: AMS, syncope    HPI: this patient is a 68 y.o. female presenting to the ED from home via EMS. This morning when Ms. Hernandes was at home one of her daughters noticed that she \"wasn't acting right. \" Her daughters are unsure of when she was last seen or noted to be doing well. Nonetheless, Ms. Khadijah Candaa and her daughter went on with their daily activities. Later in the morning Ms. Hernandes's daughter heard Ms. Hernandes \"scream\" and then heard a loud crash. When she arrived to Ms. Hernandes's side she had collapsed to the ground and was minimally responsive. There are conflicting reports regarding whether or nor a family member performed a brief period of chest compressions soon thereafter. EMS personnel were called to their home. When they arrived Ms. Hernandes was reportedly obtunded with bradypnea. She was not responsive to noxious stimuli an her pupils were reportedly pinpoint. EMS providers administered 4mg of Naloxone IV. Ms. Khadijah Canada almost immediately then became agitated, aggressive, hyperactive, and combative. They then brought her here for evaluation. On arrival here Ms. Khadijah Canada is altered and combative; she is currently unable to provide additional H&P details. Update: family is now here at bedside. They note that Ms. Hernandes typically takes about 40mg of Oxycodone on a daily basis. They also add that Ms. Khadijah Canada has been eating and drinking very little as of late.  _____________________________________________________________________    Past Medical History:   Diagnosis Date    Atrial fibrillation ([facility]     AVM (arteriovenous malformation) of duodenum 2017    AVM (arteriovenous malformation) of stomach 2017    Bladder cancer ([facility] 2014    CAD (coronary artery disease)     Carotid stenosis 2014 Chronic back pain     Chronic diastolic CHF (congestive heart failure) ([facility] 2016    Chronic prescription opiate use     COPD ([facility]     Diverticulosis     HTN (hypertension)     Hyperlipidemia     Hypothyroidism     Ischemic stroke 2013    x 2    Lumbar spine stenosis 2017    Macular degeneration 2018    Nonrheumatic mitral valve regurgitation     Obstructive sleep apnea     Osteoarthritis     Peripheral neuropathy     Pulmonary HTN ([facility] 2014    PVD (peripheral vascular disease) (HCC)     Tobacco use disorder in remission        Past Surgical History:   Procedure Laterality Date    ABLATION OF DYSRHYTHMIC FOCUS  2022    AORTIC VALVE REPLACEMENT  6/16/15    Dr. Christiane Quinn. Hernandez - PVI, RENETTA exclusion. 19mm Maki pericardial Magna    APPENDECTOMY      BACK SURGERY  03/15/2019    superion inserted to keep back from hurtin [address] N/A 2019    EMERGENT; LAPAROSCOPIC CHOLECYSTECTOMY WITH GRAM performed by Apple Cat MD at [facility]      stent x 1    CYSTOSCOPY  2014    dr Anibal Bueno      THR Right    OTHER SURGICAL HISTORY  2018     EGD and colonoscopy.     PACEMAKER INSERTION  2022    PAIN MANAGEMENT PROCEDURE Bilateral 2021    BILATERAL L3, L4, L5 MEDIAL BRANCH BLOCK WITH FLUOROSCOPY performed by Aurelia Maldonado MD at [facility] Bilateral 2021    BILATERAL L3, L4, L5 MEDIAL BRANCH BLOCKS WITH FLUOROSCOPY (42880, [id number]) performed by Aurelia Maldonado MD at [address] [place] [address] Bilateral 2021    BILATERAL L3, L4, L5 RADIOFREQUENCY ABLATION WITH FLUOROSCOPY ([id number], 27018) performed by Aurelia Maldonado MD at [address] [place] [address] 3/15/2019    INSERTION OF INTERSPINOUS SPACER SUPERION VERTIFLEX AT [address] performed by Mirella Sebastian MD at [address] [place] [address] ENDOSCOPY  12/15/2017       Social History     Tobacco Use    Smoking status: Every Day     Packs/day: 1.00     Years: 45.00     Pack years: 45.00     Types: Cigarettes    Smokeless tobacco: Former   Vaping Use    Vaping Use: Never used   Substance Use Topics    Alcohol use: Yes    Drug use: No       Family History   Problem Relation Age of Onset    Breast Cancer Daughter      _____________________________________________________________________    Review of Systems   Unable to perform ROS: Mental status change   Neurological:  Positive for syncope. Psychiatric/Behavioral:  Positive for agitation. Physical Exam  Constitutional:       General: She is awake. She is not in acute distress. Appearance: She is ill-appearing. She is not toxic-appearing or diaphoretic. HENT:      Head: Normocephalic and atraumatic. Eyes:      Conjunctiva/sclera: Conjunctivae normal.   Neck:      Vascular: No JVD. Cardiovascular:      Rate and Rhythm: Normal rate and regular rhythm. Pulses:           Radial pulses are 2+ on the right side and 2+ on the left side. Heart sounds: Normal heart sounds. No murmur heard. Pulmonary:      Effort: Pulmonary effort is normal. No accessory muscle usage. Breath sounds: Normal breath sounds. Abdominal:      General: Bowel sounds are normal. There is no distension. Palpations: Abdomen is soft. Tenderness: There is no abdominal tenderness. There is no guarding or rebound. Skin:     General: Skin is warm and dry. Coloration: Skin is not cyanotic, mottled or pale. Neurological:      Mental Status: She is alert. GCS: GCS eye subscore is 4. GCS verbal subscore is 2. GCS motor subscore is 5. Cranial Nerves: No facial asymmetry. Comments:   Not following commands but she is spontaneously moving all extremities with exceptional strength. Localizing to pain with all extremities. Attempting to kick and/or punch ED staff members.    Psychiatric:         Behavior: Behavior is uncooperative, agitated, aggressive, hyperactive and combative.     _____________________________________________________________________    RESULTS:    Results for orders placed or performed during the hospital encounter of 11/12/22   CBC with Auto Differential   Result Value Ref Range    WBC 7.9 4.0 - 11.0 K/uL    RBC 4.41 4.00 - 5.20 M/uL    Hemoglobin 13.2 12.0 - 16.0 g/dL    Hematocrit 39.4 36.0 - 48.0 %    MCV 89.3 80.0 - 100.0 fL    MCH 30.0 26.0 - 34.0 pg    MCHC 33.6 31.0 - 36.0 g/dL    RDW 13.6 12.4 - 15.4 %    Platelets 893 917 - 818 K/uL    MPV 8.0 5.0 - 10.5 fL    Neutrophils % 74.3 %    Lymphocytes % 11.4 %    Monocytes % 11.9 %    Eosinophils % 1.9 %    Basophils % 0.5 %    Neutrophils Absolute 5.8 1.7 - 7.7 K/uL    Lymphocytes Absolute 0.9 (L) 1.0 - 5.1 K/uL    Monocytes Absolute 0.9 0.0 - 1.3 K/uL    Eosinophils Absolute 0.1 0.0 - 0.6 K/uL    Basophils Absolute 0.0 0.0 - 0.2 K/uL   Comprehensive Metabolic Panel   Result Value Ref Range    Sodium 126 (L) 136 - 145 mmol/L    Potassium 2.9 (LL) 3.5 - 5.1 mmol/L    Chloride 86 (L) 99 - 110 mmol/L    CO2 29 21 - 32 mmol/L    Anion Gap 11 3 - 16    Glucose 124 (H) 70 - 99 mg/dL    BUN 20 7 - 20 mg/dL    Creatinine 1.1 0.6 - 1.2 mg/dL    Est, Glom Filt Rate 52 (A) >60    Calcium 9.4 8.3 - 10.6 mg/dL    Total Protein 7.4 6.4 - 8.2 g/dL    Albumin 4.4 3.4 - 5.0 g/dL    Albumin/Globulin Ratio 1.5 1.1 - 2.2    Total Bilirubin 0.5 0.0 - 1.0 mg/dL    Alkaline Phosphatase 120 40 - 129 U/L    ALT 29 10 - 40 U/L    AST 46 (H) 15 - 37 U/L   Magnesium   Result Value Ref Range    Magnesium 2.20 1.80 - 2.40 mg/dL   Phosphorus   Result Value Ref Range    Phosphorus 2.7 2.5 - 4.9 mg/dL   Blood Gas, Venous   Result Value Ref Range    pH, Rahat 7.462 (H) 7.350 - 7.450    pCO2, Rahat 44.5 40.0 - 50.0 mmHg    pO2, Rahat 49 Not Established mmHg    HCO3, Venous 32 (H) 23 - 29 mmol/L    Base Excess, Rahat 7.0 Not Established mmol/L    O2 Sat, Rahat 82 Not Established %    Carboxyhemoglobin 1.4 %    MetHgb, Rahat 0. 7 <1.5 %    TC02 (Calc), Rahat 33 Not Established mmol/L    O2 Therapy Unknown    Troponin   Result Value Ref Range    Troponin 0.02 (H) <0.01 ng/mL   POCT Glucose   Result Value Ref Range    POC Glucose 175 (H) 70 - 99 mg/dl    Performed on ACCU-CHEK        CT HEAD WO CONTRAST   Final Result   No acute intracranial abnormality. Diffuse atrophic changes with findings suggesting chronic microvascular   ischemia         XR CHEST PORTABLE   Preliminary Result   Cardiomegaly with findings of chronic moderate CHF and interstitial pulmonary   edema. No acute focal process. Stable exam.           _____________________________________________________________________    UMCQZSVOD(N):    Critical Care  Performed by: Glennie Cranker, MD  Authorized by: Glennie Cranker, MD     Critical care provider statement:     Critical care time (minutes):  60    Critical care time was exclusive of:  Separately billable procedures and treating other patients    Critical care was necessary to treat or prevent imminent or life-threatening deterioration of the following conditions: Hyperactive delirium, syncope, hyponatremia, hypokalemia. Critical care was time spent personally by me on the following activities:  Blood draw for specimens, development of treatment plan with patient or surrogate, evaluation of patient's response to treatment, examination of patient, interpretation of cardiac output measurements, obtaining history from patient or surrogate, vascular access procedures, review of old charts, re-evaluation of patient's condition, pulse oximetry, ordering and review of radiographic studies, ordering and review of laboratory studies and ordering and performing treatments and interventions    I assumed direction of critical care for this patient from another provider in my specialty: no      _____________________________________________________________________    Is this patient to be included in the SEP-1 Core Measure?  No (no infection identified)  _____________________________________________________________________    MEDICAL DECISION MAKING & PLANS:    I reviewed the patient's recent and/or pertinent records, current medications, nurses notes, allergies, and vital signs. Differential Diagnosis:  Cardiogenic syncope  Metabolic encephalopathy  Opiate overdose  Iatrogenic opiate withdrawal  Hyperactive delirium  Less likely CVA, TBI    Labs & Studies:  Labs  EKG  CXR  CT head    Treatments:  Medications   0.9 % sodium chloride bolus (has no administration in time range)   potassium chloride 10 mEq/100 mL IVPB (Peripheral Line) (has no administration in time range)   potassium chloride 10 mEq/100 mL IVPB (Peripheral Line) (has no administration in time range)   potassium chloride 10 mEq/100 mL IVPB (Peripheral Line) (has no administration in time range)   midazolam (VERSED) injection 2 mg (2 mg IntraVENous Given by Other 11/12/22 1031)   droperidol (INAPSINE) injection 2.5 mg (2.5 mg IntraVENous Given by Other 11/12/22 1031)   ketamine (KETALAR) injection 230 mg (230 mg IntraMUSCular Given 11/12/22 1058)   potassium chloride 10 mEq/100 mL IVPB (Peripheral Line) (10 mEq IntraVENous New Bag 11/12/22 1250)   aspirin suppository 300 mg (300 mg Rectal Given 11/12/22 1249)   oxyCODONE HCl (OXY-IR) immediate release tablet 10 mg (10 mg Oral Given 11/12/22 1418)     Disposition:  Ms. Lashon Robison arrived to the ED profoundly hyperactive and combative. We first attempted to use Droperidol and Midazolam but this did not provided any improvement in her condition. We subsequently treated her with a dose of IM Ketamine in order to facilitate urgent, necessary medical interventions. She is now awake, alert, and somewhat cooperative; however, she remains very confused and disoriented. On her ED evaluation results she does appear to have hyponatremia and hypokalemia. Her Qtc is prolonged on EKG.  I would be suspicious that she had an arrhythmia - likely provoked by HypoK and Qtc prolongation - earlier this AM leading to her bout of syncope. Opiate overdose and/or withdrawal d/t Naloxone is likely playing a role in her condition as well. Discussed results, Dxs, and expected clinical course with her daughter at bedside. Recommended that we proceed with hospitalization and she is in agreement with this plan.  _____________________________________________________________________    Clinical Impression(s)  1. Syncope and collapse    2. Acute hyperactive delirium due to another medical condition    3. Chronic prescription opiate use    4. Opiate withdrawal ([address]    5. Hypokalemia    6. Hyponatremia    7. Prolonged Q-T interval on ECG        Provider Signature: MD Machelle Bennett MD  11/12/22 5038

## 2022-11-13 PROBLEM — G45.9 TIA (TRANSIENT ISCHEMIC ATTACK): Status: ACTIVE | Noted: 2022-11-13

## 2022-11-13 LAB
ANION GAP SERPL CALCULATED.3IONS-SCNC: 11 MMOL/L (ref 3–16)
BUN BLDV-MCNC: 13 MG/DL (ref 7–20)
CALCIUM SERPL-MCNC: 9.5 MG/DL (ref 8.3–10.6)
CHLORIDE BLD-SCNC: 97 MMOL/L (ref 99–110)
CO2: 27 MMOL/L (ref 21–32)
CREAT SERPL-MCNC: 0.9 MG/DL (ref 0.6–1.2)
EKG ATRIAL RATE: 64 BPM
EKG DIAGNOSIS: NORMAL
EKG P AXIS: 49 DEGREES
EKG P-R INTERVAL: 134 MS
EKG Q-T INTERVAL: 532 MS
EKG QRS DURATION: 162 MS
EKG QTC CALCULATION (BAZETT): 548 MS
EKG R AXIS: 83 DEGREES
EKG T AXIS: 52 DEGREES
EKG VENTRICULAR RATE: 64 BPM
GFR SERPL CREATININE-BSD FRML MDRD: >60 ML/MIN/{1.73_M2}
GLUCOSE BLD-MCNC: 81 MG/DL (ref 70–99)
MAGNESIUM: 2.4 MG/DL (ref 1.8–2.4)
OSMOLALITY URINE: 160 MOSM/KG (ref 390–1070)
OSMOLALITY: 275 MOSM/KG (ref 280–301)
POTASSIUM REFLEX MAGNESIUM: 2.9 MMOL/L (ref 3.5–5.1)
SODIUM BLD-SCNC: 135 MMOL/L (ref 136–145)
SODIUM URINE: <20 MMOL/L

## 2022-11-13 PROCEDURE — 97162 PT EVAL MOD COMPLEX 30 MIN: CPT

## 2022-11-13 PROCEDURE — 6360000002 HC RX W HCPCS: Performed by: INTERNAL MEDICINE

## 2022-11-13 PROCEDURE — 83935 ASSAY OF URINE OSMOLALITY: CPT

## 2022-11-13 PROCEDURE — 2060000000 HC ICU INTERMEDIATE R&B

## 2022-11-13 PROCEDURE — 94760 N-INVAS EAR/PLS OXIMETRY 1: CPT

## 2022-11-13 PROCEDURE — 36415 COLL VENOUS BLD VENIPUNCTURE: CPT

## 2022-11-13 PROCEDURE — 99223 1ST HOSP IP/OBS HIGH 75: CPT | Performed by: INTERNAL MEDICINE

## 2022-11-13 PROCEDURE — 80048 BASIC METABOLIC PNL TOTAL CA: CPT

## 2022-11-13 PROCEDURE — 99233 SBSQ HOSP IP/OBS HIGH 50: CPT | Performed by: INTERNAL MEDICINE

## 2022-11-13 PROCEDURE — 97530 THERAPEUTIC ACTIVITIES: CPT

## 2022-11-13 PROCEDURE — 6370000000 HC RX 637 (ALT 250 FOR IP): Performed by: STUDENT IN AN ORGANIZED HEALTH CARE EDUCATION/TRAINING PROGRAM

## 2022-11-13 PROCEDURE — 2580000003 HC RX 258: Performed by: INTERNAL MEDICINE

## 2022-11-13 PROCEDURE — 84300 ASSAY OF URINE SODIUM: CPT

## 2022-11-13 PROCEDURE — 6370000000 HC RX 637 (ALT 250 FOR IP): Performed by: INTERNAL MEDICINE

## 2022-11-13 PROCEDURE — 83735 ASSAY OF MAGNESIUM: CPT

## 2022-11-13 PROCEDURE — 93010 ELECTROCARDIOGRAM REPORT: CPT | Performed by: INTERNAL MEDICINE

## 2022-11-13 RX ORDER — POTASSIUM CHLORIDE 750 MG/1
40 TABLET, FILM COATED, EXTENDED RELEASE ORAL 3 TIMES DAILY
Status: COMPLETED | OUTPATIENT
Start: 2022-11-13 | End: 2022-11-13

## 2022-11-13 RX ADMIN — POTASSIUM CHLORIDE 40 MEQ: 750 TABLET, FILM COATED, EXTENDED RELEASE ORAL at 08:33

## 2022-11-13 RX ADMIN — SODIUM CHLORIDE, PRESERVATIVE FREE 10 ML: 5 INJECTION INTRAVENOUS at 20:05

## 2022-11-13 RX ADMIN — OXYCODONE HYDROCHLORIDE 10 MG: 10 TABLET ORAL at 18:06

## 2022-11-13 RX ADMIN — POTASSIUM CHLORIDE 40 MEQ: 750 TABLET, FILM COATED, EXTENDED RELEASE ORAL at 14:16

## 2022-11-13 RX ADMIN — OXYCODONE HYDROCHLORIDE 10 MG: 10 TABLET ORAL at 12:46

## 2022-11-13 RX ADMIN — LEVOTHYROXINE SODIUM 88 MCG: 0.09 TABLET ORAL at 06:00

## 2022-11-13 RX ADMIN — SODIUM CHLORIDE: 9 INJECTION, SOLUTION INTRAVENOUS at 06:06

## 2022-11-13 RX ADMIN — METOPROLOL SUCCINATE 50 MG: 50 TABLET, EXTENDED RELEASE ORAL at 08:33

## 2022-11-13 RX ADMIN — POTASSIUM CHLORIDE 40 MEQ: 750 TABLET, FILM COATED, EXTENDED RELEASE ORAL at 20:05

## 2022-11-13 RX ADMIN — SODIUM CHLORIDE, PRESERVATIVE FREE 10 ML: 5 INJECTION INTRAVENOUS at 08:34

## 2022-11-13 RX ADMIN — ENOXAPARIN SODIUM 40 MG: 100 INJECTION SUBCUTANEOUS at 20:04

## 2022-11-13 RX ADMIN — ROSUVASTATIN CALCIUM 40 MG: 40 TABLET, FILM COATED ORAL at 20:05

## 2022-11-13 RX ADMIN — PANTOPRAZOLE SODIUM 40 MG: 40 TABLET, DELAYED RELEASE ORAL at 06:00

## 2022-11-13 RX ADMIN — AMIODARONE HYDROCHLORIDE 100 MG: 200 TABLET ORAL at 08:33

## 2022-11-13 RX ADMIN — DULOXETINE HYDROCHLORIDE 60 MG: 60 CAPSULE, DELAYED RELEASE ORAL at 08:33

## 2022-11-13 ASSESSMENT — PAIN DESCRIPTION - ONSET: ONSET: AWAKENED FROM SLEEP

## 2022-11-13 ASSESSMENT — PAIN DESCRIPTION - LOCATION
LOCATION: BACK

## 2022-11-13 ASSESSMENT — PAIN DESCRIPTION - DESCRIPTORS
DESCRIPTORS: ACHING
DESCRIPTORS: ACHING
DESCRIPTORS: DISCOMFORT

## 2022-11-13 ASSESSMENT — PAIN DESCRIPTION - DIRECTION: RADIATING_TOWARDS: BACK

## 2022-11-13 ASSESSMENT — PAIN DESCRIPTION - ORIENTATION
ORIENTATION: MID
ORIENTATION: MID
ORIENTATION: LOWER

## 2022-11-13 ASSESSMENT — PAIN SCALES - GENERAL
PAINLEVEL_OUTOF10: 4
PAINLEVEL_OUTOF10: 7
PAINLEVEL_OUTOF10: 6
PAINLEVEL_OUTOF10: 8

## 2022-11-13 ASSESSMENT — PAIN DESCRIPTION - PAIN TYPE: TYPE: CHRONIC PAIN

## 2022-11-13 ASSESSMENT — PAIN DESCRIPTION - FREQUENCY: FREQUENCY: INTERMITTENT

## 2022-11-13 ASSESSMENT — PAIN SCALES - WONG BAKER: WONGBAKER_NUMERICALRESPONSE: 0

## 2022-11-13 ASSESSMENT — PAIN - FUNCTIONAL ASSESSMENT: PAIN_FUNCTIONAL_ASSESSMENT: ACTIVITIES ARE NOT PREVENTED

## 2022-11-13 NOTE — PROGRESS NOTES
Medication Reconciliation    List of medications patient is currently taking is complete. Source of information: 1. Conversation with patient's family over the phone                                      2. EPIC records           Notes regarding home medications:   1.  Removed the following from medication list:   - Betamethasone cream   - Docusate   - Cephalexin   - Advair   - Lactobacillus   -

## 2022-11-13 NOTE — PROGRESS NOTES
Orthostatic BP and pulse completed. BP lying was 135/55, sitting BP was 92/59, and upon standing BP dropped to 77/50. Minimal change in pulse rate.

## 2022-11-13 NOTE — PROGRESS NOTES
4 Eyes Skin Assessment     The patient is being assess for  Admission    I agree that 2 RN's have performed a thorough Head to Toe Skin Assessment on the patient. ALL assessment sites listed below have been assessed on admission. Areas assessed by both nurses:   [x]   Head, Face, and Ears   [x]   Shoulders, Back, and Chest  [x]   Arms, Elbows, and Hands   [x]   Coccyx, Sacrum, and Ischum  [x]   Legs, Feet, and Heels        Does the Patient have Skin Breakdown?   Yes a wound was noted on the Admission Assessment and an LDA was Initiated documentation include the Diana-wound, Wound Assessment, Measurements, Dressing Treatment, Drainage, and Color\",         Dipesh Prevention initiated:  Yes   Wound Care Orders initiated:  Yes      [address]  nurse consulted for Pressure Injury (Stage 3,4, Unstageable, DTI, NWPT, and Complex wounds):  Yes      Nurse 1 eSignature: Electronically signed by Lars Henriquez RN on 11/13/22 at 2:43 AM EST    **SHARE this note so that the co-signing nurse is able to place an eSignature**    Nurse 2 eSignature: Electronically signed by Daisy Martinez RN on 11/13/22 at 2:43 AM EST

## 2022-11-13 NOTE — PROGRESS NOTES
Patient is calm, cooperative, able to take PO medication with water. Daughter at bedside to answer admission questions.

## 2022-11-13 NOTE — PROGRESS NOTES
Department of Internal Medicine  General Internal Medicine  Attending Progress Note    Chief Complaint:fall/syncopal episode      HPI:   SUBJECTIVE:  67 yo female with CAD, sp avr and sp watchman and PAF and Pacer for history symptomatic rhonda done 4/22 who had a syncopal episode yesterday. She yelled out for her daughter and was found on the floor. She is orthostatic per her nurse's check this morning. She also reports that she feels like she is in a dream.  She is oriented but doesn't feel normal.  Her daughter reports that on the morning before she fell she had some confusion and expressive aphasia. She has remained av paced on the monitor. She had a ct of the head in the ER which showed diffuse atrophic changes and chronic wmd changes. MRI is ordered not sure her pacer is compatible. Past Medical History:   Diagnosis Date    Atrial fibrillation ([address]     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    Bladder cancer ([address] 02/2014    CAD (coronary artery disease) 2014    Carotid stenosis 04/30/2014    Chronic back pain     Chronic diastolic CHF (congestive heart failure) ([address] 2016    Chronic prescription opiate use     COPD ([address]     Diverticulosis     HTN (hypertension)     Hyperlipidemia     Hypothyroidism     Ischemic stroke 2013    x 2    Lumbar spine stenosis 2017    Macular degeneration 2018    Nonrheumatic mitral valve regurgitation     Obstructive sleep apnea     Osteoarthritis     Peripheral neuropathy     Pulmonary HTN ([address] 2014    PVD (peripheral vascular disease) (HCC)     Tobacco use disorder in remission      Past Surgical History:   Procedure Laterality Date    ABLATION OF DYSRHYTHMIC FOCUS  05/09/2022    AORTIC VALVE REPLACEMENT  6/16/15    Dr. Anderson Wiseman. Ehrnandez - PVI, RENETTA exclusion.  19mm Maki pericardial Magna    APPENDECTOMY      BACK SURGERY  03/15/2019    superion inserted to keep back from hurting: [address], LAPAROSCOPIC N/A 4/25/2019    EMERGENT; LAPAROSCOPIC CHOLECYSTECTOMY WITH GRAM performed by David Leigh MD at [facility] 2014    stent x 1    CYSTOSCOPY  Feb 2014    dr Christel Bonilla      THR Right    OTHER SURGICAL HISTORY  02/20/2018     EGD and colonoscopy. PACEMAKER INSERTION  04/11/2022    PAIN MANAGEMENT PROCEDURE Bilateral 4/1/2021    BILATERAL L3, L4, L5 MEDIAL BRANCH BLOCK WITH FLUOROSCOPY performed by Delaney Byrnes MD at [address] Bilateral 7/8/2021    BILATERAL L3, L4, L5 MEDIAL BRANCH BLOCKS WITH FLUOROSCOPY (47019, 71662) performed by Delaney Byrens MD at [address] Bilateral 9/9/2021    BILATERAL L3, L4, L5 RADIOFREQUENCY ABLATION WITH FLUOROSCOPY (89629, 78450) performed by Delaney Byrnes MD at [address] [facility] 3/15/2019    INSERTION OF INTERSPINOUS SPACER [address] [facility] performed by Hannah Lyons MD at [address] [facility] ENDOSCOPY  12/15/2017      Family History   Problem Relation Age of Onset    Breast Cancer Daughter      Social History     Tobacco Use    Smoking status: Every Day     Packs/day: 1.00     Years: 45.00     Pack years: 45.00     Types: Cigarettes    Smokeless tobacco: Former   Vaping Use    Vaping Use: Never used   Substance Use Topics    Alcohol use: Yes    Drug use: No       Prior to Admission medications    Medication Sig Start Date End Date Taking?  Authorizing Provider   aspirin 81 MG EC tablet Take 81 mg by mouth daily   Yes Historical Provider, MD   valsartan (DIOVAN) 40 MG tablet Take 0.5 tablets by mouth daily 10/25/22   Glendy Camacho MD   rosuvastatin (CRESTOR) 40 MG tablet TAKE ONE TABLET BY MOUTH EVERY EVENING 10/25/22   Glendy Camacho MD   metoprolol succinate (TOPROL XL) 50 MG extended release tablet TAKE 1 TABLET BY MOUTH DAILY 10/25/22   Glendy Camacho MD   ezetimibe (ZETIA) 10 MG tablet TAKE ONE TABLET BY MOUTH DAILY 10/25/22   Malini Muhammad MD   pantoprazole (PROTONIX) 40 MG tablet One po qd 10/25/22   Malini Muhammad MD   sennosides-docusate sodium (SENOKOT-S) 8.6-50 MG tablet Take 1 tablet by mouth in the morning and at bedtime 10/14/22   Malini Muhammad MD   torsemide (DEMADEX) 20 MG tablet TAKE 1 TABLET BY MOUTH EVERY MORNING. TAKE EXTRA PILL ON DAYS WEIGHT INCREASES BY 2+ LBS 9/20/22   Malini Muhammad MD   tiZANidine (ZANAFLEX) 4 MG capsule TAKE ONE-HALF TABLET BY MOUTH EVERY 8 HOURS AS NEEDED (MUSCLE SPASM) 8/24/22   Malini Muhammad MD   levothyroxine (SYNTHROID) 88 MCG tablet TAKE 1 TABLET BY MOUTH EVERY DAY 8/24/22   Malini Muhammad MD   potassium chloride (KLOR-CON M) 20 MEQ TBCR extended release tablet Take 1 tablet by mouth 2 times daily 8/22/22   Malini Muhammad MD   amiodarone (CORDARONE) 200 MG tablet Take 0.5 tablets by mouth in the morning. 8/10/22   HOWARD Harper - CNP   ipratropium (ATROVENT) 0.03 % nasal spray INHALE 2 DOSES INTO EACH NOSTRIL THREE TIMES A DAY IF NEEDED FOR RHINITIS 7/10/22   Malini Muhammad MD   DULoxetine (CYMBALTA) 60 MG extended release capsule Take 60 mg by mouth daily    Historical Provider, MD   diclofenac sodium (VOLTAREN) 1 % GEL Apply 2 g topically daily    Historical Provider, MD   oxyCODONE HCl (OXY-IR) 10 MG immediate release tablet Take 10 mg by mouth every 4 hours as needed for Pain. Historical Provider, MD         ROS:  A comprehensive review of systems was negative except for: feels confused chronic back pain    OBJECTIVE:      PHYSICAL:  Intake/Output:   Patient Vitals for the past 8 hrs:   BP Temp Temp src Pulse Resp SpO2   11/13/22 1246 -- -- -- -- 18 --   11/13/22 1210 -- -- -- 54 12 95 %   11/13/22 1130 (!) 135/53 98.3 °F (36.8 °C) Oral 58 20 97 %   11/13/22 0832 (!) 145/52 99.4 °F (37.4 °C) Oral 53 20 100 %   11/13/22 0548 -- -- -- -- -- 100 %     I/O last 3 completed shifts:   In: 1220.8 [P.O.:60; I.V.:1160.8]  Out: 1875 [[facility]:8112]  I/O this shift:  In: - Out: 700 [Urine:700]  VITALS:    BP (!) 135/53   Pulse 54   Temp 98.3 °F (36.8 °C) (Oral)   Resp 18   Ht 5' 1\" (1.549 m)   Wt 126 lb 1.7 oz (57.2 kg)   SpO2 95%   BMI 23.83 kg/m²   Sbp 131 lying 102 sitting and 82 standing  General Appearance: alert and oriented to person, place and time, well-developed and well-nourished, in no acute distress  Skin: warm and dry, no rash or erythema  Head: normocephalic and atraumatic  Eyes: conjunctivae normal and sclera anicteric  ENT: hearing grossly normal bilaterally and sinuses non-tender  Neck: neck supple and non tender without mass, no thyromegaly or thyroid nodules, no cervical lymphadenopathy   Pulmonary/Chest: clear to auscultation bilaterally- no wheezes, rales or rhonchi, normal air movement, no respiratory distress  Cardiovascular: normal rate, normal S1 and S2, no gallops and no carotid bruits  Abdomen: soft, non-tender, non-distended, normal bowel sounds, no masses or organomegaly  Extremities: no cyanosis, clubbing or edema  Musculoskeletal: no swollen joints and no tender joints, tender in right inguinal region  Neurologic: coordination normal and speech normal, moves all 4 extremities    DATA:   Labs:  CBC:   Recent Labs     11/12/22  1136   WBC 7.9   HGB 13.2        BMP:    Recent Labs     11/12/22  1136 11/13/22  0641   * 135*   K 2.9* 2.9*   CL 86* 97*   CO2 29 27   BUN 20 13   CREATININE 1.1 0.9   GLUCOSE 124* 81     POC GLUCOSE:    Recent Labs     11/12/22  1024   POCGLU 175*     Ca/Mg/Phos:   Recent Labs     11/12/22  1136 11/13/22  0641   CALCIUM 9.4 9.5   MG 2.20 2.40   PHOS 2.7  --      Hepatic:   Recent Labs     11/12/22  1136   AST 46*   ALT 29   BILITOT 0.5   ALKPHOS 120     Troponin:   Recent Labs     11/12/22  1423 11/12/22  1901 11/12/22  2243   TROPONINI 0.02* <0.01 <0.01       DIAGNOSTICS:  CT HEAD WO CONTRAST    Result Date: 11/12/2022  No acute intracranial abnormality.  Diffuse atrophic changes with findings suggesting chronic microvascular ischemia     XR CHEST PORTABLE    Result Date: 11/12/2022  Cardiomegaly with findings of chronic moderate CHF and interstitial pulmonary edema. No acute focal process.   Stable exam.         ASSESSMENT AND PLAN    Principal Problem:    Syncope and collapse  Plan: may have been related to orthostatic hypotension but will also need to check out pacer, cardiology consulted, repeat echo pending, troponins not remarkable  Active Problems:    Pulmonary emphysema ([address]  Plan: not rechecked no active problem    Smoker  Plan: likely ongoing, overdue for yearly lung ct will do while here    Acute metabolic encephalopathy  Plan: reports being confused but seems ok on conversation    Bladder tumor  Plan: ? stage    Hypothyroidism  Plan:stable    Abdominal aortic aneurysm  Plan: fu per cards    Lumbar stenosis  Plan: on a generous amount of pain meds not getting as much here    Orthostatic hypotension  Plan: consider to treat medically if persists        Appropriate diagnostic studies and consults ordered  Dayana Crawford MD

## 2022-11-13 NOTE — PROGRESS NOTES
Physical Therapy  Facility/Department: [facility] 5N PROGRESSIVE CARE  Physical Therapy Initial Assessment    Name: Sabrina Ibnaez  : 1945  MRN: [id number]  Date of Service: 2022    Discharge Recommendations:  Continue to assess pending progress (Pt/family report adequate assist/support for d/c home. )   PT Equipment Recommendations  Other: Will monitor for potential equipt needs. Current Am-Pac Score 18/24. Comment: Pt reports initial mild lighthead at eob/oob. Orthostatic BP :  Supine : 131/61 (80). EOB : 102/72 (80). Stand 82/53 (63). Nursing informed. Assessment   Body Structures, Functions, Activity Limitations Requiring Skilled Therapeutic Intervention: Decreased functional mobility ; Decreased strength;Decreased cognition;Decreased endurance  Assessment: 69 y/o female admit 2022 with Syncope/Collapse, Opiate Withdrawal. PMH extensive including CHF, AVR, PAF, Bradycardia, Pacemaker (2022), AAA, COPD, CKD, Lumbar Stenosis, Sacral Fx. PTA pt living with family in home with few steps to enter and 1st floor bed/bath; reports independent daily care and functional mobility. Currently,  Pt netta eob/oob to chair with Walker and assist x 1. Pt reports onset R hip discomfort following recent fall (nursing aware) although netta oob/wgt bear without complaints. Noting decrease BP as proceed eob/oob. Pt does acknowledge some confusion to recent events. At this time, pt/family report adequate assist/support for d/c home. Will monitor pt's progress. Therapy Prognosis: Good  Decision Making: Medium Complexity  History: 69 y/o female admit 2022 with Syncope/Collapse, Opiate Withdrawal. PMH extensive including CHF, AVR, PAF, Bradycardia, Pacemaker (2022), AAA, COPD, CKD, Lumbar Stenosis, Sacral Fx. Exam: See above. Clinical Presentation: See above. Barriers to Learning: Cognitive.   Requires PT Follow-Up: Yes  Activity Tolerance  Activity Tolerance: Patient limited by endurance  Activity Tolerance Comments: Pt netta eob/oob to chair with Walker and assist x 1. Pt reports onset R hip discomfort following recent fall (nursing aware) although netta oob/wgt bear without complaints. Noting decrease BP as proceed eob/oob. Pt does acknowledge some confusion to recent events. Plan   Physcial Therapy Plan  General Plan: 3-5 times per week  Current Treatment Recommendations: Strengthening, Functional mobility training, Transfer training, Gait training, Safety education & training, Patient/Caregiver education & training  Safety Devices  Type of Devices: Call light within reach, Chair alarm in place, Left in chair, Nurse notified     Restrictions  Restrictions/Precautions  Restrictions/Precautions: Fall Risk  Position Activity Restriction  Other position/activity restrictions: Room Air : sats remain at least 90%. Subjective   General  Chart Reviewed: Yes  Patient assessed for rehabilitation services?: Yes  Additional Pertinent Hx: 69 y/o female admit 11/12/2022 with Syncope/Collapse, Opiate Withdrawal. PMH extensive including CHF, AVR, PAF, Bradycardia, Pacemaker (4/2022), AAA, COPD, CKD, Lumbar Stenosis, Sacral Fx. Family / Caregiver Present: Yes María Nguyen dtr.)  Referring Practitioner: Dr. Lia England  Other (Comment): Pt follows simple commands; delayed at times. Subjective  Subjective: Pt/family agreeable to PT Eval/Rx. Pt acknowledges that she is confused to recent events. Pt is able to report some discomfort R Hip (following recent fall).          Social/Functional History  Social/Functional History  Lives With: Family (Dtr, Grandson.)  Type of Home: House  Home Layout: Two level, Able to Live on Main level with bedroom/bathroom, Laundry in basement  Home Access: Stairs to enter without rails (1+1 steps to enter.)  Bathroom Shower/Tub: Walk-in shower  Bathroom Toilet: Handicap height  Bathroom Equipment: Grab bars in shower, [address] chair  Bathroom Accessibility: Accessible  Home Equipment: Roxi Altamirano rolling  ADL Assistance: Independent  Homemaking Assistance:  (Shared with family.)  Ambulation Assistance: Independent (Without assist device pta.)  Transfer Assistance: Independent  Active : No (Relies on family.)  Occupation: Retired  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 Additional Comments: Guerline Curielcuco [employer]) and Analisa Prasadumb [facility]) both work outside of the home. Vision/Hearing  Vision  Vision: Within Functional Limits  Hearing  Hearing: Within functional limits    Cognition   Orientation  Orientation Level: Oriented to person (Pt alert to self, , \"hospital\" setting; somewhat confused to recent event/situation.)  Cognition  Overall Cognitive Status: Exceptions  Arousal/Alertness: Appropriate responses to stimuli  Following Commands: Follows one step commands with increased time  Attention Span: Attends with cues to redirect  Memory: Decreased recall of recent events  Safety Judgement: Decreased awareness of need for safety  Insights: Decreased awareness of deficits  Initiation: Requires cues for some  Sequencing: Requires cues for some     Objective           Gross Assessment  AROM: Within functional limits (C/O mild discomfort R Hip (reports \"tolerable\") with AROM.)  Strength: Generally decreased, functional (Mild discomfort with resist R Hip.)                    Bed mobility  Supine to Sit: Contact guard assistance (HOB elevated. Use of bedrail.)  Transfers  Sit to Stand: Contact guard assistance (With Gabi Madrigal.)  Stand to Sit: Contact guard assistance (With Gabi Madrigal.)  Comment: Pt reports initial mild lighthead at eob/oob. Orthostatic BP :  Supine : 131/61 (80). EOB : 102/72 (80). Stand 82/53 (63). Nursing informed. Ambulation  Surface: Level tile  Device: Rolling Walker  Distance: 5-6 steps bed to chair with Walker Close CGA. No c/o R LE pain; no LE buckling/giving way. Reports mild lighthead; resolves once in chair. Cues for safe transitional mvts.                  AM-PAC Score  AM-[facility] Inpatient Mobility Raw Score : 18 (11/13/22 1119)  AM-PAC Inpatient T-Scale Score : 43.63 (11/13/22 1119)  Mobility Inpatient CMS 0-100% Score: 46.58 (11/13/22 1119)  Mobility Inpatient CMS G-Code Modifier : CK (11/13/22 1119)            Goals  Short Term Goals  Time Frame for Short Term Goals: Upon d/c acute care setting. Short Term Goal 1: Bed Mob SBA. Short Term Goal 2: Transfers with/without assist device SBA. Short Term Goal 3: Amb with/without assist device 50-75' CGA. Patient Goals   Patient Goals : Return home with family. Education  Patient Education  Education Given To: Patient; Family  Education Provided Comments: Role of PT, POC, Need to call for assist.  Education Method: Verbal  Barriers to Learning: Cognition  Education Outcome: Continued education needed      Therapy Time   Individual Concurrent Group Co-treatment   Time In [phone]         Time Out 1020         Minutes [address], PT

## 2022-11-13 NOTE — CONSULTS
Neurology consult Note      Patient: Zahra Hartman MRN: [id number]    YOB: 1945  Age: 68 y.o. Sex: female   [facility] Room/Bed: T3B-8382/5275-01 Location: [address] [place] [address]    Date of Consultation: 11/13/2022  Date of Admission: 11/12/2022 10:20 AM ( LOS: 1 day )    Consult Requested By: Dr. Kayden Velazquez      Reason for Consult: \"new onset change in mental status ? new cva\"    Chief Complaint:   Altered mental status. History of Present Illness:    History obtained from chart review, history from patient, history from daughter at bedside. Zahra Hartman is a 68-year-old female with past medical history significant for atrial fibrillation s/p pacemaker, not on anticoagulation, status post post aortic valve replacement with bioprosthetic valve, hypertension, chronic back pain, HTN, HLD, hypothyroidism, chornic opiate use,  previous CVA who presented yesterday with with an episode of loss of consciousness. She does not remember what happened yesterday morning that caused her to present. But her daughter at the bedside reports that on Saturday morning, she had trouble with her speech. She appeared confused. She had trouble telling her daughter that her recliner was broken. However, she was still able to go about her usual routine of fixing coffee and toast which she consumes before she takes her medication. She went back to her room. She then screamed. Her daughter found her on the floor. She was unconscious. She was also shaking. Subsequently, her breathing was shallow. EMS was called. She was still out of it when EMS arrived. Phone EMS  tried to get her daughter to do CPR at home, but the patient was moaning, so EMS recommended that this stop. The patient has no recollection of this event. When EMS arrived Ms. Hernandes was reportedly obtunded with bradypnea. She was not responsive to noxious stimuli an her pupils were reportedly pinpoint.  EMS providers administered 4mg of Naloxone IV. Ms. Stacey Hernandez almost immediately then became agitated, aggressive, hyperactive, and combative. She was still altered in the ER. She denies any recent illness, change in medication, head trauma. She denies any headache. She smokes a pack of cigarettes a day. She does not drink alcohol. She still says that she continues to have some difficulty expressing words. She also has some word finding difficulty. This is not new for her, and has been present since prior stroke. She also says that today she feels that she is in a dream.  She says that it feels like that 'at any moment I am going to wake up, except that there are too many details in my dream'. No history of similar complaints in the past.  No history of seizures. No history of involuntary movements. No history of staring spells. Denies any history of stigmata of nocturnal seizures. She has remained av paced on the monitor. She had a CT of the head in the ER which showed diffuse atrophic changes and chronic microvascular ischemic disease. Neurology consulted for the above. No anticoagulation at baseline. At home on ASA 81 mg daily, Crestor 40, Zetia. Laboratory studies in the emergency department showed significant hyponatremia, sodium of 126, potassium of 2.9. Her troponin was elevated at 0.02. Her EKG showed atrial paced rhythm. BP with significant postural drop this morning.        Past Medical History:  The patient has  has a past medical history of Atrial fibrillation ([address] AVM (arteriovenous malformation) of duodenum, AVM (arteriovenous malformation) of stomach, Bladder cancer ([address] CAD (coronary artery disease), Carotid stenosis, Chronic back pain, Chronic diastolic CHF (congestive heart failure) ([address] Chronic prescription opiate use, COPD ([address] Diverticulosis, HTN (hypertension), Hyperlipidemia, Hypothyroidism, Ischemic stroke, Lumbar spine stenosis, Macular degeneration, Nonrheumatic mitral valve regurgitation, Obstructive sleep apnea, Osteoarthritis, Peripheral neuropathy, Pulmonary HTN ([address] PVD (peripheral vascular disease) ([address] and Tobacco use disorder in remission. Past Surgical History:  Past Surgical History:   Procedure Laterality Date    ABLATION OF DYSRHYTHMIC FOCUS  2022    AORTIC VALVE REPLACEMENT  6/16/15    Dr. Jeana Franco. Hernandez - PVI, RENETTA exclusion. 19mm Maki pericardial Magna    APPENDECTOMY      BACK SURGERY  03/15/2019    superion inserted to keep back from hurtin [address] N/A 2019    EMERGENT; LAPAROSCOPIC CHOLECYSTECTOMY WITH GRAM performed by Joel Simpson MD at [place] [address]      stent x 1    CYSTOSCOPY  2014    dr Therese Romero      THR Right    OTHER SURGICAL HISTORY  2018     EGD and colonoscopy. PACEMAKER INSERTION  2022    PAIN MANAGEMENT PROCEDURE Bilateral 2021    BILATERAL L3, L4, L5 MEDIAL BRANCH BLOCK WITH FLUOROSCOPY performed by Aminah Gasca MD at [address] [facility] Bilateral 2021    BILATERAL L3, L4, L5 MEDIAL BRANCH BLOCKS WITH FLUOROSCOPY (24517, 44277) performed by Aminah Gasca MD at [address] [place] [address] Bilateral 2021    BILATERAL L3, L4, L5 RADIOFREQUENCY ABLATION WITH FLUOROSCOPY (64381, 20668) performed by Aminah Gasca MD at [address] [place] [address] 3/15/2019    INSERTION OF INTERSPINOUS SPACER Sidonie Iba AT [address] performed by Corie Stoll MD at [address] [place] [address] ENDOSCOPY  12/15/2017       Family History:  family history includes Breast Cancer in her daughter. Social History:  she reports that she has been smoking cigarettes. She has a 45.00 pack-year smoking history. She has quit using smokeless tobacco. She reports current alcohol use. She reports that she does not use drugs. Medications:   Allergies Allergen Reactions    Benadryl [Diphenhydramine] Swelling    Doxylamine     Mucinex [Guaifenesin Er]      hallucinations    Spiriva Handihaler [Tiotropium Bromide Monohydrate]      Nausea      Adhesive Tape Rash and Swelling    Neosporin [Bacitracin-Polymyxin B] Other (See Comments)     Rash that spread across face with swelling       Medications Prior to Admission: aspirin 81 MG EC tablet, Take 81 mg by mouth daily  valsartan (DIOVAN) 40 MG tablet, Take 0.5 tablets by mouth daily  rosuvastatin (CRESTOR) 40 MG tablet, TAKE ONE TABLET BY MOUTH EVERY EVENING  metoprolol succinate (TOPROL XL) 50 MG extended release tablet, TAKE 1 TABLET BY MOUTH DAILY  ezetimibe (ZETIA) 10 MG tablet, TAKE ONE TABLET BY MOUTH DAILY  pantoprazole (PROTONIX) 40 MG tablet, One po qd  sennosides-docusate sodium (SENOKOT-S) 8.6-50 MG tablet, Take 1 tablet by mouth in the morning and at bedtime  torsemide (DEMADEX) 20 MG tablet, TAKE 1 TABLET BY MOUTH EVERY MORNING. TAKE EXTRA PILL ON DAYS WEIGHT INCREASES BY 2+ LBS  [DISCONTINUED] fluticasone-salmeterol (ADVAIR) 250-50 MCG/ACT AEPB diskus inhaler, INHALE 1 PUFF BY MOUTH 2 TIMES A DAY (Patient not taking: Reported on 11/12/2022)  tiZANidine (ZANAFLEX) 4 MG capsule, TAKE ONE-HALF TABLET BY MOUTH EVERY 8 HOURS AS NEEDED (MUSCLE SPASM)  levothyroxine (SYNTHROID) 88 MCG tablet, TAKE 1 TABLET BY MOUTH EVERY DAY  potassium chloride (KLOR-CON M) 20 MEQ TBCR extended release tablet, Take 1 tablet by mouth 2 times daily  amiodarone (CORDARONE) 200 MG tablet, Take 0.5 tablets by mouth in the morning. [DISCONTINUED] betamethasone dipropionate 0.05 % cream, Apply topically 2 times daily.  Rub in well to itchy red spots on legs  ipratropium (ATROVENT) 0.03 % nasal spray, INHALE 2 DOSES INTO EACH NOSTRIL THREE TIMES A DAY IF NEEDED FOR RHINITIS  [DISCONTINUED] lactobacillus (CULTURELLE) capsule, Take 1 capsule by mouth daily (with breakfast) (Patient not taking: Reported on 11/12/2022)  DULoxetine (CYMBALTA) 60 MG extended release capsule, Take 60 mg by mouth daily  [DISCONTINUED] docusate sodium (DOK) 100 MG capsule, TAKE ONE CAPSULE BY MOUTH TWO TIMES A DAY  diclofenac sodium (VOLTAREN) 1 % GEL, Apply 2 g topically daily  oxyCODONE HCl (OXY-IR) 10 MG immediate release tablet, Take 10 mg by mouth every 4 hours as needed for Pain. Review of Systems:  ROS:  Constitutional- No weight loss or fevers  Eyes- No diplopia. No photophobia. Ears/nose/throat- No dysphagia. No Dysarthria  Cardiovascular- No palpitations. No chest pain  Respiratory- No dyspnea. No Cough  Gastrointestinal- No Abdominal pain. No Vomiting. Genitourinary- No incontinence. No urinary retention  Musculoskeletal-chronic pain on opioids   Skin- No rash. No easy bruising. Endocrine-hypothyroidism on repletion. Hematologic- No bleeding difficulty. No fatigue  Neurologic- as per HPI. OBJECTIVE     Vitals:  Blood pressure (!) 135/53, pulse 54, temperature 98.3 °F (36.8 °C), temperature source Oral, resp. rate 18, height 5' 1\" (1.549 m), weight 126 lb 1.7 oz (57.2 kg), SpO2 95 %, not currently breastfeeding. Neurological Exam:  Exam:  Constitutional    Vital signs: BP, HR, and RR reviewed   General Alert, no distress, well-nourished  Eyes: fundoscopic exam not visualized. Cardiovascular: pulses symmetric in all 4 extremities. No peripheral edema. Psychiatric: cooperative with examination, no  psychotic behavior noted. Neurologic  Mental status:   orientation to person and place and time    General fund of knowledge grossly intact   Memory grossly intact   Attention intact as able to attend well to the exam     Language fluent in conversation. Slight expressive aphasia and word finding difficulty. Comprehension intact; follows simple commands    Cranial nerves:   CN2: Pupils are equal round and reactive to light.    CN 3,4,6: extraocular muscles intact,  CN5: facial sensation symmetric   CN7:face symmetric without dysarthria  CN8: hearing grossly intact  CN9: palate elevated symmetrically  CN11: trap full strength on shoulder shrug  CN12: tongue midline with protrusion    Motor:  Normal bulk and tone throughout  No tremor/fasciculation  No cogwheeling/rigidity/bradykinesia  No prontator drift. 5/5 strength in B/L Deltoids, biceps, triceps, wrist flexion and extension, small muscles of hands  5/5 strength in bilateral hip flexion, knee flexion and extension, ankle dorsi and plantar flexion    Sensory:   Light touch intact in all 4 extremities. No sensory extinction on double simultaneous stimulation    Cerebellar/coordination: finger nose finger normal without ataxia     Gait:   Testing deferred for safety. She has a urinary catheter. Neuro imaging personally reviewed where indicated. Pertinent positives & negatives are addressed in Assessment & Plan section of note    Imaging:    CT HEAD WO CONTRAST   Final Result   No acute intracranial abnormality. Diffuse atrophic changes with findings suggesting chronic microvascular   ischemia               Laboratory Review: All results below personally reviewed.  Pertinent positives & negatives are addressed in Assessment & Plan section of note  Recent Results (from the past 36 hour(s))   POCT Glucose    Collection Time: 11/12/22 10:24 AM   Result Value Ref Range    POC Glucose 175 (H) 70 - 99 mg/dl    Performed on ACCU-CHEK    CBC with Auto Differential    Collection Time: 11/12/22 11:36 AM   Result Value Ref Range    WBC 7.9 4.0 - 11.0 K/uL    RBC 4.41 4.00 - 5.20 M/uL    Hemoglobin 13.2 12.0 - 16.0 g/dL    Hematocrit 39.4 36.0 - 48.0 %    MCV 89.3 80.0 - 100.0 fL    MCH 30.0 26.0 - 34.0 pg    MCHC 33.6 31.0 - 36.0 g/dL    RDW 13.6 12.4 - 15.4 %    Platelets 632 559 - 132 K/uL    MPV 8.0 5.0 - 10.5 fL    Neutrophils % 74.3 %    Lymphocytes % 11.4 %    Monocytes % 11.9 %    Eosinophils % 1.9 %    Basophils % 0.5 %    Neutrophils Absolute 5.8 1.7 - 7.7 K/uL Lymphocytes Absolute 0.9 (L) 1.0 - 5.1 K/uL    Monocytes Absolute 0.9 0.0 - 1.3 K/uL    Eosinophils Absolute 0.1 0.0 - 0.6 K/uL    Basophils Absolute 0.0 0.0 - 0.2 K/uL   Comprehensive Metabolic Panel    Collection Time: 11/12/22 11:36 AM   Result Value Ref Range    Sodium 126 (L) 136 - 145 mmol/L    Potassium 2.9 (LL) 3.5 - 5.1 mmol/L    Chloride 86 (L) 99 - 110 mmol/L    CO2 29 21 - 32 mmol/L    Anion Gap 11 3 - 16    Glucose 124 (H) 70 - 99 mg/dL    BUN 20 7 - 20 mg/dL    Creatinine 1.1 0.6 - 1.2 mg/dL    Est, Glom Filt Rate 52 (A) >60    Calcium 9.4 8.3 - 10.6 mg/dL    Total Protein 7.4 6.4 - 8.2 g/dL    Albumin 4.4 3.4 - 5.0 g/dL    Albumin/Globulin Ratio 1.5 1.1 - 2.2    Total Bilirubin 0.5 0.0 - 1.0 mg/dL    Alkaline Phosphatase 120 40 - 129 U/L    ALT 29 10 - 40 U/L    AST 46 (H) 15 - 37 U/L   Magnesium    Collection Time: 11/12/22 11:36 AM   Result Value Ref Range    Magnesium 2.20 1.80 - 2.40 mg/dL   Phosphorus    Collection Time: 11/12/22 11:36 AM   Result Value Ref Range    Phosphorus 2.7 2.5 - 4.9 mg/dL   Blood Gas, Venous    Collection Time: 11/12/22 11:36 AM   Result Value Ref Range    pH, Rahat 7.462 (H) 7.350 - 7.450    pCO2, Rahat 44.5 40.0 - 50.0 mmHg    pO2, Rahat 49 Not Established mmHg    HCO3, Venous 32 (H) 23 - 29 mmol/L    Base Excess, Rahat 7.0 Not Established mmol/L    O2 Sat, Rahat 82 Not Established %    Carboxyhemoglobin 1.4 %    MetHgb, Rahat 0.7 <1.5 %    TC02 (Calc), Rahat 33 Not Established mmol/L    O2 Therapy Unknown    Troponin    Collection Time: 11/12/22 11:36 AM   Result Value Ref Range    Troponin 0.02 (H) <0.01 ng/mL   EKG 12 Lead    Collection Time: 11/12/22  1:31 PM   Result Value Ref Range    Ventricular Rate 64 BPM    Atrial Rate 64 BPM    P-R Interval 134 ms    QRS Duration 162 ms    Q-T Interval 532 ms    QTc Calculation (Bazett) 548 ms    P Axis 49 degrees    R Axis 83 degrees    T Axis 52 degrees    Diagnosis       Atrial-sensed ventricular-paced rhythmAbnormal ECGWhen compared with ECG of 09-MAY-2022 17:02,Previous ECG has undetermined rhythm, needs review   Troponin    Collection Time: 11/12/22  2:23 PM   Result Value Ref Range    Troponin 0.02 (H) <0.01 ng/mL   Ethanol    Collection Time: 11/12/22  2:23 PM   Result Value Ref Range    Ethanol Lvl None Detected mg/dL   Troponin    Collection Time: 11/12/22  7:01 PM   Result Value Ref Range    Troponin <0.01 <0.01 ng/mL   Osmolality    Collection Time: 11/12/22  7:01 PM   Result Value Ref Range    Osmolality 275 (L) 280 - 301 mOsm/kg   TSH with Reflex to FT4    Collection Time: 11/12/22  7:01 PM   Result Value Ref Range    TSH Reflex FT4 0.68 0.27 - 4.20 uIU/mL   Troponin    Collection Time: 11/12/22 10:43 PM   Result Value Ref Range    Troponin <0.01 <0.01 ng/mL   Osmolality, Urine    Collection Time: 11/13/22  1:00 AM   Result Value Ref Range    Osmolality, Ur 160 (L) 390 - 1070 mOsm/kg   Sodium, Urine, Random    Collection Time: 11/13/22  1:00 AM   Result Value Ref Range    Sodium, Ur <20 Not Established mmol/L   Basic Metabolic Panel w/ Reflex to MG    Collection Time: 11/13/22  6:41 AM   Result Value Ref Range    Sodium 135 (L) 136 - 145 mmol/L    Potassium reflex Magnesium 2.9 (LL) 3.5 - 5.1 mmol/L    Chloride 97 (L) 99 - 110 mmol/L    CO2 27 21 - 32 mmol/L    Anion Gap 11 3 - 16    Glucose 81 70 - 99 mg/dL    BUN 13 7 - 20 mg/dL    Creatinine 0.9 0.6 - 1.2 mg/dL    Est, Glom Filt Rate >60 >60    Calcium 9.5 8.3 - 10.6 mg/dL   Magnesium    Collection Time: 11/13/22  6:41 AM   Result Value Ref Range    Magnesium 2.40 1.80 - 2.40 mg/dL       Scheduled Meds:   potassium chloride  40 mEq Oral TID    potassium chloride  10 mEq IntraVENous Once    potassium chloride  10 mEq IntraVENous Once    amiodarone  100 mg Oral Daily    DULoxetine  60 mg Oral Daily    levothyroxine  88 mcg Oral QAM AC    metoprolol succinate  50 mg Oral Daily    pantoprazole  40 mg Oral QAM AC    rosuvastatin  40 mg Oral Nightly    sodium chloride flush  5-40 mL IntraVENous 2 times per day    enoxaparin  40 mg SubCUTAneous Nightly    lidocaine  1 patch TransDERmal Daily       Continuous Infusions:  sodium chloride           ASSESSMENT & RECOMMENDATIONS:     Altered mental status  Seizure  Rule out new stroke  Hypertension  Atrial fibrillation not on anticoagulation, due to GI bleeding. Hypokalemia  Hyperlipidemia  Hypothyroidism  Tobacco abuse  Chronic opiate use. Loss of consciousness and shaking followed by what appears to be a postictal state. Concern for seizure. Routine EEG is ordered. Still has slight expressive aphasia but this is not new, and there are no other neurological deficits. Must rule out acute CVA. Perhaps there is recrudescence of prior stroke in the setting of concomitant electrolyte abnormalities. Also concern for opiate overdose. Recommend MRI brain without contrast.  Routine EEG. No AED unless this is abnormal, as there have not been other events concerning for seizure in the past.    Remainder of neurological work-up based on results of above studies. Neurology will follow. A copy of this note was provided for Dr Maryjane Herman MD     Electronically signed by:    Shamar Holguin.  Gino Thomas MD  Consultant Neurologist  [address] [place] [address] [address] [facility] Neuroscience  [phone]     11/13/2022,1:32 PM

## 2022-11-14 ENCOUNTER — APPOINTMENT (OUTPATIENT)
Dept: CT IMAGING | Age: 77
DRG: 689 | End: 2022-11-14
Payer: MEDICARE

## 2022-11-14 LAB
ANION GAP SERPL CALCULATED.3IONS-SCNC: 11 MMOL/L (ref 3–16)
BACTERIA: ABNORMAL /HPF
BILIRUBIN URINE: NEGATIVE
BLOOD, URINE: ABNORMAL
BUN BLDV-MCNC: 14 MG/DL (ref 7–20)
CALCIUM SERPL-MCNC: 9.2 MG/DL (ref 8.3–10.6)
CHLORIDE BLD-SCNC: 104 MMOL/L (ref 99–110)
CLARITY: ABNORMAL
CO2: 23 MMOL/L (ref 21–32)
COLOR: YELLOW
CREAT SERPL-MCNC: 0.9 MG/DL (ref 0.6–1.2)
EPITHELIAL CELLS, UA: 1 /HPF (ref 0–5)
GFR SERPL CREATININE-BSD FRML MDRD: >60 ML/MIN/{1.73_M2}
GLUCOSE BLD-MCNC: 97 MG/DL (ref 70–99)
GLUCOSE URINE: NEGATIVE MG/DL
HYALINE CASTS: 1 /LPF (ref 0–8)
KETONES, URINE: NEGATIVE MG/DL
LEUKOCYTE ESTERASE, URINE: ABNORMAL
LV EF: 55 %
LVEF MODALITY: NORMAL
MICROSCOPIC EXAMINATION: YES
NITRITE, URINE: POSITIVE
PH UA: 7.5 (ref 5–8)
POTASSIUM REFLEX MAGNESIUM: 4.7 MMOL/L (ref 3.5–5.1)
PROTEIN UA: 30 MG/DL
RBC UA: 18 /HPF (ref 0–4)
SODIUM BLD-SCNC: 138 MMOL/L (ref 136–145)
SPECIFIC GRAVITY UA: 1.02 (ref 1–1.03)
URINE REFLEX TO CULTURE: YES
URINE TYPE: ABNORMAL
UROBILINOGEN, URINE: 1 E.U./DL
WBC UA: 315 /HPF (ref 0–5)

## 2022-11-14 PROCEDURE — 6370000000 HC RX 637 (ALT 250 FOR IP): Performed by: INTERNAL MEDICINE

## 2022-11-14 PROCEDURE — 97530 THERAPEUTIC ACTIVITIES: CPT

## 2022-11-14 PROCEDURE — 93306 TTE W/DOPPLER COMPLETE: CPT

## 2022-11-14 PROCEDURE — 71250 CT THORAX DX C-: CPT

## 2022-11-14 PROCEDURE — 6360000002 HC RX W HCPCS: Performed by: INTERNAL MEDICINE

## 2022-11-14 PROCEDURE — 2060000000 HC ICU INTERMEDIATE R&B

## 2022-11-14 PROCEDURE — 81001 URINALYSIS AUTO W/SCOPE: CPT

## 2022-11-14 PROCEDURE — 99233 SBSQ HOSP IP/OBS HIGH 50: CPT | Performed by: INTERNAL MEDICINE

## 2022-11-14 PROCEDURE — 99232 SBSQ HOSP IP/OBS MODERATE 35: CPT | Performed by: INTERNAL MEDICINE

## 2022-11-14 PROCEDURE — 36415 COLL VENOUS BLD VENIPUNCTURE: CPT

## 2022-11-14 PROCEDURE — 93880 EXTRACRANIAL BILAT STUDY: CPT

## 2022-11-14 PROCEDURE — 97165 OT EVAL LOW COMPLEX 30 MIN: CPT

## 2022-11-14 PROCEDURE — 87186 SC STD MICRODIL/AGAR DIL: CPT

## 2022-11-14 PROCEDURE — 80048 BASIC METABOLIC PNL TOTAL CA: CPT

## 2022-11-14 PROCEDURE — 2580000003 HC RX 258: Performed by: INTERNAL MEDICINE

## 2022-11-14 PROCEDURE — 87077 CULTURE AEROBIC IDENTIFY: CPT

## 2022-11-14 PROCEDURE — 87086 URINE CULTURE/COLONY COUNT: CPT

## 2022-11-14 RX ORDER — CEFUROXIME AXETIL 250 MG/1
250 TABLET ORAL EVERY 12 HOURS SCHEDULED
Status: DISCONTINUED | OUTPATIENT
Start: 2022-11-14 | End: 2022-11-16 | Stop reason: HOSPADM

## 2022-11-14 RX ADMIN — OXYCODONE HYDROCHLORIDE 10 MG: 10 TABLET ORAL at 02:45

## 2022-11-14 RX ADMIN — CEFUROXIME AXETIL 250 MG: 250 TABLET ORAL at 21:28

## 2022-11-14 RX ADMIN — METOPROLOL SUCCINATE 50 MG: 50 TABLET, EXTENDED RELEASE ORAL at 10:42

## 2022-11-14 RX ADMIN — AMIODARONE HYDROCHLORIDE 100 MG: 200 TABLET ORAL at 10:41

## 2022-11-14 RX ADMIN — DULOXETINE HYDROCHLORIDE 60 MG: 60 CAPSULE, DELAYED RELEASE ORAL at 10:42

## 2022-11-14 RX ADMIN — OXYCODONE HYDROCHLORIDE 10 MG: 10 TABLET ORAL at 22:58

## 2022-11-14 RX ADMIN — PANTOPRAZOLE SODIUM 40 MG: 40 TABLET, DELAYED RELEASE ORAL at 06:02

## 2022-11-14 RX ADMIN — ROSUVASTATIN CALCIUM 40 MG: 40 TABLET, FILM COATED ORAL at 21:28

## 2022-11-14 RX ADMIN — SODIUM CHLORIDE, PRESERVATIVE FREE 10 ML: 5 INJECTION INTRAVENOUS at 10:47

## 2022-11-14 RX ADMIN — OXYCODONE HYDROCHLORIDE 10 MG: 10 TABLET ORAL at 12:44

## 2022-11-14 RX ADMIN — SODIUM CHLORIDE, PRESERVATIVE FREE 10 ML: 5 INJECTION INTRAVENOUS at 21:28

## 2022-11-14 RX ADMIN — LEVOTHYROXINE SODIUM 88 MCG: 0.09 TABLET ORAL at 06:02

## 2022-11-14 RX ADMIN — ENOXAPARIN SODIUM 40 MG: 100 INJECTION SUBCUTANEOUS at 21:28

## 2022-11-14 ASSESSMENT — PAIN DESCRIPTION - LOCATION
LOCATION: BACK

## 2022-11-14 ASSESSMENT — PAIN SCALES - GENERAL
PAINLEVEL_OUTOF10: 7
PAINLEVEL_OUTOF10: 7
PAINLEVEL_OUTOF10: 5
PAINLEVEL_OUTOF10: 5
PAINLEVEL_OUTOF10: 6

## 2022-11-14 ASSESSMENT — PAIN SCALES - WONG BAKER
WONGBAKER_NUMERICALRESPONSE: 0

## 2022-11-14 ASSESSMENT — PAIN DESCRIPTION - FREQUENCY
FREQUENCY: INTERMITTENT
FREQUENCY: INTERMITTENT

## 2022-11-14 ASSESSMENT — PAIN - FUNCTIONAL ASSESSMENT
PAIN_FUNCTIONAL_ASSESSMENT: ACTIVITIES ARE NOT PREVENTED
PAIN_FUNCTIONAL_ASSESSMENT: ACTIVITIES ARE NOT PREVENTED

## 2022-11-14 ASSESSMENT — PAIN DESCRIPTION - ORIENTATION
ORIENTATION: LOWER
ORIENTATION: LOWER

## 2022-11-14 ASSESSMENT — PAIN DESCRIPTION - PAIN TYPE
TYPE: CHRONIC PAIN
TYPE: CHRONIC PAIN

## 2022-11-14 ASSESSMENT — PAIN DESCRIPTION - DESCRIPTORS
DESCRIPTORS: ACHING;DISCOMFORT
DESCRIPTORS: DISCOMFORT

## 2022-11-14 NOTE — PROGRESS NOTES
[address]   Daily Progress Note      Admit Date:  11/12/2022      Subjective:   Ms. Kishor Tucker is a 68 y.o. female with a past medical history significant for with a past medical history significant for atrial fibrillation, pulmonary hypertension, aortic stenosis and CAD s/p CHILANGO to mid circ 5/2014. She is s/p AVR, PVI and RENETTA exclusion by Dr Ousmane Santana on 6/16. An inpatient echocardiogram was completed revealing moderate to severe MR. Subsequently, a CAMERON was completed on 7/27/20 showing moderate MR. She underwent implant of Bi-V pacemaker with Dr. Magaly Petersen on 4/11/22. She had recurrence of atrial fibrillation noted on interrogation and underwent ablation on 5/9/22. I was asked to see her for syncope. Yesterday, per the granddaughter, Kamaljit Morales was having difficulty with word finding for things like \"coffee and toast\". She went back to her room and Shonda's daughter heard her scream. She found her on the floor unresponsive so she called 911. He respirations were poor but she did have painful response with moaning to several attempted chest compressions. Today, Kamaljit Morales feels like her back and hip are hurting. She says she still has a little issue still with word finding. She has had no chest pain, dyspnea or tightness. Interval History:  Kamaljit Morales reports that she is feeling better today. She is much more with that and able to find her words. She denies any chest pain or shortness of breath.       Objective:     /71   Pulse 67   Temp 97.8 °F (36.6 °C) (Oral)   Resp 16   Ht 5' 1\" (1.549 m)   Wt 123 lb (55.8 kg)   SpO2 97%   BMI 23.24 kg/m²      Intake/Output Summary (Last 24 hours) at 11/14/2022 1331  Last data filed at 11/14/2022 1241  Gross per 24 hour   Intake 360 ml   Output 800 ml   Net -440 ml       Physical Exam:  General:  Awake, alert, NAD  Skin:  Warm and dry  Neck:  JVD<8, no carotid bruits  Chest:  Clear to auscultation, no wheezes/rhonchi/rales  Cardiovascular:  RRR, normal S1/S2, no M/R/G  Abdomen:  Soft, nontender, +bowel sounds  Extremities:  no edema  Pulses: 2+ bilat carotid    2+ bilat radial    2+ bilat femoral        Medications:    potassium chloride  10 mEq IntraVENous Once    potassium chloride  10 mEq IntraVENous Once    amiodarone  100 mg Oral Daily    DULoxetine  60 mg Oral Daily    levothyroxine  88 mcg Oral QAM AC    metoprolol succinate  50 mg Oral Daily    pantoprazole  40 mg Oral QAM AC    rosuvastatin  40 mg Oral Nightly    sodium chloride flush  5-40 mL IntraVENous 2 times per day    enoxaparin  40 mg SubCUTAneous Nightly    lidocaine  1 patch TransDERmal Daily      sodium chloride         Lab Data:  CBC:   Recent Labs     11/12/22  1136   WBC 7.9   HGB 13.2        BMP:    Recent Labs     11/12/22  1136 11/13/22  0641 11/14/22  0503   * 135* 138   K 2.9* 2.9* 4.7   CO2 29 27 23   BUN 20 13 14   CREATININE 1.1 0.9 0.9     LIVR:   Recent Labs     11/12/22  1136   AST 46*   ALT 29     PT/INR:   Recent Labs     11/12/22  1136   PROT 7.4     Recent Labs     11/12/22  1423 11/12/22  1901 11/12/22  2243   TROPONINI 0.02* <0.01 <0.01     FASTING LIPID PANEL:  Lab Results   Component Value Date/Time    CHOL 137 07/20/2022 01:08 PM    HDL 53 07/20/2022 01:08 PM    HDL 42 06/21/2010 10:02 PM    TRIG 86 07/20/2022 01:08 PM       TTE 11/14/22:  Left ventricular cavity size is normal.  Left ventricular function is low normal with ejection fraction estimated at 50-60%. Abnormal septal motion is present. Diastolic filling parameters suggest grade III diastolic dysfunction. Thickening of leaflets of mitral valve. Mitral annular calcification is present. Moderate mitral regurgitation is present. .  A bioprosthetic aortic valve appears well seated with a maximum velocity of 3.01m/s and a mean gradient of 19mmHg. Trivial aortic regurgitation. The right ventricle is normal in size and function. Assessment / Plan:    1)Syncope  I would continue with the neurologic work-up. I agree with the brain MRI. She had symptoms of expressive aphasia prior to this episode. She is off anticoagulation with her a-fib due to GI bleeding but had a RENETTA closure during her open heart surgery. 2)Chronic Systolic CHF,class 3  She is euvolemic on exam. Okay to hold diuretics as it's possible she is a little dry. Continue beta blockade but okay to hold ACEI/ARB therapy temporarily. She has done very well since her BiV/AICD was implanted.    3)Hypokalemia  Corrected.

## 2022-11-14 NOTE — PROGRESS NOTES
Occupational Therapy  Facility/Department: [address] PROGRESSIVE CARE  Occupational Therapy Initial Assessment and Tentative D/C      Name: Gabby Boyer  : 1945  MRN: [id number]  Date of Service: 2022    Discharge Recommendations: Gabby Boyer scored a 21/24 on the AM-PAC ADL Inpatient form. Current research shows that an AM-PAC score of 18 or greater is typically associated with a discharge to the patient's home setting. If patient discharges prior to next session this note will serve as a discharge summary. Please see below for the latest assessment towards goals. Continue to assess pending progress, Home with assist PRN  OT Equipment Recommendations  Equipment Needed: No       Patient Diagnosis(es): The primary encounter diagnosis was Syncope and collapse. Diagnoses of Acute hyperactive delirium due to another medical condition, Chronic prescription opiate use, Opiate withdrawal ([address] Hypokalemia, Hyponatremia, Prolonged Q-T interval on ECG, and Altered mental status, unspecified altered mental status type were also pertinent to this visit. Past Medical History:  has a past medical history of Atrial fibrillation ([address] AVM (arteriovenous malformation) of duodenum, AVM (arteriovenous malformation) of stomach, Bladder cancer ([address] CAD (coronary artery disease), Carotid stenosis, Chronic back pain, Chronic diastolic CHF (congestive heart failure) ([address] Chronic prescription opiate use, COPD ([address] Diverticulosis, HTN (hypertension), Hyperlipidemia, Hypothyroidism, Ischemic stroke, Lumbar spine stenosis, Macular degeneration, Nonrheumatic mitral valve regurgitation, Obstructive sleep apnea, Osteoarthritis, Peripheral neuropathy, Pulmonary HTN ([address] PVD (peripheral vascular disease) ([address] and Tobacco use disorder in remission. Past Surgical History:  has a past surgical history that includes Cystoscopy (2014); joint replacement; Appendectomy; Aortic valve replacement (6/16/15);  Upper gastrointestinal endoscopy (12/15/2017); other surgical history (02/20/2018); Coronary angioplasty (2014); Spine surgery (N/A, 3/15/2019); Cholecystectomy, laparoscopic (N/A, 4/25/2019); back surgery (03/15/2019); Pain management procedure (Bilateral, 4/1/2021); Pain management procedure (Bilateral, 7/8/2021); Pain management procedure (Bilateral, 9/9/2021); ablation of dysrhythmic focus (05/09/2022); and Pacemaker insertion (04/11/2022). Assessment   Performance deficits / Impairments: Decreased functional mobility ; Decreased endurance;Decreased balance;Decreased high-level IADLs;Decreased ADL status  Assessment: 69 yo female with CAD, sp avr and sp watchman and PAF and Pacer for history symptomatic rhonda done 4/22 who had a syncopal episode yesterday. She yelled out for her daughter and was found on the floor. She is orthostatic per her nurse's check this morning. She also reports that she feels like she is in a dream.  She is oriented but doesn't feel normal.  Her daughter reports that on the morning before she fell she had some confusion and expressive aphasia. She has remained av paced on the monitor. She had a ct of the head in the ER which showed diffuse atrophic changes and chronic wmd changes. MRI is ordered not sure her pacer is compatible. PTA pt from home with family where pt was Ind with mobility and ADLs. Pt currently requires SBA and use of RW for mobility and transfers. Pt with no LOB. No reports of light headedness or dizziness throughout. Orthostatic BP taken and found to be supine 149/62 (88), 149/67 (90), 128/65 (84). Anticipate pt needing up to SBA/Min A for ADLs. No noted UE strength, coordination, or sensation deficits. Pt will benefit from skilled OT services at this time. Anticipate pt safe to return home at time of D/C.   Prognosis: Good  Decision Making: Low Complexity  Exam: see above  Assistance / Modification: RW  REQUIRES OT FOLLOW-UP: Yes  Activity Tolerance  Activity Tolerance: Patient Tolerated treatment well  Activity Tolerance Comments: orthostatic BPs: supine 149/62 (88), 149/67 (90), 128/65 (84) no reports of light headedness/dizziness throughout        Plan   Occupational Therapy Plan  Times Per Week: 3-5x  Current Treatment Recommendations: Strengthening, Balance training, Functional mobility training, Endurance training, Self-Care / ADL, Patient/Caregiver education & training, Safety education & training     Restrictions  Restrictions/Precautions  Restrictions/Precautions: Fall Risk  Position Activity Restriction  Other position/activity restrictions: RA    Subjective   General  Chart Reviewed: Yes  Patient assessed for rehabilitation services?: Yes  Additional Pertinent Hx: per MD note, 69 yo female with CAD, sp avr and sp watchman and PAF and Pacer for history symptomatic rhonda done 4/22 who had a syncopal episode yesterday. She yelled out for her daughter and was found on the floor. She is orthostatic per her nurse's check this morning. She also reports that she feels like she is in a dream.  She is oriented but doesn't feel normal.  Her daughter reports that on the morning before she fell she had some confusion and expressive aphasia. She has remained av paced on the monitor. She had a ct of the head in the ER which showed diffuse atrophic changes and chronic wmd changes. MRI is ordered not sure her pacer is compatible. Family / Caregiver Present: No  Referring Practitioner: Regulo Cruz MD  Subjective  Subjective: Pt agreeable to OT evaluation. Pt reports generalized pain rating 4/10.  Pt reports no dizziness at rest.     Social/Functional History  Social/Functional History  Lives With: Family (Dtr, Grandson.)  Type of Home: House  Home Layout: Two level, Able to Live on Main level with bedroom/bathroom, Laundry in basement  Home Access: Stairs to enter without rails (1+1 steps to enter.)  Bathroom Shower/Tub: Walk-in shower  Bathroom Toilet: Handicap height  Bathroom Equipment: Grab bars in shower, Shower chair  Bathroom Accessibility: Accessible  Home Equipment: Wilhelmenia Di, rolling, Cane  ADL Assistance: Independent  Homemaking Assistance:  (Shared with family.)  Ambulation Assistance: Independent (Without assist device pta.)  Transfer Assistance: Independent  Active : No (Relies on family.)  Occupation: Retired  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 Additional Comments: Dtr Natasha Duane) and Carmencita Miller) both work outside of the home. Objective     Safety Devices  Type of Devices: Call light within reach; Chair alarm in place; Left in chair;Nurse notified;Gait belt  Restraints  Restraints Initially in Place: No  Bed Mobility Training  Bed Mobility Training: Yes  Overall Level of Assistance: Supervision  Supine to Sit: Supervision  Scooting: Supervision  Balance  Sitting: Intact  Standing: Impaired (RW)  Standing - Static: Good  Standing - Dynamic: Fair  Transfer Training  Transfer Training: Yes  Overall Level of Assistance: Stand-by assistance (RW)  Sit to Stand: Stand-by assistance (RW)  Stand to Sit: Stand-by assistance  Bed to Chair: Stand-by assistance (RW)  Toilet Transfer: Stand-by assistance (RW; grab bar)  Gait  Overall Level of Assistance: Stand-by assistance (no reports of dizziness/light headedness; no LOB)  Distance (ft): 25 Feet (25ft x2; 50ft)  Assistive Device: Walker, rolling     AROM: Within functional limits  PROM: Within functional limits  Strength: Within functional limits  Coordination: Within functional limits  Tone: Normal  Sensation: Intact  ADL  Additional Comments: Anticipate pt needing up to SBA/Min A for ADLs based on ROM, strength, and balance              Vision  Vision: Within Functional Limits  Hearing  Hearing: Within functional limits  Cognition  Overall Cognitive Status: Exceptions  Arousal/Alertness: Appropriate responses to stimuli  Following Commands:  Follows all commands without difficulty  Attention Span: Appears intact  Memory: Decreased recall of recent events  Initiation: Does not require cues  Sequencing: Does not require cues  Orientation  Overall Orientation Status: Within Functional Limits  Orientation Level: Oriented X4                  Education Given To: Patient  Education Provided: Role of Therapy;Plan of Care;Transfer Training  Education Method: Demonstration;Verbal  Barriers to Learning: Cognition  Education Outcome: Verbalized understanding;Demonstrated understanding;Continued education needed          AM-PAC Score        AM-PAC Inpatient Daily Activity Raw Score: 21 (11/14/22 0741)  AM-PAC Inpatient ADL T-Scale Score : 44.27 (11/14/22 0741)  ADL Inpatient CMS 0-100% Score: 32.79 (11/14/22 0741)  ADL Inpatient CMS G-Code Modifier : CJ (11/14/22 0741)    Tinneti Score       Goals  Short Term Goals  Time Frame for Short Term Goals: prior to D/C  Short Term Goal 1: complete functional mobility and transfers Ind  Short Term Goal 2: complete bathing and dressing Ind  Short Term Goal 3: complete toileting Ind  Short Term Goal 4: complete grooming in stance at sink Ind  Long Term Goals  Time Frame for Long Term Goals : STG=LTG  Patient Goals   Patient goals : get better       Therapy Time   Individual Concurrent Group Co-treatment   Time In 0705         Time Out 0743         Minutes 38         Timed Code Treatment Minutes: 23 Minutes (15 minute eval)       ALEJANDRO Alexis/L

## 2022-11-15 ENCOUNTER — APPOINTMENT (OUTPATIENT)
Dept: MRI IMAGING | Age: 77
DRG: 689 | End: 2022-11-15
Payer: MEDICARE

## 2022-11-15 PROCEDURE — 6370000000 HC RX 637 (ALT 250 FOR IP): Performed by: INTERNAL MEDICINE

## 2022-11-15 PROCEDURE — 6360000002 HC RX W HCPCS: Performed by: INTERNAL MEDICINE

## 2022-11-15 PROCEDURE — 92610 EVALUATE SWALLOWING FUNCTION: CPT

## 2022-11-15 PROCEDURE — 97110 THERAPEUTIC EXERCISES: CPT

## 2022-11-15 PROCEDURE — 97116 GAIT TRAINING THERAPY: CPT

## 2022-11-15 PROCEDURE — 70551 MRI BRAIN STEM W/O DYE: CPT

## 2022-11-15 PROCEDURE — 97530 THERAPEUTIC ACTIVITIES: CPT

## 2022-11-15 PROCEDURE — 99232 SBSQ HOSP IP/OBS MODERATE 35: CPT | Performed by: INTERNAL MEDICINE

## 2022-11-15 PROCEDURE — 2580000003 HC RX 258: Performed by: INTERNAL MEDICINE

## 2022-11-15 PROCEDURE — 99233 SBSQ HOSP IP/OBS HIGH 50: CPT | Performed by: INTERNAL MEDICINE

## 2022-11-15 PROCEDURE — 95819 EEG AWAKE AND ASLEEP: CPT

## 2022-11-15 PROCEDURE — 92523 SPEECH SOUND LANG COMPREHEN: CPT

## 2022-11-15 PROCEDURE — 2060000000 HC ICU INTERMEDIATE R&B

## 2022-11-15 RX ORDER — OXYCODONE HYDROCHLORIDE 10 MG/1
10 TABLET ORAL EVERY 4 HOURS
Status: DISCONTINUED | OUTPATIENT
Start: 2022-11-15 | End: 2022-11-16 | Stop reason: HOSPADM

## 2022-11-15 RX ORDER — LIDOCAINE 4 G/G
1 PATCH TOPICAL DAILY
Qty: 30 EACH | Refills: 3 | Status: SHIPPED | OUTPATIENT
Start: 2022-11-16

## 2022-11-15 RX ADMIN — AMIODARONE HYDROCHLORIDE 100 MG: 200 TABLET ORAL at 08:44

## 2022-11-15 RX ADMIN — OXYCODONE HYDROCHLORIDE 10 MG: 10 TABLET ORAL at 14:39

## 2022-11-15 RX ADMIN — OXYCODONE HYDROCHLORIDE 10 MG: 10 TABLET ORAL at 18:52

## 2022-11-15 RX ADMIN — CEFUROXIME AXETIL 250 MG: 250 TABLET ORAL at 08:43

## 2022-11-15 RX ADMIN — METOPROLOL SUCCINATE 50 MG: 50 TABLET, EXTENDED RELEASE ORAL at 08:43

## 2022-11-15 RX ADMIN — PANTOPRAZOLE SODIUM 40 MG: 40 TABLET, DELAYED RELEASE ORAL at 06:33

## 2022-11-15 RX ADMIN — CEFUROXIME AXETIL 250 MG: 250 TABLET ORAL at 20:39

## 2022-11-15 RX ADMIN — POLYETHYLENE GLYCOL 3350 17 G: 17 POWDER, FOR SOLUTION ORAL at 20:50

## 2022-11-15 RX ADMIN — DULOXETINE HYDROCHLORIDE 60 MG: 60 CAPSULE, DELAYED RELEASE ORAL at 08:43

## 2022-11-15 RX ADMIN — SODIUM CHLORIDE, PRESERVATIVE FREE 10 ML: 5 INJECTION INTRAVENOUS at 08:58

## 2022-11-15 RX ADMIN — ROSUVASTATIN CALCIUM 40 MG: 40 TABLET, FILM COATED ORAL at 20:39

## 2022-11-15 RX ADMIN — OXYCODONE HYDROCHLORIDE 10 MG: 10 TABLET ORAL at 22:36

## 2022-11-15 RX ADMIN — ENOXAPARIN SODIUM 40 MG: 100 INJECTION SUBCUTANEOUS at 20:39

## 2022-11-15 RX ADMIN — SODIUM CHLORIDE, PRESERVATIVE FREE 10 ML: 5 INJECTION INTRAVENOUS at 20:39

## 2022-11-15 RX ADMIN — LEVOTHYROXINE SODIUM 88 MCG: 0.09 TABLET ORAL at 06:33

## 2022-11-15 ASSESSMENT — PAIN DESCRIPTION - DESCRIPTORS
DESCRIPTORS: SHARP
DESCRIPTORS: ACHING;DISCOMFORT

## 2022-11-15 ASSESSMENT — PAIN SCALES - GENERAL
PAINLEVEL_OUTOF10: 6
PAINLEVEL_OUTOF10: 5

## 2022-11-15 ASSESSMENT — PAIN DESCRIPTION - LOCATION
LOCATION: BACK

## 2022-11-15 ASSESSMENT — PAIN DESCRIPTION - ORIENTATION
ORIENTATION: LOWER
ORIENTATION: MID

## 2022-11-15 ASSESSMENT — PAIN DESCRIPTION - PAIN TYPE: TYPE: CHRONIC PAIN

## 2022-11-15 NOTE — PROGRESS NOTES
Progress Note    Updates  Doing well this morning. Feels back to baseline.       Active Ambulatory Problems     Diagnosis Date Noted    Hypothyroidism     Essential hypertension     Hyperlipidemia LDL goal <70     Paroxysmal atrial fibrillation (HCC)     Cerebral infarction ([address]     Arthritis     Bladder tumor 02/11/2014    Pulmonary emphysema ([address] 04/28/2014    Smoker 04/28/2014    Closed sacral fracture ([address] 04/28/2014    Stenosis of right carotid artery     Intermittent claudication ([address] 04/30/2014    Adjustment reaction with anxiety 05/01/2014    Symptomatic bradycardia 05/03/2014    Pulmonary HTN ([address]     Sacral fracture, closed ([address]     Acute on chronic diastolic heart failure ([address]     Left hand weakness 06/17/2015    S/P AVR (aortic valve replacement)     Leg edema 06/22/2015    Abdominal aortic aneurysm 12/20/2010    Acute respiratory failure with hypoxia ([address] 11/27/2015    CAP (community acquired pneumonia) 11/27/2015    COPD exacerbation ([address] 11/27/2015    Dyspnea and respiratory abnormalities 11/27/2015    Hemoptysis 11/27/2015    Chronic obstructive pulmonary disease ([address]     Coronary artery disease involving native coronary artery of native heart without angina pectoris     PAF (paroxysmal atrial fibrillation) ([address] 07/30/2016    Nonrheumatic mitral valve regurgitation     Lumbar stenosis 08/01/2016    Chronic bilateral low back pain with bilateral sciatica     Acute metabolic encephalopathy 83/89/6342    Hypothyroidism due to Hashimoto's thyroiditis     Chronic systolic CHF (congestive heart failure), NYHA class 2 (HCC)     ANITA (acute kidney injury) ([address]     Pulmonary nodule     Orthostatic hypotension     Bright red rectal bleeding 02/18/2018    Acute blood loss anemia 02/18/2018    Rectal bleeding 02/18/2018    Sinus bradycardia     Gastrointestinal hemorrhage     Macular degeneration 01/01/2018    Thoracic spine pain 07/06/2020    Anginal equivalent ([address] 04/26/2021    Atrial fibrillation with RVR ([facility] [place] [facility] 10/26/2021    History of GI bleed 35/55/5135    Basilic vein thrombosis 83/28/6432    Pressure injury of buttock, stage 2, unspecified laterality ([facility] [place] [facility] 01/24/2022    Stage 3a chronic kidney disease ([facility] [place] 01/24/2022    Obstructive sleep apnea     Biventricular cardiac pacemaker in situ 04/11/2022    Cellulitis 07/07/2022    Hypokalemia 07/08/2022     Past Medical History:   Diagnosis Date    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    Bladder cancer ([facility] [place] [facility] 02/2014    Carotid stenosis 04/30/2014    Chronic back pain     Chronic prescription opiate use     Diverticulosis     HTN (hypertension)     Hyperlipidemia     Ischemic stroke 2013    Lumbar spine stenosis 2017    Osteoarthritis     Peripheral neuropathy     PVD (peripheral vascular disease) (HCC)     Tobacco use disorder in remission      Current Facility-Administered Medications:     oxyCODONE HCl (OXY-IR) immediate release tablet 10 mg, 10 mg, Oral, Q4H, Maryjane Herman MD    cefUROXime (CEFTIN) tablet 250 mg, 250 mg, Oral, 2 times per day, Paul Colin MD, 250 mg at 11/15/22 0843    potassium chloride 10 mEq/100 mL IVPB (Peripheral Line), 10 mEq, IntraVENous, Once, Hernan Lucas MD    potassium chloride 10 mEq/100 mL IVPB (Peripheral Line), 10 mEq, IntraVENous, Once, Hernan Lucas MD    amiodarone (CORDARONE) tablet 100 mg, 100 mg, Oral, Daily, Anirudh Chong MD, 100 mg at 11/15/22 0844    DULoxetine (CYMBALTA) extended release capsule 60 mg, 60 mg, Oral, Daily, Anirudh Chong MD, 60 mg at 11/15/22 0843    levothyroxine (SYNTHROID) tablet 88 mcg, 88 mcg, Oral, SANGEETA TOLLIVER, Anirudh Chong MD, 88 mcg at 11/15/22 4641    metoprolol succinate (TOPROL XL) extended release tablet 50 mg, 50 mg, Oral, Daily, Anirudh Chong MD, 50 mg at 11/15/22 0843    pantoprazole (PROTONIX) tablet 40 mg, 40 mg, Oral, SANGEETA TOLLIVER, Thomas Gibbons Lia England MD, 40 mg at 11/15/22 1182    rosuvastatin (CRESTOR) tablet 40 mg, 40 mg, Oral, Nightly, Nick Porter MD, 40 mg at 11/14/22 2128    sodium chloride flush 0.9 % injection 5-40 mL, 5-40 mL, IntraVENous, 2 times per day, Nick Porter MD, 10 mL at 11/15/22 0858    sodium chloride flush 0.9 % injection 5-40 mL, 5-40 mL, IntraVENous, PRN, Nick Porter MD    0.9 % sodium chloride infusion, , IntraVENous, PRN, Nick Porter MD    enoxaparin (LOVENOX) injection 40 mg, 40 mg, SubCUTAneous, Nightly, Nick Porter MD, 40 mg at 11/14/22 2128    ondansetron (ZOFRAN-ODT) disintegrating tablet 4 mg, 4 mg, Oral, Q8H PRN **OR** ondansetron (ZOFRAN) injection 4 mg, 4 mg, IntraVENous, Q6H PRN, Nick Porter MD    polyethylene glycol (GLYCOLAX) packet 17 g, 17 g, Oral, Daily PRN, Nick Porter MD    acetaminophen (TYLENOL) tablet 650 mg, 650 mg, Oral, Q6H PRN **OR** acetaminophen (TYLENOL) suppository 650 mg, 650 mg, Rectal, Q6H PRN, Nick Porter MD    potassium chloride 10 mEq/100 mL IVPB (Peripheral Line), 10 mEq, IntraVENous, PRN, Nick Porter MD    magnesium sulfate 2000 mg in 50 mL IVPB premix, 2,000 mg, IntraVENous, PRN, Nick Porter MD    lidocaine 4 % external patch 1 patch, 1 patch, TransDERmal, Daily, Nick Porter MD, 1 patch at 11/15/22 6084    Current Outpatient Medications on File Prior to Encounter   Medication Sig    aspirin 81 MG EC tablet Take 81 mg by mouth daily    valsartan (DIOVAN) 40 MG tablet Take 0.5 tablets by mouth daily    rosuvastatin (CRESTOR) 40 MG tablet TAKE ONE TABLET BY MOUTH EVERY EVENING    metoprolol succinate (TOPROL XL) 50 MG extended release tablet TAKE 1 TABLET BY MOUTH DAILY    ezetimibe (ZETIA) 10 MG tablet TAKE ONE TABLET BY MOUTH DAILY    pantoprazole (PROTONIX) 40 MG tablet One po qd    sennosides-docusate sodium (SENOKOT-S) 8.6-50 MG tablet Take 1 tablet by mouth in the morning and at bedtime    torsemide (DEMADEX) 20 MG tablet TAKE 1 TABLET BY MOUTH EVERY MORNING. TAKE EXTRA PILL ON DAYS WEIGHT INCREASES BY 2+ LBS    tiZANidine (ZANAFLEX) 4 MG capsule TAKE ONE-HALF TABLET BY MOUTH EVERY 8 HOURS AS NEEDED (MUSCLE SPASM)    levothyroxine (SYNTHROID) 88 MCG tablet TAKE 1 TABLET BY MOUTH EVERY DAY    potassium chloride (KLOR-CON M) 20 MEQ TBCR extended release tablet Take 1 tablet by mouth 2 times daily    amiodarone (CORDARONE) 200 MG tablet Take 0.5 tablets by mouth in the morning. ipratropium (ATROVENT) 0.03 % nasal spray INHALE 2 DOSES INTO EACH NOSTRIL THREE TIMES A DAY IF NEEDED FOR RHINITIS    DULoxetine (CYMBALTA) 60 MG extended release capsule Take 60 mg by mouth daily    diclofenac sodium (VOLTAREN) 1 % GEL Apply 2 g topically daily    oxyCODONE HCl (OXY-IR) 10 MG immediate release tablet Take 10 mg by mouth every 4 hours as needed for Pain. Exam  Blood pressure (!) 146/64, pulse 53, temperature 97.8 °F (36.6 °C), temperature source Oral, resp. rate 19, height 5' 1\" (1.549 m), weight 125 lb 7.1 oz (56.9 kg), SpO2 98 %, not currently breastfeeding. Constitutional    Vital signs: BP, HR, and RR reviewed   General alert, no distress  Eyes: unable to visualize the fundi  Cardiovascular: no peripheral edema. Psychiatric: cooperative with examination, no psychotic behavior noted. Neurologic  Mental status:   orientation to person, place, time. Attention intact as able to attend well to the exam     Language fluent in conversation   Comprehension intact; follows simple commands  Cranial nerves:   CN2: visual fields full   CN 3,4,6: extraocular muscles intact. Pupils are equal, round, reactive bilaterally. CN7: face symmetric without dysarthria  CN8: hearing grossly intact  CN12: tongue midline with protrusion  Strength: good strength in all 4 extremities   Sensory: light touch intact in all 4 extremities.     Cerebellar/coordination: finger nose finger normal without ataxia  Tone: normal in all 4 extremities  Gait: deferred at this time for comfort. ROS  Constitutional- No weight loss or fevers  Eyes- No diplopia. No photophobia. Ears/nose/throat- No dysphagia. No Dysarthria  Cardiovascular- No palpitations. No chest pain  Respiratory- No dyspnea. No Cough  Gastrointestinal- No Abdominal pain. No Vomiting. Genitourinary- No incontinence. No urinary retention  Musculoskeletal- No myalgia. No arthralgia  Skin- No rash. No easy bruising. Psychiatric- No depression. No anxiety  Endocrine- No diabetes. No thyroid issues. Hematologic- No bleeding difficulty. No fatigue  Neurologic- No weakness. No Headache. Labs  Glucose 97  Na 138  K 4.7  BUN 14  Cr 0.9    WBC 7.9K  Hg 13.2  Platelets 233    TSH Reflex FT4 - 0.68    UA + nitrites, large LE, 315 WBC, 4+ bacteria. Culture pending. Studies  CT head w/o 11/12/22, independently reviewed  Impression   No acute intracranial abnormality. Diffuse atrophic changes with findings suggesting chronic microvascular   ischemia     Carotid US 11/14/22  The right internal carotid artery appears to have a 50-79% diameter reducing    stenosis based on velocity criteria. TTE 11/14/22   Left ventricular cavity size is normal.   Left ventricular function is low normal with ejection fraction estimated at   50-60%. Abnormal septal motion is present. Diastolic filling parameters suggest   grade III diastolic dysfunction. Thickening of leaflets of mitral valve. Mitral annular calcification is   present. Moderate mitral regurgitation is present. A bioprosthetic aortic valve appears well seated with a maximum velocity of   3.01m/s and a mean gradient of 19mmHg. Trivial aortic regurgitation. The right ventricle is normal in size and function. Impression/Recommendations  AMS/word finding difficulty. LOC w/ reported convulsive activity. Urinary tract infection  PAF; RENETTA closure device. TAMIKO stenosis. Orthostatic hypotension. Metabolic derangements. Clinically she is back to her baseline. The differential here may be broad; perhaps multifactorial.  Could be metabolic driven as she has a urinary tract infection. Perhaps that could have caused a recrudescence of her prior reported stroke or triggered a focal onset seizure w/ secondary generalization and subsequent post ictal state. Opiate overdose? She was also found to be significantly orthostatic a couple of days ago. Cerebral hypoperfusion w/ a convulsive syncopal episode is also something to consider. A brain MRI is pending to assess for acute intracranial pathology. An EEG is also pending. Would recommend treating UTI and management of other acute medical issues. If there happens to be evidence R hemispheric stroke, her ICA stenosis will need to be addressed. Continue antiplatelet therapy. We will follow the results of the above testing and comment PRN. Please call back w/ any additional questions or concerns. Thank you.       Belem Lozano NP  [address] [place] [address] [address] Neurology    A copy of this note was provided for Dr Pepe Perez MD

## 2022-11-15 NOTE — PROGRESS NOTES
Department of Internal Medicine  General Internal Medicine  Attending Progress Note    Chief Complaint:fall/syncopal episode      HPI:   SUBJECTIVE:  69 yo female with CAD, sp avr and sp watchman and PAF and Pacer for history symptomatic rhonda done 4/22 who had a syncopal episode yesterday. She yelled out for her daughter and was found on the floor. She is orthostatic per her nurse's check this morning. She also reports that she feels like she is in a dream.  She is oriented but doesn't feel normal.  Her daughter reports that on the morning before she fell she had some confusion and expressive aphasia. She has remained av paced on the monitor. She had a ct of the head in the ER which showed diffuse atrophic changes and chronic wmd changes. MRI is ordered not sure her pacer is compatible. Now today 1/14 she reports feeling much more clear headed, back to herself. Much less difficulty with word finding.   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    Bladder cancer ([address] 02/2014    CAD (coronary artery disease) 2014    Carotid stenosis 04/30/2014    Chronic back pain     Chronic diastolic CHF (congestive heart failure) ([address] 2016    Chronic prescription opiate use     COPD ([address]     Diverticulosis     HTN (hypertension)     Hyperlipidemia     Hypothyroidism     Ischemic stroke 2013    x 2    Lumbar spine stenosis 2017    Macular degeneration 2018    Nonrheumatic mitral valve regurgitation     Obstructive sleep apnea     Osteoarthritis     Peripheral neuropathy     Pulmonary HTN ([address] 2014    PVD (peripheral vascular disease) ([address]     Tobacco use disorder in remission      Past Surgical History:   Procedure Laterality Date    ABLATION OF DYSRHYTHMIC FOCUS  05/09/2022    AORTIC VALVE REPLACEMENT  6/16/15    Dr. Anderson Wiseman. Hernandez - PVI, RENETTA exclusion. 19mm Maki pericardial Magna    APPENDECTOMY      BACK SURGERY  03/15/2019    superion inserted to keep back from hurtin [address] N/A 2019    EMERGENT; LAPAROSCOPIC CHOLECYSTECTOMY WITH GRAM performed by Tomasa Thompson MD at [place] [address]      stent x 1    CYSTOSCOPY  2014    dr Elvis Aschoff      THR Right    OTHER SURGICAL HISTORY  2018     EGD and colonoscopy. PACEMAKER INSERTION  2022    PAIN MANAGEMENT PROCEDURE Bilateral 2021    BILATERAL L3, L4, L5 MEDIAL BRANCH BLOCK WITH FLUOROSCOPY performed by Yuri Allen MD at [facility] Bilateral 2021    BILATERAL L3, L4, L5 MEDIAL BRANCH BLOCKS WITH FLUOROSCOPY (40098, 09814) performed by Yuri Allen MD at [facility] Bilateral 2021    BILATERAL L3, L4, L5 RADIOFREQUENCY ABLATION WITH FLUOROSCOPY (52145, 89430) [place] [facility] [place]    [facility] by Jerome Ordoñez MD at [facility] ENDOSCOPY  12/15/2017      Family History   Problem Relation Age of Onset    Breast Cancer Daughter      Social History     Tobacco Use    Smoking status: Every Day     Packs/day: 1.00     Years: 45.00     Pack years: 45.00     Types: Cigarettes    Smokeless tobacco: Former   Vaping Use    Vaping Use: Never used   Substance Use Topics    Alcohol use: Yes    Drug use: No       Prior to Admission medications    Medication Sig Start Date End Date Taking?  Authorizing Provider   aspirin 81 MG EC tablet Take 81 mg by mouth daily   Yes Historical Provider, MD   valsartan (DIOVAN) 40 MG tablet Take 0.5 tablets by mouth daily 10/25/22   Maryan Jeffery MD   rosuvastatin (CRESTOR) 40 MG tablet TAKE ONE TABLET BY MOUTH EVERY EVENING 10/25/22   Adelina Cabral MD   metoprolol succinate (TOPROL XL) 50 MG extended release tablet TAKE 1 TABLET BY MOUTH DAILY 10/25/22   Adelina Cabral MD   ezetimibe (ZETIA) 10 MG tablet TAKE ONE TABLET BY MOUTH DAILY 10/25/22   Adelina Cabral MD   pantoprazole (PROTONIX) 40 MG tablet One po qd 10/25/22   Adelina Cabral MD   sennosides-docusate sodium (SENOKOT-S) 8.6-50 MG tablet Take 1 tablet by mouth in the morning and at bedtime 10/14/22   Adelina Cabral MD   torsemide (DEMADEX) 20 MG tablet TAKE 1 TABLET BY MOUTH EVERY MORNING. TAKE EXTRA PILL ON DAYS WEIGHT INCREASES BY 2+ LBS 9/20/22   Adelina Cabral MD   tiZANidine (ZANAFLEX) 4 MG capsule TAKE ONE-HALF TABLET BY MOUTH EVERY 8 HOURS AS NEEDED (MUSCLE SPASM) 8/24/22   Adelina Cabral MD   levothyroxine (SYNTHROID) 88 MCG tablet TAKE 1 TABLET BY MOUTH EVERY DAY 8/24/22   Adelina Cabral MD   potassium chloride (KLOR-CON M) 20 MEQ TBCR extended release tablet Take 1 tablet by mouth 2 times daily 8/22/22   Adelina Cabral MD   amiodarone (CORDARONE) 200 MG tablet Take 0.5 tablets by mouth in the morning. 8/10/22   HOWARD Harper CNP   ipratropium (ATROVENT) 0.03 % nasal spray INHALE 2 DOSES INTO EACH NOSTRIL THREE TIMES A DAY IF NEEDED FOR RHINITIS 7/10/22   Adelina Cabral MD   DULoxetine (CYMBALTA) 60 MG extended release capsule Take 60 mg by mouth daily    Historical Provider, MD   diclofenac sodium (VOLTAREN) 1 % GEL Apply 2 g topically daily    Historical Provider, MD   oxyCODONE HCl (OXY-IR) 10 MG immediate release tablet Take 10 mg by mouth every 4 hours as needed for Pain.     Historical Provider, MD         ROS:  A comprehensive review of systems was negative except for: feels confused chronic back pain    OBJECTIVE:      PHYSICAL:  Intake/Output:   Patient Vitals for the past 8 hrs:   BP Temp Temp src Pulse Resp SpO2 Weight   11/14/22 1542 136/71 98 °F (36.7 °C) Oral 51 16 98 % 123 lb 0.3 oz (55.8 kg)   11/14/22 1156 136/71 -- Oral 67 16 97 % -- I/O last 3 completed shifts: In: 680 [P.O.:680]  Out: 1600 [Urine:1600]  No intake/output data recorded.   VITALS:    /71   Pulse 51   Temp 98 °F (36.7 °C) (Oral)   Resp 16   Ht 5' 1\" (1.549 m)   Wt 123 lb 0.3 oz (55.8 kg)   SpO2 98%   BMI 23.24 kg/m²   Sbp 131 lying 102 sitting and 82 standing  General Appearance: alert and oriented to person, place and time, well-developed and well-nourished, in no acute distress  Skin: warm and dry, no rash or erythema  Head: normocephalic and atraumatic  Eyes: conjunctivae normal and sclera anicteric  ENT: hearing grossly normal bilaterally and sinuses non-tender  Neck: neck supple and non tender without mass, no thyromegaly or thyroid nodules, no cervical lymphadenopathy   Pulmonary/Chest: clear to auscultation bilaterally- no wheezes, rales or rhonchi, normal air movement, no respiratory distress  Cardiovascular: normal rate, normal S1 and S2, no gallops and no carotid bruits  Abdomen: soft, non-tender, non-distended, normal bowel sounds, no masses or organomegaly  Extremities: no cyanosis, clubbing or edema  Musculoskeletal: no swollen joints and no tender joints, tender in right inguinal region  Neurologic: coordination normal and speech normal, moves all 4 extremities    DATA:   Labs:  CBC:   Recent Labs     11/12/22  1136   WBC 7.9   HGB 13.2          BMP:    Recent Labs     11/12/22  1136 11/13/22  0641 11/14/22  0503   * 135* 138   K 2.9* 2.9* 4.7   CL 86* 97* 104   CO2 29 27 23   BUN 20 13 14   CREATININE 1.1 0.9 0.9   GLUCOSE 124* 81 97       POC GLUCOSE:    Recent Labs     11/12/22  1024   POCGLU 175*       Ca/Mg/Phos:   Recent Labs     11/12/22  1136 11/13/22  0641 11/14/22  0503   CALCIUM 9.4 9.5 9.2   MG 2.20 2.40  --    PHOS 2.7  --   --        Hepatic:   Recent Labs     11/12/22  1136   AST 46*   ALT 29   BILITOT 0.5   ALKPHOS 120       Troponin:   Recent Labs     11/12/22  1423 11/12/22  1901 11/12/22  2243   TROPONINI 0.02* <0.01 <0.01         DIAGNOSTICS:  CT HEAD WO CONTRAST    Result Date: 11/12/2022  No acute intracranial abnormality. Diffuse atrophic changes with findings suggesting chronic microvascular ischemia     XR CHEST PORTABLE    Result Date: 11/12/2022  Cardiomegaly with findings of chronic moderate CHF and interstitial pulmonary edema. No acute focal process.   Stable exam.         ASSESSMENT AND PLAN    Principal Problem:    Syncope and collapse  Plan: may have been related to orthostatic hypotension but will also need to check out pacer, cardiology consulted, repeat echo pending, troponins not remarkable  Active Problems:    Pulmonary emphysema ([address]  Plan: not rechecked no active problem    Smoker  Plan: likely ongoing, overdue for yearly lung ct will do while here    Acute metabolic encephalopathy  Plan: reports being confused but seems ok on conversation    Bladder tumor  Plan: ? stage    Hypothyroidism  Plan:stable    Abdominal aortic aneurysm  Plan: fu per cards    Lumbar stenosis  Plan: on a generous amount of pain meds not getting as much here    Orthostatic hypotension  Plan: consider to treat medically if persists        Appropriate diagnostic studies and consults ordered  Charito Fonseca MD

## 2022-11-15 NOTE — CARE COORDINATION
Discharge Planning:    Attempted to meet with patient at bedside re: discharge planning. Patient out of room at MRI. Will re-attempt later as time allows.     Electronically signed by Marlinda Felty, RN on 11/15/2022 at 2:20 PM

## 2022-11-15 NOTE — PROGRESS NOTES
Physical Therapy  Facility/Department: [address] PROGRESSIVE CARE  Physical Therapy Treatment Note    Name: Dalila Mathews  : 1945  MRN: [id number]  Date of Service: 11/15/2022    Discharge Recommendations:  Continue to assess pending progress (Home with family; may benefit from Home PT.)   PT Equipment Recommendations  Other: Will monitor for potential equipt needs. Dalila Mathews scored a 21/24 on the AM-PAC short mobility form. Current research shows that an AM-PAC score of 18 or greater is typically associated with a discharge to the patient's home setting. Based on the patient's AM-PAC score and their current functional mobility deficits, it is recommended that the patient have 2-3 sessions per week of Physical Therapy at d/c to increase the patient's independence. At this time, this patient demonstrates the endurance and safety to discharge home with Home PT Evaluation  and a follow up treatment frequency of 2-3x/wk. Please see assessment section for further patient specific details. If patient discharges prior to next session this note will serve as a discharge summary. Please see below for the latest assessment towards goals. Assessment   Body Structures, Functions, Activity Limitations Requiring Skilled Therapeutic Intervention: Decreased functional mobility ; Decreased cognition  Assessment: 69 y/o female admit 2022 with Syncope/Collapse, Opiate Withdrawal. PMH extensive including CHF, AVR, PAF, Bradycardia, Pacemaker (2022), AAA, COPD, CKD, Lumbar Stenosis, Sacral Fx. PTA pt living with family in home with few steps to enter and 1st floor bed/bath; reports independent daily care and functional mobility. Currently,  Pt appears more alert/oriented; no c/o pain or discomfort. Pt netta oob, amb functional distances without assist device Slight CGA; alittle guarded although no specific LOB noted. BP abit elevated (no orthostatic sxs); nursing aware.    At this time, pt/family report adequate assist/support for d/c home; may benefit from at least brief Home PT. Will cont to monitor pt's progress. Therapy Prognosis: Good  History: 67 y/o female admit 11/12/2022 with Syncope/Collapse, Opiate Withdrawal. PMH extensive including CHF, AVR, PAF, Bradycardia, Pacemaker (4/2022), AAA, COPD, CKD, Lumbar Stenosis, Sacral Fx. Exam: See above. Clinical Presentation: See above. Barriers to Learning: Cognitive. Requires PT Follow-Up: Yes  Activity Tolerance  Activity Tolerance: Patient tolerated treatment well  Activity Tolerance Comments: Pt appears more alert/oriented; no c/o pain or discomfort. Pt netta oob, amb functional distances without assist device Slight CGA; alittle guarded although no specific LOB noted. BP abit elevated (no orthostatic sxs); nursing aware. Plan   Physcial Therapy Plan  General Plan: 3-5 times per week  Current Treatment Recommendations: Strengthening, Functional mobility training, Transfer training, Gait training, Safety education & training, Patient/Caregiver education & training  Safety Devices  Type of Devices: Call light within reach, Chair alarm in place, Left in chair, Nurse notified  Restraints  Restraints Initially in Place: No     Restrictions  Restrictions/Precautions  Restrictions/Precautions: Fall Risk  Position Activity Restriction  Other position/activity restrictions: Room Air. Subjective   General  Chart Reviewed: Yes  Patient assessed for rehabilitation services?: Yes  Additional Pertinent Hx: 67 y/o female admit 11/12/2022 with Syncope/Collapse, Opiate Withdrawal. PMH extensive including CHF, AVR, PAF, Bradycardia, Pacemaker (4/2022), AAA, COPD, CKD, Lumbar Stenosis, Sacral Fx. Family / Caregiver Present: No  Referring Practitioner: Dr. Waddell Hash: Within Functional Limits  Subjective  Subjective: Pt appears more alert/oriented; agreeable to PT Rx. Pt reports that she feels less confused. No c/o pain.          Social/Functional History  Social/Functional History  Lives With: Family (Dtr, Grandson.)  Type of Home: House  Home Layout: Two level, Able to Live on Main level with bedroom/bathroom, Laundry in basement  Home Access: Stairs to enter without rails (1+1 steps to enter.)  Bathroom Shower/Tub: Walk-in shower  Bathroom Toilet: Handicap height  Bathroom Equipment: Grab bars in shower, Shower chair  Bathroom Accessibility: Accessible  Home Equipment: Sukhdeep Star, rolling, Cane  ADL Assistance: Independent  Homemaking Assistance:  (Shared with family.)  Ambulation Assistance: Independent (Without assist device pta.)  Transfer Assistance: Independent  Active : No (Relies on family.)  Occupation: Retired  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 Additional Comments: Guerline Cuellar) and Marilyn Patel) both work outside of the home. Vision/Hearing  Vision  Vision: Within Functional Limits  Hearing  Hearing: Within functional limits    Cognition   Orientation  Overall Orientation Status: Within Functional Limits  Cognition  Overall Cognitive Status: Exceptions  Arousal/Alertness: Appropriate responses to stimuli  Following Commands: Follows all commands without difficulty  Attention Span: Appears intact  Memory: Decreased recall of recent events  Safety Judgement: Good awareness of safety precautions  Cognition Comment: Pt appears more alert/oriented; cont diminished recall of recent events (does not remember this PT at her bedside 2 days ago) although improving. Objective           Gross Assessment  AROM: Within functional limits  Strength: Within functional limits                    Bed mobility  Supine to Sit: Supervision  Transfers  Sit to Stand: Stand by assistance  Stand to Sit: Stand by assistance  Comment: Toilet Transfer : Supervision. Pt completed care/managed depends and stood at sink to wash hands; no LOB noted. Orthostatic BP :  Supine : 162/69 (94). EOB : 163/66 (94). Stand : 146/64 (88).   No c/o dizziness/lighthead. Nursing informed. Ambulation  Surface: Level tile; Carpet  Device: No Device  Distance: Pt amb to/from bathroom, additional 100'  2 without assist device Slight CGA. No LE buckling/giving way; slow/alittle guarded although no specific LOB noted. A/AROM Exercises: Stand at counter : marching, mini-squats, toe/heel raises. AM-PAC Score  AM-PAC Inpatient Mobility Raw Score : 21 (11/15/22 0854)  AM-PAC Inpatient T-Scale Score : 50.25 (11/15/22 0854)  Mobility Inpatient CMS 0-100% Score: 28.97 (11/15/22 0854)  Mobility Inpatient CMS G-Code Modifier : CJ (11/15/22 0928)          Goals  Short Term Goals  Time Frame for Short Term Goals: Upon d/c acute care setting. Short Term Goal 1: Bed Mob SBA. 11/15 Goal met. Revised : Bed Mob Independent. Short Term Goal 2: Transfers with/without assist device SBA. 11/15 Goal met. Revised : Transfers without assist device Independent. Short Term Goal 3: Amb with/without assist device 50-75' CGA. 11/15 Goal met. Revised : Amb without assist device  150' Supervision. Patient Goals   Patient Goals : Return home with family.        Education  Patient Education  Education Given To: Patient  Education Provided Comments: Role of PT, POC, Need to call for assist.  Education Method: Verbal  Barriers to Learning: Cognition  Education Outcome: Verbalized understanding;Continued education needed      Therapy Time   Individual Concurrent Group Co-treatment   Time In 0800         Time Out 0840         Minutes [address], PT

## 2022-11-15 NOTE — CARE COORDINATION
Wound referral noted. Pt currently of unit. Will attempt to see pt later.  Farida Benson, MSN, RN, [facility]

## 2022-11-15 NOTE — PROGRESS NOTES
Facility/Department: [address] PROGRESSIVE CARE  SLP Clinical Swallow Evaluation and Speech Language Cognitive Assessment     Patient: Jovan Andrew   : 1945   MRN: [id number]      Evaluation Date: 11/15/2022      Admitting Dx: Syncope and collapse [R55]  Hypokalemia [E87.6]  Hyponatremia [E87.1]  Prolonged Q-T interval on ECG [R94.31]  Opiate withdrawal ([address] [F11.93]  Acute hyperactive delirium due to another medical condition [F05]  Altered mental status, unspecified altered mental status type [R41.82]  Chronic prescription opiate use [Z79.891]  Pain: Denies                                    Chart Review:   H&P: 80-year-old female with past medical history significant for atrial fibrillation s/p pacemaker, status post post aortic valve replacement with bioprosthetic valve, hypertension, chronic back pain previous CVA alcohol use presents with syncope. Most of the history provided by daughter at side. Daughter reports patient today in the kitchen. She denies any prodromal symptoms such as chest pain shortness of breath. Laboratory studies in the emergency department showed significant heart natremia, sodium of 126, potassium of 2.9. Her troponin was elevated at 0.02. Her EKG showed atrial paced rhythm. Her previous echocardiogram EF of 45% with grade 3 diastolic dysfunction, severe tricuspid regurgitation and bioprosthetic aortic valve. Current diet: Regular/thin liquids    Imaging:  MRI brain 11/15/22  Impression   Motion artifact. No acute intracranial abnormality. 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 Mild parenchymal volume loss. Mild chronic microvascular disease. CT CHEST 22  Impression   1. Unchanged solid subcentimeter bilateral pulmonary nodules.      Modified Barium Swallow Study:   2020 MBS: Oropharyngeal Dysphagia characterized by prolonged mastications of regular solid foods; disorganized but functional A-P oral transit most symptomatic of regular sold foods; delayed initiation of swallow. This SLP offered recommendation for downgrade trial to soft/bite size; but pt declined stating that food (SLP presented) was dry. Symptoms:  Inconsistent premature loss of material to the pharynx to the level of the valleculae  Lingual mashing A-P oral transit of foods  Piecemeal swallows of food consistencies  Post swallow oral residue mid to posterior tongue with trace to minimal collection in the valleculae again most symptomatic of regular solid foods  Pt did have one episode of penetration early on in the procedure (second presentation of nectar thick) which cleared with swallow. No further episodes were observed during procedure  Post swallow regular food residue lining the pharyngeal wall; pt did clear with clearance swallow    History/Prior Level of Function:   Living Status: Lives with daughter and granddaughter; was working 64-70 hours a week as  at [employer], however stopped driving a year ago due to visual impairment and had to retire  Prior Dysphagia History: Pt seen here July 2020 with recommendation for regular/thin liquids  Prior Speech History: reports occasional anomia however reports was independent with cognitive function     Reason for referral: SLP evaluation orders received due to CVA protocol . DYSPHAGIA BEDSIDE SWALLOW EVALUATION   Dysphagia Impressions/Dysphagia Diagnosis: Oropharyngeal Dysphagia   Dysphagia Diagnosis: Mild oropharyngeal dysphagia  Accepted and tolerated evaluation at bedside  Patient alert, cooperative and pleasant. Reports improving cognition/less confusion but reports continue having difficulty with anomia and memory.  Oriented to time and place  Mild oropharyngeal dysphagia characterized by slow prolonged mastication, adequate clearance, delayed swallow initiation, decreased laryngeal elevation, no overt s/s of aspiration or penetration  Recommend cont reg/thin liquids    Recommended Diet and Intervention:  Diet Solids Recommendation:  Regular texture diet  Liquid Consistency Recommendation: Thin liquids  Recommended form of Meds: Meds whole with water      Dysphagia Therapeutic Intervention:  Diet Tolerance Monitoring , Patient/Family Education     Compensatory Swallowing Strategies:  Upright as possible with all PO intake , Remain upright 30-45 min     TEST DATA:   Patient Positioning: Upright in chair    Consistencies presented: thin liquids; regular    Oral Mechanism Exam:  [x]WFL []Mild   [] Moderate  []Severe  []To be assessed    Voltional cough: WFL  Volitional swallow: Delayed  Voice: WFL    Oral Phase: []WFL [x]Mild   [] Moderate  []Severe  []To be assessed   [x]Impaired/Prolonged Mastication:   []Spillage Left:   []Spillage Right:  []Pocketing Left:   []Pocketing Right:   []Decreased Anterior to Posterior Transit:   []Suspected Premature Bolus Loss:   []Lingual/Palatal Residue:   []Other:     Pharyngeal Phase: []WFL [x]Mild   [] Moderate  []Severe  []To be assessed   [x]Delayed Swallow:   []Suspected Pharyngeal Pooling:   [x]Decreased Laryngeal Elevation:   []Absent Swallow:  []Wet Vocal Quality:   []Throat Clearing-Immediate:   []Throat Clearing-Delayed:   []Cough-Immediate:   []Cough-Delayed:  []Change in Vital Signs:  []Suspected Delayed Pharyngeal Clearing:  []Other:     SHORT TERM DYSPHAGIA GOALS  Pt will functionally tolerate recommended diet with no overt clinical s/s of aspiration   Pt will demonstrate understanding of aspiration risk and precautions via education/demonstration with occasional prompting    Dysphagia Prognosis: Good for diet tolerance    SPEECH LANGUAGE COGNITIVE ASSESSMENT:     Speech Diagnosis:   Cognitive-Linguistic Deficits     Impressions: Mild-moderate cognitive impairments characterized by deficits with short term memory, working memory, executive function and anomia in conversation.  Patient reports improving confusion-but persist new onset anomia in conversation and STM deficits    Test Data:   Vision: Wears reading glasses    Hearing: WF    COMPREHENSION  Auditory Comprehension: Within functional limits     EXPRESSION  Verbal Expressive Language: Mild   Impaired Divergent Naming  Anomia -reports difficulty in conversation  Named 7 animals in 1 minute     Written Expressive Language:  WFL    Pragmatics/Social Functioning: Within functional limits   Very pleasant and cooperative      MOTOR SPEECH  Motor Speech: Within functional limits     VOICE  Voice: Within functional limits     COGNITION    Overall Orientation : Within functional limits      Attention: Within functional limits      Memory: Mild    Recall 4/5 words after delay, recall 3/4 pieces of information read verbally    Problem Solving: Mild      Safety/Judgement: Within functional limits    Appears WFL-good recall of use of call light and requirement for assistance    GOALS:  Short Term Speech/Language/Cognitive Goals:   Pt will improve orientation and short term recall via graded tasks to 80%  Pt will participate in ongoing cognitive assessment with goals to be established as indicated     Plan of care: 3-5 times per week during acute care stay. SLP Prognosis:   Good     Discharge Recommendations:  Discharge recommendations to be determined pending ongoing follow-up during acute care stay    EDUCATION:   Provided education regarding role of SLP, results of assessment, recommendations and general speech pathology plan of care. [x] Pt verbalized understanding and agreement   [] Pt requires ongoing learning   [] No evidence of comprehension     If patient discharges prior to next visit, this note will serve as discharge.      Time code minutes:  0  Total Time:  35 (15 for clinical swallow evaluation, 20 for speech lang eval)    Electronically signed by:   Rolo Chatman, [address] [place] [address]  Speech-Language Pathologist  On 11/15/22 at 3:20 PM

## 2022-11-15 NOTE — PROGRESS NOTES
Department of Internal Medicine  General Internal Medicine  Attending Progress Note    Chief Complaint:fall/syncopal episode      HPI:   SUBJECTIVE:  67 yo female with CAD, sp avr and sp watchman and PAF and Pacer for history symptomatic rhonda done 4/22 who had a syncopal episode yesterday. She yelled out for her daughter and was found on the floor. She is orthostatic per her nurse's check this morning. She also reports that she feels like she is in a dream.  She is oriented but doesn't feel normal.  Her daughter reports that on the morning before she fell she had some confusion and expressive aphasia. She has remained av paced on the monitor. She had a ct of the head in the ER which showed diffuse atrophic changes and chronic wmd changes. MRI is ordered not sure her pacer is compatible. Now today 1/14 she reports feeling much more clear headed, back to herself. Much less difficulty with word finding. Hips and low back hurting more, not sure she has been getting the same amt of oxy here  11/15:feels well no further episodes. Walking around without difficulty. Had abnormal ua and started on abx but has history of bladder ca and she is utd on fu with DR MCKENZIE for that.   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    Bladder cancer ([address] 02/2014    CAD (coronary artery disease) 2014    Carotid stenosis 04/30/2014    Chronic back pain     Chronic diastolic CHF (congestive heart failure) ([address] 2016    Chronic prescription opiate use     COPD ([address]     Diverticulosis     HTN (hypertension)     Hyperlipidemia     Hypothyroidism     Ischemic stroke 2013    x 2    Lumbar spine stenosis 2017    Macular degeneration 2018    Nonrheumatic mitral valve regurgitation     Obstructive sleep apnea     Osteoarthritis     Peripheral neuropathy     Pulmonary HTN [facility]     PVD (peripheral vascular disease) ([facility]     Tobacco use disorder in remission      Past Surgical History:   Procedure Laterality Date    ABLATION OF DYSRHYTHMIC FOCUS  2022    AORTIC VALVE REPLACEMENT  6/16/15    Dr. Lopes. David - PVI, RENETTA exclusion. 19mm Maki pericardial Magna    APPENDECTOMY      BACK SURGERY  03/15/2019    superion inserted to keep back from hurtin [address] N/A 2019    EMERGENT; LAPAROSCOPIC CHOLECYSTECTOMY WITH GRAM performed by Jen Rodrigues MD at [place] [address]      stent x 1    CYSTOSCOPY  2014    dr Richmond Jerome      THR Right    OTHER SURGICAL HISTORY  2018     EGD and colonoscopy. PACEMAKER INSERTION  2022    PAIN MANAGEMENT PROCEDURE Bilateral 2021    BILATERAL L3, L4, L5 MEDIAL BRANCH BLOCK WITH FLUOROSCOPY performed by Kaleb Mary MD at [address] [place] [address] Bilateral 2021    BILATERAL L3, L4, L5 MEDIAL BRANCH BLOCKS WITH FLUOROSCOPY (13431, 63507) performed by Kaleb Mary MD at [address] [place] [address] Bilateral 2021    BILATERAL L3, L4, L5 RADIOFREQUENCY ABLATION WITH FLUOROSCOPY (73351, 16320) performed by Kaleb Mary MD at [address] [place] [address] 3/15/2019    [facility] by Daniela Naik MD at [address] [place] [address] ENDOSCOPY  12/15/2017      Family History   Problem Relation Age of Onset    Breast Cancer Daughter      Social History     Tobacco Use    Smoking status: Every Day     Packs/day: 1.00     Years: 45.00     Pack years: 45.00     Types: Cigarettes    Smokeless tobacco: Former   Vaping Use    Vaping Use: Never used   Substance Use Topics    Alcohol use: Yes    Drug use: No       Prior to Admission medications    Medication Sig Start Date End Date Taking? Authorizing Provider   aspirin 81 MG EC tablet Take 81 mg by mouth daily   Yes Historical Provider, MD   valsartan (DIOVAN) 40 MG tablet Take 0.5 tablets by mouth daily 10/25/22   Michelle Majano MD   rosuvastatin (CRESTOR) 40 MG tablet TAKE ONE TABLET BY MOUTH EVERY EVENING 10/25/22   Michelle Majano MD   metoprolol succinate (TOPROL XL) 50 MG extended release tablet TAKE 1 TABLET BY MOUTH DAILY 10/25/22   Michelle Majano MD   ezetimibe (ZETIA) 10 MG tablet TAKE ONE TABLET BY MOUTH DAILY 10/25/22   Michelle Majano MD   pantoprazole (PROTONIX) 40 MG tablet One po qd 10/25/22   Michelle Majano MD   sennosides-docusate sodium (SENOKOT-S) 8.6-50 MG tablet Take 1 tablet by mouth in the morning and at bedtime 10/14/22   Michelle Majano MD   torsemide (DEMADEX) 20 MG tablet TAKE 1 TABLET BY MOUTH EVERY MORNING. TAKE EXTRA PILL ON DAYS WEIGHT INCREASES BY 2+ LBS 9/20/22   Michelle Majano MD   tiZANidine (ZANAFLEX) 4 MG capsule TAKE ONE-HALF TABLET BY MOUTH EVERY 8 HOURS AS NEEDED (MUSCLE SPASM) 8/24/22   Michelle Majano MD   levothyroxine (SYNTHROID) 88 MCG tablet TAKE 1 TABLET BY MOUTH EVERY DAY 8/24/22   Michelle Majano MD   potassium chloride (KLOR-CON M) 20 MEQ TBCR extended release tablet Take 1 tablet by mouth 2 times daily 8/22/22   Michelle Majano MD   amiodarone (CORDARONE) 200 MG tablet Take 0.5 tablets by mouth in the morning. 8/10/22   HOWARD Harper - CNP   ipratropium (ATROVENT) 0.03 % nasal spray INHALE 2 DOSES INTO EACH NOSTRIL THREE TIMES A DAY IF NEEDED FOR RHINITIS 7/10/22   Michelle Majano MD   DULoxetine (CYMBALTA) 60 MG extended release capsule Take 60 mg by mouth daily    Historical Provider, MD   diclofenac sodium (VOLTAREN) 1 % GEL Apply 2 g topically daily    Historical Provider, MD   oxyCODONE HCl (OXY-IR) 10 MG immediate release tablet Take 10 mg by mouth every 4 hours as needed for Pain.     Historical Provider, MD         ROS:  A comprehensive review of systems was negative except for: feels confused chronic back pain    OBJECTIVE:      PHYSICAL:  Intake/Output:   Patient Vitals for the past 8 hrs:   BP Temp Temp src Pulse Resp SpO2   11/15/22 1145 (!) 133/50 98.5 °F (36.9 °C) Oral 52 24 95 %   11/15/22 0816 (!) 146/64 97.8 °F (36.6 °C) Oral 53 19 --   11/15/22 0814 (!) 163/66 -- -- 55 19 --   11/15/22 0812 (!) 162/69 -- -- 58 11 --       I/O last 3 completed shifts: In: 800 [P.O.:800]  Out: 750 [Urine:750]  No intake/output data recorded. VITALS:    BP (!) 133/50   Pulse 52   Temp 98.5 °F (36.9 °C) (Oral)   Resp 24   Ht 5' 1\" (1.549 m)   Wt 125 lb 7.1 oz (56.9 kg)   SpO2 95%   BMI 23.70 kg/m²   Sbp 131 lying 102 sitting and 82 standing  General Appearance: alert and oriented to person, place and time, well-developed and well-nourished, in no acute distress  Skin: warm and dry, no rash or erythema  Head: normocephalic and atraumatic  Eyes: conjunctivae normal and sclera anicteric  ENT: hearing grossly normal bilaterally and sinuses non-tender  Neck: neck supple and non tender without mass, no thyromegaly or thyroid nodules, no cervical lymphadenopathy   Pulmonary/Chest: clear to auscultation bilaterally- no wheezes, rales or rhonchi, normal air movement, no respiratory distress  Cardiovascular: normal rate, normal S1 and S2, no gallops and no carotid bruits  Abdomen: soft, non-tender, non-distended, normal bowel sounds, no masses or organomegaly  Extremities: no cyanosis, clubbing or edema  Musculoskeletal: no swollen joints and no tender joints, tender in right inguinal region  Neurologic: coordination normal and speech normal, moves all 4 extremities    DATA:   Labs:  CBC:   No results for input(s): WBC, HGB, PLT in the last 72 hours. BMP:    Recent Labs     11/13/22  0641 11/14/22  0503   * 138   K 2.9* 4.7   CL 97* 104   CO2 27 23   BUN 13 14   CREATININE 0.9 0.9   GLUCOSE 81 97       POC GLUCOSE:    No results for input(s): POCGLU in the last 72 hours.     Ca/Mg/Phos:   Recent Labs     11/13/22  7255 11/14/22  0503   CALCIUM 9.5 9.2   MG 2.40  --        Hepatic:   No results for input(s): AST, ALT, ALB, BILITOT, ALKPHOS in the last 72 hours. Troponin:   Recent Labs     11/12/22  1423 11/12/22  1901 11/12/22  2243   TROPONINI 0.02* <0.01 <0.01         DIAGNOSTICS:  CT HEAD WO CONTRAST    Result Date: 11/12/2022  No acute intracranial abnormality. Diffuse atrophic changes with findings suggesting chronic microvascular ischemia     XR CHEST PORTABLE    Result Date: 11/12/2022  Cardiomegaly with findings of chronic moderate CHF and interstitial pulmonary edema. No acute focal process. Stable exam.         ASSESSMENT AND PLAN    Principal Problem:    Syncope and collapse  Plan: may have been related to orthostatic hypotension but will also need to check out pacer, cardiology consulted, repeat echo pending, troponins not remarkable  Active Problems:    Pulmonary emphysema ([address]  Plan: not rechecked no active problem    Smoker  Plan: unchanged bilat nodules need contd fu    Acute metabolic encephalopathy  Plan: reports being confused but seems ok on conversation    Bladder tumor  Plan: up to date with Dr Kris Garcia.     Hypothyroidism  Plan:stable    Abdominal aortic aneurysm  Plan: fu per cards    Lumbar stenosis  Plan: on a generous amount of pain meds not getting as much here    Orthostatic hypotension  Plan: consider to treat medically if persists        Appropriate diagnostic studies and consults ordered  Guadalupe Liang MD

## 2022-11-15 NOTE — PROGRESS NOTES
[address]   Daily Progress Note      Admit Date:  11/12/2022      Subjective:   Ms. Jorge Enriquez is a 68 y.o. female with a past medical history significant for with a past medical history significant for atrial fibrillation, pulmonary hypertension, aortic stenosis and CAD s/p CHILANGO to mid circ 5/2014. She is s/p AVR, PVI and RENETTA exclusion by Dr Dahiana Alexis on 6/16. An inpatient echocardiogram was completed revealing moderate to severe MR. Subsequently, a CAMERON was completed on 7/27/20 showing moderate MR. She underwent implant of Bi-V pacemaker with Dr. Elvi Benavides on 4/11/22. She had recurrence of atrial fibrillation noted on interrogation and underwent ablation on 5/9/22. I was asked to see her for syncope. Yesterday, per the granddaughter, wSathi Richards was having difficulty with word finding for things like \"coffee and toast\". She went back to her room and Shonda's daughter heard her scream. She found her on the floor unresponsive so she called 911. He respirations were poor but she did have painful response with moaning to several attempted chest compressions. Today, Swathi Richards feels like her back and hip are hurting. She says she still has a little issue still with word finding. She has had no chest pain, dyspnea or tightness. Interval History:  Swathi Richards reports feeling even better today. She has had no expressive aphasia. There is been no shortness of breath. No events overnight.     Objective:     BP (!) 150/73   Pulse 57   Temp 98.2 °F (36.8 °C) (Oral)   Resp 19   Ht 5' 1\" (1.549 m)   Wt 125 lb 7.1 oz (56.9 kg)   SpO2 97%   BMI 23.70 kg/m²      Intake/Output Summary (Last 24 hours) at 11/15/2022 1649  Last data filed at 11/15/2022 1509  Gross per 24 hour   Intake 600 ml   Output --   Net 600 ml       Physical Exam:  General:  Awake, alert, NAD  Skin:  Warm and dry  Neck:  JVD<8, no carotid bruits  Chest:  Clear to auscultation, no wheezes/rhonchi/rales  Cardiovascular:  RRR, normal S1/S2, no M/R/G  Abdomen:  Soft, nontender, +bowel sounds  Extremities:  no edema  Pulses: 2+ bilat carotid    2+ bilat radial    2+ bilat femoral        Medications:    oxyCODONE  10 mg Oral Q4H    cefUROXime  250 mg Oral 2 times per day    potassium chloride  10 mEq IntraVENous Once    potassium chloride  10 mEq IntraVENous Once    amiodarone  100 mg Oral Daily    DULoxetine  60 mg Oral Daily    levothyroxine  88 mcg Oral QAM AC    metoprolol succinate  50 mg Oral Daily    pantoprazole  40 mg Oral QAM AC    rosuvastatin  40 mg Oral Nightly    sodium chloride flush  5-40 mL IntraVENous 2 times per day    enoxaparin  40 mg SubCUTAneous Nightly    lidocaine  1 patch TransDERmal Daily      sodium chloride         Lab Data:  CBC:   No results for input(s): WBC, HGB, PLT in the last 72 hours. BMP:    Recent Labs     11/13/22  0641 11/14/22  0503   * 138   K 2.9* 4.7   CO2 27 23   BUN 13 14   CREATININE 0.9 0.9     LIVR:   No results for input(s): AST, ALT in the last 72 hours. PT/INR:   No results for input(s): PROT, INR in the last 72 hours. Recent Labs     11/12/22  1901 11/12/22  2243   TROPONINI <0.01 <0.01     FASTING LIPID PANEL:  Lab Results   Component Value Date/Time    CHOL 137 07/20/2022 01:08 PM    HDL 53 07/20/2022 01:08 PM    HDL 42 06/21/2010 10:02 PM    TRIG 86 07/20/2022 01:08 PM       TTE 11/14/22:  Left ventricular cavity size is normal.  Left ventricular function is low normal with ejection fraction estimated at 50-60%. Abnormal septal motion is present. Diastolic filling parameters suggest grade III diastolic dysfunction. Thickening of leaflets of mitral valve. Mitral annular calcification is present. Moderate mitral regurgitation is present. .  A bioprosthetic aortic valve appears well seated with a maximum velocity of 3.01m/s and a mean gradient of 19mmHg. Trivial aortic regurgitation. The right ventricle is normal in size and function.     Assessment / Plan:    1)Syncope  I would continue with the neurologic work-up. Brain MRI shows no acute infarct. She had symptoms of expressive aphasia prior to this episode. She is off anticoagulation with her a-fib due to GI bleeding but had a RENETTA closure during her open heart surgery. 2)Chronic Systolic CHF,class 3  She is euvolemic on exam. Okay to hold diuretics as it's possible she is a little dry. Continue beta blockade but okay to hold ACEI/ARB therapy temporarily. She has done very well since her BiV/AICD was implanted.

## 2022-11-15 NOTE — PROGRESS NOTES
Speech Language Pathology    Attempted to see patient for evaluation. RN reports patient is going down for MRI. Will re-attempt as schedule permits.      Leslye Zamora, [facility]  Speech-Language Pathologist  On 11/15/22 at 12:37 PM

## 2022-11-16 ENCOUNTER — TELEPHONE (OUTPATIENT)
Dept: CASE MANAGEMENT | Age: 77
End: 2022-11-16

## 2022-11-16 VITALS
HEART RATE: 52 BPM | OXYGEN SATURATION: 95 % | RESPIRATION RATE: 18 BRPM | DIASTOLIC BLOOD PRESSURE: 59 MMHG | WEIGHT: 127.21 LBS | BODY MASS INDEX: 24.02 KG/M2 | SYSTOLIC BLOOD PRESSURE: 144 MMHG | HEIGHT: 61 IN | TEMPERATURE: 98.5 F

## 2022-11-16 LAB
ORGANISM: ABNORMAL
ORGANISM: ABNORMAL
URINE CULTURE, ROUTINE: ABNORMAL
URINE CULTURE, ROUTINE: ABNORMAL

## 2022-11-16 PROCEDURE — 97530 THERAPEUTIC ACTIVITIES: CPT

## 2022-11-16 PROCEDURE — 6370000000 HC RX 637 (ALT 250 FOR IP): Performed by: INTERNAL MEDICINE

## 2022-11-16 PROCEDURE — 99239 HOSP IP/OBS DSCHRG MGMT >30: CPT | Performed by: INTERNAL MEDICINE

## 2022-11-16 PROCEDURE — 2580000003 HC RX 258: Performed by: INTERNAL MEDICINE

## 2022-11-16 PROCEDURE — 97129 THER IVNTJ 1ST 15 MIN: CPT

## 2022-11-16 PROCEDURE — 97116 GAIT TRAINING THERAPY: CPT

## 2022-11-16 RX ADMIN — OXYCODONE HYDROCHLORIDE 10 MG: 10 TABLET ORAL at 06:40

## 2022-11-16 RX ADMIN — PANTOPRAZOLE SODIUM 40 MG: 40 TABLET, DELAYED RELEASE ORAL at 06:40

## 2022-11-16 RX ADMIN — CEFUROXIME AXETIL 250 MG: 250 TABLET ORAL at 08:15

## 2022-11-16 RX ADMIN — LEVOTHYROXINE SODIUM 88 MCG: 0.09 TABLET ORAL at 06:40

## 2022-11-16 RX ADMIN — METOPROLOL SUCCINATE 50 MG: 50 TABLET, EXTENDED RELEASE ORAL at 08:15

## 2022-11-16 RX ADMIN — OXYCODONE HYDROCHLORIDE 10 MG: 10 TABLET ORAL at 11:38

## 2022-11-16 RX ADMIN — DULOXETINE HYDROCHLORIDE 60 MG: 60 CAPSULE, DELAYED RELEASE ORAL at 08:15

## 2022-11-16 RX ADMIN — SODIUM CHLORIDE, PRESERVATIVE FREE 10 ML: 5 INJECTION INTRAVENOUS at 08:18

## 2022-11-16 RX ADMIN — AMIODARONE HYDROCHLORIDE 100 MG: 200 TABLET ORAL at 08:15

## 2022-11-16 RX ADMIN — OXYCODONE HYDROCHLORIDE 10 MG: 10 TABLET ORAL at 02:49

## 2022-11-16 ASSESSMENT — PAIN DESCRIPTION - DESCRIPTORS: DESCRIPTORS: ACHING

## 2022-11-16 ASSESSMENT — PAIN SCALES - GENERAL
PAINLEVEL_OUTOF10: 5
PAINLEVEL_OUTOF10: 6
PAINLEVEL_OUTOF10: 6

## 2022-11-16 ASSESSMENT — PAIN DESCRIPTION - LOCATION
LOCATION: BACK
LOCATION: BACK

## 2022-11-16 ASSESSMENT — PAIN DESCRIPTION - ORIENTATION: ORIENTATION: MID

## 2022-11-16 NOTE — PROCEDURES
[address] [place] [address]                          ELECTROENCEPHALOGRAM REPORT    PATIENT NAME: NICHELLE LORENZO                          :        1945  MED REC NO:   [id number]                          ROOM:       5275  ACCOUNT NO:   [de-identified]                           ADMIT DATE: 2022  PROVIDER:     Alex Chan, DO    DATE OF EE/15/2022    REFERRING PROVIDER:  Rufino Bonilla MD    REASON FOR STUDY:  Altered mental status, loss of consciousness. BRIEF HISTORY AND NEUROLOGIC FINDINGS:  The patient is a 49-year-old  female being evaluated for reported altered mental status and loss of  consciousness. MEDICATIONS:  The patient's centrally acting medications listed include  oxycodone, Cymbalta and Synthroid. EEG FINDINGS:  This is a 20-channel digital EEG performed utilizing  bipolar and referential montages. Wakefulness, drowsiness and sleep  were obtained during the recording. During maximum wakefulness, there  was a moderate voltage, symmetric, fairly well-regulated and reactive 8  Hz posterior background rhythm. The anterior background consists of low  voltage fast frequencies. Drowsiness is manifested by attenuation of  waking background rhythms. Sleep was manifested by K-complexes and  sleep spindles. Photic stimulation was performed at various flash frequencies and evoked  a symmetric posterior driving response at a few isolated frequencies. Hyperventilation exercise was not performed due to the patient's  clinical history and/or age. A 1-channel EKG rhythm strip was reviewed and showed what appeared to be  a paced rhythm with heart rates in the low to mid 50s. EEG DIAGNOSIS:  Essentially normal awake and asleep EEG. CLINICAL INTERPRETATION:  No definite epileptiform activity or evidence  for focal cerebral dysfunction was present during this recording.   If  seizures remain a clinical concern, then a repeat EEG may be of further  benefit. Clinical correlation is advised.         Mehnaz Colorado DO    D: 11/15/2022 17:54:01       T: 11/15/2022 17:56:22     [id number]  Job#: [id number]     Doc#: [id number]    CC:

## 2022-11-16 NOTE — PLAN OF CARE
-- Message is from the [facility]--    Referral Request  Name of Specialist: ENRIQUE SO  Provider's specialty: Endocrinology    Medical condition for referral:  TYPE 2 DIABETES. PATIENT WILL NEED REFERRAL TO SEE ENRIQUE SO AFTER HER DIABETIC EDUCATOR CLASS.    Is this a NEW request?: yes      Referral ordered by: PATIENT      Insurance type: [facility] [employer]      [facility] / Plan: [facility] CHOICE [id number] / Product Type: O MISC      Preferred Delivery Method   Fax - number to send to: [phone]    Caller Information       Type Contact Phone    08/13/2019 12:42 PM Phone (Incoming) Courtney Garcia (Self) [phone] (M)          Alternative phone number: NA    Turnaround time given to caller:   \"This message will be sent to [state Provider's full name]. The clinical team will return your call as soon as they review your message. Typically, it takes 3 business days to process referral requests.\"   Problem: Discharge Planning  Goal: Discharge to home or other facility with appropriate resources  Outcome: Completed     Problem: Safety - Adult  Goal: Free from fall injury  Outcome: Completed     Problem: Skin/Tissue Integrity  Goal: Absence of new skin breakdown  Description: 1. Monitor for areas of redness and/or skin breakdown  2. Assess vascular access sites hourly  3. Every 4-6 hours minimum:  Change oxygen saturation probe site  4. Every 4-6 hours:  If on nasal continuous positive airway pressure, respiratory therapy assess nares and determine need for appliance change or resting period.   Outcome: Completed     Problem: Pain  Goal: Verbalizes/displays adequate comfort level or baseline comfort level  Outcome: Completed     Problem: Chronic Conditions and Co-morbidities  Goal: Patient's chronic conditions and co-morbidity symptoms are monitored and maintained or improved  Outcome: Completed

## 2022-11-16 NOTE — PROGRESS NOTES
Physical Therapy  Facility/Department: [facility] PROGRESSIVE CARE  Physical Therapy Treatment Note/Discharge Summary    Name: Sneha Garcia  : 1945  MRN: [id number]  Date of Service: 2022    Discharge Recommendations:  Home with assist PRN   PT Equipment Recommendations  Equipment Needed: No  Sneha Garcia scored a 23/24 on the AM-PAC short mobility form. At this time, no further PT is recommended upon discharge. Assessment   Body Structures, Functions, Activity Limitations Requiring Skilled Therapeutic Intervention: Decreased functional mobility ; Decreased cognition  Assessment: 69 y/o female admit 2022 with Syncope/Collapse, Opiate Withdrawal. PMH extensive including CHF, AVR, PAF, Bradycardia, Pacemaker (2022), AAA, COPD, CKD, Lumbar Stenosis, Sacral Fx. PTA pt living with family in home with few steps to enter and 1st floor bed/bath; reports independent daily care and functional mobility. Currently, Pt reports feeling better; hopeful to return home soon. Pt netta oob, amb functional distances; alittle guarded (given back/foot issues that are chronic) although no LOB noted. At this time, pt/family report adequate assist/support for d/c home. No further PT indicated at this time. Therapy Prognosis: Good  History: 69 y/o female admit 2022 with Syncope/Collapse, Opiate Withdrawal. PMH extensive including CHF, AVR, PAF, Bradycardia, Pacemaker (2022), AAA, COPD, CKD, Lumbar Stenosis, Sacral Fx. Exam: See above. Clinical Presentation: See above. Barriers to Learning: Cognitive. Requires PT Follow-Up: No  Activity Tolerance  Activity Tolerance: Patient tolerated treatment well  Activity Tolerance Comments: Pt reports feeling better; hopeful to return home soon. Pt netta oob, amb functional distances; alittle guarded (given back/foot issues that are chronic) although no LOB noted.      Plan   Physcial Therapy Plan  General Plan: Discharge  Current Treatment Recommendations: Strengthening, Functional mobility training, Transfer training, Gait training, Safety education & training, Patient/Caregiver education & training  Safety Devices  Type of Devices: Call light within reach, Left in chair, Nurse notified (Nursing acknowledges pt up ad monica.)  Restraints  Restraints Initially in Place: No     Restrictions  Restrictions/Precautions  Restrictions/Precautions: Up as Tolerated  Position Activity Restriction  Other position/activity restrictions: Room Air. Subjective   General  Chart Reviewed: Yes  Patient assessed for rehabilitation services?: Yes  Additional Pertinent Hx: 69 y/o female admit 11/12/2022 with Syncope/Collapse, Opiate Withdrawal. PMH extensive including CHF, AVR, PAF, Bradycardia, Pacemaker (4/2022), AAA, COPD, CKD, Lumbar Stenosis, Sacral Fx. Family / Caregiver Present: No  Referring Practitioner: Dr. Penney Hodgkin: Within Functional Limits  Subjective  Subjective: Pt reports feeling better; hope to go home soon. Reports chronic back pain and foot issues (callous form) which affect walking. Social/Functional History  Social/Functional History  Lives With: Family (Dtr, Grandson.)  Type of Home: House  Home Layout: Two level, Able to Live on Main level with bedroom/bathroom, Laundry in basement  Home Access: Stairs to enter without rails (1+1 steps to enter.)  Bathroom Shower/Tub: Walk-in shower  Bathroom Toilet: Handicap height  Bathroom Equipment: Grab bars in shower, Shower chair  Bathroom Accessibility: Accessible  Home Equipment: Sriram Crew, rolling, Cane  ADL Assistance: Independent  Homemaking Assistance:  (Shared with family.)  Ambulation Assistance: Independent (Without assist device pta.)  Transfer Assistance: Independent  Active : No (Relies on family.)  Occupation: Retired  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   Additional Comments: Dtr Dorothy Brown) and Ryne Hayes) both work outside of the home.  Vision/Hearing  Vision  Vision: Within Functional Limits  Hearing  Hearing: Within functional limits    Cognition   Orientation  Overall Orientation Status: Within Functional Limits  Cognition  Overall Cognitive Status: WFL     Objective                      Bed mobility  Supine to Sit: Independent  Sit to Supine: Independent  Transfers  Sit to Stand: Independent  Stand to Sit: Independent  Ambulation  Surface: Level tile; Carpet  Device: No Device  Distance: Pt amb 150' x 2 without assist device Independently. Diminished step length/foot flat; alittle guarded (due to chronic back pain per pt). No LOB noted. Gait slow/steady. AM-PAC Score  AM-PAC Inpatient Mobility Raw Score : 23 (11/16/22 1002)  AM-PAC Inpatient T-Scale Score : 56.93 (11/16/22 1002)  Mobility Inpatient CMS 0-100% Score: 11.2 (11/16/22 1002)  Mobility Inpatient CMS G-Code Modifier : CI (11/16/22 1002)          Goals  Short Term Goals  Time Frame for Short Term Goals: Upon d/c acute care setting. Short Term Goal 1: Bed Mob SBA. 11/15 Goal met. Revised : Bed Mob Independent. 11/16 Goal met. Short Term Goal 2: Transfers with/without assist device SBA. 11/15 Goal met. Revised : Transfers without assist device Independent. 11/16 Goal met. Short Term Goal 3: Amb with/without assist device 50-75' CGA. 11/15 Goal met. Revised : Amb without assist device  150' Supervision. 11/16 Goal met. Patient Goals   Patient Goals : Return home with family.        Education  Patient Education  Education Given To: Patient  Education Provided Comments: Role of PT, POC, Need to call for assist.  Education Method: Verbal  Barriers to Learning: None  Education Outcome: Verbalized understanding      Therapy Time   Individual Concurrent Group Co-treatment   Time In 0900         Time Out 0925         Minutes [facility],

## 2022-11-16 NOTE — CARE COORDINATION
INITIAL CASE MANAGEMENT ASSESSMENT AND DISCHARGE SUMMARY     Reviewed chart, spoke with patient to assess possible discharge needs. Explained Case Management role/services. Living Situation: The patient confirmed address. The patient lives in a ranch style home with her daughter Jessica Weston. She has two dogs. And she uses one step before entering her home. ADLs: IPTA     DME: RW, Lift chair     PT/OT Recs: Not ordered     Active Services: None     Transportation: Daughter provides transportation     Medications: [address]     PCP: Anthony Oliver MD      HD/PD: n/a     PLAN/COMMENTS: Plan is to return home with daughter. SW/CM provided contact information for patient or family to call with any questions. SW/CM will follow and assist as needed.     Electronically signed by Milton Perez RN on 11/16/2022 at 11:21 AM

## 2022-11-16 NOTE — CARE COORDINATION
11/16/22 1125   IMM Letter   IMM Letter given to Patient/Family/Significant other/Guardian/POA/by: Provided to patient at bedside by Rich Nath RN. Education provided to patient, patient reported no questions and verbalized understanding. Patient aware of 4 hours allotted time to determine if they choose to pursue Medicare appeal process. Patient verbalized readiness for discharge.    IMM Letter date given: 11/16/22   IMM Letter time given: 1117   Electronically signed by Charles Starks RN on 11/16/2022 at 11:26 AM

## 2022-11-16 NOTE — CARE COORDINATION
Wound care consulted for foot and back wounds. Pt awake and alert walking the hallway. Pt back to room. Pt has calloused dry area to plantar L foot. Being treated outpatient already by podiatrist Will follow up once discharged. No current wound care needs. Pt has abrasion/open area to mid back. Recommend patient follow up with a dermatologist once d/c if she would like. Currently covered with foam border. Will not continue to follow. Please re-consult if needed.   Electronically signed by Oziel Issa RN [id number] on 11/16/2022 at 12:55 PM

## 2022-11-16 NOTE — PROGRESS NOTES
Progress Note    Updates  No significant events overnight. Remains at her neurologic baseline.       Active Ambulatory Problems     Diagnosis Date Noted    Hypothyroidism     Essential hypertension     Hyperlipidemia LDL goal <70     Paroxysmal atrial fibrillation (HCC)     Cerebral infarction ([address]     Arthritis     Bladder tumor 02/11/2014    Pulmonary emphysema ([address] 04/28/2014    Smoker 04/28/2014    Closed sacral fracture ([address] 04/28/2014    Stenosis of right carotid artery     Intermittent claudication ([address] 04/30/2014    Adjustment reaction with anxiety 05/01/2014    Symptomatic bradycardia 05/03/2014    Pulmonary HTN ([address]     Sacral fracture, closed ([address]     Acute on chronic diastolic heart failure ([address]     Left hand weakness 06/17/2015    S/P AVR (aortic valve replacement)     Leg edema 06/22/2015    Abdominal aortic aneurysm 12/20/2010    Acute respiratory failure with hypoxia ([address] 11/27/2015    CAP (community acquired pneumonia) 11/27/2015    COPD exacerbation ([address] 11/27/2015    Dyspnea and respiratory abnormalities 11/27/2015    Hemoptysis 11/27/2015    Chronic obstructive pulmonary disease ([address]     Coronary artery disease involving native coronary artery of native heart without angina pectoris     PAF (paroxysmal atrial fibrillation) ([address] 07/30/2016    Nonrheumatic mitral valve regurgitation     Lumbar stenosis 08/01/2016    Chronic bilateral low back pain with bilateral sciatica     Acute metabolic encephalopathy 71/59/2014    Hypothyroidism due to Hashimoto's thyroiditis     Chronic systolic CHF (congestive heart failure), NYHA class 2 (HCC)     ANITA (acute kidney injury) ([address]     Pulmonary nodule     Orthostatic hypotension     Bright red rectal bleeding 02/18/2018    Acute blood loss anemia 02/18/2018    Rectal bleeding 02/18/2018    Sinus bradycardia     Gastrointestinal hemorrhage     Macular degeneration 01/01/2018    Thoracic spine pain 07/06/2020    Anginal equivalent ([address] 04/26/2021 Atrial fibrillation with RVR ([facility] 10/26/2021    History of GI bleed 61/09/2717    Basilic vein thrombosis 50/04/6265    Pressure injury of buttock, stage 2, unspecified laterality ([facility] 01/24/2022    Stage 3a chronic kidney disease ([facility] 01/24/2022    Obstructive sleep apnea     Biventricular cardiac pacemaker in situ 04/11/2022    Cellulitis 07/07/2022    Hypokalemia 07/08/2022     Past Medical History:   Diagnosis Date    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    Bladder cancer ([facility] 02/2014    Carotid stenosis 04/30/2014    Chronic back pain     Chronic prescription opiate use     Diverticulosis     HTN (hypertension)     Hyperlipidemia     Ischemic stroke 2013    Lumbar spine stenosis 2017    Osteoarthritis     Peripheral neuropathy     PVD (peripheral vascular disease) (HCC)     Tobacco use disorder in remission      Current Facility-Administered Medications:   oxyCODONE HCl (OXY-IR) immediate release tablet 10 mg, 10 mg, Oral, Q4H  cefUROXime (CEFTIN) tablet 250 mg, 250 mg, Oral, 2 times per day  potassium chloride 10 mEq/100 mL IVPB (Peripheral Line), 10 mEq, IntraVENous, Once  potassium chloride 10 mEq/100 mL IVPB (Peripheral Line), 10 mEq, IntraVENous, Once  amiodarone (CORDARONE) tablet 100 mg, 100 mg, Oral, Daily  DULoxetine (CYMBALTA) extended release capsule 60 mg, 60 mg, Oral, Daily  levothyroxine (SYNTHROID) tablet 88 mcg, 88 mcg, Oral, QAM AC  metoprolol succinate (TOPROL XL) extended release tablet 50 mg, 50 mg, Oral, Daily  pantoprazole (PROTONIX) tablet 40 mg, 40 mg, Oral, QAM AC  rosuvastatin (CRESTOR) tablet 40 mg, 40 mg, Oral, Nightly  sodium chloride flush 0.9 % injection 5-40 mL, 5-40 mL, IntraVENous, 2 times per day  sodium chloride flush 0.9 % injection 5-40 mL, 5-40 mL, IntraVENous, PRN  0.9 % sodium chloride infusion, , IntraVENous, PRN  enoxaparin (LOVENOX) injection 40 mg, 40 mg, SubCUTAneous, Nightly  ondansetron (ZOFRAN-ODT) disintegrating tablet 4 mg, 4 mg, Oral, Q8H PRN **OR** ondansetron (ZOFRAN) injection 4 mg, 4 mg, IntraVENous, Q6H PRN  polyethylene glycol (GLYCOLAX) packet 17 g, 17 g, Oral, Daily PRN  acetaminophen (TYLENOL) tablet 650 mg, 650 mg, Oral, Q6H PRN **OR** acetaminophen (TYLENOL) suppository 650 mg, 650 mg, Rectal, Q6H PRN  potassium chloride 10 mEq/100 mL IVPB (Peripheral Line), 10 mEq, IntraVENous, PRN  magnesium sulfate 2000 mg in 50 mL IVPB premix, 2,000 mg, IntraVENous, PRN  lidocaine 4 % external patch 1 patch, 1 patch, TransDERmal, Daily    Current Outpatient Medications on File Prior to Encounter   Medication Sig    aspirin 81 MG EC tablet Take 81 mg by mouth daily    valsartan (DIOVAN) 40 MG tablet Take 0.5 tablets by mouth daily    rosuvastatin (CRESTOR) 40 MG tablet TAKE ONE TABLET BY MOUTH EVERY EVENING    metoprolol succinate (TOPROL XL) 50 MG extended release tablet TAKE 1 TABLET BY MOUTH DAILY    ezetimibe (ZETIA) 10 MG tablet TAKE ONE TABLET BY MOUTH DAILY    pantoprazole (PROTONIX) 40 MG tablet One po qd    sennosides-docusate sodium (SENOKOT-S) 8.6-50 MG tablet Take 1 tablet by mouth in the morning and at bedtime    torsemide (DEMADEX) 20 MG tablet TAKE 1 TABLET BY MOUTH EVERY MORNING. TAKE EXTRA PILL ON DAYS WEIGHT INCREASES BY 2+ LBS    tiZANidine (ZANAFLEX) 4 MG capsule TAKE ONE-HALF TABLET BY MOUTH EVERY 8 HOURS AS NEEDED (MUSCLE SPASM)    levothyroxine (SYNTHROID) 88 MCG tablet TAKE 1 TABLET BY MOUTH EVERY DAY    potassium chloride (KLOR-CON M) 20 MEQ TBCR extended release tablet Take 1 tablet by mouth 2 times daily    amiodarone (CORDARONE) 200 MG tablet Take 0.5 tablets by mouth in the morning.     ipratropium (ATROVENT) 0.03 % nasal spray INHALE 2 DOSES INTO EACH NOSTRIL THREE TIMES A DAY IF NEEDED FOR RHINITIS    DULoxetine (CYMBALTA) 60 MG extended release capsule Take 60 mg by mouth daily    diclofenac sodium (VOLTAREN) 1 % GEL Apply 2 g topically daily    oxyCODONE HCl (OXY-IR) 10 MG immediate release tablet Take 10 mg by mouth every 4 hours as needed for Pain. Exam  Blood pressure (!) 144/59, pulse 52, temperature 98.5 °F (36.9 °C), temperature source Oral, resp. rate 17, height 5' 1\" (1.549 m), weight 127 lb 3.3 oz (57.7 kg), SpO2 95 %, not currently breastfeeding. Constitutional    Vital signs: BP, HR, and RR reviewed   General alert, no distress  Eyes: unable to visualize the fundi  Cardiovascular: no peripheral edema. Psychiatric: cooperative with examination, no psychotic behavior noted. Neurologic  Mental status:   orientation to person, place, time. Attention intact as able to attend well to the exam     Language fluent in conversation   Comprehension intact; follows simple commands  Cranial nerves:   CN2: visual fields full   CN 3,4,6: extraocular muscles intact. Pupils are equal, round, reactive bilaterally. CN7: face symmetric without dysarthria  CN8: hearing grossly intact  CN12: tongue midline with protrusion  Strength: good strength in all 4 extremities   Sensory: light touch intact in all 4 extremities. Cerebellar/coordination: finger nose finger normal without ataxia  Tone: normal in all 4 extremities  Gait: deferred at this time for comfort. ROS  Constitutional- No weight loss or fevers  Eyes- No diplopia. No photophobia. Ears/nose/throat- No dysphagia. No Dysarthria  Cardiovascular- No palpitations. No chest pain  Respiratory- No dyspnea. No Cough  Gastrointestinal- No Abdominal pain. No Vomiting. Genitourinary- No incontinence. No urinary retention  Musculoskeletal- No myalgia. No arthralgia  Skin- No rash. No easy bruising. Psychiatric- No depression. No anxiety  Endocrine- No diabetes. No thyroid issues. Hematologic- No bleeding difficulty. No fatigue  Neurologic- No weakness. No Headache.     Labs  Glucose 97  Na 138  K 4.7  BUN 14  Cr 0.9    WBC 7.9K  Hg 13.2  Platelets 603    TSH Reflex FT4 - 0.68    UA + nitrites, large LE, 315 WBC, 4+ bacteria. Culture pending. Studies  EEG 11/15/22  Normal.     MRI brain w/o 11/15/22, independently reviewed  Impression   Motion artifact. No acute intracranial abnormality. 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 Mild parenchymal volume loss. Mild chronic microvascular disease. No intracranial hemorrhage. Possible old petechial hemorrhage or cavernoma in R frontal lobe. Carotid US 11/14/22  The right internal carotid artery appears to have a 50-79% diameter reducing    stenosis based on velocity criteria. TTE 11/14/22   Left ventricular cavity size is normal.   Left ventricular function is low normal with ejection fraction estimated at   50-60%. Abnormal septal motion is present. Diastolic filling parameters suggest   grade III diastolic dysfunction. Thickening of leaflets of mitral valve. Mitral annular calcification is   present. Moderate mitral regurgitation is present. A bioprosthetic aortic valve appears well seated with a maximum velocity of   3.01m/s and a mean gradient of 19mmHg. Trivial aortic regurgitation. The right ventricle is normal in size and function. Impression/Recommendations  AMS/word finding difficulty. LOC w/ reported convulsive activity. Urinary tract infection  PAF; RENETTA closure device. TAMIKO stenosis. Orthostatic hypotension. Metabolic derangements. Clinically she is back to her baseline. There is no evidence of stroke. This seems to have been most consistent w/ a seizure in the setting of a urinary tract infection and electrolyte abnormalities. Her EEG was normal.  Convulsive syncope is also on the differential.      This being her first possible seizure, if so provoked and her testing essentially unrevealing, will hold off on starting an AED. She should follow up outpatient for her R ICA stenosis, either w/ cardiology or vascular surgery. Continue home antiplatelet/statin therapy. There was mention on her [facility] MRI of either an old petechial hemorrhage or small R frontal lobe cavernoma. This may need to be monitored. We will sign off. Please call back w/ any additional questions or concerns. Thank you.       Kayli Whelan NP  [address] [place] [address] [address] Neurology    A copy of this note was provided for Dr Glendy Camacho MD Erythromycin Counseling:  I discussed with the patient the risks of erythromycin including but not limited to GI upset, allergic reaction, drug rash, diarrhea, increase in liver enzymes, and yeast infections.

## 2022-11-16 NOTE — PROGRESS NOTES
Pt given discharge instruction , family at bedside, pt has no complaints at the moment, will continue to monitor. No

## 2022-11-16 NOTE — PROGRESS NOTES
Speech Language Pathology  Facility/Department: [facility] PROGRESSIVE CARE  Daily Treatment Note  NAME: Sneha Garcia  : 1945  MRN: [id number]    Date of Eval: 2022  Evaluating Therapist: Devon Lennon SLP    Patient Diagnosis(es): has Hypothyroidism; Essential hypertension; Hyperlipidemia LDL goal <70; Paroxysmal atrial fibrillation ([address] Cerebral infarction ([address] Arthritis; Bladder tumor; Pulmonary emphysema ([address] Smoker; Closed sacral fracture ([address] Stenosis of right carotid artery; Intermittent claudication ([address] Adjustment reaction with anxiety; Symptomatic bradycardia; Pulmonary HTN ([address] Sacral fracture, closed ([address] Acute on chronic diastolic heart failure ([address] Left hand weakness; S/P AVR (aortic valve replacement); Leg edema; Abdominal aortic aneurysm; Acute respiratory failure with hypoxia ([address] CAP (community acquired pneumonia); COPD exacerbation ([address] Dyspnea and respiratory abnormalities; Hemoptysis; Chronic obstructive pulmonary disease ([address] Coronary artery disease involving native coronary artery of native heart without angina pectoris; PAF (paroxysmal atrial fibrillation) ([address] Nonrheumatic mitral valve regurgitation; Lumbar stenosis; Chronic bilateral low back pain with bilateral sciatica; Acute metabolic encephalopathy; Hypothyroidism due to Hashimoto's thyroiditis; Chronic systolic CHF (congestive heart failure), NYHA class 2 ([address] ANITA (acute kidney injury) ([address] Pulmonary nodule; Orthostatic hypotension; Bright red rectal bleeding; Acute blood loss anemia; Rectal bleeding; Sinus bradycardia; Gastrointestinal hemorrhage; Macular degeneration; Thoracic spine pain; Anginal equivalent ([address] Atrial fibrillation with RVR ([address] History of GI bleed; Basilic vein thrombosis; Pressure injury of buttock, stage 2, unspecified laterality ([address] Stage 3a chronic kidney disease ([address] Obstructive sleep apnea; Biventricular cardiac pacemaker in situ; Cellulitis;  Hypokalemia; Syncope and collapse; and TIA (transient ischemic attack) on their problem list.  Onset Date: 11/12/2022    Primary Complaint: was having some fogginess with her thinking. Pt and her dtr report it is resolving and at one point pt states she is back to her baseline. Pain:  Pain Assessment  Pain Assessment: None - Denies Pain  Pain Level: 5      Oral Motor / Motor Speech:  Not addressed this date. Auditory Comprehension:   Appears WNL in conversation        Verbal Expression:  Pt reports difficulty with WF at baseline and that she uses strategies \"I say another word. \"    Cognitive Linguistic:  Pt reports some fuzziness with thinking persists but it is getting better per pt and dtr. Review of recommendation for dtr/grandtr to monitor finances and meds initially. Dtr assist with finances at baseline due to pt's low vision. Pt completes basic math accurately 2/2. Pt recalls her performance on word recall task from 11/15 accurately  Pt oriented. Patient/Family/Caregiver Education:  Education on purpose of ST and availabiltiy for OP tx if pt does not improve as she would like. Pt is unable to participate in some cognitive tasks for general stimulation due to low vision, others pt does not like. Impressions:  Pt with eval on 11/15 with findings of Mild-moderate cognitive impairments characterized by deficits with short term memory, working memory, executive function and anomia in conversation  Pt with short session due to pt walking in lao with dtr and wound care nurse arrives. Purpose of session is primarily teaching purpose of ST and options for OP therapy if pt feels any fogginess with thinking persists. Advised of how to begin tx. Pt and her dtr do not feel this will be needed. Pt appears to be [facility] based on limited session. Pt is to be discharged to home this date with no ST recommended unless pt notices deficits at home, at which time pt should d/w Md.           Plan:  Pt being discharged to home today. Pt and dtr (Zonia) aware of OP speech therapy if pt's cognition does not return to her baseline to their satisfaction and they are advised to notify Md if OP tx is needed. This note is to serve as a discharge report should the pt be discharged prior to next Speech Therapy session.     Coded Time: 15  Total Time: 4715 [facility], [place] CCC/SLP 2551  Speech Language Pathologist  11/16/22  11:59 AM

## 2022-11-17 ENCOUNTER — CARE COORDINATION (OUTPATIENT)
Dept: CASE MANAGEMENT | Age: 77
End: 2022-11-17

## 2022-11-17 NOTE — DISCHARGE SUMMARY
Physician Discharge Summary     Patient ID:   Nixon Peterson  [id number]  68 y.o.  1945    Admit date: 11/12/2022    Discharge date and time: 11/16/2022  1:26 PM     Admitting Physician: Jen Reilly    Discharge Physician: Marcie Cortes MD    Discharge Diagnoses:   Syncope and collapse  Acute metabolic encephalopathy  UTI  Smoker  Hypothyroid  PAF  Lumbar stenosis        Discharged Condition: fair      Hospital Course: 69 yo female who was admitted after an episode of garbled speech followed by a fall/syncopal episode at home. When emts arrived she appeared obtunded and was breathing slowly. She did not respond to sternal rub. She got narcan and became agitated aggressive and hyperactive and combative. She was still combative on arrival to the ER. The following day she reported feeling like she was in a dream.   Her baseline aphasia was worse than her baseline. She was worked up for Seizure disorder and cva and her eeg was normal and her mri showed no acute cva just the old strokes. She did have a uti and grew ecoli and proteus she was sent home on ceftin which covered both. Not clear whether she actually had a tia or vasovagal episode. After the first day of feeling off she became progressively more aware.    In addition the hyponatremia may have also accounted for her change in mental status     Consults:   IP CONSULT TO CARDIOLOGY  IP CONSULT TO NEUROLOGY    Discharge Exam:  General Appearance: alert and oriented to person, place and time, well-developed and well-nourished, in no acute distress  Skin: warm and dry, no rash or erythema  Head: normocephalic and atraumatic  Eyes: conjunctivae normal and sclera anicteric  ENT: hearing grossly normal bilaterally and sinuses non-tender  Neck: neck supple and non tender without mass, no thyromegaly or thyroid nodules, no cervical lymphadenopathy   Pulmonary/Chest: clear to auscultation bilaterally- no wheezes, rales or rhonchi, normal air movement, no respiratory distress  Cardiovascular: normal rate, normal S1 and S2, no gallops and no carotid bruits  Abdomen: soft, non-tender, non-distended, normal bowel sounds, no masses or organomegaly  Extremities: no cyanosis, clubbing or edema  Musculoskeletal: no swollen joints and no tender joints,  Neurologic: coordination normal and speech normal, moves all 4 extremities    Disposition: home  Hospital Meds:  No current facility-administered medications for this encounter. Current Outpatient Medications   Medication Sig Dispense Refill    lidocaine 4 % external patch Place 1 patch onto the skin daily 30 each 3    aspirin 81 MG EC tablet Take 81 mg by mouth daily      valsartan (DIOVAN) 40 MG tablet Take 0.5 tablets by mouth daily 30 tablet 2    rosuvastatin (CRESTOR) 40 MG tablet TAKE ONE TABLET BY MOUTH EVERY EVENING 30 tablet 2    metoprolol succinate (TOPROL XL) 50 MG extended release tablet TAKE 1 TABLET BY MOUTH DAILY 30 tablet 2    ezetimibe (ZETIA) 10 MG tablet TAKE ONE TABLET BY MOUTH DAILY 30 tablet 2    pantoprazole (PROTONIX) 40 MG tablet One po qd 30 tablet 2    sennosides-docusate sodium (SENOKOT-S) 8.6-50 MG tablet Take 1 tablet by mouth in the morning and at bedtime 60 tablet 5    torsemide (DEMADEX) 20 MG tablet TAKE 1 TABLET BY MOUTH EVERY MORNING. TAKE EXTRA PILL ON DAYS WEIGHT INCREASES BY 2+ LBS 60 tablet 0    tiZANidine (ZANAFLEX) 4 MG capsule TAKE ONE-HALF TABLET BY MOUTH EVERY 8 HOURS AS NEEDED (MUSCLE SPASM) 30 capsule 3    levothyroxine (SYNTHROID) 88 MCG tablet TAKE 1 TABLET BY MOUTH EVERY DAY 30 tablet 5    potassium chloride (KLOR-CON M) 20 MEQ TBCR extended release tablet Take 1 tablet by mouth 2 times daily 60 tablet 3    amiodarone (CORDARONE) 200 MG tablet Take 0.5 tablets by mouth in the morning.  45 tablet 3    ipratropium (ATROVENT) 0.03 % nasal spray INHALE 2 DOSES INTO EACH NOSTRIL THREE TIMES A DAY IF NEEDED FOR RHINITIS 30 mL 5    DULoxetine (CYMBALTA) 60 MG extended release capsule Take 60 mg by mouth daily      diclofenac sodium (VOLTAREN) 1 % GEL Apply 2 g topically daily      oxyCODONE HCl (OXY-IR) 10 MG immediate release tablet Take 10 mg by mouth every 4 hours as needed for Pain. Take Home Meds:  Discharge Medication List as of 11/16/2022 11:58 AM        START taking these medications    Details   lidocaine 4 % external patch Place 1 patch onto the skin daily, TransDERmal, DAILY Starting Wed 11/16/2022, Disp-30 each, R-3, Normal           CONTINUE these medications which have NOT CHANGED    Details   aspirin 81 MG EC tablet Take 81 mg by mouth dailyHistorical Med      valsartan (DIOVAN) 40 MG tablet Take 0.5 tablets by mouth daily, Disp-30 tablet, R-2Normal      rosuvastatin (CRESTOR) 40 MG tablet TAKE ONE TABLET BY MOUTH EVERY EVENING, Disp-30 tablet, R-2Normal      metoprolol succinate (TOPROL XL) 50 MG extended release tablet TAKE 1 TABLET BY MOUTH DAILY, Disp-30 tablet, R-2Normal      ezetimibe (ZETIA) 10 MG tablet TAKE ONE TABLET BY MOUTH DAILY, Disp-30 tablet, R-2Normal      pantoprazole (PROTONIX) 40 MG tablet One po qd, Disp-30 tablet, R-2Normal      sennosides-docusate sodium (SENOKOT-S) 8.6-50 MG tablet Take 1 tablet by mouth in the morning and at bedtime, Disp-60 tablet, R-5Normal      torsemide (DEMADEX) 20 MG tablet TAKE 1 TABLET BY MOUTH EVERY MORNING. TAKE EXTRA PILL ON DAYS WEIGHT INCREASES BY 2+ LBS, Disp-60 tablet, R-0Normal      tiZANidine (ZANAFLEX) 4 MG capsule TAKE ONE-HALF TABLET BY MOUTH EVERY 8 HOURS AS NEEDED (MUSCLE SPASM), Disp-30 capsule, R-3Normal      levothyroxine (SYNTHROID) 88 MCG tablet TAKE 1 TABLET BY MOUTH EVERY DAY, Disp-30 tablet, R-5Normal      potassium chloride (KLOR-CON M) 20 MEQ TBCR extended release tablet Take 1 tablet by mouth 2 times daily, Disp-60 tablet, R-3REFILL REQUESTED 12/22/21 @@ 11:46 A. Julián Melchor      amiodarone (CORDARONE) 200 MG tablet Take 0.5 tablets by mouth in the morning., Disp-45 tablet, R-3Normal      ipratropium (ATROVENT) 0.03 % nasal spray INHALE 2 DOSES INTO EACH NOSTRIL THREE TIMES A DAY IF NEEDED FOR RHINITIS, Disp-30 mL, R-5REFILL REQUESTED TUES 10/19/21 @@ 10:35AMNormal      DULoxetine (CYMBALTA) 60 MG extended release capsule Take 60 mg by mouth dailyHistorical Med      diclofenac sodium (VOLTAREN) 1 % GEL Apply 2 g topically daily, Topical, DAILY, Historical Med      oxyCODONE HCl (OXY-IR) 10 MG immediate release tablet Take 10 mg by mouth every 4 hours as needed for Pain. Historical Med           STOP taking these medications       fluticasone-salmeterol (ADVAIR) 250-50 MCG/ACT AEPB diskus inhaler Comments:   Reason for Stopping:         betamethasone dipropionate 0.05 % cream Comments:   Reason for Stopping:         lactobacillus (CULTURELLE) capsule Comments:   Reason for Stopping:         cephALEXin (KEFLEX) 500 MG capsule Comments:   Reason for Stopping:         docusate sodium (DOK) 100 MG capsule Comments:   Reason for Stopping:                 Activity: ambulate in house  Diet: cardiac diet  Wound Care: as directed small area on back    Follow-up with [facility] and pain mangement   Time Spent: over 35 minutes spent performing hospital discharge  Signed:  Colt Phillips MD  11/16/2022  8:47 PM

## 2022-11-17 NOTE — CARE COORDINATION
[facility] Care Transitions Initial Follow Up Call    Call within 2 business days of discharge: Yes    Care Transition Nurse contacted the patient by telephone to perform post hospital discharge assessment. Verified name and  with patient as identifiers. Provided introduction to self, and explanation of the Care Transition Nurse role. Patient: Krystina Juarez Patient : 1945   MRN: [id number]  Reason for Admission: syncope and collapse  Discharge Date: 22 RARS: Readmission Risk Score: 14.1      Last Discharge  Street       Date Complaint Diagnosis Description Type Department Provider    22 Altered Mental Status Syncope and collapse . .. ED to Hosp-Admission (Discharged) (ADMITTED) [facility] Damaris Saenz MD; [facility],... Was this an external facility discharge? No Discharge Facility: n/a    Challenges to be reviewed by the provider   Additional needs identified to be addressed with provider: No  none               Method of communication with provider: none. Spoke with Gudelia Flroes who states she is feeling better today. States she is able to eat and drink this morning without complaint. Denies fever, chills, cp or sob. Able to walk around without dizziness or lightheadedness. Pt does state she has sl burning sensation with urination but this is better and she finished her antibiotics in the hospital. Reminded pt if this becomes worse to notify PCP. Pt did not weight herself this am but states she was snacking throughout the night and therefore did not feel it would be accurate. States she weighs self daily. Denies swelling to ankles or legs. Pt also complains of constipation. Using tea to help naturally but does have stool softeners at home if needed. Pt states she has a PCP appt in 6 days but one is not in the appt chart. Will route a message to PCP office. Medication rec, ACP and RPM could not be discussed today as pt had to go. Will call pt later today or tomorrow to follow up. Care Transition Nurse reviewed discharge instructions with patient who verbalized understanding. The patient was given an opportunity to ask questions and does not have any further questions or concerns at this time. Were discharge instructions available to patient? Yes. Reviewed appropriate site of care based on symptoms and resources available to patient including: PCP. The patient agrees to contact the PCP office for questions related to their healthcare. Medication reconciliation was NOT performed with patient, who verbalizes understanding of administration of home medications. Medications reviewed, 1111F entered: N/A    Was patient discharged with a pulse oximeter? no    Non-face-to-face services provided:  Obtained and reviewed discharge summary and/or continuity of care documents    Offered patient enrollment in the Remote Patient Monitoring (RPM) program for in-home monitoring: NA.    Care Transitions 24 Hour Call    Do you have a copy of your discharge instructions?: Yes  Have you been contacted by a [address]?: No  Have you scheduled your follow up appointment?: Yes  How are you going to get to your appointment?: Car - drive self  Post Acute Services: [address] you have support at home?: Child  Do you feel like you have everything you need to keep you well at home?: Yes  Are you an active caregiver in your home?: No  Care Transitions Interventions         Follow Up  Future Appointments   Date Time Provider Vickie Wilson   11/30/2022  8:00 AM SCHEDULE, [facility] REMOTE TRANSMISSION [facility] BREANA [facility]   3/7/2023  3:00 PM Marko Flaherty MD [facility]       Care Transition Nurse provided contact information. Plan for follow-up call in 1-2 days based on severity of symptoms and risk factors.   Plan for next call: symptom management-sob, dizziness, lightheadedness, s/s uti  follow-up appointment-PCP appt  medication management-med rec    JACKY KhanN, RN   Care Transition Nurse  Mobile: [phone]

## 2022-11-18 ENCOUNTER — CARE COORDINATION (OUTPATIENT)
Dept: CASE MANAGEMENT | Age: 77
End: 2022-11-18

## 2022-11-18 NOTE — CARE COORDINATION
[facility] Care Transitions Follow Up Call    Patient: Hermelindo Galeano  Patient : 1945   MRN: [id number]  Reason for Admission: syncope and collapse  Discharge Date: 22 RARS: Readmission Risk Score: 14.1    Call placed to pt to discuss ACP, med rec and RPM program. Message was left with hippa compliant VM. Will continue to follow.      Follow Up  Future Appointments   Date Time Provider Vickie Wilson   2022  8:00 AM SCHEDULE, [facility] REMOTE TRANSMISSION [facility]   3/7/2023  3:00 PM Kp Jarvis MD [facility]       MARIA ESTHER Jacome, RN   Care Transition Nurse  Mobile: [phone]

## 2022-11-18 NOTE — TELEPHONE ENCOUNTER
Patient's insurance would not be charged for any remote interrogation that would occur out side of typical quarterly checks. Even in those instances patients should not have any out of pocket charge for remote interrogations. (that is provided they are not self pay)   Pt was informed of this. Judy Ayala, pts daughter, assisted in sending in a manual transmission from cell phone doris. Will review when it is transmitted. Daily Fractionated Dose (Optional- Include Units): 264.04 cGy

## 2022-11-25 ENCOUNTER — CARE COORDINATION (OUTPATIENT)
Dept: CASE MANAGEMENT | Age: 77
End: 2022-11-25

## 2022-11-25 NOTE — CARE COORDINATION
[facility] Transitions Follow Up Call    2022    Patient: Nixon Peterson  Patient : 1945   MRN: [id number]  Reason for Admission: syncope and collapse Discharge Date: 22 RARS: Readmission Risk Score: 14.1         Spoke with: Nixon Peterson who reported that she was doing good. Patient denied any ongoing symptoms. Patient stated that she is still staying with daughter for a little while. Patient denied cp, sob, cough, dizziness, headache, n/v, diarrhea, abdominal pains, fever, or chills. Patient report that appetite and fluid intake is good and denied any problems with bowel or bladder. Patient reported that he is taking all medications as ordered. Patient denied any other needs at this time. Patient instructed to continue to monitor s/s, reporting any that may present to MD immediately for early intervention. Patient is agreeable to f/u calls. Care Transitions Subsequent and Final Call    Subsequent and Final Calls  Do you have any ongoing symptoms?: No  Have your medications changed?: No  Do you have any questions related to your medications?: No  Do you currently have any active services?: No  Are you currently active with any services?: Home Health  Do you have any needs or concerns that I can assist you with?: No  Identified Barriers: None  Care Transitions Interventions  No Identified Needs  Other Interventions:              Follow Up  Future Appointments   Date Time Provider Vickie Wilson   2022  8:00 AM SCHEDULE, [facility] REMOTE [facility]SMISSION [facility]   3/7/2023  3:00 PM Abigail Jaramillo MD [facility] GERONIMO Bueno LPN

## 2022-11-30 ENCOUNTER — NURSE ONLY (OUTPATIENT)
Dept: CARDIOLOGY CLINIC | Age: 77
End: 2022-11-30
Payer: MEDICARE

## 2022-11-30 DIAGNOSIS — R00.1 SYMPTOMATIC BRADYCARDIA: ICD-10-CM

## 2022-11-30 DIAGNOSIS — Z95.0 BIVENTRICULAR CARDIAC PACEMAKER IN SITU: Primary | Chronic | ICD-10-CM

## 2022-11-30 DIAGNOSIS — I50.22 CHRONIC SYSTOLIC CHF (CONGESTIVE HEART FAILURE), NYHA CLASS 2 (HCC): ICD-10-CM

## 2022-11-30 DIAGNOSIS — I48.0 PAF (PAROXYSMAL ATRIAL FIBRILLATION) (HCC): Chronic | ICD-10-CM

## 2022-11-30 PROCEDURE — 93297 REM INTERROG DEV EVAL ICPMS: CPT | Performed by: NURSE PRACTITIONER

## 2022-11-30 PROCEDURE — G2066 INTER DEVC REMOTE 30D: HCPCS | Performed by: NURSE PRACTITIONER

## 2022-12-01 NOTE — PROGRESS NOTES
Remote transmission received from patient's CRT-P monitor at home. Transmission shows normal sensing and pacing function. No new arrhythmias/events recorded. MRI SureScan noted 11.15.22. Ap 75.0%  BiVp 97.0%, Effective 96.4%, VSRp 2.3%  PVCs 2.7/hr    Possible Optivol fluid accumulation 11.28.22-ongoing, currently elevated up to 60 threshold w correlating TI trend below reference line. TriageHF Heart Failure Risk Status on 29-Nov-2022 is Medium*. End of 31-day monitoring period 11/30/22. NP will review. See interrogation under cardiology tab in the [address] [place] [address] [address] field for more details. Will continue to monitor remotely.

## 2022-12-02 ENCOUNTER — CARE COORDINATION (OUTPATIENT)
Dept: CASE MANAGEMENT | Age: 77
End: 2022-12-02

## 2022-12-02 NOTE — CARE COORDINATION
[facility] Care Transitions Follow Up Call    Care Transition Nurse contacted the patient by telephone to follow up after admission on 22. Verified name and  with patient as identifiers. Patient: Rick Duverney  Patient : 1945   MRN: [id number]  Reason for Admission: syncope and collapse   Discharge Date: 22 RARS: Readmission Risk Score: 14.1      Needs to be reviewed by the provider   Additional needs identified to be addressed with provider: No  none             Method of communication with provider: none. Pt denies any issues. No dizziness, falls. Still with daughter. Eating and drinking well, denies any needs or questions. Appreciative of the call. Addressed changes since last contact:  none  Discussed follow-up appointments. If no appointment was previously scheduled, appointment scheduling offered: No.   Is follow up appointment scheduled within 7 days of discharge? Yes. Follow Up  Future Appointments   Date Time Provider Vickie Wilson   2023  4:30 PM SCHEDULE, [facility] REMOTE TRANSMISSION [facility]   3/7/2023  3:00 PM Chai Wallace MD [facility]         Care Transition Nurse reviewed discharge instructions with patient and discussed any barriers to care and/or understanding of plan of care after discharge. Discussed appropriate site of care based on symptoms and resources available to patient including: PCP  Specialist. The patient agrees to contact the PCP office for questions related to their healthcare.           Care Transitions Subsequent and Final Call    Schedule Follow Up Appointment with PCP: Completed  Subsequent and Final Calls  Do you have any ongoing symptoms?: No  Have your medications changed?: No  Do you have any questions related to your medications?: No  Do you currently have any active services?: No  Are you currently active with any services?: Home Health  Do you have any needs or concerns that I can assist you with?: No  Identified Barriers: None  Care Transitions Interventions  No Identified Needs  Other Interventions:             Care Transition Nurse provided contact information for future needs. Plan for follow-up call in 7-10 days based on severity of symptoms and risk factors.   Plan for next call: symptom management-syncope and collapse     Giovany Becker RN

## 2022-12-09 ENCOUNTER — CARE COORDINATION (OUTPATIENT)
Dept: CASE MANAGEMENT | Age: 77
End: 2022-12-09

## 2022-12-09 NOTE — CARE COORDINATION
[facility] Care Transitions Follow Up Call    Care Transition Nurse contacted the patient by telephone to follow up after admission. Verified name and  with patient as identifiers. Patient: Joés Love  Patient : 1945   MRN: [id number]  Reason for Admission: syncope and collapse  Discharge Date: 22 RARS: Readmission Risk Score: 14.1      Needs to be reviewed by the provider   Additional needs identified to be addressed with provider: No  none             Method of communication with provider: none. Spoke with Manda Michelle who states she is doing well. Denies fever, chills, cp or passing out. States she does have some sob on exertion but also states she has been around ppl who also have the same symptoms. Denies fever, congestion, cough, body aches. States she will reach out to PCP if symptoms get worse. Weight today was 126 lbs. Denies leg swelling since the increase of diuretic. No questions or concerns. Addressed changes since last contact:  none  Discussed follow-up appointments. If no appointment was previously scheduled, appointment scheduling offered: No.   Is follow up appointment scheduled within 7 days of discharge? No.    Follow Up  Future Appointments   Date Time Provider Vickie Wilson   2023  4:30 PM SCHEDULE, [facility] REMOTE TRANSMISSION [facility]   3/7/2023  3:00 PM Rickey Yuan MD [facility]     [id number] [address] follow up appointment(s): n/a    Care Transition Nurse reviewed red flags with patient and discussed any barriers to care and/or understanding of plan of care after discharge. Discussed appropriate site of care based on symptoms and resources available to patient including: PCP. The patient agrees to contact the PCP office for questions related to their healthcare.       Care Transitions Subsequent and Final Call    Subsequent and Final Calls  Do you have any ongoing symptoms?: Yes  Patient-reported symptoms: Shortness of Breath  Have your medications changed?: Yes  Patient Reports: pt was placed on antibiotic for 3 days. Do you have any questions related to your medications?: No  Do you currently have any active services?: No  Are you currently active with any services?: Home Health  Do you have any needs or concerns that I can assist you with?: No  Identified Barriers: None  Care Transitions Interventions  Other Interventions:             Care Transition Nurse provided contact information for future needs. Plan for follow-up call in 5-7 days based on severity of symptoms and risk factors. Plan for next call: symptom management-.     MARIA ESTHER Mathias, RN   Care Transition Nurse  Mobile: [phone]

## 2022-12-16 ENCOUNTER — CARE COORDINATION (OUTPATIENT)
Dept: CASE MANAGEMENT | Age: 77
End: 2022-12-16

## 2022-12-16 NOTE — CARE COORDINATION
[facility] Care Transitions Follow Up Call    Care Transition Nurse contacted the patient by telephone to follow up. Verified name and  with patient as identifiers. Patient: Jewel Ochoa  Patient : 1945   MRN: [id number]  Reason for Admission:  syncope and collapse   Discharge Date: 22 RARS: Readmission Risk Score: 14.1      Needs to be reviewed by the provider   Additional needs identified to be addressed with provider: Yes  Pt states she is having occasional SOB with exertion, recovers at rest and O2 sats are in the 90s. She is wondering if she should be seen. Please call pt for appt. While on the phone with pt could hear some slight cough that sounded productive. No edema of legs. States the SOB is from the chest not nasal area. Method of communication with provider: chart routing. Pt asked for a call back in 30 or so minutes. Call back to pt for f/u. She states her daughter was there. Pt is concerned about SOB returning that does resolved with rest. Has a pulse ox and is running in the 90s. Denies fluid retention, agreeable to PCP appt. No fevers or issues with eating/drinking. Addressed changes since last contact:  none  Discussed follow-up appointments. If no appointment was previously scheduled, appointment scheduling offered: No.   Is follow up appointment scheduled within 7 days of discharge? Yes. Follow Up  Future Appointments   Date Time Provider Vickie Wilson   2023  4:30 PM SCHEDULE, [facility] REMOTE TRANSMISSION [facility]   3/7/2023  3:00 PM Vahid Hernandez MD [facility]         Care Transition Nurse reviewed discharge instructions and red flags with patient and discussed any barriers to care and/or understanding of plan of care after discharge. Discussed appropriate site of care based on symptoms and resources available to patient including: PCP  Specialist  After hours contact number-office # .  The patient agrees to contact the PCP office for questions related to their healthcare. Patients top risk factors for readmission: falls and medical condition-COPD  Interventions to address risk factors: Obtained and reviewed discharge summary and/or continuity of care documents and discussed the use of the pulse ox, resting when SOB and to watch for leg edema. Care Transitions Subsequent and Final Call    Schedule Follow Up Appointment with PCP: Completed  Subsequent and Final Calls  Do you have any ongoing symptoms?: Yes  Onset of Patient-reported symptoms: In the past 7 days  Patient-reported symptoms: Shortness of Breath  Interventions for patient-reported symptoms: Notified PCP/Physician  Have your medications changed?: No  Do you have any questions related to your medications?: No  Do you currently have any active services?: No  Are you currently active with any services?: Home Health  Do you have any needs or concerns that I can assist you with?: No  Identified Barriers: None  Care Transitions Interventions  No Identified Needs  Other Interventions:             Care Transition Nurse provided contact information for future needs. No further follow-up call indicated based on severity of symptoms and risk factors. Plan for next call: referral to ambulatory care manager-for ongoing monitoring.      Vini Mathews RN

## 2022-12-20 ENCOUNTER — CARE COORDINATION (OUTPATIENT)
Dept: CARE COORDINATION | Age: 77
End: 2022-12-20

## 2022-12-20 NOTE — CARE COORDINATION
Ambulatory Care Coordination Note  12/20/2022    ACC: Mamadou Ambriz, RN    ACM spoke with Philomenaprieto Brasher to establish care today. She has history of Pulmonary HTN, CHF, CAD, AF, Emphysema,HTN, OA,  Lumbar Spinal stenosis(pain management ) , Hypothyroidism, Hyperlipidemia, OA , Macular Degeneration,. Stroke 2013, Pacemaker   She was admitted with syncopal episode on 11/12 and discharged on 11/16. She has since followed up with her PCP . She was seen yesterday for acute CHF and her Demadex was increased to 40 mg X 4 days. She was advised to call tomorrow and come in for labs on Thursday. Today, she reports that she is feeling about the same . She describes her SOB as unchanged since yesterday. Her weight today was 131 lbs. She denies increased swelling and shortness of breath. Her pulse ox was 97% and HR 55 today. She is presently living her daughter and grand-daughter. She is independent other than driving and med management due to her vision loss from macular degeneration. Smoking 1 PPD. No desire to quit. Offered patient enrollment in the Remote Patient Monitoring (RPM) program for in-home monitoring: Yes, but did not enroll at this time. ACTION: established care . Reviewed meds purpose and compliance. Discussed CHF teaching , monitoring and zones. Encouraged to  monitor symptoms and follow zones sent in my chart. Discussed pulse ox parameters. Offered RPM- declined but will think about it  Smoking cessation was discussed and support offered when ready to quit.       PLAN:  F/u call on one  week     Ambulatory Care Coordination Assessment    Care Coordination Protocol  Referral from Primary Care Provider: No  Week 1 - Initial Assessment     Do you have all of your prescriptions and are they filled?: Yes  Are you able to afford your medications?: With Assistance  Medication Assistance Program: [facility] Meds/Vouchers  How often do you have trouble taking your medications the way you have been told to take them?: I always take them as prescribed. Do you have Home O2 Therapy?: No         Current Housing: Private Residence        Per the Fall Risk Screening, did the patient have 2 or more falls or 1 fall with injury in the past year?: No     Frequent urination at night?: No  Do you use rails/bars?: No  Do you have a non-slip tub mat?: Yes        Thinking about your patient's physical health needs, are there any symptoms or problems (risk indicators) you are unsure about that require further investigation?: No identified areas of uncertainly or problems already being investigated   Are the patients physical health problems impacting on their mental well-being?: Mild impact on mental well-being e.g. \"\"feeling fed-up\"\", \"\"reduced enjoyment\"\"   Are there any problems with your patients lifestyle behaviors (alcohol, drugs, diet, exercise) that are impacting on physical or mental well-being?: No identified areas of concern   Do you have any other concerns about your patients mental well-being?  How would you rate their severity and impact on the patient?: No identified areas of concern   How would you rate their home environment in terms of safety and stability (including domestic violence, insecure housing, neighbor harassment)?: Consistently safe, supportive, stable, no identified problems   How do daily activities impact on the patient's well-being? (include current or anticipated unemployment, work, caregiving, access to transportation or other): No identified problems or perceived positive benefits   How would you rate their social network (family, work, friends)?: Good participation with social networks   How would you rate their financial resources (including ability to afford all required medical care)?: Financially secure, some resource challenges   How wells does the patient now understand their health and well-being (symptoms, signs or risk factors) and what they need to do to manage their health?: Reasonable to good understanding and already engages in managing health or is willing to undertake better management   How well do you think your patient can engage in healthcare discussions? (Barriers include language, deafness, aphasia, alcohol or drug problems, learning difficulties, concentration): Adequate communication, with or without minor barriers   Do other services need to be involved to help this patient?: Other care/services not required at this time   Are current services involved with this patient well-coordinated? (Include coordination with other services you are now recommendation): All required care/services in place and well-coordinated   Suggested Interventions and [employer] or Interventions: COPD, CHF, Salty Six   Zone Management Tools: In Process                  Prior to Admission medications    Medication Sig Start Date End Date Taking?  Authorizing Provider   ipratropium (ATROVENT) 0.03 % nasal spray INHALE 2 DOSES INTO EACH NOSTRIL THREE TIMES A DAY IF NEEDED FOR RHINITIS 11/28/22  Yes Liana Quick MD   lidocaine 4 % external patch Place 1 patch onto the skin daily 11/16/22  Yes Liana Quick MD   aspirin 81 MG EC tablet Take 81 mg by mouth daily   Yes Historical Provider, MD   valsartan (DIOVAN) 40 MG tablet Take 0.5 tablets by mouth daily 10/25/22  Yes iLana Quikc MD   rosuvastatin (CRESTOR) 40 MG tablet TAKE ONE TABLET BY MOUTH EVERY EVENING 10/25/22  Yes Liana Quick MD   metoprolol succinate (TOPROL XL) 50 MG extended release tablet TAKE 1 TABLET BY MOUTH DAILY 10/25/22  Yes Liana Quick MD   ezetimibe (ZETIA) 10 MG tablet TAKE ONE TABLET BY MOUTH DAILY 10/25/22  Yes Liana Quick MD   pantoprazole (PROTONIX) 40 MG tablet One po qd 10/25/22  Yes Liana Quick MD   sennosides-docusate sodium (SENOKOT-S) 8.6-50 MG tablet Take 1 tablet by mouth in the morning and at bedtime 10/14/22  Yes Liana Qucik MD   torsemide (DEMADEX) 20 MG tablet TAKE 1 TABLET BY MOUTH EVERY MORNING. TAKE EXTRA PILL ON DAYS WEIGHT INCREASES BY 2+ LBS 9/20/22  Yes Cyndi Martínez MD   tiZANidine (ZANAFLEX) 4 MG capsule TAKE ONE-HALF TABLET BY MOUTH EVERY 8 HOURS AS NEEDED (MUSCLE SPASM) 8/24/22  Yes Cyndi Marítnez MD   levothyroxine (SYNTHROID) 88 MCG tablet TAKE 1 TABLET BY MOUTH EVERY DAY 8/24/22  Yes Cyndi Martínez MD   potassium chloride (KLOR-CON M) 20 MEQ TBCR extended release tablet Take 1 tablet by mouth 2 times daily 8/22/22  Yes Cyndi Martínez MD   amiodarone (CORDARONE) 200 MG tablet Take 0.5 tablets by mouth in the morning. Patient taking differently: Take 100 mg by mouth daily Taking in the evening 8/10/22  Yes HOWARD Harper CNP   DULoxetine (CYMBALTA) 60 MG extended release capsule Take 60 mg by mouth daily   Yes Historical Provider, MD   diclofenac sodium (VOLTAREN) 1 % GEL Apply 2 g topically daily   Yes Historical Provider, MD   oxyCODONE HCl (OXY-IR) 10 MG immediate release tablet Take 10 mg by mouth every 4 hours as needed for Pain.    Yes Historical Provider, MD       Future Appointments   Date Time Provider Vickie Wilson   1/18/2023  4:30 PM SCHEDULE, [facility]   3/7/2023  3:00 PM Asya Glover MD [facility]     ,   Congestive Heart Failure Assessment           Symptoms:          , and   COPD Assessment              Symptoms:

## 2023-01-05 ENCOUNTER — CARE COORDINATION (OUTPATIENT)
Dept: CARE COORDINATION | Age: 78
End: 2023-01-05

## 2023-01-05 NOTE — CARE COORDINATION
Ambulatory Care Coordination Note  1/5/2023    ACC: Maverick De La Cruz, RN      ACM spoke with Donnie Lindsay to follow up . She reports that she did take an extra Demadex as directed but has not had any change in her SOB or weight. However, she reports today that her weight is fluctuating by 1 lb. She is c/o increased shortness of breath. She seemed to be very anxious on the phone today. She disconnected before end of the conversation. Her grand-daughter lives with her but working full time and she reports being alone most of the time. She is willing to weigh herself daily and check her pulse ox. ACTION: encouraged to have grand-daughter read handouts sent in ITN ( since she reports vision loss with MD)  Advised on RPM: declined. Explained weight fluctuation and CHF weight parameters to follow. Offered patient enrollment in the Remote Patient Monitoring (RPM) program for in-home monitoring: Patient declined. Lab Results       None            Care Coordination Interventions    Referral from Primary Care Provider: No  Suggested Interventions and Community Resources  Zone Management Tools: In Process  Other Services or Interventions: COPD, CHF, Salty Six          Goals Addressed    None         Prior to Admission medications    Medication Sig Start Date End Date Taking?  Authorizing Provider   ipratropium (ATROVENT) 0.03 % nasal spray INHALE 2 DOSES INTO EACH NOSTRIL THREE TIMES A DAY IF NEEDED FOR RHINITIS 11/28/22   Grecia Samuels MD   lidocaine 4 % external patch Place 1 patch onto the skin daily 11/16/22   Grecia Samuels MD   aspirin 81 MG EC tablet Take 81 mg by mouth daily    Historical Provider, MD   valsartan (DIOVAN) 40 MG tablet Take 0.5 tablets by mouth daily 10/25/22   Grecia Samuels MD   rosuvastatin (CRESTOR) 40 MG tablet TAKE ONE TABLET BY MOUTH EVERY EVENING 10/25/22   Grecia Samuels MD   metoprolol succinate (TOPROL XL) 50 MG extended release tablet TAKE 1 TABLET BY MOUTH DAILY 10/25/22 Larissa Patten MD   ezetimibe (ZETIA) 10 MG tablet TAKE ONE TABLET BY MOUTH DAILY 10/25/22   Larissa Patten MD   pantoprazole (PROTONIX) 40 MG tablet One po qd 10/25/22   Larissa Patten MD   sennosides-docusate sodium (SENOKOT-S) 8.6-50 MG tablet Take 1 tablet by mouth in the morning and at bedtime 10/14/22   Larissa Patten MD   torsemide (DEMADEX) 20 MG tablet TAKE 1 TABLET BY MOUTH EVERY MORNING. TAKE EXTRA PILL ON DAYS WEIGHT INCREASES BY 2+ LBS 9/20/22   Larissa Patten MD   tiZANidine (ZANAFLEX) 4 MG capsule TAKE ONE-HALF TABLET BY MOUTH EVERY 8 HOURS AS NEEDED (MUSCLE SPASM) 8/24/22   Larissa Patten MD   levothyroxine (SYNTHROID) 88 MCG tablet TAKE 1 TABLET BY MOUTH EVERY DAY 8/24/22   Larissa Patten MD   potassium chloride (KLOR-CON M) 20 MEQ TBCR extended release tablet Take 1 tablet by mouth 2 times daily 8/22/22   Larissa Patten MD   amiodarone (CORDARONE) 200 MG tablet Take 0.5 tablets by mouth in the morning. Patient taking differently: Take 100 mg by mouth daily Taking in the evening 8/10/22   Candy Kayser, APRN - CNP   DULoxetine (CYMBALTA) 60 MG extended release capsule Take 60 mg by mouth daily    Historical Provider, MD   diclofenac sodium (VOLTAREN) 1 % GEL Apply 2 g topically daily    Historical Provider, MD   oxyCODONE HCl (OXY-IR) 10 MG immediate release tablet Take 10 mg by mouth every 4 hours as needed for Pain.     Historical Provider, MD       Future Appointments   Date Time Provider Vickie Wilson   1/18/2023  4:30 PM [facility]   3/7/2023  3:00 PM Tom Bonds MD [facility]

## 2023-01-11 PROBLEM — F11.93 OPIATE WITHDRAWAL (HCC): Status: ACTIVE | Noted: 2023-01-11

## 2023-01-16 ENCOUNTER — CARE COORDINATION (OUTPATIENT)
Dept: CARE COORDINATION | Age: 78
End: 2023-01-16

## 2023-01-18 ENCOUNTER — NURSE ONLY (OUTPATIENT)
Dept: CARDIOLOGY CLINIC | Age: 78
End: 2023-01-18
Payer: COMMERCIAL

## 2023-01-18 DIAGNOSIS — R00.1 SYMPTOMATIC BRADYCARDIA: ICD-10-CM

## 2023-01-18 DIAGNOSIS — Z95.0 BIVENTRICULAR CARDIAC PACEMAKER IN SITU: Primary | Chronic | ICD-10-CM

## 2023-01-18 DIAGNOSIS — I48.0 PAF (PAROXYSMAL ATRIAL FIBRILLATION) (HCC): Chronic | ICD-10-CM

## 2023-01-18 DIAGNOSIS — I50.22 CHRONIC SYSTOLIC CHF (CONGESTIVE HEART FAILURE), NYHA CLASS 2 (HCC): ICD-10-CM

## 2023-01-18 PROCEDURE — 93297 REM INTERROG DEV EVAL ICPMS: CPT | Performed by: NURSE PRACTITIONER

## 2023-01-18 PROCEDURE — 93296 REM INTERROG EVL PM/IDS: CPT | Performed by: INTERNAL MEDICINE

## 2023-01-18 PROCEDURE — 93294 REM INTERROG EVL PM/LDLS PM: CPT | Performed by: INTERNAL MEDICINE

## 2023-01-19 NOTE — CARE COORDINATION
Ambulatory Care Coordination Note  1/19/2023    ACC: Maverick De La Cruz, RN    ACM spoke with Donnie Lindsay. She was very polite today and apologized for being rude last encounter. Again, she seems very anxious. She denies swelling, SOB and weight gain. Her grand-daughter has not gone over the handouts sent to her in My Chart. Donnie Lindsay feels that she is doing fine with monitoring her symptoms. Offered patient enrollment in the Remote Patient Monitoring (RPM) program for in-home monitoring: Patient declined. ACTION: advised on symptoms to monitor. Encouraged to have grand-daughter to review with her . Discussed CHF monitoring  and zones to follow   Emphasized importance of calling PCP ASAP with symptoms. Advised pt she could decline services and she has declined. PLAN:  Discharge     PLAN:      Lab Results       None            Care Coordination Interventions    Referral from Primary Care Provider: No  Suggested Interventions and Community Resources  Zone Management Tools: In Process  Other Services or Interventions: COPD, CHF, Salty Six          Goals Addressed    None         Prior to Admission medications    Medication Sig Start Date End Date Taking?  Authorizing Provider   potassium chloride (KLOR-CON M) 20 MEQ TBCR extended release tablet Take 1 tablet by mouth 2 times daily 1/13/23   Grecia Samuels MD   torsemide (DEMADEX) 20 MG tablet Two po qam water pill 1/13/23   Grecia Samuels MD   rosuvastatin (CRESTOR) 40 MG tablet TAKE ONE TABLET BY MOUTH EVERY EVENING 1/13/23   Grecia Samuels MD   metoprolol succinate (TOPROL XL) 50 MG extended release tablet TAKE 1 TABLET BY MOUTH DAILY 1/13/23   Grecia Samuels MD   pantoprazole (PROTONIX) 40 MG tablet One po qd 1/13/23   Grecia Samuels MD   ezetimibe (ZETIA) 10 MG tablet TAKE ONE TABLET BY MOUTH DAILY 1/13/23   Grecia Samuels MD   tiZANidine (ZANAFLEX) 4 MG capsule TAKE ONE-HALF TABLET BY MOUTH EVERY 8 HOURS AS NEEDED (MUSCLE SPASM) 1/11/23   Grecia Samuels MD   diclofenac sodium (VOLTAREN) 1 % GEL Apply 2 g topically daily 1/11/23   Tara Goldsmith MD   ipratropium (ATROVENT) 0.03 % nasal spray INHALE 2 DOSES INTO EACH NOSTRIL THREE TIMES A DAY IF NEEDED FOR RHINITIS 11/28/22   Tara Goldsmith MD   lidocaine 4 % external patch Place 1 patch onto the skin daily 11/16/22   Tara Goldsmith MD   aspirin 81 MG EC tablet Take 81 mg by mouth daily    Historical Provider, MD   valsartan (DIOVAN) 40 MG tablet Take 0.5 tablets by mouth daily 10/25/22   Tara Goldsmith MD   sennosides-docusate sodium (SENOKOT-S) 8.6-50 MG tablet Take 1 tablet by mouth in the morning and at bedtime 10/14/22   Tara Goldsmith MD   levothyroxine (SYNTHROID) 88 MCG tablet TAKE 1 TABLET BY MOUTH EVERY DAY 8/24/22   Tara Goldsmith MD   amiodarone (CORDARONE) 200 MG tablet Take 0.5 tablets by mouth in the morning. Patient taking differently: Take 100 mg by mouth daily Taking in the evening 8/10/22   HOWARD Harper - CNP   DULoxetine (CYMBALTA) 60 MG extended release capsule Take 60 mg by mouth daily    Historical Provider, MD   oxyCODONE HCl (OXY-IR) 10 MG immediate release tablet Take 10 mg by mouth every 4 hours as needed for Pain.     Historical Provider, MD       Future Appointments   Date Time Provider Vickie Wilson   2/22/2023 10:30 AM SCHEDULE, [facility] DEVICE CHECK [facility]   2/22/2023 10:45 AM Familia Mario MD [facility]   3/7/2023  3:00 PM Ramiro Clifton MD [facility]   4/12/2023  3:40 PM Tara Goldsmith MD [facility] [place] [facility]   4/12/2023  4:30 PM SCHEDULE, [facility] TRANSMISSION [facility]   5/17/2023  2:00 PM SCHEDULE, [facility] TRANSMISSION [facility]   6/28/2023  4:00 PM SCHEDULE, [facility] REMOTE TRANSMISSION [facility]   8/2/2023  3:30 PM SCHEDULE, [facility] TRANSMISSION [facility]   9/13/2023  4:00 PM SCHEDULE, [facility]OTE TRANSMISSION [facility]   10/18/2023 10:00 AM SCHEDULE, [facility] REMOTE TRANSMISSION [facility]   11/29/2023  4:30 PM SCHEDULE, [facility]    and   Congestive Heart Failure Assessment    Do you understand a low sodium diet?: Yes     No patient-reported symptoms      Symptoms:  CHF associated leg swelling: Neg, CHF associated shortness of breath: Neg      Symptom course: stable

## 2023-01-19 NOTE — PROGRESS NOTES
Remote transmission received from patient's CRT-P monitor at home. Transmission shows normal sensing and pacing function. No new arrhythmias/events recorded. MRI SureScan noted 11.15.22. EP physician will review.     Ap 71.6%  BiVp 97.2%, Effective 96.6%, VSRp 2.3%  PVCs 4.6/hr    Possible Optivol fluid accumulation 11.28.22-12.18.22, resolved and back to baseline. Triage™ Heart Failure Risk Status on 18-Jan-2023 is High*. NP will review.     End of 91-day monitoring period 1/18/23. See interrogation under cardiology tab in the Paceart field for more details. Will continue to monitor remotely.

## 2023-02-20 NOTE — PROGRESS NOTES
Cardiac Electrophysiology   Date: 2/22/2023   Reason for Consultation: atrial fibrillation  Consult Requesting Physician: Sheng Warren MD  Primary CarePhysician: Nain Gonzalez MD     Chief Complaint:   F/u s/p atfial fibrillation/flutter ablation. HPI: Gaby López is a 68 y.o. patient with a history of atrial fibrillation, pulmonary hypertension, aortic stenosis s/p AVR, PVI and RENETTA exclusion by Dr Dahiana Alexis on 6/2020 and CAD s/p CHILANGO to mid circ 5/2014,  AVM's which were cauterized. She presented to [facility] with complaints of back pain and while inpatient had an episode of dyspnea. An echocardiogram was completed revealing moderate to severe MR. Subsequently, a CAMERON was completed on 7/27/20 showing moderate MR. She was admitted 10/26/2021-11/1/2021 with afib with RVR and acute CHF. She converted to a regular rhythm and was diuresed prior to discharge. She was admitted to [address] [place] [address] 11/11/21 with acute encephalopathy and found to have rhabdomyolysis and afib with RVR again. She underwent successful cardioversion on 11/17/2021. She did experience bradycardia when in sinus rhythm with heart rate between 40-50 bpm. She was seen in the office with Chelsea Alvarado CNP for follow up on 11/23/21 and then sent to the ED for admission. She was again found in afib with RVR and fluid overloaded. She was diuresed and discharged home on both amiodarone and Toprol. She also had decline in LVEF from 45 to 55%, with LBBB thought to be from possibly tachycardia mediated, LBBB or both. She underwent placement of a Medtronic Bi-V PPM on 4/11/2022 by me. Patient daughter called into the office on 4/26/2022 reporting that she thinks the patient may be back in atrial fibrillation and she recorded HR between 100-105 bpm. Patient sent in a remote check on her device which showed atrial fibrillation and flutter which began at 12:42 pm. Diltiazem 120 mg daily was added to her medications.     She was seen in office on 5/4/2022 and reported symptoms of palpitations and heart bounding and pacing. She reported that she started having swelling of BLE after starting Diltiazem she took Torsemide which removed the fluid but she she stopped the swelling returned. She had a 3 lbs weight gain over night. Ablation was discussed as a treatment option for his atrial fibrillation. She underwent successful atrial fibrillation, typical and atypical atrial flutter ablation 5/12/2022. Device interrogation on 5/4/2022 prior to ablation showed AT/AF burden of 52.9 %, device interrogation 06/10/22 shows decrease in AT/AF burden to 5.4 % with last episodes occurring between 5/17-5/20/22. Effective CRT pacing on interrogation 5/4/2022 86 % to 95.2 % on 06/10/22. She was feeling great. Optival trended up during atrial fibrillation episodes but had began to trend down. Advided her to take extra dose of lasix for leg swelling but to check with Dr. Yennifer Kothari if recurs. F/u with Ana Randall NP amiodarone was stopped. She was seen at [facility] 11/12/22, admitted after an episode of garbled speech followed by a fall/syncopal episode at home. When emts arrived she appeared obtunded and was breathing slowly. She did not respond to sternal rub. Eventual workup was suggestive of UTI. Negative scan for stroke and no arrhythmia on device check. She is off anticoagulation with her a-fib due to GI bleeding but had a RENETTA closure during her open heart surgery. Interval History: Today, she presents to office accompanied by her daughter. She is feeling great. Device interrogated today and functioning properly. No recurrence of atrial fibrillation, she monitors her heart at home with a heart rate range of 50- 60s. Reports compliance with medications and tolerating them well. Denies chest pain/pressure, tightness, edema, shortness of breath, heart racing, palpitations, lightheadedness, dizziness, syncope, presyncope,  PND or orthopnea.         Past Medical History: Diagnosis Date    Atrial fibrillation ([facility]     AVM (arteriovenous malformation) of duodenum 2017    AVM (arteriovenous malformation) of stomach 2017    Bladder cancer ([facility] 2014    CAD (coronary artery disease)     Carotid stenosis 2014    Chronic back pain     Chronic diastolic CHF (congestive heart failure) ([facility] 2016    Chronic prescription opiate use     [facility]     Diverticulosis     HTN (hypertension)     Hyperlipidemia     Hypothyroidism     Ischemic stroke 2013    x 2    Lumbar spine stenosis 2017    Macular degeneration 2018    Nonrheumatic mitral valve regurgitation     Obstructive sleep apnea     Osteoarthritis     Peripheral neuropathy     Pulmonary HTN ([facility] 2014    PVD (peripheral vascular disease) ([facility]     Syncope 2022    of unknown cause    Tobacco use disorder in remission       Past Surgical History:   Procedure Laterality Date    ABLATION OF DYSRHYTHMIC FOCUS  2022    AORTIC VALVE REPLACEMENT  6/16/15    Dr. Mo Hung. Hernandez - PVI, RENETTA exclusion. 19mm Maki pericardial Magna    APPENDECTOMY      BACK SURGERY  03/15/2019    superion inserted to keep back from hurtin [address] N/A 2019    EMERGENT; LAPAROSCOPIC CHOLECYSTECTOMY WITH GRAM performed by Gary Castellanos MD at [place] [address]      stent x 1    CYSTOSCOPY  2014    dr James Cunningham      THR Right    OTHER SURGICAL HISTORY  2018     EGD and colonoscopy.     PACEMAKER INSERTION  2022    PAIN MANAGEMENT PROCEDURE Bilateral 2021    BILATERAL L3, L4, L5 MEDIAL BRANCH BLOCK WITH FLUOROSCOPY performed by Skylar Hall MD at [address] [place] [address] Bilateral 2021    BILATERAL L3, L4, L5 MEDIAL BRANCH BLOCKS WITH FLUOROSCOPY (24100, [id number]) performed by Skylar Hall MD at [address] [place] [address] Bilateral 2021    BILATERAL L3, L4, L5 RADIOFREQUENCY ABLATION WITH FLUOROSCOPY (24668, [id number]) performed by Irene Trejo MD at [address] [facility] 3/15/2019    INSERTION OF INTERSPINOUS SPACER SUPERION VERTIFLEX AT LUMBAR FOUR-LUMBAR FIVE performed by Dorothea Lizama MD at [facility]  12/15/2017       Allergies: Allergies   Allergen Reactions    Benadryl [Diphenhydramine] Swelling    Doxylamine     Mucinex [Guaifenesin Er]      hallucinations    Spiriva Handihaler [Tiotropium Bromide Monohydrate]      Nausea      Adhesive Tape Rash and Swelling    Neosporin [Bacitracin-Polymyxin B] Other (See Comments)     Rash that spread across face with swelling       Medication:   Prior to Admission medications    Medication Sig Start Date End Date Taking?  Authorizing Provider   levothyroxine (SYNTHROID) 88 MCG tablet TAKE 1 TABLET BY MOUTH EVERY DAY 2/7/23   Yuliet Pizarro MD   potassium chloride (KLOR-CON M) 20 MEQ TBCR extended release tablet Take 1 tablet by mouth 2 times daily 1/13/23   Taniya Bradley MD   torsemide (DEMADEX) 20 MG tablet Two po qam water pill 1/13/23   Taniya Bradley MD   rosuvastatin (CRESTOR) 40 MG tablet TAKE ONE TABLET BY MOUTH EVERY EVENING 1/13/23   Taniya Bradley MD   metoprolol succinate (TOPROL XL) 50 MG extended release tablet TAKE 1 TABLET BY MOUTH DAILY 1/13/23   Taniya Bradley MD   pantoprazole (PROTONIX) 40 MG tablet One po qd 1/13/23   Taniya Bradley MD   ezetimibe (ZETIA) 10 MG tablet TAKE ONE TABLET BY MOUTH DAILY 1/13/23   Taniya Bradley MD   tiZANidine (ZANAFLEX) 4 MG capsule TAKE ONE-HALF TABLET BY MOUTH EVERY 8 HOURS AS NEEDED (MUSCLE SPASM) 1/11/23   Taniya Bradley MD   diclofenac sodium (VOLTAREN) 1 % GEL Apply 2 g topically daily 1/11/23   Taniya Bradley MD   ipratropium (ATROVENT) 0.03 % nasal spray INHALE 2 DOSES INTO EACH NOSTRIL THREE TIMES A DAY IF NEEDED FOR RHINITIS 11/28/22   Taniya Bradley MD   lidocaine 4 % external patch Place 1 patch onto the skin daily 11/16/22   Taniya Bradley MD   aspirin 81 MG EC tablet Take 81 mg by mouth daily    Historical Provider, MD   valsartan (DIOVAN) 40 MG tablet Take 0.5 tablets by mouth daily 10/25/22   Alberto De Jesus MD   sennosides-docusate sodium (SENOKOT-S) 8.6-50 MG tablet Take 1 tablet by mouth in the morning and at bedtime 10/14/22   Alberto De Jesus MD   amiodarone (CORDARONE) 200 MG tablet Take 0.5 tablets by mouth in the morning. Patient taking differently: Take 100 mg by mouth daily Taking in the evening 8/10/22   Khadijah Greenberg APRN - CNP   DULoxetine (CYMBALTA) 60 MG extended release capsule Take 60 mg by mouth daily    Historical Provider, MD   oxyCODONE HCl (OXY-IR) 10 MG immediate release tablet Take 10 mg by mouth every 4 hours as needed for Pain. Historical Provider, MD       Social History:   reports that she has been smoking cigarettes. She has a 45.00 pack-year smoking history. She has quit using smokeless tobacco. She reports current alcohol use. She reports that she does not use drugs. Family History:  family history includes Breast Cancer in her daughter. Reviewed. Denies family history of sudden cardiac death, arrhythmia, premature CAD    Review of System:  Pertinent positive and negatives are in the HPI, the rest are negative. Physical Examination:  There were no vitals taken for this visit. Constitutional: Oriented. No distress. Head: Normocephalic and atraumatic. Mouth/Throat: Oropharynx is clear and moist.   Eyes: Conjunctivae normal. EOM are normal.   Neck: Normal range of motion. Neck supple. No rigidity. No JVD present. Cardiovascular: Regular rate, regular rhythmn, S1&S2 and intact distal pulses. Pulmonary/Chest: Bilateral respiratory sounds. No wheezes. No rhonchi. Abdominal: Soft. Bowel sounds present. No distension, No tenderness. Musculoskeletal: No tenderness. +1 Bilateral lower extremity swelling. Lymphadenopathy: Has no cervical adenopathy. Neurological: Alert and oriented.  Cranial nerve appears intact, No Gross deficit   Skin: Skin is warm and dry. No rash noted.   Psychiatric: Has a normal mood, affect and behavior     Labs:  Reviewed.     ECG: reviewed previous    Studies:   1. Event monitor:   none    2. ECHO 11/14/22  Left ventricular cavity size is normal.   Left ventricular function is low normal with ejection fraction estimated at   50-60%.   Abnormal septal motion is present. Diastolic filling parameters suggest   grade III diastolic dysfunction.   Thickening of leaflets of mitral valve. Mitral annular calcification is   present.   Moderate mitral regurgitation is present..   A bioprosthetic aortic valve appears well seated with a maximum velocity of   3.01m/s and a mean gradient of 19mmHg.   Trivial aortic regurgitation.   The right ventricle is normal in size and function.     Echo: 10/27/2021  Left ventricular cavity size is normal.  Normal left ventricular wall thickness.  Ejection fraction is visually estimated to be 45-50%. There is mild global  hypokinesis appreciated.  Grade III diastolic dysfunction with elevated LV filling pressures.  Moderately dilated right ventricle with mildly reduced function.  The left atrium is severely dilated.  The right atrium is mildly dilated.  Moderately severe tricuspid regurgitation.  Moderate pulmonic regurgitation present.  Moderately severe mitral regurgitation appreciated.  A bioprosthetic artificial aortic valve appears well seated with a maximum  velocity of 2.4m/s and a mean gradient of 12mmHg.  Trivial aortic regurgitation.  A bubble study was performed and fails to show evidence of right to left  Shunting.    CAMERON 10/29/2021  Overall left ventricular size and wall thickness appear normal with systolic  function mildly reduced.  LVEF 40-45%.  Normal right ventricular size and function.  Left atrium is moderately dilated.  The left atrial appendage appears to be ligated with no flow into it.  Mitral valve leaflets appear thickened/calcified.  Restricted posterior leaflet with  malcoaptation id the leaflet tips. Moderate mitral regurgitation is present. Moderate tricuspid regurgitation. 3. Stress Test:    none    4. Cath: 6/15/2015   1. Right dominant coronary arterial system with mild calcification of the RCA. There is 40% serial lesions in the mid RCA. In the left system there is 25% distal left main disease. There is 50% mid LAD disease and 50% ostial second diagonal branch disease. There is no significant restenosis identified in the prior previously placed mid circumflex stent. 2. Systemic hypertension. [id number] Score for Atrial Fibrillation Stroke Risk   Risk   Factors  Component Value   C CHF Yes 1   H HTN Yes 1   A2 Age >= 76 Yes,  (79 y.o.) 2   D DM No 0   S2 Prior Stroke/TIA Yes 2   V Vascular Disease No 0   A Age 74-69 No,  (79 y.o.) 0   Sc Sex female 1    [id number]  Score  7   Score last updated 6/10/22 4:75 PM EDT    Click here for a link to the UpToDate guideline \"Atrial Fibrillation: Anticoagulation therapy to prevent embolization    Disclaimer: Risk Score calculation is dependent on accuracy of patient problem list and past encounter diagnosis. Procedures:  1. AVR, PVI and RENETTA exclusion by Dr. Dahiana Alexis 2015  2. DCCV on 11/17/2021  3. Electrophysiology study with radiofrequency ablation of atrial fibrillation and pulmonary vein isolation     I independently reviewed the ECG, MCOT, echocardiogram, stress test, and coronary angiography/PCI results and used them for my plan of care. Procedures:  1. AVR, PVI and RENETTA exclusion by Dr. Dahiana Alexis 2015  2. DCCV on 11/17/2021  3. Implantation of Medtronic BI-V Pacemaker on 4/11/2022  4. Electrophysiology study with radiofrequency ablation of atrial fibrillation and pulmonary vein isolation 5/12/2022. Assessment/Plan:    Paroxsymal atrial fibrillation / Atrial Flutter   -Symptomatic with palpitations, fatigue and shortness of breath with exertion.  Symptoms resolved s/p RFA ablation of atrial fibrillation on 5/12/2022.   - with recurrences in 2020, 2021 s/p DCCV in 2020              - had surgical PVI with AVR in 2020.   - s/p radiofrequency ablation of atrial fibrillation and pulmonary vein isolation 5/12/2022              - Continue Toprol XL 50 mg daily for rate control.   - Continue amiodarone 100 mg daily.              - She has a CHADS2-VASc 5 (age, gender, HTN, CAD)   - Not a candidate for anticoagulation due to past RENETTA exclusion with no flow in appendage seen on CAMERON in 2020. Ms. Gabby Boyer is doing well. Device check shows no AT/AF recurrence since last check in November 2022. Has good CRT effective pacing and optival index is down. Cliniciall in euvolemia. Continue current coarse of treatment    -LVEF 50-60%, per Echo 11/2022   -TSH 0.68 (11/12/22). Check TSH in 5/2023    Symptomatic bradycardia - addressed with Bi-V pacemaker.   - Likely some component of tachybrady as well given history of A fib with RVR     - Had been symptomatic while on Toprol and amiodarone as HR tends to be in the 40s   - EF has recovered at 50-60% per 11/2022 Echo   - s/p Medtronic Bi-V pacemaker implanted on 4/11/2022.  - device interrogation today as above, device with normal function    Chronic combine diastolic and systolic heart failure              -NYHA class II              - EF 50-60%,   - Euvolemic upon today's exam   - She is currently on guideline directed medical therapy. Beta Blocker: Toprol XL 50 mg daily               Aldosterone Antagonist: Spironolactone                       Diuretic: Torsemide 20 mg daily. ACE/ARB/ARNI: Valsartan  40 mg qd (previously but was stopped due to ANITA). - Routine BMP 01/11/23 reviewed    Mitral regurgitation              - being followed by Dr. Nissa Wise. CAD   -prior stent to mid circ 2014.              - Denies angina.              - Continue with medical management and risk factor modification including statin, and beta blocker.       S/p bioprosthetic AVR              - in 2015              - Cotinue aspirin 81 mg daily. Follow up:  -Follow up in 6 months with HOWARD Villafana  -Continue routine follow up with Dr. Barak Presley MD.    Sherren Sylwia campuzano for allowing me to participate in the care of [facility]. All questions and concerns were addressed to the patient/family. Alternatives to my treatment were discussed. This note was scribed in the presence of Stalin Ron MD by Nelson Ralph LPN    Physician attestation: The scribe's documentation has been prepared under my direction and has been personally reviewed by me in its entirety. I confirm that the note above reflects all work, treatment, procedures, and medical decision making performed by me.      Stalin Ron MD  Cardiac Electrophysiology  [facility]

## 2023-02-22 ENCOUNTER — NURSE ONLY (OUTPATIENT)
Dept: CARDIOLOGY CLINIC | Age: 78
End: 2023-02-22
Payer: COMMERCIAL

## 2023-02-22 ENCOUNTER — OFFICE VISIT (OUTPATIENT)
Dept: CARDIOLOGY CLINIC | Age: 78
End: 2023-02-22
Payer: COMMERCIAL

## 2023-02-22 VITALS — BODY MASS INDEX: 24.92 KG/M2 | WEIGHT: 132 LBS | OXYGEN SATURATION: 98 % | HEART RATE: 94 BPM | HEIGHT: 61 IN

## 2023-02-22 DIAGNOSIS — R00.1 SYMPTOMATIC BRADYCARDIA: ICD-10-CM

## 2023-02-22 DIAGNOSIS — I25.10 CORONARY ARTERY DISEASE INVOLVING NATIVE CORONARY ARTERY OF NATIVE HEART WITHOUT ANGINA PECTORIS: ICD-10-CM

## 2023-02-22 DIAGNOSIS — I48.91 ATRIAL FIBRILLATION WITH RVR (HCC): ICD-10-CM

## 2023-02-22 DIAGNOSIS — Z95.0 BIVENTRICULAR CARDIAC PACEMAKER IN SITU: Primary | Chronic | ICD-10-CM

## 2023-02-22 DIAGNOSIS — I48.0 PAF (PAROXYSMAL ATRIAL FIBRILLATION) (HCC): Primary | ICD-10-CM

## 2023-02-22 DIAGNOSIS — I34.0 NONRHEUMATIC MITRAL VALVE REGURGITATION: ICD-10-CM

## 2023-02-22 DIAGNOSIS — I50.33 ACUTE ON CHRONIC DIASTOLIC HEART FAILURE (HCC): ICD-10-CM

## 2023-02-22 DIAGNOSIS — I48.0 PAF (PAROXYSMAL ATRIAL FIBRILLATION) (HCC): Chronic | ICD-10-CM

## 2023-02-22 DIAGNOSIS — I50.22 CHRONIC SYSTOLIC CHF (CONGESTIVE HEART FAILURE), NYHA CLASS 2 (HCC): ICD-10-CM

## 2023-02-22 DIAGNOSIS — I50.42 CHRONIC COMBINED SYSTOLIC AND DIASTOLIC CHF, NYHA CLASS 2 (HCC): ICD-10-CM

## 2023-02-22 DIAGNOSIS — R00.1 SINUS BRADYCARDIA: ICD-10-CM

## 2023-02-22 PROCEDURE — 93281 PM DEVICE PROGR EVAL MULTI: CPT | Performed by: INTERNAL MEDICINE

## 2023-02-22 PROCEDURE — 93000 ELECTROCARDIOGRAM COMPLETE: CPT | Performed by: INTERNAL MEDICINE

## 2023-02-22 PROCEDURE — 99215 OFFICE O/P EST HI 40 MIN: CPT | Performed by: INTERNAL MEDICINE

## 2023-02-22 PROCEDURE — 93290 INTERROG DEV EVAL ICPMS IP: CPT | Performed by: INTERNAL MEDICINE

## 2023-02-22 PROCEDURE — 1123F ACP DISCUSS/DSCN MKR DOCD: CPT | Performed by: INTERNAL MEDICINE

## 2023-02-22 NOTE — PROGRESS NOTES
Patient comes in for programming evaluation on their Medtronic [id number] [facility] CRT-P. Last remote on 1/18/23    All sensing and pacing parameters are within normal range. Battery life 11.1y  P- ApBvp @ 62 bpm  U- AsVs @ 55 bpm  AP 74.2%.  97%. Effective 96.4%  AT/AF burden 0%  PVC 5.3/hr  No episodes noted. Patient remains on Torsemide, Metoprolol, Amiodarone  Updated time. Please see interrogation for more detail. Optivol is within normal range. Patient will see Gary Tolbert today and follow up in 3 months in office or remotely.

## 2023-03-07 ENCOUNTER — OFFICE VISIT (OUTPATIENT)
Dept: CARDIOLOGY CLINIC | Age: 78
End: 2023-03-07
Payer: COMMERCIAL

## 2023-03-07 VITALS
HEIGHT: 61 IN | OXYGEN SATURATION: 89 % | SYSTOLIC BLOOD PRESSURE: 122 MMHG | DIASTOLIC BLOOD PRESSURE: 62 MMHG | WEIGHT: 132 LBS | BODY MASS INDEX: 24.92 KG/M2 | HEART RATE: 77 BPM

## 2023-03-07 DIAGNOSIS — R00.1 BRADYCARDIA: ICD-10-CM

## 2023-03-07 DIAGNOSIS — E78.5 HYPERLIPIDEMIA LDL GOAL <70: ICD-10-CM

## 2023-03-07 DIAGNOSIS — I73.9 PAD (PERIPHERAL ARTERY DISEASE) (HCC): ICD-10-CM

## 2023-03-07 DIAGNOSIS — Z95.2 S/P AVR (AORTIC VALVE REPLACEMENT): ICD-10-CM

## 2023-03-07 DIAGNOSIS — I50.42 CHRONIC COMBINED SYSTOLIC AND DIASTOLIC CHF, NYHA CLASS 2 (HCC): ICD-10-CM

## 2023-03-07 DIAGNOSIS — I48.0 PAF (PAROXYSMAL ATRIAL FIBRILLATION) (HCC): ICD-10-CM

## 2023-03-07 DIAGNOSIS — I34.0 NONRHEUMATIC MITRAL VALVE REGURGITATION: ICD-10-CM

## 2023-03-07 DIAGNOSIS — I25.10 CORONARY ARTERY DISEASE INVOLVING NATIVE CORONARY ARTERY OF NATIVE HEART WITHOUT ANGINA PECTORIS: Primary | ICD-10-CM

## 2023-03-07 DIAGNOSIS — Z95.0 BIVENTRICULAR CARDIAC PACEMAKER IN SITU: ICD-10-CM

## 2023-03-07 DIAGNOSIS — I65.21 CAROTID STENOSIS, RIGHT: ICD-10-CM

## 2023-03-07 PROCEDURE — 3078F DIAST BP <80 MM HG: CPT | Performed by: INTERNAL MEDICINE

## 2023-03-07 PROCEDURE — 1123F ACP DISCUSS/DSCN MKR DOCD: CPT | Performed by: INTERNAL MEDICINE

## 2023-03-07 PROCEDURE — 3074F SYST BP LT 130 MM HG: CPT | Performed by: INTERNAL MEDICINE

## 2023-03-07 PROCEDURE — 93000 ELECTROCARDIOGRAM COMPLETE: CPT | Performed by: INTERNAL MEDICINE

## 2023-03-07 PROCEDURE — 99214 OFFICE O/P EST MOD 30 MIN: CPT | Performed by: INTERNAL MEDICINE

## 2023-03-07 NOTE — PROGRESS NOTES
[facility] [place]   Office Note    CC: \"I am doing good\"     HPI: Shonda Hernandes  is a 77 y.o. patient with a past medical history significant for atrial fibrillation, pulmonary hypertension, aortic stenosis and CAD s/p CHILANGO to mid circ 5/2014. She is s/p AVR, PVI and RENETTA exclusion by Dr Hernandez on 6/16. An inpatient echocardiogram was completed revealing moderate to severe MR. Subsequently, a CAMERON was completed on 7/27/20 showing moderate MR. She was admitted 10/26/21-11/1/21 with afib with RVR and acute CHF. She converted to a regular rhythm and was diuresed prior to discharge. She was admitted to [facility] 11/11/21 with acute encephalopathy and found to have rhabdomyolysis and afib with RVR again. She underwent successful cardioversion on 11/17/21. She did experience bradycardia when in sinus rhythm with heart rate between 40-50 bpm. She was seen in the office with Diane Enzweiler, CNP for follow up on 11/23/21 and then sent to the ED for admission. She was again found in afib with RVR and fluid overloaded. She was diuresed and discharged home on both amiodarone and Toprol. She underwent implant of Bi-V pacemaker with Dr. Mcbride on 4/11/22. She had recurrence of atrial fibrillation noted on interrogation and underwent ablation on 5/9/22.     She was seen in office on 02/22/2023 by Dominic Mcbride MD and device interrogation showed no AT/ AF recurrence since her last check in November 2022.    Today, she presents to office for follow up. She has a plantar wart on bottom of her foot which causes her some discomfort with ambulation. Endorses chronic back pain which she takes oxy IR for. Reports PCP is hesitant to increase due to concern about how it will affect her heart.  Reports compliance with medications and tolerating them well. Denies chest pain/pressure, tightness, edema, shortness of breath, heart racing, palpitations, lightheadedness, dizziness, syncope, presyncope,  PND or orthopnea.       Review of  Systems:  Constitutional: No fatigue, weakness, night sweats or fever. HEENT: No new vision difficulties or ringing in the ears. Respiratory: No new SOB, PND, orthopnea or cough. Cardiovascular: See HPI   GI: No n/v, diarrhea, constipation, abdominal pain or changes in bowel habits. No melena, no hematochezia  : No urinary frequency, urgency, incontinence, hematuria or dysuria. Skin: No cyanosis or skin lesions. Musculoskeletal: No new muscle or joint pain. Neurological: No syncope or TIA-like symptoms.   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    Bladder cancer ([facility] 02/2014    CAD (coronary artery disease) 2014    Carotid stenosis 04/30/2014    Chronic back pain     Chronic diastolic CHF (congestive heart failure) ([address] 2016    Chronic prescription opiate use     COPD ([facility]     Diverticulosis     HTN (hypertension)     Hyperlipidemia     Hypothyroidism     Ischemic stroke 2013    x 2    Lumbar spine stenosis 2017    Macular degeneration 2018    Nonrheumatic mitral valve regurgitation     Obstructive sleep apnea     Osteoarthritis     Peripheral neuropathy     Pulmonary HTN ([address] 2014    PVD (peripheral vascular disease) ([facility]     Syncope 11/2022    of unknown cause    Tobacco use disorder in remission        Current Outpatient Medications   Medication Sig Dispense Refill    levothyroxine (SYNTHROID) 88 MCG tablet TAKE 1 TABLET BY MOUTH EVERY DAY 30 tablet 2    potassium chloride (KLOR-CON M) 20 MEQ TBCR extended release tablet Take 1 tablet by mouth 2 times daily 60 tablet 3    torsemide (DEMADEX) 20 MG tablet Two po qam water pill 60 tablet 3    rosuvastatin (CRESTOR) 40 MG tablet TAKE ONE TABLET BY MOUTH EVERY EVENING 30 tablet 2    metoprolol succinate (TOPROL XL) 50 MG extended release tablet TAKE 1 TABLET BY MOUTH DAILY 30 tablet 2    pantoprazole (PROTONIX) 40 MG tablet One po qd 30 tablet 2    ezetimibe (ZETIA) 10 MG tablet TAKE ONE TABLET BY MOUTH DAILY 30 tablet 2    tiZANidine (ZANAFLEX) 4 MG capsule TAKE ONE-HALF TABLET BY MOUTH EVERY 8 HOURS AS NEEDED (MUSCLE SPASM) 30 capsule 3    diclofenac sodium (VOLTAREN) 1 % GEL Apply 2 g topically daily 2 g 3    ipratropium (ATROVENT) 0.03 % nasal spray INHALE 2 DOSES INTO EACH NOSTRIL THREE TIMES A DAY IF NEEDED FOR RHINITIS 30 mL 5    lidocaine 4 % external patch Place 1 patch onto the skin daily 30 each 3    aspirin 81 MG EC tablet Take 81 mg by mouth daily      valsartan (DIOVAN) 40 MG tablet Take 0.5 tablets by mouth daily 30 tablet 2    sennosides-docusate sodium (SENOKOT-S) 8.6-50 MG tablet Take 1 tablet by mouth in the morning and at bedtime 60 tablet 5    amiodarone (CORDARONE) 200 MG tablet Take 0.5 tablets by mouth in the morning. (Patient taking differently: Take 100 mg by mouth daily Taking in the evening) 45 tablet 3    DULoxetine (CYMBALTA) 60 MG extended release capsule Take 60 mg by mouth daily      oxyCODONE HCl (OXY-IR) 10 MG immediate release tablet Take 10 mg by mouth every 4 hours as needed for Pain. No current facility-administered medications for this visit. Family History   Problem Relation Age of Onset    Breast Cancer Daughter      Objective:   Vitals:    03/07/23 1456   BP: 122/62   Pulse: 77   SpO2: (!) 89%   Weight: 132 lb (59.9 kg)   Height: 5' 1\" (1.549 m)         Physical Exam:   Constitutional: The patient is oriented to person, place, and time. Appears well-developed and well-nourished. In no acute distress. Head: Normocephalic and atraumatic. Pupils equal and round. Neck: Neck supple. No JVP or carotid bruit appreciated. No mass and no thyromegaly present. No lymphadenopathy present. Cardiovascular: Normal rate. Normal heart sounds. Exam reveals no gallop and no friction rub. No murmur heard.   Pulmonary/Chest: Effort normal and breath sounds normal. No respiratory distress. No wheezes, rhonchi or rales. Abdominal: Soft, non-tender. Bowel sounds are normal. Exhibits no organomegaly, mass or bruit. Extremities: No edema. No cyanosis or clubbing. Pulses are 2+ radial and carotid bilaterally. Neurological: No gross cranial nerve deficit. Coordination normal.   Skin: Skin is warm and dry. There is no rash or diaphoresis. Psychiatric: Patient has a normal mood and affect. Speech is normal and behavior is normal.     Lab Results   Component Value Date/Time    CHOL 137 07/20/2022 01:08 PM    HDL 53 07/20/2022 01:08 PM    HDL 42 06/21/2010 10:02 PM    TRIG 86 07/20/2022 01:08 PM   LDL 52  Lab Results   Component Value Date/Time     01/11/2023 04:23 PM     Lab Results   Component Value Date/Time    K 4.8 01/11/2023 04:23 PM    K 4.7 11/14/2022 05:03 AM       Lab Results   Component Value Date/Time    BUN 14 01/11/2023 04:23 PM    CREATININE 1.4 01/11/2023 04:23 PM     Personally reviewed and interpreted   EKG 10/22/15: Sinus bradycardia with LAFB  EKG 1/2/20: Sinus rhonda, LAFB  EKG 6/10/21:  Sinus rhythm with LBBB  EKG 11/30/21: Sinus rhythm with LBBB  EKG 12/15/21: Sinus bradycardia with LBBB  EKG 2/15/22: Sinus bradycardia with LBBB  EKG 5/11/22: AV paced  EKG 5/25/22: AV paced  EKG 8/30/22: AV Paced  EKG 3/07/22:  reviewed AV paced     Procedures:     [facility] 6/15/2015   1. Right dominant coronary arterial system with mild calcification of the RCA. There is 40% serial lesions in the mid RCA. In the left system there is 25% distal left main disease. There is 50% mid LAD disease and 50% ostial second diagonal branch disease. There is no significant restenosis identified in the prior previously placed mid circumflex stent. 2. Systemic hypertension. [address] 10/27/21  HEMODYNAMICS:  1. Right atrial pressure was 12 mmHg. 2.  RV pressure 65/-4. 3.  Pulmonary capillary wedge pressure was 24 mmHg.   4.  Pulmonary artery pressure 71/19 with a mean of 41 mmHg. 5.  Cardiac output 4.9 liters per minute. 6.  Mixed venous saturation 52%. 7.  Pulmonary capillary wedge saturation 90%. 8.  Right atrial saturation 51%. In summary, elevated right and left filling pressure with tall V waves  seen in pulmonary capillary wedge pressure tracing suggestive of severe  mitral regurgitation. Imaging:     Echo 7/30/2016  Left ventricle size is normal. Normal left ventricular wall thickness. Ejection fraction is visually estimated to be 55-60%. Diastolic filling parameters suggests grade III (restrictive) diastolic dysfunction. The right ventricle appears mildly enlarged. Right ventricular systolic function is normal.  The left atrium is severely dilated. The right atrium is mildly dilated. At least moderate mitral regurgitation with a central and eccentric jet. Mild mitral stenosis. BP reported as 171/51 at time of imaging. A bioprosthetic aortic valve has a maximum velocity of 2.8 m/s and a mean gradient of 17 mmHg. Para-valvular regurgitation. There is moderate tricuspid regurgitation. Mild pulmonic regurgitation. Estimated pulmonary artery systolic pressure is 72 mmHg assuming a rightatrial pressure of 10 mmHg. Lower Extremity Arterial Studies 5/25/17  Right Impression   There is an NIKOS of .70 in the DP and .77 in the PT. on the right. This is in   the mild claudication range. Occlusion of the SFA artery in the right lower   extremity. Left Impression   There is an NIKOS of 1.06 in the DP and 1.08 in the PT. on the left. This is in   the normal range. Elevated velocity of the SFA. Carotid Studies 5/25/17  Right Impression   The right internal carotid artery appears to have a calcified 50-79% diameter   reducing stenosis based on velocity criteria. Left Impression   The left internal carotid artery appears to have a <50% diameter reducing   stenosis based on velocity criteria.          Echo 1/8/18  Normal LV size and systolic function. Estimated ejection fraction is 60%. Moderate to severe mitral regurgitation is present. Severely dilated left atrium. Normally functioning bioprosthetic valve in aortic position. Trivial regurgitation noted. The right ventricle is mildly enlarged. Moderate tricuspid regurgitation. Doppler derived PA systolic pressure is 52 mm Hg suggestive of moderate   pulmonary hypertension. Holter 1/8/18 ([facility])  1. The rhythm was sinus with sinus arrhythmia. Average MS interval 0.20,   average QRS duration 0.14 [IVCD]. Average daily heart rate 44 ranging 38 to 68 bpm.   There were 129 pauses greater than 2.0 seconds with Max R-R 2.1 seconds occurring 05:13:11.   2. Frequent premature supraventricular ectopic beats total 1,642 consisting   of 1,152 isolated PACs, 209 atrial pairs and 3 atrial runs. The longest atrial run 3 beats   HR-61 bpm occurring 17:56:40. The fastest atrial run 3 beats HR-76 bpm occurring 09:49:41.   3. Very frequent premature ventricular ectopic beats total 16,163 consisting of 16,008 isolated   PVCs, 74 ventricular couplets and 1 ventricular triplet. The ventricular triplet HR-126 bpm   occurring 15:36:29.   4. Diary returned with an entry of \"irregular heart\",  isolated PACs, PVCs and   ventricular couplets noted. Carotid duplex 1/16/20  Right Impression   The right internal carotid artery appears to have a 50-79% diameter reducing   stenosis based on velocity criteria. The right vertebral artery demonstrates normal antegrade flow. Left Impression   The left internal carotid artery appears to have a <50% diameter reducing   stenosis based on velocity criteria. The left vertebral artery demonstrates normal antegrade flow. Echo 7/8/20  There is mild concentric left ventricular hypertrophy. Ejection fraction is visually estimated to be 55-60%. diastology cannot be determined  Moderate to severe mitral regurgitation is present.   A bioprosthetic artificial aortic valve appears well seated with a maximum  velocity of 273 m/s and a mean gradient of 16 mmHg. Study is unchanged from previous dated 1/8/2018    CAMERON 7/27/20  Left ventricular cavity size is normal in size with normal wall thickness. Overall left ventricular systolic function appears normal.  Ejection fraction is visually estimated to be 55-60%. Normal right ventricular size and function. Left atrium is moderately dilated. Mild thickening of the mitral valve leaflets. There is moderate mitral regurgitation appreciated. Moderate tricuspid regurgitation. A bioprosthetic valve appears well seated. There is no appreciable leaflet restriction or stenosis. There is no aortic insufficiency appreciated. Echo 10/27/21  Left ventricular cavity size is normal.  Normal left ventricular wall thickness. Ejection fraction is visually estimated to be 45-50%. There is mild global  hypokinesis appreciated. Grade III diastolic dysfunction with elevated LV filling pressures. Moderately dilated right ventricle with mildly reduced function. The left atrium is severely dilated. The right atrium is mildly dilated. Moderately severe tricuspid regurgitation. Moderate pulmonic regurgitation present. Moderately severe mitral regurgitation appreciated. A bioprosthetic artificial aortic valve appears well seated with a maximum  velocity of 2.4m/s and a mean gradient of 12mmHg. Trivial aortic regurgitation. A bubble study was performed and fails to show evidence of right to left  shunting. CAMERON 10/29/21  Overall left ventricular size and wall thickness appear normal with systolic  function mildly reduced. LVEF 40-45%. Normal right ventricular size and function. Left atrium is moderately dilated. The left atrial appendage appears to be ligated with no flow into it. Mitral valve leaflets appear thickened/calcified. Restricted posterior leaflet with malcoaptation id the leaflet tips.   Moderate mitral regurgitation is present. Moderate tricuspid regurgitation. Upper extremity venous duplex 11/18/21 ([facility])     o No evidence of acute deep vein thrombosis in the left upper extremity . o There is superficial venous thrombosis identified in the left upper        extremity that is of indeterminate age as described above. Carotid duplex 7/8/22  Right Impression   The right internal carotid artery appears to have a 50-79% diameter reducing   stenosis based on velocity criteria. The right vertebral artery demonstrates normal antegrade flow. Left Impression   The left internal carotid artery appears to have a <50% diameter reducing   stenosis based on velocity criteria. The left vertebral artery demonstrates normal antegrade flow. Lower extremity arterial duplex 8/18/22  Right Impression   Right NIKOS was not available due to noncompressible tibial vessels. The majority of the waveforms are monophasic throughout the right lower   extremity. Occlusion of the proximal to mid femoral artery with recanalization at the   distal segment in the right lower extremity. Elevated velocity of the profunda femoral artery suggests a greater than 50%   stenosis. Left Impression   Left NIKOS was not available due to noncompressible tibial vessels. The majority of the waveforms are multiphasic throughout the left lower   extremity. Elevated velocity of the proximal femoral artery suggest a than 50-70%   stenosis. Assessment:  1. CAD of native coronary arteries without angina  2. S/p Aortic Valve Replacement with bioprosthesis for nonrheumatic aortic stenosis  3. Chronic combined systolic and diastolic CHF, class 3  4. Hyperlipidemia with goal LDL <70 mg/dL  5. Paroxysmal Atrial fibrillation  6. Asymptomatic Bradycardia  7. Carotid stenosis, right   8. Mitral regurgitation, moderate  9. PAD     Plan:  Claudean Gasser is entirely stable from a cardiovascular standpoint.  I see no need to make any changes currently in his medical regimen. She is not endorsing symptoms representing angina and is overall well compensated on exam. Her blood pressure is well controlled today in the office. She does have peripheral disease noted on her recent testing. Continue amiodarone 100 mg daily, with routine checking of liver and thyroid function Q4-6 months, yearly PFTs and ophthalmology. CHADS2-VASc 5 (age, gender, HTN, CAD) [facility] is not indicated with history of RENETTA exclusion and CAEMRON completed in 2020 showing no flow in the appendage. I have personally reviewed all previous testing for this visit today including imaging, lab results and EKG as detailed above. I will see her in office for follow up in 6 months. This note was scribed in the presence of Michael Joel MD by Jocelyne Donovan RN. Physician Attestation:  The scribes documentation has been prepared under my direction and personally reviewed by me in its entirety. I, Dr. Alesha Monroy personally performed the services described in this documentation as scribed by my RN in my presence, and I confirm that the note above accurately reflects all work, treatment, procedures, and medical decision making performed by me.

## 2023-03-23 ENCOUNTER — OFFICE VISIT (OUTPATIENT)
Dept: PAIN MANAGEMENT | Age: 78
End: 2023-03-23

## 2023-03-23 VITALS
SYSTOLIC BLOOD PRESSURE: 137 MMHG | OXYGEN SATURATION: 98 % | HEIGHT: 62 IN | DIASTOLIC BLOOD PRESSURE: 67 MMHG | HEART RATE: 71 BPM | BODY MASS INDEX: 24.11 KG/M2 | WEIGHT: 131 LBS

## 2023-03-23 DIAGNOSIS — G89.29 CHRONIC MIDLINE LOW BACK PAIN WITH SCIATICA, SCIATICA LATERALITY UNSPECIFIED: ICD-10-CM

## 2023-03-23 DIAGNOSIS — Z91.81 AT RISK FOR FALLING: ICD-10-CM

## 2023-03-23 DIAGNOSIS — M51.34 DDD (DEGENERATIVE DISC DISEASE), THORACIC: ICD-10-CM

## 2023-03-23 DIAGNOSIS — M47.812 CERVICAL ARTHRITIS: ICD-10-CM

## 2023-03-23 DIAGNOSIS — M15.3 OTHER SECONDARY OSTEOARTHRITIS OF MULTIPLE SITES: ICD-10-CM

## 2023-03-23 DIAGNOSIS — M48.061 SPINAL STENOSIS OF LUMBAR REGION WITHOUT NEUROGENIC CLAUDICATION: ICD-10-CM

## 2023-03-23 DIAGNOSIS — M54.40 CHRONIC MIDLINE LOW BACK PAIN WITH SCIATICA, SCIATICA LATERALITY UNSPECIFIED: ICD-10-CM

## 2023-03-23 DIAGNOSIS — M47.817 LUMBOSACRAL SPONDYLOSIS WITHOUT MYELOPATHY: ICD-10-CM

## 2023-03-23 DIAGNOSIS — G89.4 CHRONIC PAIN SYNDROME: ICD-10-CM

## 2023-03-23 DIAGNOSIS — M47.814 FACET ARTHROPATHY, THORACIC: ICD-10-CM

## 2023-03-23 DIAGNOSIS — J44.1 COPD EXACERBATION (HCC): ICD-10-CM

## 2023-03-23 RX ORDER — LIDOCAINE 50 MG/G
1 PATCH TOPICAL DAILY
Qty: 30 PATCH | Refills: 0 | Status: SHIPPED | OUTPATIENT
Start: 2023-03-23 | End: 2023-04-22

## 2023-03-23 RX ORDER — OXYCODONE AND ACETAMINOPHEN 7.5; 325 MG/1; MG/1
1 TABLET ORAL EVERY 12 HOURS PRN
Qty: 56 TABLET | Refills: 0 | Status: SHIPPED | OUTPATIENT
Start: 2023-03-23 | End: 2023-04-20

## 2023-03-23 RX ORDER — OXYCODONE 13.5 MG/1
13.5 CAPSULE, EXTENDED RELEASE ORAL EVERY 12 HOURS PRN
Qty: 56 EACH | Refills: 0 | Status: SHIPPED | OUTPATIENT
Start: 2023-03-23 | End: 2023-04-20

## 2023-03-23 NOTE — PROGRESS NOTES
HISTORY OF PRESENT ILLNESS:  Ms. Marleny Thurston is a 68 y.o. female presents for consultation at the kind request of Dr. Jen Lu her primary care physician for chronic pain management. Her presenting problems are pain in the lower back without radiation to bilateral lower extremities, radiation seems to occur mainly towards the mid back. She has also been evaluated by cardiology for heart disease, pulmonolgy for COPD. Onset of pain began over 10 years ago spontaneously, reports little by little pain got worse with time. 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 She had a CVA in 2013 as well as bladder cancer in 2014 currently in remission. Was treated under pain management with Dr. Hakeem Orantes in 2019 who inserted an interspinous spacer in the lumbar spine which provided no pain relief. She received 3 JAILYN's to the lumbar spine under the care of Dr. Fatuma Smith with the last 1 having been in 2021 with minimal to no pain relief. Opioids were primarily prescribed by Dr. Hermila Gordon with oxycodone 10 mg tablets last dispensed on 2/22/2023, currently has another prescription pending for pickup. She was advised to follow-up at another pain clinic for continuation of opioid therapy due to office closure. Health conditions include hypertension, COPD, heart disease with pacemaker, HLD, hypothyroidism. Denies having had physical therapy in the last year     She rates the pain lower back at 6/10, she describes it as aching, shooting, numbness, tingling. Pain is made worse by: standing, up/down the stairs. Activities that have been limited by pain that she otherwise tolerated well are working. Alternative therapies she haspreviously attempted are medicine, anti-inflammatories, muscle relaxants, local injections. Current treatment regimen has helped relieve about 0% of the pain.  Relieving factors of pain include medicine, muscle

## 2023-04-18 ENCOUNTER — OFFICE VISIT (OUTPATIENT)
Dept: PAIN MANAGEMENT | Age: 78
End: 2023-04-18

## 2023-04-18 VITALS
OXYGEN SATURATION: 94 % | HEART RATE: 73 BPM | SYSTOLIC BLOOD PRESSURE: 128 MMHG | DIASTOLIC BLOOD PRESSURE: 64 MMHG | HEIGHT: 62 IN | BODY MASS INDEX: 23.55 KG/M2 | TEMPERATURE: 97.3 F | WEIGHT: 128 LBS

## 2023-04-18 DIAGNOSIS — M47.814 FACET ARTHROPATHY, THORACIC: ICD-10-CM

## 2023-04-18 DIAGNOSIS — M15.3 OTHER SECONDARY OSTEOARTHRITIS OF MULTIPLE SITES: ICD-10-CM

## 2023-04-18 DIAGNOSIS — G89.29 CHRONIC MIDLINE LOW BACK PAIN WITH SCIATICA, SCIATICA LATERALITY UNSPECIFIED: ICD-10-CM

## 2023-04-18 DIAGNOSIS — M51.34 DDD (DEGENERATIVE DISC DISEASE), THORACIC: ICD-10-CM

## 2023-04-18 DIAGNOSIS — M47.817 LUMBOSACRAL SPONDYLOSIS WITHOUT MYELOPATHY: ICD-10-CM

## 2023-04-18 DIAGNOSIS — M54.40 CHRONIC MIDLINE LOW BACK PAIN WITH SCIATICA, SCIATICA LATERALITY UNSPECIFIED: ICD-10-CM

## 2023-04-18 DIAGNOSIS — M48.061 SPINAL STENOSIS OF LUMBAR REGION WITHOUT NEUROGENIC CLAUDICATION: ICD-10-CM

## 2023-04-18 DIAGNOSIS — M47.812 CERVICAL ARTHRITIS: ICD-10-CM

## 2023-04-18 DIAGNOSIS — G89.4 CHRONIC PAIN SYNDROME: ICD-10-CM

## 2023-04-18 RX ORDER — OXYCODONE AND ACETAMINOPHEN 10; 325 MG/1; MG/1
1 TABLET ORAL EVERY 12 HOURS PRN
Qty: 56 TABLET | Refills: 0 | Status: SHIPPED | OUTPATIENT
Start: 2023-04-18 | End: 2023-05-16

## 2023-04-18 RX ORDER — LIDOCAINE 50 MG/G
1 PATCH TOPICAL DAILY
Qty: 30 PATCH | Refills: 0 | Status: SHIPPED | OUTPATIENT
Start: 2023-04-18 | End: 2023-05-18

## 2023-04-18 RX ORDER — OXYCODONE 9 MG/1
9 CAPSULE, EXTENDED RELEASE ORAL EVERY 12 HOURS
Qty: 56 EACH | Refills: 0 | Status: SHIPPED | OUTPATIENT
Start: 2023-04-18 | End: 2023-05-16

## 2023-04-18 NOTE — PROGRESS NOTES
[facility]  1945  [id number]      HISTORY OF PRESENT ILLNESS: Ms. Kalyan Avila is a 68 y.o. female returns for a follow up visit for pain management  She has a diagnosis of   1. Chronic pain syndrome    2. DDD (degenerative disc disease), thoracic    3. Facet arthropathy, thoracic    4. Spinal stenosis of lumbar region without neurogenic claudication    5. Lumbosacral spondylosis without myelopathy    6. Chronic midline low back pain with sciatica, sciatica laterality unspecified    7. Cervical arthritis    8. Other secondary osteoarthritis of multiple sites      As per Information Obtained from the PADT (Patient Assessment and Documentation Tool)    She complains of pain in the  mid and lower back  She rates the pain 8/10 and describes it as aching. Current treatment regimen has helped relieve about 20% of the pain. She denies any side effects from the current pain regimen. Patient reports that since the last follow up visit the physical functioning is worse, family/social relationships are unchanged, mood is worse sleep patterns are unchanged, and that the overall functioning is worse. Patient denies misusing/abusing her narcotic pain medications or using any illegal drugs. Upon obtaining medical history from Ms. Hernandes states that pain is not manageable on current pain therapy. Takes the pain medications as prescribed. Reports current pain therapy is not working for her. Mood/anxiety is stable. Sleep is fair with an average of 5-6 hours. Denies to having issues of constipation. Tolerating activities/house chores with moderate tenderness to the lower back. ALLERGIES: Patients list of allergies were reviewed     MEDICATIONS: Ms. Kalyan Avila list of medications were reviewed. Her current medications are   Outpatient Medications Prior to Visit   Medication Sig Dispense Refill    pantoprazole (PROTONIX) 40 MG tablet One po qd 30 tablet 2    valsartan (DIOVAN) 40 MG tablet Take 0.5 tablets by mouth daily 30 tablet 2

## 2023-04-21 RX ORDER — NALOXONE HYDROCHLORIDE 4 MG/.1ML
SPRAY NASAL
Qty: 1 EACH | Refills: 0 | Status: SHIPPED | OUTPATIENT
Start: 2023-04-21

## 2023-05-16 ENCOUNTER — OFFICE VISIT (OUTPATIENT)
Dept: PAIN MANAGEMENT | Age: 78
End: 2023-05-16

## 2023-05-16 VITALS
TEMPERATURE: 97.2 F | OXYGEN SATURATION: 97 % | DIASTOLIC BLOOD PRESSURE: 67 MMHG | SYSTOLIC BLOOD PRESSURE: 156 MMHG | HEART RATE: 64 BPM

## 2023-05-16 DIAGNOSIS — M15.3 OTHER SECONDARY OSTEOARTHRITIS OF MULTIPLE SITES: ICD-10-CM

## 2023-05-16 DIAGNOSIS — M48.061 SPINAL STENOSIS OF LUMBAR REGION WITHOUT NEUROGENIC CLAUDICATION: ICD-10-CM

## 2023-05-16 DIAGNOSIS — M51.34 DDD (DEGENERATIVE DISC DISEASE), THORACIC: ICD-10-CM

## 2023-05-16 DIAGNOSIS — M47.814 FACET ARTHROPATHY, THORACIC: ICD-10-CM

## 2023-05-16 DIAGNOSIS — G89.4 CHRONIC PAIN SYNDROME: ICD-10-CM

## 2023-05-16 DIAGNOSIS — M47.817 LUMBOSACRAL SPONDYLOSIS WITHOUT MYELOPATHY: ICD-10-CM

## 2023-05-16 DIAGNOSIS — M47.812 CERVICAL ARTHRITIS: ICD-10-CM

## 2023-05-16 DIAGNOSIS — M54.40 CHRONIC MIDLINE LOW BACK PAIN WITH SCIATICA, SCIATICA LATERALITY UNSPECIFIED: ICD-10-CM

## 2023-05-16 DIAGNOSIS — G89.29 CHRONIC MIDLINE LOW BACK PAIN WITH SCIATICA, SCIATICA LATERALITY UNSPECIFIED: ICD-10-CM

## 2023-05-16 RX ORDER — OXYCODONE 9 MG/1
9 CAPSULE, EXTENDED RELEASE ORAL EVERY 12 HOURS
Qty: 56 EACH | Refills: 0 | Status: SHIPPED | OUTPATIENT
Start: 2023-05-16 | End: 2023-06-13

## 2023-05-16 RX ORDER — LIDOCAINE 50 MG/G
1 PATCH TOPICAL DAILY
Qty: 30 PATCH | Refills: 0 | Status: SHIPPED | OUTPATIENT
Start: 2023-05-16 | End: 2023-06-15

## 2023-05-16 RX ORDER — OXYCODONE AND ACETAMINOPHEN 10; 325 MG/1; MG/1
1 TABLET ORAL EVERY 12 HOURS PRN
Qty: 56 TABLET | Refills: 0 | Status: SHIPPED | OUTPATIENT
Start: 2023-05-16 | End: 2023-06-13

## 2023-05-16 NOTE — PROGRESS NOTES
pain with sciatica, sciatica laterality unspecified    7. Other secondary osteoarthritis of multiple sites    8. Cervical arthritis      PLAN:  Informed verbal consent was obtained:  -Patient's opioid therapy will be maintained at current dose  -Home exercises/Mallika exercises recommended  -Patient requested to take the pain medications atleast every 5 hours due to sleeping long hours. Informed the patient as long as she took no more than 4 tablets a days no less than 5 hours she is fine. -CBT techniques- relaxation therapies such as biofeedback, mindfulness based stress reduction, imagery, cognitive restructuring, problem solving discussed with patient   -Last UDS 3.23.23 consistent  -Return in about 4 weeks (around 6/13/2023). Analgesic Plan:              Continue present regimen: Xtampza ER 9 mg tabs orally bid, Percocet  mg tabs orally bid prn              Adjust dose of present analgesic: No              Switch analgesics: No              Add/Adjust concomitant therapy: Lidocaine    I will continue her current medication regimen  which is part of the above treatment schedule. It has been helping with Ms. Myers Gather chronic  medical problems which for this visit include:   Diagnoses of Chronic pain syndrome, DDD (degenerative disc disease), thoracic, Facet arthropathy, thoracic, Lumbosacral spondylosis without myelopathy, Spinal stenosis of lumbar region without neurogenic claudication, Chronic midline low back pain with sciatica, sciatica laterality unspecified, Other secondary osteoarthritis of multiple sites, and Cervical arthritis were pertinent to this visit. Risks and benefits of the medications and other alternative treatments  including no treatment were discussed with the patient. The common side effects of these medications were also explained to the patient. Informed verbal consent was obtained.    Goals of current treatment regimen include improvement in pain, restoration of functioning-

## 2023-05-17 ENCOUNTER — NURSE ONLY (OUTPATIENT)
Dept: CARDIOLOGY CLINIC | Age: 78
End: 2023-05-17

## 2023-05-17 DIAGNOSIS — R00.1 SYMPTOMATIC BRADYCARDIA: ICD-10-CM

## 2023-05-17 DIAGNOSIS — I48.0 PAF (PAROXYSMAL ATRIAL FIBRILLATION) (HCC): Primary | Chronic | ICD-10-CM

## 2023-05-17 DIAGNOSIS — Z95.0 BIVENTRICULAR CARDIAC PACEMAKER IN SITU: Chronic | ICD-10-CM

## 2023-05-17 DIAGNOSIS — I50.22 CHRONIC SYSTOLIC CHF (CONGESTIVE HEART FAILURE), NYHA CLASS 2 (HCC): ICD-10-CM

## 2023-05-19 NOTE — PROGRESS NOTES
Remote transmission received from patient's CRT-P monitor at home. Transmission shows normal sensing and pacing function. No new arrhythmias/events recorded. EP physician will review. Ap 90.7%  BiVp 97.7%, Effective 97.6%, VSRp 1.9%  PVCs 6.3/hr    Optivol is within normal range. TriageHF Heart Failure Risk Status on 16-May-2023 is Low*. NP will review. End of 91-day monitoring period 5/17/23. See interrogation under cardiology tab in the [address] [place] [address] [address] field for more details. Will continue to monitor remotely.

## 2023-06-28 ENCOUNTER — NURSE ONLY (OUTPATIENT)
Dept: CARDIOLOGY CLINIC | Age: 78
End: 2023-06-28
Payer: COMMERCIAL

## 2023-06-28 DIAGNOSIS — I48.0 PAF (PAROXYSMAL ATRIAL FIBRILLATION) (HCC): Chronic | ICD-10-CM

## 2023-06-28 DIAGNOSIS — Z95.0 BIVENTRICULAR CARDIAC PACEMAKER IN SITU: Primary | Chronic | ICD-10-CM

## 2023-06-28 DIAGNOSIS — R00.1 SYMPTOMATIC BRADYCARDIA: ICD-10-CM

## 2023-06-28 DIAGNOSIS — I50.22 CHRONIC SYSTOLIC CHF (CONGESTIVE HEART FAILURE), NYHA CLASS 2 (HCC): ICD-10-CM

## 2023-06-28 PROCEDURE — 93297 REM INTERROG DEV EVAL ICPMS: CPT | Performed by: NURSE PRACTITIONER

## 2023-06-28 PROCEDURE — G2066 INTER DEVC REMOTE 30D: HCPCS | Performed by: NURSE PRACTITIONER

## 2023-07-11 ENCOUNTER — TELEPHONE (OUTPATIENT)
Dept: PAIN MANAGEMENT | Age: 78
End: 2023-07-11

## 2023-07-11 DIAGNOSIS — M51.34 DDD (DEGENERATIVE DISC DISEASE), THORACIC: ICD-10-CM

## 2023-07-11 DIAGNOSIS — M47.817 LUMBOSACRAL SPONDYLOSIS WITHOUT MYELOPATHY: ICD-10-CM

## 2023-07-11 DIAGNOSIS — M54.40 CHRONIC MIDLINE LOW BACK PAIN WITH SCIATICA, SCIATICA LATERALITY UNSPECIFIED: ICD-10-CM

## 2023-07-11 DIAGNOSIS — G89.4 CHRONIC PAIN SYNDROME: ICD-10-CM

## 2023-07-11 DIAGNOSIS — M48.061 SPINAL STENOSIS OF LUMBAR REGION WITHOUT NEUROGENIC CLAUDICATION: ICD-10-CM

## 2023-07-11 DIAGNOSIS — M47.814 FACET ARTHROPATHY, THORACIC: ICD-10-CM

## 2023-07-11 DIAGNOSIS — G89.29 CHRONIC MIDLINE LOW BACK PAIN WITH SCIATICA, SCIATICA LATERALITY UNSPECIFIED: ICD-10-CM

## 2023-07-11 DIAGNOSIS — M47.812 CERVICAL ARTHRITIS: ICD-10-CM

## 2023-07-11 DIAGNOSIS — M15.3 OTHER SECONDARY OSTEOARTHRITIS OF MULTIPLE SITES: ICD-10-CM

## 2023-07-11 RX ORDER — OXYCODONE 9 MG/1
9 CAPSULE, EXTENDED RELEASE ORAL EVERY 12 HOURS
Qty: 10 EACH | Refills: 0 | Status: SHIPPED | OUTPATIENT
Start: 2023-07-11 | End: 2023-07-16

## 2023-07-11 RX ORDER — OXYCODONE AND ACETAMINOPHEN 10; 325 MG/1; MG/1
1 TABLET ORAL EVERY 12 HOURS PRN
Qty: 10 TABLET | Refills: 0 | Status: SHIPPED | OUTPATIENT
Start: 2023-07-11 | End: 2023-07-16

## 2023-07-11 NOTE — TELEPHONE ENCOUNTER
Pt said her FU was scheduled later than usual so she would like an extension of xtampza and percocet sent to the pharmacy. Arabella Hicks [phone] [facility], [address] [place] [address] [phone] Kurt Moore [phone]        Please advise

## 2023-07-11 NOTE — TELEPHONE ENCOUNTER
Spoke with pt letting her know that [facility] printed the scripts, pt can pick them up on [place] between 8 & 4:30

## 2023-07-18 ENCOUNTER — OFFICE VISIT (OUTPATIENT)
Dept: PAIN MANAGEMENT | Age: 78
End: 2023-07-18
Payer: COMMERCIAL

## 2023-07-18 VITALS
SYSTOLIC BLOOD PRESSURE: 136 MMHG | OXYGEN SATURATION: 95 % | TEMPERATURE: 97.2 F | DIASTOLIC BLOOD PRESSURE: 56 MMHG | HEART RATE: 56 BPM

## 2023-07-18 DIAGNOSIS — M47.817 LUMBOSACRAL SPONDYLOSIS WITHOUT MYELOPATHY: ICD-10-CM

## 2023-07-18 DIAGNOSIS — M15.3 OTHER SECONDARY OSTEOARTHRITIS OF MULTIPLE SITES: ICD-10-CM

## 2023-07-18 DIAGNOSIS — G89.29 CHRONIC MIDLINE LOW BACK PAIN WITH SCIATICA, SCIATICA LATERALITY UNSPECIFIED: ICD-10-CM

## 2023-07-18 DIAGNOSIS — G89.4 CHRONIC PAIN SYNDROME: ICD-10-CM

## 2023-07-18 DIAGNOSIS — M51.34 DDD (DEGENERATIVE DISC DISEASE), THORACIC: ICD-10-CM

## 2023-07-18 DIAGNOSIS — M47.814 FACET ARTHROPATHY, THORACIC: ICD-10-CM

## 2023-07-18 DIAGNOSIS — M48.061 SPINAL STENOSIS OF LUMBAR REGION WITHOUT NEUROGENIC CLAUDICATION: ICD-10-CM

## 2023-07-18 DIAGNOSIS — M54.40 CHRONIC MIDLINE LOW BACK PAIN WITH SCIATICA, SCIATICA LATERALITY UNSPECIFIED: ICD-10-CM

## 2023-07-18 DIAGNOSIS — M47.812 CERVICAL ARTHRITIS: ICD-10-CM

## 2023-07-18 PROCEDURE — 99213 OFFICE O/P EST LOW 20 MIN: CPT | Performed by: NURSE PRACTITIONER

## 2023-07-18 PROCEDURE — 3074F SYST BP LT 130 MM HG: CPT | Performed by: NURSE PRACTITIONER

## 2023-07-18 PROCEDURE — 1123F ACP DISCUSS/DSCN MKR DOCD: CPT | Performed by: NURSE PRACTITIONER

## 2023-07-18 PROCEDURE — 3078F DIAST BP <80 MM HG: CPT | Performed by: NURSE PRACTITIONER

## 2023-07-18 RX ORDER — OXYCODONE AND ACETAMINOPHEN 10; 325 MG/1; MG/1
1 TABLET ORAL EVERY 12 HOURS PRN
Qty: 60 TABLET | Refills: 0 | Status: SHIPPED | OUTPATIENT
Start: 2023-07-18 | End: 2023-08-17

## 2023-07-18 RX ORDER — LIDOCAINE 50 MG/G
1 PATCH TOPICAL DAILY
Qty: 30 PATCH | Refills: 0 | Status: SHIPPED | OUTPATIENT
Start: 2023-07-18 | End: 2023-08-17

## 2023-07-18 RX ORDER — OXYCODONE 9 MG/1
9 CAPSULE, EXTENDED RELEASE ORAL EVERY 12 HOURS
Qty: 60 EACH | Refills: 0 | Status: SHIPPED | OUTPATIENT
Start: 2023-07-18 | End: 2023-08-17

## 2023-07-18 NOTE — PROGRESS NOTES
Cris Lewis  1945  [id number]      HISTORY OF PRESENT ILLNESS: Ms. Paul Patel is a 68 y.o. female returns for a follow up visit for pain management  She has a diagnosis of   1. Chronic pain syndrome    2. DDD (degenerative disc disease), thoracic    3. Facet arthropathy, thoracic    4. Lumbosacral spondylosis without myelopathy    5. Chronic midline low back pain with sciatica, sciatica laterality unspecified    6. Spinal stenosis of lumbar region without neurogenic claudication    7. Cervical arthritis    8. Other secondary osteoarthritis of multiple sites      As per Information Obtained from the PADT (Patient Assessment and Documentation Tool)    She complains of pain in the  mid and lower back  She rates the pain 6/10 and describes it as aching, burning, numbness, pins and needles, stabbing. Current treatment regimen has helped relieve about 40% of the pain. She denies any side effects from the current pain regimen. Patient reports that since the last follow up visit the physical functioning is unchanged, family/social relationships are unchanged, mood is unchanged sleep patterns are unchanged, and that the overall functioning is unchanged. Patient denies misusing/abusing her narcotic pain medications or using any illegal drugs. Upon obtaining medical history from Ms. Hernandes states that pain is manageable on current pain therapy. Takes the pain medications as prescribed. Mood/anxiety is stable. Sleep is fair with an average of 5-6 hours. Denies to having issues of constipation. Tolerating activities/house chores with moderate tenderness to the lower back. ALLERGIES: Patients list of allergies were reviewed     MEDICATIONS: Ms. Paul Patel list of medications were reviewed. Her current medications are   Outpatient Medications Prior to Visit   Medication Sig Dispense Refill    levothyroxine (SYNTHROID) 88 MCG tablet TAKE 1 TABLET BY MOUTH EVERY DAY 30 tablet 0    ezetimibe (ZETIA) 10 MG tablet TAKE ONE TABLET BY

## 2023-08-06 ENCOUNTER — HOSPITAL ENCOUNTER (EMERGENCY)
Age: 78
Discharge: HOME | End: 2023-08-06
Payer: MEDICARE

## 2023-08-06 ENCOUNTER — APPOINTMENT (OUTPATIENT)
Dept: CT IMAGING | Age: 78
End: 2023-08-06
Payer: MEDICARE

## 2023-08-06 ENCOUNTER — APPOINTMENT (OUTPATIENT)
Dept: GENERAL RADIOLOGY | Age: 78
End: 2023-08-06
Payer: MEDICARE

## 2023-08-06 VITALS
BODY MASS INDEX: 25.02 KG/M2 | HEIGHT: 61 IN | RESPIRATION RATE: 19 BRPM | WEIGHT: 132.5 LBS | TEMPERATURE: 98.1 F | SYSTOLIC BLOOD PRESSURE: 137 MMHG | DIASTOLIC BLOOD PRESSURE: 57 MMHG | HEART RATE: 92 BPM | OXYGEN SATURATION: 94 %

## 2023-08-06 DIAGNOSIS — M54.6 ACUTE BILATERAL THORACIC BACK PAIN: Primary | ICD-10-CM

## 2023-08-06 DIAGNOSIS — R93.89 ABNORMAL X-RAY: ICD-10-CM

## 2023-08-06 DIAGNOSIS — E86.0 DEHYDRATION: ICD-10-CM

## 2023-08-06 LAB
ALBUMIN SERPL-MCNC: 4 G/DL (ref 3.4–5)
ALP SERPL-CCNC: 95 U/L (ref 40–129)
ALT SERPL-CCNC: 26 U/L (ref 10–40)
ANION GAP SERPL CALCULATED.3IONS-SCNC: 12 MMOL/L (ref 3–16)
AST SERPL-CCNC: 40 U/L (ref 15–37)
BACTERIA URNS QL MICRO: NORMAL /HPF
BASOPHILS # BLD: 0.1 K/UL (ref 0–0.2)
BASOPHILS NFR BLD: 0.8 %
BILIRUB DIRECT SERPL-MCNC: <0.2 MG/DL (ref 0–0.3)
BILIRUB INDIRECT SERPL-MCNC: ABNORMAL MG/DL (ref 0–1)
BILIRUB SERPL-MCNC: 0.3 MG/DL (ref 0–1)
BILIRUB UR QL STRIP.AUTO: NEGATIVE
BUN SERPL-MCNC: 24 MG/DL (ref 7–20)
CALCIUM SERPL-MCNC: 9.3 MG/DL (ref 8.3–10.6)
CHLORIDE SERPL-SCNC: 102 MMOL/L (ref 99–110)
CLARITY UR: CLEAR
CO2 SERPL-SCNC: 25 MMOL/L (ref 21–32)
COLOR UR: YELLOW
CREAT SERPL-MCNC: 1.5 MG/DL (ref 0.6–1.2)
DEPRECATED RDW RBC AUTO: 15.4 % (ref 12.4–15.4)
EOSINOPHIL # BLD: 0.5 K/UL (ref 0–0.6)
EOSINOPHIL NFR BLD: 6.9 %
EPI CELLS #/AREA URNS AUTO: 1 /HPF (ref 0–5)
GFR SERPLBLD CREATININE-BSD FMLA CKD-EPI: 35 ML/MIN/{1.73_M2}
GLUCOSE SERPL-MCNC: 78 MG/DL (ref 70–99)
GLUCOSE UR STRIP.AUTO-MCNC: NEGATIVE MG/DL
HCT VFR BLD AUTO: 34.1 % (ref 36–48)
HGB BLD-MCNC: 11.4 G/DL (ref 12–16)
HGB UR QL STRIP.AUTO: ABNORMAL
HYALINE CASTS #/AREA URNS AUTO: 2 /LPF (ref 0–8)
KETONES UR STRIP.AUTO-MCNC: NEGATIVE MG/DL
LACTATE BLDV-SCNC: 0.8 MMOL/L (ref 0.4–2)
LEUKOCYTE ESTERASE UR QL STRIP.AUTO: NEGATIVE
LYMPHOCYTES # BLD: 1.6 K/UL (ref 1–5.1)
LYMPHOCYTES NFR BLD: 22.4 %
MCH RBC QN AUTO: 30.1 PG (ref 26–34)
MCHC RBC AUTO-ENTMCNC: 33.5 G/DL (ref 31–36)
MCV RBC AUTO: 90 FL (ref 80–100)
MONOCYTES # BLD: 0.9 K/UL (ref 0–1.3)
MONOCYTES NFR BLD: 12.6 %
NEUTROPHILS # BLD: 4 K/UL (ref 1.7–7.7)
NEUTROPHILS NFR BLD: 57.3 %
NITRITE UR QL STRIP.AUTO: NEGATIVE
PH UR STRIP.AUTO: 6 [PH] (ref 5–8)
PLATELET # BLD AUTO: 184 K/UL (ref 135–450)
PMV BLD AUTO: 8.4 FL (ref 5–10.5)
POTASSIUM SERPL-SCNC: 3.9 MMOL/L (ref 3.5–5.1)
PROT SERPL-MCNC: 6.9 G/DL (ref 6.4–8.2)
PROT UR STRIP.AUTO-MCNC: NEGATIVE MG/DL
RBC # BLD AUTO: 3.79 M/UL (ref 4–5.2)
RBC CLUMPS #/AREA URNS AUTO: 2 /HPF (ref 0–4)
SODIUM SERPL-SCNC: 139 MMOL/L (ref 136–145)
SP GR UR STRIP.AUTO: 1.01 (ref 1–1.03)
UA COMPLETE W REFLEX CULTURE PNL UR: ABNORMAL
UA DIPSTICK W REFLEX MICRO PNL UR: YES
URN SPEC COLLECT METH UR: ABNORMAL
UROBILINOGEN UR STRIP-ACNC: 0.2 E.U./DL
WBC # BLD AUTO: 7.1 K/UL (ref 4–11)
WBC #/AREA URNS AUTO: 0 /HPF (ref 0–5)

## 2023-08-06 PROCEDURE — 74177 CT ABD & PELVIS W/CONTRAST: CPT

## 2023-08-06 PROCEDURE — 96361 HYDRATE IV INFUSION ADD-ON: CPT

## 2023-08-06 PROCEDURE — 2580000003 HC RX 258: Performed by: NURSE PRACTITIONER

## 2023-08-06 PROCEDURE — 99285 EMERGENCY DEPT VISIT HI MDM: CPT

## 2023-08-06 PROCEDURE — 6360000002 HC RX W HCPCS: Performed by: NURSE PRACTITIONER

## 2023-08-06 PROCEDURE — 80076 HEPATIC FUNCTION PANEL: CPT

## 2023-08-06 PROCEDURE — 96375 TX/PRO/DX INJ NEW DRUG ADDON: CPT

## 2023-08-06 PROCEDURE — 73030 X-RAY EXAM OF SHOULDER: CPT

## 2023-08-06 PROCEDURE — 6370000000 HC RX 637 (ALT 250 FOR IP): Performed by: NURSE PRACTITIONER

## 2023-08-06 PROCEDURE — 80048 BASIC METABOLIC PNL TOTAL CA: CPT

## 2023-08-06 PROCEDURE — 83605 ASSAY OF LACTIC ACID: CPT

## 2023-08-06 PROCEDURE — 6360000004 HC RX CONTRAST MEDICATION: Performed by: NURSE PRACTITIONER

## 2023-08-06 PROCEDURE — 81001 URINALYSIS AUTO W/SCOPE: CPT

## 2023-08-06 PROCEDURE — 85025 COMPLETE CBC W/AUTO DIFF WBC: CPT

## 2023-08-06 PROCEDURE — 96374 THER/PROPH/DIAG INJ IV PUSH: CPT

## 2023-08-06 RX ORDER — ONDANSETRON 2 MG/ML
4 INJECTION INTRAMUSCULAR; INTRAVENOUS ONCE
Status: COMPLETED | OUTPATIENT
Start: 2023-08-06 | End: 2023-08-06

## 2023-08-06 RX ORDER — KETOROLAC TROMETHAMINE 15 MG/ML
15 INJECTION, SOLUTION INTRAMUSCULAR; INTRAVENOUS ONCE
Status: COMPLETED | OUTPATIENT
Start: 2023-08-06 | End: 2023-08-06

## 2023-08-06 RX ORDER — LIDOCAINE 4 G/G
1 PATCH TOPICAL ONCE
Status: DISCONTINUED | OUTPATIENT
Start: 2023-08-06 | End: 2023-08-06 | Stop reason: HOSPADM

## 2023-08-06 RX ORDER — 0.9 % SODIUM CHLORIDE 0.9 %
1000 INTRAVENOUS SOLUTION INTRAVENOUS ONCE
Status: COMPLETED | OUTPATIENT
Start: 2023-08-06 | End: 2023-08-06

## 2023-08-06 RX ADMIN — KETOROLAC TROMETHAMINE 15 MG: 15 INJECTION, SOLUTION INTRAMUSCULAR; INTRAVENOUS at 17:06

## 2023-08-06 RX ADMIN — SODIUM CHLORIDE 1000 ML: 9 INJECTION, SOLUTION INTRAVENOUS at 18:30

## 2023-08-06 RX ADMIN — ONDANSETRON 4 MG: 2 INJECTION INTRAMUSCULAR; INTRAVENOUS at 17:05

## 2023-08-06 RX ADMIN — IOPAMIDOL 75 ML: 755 INJECTION, SOLUTION INTRAVENOUS at 18:05

## 2023-08-06 ASSESSMENT — PAIN SCALES - GENERAL
PAINLEVEL_OUTOF10: 8
PAINLEVEL_OUTOF10: 8

## 2023-08-06 ASSESSMENT — PAIN DESCRIPTION - ORIENTATION: ORIENTATION: LEFT;RIGHT

## 2023-08-06 ASSESSMENT — PAIN DESCRIPTION - LOCATION: LOCATION: FLANK

## 2023-08-06 ASSESSMENT — PAIN DESCRIPTION - DESCRIPTORS: DESCRIPTORS: ACHING

## 2023-08-06 ASSESSMENT — PAIN - FUNCTIONAL ASSESSMENT
PAIN_FUNCTIONAL_ASSESSMENT: 0-10
PAIN_FUNCTIONAL_ASSESSMENT: NONE - DENIES PAIN

## 2023-08-06 NOTE — ED PROVIDER NOTES
[facility] [address] [address]        Pt Name: Pritesh Avila  MRN: [id number]  [facility] 1945  Date of evaluation: 8/6/2023  Provider: HOWARD Langley - MELINA  PCP: Ramya Mills MD  Note Started: 4:39 PM EDT 8/6/23      SERA. I have evaluated this patient. CHIEF COMPLAINT       Chief Complaint   Patient presents with    Flank Pain     Bilateral x 1 wk       HISTORY OF PRESENT ILLNESS: 1 or more Elements     History from : Patient    Limitations to history : None    Pritesh Avila is a 68 y.o. nontoxic, well-appearing, mildly distressed female who presents to the emergency department for evaluation of a 1 week history of bilateral flank pain described as \"sharp\" accompanied by nausea, lightheadedness, and diaphoresis. Denies dizziness, vomiting, fevers, chills, abdominal pain, diarrhea, urinary frequency, urgency, dysuria, retention, change in color of urine, vaginal bleeding or discharge, other symptoms/concerns. Nursing Notes were all reviewed and agreed with or any disagreements were addressed in the HPI. REVIEW OF SYSTEMS :      Review of Systems   Constitutional:  Positive for diaphoresis. Negative for chills, fatigue and fever. HENT:  Negative for congestion and sore throat. Eyes:  Negative for pain and visual disturbance. Respiratory:  Negative for cough, shortness of breath and wheezing. Cardiovascular:  Negative for chest pain and leg swelling. Gastrointestinal:  Positive for nausea. Negative for abdominal pain, anal bleeding, diarrhea and vomiting. Genitourinary:  Positive for flank pain (Bilateral). Negative for difficulty urinating, dysuria, frequency, hematuria, urgency, vaginal bleeding and vaginal discharge. Musculoskeletal:  Negative for back pain, neck pain and neck stiffness. Skin:  Negative for rash and wound. Neurological:  Positive for light-headedness. Negative for dizziness. Hematological: Negative.

## 2023-08-07 ENCOUNTER — CARE COORDINATION (OUTPATIENT)
Dept: CARE COORDINATION | Age: 78
End: 2023-08-07

## 2023-08-07 NOTE — DISCHARGE INSTRUCTIONS
Return to the emergency department for new or worsening symptoms including, not limited to, developing nausea, vomiting, fever, chills, sweats, inability to urinate, reduced urine output, chest pain, shortness of breath, or other symptoms/concerns. We discussed that you have been exerting yourself \"swinging sledgehammer and pulling weeds\". This is the likely etiology of your bilateral thoracic back pain. Continue your home medications as previously as prescribed and follow-up with your primary care doctor. As we discussed please drink at least 64 ounces of water a day to help hydrate your kidneys to improve your renal insufficiency. The imaging on your right shoulder shows that you have osteoarthritis of that joint.

## 2023-08-07 NOTE — CARE COORDINATION
Ambulatory Care Coordination  ED Follow up Call    Reason for ED visit:  back pain   Status:     not changed    Did you call your PCP prior to going to the ED? No      Did you receive a discharge instructions from the Emergency Room? Yes  Review of Instructions:     Understands what to report/when to return?:  Yes   Understands discharge instructions?:  Yes   Following discharge instructions?:  Yes   If not why? Are there any new complaints of pain? No  New Pain Meds? No    Constipation prophylaxis needed? No    If you have a wound is the dressing clean, dry, and intact? N/A  Understands wound care regimen? N/A    Are there any other complaints/concerns that you wish to tell your provider? No    FU appts/Provider:    Future Appointments   Date Time Provider [address]   8/9/2023  5:40 PM Derrick Gan MD [facility] [place] [facility]   8/15/2023  3:20 PM Reno Shook APRN - CNP [facility]   8/22/2023  3:20 PM Kati Osorio APRN - CNP [facility]   9/13/2023  4:00 PM SCHEDULE, [facility]   9/19/2023  3:30 PM Cindy Wilkins MD [facility]   10/18/2023 10:00 AM SCHEDULE, [facility]   10/23/2023  3:20 PM Derrick Gan MD [facility] [place] - [facility]   11/29/2023  4:30 PM SCHEDULE, [facility]           New Medications?:   No      Medication Reconciliation by phone - No  Understands Medications? Yes  Taking Medications? Yes  Can you swallow your pills? Yes    Any further needs in the home i.e. Equipment? No    Link to services in community?:  No   Which services:    Pt denying ACM needs, stating she makes appointments as needed and has family living with her who assist. Pt given eduction to utilize Urgent Care if appropriate, given address and hours of operation of new Urgent [place] on [address] [facility].

## 2023-08-08 ASSESSMENT — ENCOUNTER SYMPTOMS
BACK PAIN: 0
NAUSEA: 1
SORE THROAT: 0
WHEEZING: 0
SHORTNESS OF BREATH: 0
COUGH: 0
VOMITING: 0
DIARRHEA: 0
EYE PAIN: 0
ANAL BLEEDING: 0
ABDOMINAL PAIN: 0

## 2023-08-15 ENCOUNTER — OFFICE VISIT (OUTPATIENT)
Dept: PAIN MANAGEMENT | Age: 78
End: 2023-08-15

## 2023-08-15 VITALS
OXYGEN SATURATION: 96 % | DIASTOLIC BLOOD PRESSURE: 75 MMHG | SYSTOLIC BLOOD PRESSURE: 141 MMHG | TEMPERATURE: 98.1 F | HEART RATE: 74 BPM

## 2023-08-15 DIAGNOSIS — G89.4 CHRONIC PAIN SYNDROME: ICD-10-CM

## 2023-08-15 DIAGNOSIS — M51.34 DDD (DEGENERATIVE DISC DISEASE), THORACIC: ICD-10-CM

## 2023-08-15 DIAGNOSIS — M47.814 FACET ARTHROPATHY, THORACIC: ICD-10-CM

## 2023-08-15 DIAGNOSIS — M15.3 OTHER SECONDARY OSTEOARTHRITIS OF MULTIPLE SITES: ICD-10-CM

## 2023-08-15 DIAGNOSIS — G89.29 CHRONIC MIDLINE LOW BACK PAIN WITH SCIATICA, SCIATICA LATERALITY UNSPECIFIED: ICD-10-CM

## 2023-08-15 DIAGNOSIS — J44.1 COPD EXACERBATION (HCC): ICD-10-CM

## 2023-08-15 DIAGNOSIS — E66.09 CLASS 1 OBESITY DUE TO EXCESS CALORIES WITH SERIOUS COMORBIDITY IN ADULT, UNSPECIFIED BMI: ICD-10-CM

## 2023-08-15 DIAGNOSIS — M48.061 SPINAL STENOSIS OF LUMBAR REGION WITHOUT NEUROGENIC CLAUDICATION: ICD-10-CM

## 2023-08-15 DIAGNOSIS — M47.817 LUMBOSACRAL SPONDYLOSIS WITHOUT MYELOPATHY: ICD-10-CM

## 2023-08-15 DIAGNOSIS — F43.22 ADJUSTMENT REACTION WITH ANXIETY: ICD-10-CM

## 2023-08-15 DIAGNOSIS — M54.40 CHRONIC MIDLINE LOW BACK PAIN WITH SCIATICA, SCIATICA LATERALITY UNSPECIFIED: ICD-10-CM

## 2023-08-15 DIAGNOSIS — M47.812 CERVICAL ARTHRITIS: ICD-10-CM

## 2023-08-15 RX ORDER — OXYCODONE 9 MG/1
9 CAPSULE, EXTENDED RELEASE ORAL EVERY 12 HOURS
Qty: 60 EACH | Refills: 0 | Status: SHIPPED | OUTPATIENT
Start: 2023-08-15 | End: 2023-09-14

## 2023-08-15 RX ORDER — LIDOCAINE 50 MG/G
1 PATCH TOPICAL DAILY
Qty: 30 PATCH | Refills: 0 | Status: SHIPPED | OUTPATIENT
Start: 2023-08-15 | End: 2023-09-14

## 2023-08-15 RX ORDER — OXYCODONE AND ACETAMINOPHEN 10; 325 MG/1; MG/1
1 TABLET ORAL EVERY 12 HOURS PRN
Qty: 60 TABLET | Refills: 0 | Status: SHIPPED | OUTPATIENT
Start: 2023-08-15 | End: 2023-09-14

## 2023-08-15 NOTE — PROGRESS NOTES
Kapil Jacobs  1945  [id number]    HISTORY OF PRESENT ILLNESS: Ms. Sky Vivar is a 68 y.o. female returns for a follow up visit for pain management  She has a diagnosis of   1. Chronic pain syndrome    2. Cervical arthritis    3. DDD (degenerative disc disease), thoracic    4. Facet arthropathy, thoracic    5. Lumbosacral spondylosis without myelopathy    6. Spinal stenosis of lumbar region without neurogenic claudication    7. Chronic midline low back pain with sciatica, sciatica laterality unspecified    8. Other secondary osteoarthritis of multiple sites    9. Adjustment reaction with anxiety    10. COPD exacerbation ([address])    11. Class 1 obesity due to excess calories with serious comorbidity in adult, unspecified BMI      As per Information Obtained from the PADT (Patient Assessment and Documentation Tool)    She complains of pain in the  lower left back  She rates the pain 7/10 and describes it as aching, burning, stabbing. Current treatment regimen has helped relieve about 30% of the pain. She denies any side effects from the current pain regimen. Patient reports that since the last follow up visit the physical functioning is unchanged, family/social relationships are unchanged, mood is unchanged sleep patterns are unchanged, and that the overall functioning is unchanged. Patient denies misusing/abusing her narcotic pain medications or using any illegal drugs. Upon obtaining medical history from Ms. Hernandes states that pain is manageable on current pain therapy. Takes the pain medications as prescribed. She treated in ER for acute thoracic pain, admitted to having worked in the yard which contributed to the pain. She also had pain to the right shoulder, was told she has arthritis. Mood/anxiety is stable. Sleep is fair with an average of 5-6 hours. Denies to having issues of constipation. Tolerating activities/house chores with moderate tenderness to the lower back, right shoulder.      ALLERGIES: Patients list

## 2023-08-21 NOTE — PROGRESS NOTES
[facility]   Electrophysiology      Date: 8/22/2023    Primary Cardiologist: Dede Carlson MD  PCP: Harrison Cm MD     Chief Complaint:   Chief Complaint   Patient presents with    Follow-up     History of Present Illness:    I saw Holland Robles in the office for electrophysiology follow up today. She is a 68 y.o. female with a past medical history of paroxysmal atrial fibrillation, pulmonary hypertension, aortic stenosis s/p AVR, PVI and RENETTA exclusion by Dr Kathy Kauffman on 6/2020 and CAD s/p CHILANGO to mid circ 5/2014,  AVM's which were cauterized. She presented to [facility] with complaints of back pain and while inpatient had an episode of dyspnea. An echocardiogram was completed revealing moderate to severe MR. Subsequently, a CAMERON was completed on 7/27/20 showing moderate MR. She was admitted 10/26/2021-11/1/2021 with afib with RVR and acute CHF. She converted to a regular rhythm and was diuresed prior to discharge. She was admitted to [facility] 11/11/21 with acute encephalopathy and found to have rhabdomyolysis and afib with RVR again. She underwent successful cardioversion on 11/17/2021. She did experience bradycardia when in sinus rhythm with heart rate between 40-50 bpm. She was seen in the office with Eric Mendoza CNP for follow up on 11/23/21 and then sent to the ED for admission. She was again found in afib with RVR and fluid overloaded. She was diuresed and discharged home on both amiodarone and Toprol. Amiodarone later had to be stopped due to bradycardia. She underwent placement of a [employer] Bi-V PPM on 4/11/22 with Dr. Jillian Diallo. Amiodarone was resumed after device implant. She presents today for follow up. She has been having issues with fluid overload and weight gain recently. She was on prednisone last week due to back pain. She has been trying to modify how much water she is drinking but has also increased her torsemide. She lost 4 pounds but then gained to back.   Her weight at

## 2023-08-22 ENCOUNTER — OFFICE VISIT (OUTPATIENT)
Dept: CARDIOLOGY CLINIC | Age: 78
End: 2023-08-22
Payer: MEDICARE

## 2023-08-22 ENCOUNTER — NURSE ONLY (OUTPATIENT)
Dept: CARDIOLOGY CLINIC | Age: 78
End: 2023-08-22

## 2023-08-22 VITALS
BODY MASS INDEX: 25.68 KG/M2 | DIASTOLIC BLOOD PRESSURE: 80 MMHG | HEART RATE: 74 BPM | HEIGHT: 61 IN | OXYGEN SATURATION: 94 % | SYSTOLIC BLOOD PRESSURE: 130 MMHG | WEIGHT: 136 LBS

## 2023-08-22 DIAGNOSIS — I50.22 CHRONIC SYSTOLIC CHF (CONGESTIVE HEART FAILURE), NYHA CLASS 2 (HCC): ICD-10-CM

## 2023-08-22 DIAGNOSIS — R00.1 SYMPTOMATIC BRADYCARDIA: Primary | ICD-10-CM

## 2023-08-22 DIAGNOSIS — R00.1 SYMPTOMATIC BRADYCARDIA: ICD-10-CM

## 2023-08-22 DIAGNOSIS — I48.0 PAF (PAROXYSMAL ATRIAL FIBRILLATION) (HCC): ICD-10-CM

## 2023-08-22 DIAGNOSIS — I50.42 CHRONIC COMBINED SYSTOLIC AND DIASTOLIC HEART FAILURE (HCC): ICD-10-CM

## 2023-08-22 DIAGNOSIS — Z95.0 BIVENTRICULAR CARDIAC PACEMAKER IN SITU: ICD-10-CM

## 2023-08-22 DIAGNOSIS — I50.42 CHRONIC COMBINED SYSTOLIC AND DIASTOLIC CHF, NYHA CLASS 2 (HCC): ICD-10-CM

## 2023-08-22 DIAGNOSIS — I48.91 ATRIAL FIBRILLATION WITH RVR (HCC): ICD-10-CM

## 2023-08-22 DIAGNOSIS — Z95.0 BIVENTRICULAR CARDIAC PACEMAKER IN SITU: Primary | ICD-10-CM

## 2023-08-22 DIAGNOSIS — Z95.2 S/P AVR (AORTIC VALVE REPLACEMENT): ICD-10-CM

## 2023-08-22 DIAGNOSIS — I48.0 PAROXYSMAL ATRIAL FIBRILLATION (HCC): ICD-10-CM

## 2023-08-22 DIAGNOSIS — I34.0 NONRHEUMATIC MITRAL VALVE REGURGITATION: ICD-10-CM

## 2023-08-22 DIAGNOSIS — R00.1 SINUS BRADYCARDIA: ICD-10-CM

## 2023-08-22 DIAGNOSIS — I25.10 CORONARY ARTERY DISEASE INVOLVING NATIVE CORONARY ARTERY OF NATIVE HEART WITHOUT ANGINA PECTORIS: ICD-10-CM

## 2023-08-22 DIAGNOSIS — R00.1 BRADYCARDIA: ICD-10-CM

## 2023-08-22 PROCEDURE — 1123F ACP DISCUSS/DSCN MKR DOCD: CPT | Performed by: NURSE PRACTITIONER

## 2023-08-22 PROCEDURE — 93000 ELECTROCARDIOGRAM COMPLETE: CPT | Performed by: NURSE PRACTITIONER

## 2023-08-22 PROCEDURE — 1090F PRES/ABSN URINE INCON ASSESS: CPT | Performed by: NURSE PRACTITIONER

## 2023-08-22 PROCEDURE — G8427 DOCREV CUR MEDS BY ELIG CLIN: HCPCS | Performed by: NURSE PRACTITIONER

## 2023-08-22 PROCEDURE — 3079F DIAST BP 80-89 MM HG: CPT | Performed by: NURSE PRACTITIONER

## 2023-08-22 PROCEDURE — 99214 OFFICE O/P EST MOD 30 MIN: CPT | Performed by: NURSE PRACTITIONER

## 2023-08-22 PROCEDURE — G8400 PT W/DXA NO RESULTS DOC: HCPCS | Performed by: NURSE PRACTITIONER

## 2023-08-22 PROCEDURE — G8417 CALC BMI ABV UP PARAM F/U: HCPCS | Performed by: NURSE PRACTITIONER

## 2023-08-22 PROCEDURE — 4004F PT TOBACCO SCREEN RCVD TLK: CPT | Performed by: NURSE PRACTITIONER

## 2023-08-22 PROCEDURE — 3075F SYST BP GE 130 - 139MM HG: CPT | Performed by: NURSE PRACTITIONER

## 2023-08-22 RX ORDER — AMIODARONE HYDROCHLORIDE 200 MG/1
100 TABLET ORAL DAILY
Qty: 30 TABLET | Refills: 5 | Status: SHIPPED | OUTPATIENT
Start: 2023-08-22

## 2023-08-22 ASSESSMENT — ENCOUNTER SYMPTOMS
NAUSEA: 0
SINUS PRESSURE: 0
VOMITING: 0
ABDOMINAL PAIN: 0
COLOR CHANGE: 0
BLOOD IN STOOL: 0
TROUBLE SWALLOWING: 0
WHEEZING: 0
DIARRHEA: 0
SHORTNESS OF BREATH: 0
SORE THROAT: 0
BACK PAIN: 0
CONSTIPATION: 0
COUGH: 0

## 2023-08-23 LAB
ANION GAP SERPL CALCULATED.3IONS-SCNC: 9 MMOL/L (ref 3–16)
BUN SERPL-MCNC: 24 MG/DL (ref 7–20)
CALCIUM SERPL-MCNC: 9.6 MG/DL (ref 8.3–10.6)
CHLORIDE SERPL-SCNC: 102 MMOL/L (ref 99–110)
CO2 SERPL-SCNC: 31 MMOL/L (ref 21–32)
CREAT SERPL-MCNC: 1.3 MG/DL (ref 0.6–1.2)
GFR SERPLBLD CREATININE-BSD FMLA CKD-EPI: 42 ML/MIN/{1.73_M2}
GLUCOSE SERPL-MCNC: 88 MG/DL (ref 70–99)
POTASSIUM SERPL-SCNC: 4.4 MMOL/L (ref 3.5–5.1)
SODIUM SERPL-SCNC: 142 MMOL/L (ref 136–145)
TSH SERPL DL<=0.005 MIU/L-ACNC: 1.22 UIU/ML (ref 0.27–4.2)

## 2023-08-30 ENCOUNTER — TELEPHONE (OUTPATIENT)
Dept: CARDIOLOGY CLINIC | Age: 78
End: 2023-08-30

## 2023-08-30 NOTE — TELEPHONE ENCOUNTER
Called patient to provide Laila Crystal NP recommendations. She vu and we reviewed her upcoming f/u with Dr. Brianna Avila on 9/19/23.

## 2023-08-30 NOTE — TELEPHONE ENCOUNTER
Humberto Muñiz said Hodan Sutherland wanted her to call in 2 weeks to see if she had gained or lost any weight since her visit, she has not gained or lost she is still the same.       Shonda [phone]

## 2023-09-12 ENCOUNTER — OFFICE VISIT (OUTPATIENT)
Dept: PAIN MANAGEMENT | Age: 78
End: 2023-09-12
Payer: MEDICARE

## 2023-09-12 VITALS
DIASTOLIC BLOOD PRESSURE: 71 MMHG | TEMPERATURE: 97.2 F | SYSTOLIC BLOOD PRESSURE: 144 MMHG | HEART RATE: 69 BPM | OXYGEN SATURATION: 92 %

## 2023-09-12 DIAGNOSIS — M48.061 SPINAL STENOSIS OF LUMBAR REGION WITHOUT NEUROGENIC CLAUDICATION: ICD-10-CM

## 2023-09-12 DIAGNOSIS — M47.817 LUMBOSACRAL SPONDYLOSIS WITHOUT MYELOPATHY: ICD-10-CM

## 2023-09-12 DIAGNOSIS — M19.011 PRIMARY OSTEOARTHRITIS OF RIGHT SHOULDER: ICD-10-CM

## 2023-09-12 DIAGNOSIS — M54.40 CHRONIC MIDLINE LOW BACK PAIN WITH SCIATICA, SCIATICA LATERALITY UNSPECIFIED: ICD-10-CM

## 2023-09-12 DIAGNOSIS — M47.814 FACET ARTHROPATHY, THORACIC: ICD-10-CM

## 2023-09-12 DIAGNOSIS — M15.3 OTHER SECONDARY OSTEOARTHRITIS OF MULTIPLE SITES: ICD-10-CM

## 2023-09-12 DIAGNOSIS — G89.29 CHRONIC MIDLINE LOW BACK PAIN WITH SCIATICA, SCIATICA LATERALITY UNSPECIFIED: ICD-10-CM

## 2023-09-12 DIAGNOSIS — M47.812 CERVICAL ARTHRITIS: ICD-10-CM

## 2023-09-12 DIAGNOSIS — M51.34 DDD (DEGENERATIVE DISC DISEASE), THORACIC: ICD-10-CM

## 2023-09-12 DIAGNOSIS — G89.4 CHRONIC PAIN SYNDROME: ICD-10-CM

## 2023-09-12 PROCEDURE — 20553 NJX 1/MLT TRIGGER POINTS 3/>: CPT | Performed by: NURSE PRACTITIONER

## 2023-09-12 PROCEDURE — 99214 OFFICE O/P EST MOD 30 MIN: CPT | Performed by: NURSE PRACTITIONER

## 2023-09-12 PROCEDURE — 1090F PRES/ABSN URINE INCON ASSESS: CPT | Performed by: NURSE PRACTITIONER

## 2023-09-12 PROCEDURE — G8417 CALC BMI ABV UP PARAM F/U: HCPCS | Performed by: NURSE PRACTITIONER

## 2023-09-12 PROCEDURE — 3078F DIAST BP <80 MM HG: CPT | Performed by: NURSE PRACTITIONER

## 2023-09-12 PROCEDURE — 4004F PT TOBACCO SCREEN RCVD TLK: CPT | Performed by: NURSE PRACTITIONER

## 2023-09-12 PROCEDURE — G8400 PT W/DXA NO RESULTS DOC: HCPCS | Performed by: NURSE PRACTITIONER

## 2023-09-12 PROCEDURE — 1123F ACP DISCUSS/DSCN MKR DOCD: CPT | Performed by: NURSE PRACTITIONER

## 2023-09-12 PROCEDURE — G8427 DOCREV CUR MEDS BY ELIG CLIN: HCPCS | Performed by: NURSE PRACTITIONER

## 2023-09-12 PROCEDURE — 3077F SYST BP >= 140 MM HG: CPT | Performed by: NURSE PRACTITIONER

## 2023-09-12 RX ORDER — OXYCODONE AND ACETAMINOPHEN 10; 325 MG/1; MG/1
1 TABLET ORAL EVERY 12 HOURS PRN
Qty: 60 TABLET | Refills: 0 | Status: SHIPPED | OUTPATIENT
Start: 2023-09-12 | End: 2023-10-12

## 2023-09-12 RX ORDER — LIDOCAINE 50 MG/G
1 PATCH TOPICAL DAILY
Qty: 30 PATCH | Refills: 0 | Status: SHIPPED | OUTPATIENT
Start: 2023-09-12 | End: 2023-10-12

## 2023-09-12 RX ORDER — OXYCODONE 9 MG/1
9 CAPSULE, EXTENDED RELEASE ORAL EVERY 12 HOURS
Qty: 60 EACH | Refills: 0 | Status: SHIPPED | OUTPATIENT
Start: 2023-09-12 | End: 2023-10-12

## 2023-09-12 RX ORDER — TRIAMCINOLONE ACETONIDE 40 MG/ML
40 INJECTION, SUSPENSION INTRA-ARTICULAR; INTRAMUSCULAR ONCE
Status: COMPLETED | OUTPATIENT
Start: 2023-09-12 | End: 2023-09-12

## 2023-09-12 RX ADMIN — TRIAMCINOLONE ACETONIDE 40 MG: 40 INJECTION, SUSPENSION INTRA-ARTICULAR; INTRAMUSCULAR at 17:23

## 2023-09-12 NOTE — PROGRESS NOTES
Ability to do household chores indoor and/or outdoor work and social and leisure activities. Improve psychosocial and physical functioning. - she is not showing any significant progress/or showing regression  towards this goal and reassessment and adjustment of goals/treatment have been made. She was advised against drinking alcohol with the narcotic pain medicines, advised against driving or handling machinery while adjusting the dose of medicines or if having cognitive  issues related to the current medications. Risk of overdose and death, if medicines not taken as prescribed, were also discussed. If the patient develops new symptoms or if the symptoms worsen, the patient should call the office. While transcribing every attempt was made to maintain the accuracy of the note in terms of it's contents,there may have been some errors made inadvertently. Thank you for allowing me to participate in the care of this patient. Eusebia Veronica CNP.     Cc: Alejo Doyle MD

## 2023-09-19 ENCOUNTER — OFFICE VISIT (OUTPATIENT)
Dept: CARDIOLOGY CLINIC | Age: 78
End: 2023-09-19
Payer: MEDICARE

## 2023-09-19 VITALS
WEIGHT: 141 LBS | DIASTOLIC BLOOD PRESSURE: 78 MMHG | OXYGEN SATURATION: 98 % | HEART RATE: 69 BPM | SYSTOLIC BLOOD PRESSURE: 134 MMHG | BODY MASS INDEX: 26.62 KG/M2 | HEIGHT: 61 IN

## 2023-09-19 DIAGNOSIS — R00.1 BRADYCARDIA: ICD-10-CM

## 2023-09-19 DIAGNOSIS — I73.9 PAD (PERIPHERAL ARTERY DISEASE) (HCC): ICD-10-CM

## 2023-09-19 DIAGNOSIS — Z95.2 S/P AVR (AORTIC VALVE REPLACEMENT): ICD-10-CM

## 2023-09-19 DIAGNOSIS — R63.5 WEIGHT GAIN: ICD-10-CM

## 2023-09-19 DIAGNOSIS — I34.0 NONRHEUMATIC MITRAL VALVE REGURGITATION: ICD-10-CM

## 2023-09-19 DIAGNOSIS — I48.0 PAROXYSMAL ATRIAL FIBRILLATION (HCC): ICD-10-CM

## 2023-09-19 DIAGNOSIS — I65.21 CAROTID STENOSIS, RIGHT: ICD-10-CM

## 2023-09-19 DIAGNOSIS — I25.10 CORONARY ARTERY DISEASE INVOLVING NATIVE CORONARY ARTERY OF NATIVE HEART WITHOUT ANGINA PECTORIS: Primary | ICD-10-CM

## 2023-09-19 DIAGNOSIS — I50.42 CHRONIC COMBINED SYSTOLIC AND DIASTOLIC CHF, NYHA CLASS 2 (HCC): ICD-10-CM

## 2023-09-19 DIAGNOSIS — E78.5 HYPERLIPIDEMIA LDL GOAL <70: ICD-10-CM

## 2023-09-19 PROCEDURE — G8417 CALC BMI ABV UP PARAM F/U: HCPCS | Performed by: INTERNAL MEDICINE

## 2023-09-19 PROCEDURE — 4004F PT TOBACCO SCREEN RCVD TLK: CPT | Performed by: INTERNAL MEDICINE

## 2023-09-19 PROCEDURE — 3078F DIAST BP <80 MM HG: CPT | Performed by: INTERNAL MEDICINE

## 2023-09-19 PROCEDURE — 3074F SYST BP LT 130 MM HG: CPT | Performed by: INTERNAL MEDICINE

## 2023-09-19 PROCEDURE — G8400 PT W/DXA NO RESULTS DOC: HCPCS | Performed by: INTERNAL MEDICINE

## 2023-09-19 PROCEDURE — 1123F ACP DISCUSS/DSCN MKR DOCD: CPT | Performed by: INTERNAL MEDICINE

## 2023-09-19 PROCEDURE — 1090F PRES/ABSN URINE INCON ASSESS: CPT | Performed by: INTERNAL MEDICINE

## 2023-09-19 PROCEDURE — 93000 ELECTROCARDIOGRAM COMPLETE: CPT | Performed by: INTERNAL MEDICINE

## 2023-09-19 PROCEDURE — 99214 OFFICE O/P EST MOD 30 MIN: CPT | Performed by: INTERNAL MEDICINE

## 2023-09-19 PROCEDURE — G8427 DOCREV CUR MEDS BY ELIG CLIN: HCPCS | Performed by: INTERNAL MEDICINE

## 2023-09-19 RX ORDER — METOLAZONE 5 MG/1
5 TABLET ORAL WEEKLY
Qty: 30 TABLET | Refills: 3 | Status: SHIPPED | OUTPATIENT
Start: 2023-09-19 | End: 2023-09-22 | Stop reason: SDUPTHER

## 2023-09-19 RX ORDER — TORSEMIDE 20 MG/1
60 TABLET ORAL DAILY
Qty: 90 TABLET | Refills: 3
Start: 2023-09-19

## 2023-09-19 NOTE — PROGRESS NOTES
[facility]   Office Note    CC: \"I am doing good\"     HPI: Tabatha Gomez  is a 68 y.o. patient with a past medical history significant for atrial fibrillation, pulmonary hypertension, aortic stenosis and CAD s/p CHILANGO to mid circ 5/2014. She is s/p AVR, PVI and RENETTA exclusion by Dr Christiane Yusuf on 6/16. An inpatient echocardiogram was completed revealing moderate to severe MR. Subsequently, a CAMERON was completed on 7/27/20 showing moderate MR. She was admitted 10/26/21-11/1/21 with afib with RVR and acute CHF. She converted to a regular rhythm and was diuresed prior to discharge. She was admitted to [facility] 11/11/21 with acute encephalopathy and found to have rhabdomyolysis and afib with RVR again. She underwent successful cardioversion on 11/17/21. She did experience bradycardia when in sinus rhythm with heart rate between 40-50 bpm. She was seen in the office with Padilla Kim CNP for follow up on 11/23/21 and then sent to the ED for admission. She was again found in afib with RVR and fluid overloaded. She was diuresed and discharged home on both amiodarone and Toprol. She underwent implant of Bi-V pacemaker with Dr. Lidia Caballero on 4/11/22. She had recurrence of atrial fibrillation noted on interrogation and underwent ablation on 5/9/22. She was seen in office on 02/22/2023 by Claudell Ales, MD and device interrogation showed no AT/ AF recurrence since her last check in November 2022. Today, she presents to office for follow up. She has a plantar wart on bottom of her foot which causes her some discomfort with ambulation. Endorses chronic back pain which she takes oxy IR for. Reports PCP is hesitant to increase due to concern about how it will affect her heart. Reports compliance with medications and tolerating them well. Denies chest pain/pressure, tightness, edema, shortness of breath, heart racing, palpitations, lightheadedness, dizziness, syncope, presyncope,  PND or orthopnea.        Review of
PM     Personally reviewed and interpreted   EKG 10/22/15: Sinus bradycardia with LAFB  EKG 1/2/20: Sinus rhonda, LAFB  EKG 6/10/21:  Sinus rhythm with LBBB  EKG 11/30/21: Sinus rhythm with LBBB  EKG 12/15/21: Sinus bradycardia with LBBB  EKG 2/15/22: Sinus bradycardia with LBBB  EKG 5/11/22: AV paced  EKG 5/25/22: AV paced  EKG 8/30/22: AV Paced  EKG 3/07/22:  reviewed AV paced   EKG 9/19/23: AV paced     Procedures:     [facility] 6/15/2015   1. Right dominant coronary arterial system with mild calcification of the RCA. There is 40% serial lesions in the mid RCA. In the left system there is 25% distal left main disease. There is 50% mid LAD disease and 50% ostial second diagonal branch disease. There is no significant restenosis identified in the prior previously placed mid circumflex stent. 2. Systemic hypertension. [facility] 10/27/21  HEMODYNAMICS:  1. Right atrial pressure was 12 mmHg. 2.  RV pressure 65/-4. 3.  Pulmonary capillary wedge pressure was 24 mmHg. 4.  Pulmonary artery pressure 71/19 with a mean of 41 mmHg. 5.  Cardiac output 4.9 liters per minute. 6.  Mixed venous saturation 52%. 7.  Pulmonary capillary wedge saturation 90%. 8.  Right atrial saturation 51%. In summary, elevated right and left filling pressure with tall V waves  seen in pulmonary capillary wedge pressure tracing suggestive of severe  mitral regurgitation. Imaging:     Echo 7/30/2016  Left ventricle size is normal. Normal left ventricular wall thickness. Ejection fraction is visually estimated to be 55-60%. Diastolic filling parameters suggests grade III (restrictive) diastolic dysfunction. The right ventricle appears mildly enlarged. Right ventricular systolic function is normal.  The left atrium is severely dilated. The right atrium is mildly dilated. At least moderate mitral regurgitation with a central and eccentric jet. Mild mitral stenosis. BP reported as 171/51 at time of imaging.    A bioprosthetic aortic

## 2023-09-22 ENCOUNTER — TELEPHONE (OUTPATIENT)
Dept: CARDIOLOGY CLINIC | Age: 78
End: 2023-09-22

## 2023-09-22 RX ORDER — METOLAZONE 5 MG/1
5 TABLET ORAL WEEKLY
Qty: 8 TABLET | Refills: 1 | Status: SHIPPED | OUTPATIENT
Start: 2023-09-22

## 2023-09-22 NOTE — TELEPHONE ENCOUNTER
JET at [address] [place] [address] to verify if they had received script that was sent electronically.    Shonda asked if we could send to [facility] in [place] if cannot fill at [facility]   Will send small temporary supply to [facility] and I informed Ernie Hutchins

## 2023-09-22 NOTE — TELEPHONE ENCOUNTER
Balbir Ha called stating that her pharmacy has not received her new Rx of metOLazone 5 MG. I did confirn that it was sent the same day as her OV with Kalyani Joy. Please advise.

## 2023-09-28 DIAGNOSIS — I25.10 CORONARY ARTERY DISEASE INVOLVING NATIVE CORONARY ARTERY OF NATIVE HEART WITHOUT ANGINA PECTORIS: ICD-10-CM

## 2023-09-28 LAB
ANION GAP SERPL CALCULATED.3IONS-SCNC: 15 MMOL/L (ref 3–16)
BUN SERPL-MCNC: 26 MG/DL (ref 7–20)
CALCIUM SERPL-MCNC: 8.9 MG/DL (ref 8.3–10.6)
CHLORIDE SERPL-SCNC: 95 MMOL/L (ref 99–110)
CO2 SERPL-SCNC: 29 MMOL/L (ref 21–32)
CREAT SERPL-MCNC: 1.3 MG/DL (ref 0.6–1.2)
GFR SERPLBLD CREATININE-BSD FMLA CKD-EPI: 42 ML/MIN/{1.73_M2}
GLUCOSE SERPL-MCNC: 98 MG/DL (ref 70–99)
POTASSIUM SERPL-SCNC: 3.6 MMOL/L (ref 3.5–5.1)
SODIUM SERPL-SCNC: 139 MMOL/L (ref 136–145)

## 2023-09-29 ENCOUNTER — TELEPHONE (OUTPATIENT)
Dept: CARDIOLOGY CLINIC | Age: 78
End: 2023-09-29

## 2023-09-29 NOTE — TELEPHONE ENCOUNTER
Kidney function stable. Continue current medical regimen and call if symptoms do not continue to improve. Please notify patient, thanks.

## 2023-09-29 NOTE — TELEPHONE ENCOUNTER
Zelda Benton called in this afternoon, she states she was in to see Dr. Deja Melton on 9/19 and he started her on a new medication, she cant remember the name. She states she was told to call in a few days after starting the new medication. She states she has lost a pound and a half. She can be reached at [phone].

## 2023-10-10 ENCOUNTER — TELEPHONE (OUTPATIENT)
Dept: PAIN MANAGEMENT | Age: 78
End: 2023-10-10

## 2023-10-10 DIAGNOSIS — M48.061 SPINAL STENOSIS OF LUMBAR REGION WITHOUT NEUROGENIC CLAUDICATION: ICD-10-CM

## 2023-10-10 DIAGNOSIS — M51.34 DDD (DEGENERATIVE DISC DISEASE), THORACIC: ICD-10-CM

## 2023-10-10 DIAGNOSIS — M47.812 CERVICAL ARTHRITIS: ICD-10-CM

## 2023-10-10 DIAGNOSIS — M15.3 OTHER SECONDARY OSTEOARTHRITIS OF MULTIPLE SITES: ICD-10-CM

## 2023-10-10 DIAGNOSIS — G89.29 CHRONIC MIDLINE LOW BACK PAIN WITH SCIATICA, SCIATICA LATERALITY UNSPECIFIED: ICD-10-CM

## 2023-10-10 DIAGNOSIS — M47.817 LUMBOSACRAL SPONDYLOSIS WITHOUT MYELOPATHY: ICD-10-CM

## 2023-10-10 DIAGNOSIS — M47.814 FACET ARTHROPATHY, THORACIC: ICD-10-CM

## 2023-10-10 DIAGNOSIS — G89.4 CHRONIC PAIN SYNDROME: ICD-10-CM

## 2023-10-10 DIAGNOSIS — M54.40 CHRONIC MIDLINE LOW BACK PAIN WITH SCIATICA, SCIATICA LATERALITY UNSPECIFIED: ICD-10-CM

## 2023-10-10 RX ORDER — OXYCODONE 9 MG/1
9 CAPSULE, EXTENDED RELEASE ORAL EVERY 12 HOURS
Qty: 6 EACH | Refills: 0 | Status: SHIPPED | OUTPATIENT
Start: 2023-10-10 | End: 2023-10-17 | Stop reason: SDUPTHER

## 2023-10-10 RX ORDER — OXYCODONE AND ACETAMINOPHEN 10; 325 MG/1; MG/1
1 TABLET ORAL EVERY 12 HOURS PRN
Qty: 6 TABLET | Refills: 0 | Status: SHIPPED | OUTPATIENT
Start: 2023-10-10 | End: 2023-10-17 | Stop reason: SDUPTHER

## 2023-10-10 NOTE — TELEPHONE ENCOUNTER
. Medication Refill    Patient need additional medication sent to the pharmacy until her appt on 10/17/2023    Name of medication:--- Percocet 10-325mg tab    Pharmacy name and phone number   [facility]  [phone]    Last refill:    Next appointment:10/17/2023    Patient confirmed the above pharmacy has their medication in stock

## 2023-10-13 ENCOUNTER — HOSPITAL ENCOUNTER (OUTPATIENT)
Dept: VASCULAR LAB | Age: 78
Discharge: HOME | End: 2023-10-13
Payer: MEDICARE

## 2023-10-13 PROCEDURE — 93880 EXTRACRANIAL BILAT STUDY: CPT

## 2023-10-17 ENCOUNTER — OFFICE VISIT (OUTPATIENT)
Dept: PAIN MANAGEMENT | Age: 78
End: 2023-10-17
Payer: MEDICARE

## 2023-10-17 VITALS
SYSTOLIC BLOOD PRESSURE: 118 MMHG | TEMPERATURE: 97 F | HEART RATE: 63 BPM | OXYGEN SATURATION: 99 % | DIASTOLIC BLOOD PRESSURE: 30 MMHG

## 2023-10-17 DIAGNOSIS — M47.814 FACET ARTHROPATHY, THORACIC: ICD-10-CM

## 2023-10-17 DIAGNOSIS — M47.812 CERVICAL ARTHRITIS: ICD-10-CM

## 2023-10-17 DIAGNOSIS — M47.817 LUMBOSACRAL SPONDYLOSIS WITHOUT MYELOPATHY: ICD-10-CM

## 2023-10-17 DIAGNOSIS — M54.40 CHRONIC MIDLINE LOW BACK PAIN WITH SCIATICA, SCIATICA LATERALITY UNSPECIFIED: ICD-10-CM

## 2023-10-17 DIAGNOSIS — M48.061 SPINAL STENOSIS OF LUMBAR REGION WITHOUT NEUROGENIC CLAUDICATION: ICD-10-CM

## 2023-10-17 DIAGNOSIS — M51.34 DDD (DEGENERATIVE DISC DISEASE), THORACIC: ICD-10-CM

## 2023-10-17 DIAGNOSIS — M15.3 OTHER SECONDARY OSTEOARTHRITIS OF MULTIPLE SITES: ICD-10-CM

## 2023-10-17 DIAGNOSIS — G89.29 CHRONIC MIDLINE LOW BACK PAIN WITH SCIATICA, SCIATICA LATERALITY UNSPECIFIED: ICD-10-CM

## 2023-10-17 DIAGNOSIS — G89.4 CHRONIC PAIN SYNDROME: ICD-10-CM

## 2023-10-17 PROCEDURE — 99213 OFFICE O/P EST LOW 20 MIN: CPT | Performed by: NURSE PRACTITIONER

## 2023-10-17 PROCEDURE — 4004F PT TOBACCO SCREEN RCVD TLK: CPT | Performed by: NURSE PRACTITIONER

## 2023-10-17 PROCEDURE — G8484 FLU IMMUNIZE NO ADMIN: HCPCS | Performed by: NURSE PRACTITIONER

## 2023-10-17 PROCEDURE — 3074F SYST BP LT 130 MM HG: CPT | Performed by: NURSE PRACTITIONER

## 2023-10-17 PROCEDURE — G8400 PT W/DXA NO RESULTS DOC: HCPCS | Performed by: NURSE PRACTITIONER

## 2023-10-17 PROCEDURE — G8427 DOCREV CUR MEDS BY ELIG CLIN: HCPCS | Performed by: NURSE PRACTITIONER

## 2023-10-17 PROCEDURE — 1090F PRES/ABSN URINE INCON ASSESS: CPT | Performed by: NURSE PRACTITIONER

## 2023-10-17 PROCEDURE — 1123F ACP DISCUSS/DSCN MKR DOCD: CPT | Performed by: NURSE PRACTITIONER

## 2023-10-17 PROCEDURE — 3078F DIAST BP <80 MM HG: CPT | Performed by: NURSE PRACTITIONER

## 2023-10-17 PROCEDURE — G8417 CALC BMI ABV UP PARAM F/U: HCPCS | Performed by: NURSE PRACTITIONER

## 2023-10-17 RX ORDER — OXYCODONE 9 MG/1
9 CAPSULE, EXTENDED RELEASE ORAL EVERY 12 HOURS
Qty: 60 EACH | Refills: 0 | Status: SHIPPED | OUTPATIENT
Start: 2023-10-17 | End: 2023-11-16

## 2023-10-17 RX ORDER — LIDOCAINE 50 MG/G
1 PATCH TOPICAL DAILY
Qty: 30 PATCH | Refills: 0 | Status: SHIPPED | OUTPATIENT
Start: 2023-10-17 | End: 2023-11-16

## 2023-10-17 RX ORDER — OXYCODONE AND ACETAMINOPHEN 10; 325 MG/1; MG/1
1 TABLET ORAL EVERY 12 HOURS PRN
Qty: 60 TABLET | Refills: 0 | Status: SHIPPED | OUTPATIENT
Start: 2023-10-17 | End: 2023-11-16

## 2023-10-17 NOTE — PROGRESS NOTES
[facility]  1945  [id number]    HISTORY OF PRESENT ILLNESS: Ms. Isabelle Duarte is a 66 y.o. female returns for a follow up visit for pain management  She has a diagnosis of   1. Chronic pain syndrome    2. Cervical arthritis    3. DDD (degenerative disc disease), thoracic    4. Facet arthropathy, thoracic    5. Lumbosacral spondylosis without myelopathy    6. Spinal stenosis of lumbar region without neurogenic claudication    7. Chronic midline low back pain with sciatica, sciatica laterality unspecified    8. Other secondary osteoarthritis of multiple sites      As per Information Obtained from the PADT (Patient Assessment and Documentation Tool)    She complains of pain in the  lower back  She rates the pain 7/10 and describes it as aching. Current treatment regimen has helped relieve about 20% of the pain. She denies any side effects from the current pain regimen. Patient reports that since the last follow up visit the physical functioning is worse, family/social relationships are better, mood is unchanged sleep patterns are unchanged, and that the overall functioning is unchanged. Patient denies misusing/abusing her narcotic pain medications or using any illegal drugs. Upon obtaining medical history from Ms. Hernandes states that pain is somewhat manageable on current pain therapy. Takes the pain medications as prescribed. Last TPI of the Lumbar spine provided 2 weeks [ain relief. She has pain in the lower back with activities. Mood/anxiety is stable. Sleep is fair with an average of 5-6 hours. Denies to having issues of constipation. Tolerating activities/house chores with moderate tenderness to the lower back. ALLERGIES: Patients list of allergies were reviewed     MEDICATIONS: Ms. Isabelle Duarte list of medications were reviewed. Her current medications are   Outpatient Medications Prior to Visit   Medication Sig Dispense Refill    levothyroxine (SYNTHROID) 88 MCG tablet TAKE 1 TABLET BY MOUTH EVERY DAY 30 tablet 0

## 2023-11-14 ENCOUNTER — OFFICE VISIT (OUTPATIENT)
Dept: CARDIOLOGY CLINIC | Age: 78
End: 2023-11-14
Payer: MEDICARE

## 2023-11-14 ENCOUNTER — NURSE ONLY (OUTPATIENT)
Dept: CARDIOLOGY CLINIC | Age: 78
End: 2023-11-14

## 2023-11-14 ENCOUNTER — OFFICE VISIT (OUTPATIENT)
Dept: PAIN MANAGEMENT | Age: 78
End: 2023-11-14

## 2023-11-14 VITALS
SYSTOLIC BLOOD PRESSURE: 116 MMHG | OXYGEN SATURATION: 96 % | HEIGHT: 61 IN | HEART RATE: 61 BPM | WEIGHT: 138.4 LBS | BODY MASS INDEX: 26.13 KG/M2 | DIASTOLIC BLOOD PRESSURE: 60 MMHG

## 2023-11-14 VITALS
WEIGHT: 139 LBS | TEMPERATURE: 97.2 F | OXYGEN SATURATION: 97 % | DIASTOLIC BLOOD PRESSURE: 58 MMHG | HEART RATE: 64 BPM | BODY MASS INDEX: 26.26 KG/M2 | SYSTOLIC BLOOD PRESSURE: 120 MMHG

## 2023-11-14 DIAGNOSIS — M15.3 OTHER SECONDARY OSTEOARTHRITIS OF MULTIPLE SITES: ICD-10-CM

## 2023-11-14 DIAGNOSIS — I50.42 CHRONIC COMBINED SYSTOLIC AND DIASTOLIC CHF, NYHA CLASS 2 (HCC): ICD-10-CM

## 2023-11-14 DIAGNOSIS — Z95.2 S/P AVR (AORTIC VALVE REPLACEMENT): ICD-10-CM

## 2023-11-14 DIAGNOSIS — R00.1 SINUS BRADYCARDIA: ICD-10-CM

## 2023-11-14 DIAGNOSIS — I50.42 CHRONIC COMBINED SYSTOLIC AND DIASTOLIC HEART FAILURE (HCC): ICD-10-CM

## 2023-11-14 DIAGNOSIS — I48.0 PAROXYSMAL ATRIAL FIBRILLATION (HCC): ICD-10-CM

## 2023-11-14 DIAGNOSIS — R00.1 SYMPTOMATIC BRADYCARDIA: ICD-10-CM

## 2023-11-14 DIAGNOSIS — M48.061 SPINAL STENOSIS OF LUMBAR REGION WITHOUT NEUROGENIC CLAUDICATION: ICD-10-CM

## 2023-11-14 DIAGNOSIS — E78.5 HYPERLIPIDEMIA LDL GOAL <70: ICD-10-CM

## 2023-11-14 DIAGNOSIS — R00.1 BRADYCARDIA: ICD-10-CM

## 2023-11-14 DIAGNOSIS — M47.817 LUMBOSACRAL SPONDYLOSIS WITHOUT MYELOPATHY: ICD-10-CM

## 2023-11-14 DIAGNOSIS — M47.814 FACET ARTHROPATHY, THORACIC: ICD-10-CM

## 2023-11-14 DIAGNOSIS — G89.29 CHRONIC MIDLINE LOW BACK PAIN WITH SCIATICA, SCIATICA LATERALITY UNSPECIFIED: ICD-10-CM

## 2023-11-14 DIAGNOSIS — F43.22 ADJUSTMENT REACTION WITH ANXIETY: ICD-10-CM

## 2023-11-14 DIAGNOSIS — M51.34 DDD (DEGENERATIVE DISC DISEASE), THORACIC: ICD-10-CM

## 2023-11-14 DIAGNOSIS — M54.40 CHRONIC MIDLINE LOW BACK PAIN WITH SCIATICA, SCIATICA LATERALITY UNSPECIFIED: ICD-10-CM

## 2023-11-14 DIAGNOSIS — Z95.0 BIVENTRICULAR CARDIAC PACEMAKER IN SITU: Primary | ICD-10-CM

## 2023-11-14 DIAGNOSIS — D50.0 IRON DEFICIENCY ANEMIA DUE TO CHRONIC BLOOD LOSS: ICD-10-CM

## 2023-11-14 DIAGNOSIS — I73.9 PAD (PERIPHERAL ARTERY DISEASE) (HCC): ICD-10-CM

## 2023-11-14 DIAGNOSIS — I50.32 CHRONIC DIASTOLIC CHF (CONGESTIVE HEART FAILURE) (HCC): ICD-10-CM

## 2023-11-14 DIAGNOSIS — I50.42 CHRONIC COMBINED SYSTOLIC AND DIASTOLIC CHF, NYHA CLASS 3 (HCC): ICD-10-CM

## 2023-11-14 DIAGNOSIS — E78.00 HYPERCHOLESTEROLEMIA: ICD-10-CM

## 2023-11-14 DIAGNOSIS — M47.812 CERVICAL ARTHRITIS: ICD-10-CM

## 2023-11-14 DIAGNOSIS — E66.09 CLASS 1 OBESITY DUE TO EXCESS CALORIES WITHOUT SERIOUS COMORBIDITY IN ADULT, UNSPECIFIED BMI: ICD-10-CM

## 2023-11-14 DIAGNOSIS — I25.10 CORONARY ARTERY DISEASE INVOLVING NATIVE CORONARY ARTERY OF NATIVE HEART WITHOUT ANGINA PECTORIS: Primary | ICD-10-CM

## 2023-11-14 DIAGNOSIS — G89.4 CHRONIC PAIN SYNDROME: ICD-10-CM

## 2023-11-14 DIAGNOSIS — I35.0 NONRHEUMATIC AORTIC VALVE STENOSIS: ICD-10-CM

## 2023-11-14 DIAGNOSIS — I34.0 NONRHEUMATIC MITRAL VALVE REGURGITATION: ICD-10-CM

## 2023-11-14 LAB
ALBUMIN SERPL-MCNC: 4.5 G/DL (ref 3.4–5)
ALBUMIN/GLOB SERPL: 1.8 {RATIO} (ref 1.1–2.2)
ALP SERPL-CCNC: 76 U/L (ref 40–129)
ALT SERPL-CCNC: 43 U/L (ref 10–40)
ANION GAP SERPL CALCULATED.3IONS-SCNC: 17 MMOL/L (ref 3–16)
AST SERPL-CCNC: 50 U/L (ref 15–37)
BASOPHILS # BLD: 0.1 K/UL (ref 0–0.2)
BASOPHILS NFR BLD: 0.8 %
BILIRUB SERPL-MCNC: 0.4 MG/DL (ref 0–1)
BUN SERPL-MCNC: 47 MG/DL (ref 7–20)
CALCIUM SERPL-MCNC: 9.8 MG/DL (ref 8.3–10.6)
CHLORIDE SERPL-SCNC: 92 MMOL/L (ref 99–110)
CHOLEST SERPL-MCNC: 136 MG/DL (ref 0–199)
CO2 SERPL-SCNC: 27 MMOL/L (ref 21–32)
CREAT SERPL-MCNC: 1.6 MG/DL (ref 0.6–1.2)
DEPRECATED RDW RBC AUTO: 15.1 % (ref 12.4–15.4)
EOSINOPHIL # BLD: 0.4 K/UL (ref 0–0.6)
EOSINOPHIL NFR BLD: 3.5 %
GFR SERPLBLD CREATININE-BSD FMLA CKD-EPI: 33 ML/MIN/{1.73_M2}
GLUCOSE SERPL-MCNC: 101 MG/DL (ref 70–99)
HCT VFR BLD AUTO: 39.3 % (ref 36–48)
HDLC SERPL-MCNC: 48 MG/DL (ref 40–60)
HGB BLD-MCNC: 13 G/DL (ref 12–16)
LDLC SERPL CALC-MCNC: 70 MG/DL
LYMPHOCYTES # BLD: 2.3 K/UL (ref 1–5.1)
LYMPHOCYTES NFR BLD: 20.7 %
MCH RBC QN AUTO: 28.9 PG (ref 26–34)
MCHC RBC AUTO-ENTMCNC: 33 G/DL (ref 31–36)
MCV RBC AUTO: 87.6 FL (ref 80–100)
MONOCYTES # BLD: 1.1 K/UL (ref 0–1.3)
MONOCYTES NFR BLD: 10 %
NEUTROPHILS # BLD: 7.1 K/UL (ref 1.7–7.7)
NEUTROPHILS NFR BLD: 65 %
NT-PROBNP SERPL-MCNC: 1087 PG/ML (ref 0–449)
PLATELET # BLD AUTO: 260 K/UL (ref 135–450)
PMV BLD AUTO: 8.9 FL (ref 5–10.5)
POTASSIUM SERPL-SCNC: 3.3 MMOL/L (ref 3.5–5.1)
PROT SERPL-MCNC: 7 G/DL (ref 6.4–8.2)
RBC # BLD AUTO: 4.48 M/UL (ref 4–5.2)
SODIUM SERPL-SCNC: 136 MMOL/L (ref 136–145)
TRIGL SERPL-MCNC: 89 MG/DL (ref 0–150)
TSH SERPL DL<=0.005 MIU/L-ACNC: 1.82 UIU/ML (ref 0.27–4.2)
VLDLC SERPL CALC-MCNC: 18 MG/DL
WBC # BLD AUTO: 10.9 K/UL (ref 4–11)

## 2023-11-14 PROCEDURE — 1036F TOBACCO NON-USER: CPT | Performed by: INTERNAL MEDICINE

## 2023-11-14 PROCEDURE — G8417 CALC BMI ABV UP PARAM F/U: HCPCS | Performed by: INTERNAL MEDICINE

## 2023-11-14 PROCEDURE — 1123F ACP DISCUSS/DSCN MKR DOCD: CPT | Performed by: INTERNAL MEDICINE

## 2023-11-14 PROCEDURE — G8484 FLU IMMUNIZE NO ADMIN: HCPCS | Performed by: INTERNAL MEDICINE

## 2023-11-14 PROCEDURE — G8427 DOCREV CUR MEDS BY ELIG CLIN: HCPCS | Performed by: INTERNAL MEDICINE

## 2023-11-14 PROCEDURE — 1090F PRES/ABSN URINE INCON ASSESS: CPT | Performed by: INTERNAL MEDICINE

## 2023-11-14 PROCEDURE — G8400 PT W/DXA NO RESULTS DOC: HCPCS | Performed by: INTERNAL MEDICINE

## 2023-11-14 PROCEDURE — 3078F DIAST BP <80 MM HG: CPT | Performed by: INTERNAL MEDICINE

## 2023-11-14 PROCEDURE — 99214 OFFICE O/P EST MOD 30 MIN: CPT | Performed by: INTERNAL MEDICINE

## 2023-11-14 PROCEDURE — 3074F SYST BP LT 130 MM HG: CPT | Performed by: INTERNAL MEDICINE

## 2023-11-14 RX ORDER — OXYCODONE AND ACETAMINOPHEN 10; 325 MG/1; MG/1
1 TABLET ORAL EVERY 12 HOURS PRN
Qty: 60 TABLET | Refills: 0 | Status: SHIPPED | OUTPATIENT
Start: 2023-11-14 | End: 2023-12-14

## 2023-11-14 RX ORDER — TORSEMIDE 20 MG/1
20 TABLET ORAL 2 TIMES DAILY
Qty: 180 TABLET | Refills: 3 | Status: SHIPPED | OUTPATIENT
Start: 2023-11-14

## 2023-11-14 RX ORDER — OXYCODONE 9 MG/1
9 CAPSULE, EXTENDED RELEASE ORAL EVERY 12 HOURS
Qty: 60 EACH | Refills: 0 | Status: SHIPPED | OUTPATIENT
Start: 2023-11-14 | End: 2023-12-14

## 2023-11-14 RX ORDER — LIDOCAINE 50 MG/G
1 PATCH TOPICAL DAILY
Qty: 30 PATCH | Refills: 0 | Status: SHIPPED | OUTPATIENT
Start: 2023-11-14 | End: 2023-12-14

## 2023-11-14 NOTE — PROGRESS NOTES
Deandra Lua  1945  [id number]    HISTORY OF PRESENT ILLNESS: Ms. Sybil Corrales is a 66 y.o. female returns for a follow up visit for pain management  She has a diagnosis of   1. Chronic pain syndrome    2. Cervical arthritis    3. DDD (degenerative disc disease), thoracic    4. Facet arthropathy, thoracic    5. Lumbosacral spondylosis without myelopathy    6. Spinal stenosis of lumbar region without neurogenic claudication    7. Chronic midline low back pain with sciatica, sciatica laterality unspecified    8. Other secondary osteoarthritis of multiple sites    9. Adjustment reaction with anxiety    10. Class 1 obesity due to excess calories without serious comorbidity in adult, unspecified BMI      As per Information Obtained from the PADT (Patient Assessment and Documentation Tool)    She complains of pain in the  lower back  She rates the pain 7/10 and describes it as aching. Current treatment regimen has helped relieve about 20% of the pain. She denies any side effects from the current pain regimen. Patient reports that since the last follow up visit the physical functioning is better, family/social relationships are better, mood is better sleep patterns are better, and that the overall functioning is better. Patient denies misusing/abusing her narcotic pain medications or using any illegal drugs. Upon obtaining medical history from Ms. Hernandes states that pain is manageable on current pain therapy. Takes the pain medications as prescribed. She still has some tenderness to the lower back with activities, reports she is making the pain medications work. Mood/anxiety is stable. Sleep is fair with an average of 5-6 hours. Denies to having issues of constipation. Tolerating activities/house chores with moderate tenderness to the lower back. ALLERGIES: Patients list of allergies were reviewed     MEDICATIONS: Ms. Sybil Corrales list of medications were reviewed. Her current medications are   Outpatient Medications Prior to

## 2023-11-16 DIAGNOSIS — G89.4 CHRONIC PAIN SYNDROME: ICD-10-CM

## 2023-11-16 DIAGNOSIS — L23.7 POISON IVY DERMATITIS: ICD-10-CM

## 2023-11-27 DIAGNOSIS — I50.32 CHRONIC DIASTOLIC CHF (CONGESTIVE HEART FAILURE) (HCC): ICD-10-CM

## 2023-11-27 DIAGNOSIS — D50.0 IRON DEFICIENCY ANEMIA DUE TO CHRONIC BLOOD LOSS: ICD-10-CM

## 2023-11-27 DIAGNOSIS — E78.00 HYPERCHOLESTEROLEMIA: ICD-10-CM

## 2023-11-27 LAB
BASOPHILS # BLD: 0.1 K/UL (ref 0–0.2)
BASOPHILS NFR BLD: 0.7 %
DEPRECATED RDW RBC AUTO: 15.7 % (ref 12.4–15.4)
EOSINOPHIL # BLD: 0.6 K/UL (ref 0–0.6)
EOSINOPHIL NFR BLD: 6.7 %
HCT VFR BLD AUTO: 34.4 % (ref 36–48)
HGB BLD-MCNC: 11.8 G/DL (ref 12–16)
LYMPHOCYTES # BLD: 2.1 K/UL (ref 1–5.1)
LYMPHOCYTES NFR BLD: 23.7 %
MCH RBC QN AUTO: 30 PG (ref 26–34)
MCHC RBC AUTO-ENTMCNC: 34.2 G/DL (ref 31–36)
MCV RBC AUTO: 87.8 FL (ref 80–100)
MONOCYTES # BLD: 1.1 K/UL (ref 0–1.3)
MONOCYTES NFR BLD: 12.5 %
NEUTROPHILS # BLD: 5 K/UL (ref 1.7–7.7)
NEUTROPHILS NFR BLD: 56.4 %
NT-PROBNP SERPL-MCNC: 1361 PG/ML (ref 0–449)
PLATELET # BLD AUTO: 201 K/UL (ref 135–450)
PMV BLD AUTO: 8.5 FL (ref 5–10.5)
RBC # BLD AUTO: 3.92 M/UL (ref 4–5.2)
TSH SERPL DL<=0.005 MIU/L-ACNC: 1.65 UIU/ML (ref 0.27–4.2)
WBC # BLD AUTO: 8.9 K/UL (ref 4–11)

## 2023-11-28 LAB
ALBUMIN SERPL-MCNC: 4.1 G/DL (ref 3.4–5)
ALBUMIN/GLOB SERPL: 1.6 {RATIO} (ref 1.1–2.2)
ALP SERPL-CCNC: 65 U/L (ref 40–129)
ALT SERPL-CCNC: 39 U/L (ref 10–40)
ANION GAP SERPL CALCULATED.3IONS-SCNC: 16 MMOL/L (ref 3–16)
AST SERPL-CCNC: 45 U/L (ref 15–37)
BILIRUB SERPL-MCNC: <0.2 MG/DL (ref 0–1)
BUN SERPL-MCNC: 30 MG/DL (ref 7–20)
CALCIUM SERPL-MCNC: 9.7 MG/DL (ref 8.3–10.6)
CHLORIDE SERPL-SCNC: 96 MMOL/L (ref 99–110)
CHOLEST SERPL-MCNC: 140 MG/DL (ref 0–199)
CO2 SERPL-SCNC: 29 MMOL/L (ref 21–32)
CREAT SERPL-MCNC: 1.5 MG/DL (ref 0.6–1.2)
GFR SERPLBLD CREATININE-BSD FMLA CKD-EPI: 35 ML/MIN/{1.73_M2}
GLUCOSE SERPL-MCNC: 69 MG/DL (ref 70–99)
HDLC SERPL-MCNC: 53 MG/DL (ref 40–60)
LDLC SERPL CALC-MCNC: 57 MG/DL
POTASSIUM SERPL-SCNC: 4.2 MMOL/L (ref 3.5–5.1)
PROT SERPL-MCNC: 6.7 G/DL (ref 6.4–8.2)
SODIUM SERPL-SCNC: 141 MMOL/L (ref 136–145)
TRIGL SERPL-MCNC: 151 MG/DL (ref 0–150)
VLDLC SERPL CALC-MCNC: 30 MG/DL

## 2023-12-12 ENCOUNTER — OFFICE VISIT (OUTPATIENT)
Dept: PAIN MANAGEMENT | Age: 78
End: 2023-12-12
Payer: MEDICARE

## 2023-12-12 VITALS
TEMPERATURE: 97.1 F | HEART RATE: 84 BPM | SYSTOLIC BLOOD PRESSURE: 134 MMHG | OXYGEN SATURATION: 99 % | DIASTOLIC BLOOD PRESSURE: 72 MMHG

## 2023-12-12 DIAGNOSIS — M47.812 CERVICAL ARTHRITIS: ICD-10-CM

## 2023-12-12 DIAGNOSIS — M48.061 SPINAL STENOSIS OF LUMBAR REGION WITHOUT NEUROGENIC CLAUDICATION: ICD-10-CM

## 2023-12-12 DIAGNOSIS — J44.1 COPD EXACERBATION (HCC): ICD-10-CM

## 2023-12-12 DIAGNOSIS — G89.29 CHRONIC MIDLINE LOW BACK PAIN WITH SCIATICA, SCIATICA LATERALITY UNSPECIFIED: ICD-10-CM

## 2023-12-12 DIAGNOSIS — M47.817 LUMBOSACRAL SPONDYLOSIS WITHOUT MYELOPATHY: ICD-10-CM

## 2023-12-12 DIAGNOSIS — M47.814 FACET ARTHROPATHY, THORACIC: ICD-10-CM

## 2023-12-12 DIAGNOSIS — M19.011 ARTHRITIS OF RIGHT SHOULDER REGION: ICD-10-CM

## 2023-12-12 DIAGNOSIS — M15.3 OTHER SECONDARY OSTEOARTHRITIS OF MULTIPLE SITES: ICD-10-CM

## 2023-12-12 DIAGNOSIS — M51.34 DDD (DEGENERATIVE DISC DISEASE), THORACIC: ICD-10-CM

## 2023-12-12 DIAGNOSIS — M54.40 CHRONIC MIDLINE LOW BACK PAIN WITH SCIATICA, SCIATICA LATERALITY UNSPECIFIED: ICD-10-CM

## 2023-12-12 DIAGNOSIS — G89.4 CHRONIC PAIN SYNDROME: ICD-10-CM

## 2023-12-12 PROCEDURE — 3078F DIAST BP <80 MM HG: CPT | Performed by: NURSE PRACTITIONER

## 2023-12-12 PROCEDURE — G8400 PT W/DXA NO RESULTS DOC: HCPCS | Performed by: NURSE PRACTITIONER

## 2023-12-12 PROCEDURE — 1090F PRES/ABSN URINE INCON ASSESS: CPT | Performed by: NURSE PRACTITIONER

## 2023-12-12 PROCEDURE — 99214 OFFICE O/P EST MOD 30 MIN: CPT | Performed by: NURSE PRACTITIONER

## 2023-12-12 PROCEDURE — 20610 DRAIN/INJ JOINT/BURSA W/O US: CPT | Performed by: NURSE PRACTITIONER

## 2023-12-12 PROCEDURE — 1123F ACP DISCUSS/DSCN MKR DOCD: CPT | Performed by: NURSE PRACTITIONER

## 2023-12-12 PROCEDURE — G8484 FLU IMMUNIZE NO ADMIN: HCPCS | Performed by: NURSE PRACTITIONER

## 2023-12-12 PROCEDURE — G8417 CALC BMI ABV UP PARAM F/U: HCPCS | Performed by: NURSE PRACTITIONER

## 2023-12-12 PROCEDURE — 3023F SPIROM DOC REV: CPT | Performed by: NURSE PRACTITIONER

## 2023-12-12 PROCEDURE — 1036F TOBACCO NON-USER: CPT | Performed by: NURSE PRACTITIONER

## 2023-12-12 PROCEDURE — 3075F SYST BP GE 130 - 139MM HG: CPT | Performed by: NURSE PRACTITIONER

## 2023-12-12 PROCEDURE — G8427 DOCREV CUR MEDS BY ELIG CLIN: HCPCS | Performed by: NURSE PRACTITIONER

## 2023-12-12 RX ORDER — OXYCODONE AND ACETAMINOPHEN 10; 325 MG/1; MG/1
1 TABLET ORAL EVERY 12 HOURS PRN
Qty: 60 TABLET | Refills: 0 | Status: SHIPPED | OUTPATIENT
Start: 2023-12-12 | End: 2024-01-11

## 2023-12-12 RX ORDER — LIDOCAINE 50 MG/G
1 PATCH TOPICAL DAILY
Qty: 30 PATCH | Refills: 0 | Status: SHIPPED | OUTPATIENT
Start: 2023-12-12 | End: 2024-01-11

## 2023-12-12 RX ORDER — OXYCODONE 9 MG/1
9 CAPSULE, EXTENDED RELEASE ORAL EVERY 12 HOURS
Qty: 60 EACH | Refills: 0 | Status: SHIPPED | OUTPATIENT
Start: 2023-12-12 | End: 2024-01-11

## 2023-12-16 ENCOUNTER — APPOINTMENT (OUTPATIENT)
Dept: CT IMAGING | Age: 78
End: 2023-12-16
Payer: MEDICARE

## 2023-12-16 ENCOUNTER — HOSPITAL ENCOUNTER (EMERGENCY)
Age: 78
Discharge: HOME | End: 2023-12-16
Payer: MEDICARE

## 2023-12-16 ENCOUNTER — APPOINTMENT (OUTPATIENT)
Dept: GENERAL RADIOLOGY | Age: 78
End: 2023-12-16
Payer: MEDICARE

## 2023-12-16 VITALS
SYSTOLIC BLOOD PRESSURE: 124 MMHG | TEMPERATURE: 98.1 F | DIASTOLIC BLOOD PRESSURE: 78 MMHG | RESPIRATION RATE: 16 BRPM | OXYGEN SATURATION: 96 % | BODY MASS INDEX: 26.18 KG/M2 | WEIGHT: 138.67 LBS | HEIGHT: 61 IN | HEART RATE: 78 BPM

## 2023-12-16 DIAGNOSIS — S62.102A CLOSED FRACTURE OF LEFT WRIST, INITIAL ENCOUNTER: Primary | ICD-10-CM
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
## 2023-12-16 DIAGNOSIS — S09.90XA CLOSED HEAD INJURY, INITIAL ENCOUNTER: ICD-10-CM

## 2023-12-16 DIAGNOSIS — S00.03XA HEMATOMA OF SCALP, INITIAL ENCOUNTER: ICD-10-CM

## 2023-12-16 PROCEDURE — 73110 X-RAY EXAM OF WRIST: CPT

## 2023-12-16 PROCEDURE — 6370000000 HC RX 637 (ALT 250 FOR IP): Performed by: PHYSICIAN ASSISTANT

## 2023-12-16 PROCEDURE — 70450 CT HEAD/BRAIN W/O DYE: CPT

## 2023-12-16 RX ORDER — HYDROCODONE BITARTRATE AND ACETAMINOPHEN 5; 325 MG/1; MG/1
1 TABLET ORAL ONCE
Status: COMPLETED | OUTPATIENT
Start: 2023-12-16 | End: 2023-12-16

## 2023-12-16 RX ADMIN — HYDROCODONE BITARTRATE AND ACETAMINOPHEN 1 TABLET: 5; 325 TABLET ORAL at 13:16

## 2023-12-16 ASSESSMENT — PAIN - FUNCTIONAL ASSESSMENT
PAIN_FUNCTIONAL_ASSESSMENT: 0-10
PAIN_FUNCTIONAL_ASSESSMENT: 0-10

## 2023-12-16 ASSESSMENT — PAIN SCALES - GENERAL
PAINLEVEL_OUTOF10: 4
PAINLEVEL_OUTOF10: 8

## 2023-12-16 ASSESSMENT — PAIN DESCRIPTION - LOCATION: LOCATION: WRIST

## 2023-12-16 ASSESSMENT — PAIN DESCRIPTION - ORIENTATION: ORIENTATION: LEFT

## 2023-12-16 NOTE — ED TRIAGE NOTES
Pt presents with left wrist pain after tripping and falling at around 11:30 am and pt states she hit her head. Pt has large knot on her head as a result. Pt denies LOC or being blood thinners.

## 2023-12-16 NOTE — ED NOTES
Discharge and education instructions reviewed. Patient verbalized understanding, teach-back successful. Patient denied questions at this time. No acute distress noted. Patient instructed to follow-up as noted - return to emergency department if symptoms worsen. Patient verbalized understanding. Discharged per EDMD with discharge instructions.        Brooks Angulo RN  12/16/23 [id number]

## 2023-12-17 NOTE — ED PROVIDER NOTES
following up with the orthopedic doctor to discuss surgical versus nonsurgical options. The patient tolerated their visit well. I evaluated the patient. The physician was available for consultation as needed. The patient and / or the family were informed of the results of any tests, a time was given to answer questions, a plan was proposed and they agreed with plan. I am the Primary Clinician of Record. CLINICAL IMPRESSION:  1. Closed fracture of left wrist, initial encounter    2. Hematoma of scalp, initial encounter    3. Closed head injury, initial encounter    4.  Fall, initial encounter        DISPOSITION Decision To Discharge 12/16/2023 02:38:08 PM      PATIENT REFERRED TO:  Jose Carlos Mendoza, [address] [place] [address]  [address] [address] [address]  [phone]    Call in 2 days  For a recheck in in his clinic wednesday      DISCHARGE MEDICATIONS:  Discharge Medication List as of 12/16/2023  2:41 PM          DISCONTINUED MEDICATIONS:  Discharge Medication List as of 12/16/2023  2:41 PM                 (Please note the MDM and HPI sections of this note were completed with a voice recognition program.  Efforts were made to edit the dictations but occasionally words are mis-transcribed.)    Electronically signed, Lesly Lui PA-C  12/16/23 [id number]

## 2023-12-18 RX ORDER — TRIAMCINOLONE ACETONIDE 40 MG/ML
40 INJECTION, SUSPENSION INTRA-ARTICULAR; INTRAMUSCULAR ONCE
Status: COMPLETED | OUTPATIENT
Start: 2023-12-18 | End: 2023-12-18

## 2023-12-18 RX ADMIN — TRIAMCINOLONE ACETONIDE 40 MG: 40 INJECTION, SUSPENSION INTRA-ARTICULAR; INTRAMUSCULAR at 17:54

## 2023-12-20 PROBLEM — S52.502A CLOSED FRACTURE OF LEFT DISTAL RADIUS: Status: ACTIVE | Noted: 2023-12-20

## 2023-12-21 ENCOUNTER — PATIENT MESSAGE (OUTPATIENT)
Dept: CARDIOLOGY CLINIC | Age: 78
End: 2023-12-21

## 2023-12-27 RX ORDER — METHYLPREDNISOLONE 4 MG/1
TABLET ORAL
Qty: 21 TABLET | OUTPATIENT
Start: 2023-12-27

## 2024-01-09 ENCOUNTER — OFFICE VISIT (OUTPATIENT)
Dept: PAIN MANAGEMENT | Age: 79
End: 2024-01-09
Payer: MEDICARE

## 2024-01-09 VITALS
TEMPERATURE: 96.8 F | DIASTOLIC BLOOD PRESSURE: 80 MMHG | OXYGEN SATURATION: 94 % | SYSTOLIC BLOOD PRESSURE: 147 MMHG | HEART RATE: 81 BPM
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
## 2024-01-09 DIAGNOSIS — M47.812 CERVICAL ARTHRITIS: ICD-10-CM

## 2024-01-09 DIAGNOSIS — M47.814 FACET ARTHROPATHY, THORACIC: ICD-10-CM

## 2024-01-09 DIAGNOSIS — S52.101P: ICD-10-CM

## 2024-01-09 DIAGNOSIS — M47.817 LUMBOSACRAL SPONDYLOSIS WITHOUT MYELOPATHY: ICD-10-CM

## 2024-01-09 DIAGNOSIS — M54.40 CHRONIC MIDLINE LOW BACK PAIN WITH SCIATICA, SCIATICA LATERALITY UNSPECIFIED: ICD-10-CM

## 2024-01-09 DIAGNOSIS — M15.3 OTHER SECONDARY OSTEOARTHRITIS OF MULTIPLE SITES: ICD-10-CM

## 2024-01-09 DIAGNOSIS — G89.4 CHRONIC PAIN SYNDROME: ICD-10-CM

## 2024-01-09 DIAGNOSIS — M48.061 SPINAL STENOSIS OF LUMBAR REGION WITHOUT NEUROGENIC CLAUDICATION: ICD-10-CM

## 2024-01-09 DIAGNOSIS — G89.29 CHRONIC MIDLINE LOW BACK PAIN WITH SCIATICA, SCIATICA LATERALITY UNSPECIFIED: ICD-10-CM

## 2024-01-09 DIAGNOSIS — M51.34 DDD (DEGENERATIVE DISC DISEASE), THORACIC: ICD-10-CM

## 2024-01-09 PROCEDURE — G8400 PT W/DXA NO RESULTS DOC: HCPCS | Performed by: NURSE PRACTITIONER

## 2024-01-09 PROCEDURE — 99214 OFFICE O/P EST MOD 30 MIN: CPT | Performed by: NURSE PRACTITIONER

## 2024-01-09 PROCEDURE — 3078F DIAST BP <80 MM HG: CPT | Performed by: NURSE PRACTITIONER

## 2024-01-09 PROCEDURE — G8484 FLU IMMUNIZE NO ADMIN: HCPCS | Performed by: NURSE PRACTITIONER

## 2024-01-09 PROCEDURE — G8417 CALC BMI ABV UP PARAM F/U: HCPCS | Performed by: NURSE PRACTITIONER

## 2024-01-09 PROCEDURE — G8427 DOCREV CUR MEDS BY ELIG CLIN: HCPCS | Performed by: NURSE PRACTITIONER

## 2024-01-09 PROCEDURE — 1090F PRES/ABSN URINE INCON ASSESS: CPT | Performed by: NURSE PRACTITIONER

## 2024-01-09 PROCEDURE — 1123F ACP DISCUSS/DSCN MKR DOCD: CPT | Performed by: NURSE PRACTITIONER

## 2024-01-09 PROCEDURE — 1036F TOBACCO NON-USER: CPT | Performed by: NURSE PRACTITIONER

## 2024-01-09 PROCEDURE — 3077F SYST BP >= 140 MM HG: CPT | Performed by: NURSE PRACTITIONER

## 2024-01-09 RX ORDER — OXYCODONE AND ACETAMINOPHEN 10; 325 MG/1; MG/1
1 TABLET ORAL EVERY 12 HOURS PRN
Qty: 60 TABLET | Refills: 0 | Status: SHIPPED | OUTPATIENT
Start: 2024-01-09 | End: 2024-02-08

## 2024-01-09 RX ORDER — LIDOCAINE 50 MG/G
1 PATCH TOPICAL DAILY
Qty: 30 PATCH | Refills: 0 | Status: SHIPPED | OUTPATIENT
Start: 2024-01-09 | End: 2024-02-08

## 2024-01-09 RX ORDER — OXYCODONE 9 MG/1
9 CAPSULE, EXTENDED RELEASE ORAL EVERY 12 HOURS
Qty: 60 EACH | Refills: 0 | Status: SHIPPED | OUTPATIENT
Start: 2024-01-09 | End: 2024-02-08

## 2024-01-09 NOTE — PROGRESS NOTES
Medication Agreement: 1/9/24              Date Naloxone prescribed: 4/21/23              UDT:                          Date of last UDT: 3/23/23                          Adverse report: No              OARRS:                          Checked today: Yes                          Adverse report: No    IMPRESSION:   1. Chronic pain syndrome    2. Fall, initial encounter    3. Closed fracture of proximal end of left radius, unspecified fracture morphology, subsequent encounter    4. Cervical arthritis    5. DDD (degenerative disc disease), thoracic    6. Facet arthropathy, thoracic    7. Spinal stenosis of lumbar region without neurogenic claudication    8. Lumbosacral spondylosis without myelopathy    9. Chronic midline low back pain with sciatica, sciatica laterality unspecified    10. Other secondary osteoarthritis of multiple sites      PLAN:  Informed verbal consent was obtained:  -Patient's opioid therapy will be maintained at current dose  -Home exercises/Mallika exercises recommended  -Maintain f/u with orthopedics post falling  -Recommended that patient monitor BP, f/u with PCP if it continues to stay elevated or she becomes symptomatic with SOB, CP, syncopy. Advised to maintain compliance with prescribed antihypertensives. she is currently asymptomatic   -Pain contract was reviewed today as well as renewed. This includes passing UDS/Pill count to enable on-going opioid therapy. Obtaining opioids outside the pain contract can only be done upon approval/verification with the pain provider in unique circumstances such as surgery. Patient verbalized understanding    -Education provided on safety in the home, to reduce risk of falling. Advised patient to get up slowly from the chair or bed after sleeping at night. Maintain clutter free environment, well lit rooms, non-skid shoes. Use assistive devices as advised if present. Maintain f/u with orthopedics post fall  -CBT techniques- relaxation therapies such as

## 2024-01-21 ENCOUNTER — TELEPHONE (OUTPATIENT)
Dept: CASE MANAGEMENT | Age: 79
End: 2024-01-21

## 2024-01-26 ENCOUNTER — ENROLLMENT (OUTPATIENT)
Dept: PHARMACY | Facility: CLINIC | Age: 79
End: 2024-01-26

## 2024-01-31 ENCOUNTER — OFFICE VISIT (OUTPATIENT)
Dept: ORTHOPEDIC SURGERY | Age: 79
End: 2024-01-31

## 2024-01-31 VITALS — BODY MASS INDEX: 26.06 KG/M2 | HEIGHT: 61 IN | WEIGHT: 138 LBS

## 2024-01-31 DIAGNOSIS — S52.572A OTHER CLOSED INTRA-ARTICULAR FRACTURE OF DISTAL END OF LEFT RADIUS, INITIAL ENCOUNTER: Primary | ICD-10-CM

## 2024-01-31 PROCEDURE — 99024 POSTOP FOLLOW-UP VISIT: CPT | Performed by: NURSE PRACTITIONER

## 2024-02-01 NOTE — PROGRESS NOTES
oxyCODONE-acetaminophen (PERCOCET)  MG per tablet Take 1 tablet by mouth every 12 hours as needed for Pain (take for breakthrough pain) for up to 30 days. Intended supply: 30 days Max Daily Amount: 2 tablets 60 tablet 0    torsemide (DEMADEX) 20 MG tablet Take 1 tablet by mouth in the morning and at bedtime 180 tablet 3    valsartan (DIOVAN) 40 MG tablet Take 0.5 tablets by mouth daily 30 tablet 2    tiZANidine (ZANAFLEX) 4 MG capsule TAKE ONE-HALF TABLET BY MOUTH EVERY 8 HOURS AS NEEDED (MUSCLE SPASM) 30 capsule 3    diclofenac sodium (VOLTAREN) 1 % GEL Apply 2 g topically daily 2 g 3    metOLazone (ZAROXOLYN) 5 MG tablet Take 1 tablet by mouth once a week 8 tablet 1    amiodarone (CORDARONE) 200 MG tablet Take 0.5 tablets by mouth daily Taking in the evening 30 tablet 5    DULoxetine (CYMBALTA) 60 MG extended release capsule Take 1 capsule by mouth 2 times daily 60 capsule 3    fluticasone-salmeterol (ADVAIR DISKUS) 250-50 MCG/ACT AEPB diskus inhaler Inhale 1 puff into the lungs in the morning and 1 puff in the evening. 60 each 3    Fluticasone-Salmeterol 232-14 MCG/ACT AEPB One p bid 1 each 5    naloxone (NARCAN) 4 MG/0.1ML LIQD nasal spray Use as directed 1 each 0    lidocaine 4 % external patch Place 1 patch onto the skin daily 30 each 3    aspirin 81 MG EC tablet Take 1 tablet by mouth daily       No current facility-administered medications on file prior to visit.       Pertinent items are noted in HPI  Review of systems reviewed from Patient History Form and available in the patient's chart under the Media tab.        PHYSICAL EXAMINATION:  Ms. Hernandes is a very pleasant 78 y.o.  female who presents today in no acute distress, awake, alert, and oriented.  She is well dressed, nourished and  groomed.  Patient with normal affect.  Height is  1.549 m (5' 1\"), weight is 62.6 kg (138 lb), Body mass index is 26.07 kg/m².  Resting respiratory rate is 16.     On evaluation of her left upper extremity,

## 2024-02-02 ENCOUNTER — TELEPHONE (OUTPATIENT)
Dept: PHARMACY | Facility: CLINIC | Age: 79
End: 2024-02-02

## 2024-02-02 NOTE — TELEPHONE ENCOUNTER
Venessa Bolivar MD, please see below - would patient benefit from DEXA due to recent fracture?  78yof, no previous DEXA noted (appears may have declined in 2014)  Fracture of wrist in December, s/p trip & fall  May also consider vitamin D level     If appropriate, please order and have your staff notify patient.    Thank you,  Wandy Dial, PharmD, Psychiatric  Population Health Pharmacist  [facility] Clinical [facility]  Department, toll free: [phone], option 1    ==================================================================  POPULATION [facility] CLINICAL PHARMACY REVIEW: RECENT FRACTURE    Shonda Hernandes is a 78 y.o. old White (non-) female patient who recently had a fracture of left wrist (12/16/23 ED visit; s/p trip & fall).    Lab Results   Component Value Date    VITD25 14.8 (L) 11/04/2021      Lab Results   Component Value Date    CALCIUM 9.3 01/09/2024    PHOS 2.7 11/12/2022     estimated creatinine clearance is 30 mL/min (A) (based on SCr of 1.3 mg/dL (H)).    DEXA:  No previous DEXA noted (appears patient declined in 2014)    FRAX-calculated 10-year fracture probability:   Per WHO calculator: major Osteoporotic = 29% and Hip = 9.8%    - Other insurer-identified rx measures: non identified in payor portal    Assessment:   - AACE recommends BMD testing in adults who have a fracture at or after age 50; USPSTF and ACP recommend DEXA for women at or after age 65  78 y.o. female with recent fracture and may benefit from DEXA to assess current BMD  1/31/24 ortho OV note: \"I have referred the patient back to the primary care physician for evaluation for osteoporosis, including consideration for DEXA scanning\"  - If targeting vitamin D  ng/ml: last level in 2021 low    Considerations:  - Suggest obtaining DEXA and Consider getting vitamin D level

## 2024-02-05 NOTE — TELEPHONE ENCOUNTER
Noted DEXA scheduled 3/12/24    =======================================================   For Pharmacy Admin Tracking Only    Program: Pop Health  CPA in place:  No  Recommendation Provided To: Provider: 1 via Note to Provider and Patient/Caregiver: 1 via Telephone  Intervention Detail: Lab(s) Ordered  Intervention Accepted By: Provider: 1 and Patient/Caregiver: 1  Gap Closed?: Yes   Time Spent (min): 20

## 2024-02-05 NOTE — TELEPHONE ENCOUNTER
Noted DEXA ordered by PCP, thank you for reviewing      Reached patient to review. Agreeable to completing - transferred to  [phone] to schedule DEXA scan.

## 2024-02-06 ENCOUNTER — OFFICE VISIT (OUTPATIENT)
Dept: PAIN MANAGEMENT | Age: 79
End: 2024-02-06

## 2024-02-06 VITALS
SYSTOLIC BLOOD PRESSURE: 131 MMHG | TEMPERATURE: 96.8 F | HEART RATE: 87 BPM | OXYGEN SATURATION: 98 % | DIASTOLIC BLOOD PRESSURE: 81 MMHG

## 2024-02-06 DIAGNOSIS — G89.29 CHRONIC MIDLINE LOW BACK PAIN WITH SCIATICA, SCIATICA LATERALITY UNSPECIFIED: ICD-10-CM

## 2024-02-06 DIAGNOSIS — J44.1 COPD EXACERBATION (HCC): ICD-10-CM

## 2024-02-06 DIAGNOSIS — G89.4 CHRONIC PAIN SYNDROME: ICD-10-CM

## 2024-02-06 DIAGNOSIS — M47.817 LUMBOSACRAL SPONDYLOSIS WITHOUT MYELOPATHY: ICD-10-CM

## 2024-02-06 DIAGNOSIS — M15.3 OTHER SECONDARY OSTEOARTHRITIS OF MULTIPLE SITES: ICD-10-CM

## 2024-02-06 DIAGNOSIS — M47.814 FACET ARTHROPATHY, THORACIC: ICD-10-CM

## 2024-02-06 DIAGNOSIS — M48.061 SPINAL STENOSIS OF LUMBAR REGION WITHOUT NEUROGENIC CLAUDICATION: ICD-10-CM

## 2024-02-06 DIAGNOSIS — M51.34 DDD (DEGENERATIVE DISC DISEASE), THORACIC: ICD-10-CM

## 2024-02-06 DIAGNOSIS — M47.812 CERVICAL ARTHRITIS: ICD-10-CM

## 2024-02-06 DIAGNOSIS — M19.019 SHOULDER ARTHRITIS: ICD-10-CM

## 2024-02-06 DIAGNOSIS — M54.40 CHRONIC MIDLINE LOW BACK PAIN WITH SCIATICA, SCIATICA LATERALITY UNSPECIFIED: ICD-10-CM

## 2024-02-06 RX ORDER — OXYCODONE 9 MG/1
9 CAPSULE, EXTENDED RELEASE ORAL EVERY 12 HOURS
Qty: 60 EACH | Refills: 0 | Status: SHIPPED | OUTPATIENT
Start: 2024-02-06 | End: 2024-03-07

## 2024-02-06 RX ORDER — TRIAMCINOLONE ACETONIDE 40 MG/ML
40 INJECTION, SUSPENSION INTRA-ARTICULAR; INTRAMUSCULAR ONCE
Status: COMPLETED | OUTPATIENT
Start: 2024-02-06 | End: 2024-02-06

## 2024-02-06 RX ORDER — OXYCODONE AND ACETAMINOPHEN 10; 325 MG/1; MG/1
1 TABLET ORAL EVERY 12 HOURS PRN
Qty: 60 TABLET | Refills: 0 | Status: SHIPPED | OUTPATIENT
Start: 2024-02-06 | End: 2024-03-07

## 2024-02-06 RX ORDER — LIDOCAINE 50 MG/G
1 PATCH TOPICAL DAILY
Qty: 30 PATCH | Refills: 0 | Status: SHIPPED | OUTPATIENT
Start: 2024-02-06 | End: 2024-03-07

## 2024-02-06 RX ADMIN — TRIAMCINOLONE ACETONIDE 40 MG: 40 INJECTION, SUSPENSION INTRA-ARTICULAR; INTRAMUSCULAR at 16:23

## 2024-02-06 NOTE — PROGRESS NOTES
with special emphasis on  1. Improvement in perceived interfernce  of pain with ADL's. Ability to do home exercises independently. Ability to do household chores indoor and/or outdoor work and social and leisure activities.Improve psychosocial and physical functioning. - she is not showing any significant progress/or showing regression  towards this goal and reassessment and adjustment of goals/treatment have been made.     She was advised against drinking alcohol with the narcotic pain medicines, advised against driving or handling machinery while adjusting the dose of medicines or if having cognitive  issues related to the current medications.Risk of overdose and death, if medicines not taken as prescribed, were also discussed. If the patient develops new symptoms or if the symptoms worsen, the patient should call the office.    While transcribing every attempt was made to maintain the accuracy of the note in terms of it's contents,there may have been some errors made inadvertently.  Thank you for allowing me to participate in the care of this patient.    Kristina Lopez CNP.    Cc: Venessa Juárez MD

## 2024-02-09 NOTE — PROGRESS NOTES
[facility] [place]   Office Note    CC: \"I feel tired\"    HPI: Shonda Hernandes  is a 78 y.o. patient with a past medical history significant for atrial fibrillation, pulmonary hypertension, aortic stenosis and CAD s/p CHILANGO to mid circ 5/2014. She is s/p AVR, PVI and RENETTA exclusion by Dr Hernandez on 6/16. An inpatient echocardiogram was completed revealing moderate to severe MR. Subsequently, a CAMERON was completed on 7/27/20 showing moderate MR. She was admitted 10/26/21-11/1/21 with afib with RVR and acute CHF. She converted to a regular rhythm and was diuresed prior to discharge. She was admitted to [facility] 11/11/21 with acute encephalopathy and found to have rhabdomyolysis and afib with RVR again. She underwent successful cardioversion on 11/17/21. She did experience bradycardia when in sinus rhythm with heart rate between 40-50 bpm. She was seen in the office with Diane Enzweiler, CNP for follow up on 11/23/21 and then sent to the ED for admission. She was again found in afib with RVR and fluid overloaded. She was diuresed and discharged home on both amiodarone and Toprol. She underwent implant of Bi-V pacemaker with Dr. Mcbride on 4/11/22. She had recurrence of atrial fibrillation noted on interrogation and underwent ablation on 5/9/22. She was seen in office on 02/22/2023 by Dominic Mcbride MD and device interrogation showed no AT/ AF recurrence since her last check in November 2022.    Today she presents for follow up and states that feeling sob a times. She reports is recovering from a broken wrist and is getting a shoulder MRI.  She states is concerned with some weight gain. She is also seeing a spine Doctor for her back. She denies any new sounding cardiac complaints. She denies any chest pains or worsening shortness of breath. She reports medication compliance and is tolerating. She denies any abnormal bleeding or bruising. She denies exertional chest pain/pressure, dyspnea at rest, worsening HOLLOWAY, PND,

## 2024-02-14 ENCOUNTER — OFFICE VISIT (OUTPATIENT)
Dept: CARDIOLOGY CLINIC | Age: 79
End: 2024-02-14
Payer: MEDICARE

## 2024-02-14 ENCOUNTER — OFFICE VISIT (OUTPATIENT)
Dept: ORTHOPEDIC SURGERY | Age: 79
End: 2024-02-14
Payer: MEDICARE

## 2024-02-14 VITALS
SYSTOLIC BLOOD PRESSURE: 144 MMHG | HEIGHT: 61 IN | DIASTOLIC BLOOD PRESSURE: 80 MMHG | BODY MASS INDEX: 27.19 KG/M2 | WEIGHT: 144 LBS | OXYGEN SATURATION: 98 % | HEART RATE: 91 BPM

## 2024-02-14 DIAGNOSIS — Z95.2 H/O AORTIC VALVE REPLACEMENT: ICD-10-CM

## 2024-02-14 DIAGNOSIS — M75.101 TEAR OF RIGHT ROTATOR CUFF, UNSPECIFIED TEAR EXTENT, UNSPECIFIED WHETHER TRAUMATIC: Primary | ICD-10-CM

## 2024-02-14 DIAGNOSIS — I35.0 NONRHEUMATIC AORTIC VALVE STENOSIS: ICD-10-CM

## 2024-02-14 DIAGNOSIS — E78.5 HYPERLIPIDEMIA LDL GOAL <70: ICD-10-CM

## 2024-02-14 DIAGNOSIS — I48.0 PAF (PAROXYSMAL ATRIAL FIBRILLATION) (HCC): ICD-10-CM

## 2024-02-14 DIAGNOSIS — I25.10 CORONARY ARTERY DISEASE INVOLVING NATIVE CORONARY ARTERY OF NATIVE HEART WITHOUT ANGINA PECTORIS: Primary | ICD-10-CM

## 2024-02-14 DIAGNOSIS — I50.42 CHRONIC COMBINED SYSTOLIC AND DIASTOLIC CHF (CONGESTIVE HEART FAILURE) (HCC): ICD-10-CM

## 2024-02-14 PROCEDURE — G8484 FLU IMMUNIZE NO ADMIN: HCPCS | Performed by: INTERNAL MEDICINE

## 2024-02-14 PROCEDURE — 1090F PRES/ABSN URINE INCON ASSESS: CPT | Performed by: ORTHOPAEDIC SURGERY

## 2024-02-14 PROCEDURE — 1123F ACP DISCUSS/DSCN MKR DOCD: CPT | Performed by: INTERNAL MEDICINE

## 2024-02-14 PROCEDURE — 99214 OFFICE O/P EST MOD 30 MIN: CPT | Performed by: ORTHOPAEDIC SURGERY

## 2024-02-14 PROCEDURE — G8400 PT W/DXA NO RESULTS DOC: HCPCS | Performed by: INTERNAL MEDICINE

## 2024-02-14 PROCEDURE — 3079F DIAST BP 80-89 MM HG: CPT | Performed by: INTERNAL MEDICINE

## 2024-02-14 PROCEDURE — 99214 OFFICE O/P EST MOD 30 MIN: CPT | Performed by: INTERNAL MEDICINE

## 2024-02-14 PROCEDURE — G8427 DOCREV CUR MEDS BY ELIG CLIN: HCPCS | Performed by: INTERNAL MEDICINE

## 2024-02-14 PROCEDURE — 1090F PRES/ABSN URINE INCON ASSESS: CPT | Performed by: INTERNAL MEDICINE

## 2024-02-14 PROCEDURE — G8427 DOCREV CUR MEDS BY ELIG CLIN: HCPCS | Performed by: ORTHOPAEDIC SURGERY

## 2024-02-14 PROCEDURE — 1036F TOBACCO NON-USER: CPT | Performed by: INTERNAL MEDICINE

## 2024-02-14 PROCEDURE — G8484 FLU IMMUNIZE NO ADMIN: HCPCS | Performed by: ORTHOPAEDIC SURGERY

## 2024-02-14 PROCEDURE — 1123F ACP DISCUSS/DSCN MKR DOCD: CPT | Performed by: ORTHOPAEDIC SURGERY

## 2024-02-14 PROCEDURE — 3077F SYST BP >= 140 MM HG: CPT | Performed by: INTERNAL MEDICINE

## 2024-02-14 PROCEDURE — G8400 PT W/DXA NO RESULTS DOC: HCPCS | Performed by: ORTHOPAEDIC SURGERY

## 2024-02-14 PROCEDURE — 1036F TOBACCO NON-USER: CPT | Performed by: ORTHOPAEDIC SURGERY

## 2024-02-14 PROCEDURE — G8417 CALC BMI ABV UP PARAM F/U: HCPCS | Performed by: INTERNAL MEDICINE

## 2024-02-14 PROCEDURE — 93000 ELECTROCARDIOGRAM COMPLETE: CPT | Performed by: INTERNAL MEDICINE

## 2024-02-14 PROCEDURE — G8417 CALC BMI ABV UP PARAM F/U: HCPCS | Performed by: ORTHOPAEDIC SURGERY

## 2024-02-14 NOTE — PROGRESS NOTES
CHIEF COMPLAINT: Right shoulder pain/ cuff tendinopathy/ impingement syndrome.    HISTORY:  Ms. Hernandes 78 y.o. female right handed presents today for the first visit for evaluation of right shoulder pain which started 3 months ago with no injury or trauma.  She is complaining of achy pain, 2-8/10.  Pain is increase with elevation and decrease with rest. No radiation and no numbness and tingling sensation.  She did see her pain doctor who she sees for her back who gave her trigger point injections in her right shoulder on 2/6/2024 with no significant improvement.  She has tried Voltaren gel, lidocaine patches and NSAIDs with no significant improvement.  No other complaint.  No h/o trauma or gout.  She is well-known to us for left distal radius nonoperative fracture on 12/16/2023.    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    Macular degeneration 2018    Nonrheumatic mitral valve regurgitation     Obstructive sleep apnea     Osteoarthritis     Peripheral neuropathy     Pulmonary HTN ([facility]) 2014    PVD (peripheral vascular disease) ([facility])     Syncope 11/2022    of unknown cause    Tobacco use disorder in remission        Past Surgical History:   Procedure Laterality Date    ABLATION OF DYSRHYTHMIC FOCUS  05/09/2022    AORTIC VALVE REPLACEMENT  6/16/15    Dr. Correa - PVI, RENETTA exclusion. 19mm Maki pericardial Magna    APPENDECTOMY      BACK SURGERY

## 2024-03-04 ENCOUNTER — TELEPHONE (OUTPATIENT)
Dept: PAIN MANAGEMENT | Age: 79
End: 2024-03-04

## 2024-03-04 DIAGNOSIS — M48.061 SPINAL STENOSIS OF LUMBAR REGION WITHOUT NEUROGENIC CLAUDICATION: ICD-10-CM

## 2024-03-04 DIAGNOSIS — M47.817 LUMBOSACRAL SPONDYLOSIS WITHOUT MYELOPATHY: ICD-10-CM

## 2024-03-04 DIAGNOSIS — M15.3 OTHER SECONDARY OSTEOARTHRITIS OF MULTIPLE SITES: ICD-10-CM

## 2024-03-04 DIAGNOSIS — M54.40 CHRONIC MIDLINE LOW BACK PAIN WITH SCIATICA, SCIATICA LATERALITY UNSPECIFIED: ICD-10-CM

## 2024-03-04 DIAGNOSIS — M47.812 CERVICAL ARTHRITIS: ICD-10-CM

## 2024-03-04 DIAGNOSIS — M47.814 FACET ARTHROPATHY, THORACIC: ICD-10-CM

## 2024-03-04 DIAGNOSIS — M51.34 DDD (DEGENERATIVE DISC DISEASE), THORACIC: ICD-10-CM

## 2024-03-04 DIAGNOSIS — G89.29 CHRONIC MIDLINE LOW BACK PAIN WITH SCIATICA, SCIATICA LATERALITY UNSPECIFIED: ICD-10-CM

## 2024-03-04 DIAGNOSIS — G89.4 CHRONIC PAIN SYNDROME: ICD-10-CM

## 2024-03-04 RX ORDER — OXYCODONE AND ACETAMINOPHEN 10; 325 MG/1; MG/1
1 TABLET ORAL EVERY 12 HOURS PRN
Qty: 8 TABLET | Refills: 0 | Status: SHIPPED | OUTPATIENT
Start: 2024-03-04 | End: 2024-03-08

## 2024-03-04 RX ORDER — OXYCODONE 9 MG/1
9 CAPSULE, EXTENDED RELEASE ORAL EVERY 12 HOURS
Qty: 8 EACH | Refills: 0 | Status: SHIPPED | OUTPATIENT
Start: 2024-03-04 | End: 2024-03-08

## 2024-03-04 NOTE — TELEPHONE ENCOUNTER
Pt is requesting extension     oxyCODONE ER (XTAMPZA ER) 9 MG C12A     oxyCODONE-acetaminophen (PERCOCET)  MG     [facility] [id number] - [facility]   [address] - P [phone]

## 2024-03-12 ENCOUNTER — OFFICE VISIT (OUTPATIENT)
Dept: PAIN MANAGEMENT | Age: 79
End: 2024-03-12
Payer: MEDICARE

## 2024-03-12 ENCOUNTER — HOSPITAL ENCOUNTER (OUTPATIENT)
Dept: WOMENS IMAGING | Age: 79
Discharge: HOME | End: 2024-03-12
Attending: INTERNAL MEDICINE
Payer: MEDICARE

## 2024-03-12 VITALS
TEMPERATURE: 97 F | HEART RATE: 71 BPM | OXYGEN SATURATION: 98 % | DIASTOLIC BLOOD PRESSURE: 60 MMHG | SYSTOLIC BLOOD PRESSURE: 125 MMHG

## 2024-03-12 DIAGNOSIS — G89.29 CHRONIC MIDLINE LOW BACK PAIN WITH SCIATICA, SCIATICA LATERALITY UNSPECIFIED: ICD-10-CM

## 2024-03-12 DIAGNOSIS — G62.9 NEUROPATHY: ICD-10-CM

## 2024-03-12 DIAGNOSIS — M48.061 SPINAL STENOSIS OF LUMBAR REGION WITHOUT NEUROGENIC CLAUDICATION: ICD-10-CM

## 2024-03-12 DIAGNOSIS — M47.814 FACET ARTHROPATHY, THORACIC: ICD-10-CM

## 2024-03-12 DIAGNOSIS — Z78.0 ENCOUNTER FOR OSTEOPOROSIS SCREENING IN ASYMPTOMATIC POSTMENOPAUSAL PATIENT: ICD-10-CM

## 2024-03-12 DIAGNOSIS — Z13.820 ENCOUNTER FOR OSTEOPOROSIS SCREENING IN ASYMPTOMATIC POSTMENOPAUSAL PATIENT: ICD-10-CM

## 2024-03-12 DIAGNOSIS — M15.3 OTHER SECONDARY OSTEOARTHRITIS OF MULTIPLE SITES: ICD-10-CM

## 2024-03-12 DIAGNOSIS — M47.817 LUMBOSACRAL SPONDYLOSIS WITHOUT MYELOPATHY: ICD-10-CM

## 2024-03-12 DIAGNOSIS — G89.4 CHRONIC PAIN SYNDROME: ICD-10-CM

## 2024-03-12 DIAGNOSIS — M47.812 CERVICAL ARTHRITIS: ICD-10-CM

## 2024-03-12 DIAGNOSIS — M51.34 DDD (DEGENERATIVE DISC DISEASE), THORACIC: ICD-10-CM

## 2024-03-12 DIAGNOSIS — Z51.81 ENCOUNTER FOR THERAPEUTIC DRUG MONITORING: ICD-10-CM

## 2024-03-12 DIAGNOSIS — M54.40 CHRONIC MIDLINE LOW BACK PAIN WITH SCIATICA, SCIATICA LATERALITY UNSPECIFIED: ICD-10-CM

## 2024-03-12 PROCEDURE — 1123F ACP DISCUSS/DSCN MKR DOCD: CPT | Performed by: NURSE PRACTITIONER

## 2024-03-12 PROCEDURE — G8427 DOCREV CUR MEDS BY ELIG CLIN: HCPCS | Performed by: NURSE PRACTITIONER

## 2024-03-12 PROCEDURE — 77080 DXA BONE DENSITY AXIAL: CPT

## 2024-03-12 PROCEDURE — 3078F DIAST BP <80 MM HG: CPT | Performed by: NURSE PRACTITIONER

## 2024-03-12 PROCEDURE — 1090F PRES/ABSN URINE INCON ASSESS: CPT | Performed by: NURSE PRACTITIONER

## 2024-03-12 PROCEDURE — 3074F SYST BP LT 130 MM HG: CPT | Performed by: NURSE PRACTITIONER

## 2024-03-12 PROCEDURE — G8399 PT W/DXA RESULTS DOCUMENT: HCPCS | Performed by: NURSE PRACTITIONER

## 2024-03-12 PROCEDURE — G8484 FLU IMMUNIZE NO ADMIN: HCPCS | Performed by: NURSE PRACTITIONER

## 2024-03-12 PROCEDURE — 1036F TOBACCO NON-USER: CPT | Performed by: NURSE PRACTITIONER

## 2024-03-12 PROCEDURE — G8417 CALC BMI ABV UP PARAM F/U: HCPCS | Performed by: NURSE PRACTITIONER

## 2024-03-12 PROCEDURE — 99214 OFFICE O/P EST MOD 30 MIN: CPT | Performed by: NURSE PRACTITIONER

## 2024-03-12 RX ORDER — LIDOCAINE 50 MG/G
1 PATCH TOPICAL DAILY
Qty: 30 PATCH | Refills: 0 | Status: SHIPPED | OUTPATIENT
Start: 2024-03-12 | End: 2024-04-11

## 2024-03-12 RX ORDER — OXYCODONE AND ACETAMINOPHEN 10; 325 MG/1; MG/1
1 TABLET ORAL 2 TIMES DAILY PRN
Qty: 60 TABLET | Refills: 0 | Status: SHIPPED | OUTPATIENT
Start: 2024-03-12 | End: 2024-04-11

## 2024-03-12 NOTE — PROGRESS NOTES
SYSTEMS:    Respiratory: Negative for apnea, chest tightness and shortness of breath or change in baseline breathing.    Gastrointestinal: Negative for nausea, vomiting, abdominal pain, diarrhea, constipation, blood in stool and abdominal distention.     PHYSICAL EXAM:   Nursing note and vitals reviewed. /60   Pulse 71   Temp 97 °F (36.1 °C)   SpO2 98%   Constitutional: She appears well-developed and well-nourished. No acute distress.   Skin: Skin is warm and dry, good turgor. No rash noted. She is not diaphoretic.  Cardiovascular: Normal rate, regular rhythm, normal heart sounds, and does not have murmur.     Pulmonary/Chest: Effort normal. No respiratory distress. She does not have wheezes in the lung fields. She has no rales.     Neurological/Psychiatric:She is alert and oriented to person, place, and time. Coordination is  normal. +Lumbar spine pain Her mood isAppropriate and affect is Neutral/Euthymic(normal) .     Prescription pain medication monitoring:                  MEDD current = 60              ORT Score = 0 low risk              Other Risk factors - (mood) stable              Date of Last Medication Agreement: 1/9/24              Date Naloxone prescribed: 4/21/23              UDT:                          Date of last UDT: 3/23/23                          Adverse report: No              OARRS:                          Checked today: Yes                          Adverse report: No    IMPRESSION:   1. Chronic pain syndrome    2. Cervical arthritis    3. DDD (degenerative disc disease), thoracic    4. Facet arthropathy, thoracic    5. Lumbosacral spondylosis without myelopathy    6. Chronic midline low back pain with sciatica, sciatica laterality unspecified    7. Spinal stenosis of lumbar region without neurogenic claudication    8. Other secondary osteoarthritis of multiple sites    9. Peripheral neuropathy    10. Encounter for therapeutic drug monitoring      PLAN:  Informed verbal consent was

## 2024-03-13 ENCOUNTER — HOSPITAL ENCOUNTER (OUTPATIENT)
Dept: MRI IMAGING | Age: 79
Discharge: HOME | End: 2024-03-13
Attending: ORTHOPAEDIC SURGERY

## 2024-03-13 DIAGNOSIS — M47.816 LUMBAR SPONDYLOSIS: ICD-10-CM

## 2024-03-13 DIAGNOSIS — M54.50 LOW BACK PAIN, UNSPECIFIED BACK PAIN LATERALITY, UNSPECIFIED CHRONICITY, UNSPECIFIED WHETHER SCIATICA PRESENT: ICD-10-CM

## 2024-03-13 DIAGNOSIS — M75.101 TEAR OF RIGHT ROTATOR CUFF, UNSPECIFIED TEAR EXTENT, UNSPECIFIED WHETHER TRAUMATIC: ICD-10-CM

## 2024-03-15 ENCOUNTER — TELEPHONE (OUTPATIENT)
Dept: ORTHOPEDIC SURGERY | Age: 79
End: 2024-03-15

## 2024-03-15 NOTE — TELEPHONE ENCOUNTER
Other MRI DEPT NEEDING NEW AUTH FOR MRI R SHOULDER. THE CURRENT MRI WILL  3/16/24 AND IT WILL BE AFTER THEN BEFORE PATIENT CAN SCHEDULE.

## 2024-03-15 NOTE — TELEPHONE ENCOUNTER
JET with Zonia her daughter notifying her we will need to get an extension on the MRI RIGHT SHOULDER authorization from her insurance. We can still see her mother for her LEFT wrist on 3/27 [facility]

## 2024-03-25 ENCOUNTER — TELEPHONE (OUTPATIENT)
Dept: CARDIOLOGY CLINIC | Age: 79
End: 2024-03-25

## 2024-03-25 NOTE — TELEPHONE ENCOUNTER
LM for daughter Zonia. Received MRI form and need a more recent transmission from pt's device in order to approve.

## 2024-03-25 NOTE — TELEPHONE ENCOUNTER
Spoke with Amanda and walked through on how to send a transmission. Will send transmission when pt is back home.

## 2024-03-25 NOTE — TELEPHONE ENCOUNTER
Amanda, called in she stated that they did get the message about sending the transmission but, are not sure how that is done and would like a call back please

## 2024-03-27 ENCOUNTER — TELEPHONE (OUTPATIENT)
Dept: CARDIOLOGY CLINIC | Age: 79
End: 2024-03-27

## 2024-03-27 NOTE — TELEPHONE ENCOUNTER
Shonda Young's daughter called the office to confirm remote transmission was received in office.    Please confirm.    Hannah's callback: [phone]

## 2024-03-27 NOTE — TELEPHONE ENCOUNTER
LM informing Zonia that the transmission was received and the MRI order was filled out and faxed back

## 2024-03-29 ENCOUNTER — TELEPHONE (OUTPATIENT)
Dept: CARDIOLOGY CLINIC | Age: 79
End: 2024-03-29

## 2024-03-29 PROCEDURE — 93297 REM INTERROG DEV EVAL ICPMS: CPT | Performed by: NURSE PRACTITIONER

## 2024-03-29 NOTE — TELEPHONE ENCOUNTER
Please review Murj for: \"Possible OptiVol fluid accumulation: 15-Mar-2024 -- ongoing, elevated up to 120 w correlating TI trend below reference line. TriageHF Heart Failure Risk Status on 27-Mar-2024 is Medium*.\"

## 2024-04-01 PROCEDURE — 93294 REM INTERROG EVL PM/LDLS PM: CPT | Performed by: INTERNAL MEDICINE

## 2024-04-01 PROCEDURE — 93296 REM INTERROG EVL PM/IDS: CPT | Performed by: INTERNAL MEDICINE

## 2024-04-01 NOTE — TELEPHONE ENCOUNTER
Remote device check completed. Optivol reviewed. Thoracic impedence decreased indicating fluid accumulation. Please call patient and assess for S/S of HF.     Please ask:  Worsening SOB above baseline? No.  What specifically is making them SOB? Nothing.  When did they first notice increased SOB? No longer SOB.     Orthopnea/unable to lay flat without getting SOB? I sleep in the recliner and do no havge any SOB.  Do they wake up SOB? No.     PND (Paroxysmal nocturnal dyspnea)/ dyspnea that wakes them up at night? No.     Chest pain/pressure? No.  What causes the chest discomfort? No CP or pressure.     Weight gain? Yes.  What is their Dry weight:  130 lb.         Today' weight: 152  lb.     Edema/ swelling worse than their baseline? Swelling in ankles.  I saw my PCP last Wednesday and she recommended that I am wearing compress stockings that go up to my knees.     Abdominal Distention/bloating? I have a hernia and some swelling.  Can they button their pants? Yes.     Activity level/ not tolerating typical activity? No change in activity.  What specific activity can they not do that they were able to do 1 week ago? No change.     Lightheaded/syncope? No.     Follows 2gm Na Diet? Yes.     Follows Fluid Restriction 64 oz? Yes.

## 2024-04-01 NOTE — TELEPHONE ENCOUNTER
Currently takes metolazone weekly. Please Advise pt to take extra metolazone this week - space out 2 - 3 days. She should take an extra potassium on the day she takes extra metolazone.

## 2024-04-02 NOTE — TELEPHONE ENCOUNTER
Relayed message from Edwige WALLS.  Patient normally takes her metolazone on Saturday so she said that she will take the metolazone and potassium tomorrow.

## 2024-04-05 ENCOUNTER — HOSPITAL ENCOUNTER (OUTPATIENT)
Dept: MRI IMAGING | Age: 79
Discharge: HOME | End: 2024-04-05
Attending: ORTHOPAEDIC SURGERY
Payer: MEDICARE

## 2024-04-05 ENCOUNTER — HOSPITAL ENCOUNTER (OUTPATIENT)
Dept: GENERAL RADIOLOGY | Age: 79
Discharge: HOME | End: 2024-04-05
Attending: ORTHOPAEDIC SURGERY
Payer: MEDICARE

## 2024-04-05 DIAGNOSIS — Z95.0 PACEMAKER: ICD-10-CM

## 2024-04-05 PROCEDURE — 73221 MRI JOINT UPR EXTREM W/O DYE: CPT

## 2024-04-05 PROCEDURE — 71045 X-RAY EXAM CHEST 1 VIEW: CPT

## 2024-04-05 PROCEDURE — 72148 MRI LUMBAR SPINE W/O DYE: CPT

## 2024-04-09 ENCOUNTER — OFFICE VISIT (OUTPATIENT)
Dept: PAIN MANAGEMENT | Age: 79
End: 2024-04-09

## 2024-04-09 VITALS
SYSTOLIC BLOOD PRESSURE: 114 MMHG | DIASTOLIC BLOOD PRESSURE: 69 MMHG | TEMPERATURE: 97 F | HEART RATE: 68 BPM | OXYGEN SATURATION: 96 %

## 2024-04-09 DIAGNOSIS — M51.34 DDD (DEGENERATIVE DISC DISEASE), THORACIC: ICD-10-CM

## 2024-04-09 DIAGNOSIS — G89.4 CHRONIC PAIN SYNDROME: ICD-10-CM

## 2024-04-09 DIAGNOSIS — G62.9 NEUROPATHY: ICD-10-CM

## 2024-04-09 DIAGNOSIS — M54.40 CHRONIC MIDLINE LOW BACK PAIN WITH SCIATICA, SCIATICA LATERALITY UNSPECIFIED: ICD-10-CM

## 2024-04-09 DIAGNOSIS — M15.3 OTHER SECONDARY OSTEOARTHRITIS OF MULTIPLE SITES: ICD-10-CM

## 2024-04-09 DIAGNOSIS — M47.817 LUMBOSACRAL SPONDYLOSIS WITHOUT MYELOPATHY: ICD-10-CM

## 2024-04-09 DIAGNOSIS — G89.29 CHRONIC MIDLINE LOW BACK PAIN WITH SCIATICA, SCIATICA LATERALITY UNSPECIFIED: ICD-10-CM

## 2024-04-09 DIAGNOSIS — M47.814 FACET ARTHROPATHY, THORACIC: ICD-10-CM

## 2024-04-09 DIAGNOSIS — Z91.81 AT RISK FOR FALLING: ICD-10-CM

## 2024-04-09 DIAGNOSIS — F43.22 ADJUSTMENT REACTION WITH ANXIETY: ICD-10-CM

## 2024-04-09 DIAGNOSIS — M47.812 CERVICAL ARTHRITIS: ICD-10-CM

## 2024-04-09 DIAGNOSIS — M19.019 SHOULDER ARTHRITIS: ICD-10-CM

## 2024-04-09 DIAGNOSIS — M48.061 SPINAL STENOSIS OF LUMBAR REGION WITHOUT NEUROGENIC CLAUDICATION: ICD-10-CM

## 2024-04-09 RX ORDER — LIDOCAINE 50 MG/G
1 PATCH TOPICAL DAILY
Qty: 30 PATCH | Refills: 0 | Status: SHIPPED | OUTPATIENT
Start: 2024-04-09 | End: 2024-05-09

## 2024-04-09 RX ORDER — OXYCODONE AND ACETAMINOPHEN 10; 325 MG/1; MG/1
1 TABLET ORAL 2 TIMES DAILY PRN
Qty: 60 TABLET | Refills: 0 | Status: SHIPPED | OUTPATIENT
Start: 2024-04-09 | End: 2024-05-09

## 2024-04-09 NOTE — PROGRESS NOTES
obtained:  -Patient's opioid therapy will be maintained at current dose  -Home exercises/Mallika exercises recommended  -MRI of the Lumbar spine as well as MRI of the right Shoulder  -Maintain f/u with [facility] for Lumbar spine degeneration as well as Dr. Ziegler for shoulder tear  -CBT techniques- relaxation therapies such as biofeedback, mindfulness based stress reduction, imagery, cognitive restructuring, problem solving discussed with patient   -She was advised weight reduction, diet changes- 800-1200 dav diet, diet diary, exercising, nutritional  consult increased physical activity as tolerated   -Last UDS 3/23/23 consistent  -Return in about 4 weeks (around 5/7/2024).     Analgesic Plan:              Continue present regimen: Yes: Xtampza ER 9 mg tabs orally bid, Percocet  mg tabs orally bid prn              Adjust dose of present analgesic: No              Switch analgesics: No              Add/Adjust concomitant therapy: No: continue with Lidocaine, Narcan    I will continue her current medication regimen  which is part of the above treatment schedule. It has been helping with Ms. Hernandes's chronic  medical problems which for this visit include:   Diagnoses of Chronic pain syndrome, DDD (degenerative disc disease), thoracic, Facet arthropathy, thoracic, Lumbosacral spondylosis without myelopathy, Spinal stenosis of lumbar region without neurogenic claudication, Chronic midline low back pain with sciatica, sciatica laterality unspecified, Cervical arthritis, Other secondary osteoarthritis of multiple sites, Peripheral neuropathy, Shoulder arthritis, right, Adjustment reaction with anxiety, and At risk for falling were pertinent to this visit.   Risks and benefits of the medications and other alternative treatments  including no treatment were discussed with the patient.The common side effects of these medications were also explained to the patient.  Informed verbal consent was obtained.   Goals of

## 2024-04-23 ENCOUNTER — HOSPITAL ENCOUNTER (OUTPATIENT)
Age: 79
Discharge: HOME | End: 2024-04-23
Payer: MEDICARE

## 2024-04-23 ENCOUNTER — HOSPITAL ENCOUNTER (OUTPATIENT)
Dept: GENERAL RADIOLOGY | Age: 79
Discharge: HOME | End: 2024-04-23
Attending: PHYSICAL MEDICINE & REHABILITATION
Payer: MEDICARE

## 2024-04-23 PROCEDURE — 72110 X-RAY EXAM L-2 SPINE 4/>VWS: CPT

## 2024-04-24 ENCOUNTER — OFFICE VISIT (OUTPATIENT)
Dept: ORTHOPEDIC SURGERY | Age: 79
End: 2024-04-24
Payer: MEDICARE

## 2024-04-24 VITALS — HEIGHT: 60 IN | BODY MASS INDEX: 29.17 KG/M2 | WEIGHT: 148.59 LBS

## 2024-04-24 DIAGNOSIS — M19.011 LOCALIZED OSTEOARTHRITIS OF SHOULDER, RIGHT: Primary | ICD-10-CM

## 2024-04-24 PROCEDURE — G8399 PT W/DXA RESULTS DOCUMENT: HCPCS | Performed by: ORTHOPAEDIC SURGERY

## 2024-04-24 PROCEDURE — 1036F TOBACCO NON-USER: CPT | Performed by: ORTHOPAEDIC SURGERY

## 2024-04-24 PROCEDURE — 99214 OFFICE O/P EST MOD 30 MIN: CPT | Performed by: ORTHOPAEDIC SURGERY

## 2024-04-24 PROCEDURE — 20610 DRAIN/INJ JOINT/BURSA W/O US: CPT | Performed by: ORTHOPAEDIC SURGERY

## 2024-04-24 PROCEDURE — G8427 DOCREV CUR MEDS BY ELIG CLIN: HCPCS | Performed by: ORTHOPAEDIC SURGERY

## 2024-04-24 PROCEDURE — G8417 CALC BMI ABV UP PARAM F/U: HCPCS | Performed by: ORTHOPAEDIC SURGERY

## 2024-04-24 PROCEDURE — 1090F PRES/ABSN URINE INCON ASSESS: CPT | Performed by: ORTHOPAEDIC SURGERY

## 2024-04-24 PROCEDURE — 1123F ACP DISCUSS/DSCN MKR DOCD: CPT | Performed by: ORTHOPAEDIC SURGERY

## 2024-04-24 RX ORDER — TRIAMCINOLONE ACETONIDE 40 MG/ML
40 INJECTION, SUSPENSION INTRA-ARTICULAR; INTRAMUSCULAR ONCE
Status: COMPLETED | OUTPATIENT
Start: 2024-04-24 | End: 2024-04-24

## 2024-04-24 RX ORDER — LIDOCAINE HYDROCHLORIDE 10 MG/ML
4 INJECTION, SOLUTION INFILTRATION; PERINEURAL ONCE
Status: COMPLETED | OUTPATIENT
Start: 2024-04-24 | End: 2024-04-24

## 2024-04-24 RX ADMIN — LIDOCAINE HYDROCHLORIDE 4 ML: 10 INJECTION, SOLUTION INFILTRATION; PERINEURAL at 11:06

## 2024-04-24 RX ADMIN — TRIAMCINOLONE ACETONIDE 40 MG: 40 INJECTION, SUSPENSION INTRA-ARTICULAR; INTRAMUSCULAR at 11:06

## 2024-04-24 NOTE — PROGRESS NOTES
CHIEF COMPLAINT: Right shoulder pain/ cuff tendinopathy/ impingement syndrome.    HISTORY:  Ms. Hernandes 78 y.o. female right handed presents today for follow up visit for evaluation of right shoulder pain which started 3 months ago with no injury or trauma. She is here to review her MRI.  She is complaining of achy pain, 0-7/10.  Pain is increase with elevation and decrease with rest. No radiation and no numbness and tingling sensation.  She did see her pain doctor who she sees for her back who gave her trigger point injections in her right shoulder on 2/6/2024 with no significant improvement.  She has tried Voltaren gel, lidocaine patches and NSAIDs with no significant improvement.  No other complaint.  No h/o trauma or gout.  She is well-known to us for left distal radius nonoperative fracture on 12/16/2023.    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    Macular degeneration 2018    Nonrheumatic mitral valve regurgitation     Obstructive sleep apnea     Osteoarthritis     Peripheral neuropathy     Pulmonary HTN ([facility]) 2014    PVD (peripheral vascular disease) ([facility])     Syncope 11/2022    of unknown cause    Tobacco use disorder in remission        Past Surgical History:   Procedure Laterality Date    ABLATION OF DYSRHYTHMIC FOCUS  05/09/2022    AORTIC VALVE REPLACEMENT  6/16/15    Dr. Correa - СЕРГЕЙ, RENETTA

## 2024-05-01 ENCOUNTER — OFFICE VISIT (OUTPATIENT)
Dept: ORTHOPEDIC SURGERY | Age: 79
End: 2024-05-01
Payer: MEDICARE

## 2024-05-01 VITALS — BODY MASS INDEX: 29.06 KG/M2 | WEIGHT: 148 LBS | HEIGHT: 60 IN

## 2024-05-01 DIAGNOSIS — S52.572A OTHER CLOSED INTRA-ARTICULAR FRACTURE OF DISTAL END OF LEFT RADIUS, INITIAL ENCOUNTER: Primary | ICD-10-CM

## 2024-05-01 PROCEDURE — 99213 OFFICE O/P EST LOW 20 MIN: CPT | Performed by: ORTHOPAEDIC SURGERY

## 2024-05-01 PROCEDURE — 1036F TOBACCO NON-USER: CPT | Performed by: ORTHOPAEDIC SURGERY

## 2024-05-01 PROCEDURE — G8399 PT W/DXA RESULTS DOCUMENT: HCPCS | Performed by: ORTHOPAEDIC SURGERY

## 2024-05-01 PROCEDURE — 1123F ACP DISCUSS/DSCN MKR DOCD: CPT | Performed by: ORTHOPAEDIC SURGERY

## 2024-05-01 PROCEDURE — G8427 DOCREV CUR MEDS BY ELIG CLIN: HCPCS | Performed by: ORTHOPAEDIC SURGERY

## 2024-05-01 PROCEDURE — 1090F PRES/ABSN URINE INCON ASSESS: CPT | Performed by: ORTHOPAEDIC SURGERY

## 2024-05-01 PROCEDURE — G8417 CALC BMI ABV UP PARAM F/U: HCPCS | Performed by: ORTHOPAEDIC SURGERY

## 2024-05-01 NOTE — PROGRESS NOTES
CHIEF COMPLAINT: Left wrist pain/ minimally displaced distal radius fracture.     DATE OF INJURY: 12/16/2023, DOT 12/20/2023     HISTORY:  Shonda Hernandes 78 y.o.  female right handed who presents today for follow-up evaluation of a left wrist injury. The patient reports that this injury occurred when she fell. She was first seen and evaluated in [place], when she was x-rayed and splinted, and asked to f/u with Orthopedics. The patient denies any other injuries. Rates pain a 5/10 VAS mild, achy, intermittent and improving. Movement or use makes the pain worset and resting makes the pain better. Alleviating factors rest. No numbness or tingling sensation.      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    Macular degeneration 2018    Nonrheumatic mitral valve regurgitation     Obstructive sleep apnea     Osteoarthritis     Peripheral neuropathy     Pulmonary HTN ([facility]) 2014    PVD (peripheral vascular disease) ([facility])     Syncope 11/2022    of unknown cause    Tobacco use disorder in remission        Past Surgical History:   Procedure Laterality Date    ABLATION OF DYSRHYTHMIC FOCUS  05/09/2022    AORTIC VALVE REPLACEMENT  6/16/15    Dr. Correa - PVI, RENETTA exclusion. 19mm Maki pericardial Magna    APPENDECTOMY      BACK SURGERY  03/15/2019    superion inserted to keep back from hurting: Felipa

## 2024-05-03 NOTE — PROGRESS NOTES
PATIENT REACHED   YES__X__NO____    PREOP INSTRUCTIONS LEFT ON VM XNUMBER_______________      DATE_5/9/24________ [id number]_________[id number]______[id number]___________  NOTHING TO EAT OR DRINK  AFTER MIDNIGHT THE EVENING PRIOR OR AS INSTRUCTED BY YOUR DR.  YOU NEED A RESPONSIBLE ADULT AGE 18 OR OLDER TO DRIVE YOU HOME  PLEASE BRING INSURANCE CARD.PICTURE ID AND COMPLETE LIST OF MEDS  WEAR LOOSE COMFORTABLE CLOTHING  FOLLOW ANY INSTRUCTIONS YOUR DRS OFFICE HAS GIVEN YOU,INCLUDING WHAT MEDICATIONS TO TAKE THE AM OF PROCEDURE AND WHEN AND IF YOU NEED TO STOP ANY BLOOD THINNERS. IF YOU HAVE QUESTIONS REGARDING THIS CALL THE OFFICE  THE GOAL BLOOD SUGAR THE AM OF PROCEDURE  OR LESS ABOVE THAT THE PROCEDURE MAY BE CANCELLED  ANY QUESTIONS CALL YOUR DOCTOR.ALSO,PLEASE READ THE INSTRUCTION PACKET FROM YOUR DR IF YOU RECEIVED ONE.  SPINE INTERVENTION NUMBER [phone]      OTHER___________________________________      VISITOR POLICY(subject to change)    Current policy is 2 visitors per patient. No children. Masks are required.

## 2024-05-07 ENCOUNTER — OFFICE VISIT (OUTPATIENT)
Dept: PAIN MANAGEMENT | Age: 79
End: 2024-05-07

## 2024-05-07 VITALS
SYSTOLIC BLOOD PRESSURE: 104 MMHG | HEART RATE: 77 BPM | TEMPERATURE: 96.8 F | DIASTOLIC BLOOD PRESSURE: 62 MMHG | OXYGEN SATURATION: 94 %

## 2024-05-07 DIAGNOSIS — M51.34 DDD (DEGENERATIVE DISC DISEASE), THORACIC: ICD-10-CM

## 2024-05-07 DIAGNOSIS — M15.3 OTHER SECONDARY OSTEOARTHRITIS OF MULTIPLE SITES: ICD-10-CM

## 2024-05-07 DIAGNOSIS — G89.4 CHRONIC PAIN SYNDROME: ICD-10-CM

## 2024-05-07 DIAGNOSIS — M54.40 CHRONIC MIDLINE LOW BACK PAIN WITH SCIATICA, SCIATICA LATERALITY UNSPECIFIED: ICD-10-CM

## 2024-05-07 DIAGNOSIS — M47.812 CERVICAL ARTHRITIS: ICD-10-CM

## 2024-05-07 DIAGNOSIS — M47.814 FACET ARTHROPATHY, THORACIC: ICD-10-CM

## 2024-05-07 DIAGNOSIS — M48.061 SPINAL STENOSIS OF LUMBAR REGION WITHOUT NEUROGENIC CLAUDICATION: ICD-10-CM

## 2024-05-07 DIAGNOSIS — G62.9 NEUROPATHY: ICD-10-CM

## 2024-05-07 DIAGNOSIS — F43.22 ADJUSTMENT REACTION WITH ANXIETY: ICD-10-CM

## 2024-05-07 DIAGNOSIS — Z91.81 AT RISK FOR FALLING: ICD-10-CM

## 2024-05-07 DIAGNOSIS — M47.817 LUMBOSACRAL SPONDYLOSIS WITHOUT MYELOPATHY: ICD-10-CM

## 2024-05-07 DIAGNOSIS — G89.29 CHRONIC MIDLINE LOW BACK PAIN WITH SCIATICA, SCIATICA LATERALITY UNSPECIFIED: ICD-10-CM

## 2024-05-07 DIAGNOSIS — M19.019 SHOULDER ARTHRITIS: ICD-10-CM

## 2024-05-07 RX ORDER — OXYCODONE AND ACETAMINOPHEN 10; 325 MG/1; MG/1
1 TABLET ORAL 2 TIMES DAILY PRN
Qty: 60 TABLET | Refills: 0 | Status: SHIPPED | OUTPATIENT
Start: 2024-05-07 | End: 2024-06-06

## 2024-05-07 RX ORDER — LIDOCAINE 50 MG/G
1 PATCH TOPICAL DAILY
Qty: 30 PATCH | Refills: 0 | Status: SHIPPED | OUTPATIENT
Start: 2024-05-07 | End: 2024-06-06

## 2024-05-07 NOTE — PROGRESS NOTES
activities.Improve psychosocial and physical functioning. - she is showing progression towards this treatment goal with the current regimen.     She was advised against drinking alcohol with the narcotic pain medicines, advised against driving or handling machinery while adjusting the dose of medicines or if having cognitive  issues related to the current medications.Risk of overdose and death, if medicines not taken as prescribed, were also discussed. If the patient develops new symptoms or if the symptoms worsen, the patient should call the office.    While transcribing every attempt was made to maintain the accuracy of the note in terms of it's contents,there may have been some errors made inadvertently.  Thank you for allowing me to participate in the care of this patient.    Kristina Lopze CNP.    Cc: Venessa Juárez MD

## 2024-05-09 ENCOUNTER — HOSPITAL ENCOUNTER (OUTPATIENT)
Age: 79
Setting detail: OUTPATIENT SURGERY
Discharge: HOME | End: 2024-05-09
Attending: PHYSICAL MEDICINE & REHABILITATION | Admitting: PHYSICAL MEDICINE & REHABILITATION
Payer: MEDICARE

## 2024-05-09 ENCOUNTER — APPOINTMENT (OUTPATIENT)
Dept: GENERAL RADIOLOGY | Age: 79
End: 2024-05-09
Attending: PHYSICAL MEDICINE & REHABILITATION
Payer: MEDICARE

## 2024-05-09 VITALS
DIASTOLIC BLOOD PRESSURE: 56 MMHG | HEART RATE: 69 BPM | WEIGHT: 140 LBS | BODY MASS INDEX: 26.43 KG/M2 | HEIGHT: 61 IN | TEMPERATURE: 97.1 F | SYSTOLIC BLOOD PRESSURE: 115 MMHG | OXYGEN SATURATION: 99 % | RESPIRATION RATE: 20 BRPM

## 2024-05-09 PROCEDURE — 2709999900 HC NON-CHARGEABLE SUPPLY: Performed by: PHYSICAL MEDICINE & REHABILITATION

## 2024-05-09 PROCEDURE — 77003 FLUOROGUIDE FOR SPINE INJECT: CPT

## 2024-05-09 PROCEDURE — 99152 MOD SED SAME PHYS/QHP 5/>YRS: CPT | Performed by: PHYSICAL MEDICINE & REHABILITATION

## 2024-05-09 PROCEDURE — 6360000002 HC RX W HCPCS: Performed by: PHYSICAL MEDICINE & REHABILITATION

## 2024-05-09 PROCEDURE — 3610000056 HC PAIN LEVEL 4 BASE (NON-OR): Performed by: PHYSICAL MEDICINE & REHABILITATION

## 2024-05-09 PROCEDURE — 2500000003 HC RX 250 WO HCPCS: Performed by: PHYSICAL MEDICINE & REHABILITATION

## 2024-05-09 RX ORDER — DEXAMETHASONE SODIUM PHOSPHATE 10 MG/ML
INJECTION, SOLUTION INTRAMUSCULAR; INTRAVENOUS
Status: COMPLETED | OUTPATIENT
Start: 2024-05-09 | End: 2024-05-09

## 2024-05-09 RX ORDER — MIDAZOLAM HYDROCHLORIDE 1 MG/ML
INJECTION INTRAMUSCULAR; INTRAVENOUS
Status: COMPLETED | OUTPATIENT
Start: 2024-05-09 | End: 2024-05-09

## 2024-05-09 RX ORDER — LIDOCAINE HYDROCHLORIDE 10 MG/ML
INJECTION, SOLUTION EPIDURAL; INFILTRATION; INTRACAUDAL; PERINEURAL
Status: COMPLETED | OUTPATIENT
Start: 2024-05-09 | End: 2024-05-09

## 2024-05-09 ASSESSMENT — PAIN - FUNCTIONAL ASSESSMENT
PAIN_FUNCTIONAL_ASSESSMENT: PREVENTS OR INTERFERES SOME ACTIVE ACTIVITIES AND ADLS
PAIN_FUNCTIONAL_ASSESSMENT: 0-10

## 2024-05-09 ASSESSMENT — PAIN DESCRIPTION - DESCRIPTORS: DESCRIPTORS: ACHING

## 2024-05-09 NOTE — OP NOTE
PATIENT:  Shonda Hernandes  AGE:  78 yrs  MEDICAL RECORD #:  [id number]  YOB: 1945     DATE:  5/9/2024  PHYSICIAN: Abel Ruiz M.D.     PROCEDURE: Bilateral L2 transforaminal epidural steroid injection under fluoroscopy.     PRE-OP DIAGNOSIS:  Low Back Pain/Radiculopathy     POST-OP DIAGNOSIS:  same     HISTORY OF PRESENT ILLNESS:  See office notes. Patient has failed previous less-invasive treatments.     ALLERGIES:  Benadryl [diphenhydramine], Doxylamine, Mucinex [guaifenesin er], Spiriva handihaler [tiotropium bromide monohydrate], Adhesive tape, Neosporin [bacitracin-polymyxin b], and Tylenol [acetaminophen]     MEDICATIONS:    No current facility-administered medications for this encounter.        PHYSICAL EXAMINATION:              General:  Awake, alert              Heart:  No audible murmurs, extremities well perfused              Lungs:  No increased WOB or audible wheezing              Extremities:  Normal tone. Warm. No swelling.      Anesthesia: 0.5 mg Versed and 0 mcg fentanyl    Estimated blood loss: None  Complications: None  Specimen: None    DESCRIPTION OF PROCEDURE:     Components of the procedure were again reviewed with the patient prior to the procedure.  She is aware of risks including infection, bleeding, allergic reaction, and nerve injury.  She had ample opportunity for additional questions.  She elected to proceed with treatment.     The patient was placed in the prone position.  Cardiovascular monitoring was initiated, and vital signs were stable prior to, during, and after the procedure.  Utilizing fluoroscopy, the L2 vertebrae was identified.  The area was sterilely prepped and draped. Skin anesthesia was achieved at each entry site using 1-2 cc of Lidocaine 1%. A 22 g 3.5 inch spinal needle was slowly inserted into the bilateral L2 neuroforamen using AP, lateral and oblique fluoroscopic imaging. Negative aspiration was confirmed. 1 cc Isovue-M 300 was injected at each site

## 2024-05-09 NOTE — DISCHARGE INSTRUCTIONS
DR. RUIZ DISCHARGE INSTRUCTIONS           1. Do not drive today for ( 8 hours with no sedation)  (24 hours if had sedation)     2. If you received sedation during your procedure, be advised for the next 24 hour       Do not drink alcohol    Do not operate machinery    Do not make any important decisions or sign any legal documents    You may experience light headedness,dizziness or sleepiness     3. You may apply ice for 15 minutes 4-5 times a day as needed.     4. No heating pad for 48 hours     5.Follow up with the Provider who referred you.     6.Keep site dry for 24 hours after procedure, then remove bandaid.     7.It may take 24-48 hours to feel improvement.     8. Side effects of Steroids may include:      Elevated glucose levels ( in diabetics)    Fluid retention    Tenderness at site    Nervous energy    Sleeplessness    Muscle spasms    Facial flushing    Hot flashes     9. Call if:    Your symptoms worsen severely    You experience a severe headache that worsens when upright    You develop a fever greater than 101 degrees     Dr Ruiz ([phone])                             10. You may restart any blood thinning medications tomorrow.

## 2024-05-09 NOTE — PROGRESS NOTES
IV discontinued, catheter intact, and dressing applied.    Procedural dressing dry and intact.    Bilateral, lower extremities equal in strength.    Discharge instructions reviewed with patient or responsible adult, signed and copy given.  All home medications have been reviewed.  All questions answered and patient or responsible adult verbalized understanding.

## 2024-05-09 NOTE — H&P
Chris Todd MD   potassium chloride (K-TAB) 20 MEQ TBCR extended release tablet Take 1 tablet by mouth 2 times daily 4/17/24   Venessa Bolivar MD   metOLazone (ZAROXOLYN) 5 MG tablet One a week increase to twice a week if needed 4/17/24   Venessa Bolivar MD   DULoxetine (CYMBALTA) 60 MG extended release capsule One po qam x 4 day then one po bid 4/17/24   Venessa Bolivar MD   alendronate (FOSAMAX) 70 MG tablet Take 1 tablet by mouth every 7 days Take with a full glass of water upon arising for the day. Consume on an empty stomach at least 30 minutes before the first food, beverage, or medication. Stay upright (do not lie down) for at least 30 minutes. Do not crush or break. 4/3/24   Venessa Bolivar MD   valsartan (DIOVAN) 40 MG tablet Take 0.5 tablets by mouth daily 4/1/24   Venessa Bolivar MD   pantoprazole (PROTONIX) 40 MG tablet One po qd 4/1/24   Venessa Bolivar MD   rosuvastatin (CRESTOR) 40 MG tablet TAKE ONE TABLET BY MOUTH EVERY EVENING 4/1/24   Venessa Bolivar MD   Torsemide 40 MG TABS Take 40 mg by mouth in the morning and at bedtime 4/1/24   Venessa Bolivar MD   metoprolol succinate (TOPROL XL) 50 MG extended release tablet TAKE 1 TABLET BY MOUTH DAILY 4/1/24   Venessa Bolivar MD   Fluticasone-Salmeterol 232-14 MCG/ACT AEPB One p bid 4/1/24   Venessa Bolivar MD   metOLazone (ZAROXOLYN) 5 MG tablet Take 1 tablet by mouth once a week 3/29/24   Venessa Bolivar MD   levothyroxine (SYNTHROID) 88 MCG tablet Take 1 tablet by mouth daily 3/27/24   Venessa Bolivar MD   diclofenac sodium (VOLTAREN) 1 % GEL Apply 2 g topically daily 3/27/24   Venessa Bolivar MD   fluticasone-salmeterol (ADVAIR DISKUS) 250-50 MCG/ACT AEPB diskus inhaler Inhale 1 puff into the lungs in the morning and 1 puff in the evening. 2/20/24   Venessa Bolivar MD   sennosides-docusate sodium (SENOKOT-S) 8.6-50 MG tablet Take 1 tablet by mouth in the morning and at bedtime 1/22/24   Venessa Bolivar MD   ipratropium (ATROVENT) 0.03 % nasal spray INHALE 2 DOSES INTO EACH NOSTRIL THREE

## 2024-06-04 ENCOUNTER — OFFICE VISIT (OUTPATIENT)
Dept: PAIN MANAGEMENT | Age: 79
End: 2024-06-04
Payer: MEDICARE

## 2024-06-04 VITALS
OXYGEN SATURATION: 95 % | HEART RATE: 74 BPM | SYSTOLIC BLOOD PRESSURE: 96 MMHG | TEMPERATURE: 97.2 F | DIASTOLIC BLOOD PRESSURE: 52 MMHG

## 2024-06-04 DIAGNOSIS — G89.29 CHRONIC MIDLINE LOW BACK PAIN WITH SCIATICA, SCIATICA LATERALITY UNSPECIFIED: ICD-10-CM

## 2024-06-04 DIAGNOSIS — M47.817 LUMBOSACRAL SPONDYLOSIS WITHOUT MYELOPATHY: ICD-10-CM

## 2024-06-04 DIAGNOSIS — M48.061 SPINAL STENOSIS OF LUMBAR REGION WITHOUT NEUROGENIC CLAUDICATION: ICD-10-CM

## 2024-06-04 DIAGNOSIS — M51.34 DDD (DEGENERATIVE DISC DISEASE), THORACIC: ICD-10-CM

## 2024-06-04 DIAGNOSIS — G89.4 CHRONIC PAIN SYNDROME: ICD-10-CM

## 2024-06-04 DIAGNOSIS — G62.9 NEUROPATHY: ICD-10-CM

## 2024-06-04 DIAGNOSIS — Z91.81 AT RISK FOR FALLING: ICD-10-CM

## 2024-06-04 DIAGNOSIS — F43.22 ADJUSTMENT REACTION WITH ANXIETY: ICD-10-CM

## 2024-06-04 DIAGNOSIS — M47.812 CERVICAL ARTHRITIS: ICD-10-CM

## 2024-06-04 DIAGNOSIS — M15.3 OTHER SECONDARY OSTEOARTHRITIS OF MULTIPLE SITES: ICD-10-CM

## 2024-06-04 DIAGNOSIS — J44.1 COPD EXACERBATION (HCC): ICD-10-CM

## 2024-06-04 DIAGNOSIS — M47.814 FACET ARTHROPATHY, THORACIC: ICD-10-CM

## 2024-06-04 DIAGNOSIS — M54.40 CHRONIC MIDLINE LOW BACK PAIN WITH SCIATICA, SCIATICA LATERALITY UNSPECIFIED: ICD-10-CM

## 2024-06-04 DIAGNOSIS — M19.019 SHOULDER ARTHRITIS: ICD-10-CM

## 2024-06-04 PROCEDURE — 3074F SYST BP LT 130 MM HG: CPT | Performed by: NURSE PRACTITIONER

## 2024-06-04 PROCEDURE — 99214 OFFICE O/P EST MOD 30 MIN: CPT | Performed by: NURSE PRACTITIONER

## 2024-06-04 PROCEDURE — 3078F DIAST BP <80 MM HG: CPT | Performed by: NURSE PRACTITIONER

## 2024-06-04 PROCEDURE — G8427 DOCREV CUR MEDS BY ELIG CLIN: HCPCS | Performed by: NURSE PRACTITIONER

## 2024-06-04 PROCEDURE — 1123F ACP DISCUSS/DSCN MKR DOCD: CPT | Performed by: NURSE PRACTITIONER

## 2024-06-04 PROCEDURE — 1090F PRES/ABSN URINE INCON ASSESS: CPT | Performed by: NURSE PRACTITIONER

## 2024-06-04 PROCEDURE — 3023F SPIROM DOC REV: CPT | Performed by: NURSE PRACTITIONER

## 2024-06-04 PROCEDURE — G8399 PT W/DXA RESULTS DOCUMENT: HCPCS | Performed by: NURSE PRACTITIONER

## 2024-06-04 PROCEDURE — 1036F TOBACCO NON-USER: CPT | Performed by: NURSE PRACTITIONER

## 2024-06-04 PROCEDURE — G8417 CALC BMI ABV UP PARAM F/U: HCPCS | Performed by: NURSE PRACTITIONER

## 2024-06-04 RX ORDER — LIDOCAINE 50 MG/G
1 PATCH TOPICAL DAILY
Qty: 30 PATCH | Refills: 0 | Status: SHIPPED | OUTPATIENT
Start: 2024-06-04 | End: 2024-07-04

## 2024-06-04 RX ORDER — OXYCODONE AND ACETAMINOPHEN 10; 325 MG/1; MG/1
1 TABLET ORAL 2 TIMES DAILY PRN
Qty: 60 TABLET | Refills: 0 | Status: SHIPPED | OUTPATIENT
Start: 2024-06-04 | End: 2024-07-04

## 2024-06-04 NOTE — PROGRESS NOTES
problem solving discussed with patient   -Last UDS 3/12/24 consistent  -Return in about 4 weeks (around 7/2/2024).     Analgesic Plan:              Continue present regimen: Yes: Xtampza ER 9 mg tabs orally bid, Percocet  mg tabs orally bid prn              Adjust dose of present analgesic: No              Switch analgesics: No              Add/Adjust concomitant therapy: No: continue with Lidocaine, Narcan    I will continue her current medication regimen  which is part of the above treatment schedule. It has been helping with Ms. Hernandes's chronic  medical problems which for this visit include:   Diagnoses of Chronic pain syndrome, DDD (degenerative disc disease), thoracic, Facet arthropathy, thoracic, Lumbosacral spondylosis without myelopathy, Spinal stenosis of lumbar region without neurogenic claudication, Chronic midline low back pain with sciatica, sciatica laterality unspecified, Other secondary osteoarthritis of multiple sites, Cervical arthritis, Peripheral neuropathy, Shoulder arthritis, right, Adjustment reaction with anxiety, At risk for falling, and COPD exacerbation (HCC) were pertinent to this visit.   Risks and benefits of the medications and other alternative treatments  including no treatment were discussed with the patient.The common side effects of these medications were also explained to the patient.  Informed verbal consent was obtained.   Goals of current treatment regimen include improvement in pain, restoration of functioning- with focus on improvement in physical performance, general activity, work or disability,emotional distress, health care utilization and  decreased medication consumption. Will continue to monitor progress towards achieving/maintaining therapeutic goals with special emphasis on  1. Improvement in perceived interfernce  of pain with ADL's. Ability to do home exercises independently. Ability to do household chores indoor and/or outdoor work and social and leisure

## 2024-07-02 ENCOUNTER — OFFICE VISIT (OUTPATIENT)
Dept: PAIN MANAGEMENT | Age: 79
End: 2024-07-02
Payer: MEDICARE

## 2024-07-02 VITALS
OXYGEN SATURATION: 96 % | TEMPERATURE: 96.9 F | SYSTOLIC BLOOD PRESSURE: 104 MMHG | HEART RATE: 83 BPM | DIASTOLIC BLOOD PRESSURE: 62 MMHG

## 2024-07-02 DIAGNOSIS — M15.3 OTHER SECONDARY OSTEOARTHRITIS OF MULTIPLE SITES: ICD-10-CM

## 2024-07-02 DIAGNOSIS — M51.34 DDD (DEGENERATIVE DISC DISEASE), THORACIC: ICD-10-CM

## 2024-07-02 DIAGNOSIS — G89.4 CHRONIC PAIN SYNDROME: ICD-10-CM

## 2024-07-02 DIAGNOSIS — M47.812 CERVICAL ARTHRITIS: ICD-10-CM

## 2024-07-02 DIAGNOSIS — Z91.81 AT RISK FOR FALLING: ICD-10-CM

## 2024-07-02 DIAGNOSIS — G62.9 NEUROPATHY: ICD-10-CM

## 2024-07-02 DIAGNOSIS — M48.061 SPINAL STENOSIS OF LUMBAR REGION WITHOUT NEUROGENIC CLAUDICATION: ICD-10-CM

## 2024-07-02 DIAGNOSIS — M47.817 LUMBOSACRAL SPONDYLOSIS WITHOUT MYELOPATHY: ICD-10-CM

## 2024-07-02 DIAGNOSIS — F43.22 ADJUSTMENT REACTION WITH ANXIETY: ICD-10-CM

## 2024-07-02 DIAGNOSIS — G89.29 CHRONIC MIDLINE LOW BACK PAIN WITH SCIATICA, SCIATICA LATERALITY UNSPECIFIED: ICD-10-CM

## 2024-07-02 DIAGNOSIS — J44.1 COPD EXACERBATION (HCC): ICD-10-CM

## 2024-07-02 DIAGNOSIS — M19.019 SHOULDER ARTHRITIS: ICD-10-CM

## 2024-07-02 DIAGNOSIS — M54.40 CHRONIC MIDLINE LOW BACK PAIN WITH SCIATICA, SCIATICA LATERALITY UNSPECIFIED: ICD-10-CM

## 2024-07-02 DIAGNOSIS — M47.814 FACET ARTHROPATHY, THORACIC: ICD-10-CM

## 2024-07-02 PROCEDURE — G8427 DOCREV CUR MEDS BY ELIG CLIN: HCPCS | Performed by: NURSE PRACTITIONER

## 2024-07-02 PROCEDURE — 99214 OFFICE O/P EST MOD 30 MIN: CPT | Performed by: NURSE PRACTITIONER

## 2024-07-02 PROCEDURE — 3023F SPIROM DOC REV: CPT | Performed by: NURSE PRACTITIONER

## 2024-07-02 PROCEDURE — G8399 PT W/DXA RESULTS DOCUMENT: HCPCS | Performed by: NURSE PRACTITIONER

## 2024-07-02 PROCEDURE — 1123F ACP DISCUSS/DSCN MKR DOCD: CPT | Performed by: NURSE PRACTITIONER

## 2024-07-02 PROCEDURE — G8417 CALC BMI ABV UP PARAM F/U: HCPCS | Performed by: NURSE PRACTITIONER

## 2024-07-02 PROCEDURE — 3078F DIAST BP <80 MM HG: CPT | Performed by: NURSE PRACTITIONER

## 2024-07-02 PROCEDURE — 1090F PRES/ABSN URINE INCON ASSESS: CPT | Performed by: NURSE PRACTITIONER

## 2024-07-02 PROCEDURE — 1036F TOBACCO NON-USER: CPT | Performed by: NURSE PRACTITIONER

## 2024-07-02 PROCEDURE — 3074F SYST BP LT 130 MM HG: CPT | Performed by: NURSE PRACTITIONER

## 2024-07-02 RX ORDER — OXYCODONE AND ACETAMINOPHEN 10; 325 MG/1; MG/1
1 TABLET ORAL 2 TIMES DAILY PRN
Qty: 60 TABLET | Refills: 0 | Status: SHIPPED | OUTPATIENT
Start: 2024-07-02 | End: 2024-08-01

## 2024-07-02 RX ORDER — LIDOCAINE 50 MG/G
1 PATCH TOPICAL DAILY
Qty: 30 PATCH | Refills: 0 | Status: SHIPPED | OUTPATIENT
Start: 2024-07-02 | End: 2024-08-01

## 2024-07-02 RX ORDER — PREDNISONE 20 MG/1
20 TABLET ORAL DAILY
Qty: 10 TABLET | Refills: 0 | Status: SHIPPED | OUTPATIENT
Start: 2024-07-02 | End: 2024-07-03 | Stop reason: SDUPTHER

## 2024-07-02 NOTE — PROGRESS NOTES
utilization and  decreased medication consumption. Will continue to monitor progress towards achieving/maintaining therapeutic goals with special emphasis on  1. Improvement in perceived interfernce  of pain with ADL's. Ability to do home exercises independently. Ability to do household chores indoor and/or outdoor work and social and leisure activities.Improve psychosocial and physical functioning. - she is not showing any significant progress/or showing regression  towards this goal and reassessment and adjustment of goals/treatment have been made.     She was advised against drinking alcohol with the narcotic pain medicines, advised against driving or handling machinery while adjusting the dose of medicines or if having cognitive  issues related to the current medications.Risk of overdose and death, if medicines not taken as prescribed, were also discussed. If the patient develops new symptoms or if the symptoms worsen, the patient should call the office.    While transcribing every attempt was made to maintain the accuracy of the note in terms of it's contents,there may have been some errors made inadvertently.  Thank you for allowing me to participate in the care of this patient.    Kristina Lopez CNP.    Cc: Venessa Juárez MD

## 2024-07-03 ENCOUNTER — TELEPHONE (OUTPATIENT)
Dept: PAIN MANAGEMENT | Age: 79
End: 2024-07-03

## 2024-07-03 DIAGNOSIS — G89.4 CHRONIC PAIN SYNDROME: ICD-10-CM

## 2024-07-03 DIAGNOSIS — M47.814 FACET ARTHROPATHY, THORACIC: ICD-10-CM

## 2024-07-03 DIAGNOSIS — G89.29 CHRONIC MIDLINE LOW BACK PAIN WITH SCIATICA, SCIATICA LATERALITY UNSPECIFIED: ICD-10-CM

## 2024-07-03 DIAGNOSIS — M47.812 CERVICAL ARTHRITIS: ICD-10-CM

## 2024-07-03 DIAGNOSIS — M48.061 SPINAL STENOSIS OF LUMBAR REGION WITHOUT NEUROGENIC CLAUDICATION: ICD-10-CM

## 2024-07-03 DIAGNOSIS — M15.3 OTHER SECONDARY OSTEOARTHRITIS OF MULTIPLE SITES: ICD-10-CM

## 2024-07-03 DIAGNOSIS — M51.34 DDD (DEGENERATIVE DISC DISEASE), THORACIC: ICD-10-CM

## 2024-07-03 DIAGNOSIS — M47.817 LUMBOSACRAL SPONDYLOSIS WITHOUT MYELOPATHY: ICD-10-CM

## 2024-07-03 DIAGNOSIS — M54.40 CHRONIC MIDLINE LOW BACK PAIN WITH SCIATICA, SCIATICA LATERALITY UNSPECIFIED: ICD-10-CM

## 2024-07-03 DIAGNOSIS — M19.019 SHOULDER ARTHRITIS: ICD-10-CM

## 2024-07-03 RX ORDER — PREDNISONE 20 MG/1
20 TABLET ORAL DAILY
Qty: 10 TABLET | Refills: 0 | Status: SHIPPED | OUTPATIENT
Start: 2024-07-03 | End: 2024-07-13

## 2024-07-03 NOTE — TELEPHONE ENCOUNTER
Patient was seen on 7/2/24, she states the [facility] on [address] does not have the Prednisone in stock, she would like it sent to [facility]   Patient would like a call back once it was sent

## 2024-07-18 ENCOUNTER — HOSPITAL ENCOUNTER (OUTPATIENT)
Dept: GENERAL RADIOLOGY | Age: 79
Discharge: HOME | End: 2024-07-18
Payer: MEDICARE

## 2024-07-18 ENCOUNTER — HOSPITAL ENCOUNTER (OUTPATIENT)
Dept: MRI IMAGING | Age: 79
Discharge: HOME | End: 2024-07-18
Payer: MEDICARE

## 2024-07-18 DIAGNOSIS — Z95.0 PACEMAKER: ICD-10-CM

## 2024-07-18 DIAGNOSIS — M54.6 PAIN IN THORACIC SPINE: ICD-10-CM

## 2024-07-18 PROCEDURE — 72146 MRI CHEST SPINE W/O DYE: CPT

## 2024-07-18 PROCEDURE — 71045 X-RAY EXAM CHEST 1 VIEW: CPT

## 2024-07-30 ENCOUNTER — OFFICE VISIT (OUTPATIENT)
Dept: PAIN MANAGEMENT | Age: 79
End: 2024-07-30

## 2024-07-30 VITALS
DIASTOLIC BLOOD PRESSURE: 54 MMHG | TEMPERATURE: 98.8 F | OXYGEN SATURATION: 96 % | BODY MASS INDEX: 26.45 KG/M2 | WEIGHT: 140 LBS | HEART RATE: 71 BPM | SYSTOLIC BLOOD PRESSURE: 109 MMHG

## 2024-07-30 DIAGNOSIS — G62.9 NEUROPATHY: ICD-10-CM

## 2024-07-30 DIAGNOSIS — M47.814 FACET ARTHROPATHY, THORACIC: ICD-10-CM

## 2024-07-30 DIAGNOSIS — G89.4 CHRONIC PAIN SYNDROME: ICD-10-CM

## 2024-07-30 DIAGNOSIS — M47.812 CERVICAL ARTHRITIS: ICD-10-CM

## 2024-07-30 DIAGNOSIS — M47.817 LUMBOSACRAL SPONDYLOSIS WITHOUT MYELOPATHY: ICD-10-CM

## 2024-07-30 DIAGNOSIS — F43.22 ADJUSTMENT REACTION WITH ANXIETY: ICD-10-CM

## 2024-07-30 DIAGNOSIS — M54.40 CHRONIC MIDLINE LOW BACK PAIN WITH SCIATICA, SCIATICA LATERALITY UNSPECIFIED: ICD-10-CM

## 2024-07-30 DIAGNOSIS — Z91.81 AT RISK FOR FALLING: ICD-10-CM

## 2024-07-30 DIAGNOSIS — M15.3 OTHER SECONDARY OSTEOARTHRITIS OF MULTIPLE SITES: ICD-10-CM

## 2024-07-30 DIAGNOSIS — M51.34 DDD (DEGENERATIVE DISC DISEASE), THORACIC: ICD-10-CM

## 2024-07-30 DIAGNOSIS — J44.1 COPD EXACERBATION (HCC): ICD-10-CM

## 2024-07-30 DIAGNOSIS — G89.29 CHRONIC MIDLINE LOW BACK PAIN WITH SCIATICA, SCIATICA LATERALITY UNSPECIFIED: ICD-10-CM

## 2024-07-30 DIAGNOSIS — M48.061 SPINAL STENOSIS OF LUMBAR REGION WITHOUT NEUROGENIC CLAUDICATION: ICD-10-CM

## 2024-07-30 RX ORDER — LIDOCAINE 50 MG/G
1 PATCH TOPICAL DAILY
Qty: 30 PATCH | Refills: 0 | Status: SHIPPED | OUTPATIENT
Start: 2024-07-30 | End: 2024-08-15 | Stop reason: SDUPTHER

## 2024-07-30 RX ORDER — OXYCODONE AND ACETAMINOPHEN 10; 325 MG/1; MG/1
1 TABLET ORAL 2 TIMES DAILY PRN
Qty: 60 TABLET | Refills: 0 | Status: SHIPPED | OUTPATIENT
Start: 2024-07-30 | End: 2024-08-29

## 2024-07-30 RX ORDER — METHYLPREDNISOLONE 4 MG/1
TABLET ORAL
Qty: 1 KIT | Refills: 0 | Status: ON HOLD | OUTPATIENT
Start: 2024-07-30 | End: 2024-08-07 | Stop reason: HOSPADM

## 2024-07-30 NOTE — PROGRESS NOTES
Shonda Hernandes  1945  [id number]    HISTORY OF PRESENT ILLNESS: Ms. Hernandes is a 78 y.o. female returns for a follow up visit for pain management  She has a diagnosis of   1. Chronic pain syndrome    2. DDD (degenerative disc disease), thoracic    3. Facet arthropathy, thoracic    4. Lumbosacral spondylosis without myelopathy    5. Spinal stenosis of lumbar region without neurogenic claudication    6. Chronic midline low back pain with sciatica, sciatica laterality unspecified    7. Cervical arthritis    8. Other secondary osteoarthritis of multiple sites    9. Peripheral neuropathy    10. Adjustment reaction with anxiety    11. At risk for falling    12. COPD exacerbation (HCC)      As per Information Obtained from the PADT (Patient Assessment and Documentation Tool)    She complains of pain in the lower back,back of the neck. She rates the pain 8/10 and describes it as aching, stabbing. Current treatment regimen has helped relieve about 30% of the pain.  She denies any side effects from the current pain regimen. Patient reports that since the last follow up visit the physical functioning is worse, family/social relationships are unchanged, mood is worse sleep patterns are worse, and that the overall functioning is worse.  Patient denies misusing/abusing her narcotic pain medications or using any illegal drugs.      Upon obtaining medical history from Ms. Hernandes states that pain is manageable on current pain therapy. Takes the pain medications as prescribed. Patient reports having  scratched by her dog last month which caused bruising. She also reached out to pull some laundry which caused increased pain in the cervical region, this caused increased pain. Patient reports having taken extra pain medications due to unrelieved pain relief. She plans on f/u with Dr. Ziegler for right shoulder pain. Reports current pain therapy is not working to relieve her pain. Mood/anxiety is stable. Sleep is fair with an average of 5-6

## 2024-08-01 ENCOUNTER — APPOINTMENT (OUTPATIENT)
Dept: GENERAL RADIOLOGY | Age: 79
End: 2024-08-01
Payer: MEDICARE

## 2024-08-01 ENCOUNTER — APPOINTMENT (OUTPATIENT)
Dept: CT IMAGING | Age: 79
End: 2024-08-01
Payer: MEDICARE

## 2024-08-01 ENCOUNTER — APPOINTMENT (OUTPATIENT)
Dept: ULTRASOUND IMAGING | Age: 79
End: 2024-08-01
Payer: MEDICARE

## 2024-08-01 ENCOUNTER — HOSPITAL ENCOUNTER (INPATIENT)
Age: 79
LOS: 6 days | Discharge: HOME HEALTH | End: 2024-08-07
Attending: STUDENT IN AN ORGANIZED HEALTH CARE EDUCATION/TRAINING PROGRAM | Admitting: STUDENT IN AN ORGANIZED HEALTH CARE EDUCATION/TRAINING PROGRAM
Payer: MEDICARE

## 2024-08-01 DIAGNOSIS — L03.116 BILATERAL LOWER LEG CELLULITIS: ICD-10-CM

## 2024-08-01 DIAGNOSIS — L03.115 BILATERAL LOWER LEG CELLULITIS: ICD-10-CM

## 2024-08-01 DIAGNOSIS — N17.9 ACUTE RENAL FAILURE, UNSPECIFIED ACUTE RENAL FAILURE TYPE (HCC): ICD-10-CM

## 2024-08-01 DIAGNOSIS — R60.0 BILATERAL LOWER EXTREMITY EDEMA: ICD-10-CM

## 2024-08-01 DIAGNOSIS — R42 DIZZINESS: Primary | ICD-10-CM

## 2024-08-01 LAB
ALBUMIN SERPL-MCNC: 3.2 G/DL (ref 3.4–5)
ALBUMIN/GLOB SERPL: 0.9 {RATIO} (ref 1.1–2.2)
ALP SERPL-CCNC: 89 U/L (ref 40–129)
ALT SERPL-CCNC: 32 U/L (ref 10–40)
ANION GAP SERPL CALCULATED.3IONS-SCNC: 18 MMOL/L (ref 3–16)
ANION GAP SERPL CALCULATED.3IONS-SCNC: 20 MMOL/L (ref 3–16)
AST SERPL-CCNC: 42 U/L (ref 15–37)
BACTERIA URNS QL MICRO: NORMAL /HPF
BASOPHILS # BLD: 0 K/UL (ref 0–0.2)
BASOPHILS NFR BLD: 0 %
BILIRUB SERPL-MCNC: 0.4 MG/DL (ref 0–1)
BILIRUB UR QL STRIP.AUTO: NEGATIVE
BUN SERPL-MCNC: 79 MG/DL (ref 7–20)
BUN SERPL-MCNC: 83 MG/DL (ref 7–20)
CALCIUM SERPL-MCNC: 8.2 MG/DL (ref 8.3–10.6)
CALCIUM SERPL-MCNC: 9.1 MG/DL (ref 8.3–10.6)
CHLORIDE SERPL-SCNC: 92 MMOL/L (ref 99–110)
CHLORIDE SERPL-SCNC: 95 MMOL/L (ref 99–110)
CLARITY UR: CLEAR
CO2 SERPL-SCNC: 18 MMOL/L (ref 21–32)
CO2 SERPL-SCNC: 19 MMOL/L (ref 21–32)
COLOR UR: YELLOW
CREAT SERPL-MCNC: 5.4 MG/DL (ref 0.6–1.2)
CREAT SERPL-MCNC: 6 MG/DL (ref 0.6–1.2)
DEPRECATED RDW RBC AUTO: 15.4 % (ref 12.4–15.4)
EKG ATRIAL RATE: 60 BPM
EKG DIAGNOSIS: NORMAL
EKG P-R INTERVAL: 108 MS
EKG Q-T INTERVAL: 562 MS
EKG QRS DURATION: 166 MS
EKG QTC CALCULATION (BAZETT): 562 MS
EKG R AXIS: -42 DEGREES
EKG T AXIS: 125 DEGREES
EKG VENTRICULAR RATE: 60 BPM
EOSINOPHIL # BLD: 0.1 K/UL (ref 0–0.6)
EOSINOPHIL NFR BLD: 1 %
EPI CELLS #/AREA URNS AUTO: 1 /HPF (ref 0–5)
GFR SERPLBLD CREATININE-BSD FMLA CKD-EPI: 7 ML/MIN/{1.73_M2}
GFR SERPLBLD CREATININE-BSD FMLA CKD-EPI: 8 ML/MIN/{1.73_M2}
GLUCOSE SERPL-MCNC: 110 MG/DL (ref 70–99)
GLUCOSE SERPL-MCNC: 116 MG/DL (ref 70–99)
GLUCOSE UR STRIP.AUTO-MCNC: NEGATIVE MG/DL
HCT VFR BLD AUTO: 32.5 % (ref 36–48)
HGB BLD-MCNC: 10.7 G/DL (ref 12–16)
HGB UR QL STRIP.AUTO: ABNORMAL
HYALINE CASTS #/AREA URNS AUTO: 6 /LPF (ref 0–8)
KETONES UR STRIP.AUTO-MCNC: NEGATIVE MG/DL
LEUKOCYTE ESTERASE UR QL STRIP.AUTO: NEGATIVE
LYMPHOCYTES # BLD: 1.4 K/UL (ref 1–5.1)
LYMPHOCYTES NFR BLD: 10 %
MCH RBC QN AUTO: 28.7 PG (ref 26–34)
MCHC RBC AUTO-ENTMCNC: 33 G/DL (ref 31–36)
MCV RBC AUTO: 87 FL (ref 80–100)
MONOCYTES # BLD: 1.7 K/UL (ref 0–1.3)
MONOCYTES NFR BLD: 12 %
NEUTROPHILS # BLD: 10.8 K/UL (ref 1.7–7.7)
NEUTROPHILS NFR BLD: 77 %
NITRITE UR QL STRIP.AUTO: NEGATIVE
PATH INTERP BLD-IMP: YES
PH UR STRIP.AUTO: 6 [PH] (ref 5–8)
PLATELET # BLD AUTO: 273 K/UL (ref 135–450)
PMV BLD AUTO: 7.7 FL (ref 5–10.5)
POTASSIUM SERPL-SCNC: 3.8 MMOL/L (ref 3.5–5.1)
POTASSIUM SERPL-SCNC: 4.5 MMOL/L (ref 3.5–5.1)
PROT SERPL-MCNC: 6.7 G/DL (ref 6.4–8.2)
PROT UR STRIP.AUTO-MCNC: 30 MG/DL
RBC # BLD AUTO: 3.73 M/UL (ref 4–5.2)
RBC CLUMPS #/AREA URNS AUTO: 3 /HPF (ref 0–4)
RBC MORPH BLD: NORMAL
SLIDE REVIEW: ABNORMAL
SODIUM SERPL-SCNC: 130 MMOL/L (ref 136–145)
SODIUM SERPL-SCNC: 132 MMOL/L (ref 136–145)
SP GR UR STRIP.AUTO: 1.01 (ref 1–1.03)
TROPONIN, HIGH SENSITIVITY: 55 NG/L (ref 0–14)
TROPONIN, HIGH SENSITIVITY: 61 NG/L (ref 0–14)
UA COMPLETE W REFLEX CULTURE PNL UR: ABNORMAL
UA DIPSTICK W REFLEX MICRO PNL UR: YES
URN SPEC COLLECT METH UR: ABNORMAL
UROBILINOGEN UR STRIP-ACNC: 0.2 E.U./DL
WBC # BLD AUTO: 14 K/UL (ref 4–11)
WBC #/AREA URNS AUTO: 0 /HPF (ref 0–5)

## 2024-08-01 PROCEDURE — 99285 EMERGENCY DEPT VISIT HI MDM: CPT

## 2024-08-01 PROCEDURE — 80053 COMPREHEN METABOLIC PANEL: CPT

## 2024-08-01 PROCEDURE — 2580000003 HC RX 258: Performed by: PHYSICIAN ASSISTANT

## 2024-08-01 PROCEDURE — 87040 BLOOD CULTURE FOR BACTERIA: CPT

## 2024-08-01 PROCEDURE — 93005 ELECTROCARDIOGRAM TRACING: CPT | Performed by: EMERGENCY MEDICINE

## 2024-08-01 PROCEDURE — 93010 ELECTROCARDIOGRAM REPORT: CPT | Performed by: INTERNAL MEDICINE

## 2024-08-01 PROCEDURE — 96374 THER/PROPH/DIAG INJ IV PUSH: CPT

## 2024-08-01 PROCEDURE — 36415 COLL VENOUS BLD VENIPUNCTURE: CPT

## 2024-08-01 PROCEDURE — 84484 ASSAY OF TROPONIN QUANT: CPT

## 2024-08-01 PROCEDURE — 76770 US EXAM ABDO BACK WALL COMP: CPT

## 2024-08-01 PROCEDURE — 70450 CT HEAD/BRAIN W/O DYE: CPT

## 2024-08-01 PROCEDURE — 2060000000 HC ICU INTERMEDIATE R&B

## 2024-08-01 PROCEDURE — 85025 COMPLETE CBC W/AUTO DIFF WBC: CPT

## 2024-08-01 PROCEDURE — 81001 URINALYSIS AUTO W/SCOPE: CPT

## 2024-08-01 PROCEDURE — 2500000003 HC RX 250 WO HCPCS: Performed by: PHYSICIAN ASSISTANT

## 2024-08-01 PROCEDURE — 1200000000 HC SEMI PRIVATE

## 2024-08-01 PROCEDURE — 6360000002 HC RX W HCPCS: Performed by: PHYSICIAN ASSISTANT

## 2024-08-01 PROCEDURE — 71045 X-RAY EXAM CHEST 1 VIEW: CPT

## 2024-08-01 RX ORDER — DULOXETIN HYDROCHLORIDE 30 MG/1
30 CAPSULE, DELAYED RELEASE ORAL DAILY
Status: DISCONTINUED | OUTPATIENT
Start: 2024-08-02 | End: 2024-08-04

## 2024-08-01 RX ORDER — AMIODARONE HYDROCHLORIDE 200 MG/1
100 TABLET ORAL DAILY
Status: DISCONTINUED | OUTPATIENT
Start: 2024-08-01 | End: 2024-08-07 | Stop reason: HOSPADM

## 2024-08-01 RX ORDER — POLYETHYLENE GLYCOL 3350 17 G/17G
17 POWDER, FOR SOLUTION ORAL DAILY PRN
Status: DISCONTINUED | OUTPATIENT
Start: 2024-08-01 | End: 2024-08-07 | Stop reason: HOSPADM

## 2024-08-01 RX ORDER — BUDESONIDE AND FORMOTEROL FUMARATE DIHYDRATE 80; 4.5 UG/1; UG/1
2 AEROSOL RESPIRATORY (INHALATION)
Status: DISCONTINUED | OUTPATIENT
Start: 2024-08-01 | End: 2024-08-01 | Stop reason: SDUPTHER

## 2024-08-01 RX ORDER — OXYCODONE AND ACETAMINOPHEN 10; 325 MG/1; MG/1
1 TABLET ORAL 2 TIMES DAILY PRN
Status: DISCONTINUED | OUTPATIENT
Start: 2024-08-01 | End: 2024-08-07 | Stop reason: HOSPADM

## 2024-08-01 RX ORDER — SODIUM CHLORIDE 0.9 % (FLUSH) 0.9 %
5-40 SYRINGE (ML) INJECTION EVERY 12 HOURS SCHEDULED
Status: DISCONTINUED | OUTPATIENT
Start: 2024-08-01 | End: 2024-08-07 | Stop reason: HOSPADM

## 2024-08-01 RX ORDER — METOPROLOL SUCCINATE 50 MG/1
50 TABLET, EXTENDED RELEASE ORAL DAILY
Status: DISCONTINUED | OUTPATIENT
Start: 2024-08-02 | End: 2024-08-07 | Stop reason: HOSPADM

## 2024-08-01 RX ORDER — ONDANSETRON 4 MG/1
4 TABLET, ORALLY DISINTEGRATING ORAL EVERY 8 HOURS PRN
Status: DISCONTINUED | OUTPATIENT
Start: 2024-08-01 | End: 2024-08-01

## 2024-08-01 RX ORDER — OXYCODONE HCL 10 MG/1
10 TABLET, FILM COATED, EXTENDED RELEASE ORAL EVERY 12 HOURS SCHEDULED
Status: DISCONTINUED | OUTPATIENT
Start: 2024-08-01 | End: 2024-08-07 | Stop reason: HOSPADM

## 2024-08-01 RX ORDER — LEVOTHYROXINE SODIUM 88 UG/1
88 TABLET ORAL DAILY
Status: DISCONTINUED | OUTPATIENT
Start: 2024-08-02 | End: 2024-08-07 | Stop reason: HOSPADM

## 2024-08-01 RX ORDER — SODIUM CHLORIDE 9 MG/ML
INJECTION, SOLUTION INTRAVENOUS PRN
Status: DISCONTINUED | OUTPATIENT
Start: 2024-08-01 | End: 2024-08-07 | Stop reason: HOSPADM

## 2024-08-01 RX ORDER — ONDANSETRON 2 MG/ML
4 INJECTION INTRAMUSCULAR; INTRAVENOUS EVERY 6 HOURS PRN
Status: DISCONTINUED | OUTPATIENT
Start: 2024-08-01 | End: 2024-08-01

## 2024-08-01 RX ORDER — EZETIMIBE 10 MG/1
10 TABLET ORAL DAILY
Status: DISCONTINUED | OUTPATIENT
Start: 2024-08-02 | End: 2024-08-07 | Stop reason: HOSPADM

## 2024-08-01 RX ORDER — BUDESONIDE AND FORMOTEROL FUMARATE DIHYDRATE 160; 4.5 UG/1; UG/1
2 AEROSOL RESPIRATORY (INHALATION) 2 TIMES DAILY
Status: DISCONTINUED | OUTPATIENT
Start: 2024-08-01 | End: 2024-08-07 | Stop reason: HOSPADM

## 2024-08-01 RX ORDER — SODIUM CHLORIDE 0.9 % (FLUSH) 0.9 %
5-40 SYRINGE (ML) INJECTION PRN
Status: DISCONTINUED | OUTPATIENT
Start: 2024-08-01 | End: 2024-08-07 | Stop reason: HOSPADM

## 2024-08-01 RX ORDER — HEPARIN SODIUM 5000 [USP'U]/ML
5000 INJECTION, SOLUTION INTRAVENOUS; SUBCUTANEOUS EVERY 8 HOURS SCHEDULED
Status: DISCONTINUED | OUTPATIENT
Start: 2024-08-01 | End: 2024-08-07 | Stop reason: HOSPADM

## 2024-08-01 RX ORDER — 0.9 % SODIUM CHLORIDE 0.9 %
1000 INTRAVENOUS SOLUTION INTRAVENOUS ONCE
Status: COMPLETED | OUTPATIENT
Start: 2024-08-01 | End: 2024-08-01

## 2024-08-01 RX ADMIN — CEFEPIME 2000 MG: 2 INJECTION, POWDER, FOR SOLUTION INTRAVENOUS at 19:20

## 2024-08-01 RX ADMIN — SODIUM BICARBONATE: 84 INJECTION, SOLUTION INTRAVENOUS at 22:03

## 2024-08-01 RX ADMIN — SODIUM CHLORIDE 1000 ML: 9 INJECTION, SOLUTION INTRAVENOUS at 16:11

## 2024-08-01 RX ADMIN — VANCOMYCIN HYDROCHLORIDE 1000 MG: 1 INJECTION, POWDER, LYOPHILIZED, FOR SOLUTION INTRAVENOUS at 20:38

## 2024-08-01 ASSESSMENT — PAIN DESCRIPTION - ORIENTATION: ORIENTATION: LEFT

## 2024-08-01 ASSESSMENT — PAIN - FUNCTIONAL ASSESSMENT: PAIN_FUNCTIONAL_ASSESSMENT: 0-10

## 2024-08-01 ASSESSMENT — PAIN DESCRIPTION - DESCRIPTORS: DESCRIPTORS: DISCOMFORT

## 2024-08-01 ASSESSMENT — PAIN SCALES - GENERAL: PAINLEVEL_OUTOF10: 7

## 2024-08-01 ASSESSMENT — PAIN DESCRIPTION - LOCATION: LOCATION: FOOT

## 2024-08-01 ASSESSMENT — PAIN DESCRIPTION - PAIN TYPE: TYPE: ACUTE PAIN

## 2024-08-01 NOTE — ACP (ADVANCE CARE PLANNING)
Advance Care Planning     Advance Care Planning Activator (Inpatient)  Conversation Note      Date of ACP Conversation: 8/1/2024     Conversation Conducted with: Patient with Decision Making Capacity    ACP Activator: DEISY Carson        Health Care Decision Maker:     Current Designated Health Care Decision Maker:     Primary Decision Maker: Tricia Hoover - Child - [phone]    Secondary Decision Maker: Hannah Rios - Child - [phone]    Supplemental (Other) Decision Maker: Bety Blackman - Child - [phone]    Today we documented Decision Maker(s) consistent with Legal Next of Kin hierarchy.    Care Preferences    Ventilation:  \"If you were in your present state of health and suddenly became very ill and were unable to breathe on your own, what would your preference be about the use of a ventilator (breathing machine) if it were available to you?\"      Would the patient desire the use of ventilator (breathing machine)?: yes    \"If your health worsens and it becomes clear that your chance of recovery is unlikely, what would your preference be about the use of a ventilator (breathing machine) if it were available to you?\"     Would the patient desire the use of ventilator (breathing machine)?: Unsure      Resuscitation  \"CPR works best to restart the heart when there is a sudden event, like a heart attack, in someone who is otherwise healthy. Unfortunately, CPR does not typically restart the heart for people who have serious health conditions or who are very sick.\"    \"In the event your heart stopped as a result of an underlying serious health condition, would you want attempts to be made to restart your heart (answer \"yes\" for attempt to resuscitate) or would you prefer a natural death (answer \"no\" for do not attempt to resuscitate)?\" no       [x] Yes   [] No   Educated Patient / Decision Maker regarding differences between Advance Directives and portable DNR orders.    Length of ACP Conversation

## 2024-08-01 NOTE — ED NOTES
Pharmacy Medication Reconciliation Note     List of medications patient is currently taking is complete.    Source of information:   EMR  patient    Notes regarding home medications:   Reports taking AM medications today  Was unaware of Medrol dose pack prescribed on 7/30 so has not started it yet     iMke Torres PharmD  8/1/2024  5:58 PM

## 2024-08-01 NOTE — CARE COORDINATION
Case Management Assessment  Initial Evaluation    Date/Time of Evaluation: 8/1/2024 6:06 PM  Assessment Completed by: DEISY Carson    If patient is discharged prior to next notation, then this note serves as note for discharge by case management.    Patient Name: Shonda Hernandes                   YOB: 1945  Diagnosis: No admission diagnoses are documented for this encounter.                   Date / Time: 8/1/2024  2:31 PM    Patient Admission Status: Emergency   Readmission Risk (Low < 19, Mod (19-27), High > 27): No data recorded  Current PCP: Adeel Nguyễn MD  PCP verified by CM? Yes    Chart Reviewed: Yes      History Provided by: Patient  Patient Orientation: Alert and Oriented    Patient Cognition: Alert    Hospitalization in the last 30 days (Readmission):  No    If yes, Readmission Assessment in  Navigator will be completed.    Advance Directives:      Code Status: Prior   Patient's Primary Decision Maker is: Legal Next of Kin    Primary Decision Maker: KikoTricia  Child - [phone]    Secondary Decision Maker: Hannah Rios  Child - [phone]    Supplemental (Other) Decision Maker: Bety Blackman  Child - [phone]    Discharge Planning:    Patient lives with: Children, Family Members Type of Home: House  Primary Care Giver: Self  Patient Support Systems include: Children, Family Members   Current Financial resources: Medicare  Current community resources: None  Current services prior to admission: Durable Medical Equipment            Current DME: Walker            Type of Home Care services:  PT, OT, Nursing Services    ADLS  Prior functional level: Independent in ADLs/IADLs  Current functional level: Independent in ADLs/IADLs    PT AM-PAC:   /24  OT AM-PAC:   /24    Family can provide assistance at DC: Yes  Would you like Case Management to discuss the discharge plan with any other family members/significant others, and if so, who? Yes (Daughters)  Plans to Return to  Present Housing: Yes  Other Identified Issues/Barriers to RETURNING to current housing: weakness  Potential Assistance needed at discharge: N/A            Potential DME:    Patient expects to discharge to: House  Plan for transportation at discharge:      Financial    Payor: [employer] MEDICARE / Plan: [employer] GOLD PLUS HMO / Product Type: *No Product type* /     Does insurance require precert for SNF: Yes    Potential assistance Purchasing Medications: No  Meds-to-Beds request:      No Pharmacies Listed    Notes:    Factors facilitating achievement of predicted outcomes: Family support, Motivated, Cooperative, and Pleasant    Barriers to discharge: Decreased endurance and Medical complications    Additional Case Management Notes: Pt plans to return home with family support- [facility] if home care ordered- referral made to [facility].    The Plan for Transition of Care is related to the following treatment goals of No admission diagnoses are documented for this encounter.    IF APPLICABLE: The Patient and/or patient representative Shonda and her family were provided with a choice of provider and agrees with the discharge plan. Freedom of choice list with basic dialogue that supports the patient's individualized plan of care/goals and shares the quality data associated with the providers was provided to: Patient   Patient Representative Name:       The Patient and/or Patient Representative Agree with the Discharge Plan? Yes    DEISY Carson  Case Management Department  Ph: [phone] Fax: [phone]

## 2024-08-01 NOTE — ED PROVIDER NOTES
[facility] EMERGENCY DEPARTMENT  EMERGENCY DEPARTMENT ENCOUNTER      Pt Name: Shonda Hernandes  MRN:[id number]  Birthdate 1945  Date of evaluation: 8/1/2024  Provider: Salvador Wallace PA-C  Note Started: 5:36 PM EDT 8/1/24    This patient was seen and evaluated by attending physician Dr. Eliecer Monroy MD        Chief Complaint:    Chief Complaint   Patient presents with    Dizziness     Dizziness x 1 week. Patient reports bilateral leg swelling with redness x 2 days.          Nursing Notes, Past Medical Hx, Past Surgical Hx, Social Hx, Allergies, and Family Hx were all reviewed and agreed with or any disagreements were addressed in the HPI.    HPI: (Location, Duration, Timing, Severity, Quality, Assoc Sx, Context, Modifying factors)    History From: Patient  Limitations to history : None    Social Determinants Significantly Affecting Health : None    Chief Complaint of dizziness.  Patient states in the last couple days has been real dizzy.  And she also complained of bilateral leg swelling and redness.  Denies fever.  No chest pain, complain of some lower abdominal pain.  Some discomfort with urination and frequent urination.  Denies gross hematuria.  No nausea vomiting.  She does use a walker when ambulatory.  Complain of easy bruising but she is not on any blood thinners.  No other complaints.    This is a  78 y.o. 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

## 2024-08-01 NOTE — ED PROVIDER NOTES
This is my SERA Supervisory and shared visit note:     This patient was seen by the advanced practice provider.     I personally saw the patient and made/approved the management plan and take responsibility for the patient management.      Briefly, 78 y.o. female presents with 1 to 2 weeks of dizziness.  Patient denies fever, chest pain, shortness of breath, respiratory symptoms, urinary symptoms, abdominal pain, nausea, vomiting.    Focused exam:   Gen: awake, alert, and NAD  HEENT: NCAT. EOMI.   CV: RRR w/o MRG  Lungs: CTAB. No incr WOB.   Abdomen: Soft, nontender, nondistended. No rebound/guarding.   Neuro: Moving all extremities, fluent speech, follows commands.  Finger-nose normal.  5 out of 5 strength in upper and lower extremities.  Cranial nerves II through XII intact.  MSK: Lower extremity edema bilaterally.  Slight erythema right lower extremity.    My EKG interpretation:Paced rhythm, ventricular rate of 60, normal axis, no STEMI    MDM:   Patient is hemodynamically stable upon arrival to the emergency department.  Patient is afebrile.  Differential diagnose includes but not limited to electrolyte abnormality, renal failure, ANITA, cellulitis.  Prior to me joining care labs and images ordered for patient.  Patient has no focal neurologic deficit on examination.  NIH stroke scale is 0.  There is low suspicion for stroke as a cause of patient's reported dizziness.  Patient describes symptoms more so as lightheadedness and less vertigo.  Patient had a stable gait.  CT head negative for intracranial bleed.  Chest x-ray negative for pneumonia.  Patient was started on vancomycin and cefepime in the emergency department for cellulitis.  CMP notable for a creatinine of 6.0 and BUN of 83.  Patient hyponatremic at 130.  Potassium within normal limits at 4.5.  Urinalysis negative for UTI.  CBC shows anemia with a stable hemoglobin 9.4.  Initial troponin elevated 61, repeat troponin downtrending at 55.  Patient denies

## 2024-08-02 ENCOUNTER — APPOINTMENT (OUTPATIENT)
Dept: GENERAL RADIOLOGY | Age: 79
End: 2024-08-02
Payer: MEDICARE

## 2024-08-02 LAB
ALBUMIN SERPL-MCNC: 2.5 G/DL (ref 3.4–5)
ANION GAP SERPL CALCULATED.3IONS-SCNC: 17 MMOL/L (ref 3–16)
BASOPHILS # BLD: 0 K/UL (ref 0–0.2)
BASOPHILS NFR BLD: 0.5 %
BUN SERPL-MCNC: 66 MG/DL (ref 7–20)
CALCIUM SERPL-MCNC: 7.9 MG/DL (ref 8.3–10.6)
CHLORIDE SERPL-SCNC: 95 MMOL/L (ref 99–110)
CK SERPL-CCNC: 155 U/L (ref 26–192)
CO2 SERPL-SCNC: 22 MMOL/L (ref 21–32)
CREAT SERPL-MCNC: 3.9 MG/DL (ref 0.6–1.2)
DEPRECATED RDW RBC AUTO: 15.5 % (ref 12.4–15.4)
EOSINOPHIL # BLD: 0.3 K/UL (ref 0–0.6)
EOSINOPHIL NFR BLD: 2.8 %
GFR SERPLBLD CREATININE-BSD FMLA CKD-EPI: 11 ML/MIN/{1.73_M2}
GLUCOSE SERPL-MCNC: 83 MG/DL (ref 70–99)
HCT VFR BLD AUTO: 27.5 % (ref 36–48)
HGB BLD-MCNC: 9.4 G/DL (ref 12–16)
LACTATE BLDV-SCNC: 1.3 MMOL/L (ref 0.4–2)
LYMPHOCYTES # BLD: 0.8 K/UL (ref 1–5.1)
LYMPHOCYTES NFR BLD: 8.1 %
MCH RBC QN AUTO: 29.3 PG (ref 26–34)
MCHC RBC AUTO-ENTMCNC: 34.3 G/DL (ref 31–36)
MCV RBC AUTO: 85.2 FL (ref 80–100)
MONOCYTES # BLD: 1.4 K/UL (ref 0–1.3)
MONOCYTES NFR BLD: 14.9 %
NEUTROPHILS # BLD: 7.1 K/UL (ref 1.7–7.7)
NEUTROPHILS NFR BLD: 73.7 %
PATH INTERP BLD-IMP: NORMAL
PHOSPHATE SERPL-MCNC: 5.8 MG/DL (ref 2.5–4.9)
PLATELET # BLD AUTO: 242 K/UL (ref 135–450)
PMV BLD AUTO: 7.6 FL (ref 5–10.5)
POTASSIUM SERPL-SCNC: 2.6 MMOL/L (ref 3.5–5.1)
POTASSIUM SERPL-SCNC: 2.9 MMOL/L (ref 3.5–5.1)
POTASSIUM SERPL-SCNC: 3 MMOL/L (ref 3.5–5.1)
POTASSIUM SERPL-SCNC: 3.3 MMOL/L (ref 3.5–5.1)
RBC # BLD AUTO: 3.23 M/UL (ref 4–5.2)
SODIUM SERPL-SCNC: 134 MMOL/L (ref 136–145)
VANCOMYCIN SERPL-MCNC: 12.9 UG/ML
WBC # BLD AUTO: 9.7 K/UL (ref 4–11)

## 2024-08-02 PROCEDURE — 2580000003 HC RX 258: Performed by: STUDENT IN AN ORGANIZED HEALTH CARE EDUCATION/TRAINING PROGRAM

## 2024-08-02 PROCEDURE — 80202 ASSAY OF VANCOMYCIN: CPT

## 2024-08-02 PROCEDURE — 80069 RENAL FUNCTION PANEL: CPT

## 2024-08-02 PROCEDURE — 84132 ASSAY OF SERUM POTASSIUM: CPT

## 2024-08-02 PROCEDURE — 2580000003 HC RX 258

## 2024-08-02 PROCEDURE — 94760 N-INVAS EAR/PLS OXIMETRY 1: CPT

## 2024-08-02 PROCEDURE — 6370000000 HC RX 637 (ALT 250 FOR IP)

## 2024-08-02 PROCEDURE — 6370000000 HC RX 637 (ALT 250 FOR IP): Performed by: STUDENT IN AN ORGANIZED HEALTH CARE EDUCATION/TRAINING PROGRAM

## 2024-08-02 PROCEDURE — 94640 AIRWAY INHALATION TREATMENT: CPT

## 2024-08-02 PROCEDURE — 83605 ASSAY OF LACTIC ACID: CPT

## 2024-08-02 PROCEDURE — 71045 X-RAY EXAM CHEST 1 VIEW: CPT

## 2024-08-02 PROCEDURE — 85025 COMPLETE CBC W/AUTO DIFF WBC: CPT

## 2024-08-02 PROCEDURE — 2060000000 HC ICU INTERMEDIATE R&B

## 2024-08-02 PROCEDURE — P9047 ALBUMIN (HUMAN), 25%, 50ML: HCPCS

## 2024-08-02 PROCEDURE — 82550 ASSAY OF CK (CPK): CPT

## 2024-08-02 PROCEDURE — 2500000003 HC RX 250 WO HCPCS

## 2024-08-02 PROCEDURE — 36415 COLL VENOUS BLD VENIPUNCTURE: CPT

## 2024-08-02 PROCEDURE — 6360000002 HC RX W HCPCS: Performed by: STUDENT IN AN ORGANIZED HEALTH CARE EDUCATION/TRAINING PROGRAM

## 2024-08-02 PROCEDURE — 6360000002 HC RX W HCPCS

## 2024-08-02 PROCEDURE — 99223 1ST HOSP IP/OBS HIGH 75: CPT | Performed by: STUDENT IN AN ORGANIZED HEALTH CARE EDUCATION/TRAINING PROGRAM

## 2024-08-02 RX ORDER — MIDODRINE HYDROCHLORIDE 5 MG/1
2.5 TABLET ORAL
Status: DISCONTINUED | OUTPATIENT
Start: 2024-08-02 | End: 2024-08-06

## 2024-08-02 RX ORDER — POTASSIUM CHLORIDE 20 MEQ/1
40 TABLET, EXTENDED RELEASE ORAL PRN
Status: DISCONTINUED | OUTPATIENT
Start: 2024-08-02 | End: 2024-08-02

## 2024-08-02 RX ORDER — POTASSIUM CHLORIDE 7.45 MG/ML
10 INJECTION INTRAVENOUS PRN
Status: DISCONTINUED | OUTPATIENT
Start: 2024-08-02 | End: 2024-08-02

## 2024-08-02 RX ORDER — POTASSIUM CHLORIDE 7.45 MG/ML
10 INJECTION INTRAVENOUS
Status: DISPENSED | OUTPATIENT
Start: 2024-08-02 | End: 2024-08-02

## 2024-08-02 RX ORDER — POTASSIUM CHLORIDE 750 MG/1
40 TABLET, FILM COATED, EXTENDED RELEASE ORAL ONCE
Status: COMPLETED | OUTPATIENT
Start: 2024-08-02 | End: 2024-08-02

## 2024-08-02 RX ORDER — POTASSIUM CHLORIDE 20 MEQ/1
40 TABLET, EXTENDED RELEASE ORAL ONCE
Status: COMPLETED | OUTPATIENT
Start: 2024-08-03 | End: 2024-08-03

## 2024-08-02 RX ORDER — ALBUMIN (HUMAN) 12.5 G/50ML
25 SOLUTION INTRAVENOUS EVERY 8 HOURS
Status: COMPLETED | OUTPATIENT
Start: 2024-08-02 | End: 2024-08-02

## 2024-08-02 RX ADMIN — OXYCODONE HYDROCHLORIDE 10 MG: 10 TABLET, FILM COATED, EXTENDED RELEASE ORAL at 09:28

## 2024-08-02 RX ADMIN — METOPROLOL SUCCINATE 50 MG: 50 TABLET, EXTENDED RELEASE ORAL at 09:28

## 2024-08-02 RX ADMIN — POTASSIUM CHLORIDE 10 MEQ: 7.46 INJECTION, SOLUTION INTRAVENOUS at 11:30

## 2024-08-02 RX ADMIN — MIDODRINE HYDROCHLORIDE 2.5 MG: 5 TABLET ORAL at 19:08

## 2024-08-02 RX ADMIN — POTASSIUM CHLORIDE 40 MEQ: 750 TABLET, FILM COATED, EXTENDED RELEASE ORAL at 14:56

## 2024-08-02 RX ADMIN — DULOXETINE HYDROCHLORIDE 30 MG: 30 CAPSULE, DELAYED RELEASE ORAL at 09:28

## 2024-08-02 RX ADMIN — VANCOMYCIN HYDROCHLORIDE 1000 MG: 1 INJECTION, POWDER, LYOPHILIZED, FOR SOLUTION INTRAVENOUS at 15:17

## 2024-08-02 RX ADMIN — OXYCODONE HYDROCHLORIDE 10 MG: 10 TABLET, FILM COATED, EXTENDED RELEASE ORAL at 22:01

## 2024-08-02 RX ADMIN — AMIODARONE HYDROCHLORIDE 100 MG: 200 TABLET ORAL at 09:28

## 2024-08-02 RX ADMIN — BUDESONIDE AND FORMOTEROL FUMARATE DIHYDRATE 2 PUFF: 160; 4.5 AEROSOL RESPIRATORY (INHALATION) at 08:59

## 2024-08-02 RX ADMIN — OXYCODONE AND ACETAMINOPHEN 1 TABLET: 10; 325 TABLET ORAL at 15:19

## 2024-08-02 RX ADMIN — OXYCODONE HYDROCHLORIDE 10 MG: 10 TABLET, FILM COATED, EXTENDED RELEASE ORAL at 00:40

## 2024-08-02 RX ADMIN — SODIUM BICARBONATE: 84 INJECTION, SOLUTION INTRAVENOUS at 15:05

## 2024-08-02 RX ADMIN — MIDODRINE HYDROCHLORIDE 2.5 MG: 5 TABLET ORAL at 15:19

## 2024-08-02 RX ADMIN — SODIUM CHLORIDE, PRESERVATIVE FREE 10 ML: 5 INJECTION INTRAVENOUS at 09:00

## 2024-08-02 RX ADMIN — LEVOTHYROXINE SODIUM 88 MCG: 0.09 TABLET ORAL at 09:28

## 2024-08-02 RX ADMIN — ALBUMIN (HUMAN) 25 G: 0.25 INJECTION, SOLUTION INTRAVENOUS at 15:37

## 2024-08-02 RX ADMIN — SODIUM CHLORIDE, PRESERVATIVE FREE 10 ML: 5 INJECTION INTRAVENOUS at 20:34

## 2024-08-02 RX ADMIN — HEPARIN SODIUM 5000 UNITS: 5000 INJECTION INTRAVENOUS; SUBCUTANEOUS at 20:43

## 2024-08-02 RX ADMIN — ALBUMIN (HUMAN) 25 G: 0.25 INJECTION, SOLUTION INTRAVENOUS at 20:34

## 2024-08-02 RX ADMIN — BUDESONIDE AND FORMOTEROL FUMARATE DIHYDRATE 2 PUFF: 160; 4.5 AEROSOL RESPIRATORY (INHALATION) at 20:59

## 2024-08-02 RX ADMIN — CEFEPIME 500 MG: 1 INJECTION, POWDER, FOR SOLUTION INTRAMUSCULAR; INTRAVENOUS at 20:30

## 2024-08-02 RX ADMIN — POTASSIUM CHLORIDE 10 MEQ: 7.46 INJECTION, SOLUTION INTRAVENOUS at 13:04

## 2024-08-02 RX ADMIN — EZETIMIBE 10 MG: 10 TABLET ORAL at 09:28

## 2024-08-02 RX ADMIN — HEPARIN SODIUM 5000 UNITS: 5000 INJECTION INTRAVENOUS; SUBCUTANEOUS at 14:59

## 2024-08-02 RX ADMIN — POTASSIUM CHLORIDE 10 MEQ: 7.46 INJECTION, SOLUTION INTRAVENOUS at 15:01

## 2024-08-02 ASSESSMENT — PAIN DESCRIPTION - ORIENTATION
ORIENTATION: RIGHT;LEFT
ORIENTATION: RIGHT;LEFT
ORIENTATION: MID;LOWER
ORIENTATION: MID

## 2024-08-02 ASSESSMENT — PAIN - FUNCTIONAL ASSESSMENT
PAIN_FUNCTIONAL_ASSESSMENT: PREVENTS OR INTERFERES SOME ACTIVE ACTIVITIES AND ADLS

## 2024-08-02 ASSESSMENT — PAIN SCALES - GENERAL
PAINLEVEL_OUTOF10: 8
PAINLEVEL_OUTOF10: 6
PAINLEVEL_OUTOF10: 8
PAINLEVEL_OUTOF10: 5
PAINLEVEL_OUTOF10: 7
PAINLEVEL_OUTOF10: 3
PAINLEVEL_OUTOF10: 6

## 2024-08-02 ASSESSMENT — PAIN DESCRIPTION - DESCRIPTORS
DESCRIPTORS: SHARP
DESCRIPTORS: ACHING

## 2024-08-02 ASSESSMENT — PAIN DESCRIPTION - LOCATION
LOCATION: BACK
LOCATION: SHOULDER;FOOT;LEG
LOCATION: SHOULDER;LEG;FOOT
LOCATION: NECK

## 2024-08-02 ASSESSMENT — PAIN DESCRIPTION - PAIN TYPE: TYPE: ACUTE PAIN

## 2024-08-02 NOTE — NURSE NAVIGATOR
4 Eyes Skin Assessment     NAME:  Shonda Hernandes  YOB: 1945  MEDICAL RECORD NUMBER:  [id number]    The patient is being assessed for  Admission    I agree that at least one RN has performed a thorough Head to Toe Skin Assessment on the patient. ALL assessment sites listed below have been assessed.      Areas assessed by both nurses:    Head, Face, Ears, Shoulders, Back, Chest, Arms, Elbows, Hands, Sacrum. Buttock, Coccyx, Ischium, Legs. Feet and Heels, Under Medical Devices , and Other left chest biventricular pace maker ,, both arms are badly  bruised ,   left buttocks has open area ,  left buttocks has dark area   and also a very  small   dark spot by  self  triad cream applied  bilateral lower extremities bright red scattered abrasions and swelling  and they are elivated on pillow  tp float heels .  Both feet have e very hard callused dirty areas  noted a few dark spots , ,          Does the Patient have a Wound? Yes wound(s) were present on assessment. LDA wound assessment was Initiated and completed by RADHA Landon Prevention initiated by RN: Yes  Wound Care Orders initiated by RN: Yes    Pressure Injury (Stage 3,4, Unstageable, DTI, NWPT, and Complex wounds) if present, place Wound referral order by RN under : No    New Ostomies, if present place, Ostomy referral order under : No     Nurse 1 eSignature: Electronically signed by Astrid Davidson RN on 8/2/24 at 4:55 AM EDT    **SHARE this note so that the co-signing nurse can place an eSignature**    Nurse 2 eSignature: Electronically signed by Keyana Hall RN on 8/2/24 at 6:02 AM EDT

## 2024-08-02 NOTE — CONSULTS
Nephrology Consult Note   [email or web address]      Reason for consultation: ANITA on CKD 3a/b -- baseline Cr ~ 1.2-1.6 mg/dL since 12/2022    History of Present Illness: Shonda Hernandes is a 77 yo male with a PMHx of CKD 3a/b, CHF, COPD, HTN, HLD, hypothyroidism, SHAVONNE, PVD, CAD, afib, h/o bladder cancer. Patient presents to  ED on 8/1/2024 with confusion, hallucinations, dizziness, and edema with erythema to BLE. Patient is a difficult historian. Family at bedside states the AMS began Wednesday of last week and has been worsening since. Legs are erythematous and hot to touch. WBC 14.0>9.7. Receiving vancomycin and cefepime. CT head negative for acute findings.     We have been consulted for ANITA on CKD 3a/b. Patient does not follow with a nephrologist. Baseline Cr is ~ 1.2-1.6 mg/dL since 12/2022. Her Cr was 2.2 mg/dL in 5/2024. Cr upon admission was 6.0 mg/dL. A li catheter was placed for strict I/Os. Renal US was obtained following li catheter placement and was negative for hydro. Pressures have been soft with readings as low as 90/47 mmHg. Endorses decreased oral intake the past couple days. Denies  symptoms. Takes metolazone, torsemide, and valsartan. Denies NSAID use. Received IVF and today Cr is 3.9 mg/dL. Patient was initially on RA and is now on 1 L NC. CXR yesterday was negative for acute findings.     Subjective:      Patient seen and examined. Labs and chart reviewed. Resting in bed on 1 L NC.     There were not complications last night.    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 Parada.  See attestation for additional recommendations.    HOWARD Stevenson - CNP

## 2024-08-02 NOTE — PROGRESS NOTES
Clinical Pharmacy Note  Vancomycin Consult    Shonda Hernandes is a 78 y.o. female ordered vancomycin for SSTI; consult received from Dr. Nguyễn to manage therapy. Also receiving cefepime.    Allergies:  Benadryl [diphenhydramine], Doxylamine, Mucinex [guaifenesin er], Spiriva handihaler [tiotropium bromide monohydrate], Adhesive tape, Neosporin [bacitracin-polymyxin b], and Tylenol [acetaminophen]     Temp max:  Temp (24hrs), Av.2 °F (36.8 °C), Min:97.9 °F (36.6 °C), Max:98.5 °F (36.9 °C)      Recent Labs     24  1459   WBC 14.0*       Recent Labs     24  1459 24  1907   BUN 83* 79*   CREATININE 6.0* 5.4*       No intake or output data in the 24 hours ending 24 2321    Culture Results:  Pending    Ht Readings from Last 1 Encounters:   24 1.549 m (5' 1\")        Wt Readings from Last 1 Encounters:   24 63.3 kg (139 lb 8.8 oz)         Estimated Creatinine Clearance: 7 mL/min (A) (based on SCr of 5.4 mg/dL (HH)).    Assessment/Plan:  Day # 1 of vancomycin.  Vancomycin 1000 mg IV once 24  Goal trough 15-20  Scr: 5.4 mg/dL (24 190) - patient currently in ANITA    Vanc random and Scr within renal function panel ordered with morning labs    Thank you for the consult.     Kristin Snell, DiogoD

## 2024-08-02 NOTE — PROGRESS NOTES
Clinical Pharmacy Note  Renal Dose Adjustment    The electrolyte replacement protocol for potassium/magnesium has been discontinued per P&T guidelines because the patient has reduced renal function (CrCl < 30 mL/min). For patients with decreased renal function needing potassium or magnesium supplementation, please order individual doses with appropriate monitoring. Please contact the pharmacy with any concerns.    Jade Billings RPH, PharmD 8/2/2024 9:13 AM

## 2024-08-02 NOTE — H&P
03/15/2019    superion inserted to keep back from hurting: [place]    CHOLECYSTECTOMY, LAPAROSCOPIC N/A 4/25/2019    EMERGENT; LAPAROSCOPIC CHOLECYSTECTOMY WITH GRAM performed by Dean Harrison MD at [facility] OR    CORONARY ANGIOPLASTY  2014    stent x 1    CYSTOSCOPY  Feb 2014    dr Abel    JOINT REPLACEMENT      THR Right    OTHER SURGICAL HISTORY  02/20/2018     EGD and colonoscopy.    PACEMAKER INSERTION  04/11/2022    PAIN MANAGEMENT PROCEDURE Bilateral 4/1/2021    BILATERAL L3, L4, L5 MEDIAL BRANCH BLOCK WITH FLUOROSCOPY performed by Abel Ruiz MD at [facility]    PAIN MANAGEMENT PROCEDURE Bilateral 7/8/2021    BILATERAL L3, L4, L5 MEDIAL BRANCH BLOCKS WITH FLUOROSCOPY (55321, 63093) performed by Abel Ruiz MD at [facility]    PAIN MANAGEMENT PROCEDURE Bilateral 9/9/2021    BILATERAL L3, L4, L5 RADIOFREQUENCY ABLATION WITH FLUOROSCOPY (62650, 30002) performed by Abel Ruiz MD at [facility]    PAIN MANAGEMENT PROCEDURE Bilateral 5/9/2024    BILATERAL L2 TRANSFORAMINAL EPIDURAL STEROID INJECTION WITH FLUOROSCOPY - [place] performed by Abel Ruiz MD at [facility]    SPINE SURGERY N/A 3/15/2019    INSERTION OF INTERSPINOUS SPACER SUPERION VERTIFLEX AT LUMBAR FOUR-LUMBAR FIVE performed by Tereso Leo MD at [facility] OR    UPPER GASTROINTESTINAL ENDOSCOPY  12/15/2017       Medications Prior to Admission:    No current facility-administered medications on file prior to encounter.     Current Outpatient Medications on File Prior to Encounter   Medication Sig Dispense Refill    lidocaine (LIDODERM) 5 % Place 1 patch onto the skin daily 12 hours on, 12 hours off. 30 patch 0    oxyCODONE ER 9 MG C12A Take 9 mg by mouth 2 times daily for 30 days. Max Daily Amount: 18 mg 60 each 0    oxyCODONE-acetaminophen (PERCOCET)  MG per tablet Take 1 tablet by mouth 2 times daily as needed for Pain (take as needed for severe pain) for up to 30 days. Intended supply: 30 days Max Daily Amount:  daily 200 g 3    fluticasone-salmeterol (ADVAIR DISKUS) 250-50 MCG/ACT AEPB diskus inhaler Inhale 1 puff into the lungs in the morning and 1 puff in the evening. (Patient not taking: Reported on 8/1/2024) 60 each 0    sennosides-docusate sodium (SENOKOT-S) 8.6-50 MG tablet Take 1 tablet by mouth in the morning and at bedtime 60 tablet 5    metOLazone (ZAROXOLYN) 5 MG tablet Take 1 tablet by mouth once a week 8 tablet 1    naloxone (NARCAN) 4 MG/0.1ML LIQD nasal spray Use as directed 1 each 0    lidocaine 4 % external patch Place 1 patch onto the skin daily 30 each 3    aspirin 81 MG EC tablet Take 1 tablet by mouth daily         Allergies:  Benadryl [diphenhydramine], Doxylamine, Mucinex [guaifenesin er], Spiriva handihaler [tiotropium bromide monohydrate], Adhesive tape, Neosporin [bacitracin-polymyxin b], and Tylenol [acetaminophen]    Social History:   TOBACCO:   reports that she quit smoking about 10 months ago. Her smoking use included cigarettes. She started smoking about 45 years ago. She has a 45.0 pack-year smoking history. She has quit using smokeless tobacco.     ETOH:   reports current alcohol use.    Family History:       Problem Relation Age of Onset    Breast Cancer Daughter        ROS:     All other systems reviewed and are negative.    PHYSICAL EXAM:  BP (!) 94/44   Pulse 60   Temp 97.7 °F (36.5 °C) (Oral)   Resp 20   Ht 1.549 m (5' 1\")   Wt 63.3 kg (139 lb 8.8 oz)   SpO2 90%   BMI 26.37 kg/m²     General: No acute distress   HEENT: PERRL, EOMI  Cardiac: Regular rate and rhythm, no murmur  Lungs: Clear to auscultation anteriorly  Abdomen: Soft nontender nondistended  Extremities: Bilateral lower extremity edema that is warm  Neuro: Nonfocal    LABS:  Recent Labs     08/01/24  1459 08/02/24  0421   WBC 14.0* 9.7   HGB 10.7* 9.4*   HCT 32.5* 27.5*    242                                                                  Recent Labs     08/01/24  1459 08/01/24  1907 08/02/24  0421   *

## 2024-08-02 NOTE — PROGRESS NOTES
Clinical Pharmacy Note  Vancomycin Consult    Shonda Hernandes is a 78 y.o. female ordered vancomycin for SSTI; consult received from Dr. Nguyễn to manage therapy. Also receiving cefepime.    Allergies:  Benadryl [diphenhydramine], Doxylamine, Mucinex [guaifenesin er], Spiriva handihaler [tiotropium bromide monohydrate], Adhesive tape, Neosporin [bacitracin-polymyxin b], and Tylenol [acetaminophen]     Temp max:  Temp (24hrs), Av.3 °F (36.8 °C), Min:97.9 °F (36.6 °C), Max:98.6 °F (37 °C)      Recent Labs     24  1459 24  0421   WBC 14.0* 9.7       Recent Labs     24  1459 24  1907 24  0421   BUN 83* 79* 66*   CREATININE 6.0* 5.4* 3.9*         Intake/Output Summary (Last 24 hours) at 2024 0708  Last data filed at 2024 0615  Gross per 24 hour   Intake 240 ml   Output 2450 ml   Net -2210 ml       Culture Results:  Pending    Ht Readings from Last 1 Encounters:   24 1.549 m (5' 1\")        Wt Readings from Last 1 Encounters:   24 63.3 kg (139 lb 8.8 oz)         Estimated Creatinine Clearance: 10 mL/min (A) (based on SCr of 3.9 mg/dL (H)).    Assessment:  Day # 2 of vancomycin.  Current regimen: Intermittent dosing based on levels  SCr 6 -> 5.4 -> 3.9 mg/dL  Vancomycin level: 12.9 mg/L ~ 8 hours post 1000mg dose      Plan:  Will dose vancomycin 1000 mg IV x 1 at 1200 and check a random level with tomorrow AM labs.      Thank you for the consult.     Adi Urbano [facility]  2024 7:11 AM

## 2024-08-02 NOTE — CARE COORDINATION
[facility] Continence Nurse  Consult Note       NAME:  Shonda Hernandes  MEDICAL RECORD NUMBER:  [id number]  AGE: 78 y.o.   GENDER: female  : 1945  TODAY'S DATE:  2024    Subjective   Reason for WOCN Evaluation and Assessment: \"stage 2 and potential DTI on buttocks .\"  Pt has stage 2 to left buttock, POA      Shonda Hernandes is a 78 y.o. female referred by:   [] Physician  [x] Nursing  [] Other:     Wound Identification:  Wound Type: pressure  Contributing Factors: chronic pressure, decreased mobility, shear force, and friction    Wound History: noted on admit, chronic  Current Wound Care Treatment:  Triad    Patient Goal of Care:  [x] Wound Healing  [] Odor Control  [] Palliative Care  [] Pain Control   [] Other:         PAST MEDICAL HISTORY        Diagnosis Date    Arthritis of shoulder region, right     advanced    Atrial fibrillation (HCC)     had watchman's    AVM (arteriovenous malformation) of duodenum 2017    AVM (arteriovenous malformation) of stomach 2017    Bladder cancer (HCC) 2014    CAD (coronary artery disease) 2014    Carotid stenosis 2014    Chronic back pain     Chronic diastolic CHF (congestive heart failure) ([facility]) 2016    Chronic prescription opiate use     COPD (HCC)     Diverticulosis     HTN (hypertension)     Hyperlipidemia     Hypothyroidism     Ischemic stroke 2013    x 2    Lumbar spine stenosis 2017    multiple procedures unsuccessful.    Macular degeneration 2018    Nonrheumatic mitral valve regurgitation     Obstructive sleep apnea     Osteoarthritis     Peripheral neuropathy     Pulmonary HTN ([facility]) 2014    PVD (peripheral vascular disease) ([facility])     Syncope 2022    of unknown cause    Tobacco use disorder in remission        PAST SURGICAL HISTORY    Past Surgical History:   Procedure Laterality Date    ABLATION OF DYSRHYTHMIC FOCUS  2022    AORTIC VALVE REPLACEMENT  6/16/15    Dr. Correa - PVI, RENETTA exclusion. 19mm Maki pericardial Magna     buttocks  [] Clear sacral border to sacral area. Assess each shift, change every 7 days.  [x] turn and reposition every 2 hours  [] wedge for repositioning  [x] elevate heels   [] apply liquid barrier film to heels twice daily  [x] chair cushion, encourage to reposition self frequently while in chair  [] limit time in chair to 2 hours if patient is unable to reposition themselves   [x] minimize layers under patient, ensure there are no wrinkles under patient   [] utilize briefs only when out of bed  [x]  Blue wipes for pericare  [] Zinc barrier  [x] Triad  []     Specialty Bed Required : Yes   [x] Low Air Loss - please place pump  [x] Pressure Redistribution - Isoflex  [] Fluid Immersion  [] Bariatric  [] Total Pressure Relief  [] Other:     Current Diet: ADULT DIET; Regular; No Added Salt (3-4 gm); Low Phosphorus (Less than 1000 mg)  Dietician consult:  No    Discharge Plan:  Placement for patient upon discharge: TBD    Patient appropriate for Outpatient Wound Care Center: No    Referrals:  [x]   [] Home Health Care  [] Supplies  [] Other    Patient/Caregiver Teaching:  Level of patient/caregiver understanding able to:   [] Indicates understanding       [] Needs reinforcement  [] Unsuccessful      [x] Verbal Understanding  [] Demonstrated understanding       [] No evidence of learning  [] Refused teaching         [] N/A       Electronically signed by JEFFREY Thacker on 8/2/2024 at 3:26 PM

## 2024-08-03 LAB
ALBUMIN SERPL-MCNC: 3.2 G/DL (ref 3.4–5)
ANION GAP SERPL CALCULATED.3IONS-SCNC: 16 MMOL/L (ref 3–16)
BUN SERPL-MCNC: 38 MG/DL (ref 7–20)
CALCIUM SERPL-MCNC: 8.1 MG/DL (ref 8.3–10.6)
CHLORIDE SERPL-SCNC: 101 MMOL/L (ref 99–110)
CO2 SERPL-SCNC: 23 MMOL/L (ref 21–32)
CREAT SERPL-MCNC: 2.2 MG/DL (ref 0.6–1.2)
CREAT UR-MCNC: 35.2 MG/DL (ref 28–259)
DEPRECATED RDW RBC AUTO: 15.3 % (ref 12.4–15.4)
FERRITIN SERPL IA-MCNC: 136.8 NG/ML (ref 15–150)
FOLATE SERPL-MCNC: 12.2 NG/ML (ref 4.78–24.2)
GFR SERPLBLD CREATININE-BSD FMLA CKD-EPI: 22 ML/MIN/{1.73_M2}
GLUCOSE SERPL-MCNC: 90 MG/DL (ref 70–99)
HCT VFR BLD AUTO: 29 % (ref 36–48)
HGB BLD-MCNC: 9.8 G/DL (ref 12–16)
IRON SATN MFR SERPL: 13 % (ref 15–50)
IRON SERPL-MCNC: 25 UG/DL (ref 37–145)
MCH RBC QN AUTO: 28.7 PG (ref 26–34)
MCHC RBC AUTO-ENTMCNC: 33.7 G/DL (ref 31–36)
MCV RBC AUTO: 85.2 FL (ref 80–100)
MICROALBUMIN UR DL<=1MG/L-MCNC: 8.3 MG/DL
MICROALBUMIN/CREAT UR: 235.8 MG/G (ref 0–30)
PHOSPHATE SERPL-MCNC: 3.1 MG/DL (ref 2.5–4.9)
PLATELET # BLD AUTO: 252 K/UL (ref 135–450)
PMV BLD AUTO: 7.8 FL (ref 5–10.5)
POTASSIUM SERPL-SCNC: 3.5 MMOL/L (ref 3.5–5.1)
RBC # BLD AUTO: 3.41 M/UL (ref 4–5.2)
SODIUM SERPL-SCNC: 140 MMOL/L (ref 136–145)
TIBC SERPL-MCNC: 192 UG/DL (ref 260–445)
TRANSFERRIN SERPL-MCNC: 149 MG/DL (ref 200–360)
VANCOMYCIN SERPL-MCNC: 16.4 UG/ML
VIT B12 SERPL-MCNC: >2000 PG/ML (ref 211–911)
WBC # BLD AUTO: 9.9 K/UL (ref 4–11)

## 2024-08-03 PROCEDURE — 6370000000 HC RX 637 (ALT 250 FOR IP): Performed by: STUDENT IN AN ORGANIZED HEALTH CARE EDUCATION/TRAINING PROGRAM

## 2024-08-03 PROCEDURE — 83540 ASSAY OF IRON: CPT

## 2024-08-03 PROCEDURE — 82570 ASSAY OF URINE CREATININE: CPT

## 2024-08-03 PROCEDURE — 6370000000 HC RX 637 (ALT 250 FOR IP)

## 2024-08-03 PROCEDURE — 94640 AIRWAY INHALATION TREATMENT: CPT

## 2024-08-03 PROCEDURE — 97530 THERAPEUTIC ACTIVITIES: CPT

## 2024-08-03 PROCEDURE — 36415 COLL VENOUS BLD VENIPUNCTURE: CPT

## 2024-08-03 PROCEDURE — 6360000002 HC RX W HCPCS: Performed by: STUDENT IN AN ORGANIZED HEALTH CARE EDUCATION/TRAINING PROGRAM

## 2024-08-03 PROCEDURE — 82728 ASSAY OF FERRITIN: CPT

## 2024-08-03 PROCEDURE — 80069 RENAL FUNCTION PANEL: CPT

## 2024-08-03 PROCEDURE — 2060000000 HC ICU INTERMEDIATE R&B

## 2024-08-03 PROCEDURE — 99233 SBSQ HOSP IP/OBS HIGH 50: CPT | Performed by: STUDENT IN AN ORGANIZED HEALTH CARE EDUCATION/TRAINING PROGRAM

## 2024-08-03 PROCEDURE — 82607 VITAMIN B-12: CPT

## 2024-08-03 PROCEDURE — 97166 OT EVAL MOD COMPLEX 45 MIN: CPT

## 2024-08-03 PROCEDURE — 82043 UR ALBUMIN QUANTITATIVE: CPT

## 2024-08-03 PROCEDURE — 85027 COMPLETE CBC AUTOMATED: CPT

## 2024-08-03 PROCEDURE — 97162 PT EVAL MOD COMPLEX 30 MIN: CPT

## 2024-08-03 PROCEDURE — 2500000003 HC RX 250 WO HCPCS

## 2024-08-03 PROCEDURE — 80202 ASSAY OF VANCOMYCIN: CPT

## 2024-08-03 PROCEDURE — 2580000003 HC RX 258

## 2024-08-03 PROCEDURE — 94760 N-INVAS EAR/PLS OXIMETRY 1: CPT

## 2024-08-03 PROCEDURE — 97535 SELF CARE MNGMENT TRAINING: CPT

## 2024-08-03 PROCEDURE — 84466 ASSAY OF TRANSFERRIN: CPT

## 2024-08-03 PROCEDURE — 2580000003 HC RX 258: Performed by: STUDENT IN AN ORGANIZED HEALTH CARE EDUCATION/TRAINING PROGRAM

## 2024-08-03 PROCEDURE — 82746 ASSAY OF FOLIC ACID SERUM: CPT

## 2024-08-03 RX ADMIN — OXYCODONE HYDROCHLORIDE 10 MG: 10 TABLET, FILM COATED, EXTENDED RELEASE ORAL at 08:28

## 2024-08-03 RX ADMIN — SODIUM CHLORIDE, PRESERVATIVE FREE 10 ML: 5 INJECTION INTRAVENOUS at 21:47

## 2024-08-03 RX ADMIN — MIDODRINE HYDROCHLORIDE 2.5 MG: 5 TABLET ORAL at 17:36

## 2024-08-03 RX ADMIN — MIDODRINE HYDROCHLORIDE 2.5 MG: 5 TABLET ORAL at 08:28

## 2024-08-03 RX ADMIN — CEFEPIME 1000 MG: 1 INJECTION, POWDER, FOR SOLUTION INTRAMUSCULAR; INTRAVENOUS at 17:32

## 2024-08-03 RX ADMIN — HEPARIN SODIUM 5000 UNITS: 5000 INJECTION INTRAVENOUS; SUBCUTANEOUS at 06:18

## 2024-08-03 RX ADMIN — HEPARIN SODIUM 5000 UNITS: 5000 INJECTION INTRAVENOUS; SUBCUTANEOUS at 12:20

## 2024-08-03 RX ADMIN — OXYCODONE AND ACETAMINOPHEN 1 TABLET: 10; 325 TABLET ORAL at 12:21

## 2024-08-03 RX ADMIN — HEPARIN SODIUM 5000 UNITS: 5000 INJECTION INTRAVENOUS; SUBCUTANEOUS at 21:46

## 2024-08-03 RX ADMIN — VANCOMYCIN HYDROCHLORIDE 750 MG: 750 INJECTION, POWDER, LYOPHILIZED, FOR SOLUTION INTRAVENOUS at 15:45

## 2024-08-03 RX ADMIN — LEVOTHYROXINE SODIUM 88 MCG: 0.09 TABLET ORAL at 08:29

## 2024-08-03 RX ADMIN — BUDESONIDE AND FORMOTEROL FUMARATE DIHYDRATE 2 PUFF: 160; 4.5 AEROSOL RESPIRATORY (INHALATION) at 09:06

## 2024-08-03 RX ADMIN — POTASSIUM CHLORIDE 40 MEQ: 1500 TABLET, EXTENDED RELEASE ORAL at 00:26

## 2024-08-03 RX ADMIN — BUDESONIDE AND FORMOTEROL FUMARATE DIHYDRATE 2 PUFF: 160; 4.5 AEROSOL RESPIRATORY (INHALATION) at 21:17

## 2024-08-03 RX ADMIN — SODIUM BICARBONATE: 84 INJECTION, SOLUTION INTRAVENOUS at 05:05

## 2024-08-03 RX ADMIN — MIDODRINE HYDROCHLORIDE 2.5 MG: 5 TABLET ORAL at 12:21

## 2024-08-03 RX ADMIN — METOPROLOL SUCCINATE 50 MG: 50 TABLET, EXTENDED RELEASE ORAL at 08:28

## 2024-08-03 RX ADMIN — EZETIMIBE 10 MG: 10 TABLET ORAL at 08:29

## 2024-08-03 RX ADMIN — DULOXETINE HYDROCHLORIDE 30 MG: 30 CAPSULE, DELAYED RELEASE ORAL at 08:29

## 2024-08-03 RX ADMIN — OXYCODONE HYDROCHLORIDE 10 MG: 10 TABLET, FILM COATED, EXTENDED RELEASE ORAL at 21:46

## 2024-08-03 RX ADMIN — SODIUM BICARBONATE: 84 INJECTION, SOLUTION INTRAVENOUS at 17:35

## 2024-08-03 RX ADMIN — AMIODARONE HYDROCHLORIDE 100 MG: 200 TABLET ORAL at 08:29

## 2024-08-03 RX ADMIN — SODIUM CHLORIDE, PRESERVATIVE FREE 10 ML: 5 INJECTION INTRAVENOUS at 08:32

## 2024-08-03 ASSESSMENT — PAIN SCALES - GENERAL
PAINLEVEL_OUTOF10: 7
PAINLEVEL_OUTOF10: 7
PAINLEVEL_OUTOF10: 0

## 2024-08-03 ASSESSMENT — PAIN DESCRIPTION - DESCRIPTORS
DESCRIPTORS: ACHING;DISCOMFORT
DESCRIPTORS: ACHING;DISCOMFORT

## 2024-08-03 ASSESSMENT — PAIN DESCRIPTION - ORIENTATION: ORIENTATION: UPPER

## 2024-08-03 ASSESSMENT — PAIN DESCRIPTION - LOCATION
LOCATION: BACK;NECK
LOCATION: BACK;LEG

## 2024-08-03 NOTE — PROGRESS NOTES
Patient's repeat potassium resulted at 3.0 despite replacement that was given today. Dr. Nguyễn made aware. Oral potassium replacement ordered by MD and was given per order. Repeat lab to be drawn in the morning.     Electronically signed by Carmen Bolanos RN on 8/3/2024 at 1:32 AM

## 2024-08-03 NOTE — PROGRESS NOTES
Nephrology Progress Note   [facility]      Reason for consultation: ANITA on CKD 3a/b -- baseline Cr ~ 1.2-1.6 mg/dL since 12/2022    History of Present Illness: Shonda Hernandes is a 79 yo male with a PMHx of CKD 3a/b, CHF, COPD, HTN, HLD, hypothyroidism, SHAVONNE, PVD, CAD, afib, h/o bladder cancer. Patient presents to  ED on 8/1/2024 with confusion, hallucinations, dizziness, and edema with erythema to BLE. Patient is a difficult historian. Family at bedside states the AMS began Wednesday of last week and has been worsening since. Legs are erythematous and hot to touch. WBC 14.0>9.7. Receiving vancomycin and cefepime. CT head negative for acute findings.     We have been consulted for ANITA on CKD 3a/b. Patient does not follow with a nephrologist. Baseline Cr is ~ 1.2-1.6 mg/dL since 12/2022. Her Cr was 2.2 mg/dL in 5/2024. Cr upon admission was 6.0 mg/dL. A li catheter was placed for strict I/Os. Renal US was obtained following li catheter placement and was negative for hydro. Pressures have been soft with readings as low as 90/47 mmHg. Endorses decreased oral intake the past couple days. Denies  symptoms. Takes metolazone, torsemide, and valsartan. Denies NSAID use. Received IVF and today Cr is 3.9 mg/dL. Patient was initially on RA and is now on 1 L NC. CXR yesterday was negative for acute findings.     Subjective:    Patient seen and examined. Labs and chart reviewed. Resting in recliner  There were not complications last night.  Patient review of systems: Denies SOB    Scheduled Meds:   vancomycin  750 mg IntraVENous Once    cefepime  1,000 mg IntraVENous Q24H    midodrine  2.5 mg Oral TID WC    sodium chloride flush  5-40 mL IntraVENous 2 times per day    heparin (porcine)  5,000 Units SubCUTAneous 3 times per day    amiodarone  100 mg Oral Daily    budesonide-formoterol  2 puff Inhalation BID    DULoxetine  30 mg Oral Daily    ezetimibe  10 mg Oral Daily    levothyroxine  88 mcg Oral Daily    metoprolol

## 2024-08-03 NOTE — PLAN OF CARE
Problem: Discharge Planning  Goal: Discharge to home or other facility with appropriate resources  Outcome: Progressing  Flowsheets (Taken 8/3/2024 0126)  Discharge to home or other facility with appropriate resources: Identify barriers to discharge with patient and caregiver     Problem: Pain  Goal: Verbalizes/displays adequate comfort level or baseline comfort level  Outcome: Progressing  Flowsheets (Taken 8/3/2024 0126)  Verbalizes/displays adequate comfort level or baseline comfort level:   Encourage patient to monitor pain and request assistance   Implement non-pharmacological measures as appropriate and evaluate response   Assess pain using appropriate pain scale   Administer analgesics based on type and severity of pain and evaluate response     Problem: Safety - Adult  Goal: Free from fall injury  Outcome: Progressing  Flowsheets (Taken 8/3/2024 0126)  Free From Fall Injury: Instruct family/caregiver on patient safety     Problem: Chronic Conditions and Co-morbidities  Goal: Patient's chronic conditions and co-morbidity symptoms are monitored and maintained or improved  Outcome: Progressing  Flowsheets (Taken 8/3/2024 0126)  Care Plan - Patient's Chronic Conditions and Co-Morbidity Symptoms are Monitored and Maintained or Improved: Monitor and assess patient's chronic conditions and comorbid symptoms for stability, deterioration, or improvement

## 2024-08-03 NOTE — PROGRESS NOTES
Pt up to chair almost all of shift. Refused to return to bed despite education on skin breakdown.   Pt appears to have some comprehension and retention issues when conversing so unsure if she understands the risk of skin breakdown.   Pt remained on room air this shift- no desat noted.   Johnson remains in place per nephro order.   Continues on IV fluid and atbs.   Family at bedside intermittently throughout shift.  Electronically signed by Antonieta Phillips RN on 8/3/24 at 6:34 PM EDT

## 2024-08-03 NOTE — PROGRESS NOTES
Physical Therapy  Facility/Department: [address] PROGRESSIVE CARE  Physical Therapy Initial Assessment    Name: Shonda Hernandes  : 1945  MRN: [id number]  Date of Service: 8/3/2024    Discharge Recommendations:  24 hour supervision or assist, Home with Home health PT   PT Equipment Recommendations  Equipment Needed: No    Shonda Hernandes scored a 19/24 on the AM-PAC short mobility form. Current research shows that an AM-PAC score of 18 or greater is typically associated with a discharge to the patient's home setting. Based on the patient's AM-PAC score and their current functional mobility deficits, it is recommended that the patient have 2-3 sessions per week of Physical Therapy at d/c to increase the patient's independence.  At this time, this patient demonstrates the endurance and safety to discharge home with HHPT (home vs OP services) and a follow up treatment frequency of 2-3x/wk.  Please see assessment section for further patient specific details.    If patient discharges prior to next session this note will serve as a discharge summary.  Please see below for the latest assessment towards goals.          Patient Diagnosis(es): The primary encounter diagnosis was Dizziness. Diagnoses of Bilateral lower leg cellulitis, Bilateral lower extremity edema, and Acute renal failure, unspecified acute renal failure type (HCC) were also pertinent to this visit.  Past Medical History:  has a past medical history of Arthritis of shoulder region, right, Atrial fibrillation (HCC), AVM (arteriovenous malformation) of duodenum, AVM (arteriovenous malformation) of stomach, Bladder cancer (HCC), CAD (coronary artery disease), Carotid stenosis, Chronic back pain, Chronic diastolic CHF (congestive heart failure) (HCC), Chronic prescription opiate use, COPD (HCC), Diverticulosis, HTN (hypertension), Hyperlipidemia, Hypothyroidism, Ischemic stroke, Lumbar spine stenosis, Macular degeneration, Nonrheumatic mitral valve regurgitation,

## 2024-08-03 NOTE — PROGRESS NOTES
Patient refuses to turn as recommended.    Education provided regarding the benefits of repositioning and the risk to skin integrity associated with not repositioning as recommended.   Electronically signed by Antonieta Phillips RN on 8/3/24 at 12:25 PM EDT

## 2024-08-03 NOTE — PROGRESS NOTES
Clinical Pharmacy Note  Vancomycin Consult    Shonda Hernandes is a 78 y.o. female ordered vancomycin for SSTI; consult received from Dr. Nguyễn to manage therapy. Also receiving cefepime.    Allergies:  Benadryl [diphenhydramine], Doxylamine, Mucinex [guaifenesin er], Spiriva handihaler [tiotropium bromide monohydrate], Adhesive tape, Neosporin [bacitracin-polymyxin b], and Tylenol [acetaminophen]     Temp max:  Temp (24hrs), Av.1 °F (36.7 °C), Min:97.6 °F (36.4 °C), Max:99.1 °F (37.3 °C)      Recent Labs     24  1459 24  0421 24  0458   WBC 14.0* 9.7 9.9         Recent Labs     24  1907 24  0421 24  0458   BUN 79* 66* 38*   CREATININE 5.4* 3.9* 2.2*           Intake/Output Summary (Last 24 hours) at 8/3/2024 1106  Last data filed at 8/3/2024 0628  Gross per 24 hour   Intake 1907.44 ml   Output 1950 ml   Net -42.56 ml         Culture Results:  Pending    Ht Readings from Last 1 Encounters:   24 1.549 m (5' 1\")        Wt Readings from Last 1 Encounters:   24 49.7 kg (109 lb 9.1 oz)         Estimated Creatinine Clearance: 16 mL/min (A) (based on SCr of 2.2 mg/dL (H)).    Assessment:  Day # 3/5 of vancomycin.  Current regimen: Intermittent dosing based on levels  SCr 6 -> 5.4 -> 3.9 --> 2.2 mg/dL  Vancomycin level: 16.4 mg/L       Plan:  Will dose vancomycin 750 mg x 1 later this afternoon and check a random level with tomorrow      Thank you for the consult.     Mike Torres RPH  8/3/2024 11:06 AM

## 2024-08-03 NOTE — PROGRESS NOTES
Clinical Pharmacy Note  Renal Dose Adjustment    Shonda Hernandes is receiving cefepime.  This medication is renally eliminated.  Based on the patient's Estimated Creatinine Clearance: 16 mL/min (A) (based on SCr of 2.2 mg/dL (H)). and dx SSTI, the dose has been adjusted to 1000 mg IV Q24H per protocol.    Pharmacy will continue to monitor and adjust dose as needed for changes in renal function.    Jade Billings RPH, PharmD 8/3/2024 1:38 PM

## 2024-08-03 NOTE — PROGRESS NOTES
Occupational Therapy  Facility/Department: [address] PROGRESSIVE CARE  Occupational Therapy Initial Assessment and Tentative D/C      Name: Shonda Hernandes  : 1945  MRN: [id number]  Date of Service: 8/3/2024    Discharge Recommendations: Shonda Hernandes scored a 18/24 on the AM-PAC ADL Inpatient form. Current research shows that an AM-PAC score of 18 or greater is typically associated with a discharge to the patient's home setting. Based on the patient's AM-PAC score, and their current ADL deficits, it is recommended that the patient have 2-3 sessions per week of Occupational Therapy at d/c to increase the patient's independence.  At this time, this patient demonstrates the endurance and safety to discharge home with HHOT and a follow up treatment frequency of 2-3x/wk.   Please see assessment section for further patient specific details.    If patient discharges prior to next session this note will serve as a discharge summary.  Please see below for the latest assessment towards goals.     24 hour supervision or assist, 2-3 sessions per week, Patient would benefit from continued therapy after discharge  OT Equipment Recommendations  Equipment Needed: No       Patient Diagnosis(es): The primary encounter diagnosis was Dizziness. Diagnoses of Bilateral lower leg cellulitis, Bilateral lower extremity edema, and Acute renal failure, unspecified acute renal failure type (HCC) were also pertinent to this visit.  Past Medical History:  has a past medical history of Arthritis of shoulder region, right, Atrial fibrillation (HCC), AVM (arteriovenous malformation) of duodenum, AVM (arteriovenous malformation) of stomach, Bladder cancer (HCC), CAD (coronary artery disease), Carotid stenosis, Chronic back pain, Chronic diastolic CHF (congestive heart failure) (HCC), Chronic prescription opiate use, COPD (HCC), Diverticulosis, HTN (hypertension), Hyperlipidemia, Hypothyroidism, Ischemic stroke, Lumbar spine stenosis, Macular

## 2024-08-03 NOTE — PLAN OF CARE
RADHA Fung  Outcome: Progressing  8/3/2024 0126 by Carmen Bolanos, RN  Outcome: Progressing  Flowsheets (Taken 8/3/2024 0126)  Care Plan - Patient's Chronic Conditions and Co-Morbidity Symptoms are Monitored and Maintained or Improved: Monitor and assess patient's chronic conditions and comorbid symptoms for stability, deterioration, or improvement

## 2024-08-03 NOTE — PROGRESS NOTES
[place] [facility]     INPATIENT PROGRESS NOTE  Name: Shonda Hernandes  /Age/Sex: 1945 (78 y.o. female)   MRN & CSN: [id number] & [id number]  Admission Date/Time: 2024  2:31 PM   Location: [unfilled] [id number]  Current Hospital Day: Hospital Day: 3   Principal Problem: ANITA (acute kidney injury) (HCC)  HPI     Patient seen and examined.Reports night sweats. Feeling overall better today. Tenderness over left foot with ambulation. No hallucinations    All other review of systems negative unless noted above.  VITALS  Vitals:    24 1100   BP: (!) 109/51   Pulse: 64   Resp: 17   Temp: 97.6 °F (36.4 °C)   SpO2: 92%         PHYSICAL EXAM     General: No acute distress   HEENT: PERRL, EOMI  Cardiac: Regular rate and rhythm, no murmur  Lungs: Clear to auscultation anteriorly  Abdomen: Soft nontender nondistended  Extremities: Left dorsal foot erythema, no fluctuance  Neuro: Nonfocal     LABS   BMP  Recent Labs     24  1907 24  1115 24  20424  22224  0458   * 134*  --   --   --  140   K 3.8 2.6*   < > 3.3* 3.0* 3.5   CL 95* 95*  --   --   --  101   CO2 19* 22  --   --   --  23   BUN 79* 66*  --   --   --  38*   CREATININE 5.4* 3.9*  --   --   --  2.2*   CALCIUM 8.2* 7.9*  --   --   --  8.1*   PHOS  --  5.8*  --   --   --  3.1    < > = values in this interval not displayed.     CBC/COAGS  Recent Labs     24   WBC 14.0* 9.7 9.9   HCT 32.5* 27.5* 29.0*    242 252     Liver & Pancreas  Recent Labs     24  045   AST 42*  --   --    ALT 32  --   --    ALKPHOS 89  --   --    ALBUMIN 3.2* 2.5* 3.2*          IMAGING   Reviewed CXR   Above studies and images were personally reviewed by myself    MEDS   Scheduled Meds:   vancomycin  750 mg IntraVENous Once    midodrine  2.5 mg Oral TID WC    sodium chloride flush  5-40 mL IntraVENous 2 times per day    heparin

## 2024-08-04 LAB
ALBUMIN SERPL-MCNC: 2.8 G/DL (ref 3.4–5)
ANION GAP SERPL CALCULATED.3IONS-SCNC: 10 MMOL/L (ref 3–16)
BUN SERPL-MCNC: 21 MG/DL (ref 7–20)
CALCIUM SERPL-MCNC: 7.8 MG/DL (ref 8.3–10.6)
CHLORIDE SERPL-SCNC: 101 MMOL/L (ref 99–110)
CO2 SERPL-SCNC: 26 MMOL/L (ref 21–32)
CREAT SERPL-MCNC: 1.5 MG/DL (ref 0.6–1.2)
DEPRECATED RDW RBC AUTO: 15.9 % (ref 12.4–15.4)
GFR SERPLBLD CREATININE-BSD FMLA CKD-EPI: 35 ML/MIN/{1.73_M2}
GLUCOSE SERPL-MCNC: 127 MG/DL (ref 70–99)
HCT VFR BLD AUTO: 27 % (ref 36–48)
HGB BLD-MCNC: 9.3 G/DL (ref 12–16)
MAGNESIUM SERPL-MCNC: 1.9 MG/DL (ref 1.8–2.4)
MCH RBC QN AUTO: 29.5 PG (ref 26–34)
MCHC RBC AUTO-ENTMCNC: 34.4 G/DL (ref 31–36)
MCV RBC AUTO: 85.9 FL (ref 80–100)
PHOSPHATE SERPL-MCNC: 2.2 MG/DL (ref 2.5–4.9)
PLATELET # BLD AUTO: 260 K/UL (ref 135–450)
PMV BLD AUTO: 7.2 FL (ref 5–10.5)
POTASSIUM SERPL-SCNC: 3 MMOL/L (ref 3.5–5.1)
RBC # BLD AUTO: 3.14 M/UL (ref 4–5.2)
SODIUM SERPL-SCNC: 137 MMOL/L (ref 136–145)
VANCOMYCIN SERPL-MCNC: 13.9 UG/ML
WBC # BLD AUTO: 9.7 K/UL (ref 4–11)

## 2024-08-04 PROCEDURE — 6360000002 HC RX W HCPCS: Performed by: STUDENT IN AN ORGANIZED HEALTH CARE EDUCATION/TRAINING PROGRAM

## 2024-08-04 PROCEDURE — 94640 AIRWAY INHALATION TREATMENT: CPT

## 2024-08-04 PROCEDURE — 6370000000 HC RX 637 (ALT 250 FOR IP): Performed by: STUDENT IN AN ORGANIZED HEALTH CARE EDUCATION/TRAINING PROGRAM

## 2024-08-04 PROCEDURE — 80069 RENAL FUNCTION PANEL: CPT

## 2024-08-04 PROCEDURE — 2500000003 HC RX 250 WO HCPCS: Performed by: INTERNAL MEDICINE

## 2024-08-04 PROCEDURE — 2580000003 HC RX 258

## 2024-08-04 PROCEDURE — 83735 ASSAY OF MAGNESIUM: CPT

## 2024-08-04 PROCEDURE — 85027 COMPLETE CBC AUTOMATED: CPT

## 2024-08-04 PROCEDURE — 36415 COLL VENOUS BLD VENIPUNCTURE: CPT

## 2024-08-04 PROCEDURE — 80202 ASSAY OF VANCOMYCIN: CPT

## 2024-08-04 PROCEDURE — 94760 N-INVAS EAR/PLS OXIMETRY 1: CPT

## 2024-08-04 PROCEDURE — 2580000003 HC RX 258: Performed by: STUDENT IN AN ORGANIZED HEALTH CARE EDUCATION/TRAINING PROGRAM

## 2024-08-04 PROCEDURE — 99233 SBSQ HOSP IP/OBS HIGH 50: CPT | Performed by: STUDENT IN AN ORGANIZED HEALTH CARE EDUCATION/TRAINING PROGRAM

## 2024-08-04 PROCEDURE — 2060000000 HC ICU INTERMEDIATE R&B

## 2024-08-04 PROCEDURE — 2580000003 HC RX 258: Performed by: INTERNAL MEDICINE

## 2024-08-04 PROCEDURE — 6370000000 HC RX 637 (ALT 250 FOR IP): Performed by: INTERNAL MEDICINE

## 2024-08-04 PROCEDURE — 6370000000 HC RX 637 (ALT 250 FOR IP)

## 2024-08-04 PROCEDURE — 2500000003 HC RX 250 WO HCPCS

## 2024-08-04 RX ORDER — FLUTICASONE PROPIONATE 50 MCG
1 SPRAY, SUSPENSION (ML) NASAL DAILY
Status: DISCONTINUED | OUTPATIENT
Start: 2024-08-04 | End: 2024-08-07 | Stop reason: HOSPADM

## 2024-08-04 RX ORDER — LIDOCAINE HYDROCHLORIDE 20 MG/ML
5 SOLUTION OROPHARYNGEAL
Status: DISCONTINUED | OUTPATIENT
Start: 2024-08-04 | End: 2024-08-07 | Stop reason: HOSPADM

## 2024-08-04 RX ORDER — SODIUM CHLORIDE 9 MG/ML
INJECTION, SOLUTION INTRAVENOUS CONTINUOUS
Status: DISCONTINUED | OUTPATIENT
Start: 2024-08-04 | End: 2024-08-05

## 2024-08-04 RX ORDER — DULOXETIN HYDROCHLORIDE 20 MG/1
40 CAPSULE, DELAYED RELEASE ORAL DAILY
Status: DISCONTINUED | OUTPATIENT
Start: 2024-08-05 | End: 2024-08-07 | Stop reason: HOSPADM

## 2024-08-04 RX ORDER — LIDOCAINE 40 MG/G
CREAM TOPICAL 2 TIMES DAILY PRN
Status: DISCONTINUED | OUTPATIENT
Start: 2024-08-04 | End: 2024-08-07 | Stop reason: HOSPADM

## 2024-08-04 RX ADMIN — SODIUM CHLORIDE: 9 INJECTION, SOLUTION INTRAVENOUS at 14:37

## 2024-08-04 RX ADMIN — LIDOCAINE: 40 CREAM TOPICAL at 17:32

## 2024-08-04 RX ADMIN — SODIUM CHLORIDE, PRESERVATIVE FREE 5 ML: 5 INJECTION INTRAVENOUS at 08:39

## 2024-08-04 RX ADMIN — METOPROLOL SUCCINATE 50 MG: 50 TABLET, EXTENDED RELEASE ORAL at 08:38

## 2024-08-04 RX ADMIN — OXYCODONE AND ACETAMINOPHEN 1 TABLET: 10; 325 TABLET ORAL at 10:36

## 2024-08-04 RX ADMIN — AMIODARONE HYDROCHLORIDE 100 MG: 200 TABLET ORAL at 08:39

## 2024-08-04 RX ADMIN — HEPARIN SODIUM 5000 UNITS: 5000 INJECTION INTRAVENOUS; SUBCUTANEOUS at 14:38

## 2024-08-04 RX ADMIN — FLUTICASONE PROPIONATE 1 SPRAY: 50 SPRAY, METERED NASAL at 12:43

## 2024-08-04 RX ADMIN — EZETIMIBE 10 MG: 10 TABLET ORAL at 08:38

## 2024-08-04 RX ADMIN — OXYCODONE HYDROCHLORIDE 10 MG: 10 TABLET, FILM COATED, EXTENDED RELEASE ORAL at 23:09

## 2024-08-04 RX ADMIN — DULOXETINE HYDROCHLORIDE 30 MG: 30 CAPSULE, DELAYED RELEASE ORAL at 08:38

## 2024-08-04 RX ADMIN — MIDODRINE HYDROCHLORIDE 2.5 MG: 5 TABLET ORAL at 08:38

## 2024-08-04 RX ADMIN — SODIUM CHLORIDE: 9 INJECTION, SOLUTION INTRAVENOUS at 11:48

## 2024-08-04 RX ADMIN — BUDESONIDE AND FORMOTEROL FUMARATE DIHYDRATE 2 PUFF: 160; 4.5 AEROSOL RESPIRATORY (INHALATION) at 08:47

## 2024-08-04 RX ADMIN — SODIUM BICARBONATE: 84 INJECTION, SOLUTION INTRAVENOUS at 06:58

## 2024-08-04 RX ADMIN — Medication 5 ML: at 17:31

## 2024-08-04 RX ADMIN — OXYCODONE AND ACETAMINOPHEN 1 TABLET: 10; 325 TABLET ORAL at 00:17

## 2024-08-04 RX ADMIN — LEVOTHYROXINE SODIUM 88 MCG: 0.09 TABLET ORAL at 08:39

## 2024-08-04 RX ADMIN — HEPARIN SODIUM 5000 UNITS: 5000 INJECTION INTRAVENOUS; SUBCUTANEOUS at 05:53

## 2024-08-04 RX ADMIN — HEPARIN SODIUM 5000 UNITS: 5000 INJECTION INTRAVENOUS; SUBCUTANEOUS at 20:37

## 2024-08-04 RX ADMIN — MIDODRINE HYDROCHLORIDE 2.5 MG: 5 TABLET ORAL at 17:30

## 2024-08-04 RX ADMIN — OXYCODONE AND ACETAMINOPHEN 1 TABLET: 10; 325 TABLET ORAL at 20:37

## 2024-08-04 RX ADMIN — VANCOMYCIN HYDROCHLORIDE 750 MG: 750 INJECTION, POWDER, LYOPHILIZED, FOR SOLUTION INTRAVENOUS at 14:38

## 2024-08-04 RX ADMIN — OXYCODONE HYDROCHLORIDE 10 MG: 10 TABLET, FILM COATED, EXTENDED RELEASE ORAL at 08:38

## 2024-08-04 RX ADMIN — POTASSIUM BICARBONATE 40 MEQ: 782 TABLET, EFFERVESCENT ORAL at 11:49

## 2024-08-04 RX ADMIN — SODIUM PHOSPHATE, MONOBASIC, MONOHYDRATE AND SODIUM PHOSPHATE, DIBASIC, ANHYDROUS 20 MMOL: 276; 142 INJECTION, SOLUTION INTRAVENOUS at 13:03

## 2024-08-04 RX ADMIN — CEFEPIME 1000 MG: 1 INJECTION, POWDER, FOR SOLUTION INTRAMUSCULAR; INTRAVENOUS at 17:30

## 2024-08-04 RX ADMIN — BUDESONIDE AND FORMOTEROL FUMARATE DIHYDRATE 2 PUFF: 160; 4.5 AEROSOL RESPIRATORY (INHALATION) at 20:49

## 2024-08-04 ASSESSMENT — PAIN DESCRIPTION - LOCATION
LOCATION: SHOULDER
LOCATION: SHOULDER
LOCATION: BACK
LOCATION: BACK
LOCATION: MOUTH

## 2024-08-04 ASSESSMENT — PAIN DESCRIPTION - ORIENTATION
ORIENTATION: RIGHT
ORIENTATION: ANTERIOR
ORIENTATION: LOWER
ORIENTATION: RIGHT

## 2024-08-04 ASSESSMENT — PAIN SCALES - GENERAL
PAINLEVEL_OUTOF10: 7
PAINLEVEL_OUTOF10: 0
PAINLEVEL_OUTOF10: 8
PAINLEVEL_OUTOF10: 0
PAINLEVEL_OUTOF10: 5
PAINLEVEL_OUTOF10: 7
PAINLEVEL_OUTOF10: 7
PAINLEVEL_OUTOF10: 0

## 2024-08-04 ASSESSMENT — PAIN DESCRIPTION - ONSET
ONSET: GRADUAL
ONSET: ON-GOING
ONSET: GRADUAL

## 2024-08-04 ASSESSMENT — PAIN DESCRIPTION - PAIN TYPE
TYPE: ACUTE PAIN
TYPE: CHRONIC PAIN
TYPE: CHRONIC PAIN

## 2024-08-04 ASSESSMENT — PAIN DESCRIPTION - FREQUENCY
FREQUENCY: INTERMITTENT
FREQUENCY: INTERMITTENT
FREQUENCY: CONTINUOUS

## 2024-08-04 ASSESSMENT — PAIN DESCRIPTION - DESCRIPTORS
DESCRIPTORS: SORE
DESCRIPTORS: ACHING;DISCOMFORT
DESCRIPTORS: STABBING

## 2024-08-04 ASSESSMENT — PAIN SCALES - WONG BAKER
WONGBAKER_NUMERICALRESPONSE: NO HURT

## 2024-08-04 ASSESSMENT — PAIN - FUNCTIONAL ASSESSMENT
PAIN_FUNCTIONAL_ASSESSMENT: ACTIVITIES ARE NOT PREVENTED
PAIN_FUNCTIONAL_ASSESSMENT: PREVENTS OR INTERFERES SOME ACTIVE ACTIVITIES AND ADLS
PAIN_FUNCTIONAL_ASSESSMENT: PREVENTS OR INTERFERES SOME ACTIVE ACTIVITIES AND ADLS

## 2024-08-04 NOTE — PROGRESS NOTES
Clinical Pharmacy Note  Vancomycin Consult    Shonda Hernandes is a 78 y.o. female ordered vancomycin for SSTI; consult received from Dr. Nguyễn to manage therapy. Also receiving cefepime.    Allergies:  Benadryl [diphenhydramine], Doxylamine, Mucinex [guaifenesin er], Spiriva handihaler [tiotropium bromide monohydrate], Adhesive tape, Neosporin [bacitracin-polymyxin b], and Tylenol [acetaminophen]     Temp max:  Temp (24hrs), Av.6 °F (37 °C), Min:98.1 °F (36.7 °C), Max:99.5 °F (37.5 °C)      Recent Labs     24  04224  0458 24  0504   WBC 9.7 9.9 9.7         Recent Labs     248 24  0504   BUN 66* 38* 21*   CREATININE 3.9* 2.2* 1.5*           Intake/Output Summary (Last 24 hours) at 2024 1313  Last data filed at 2024 1130  Gross per 24 hour   Intake 2935.64 ml   Output 1800 ml   Net 1135.64 ml         Culture Results:  Pending    Ht Readings from Last 1 Encounters:   24 1.549 m (5' 1\")        Wt Readings from Last 1 Encounters:   24 52 kg (114 lb 10.2 oz)         Estimated Creatinine Clearance: 23 mL/min (A) (based on SCr of 1.5 mg/dL (H)).    Assessment:  Day # 4/5 of vancomycin.  Current regimen: Intermittent dosing based on levels  SCr 6 -> 5.4 -> 3.9 --> 2.2--> 1.5 mg/dL  Vancomycin level: 13.9 mg/L       Plan:  Contineu with 750 mg every 24 hours x 2 doses to finish 5 days presuming continued improvement in renal function. If kidney function worsens tomorrow, check a random prior to next dose.       Thank you for the consult.     Mike Torres RPH  2024 1:13 PM

## 2024-08-04 NOTE — PROGRESS NOTES
Nephrology Progress Note   [facility]      Reason for consultation: ANITA on CKD 3a/b -- baseline Cr ~ 1.2-1.6 mg/dL since 12/2022    History of Present Illness: Shonda Hernandes is a 79 yo male with a PMHx of CKD 3a/b, CHF, COPD, HTN, HLD, hypothyroidism, SHAVONNE, PVD, CAD, afib, h/o bladder cancer. Patient presents to  ED on 8/1/2024 with confusion, hallucinations, dizziness, and edema with erythema to BLE. Patient is a difficult historian. Family at bedside states the AMS began Wednesday of last week and has been worsening since. Legs are erythematous and hot to touch. WBC 14.0>9.7. Receiving vancomycin and cefepime. CT head negative for acute findings.     We have been consulted for ANITA on CKD 3a/b. Patient does not follow with a nephrologist. Baseline Cr is ~ 1.2-1.6 mg/dL since 12/2022. Her Cr was 2.2 mg/dL in 5/2024. Cr upon admission was 6.0 mg/dL. A li catheter was placed for strict I/Os. Renal US was obtained following li catheter placement and was negative for hydro. Pressures have been soft with readings as low as 90/47 mmHg. Endorses decreased oral intake the past couple days. Denies  symptoms. Takes metolazone, torsemide, and valsartan. Denies NSAID use. Received IVF and today Cr is 3.9 mg/dL. Patient was initially on RA and is now on 1 L NC. CXR yesterday was negative for acute findings.     Subjective:    Patient seen and examined. Labs and chart reviewed. Resting in recliner. Family at bedside  There were not complications last night.  Patient review of systems: Denies SOB; feels weak    Scheduled Meds:   potassium bicarb-citric acid  40 mEq Oral Once    cefepime  1,000 mg IntraVENous Q24H    midodrine  2.5 mg Oral TID WC    sodium chloride flush  5-40 mL IntraVENous 2 times per day    heparin (porcine)  5,000 Units SubCUTAneous 3 times per day    amiodarone  100 mg Oral Daily    budesonide-formoterol  2 puff Inhalation BID    DULoxetine  30 mg Oral Daily    ezetimibe  10 mg Oral Daily

## 2024-08-04 NOTE — PROGRESS NOTES
[place] [facility]     INPATIENT PROGRESS NOTE  Name: Shonda Hernandes  /Age/Sex: 1945 (78 y.o. female)   MRN & CSN: [id number] & [id number]  Admission Date/Time: 2024  2:31 PM   Location: [unfilled] [id number]  Current Hospital Day: Hospital Day: 4   Principal Problem: ANITA (acute kidney injury) (HCC)  HPI     Patient seen and examined.No issues overnight. Reports chronic pain in shoulders and legs is worse today. Afebrile. She is tearful and feels depressed.     All other review of systems negative unless noted above.  VITALS  Vitals:    24 1045   BP: 124/65   Pulse: 87   Resp: 20   Temp: 98.5 °F (36.9 °C)   SpO2: 93%         PHYSICAL EXAM     General: No acute distress   HEENT: PERRL, EOMI  Cardiac: Regular rate and rhythm, no murmur  Lungs: Clear to auscultation anteriorly  Abdomen: Soft nontender nondistended  Extremities: Left dorsal foot erythema and blister, no fluctuance  Neuro: Nonfocal     LABS   BMP  Recent Labs     24  04224  1115 24  2229 24  0458 24  0504   *  --   --  140 137   K 2.6*   < > 3.0* 3.5 3.0*   CL 95*  --   --  101 101   CO2 22  --   --  23 26   BUN 66*  --   --  38* 21*   CREATININE 3.9*  --   --  2.2* 1.5*   CALCIUM 7.9*  --   --  8.1* 7.8*   PHOS 5.8*  --   --  3.1 2.2*    < > = values in this interval not displayed.     CBC/COAGS  Recent Labs     24  04224  0458 24  0504   WBC 9.7 9.9 9.7   HCT 27.5* 29.0* 27.0*    252 260     Liver & Pancreas  Recent Labs     24  1459 24  0421 24  0458 24  0504   AST 42*  --   --   --    ALT 32  --   --   --    ALKPHOS 89  --   --   --    ALBUMIN 3.2* 2.5* 3.2* 2.8*          IMAGING   Reviewed CXR   Above studies and images were personally reviewed by myself    MEDS   Scheduled Meds:   sodium phosphate IVPB (CENTRAL line)  20 mmol IntraVENous Once    [START ON 2024] DULoxetine  40 mg Oral Daily    cefepime  1,000 mg  IntraVENous Q24H    midodrine  2.5 mg Oral TID WC    sodium chloride flush  5-40 mL IntraVENous 2 times per day    heparin (porcine)  5,000 Units SubCUTAneous 3 times per day    amiodarone  100 mg Oral Daily    budesonide-formoterol  2 puff Inhalation BID    ezetimibe  10 mg Oral Daily    levothyroxine  88 mcg Oral Daily    metoprolol succinate  50 mg Oral Daily    oxyCODONE  10 mg Oral 2 times per day    vancomycin (VANCOCIN) intermittent dosing (placeholder)   Other RX Placeholder     Continuous Infusions:   sodium chloride 75 mL/hr at 08/04/24 1148    sodium chloride       PRN Meds:sodium chloride flush, sodium chloride, polyethylene glycol, oxyCODONE-acetaminophen     CURRENT HOSPITAL PROBLEMS   Principal Problem:    ANITA (acute kidney injury) (HCC)  Resolved Problems:    * No resolved hospital problems. *     ANITA (acute kidney injury) (HCC), improving  -Likely volume depletion, continue sodium chloride  - Appreciate nephrology assistance  - Renal ultrasound unremarkable     Lower extremity cellulitis, left foot, improving  - Continue vancomycin and cefepime     Hypokalemia  - Replete     Diastolic CHF  - Hold diuretics     Depression  - Increase duloxetine 40 mg    Chronic pain  - Continue home regimen  - add lidocaine gel     Hypothyroidism  - Continue Synthroid     A-fib status post RENETTA exclusion  -Continue amiodarone        F/E/N: Reg  PPx: Heparin  Dispo: ANITA improvement, cellulitis tx     Adeel Nguyễn MD 8/4/2024 12:02 PM

## 2024-08-04 NOTE — PLAN OF CARE
Problem: Pain  Goal: Verbalizes/displays adequate comfort level or baseline comfort level  8/4/2024 0106 by Janell Wilson, RN  Outcome: Progressing  Flowsheets (Taken 8/4/2024 0106)  Verbalizes/displays adequate comfort level or baseline comfort level:   Encourage patient to monitor pain and request assistance   Assess pain using appropriate pain scale   Administer analgesics based on type and severity of pain and evaluate response   Implement non-pharmacological measures as appropriate and evaluate response     Problem: Skin/Tissue Integrity  Goal: Absence of new skin breakdown  Description: 1.  Monitor for areas of redness and/or skin breakdown  2.  Assess vascular access sites hourly  3.  Every 4-6 hours minimum:  Change oxygen saturation probe site  4.  Every 4-6 hours:  If on nasal continuous positive airway pressure, respiratory therapy assess nares and determine need for appliance change or resting period.  8/4/2024 0106 by Janell Wilson, RN  Outcome: Progressing

## 2024-08-04 NOTE — PLAN OF CARE
Problem: Discharge Planning  Goal: Discharge to home or other facility with appropriate resources  Outcome: Progressing  Flowsheets (Taken 8/4/2024 0106)  Discharge to home or other facility with appropriate resources: Identify barriers to discharge with patient and caregiver     Problem: Pain  Goal: Verbalizes/displays adequate comfort level or baseline comfort level  Outcome: Progressing  Flowsheets (Taken 8/4/2024 0106)  Verbalizes/displays adequate comfort level or baseline comfort level:   Encourage patient to monitor pain and request assistance   Assess pain using appropriate pain scale   Administer analgesics based on type and severity of pain and evaluate response   Implement non-pharmacological measures as appropriate and evaluate response     Problem: Skin/Tissue Integrity  Goal: Absence of new skin breakdown  Description: 1.  Monitor for areas of redness and/or skin breakdown  2.  Assess vascular access sites hourly  3.  Every 4-6 hours minimum:  Change oxygen saturation probe site  4.  Every 4-6 hours:  If on nasal continuous positive airway pressure, respiratory therapy assess nares and determine need for appliance change or resting period.  Outcome: Progressing     Problem: Safety - Adult  Goal: Free from fall injury  Outcome: Progressing  Flowsheets (Taken 8/4/2024 0106)  Free From Fall Injury:   Based on caregiver fall risk screen, instruct family/caregiver to ask for assistance with transferring infant if caregiver noted to have fall risk factors   Instruct family/caregiver on patient safety     Problem: Chronic Conditions and Co-morbidities  Goal: Patient's chronic conditions and co-morbidity symptoms are monitored and maintained or improved  Outcome: Progressing  Flowsheets (Taken 8/4/2024 0106)  Care Plan - Patient's Chronic Conditions and Co-Morbidity Symptoms are Monitored and Maintained or Improved: Monitor and assess patient's chronic conditions and comorbid symptoms for stability,  deterioration, or improvement

## 2024-08-04 NOTE — PROGRESS NOTES
Patient refusing turns so far this shift. Patient educated on importance of turning r/t skin integrity, especially since she already has a stage 2. Patient v/u but continues to refuse. Patient states, \"I am more worried about my shoulder pain right now than my butt.\" PRN percocet given per order for shoulder pain, see MAR.

## 2024-08-04 NOTE — PROGRESS NOTES
Patient alert and oriented x 4 and up x 1 walker vs stedy. Johnson patent and draining. Patient tolerating diet with no c/o nausea at this time. Scheduled oxycodone given per order for chronic back and R shoulder pain. Initial assessment completed, see flowsheet. IV antibiotics and fluids infusing per order. Patient's bed is locked and in lowest position, side rails up x 2 with an active bed alarm. Non slip socks applied.

## 2024-08-05 PROBLEM — M75.41 SHOULDER IMPINGEMENT SYNDROME, RIGHT: Status: ACTIVE | Noted: 2024-08-05

## 2024-08-05 LAB
ALBUMIN SERPL-MCNC: 2.7 G/DL (ref 3.4–5)
ANION GAP SERPL CALCULATED.3IONS-SCNC: 13 MMOL/L (ref 3–16)
BACTERIA BLD CULT ORG #2: NORMAL
BACTERIA BLD CULT: NORMAL
BUN SERPL-MCNC: 15 MG/DL (ref 7–20)
CALCIUM SERPL-MCNC: 8.2 MG/DL (ref 8.3–10.6)
CHLORIDE SERPL-SCNC: 102 MMOL/L (ref 99–110)
CO2 SERPL-SCNC: 23 MMOL/L (ref 21–32)
CREAT SERPL-MCNC: 1 MG/DL (ref 0.6–1.2)
DEPRECATED RDW RBC AUTO: 15.6 % (ref 12.4–15.4)
GFR SERPLBLD CREATININE-BSD FMLA CKD-EPI: 57 ML/MIN/{1.73_M2}
GLUCOSE SERPL-MCNC: 78 MG/DL (ref 70–99)
HCT VFR BLD AUTO: 27.2 % (ref 36–48)
HGB BLD-MCNC: 9.3 G/DL (ref 12–16)
MAGNESIUM SERPL-MCNC: 1.7 MG/DL (ref 1.8–2.4)
MCH RBC QN AUTO: 29.7 PG (ref 26–34)
MCHC RBC AUTO-ENTMCNC: 34.1 G/DL (ref 31–36)
MCV RBC AUTO: 87.1 FL (ref 80–100)
PHOSPHATE SERPL-MCNC: 2.3 MG/DL (ref 2.5–4.9)
PLATELET # BLD AUTO: 258 K/UL (ref 135–450)
PMV BLD AUTO: 7.9 FL (ref 5–10.5)
POTASSIUM SERPL-SCNC: 3.5 MMOL/L (ref 3.5–5.1)
RBC # BLD AUTO: 3.13 M/UL (ref 4–5.2)
SODIUM SERPL-SCNC: 138 MMOL/L (ref 136–145)
WBC # BLD AUTO: 10.8 K/UL (ref 4–11)

## 2024-08-05 PROCEDURE — 6370000000 HC RX 637 (ALT 250 FOR IP)

## 2024-08-05 PROCEDURE — 2060000000 HC ICU INTERMEDIATE R&B

## 2024-08-05 PROCEDURE — 83735 ASSAY OF MAGNESIUM: CPT

## 2024-08-05 PROCEDURE — 20610 DRAIN/INJ JOINT/BURSA W/O US: CPT | Performed by: ORTHOPAEDIC SURGERY

## 2024-08-05 PROCEDURE — 2580000003 HC RX 258

## 2024-08-05 PROCEDURE — 6360000002 HC RX W HCPCS: Performed by: STUDENT IN AN ORGANIZED HEALTH CARE EDUCATION/TRAINING PROGRAM

## 2024-08-05 PROCEDURE — 85027 COMPLETE CBC AUTOMATED: CPT

## 2024-08-05 PROCEDURE — 94761 N-INVAS EAR/PLS OXIMETRY MLT: CPT

## 2024-08-05 PROCEDURE — 6360000002 HC RX W HCPCS: Performed by: NURSE PRACTITIONER

## 2024-08-05 PROCEDURE — 94640 AIRWAY INHALATION TREATMENT: CPT

## 2024-08-05 PROCEDURE — 97530 THERAPEUTIC ACTIVITIES: CPT

## 2024-08-05 PROCEDURE — 6370000000 HC RX 637 (ALT 250 FOR IP): Performed by: STUDENT IN AN ORGANIZED HEALTH CARE EDUCATION/TRAINING PROGRAM

## 2024-08-05 PROCEDURE — 99222 1ST HOSP IP/OBS MODERATE 55: CPT | Performed by: ORTHOPAEDIC SURGERY

## 2024-08-05 PROCEDURE — 2500000003 HC RX 250 WO HCPCS

## 2024-08-05 PROCEDURE — 2580000003 HC RX 258: Performed by: STUDENT IN AN ORGANIZED HEALTH CARE EDUCATION/TRAINING PROGRAM

## 2024-08-05 PROCEDURE — 36415 COLL VENOUS BLD VENIPUNCTURE: CPT

## 2024-08-05 PROCEDURE — 80069 RENAL FUNCTION PANEL: CPT

## 2024-08-05 PROCEDURE — 99232 SBSQ HOSP IP/OBS MODERATE 35: CPT | Performed by: STUDENT IN AN ORGANIZED HEALTH CARE EDUCATION/TRAINING PROGRAM

## 2024-08-05 RX ORDER — TRIAMCINOLONE ACETONIDE 40 MG/ML
80 INJECTION, SUSPENSION INTRA-ARTICULAR; INTRAMUSCULAR ONCE
Status: COMPLETED | OUTPATIENT
Start: 2024-08-05 | End: 2024-08-05

## 2024-08-05 RX ORDER — BUPIVACAINE HYDROCHLORIDE 5 MG/ML
2 INJECTION, SOLUTION PERINEURAL ONCE
Status: COMPLETED | OUTPATIENT
Start: 2024-08-05 | End: 2024-08-05

## 2024-08-05 RX ORDER — MAGNESIUM SULFATE IN WATER 40 MG/ML
2000 INJECTION, SOLUTION INTRAVENOUS ONCE
Status: COMPLETED | OUTPATIENT
Start: 2024-08-05 | End: 2024-08-05

## 2024-08-05 RX ADMIN — MIDODRINE HYDROCHLORIDE 2.5 MG: 5 TABLET ORAL at 08:07

## 2024-08-05 RX ADMIN — OXYCODONE AND ACETAMINOPHEN 1 TABLET: 10; 325 TABLET ORAL at 20:49

## 2024-08-05 RX ADMIN — BUDESONIDE AND FORMOTEROL FUMARATE DIHYDRATE 2 PUFF: 160; 4.5 AEROSOL RESPIRATORY (INHALATION) at 07:36

## 2024-08-05 RX ADMIN — CEFEPIME 1000 MG: 1 INJECTION, POWDER, FOR SOLUTION INTRAMUSCULAR; INTRAVENOUS at 17:36

## 2024-08-05 RX ADMIN — SODIUM PHOSPHATE, MONOBASIC, MONOHYDRATE AND SODIUM PHOSPHATE, DIBASIC, ANHYDROUS 20 MMOL: 276; 142 INJECTION, SOLUTION INTRAVENOUS at 10:03

## 2024-08-05 RX ADMIN — EZETIMIBE 10 MG: 10 TABLET ORAL at 08:06

## 2024-08-05 RX ADMIN — VANCOMYCIN HYDROCHLORIDE 750 MG: 750 INJECTION, POWDER, LYOPHILIZED, FOR SOLUTION INTRAVENOUS at 15:06

## 2024-08-05 RX ADMIN — AMIODARONE HYDROCHLORIDE 100 MG: 200 TABLET ORAL at 08:07

## 2024-08-05 RX ADMIN — OXYCODONE HYDROCHLORIDE 10 MG: 10 TABLET, FILM COATED, EXTENDED RELEASE ORAL at 09:49

## 2024-08-05 RX ADMIN — FLUTICASONE PROPIONATE 1 SPRAY: 50 SPRAY, METERED NASAL at 09:57

## 2024-08-05 RX ADMIN — HEPARIN SODIUM 5000 UNITS: 5000 INJECTION INTRAVENOUS; SUBCUTANEOUS at 06:51

## 2024-08-05 RX ADMIN — OXYCODONE HYDROCHLORIDE 10 MG: 10 TABLET, FILM COATED, EXTENDED RELEASE ORAL at 22:59

## 2024-08-05 RX ADMIN — BUDESONIDE AND FORMOTEROL FUMARATE DIHYDRATE 2 PUFF: 160; 4.5 AEROSOL RESPIRATORY (INHALATION) at 20:39

## 2024-08-05 RX ADMIN — BUPIVACAINE HYDROCHLORIDE 10 MG: 5 INJECTION, SOLUTION EPIDURAL; INTRACAUDAL; PERINEURAL at 16:10

## 2024-08-05 RX ADMIN — TRIAMCINOLONE ACETONIDE 80 MG: 40 INJECTION, SUSPENSION INTRA-ARTICULAR; INTRAMUSCULAR at 16:08

## 2024-08-05 RX ADMIN — METOPROLOL SUCCINATE 50 MG: 50 TABLET, EXTENDED RELEASE ORAL at 08:07

## 2024-08-05 RX ADMIN — LEVOTHYROXINE SODIUM 88 MCG: 0.09 TABLET ORAL at 08:06

## 2024-08-05 RX ADMIN — MIDODRINE HYDROCHLORIDE 2.5 MG: 5 TABLET ORAL at 16:13

## 2024-08-05 RX ADMIN — HEPARIN SODIUM 5000 UNITS: 5000 INJECTION INTRAVENOUS; SUBCUTANEOUS at 14:20

## 2024-08-05 RX ADMIN — HEPARIN SODIUM 5000 UNITS: 5000 INJECTION INTRAVENOUS; SUBCUTANEOUS at 20:55

## 2024-08-05 RX ADMIN — MIDODRINE HYDROCHLORIDE 2.5 MG: 5 TABLET ORAL at 11:26

## 2024-08-05 RX ADMIN — MAGNESIUM SULFATE HEPTAHYDRATE 2000 MG: 40 INJECTION, SOLUTION INTRAVENOUS at 08:14

## 2024-08-05 RX ADMIN — OXYCODONE AND ACETAMINOPHEN 1 TABLET: 10; 325 TABLET ORAL at 08:06

## 2024-08-05 RX ADMIN — DULOXETINE HYDROCHLORIDE 40 MG: 20 CAPSULE, DELAYED RELEASE ORAL at 08:06

## 2024-08-05 ASSESSMENT — PAIN SCALES - GENERAL
PAINLEVEL_OUTOF10: 7
PAINLEVEL_OUTOF10: 7
PAINLEVEL_OUTOF10: 8

## 2024-08-05 ASSESSMENT — PAIN DESCRIPTION - ORIENTATION
ORIENTATION: RIGHT
ORIENTATION: RIGHT

## 2024-08-05 ASSESSMENT — PAIN DESCRIPTION - LOCATION
LOCATION: SHOULDER
LOCATION: SHOULDER

## 2024-08-05 ASSESSMENT — PAIN SCALES - WONG BAKER: WONGBAKER_NUMERICALRESPONSE: NO HURT

## 2024-08-05 ASSESSMENT — PAIN DESCRIPTION - DESCRIPTORS: DESCRIPTORS: ACHING;DISCOMFORT

## 2024-08-05 ASSESSMENT — PAIN - FUNCTIONAL ASSESSMENT: PAIN_FUNCTIONAL_ASSESSMENT: ACTIVITIES ARE NOT PREVENTED

## 2024-08-05 NOTE — CONSULTS
[facility] Orthopedic Surgery  Consult Note    This patient is seen in consultation at the request of Dr Nguyễn    Reason for Consult:  Right frozen shoulder    CHIEF COMPLAINT:  Right shoulder pain    History Obtained From:  patient, electronic medical record    HISTORY OF PRESENT ILLNESS:    The patient is a 78 y.o. female who presents with right shoulder pain. She states she has a rotator cuff tear and Dr Alcantar injects it every 3 months. She states she is due next week and right shoulder painful today. Pain is described in right lateral shoulder and with the intensity of moderate to severe. Pain is described as aching, piercing. Discomfort is intermittent. Pain is worse with use of right arm and better at rest. No other complaints.     Past Medical History:        Diagnosis Date    Arthritis of shoulder region, right 2024    advanced    Atrial fibrillation (HCC)     had watchman's    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    Bladder cancer (HCC) 02/2014    CAD (coronary artery disease) 2014    Carotid stenosis 04/30/2014    Chronic back pain     Chronic diastolic CHF (congestive heart failure) ([facility]) 2016    Chronic prescription opiate use     COPD (HCC)     Diverticulosis     HTN (hypertension)     Hyperlipidemia     Hypothyroidism     Ischemic stroke 2013    x 2    Lumbar spine stenosis 2017    multiple procedures unsuccessful.    Macular degeneration 2018    Nonrheumatic mitral valve regurgitation     Obstructive sleep apnea     Osteoarthritis     Peripheral neuropathy     Pulmonary HTN ([facility]) 2014    PVD (peripheral vascular disease) ([facility])     Syncope 11/2022    of unknown cause    Tobacco use disorder in remission        Past Surgical History:        Procedure Laterality Date    ABLATION OF DYSRHYTHMIC FOCUS  05/09/2022    AORTIC VALVE REPLACEMENT  6/16/15    Dr. Correa - PVI, RENETTA exclusion. 19mm Maki pericardial Magna    APPENDECTOMY

## 2024-08-05 NOTE — PROGRESS NOTES
Nephrology Progress Note   [facility]      Reason for consultation: ANITA on CKD 3a/b -- baseline Cr ~ 1.2-1.6 mg/dL since 12/2022    History of Present Illness: Shonda Hernandes is a 77 yo male with a PMHx of CKD 3a/b, CHF, COPD, HTN, HLD, hypothyroidism, SHAVONNE, PVD, CAD, afib, h/o bladder cancer. Patient presents to  ED on 8/1/2024 with confusion, hallucinations, dizziness, and edema with erythema to BLE. Patient is a difficult historian. Family at bedside states the AMS began Wednesday of last week and has been worsening since. Legs are erythematous and hot to touch. WBC 14.0>9.7. Receiving vancomycin and cefepime. CT head negative for acute findings.     We have been consulted for ANITA on CKD 3a/b. Patient does not follow with a nephrologist. Baseline Cr is ~ 1.2-1.6 mg/dL since 12/2022. Her Cr was 2.2 mg/dL in 5/2024. Cr upon admission was 6.0 mg/dL. A li catheter was placed for strict I/Os. Renal US was obtained following li catheter placement and was negative for hydro. Pressures have been soft with readings as low as 90/47 mmHg. Endorses decreased oral intake the past couple days. Denies  symptoms. Takes metolazone, torsemide, and valsartan. Denies NSAID use. Received IVF and today Cr is 3.9 mg/dL. Patient was initially on RA and is now on 1 L NC. CXR yesterday was negative for acute findings.     Subjective:    Patient seen and examined. Labs and chart reviewed. Resting in bed. Family at bedside. Cr better than baseline today.   There were not complications last night.  Patient review of systems: Denies SOB; feels weak    Scheduled Meds:   magnesium sulfate  2,000 mg IntraVENous Once    sodium phosphate IVPB (PERIPHERAL line)  20 mmol IntraVENous Once    DULoxetine  40 mg Oral Daily    fluticasone  1 spray Each Nostril Daily    vancomycin  750 mg IntraVENous Q24H    cefepime  1,000 mg IntraVENous Q24H    midodrine  2.5 mg Oral TID WC    sodium chloride flush  5-40 mL IntraVENous 2 times per day    heparin  (porcine)  5,000 Units SubCUTAneous 3 times per day    amiodarone  100 mg Oral Daily    budesonide-formoterol  2 puff Inhalation BID    ezetimibe  10 mg Oral Daily    levothyroxine  88 mcg Oral Daily    metoprolol succinate  50 mg Oral Daily    oxyCODONE  10 mg Oral 2 times per day    vancomycin (VANCOCIN) intermittent dosing (placeholder)   Other RX Placeholder        sodium chloride 5 mL/hr at 24 1437       PRN Meds:lidocaine, lidocaine viscous hcl, sodium chloride flush, sodium chloride, polyethylene glycol, oxyCODONE-acetaminophen    Physical Exam:    TEMPERATURE:  Current - Temp: 98.2 °F (36.8 °C); Max - Temp  Av.3 °F (36.8 °C)  Min: 98 °F (36.7 °C)  Max: 98.5 °F (36.9 °C)  RESPIRATIONS RANGE: Resp  Av.8  Min: 16  Max: 27  PULSE RANGE: Pulse  Av.5  Min: 58  Max: 104  BLOOD PRESSURE RANGE:  Systolic (24hrs), Av , Min:99 , Max:124   ; Diastolic (24hrs), Av, Min:48, Max:68    24HR INTAKE/OUTPUT:    Intake/Output Summary (Last 24 hours) at 2024 0925  Last data filed at 2024 0753  Gross per 24 hour   Intake 2534.31 ml   Output 1800 ml   Net 734.31 ml         Patient Vitals for the past 96 hrs (Last 3 readings):   Weight   24 0231 52.4 kg (115 lb 8.3 oz)   24 0215 52 kg (114 lb 10.2 oz)   24 0206 49.7 kg (109 lb 9.1 oz)         General: Alert, Awake, NAD  HEENT: Normocephalic, atraumatic  Chest: diminished to auscultation, no intercostal retractions  CVS: RRR, no murmur, no rub  Abdomen: soft, non tender  Extremities: no to trace edema, erythema  Skin: normal texture, normal skin turgor, no rash  Neurological: CN intact, no focal motor neurological deficit  Psych: normal affect      LAB DATA:    CBC:   Lab Results   Component Value Date/Time    WBC 10.8 2024 05:31 AM    RBC 3.13 2024 05:31 AM    HGB 9.3 2024 05:31 AM    HCT 27.2 2024 05:31 AM    MCV 87.1 2024 05:31 AM    MCH 29.7 2024 05:31 AM    MCHC 34.1 2024 05:31 AM

## 2024-08-05 NOTE — PROGRESS NOTES
Occupational Therapy  Facility/Department: [address] PROGRESSIVE CARE  Occupational Therapy Treatment and Tentative D/C      Name: Shonda Hernandes  : 1945  MRN: [id number]  Date of Service: 2024    Discharge Recommendations: Shonda Hernandes scored a 18/24 on the AM-PAC ADL Inpatient form. Current research shows that an AM-PAC score of 18 or greater is typically associated with a discharge to the patient's home setting. Based on the patient's AM-PAC score, and their current ADL deficits, it is recommended that the patient have 2-3 sessions per week of Occupational Therapy at d/c to increase the patient's independence.  At this time, this patient demonstrates the endurance and safety to discharge home with HHOT and a follow up treatment frequency of 2-3x/wk.   Please see assessment section for further patient specific details.    If patient discharges prior to next session this note will serve as a discharge summary.  Please see below for the latest assessment towards goals.     24 hour supervision or assist, 2-3 sessions per week, Patient would benefit from continued therapy after discharge  OT Equipment Recommendations  Equipment Needed: No       Patient Diagnosis(es): The primary encounter diagnosis was Dizziness. Diagnoses of Bilateral lower leg cellulitis, Bilateral lower extremity edema, and Acute renal failure, unspecified acute renal failure type (HCC) were also pertinent to this visit.  Past Medical History:  has a past medical history of Arthritis of shoulder region, right, Atrial fibrillation (HCC), AVM (arteriovenous malformation) of duodenum, AVM (arteriovenous malformation) of stomach, Bladder cancer (HCC), CAD (coronary artery disease), Carotid stenosis, Chronic back pain, Chronic diastolic CHF (congestive heart failure) (HCC), Chronic prescription opiate use, COPD (HCC), Diverticulosis, HTN (hypertension), Hyperlipidemia, Hypothyroidism, Ischemic stroke, Lumbar spine stenosis, Macular degeneration,

## 2024-08-05 NOTE — PLAN OF CARE
Problem: Discharge Planning  Goal: Discharge to home or other facility with appropriate resources  8/5/2024 1018 by Genesis Da Silva, RN  Outcome: Progressing     Problem: Safety - Adult  Goal: Free from fall injury  8/5/2024 1018 by Genesis Da Silva, RN  Outcome: Progressing     Problem: Musculoskeletal - Adult  Goal: Return mobility to safest level of function  8/5/2024 1018 by Genesis Da Silva, RN  Outcome: Progressing

## 2024-08-05 NOTE — PLAN OF CARE
Problem: Discharge Planning  Goal: Discharge to home or other facility with appropriate resources  Outcome: Progressing  Flowsheets (Taken 8/4/2024 2016)  Discharge to home or other facility with appropriate resources:   Identify barriers to discharge with patient and caregiver   Arrange for needed discharge resources and transportation as appropriate   Identify discharge learning needs (meds, wound care, etc)   Refer to discharge planning if patient needs post-hospital services based on physician order or complex needs related to functional status, cognitive ability or social support system     Problem: Pain  Goal: Verbalizes/displays adequate comfort level or baseline comfort level  Outcome: Progressing  Flowsheets (Taken 8/5/2024 0300)  Verbalizes/displays adequate comfort level or baseline comfort level:   Encourage patient to monitor pain and request assistance   Assess pain using appropriate pain scale   Administer analgesics based on type and severity of pain and evaluate response   Implement non-pharmacological measures as appropriate and evaluate response   Notify Licensed Independent Practitioner if interventions unsuccessful or patient reports new pain   Consider cultural and social influences on pain and pain management     Problem: Skin/Tissue Integrity  Goal: Absence of new skin breakdown  Description: 1.  Monitor for areas of redness and/or skin breakdown  2.  Assess vascular access sites hourly  3.  Every 4-6 hours minimum:  Change oxygen saturation probe site  4.  Every 4-6 hours:  If on nasal continuous positive airway pressure, respiratory therapy assess nares and determine need for appliance change or resting period.  Outcome: Progressing     Problem: Safety - Adult  Goal: Free from fall injury  Outcome: Progressing     Problem: Chronic Conditions and Co-morbidities  Goal: Patient's chronic conditions and co-morbidity symptoms are monitored and maintained or improved  Outcome:

## 2024-08-05 NOTE — PROGRESS NOTES
Comprehensive Nutrition Assessment    Type and Reason for Visit:  Initial, Positive Nutrition Screen    Nutrition Recommendations/Plan:   Continue No Added Salt diet  Add Fruit Punch Cresencio bid     Malnutrition Assessment:  Malnutrition Status:  No malnutrition (08/05/24 1317)    Context:  Acute Illness     Findings of the 6 clinical characteristics of malnutrition:  Energy Intake:  Mild decrease in energy intake (Comment)  Weight Loss:  Unable to assess (fluid shifts?)     Body Fat Loss:  No significant body fat loss     Muscle Mass Loss:  No significant muscle mass loss    Fluid Accumulation:  No significant fluid accumulation Extremities   Strength:  Not Performed    Nutrition Assessment:    MST for altered skin integrity. PMH includes AVR, CKD(3), Choly, Pacemaker, CHF, CAD, HTN, HLD, CVA (x2), PVD. Pt adm r/t altered mental status and ANITA. Work up revealed volume depletion likely r/t diuretic use and ARB. Pt also noted with cellulitis of BLE. When asked about UBW, pt and family reported 129-132 lbs. Noted adm wt at 139 lbs 8.8 oz. Wt taken this date noted at 115 lbs 8.3 oz. Question current wt. Will monitor trend. Diet adv to No Added Salt. Pt endorsed a poor appetite but has been eating the meals sent. Pt also stated that a low sodium diet has been followed for years. No questions regarding diet voiced. Records reflect improved po intake likely result of improved mentation. Noted pt with stage ll to left buttocks. Feedback revealed that the skin breakdown generally goes from left to right buttock, with pt managing care. Pt reported that the areas eventually heal only to return. Reluctantly agreed to adding Cresencio (wound healing ONS) to regimen bid, preferring fruit punch flavor. Will monitor acceptance and progress.    Nutrition Related Findings:    Labs reviewed. Noted runs required for low magnesium and phosphorus levels. Noted BM on 8/3. Noted trace edema to BLE. Wound Type: Stage II       Current

## 2024-08-05 NOTE — PROGRESS NOTES
Nephrology Progress Note   [facility]      Reason for consultation: ANITA on CKD 3a/b -- baseline Cr ~ 1.2-1.6 mg/dL since 12/2022    History of Present Illness: Shonda Hernandes is a 79 yo male with a PMHx of CKD 3a/b, CHF, COPD, HTN, HLD, hypothyroidism, SHAVONNE, PVD, CAD, afib, h/o bladder cancer. Patient presents to  ED on 8/1/2024 with confusion, hallucinations, dizziness, and edema with erythema to BLE. Patient is a difficult historian. Family at bedside states the AMS began Wednesday of last week and has been worsening since. Legs are erythematous and hot to touch. WBC 14.0>9.7. Receiving vancomycin and cefepime. CT head negative for acute findings.     We have been consulted for ANITA on CKD 3a/b. Patient does not follow with a nephrologist. Baseline Cr is ~ 1.2-1.6 mg/dL since 12/2022. Her Cr was 2.2 mg/dL in 5/2024. Cr upon admission was 6.0 mg/dL. A li catheter was placed for strict I/Os. Renal US was obtained following li catheter placement and was negative for hydro. Pressures have been soft with readings as low as 90/47 mmHg. Endorses decreased oral intake the past couple days. Denies  symptoms. Takes metolazone, torsemide, and valsartan. Denies NSAID use. Received IVF and today Cr is 3.9 mg/dL. Patient was initially on RA and is now on 1 L NC. CXR yesterday was negative for acute findings.     Subjective:    Patient seen and examined. Labs and chart reviewed.     There were not complications last night.  Patient review of systems: Denies SOB/HOLLOWAY     Scheduled Meds:   sodium phosphate IVPB (PERIPHERAL line)  20 mmol IntraVENous Once    DULoxetine  40 mg Oral Daily    fluticasone  1 spray Each Nostril Daily    vancomycin  750 mg IntraVENous Q24H    cefepime  1,000 mg IntraVENous Q24H    midodrine  2.5 mg Oral TID WC    sodium chloride flush  5-40 mL IntraVENous 2 times per day    heparin (porcine)  5,000 Units SubCUTAneous 3 times per day    amiodarone  100 mg Oral Daily    budesonide-formoterol  2 puff  midodrine 2.5 mg TID    3. Hypokalemia   - Replace prn    4. BLE Cellulitis   - On vancomycin and cefepime   - Management per primary    5. HFpEF   - TTE (11/2022): LVEF 50-60%. Grade 3 diastolic dysfx.    - CXR (8/1/2024): negative for acute findings   - Carefully monitor volume status while on IVF - compensated thus far   - Holding diuretics.   - Daily weights. Strict I/Os.    6. CKD-BMD   - Phosphorus low - replace    7. Anemia of CKD   - Hgb below goal   - Iron low will need supplement once off antibiotics        More Oliveira MD FACP

## 2024-08-05 NOTE — PROGRESS NOTES
[place] [facility]     INPATIENT PROGRESS NOTE  Name: Shonda Hernandes  /Age/Sex: 1945 (78 y.o. female)   MRN & CSN: [id number] & [id number]  Admission Date/Time: 2024  2:31 PM   Location: [unfilled] [id number]  Current Hospital Day: Hospital Day: 5   Principal Problem: ANITA (acute kidney injury) (HCC)  HPI     Patient seen and examined.No issues overnight.  Request orthopedic evaluation for shoulder.  Foot pain has improved.  No fever or chills.      All other review of systems negative unless noted above.  VITALS  Vitals:    24 1115   BP: (!) 102/48   Pulse: 58   Resp: 24   Temp: 99.2 °F (37.3 °C)   SpO2: 97%         PHYSICAL EXAM     General: No acute distress   HEENT: PERRL, EOMI  Cardiac: Regular rate and rhythm, no murmur  Lungs: Clear to auscultation anteriorly  Abdomen: Soft nontender nondistended  Extremities: Left dorsal foot erythema and blister, no fluctuance  Neuro: Nonfocal     LABS   BMP  Recent Labs     24  0504 24  0531    137 138   K 3.5 3.0* 3.5    101 102   CO2 23 26 23   BUN 38* 21* 15   CREATININE 2.2* 1.5* 1.0   CALCIUM 8.1* 7.8* 8.2*   PHOS 3.1 2.2* 2.3*   MG  --  1.90 1.70*     CBC/COAGS  Recent Labs     24  0504 24  0531   WBC 9.9 9.7 10.8   HCT 29.0* 27.0* 27.2*    260 258     Liver & Pancreas  Recent Labs     24  0504 24  0531   ALBUMIN 3.2* 2.8* 2.7*          IMAGING   No new imaging   Above studies and images were personally reviewed by myself    MEDS   Scheduled Meds:   sodium phosphate IVPB (PERIPHERAL line)  20 mmol IntraVENous Once    DULoxetine  40 mg Oral Daily    fluticasone  1 spray Each Nostril Daily    vancomycin  750 mg IntraVENous Q24H    cefepime  1,000 mg IntraVENous Q24H    midodrine  2.5 mg Oral TID WC    sodium chloride flush  5-40 mL IntraVENous 2 times per day    heparin (porcine)  5,000 Units SubCUTAneous 3 times per day    amiodarone

## 2024-08-06 LAB
ALBUMIN SERPL-MCNC: 3.1 G/DL (ref 3.4–5)
ANION GAP SERPL CALCULATED.3IONS-SCNC: 14 MMOL/L (ref 3–16)
BUN SERPL-MCNC: 15 MG/DL (ref 7–20)
CALCIUM SERPL-MCNC: 8.7 MG/DL (ref 8.3–10.6)
CHLORIDE SERPL-SCNC: 102 MMOL/L (ref 99–110)
CO2 SERPL-SCNC: 20 MMOL/L (ref 21–32)
CREAT SERPL-MCNC: 1.1 MG/DL (ref 0.6–1.2)
DEPRECATED RDW RBC AUTO: 15.8 % (ref 12.4–15.4)
GFR SERPLBLD CREATININE-BSD FMLA CKD-EPI: 51 ML/MIN/{1.73_M2}
GLUCOSE SERPL-MCNC: 189 MG/DL (ref 70–99)
HCT VFR BLD AUTO: 29 % (ref 36–48)
HGB BLD-MCNC: 9.7 G/DL (ref 12–16)
MAGNESIUM SERPL-MCNC: 2.2 MG/DL (ref 1.8–2.4)
MCH RBC QN AUTO: 29.3 PG (ref 26–34)
MCHC RBC AUTO-ENTMCNC: 33.5 G/DL (ref 31–36)
MCV RBC AUTO: 87.6 FL (ref 80–100)
PHOSPHATE SERPL-MCNC: 2 MG/DL (ref 2.5–4.9)
PHOSPHATE SERPL-MCNC: 4 MG/DL (ref 2.5–4.9)
PLATELET # BLD AUTO: 280 K/UL (ref 135–450)
PMV BLD AUTO: 7.5 FL (ref 5–10.5)
POTASSIUM SERPL-SCNC: 3.6 MMOL/L (ref 3.5–5.1)
RBC # BLD AUTO: 3.31 M/UL (ref 4–5.2)
SODIUM SERPL-SCNC: 136 MMOL/L (ref 136–145)
WBC # BLD AUTO: 8.5 K/UL (ref 4–11)

## 2024-08-06 PROCEDURE — 2500000003 HC RX 250 WO HCPCS

## 2024-08-06 PROCEDURE — 85027 COMPLETE CBC AUTOMATED: CPT

## 2024-08-06 PROCEDURE — 99233 SBSQ HOSP IP/OBS HIGH 50: CPT | Performed by: STUDENT IN AN ORGANIZED HEALTH CARE EDUCATION/TRAINING PROGRAM

## 2024-08-06 PROCEDURE — 2060000000 HC ICU INTERMEDIATE R&B

## 2024-08-06 PROCEDURE — 97530 THERAPEUTIC ACTIVITIES: CPT

## 2024-08-06 PROCEDURE — 97535 SELF CARE MNGMENT TRAINING: CPT

## 2024-08-06 PROCEDURE — 6370000000 HC RX 637 (ALT 250 FOR IP): Performed by: STUDENT IN AN ORGANIZED HEALTH CARE EDUCATION/TRAINING PROGRAM

## 2024-08-06 PROCEDURE — 94760 N-INVAS EAR/PLS OXIMETRY 1: CPT

## 2024-08-06 PROCEDURE — 84100 ASSAY OF PHOSPHORUS: CPT

## 2024-08-06 PROCEDURE — 2580000003 HC RX 258: Performed by: STUDENT IN AN ORGANIZED HEALTH CARE EDUCATION/TRAINING PROGRAM

## 2024-08-06 PROCEDURE — 6360000002 HC RX W HCPCS: Performed by: STUDENT IN AN ORGANIZED HEALTH CARE EDUCATION/TRAINING PROGRAM

## 2024-08-06 PROCEDURE — 2580000003 HC RX 258

## 2024-08-06 PROCEDURE — 94640 AIRWAY INHALATION TREATMENT: CPT

## 2024-08-06 PROCEDURE — 83735 ASSAY OF MAGNESIUM: CPT

## 2024-08-06 PROCEDURE — 36415 COLL VENOUS BLD VENIPUNCTURE: CPT

## 2024-08-06 PROCEDURE — 80069 RENAL FUNCTION PANEL: CPT

## 2024-08-06 PROCEDURE — 6370000000 HC RX 637 (ALT 250 FOR IP): Performed by: INTERNAL MEDICINE

## 2024-08-06 RX ORDER — AMOXICILLIN AND CLAVULANATE POTASSIUM 875; 125 MG/1; MG/1
1 TABLET, FILM COATED ORAL EVERY 12 HOURS SCHEDULED
Status: DISCONTINUED | OUTPATIENT
Start: 2024-08-06 | End: 2024-08-07 | Stop reason: HOSPADM

## 2024-08-06 RX ORDER — DOXYCYCLINE HYCLATE 100 MG
100 TABLET ORAL EVERY 12 HOURS SCHEDULED
Status: DISCONTINUED | OUTPATIENT
Start: 2024-08-06 | End: 2024-08-07 | Stop reason: HOSPADM

## 2024-08-06 RX ORDER — SENNA AND DOCUSATE SODIUM 50; 8.6 MG/1; MG/1
1 TABLET, FILM COATED ORAL 2 TIMES DAILY
Status: DISCONTINUED | OUTPATIENT
Start: 2024-08-06 | End: 2024-08-07 | Stop reason: HOSPADM

## 2024-08-06 RX ORDER — FUROSEMIDE 20 MG/1
20 TABLET ORAL DAILY
Status: DISCONTINUED | OUTPATIENT
Start: 2024-08-06 | End: 2024-08-07

## 2024-08-06 RX ADMIN — FUROSEMIDE 20 MG: 20 TABLET ORAL at 13:42

## 2024-08-06 RX ADMIN — SENNOSIDES AND DOCUSATE SODIUM 1 TABLET: 50; 8.6 TABLET ORAL at 11:31

## 2024-08-06 RX ADMIN — LEVOTHYROXINE SODIUM 88 MCG: 0.09 TABLET ORAL at 08:50

## 2024-08-06 RX ADMIN — AMOXICILLIN AND CLAVULANATE POTASSIUM 1 TABLET: 875; 125 TABLET, FILM COATED ORAL at 11:31

## 2024-08-06 RX ADMIN — OXYCODONE AND ACETAMINOPHEN 1 TABLET: 10; 325 TABLET ORAL at 09:56

## 2024-08-06 RX ADMIN — FLUTICASONE PROPIONATE 1 SPRAY: 50 SPRAY, METERED NASAL at 08:52

## 2024-08-06 RX ADMIN — AMOXICILLIN AND CLAVULANATE POTASSIUM 1 TABLET: 875; 125 TABLET, FILM COATED ORAL at 21:30

## 2024-08-06 RX ADMIN — METOPROLOL SUCCINATE 50 MG: 50 TABLET, EXTENDED RELEASE ORAL at 08:50

## 2024-08-06 RX ADMIN — SODIUM PHOSPHATE, MONOBASIC, MONOHYDRATE 30 MMOL: 276; 142 INJECTION, SOLUTION INTRAVENOUS at 10:00

## 2024-08-06 RX ADMIN — EZETIMIBE 10 MG: 10 TABLET ORAL at 08:50

## 2024-08-06 RX ADMIN — SENNOSIDES AND DOCUSATE SODIUM 1 TABLET: 50; 8.6 TABLET ORAL at 21:30

## 2024-08-06 RX ADMIN — OXYCODONE HYDROCHLORIDE 10 MG: 10 TABLET, FILM COATED, EXTENDED RELEASE ORAL at 21:30

## 2024-08-06 RX ADMIN — HEPARIN SODIUM 5000 UNITS: 5000 INJECTION INTRAVENOUS; SUBCUTANEOUS at 06:27

## 2024-08-06 RX ADMIN — BUDESONIDE AND FORMOTEROL FUMARATE DIHYDRATE 2 PUFF: 160; 4.5 AEROSOL RESPIRATORY (INHALATION) at 20:39

## 2024-08-06 RX ADMIN — IRON SUCROSE 200 MG: 20 INJECTION, SOLUTION INTRAVENOUS at 15:29

## 2024-08-06 RX ADMIN — BUDESONIDE AND FORMOTEROL FUMARATE DIHYDRATE 2 PUFF: 160; 4.5 AEROSOL RESPIRATORY (INHALATION) at 08:31

## 2024-08-06 RX ADMIN — HEPARIN SODIUM 5000 UNITS: 5000 INJECTION INTRAVENOUS; SUBCUTANEOUS at 21:49

## 2024-08-06 RX ADMIN — DULOXETINE HYDROCHLORIDE 40 MG: 20 CAPSULE, DELAYED RELEASE ORAL at 08:50

## 2024-08-06 RX ADMIN — LIDOCAINE: 40 CREAM TOPICAL at 21:46

## 2024-08-06 RX ADMIN — AMIODARONE HYDROCHLORIDE 100 MG: 200 TABLET ORAL at 08:50

## 2024-08-06 RX ADMIN — DOXYCYCLINE HYCLATE 100 MG: 100 TABLET, COATED ORAL at 21:30

## 2024-08-06 RX ADMIN — DOXYCYCLINE HYCLATE 100 MG: 100 TABLET, COATED ORAL at 11:31

## 2024-08-06 RX ADMIN — SODIUM CHLORIDE, PRESERVATIVE FREE 10 ML: 5 INJECTION INTRAVENOUS at 21:45

## 2024-08-06 RX ADMIN — HEPARIN SODIUM 5000 UNITS: 5000 INJECTION INTRAVENOUS; SUBCUTANEOUS at 13:40

## 2024-08-06 RX ADMIN — OXYCODONE HYDROCHLORIDE 10 MG: 10 TABLET, FILM COATED, EXTENDED RELEASE ORAL at 08:50

## 2024-08-06 ASSESSMENT — PAIN - FUNCTIONAL ASSESSMENT
PAIN_FUNCTIONAL_ASSESSMENT: PREVENTS OR INTERFERES SOME ACTIVE ACTIVITIES AND ADLS
PAIN_FUNCTIONAL_ASSESSMENT: ACTIVITIES ARE NOT PREVENTED
PAIN_FUNCTIONAL_ASSESSMENT: PREVENTS OR INTERFERES SOME ACTIVE ACTIVITIES AND ADLS
PAIN_FUNCTIONAL_ASSESSMENT: ACTIVITIES ARE NOT PREVENTED

## 2024-08-06 ASSESSMENT — PAIN SCALES - GENERAL
PAINLEVEL_OUTOF10: 6
PAINLEVEL_OUTOF10: 7

## 2024-08-06 ASSESSMENT — PAIN DESCRIPTION - LOCATION
LOCATION: BACK;NECK;FOOT
LOCATION: BACK;NECK
LOCATION: BACK;SHOULDER;NECK
LOCATION: BACK;NECK;SHOULDER

## 2024-08-06 ASSESSMENT — PAIN DESCRIPTION - DESCRIPTORS
DESCRIPTORS: ACHING;DULL
DESCRIPTORS: ACHING;DISCOMFORT
DESCRIPTORS: ACHING;DULL
DESCRIPTORS: ACHING;DISCOMFORT

## 2024-08-06 ASSESSMENT — PAIN DESCRIPTION - ORIENTATION
ORIENTATION: LOWER;UPPER;MID
ORIENTATION: RIGHT;LEFT;LOWER;MID;UPPER

## 2024-08-06 NOTE — CARE COORDINATION
RN CM spoke with with patient and patient's daughter Tricia at bedside. Patient would like to do home health through [facility]. Patient and daughter stated that the patient is going to go live with the other daughter Hannah for a few weeks. The other daughters address is [address] [place] [address].   RADHA FUNES spoke with Xiang through [facility]. Patient will go through [facility] while she is in [place] and then will  [facility] when back in [place].   Electronically signed by Aixa Sethi RN on 8/6/2024 at 4:29 PM  [phone]

## 2024-08-06 NOTE — PROGRESS NOTES
Nephrology Progress Note   [facility]      Reason for consultation: ANITA on CKD 3a/b -- baseline Cr ~ 1.2-1.6 mg/dL since 12/2022    History of Present Illness: Shonda Hernandes is a 79 yo male with a PMHx of CKD 3a/b, CHF, COPD, HTN, HLD, hypothyroidism, SHAVONNE, PVD, CAD, afib, h/o bladder cancer. Patient presents to  ED on 8/1/2024 with confusion, hallucinations, dizziness, and edema with erythema to BLE. Patient is a difficult historian. Family at bedside states the AMS began Wednesday of last week and has been worsening since. Legs are erythematous and hot to touch. WBC 14.0>9.7. Receiving vancomycin and cefepime. CT head negative for acute findings.     We have been consulted for ANITA on CKD 3a/b. Patient does not follow with a nephrologist. Baseline Cr is ~ 1.2-1.6 mg/dL since 12/2022. Her Cr was 2.2 mg/dL in 5/2024. Cr upon admission was 6.0 mg/dL. A li catheter was placed for strict I/Os. Renal US was obtained following li catheter placement and was negative for hydro. Pressures have been soft with readings as low as 90/47 mmHg. Endorses decreased oral intake the past couple days. Denies  symptoms. Takes metolazone, torsemide, and valsartan. Denies NSAID use. Received IVF and today Cr is 3.9 mg/dL. Patient was initially on RA and is now on 1 L NC. CXR yesterday was negative for acute findings.     Subjective:    Patient seen and examined. Labs  reviewed.     There were not complications last night.  Patient review of systems: Denies SOB/HOLLOWAY   Increase LE edema noted .     Scheduled Meds:   sodium phosphate IVPB (PERIPHERAL line)  30 mmol IntraVENous Once    iron sucrose  200 mg IntraVENous Q24H    amoxicillin-clavulanate  1 tablet Oral 2 times per day    doxycycline hyclate  100 mg Oral 2 times per day    DULoxetine  40 mg Oral Daily    fluticasone  1 spray Each Nostril Daily    sodium chloride flush  5-40 mL IntraVENous 2 times per day    heparin (porcine)  5,000 Units SubCUTAneous 3 times per day

## 2024-08-06 NOTE — PROGRESS NOTES
IV site noted to be pink about an hr in [facility] infusion- IV removed. Unable to obtain new access. Call placed to [facility].  Pt c/o this AM of \"sore on bottom\" hurting. RN reminded pt that when RN took care of pt on Saturday pt refused to return to bed all of shift despite education of skin breakdown. Pt denies this and has continued to make rude comments to RN about this intermittently through shift. PCA offered multiple times for pt to return to bed this shift as she has been up in recliner since 0730. Pt refusing.   RN has reviewed meds and labs thoroughly with pt this AM. Pt has difficulty with retaining info given and then becomes argumentative with RN regarding meds.  Tele d/c'd per Dr. gNuyễn.  At this time plan is to d/c tomorrow  Electronically signed by Antonieta Phillips RN on 8/6/24 at 1:37 PM EDT    Pt finally agreeable to getting in bed this afternoon. Continues to question care. Continues to have difficulty retaining info.   Pt asked when tele was removed if that meant she could get up on her own. Pt educated that she could not yet pt was found multiple times getting up on her own. Stated \"well the monitor is off\". Pt placed back in bed with alarm on.  Electronically signed by Antonieta Phillips RN on 8/6/24 at 6:49 PM EDT

## 2024-08-06 NOTE — PLAN OF CARE
Problem: Discharge Planning  Goal: Discharge to home or other facility with appropriate resources  8/6/2024 1049 by Antonieta Phillips RN  Outcome: Progressing  8/6/2024 0346 by Lilly Olivia RN  Outcome: Progressing  Flowsheets (Taken 8/5/2024 2004)  Discharge to home or other facility with appropriate resources:   Identify barriers to discharge with patient and caregiver   Arrange for needed discharge resources and transportation as appropriate   Identify discharge learning needs (meds, wound care, etc)   Refer to discharge planning if patient needs post-hospital services based on physician order or complex needs related to functional status, cognitive ability or social support system     Problem: Pain  Goal: Verbalizes/displays adequate comfort level or baseline comfort level  8/6/2024 1049 by Antonieta Phillips RN  Outcome: Progressing  8/6/2024 0346 by Lilly Olivia RN  Outcome: Progressing  Flowsheets (Taken 8/5/2024 0300)  Verbalizes/displays adequate comfort level or baseline comfort level:   Encourage patient to monitor pain and request assistance   Assess pain using appropriate pain scale   Administer analgesics based on type and severity of pain and evaluate response   Implement non-pharmacological measures as appropriate and evaluate response   Notify Licensed Independent Practitioner if interventions unsuccessful or patient reports new pain   Consider cultural and social influences on pain and pain management     Problem: Skin/Tissue Integrity  Goal: Absence of new skin breakdown  Description: 1.  Monitor for areas of redness and/or skin breakdown  2.  Assess vascular access sites hourly  3.  Every 4-6 hours minimum:  Change oxygen saturation probe site  4.  Every 4-6 hours:  If on nasal continuous positive airway pressure, respiratory therapy assess nares and determine need for appliance change or resting period.  8/6/2024 1049 by Antonieat Phillips RN  Outcome: Progressing  8/6/2024 0346 by Corwin  RADHA Carr  Outcome: Progressing     Problem: Safety - Adult  Goal: Free from fall injury  8/6/2024 1049 by Antonieta Phillips RN  Outcome: Progressing  Note: Fall precautions in place. Bed locked and in lowest position. Alarm on. Call light within reach.   8/6/2024 0346 by Lilly Olivia RN  Outcome: Progressing     Problem: Chronic Conditions and Co-morbidities  Goal: Patient's chronic conditions and co-morbidity symptoms are monitored and maintained or improved  8/6/2024 1049 by Antonieta Phillips RN  Outcome: Progressing  8/6/2024 0346 by Lilly Olivia RN  Outcome: Progressing  Flowsheets (Taken 8/5/2024 2004)  Care Plan - Patient's Chronic Conditions and Co-Morbidity Symptoms are Monitored and Maintained or Improved:   Monitor and assess patient's chronic conditions and comorbid symptoms for stability, deterioration, or improvement   Collaborate with multidisciplinary team to address chronic and comorbid conditions and prevent exacerbation or deterioration   Update acute care plan with appropriate goals if chronic or comorbid symptoms are exacerbated and prevent overall improvement and discharge     Problem: Respiratory - Adult  Goal: Achieves optimal ventilation and oxygenation  Outcome: Progressing     Problem: Cardiovascular - Adult  Goal: Maintains optimal cardiac output and hemodynamic stability  8/6/2024 1049 by Antonieta Phillips RN  Outcome: Progressing  8/6/2024 0346 by Lilly Olivia RN  Outcome: Progressing  Flowsheets (Taken 8/5/2024 2004)  Maintains optimal cardiac output and hemodynamic stability: Monitor blood pressure and heart rate     Problem: Skin/Tissue Integrity - Adult  Goal: Skin integrity remains intact  8/6/2024 1049 by Antonieta Phillips RN  Outcome: Progressing  8/6/2024 0346 by Lilly Olivia RN  Outcome: Progressing  Flowsheets (Taken 8/5/2024 2004)  Skin Integrity Remains Intact: Monitor for areas of redness and/or skin breakdown     Problem: Musculoskeletal - Adult  Goal: Return

## 2024-08-06 NOTE — PROGRESS NOTES
[place] [facility]     INPATIENT PROGRESS NOTE  Name: Shonda Hernandes  /Age/Sex: 1945 (78 y.o. female)   MRN & CSN: [id number] & [id number]  Admission Date/Time: 2024  2:31 PM   Location: [unfilled] [id number]  Current Hospital Day: Hospital Day: 6   Principal Problem: ANITA (acute kidney injury) (HCC)  HPI     Patient seen and examined.No issues overnight.  Feeling better today.  No foot pain.  Appetite has been okay.      All other review of systems negative unless noted above.  VITALS  Vitals:    24 0920   BP:    Pulse:    Resp: 16   Temp:    SpO2:          PHYSICAL EXAM     General: No acute distress   HEENT: PERRL, EOMI  Cardiac: Regular rate and rhythm, no murmur  Lungs: Clear to auscultation anteriorly  Abdomen: Soft nontender nondistended  Extremities: Left dorsal foot erythema and blister, no fluctuance  Neuro: Nonfocal     LABS   BMP  Recent Labs     24  0504 24  0531 24  0434    138 136   K 3.0* 3.5 3.6    102 102   CO2 26 23 20*   BUN 21* 15 15   CREATININE 1.5* 1.0 1.1   CALCIUM 7.8* 8.2* 8.7   PHOS 2.2* 2.3* 2.0*   MG 1.90 1.70* 2.20     CBC/COAGS  Recent Labs     24  0504 24  0531 24  0434   WBC 9.7 10.8 8.5   HCT 27.0* 27.2* 29.0*    258 280     Liver & Pancreas  Recent Labs     24  0504 24  0531 24  0434   ALBUMIN 2.8* 2.7* 3.1*          IMAGING   No new imaging   Above studies and images were personally reviewed by myself    MEDS   Scheduled Meds:   sodium phosphate IVPB (PERIPHERAL line)  30 mmol IntraVENous Once    iron sucrose  200 mg IntraVENous Q24H    DULoxetine  40 mg Oral Daily    fluticasone  1 spray Each Nostril Daily    midodrine  2.5 mg Oral TID WC    sodium chloride flush  5-40 mL IntraVENous 2 times per day    heparin (porcine)  5,000 Units SubCUTAneous 3 times per day    amiodarone  100 mg Oral Daily    budesonide-formoterol  2 puff Inhalation BID    ezetimibe  10 mg Oral Daily

## 2024-08-06 NOTE — PLAN OF CARE
Problem: Discharge Planning  Goal: Discharge to home or other facility with appropriate resources  Outcome: Progressing  Flowsheets (Taken 8/5/2024 2004)  Discharge to home or other facility with appropriate resources:   Identify barriers to discharge with patient and caregiver   Arrange for needed discharge resources and transportation as appropriate   Identify discharge learning needs (meds, wound care, etc)   Refer to discharge planning if patient needs post-hospital services based on physician order or complex needs related to functional status, cognitive ability or social support system     Problem: Pain  Goal: Verbalizes/displays adequate comfort level or baseline comfort level  Outcome: Progressing  Flowsheets (Taken 8/5/2024 0300)  Verbalizes/displays adequate comfort level or baseline comfort level:   Encourage patient to monitor pain and request assistance   Assess pain using appropriate pain scale   Administer analgesics based on type and severity of pain and evaluate response   Implement non-pharmacological measures as appropriate and evaluate response   Notify Licensed Independent Practitioner if interventions unsuccessful or patient reports new pain   Consider cultural and social influences on pain and pain management     Problem: Skin/Tissue Integrity  Goal: Absence of new skin breakdown  Description: 1.  Monitor for areas of redness and/or skin breakdown  2.  Assess vascular access sites hourly  3.  Every 4-6 hours minimum:  Change oxygen saturation probe site  4.  Every 4-6 hours:  If on nasal continuous positive airway pressure, respiratory therapy assess nares and determine need for appliance change or resting period.  Outcome: Progressing     Problem: Safety - Adult  Goal: Free from fall injury  Outcome: Progressing     Problem: Chronic Conditions and Co-morbidities  Goal: Patient's chronic conditions and co-morbidity symptoms are monitored and maintained or improved  Outcome:

## 2024-08-06 NOTE — PROGRESS NOTES
Occupational Therapy  Facility/Department: [address] PROGRESSIVE CARE  Daily Treatment Note  NAME: Shonda Hernandes  : 1945  MRN: [id number]    Date of Service: 2024    Discharge Recommendations:  24 hour supervision or assist, 2-3 sessions per week, Patient would benefit from continued therapy after discharge  OT Equipment Recommendations  Equipment Needed: No      Patient Diagnosis(es): The primary encounter diagnosis was Dizziness. Diagnoses of Bilateral lower leg cellulitis, Bilateral lower extremity edema, and Acute renal failure, unspecified acute renal failure type (HCC) were also pertinent to this visit.     Assessment    Discharge Recommendations: 24 hour supervision or assist;2-3 sessions per week;Patient would benefit from continued therapy after discharge  Equipment Needed: No      Plan   Occupational Therapy Plan  Times Per Week: 3-5x  Current Treatment Recommendations: Strengthening;Balance training;Functional mobility training;Endurance training;Safety education & training;Self-Care / ADL;Patient/Caregiver education & training     Restrictions  Restrictions/Precautions  Restrictions/Precautions: Fall Risk;Up as Tolerated    Subjective   Subjective  Subjective: Pt seated in recliner, daughter Danni present. Pt is pleasant and agreeable to therapy session. Pt with 6/10 right shoulder pain/back pain, RN called for pt's request of pain meds - per RN pt already received   Orientation  Overall Orientation Status: Within Functional Limits  Cognition  Overall Cognitive Status: WFL        Objective    Vitals     Bed Mobility Training  Bed Mobility Training: No  Transfer Training  Transfer Training: Yes  Overall Level of Assistance: Supervision  Sit to Stand: Supervision  Stand to Sit: Supervision  Toilet Transfer: Supervision (standard toilet with grab bar)  Gait  Gait Training: Yes  Overall Level of Assistance: Supervision;Stand-by assistance  Distance (ft): 35 Feet (35 ft x 2 trials  + to/from

## 2024-08-06 NOTE — PROGRESS NOTES
Nephrology Progress Note   [facility]      Reason for consultation: ANITA on CKD 3a/b -- baseline Cr ~ 1.2-1.6 mg/dL since 12/2022    History of Present Illness: Shonda Hernandes is a 77 yo male with a PMHx of CKD 3a/b, CHF, COPD, HTN, HLD, hypothyroidism, SHAVONNE, PVD, CAD, afib, h/o bladder cancer. Patient presents to  ED on 8/1/2024 with confusion, hallucinations, dizziness, and edema with erythema to BLE. Patient is a difficult historian. Family at bedside states the AMS began Wednesday of last week and has been worsening since. Legs are erythematous and hot to touch. WBC 14.0>9.7. Receiving vancomycin and cefepime. CT head negative for acute findings.     We have been consulted for ANITA on CKD 3a/b. Patient does not follow with a nephrologist. Baseline Cr is ~ 1.2-1.6 mg/dL since 12/2022. Her Cr was 2.2 mg/dL in 5/2024. Cr upon admission was 6.0 mg/dL. A li catheter was placed for strict I/Os. Renal US was obtained following li catheter placement and was negative for hydro. Pressures have been soft with readings as low as 90/47 mmHg. Endorses decreased oral intake the past couple days. Denies  symptoms. Takes metolazone, torsemide, and valsartan. Denies NSAID use. Received IVF and today Cr is 3.9 mg/dL. Patient was initially on RA and is now on 1 L NC. CXR yesterday was negative for acute findings.     Subjective:    Patient seen and examined. Labs and chart reviewed. Resting in bed. Family at bedside. Cr is stable following discontinuation of IVF yesterday. Li catheter removed yesterday and patient voiding without issue.     There were not complications last night.    Patient review of systems: Denies SOB    Scheduled Meds:   sodium phosphate IVPB (PERIPHERAL line)  30 mmol IntraVENous Once    DULoxetine  40 mg Oral Daily    fluticasone  1 spray Each Nostril Daily    midodrine  2.5 mg Oral TID WC    sodium chloride flush  5-40 mL IntraVENous 2 times per day    heparin (porcine)  5,000 Units SubCUTAneous 3   08/06/2024 04:34 AM    K 3.6 08/06/2024 04:34 AM    K 4.5 08/01/2024 02:59 PM     08/06/2024 04:34 AM    CO2 20 08/06/2024 04:34 AM    BUN 15 08/06/2024 04:34 AM    CREATININE 1.1 08/06/2024 04:34 AM    CALCIUM 8.7 08/06/2024 04:34 AM    GFRAA >60 08/09/2022 03:27 PM    GFRAA >60 04/22/2013 05:05 AM    LABGLOM 51 08/06/2024 04:34 AM    LABGLOM 42 03/27/2024 04:57 PM    GLUCOSE 189 08/06/2024 04:34 AM    GLUCOSE 106 06/20/2011 01:15 PM     Ionized Calcium:  No components found for: \"IONCA\"  Magnesium:    Lab Results   Component Value Date/Time    MG 2.20 08/06/2024 04:34 AM     Phosphorus:    Lab Results   Component Value Date/Time    PHOS 2.0 08/06/2024 04:34 AM     U/A:    Lab Results   Component Value Date/Time    NITRITE neg 04/17/2024 12:18 PM    COLORU Yellow 08/01/2024 02:59 PM    PHUR 6.0 08/01/2024 02:59 PM    PHUR 7.0 04/17/2024 12:18 PM    PHUR 6.0 08/06/2023 04:54 PM    LABCAST 1-3 Hyaline 06/20/2015 06:20 PM    WBCUA 0 08/01/2024 02:59 PM    RBCUA 3 08/01/2024 02:59 PM    BACTERIA None Seen 08/01/2024 02:59 PM    CLARITYU Clear 08/01/2024 02:59 PM    SPECGRAV 1.0158 04/17/2024 12:18 PM    LEUKOCYTESUR Negative 08/01/2024 02:59 PM    UROBILINOGEN 0.2 08/01/2024 02:59 PM    BILIRUBINUR Negative 08/01/2024 02:59 PM    BLOODU MODERATE 08/01/2024 02:59 PM    GLUCOSEU Negative 08/01/2024 02:59 PM         IMPRESSION/RECOMMENDATIONS:      ANITA on CKD 3a/b -- baseline Cr ~ 1.2-1.6 mg/dL since 12/2022 with the exception of a Cr of 2.2 mg/dL in 5/2024. Etiology of ANITA likely 2/2 volume depletion and hypotension in the setting of diuretic and ARB use.   - Cr better than baseline   - UA noted with moderate blood and 3 RBCs. 30 protein. 6 hyaline casts. UMCR 235 mg. CK normal.   - Continue midodrine. Continue to hold diuretic and ARB.   - Strict I/Os. Daily weights.    2. Hypotension   - BP better today   - Holding valsartan   - Continue midodrine 2.5 mg TID -- if BP remains adequate today, can d/c

## 2024-08-07 VITALS
SYSTOLIC BLOOD PRESSURE: 103 MMHG | DIASTOLIC BLOOD PRESSURE: 51 MMHG | HEIGHT: 61 IN | HEART RATE: 83 BPM | OXYGEN SATURATION: 93 % | WEIGHT: 121.25 LBS | RESPIRATION RATE: 18 BRPM | BODY MASS INDEX: 22.89 KG/M2 | TEMPERATURE: 98.2 F

## 2024-08-07 PROBLEM — M75.41 SHOULDER IMPINGEMENT SYNDROME, RIGHT: Status: RESOLVED | Noted: 2024-08-05 | Resolved: 2024-08-07

## 2024-08-07 LAB
ALBUMIN SERPL-MCNC: 3.1 G/DL (ref 3.4–5)
ANION GAP SERPL CALCULATED.3IONS-SCNC: 12 MMOL/L (ref 3–16)
BUN SERPL-MCNC: 24 MG/DL (ref 7–20)
CALCIUM SERPL-MCNC: 8.8 MG/DL (ref 8.3–10.6)
CHLORIDE SERPL-SCNC: 99 MMOL/L (ref 99–110)
CO2 SERPL-SCNC: 23 MMOL/L (ref 21–32)
CREAT SERPL-MCNC: 1.1 MG/DL (ref 0.6–1.2)
DEPRECATED RDW RBC AUTO: 16.1 % (ref 12.4–15.4)
GFR SERPLBLD CREATININE-BSD FMLA CKD-EPI: 51 ML/MIN/{1.73_M2}
GLUCOSE SERPL-MCNC: 99 MG/DL (ref 70–99)
HCT VFR BLD AUTO: 26.1 % (ref 36–48)
HGB BLD-MCNC: 8.7 G/DL (ref 12–16)
MAGNESIUM SERPL-MCNC: 2 MG/DL (ref 1.8–2.4)
MCH RBC QN AUTO: 29.2 PG (ref 26–34)
MCHC RBC AUTO-ENTMCNC: 33.4 G/DL (ref 31–36)
MCV RBC AUTO: 87.3 FL (ref 80–100)
NT-PROBNP SERPL-MCNC: ABNORMAL PG/ML (ref 0–449)
PHOSPHATE SERPL-MCNC: 3.4 MG/DL (ref 2.5–4.9)
PLATELET # BLD AUTO: 258 K/UL (ref 135–450)
PMV BLD AUTO: 7.6 FL (ref 5–10.5)
POTASSIUM SERPL-SCNC: 3.6 MMOL/L (ref 3.5–5.1)
RBC # BLD AUTO: 2.99 M/UL (ref 4–5.2)
SODIUM SERPL-SCNC: 134 MMOL/L (ref 136–145)
WBC # BLD AUTO: 11.4 K/UL (ref 4–11)

## 2024-08-07 PROCEDURE — 6360000002 HC RX W HCPCS: Performed by: STUDENT IN AN ORGANIZED HEALTH CARE EDUCATION/TRAINING PROGRAM

## 2024-08-07 PROCEDURE — 83735 ASSAY OF MAGNESIUM: CPT

## 2024-08-07 PROCEDURE — 85027 COMPLETE CBC AUTOMATED: CPT

## 2024-08-07 PROCEDURE — 80069 RENAL FUNCTION PANEL: CPT

## 2024-08-07 PROCEDURE — 99239 HOSP IP/OBS DSCHRG MGMT >30: CPT | Performed by: STUDENT IN AN ORGANIZED HEALTH CARE EDUCATION/TRAINING PROGRAM

## 2024-08-07 PROCEDURE — 6370000000 HC RX 637 (ALT 250 FOR IP): Performed by: STUDENT IN AN ORGANIZED HEALTH CARE EDUCATION/TRAINING PROGRAM

## 2024-08-07 PROCEDURE — 6370000000 HC RX 637 (ALT 250 FOR IP): Performed by: INTERNAL MEDICINE

## 2024-08-07 PROCEDURE — 36415 COLL VENOUS BLD VENIPUNCTURE: CPT

## 2024-08-07 PROCEDURE — 2580000003 HC RX 258: Performed by: STUDENT IN AN ORGANIZED HEALTH CARE EDUCATION/TRAINING PROGRAM

## 2024-08-07 PROCEDURE — 83880 ASSAY OF NATRIURETIC PEPTIDE: CPT

## 2024-08-07 PROCEDURE — 94760 N-INVAS EAR/PLS OXIMETRY 1: CPT

## 2024-08-07 PROCEDURE — 94640 AIRWAY INHALATION TREATMENT: CPT

## 2024-08-07 PROCEDURE — 97530 THERAPEUTIC ACTIVITIES: CPT

## 2024-08-07 PROCEDURE — 97116 GAIT TRAINING THERAPY: CPT

## 2024-08-07 RX ORDER — AMOXICILLIN AND CLAVULANATE POTASSIUM 875; 125 MG/1; MG/1
1 TABLET, FILM COATED ORAL EVERY 12 HOURS SCHEDULED
Qty: 4 TABLET | Refills: 0 | Status: SHIPPED | OUTPATIENT
Start: 2024-08-07 | End: 2024-08-09

## 2024-08-07 RX ORDER — FUROSEMIDE 40 MG/1
40 TABLET ORAL DAILY
Status: DISCONTINUED | OUTPATIENT
Start: 2024-08-08 | End: 2024-08-07 | Stop reason: HOSPADM

## 2024-08-07 RX ORDER — DOXYCYCLINE HYCLATE 100 MG
100 TABLET ORAL EVERY 12 HOURS SCHEDULED
Qty: 4 TABLET | Refills: 0 | Status: SHIPPED | OUTPATIENT
Start: 2024-08-07 | End: 2024-08-09

## 2024-08-07 RX ORDER — FUROSEMIDE 40 MG/1
40 TABLET ORAL DAILY
Qty: 60 TABLET | Refills: 3 | Status: SHIPPED | OUTPATIENT
Start: 2024-08-08

## 2024-08-07 RX ADMIN — SENNOSIDES AND DOCUSATE SODIUM 1 TABLET: 50; 8.6 TABLET ORAL at 10:08

## 2024-08-07 RX ADMIN — DULOXETINE HYDROCHLORIDE 40 MG: 20 CAPSULE, DELAYED RELEASE ORAL at 10:07

## 2024-08-07 RX ADMIN — OXYCODONE AND ACETAMINOPHEN 1 TABLET: 10; 325 TABLET ORAL at 00:33

## 2024-08-07 RX ADMIN — SODIUM CHLORIDE, PRESERVATIVE FREE 10 ML: 5 INJECTION INTRAVENOUS at 10:16

## 2024-08-07 RX ADMIN — AMOXICILLIN AND CLAVULANATE POTASSIUM 1 TABLET: 875; 125 TABLET, FILM COATED ORAL at 10:07

## 2024-08-07 RX ADMIN — BUDESONIDE AND FORMOTEROL FUMARATE DIHYDRATE 2 PUFF: 160; 4.5 AEROSOL RESPIRATORY (INHALATION) at 09:06

## 2024-08-07 RX ADMIN — HEPARIN SODIUM 5000 UNITS: 5000 INJECTION INTRAVENOUS; SUBCUTANEOUS at 14:39

## 2024-08-07 RX ADMIN — EZETIMIBE 10 MG: 10 TABLET ORAL at 10:08

## 2024-08-07 RX ADMIN — OXYCODONE AND ACETAMINOPHEN 1 TABLET: 10; 325 TABLET ORAL at 14:45

## 2024-08-07 RX ADMIN — IRON SUCROSE 200 MG: 20 INJECTION, SOLUTION INTRAVENOUS at 10:08

## 2024-08-07 RX ADMIN — FUROSEMIDE 20 MG: 20 TABLET ORAL at 10:08

## 2024-08-07 RX ADMIN — DOXYCYCLINE HYCLATE 100 MG: 100 TABLET, COATED ORAL at 10:07

## 2024-08-07 RX ADMIN — HEPARIN SODIUM 5000 UNITS: 5000 INJECTION INTRAVENOUS; SUBCUTANEOUS at 06:12

## 2024-08-07 RX ADMIN — FLUTICASONE PROPIONATE 1 SPRAY: 50 SPRAY, METERED NASAL at 10:08

## 2024-08-07 RX ADMIN — LEVOTHYROXINE SODIUM 88 MCG: 0.09 TABLET ORAL at 10:08

## 2024-08-07 RX ADMIN — LIDOCAINE: 40 CREAM TOPICAL at 11:46

## 2024-08-07 RX ADMIN — AMIODARONE HYDROCHLORIDE 100 MG: 200 TABLET ORAL at 10:08

## 2024-08-07 RX ADMIN — OXYCODONE HYDROCHLORIDE 10 MG: 10 TABLET, FILM COATED, EXTENDED RELEASE ORAL at 10:08

## 2024-08-07 ASSESSMENT — PAIN DESCRIPTION - LOCATION
LOCATION: FOOT
LOCATION: BACK;SHOULDER
LOCATION: KNEE;BACK;FOOT
LOCATION: GENERALIZED;ABDOMEN

## 2024-08-07 ASSESSMENT — PAIN DESCRIPTION - DESCRIPTORS
DESCRIPTORS: ACHING
DESCRIPTORS: STABBING
DESCRIPTORS: OTHER (COMMENT)
DESCRIPTORS: STABBING;SHARP

## 2024-08-07 ASSESSMENT — PAIN SCALES - GENERAL
PAINLEVEL_OUTOF10: 7
PAINLEVEL_OUTOF10: 8
PAINLEVEL_OUTOF10: 8
PAINLEVEL_OUTOF10: 7

## 2024-08-07 ASSESSMENT — PAIN DESCRIPTION - ORIENTATION
ORIENTATION: RIGHT;LEFT;MID
ORIENTATION: RIGHT;LEFT;MID;LOWER;UPPER
ORIENTATION: RIGHT;LEFT
ORIENTATION: RIGHT;MID;LOWER

## 2024-08-07 ASSESSMENT — PAIN DESCRIPTION - PAIN TYPE: TYPE: CHRONIC PAIN;ACUTE PAIN

## 2024-08-07 ASSESSMENT — PAIN SCALES - WONG BAKER: WONGBAKER_NUMERICALRESPONSE: NO HURT

## 2024-08-07 NOTE — PROGRESS NOTES
Nephrology Progress Note   [facility]      Reason for consultation: ANITA on CKD 3a/b -- baseline Cr ~ 1.2-1.6 mg/dL since 12/2022    History of Present Illness: Shonda Hernandes is a 77 yo male with a PMHx of CKD 3a/b, CHF, COPD, HTN, HLD, hypothyroidism, SHAVONNE, PVD, CAD, afib, h/o bladder cancer. Patient presents to  ED on 8/1/2024 with confusion, hallucinations, dizziness, and edema with erythema to BLE. Patient is a difficult historian. Family at bedside states the AMS began Wednesday of last week and has been worsening since. Legs are erythematous and hot to touch. WBC 14.0>9.7. Receiving vancomycin and cefepime. CT head negative for acute findings.     We have been consulted for ANITA on CKD 3a/b. Patient does not follow with a nephrologist. Baseline Cr is ~ 1.2-1.6 mg/dL since 12/2022. Her Cr was 2.2 mg/dL in 5/2024. Cr upon admission was 6.0 mg/dL. A li catheter was placed for strict I/Os. Renal US was obtained following li catheter placement and was negative for hydro. Pressures have been soft with readings as low as 90/47 mmHg. Endorses decreased oral intake the past couple days. Denies  symptoms. Takes metolazone, torsemide, and valsartan. Denies NSAID use. Received IVF and today Cr is 3.9 mg/dL. Patient was initially on RA and is now on 1 L NC. CXR yesterday was negative for acute findings.     Subjective:    Patient seen and examined. Labs  reviewed.     There were not complications last night.  Patient review of systems: Denies SOB/HOLLOWAY   Stable  LE edema noted .     Scheduled Meds:   [START ON 8/8/2024] furosemide  40 mg Oral Daily    iron sucrose  200 mg IntraVENous Q24H    amoxicillin-clavulanate  1 tablet Oral 2 times per day    doxycycline hyclate  100 mg Oral 2 times per day    sennosides-docusate sodium  1 tablet Oral BID    DULoxetine  40 mg Oral Daily    fluticasone  1 spray Each Nostril Daily    sodium chloride flush  5-40 mL IntraVENous 2 times per day    heparin (porcine)  5,000 Units    Component Value Date/Time     08/07/2024 04:32 AM    K 3.6 08/07/2024 04:32 AM    K 4.5 08/01/2024 02:59 PM    CL 99 08/07/2024 04:32 AM    CO2 23 08/07/2024 04:32 AM    BUN 24 08/07/2024 04:32 AM    CREATININE 1.1 08/07/2024 04:32 AM    CALCIUM 8.8 08/07/2024 04:32 AM    GFRAA >60 08/09/2022 03:27 PM    GFRAA >60 04/22/2013 05:05 AM    LABGLOM 51 08/07/2024 04:32 AM    LABGLOM 42 03/27/2024 04:57 PM    GLUCOSE 99 08/07/2024 04:32 AM    GLUCOSE 106 06/20/2011 01:15 PM     Ionized Calcium:  No components found for: \"IONCA\"  Magnesium:    Lab Results   Component Value Date/Time    MG 2.00 08/07/2024 04:32 AM     Phosphorus:    Lab Results   Component Value Date/Time    PHOS 3.4 08/07/2024 04:32 AM     U/A:    Lab Results   Component Value Date/Time    NITRITE neg 04/17/2024 12:18 PM    COLORU Yellow 08/01/2024 02:59 PM    PHUR 6.0 08/01/2024 02:59 PM    PHUR 7.0 04/17/2024 12:18 PM    PHUR 6.0 08/06/2023 04:54 PM    LABCAST 1-3 Hyaline 06/20/2015 06:20 PM    WBCUA 0 08/01/2024 02:59 PM    RBCUA 3 08/01/2024 02:59 PM    BACTERIA None Seen 08/01/2024 02:59 PM    CLARITYU Clear 08/01/2024 02:59 PM    SPECGRAV 1.0158 04/17/2024 12:18 PM    LEUKOCYTESUR Negative 08/01/2024 02:59 PM    UROBILINOGEN 0.2 08/01/2024 02:59 PM    BILIRUBINUR Negative 08/01/2024 02:59 PM    BLOODU MODERATE 08/01/2024 02:59 PM    GLUCOSEU Negative 08/01/2024 02:59 PM         IMPRESSION/RECOMMENDATIONS:      ANITA on CKD 3a/b -- baseline Cr ~ 1.2-1.6 mg/dL since 12/2022 with the exception of a Cr of 2.2 mg/dL in 5/2024. Etiology of ANITA likely 2/2 volume depletion and hypotension in the setting of diuretic and ARB use.   - Cr stable. Off IVF .    - UA noted with moderate blood and 3 RBCs. 30 protein. 6 hyaline casts.    - UMCR 235 mg   - CK normal   - Increase  diuretic . Off  ARB   -    2. Hypotension   - Off  valsartan   - dc midodrine     3. Hypokalemia   - Replace prn    4. BLE Cellulitis   - On abx    - Management per primary    5.

## 2024-08-07 NOTE — PROGRESS NOTES
Physical Therapy  Facility/Department: [address] PROGRESSIVE CARE  Physical Therapy Initial Assessment    Name: Shonda Hernandes  : 1945  MRN: [id number]  Date of Service: 2024    Discharge Recommendations: Shonda Hernandes scored a 19/24 on the AM-PAC short mobility form. Current research shows that an AM-PAC score of 18 or greater is typically associated with a discharge to the patient's home setting. Based on the patient's AM-PAC score and their current functional mobility deficits, it is recommended that the patient have 2-3 sessions per week of Physical Therapy at d/c to increase the patient's independence.  At this time, this patient demonstrates the endurance and safety to discharge home with HHPT and 24 hour supervision/assistance and a follow up treatment frequency of 2-3x/wk.  Please see assessment section for further patient specific details.    If patient discharges prior to next session this note will serve as a discharge summary.  Please see below for the latest assessment towards goals.     HOME HEALTH CARE: LEVEL 1 STANDARD    - Initial home health evaluation to occur within 24-48 hours, in patient home   - Therapy to evaluate with goal of regaining prior level of functioning   - Therapy to evaluate if patient has Home Health Aide needs for personal care  24 hour supervision or assist, Home with Home health PT   PT Equipment Recommendations  Equipment Needed: No (Recommend use of RW upon discharge)      Patient Diagnosis(es): The primary encounter diagnosis was Dizziness. Diagnoses of Bilateral lower leg cellulitis, Bilateral lower extremity edema, and Acute renal failure, unspecified acute renal failure type (HCC) were also pertinent to this visit.  Past Medical History:  has a past medical history of Arthritis of shoulder region, right, Atrial fibrillation (HCC), AVM (arteriovenous malformation) of duodenum, AVM (arteriovenous malformation) of stomach, Bladder cancer (HCC), CAD (coronary artery  Date : 08/02/24  Diagnosis: ANITA (acute kidney injury)  Follows Commands: Within Functional Limits  General Comment  Comments: Pt semi-fowlers in bed on arrival, pleasant and agreeable to participate in PT treatment session.  Subjective  Subjective: Pt reports chronic back pain, not rated, RN notified of pts request for pain medication.     Social/Functional History  Social/Functional History  Lives With: [facility] ([facility]  Type of Home: House  Home Layout: Able to Live on Main level with bedroom/bathroom, Performs ADL's on one level, Laundry in basement  Home Access: Stairs to enter without rails  Entrance Stairs - Number of Steps: 1+1 steps to enter; 1 flight to basement  Bathroom Shower/Tub: Walk-in shower  Bathroom Toilet: Handicap height  Bathroom Equipment: Shower chair  Bathroom Accessibility: Accessible  Home Equipment: Walker - Rolling, Cane  ADL Assistance: Independent  Homemaking Assistance: Needs assistance (shared with family)  Ambulation Assistance: Independent  Transfer Assistance: Independent  Occupation: Retired  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  Additional Comments: Dtr (Danni) and Grandwaqas (Hakeem) both work outside of the home.; reports plan to stay at other daughter's home where she can stay on main floor    Vision/Hearing  Vision  Vision: Within Functional Limits  Hearing  Hearing: Within functional limits      Cognition   Cognition  Overall Cognitive Status: WFL     Objective   Observation/Palpation  Observation: Pt on RA on arrival. B ankle redness improving.      Bed mobility  Supine to Sit: Stand by assistance (HOB elevated, increased time required to complete task, use of bed rail)  Scooting: Stand by assistance (Toward EOB, use of bed rail)  Transfers  Sit to Stand: Stand by assistance  Stand to Sit: Stand by assistance  Comment: Use of RW for transfers, verbal cues required for hand placement. Toilet transfer: SBA  Ambulation  Surface: Level tile  Device:

## 2024-08-07 NOTE — CARE COORDINATION
CASE MANAGEMENT DISCHARGE SUMMARY:    DISCHARGE DATE: 8/7/2024    DISCHARGED TO: home     [facility]/ [facility]   [facility]  Address:   [address] [place] [address] [place] [facility]  Phone:  [phone]  Fax:  [phone]     TRANSPORTATION: via family             TIME: TBD by patient and bedside RN    COMMENTS: Patient, patient's family, bedside RN, and home healthcare company aware of discharge plan and timeline.    Electronically signed by NOAH MCHUGH RN on 8/7/2024 at 2:08 PM

## 2024-08-07 NOTE — PLAN OF CARE
Problem: Discharge Planning  Goal: Discharge to home or other facility with appropriate resources  8/7/2024 0015 by La Hall RN  Outcome: Progressing     Problem: Pain  Goal: Verbalizes/displays adequate comfort level or baseline comfort level  8/7/2024 0015 by La Hall RN  Outcome: Progressing     Problem: Skin/Tissue Integrity  Goal: Absence of new skin breakdown  Description: 1.  Monitor for areas of redness and/or skin breakdown  2.  Assess vascular access sites hourly  3.  Every 4-6 hours minimum:  Change oxygen saturation probe site  4.  Every 4-6 hours:  If on nasal continuous positive airway pressure, respiratory therapy assess nares and determine need for appliance change or resting period.  8/7/2024 0015 by La Hall RN  Outcome: Progressing     Problem: Safety - Adult  Goal: Free from fall injury  8/7/2024 0015 by La Hall RN  Outcome: Progressing     Problem: Chronic Conditions and Co-morbidities  Goal: Patient's chronic conditions and co-morbidity symptoms are monitored and maintained or improved  8/7/2024 0015 by La Hall RN  Outcome: Progressing     Problem: Discharge Planning  Goal: Discharge to home or other facility with appropriate resources  8/7/2024 0015 by La Hall RN  Outcome: Progressing  8/6/2024 1049 by Antonieta Phillips RN  Outcome: Progressing     Problem: Pain  Goal: Verbalizes/displays adequate comfort level or baseline comfort level  8/7/2024 0015 by La Hall RN  Outcome: Progressing     Problem: Skin/Tissue Integrity  Goal: Absence of new skin breakdown  Description: 1.  Monitor for areas of redness and/or skin breakdown  2.  Assess vascular access sites hourly  3.  Every 4-6 hours minimum:  Change oxygen saturation probe site  4.  Every 4-6 hours:  If on nasal continuous positive airway pressure, respiratory therapy assess nares and determine need for appliance change or resting period.  8/7/2024 0015 by La Hall RN  Outcome: Progressing      Problem: Safety - Adult  Goal: Free from fall injury  8/7/2024 0015 by La Hall RN  Outcome: Progressing     Problem: Chronic Conditions and Co-morbidities  Goal: Patient's chronic conditions and co-morbidity symptoms are monitored and maintained or improved  8/7/2024 0015 by La Hall RN  Outcome: Progressing     Problem: Respiratory - Adult  Goal: Achieves optimal ventilation and oxygenation  8/7/2024 0015 by La Hall RN  Outcome: Progressing     Problem: Cardiovascular - Adult  Goal: Maintains optimal cardiac output and hemodynamic stability  8/7/2024 0015 by La Hall RN  Outcome: Progressing     Problem: Skin/Tissue Integrity - Adult  Goal: Skin integrity remains intact  8/7/2024 0015 by La Hall RN  Outcome: Progressing  Flowsheets (Taken 8/6/2024 1050 by Antonieta Phillips RN)  Skin Integrity Remains Intact: Monitor for areas of redness and/or skin breakdown     Problem: Musculoskeletal - Adult  Goal: Return mobility to safest level of function  8/7/2024 0015 by La Hall RN  Outcome: Progressing     Problem: Gastrointestinal - Adult  Goal: Minimal or absence of nausea and vomiting  8/7/2024 0015 by La Hall RN  Outcome: Progressing     Problem: Genitourinary - Adult  Goal: Urinary catheter remains patent  8/7/2024 0015 by La Hall RN  Outcome: Progressing     Problem: ABCDS Injury Assessment  Goal: Absence of physical injury  8/7/2024 0015 by La Hall RN  Outcome: Progressing

## 2024-08-07 NOTE — PROGRESS NOTES
HR 50s, /56 this morning. Metoprolol held per attending. All other morning medications given per order.

## 2024-08-07 NOTE — DISCHARGE INSTR - COC
Continuity of Care Form    Patient Name: Shonda Hernandes   :  1945  MRN:  [id number]    Admit date:  2024  Discharge date:  2024    Code Status Order: Full Code   Advance Directives:     Admitting Physician:  Adeel Nguyễn MD  PCP: Adeel Nguyễn MD    Discharging Nurse: Carla Orr  Discharging Hospital Unit/Room#: U5C-6545/5277-01  Discharging Unit Phone Number: [phone]    Emergency Contact:   Extended Emergency Contact Information  Primary Emergency Contact: Tricia Hoover  Address: [address]           [facility] [address]   [facility]  Home Phone: [phone]  Relation: Child  Secondary Emergency Contact: Pa Riostt  Address: [address] [place] [address]  Home Phone: [phone]  Relation: Child    Past Surgical History:  Past Surgical History:   Procedure Laterality Date    ABLATION OF DYSRHYTHMIC FOCUS  2022    AORTIC VALVE REPLACEMENT  6/16/15    Dr. Correa - PVI, RENETTA exclusion. 19mm Maki pericardial Magna    APPENDECTOMY      BACK SURGERY  03/15/2019    superion inserted to keep back from hurting: [place]    CHOLECYSTECTOMY, LAPAROSCOPIC N/A 2019    EMERGENT; LAPAROSCOPIC CHOLECYSTECTOMY WITH GRAM performed by Dean Harrison MD at [facility] OR    CORONARY ANGIOPLASTY  2014    stent x 1    CYSTOSCOPY  2014    dr Abel    JOINT REPLACEMENT      THR Right    OTHER SURGICAL HISTORY  2018     EGD and colonoscopy.    PACEMAKER INSERTION  2022    PAIN MANAGEMENT PROCEDURE Bilateral 2021    BILATERAL L3, L4, L5 MEDIAL BRANCH BLOCK WITH FLUOROSCOPY performed by Abel Ruiz MD at [facility]    PAIN MANAGEMENT PROCEDURE Bilateral 2021    BILATERAL L3, L4, L5 MEDIAL BRANCH BLOCKS WITH FLUOROSCOPY (17765, 74057) performed by Abel Ruiz MD at [facility]    PAIN MANAGEMENT PROCEDURE Bilateral 2021    BILATERAL L3, L4, L5 RADIOFREQUENCY ABLATION WITH FLUOROSCOPY (81375, 48444) performed by  Level: 8  Last Weight:   Wt Readings from Last 1 Encounters:   08/07/24 55 kg (121 lb 4.1 oz)     Mental Status:  oriented and alert    IV Access:  - None    Nursing Mobility/ADLs:  Walking   Independent  Transfer  Independent  Bathing  Independent  Dressing  Independent  Toileting  Independent  Feeding  Independent  Med Admin  Assisted  Med Delivery   whole    Wound Care Documentation and Therapy:  Wound 08/01/24 Buttocks Left chronic , open stage 2 , red with pink edegs (Active)   Wound Image   08/02/24 1400   Wound Etiology Pressure Stage 2 08/07/24 1016   Dressing Status Clean;Dry;Intact 08/07/24 1016   Wound Cleansed Not Cleansed 08/07/24 1016   Dressing/Treatment Triad hydro/zinc oxide-based hydrophilic paste 08/07/24 1016   Wound Length (cm) 1 cm 08/02/24 1400   Wound Width (cm) 1 cm 08/02/24 1400   Wound Surface Area (cm^2) 1 cm^2 08/02/24 1400   Change in Wound Size % (l*w) 0 08/02/24 1400   Wound Assessment Dry;Pink/red 08/07/24 0606   Drainage Amount None (dry) 08/07/24 0606   Odor None 08/07/24 0606   Diana-wound Assessment Blanchable erythema 08/07/24 0606   Margins Defined edges 08/06/24 1051   Number of days: 5        Elimination:  Continence:   Bowel: Yes  Bladder: Yes  Urinary Catheter: None   Colostomy/Ileostomy/Ileal Conduit: No       Date of Last BM: 8/3/2024    Intake/Output Summary (Last 24 hours) at 8/7/2024 1401  Last data filed at 8/7/2024 1348  Gross per 24 hour   Intake 1090 ml   Output 1200 ml   Net -110 ml     I/O last 3 completed shifts:  In: 970 [P.O.:960; I.V.:10]  Out: 2100 [Urine:2100]    Safety Concerns:     At Risk for Falls    Impairments/Disabilities:      None    Nutrition Therapy:  Current Nutrition Therapy:   - Oral Diet:  Low Sodium (3-4gm)    Routes of Feeding: Oral  Liquids: Thin Liquids  Daily Fluid Restriction: no  Last Modified Barium Swallow with Video (Video Swallowing Test): not done    Treatments at the Time of Hospital Discharge:   Respiratory Treatments:  Opt out

## 2024-08-07 NOTE — PROGRESS NOTES
[facility]     INPATIENT PROGRESS NOTE  Name: Shonda Hernandes  /Age/Sex: 1945 (78 y.o. female)   MRN & CSN: [id number] & [id number]  Admission Date/Time: 2024  2:31 PM   Location: [unfilled] [id number]-0457/5277-01  Current Hospital Day: Hospital Day: 7   Principal Problem: ANITA (acute kidney injury) (HCC)  HPI     Patient seen and examined.No issues overnight.  Feeling better today.  Does have neck and back pain.  No fever or chills.  Foot feels better.      All other review of systems negative unless noted above.  VITALS  Vitals:    24 1144   BP: (!) 141/62   Pulse: 71   Resp: 16   Temp: 98.6 °F (37 °C)   SpO2: 93%         PHYSICAL EXAM     General: No acute distress   HEENT: PERRL, EOMI  Cardiac: Regular rate and rhythm, no murmur  Lungs: Clear to auscultation anteriorly  Abdomen: Soft nontender nondistended  Extremities: Left dorsal foot erythema and blister, no fluctuance  Neuro: Nonfocal     LABS   BMP  Recent Labs     2431 24    136  --  134*   K 3.5 3.6  --  3.6    102  --  99   CO2 23 20*  --  23   BUN 15 15  --  24*   CREATININE 1.0 1.1  --  1.1   CALCIUM 8.2* 8.7  --  8.8   PHOS 2.3* 2.0* 4.0 3.4   MG 1.70* 2.20  --  2.00     CBC/COAGS  Recent Labs     24   WBC 10.8 8.5 11.4*   HCT 27.2* 29.0* 26.1*    280 258     Liver & Pancreas  Recent Labs     24   ALBUMIN 2.7* 3.1* 3.1*          IMAGING   No new imaging   Above studies and images were personally reviewed by myself    MEDS   Scheduled Meds:   iron sucrose  200 mg IntraVENous Q24H    amoxicillin-clavulanate  1 tablet Oral 2 times per day    doxycycline hyclate  100 mg Oral 2 times per day    sennosides-docusate sodium  1 tablet Oral BID    furosemide  20 mg Oral Daily    DULoxetine  40 mg Oral Daily    fluticasone  1 spray Each Nostril Daily    sodium chloride flush  5-40

## 2024-08-07 NOTE — PROGRESS NOTES
Pt discharged to home with daughter. Daughter transporting pt. Discharge education reviewed. All questions answered. IV removed without complication. All belongings sent home with family.

## 2024-08-07 NOTE — PROGRESS NOTES
Patient is demanding with multiple complaints to staff. Patient questions care. Reviewed meds with patient this writer's night shift. Patient continues with difficulty retaining information given and education updates. Plan is to discharge 8/7/24 to home with daughter. Patient has chronic pain and it is uncontrolled. VS are WNL. Had replacement Phosphorous 8/6/24 and labs are WNL now. Patient did turn occasionally for staff this shift. Bilateral lower extremity cellulitis symptoms have cleared significantly. No tele orders are in place presently.

## 2024-08-08 ENCOUNTER — CARE COORDINATION (OUTPATIENT)
Dept: CASE MANAGEMENT | Age: 79
End: 2024-08-08

## 2024-08-08 NOTE — CARE COORDINATION
Care Transitions Note    Initial Call - Call within 2 business days of discharge: Yes    Patient Current Location:  [place] at daughter's home    Care Transition Nurse contacted the Hannah sanders, by telephone to perform post hospital discharge assessment, verified name and  as identifiers. Provided introduction to self, and explanation of the Care Transition Nurse role.     Patient: Shonda Hernandes    Patient : 1945   MRN: [id number]    Reason for Admission: ANITA, et al  Discharge Date: 24  RURS: Readmission Risk Score: 20.3      Last Discharge Facility       Date Complaint Diagnosis Description Type Department Provider    24 Dizziness Dizziness ... ED to Hosp-Admission (Discharged) (ADMITTED) [facility] Adeel Nguyễn MD; Rylan Greer...            Was this an external facility discharge? No    Additional needs identified to be addressed with provider   No needs identified         Method of communication with provider: none.    Patients top risk factors for readmission: functional physical ability, medical condition-ANITA, A Fib, CHF, COPD, Pressure Ulcer, other, medication management, multiple health system providers, and polypharmacy    Interventions to address risk factors:   Review of patient management of conditions/medications:    Home Health: [facility]   DME: walker    Advance Care Planning:   Does patient have an Advance Directive:   Patient has a POA, Healthcare Surrogate, Living Will.    Advance Care Planning   Healthcare Decision Maker:    Primary Decision Maker: Tricia Hoover - Child - [phone]    Secondary Decision Maker: GabrielHannah - Child - [phone]    Supplemental (Other) Decision Maker: Bety Blackman - Child - [phone]    Patient does not have a signed [facility] Communication Release form (HIPAA form) on file at this time.    Medication Reconciliation:  Medication reconciliation was performed with  Hannah sanders ,[id number] entered: unable to get it to

## 2024-08-09 ENCOUNTER — TELEPHONE (OUTPATIENT)
Dept: CARDIOLOGY CLINIC | Age: 79
End: 2024-08-09

## 2024-08-09 DIAGNOSIS — I50.42 CHRONIC COMBINED SYSTOLIC AND DIASTOLIC CHF (CONGESTIVE HEART FAILURE) (HCC): Primary | ICD-10-CM

## 2024-08-09 NOTE — TELEPHONE ENCOUNTER
Increase Demadex to 40 mg twice daily for 3 days and then go back to 40 mg once daily.  She needs a repeat BMP next week

## 2024-08-09 NOTE — TELEPHONE ENCOUNTER
CHF-SOB-EDEMA-WEIGHT GAIN    What type of symptoms are you having? Yes  Was in the hospital for Cellulitis on 8/1       Do you have new or worsening edema (swelling)? Yes   If so, where? Bilateral legs to ankles Swelling around her abdomin  Do you wear compression stockings? Has them but not with her staying with her daughter  Are you elevating your legs? no    Have you had recent weight gain?    If so how much and in what time frame?    What is your current weight? 147.6lbs   What is your normal (dry) weight? 135lbs    Do you have new or worsening short of breath?   Is your SOB only with exertion yes   How far can you walk before needing to stop?   20 to 30 steps   How long does it take to recover (return to normal breathing) at rest? Just a minute or 2   Are you able to lay flat to sleep without becoming SOB? Never lays flat sleeps in a recliner    Are you on Oxygen? no   How much?   Have you needed to increase your O2?   Are you monitoring your SpO2 (oxygen saturation)?  If so what is it?    Are you taking a diuretic (water pill)?      Medication Name: Demadex 40mg 1 daily in the AM  Waiting on the Furosemide to come into the pharmacy       Do you notice any improvement in SOB or swelling when you take the diuretic?    Yes she urinates alittle more   How long have you been having these symptoms?  Since she was released form the hospital   Are your symptoms limiting daily activities or quality of life? Hard for her to walk    Are you following a low sodium  Yes  fluid restricted diet? No Drinks a lot of water   What does you typical daily diet include?   How much fluid are you drinking? 3-4 8oz of water a day    Have you had any recent hospitalizations for these symptoms?Yes    Do you monitor your BP Yes  117/58 this afternoon  Heart rate  81   If so what are your recent readings?     Has there been a recent Echo? 5/9/2022   What is the EF? 40-45%

## 2024-08-09 NOTE — TELEPHONE ENCOUNTER
Mini (Shonda's Daughter) called in reporting Shonda has bilateral swelling in her legs. She say they are very big. She wants to know if she needs to increase her furosemide.     Please Advise: [phone]

## 2024-08-12 NOTE — TELEPHONE ENCOUNTER
If the swelling is better continue Lasix 40 mg daily and repeat BMP this week. Lab orders have been placed. Please make her aware.

## 2024-08-12 NOTE — TELEPHONE ENCOUNTER
Informed Mini (ok per HIPAA) of message below.  Elevate her legs also  Will have labs done on Thursday   Call if needed

## 2024-08-12 NOTE — TELEPHONE ENCOUNTER
So she received Furosemide 40mg on Saturday. Sunday she had given her an extra Furosemide on along with an extra Kt  Looking better last night around her ankles     Please advise

## 2024-08-13 ENCOUNTER — CARE COORDINATION (OUTPATIENT)
Dept: CASE MANAGEMENT | Age: 79
End: 2024-08-13

## 2024-08-13 NOTE — CARE COORDINATION
Care Transitions Note    Follow Up Call     Patient Current Location:  Home: [address]  [place] [address]    Care Transition Nurse contacted the  daughter, Hannah,  by telephone. Verified name and  as identifiers.    Additional needs identified to be addressed with provider   No needs identified         Method of communication with provider: none.    Care Summary Note:   Placed a call to the home and spoke with daughter, Hannah, for follow up.  She reported patient is getting a little better each day.  I could hear the patient speaking over the speaker phone.  She said something about her back pain.  Daughter reported she does have issues with her back pain.  She said it comes and goes, some days it is worse than others.  Some days it is relieved with pain meds and heat and/or cold.  Other days, not so much, nothing relieves it.  She said some days, she just lies around, sleeps it off.   She is up and about, mostly with help and observation.  She is eating fair, a little better than she was.  She has all of her meds, tolerating well.  She said she has had some swelling in both lower extremities.  They called cardiology and was told to take extra Furosemide.  She has been doing that and it has greatly improved.  She sees her PCP on Thursday.  No other issues noted.     Plan of care updates since last contact:  Ongoing assessment of patient's ability to manage self care needs in the home environment under current circumstances.    Assessments:  Care Transitions Subsequent and Final Call    Schedule Follow Up Appointment with PCP: Completed  Subsequent and Final Calls  Have your medications changed?: No  Do you have any questions related to your medications?: No  Are you currently active with any services?: Home Health  Do you have any needs or concerns that I can assist you with?: No  Identified Barriers: None  Care Transitions Interventions  Other Interventions:            Follow Up

## 2024-08-19 NOTE — PROGRESS NOTES
[facility] [place]   Office Note    CC: \" I am much better now\"    HPI: Shonda Hernandes  is a 78 y.o. patient with a past medical history significant for atrial fibrillation, pulmonary hypertension, aortic stenosis and CAD s/p CHILANGO to mid circ 5/2014. She is s/p AVR, PVI and RENETTA exclusion by Dr Hernandez on 6/16. An inpatient echocardiogram was completed revealing moderate to severe MR. Subsequently, a CAMERON was completed on 7/27/20 showing moderate MR. She was admitted 10/26/21-11/1/21 with afib with RVR and acute CHF. She converted to a regular rhythm and was diuresed prior to discharge. She was admitted to [facility] 11/11/21 with acute encephalopathy and found to have rhabdomyolysis and afib with RVR again. She underwent successful cardioversion on 11/17/21. She did experience bradycardia when in sinus rhythm with heart rate between 40-50 bpm. She was seen in the office with Diane Enzweiler, CNP for follow up on 11/23/21 and then sent to the ED for admission. She was again found in afib with RVR and fluid overloaded. She was diuresed and discharged home on both amiodarone and Toprol. She underwent implant of Bi-V pacemaker with Dr. Mcbride on 4/11/22. She had recurrence of atrial fibrillation noted on interrogation and underwent ablation on 5/9/22. She was seen in office on 02/22/2023 by Dominic Mcbride MD and device interrogation showed no AT/ AF recurrence since her last check in November 2022.    Today she presents for follow up and states that feeling good. She is accompanied by her daughter.  She had a recent hospital stay with cellulitis and decreased kidney function.  She endorses within the last month she is more fatigued. Her recent blood work showed decrease in her blood count.  She reports medication compliance and is tolerating. She denies any abnormal bleeding or bruising. She denies exertional chest pain/pressure, dyspnea at rest, worsening HOLLOWAY, PND, orthopnea, palpitations, lightheadedness, weight

## 2024-08-20 ENCOUNTER — OFFICE VISIT (OUTPATIENT)
Dept: CARDIOLOGY CLINIC | Age: 79
End: 2024-08-20
Payer: MEDICARE

## 2024-08-20 VITALS
SYSTOLIC BLOOD PRESSURE: 144 MMHG | DIASTOLIC BLOOD PRESSURE: 60 MMHG | HEART RATE: 80 BPM | WEIGHT: 131.8 LBS | HEIGHT: 61 IN | BODY MASS INDEX: 24.88 KG/M2

## 2024-08-20 DIAGNOSIS — D63.8 ANEMIA, CHRONIC DISEASE: ICD-10-CM

## 2024-08-20 DIAGNOSIS — I65.21 CAROTID STENOSIS, RIGHT: ICD-10-CM

## 2024-08-20 DIAGNOSIS — Z95.2 S/P AVR (AORTIC VALVE REPLACEMENT): ICD-10-CM

## 2024-08-20 DIAGNOSIS — E78.5 HYPERLIPIDEMIA LDL GOAL <70: ICD-10-CM

## 2024-08-20 DIAGNOSIS — I34.0 NONRHEUMATIC MITRAL VALVE REGURGITATION: ICD-10-CM

## 2024-08-20 DIAGNOSIS — I50.42 CHRONIC COMBINED SYSTOLIC AND DIASTOLIC CHF (CONGESTIVE HEART FAILURE) (HCC): ICD-10-CM

## 2024-08-20 DIAGNOSIS — I25.10 CORONARY ARTERY DISEASE INVOLVING NATIVE CORONARY ARTERY OF NATIVE HEART WITHOUT ANGINA PECTORIS: Primary | ICD-10-CM

## 2024-08-20 DIAGNOSIS — I48.0 PAF (PAROXYSMAL ATRIAL FIBRILLATION) (HCC): ICD-10-CM

## 2024-08-20 PROCEDURE — 1123F ACP DISCUSS/DSCN MKR DOCD: CPT | Performed by: INTERNAL MEDICINE

## 2024-08-20 PROCEDURE — G8420 CALC BMI NORM PARAMETERS: HCPCS | Performed by: INTERNAL MEDICINE

## 2024-08-20 PROCEDURE — 3078F DIAST BP <80 MM HG: CPT | Performed by: INTERNAL MEDICINE

## 2024-08-20 PROCEDURE — 99214 OFFICE O/P EST MOD 30 MIN: CPT | Performed by: INTERNAL MEDICINE

## 2024-08-20 PROCEDURE — 1036F TOBACCO NON-USER: CPT | Performed by: INTERNAL MEDICINE

## 2024-08-20 PROCEDURE — G8428 CUR MEDS NOT DOCUMENT: HCPCS | Performed by: INTERNAL MEDICINE

## 2024-08-20 PROCEDURE — G8399 PT W/DXA RESULTS DOCUMENT: HCPCS | Performed by: INTERNAL MEDICINE

## 2024-08-20 PROCEDURE — 3077F SYST BP >= 140 MM HG: CPT | Performed by: INTERNAL MEDICINE

## 2024-08-20 PROCEDURE — 1111F DSCHRG MED/CURRENT MED MERGE: CPT | Performed by: INTERNAL MEDICINE

## 2024-08-20 PROCEDURE — 1090F PRES/ABSN URINE INCON ASSESS: CPT | Performed by: INTERNAL MEDICINE

## 2024-08-20 RX ORDER — DOXYCYCLINE HYCLATE 100 MG
TABLET ORAL
COMMUNITY
Start: 2024-08-09 | End: 2024-08-20 | Stop reason: ALTCHOICE

## 2024-08-20 RX ORDER — AMOXICILLIN AND CLAVULANATE POTASSIUM 875; 125 MG/1; MG/1
1 TABLET, FILM COATED ORAL
COMMUNITY
Start: 2024-08-09

## 2024-08-22 ENCOUNTER — CARE COORDINATION (OUTPATIENT)
Dept: CASE MANAGEMENT | Age: 79
End: 2024-08-22

## 2024-08-22 NOTE — CARE COORDINATION
Care Transitions Note    Follow Up Call     Attempted to reach patient for transitions of care follow up.  Unable to reach patient.      Outreach Attempts:   HIPAA compliant voicemail left for daughter.     Follow Up Appointment:   Future Appointments         Provider Specialty Dept Phone    8/27/2024 2:40 PM Kristina Lopez, APRN - CNP Pain Management [phone]    9/16/2024 4:00 PM Adeel Nguyễn MD Internal Medicine [phone]    2/25/2025 3:30 PM Pablo Wang MD Cardiology [phone]          Plan for follow-up call in 6-10 days based on severity of symptoms and risk factors. Plan for next call: as before    Christina Grigsby RN

## 2024-08-27 ENCOUNTER — OFFICE VISIT (OUTPATIENT)
Dept: PAIN MANAGEMENT | Age: 79
End: 2024-08-27
Payer: MEDICARE

## 2024-08-27 VITALS
WEIGHT: 131 LBS | TEMPERATURE: 98.5 F | OXYGEN SATURATION: 97 % | BODY MASS INDEX: 24.77 KG/M2 | DIASTOLIC BLOOD PRESSURE: 65 MMHG | HEART RATE: 81 BPM | SYSTOLIC BLOOD PRESSURE: 135 MMHG

## 2024-08-27 DIAGNOSIS — G89.29 CHRONIC MIDLINE LOW BACK PAIN WITH SCIATICA, SCIATICA LATERALITY UNSPECIFIED: ICD-10-CM

## 2024-08-27 DIAGNOSIS — M15.3 OTHER SECONDARY OSTEOARTHRITIS OF MULTIPLE SITES: ICD-10-CM

## 2024-08-27 DIAGNOSIS — M51.34 DDD (DEGENERATIVE DISC DISEASE), THORACIC: ICD-10-CM

## 2024-08-27 DIAGNOSIS — M48.061 SPINAL STENOSIS OF LUMBAR REGION WITHOUT NEUROGENIC CLAUDICATION: ICD-10-CM

## 2024-08-27 DIAGNOSIS — M47.814 FACET ARTHROPATHY, THORACIC: ICD-10-CM

## 2024-08-27 DIAGNOSIS — F43.22 ADJUSTMENT REACTION WITH ANXIETY: ICD-10-CM

## 2024-08-27 DIAGNOSIS — M54.40 CHRONIC MIDLINE LOW BACK PAIN WITH SCIATICA, SCIATICA LATERALITY UNSPECIFIED: ICD-10-CM

## 2024-08-27 DIAGNOSIS — J44.1 COPD EXACERBATION (HCC): ICD-10-CM

## 2024-08-27 DIAGNOSIS — M47.812 CERVICAL ARTHRITIS: ICD-10-CM

## 2024-08-27 DIAGNOSIS — G89.4 CHRONIC PAIN SYNDROME: ICD-10-CM

## 2024-08-27 DIAGNOSIS — G62.9 NEUROPATHY: ICD-10-CM

## 2024-08-27 DIAGNOSIS — M47.817 LUMBOSACRAL SPONDYLOSIS WITHOUT MYELOPATHY: ICD-10-CM

## 2024-08-27 DIAGNOSIS — Z91.81 AT RISK FOR FALLING: ICD-10-CM

## 2024-08-27 PROCEDURE — G8399 PT W/DXA RESULTS DOCUMENT: HCPCS | Performed by: NURSE PRACTITIONER

## 2024-08-27 PROCEDURE — 99214 OFFICE O/P EST MOD 30 MIN: CPT | Performed by: NURSE PRACTITIONER

## 2024-08-27 PROCEDURE — 1111F DSCHRG MED/CURRENT MED MERGE: CPT | Performed by: NURSE PRACTITIONER

## 2024-08-27 PROCEDURE — G8420 CALC BMI NORM PARAMETERS: HCPCS | Performed by: NURSE PRACTITIONER

## 2024-08-27 PROCEDURE — G8427 DOCREV CUR MEDS BY ELIG CLIN: HCPCS | Performed by: NURSE PRACTITIONER

## 2024-08-27 PROCEDURE — 1090F PRES/ABSN URINE INCON ASSESS: CPT | Performed by: NURSE PRACTITIONER

## 2024-08-27 PROCEDURE — 3075F SYST BP GE 130 - 139MM HG: CPT | Performed by: NURSE PRACTITIONER

## 2024-08-27 PROCEDURE — 3023F SPIROM DOC REV: CPT | Performed by: NURSE PRACTITIONER

## 2024-08-27 PROCEDURE — 1123F ACP DISCUSS/DSCN MKR DOCD: CPT | Performed by: NURSE PRACTITIONER

## 2024-08-27 PROCEDURE — 1036F TOBACCO NON-USER: CPT | Performed by: NURSE PRACTITIONER

## 2024-08-27 PROCEDURE — 3078F DIAST BP <80 MM HG: CPT | Performed by: NURSE PRACTITIONER

## 2024-08-27 RX ORDER — OXYCODONE AND ACETAMINOPHEN 10; 325 MG/1; MG/1
1 TABLET ORAL 2 TIMES DAILY PRN
Qty: 60 TABLET | Refills: 0 | Status: SHIPPED | OUTPATIENT
Start: 2024-08-27 | End: 2024-09-26

## 2024-08-27 NOTE — PROGRESS NOTES
Shonda Hernandes  1945  [id number]    HISTORY OF PRESENT ILLNESS: Ms. Hernandes is a 78 y.o. female returns for a follow up visit for pain management  She has a diagnosis of   1. Chronic pain syndrome    2. DDD (degenerative disc disease), thoracic    3. Facet arthropathy, thoracic    4. Lumbosacral spondylosis without myelopathy    5. Chronic midline low back pain with sciatica, sciatica laterality unspecified    6. Spinal stenosis of lumbar region without neurogenic claudication    7. Cervical arthritis    8. Other secondary osteoarthritis of multiple sites    9. Peripheral neuropathy    10. Adjustment reaction with anxiety    11. COPD exacerbation (HCC)    12. At risk for falling      As per Information Obtained from the PADT (Patient Assessment and Documentation Tool)    She complains of pain in the back,right shoulder,left toes. She rates the pain 7/10 and describes it as aching, tingling, stabbing. Current treatment regimen has helped relieve about 40% of the pain.  She denies any side effects from the current pain regimen. Patient reports that since the last follow up visit the physical functioning is unchanged, family/social relationships are unchanged, mood is unchanged sleep patterns are unchanged, and that the overall functioning is worse.  Patient denies misusing/abusing her narcotic pain medications or using any illegal drugs.      Upon obtaining medical history from Ms. Hernandes states that pain is manageable on current pain therapy. Takes the pain medications as prescribed. She was treated in the ER for cellulitis BLE/Kidney injury, reports she was was narcaned twice which was not required. Daughter reports that she had not had opioids that morning, believes this may have bee due to dehydration. Patient reports that pain was otherwise better managed in the hospital as opioids were given to her every 4 hours. Her legs are better post antibiotic therapy. Currently scheduled for visit with Dr. Andrade for chronic          Adjust dose of present analgesic: No              Switch analgesics: No              Add/Adjust concomitant therapy: No: continue with Lidocaine, Narcan    I will continue her current medication regimen  which is part of the above treatment schedule. It has been helping with Ms. Hernandes's chronic  medical problems which for this visit include:   Diagnoses of Chronic pain syndrome, DDD (degenerative disc disease), thoracic, Facet arthropathy, thoracic, Lumbosacral spondylosis without myelopathy, Chronic midline low back pain with sciatica, sciatica laterality unspecified, Spinal stenosis of lumbar region without neurogenic claudication, Cervical arthritis, Other secondary osteoarthritis of multiple sites, Peripheral neuropathy, Adjustment reaction with anxiety, COPD exacerbation (HCC), and At risk for falling were pertinent to this visit.   Risks and benefits of the medications and other alternative treatments  including no treatment were discussed with the patient.The common side effects of these medications were also explained to the patient.  Informed verbal consent was obtained.   Goals of current treatment regimen include improvement in pain, restoration of functioning- with focus on improvement in physical performance, general activity, work or disability,emotional distress, health care utilization and  decreased medication consumption. Will continue to monitor progress towards achieving/maintaining therapeutic goals with special emphasis on  1. Improvement in perceived interfernce  of pain with ADL's. Ability to do home exercises independently. Ability to do household chores indoor and/or outdoor work and social and leisure activities.Improve psychosocial and physical functioning. - she is showing progression towards this treatment goal with the current regimen.     She was advised against drinking alcohol with the narcotic pain medicines, advised against driving or handling machinery while adjusting the

## 2024-08-29 ENCOUNTER — CARE COORDINATION (OUTPATIENT)
Dept: CASE MANAGEMENT | Age: 79
End: 2024-08-29

## 2024-08-29 NOTE — CARE COORDINATION
Care Transitions Note    Final Call     Attempted to reach patient for transitions of care follow up.  Unable to reach patient.      Outreach Attempts:   HIPAA compliant voicemail left for daughter.     Patient closed (unable to reach patient) from the Care Transitions program on 8/29/2024.  Patient/family has the ability to self manage at this time..      Handoff:   Condition management for A. Fib, CAD, CHF, COPD, HTN.     Assessments:  Care Transitions Subsequent and Final Call    Subsequent and Final Calls  Are you currently active with any services?: Home Health  Care Transitions Interventions  Other Interventions:           Upcoming Appointments:    Future Appointments         Provider Specialty Dept Phone    9/16/2024 4:00 PM Adeel Nguyễn MD Internal Medicine [phone]    2/25/2025 3:30 PM Pablo Wang MD Cardiology [phone]            Christina Grigsby RN

## 2024-08-30 ENCOUNTER — CARE COORDINATION (OUTPATIENT)
Dept: CARE COORDINATION | Age: 79
End: 2024-08-30

## 2024-09-05 ENCOUNTER — CARE COORDINATION (OUTPATIENT)
Dept: CARE COORDINATION | Age: 79
End: 2024-09-05

## 2024-09-05 NOTE — CARE COORDINATION
Ambulatory Care Coordination Note     9/5/2024 10:56 AM     Family, Mini,  outreach attempt by this ACM today to perform care management follow up . ACM was unable to reach the patient by telephone today; left voice message requesting a return phone call to this ACM.     ACM: Edwige Mills RN     Care Summary Note: CTN handoff     PCP/Specialist follow up:   Future Appointments         Provider Specialty Dept Phone    9/16/2024 4:00 PM Adeel Nguyễn MD Internal Medicine [phone]    2/25/2025 3:30 PM Pablo Wang MD Cardiology [phone]            Follow Up:   Plan for next ACM outreach in approximately 1 week to complete:  - CC Protocol assessments  - disease specific assessments  - SDOH assessments  - medication review  - advance care planning  - goal progression  - education   - RPM.

## 2024-09-06 ENCOUNTER — TELEPHONE (OUTPATIENT)
Dept: CARDIOLOGY CLINIC | Age: 79
End: 2024-09-06

## 2024-09-09 RX ORDER — AMIODARONE HYDROCHLORIDE 200 MG/1
100 TABLET ORAL DAILY
Qty: 30 TABLET | Refills: 5 | Status: SHIPPED | OUTPATIENT
Start: 2024-09-09

## 2024-09-13 ENCOUNTER — CARE COORDINATION (OUTPATIENT)
Dept: CARE COORDINATION | Age: 79
End: 2024-09-13

## 2024-09-13 DIAGNOSIS — E78.5 HYPERLIPIDEMIA LDL GOAL <70: ICD-10-CM

## 2024-09-13 DIAGNOSIS — I48.0 PAF (PAROXYSMAL ATRIAL FIBRILLATION) (HCC): ICD-10-CM

## 2024-09-13 DIAGNOSIS — I25.10 CORONARY ARTERY DISEASE INVOLVING NATIVE CORONARY ARTERY OF NATIVE HEART WITHOUT ANGINA PECTORIS: ICD-10-CM

## 2024-09-13 DIAGNOSIS — I65.21 CAROTID STENOSIS, RIGHT: ICD-10-CM

## 2024-09-13 DIAGNOSIS — I34.0 NONRHEUMATIC MITRAL VALVE REGURGITATION: ICD-10-CM

## 2024-09-13 DIAGNOSIS — I50.42 CHRONIC COMBINED SYSTOLIC AND DIASTOLIC CHF (CONGESTIVE HEART FAILURE) (HCC): ICD-10-CM

## 2024-09-13 DIAGNOSIS — Z95.2 S/P AVR (AORTIC VALVE REPLACEMENT): ICD-10-CM

## 2024-09-13 DIAGNOSIS — D63.8 ANEMIA, CHRONIC DISEASE: ICD-10-CM

## 2024-09-13 LAB
ALBUMIN SERPL-MCNC: 4.1 G/DL (ref 3.4–5)
ALBUMIN/GLOB SERPL: 1.5 {RATIO} (ref 1.1–2.2)
ALP SERPL-CCNC: 59 U/L (ref 40–129)
ALT SERPL-CCNC: 16 U/L (ref 10–40)
ANION GAP SERPL CALCULATED.3IONS-SCNC: 12 MMOL/L (ref 3–16)
AST SERPL-CCNC: 25 U/L (ref 15–37)
BILIRUB SERPL-MCNC: 0.3 MG/DL (ref 0–1)
BUN SERPL-MCNC: 28 MG/DL (ref 7–20)
CALCIUM SERPL-MCNC: 9.7 MG/DL (ref 8.3–10.6)
CHLORIDE SERPL-SCNC: 95 MMOL/L (ref 99–110)
CO2 SERPL-SCNC: 32 MMOL/L (ref 21–32)
CREAT SERPL-MCNC: 1.1 MG/DL (ref 0.6–1.2)
DEPRECATED RDW RBC AUTO: 16.9 % (ref 12.4–15.4)
GFR SERPLBLD CREATININE-BSD FMLA CKD-EPI: 51 ML/MIN/{1.73_M2}
GLUCOSE SERPL-MCNC: 78 MG/DL (ref 70–99)
HCT VFR BLD AUTO: 35.9 % (ref 36–48)
HGB BLD-MCNC: 11.8 G/DL (ref 12–16)
MCH RBC QN AUTO: 30 PG (ref 26–34)
MCHC RBC AUTO-ENTMCNC: 33 G/DL (ref 31–36)
MCV RBC AUTO: 91.1 FL (ref 80–100)
PLATELET # BLD AUTO: 216 K/UL (ref 135–450)
PMV BLD AUTO: 8.2 FL (ref 5–10.5)
POTASSIUM SERPL-SCNC: 3.4 MMOL/L (ref 3.5–5.1)
PROT SERPL-MCNC: 6.8 G/DL (ref 6.4–8.2)
RBC # BLD AUTO: 3.93 M/UL (ref 4–5.2)
SODIUM SERPL-SCNC: 139 MMOL/L (ref 136–145)
WBC # BLD AUTO: 9 K/UL (ref 4–11)

## 2024-09-14 LAB
FERRITIN SERPL IA-MCNC: 128 NG/ML (ref 15–150)
IRON SATN MFR SERPL: 16 % (ref 15–50)
IRON SERPL-MCNC: 48 UG/DL (ref 37–145)
TIBC SERPL-MCNC: 299 UG/DL (ref 260–445)

## 2024-09-25 PROBLEM — Z86.73 HISTORY OF STROKE: Status: ACTIVE | Noted: 2024-09-25

## 2024-09-26 ENCOUNTER — CARE COORDINATION (OUTPATIENT)
Dept: CARE COORDINATION | Age: 79
End: 2024-09-26

## 2024-11-01 ENCOUNTER — OFFICE VISIT (OUTPATIENT)
Dept: ORTHOPEDIC SURGERY | Age: 79
End: 2024-11-01

## 2024-11-01 VITALS — BODY MASS INDEX: 24.94 KG/M2 | HEIGHT: 60 IN | WEIGHT: 127 LBS

## 2024-11-01 DIAGNOSIS — M19.011 LOCALIZED OSTEOARTHRITIS OF SHOULDER, RIGHT: Primary | ICD-10-CM

## 2024-11-01 DIAGNOSIS — F17.200 CURRENT SMOKER: ICD-10-CM

## 2024-11-01 RX ORDER — TRIAMCINOLONE ACETONIDE 40 MG/ML
40 INJECTION, SUSPENSION INTRA-ARTICULAR; INTRAMUSCULAR ONCE
Status: COMPLETED | OUTPATIENT
Start: 2024-11-01 | End: 2024-11-01

## 2024-11-01 RX ORDER — LIDOCAINE HYDROCHLORIDE 10 MG/ML
4 INJECTION, SOLUTION INFILTRATION; PERINEURAL ONCE
Status: COMPLETED | OUTPATIENT
Start: 2024-11-01 | End: 2024-11-01

## 2024-11-01 RX ADMIN — TRIAMCINOLONE ACETONIDE 40 MG: 40 INJECTION, SUSPENSION INTRA-ARTICULAR; INTRAMUSCULAR at 10:53

## 2024-11-01 RX ADMIN — LIDOCAINE HYDROCHLORIDE 4 ML: 10 INJECTION, SOLUTION INFILTRATION; PERINEURAL at 10:52

## 2024-11-01 NOTE — PROGRESS NOTES
CHIEF COMPLAINT: Right shoulder pain/ cuff tendinopathy/ impingement syndrome.    HISTORY:  Ms. Hernandes 79 y.o. female right handed presents today for follow up visit for evaluation of right shoulder pain which started Feb 2024 with no injury or trauma. She is here to review her MRI.  She is complaining of achy pain, 9/10.  Pain is increase with elevation and decrease with rest. No radiation and no numbness and tingling sensation. She had right shoulder cortisone injection on 8/5/2024 with some improvement.  She has tried Voltaren gel, lidocaine patches and NSAIDs with no significant improvement.  No other complaint.  No h/o trauma or gout.  She is well-known to us for left distal radius nonoperative fracture on 12/16/2023.    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    Macular degeneration 2018    Nonrheumatic mitral valve regurgitation     Obstructive sleep apnea     Osteoarthritis     Peripheral neuropathy     Pulmonary HTN ([facility]) 2014    PVD (peripheral vascular disease) ([facility])     Syncope 11/2022    of unknown cause    Tobacco use disorder in remission        Past Surgical History:   Procedure Laterality Date    ABLATION OF DYSRHYTHMIC FOCUS  05/09/2022    AORTIC VALVE REPLACEMENT  6/16/15    Dr. Correa - PVI, RENETTA exclusion. 19mm Maki pericardial Magna    APPENDECTOMY      BACK SURGERY  03/15/2019

## 2024-11-13 RX ORDER — PREDNISONE 10 MG/1
TABLET ORAL
Qty: 26 TABLET | Refills: 0 | Status: SHIPPED | OUTPATIENT
Start: 2024-11-13

## 2024-11-13 NOTE — TELEPHONE ENCOUNTER
Prescription Refill     Medication Name:  RT SHOULDER STEROID MED  Pharmacy: [facility]  [address]   [phone]  Patient Contact Number:  [phone]     PAIN LEVEL 7

## 2024-11-13 NOTE — TELEPHONE ENCOUNTER
Patient last seen 11/1/24     Requested Prescriptions     Pending Prescriptions Disp Refills    predniSONE (DELTASONE) 10 MG tablet 26 tablet 0     Sig: take 3 tabs PO BID x2 days, then 2 tabs PO BID x2 days, then 1 tab PO BID x2 days, then 1 tab PO daily x2 days

## 2024-12-04 ENCOUNTER — TELEPHONE (OUTPATIENT)
Dept: ORTHOPEDIC SURGERY | Age: 79
End: 2024-12-04

## 2024-12-04 NOTE — TELEPHONE ENCOUNTER
SW patient's daughter. Advised her that Per Dr. Ziegler, he will not prescribe prednisone any more often than every 3 months. Dr. Ziegler stated he would place a referral for PT. She refused the referral for PT.

## 2024-12-04 NOTE — TELEPHONE ENCOUNTER
General Question     Subject: RT SHOULDER  Patient and /or Facility Request: Shonda Hernandes   Contact Number: [phone]    PATIENT WAS PRESCRIBE PREDNISONE FOR HER RT SHOULDER IN BETWEEN HER CORTISONE INJECTIONS.    DAUGHTER, ARANZA STATED THAT THE PREDNISONE WORKED REALLY WELL ON HER MOTHER'S SHOULDER.    SHE HAS BEEN OUT FOR ABOUT 2- 3 WEEKS AND ARANZA  WOULD LIKE TO KNOW LONG WILL IT BEFORE SHE CAN GET ANOTHER REFILL.    PLEASE CALL BACK ARANZA AT THE ABOVE NUMBER

## 2024-12-20 DIAGNOSIS — G89.4 CHRONIC PAIN SYNDROME: ICD-10-CM

## 2024-12-20 DIAGNOSIS — M54.40 CHRONIC MIDLINE LOW BACK PAIN WITH SCIATICA, SCIATICA LATERALITY UNSPECIFIED: ICD-10-CM

## 2024-12-20 DIAGNOSIS — G89.29 CHRONIC MIDLINE LOW BACK PAIN WITH SCIATICA, SCIATICA LATERALITY UNSPECIFIED: ICD-10-CM

## 2024-12-20 DIAGNOSIS — M51.34 DDD (DEGENERATIVE DISC DISEASE), THORACIC: ICD-10-CM

## 2024-12-20 DIAGNOSIS — M47.817 LUMBOSACRAL SPONDYLOSIS WITHOUT MYELOPATHY: ICD-10-CM

## 2024-12-20 DIAGNOSIS — M47.814 FACET ARTHROPATHY, THORACIC: ICD-10-CM

## 2024-12-23 RX ORDER — LIDOCAINE 50 MG/G
PATCH TOPICAL
Qty: 30 PATCH | Refills: 0 | OUTPATIENT
Start: 2024-12-23

## 2024-12-26 ENCOUNTER — HOSPITAL ENCOUNTER (INPATIENT)
Age: 79
LOS: 7 days | Discharge: HOME | DRG: 291 | End: 2025-01-02
Attending: STUDENT IN AN ORGANIZED HEALTH CARE EDUCATION/TRAINING PROGRAM | Admitting: STUDENT IN AN ORGANIZED HEALTH CARE EDUCATION/TRAINING PROGRAM
Payer: MEDICARE

## 2024-12-26 DIAGNOSIS — L03.119 CELLULITIS OF LOWER EXTREMITY, UNSPECIFIED LATERALITY: ICD-10-CM

## 2024-12-26 DIAGNOSIS — I50.31 ACUTE DIASTOLIC CONGESTIVE HEART FAILURE (HCC): Primary | ICD-10-CM

## 2024-12-26 DIAGNOSIS — M79.89 SWELLING OF RIGHT LOWER EXTREMITY: ICD-10-CM

## 2024-12-26 PROBLEM — I50.9 ACUTE CONGESTIVE HEART FAILURE, UNSPECIFIED HEART FAILURE TYPE (HCC): Status: ACTIVE | Noted: 2024-12-26

## 2024-12-26 PROBLEM — I50.30 DIASTOLIC CHF (HCC): Status: ACTIVE | Noted: 2024-12-26

## 2024-12-26 LAB
ALBUMIN SERPL-MCNC: 3.6 G/DL (ref 3.4–5)
ALBUMIN/GLOB SERPL: 1.3 {RATIO} (ref 1.1–2.2)
ALP SERPL-CCNC: 76 U/L (ref 40–129)
ALT SERPL-CCNC: 9 U/L (ref 10–40)
ANION GAP SERPL CALCULATED.3IONS-SCNC: 14 MMOL/L (ref 3–16)
AST SERPL-CCNC: 22 U/L (ref 15–37)
BASOPHILS # BLD: 0 K/UL (ref 0–0.2)
BASOPHILS NFR BLD: 0.4 %
BILIRUB SERPL-MCNC: <0.2 MG/DL (ref 0–1)
BUN SERPL-MCNC: 29 MG/DL (ref 7–20)
CALCIUM SERPL-MCNC: 9.5 MG/DL (ref 8.3–10.6)
CHLORIDE SERPL-SCNC: 97 MMOL/L (ref 99–110)
CO2 SERPL-SCNC: 26 MMOL/L (ref 21–32)
CREAT SERPL-MCNC: 1.2 MG/DL (ref 0.6–1.2)
DEPRECATED RDW RBC AUTO: 15.3 % (ref 12.4–15.4)
EOSINOPHIL # BLD: 0.5 K/UL (ref 0–0.6)
EOSINOPHIL NFR BLD: 5.4 %
FERRITIN SERPL IA-MCNC: 195 NG/ML (ref 15–150)
GFR SERPLBLD CREATININE-BSD FMLA CKD-EPI: 46 ML/MIN/{1.73_M2}
GLUCOSE SERPL-MCNC: 113 MG/DL (ref 70–99)
HCT VFR BLD AUTO: 34.1 % (ref 36–48)
HGB BLD-MCNC: 11.6 G/DL (ref 12–16)
IRON SATN MFR SERPL: 11 % (ref 15–50)
IRON SERPL-MCNC: 28 UG/DL (ref 37–145)
LACTATE BLDV-SCNC: 1.9 MMOL/L (ref 0.4–1.9)
LYMPHOCYTES # BLD: 1.5 K/UL (ref 1–5.1)
LYMPHOCYTES NFR BLD: 17.4 %
MCH RBC QN AUTO: 30.8 PG (ref 26–34)
MCHC RBC AUTO-ENTMCNC: 33.9 G/DL (ref 31–36)
MCV RBC AUTO: 90.9 FL (ref 80–100)
MONOCYTES # BLD: 1.4 K/UL (ref 0–1.3)
MONOCYTES NFR BLD: 15.5 %
NEUTROPHILS # BLD: 5.4 K/UL (ref 1.7–7.7)
NEUTROPHILS NFR BLD: 61.3 %
PLATELET # BLD AUTO: 278 K/UL (ref 135–450)
PMV BLD AUTO: 7.8 FL (ref 5–10.5)
POTASSIUM SERPL-SCNC: 3.7 MMOL/L (ref 3.5–5.1)
PROT SERPL-MCNC: 6.4 G/DL (ref 6.4–8.2)
RBC # BLD AUTO: 3.75 M/UL (ref 4–5.2)
SODIUM SERPL-SCNC: 137 MMOL/L (ref 136–145)
TIBC SERPL-MCNC: 252 UG/DL (ref 260–445)
WBC # BLD AUTO: 8.7 K/UL (ref 4–11)

## 2024-12-26 PROCEDURE — 1200000000 HC SEMI PRIVATE

## 2024-12-26 PROCEDURE — 6360000002 HC RX W HCPCS: Performed by: STUDENT IN AN ORGANIZED HEALTH CARE EDUCATION/TRAINING PROGRAM

## 2024-12-26 PROCEDURE — 83605 ASSAY OF LACTIC ACID: CPT

## 2024-12-26 PROCEDURE — 80053 COMPREHEN METABOLIC PANEL: CPT

## 2024-12-26 PROCEDURE — 85025 COMPLETE CBC W/AUTO DIFF WBC: CPT

## 2024-12-26 PROCEDURE — 83540 ASSAY OF IRON: CPT

## 2024-12-26 PROCEDURE — 83550 IRON BINDING TEST: CPT

## 2024-12-26 PROCEDURE — 36415 COLL VENOUS BLD VENIPUNCTURE: CPT

## 2024-12-26 PROCEDURE — 82728 ASSAY OF FERRITIN: CPT

## 2024-12-26 PROCEDURE — 87040 BLOOD CULTURE FOR BACTERIA: CPT

## 2024-12-26 PROCEDURE — 6370000000 HC RX 637 (ALT 250 FOR IP): Performed by: STUDENT IN AN ORGANIZED HEALTH CARE EDUCATION/TRAINING PROGRAM

## 2024-12-26 PROCEDURE — 99223 1ST HOSP IP/OBS HIGH 75: CPT | Performed by: STUDENT IN AN ORGANIZED HEALTH CARE EDUCATION/TRAINING PROGRAM

## 2024-12-26 RX ORDER — AMIODARONE HYDROCHLORIDE 200 MG/1
100 TABLET ORAL DAILY
Status: DISCONTINUED | OUTPATIENT
Start: 2024-12-27 | End: 2025-01-02 | Stop reason: HOSPADM

## 2024-12-26 RX ORDER — LIDOCAINE 4 G/G
1 PATCH TOPICAL DAILY
Status: DISCONTINUED | OUTPATIENT
Start: 2024-12-26 | End: 2025-01-02 | Stop reason: HOSPADM

## 2024-12-26 RX ORDER — EZETIMIBE 10 MG/1
10 TABLET ORAL NIGHTLY
Status: DISCONTINUED | OUTPATIENT
Start: 2024-12-26 | End: 2025-01-02 | Stop reason: HOSPADM

## 2024-12-26 RX ORDER — METOPROLOL SUCCINATE 50 MG/1
50 TABLET, EXTENDED RELEASE ORAL DAILY
Status: DISCONTINUED | OUTPATIENT
Start: 2024-12-27 | End: 2025-01-02 | Stop reason: HOSPADM

## 2024-12-26 RX ORDER — ONDANSETRON 4 MG/1
4 TABLET, ORALLY DISINTEGRATING ORAL EVERY 8 HOURS PRN
Status: DISCONTINUED | OUTPATIENT
Start: 2024-12-26 | End: 2025-01-02 | Stop reason: HOSPADM

## 2024-12-26 RX ORDER — SODIUM CHLORIDE 9 MG/ML
INJECTION, SOLUTION INTRAVENOUS PRN
Status: DISCONTINUED | OUTPATIENT
Start: 2024-12-26 | End: 2024-12-30 | Stop reason: SDUPTHER

## 2024-12-26 RX ORDER — POTASSIUM CHLORIDE 1500 MG/1
40 TABLET, EXTENDED RELEASE ORAL PRN
Status: DISCONTINUED | OUTPATIENT
Start: 2024-12-26 | End: 2025-01-02 | Stop reason: HOSPADM

## 2024-12-26 RX ORDER — ASPIRIN 81 MG/1
81 TABLET ORAL DAILY
Status: DISCONTINUED | OUTPATIENT
Start: 2024-12-27 | End: 2025-01-02 | Stop reason: HOSPADM

## 2024-12-26 RX ORDER — DULOXETIN HYDROCHLORIDE 60 MG/1
60 CAPSULE, DELAYED RELEASE ORAL DAILY
Status: DISCONTINUED | OUTPATIENT
Start: 2024-12-27 | End: 2025-01-02 | Stop reason: HOSPADM

## 2024-12-26 RX ORDER — MAGNESIUM SULFATE IN WATER 40 MG/ML
2000 INJECTION, SOLUTION INTRAVENOUS PRN
Status: DISCONTINUED | OUTPATIENT
Start: 2024-12-26 | End: 2025-01-02 | Stop reason: HOSPADM

## 2024-12-26 RX ORDER — ONDANSETRON 2 MG/ML
4 INJECTION INTRAMUSCULAR; INTRAVENOUS EVERY 6 HOURS PRN
Status: DISCONTINUED | OUTPATIENT
Start: 2024-12-26 | End: 2025-01-02 | Stop reason: HOSPADM

## 2024-12-26 RX ORDER — BUDESONIDE AND FORMOTEROL FUMARATE DIHYDRATE 160; 4.5 UG/1; UG/1
2 AEROSOL RESPIRATORY (INHALATION) 2 TIMES DAILY
Status: DISCONTINUED | OUTPATIENT
Start: 2024-12-26 | End: 2024-12-26 | Stop reason: SDUPTHER

## 2024-12-26 RX ORDER — POTASSIUM CHLORIDE 7.45 MG/ML
10 INJECTION INTRAVENOUS PRN
Status: DISCONTINUED | OUTPATIENT
Start: 2024-12-26 | End: 2025-01-02 | Stop reason: HOSPADM

## 2024-12-26 RX ORDER — ACETAMINOPHEN 650 MG/1
650 SUPPOSITORY RECTAL EVERY 6 HOURS PRN
Status: DISCONTINUED | OUTPATIENT
Start: 2024-12-26 | End: 2025-01-02 | Stop reason: HOSPADM

## 2024-12-26 RX ORDER — OXYCODONE AND ACETAMINOPHEN 7.5; 325 MG/1; MG/1
1 TABLET ORAL EVERY 4 HOURS PRN
Status: DISCONTINUED | OUTPATIENT
Start: 2024-12-26 | End: 2024-12-31

## 2024-12-26 RX ORDER — FUROSEMIDE 10 MG/ML
60 INJECTION INTRAMUSCULAR; INTRAVENOUS 2 TIMES DAILY
Status: DISCONTINUED | OUTPATIENT
Start: 2024-12-26 | End: 2024-12-30

## 2024-12-26 RX ORDER — BUDESONIDE AND FORMOTEROL FUMARATE DIHYDRATE 160; 4.5 UG/1; UG/1
2 AEROSOL RESPIRATORY (INHALATION)
Status: DISCONTINUED | OUTPATIENT
Start: 2024-12-26 | End: 2025-01-02 | Stop reason: HOSPADM

## 2024-12-26 RX ORDER — POLYETHYLENE GLYCOL 3350 17 G/17G
17 POWDER, FOR SOLUTION ORAL DAILY PRN
Status: DISCONTINUED | OUTPATIENT
Start: 2024-12-26 | End: 2025-01-02 | Stop reason: HOSPADM

## 2024-12-26 RX ORDER — LEVOTHYROXINE SODIUM 88 UG/1
88 TABLET ORAL DAILY
Status: DISCONTINUED | OUTPATIENT
Start: 2024-12-27 | End: 2025-01-02 | Stop reason: HOSPADM

## 2024-12-26 RX ORDER — SODIUM CHLORIDE 0.9 % (FLUSH) 0.9 %
5-40 SYRINGE (ML) INJECTION EVERY 12 HOURS SCHEDULED
Status: DISCONTINUED | OUTPATIENT
Start: 2024-12-26 | End: 2024-12-30 | Stop reason: SDUPTHER

## 2024-12-26 RX ORDER — MORPHINE SULFATE 15 MG/1
15 TABLET, FILM COATED, EXTENDED RELEASE ORAL EVERY 12 HOURS SCHEDULED
Status: DISCONTINUED | OUTPATIENT
Start: 2024-12-26 | End: 2025-01-02 | Stop reason: HOSPADM

## 2024-12-26 RX ORDER — ENOXAPARIN SODIUM 100 MG/ML
40 INJECTION SUBCUTANEOUS NIGHTLY
Status: DISCONTINUED | OUTPATIENT
Start: 2024-12-26 | End: 2025-01-02 | Stop reason: HOSPADM

## 2024-12-26 RX ORDER — SODIUM CHLORIDE 0.9 % (FLUSH) 0.9 %
5-40 SYRINGE (ML) INJECTION PRN
Status: DISCONTINUED | OUTPATIENT
Start: 2024-12-26 | End: 2024-12-30 | Stop reason: SDUPTHER

## 2024-12-26 RX ORDER — ACETAMINOPHEN 325 MG/1
650 TABLET ORAL EVERY 6 HOURS PRN
Status: DISCONTINUED | OUTPATIENT
Start: 2024-12-26 | End: 2025-01-02 | Stop reason: HOSPADM

## 2024-12-26 RX ADMIN — EZETIMIBE 10 MG: 10 TABLET ORAL at 21:57

## 2024-12-26 RX ADMIN — ENOXAPARIN SODIUM 40 MG: 100 INJECTION SUBCUTANEOUS at 21:58

## 2024-12-26 RX ADMIN — MORPHINE SULFATE 15 MG: 15 TABLET, FILM COATED, EXTENDED RELEASE ORAL at 21:58

## 2024-12-26 ASSESSMENT — PAIN DESCRIPTION - FREQUENCY: FREQUENCY: CONTINUOUS

## 2024-12-26 ASSESSMENT — PAIN DESCRIPTION - LOCATION: LOCATION: BACK;SHOULDER

## 2024-12-26 ASSESSMENT — PAIN DESCRIPTION - DESCRIPTORS: DESCRIPTORS: SHARP

## 2024-12-26 ASSESSMENT — PAIN DESCRIPTION - ONSET: ONSET: GRADUAL

## 2024-12-26 ASSESSMENT — PAIN DESCRIPTION - PAIN TYPE: TYPE: ACUTE PAIN

## 2024-12-26 ASSESSMENT — PAIN - FUNCTIONAL ASSESSMENT: PAIN_FUNCTIONAL_ASSESSMENT: PREVENTS OR INTERFERES SOME ACTIVE ACTIVITIES AND ADLS

## 2024-12-26 ASSESSMENT — PAIN DESCRIPTION - ORIENTATION: ORIENTATION: RIGHT

## 2024-12-26 ASSESSMENT — PAIN SCALES - GENERAL: PAINLEVEL_OUTOF10: 8

## 2024-12-26 NOTE — PROGRESS NOTES
This RN attempted to place IV in patient.  I was unsuccessful.  I called charge on [address] [facility] and asked for them to try for ultrasound guided IV Electronically signed by Raquel Aguirre RN on 12/26/2024 at 4:58 PM     Universal Safety Interventions

## 2024-12-26 NOTE — PLAN OF CARE
Problem: Chronic Conditions and Co-morbidities  Goal: Patient's chronic conditions and co-morbidity symptoms are monitored and maintained or improved  Flowsheets (Taken 12/26/2024 1654)  Care Plan - Patient's Chronic Conditions and Co-Morbidity Symptoms are Monitored and Maintained or Improved:   Monitor and assess patient's chronic conditions and comorbid symptoms for stability, deterioration, or improvement   Collaborate with multidisciplinary team to address chronic and comorbid conditions and prevent exacerbation or deterioration   Update acute care plan with appropriate goals if chronic or comorbid symptoms are exacerbated and prevent overall improvement and discharge     Problem: Discharge Planning  Goal: Discharge to home or other facility with appropriate resources  Flowsheets (Taken 12/26/2024 1654)  Discharge to home or other facility with appropriate resources:   Identify barriers to discharge with patient and caregiver   Arrange for needed discharge resources and transportation as appropriate   Identify discharge learning needs (meds, wound care, etc)   Refer to discharge planning if patient needs post-hospital services based on physician order or complex needs related to functional status, cognitive ability or social support system     Problem: Safety - Adult  Goal: Free from fall injury  Flowsheets (Taken 12/26/2024 1654)  Free From Fall Injury: Instruct family/caregiver on patient safety     Problem: ABCDS Injury Assessment  Goal: Absence of physical injury  Flowsheets (Taken 12/26/2024 1654)  Absence of Physical Injury: Implement safety measures based on patient assessment

## 2024-12-26 NOTE — PROGRESS NOTES
Direct admit was brought to the unit.  Electronically signed by Raquel Aguirre RN on 12/26/2024 at 4:37 PM

## 2024-12-26 NOTE — PROGRESS NOTES
4 Eyes Skin Assessment     NAME:  Shonda Hernandes  YOB: 1945  MEDICAL RECORD NUMBER:  [id number]    The patient is being assessed for  Admission    I agree that at least one RN has performed a thorough Head to Toe Skin Assessment on the patient. ALL assessment sites listed below have been assessed.      Areas assessed by both nurses:    Head, Face, Ears, Shoulders, Back, Chest, Arms, Elbows, Hands, Sacrum. Buttock, Coccyx, Ischium, Legs. Feet and Heels, and Under Medical Devices         Does the Patient have a Wound? Yes wound(s) were present on assessment. LDA wound assessment was Initiated and completed by RN       Dipesh Prevention initiated by RN: No  Wound Care Orders initiated by RN: No    Pressure Injury (Stage 3,4, Unstageable, DTI, NWPT, and Complex wounds) if present, place Wound referral order by RN under : No    New Ostomies, if present place, Ostomy referral order under : No     Nurse 1 eSignature: Electronically signed by Raquel Aguirre RN on 12/26/24 at 4:54 PM EST    **SHARE this note so that the co-signing nurse can place an eSignature**    Nurse 2 eSignature: Electronically signed by Alexandra Ojeda RN on 12/26/24 at 5:17 PM EST

## 2024-12-27 ENCOUNTER — APPOINTMENT (OUTPATIENT)
Age: 79
DRG: 291 | End: 2024-12-27
Attending: STUDENT IN AN ORGANIZED HEALTH CARE EDUCATION/TRAINING PROGRAM
Payer: MEDICARE

## 2024-12-27 LAB
ALBUMIN SERPL-MCNC: 3.3 G/DL (ref 3.4–5)
ALP SERPL-CCNC: 62 U/L (ref 40–129)
ALT SERPL-CCNC: 8 U/L (ref 10–40)
ANION GAP SERPL CALCULATED.3IONS-SCNC: 11 MMOL/L (ref 3–16)
AST SERPL-CCNC: 19 U/L (ref 15–37)
BILIRUB DIRECT SERPL-MCNC: <0.1 MG/DL (ref 0–0.3)
BILIRUB INDIRECT SERPL-MCNC: ABNORMAL MG/DL (ref 0–1)
BILIRUB SERPL-MCNC: <0.2 MG/DL (ref 0–1)
BUN SERPL-MCNC: 27 MG/DL (ref 7–20)
CALCIUM SERPL-MCNC: 8.9 MG/DL (ref 8.3–10.6)
CHLORIDE SERPL-SCNC: 99 MMOL/L (ref 99–110)
CHOLEST SERPL-MCNC: 196 MG/DL (ref 0–199)
CO2 SERPL-SCNC: 28 MMOL/L (ref 21–32)
CREAT SERPL-MCNC: 1 MG/DL (ref 0.6–1.2)
GFR SERPLBLD CREATININE-BSD FMLA CKD-EPI: 57 ML/MIN/{1.73_M2}
GLUCOSE SERPL-MCNC: 87 MG/DL (ref 70–99)
HDLC SERPL-MCNC: 34 MG/DL (ref 40–60)
LDLC SERPL CALC-MCNC: 138 MG/DL
MAGNESIUM SERPL-MCNC: 2.38 MG/DL (ref 1.8–2.4)
POTASSIUM SERPL-SCNC: 3.1 MMOL/L (ref 3.5–5.1)
PROT SERPL-MCNC: 5.9 G/DL (ref 6.4–8.2)
SODIUM SERPL-SCNC: 138 MMOL/L (ref 136–145)
TRIGL SERPL-MCNC: 122 MG/DL (ref 0–150)
VLDLC SERPL CALC-MCNC: 24 MG/DL

## 2024-12-27 PROCEDURE — 6360000002 HC RX W HCPCS: Performed by: STUDENT IN AN ORGANIZED HEALTH CARE EDUCATION/TRAINING PROGRAM

## 2024-12-27 PROCEDURE — 80061 LIPID PANEL: CPT

## 2024-12-27 PROCEDURE — 99233 SBSQ HOSP IP/OBS HIGH 50: CPT | Performed by: STUDENT IN AN ORGANIZED HEALTH CARE EDUCATION/TRAINING PROGRAM

## 2024-12-27 PROCEDURE — 36415 COLL VENOUS BLD VENIPUNCTURE: CPT

## 2024-12-27 PROCEDURE — C1751 CATH, INF, PER/CENT/MIDLINE: HCPCS

## 2024-12-27 PROCEDURE — 97535 SELF CARE MNGMENT TRAINING: CPT

## 2024-12-27 PROCEDURE — 2500000003 HC RX 250 WO HCPCS: Performed by: STUDENT IN AN ORGANIZED HEALTH CARE EDUCATION/TRAINING PROGRAM

## 2024-12-27 PROCEDURE — 6370000000 HC RX 637 (ALT 250 FOR IP): Performed by: STUDENT IN AN ORGANIZED HEALTH CARE EDUCATION/TRAINING PROGRAM

## 2024-12-27 PROCEDURE — 97161 PT EVAL LOW COMPLEX 20 MIN: CPT

## 2024-12-27 PROCEDURE — 80048 BASIC METABOLIC PNL TOTAL CA: CPT

## 2024-12-27 PROCEDURE — 1200000000 HC SEMI PRIVATE

## 2024-12-27 PROCEDURE — 97165 OT EVAL LOW COMPLEX 30 MIN: CPT

## 2024-12-27 PROCEDURE — 05HC33Z INSERTION OF INFUSION DEVICE INTO LEFT BASILIC VEIN, PERCUTANEOUS APPROACH: ICD-10-PCS | Performed by: STUDENT IN AN ORGANIZED HEALTH CARE EDUCATION/TRAINING PROGRAM

## 2024-12-27 PROCEDURE — 97116 GAIT TRAINING THERAPY: CPT

## 2024-12-27 PROCEDURE — 80076 HEPATIC FUNCTION PANEL: CPT

## 2024-12-27 PROCEDURE — 83735 ASSAY OF MAGNESIUM: CPT

## 2024-12-27 PROCEDURE — 94760 N-INVAS EAR/PLS OXIMETRY 1: CPT

## 2024-12-27 PROCEDURE — 97530 THERAPEUTIC ACTIVITIES: CPT

## 2024-12-27 PROCEDURE — 36410 VNPNXR 3YR/> PHY/QHP DX/THER: CPT

## 2024-12-27 RX ORDER — SODIUM CHLORIDE 0.9 % (FLUSH) 0.9 %
5-40 SYRINGE (ML) INJECTION EVERY 12 HOURS SCHEDULED
Status: DISCONTINUED | OUTPATIENT
Start: 2024-12-27 | End: 2025-01-02 | Stop reason: HOSPADM

## 2024-12-27 RX ORDER — LIDOCAINE HYDROCHLORIDE 10 MG/ML
50 INJECTION, SOLUTION EPIDURAL; INFILTRATION; INTRACAUDAL; PERINEURAL ONCE
Status: DISCONTINUED | OUTPATIENT
Start: 2024-12-27 | End: 2025-01-02 | Stop reason: HOSPADM

## 2024-12-27 RX ORDER — SODIUM CHLORIDE 0.9 % (FLUSH) 0.9 %
5-40 SYRINGE (ML) INJECTION PRN
Status: DISCONTINUED | OUTPATIENT
Start: 2024-12-27 | End: 2025-01-02 | Stop reason: HOSPADM

## 2024-12-27 RX ORDER — TORSEMIDE 20 MG/1
60 TABLET ORAL ONCE
Status: COMPLETED | OUTPATIENT
Start: 2024-12-27 | End: 2024-12-27

## 2024-12-27 RX ORDER — SODIUM CHLORIDE 9 MG/ML
INJECTION, SOLUTION INTRAVENOUS PRN
Status: DISCONTINUED | OUTPATIENT
Start: 2024-12-27 | End: 2025-01-02 | Stop reason: HOSPADM

## 2024-12-27 RX ORDER — MORPHINE SULFATE 15 MG/1
15 TABLET, FILM COATED, EXTENDED RELEASE ORAL 2 TIMES DAILY
COMMUNITY

## 2024-12-27 RX ADMIN — LEVOTHYROXINE SODIUM 88 MCG: 0.09 TABLET ORAL at 06:54

## 2024-12-27 RX ADMIN — POTASSIUM CHLORIDE 40 MEQ: 1500 TABLET, EXTENDED RELEASE ORAL at 16:42

## 2024-12-27 RX ADMIN — OXYCODONE AND ACETAMINOPHEN 1 TABLET: 7.5; 325 TABLET ORAL at 03:40

## 2024-12-27 RX ADMIN — TORSEMIDE 60 MG: 20 TABLET ORAL at 16:11

## 2024-12-27 RX ADMIN — IRON SUCROSE 200 MG: 20 INJECTION, SOLUTION INTRAVENOUS at 16:10

## 2024-12-27 RX ADMIN — ENOXAPARIN SODIUM 40 MG: 100 INJECTION SUBCUTANEOUS at 19:58

## 2024-12-27 RX ADMIN — AMIODARONE HYDROCHLORIDE 100 MG: 200 TABLET ORAL at 09:25

## 2024-12-27 RX ADMIN — EZETIMIBE 10 MG: 10 TABLET ORAL at 19:58

## 2024-12-27 RX ADMIN — DULOXETINE HYDROCHLORIDE 60 MG: 60 CAPSULE, DELAYED RELEASE ORAL at 09:25

## 2024-12-27 RX ADMIN — SODIUM CHLORIDE, PRESERVATIVE FREE 10 ML: 5 INJECTION INTRAVENOUS at 19:59

## 2024-12-27 RX ADMIN — POLYETHYLENE GLYCOL 3350 17 G: 17 POWDER, FOR SOLUTION ORAL at 20:13

## 2024-12-27 RX ADMIN — MORPHINE SULFATE 15 MG: 15 TABLET, FILM COATED, EXTENDED RELEASE ORAL at 09:25

## 2024-12-27 RX ADMIN — SODIUM CHLORIDE, PRESERVATIVE FREE 10 ML: 5 INJECTION INTRAVENOUS at 21:00

## 2024-12-27 RX ADMIN — ASPIRIN 81 MG: 81 TABLET, COATED ORAL at 09:25

## 2024-12-27 RX ADMIN — MORPHINE SULFATE 15 MG: 15 TABLET, FILM COATED, EXTENDED RELEASE ORAL at 19:57

## 2024-12-27 ASSESSMENT — PAIN DESCRIPTION - ORIENTATION
ORIENTATION: RIGHT;MID
ORIENTATION: MID;RIGHT
ORIENTATION: RIGHT

## 2024-12-27 ASSESSMENT — PAIN DESCRIPTION - FREQUENCY
FREQUENCY: CONTINUOUS

## 2024-12-27 ASSESSMENT — PAIN DESCRIPTION - ONSET
ONSET: GRADUAL
ONSET: ON-GOING
ONSET: GRADUAL

## 2024-12-27 ASSESSMENT — PAIN SCALES - GENERAL
PAINLEVEL_OUTOF10: 5
PAINLEVEL_OUTOF10: 7

## 2024-12-27 ASSESSMENT — PAIN DESCRIPTION - PAIN TYPE
TYPE: ACUTE PAIN

## 2024-12-27 ASSESSMENT — PAIN DESCRIPTION - DESCRIPTORS
DESCRIPTORS: ACHING
DESCRIPTORS: DULL;POUNDING
DESCRIPTORS: DULL

## 2024-12-27 ASSESSMENT — PAIN DESCRIPTION - LOCATION
LOCATION: SHOULDER
LOCATION: BACK;SHOULDER
LOCATION: BACK;LEG;SHOULDER
LOCATION: BACK;SHOULDER

## 2024-12-27 ASSESSMENT — PAIN - FUNCTIONAL ASSESSMENT
PAIN_FUNCTIONAL_ASSESSMENT: PREVENTS OR INTERFERES SOME ACTIVE ACTIVITIES AND ADLS

## 2024-12-27 NOTE — NURSE NAVIGATOR
Call placed to Dr. Nguyễn regarding echo order and possible dose of po lasix while waiting on PICC team   Awaiting call back. Will update bedside RN with info when available

## 2024-12-27 NOTE — CARE COORDINATION
Wound Referral Progress Note       NAME:  Shonda Hernandes  MEDICAL RECORD NUMBER:  [id number]  AGE: 79 y.o.   GENDER: female  : 1945  TODAY'S DATE:  2024    Subjective   Reason for WOCN Evaluation and Assessment: LLE wound, sacrum wound.       Shonda Hernandes is a 79 y.o. female referred by:   Nursing    Wound Identification:  Wound Type:  LLE cellulitis  Contributing Factors:  trauma, pt hit her leg on     Wound History: about 2 months ago pt hit leg on . Tells me she has been on antibiotics, but legs were not getting better.   Current Wound Care Treatment:  REMA    Patient Care Goal:  Wound Healing        PAST MEDICAL HISTORY        Diagnosis Date    Arthritis of shoulder region, right     advanced    Atrial fibrillation (HCC)     had watchman's    AVM (arteriovenous malformation) of duodenum 2017    AVM (arteriovenous malformation) of stomach 2017    Bladder cancer (HCC) 2014    CAD (coronary artery disease) 2014    Carotid stenosis 2014    Chronic back pain     Chronic diastolic CHF (congestive heart failure) ([facility]) 2016    Chronic prescription opiate use     COPD ([facility])     Diverticulosis     HTN (hypertension)     Hyperlipidemia     Hypothyroidism     Ischemic stroke 2013    x 2    Lumbar spine stenosis 2017    multiple procedures unsuccessful.    Macular degeneration 2018    Nonrheumatic mitral valve regurgitation     Obstructive sleep apnea     Osteoarthritis     Peripheral neuropathy     Pulmonary HTN ([facility])     PVD (peripheral vascular disease) ([facility])     Syncope 2022    of unknown cause    Tobacco use disorder in remission        PAST SURGICAL HISTORY    Past Surgical History:   Procedure Laterality Date    ABLATION OF DYSRHYTHMIC FOCUS  2022    AORTIC VALVE REPLACEMENT  6/16/15    Dr. Correa - PVI, RENETTA exclusion. 19mm Maki pericardial Magna    APPENDECTOMY      BACK SURGERY  03/15/2019    superion inserted to keep back from hurting: Felipa     Bromide Monohydrate]      Nausea      Adhesive Tape Rash and Swelling    Neosporin [Bacitracin-Polymyxin B] Other (See Comments)     Rash that spread across face with swelling    Tylenol [Acetaminophen] Nausea And Vomiting       MEDICATIONS    No current facility-administered medications on file prior to encounter.     Current Outpatient Medications on File Prior to Encounter   Medication Sig Dispense Refill    morphine (MS CONTIN) 15 MG extended release tablet Take 1 tablet by mouth 2 times daily. Max Daily Amount: 30 mg      oxyCODONE-acetaminophen (PERCOCET) 7.5-325 MG per tablet Take 1 tablet by mouth every 4 hours as needed for Pain.      metOLazone (ZAROXOLYN) 5 MG tablet Take 1 tablet by mouth once for 1 dose (Patient not taking: Reported on 12/26/2024) 30 tablet 1    clindamycin (CLEOCIN) 300 MG capsule Take 1 capsule by mouth 4 times daily for 10 days 40 capsule 0    lidocaine 4 % external patch Place 1 patch onto the skin daily 30 patch 0    metoprolol succinate (TOPROL XL) 50 MG extended release tablet TAKE 1 TABLET BY MOUTH DAILY 30 tablet 2    ezetimibe (ZETIA) 10 MG tablet TAKE 1 TABLET BY MOUTH EVERY DAY 30 tablet 11    ipratropium (ATROVENT) 0.03 % nasal spray INHALE 2 DOSES INTO EACH NOSTRIL THREE TIMES A DAY IF NEEDED FOR RHINITIS 30 mL 0    amiodarone (CORDARONE) 200 MG tablet Take 0.5 tablets by mouth daily 30 tablet 5    valsartan (DIOVAN) 40 MG tablet Take 0.5 tablets by mouth daily 30 tablet 2    levothyroxine (SYNTHROID) 88 MCG tablet Take 1 tablet by mouth daily 30 tablet 5    DULoxetine (CYMBALTA) 60 MG extended release capsule One po qam x 4 day then one po bid 60 capsule 5    sennosides-docusate sodium (SENOKOT-S) 8.6-50 MG tablet Take 1 tablet by mouth in the morning and at bedtime 60 tablet 5    budesonide-formoterol (SYMBICORT) 160-4.5 MCG/ACT AERO Inhale 2 puffs into the lungs 2 times daily 10.2 g 0    alendronate (FOSAMAX) 70 MG tablet Take 1 tablet by mouth every 7 days Take with a

## 2024-12-27 NOTE — PROGRESS NOTES
Arrived to place midline after chart review. Pre-procedure and timeout done with RADHA Andujar. Discussed limitations of placement and allergies. Procedure explained to pt, including risks and benefits. All questions answered. Pt verbalized understanding.      Vessels easily collapsible upon assessment. No difficulty accessing L basilic vein. Blood free flowing and non-pulsatile. Guidewire, introducer, and catheter all easily inserted. Midline has brisk blood return and flushes easily.     OK to use midline.     Patient tolerated sterile procedure well. Bed left in low position with belongings and call light within reach. Educated on line care. Handoff to bedside RN.      Please call with any questions or concerns. The  will direct you to the PICC RN that is on call.      [phone]

## 2024-12-27 NOTE — H&P
Internal Medicine  History and Physical    Patient's PCP: Adeel Nguyễn MD  Code Status: Full Code  History Obtained From:  patient    CC: Leg swelling    HPI:     Shonda Hernandes is a 79-year-old lady with past medical significant for hypertension, CAD, peripheral vascular disease who presented from home for evaluation of lower extremity swelling.    In the past week, she has been treated for right lower extremity cellulitis and edema.  Cellulitis is responded clindamycin, however she continues to have bilateral lower extremity swelling.  She has not had any fever or chills. she does not report any chest pain or shortness of breath.  She has been taking torsemide 60 mg daily and took metolazone twice over the weekend.  She does not report any PND or orthopnea.  She has been adherent with her home medication regimen.  She does not report any significant change    Past Medical / Surgical History:    Past Medical History:   Diagnosis Date    Arthritis of shoulder region, right 2024    advanced    Atrial fibrillation (HCC)     had watchman's    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    Bladder cancer ([facility]) 02/2014    CAD (coronary artery disease) 2014    Carotid stenosis 04/30/2014    Chronic back pain     Chronic diastolic CHF (congestive heart failure) ([facility]) 2016    Chronic prescription opiate use     COPD ([facility])     Diverticulosis     HTN (hypertension)     Hyperlipidemia     Hypothyroidism     Ischemic stroke 2013    x 2    Lumbar spine stenosis 2017    multiple procedures unsuccessful.    Macular degeneration 2018    Nonrheumatic mitral valve regurgitation     Obstructive sleep apnea     Osteoarthritis     Peripheral neuropathy     Pulmonary HTN ([facility]) 2014    PVD (peripheral vascular disease) ([facility])     Syncope 11/2022    of unknown cause    Tobacco use disorder in remission      Past Surgical History:   Procedure Laterality Date    ABLATION OF

## 2024-12-27 NOTE — PROGRESS NOTES
[facility]     INPATIENT PROGRESS NOTE  Name: Shonda Hernandes  /Age/Sex: 1945 (79 y.o. female)   MRN & CSN: [id number] & [id number]  Admission Date/Time: 2024  3:20 PM   Location: [unfilled] [id number]  Current Hospital Day: Hospital Day: 2   Principal Problem: Acute congestive heart failure, unspecified heart failure type (HCC)  HPI     Patient seen and examined.  Difficulty with IV access overnight.  Reports leg tenderness today.  She has been afebrile.  No chest pain or shortness of breath.  Reports right shoulder pain.    All other review of systems negative unless noted above.  VITALS   Vitals:    24 1528   BP: (!) 103/50   Pulse: 64   Resp: 16   Temp: 98.8 °F (37.1 °C)   SpO2: 93%         PHYSICAL EXAM   General: No acute distress   HEENT: PERRL, EOMI  Cardiac: Regular rate and rhythm, no murmur  Lungs: Clear to auscultation anteriorly  Abdomen: Soft nontender nondistended  Extremities: Bilateral lower extremity edema,  Neuro: Nonfocal       LABS   BMP  Recent Labs     24  1619 24  0514    138   K 3.7 3.1*   CL 97* 99   CO2 26 28   BUN 29* 27*   CREATININE 1.2 1.0   CALCIUM 9.5 8.9   MG  --  2.38     CBC/COAGS  Recent Labs     24  1619   WBC 8.7   HCT 34.1*        Liver & Pancreas  Recent Labs     24  1619 24  0514   AST 22 19   ALT 9* 8*   ALKPHOS 76 62   BILIDIR  --  <0.1   ALBUMIN 3.6 3.3*          IMAGING   No new imaging   Above studies and images were personally reviewed by myself    MEDS   Scheduled Meds:   sodium chloride flush  5-40 mL IntraVENous 2 times per day    lidocaine 1 % injection  50 mg IntraDERmal Once    torsemide  60 mg Oral Once    amiodarone  100 mg Oral Daily    aspirin  81 mg Oral Daily    DULoxetine  60 mg Oral Daily    ezetimibe  10 mg Oral Nightly    budesonide-formoterol  2 puff Inhalation BID RT    levothyroxine  88 mcg Oral Daily    lidocaine  1 patch TransDERmal Daily    metoprolol succinate

## 2024-12-27 NOTE — PROGRESS NOTES
Pharmacy Medication Reconciliation Note     List of medications patient is currently taking is complete.    Source of information:   1. Rx disp history  2. MD office visit  3. OARRS     Notes regarding home medications:   1. Added MS contin 15mg bid  2. Removed Xtampza 9mg    Denies taking any other OTC or herbal medications    Hao Torres RPH, [facility] 12/27/2024 7:16 AM

## 2024-12-27 NOTE — DISCHARGE INSTRUCTIONS
Extra Heart Failure Education/ Tools/ Resources:     https://[email or web address]/[email or web address]   --- this is American Heart Association interactive Healthier Living with Heart Failure guidebook.  Please click hyperlink or copy / paste link into search bar. The QR Code is also available below. Use your mouse to scroll through the pages.  Lots of information about weight monitoring, diet tips, activity, meds, etc    Heart Failure Tools and Resources QR Code is below. It includes multiple resources to include symptom tracker, med tracker, further HF info, and access to a HF Support Network online Community    HF [place] Johanna  -- this is a free smart phone johanna available for iPhone and Android download.  Use your phone to track sodium / fluid intake, zone tool symptom tracking, weights, medications, etc. Click on this hyperlink  HF [place] Johanna   for QR code for easy download or the link is also found in the below HF Tools and Resources.      DASH (Dietary Approach to Stop Hypertension) diet --  https://www.nhlbi.nih.gov/education/dash-eating-plan -- this diet is a flexible eating plan that promotes heart healthy eating style.  Click on hyperlink or copy / paste link into search bar.  Lots of low sodium recipes and tips.    https://www.[employer].Allclasses/recipes  -- more free recipes

## 2024-12-27 NOTE — PLAN OF CARE
HEART FAILURE CARE PLAN:    Comorbidities Reviewed: Yes   Patient has a past medical history of Arthritis of shoulder region, right, Atrial fibrillation (HCC), AVM (arteriovenous malformation) of duodenum, AVM (arteriovenous malformation) of stomach, Bladder cancer (HCC), CAD (coronary artery disease), Carotid stenosis, Chronic back pain, Chronic diastolic CHF (congestive heart failure) (HCC), Chronic prescription opiate use, COPD (HCC), Diverticulosis, HTN (hypertension), Hyperlipidemia, Hypothyroidism, Ischemic stroke, Lumbar spine stenosis, Macular degeneration, Nonrheumatic mitral valve regurgitation, Obstructive sleep apnea, Osteoarthritis, Peripheral neuropathy, Pulmonary HTN (HCC), PVD (peripheral vascular disease) ([facility]), Syncope, and Tobacco use disorder in remission.     Weights Reviewed: Yes   Admission weight: 60.3 kg (132 lb 15 oz)   Wt Readings from Last 3 Encounters:   12/27/24 60.3 kg (132 lb 15 oz)   12/26/24 61.2 kg (135 lb)   12/23/24 59 kg (130 lb)     Intake & Output Reviewed: Yes     Intake/Output Summary (Last 24 hours) at 12/27/2024 1830  Last data filed at 12/27/2024 1806  Gross per 24 hour   Intake 320 ml   Output 100 ml   Net 220 ml       ECHOCARDIOGRAM Reviewed: Yes   Patient's Ejection Fraction (EF) is greater than 40%     Medications Reviewed: Yes   SCHEDULED HOSPITAL MEDICATIONS:   sodium chloride flush  5-40 mL IntraVENous 2 times per day    lidocaine 1 % injection  50 mg IntraDERmal Once    amiodarone  100 mg Oral Daily    aspirin  81 mg Oral Daily    DULoxetine  60 mg Oral Daily    ezetimibe  10 mg Oral Nightly    budesonide-formoterol  2 puff Inhalation BID RT    levothyroxine  88 mcg Oral Daily    lidocaine  1 patch TransDERmal Daily    metoprolol succinate  50 mg Oral Daily    sodium chloride flush  5-40 mL IntraVENous 2 times per day    enoxaparin  40 mg SubCUTAneous Nightly    furosemide  60 mg IntraVENous BID    iron sucrose  200 mg IntraVENous Q24H    morphine  15 mg Oral 2

## 2024-12-27 NOTE — PROGRESS NOTES
Occupational Therapy  Facility/Department: [address] ORTHOPEDICS  Occupational Therapy Initial Assessment    Name: Shonda Hernandes  : 1945  MRN: [id number]  Date of Service: 2024    Discharge Recommendations:  Home with assist PRN        Shonda Hernandes scored a  on the [facility] ADL Inpatient form.  At this time, no further OT is recommended upon discharge. Recommend patient returns to prior setting with prior services.      Patient Diagnosis(es): acute CHF  Past Medical History:  has a past medical history of Arthritis of shoulder region, right, Atrial fibrillation (HCC), AVM (arteriovenous malformation) of duodenum, AVM (arteriovenous malformation) of stomach, Bladder cancer (HCC), CAD (coronary artery disease), Carotid stenosis, Chronic back pain, Chronic diastolic CHF (congestive heart failure) (HCC), Chronic prescription opiate use, COPD (HCC), Diverticulosis, HTN (hypertension), Hyperlipidemia, Hypothyroidism, Ischemic stroke, Lumbar spine stenosis, Macular degeneration, Nonrheumatic mitral valve regurgitation, Obstructive sleep apnea, Osteoarthritis, Peripheral neuropathy, Pulmonary HTN ([facility]), PVD (peripheral vascular disease) ([facility]), Syncope, and Tobacco use disorder in remission.  Past Surgical History:  has a past surgical history that includes Cystoscopy (2014); joint replacement; Appendectomy; Aortic valve replacement (6/16/15); Upper gastrointestinal endoscopy (12/15/2017); other surgical history (2018); Coronary angioplasty (); Spine surgery (N/A, 3/15/2019); Cholecystectomy, laparoscopic (N/A, 2019); back surgery (03/15/2019); Pain management procedure (Bilateral, 2021); Pain management procedure (Bilateral, 2021); Pain management procedure (Bilateral, 2021); ablation of dysrhythmic focus (2022); Pacemaker insertion (2022); and Pain management procedure (Bilateral, 2024).    Treatment Diagnosis: impaired ADL/fxl mobility    Assessment  Performance  for eating meals?: None  AM-PAC Inpatient Daily Activity Raw Score: 19  AM-PAC Inpatient ADL T-Scale Score : 40.22  ADL Inpatient CMS 0-100% Score: 42.8  ADL Inpatient CMS G-Code Modifier : CK    Goals  Short Term Goals  Time Frame for Short Term Goals: prior to d/c  Short Term Goal 1: toileting Mod IND  Short Term Goal 2: seated bathing Mod IND  Short Term Goal 3: LB dressing Mod IND  Short Term Goal 4: tolerate 5 min fxl standing task Mod IND  Patient Goals   Patient goals : return home with improved pain      Therapy Time   Individual Concurrent Group Co-treatment   Time In 0835         Time Out 0920         Minutes 45         Timed Code Treatment Minutes: 45 Minutes (10 ADL 35 TA)       Josette Chery, CYNDI, OTR/L

## 2024-12-27 NOTE — PROGRESS NOTES
Dr. Nguyễn returned call regarding PICC or midline. He verbalized he do not want PICC or midline tonight. He stated he will have someone try to place the line later in the morning. He is aware pt has not be given IV meds lasix and iron.

## 2024-12-27 NOTE — PROGRESS NOTES
Dr Morris called to report patient need PICC or midline. Unable to place IV. Awaiting return call.   Statement Selected

## 2024-12-27 NOTE — PROGRESS NOTES
(Bilateral, 4/1/2021); Pain management procedure (Bilateral, 7/8/2021); Pain management procedure (Bilateral, 9/9/2021); ablation of dysrhythmic focus (05/09/2022); Pacemaker insertion (04/11/2022); and Pain management procedure (Bilateral, 5/9/2024).    Assessment  Body Structures, Functions, Activity Limitations Requiring Skilled Therapeutic Intervention: Decreased functional mobility   Assessment: 80 yo female to hospital 12-26-24 with complaints of lower extremeity swelling. Has been treated in past week for R LE cellulitis and edema. Active hospital issues: acute on chronic diastolic CHF, LE cellulitis. PMHx includes hypertension, CAD, peripheral vascular disease.  Prior status dtr, grnddtr, and grandson live with patient in home with platform step to enter, and reports independent in ADLs, some IADLs, transfers, and gait without device. Sleeps in recliner chair.  Status 12-27-24: transfers SBA; gait no device, 200' SBA. Patient likley close to baseline status. Will continue to see and progress functional mobility. Anticipate DC to home with family as prior. Do not anticipate home therapy needs.  Treatment Diagnosis: IMpaired functional mobility  Therapy Prognosis: Good  Decision Making: Low Complexity  History: as noted  Clinical Presentation: Stable  Requires PT Follow-Up: Yes  Activity Tolerance  Activity Tolerance: Patient tolerated evaluation without incident    Plan  Physical Therapy Plan  General Plan: 3-5 times per week  Current Treatment Recommendations: Functional mobility training, Gait training, Transfer training  Safety Devices  Type of Devices: Call light within reach, Chair alarm in place, Gait belt, Patient at risk for falls, Left in chair, Nurse notified    Restrictions  Restrictions/Precautions  Restrictions/Precautions: Fall Risk  Position Activity Restriction  Other Position/Activity Restrictions: low vision. R rotator cuff tear.     Subjective  General  Chart Reviewed: Yes  Patient assessed  deficits    Objective  Observation/Palpation  Posture: Poor (posterior pelvic tilt. Kyphotic trunk, forward head.)  AROM RLE (degrees)  RLE AROM: WFL  RLE General AROM: ankle DF to neutral  AROM LLE (degrees)  LLE AROM : WFL  LLE General AROM: ankle DF to neutral  Strength RLE  Strength RLE: WFL  Strength LLE  Strength LLE: WFL  Bed mobility  Bed Mobility Comments: not observed. in recliner at start and end of session. Sleeps in BS chair.  Transfers  Sit to Stand: Stand by assistance  Stand to Sit: Stand by assistance  Ambulation  Surface: Level tile  Device: No Device  Assistance: Stand by assistance  Quality of Gait: kyphotic trunk, forward head. Step though pattern, with shortened steps.  Gait Deviations: Decreased step height  Distance: 200'  Stairs/Curb  Stairs?: No  Balance  Comments: Independent unsupported sitting. SBA gait without device.    AM-PAC - Mobility    AM-PAC Basic Mobility - Inpatient   How much help is needed turning from your back to your side while in a flat bed without using bedrails?: A Little  How much help is needed moving from lying on your back to sitting on the side of a flat bed without using bedrails?: A Lot  How much help is needed moving to and from a bed to a chair?: None  How much help is needed standing up from a chair using your arms?: None  How much help is needed walking in hospital room?: None  How much help is needed climbing 3-5 steps with a railing?: None  [facility] Inpatient Mobility Raw Score : 21  AM-PAC Inpatient T-Scale Score : 50.25  Mobility Inpatient CMS 0-100% Score: 28.97  Mobility Inpatient CMS G-Code Modifier : CJ    Goals  Short Term Goals  Time Frame for Short Term Goals: By acute DC  Short Term Goal 1: Transfers Mod Indep  Short Term Goal 2: Gait no device 100' Supervision  Short Term Goal 3: Platform step as needed for home SBA.  Patient Goals   Patient Goals : \"Feel better and go home.\"       Education  Patient Education  Education Given To:

## 2024-12-27 NOTE — PLAN OF CARE
Problem: Discharge Planning  Goal: Discharge to home or other facility with appropriate resources  12/27/2024 0258 by Perlita Coles, RN  Outcome: Progressing  Flowsheets (Taken 12/27/2024 0258)  Discharge to home or other facility with appropriate resources:   Identify barriers to discharge with patient and caregiver   Identify discharge learning needs (meds, wound care, etc)   Arrange for needed discharge resources and transportation as appropriate     Problem: Safety - Adult  Goal: Free from fall injury  12/27/2024 0258 by Perlita Coles, RN  Outcome: Progressing  Flowsheets (Taken 12/27/2024 0258)  Free From Fall Injury: Instruct family/caregiver on patient safety     Problem: ABCDS Injury Assessment  Goal: Absence of physical injury  12/27/2024 0258 by Perlita Coles, RN  Outcome: Progressing  Flowsheets (Taken 12/27/2024 0258)  Absence of Physical Injury: Implement safety measures based on patient assessment     Problem: Chronic Conditions and Co-morbidities  Goal: Patient's chronic conditions and co-morbidity symptoms are monitored and maintained or improved  12/27/2024 0258 by Perlita Coles, RN  Flowsheets (Taken 12/27/2024 0258)  Care Plan - Patient's Chronic Conditions and Co-Morbidity Symptoms are Monitored and Maintained or Improved: Monitor and assess patient's chronic conditions and comorbid symptoms for stability, deterioration, or improvement

## 2024-12-28 ENCOUNTER — APPOINTMENT (OUTPATIENT)
Age: 79
DRG: 291 | End: 2024-12-28
Attending: STUDENT IN AN ORGANIZED HEALTH CARE EDUCATION/TRAINING PROGRAM
Payer: MEDICARE

## 2024-12-28 LAB
ANION GAP SERPL CALCULATED.3IONS-SCNC: 13 MMOL/L (ref 3–16)
BASOPHILS # BLD: 0 K/UL (ref 0–0.2)
BASOPHILS NFR BLD: 0.5 %
BUN SERPL-MCNC: 23 MG/DL (ref 7–20)
CALCIUM SERPL-MCNC: 9.3 MG/DL (ref 8.3–10.6)
CHLORIDE SERPL-SCNC: 97 MMOL/L (ref 99–110)
CO2 SERPL-SCNC: 28 MMOL/L (ref 21–32)
CREAT SERPL-MCNC: 0.9 MG/DL (ref 0.6–1.2)
DEPRECATED RDW RBC AUTO: 15.6 % (ref 12.4–15.4)
ECHO AV MEAN GRADIENT: 15 MMHG
ECHO AV MEAN VELOCITY: 1.8 M/S
ECHO AV PEAK GRADIENT: 27 MMHG
ECHO AV PEAK VELOCITY: 2.6 M/S
ECHO AV VELOCITY RATIO: 0.38
ECHO AV VTI: 56.2 CM
ECHO BSA: 1.55 M2
ECHO LA AREA 2C: 23.9 CM2
ECHO LA AREA 4C: 26.2 CM2
ECHO LA DIAMETER INDEX: 3.57 CM/M2
ECHO LA DIAMETER: 5.5 CM
ECHO LA MAJOR AXIS: 6.9 CM
ECHO LA MINOR AXIS: 7 CM
ECHO LA VOL BP: 74 ML (ref 22–52)
ECHO LA VOL MOD A2C: 67 ML (ref 22–52)
ECHO LA VOL MOD A4C: 81 ML (ref 22–52)
ECHO LA VOL/BSA BIPLANE: 48 ML/M2 (ref 16–34)
ECHO LA VOLUME INDEX MOD A2C: 44 ML/M2 (ref 16–34)
ECHO LA VOLUME INDEX MOD A4C: 53 ML/M2 (ref 16–34)
ECHO LV E' LATERAL VELOCITY: 9.36 CM/S
ECHO LV E' SEPTAL VELOCITY: 6.31 CM/S
ECHO LV EF PHYSICIAN: 58 %
ECHO LV FRACTIONAL SHORTENING: 32 % (ref 28–44)
ECHO LV INTERNAL DIMENSION DIASTOLE INDEX: 3.05 CM/M2
ECHO LV INTERNAL DIMENSION DIASTOLIC: 4.7 CM (ref 3.9–5.3)
ECHO LV INTERNAL DIMENSION SYSTOLIC INDEX: 2.08 CM/M2
ECHO LV INTERNAL DIMENSION SYSTOLIC: 3.2 CM
ECHO LV IVSD: 1.1 CM (ref 0.6–0.9)
ECHO LV MASS 2D: 164.5 G (ref 67–162)
ECHO LV MASS INDEX 2D: 106.8 G/M2 (ref 43–95)
ECHO LV POSTERIOR WALL DIASTOLIC: 0.9 CM (ref 0.6–0.9)
ECHO LV RELATIVE WALL THICKNESS RATIO: 0.38
ECHO LVOT AV VTI INDEX: 0.37
ECHO LVOT MEAN GRADIENT: 2 MMHG
ECHO LVOT PEAK GRADIENT: 4 MMHG
ECHO LVOT PEAK VELOCITY: 1 M/S
ECHO LVOT VTI: 21 CM
ECHO MV A VELOCITY: 0.71 M/S
ECHO MV E DECELERATION TIME (DT): 259 MS
ECHO MV E VELOCITY: 1.58 M/S
ECHO MV E/A RATIO: 2.23
ECHO MV E/E' LATERAL: 16.88
ECHO MV E/E' RATIO (AVERAGED): 20.96
ECHO MV E/E' SEPTAL: 25.04
ECHO PULMONARY ARTERY END DIASTOLIC PRESSURE: 12 MMHG
ECHO PV MAX VELOCITY: 1.4 M/S
ECHO PV PEAK GRADIENT: 7 MMHG
ECHO PV REGURGITANT MAX VELOCITY: 1.7 M/S
ECHO RA AREA 4C: 14 CM2
ECHO RA END SYSTOLIC VOLUME APICAL 4 CHAMBER INDEX BSA: 21 ML/M2
ECHO RA VOLUME: 33 ML
ECHO RV FREE WALL PEAK S': 10.9 CM/S
ECHO RV INTERNAL DIMENSION: 3.7 CM
ECHO RV TAPSE: 2 CM (ref 1.7–?)
ECHO TV REGURGITANT MAX VELOCITY: 2.65 M/S
ECHO TV REGURGITANT PEAK GRADIENT: 28 MMHG
EOSINOPHIL # BLD: 0.4 K/UL (ref 0–0.6)
EOSINOPHIL NFR BLD: 4.4 %
GFR SERPLBLD CREATININE-BSD FMLA CKD-EPI: 65 ML/MIN/{1.73_M2}
GLUCOSE SERPL-MCNC: 84 MG/DL (ref 70–99)
HCT VFR BLD AUTO: 30.4 % (ref 36–48)
HGB BLD-MCNC: 10.5 G/DL (ref 12–16)
LYMPHOCYTES # BLD: 1.7 K/UL (ref 1–5.1)
LYMPHOCYTES NFR BLD: 20 %
MAGNESIUM SERPL-MCNC: 2.43 MG/DL (ref 1.8–2.4)
MCH RBC QN AUTO: 31.1 PG (ref 26–34)
MCHC RBC AUTO-ENTMCNC: 34.7 G/DL (ref 31–36)
MCV RBC AUTO: 89.7 FL (ref 80–100)
MONOCYTES # BLD: 1.3 K/UL (ref 0–1.3)
MONOCYTES NFR BLD: 15.8 %
NEUTROPHILS # BLD: 4.9 K/UL (ref 1.7–7.7)
NEUTROPHILS NFR BLD: 59.3 %
NT-PROBNP SERPL-MCNC: 2347 PG/ML (ref 0–449)
PLATELET # BLD AUTO: 257 K/UL (ref 135–450)
PMV BLD AUTO: 8.3 FL (ref 5–10.5)
POTASSIUM SERPL-SCNC: 3.4 MMOL/L (ref 3.5–5.1)
RBC # BLD AUTO: 3.39 M/UL (ref 4–5.2)
SODIUM SERPL-SCNC: 138 MMOL/L (ref 136–145)
WBC # BLD AUTO: 8.3 K/UL (ref 4–11)

## 2024-12-28 PROCEDURE — 1200000000 HC SEMI PRIVATE

## 2024-12-28 PROCEDURE — 83880 ASSAY OF NATRIURETIC PEPTIDE: CPT

## 2024-12-28 PROCEDURE — 6370000000 HC RX 637 (ALT 250 FOR IP): Performed by: INTERNAL MEDICINE

## 2024-12-28 PROCEDURE — 93306 TTE W/DOPPLER COMPLETE: CPT

## 2024-12-28 PROCEDURE — 36415 COLL VENOUS BLD VENIPUNCTURE: CPT

## 2024-12-28 PROCEDURE — 94760 N-INVAS EAR/PLS OXIMETRY 1: CPT

## 2024-12-28 PROCEDURE — 2500000003 HC RX 250 WO HCPCS: Performed by: STUDENT IN AN ORGANIZED HEALTH CARE EDUCATION/TRAINING PROGRAM

## 2024-12-28 PROCEDURE — 93306 TTE W/DOPPLER COMPLETE: CPT | Performed by: INTERNAL MEDICINE

## 2024-12-28 PROCEDURE — 6370000000 HC RX 637 (ALT 250 FOR IP): Performed by: STUDENT IN AN ORGANIZED HEALTH CARE EDUCATION/TRAINING PROGRAM

## 2024-12-28 PROCEDURE — 99232 SBSQ HOSP IP/OBS MODERATE 35: CPT | Performed by: INTERNAL MEDICINE

## 2024-12-28 PROCEDURE — 85025 COMPLETE CBC W/AUTO DIFF WBC: CPT

## 2024-12-28 PROCEDURE — 83735 ASSAY OF MAGNESIUM: CPT

## 2024-12-28 PROCEDURE — 6360000002 HC RX W HCPCS: Performed by: INTERNAL MEDICINE

## 2024-12-28 PROCEDURE — 6360000002 HC RX W HCPCS: Performed by: STUDENT IN AN ORGANIZED HEALTH CARE EDUCATION/TRAINING PROGRAM

## 2024-12-28 PROCEDURE — 80048 BASIC METABOLIC PNL TOTAL CA: CPT

## 2024-12-28 RX ORDER — FLUTICASONE PROPIONATE 50 MCG
2 SPRAY, SUSPENSION (ML) NASAL DAILY
Status: DISCONTINUED | OUTPATIENT
Start: 2024-12-28 | End: 2025-01-02 | Stop reason: HOSPADM

## 2024-12-28 RX ORDER — METHYLPREDNISOLONE 4 MG/1
4 TABLET ORAL
Status: COMPLETED | OUTPATIENT
Start: 2024-12-29 | End: 2024-12-31

## 2024-12-28 RX ORDER — METHYLPREDNISOLONE 4 MG/1
4 TABLET ORAL
Status: COMPLETED | OUTPATIENT
Start: 2024-12-29 | End: 2024-12-30

## 2024-12-28 RX ORDER — METHYLPREDNISOLONE 4 MG/1
4 TABLET ORAL
Status: COMPLETED | OUTPATIENT
Start: 2024-12-29 | End: 2025-01-02

## 2024-12-28 RX ORDER — METHYLPREDNISOLONE 4 MG/1
4 TABLET ORAL NIGHTLY
Status: COMPLETED | OUTPATIENT
Start: 2024-12-30 | End: 2025-01-01

## 2024-12-28 RX ORDER — METHYLPREDNISOLONE 4 MG/1
8 TABLET ORAL NIGHTLY
Status: COMPLETED | OUTPATIENT
Start: 2024-12-29 | End: 2024-12-29

## 2024-12-28 RX ADMIN — LEVOTHYROXINE SODIUM 88 MCG: 0.09 TABLET ORAL at 05:57

## 2024-12-28 RX ADMIN — METOPROLOL SUCCINATE 50 MG: 50 TABLET, EXTENDED RELEASE ORAL at 09:14

## 2024-12-28 RX ADMIN — FLUTICASONE PROPIONATE 2 SPRAY: 50 SPRAY, METERED NASAL at 14:00

## 2024-12-28 RX ADMIN — FUROSEMIDE 60 MG: 10 INJECTION, SOLUTION INTRAMUSCULAR; INTRAVENOUS at 09:19

## 2024-12-28 RX ADMIN — FUROSEMIDE 60 MG: 10 INJECTION, SOLUTION INTRAMUSCULAR; INTRAVENOUS at 16:58

## 2024-12-28 RX ADMIN — SODIUM CHLORIDE, PRESERVATIVE FREE 10 ML: 5 INJECTION INTRAVENOUS at 20:41

## 2024-12-28 RX ADMIN — AMIODARONE HYDROCHLORIDE 100 MG: 200 TABLET ORAL at 09:14

## 2024-12-28 RX ADMIN — OXYCODONE AND ACETAMINOPHEN 1 TABLET: 7.5; 325 TABLET ORAL at 05:56

## 2024-12-28 RX ADMIN — OXYCODONE AND ACETAMINOPHEN 1 TABLET: 7.5; 325 TABLET ORAL at 00:49

## 2024-12-28 RX ADMIN — SODIUM CHLORIDE, PRESERVATIVE FREE 10 ML: 5 INJECTION INTRAVENOUS at 09:00

## 2024-12-28 RX ADMIN — METHYLPREDNISOLONE 24 MG: 4 TABLET ORAL at 14:00

## 2024-12-28 RX ADMIN — ENOXAPARIN SODIUM 40 MG: 100 INJECTION SUBCUTANEOUS at 20:39

## 2024-12-28 RX ADMIN — ASPIRIN 81 MG: 81 TABLET, COATED ORAL at 09:13

## 2024-12-28 RX ADMIN — EZETIMIBE 10 MG: 10 TABLET ORAL at 20:40

## 2024-12-28 RX ADMIN — DULOXETINE HYDROCHLORIDE 60 MG: 60 CAPSULE, DELAYED RELEASE ORAL at 09:13

## 2024-12-28 RX ADMIN — MORPHINE SULFATE 15 MG: 15 TABLET, FILM COATED, EXTENDED RELEASE ORAL at 20:40

## 2024-12-28 RX ADMIN — MORPHINE SULFATE 15 MG: 15 TABLET, FILM COATED, EXTENDED RELEASE ORAL at 09:13

## 2024-12-28 ASSESSMENT — PAIN DESCRIPTION - LOCATION
LOCATION: SHOULDER;BACK
LOCATION: SHOULDER;BACK
LOCATION: BACK;SHOULDER
LOCATION: BACK;SHOULDER

## 2024-12-28 ASSESSMENT — PAIN DESCRIPTION - PAIN TYPE
TYPE: ACUTE PAIN

## 2024-12-28 ASSESSMENT — PAIN SCALES - GENERAL
PAINLEVEL_OUTOF10: 0
PAINLEVEL_OUTOF10: 5
PAINLEVEL_OUTOF10: 8
PAINLEVEL_OUTOF10: 5
PAINLEVEL_OUTOF10: 8

## 2024-12-28 ASSESSMENT — PAIN DESCRIPTION - FREQUENCY
FREQUENCY: CONTINUOUS

## 2024-12-28 ASSESSMENT — PAIN DESCRIPTION - DESCRIPTORS
DESCRIPTORS: ACHING
DESCRIPTORS: SHARP
DESCRIPTORS: ACHING
DESCRIPTORS: ACHING

## 2024-12-28 ASSESSMENT — PAIN - FUNCTIONAL ASSESSMENT
PAIN_FUNCTIONAL_ASSESSMENT: PREVENTS OR INTERFERES SOME ACTIVE ACTIVITIES AND ADLS
PAIN_FUNCTIONAL_ASSESSMENT: PREVENTS OR INTERFERES SOME ACTIVE ACTIVITIES AND ADLS
PAIN_FUNCTIONAL_ASSESSMENT: ACTIVITIES ARE NOT PREVENTED
PAIN_FUNCTIONAL_ASSESSMENT: PREVENTS OR INTERFERES SOME ACTIVE ACTIVITIES AND ADLS

## 2024-12-28 ASSESSMENT — PAIN DESCRIPTION - ONSET
ONSET: GRADUAL
ONSET: ON-GOING

## 2024-12-28 ASSESSMENT — PAIN DESCRIPTION - ORIENTATION
ORIENTATION: RIGHT;MID
ORIENTATION: MID

## 2024-12-28 NOTE — PROGRESS NOTES
[facility] Note      Chief Complaint:  I feel better    Subjective/Interval History:    This morning the patient states she is feeling better overall.  The leg swelling has improved.  Denies shortness of breath..  Legs remain very tender.  Feels like she is voiding well.  Eating and drinking okay.  Concerned about her ongoing chronic right shoulder pain.    No chest pain or shortness breath. No cough or sputum. No nausea, vomiting, diarrhea. No abdominal pain. No dysuria.  The remainder of the review of systems is negative.     PMH, PSH, FH/SH reviewed and unchanged as documented in the H&P dated 24    Medication list reviewed    Objective:    BP (!) 138/56   Pulse 59   Temp 98.7 °F (37.1 °C) (Oral)   Resp 11   Ht 1.549 m (5' 0.98\")   Wt 55.9 kg (123 lb 3.8 oz)   SpO2 97%   BMI 23.30 kg/m²   Temp  Av.5 °F (36.9 °C)  Min: 97.4 °F (36.3 °C)  Max: 99 °F (37.2 °C)    RRR  Chest-bilateral lower chest crackles about one third of the way up the lung field  Abd- BS+, soft, NTND  Ext- no edema    The Following Labs Were Reviewed Today:     Recent Results (from the past 24 hour(s))   Magnesium    Collection Time: 24  3:55 AM   Result Value Ref Range    Magnesium 2.43 (H) 1.80 - 2.40 mg/dL   Brain Natriuretic Peptide    Collection Time: 24  3:55 AM   Result Value Ref Range    NT Pro-BNP 2,347 (H) 0 - 449 pg/mL   CBC with Auto Differential    Collection Time: 24  3:55 AM   Result Value Ref Range    WBC 8.3 4.0 - 11.0 K/uL    RBC 3.39 (L) 4.00 - 5.20 M/uL    Hemoglobin 10.5 (L) 12.0 - 16.0 g/dL    Hematocrit 30.4 (L) 36.0 - 48.0 %    MCV 89.7 80.0 - 100.0 fL    MCH 31.1 26.0 - 34.0 pg    MCHC 34.7 31.0 - 36.0 g/dL    RDW 15.6 (H) 12.4 - 15.4 %    Platelets 257 135 - 450 K/uL    MPV 8.3 5.0 - 10.5 fL    Neutrophils % 59.3 %    Lymphocytes % 20.0 %    Monocytes % 15.8 %    Eosinophils % 4.4 %    Basophils % 0.5 %    Neutrophils Absolute 4.9 1.7 - 7.7 K/uL    Lymphocytes Absolute

## 2024-12-28 NOTE — PROGRESS NOTES
Pt resting quietly in chair with eyes closed, resp easy and unlabored. IV flushed without difficulty per orders. No s/s of acute distress noted at this time. Appears comfortable. Call light is within reach. Plan of care explained, no questions or concerns at this time.

## 2024-12-28 NOTE — PLAN OF CARE
Problem: Chronic Conditions and Co-morbidities  Goal: Patient's chronic conditions and co-morbidity symptoms are monitored and maintained or improved  12/28/2024 0202 by Perlita Coles RN  Outcome: Progressing  Flowsheets (Taken 12/28/2024 0202)  Care Plan - Patient's Chronic Conditions and Co-Morbidity Symptoms are Monitored and Maintained or Improved: Monitor and assess patient's chronic conditions and comorbid symptoms for stability, deterioration, or improvement     Problem: Discharge Planning  Goal: Discharge to home or other facility with appropriate resources  12/28/2024 0202 by Perlita Coles RN  Outcome: Progressing  Flowsheets (Taken 12/28/2024 0202)  Discharge to home or other facility with appropriate resources:   Identify barriers to discharge with patient and caregiver   Identify discharge learning needs (meds, wound care, etc)   Arrange for needed discharge resources and transportation as appropriate     Problem: Safety - Adult  Goal: Free from fall injury  12/28/2024 0202 by Perlita Coles RN  Outcome: Progressing  Flowsheets (Taken 12/28/2024 0202)  Free From Fall Injury: Instruct family/caregiver on patient safety     Problem: ABCDS Injury Assessment  Goal: Absence of physical injury  12/28/2024 0202 by Perlita Coles RN  Outcome: Progressing  Flowsheets (Taken 12/28/2024 0202)  Absence of Physical Injury: Implement safety measures based on patient assessment     Problem: Pain  Goal: Verbalizes/displays adequate comfort level or baseline comfort level  12/28/2024 0202 by Perlita Coles RN  Outcome: Progressing  Flowsheets (Taken 12/28/2024 0202)  Verbalizes/displays adequate comfort level or baseline comfort level:   Encourage patient to monitor pain and request assistance   Assess pain using appropriate pain scale   Administer analgesics based on type and severity of pain and evaluate response   Implement non-pharmacological measures as appropriate and evaluate

## 2024-12-29 ENCOUNTER — APPOINTMENT (OUTPATIENT)
Dept: GENERAL RADIOLOGY | Age: 79
DRG: 291 | End: 2024-12-29
Attending: STUDENT IN AN ORGANIZED HEALTH CARE EDUCATION/TRAINING PROGRAM
Payer: MEDICARE

## 2024-12-29 LAB
ANION GAP SERPL CALCULATED.3IONS-SCNC: 15 MMOL/L (ref 3–16)
BASOPHILS # BLD: 0 K/UL (ref 0–0.2)
BASOPHILS NFR BLD: 0.6 %
BUN SERPL-MCNC: 21 MG/DL (ref 7–20)
CALCIUM SERPL-MCNC: 9.8 MG/DL (ref 8.3–10.6)
CHLORIDE SERPL-SCNC: 96 MMOL/L (ref 99–110)
CO2 SERPL-SCNC: 30 MMOL/L (ref 21–32)
CREAT SERPL-MCNC: 0.9 MG/DL (ref 0.6–1.2)
DEPRECATED RDW RBC AUTO: 15.6 % (ref 12.4–15.4)
EOSINOPHIL # BLD: 0 K/UL (ref 0–0.6)
EOSINOPHIL NFR BLD: 0.1 %
GFR SERPLBLD CREATININE-BSD FMLA CKD-EPI: 65 ML/MIN/{1.73_M2}
GLUCOSE SERPL-MCNC: 198 MG/DL (ref 70–99)
HCT VFR BLD AUTO: 34.2 % (ref 36–48)
HGB BLD-MCNC: 11.6 G/DL (ref 12–16)
LYMPHOCYTES # BLD: 1 K/UL (ref 1–5.1)
LYMPHOCYTES NFR BLD: 14.9 %
MAGNESIUM SERPL-MCNC: 2.38 MG/DL (ref 1.8–2.4)
MCH RBC QN AUTO: 30.6 PG (ref 26–34)
MCHC RBC AUTO-ENTMCNC: 33.9 G/DL (ref 31–36)
MCV RBC AUTO: 90.2 FL (ref 80–100)
MONOCYTES # BLD: 0.1 K/UL (ref 0–1.3)
MONOCYTES NFR BLD: 1.3 %
NEUTROPHILS # BLD: 5.5 K/UL (ref 1.7–7.7)
NEUTROPHILS NFR BLD: 83.1 %
PLATELET # BLD AUTO: 285 K/UL (ref 135–450)
PMV BLD AUTO: 7.9 FL (ref 5–10.5)
POTASSIUM SERPL-SCNC: 3.5 MMOL/L (ref 3.5–5.1)
RBC # BLD AUTO: 3.79 M/UL (ref 4–5.2)
SODIUM SERPL-SCNC: 141 MMOL/L (ref 136–145)
WBC # BLD AUTO: 6.6 K/UL (ref 4–11)

## 2024-12-29 PROCEDURE — 36415 COLL VENOUS BLD VENIPUNCTURE: CPT

## 2024-12-29 PROCEDURE — 80048 BASIC METABOLIC PNL TOTAL CA: CPT

## 2024-12-29 PROCEDURE — 99232 SBSQ HOSP IP/OBS MODERATE 35: CPT | Performed by: INTERNAL MEDICINE

## 2024-12-29 PROCEDURE — 6360000002 HC RX W HCPCS: Performed by: INTERNAL MEDICINE

## 2024-12-29 PROCEDURE — 1200000000 HC SEMI PRIVATE

## 2024-12-29 PROCEDURE — 6370000000 HC RX 637 (ALT 250 FOR IP): Performed by: STUDENT IN AN ORGANIZED HEALTH CARE EDUCATION/TRAINING PROGRAM

## 2024-12-29 PROCEDURE — 2500000003 HC RX 250 WO HCPCS: Performed by: STUDENT IN AN ORGANIZED HEALTH CARE EDUCATION/TRAINING PROGRAM

## 2024-12-29 PROCEDURE — 71045 X-RAY EXAM CHEST 1 VIEW: CPT

## 2024-12-29 PROCEDURE — 94760 N-INVAS EAR/PLS OXIMETRY 1: CPT

## 2024-12-29 PROCEDURE — 85025 COMPLETE CBC W/AUTO DIFF WBC: CPT

## 2024-12-29 PROCEDURE — 94640 AIRWAY INHALATION TREATMENT: CPT

## 2024-12-29 PROCEDURE — 83735 ASSAY OF MAGNESIUM: CPT

## 2024-12-29 PROCEDURE — 6360000002 HC RX W HCPCS: Performed by: STUDENT IN AN ORGANIZED HEALTH CARE EDUCATION/TRAINING PROGRAM

## 2024-12-29 RX ADMIN — MORPHINE SULFATE 15 MG: 15 TABLET, FILM COATED, EXTENDED RELEASE ORAL at 09:42

## 2024-12-29 RX ADMIN — METHYLPREDNISOLONE 4 MG: 4 TABLET ORAL at 05:19

## 2024-12-29 RX ADMIN — SODIUM CHLORIDE, PRESERVATIVE FREE 10 ML: 5 INJECTION INTRAVENOUS at 09:00

## 2024-12-29 RX ADMIN — ASPIRIN 81 MG: 81 TABLET, COATED ORAL at 09:42

## 2024-12-29 RX ADMIN — METHYLPREDNISOLONE 4 MG: 4 TABLET ORAL at 12:00

## 2024-12-29 RX ADMIN — FLUTICASONE PROPIONATE 2 SPRAY: 50 SPRAY, METERED NASAL at 09:41

## 2024-12-29 RX ADMIN — SODIUM CHLORIDE, PRESERVATIVE FREE 10 ML: 5 INJECTION INTRAVENOUS at 20:08

## 2024-12-29 RX ADMIN — LEVOTHYROXINE SODIUM 88 MCG: 0.09 TABLET ORAL at 05:19

## 2024-12-29 RX ADMIN — METOPROLOL SUCCINATE 50 MG: 50 TABLET, EXTENDED RELEASE ORAL at 09:42

## 2024-12-29 RX ADMIN — SODIUM CHLORIDE, PRESERVATIVE FREE 10 ML: 5 INJECTION INTRAVENOUS at 08:00

## 2024-12-29 RX ADMIN — DULOXETINE HYDROCHLORIDE 60 MG: 60 CAPSULE, DELAYED RELEASE ORAL at 09:42

## 2024-12-29 RX ADMIN — METHYLPREDNISOLONE 4 MG: 4 TABLET ORAL at 18:18

## 2024-12-29 RX ADMIN — FUROSEMIDE 60 MG: 10 INJECTION, SOLUTION INTRAMUSCULAR; INTRAVENOUS at 18:18

## 2024-12-29 RX ADMIN — METHYLPREDNISOLONE 8 MG: 4 TABLET ORAL at 20:08

## 2024-12-29 RX ADMIN — ENOXAPARIN SODIUM 40 MG: 100 INJECTION SUBCUTANEOUS at 20:08

## 2024-12-29 RX ADMIN — MORPHINE SULFATE 15 MG: 15 TABLET, FILM COATED, EXTENDED RELEASE ORAL at 20:08

## 2024-12-29 RX ADMIN — AMIODARONE HYDROCHLORIDE 100 MG: 200 TABLET ORAL at 09:42

## 2024-12-29 RX ADMIN — BUDESONIDE AND FORMOTEROL FUMARATE DIHYDRATE 2 PUFF: 160; 4.5 AEROSOL RESPIRATORY (INHALATION) at 19:42

## 2024-12-29 RX ADMIN — EZETIMIBE 10 MG: 10 TABLET ORAL at 20:08

## 2024-12-29 RX ADMIN — FUROSEMIDE 60 MG: 10 INJECTION, SOLUTION INTRAMUSCULAR; INTRAVENOUS at 09:42

## 2024-12-29 ASSESSMENT — PAIN DESCRIPTION - LOCATION: LOCATION: SHOULDER

## 2024-12-29 ASSESSMENT — PAIN SCALES - GENERAL: PAINLEVEL_OUTOF10: 8

## 2024-12-29 ASSESSMENT — PAIN DESCRIPTION - ORIENTATION: ORIENTATION: RIGHT

## 2024-12-29 ASSESSMENT — PAIN DESCRIPTION - DESCRIPTORS: DESCRIPTORS: ACHING

## 2024-12-29 ASSESSMENT — PAIN - FUNCTIONAL ASSESSMENT: PAIN_FUNCTIONAL_ASSESSMENT: ACTIVITIES ARE NOT PREVENTED

## 2024-12-29 NOTE — PLAN OF CARE
Problem: Chronic Conditions and Co-morbidities  Goal: Patient's chronic conditions and co-morbidity symptoms are monitored and maintained or improved  12/29/2024 0847 by Bree Jarvis RN  Outcome: Progressing  Flowsheets (Taken 12/29/2024 0847)  Care Plan - Patient's Chronic Conditions and Co-Morbidity Symptoms are Monitored and Maintained or Improved:   Monitor and assess patient's chronic conditions and comorbid symptoms for stability, deterioration, or improvement   Collaborate with multidisciplinary team to address chronic and comorbid conditions and prevent exacerbation or deterioration  12/28/2024 1955 by Desiree Blanc RN  Outcome: Progressing  Flowsheets (Taken 12/28/2024 1949 by Bree Jarvis RN)  Care Plan - Patient's Chronic Conditions and Co-Morbidity Symptoms are Monitored and Maintained or Improved:   Monitor and assess patient's chronic conditions and comorbid symptoms for stability, deterioration, or improvement   Collaborate with multidisciplinary team to address chronic and comorbid conditions and prevent exacerbation or deterioration  12/28/2024 1949 by Bree Jarvis RN  Outcome: Progressing  Flowsheets (Taken 12/28/2024 1949)  Care Plan - Patient's Chronic Conditions and Co-Morbidity Symptoms are Monitored and Maintained or Improved:   Monitor and assess patient's chronic conditions and comorbid symptoms for stability, deterioration, or improvement   Collaborate with multidisciplinary team to address chronic and comorbid conditions and prevent exacerbation or deterioration     Problem: Discharge Planning  Goal: Discharge to home or other facility with appropriate resources  12/29/2024 0847 by Bree Jarvis RN  Outcome: Progressing  Flowsheets (Taken 12/29/2024 0847)  Discharge to home or other facility with appropriate resources:   Identify barriers to discharge with patient and caregiver   Arrange for needed discharge resources and transportation as appropriate   Identify discharge learning needs

## 2024-12-29 NOTE — PROGRESS NOTES
Patient is in chair quiet, with eyes closed. Respirations are even and unlabored. Chair alarm is on. Bedside table and call light are within reach. Patient is able to communicate needs.     Patient educated about importance of moving off of buttock r/t pressure injury prevention. Pt stated she wished to remain in chair for night, but that she will continue to move herself around in chair.

## 2024-12-29 NOTE — PLAN OF CARE
Problem: Chronic Conditions and Co-morbidities  Goal: Patient's chronic conditions and co-morbidity symptoms are monitored and maintained or improved  12/28/2024 1955 by Desiree Blanc RN  Outcome: Progressing  Flowsheets (Taken 12/28/2024 1949 by Bree Jarvis, RN)  Care Plan - Patient's Chronic Conditions and Co-Morbidity Symptoms are Monitored and Maintained or Improved:   Monitor and assess patient's chronic conditions and comorbid symptoms for stability, deterioration, or improvement   Collaborate with multidisciplinary team to address chronic and comorbid conditions and prevent exacerbation or deterioration  12/28/2024 1949 by Bree Jarvis RN  Outcome: Progressing  Flowsheets (Taken 12/28/2024 1949)  Care Plan - Patient's Chronic Conditions and Co-Morbidity Symptoms are Monitored and Maintained or Improved:   Monitor and assess patient's chronic conditions and comorbid symptoms for stability, deterioration, or improvement   Collaborate with multidisciplinary team to address chronic and comorbid conditions and prevent exacerbation or deterioration     Problem: Discharge Planning  Goal: Discharge to home or other facility with appropriate resources  12/28/2024 1955 by Desiree Blanc RN  Outcome: Progressing  Flowsheets (Taken 12/28/2024 1949 by Bree Jarvis, RN)  Discharge to home or other facility with appropriate resources:   Identify barriers to discharge with patient and caregiver   Arrange for needed discharge resources and transportation as appropriate   Identify discharge learning needs (meds, wound care, etc)  12/28/2024 1949 by Bree Jarvis RN  Outcome: Progressing  Flowsheets (Taken 12/28/2024 1949)  Discharge to home or other facility with appropriate resources:   Identify barriers to discharge with patient and caregiver   Arrange for needed discharge resources and transportation as appropriate   Identify discharge learning needs (meds, wound care, etc)     Problem: Safety - Adult  Goal: Free from fall

## 2024-12-29 NOTE — PLAN OF CARE
Problem: Chronic Conditions and Co-morbidities  Goal: Patient's chronic conditions and co-morbidity symptoms are monitored and maintained or improved  Outcome: Progressing  Flowsheets (Taken 12/28/2024 1949)  Care Plan - Patient's Chronic Conditions and Co-Morbidity Symptoms are Monitored and Maintained or Improved:   Monitor and assess patient's chronic conditions and comorbid symptoms for stability, deterioration, or improvement   Collaborate with multidisciplinary team to address chronic and comorbid conditions and prevent exacerbation or deterioration     Problem: Discharge Planning  Goal: Discharge to home or other facility with appropriate resources  Outcome: Progressing  Flowsheets (Taken 12/28/2024 1949)  Discharge to home or other facility with appropriate resources:   Identify barriers to discharge with patient and caregiver   Arrange for needed discharge resources and transportation as appropriate   Identify discharge learning needs (meds, wound care, etc)     Problem: Safety - Adult  Goal: Free from fall injury  Outcome: Progressing  Flowsheets (Taken 12/28/2024 1949)  Free From Fall Injury: Instruct family/caregiver on patient safety     Problem: ABCDS Injury Assessment  Goal: Absence of physical injury  Outcome: Progressing  Flowsheets (Taken 12/28/2024 1949)  Absence of Physical Injury: Implement safety measures based on patient assessment     Problem: Pain  Goal: Verbalizes/displays adequate comfort level or baseline comfort level  Outcome: Progressing  Flowsheets (Taken 12/28/2024 1949)  Verbalizes/displays adequate comfort level or baseline comfort level:   Encourage patient to monitor pain and request assistance   Assess pain using appropriate pain scale   Administer analgesics based on type and severity of pain and evaluate response

## 2024-12-29 NOTE — PROGRESS NOTES
Pt resting quietly in chair , resp easy and unlabored. IV flushed  without difficulty per orders. No s/s of acute distress noted at this time. Appears comfortable. Call light is within reach. Plan of care cont, no questions or concerns at this time.

## 2024-12-29 NOTE — PROGRESS NOTES
[facility] Note      Chief Complaint:  I feel better    Subjective/Interval History:    This morning the patient is sitting up in the bedside chair.  No new concerns today.  Her shoulder is feeling somewhat better with the steroids.  Eating well.  Edema is improved.  Denies shortness of breath    No chest pain or shortness breath. No cough or sputum. No nausea, vomiting, diarrhea. No abdominal pain. No dysuria.  The remainder of the review of systems is negative.     PMH, PSH, FH/SH reviewed and unchanged as documented in the H&P dated 24    Medication list reviewed    Objective:    BP (!) 150/74   Pulse 62   Temp 98.4 °F (36.9 °C) (Oral)   Resp 12   Ht 1.549 m (5' 0.98\")   Wt 57.7 kg (127 lb 3.3 oz)   SpO2 95%   BMI 24.05 kg/m²   Temp  Av.9 °F (36.1 °C)  Min: 90 °F (32.2 °C)  Max: 98.7 °F (37.1 °C)    RRR  Chest-bilateral lower chest crackles about one third of the way up the lung field Continue.  Abd- BS+, soft, NTND  Ext- no edema    The Following Labs Were Reviewed Today:     Recent Results (from the past 24 hour(s))   Echo (TTE) complete (PRN contrast/bubble/strain/3D)    Collection Time: 24 12:12 PM   Result Value Ref Range    LA Minor Axis 7.0 cm    LA Major Axis 6.9 cm    LA Area 2C 23.9 cm2    LA Area 4C 26.2 cm2    LA Volume MOD A2C 67 (A) 22 - 52 mL    LA Volume MOD A4C 81 (A) 22 - 52 mL    LA Volume BP 74 (A) 22 - 52 mL    LA Diameter 5.5 cm    RA Area 4C 14.0 cm2    RA Volume 33 ml    AV Mean Gradient 15 mmHg    AV VTI 56.2 cm    AV Mean Velocity 1.8 m/s    AV Peak Velocity 2.6 m/s    AV Peak Gradient 27 mmHg    IVSd 1.1 (A) 0.6 - 0.9 cm    LVIDd 4.7 3.9 - 5.3 cm    LVIDs 3.2 cm    LVOT Mean Gradient 2 mmHg    LVOT VTI 21.0 cm    LVOT Peak Velocity 1.0 m/s    LVOT Peak Gradient 4 mmHg    LVPWd 0.9 0.6 - 0.9 cm    LV E' Lateral Velocity 9.36 cm/s    LV E' Septal Velocity 6.31 cm/s    MV E Wave Deceleration Time 259.0 ms    MV A Velocity 0.71 m/s    MV E Velocity  1.58 m/s    UT Max Velocity 1.7 m/s    Pulmonary Artery EDP 12 mmHg    PV Max Velocity 1.4 m/s    PV Peak Gradient 7 mmHg    RVIDd 3.7 cm    RV Free Wall Peak S' 10.9 cm/s    TAPSE 2.0 1.7 cm    TR Max Velocity 2.65 m/s    TR Peak Gradient 28 mmHg    Body Surface Area 1.55 m2    Fractional Shortening 2D 32 28 - 44 %    LVIDd Index 3.05 cm/m2    LVIDs Index 2.08 cm/m2    LV RWT Ratio 0.38     LV Mass 2D 164.5 (A) 67 - 162 g    LV Mass 2D Index 106.8 (A) 43 - 95 g/m2    MV E/A 2.23     E/E' Ratio (Averaged) 20.96     E/E' Lateral 16.88     E/E' Septal 25.04     LA Volume Index BP 48 (A) 16 - 34 ml/m2    LA Volume Index MOD A2C 44 (A) 16 - 34 ml/m2    LA Volume Index MOD A4C 53 (A) 16 - 34 ml/m2    LA Size Index 3.57 cm/m2    RA Volume Index A4C 21 mL/m2    AV Velocity Ratio 0.38     LVOT:AV VTI Index 0.37     EF Physician 58 %   Magnesium    Collection Time: 12/29/24  4:04 AM   Result Value Ref Range    Magnesium 2.38 1.80 - 2.40 mg/dL   CBC with Auto Differential    Collection Time: 12/29/24  4:04 AM   Result Value Ref Range    WBC 6.6 4.0 - 11.0 K/uL    RBC 3.79 (L) 4.00 - 5.20 M/uL    Hemoglobin 11.6 (L) 12.0 - 16.0 g/dL    Hematocrit 34.2 (L) 36.0 - 48.0 %    MCV 90.2 80.0 - 100.0 fL    MCH 30.6 26.0 - 34.0 pg    MCHC 33.9 31.0 - 36.0 g/dL    RDW 15.6 (H) 12.4 - 15.4 %    Platelets 285 135 - 450 K/uL    MPV 7.9 5.0 - 10.5 fL    Neutrophils % 83.1 %    Lymphocytes % 14.9 %    Monocytes % 1.3 %    Eosinophils % 0.1 %    Basophils % 0.6 %    Neutrophils Absolute 5.5 1.7 - 7.7 K/uL    Lymphocytes Absolute 1.0 1.0 - 5.1 K/uL    Monocytes Absolute 0.1 0.0 - 1.3 K/uL    Eosinophils Absolute 0.0 0.0 - 0.6 K/uL    Basophils Absolute 0.0 0.0 - 0.2 K/uL   Basic Metabolic Panel    Collection Time: 12/29/24  4:04 AM   Result Value Ref Range    Sodium 141 136 - 145 mmol/L    Potassium 3.5 3.5 - 5.1 mmol/L    Chloride 96 (L) 99 - 110 mmol/L    CO2 30 21 - 32 mmol/L    Anion Gap 15 3 - 16    Glucose 198 (H) 70 - 99 mg/dL    BUN

## 2024-12-30 LAB
ANION GAP SERPL CALCULATED.3IONS-SCNC: 14 MMOL/L (ref 3–16)
BACTERIA BLD CULT ORG #2: NORMAL
BACTERIA BLD CULT: NORMAL
BASOPHILS # BLD: 0 K/UL (ref 0–0.2)
BASOPHILS NFR BLD: 0 %
BUN SERPL-MCNC: 21 MG/DL (ref 7–20)
CALCIUM SERPL-MCNC: 9.2 MG/DL (ref 8.3–10.6)
CHLORIDE SERPL-SCNC: 95 MMOL/L (ref 99–110)
CO2 SERPL-SCNC: 32 MMOL/L (ref 21–32)
CREAT SERPL-MCNC: 0.9 MG/DL (ref 0.6–1.2)
DEPRECATED RDW RBC AUTO: 15.8 % (ref 12.4–15.4)
EOSINOPHIL # BLD: 0 K/UL (ref 0–0.6)
EOSINOPHIL NFR BLD: 0 %
GFR SERPLBLD CREATININE-BSD FMLA CKD-EPI: 65 ML/MIN/{1.73_M2}
GLUCOSE SERPL-MCNC: 152 MG/DL (ref 70–99)
HCT VFR BLD AUTO: 32.4 % (ref 36–48)
HGB BLD-MCNC: 11.2 G/DL (ref 12–16)
LYMPHOCYTES # BLD: 1 K/UL (ref 1–5.1)
LYMPHOCYTES NFR BLD: 9.6 %
MAGNESIUM SERPL-MCNC: 2.49 MG/DL (ref 1.8–2.4)
MCH RBC QN AUTO: 31.2 PG (ref 26–34)
MCHC RBC AUTO-ENTMCNC: 34.5 G/DL (ref 31–36)
MCV RBC AUTO: 90.3 FL (ref 80–100)
MONOCYTES # BLD: 0.6 K/UL (ref 0–1.3)
MONOCYTES NFR BLD: 5.8 %
NEUTROPHILS # BLD: 9.2 K/UL (ref 1.7–7.7)
NEUTROPHILS NFR BLD: 84.6 %
NT-PROBNP SERPL-MCNC: 5134 PG/ML (ref 0–449)
PLATELET # BLD AUTO: 270 K/UL (ref 135–450)
PMV BLD AUTO: 7.8 FL (ref 5–10.5)
POTASSIUM SERPL-SCNC: 3 MMOL/L (ref 3.5–5.1)
RBC # BLD AUTO: 3.58 M/UL (ref 4–5.2)
SODIUM SERPL-SCNC: 141 MMOL/L (ref 136–145)
WBC # BLD AUTO: 10.8 K/UL (ref 4–11)

## 2024-12-30 PROCEDURE — 99232 SBSQ HOSP IP/OBS MODERATE 35: CPT | Performed by: STUDENT IN AN ORGANIZED HEALTH CARE EDUCATION/TRAINING PROGRAM

## 2024-12-30 PROCEDURE — 6370000000 HC RX 637 (ALT 250 FOR IP): Performed by: STUDENT IN AN ORGANIZED HEALTH CARE EDUCATION/TRAINING PROGRAM

## 2024-12-30 PROCEDURE — 80048 BASIC METABOLIC PNL TOTAL CA: CPT

## 2024-12-30 PROCEDURE — 6360000002 HC RX W HCPCS: Performed by: INTERNAL MEDICINE

## 2024-12-30 PROCEDURE — 85025 COMPLETE CBC W/AUTO DIFF WBC: CPT

## 2024-12-30 PROCEDURE — 83735 ASSAY OF MAGNESIUM: CPT

## 2024-12-30 PROCEDURE — 97530 THERAPEUTIC ACTIVITIES: CPT

## 2024-12-30 PROCEDURE — 6370000000 HC RX 637 (ALT 250 FOR IP): Performed by: INTERNAL MEDICINE

## 2024-12-30 PROCEDURE — 94760 N-INVAS EAR/PLS OXIMETRY 1: CPT

## 2024-12-30 PROCEDURE — 6360000002 HC RX W HCPCS: Performed by: STUDENT IN AN ORGANIZED HEALTH CARE EDUCATION/TRAINING PROGRAM

## 2024-12-30 PROCEDURE — 1200000000 HC SEMI PRIVATE

## 2024-12-30 PROCEDURE — 94640 AIRWAY INHALATION TREATMENT: CPT

## 2024-12-30 PROCEDURE — 83880 ASSAY OF NATRIURETIC PEPTIDE: CPT

## 2024-12-30 PROCEDURE — 97116 GAIT TRAINING THERAPY: CPT

## 2024-12-30 PROCEDURE — 36415 COLL VENOUS BLD VENIPUNCTURE: CPT

## 2024-12-30 PROCEDURE — 2500000003 HC RX 250 WO HCPCS: Performed by: STUDENT IN AN ORGANIZED HEALTH CARE EDUCATION/TRAINING PROGRAM

## 2024-12-30 RX ORDER — SPIRONOLACTONE 25 MG/1
12.5 TABLET ORAL DAILY
Status: DISCONTINUED | OUTPATIENT
Start: 2024-12-30 | End: 2025-01-02 | Stop reason: HOSPADM

## 2024-12-30 RX ORDER — TORSEMIDE 20 MG/1
40 TABLET ORAL DAILY
Status: DISCONTINUED | OUTPATIENT
Start: 2024-12-31 | End: 2025-01-02 | Stop reason: HOSPADM

## 2024-12-30 RX ADMIN — ENOXAPARIN SODIUM 40 MG: 100 INJECTION SUBCUTANEOUS at 22:03

## 2024-12-30 RX ADMIN — POTASSIUM CHLORIDE 10 MEQ: 7.46 INJECTION, SOLUTION INTRAVENOUS at 13:35

## 2024-12-30 RX ADMIN — BUDESONIDE AND FORMOTEROL FUMARATE DIHYDRATE 2 PUFF: 160; 4.5 AEROSOL RESPIRATORY (INHALATION) at 20:28

## 2024-12-30 RX ADMIN — DULOXETINE HYDROCHLORIDE 60 MG: 60 CAPSULE, DELAYED RELEASE ORAL at 09:19

## 2024-12-30 RX ADMIN — LEVOTHYROXINE SODIUM 88 MCG: 0.09 TABLET ORAL at 05:32

## 2024-12-30 RX ADMIN — OXYCODONE AND ACETAMINOPHEN 1 TABLET: 7.5; 325 TABLET ORAL at 12:21

## 2024-12-30 RX ADMIN — ASPIRIN 81 MG: 81 TABLET, COATED ORAL at 09:19

## 2024-12-30 RX ADMIN — METHYLPREDNISOLONE 4 MG: 4 TABLET ORAL at 12:19

## 2024-12-30 RX ADMIN — AMIODARONE HYDROCHLORIDE 100 MG: 200 TABLET ORAL at 09:19

## 2024-12-30 RX ADMIN — SODIUM CHLORIDE, PRESERVATIVE FREE 10 ML: 5 INJECTION INTRAVENOUS at 09:20

## 2024-12-30 RX ADMIN — SPIRONOLACTONE 12.5 MG: 25 TABLET ORAL at 18:05

## 2024-12-30 RX ADMIN — MORPHINE SULFATE 15 MG: 15 TABLET, FILM COATED, EXTENDED RELEASE ORAL at 09:19

## 2024-12-30 RX ADMIN — METHYLPREDNISOLONE 4 MG: 4 TABLET ORAL at 16:38

## 2024-12-30 RX ADMIN — EZETIMIBE 10 MG: 10 TABLET ORAL at 22:03

## 2024-12-30 RX ADMIN — MORPHINE SULFATE 15 MG: 15 TABLET, FILM COATED, EXTENDED RELEASE ORAL at 22:03

## 2024-12-30 RX ADMIN — METOPROLOL SUCCINATE 50 MG: 50 TABLET, EXTENDED RELEASE ORAL at 09:19

## 2024-12-30 RX ADMIN — METHYLPREDNISOLONE 4 MG: 4 TABLET ORAL at 05:32

## 2024-12-30 RX ADMIN — FUROSEMIDE 60 MG: 10 INJECTION, SOLUTION INTRAMUSCULAR; INTRAVENOUS at 09:19

## 2024-12-30 RX ADMIN — SODIUM CHLORIDE, PRESERVATIVE FREE 10 ML: 5 INJECTION INTRAVENOUS at 22:03

## 2024-12-30 RX ADMIN — POTASSIUM CHLORIDE 10 MEQ: 7.46 INJECTION, SOLUTION INTRAVENOUS at 10:47

## 2024-12-30 RX ADMIN — POTASSIUM CHLORIDE 10 MEQ: 7.46 INJECTION, SOLUTION INTRAVENOUS at 12:11

## 2024-12-30 RX ADMIN — POTASSIUM CHLORIDE 10 MEQ: 7.46 INJECTION, SOLUTION INTRAVENOUS at 16:15

## 2024-12-30 RX ADMIN — POTASSIUM CHLORIDE 10 MEQ: 7.46 INJECTION, SOLUTION INTRAVENOUS at 09:38

## 2024-12-30 RX ADMIN — POTASSIUM CHLORIDE 10 MEQ: 7.46 INJECTION, SOLUTION INTRAVENOUS at 15:00

## 2024-12-30 RX ADMIN — BUDESONIDE AND FORMOTEROL FUMARATE DIHYDRATE 2 PUFF: 160; 4.5 AEROSOL RESPIRATORY (INHALATION) at 07:30

## 2024-12-30 RX ADMIN — METHYLPREDNISOLONE 4 MG: 4 TABLET ORAL at 22:03

## 2024-12-30 ASSESSMENT — PAIN DESCRIPTION - LOCATION
LOCATION: SHOULDER
LOCATION: BACK

## 2024-12-30 ASSESSMENT — PAIN DESCRIPTION - PAIN TYPE: TYPE: CHRONIC PAIN

## 2024-12-30 ASSESSMENT — PAIN SCALES - GENERAL
PAINLEVEL_OUTOF10: 0
PAINLEVEL_OUTOF10: 8
PAINLEVEL_OUTOF10: 8

## 2024-12-30 ASSESSMENT — PAIN DESCRIPTION - FREQUENCY: FREQUENCY: CONTINUOUS

## 2024-12-30 ASSESSMENT — PAIN DESCRIPTION - ORIENTATION: ORIENTATION: LOWER

## 2024-12-30 ASSESSMENT — PAIN - FUNCTIONAL ASSESSMENT: PAIN_FUNCTIONAL_ASSESSMENT: ACTIVITIES ARE NOT PREVENTED

## 2024-12-30 ASSESSMENT — PAIN DESCRIPTION - ONSET: ONSET: ON-GOING

## 2024-12-30 ASSESSMENT — PAIN DESCRIPTION - DESCRIPTORS: DESCRIPTORS: ACHING

## 2024-12-30 NOTE — ACP (ADVANCE CARE PLANNING)
Advance Care Planning   Healthcare Decision Maker:    Primary Decision Maker: Tricia Hoover - Child - [phone]    Secondary Decision Maker: Hannah Rios - Child - [phone]    Supplemental (Other) Decision Maker: Bety Blackman - Child - [phone]      Electronically signed by Minda Jarvis, MSW, LISW, Case Management on 12/30/2024 at 4:44 PM  [place] [phone]

## 2024-12-30 NOTE — CONSULTS
[facility]    Shonda Hernandes  1945    December 30, 2024    Reason for Consult: CHF    CC: Leg Swelling    HPI:  The patient is 79 y.o. female with a past medical history significant for atrial fibrillation, pulmonary hypertension, aortic stenosis and CAD s/p CHILANGO to mid circ 5/2014. She is s/p AVR, PVI and RENETTA exclusion by Dr Hernandez on 6/16. An inpatient echocardiogram was completed revealing moderate to severe MR. Subsequently, a CAMERON was completed on 7/27/20 showing moderate MR. She was admitted 10/26/21-11/1/21 with afib with RVR and acute CHF. She converted to a regular rhythm and was diuresed prior to discharge. She was [facility] to [facility]/11/21 with acute encephalopathy and found to have rhabdomyolysis and afib with RVR again. She underwent successful cardioversion on 11/17/21. She did experience bradycardia when in sinus rhythm with heart rate between 40-50 bpm. She was seen in the office with Diane Enzweiler, CNP for follow up on 11/23/21 and then sent to the ED for admission. She was again found in afib with RVR and fluid overloaded. She was diuresed and discharged home on both amiodarone and Toprol. She underwent implant of Bi-V pacemaker with Dr. Mcbride on 4/11/22. She had recurrence of atrial fibrillation noted on interrogation and underwent ablation on 5/9/22. I was asked to see her for possible CHF.    Shonda states that her legs were swollen and red for about a month. She was seen at the [facility] office and placed on antibiotics for cellulitis. She has had some sores that she says \"got infected\". She says these occurred because she bumped into things.     Shonda says that her breathing was \"fine when she came in. Her daughter states that she walked in from the parking lot when they came in with no issues with her breathing. She has had no c4est pain, shortness of breath or awareness of her heart racing.     Review of Systems:  Constitutional: No fatigue, weakness, night  MD Hunter at [facility]    CORONARY ANGIOPLASTY      stent x 1    CYSTOSCOPY  2014    dr Abel    JOINT REPLACEMENT      THR Right    OTHER SURGICAL HISTORY  2018     EGD and colonoscopy.    PACEMAKER INSERTION  2022    PAIN MANAGEMENT PROCEDURE Bilateral 2021    BILATERAL L3, L4, L5 MEDIAL BRANCH BLOCK WITH FLUOROSCOPY performed by Abel Ruiz MD at [facility]    PAIN MANAGEMENT PROCEDURE Bilateral 2021    BILATERAL L3, L4, L5 MEDIAL BRANCH BLOCKS WITH FLUOROSCOPY (82852, [id number]) performed by Abel Ruiz MD at [facility]    PAIN MANAGEMENT PROCEDURE Bilateral 2021    BILATERAL L3, L4, L5 RADIOFREQUENCY ABLATION WITH FLUOROSCOPY (16623, [id number]) performed by Abel Ruiz MD at [facility]    PAIN MANAGEMENT PROCEDURE Bilateral 2024    BILATERAL L2 TRANSFORAMINAL EPIDURAL STEROID INJECTION WITH FLUOROSCOPY [facility] performed by Abel Ruiz MD at [facility]    SPINE SURGERY N/A 3/15/2019    INSERTION OF INTERSPINOUS SPACER SUPERION VERTIFLEX AT LUMBAR FOUR-LUMBAR FIVE performed by Tereso Leo MD at [facility] OR    UPPER GASTROINTESTINAL ENDOSCOPY  12/15/2017     Family History   Problem Relation Age of Onset    Breast Cancer Daughter      Social History     Tobacco Use    Smoking status: Former     Current packs/day: 0.00     Average packs/day: 1 pack/day for 45.0 years (45.0 ttl pk-yrs)     Types: Cigarettes     Start date: 1978     Quit date: 2023     Years since quittin.2    Smokeless tobacco: Former   Vaping Use    Vaping status: Never Used   Substance Use Topics    Alcohol use: Yes    Drug use: No       Allergies   Allergen Reactions    Benadryl [Diphenhydramine] Swelling    Doxylamine     Mucinex [Guaifenesin Er]      hallucinations    Spiriva Handihaler [Tiotropium Bromide Monohydrate]      Nausea      Adhesive Tape Rash and Swelling    Neosporin [Bacitracin-Polymyxin B] Other (See Comments)     Rash that spread across face with swelling

## 2024-12-30 NOTE — PROGRESS NOTES
Patient A&O. Pt given potassium replacement per protocol this shift. Pt given PRN pain medication this shift for pain in R shoulder. Call light within reach, able to make needs known, fall precautions in place.     Electronically signed by Yulia Irene RN on 12/30/2024 at 6:30 PM

## 2024-12-30 NOTE — PLAN OF CARE
Problem: Chronic Conditions and Co-morbidities  Goal: Patient's chronic conditions and co-morbidity symptoms are monitored and maintained or improved  Outcome: Progressing  Flowsheets (Taken 12/30/2024 0948)  Care Plan - Patient's Chronic Conditions and Co-Morbidity Symptoms are Monitored and Maintained or Improved: Monitor and assess patient's chronic conditions and comorbid symptoms for stability, deterioration, or improvement     Problem: Discharge Planning  Goal: Discharge to home or other facility with appropriate resources  Outcome: Progressing  Flowsheets (Taken 12/30/2024 0948)  Discharge to home or other facility with appropriate resources: Identify barriers to discharge with patient and caregiver     Problem: Safety - Adult  Goal: Free from fall injury  Outcome: Progressing     Problem: ABCDS Injury Assessment  Goal: Absence of physical injury  Outcome: Progressing     Problem: Pain  Goal: Verbalizes/displays adequate comfort level or baseline comfort level  Outcome: Progressing

## 2024-12-30 NOTE — PROGRESS NOTES
Patient is alert & oriented x4, standby assist, 2/4 bed rails up, bed in lowest position, fall precautions in place, call light within reach. Morning medications given without complications. Potassium replacement infusing per order.     Electronically signed by Yulia Irene RN on 12/30/2024 at 9:47 AM

## 2024-12-30 NOTE — PLAN OF CARE
Problem: Chronic Conditions and Co-morbidities  Goal: Patient's chronic conditions and co-morbidity symptoms are monitored and maintained or improved  12/29/2024 1938 by Desiree Blanc RN  Outcome: Progressing  Flowsheets (Taken 12/29/2024 0847 by Bree Jarvis, RN)  Care Plan - Patient's Chronic Conditions and Co-Morbidity Symptoms are Monitored and Maintained or Improved:   Monitor and assess patient's chronic conditions and comorbid symptoms for stability, deterioration, or improvement   Collaborate with multidisciplinary team to address chronic and comorbid conditions and prevent exacerbation or deterioration  12/29/2024 0847 by Bree Jarvis RN  Outcome: Progressing  Flowsheets (Taken 12/29/2024 0847)  Care Plan - Patient's Chronic Conditions and Co-Morbidity Symptoms are Monitored and Maintained or Improved:   Monitor and assess patient's chronic conditions and comorbid symptoms for stability, deterioration, or improvement   Collaborate with multidisciplinary team to address chronic and comorbid conditions and prevent exacerbation or deterioration     Problem: Discharge Planning  Goal: Discharge to home or other facility with appropriate resources  12/29/2024 1938 by Desiree Blanc RN  Outcome: Progressing  Flowsheets (Taken 12/29/2024 0847 by Bree Jarvis, RN)  Discharge to home or other facility with appropriate resources:   Identify barriers to discharge with patient and caregiver   Arrange for needed discharge resources and transportation as appropriate   Identify discharge learning needs (meds, wound care, etc)  12/29/2024 0847 by Bree Jarvis RN  Outcome: Progressing  Flowsheets (Taken 12/29/2024 0847)  Discharge to home or other facility with appropriate resources:   Identify barriers to discharge with patient and caregiver   Arrange for needed discharge resources and transportation as appropriate   Identify discharge learning needs (meds, wound care, etc)     Problem: Safety - Adult  Goal: Free from fall

## 2024-12-30 NOTE — CARE COORDINATION
Case Management Assessment  Initial Evaluation    Date/Time of Evaluation: 12/30/2024 4:41 PM  Assessment Completed by: MICHELLE Hernandez    If patient is discharged prior to next notation, then this note serves as note for discharge by case management.    Patient Name: Shonda Hernandes                   YOB: 1945  Diagnosis: Acute congestive heart failure, unspecified heart failure type (HCC) [I50.9]  Diastolic CHF (HCC) [I50.30]                   Date / Time: 12/26/2024  3:20 PM    Patient Admission Status: Inpatient   Readmission Risk (Low < 19, Mod (19-27), High > 27): Readmission Risk Score: 15.5    Current PCP: dAeel Nguyễn MD  PCP verified by CM? Yes    Chart Reviewed: Yes      History Provided by: Patient, Child/Family, Medical Record  Patient Orientation: Alert and Oriented    Patient Cognition: Alert    Hospitalization in the last 30 days (Readmission):  No    If yes, Readmission Assessment in CM Navigator will be completed.    Advance Directives:      Code Status: Full Code   Patient's Primary Decision Maker is: Named in Scanned ACP Document    Primary Decision Maker: Tricia Hoover - Child - [phone]    Secondary Decision Maker: Hannah Rios - Child - [phone]    Supplemental (Other) Decision Maker: Bety Blackman - Child - [phone]    Discharge Planning:    Patient lives with: Children Type of Home: House  Primary Care Giver: Self  Patient Support Systems include: Children, Family Members   Current Financial resources: Medicare  Current community resources: None  Current services prior to admission: Durable Medical Equipment            Current DME: Shower Chair, Walker, Cane (RTS, rollater)            Type of Home Care services:  None    ADLS  Prior functional level: Assistance with the following:, Housework  Current functional level: Assistance with the following:, Housework, Cooking    PT AM-PAC: 21 /24  OT AM-PAC: 19 /24    Family can provide assistance at DC: Yes  Would you

## 2024-12-30 NOTE — PROGRESS NOTES
MD Raman messaged via [employer] per pt request for cymbalta dose adjustment.    Pt currently getting 60 mg 1xday, reports getting 60 mg 2xday at home.

## 2024-12-30 NOTE — PROGRESS NOTES
Internal Medicine Hospital Progress Note    Patient:  Shonda Hernandes 79 y.o. female MRN: [id number]     Date of Service: 12/30/2024    Allergy: Benadryl [diphenhydramine], Doxylamine, Mucinex [guaifenesin er], Spiriva handihaler [tiotropium bromide monohydrate], Adhesive tape, Neosporin [bacitracin-polymyxin b], and Tylenol [acetaminophen]  CC: F/u:   Brief Course   Shonda Hernandes was admitted on 12/26/2024 for Acute congestive heart failure, unspecified heart failure type (HCC).    24 hr interval hx:  Patient feels okay today.  She denies any shortness of breath.    Objective     Physical Exam:  Vitals: BP (!) 157/65   Pulse 80   Temp 99 °F (37.2 °C) (Oral)   Resp 14   Ht 1.549 m (5' 0.98\")   Wt 56.3 kg (124 lb 1.9 oz)   SpO2 95%   BMI 23.46 kg/m²     Physical Exam  Constitutional:       General: She is not in acute distress.     Comments: Appears euvolemic   HENT:      Mouth/Throat:      Mouth: Mucous membranes are moist.   Eyes:      General: No scleral icterus.     Pupils: Pupils are equal, round, and reactive to light.   Cardiovascular:      Rate and Rhythm: Normal rate and regular rhythm.      Pulses: Normal pulses.      Heart sounds: No murmur heard.     No gallop.   Pulmonary:      Effort: Pulmonary effort is normal. No respiratory distress.      Breath sounds: Normal breath sounds. No wheezing, rhonchi or rales.   Abdominal:      General: Abdomen is flat. Bowel sounds are normal. There is no distension.      Palpations: Abdomen is soft.      Tenderness: There is no abdominal tenderness.   Musculoskeletal:      Right lower leg: No edema.      Left lower leg: No edema.   Skin:     General: Skin is warm and dry.      Findings: No erythema or rash.   Neurological:      General: No focal deficit present.      Mental Status: She is alert and oriented to person, place, and time.   Psychiatric:         Mood and Affect: Mood normal.         Behavior: Behavior normal.         Pertinent/ New Labs and Imaging Studies    Labs reviewed. Pertinent labs noted in assessment and plan      Assessment and Plan   Shonda Hernandes was admitted to hospital for Acute congestive heart failure, unspecified heart failure type (HCC)    #Heart failure  On room air.  Weight is down to 124 which she reports close to her dry weight.  BNP was elevated today.  -Continue IV diuresis as directed by cardiology.    Emphysema  -Continue home inhaler therapy    Atrial fibrillation  Status post left atrial appendage occlusion.  -Continue amiodarone    Right shoulder pain  -Medrol Dosepak    DVT Prophylaxis: Enoxaparin  Code:Full  Disposition: Likely discharge in the next 1 to 2 days pending diuresis and cardiology evaluation    Roland Griggs MD  [facility]  Office [phone]  Cell [phone]

## 2024-12-30 NOTE — DISCHARGE INSTR - COC
Continuity of Care Form    Patient Name: Shonda Hernandes   :  1945  MRN:  [id number]    Admit date:  2024  Discharge date:  2025     Code Status Order: Full Code   Advance Directives:   Advance Care Flowsheet Documentation             Admitting Physician:  Adeel Nguyễn MD  PCP: Adeel Nguyễn MD    Discharging Nurse: Leena GARCIA   Discharging Hospital Unit/Room#: T5U-5051/3108-01  Discharging Unit Phone Number:[phone]    Emergency Contact:   Extended Emergency Contact Information  Primary Emergency Contact: Tricia Hoover  Address: [address]           [facility] [address]   [facility]  Home Phone: [phone]  Relation: Child  Secondary Emergency Contact: Hannah Rios  Address: [address] [place] [address]  Home Phone: [phone]  Relation: Child    Past Surgical History:  Past Surgical History:   Procedure Laterality Date    ABLATION OF DYSRHYTHMIC FOCUS  2022    AORTIC VALVE REPLACEMENT  6/16/15    Dr. Correa - PVI, RENETTA exclusion. 19mm Maki pericardial Magna    APPENDECTOMY      BACK SURGERY  03/15/2019    superion inserted to keep back from hurting: [place]    CHOLECYSTECTOMY, LAPAROSCOPIC N/A 2019    EMERGENT; LAPAROSCOPIC CHOLECYSTECTOMY WITH GRAM performed by Dean Harrison MD at [facility] OR    CORONARY ANGIOPLASTY  2014    stent x 1    CYSTOSCOPY  2014    dr Abel    JOINT REPLACEMENT      THR Right    OTHER SURGICAL HISTORY  2018     EGD and colonoscopy.    PACEMAKER INSERTION  2022    PAIN MANAGEMENT PROCEDURE Bilateral 2021    BILATERAL L3, L4, L5 MEDIAL BRANCH BLOCK WITH FLUOROSCOPY performed by Abel Ruiz MD at [facility]    PAIN MANAGEMENT PROCEDURE Bilateral 2021    BILATERAL L3, L4, L5 MEDIAL BRANCH BLOCKS WITH FLUOROSCOPY (68383, 98028) performed by Abel Ruiz MD at [facility]    PAIN MANAGEMENT PROCEDURE Bilateral 2021    BILATERAL L3, L4, L5 RADIOFREQUENCY  ABLATION WITH FLUOROSCOPY (20071, 16326) performed by Abel Ruiz MD at [facility]    PAIN MANAGEMENT PROCEDURE Bilateral 5/9/2024    BILATERAL L2 TRANSFORAMINAL EPIDURAL STEROID INJECTION WITH FLUOROSCOPY - [place] performed by Abel Ruiz MD at [facility]    SPINE SURGERY N/A 3/15/2019    INSERTION OF INTERSPINOUS SPACER SUPERION VERTIFLEX AT LUMBAR FOUR-LUMBAR FIVE performed by Tereso Leo MD at [facility] OR    UPPER GASTROINTESTINAL ENDOSCOPY  12/15/2017       Immunization History:   Immunization History   Administered Date(s) Administered    COVID-19, PFIZER Bivalent, DO NOT Dilute, (age 12y+), IM, 30 mcg/0.3 mL 11/30/2022    COVID-19, PFIZER PURPLE top, DILUTE for use, (age 12 y+), 30mcg/0.3mL 01/15/2021, 02/05/2021, 10/20/2021    Influenza, FLUAD, (age 65 y+), IM, Quadv, 0.5mL 12/22/2020, 12/08/2021, 11/23/2022, 10/23/2023    Influenza, FLUZONE High Dose, (age 65 y+), IM, Trivalent PF, 0.5mL 12/10/2018, 10/04/2019    Pneumococcal, PCV-13, PREVNAR 13, (age 6w+), IM, 0.5mL 08/28/2017    Pneumococcal, PPSV23, PNEUMOVAX 23, (age 2y+), SC/IM, 0.5mL 12/09/2015       Active Problems:  Patient Active Problem List   Diagnosis Code    Hypothyroidism E03.9    Essential hypertension I10    Hyperlipidemia LDL goal <70 E78.5    Paroxysmal atrial fibrillation (HCC) I48.0    Cerebral infarction (HCC) I63.9    Arthritis M19.90    Bladder tumor D49.4    Pulmonary emphysema (HCC) J43.9    Current smoker F17.200    Closed sacral fracture (HCC) S32.10XA    Stenosis of right carotid artery I65.21    Intermittent claudication (HCC) I73.9    Adjustment reaction with anxiety F43.22    Symptomatic bradycardia R00.1    Pulmonary HTN (HCC) I27.20    Sacral fracture, closed (HCC) S32.10XA    Acute on chronic diastolic heart failure (HCC) I50.33    Left hand weakness R29.898    S/P AVR (aortic valve replacement) Z95.2    Leg edema R60.0    Abdominal aortic aneurysm I71.40    Acute respiratory failure with hypoxia J96.01

## 2024-12-30 NOTE — PROGRESS NOTES
Provided: Role of Therapy;Plan of Care  Education Method: Verbal  Barriers to Learning: None  Education Outcome: Verbalized understanding;Demonstrated understanding      Therapy Time   Individual Concurrent Group Co-treatment   Time In 0852         Time Out 0928         Minutes 36            Gt 17 TA 19    Mary Woodward PT  Electronically signed by MARY WOODWARD PT [id number] (#[id number])  on 12/30/2024 at 9:37 AM

## 2024-12-31 LAB
ANION GAP SERPL CALCULATED.3IONS-SCNC: 12 MMOL/L (ref 3–16)
BASOPHILS # BLD: 0 K/UL (ref 0–0.2)
BASOPHILS NFR BLD: 0.2 %
BUN SERPL-MCNC: 21 MG/DL (ref 7–20)
CALCIUM SERPL-MCNC: 9.6 MG/DL (ref 8.3–10.6)
CHLORIDE SERPL-SCNC: 101 MMOL/L (ref 99–110)
CO2 SERPL-SCNC: 28 MMOL/L (ref 21–32)
CREAT SERPL-MCNC: 0.8 MG/DL (ref 0.6–1.2)
DEPRECATED RDW RBC AUTO: 15.9 % (ref 12.4–15.4)
EOSINOPHIL # BLD: 0 K/UL (ref 0–0.6)
EOSINOPHIL NFR BLD: 0 %
GFR SERPLBLD CREATININE-BSD FMLA CKD-EPI: 75 ML/MIN/{1.73_M2}
GLUCOSE SERPL-MCNC: 133 MG/DL (ref 70–99)
HCT VFR BLD AUTO: 32.1 % (ref 36–48)
HGB BLD-MCNC: 10.9 G/DL (ref 12–16)
LYMPHOCYTES # BLD: 1.3 K/UL (ref 1–5.1)
LYMPHOCYTES NFR BLD: 9.9 %
MAGNESIUM SERPL-MCNC: 2.72 MG/DL (ref 1.8–2.4)
MCH RBC QN AUTO: 31 PG (ref 26–34)
MCHC RBC AUTO-ENTMCNC: 33.9 G/DL (ref 31–36)
MCV RBC AUTO: 91.3 FL (ref 80–100)
MONOCYTES # BLD: 1.4 K/UL (ref 0–1.3)
MONOCYTES NFR BLD: 10.8 %
NEUTROPHILS # BLD: 10.3 K/UL (ref 1.7–7.7)
NEUTROPHILS NFR BLD: 79.1 %
PLATELET # BLD AUTO: 273 K/UL (ref 135–450)
PMV BLD AUTO: 8 FL (ref 5–10.5)
POTASSIUM SERPL-SCNC: 3.7 MMOL/L (ref 3.5–5.1)
RBC # BLD AUTO: 3.51 M/UL (ref 4–5.2)
SODIUM SERPL-SCNC: 141 MMOL/L (ref 136–145)
WBC # BLD AUTO: 13 K/UL (ref 4–11)

## 2024-12-31 PROCEDURE — 6370000000 HC RX 637 (ALT 250 FOR IP): Performed by: STUDENT IN AN ORGANIZED HEALTH CARE EDUCATION/TRAINING PROGRAM

## 2024-12-31 PROCEDURE — 6360000002 HC RX W HCPCS: Performed by: STUDENT IN AN ORGANIZED HEALTH CARE EDUCATION/TRAINING PROGRAM

## 2024-12-31 PROCEDURE — 80048 BASIC METABOLIC PNL TOTAL CA: CPT

## 2024-12-31 PROCEDURE — 99233 SBSQ HOSP IP/OBS HIGH 50: CPT | Performed by: INTERNAL MEDICINE

## 2024-12-31 PROCEDURE — 94640 AIRWAY INHALATION TREATMENT: CPT

## 2024-12-31 PROCEDURE — 85025 COMPLETE CBC W/AUTO DIFF WBC: CPT

## 2024-12-31 PROCEDURE — 6360000002 HC RX W HCPCS: Performed by: INTERNAL MEDICINE

## 2024-12-31 PROCEDURE — 36415 COLL VENOUS BLD VENIPUNCTURE: CPT

## 2024-12-31 PROCEDURE — 94760 N-INVAS EAR/PLS OXIMETRY 1: CPT

## 2024-12-31 PROCEDURE — 83735 ASSAY OF MAGNESIUM: CPT

## 2024-12-31 PROCEDURE — 2500000003 HC RX 250 WO HCPCS: Performed by: STUDENT IN AN ORGANIZED HEALTH CARE EDUCATION/TRAINING PROGRAM

## 2024-12-31 PROCEDURE — 1200000000 HC SEMI PRIVATE

## 2024-12-31 PROCEDURE — 6370000000 HC RX 637 (ALT 250 FOR IP): Performed by: INTERNAL MEDICINE

## 2024-12-31 RX ORDER — OXYCODONE AND ACETAMINOPHEN 5; 325 MG/1; MG/1
2 TABLET ORAL EVERY 4 HOURS PRN
Status: DISCONTINUED | OUTPATIENT
Start: 2024-12-31 | End: 2025-01-02 | Stop reason: HOSPADM

## 2024-12-31 RX ORDER — SULFAMETHOXAZOLE AND TRIMETHOPRIM 800; 160 MG/1; MG/1
1 TABLET ORAL EVERY 12 HOURS SCHEDULED
Status: DISCONTINUED | OUTPATIENT
Start: 2024-12-31 | End: 2025-01-01

## 2024-12-31 RX ADMIN — METHYLPREDNISOLONE 4 MG: 4 TABLET ORAL at 11:25

## 2024-12-31 RX ADMIN — OXYCODONE AND ACETAMINOPHEN 1 TABLET: 7.5; 325 TABLET ORAL at 11:26

## 2024-12-31 RX ADMIN — DULOXETINE HYDROCHLORIDE 60 MG: 60 CAPSULE, DELAYED RELEASE ORAL at 08:02

## 2024-12-31 RX ADMIN — BUDESONIDE AND FORMOTEROL FUMARATE DIHYDRATE 2 PUFF: 160; 4.5 AEROSOL RESPIRATORY (INHALATION) at 09:31

## 2024-12-31 RX ADMIN — OXYCODONE HYDROCHLORIDE AND ACETAMINOPHEN 2 TABLET: 5; 325 TABLET ORAL at 19:16

## 2024-12-31 RX ADMIN — METHYLPREDNISOLONE 4 MG: 4 TABLET ORAL at 21:19

## 2024-12-31 RX ADMIN — OXYCODONE HYDROCHLORIDE AND ACETAMINOPHEN 2 TABLET: 5; 325 TABLET ORAL at 15:14

## 2024-12-31 RX ADMIN — ENOXAPARIN SODIUM 40 MG: 100 INJECTION SUBCUTANEOUS at 21:20

## 2024-12-31 RX ADMIN — BUDESONIDE AND FORMOTEROL FUMARATE DIHYDRATE 2 PUFF: 160; 4.5 AEROSOL RESPIRATORY (INHALATION) at 20:54

## 2024-12-31 RX ADMIN — TORSEMIDE 40 MG: 20 TABLET ORAL at 08:02

## 2024-12-31 RX ADMIN — OXYCODONE AND ACETAMINOPHEN 1 TABLET: 7.5; 325 TABLET ORAL at 04:01

## 2024-12-31 RX ADMIN — SULFAMETHOXAZOLE AND TRIMETHOPRIM 1 TABLET: 800; 160 TABLET ORAL at 21:19

## 2024-12-31 RX ADMIN — MORPHINE SULFATE 15 MG: 15 TABLET, FILM COATED, EXTENDED RELEASE ORAL at 21:18

## 2024-12-31 RX ADMIN — LEVOTHYROXINE SODIUM 88 MCG: 0.09 TABLET ORAL at 05:25

## 2024-12-31 RX ADMIN — METOPROLOL SUCCINATE 50 MG: 50 TABLET, EXTENDED RELEASE ORAL at 08:03

## 2024-12-31 RX ADMIN — SULFAMETHOXAZOLE AND TRIMETHOPRIM 1 TABLET: 800; 160 TABLET ORAL at 11:28

## 2024-12-31 RX ADMIN — ASPIRIN 81 MG: 81 TABLET, COATED ORAL at 08:03

## 2024-12-31 RX ADMIN — SODIUM CHLORIDE, PRESERVATIVE FREE 10 ML: 5 INJECTION INTRAVENOUS at 21:19

## 2024-12-31 RX ADMIN — METHYLPREDNISOLONE 4 MG: 4 TABLET ORAL at 05:25

## 2024-12-31 RX ADMIN — FLUTICASONE PROPIONATE 2 SPRAY: 50 SPRAY, METERED NASAL at 08:05

## 2024-12-31 RX ADMIN — AMIODARONE HYDROCHLORIDE 100 MG: 200 TABLET ORAL at 08:02

## 2024-12-31 RX ADMIN — MORPHINE SULFATE 15 MG: 15 TABLET, FILM COATED, EXTENDED RELEASE ORAL at 08:03

## 2024-12-31 RX ADMIN — SPIRONOLACTONE 12.5 MG: 25 TABLET ORAL at 08:02

## 2024-12-31 RX ADMIN — EZETIMIBE 10 MG: 10 TABLET ORAL at 21:19

## 2024-12-31 RX ADMIN — SODIUM CHLORIDE, PRESERVATIVE FREE 10 ML: 5 INJECTION INTRAVENOUS at 08:06

## 2024-12-31 ASSESSMENT — PAIN SCALES - GENERAL
PAINLEVEL_OUTOF10: 0
PAINLEVEL_OUTOF10: 8
PAINLEVEL_OUTOF10: 8
PAINLEVEL_OUTOF10: 9
PAINLEVEL_OUTOF10: 8
PAINLEVEL_OUTOF10: 0
PAINLEVEL_OUTOF10: 7
PAINLEVEL_OUTOF10: 7

## 2024-12-31 ASSESSMENT — PAIN DESCRIPTION - PAIN TYPE
TYPE: CHRONIC PAIN
TYPE: ACUTE PAIN
TYPE: ACUTE PAIN
TYPE: CHRONIC PAIN

## 2024-12-31 ASSESSMENT — PAIN DESCRIPTION - DESCRIPTORS
DESCRIPTORS: THROBBING
DESCRIPTORS: ACHING

## 2024-12-31 ASSESSMENT — PAIN SCALES - WONG BAKER
WONGBAKER_NUMERICALRESPONSE: NO HURT
WONGBAKER_NUMERICALRESPONSE: NO HURT

## 2024-12-31 ASSESSMENT — PAIN DESCRIPTION - ONSET
ONSET: ON-GOING
ONSET: ON-GOING

## 2024-12-31 ASSESSMENT — PAIN DESCRIPTION - ORIENTATION: ORIENTATION: LOWER

## 2024-12-31 ASSESSMENT — PAIN DESCRIPTION - LOCATION
LOCATION: GENERALIZED;BACK
LOCATION: BACK

## 2024-12-31 ASSESSMENT — PAIN DESCRIPTION - FREQUENCY
FREQUENCY: CONTINUOUS
FREQUENCY: CONTINUOUS

## 2024-12-31 NOTE — PROGRESS NOTES
Labs reviewed. Pertinent labs noted in assessment and plan      Assessment and Plan   Shonda Hernandes was admitted to hospital for Acute congestive heart failure, unspecified heart failure type (HCC)    #Heart failure  On room air.  Weight is down to 124 which she reports close to her dry weight.  BNP was elevated today.  -Continue IV diuresis as directed by cardiology.    Emphysema  -Continue home inhaler therapy    Lower extremity erythema      Atrial fibrillation  Status post left atrial appendage occlusion.  -Continue amiodarone    Right shoulder pain  Known supraspinatus and anterior infraspinatus tear  -Medrol Dosepak    DVT Prophylaxis: Enoxaparin  Code:Full  Disposition: Likely discharge in the next 1 to 2 days pending diuresis and cardiology evaluation    Roland Griggs MD  [facility]  Office [phone]  Cell [phone]

## 2024-12-31 NOTE — PLAN OF CARE
Problem: Chronic Conditions and Co-morbidities  Goal: Patient's chronic conditions and co-morbidity symptoms are monitored and maintained or improved  12/30/2024 2253 by Lyssa Garcia RN  Outcome: Progressing  Flowsheets (Taken 12/30/2024 2253)  Care Plan - Patient's Chronic Conditions and Co-Morbidity Symptoms are Monitored and Maintained or Improved:   Monitor and assess patient's chronic conditions and comorbid symptoms for stability, deterioration, or improvement   Collaborate with multidisciplinary team to address chronic and comorbid conditions and prevent exacerbation or deterioration   Update acute care plan with appropriate goals if chronic or comorbid symptoms are exacerbated and prevent overall improvement and discharge  12/30/2024 1140 by Yulia Irene RN  Outcome: Progressing  Flowsheets (Taken 12/30/2024 0948)  Care Plan - Patient's Chronic Conditions and Co-Morbidity Symptoms are Monitored and Maintained or Improved: Monitor and assess patient's chronic conditions and comorbid symptoms for stability, deterioration, or improvement     Problem: Discharge Planning  Goal: Discharge to home or other facility with appropriate resources  12/30/2024 2253 by Lyssa Garcia RN  Outcome: Progressing  Flowsheets (Taken 12/30/2024 2253)  Discharge to home or other facility with appropriate resources:   Identify barriers to discharge with patient and caregiver   Identify discharge learning needs (meds, wound care, etc)   Arrange for needed discharge resources and transportation as appropriate  12/30/2024 1140 by Yulia Irene, RN  Outcome: Progressing  Flowsheets (Taken 12/30/2024 0948)  Discharge to home or other facility with appropriate resources: Identify barriers to discharge with patient and caregiver     Problem: Safety - Adult  Goal: Free from fall injury  12/30/2024 2253 by Lyssa Garcia, RN  Outcome: Progressing  Flowsheets  Taken 12/30/2024 2253 by Lyssa Garcia, RN  Free From  Fall Injury: Instruct family/caregiver on patient safety  Taken 12/30/2024 1141 by Yulia Irene RN  Free From Fall Injury: Instruct family/caregiver on patient safety  12/30/2024 1140 by Yulia Irene RN  Outcome: Progressing     Problem: ABCDS Injury Assessment  Goal: Absence of physical injury  12/30/2024 2253 by Lyssa Garcia RN  Outcome: Progressing  Flowsheets  Taken 12/30/2024 2253 by Lyssa Garcia RN  Absence of Physical Injury: Implement safety measures based on patient assessment  Taken 12/30/2024 1141 by Yulia Irene RN  Absence of Physical Injury: Implement safety measures based on patient assessment  12/30/2024 1140 by Yulia Irene RN  Outcome: Progressing     Problem: Pain  Goal: Verbalizes/displays adequate comfort level or baseline comfort level  12/30/2024 2253 by Lyssa Garcia RN  Outcome: Progressing  Flowsheets (Taken 12/30/2024 2253)  Verbalizes/displays adequate comfort level or baseline comfort level:   Encourage patient to monitor pain and request assistance   Administer analgesics based on type and severity of pain and evaluate response   Assess pain using appropriate pain scale   Implement non-pharmacological measures as appropriate and evaluate response  12/30/2024 1140 by Yulia Irene RN  Outcome: Progressing

## 2024-12-31 NOTE — PLAN OF CARE
Problem: Chronic Conditions and Co-morbidities  Goal: Patient's chronic conditions and co-morbidity symptoms are monitored and maintained or improved  12/31/2024 0925 by Yulia Irene RN  Outcome: Progressing  Flowsheets (Taken 12/31/2024 0810)  Care Plan - Patient's Chronic Conditions and Co-Morbidity Symptoms are Monitored and Maintained or Improved: Monitor and assess patient's chronic conditions and comorbid symptoms for stability, deterioration, or improvement     Problem: Discharge Planning  Goal: Discharge to home or other facility with appropriate resources  12/31/2024 0925 by Yulia Irene RN  Outcome: Progressing  Flowsheets (Taken 12/31/2024 0810)  Discharge to home or other facility with appropriate resources: Identify barriers to discharge with patient and caregiver     Problem: Safety - Adult  Goal: Free from fall injury  12/31/2024 0925 by Yulia Irene RN  Outcome: Progressing  Flowsheets (Taken 12/31/2024 0924)  Free From Fall Injury: Instruct family/caregiver on patient safety     Problem: ABCDS Injury Assessment  Goal: Absence of physical injury  12/31/2024 0925 by Yulia Irene RN  Outcome: Progressing  Flowsheets (Taken 12/31/2024 0924)  Absence of Physical Injury: Implement safety measures based on patient assessment     Problem: Pain  Goal: Verbalizes/displays adequate comfort level or baseline comfort level  12/31/2024 0925 by Yulia Irene RN  Outcome: Progressing

## 2024-12-31 NOTE — PROGRESS NOTES
Patient resting up in the chair quietly this evening, A&Ox4. Education provided to patient regarding importance of shifting weight to prevent skin breakdown while in the chair, patient reports understanding. Vital signs stable. LUE midline flushed without issue, dressing CDI, normal saline locked. Reports pain 8/10. Pain medication administered per orders (see eMAR). All nightly medication taken whole without complaint. Denies N/V, SOB, lightheaded/dizziness. SSKIN bundle implemented. No additional needs verbalized at this time. Standard safety precautions in place and call light within reach. Will continue to monitor and assess. Electronically signed by Lyssa Garcia RN on 12/30/2024 at 10:44 PM

## 2024-12-31 NOTE — PROGRESS NOTES
Patient A&O. Pt given PRN pain medication this shift for chronic pain in R shoulder. Call light within reach, able to make needs known, fall precautions in place.     Electronically signed by Yulia Irene RN on 12/31/2024 at 6:03 PM

## 2024-12-31 NOTE — PROGRESS NOTES
Patient is alert & oriented x4, standby assist, 2/4 bed rails up, bed in lowest position, fall precautions in place, call light within reach. The skin on patient's BLE is red and warm to touch, patient's family voiced concerns for cellulitis, Dr. Griggs informed of this last night when this was first brought to my attention. Morning medications given without complications. Lidocaine patch applied to R shoulder.     Electronically signed by Yulia Irene RN on 12/31/2024 at 9:16 AM

## 2024-12-31 NOTE — PROGRESS NOTES
Occupational Therapy    12/31/24    Shonda Hernandes  1945  [id number]    Patient chart reviewed and spoke with RN. OT attempted to see for OT tx at this time. Pt resting in recliner eating lunch. Pt educated on Ot's continued role during hospital admission. Pt feels she is at her baseline with ADL and fxl mobility. Grand dtr present and agrees. Pt requesting to discontinue therapy treatments during admission. Will discharge from caseload     If patient is discharged prior to the next occupational therapy visit; Please see last written OT note for discharge status.    Electronically signed by CYNDI Leon, OTR/L on 12/31/2024 at 11:52 AM

## 2024-12-31 NOTE — PROGRESS NOTES
[facility]   Daily Progress Note      Admit Date:  12/26/2024      Subjective:   Ms. Hernandes is a 79 y.o. female with a past medical history significant for atrial fibrillation, pulmonary hypertension, aortic stenosis and CAD s/p CHILANGO to mid circ 5/2014. She is s/p AVR, PVI and RENETTA exclusion by Dr Hernandez on 6/16. An inpatient echocardiogram was completed revealing moderate to severe MR. Subsequently, a CAMERON was completed on 7/27/20 showing moderate MR. She was admitted 10/26/21-11/1/21 with afib with RVR and acute CHF. She converted to a regular rhythm and was diuresed prior to discharge. She was admitted to [facility] 11/11/21 with acute encephalopathy and found to have rhabdomyolysis and afib with RVR again. She underwent successful cardioversion on 11/17/21. She did experience bradycardia when in sinus rhythm with heart rate between 40-50 bpm. She was seen in the office with Diane Enzweiler, CNP for follow up on 11/23/21 and then sent to the ED for admission. She was again found in afib with RVR and fluid overloaded. She was diuresed and discharged home on both amiodarone and Toprol. She underwent implant of Bi-V pacemaker with Dr. Mcbride on 4/11/22. She had recurrence of atrial fibrillation noted on interrogation and underwent ablation on 5/9/22. I was asked to see her for possible CHF.     Interval History:  Ventricular paced on telemetry.  Steven reports that she apparently is not going home because her cellulitis is \"coming back\".  She denies any shortness of breath.  She has had no lower extremity swelling.    Objective:     BP (!) 157/72   Pulse 70   Temp 98 °F (36.7 °C) (Oral)   Resp 18   Ht 1.549 m (5' 0.98\")   Wt 56.6 kg (124 lb 12.5 oz)   SpO2 95%   BMI 23.59 kg/m²      Intake/Output Summary (Last 24 hours) at 12/31/2024 1152  Last data filed at 12/31/2024 1129  Gross per 24 hour   Intake 960 ml   Output 2500 ml   Net -1540 ml       Physical Exam:  General:  Awake, alert, NAD  Skin:

## 2024-12-31 NOTE — CONSULTS
[facility]  HEART FAILURE PROGRAM      NAME:  Shonda Hernandes  MEDICAL RECORD NUMBER:  [id number]  AGE: 79 y.o.   GENDER: female  : 1945  ADMIT DATE: 2024  TODAY'S DATE:  2024    Subjective:     VISIT TYPE: evaluation     ADMITTING PHYSICIAN:  Adeel Nguyễn MD    Code Status: Full Code    PAST MEDICAL HISTORY        Diagnosis Date    Arthritis of shoulder region, right     advanced    Atrial fibrillation (HCC)     had watchman's    AVM (arteriovenous malformation) of duodenum 2017    AVM (arteriovenous malformation) of stomach 2017    Bladder cancer ([facility]) 2014    CAD (coronary artery disease)     Carotid stenosis 2014    Chronic back pain     Chronic diastolic CHF (congestive heart failure) ([facility]) 2016    Chronic prescription opiate use     COPD ([facility])     Diverticulosis     HTN (hypertension)     Hyperlipidemia     Hypothyroidism     Ischemic stroke 2013    x 2    Lumbar spine stenosis 2017    multiple procedures unsuccessful.    Macular degeneration 2018    Nonrheumatic mitral valve regurgitation     Obstructive sleep apnea     Osteoarthritis     Peripheral neuropathy     Pulmonary HTN ([facility])     PVD (peripheral vascular disease) ([facility])     Syncope 2022    of unknown cause    Tobacco use disorder in remission        SOCIAL HISTORY    Social History     Tobacco Use    Smoking status: Former     Current packs/day: 0.00     Average packs/day: 1 pack/day for 45.0 years (45.0 ttl pk-yrs)     Types: Cigarettes     Start date: 1978     Quit date: 2023     Years since quittin.2    Smokeless tobacco: Former   Vaping Use    Vaping status: Never Used   Substance Use Topics    Alcohol use: Yes    Drug use: No         MEDICATIONS  Scheduled Meds:   torsemide  40 mg Oral Daily    spironolactone  12.5 mg Oral Daily    fluticasone  2 spray Each Nostril Daily    methylPREDNISolone  4 mg Oral QAM AC    methylPREDNISolone  4 mg Oral Lunch     Monday--Friday business hours, and we could help get them what they needed. They understood Dr. Wang to explain that they could even come in for IV lasix if needed. We reviewed all the s/s from the \"yellow zone\" and I told them that in fact , that is exactly what can happen if Dr. Wang or his Nurse Practitioner are aware of her symptoms, so they can get the orders sent in. Again, I urged them to call the Heart Failure RN to help facilitate getting the help they need.  They understand a follow up appointment will be scheduled within 7 days of discharge, and to expect follow up calls either from the Care Transition Nurse, or from me, just to ensure she is getting along ok, and no concerns arise          CURRENT DIET: ADULT DIET; Regular; No Added Salt (3-4 gm); 1500 ml    EDUCATIONAL PACKETS PROVIDED:   Titles and material given:  Yes   [x]  [facility] Pt Education Booklet given and reviewed  [x]  HF Zones & Weight log provided   []  Additional dietary material  []  Smoking Cessation  []  Other:    PATIENT/CAREGIVER TEACHING:    Level of patient/caregiver understanding:   [] Verbalized understanding   [x] Could provide teach back       [] Needs reinforcement      [] Other:         TEACHING TIME:  30 minutes       Plan:       DISCHARGE PLAN:  Placement for patient upon discharge: home with support   Discharge appointment scheduled: Yes     RECOMMENDATIONS:   [x]  Encourage to call Heart Failure Resource Line with any further questions or concerns  [x]  Continue to reiterate HF education   [x]  If EF </=40%, ensure GDMT has been met at time of dc (if not on GDMT ensure contraindication is documented)  []  Wellness Center Referral placed  []  Cardiac Rehab Phase 1 referral placed and/or contact info given  [x]  Continue to support pt's present goal of:  Get home, improve my heart           Electronically signed by Ivania Saini, RN, BSN on 12/31/2024 at 7:43 AM

## 2025-01-01 LAB
ANION GAP SERPL CALCULATED.3IONS-SCNC: 13 MMOL/L (ref 3–16)
BASOPHILS # BLD: 0 K/UL (ref 0–0.2)
BASOPHILS NFR BLD: 0.2 %
BUN SERPL-MCNC: 26 MG/DL (ref 7–20)
CALCIUM SERPL-MCNC: 9.1 MG/DL (ref 8.3–10.6)
CHLORIDE SERPL-SCNC: 99 MMOL/L (ref 99–110)
CO2 SERPL-SCNC: 26 MMOL/L (ref 21–32)
CREAT SERPL-MCNC: 1.2 MG/DL (ref 0.6–1.2)
DEPRECATED RDW RBC AUTO: 16 % (ref 12.4–15.4)
EOSINOPHIL # BLD: 0 K/UL (ref 0–0.6)
EOSINOPHIL NFR BLD: 0.2 %
GFR SERPLBLD CREATININE-BSD FMLA CKD-EPI: 46 ML/MIN/{1.73_M2}
GLUCOSE SERPL-MCNC: 137 MG/DL (ref 70–99)
HCT VFR BLD AUTO: 34.4 % (ref 36–48)
HGB BLD-MCNC: 11.4 G/DL (ref 12–16)
LYMPHOCYTES # BLD: 1.4 K/UL (ref 1–5.1)
LYMPHOCYTES NFR BLD: 13.1 %
MCH RBC QN AUTO: 30.3 PG (ref 26–34)
MCHC RBC AUTO-ENTMCNC: 33.2 G/DL (ref 31–36)
MCV RBC AUTO: 91.5 FL (ref 80–100)
MONOCYTES # BLD: 1.1 K/UL (ref 0–1.3)
MONOCYTES NFR BLD: 10 %
NEUTROPHILS # BLD: 8.5 K/UL (ref 1.7–7.7)
NEUTROPHILS NFR BLD: 76.5 %
NT-PROBNP SERPL-MCNC: 3818 PG/ML (ref 0–449)
PLATELET # BLD AUTO: 289 K/UL (ref 135–450)
PMV BLD AUTO: 7.8 FL (ref 5–10.5)
POTASSIUM SERPL-SCNC: 3.9 MMOL/L (ref 3.5–5.1)
RBC # BLD AUTO: 3.76 M/UL (ref 4–5.2)
SODIUM SERPL-SCNC: 138 MMOL/L (ref 136–145)
WBC # BLD AUTO: 11.1 K/UL (ref 4–11)

## 2025-01-01 PROCEDURE — 6360000002 HC RX W HCPCS: Performed by: STUDENT IN AN ORGANIZED HEALTH CARE EDUCATION/TRAINING PROGRAM

## 2025-01-01 PROCEDURE — 6370000000 HC RX 637 (ALT 250 FOR IP): Performed by: STUDENT IN AN ORGANIZED HEALTH CARE EDUCATION/TRAINING PROGRAM

## 2025-01-01 PROCEDURE — 2500000003 HC RX 250 WO HCPCS: Performed by: STUDENT IN AN ORGANIZED HEALTH CARE EDUCATION/TRAINING PROGRAM

## 2025-01-01 PROCEDURE — 99232 SBSQ HOSP IP/OBS MODERATE 35: CPT | Performed by: INTERNAL MEDICINE

## 2025-01-01 PROCEDURE — 6370000000 HC RX 637 (ALT 250 FOR IP): Performed by: INTERNAL MEDICINE

## 2025-01-01 PROCEDURE — 36415 COLL VENOUS BLD VENIPUNCTURE: CPT

## 2025-01-01 PROCEDURE — 80048 BASIC METABOLIC PNL TOTAL CA: CPT

## 2025-01-01 PROCEDURE — 1200000000 HC SEMI PRIVATE

## 2025-01-01 PROCEDURE — 94760 N-INVAS EAR/PLS OXIMETRY 1: CPT

## 2025-01-01 PROCEDURE — 6360000002 HC RX W HCPCS: Performed by: INTERNAL MEDICINE

## 2025-01-01 PROCEDURE — 83880 ASSAY OF NATRIURETIC PEPTIDE: CPT

## 2025-01-01 PROCEDURE — 85025 COMPLETE CBC W/AUTO DIFF WBC: CPT

## 2025-01-01 PROCEDURE — 94640 AIRWAY INHALATION TREATMENT: CPT

## 2025-01-01 RX ORDER — SULFAMETHOXAZOLE AND TRIMETHOPRIM 800; 160 MG/1; MG/1
0.5 TABLET ORAL EVERY 12 HOURS SCHEDULED
Status: DISCONTINUED | OUTPATIENT
Start: 2025-01-01 | End: 2025-01-02 | Stop reason: HOSPADM

## 2025-01-01 RX ORDER — VALSARTAN 80 MG/1
20 TABLET ORAL DAILY
Status: DISCONTINUED | OUTPATIENT
Start: 2025-01-01 | End: 2025-01-02 | Stop reason: HOSPADM

## 2025-01-01 RX ORDER — PETROLATUM,WHITE
OINTMENT IN PACKET (GRAM) TOPICAL 2 TIMES DAILY
Status: DISCONTINUED | OUTPATIENT
Start: 2025-01-01 | End: 2025-01-02 | Stop reason: HOSPADM

## 2025-01-01 RX ADMIN — SPIRONOLACTONE 12.5 MG: 25 TABLET ORAL at 08:41

## 2025-01-01 RX ADMIN — SULFAMETHOXAZOLE AND TRIMETHOPRIM 1 TABLET: 800; 160 TABLET ORAL at 08:42

## 2025-01-01 RX ADMIN — BUDESONIDE AND FORMOTEROL FUMARATE DIHYDRATE 2 PUFF: 160; 4.5 AEROSOL RESPIRATORY (INHALATION) at 21:02

## 2025-01-01 RX ADMIN — MORPHINE SULFATE 15 MG: 15 TABLET, FILM COATED, EXTENDED RELEASE ORAL at 08:43

## 2025-01-01 RX ADMIN — EZETIMIBE 10 MG: 10 TABLET ORAL at 20:20

## 2025-01-01 RX ADMIN — MORPHINE SULFATE 15 MG: 15 TABLET, FILM COATED, EXTENDED RELEASE ORAL at 20:20

## 2025-01-01 RX ADMIN — LEVOTHYROXINE SODIUM 88 MCG: 0.09 TABLET ORAL at 06:12

## 2025-01-01 RX ADMIN — ASPIRIN 81 MG: 81 TABLET, COATED ORAL at 08:42

## 2025-01-01 RX ADMIN — BUDESONIDE AND FORMOTEROL FUMARATE DIHYDRATE 2 PUFF: 160; 4.5 AEROSOL RESPIRATORY (INHALATION) at 08:20

## 2025-01-01 RX ADMIN — DULOXETINE HYDROCHLORIDE 60 MG: 60 CAPSULE, DELAYED RELEASE ORAL at 08:42

## 2025-01-01 RX ADMIN — TORSEMIDE 40 MG: 20 TABLET ORAL at 08:42

## 2025-01-01 RX ADMIN — WHITE PETROLATUM: 1 JELLY TOPICAL at 20:23

## 2025-01-01 RX ADMIN — FLUTICASONE PROPIONATE 2 SPRAY: 50 SPRAY, METERED NASAL at 08:55

## 2025-01-01 RX ADMIN — METHYLPREDNISOLONE 4 MG: 4 TABLET ORAL at 20:19

## 2025-01-01 RX ADMIN — METOPROLOL SUCCINATE 50 MG: 50 TABLET, EXTENDED RELEASE ORAL at 08:44

## 2025-01-01 RX ADMIN — AMIODARONE HYDROCHLORIDE 100 MG: 200 TABLET ORAL at 08:42

## 2025-01-01 RX ADMIN — SULFAMETHOXAZOLE AND TRIMETHOPRIM 0.5 TABLET: 800; 160 TABLET ORAL at 20:20

## 2025-01-01 RX ADMIN — ENOXAPARIN SODIUM 40 MG: 100 INJECTION SUBCUTANEOUS at 20:20

## 2025-01-01 RX ADMIN — VALSARTAN 20 MG: 80 TABLET ORAL at 11:54

## 2025-01-01 RX ADMIN — OXYCODONE HYDROCHLORIDE AND ACETAMINOPHEN 2 TABLET: 5; 325 TABLET ORAL at 04:13

## 2025-01-01 RX ADMIN — WHITE PETROLATUM: 1 JELLY TOPICAL at 15:56

## 2025-01-01 RX ADMIN — OXYCODONE HYDROCHLORIDE AND ACETAMINOPHEN 2 TABLET: 5; 325 TABLET ORAL at 00:12

## 2025-01-01 RX ADMIN — METHYLPREDNISOLONE 4 MG: 4 TABLET ORAL at 06:12

## 2025-01-01 RX ADMIN — SODIUM CHLORIDE, PRESERVATIVE FREE 10 ML: 5 INJECTION INTRAVENOUS at 08:56

## 2025-01-01 ASSESSMENT — PAIN DESCRIPTION - DESCRIPTORS
DESCRIPTORS: THROBBING

## 2025-01-01 ASSESSMENT — PAIN DESCRIPTION - LOCATION
LOCATION: GENERALIZED
LOCATION: SHOULDER;BACK
LOCATION: GENERALIZED

## 2025-01-01 ASSESSMENT — PAIN DESCRIPTION - FREQUENCY
FREQUENCY: CONTINUOUS
FREQUENCY: CONTINUOUS

## 2025-01-01 ASSESSMENT — PAIN DESCRIPTION - ORIENTATION: ORIENTATION: RIGHT;MID;LEFT

## 2025-01-01 ASSESSMENT — PAIN SCALES - GENERAL
PAINLEVEL_OUTOF10: 7
PAINLEVEL_OUTOF10: 0
PAINLEVEL_OUTOF10: 8
PAINLEVEL_OUTOF10: 0
PAINLEVEL_OUTOF10: 7

## 2025-01-01 ASSESSMENT — PAIN DESCRIPTION - ONSET
ONSET: ON-GOING
ONSET: ON-GOING

## 2025-01-01 ASSESSMENT — PAIN - FUNCTIONAL ASSESSMENT
PAIN_FUNCTIONAL_ASSESSMENT: PREVENTS OR INTERFERES WITH ALL ACTIVE AND SOME PASSIVE ACTIVITIES
PAIN_FUNCTIONAL_ASSESSMENT: PREVENTS OR INTERFERES WITH ALL ACTIVE AND SOME PASSIVE ACTIVITIES
PAIN_FUNCTIONAL_ASSESSMENT: PREVENTS OR INTERFERES SOME ACTIVE ACTIVITIES AND ADLS

## 2025-01-01 ASSESSMENT — PAIN DESCRIPTION - PAIN TYPE
TYPE: CHRONIC PAIN
TYPE: CHRONIC PAIN

## 2025-01-01 NOTE — PROGRESS NOTES
Pt AAO x4.  Sitting up in the chair.  C/o chronic generalized pain.  Medicated with scheduled Morphine pill.  Assessment completed.  Ambulated to the BR with SBA to void.  No c/o SOB or HOLLOWAY.  Denies any needs.  Call light in reach.  Chair alarm on.

## 2025-01-01 NOTE — PROGRESS NOTES
Clinical Pharmacy Note  Renal Dose Adjustment    Shonda Hernandes is receiving Bactrim.  This medication is renally eliminated.  Based on the patient's Estimated Creatinine Clearance: 29 mL/min (based on SCr of 1.2 mg/dL). and urine output, the dose has been adjusted to Bactrim DS 1/2 tab BID per protocol.    Pharmacy will continue to monitor and adjust dose as needed for changes in renal function.    Eileen Rice [facility], PharmD, 1/1/2025 2:10 PM

## 2025-01-01 NOTE — PLAN OF CARE
Problem: Chronic Conditions and Co-morbidities  Goal: Patient's chronic conditions and co-morbidity symptoms are monitored and maintained or improved  Outcome: Progressing  Flowsheets (Taken 12/31/2024 2118)  Care Plan - Patient's Chronic Conditions and Co-Morbidity Symptoms are Monitored and Maintained or Improved: Monitor and assess patient's chronic conditions and comorbid symptoms for stability, deterioration, or improvement     Problem: Discharge Planning  Goal: Discharge to home or other facility with appropriate resources  Outcome: Progressing  Flowsheets (Taken 12/31/2024 2118)  Discharge to home or other facility with appropriate resources:   Identify barriers to discharge with patient and caregiver   Arrange for needed discharge resources and transportation as appropriate     Problem: Safety - Adult  Goal: Free from fall injury  Outcome: Progressing     Problem: ABCDS Injury Assessment  Goal: Absence of physical injury  Outcome: Progressing  Flowsheets (Taken 1/1/2025 0342)  Absence of Physical Injury: Implement safety measures based on patient assessment     Problem: Pain  Goal: Verbalizes/displays adequate comfort level or baseline comfort level  Outcome: Progressing  Flowsheets (Taken 12/31/2024 2118)  Verbalizes/displays adequate comfort level or baseline comfort level:   Encourage patient to monitor pain and request assistance   Assess pain using appropriate pain scale   Administer analgesics based on type and severity of pain and evaluate response   Implement non-pharmacological measures as appropriate and evaluate response

## 2025-01-01 NOTE — PROGRESS NOTES
Patient sitting up in chair. Patient alert and oriented x 4. Respirations regular and unlabored on room air.  Patient denies the need for pain medication at the present. Fall/safety precautions in place. Call light in reach.  Electronically signed by Vanda Manning RN on 1/1/2025 at 12:40 PM

## 2025-01-01 NOTE — PLAN OF CARE
Problem: Chronic Conditions and Co-morbidities  Goal: Patient's chronic conditions and co-morbidity symptoms are monitored and maintained or improved  1/1/2025 0400 by Susan Pimentel RN  Outcome: Progressing     Problem: Discharge Planning  Goal: Discharge to home or other facility with appropriate resources  1/1/2025 0400 by Susan Pimentel RN  Outcome: Progressing     Problem: Safety - Adult  Goal: Free from fall injury  1/1/2025 0400 by Susan Pimentel RN  Outcome: Progressing     Problem: ABCDS Injury Assessment  Goal: Absence of physical injury  1/1/2025 0400 by Susan Pimentel RN  Outcome: Progressing     Problem: Pain  Goal: Verbalizes/displays adequate comfort level or baseline comfort level  1/1/2025 0400 by Susan Pimentel RN  Outcome: Progressing

## 2025-01-01 NOTE — PROGRESS NOTES
Progress Note  [facility] INTERNAL MEDICINE HOSPITAL PROGRESS    Patient: SHONDA LORENZO  :  1945  PCP: Adeel Nguyễn MD    Reason for visit: leg swelling    SUBJECTIVE     History of present illness per Dr. Nguyễn:  Shonda Lorenzo is a 79-year-old lady with past medical significant for hypertension, CAD, peripheral vascular disease who presented from home for evaluation of lower extremity swelling.     In the past week, she has been treated for right lower extremity cellulitis and edema.  Cellulitis is responded clindamycin, however she continues to have bilateral lower extremity swelling.  She has not had any fever or chills. she does not report any chest pain or shortness of breath.  She has been taking torsemide 60 mg daily and took metolazone twice over the weekend.  She does not report any PND or orthopnea.  She has been adherent with her home medication regimen.  She does not report any significant change    Last 24hr: unremarkable    Pt feels good. Her skin at the site of the cellulitis is a bit irritated and dry.    Review of systems: as above    Past Medical History:   Diagnosis Date    Arthritis of shoulder region, right     advanced    Atrial fibrillation (HCC)     had watchman's    AVM (arteriovenous malformation) of duodenum 2017    AVM (arteriovenous malformation) of stomach 2017    Bladder cancer ([facility]) 2014    CAD (coronary artery disease) 2014    Carotid stenosis 2014    Chronic back pain     Chronic diastolic CHF (congestive heart failure) ([facility]) 2016    Chronic prescription opiate use     COPD (HCC)     Diverticulosis     HTN (hypertension)     Hyperlipidemia     Hypothyroidism     Ischemic stroke 2013    x 2    Lumbar spine stenosis 2017    multiple procedures unsuccessful.    Macular degeneration 2018    Nonrheumatic mitral valve regurgitation     Obstructive sleep apnea     Osteoarthritis     Peripheral neuropathy     Pulmonary HTN ([facility]) 2014    PVD (peripheral vascular

## 2025-01-02 VITALS
TEMPERATURE: 98.5 F | RESPIRATION RATE: 18 BRPM | HEIGHT: 61 IN | DIASTOLIC BLOOD PRESSURE: 66 MMHG | BODY MASS INDEX: 23.1 KG/M2 | WEIGHT: 122.36 LBS | HEART RATE: 73 BPM | SYSTOLIC BLOOD PRESSURE: 159 MMHG | OXYGEN SATURATION: 97 %

## 2025-01-02 PROCEDURE — 6370000000 HC RX 637 (ALT 250 FOR IP): Performed by: INTERNAL MEDICINE

## 2025-01-02 PROCEDURE — 6370000000 HC RX 637 (ALT 250 FOR IP): Performed by: STUDENT IN AN ORGANIZED HEALTH CARE EDUCATION/TRAINING PROGRAM

## 2025-01-02 PROCEDURE — 6360000002 HC RX W HCPCS: Performed by: INTERNAL MEDICINE

## 2025-01-02 PROCEDURE — 94760 N-INVAS EAR/PLS OXIMETRY 1: CPT

## 2025-01-02 PROCEDURE — 94640 AIRWAY INHALATION TREATMENT: CPT

## 2025-01-02 RX ORDER — SULFAMETHOXAZOLE AND TRIMETHOPRIM 800; 160 MG/1; MG/1
0.5 TABLET ORAL EVERY 12 HOURS SCHEDULED
Qty: 3 TABLET | Refills: 0 | Status: SHIPPED | OUTPATIENT
Start: 2025-01-02 | End: 2025-01-05

## 2025-01-02 RX ADMIN — WHITE PETROLATUM: 1 JELLY TOPICAL at 11:03

## 2025-01-02 RX ADMIN — AMIODARONE HYDROCHLORIDE 100 MG: 200 TABLET ORAL at 09:24

## 2025-01-02 RX ADMIN — SPIRONOLACTONE 12.5 MG: 25 TABLET ORAL at 09:25

## 2025-01-02 RX ADMIN — OXYCODONE HYDROCHLORIDE AND ACETAMINOPHEN 2 TABLET: 5; 325 TABLET ORAL at 01:57

## 2025-01-02 RX ADMIN — VALSARTAN 20 MG: 80 TABLET ORAL at 12:15

## 2025-01-02 RX ADMIN — OXYCODONE HYDROCHLORIDE AND ACETAMINOPHEN 2 TABLET: 5; 325 TABLET ORAL at 12:19

## 2025-01-02 RX ADMIN — LEVOTHYROXINE SODIUM 88 MCG: 0.09 TABLET ORAL at 05:49

## 2025-01-02 RX ADMIN — FLUTICASONE PROPIONATE 2 SPRAY: 50 SPRAY, METERED NASAL at 09:28

## 2025-01-02 RX ADMIN — METHYLPREDNISOLONE 4 MG: 4 TABLET ORAL at 05:49

## 2025-01-02 RX ADMIN — BUDESONIDE AND FORMOTEROL FUMARATE DIHYDRATE 2 PUFF: 160; 4.5 AEROSOL RESPIRATORY (INHALATION) at 07:53

## 2025-01-02 RX ADMIN — MORPHINE SULFATE 15 MG: 15 TABLET, FILM COATED, EXTENDED RELEASE ORAL at 09:25

## 2025-01-02 RX ADMIN — SULFAMETHOXAZOLE AND TRIMETHOPRIM 0.5 TABLET: 800; 160 TABLET ORAL at 09:25

## 2025-01-02 RX ADMIN — ASPIRIN 81 MG: 81 TABLET, COATED ORAL at 09:25

## 2025-01-02 RX ADMIN — METOPROLOL SUCCINATE 50 MG: 50 TABLET, EXTENDED RELEASE ORAL at 09:25

## 2025-01-02 RX ADMIN — DULOXETINE HYDROCHLORIDE 60 MG: 60 CAPSULE, DELAYED RELEASE ORAL at 09:25

## 2025-01-02 ASSESSMENT — PAIN SCALES - GENERAL
PAINLEVEL_OUTOF10: 8
PAINLEVEL_OUTOF10: 0
PAINLEVEL_OUTOF10: 4
PAINLEVEL_OUTOF10: 7

## 2025-01-02 ASSESSMENT — PAIN DESCRIPTION - PAIN TYPE: TYPE: ACUTE PAIN;CHRONIC PAIN

## 2025-01-02 ASSESSMENT — PAIN SCALES - WONG BAKER: WONGBAKER_NUMERICALRESPONSE: NO HURT

## 2025-01-02 ASSESSMENT — PAIN DESCRIPTION - FREQUENCY: FREQUENCY: CONTINUOUS

## 2025-01-02 ASSESSMENT — PAIN - FUNCTIONAL ASSESSMENT: PAIN_FUNCTIONAL_ASSESSMENT: PREVENTS OR INTERFERES SOME ACTIVE ACTIVITIES AND ADLS

## 2025-01-02 ASSESSMENT — PAIN DESCRIPTION - DESCRIPTORS: DESCRIPTORS: ACHING;THROBBING

## 2025-01-02 ASSESSMENT — PAIN DESCRIPTION - ORIENTATION: ORIENTATION: RIGHT;MID;LEFT

## 2025-01-02 ASSESSMENT — PAIN DESCRIPTION - ONSET: ONSET: ON-GOING

## 2025-01-02 ASSESSMENT — PAIN DESCRIPTION - LOCATION: LOCATION: BACK;SHOULDER

## 2025-01-02 NOTE — PLAN OF CARE
Problem: Chronic Conditions and Co-morbidities  Goal: Patient's chronic conditions and co-morbidity symptoms are monitored and maintained or improved  1/2/2025 0327 by Alejandro Orlando RN  Outcome: Progressing  Flowsheets (Taken 1/1/2025 1233 by Vanda Manning, RN)  Care Plan - Patient's Chronic Conditions and Co-Morbidity Symptoms are Monitored and Maintained or Improved:   Monitor and assess patient's chronic conditions and comorbid symptoms for stability, deterioration, or improvement   Collaborate with multidisciplinary team to address chronic and comorbid conditions and prevent exacerbation or deterioration   Update acute care plan with appropriate goals if chronic or comorbid symptoms are exacerbated and prevent overall improvement and discharge  1/2/2025 0327 by Alejandro Orlando RN  Outcome: Progressing     Problem: Discharge Planning  Goal: Discharge to home or other facility with appropriate resources  1/2/2025 0327 by Alejandro Orlando RN  Outcome: Progressing  Flowsheets (Taken 1/1/2025 1233 by Vanda Manning RN)  Discharge to home or other facility with appropriate resources:   Identify barriers to discharge with patient and caregiver   Identify discharge learning needs (meds, wound care, etc)   Refer to discharge planning if patient needs post-hospital services based on physician order or complex needs related to functional status, cognitive ability or social support system   Arrange for needed discharge resources and transportation as appropriate  1/2/2025 0327 by Alejandro Orlando RN  Outcome: Progressing     Problem: Safety - Adult  Goal: Free from fall injury  1/2/2025 0327 by Alejandro Orlando RN  Outcome: Progressing  Flowsheets (Taken 1/1/2025 1233 by Vanda Manning, RN)  Free From Fall Injury: Instruct family/caregiver on patient safety  1/2/2025 0327 by Alejandro Orlando RN  Outcome: Progressing     Problem: ABCDS Injury Assessment  Goal: Absence of physical injury  1/2/2025 0327 by Alejandro Orlando  RN  Outcome: Progressing  Flowsheets (Taken 1/1/2025 1233 by Vanda Manning RN)  Absence of Physical Injury: Implement safety measures based on patient assessment  1/2/2025 0327 by Alejandro Orlando RN  Outcome: Progressing     Problem: Pain  Goal: Verbalizes/displays adequate comfort level or baseline comfort level  1/2/2025 0327 by Alejandro Orlando RN  Outcome: Progressing  Flowsheets (Taken 1/1/2025 1233 by Vanda Manning RN)  Verbalizes/displays adequate comfort level or baseline comfort level:   Encourage patient to monitor pain and request assistance   Administer analgesics based on type and severity of pain and evaluate response   Consider cultural and social influences on pain and pain management   Assess pain using appropriate pain scale   Implement non-pharmacological measures as appropriate and evaluate response   Notify Licensed Independent Practitioner if interventions unsuccessful or patient reports new pain  1/2/2025 0327 by Alejandro Orlando RN  Outcome: Progressing

## 2025-01-02 NOTE — PROGRESS NOTES
Internal Medicine Hospital Progress Note    Patient:  Shonda Hernandes 79 y.o. female MRN: [id number]     Date of Service: 1/2/2025    Allergy: Benadryl [diphenhydramine], Doxylamine, Mucinex [guaifenesin er], Spiriva handihaler [tiotropium bromide monohydrate], Adhesive tape, Neosporin [bacitracin-polymyxin b], and Tylenol [acetaminophen]  CC: F/u:   Brief Course   Shonda Hernandes was admitted on 12/26/2024 for Acute congestive heart failure, unspecified heart failure type (HCC).    24 hr interval hx:  Patient feels okay today.  She denies any shortness of breath.    Objective     Physical Exam:  Vitals: BP (!) 159/66   Pulse 73   Temp 98.5 °F (36.9 °C) (Oral)   Resp 18   Ht 1.549 m (5' 0.98\")   Wt 55.5 kg (122 lb 5.7 oz)   SpO2 97%   BMI 23.13 kg/m²     Physical Exam  Constitutional:       General: She is not in acute distress.     Comments: Appears euvolemic   HENT:      Mouth/Throat:      Mouth: Mucous membranes are moist.   Eyes:      General: No scleral icterus.     Pupils: Pupils are equal, round, and reactive to light.   Cardiovascular:      Rate and Rhythm: Normal rate and regular rhythm.      Pulses: Normal pulses.      Heart sounds: No murmur heard.     No gallop.   Pulmonary:      Effort: Pulmonary effort is normal. No respiratory distress.      Breath sounds: Normal breath sounds. No wheezing, rhonchi or rales.   Abdominal:      General: Abdomen is flat. Bowel sounds are normal. There is no distension.      Palpations: Abdomen is soft.      Tenderness: There is no abdominal tenderness.   Musculoskeletal:      Right lower leg: No edema.      Left lower leg: No edema.   Skin:     General: Skin is warm and dry.      Findings: No erythema or rash.   Neurological:      General: No focal deficit present.      Mental Status: She is alert and oriented to person, place, and time.   Psychiatric:         Mood and Affect: Mood normal.         Behavior: Behavior normal.         Pertinent/ New Labs and Imaging Studies    Labs reviewed. Pertinent labs noted in assessment and plan      Assessment and Plan   Shonda Hernandes was admitted to hospital for Acute congestive heart failure, unspecified heart failure type (HCC)    #Heart failure  On room air.  Weight is down to 124 which she reports close to her dry weight.  BNP was elevated today.  -Continue IV diuresis as directed by cardiology.    Emphysema  -Continue home inhaler therapy    Lower extremity erythema      Atrial fibrillation  Status post left atrial appendage occlusion.  -Continue amiodarone    Right shoulder pain  Known supraspinatus and anterior infraspinatus tear  -Medrol Dosepak    DVT Prophylaxis: Enoxaparin  Code:Full  Disposition: Likely discharge in the next 1 to 2 days pending diuresis and cardiology evaluation    Roland Griggs MD  [facility]  Office [phone]  Cell [phone]

## 2025-01-02 NOTE — PROGRESS NOTES
Patient up to bathroom at this time. Nightly medications taken w/o complications. Education provided to patient on importance of shifting weight while in the chair to prevent skin breakdown. Patient verbalized understanding. Fall precautions remain in place, bed locked in lowest position, call light within reach and alarm on. Patient denies further needs at this time.

## 2025-01-02 NOTE — NURSE NAVIGATOR
Order noted for CHF RN consult. I me with Shonda and her daughter at length on 12/30/34. They have  the number for HF RN resource line for any further questions. There are hospital follow up appointments scheduled with both her PCP/ Cardiology, noted on AVS, and pt was given info during consult, and entered it into her phone calandar.   All GDMT has been addressed   Dc wt 122lbs standing

## 2025-01-02 NOTE — PROGRESS NOTES
Patient sitting up in chair. Patient alert and oriented x 4. Respirations regular and unlabored on room air. White petroleum jelly applied Right lower shin/dry skin area per MD orders.Right lower shin /skin intact and open to air.  Fall/safety precautions in place. Call light in reach. Electronically signed by Vanda Manning RN on 1/1/2025 at 8:09 PM

## 2025-01-02 NOTE — PROGRESS NOTES
CLINICAL PHARMACY NOTE: MEDS TO BEDS    Total # of Prescriptions Filled: 1   The following medications were delivered to the patient:  Current Discharge Medication List        START taking these medications    Details   sulfamethoxazole-trimethoprim (BACTRIM DS;SEPTRA DS) 800-160 MG per tablet Take 0.5 tablets by mouth every 12 hours for 6 doses  Qty: 3 tablet, Refills: 0               Additional Documentation: Tubed to RADHA Stern

## 2025-01-02 NOTE — PROGRESS NOTES
Low Risk Nutrition Note     Reason for Visit:   Length of Stay    Nutrition Assessment:  Pt. Admitted for acute on chronic CHF. Diet acceptance has been adequate throughout admission. Mild weight loss noted, planned and favorable d/t IV diuresis. Nutrition labs reviewed. Pt. Is discharged with plans to return home with her daughter.    Current Nutrition Therapies:    ADULT DIET; Regular; No Added Salt (3-4 gm); 1500 ml    Anthropometrics:   Current Height: 154.9 cm (5' 0.98\")  Current Weight - Scale: 55.5 kg (122 lb 5.7 oz)      Monitoring and Evaluation:  No nutrition diagnosis. Patient will be monitored per nutrition standards of care.     Consult Dietitian if nutrition intervention essential to patient care is needed.     Discharge Planning:  No needs    Tarsha Rasheed RD     Electronically signed by Tarsha Rasheed RD on 1/2/25 at 2:19 PM EST    Contact: [phone]

## 2025-01-02 NOTE — PROGRESS NOTES
Data- discharge order received, pt verbalized agreement to discharge, disposition to previous residence, no needs for HHC/DME.     Action- discharge instructions prepared and given to pt and daughter, and granddaughter , pt verbalized understanding. Medication information packet given r/t NEW and/or CHANGED prescriptions emphasizing name/purpose/side effects, pt verbalized understanding. Discharge instruction summary: Diet- low salt, low fat , Activity- up with assistance , Primary Care Physician as follows: Adeel Nguyễn -[phone] f/u appointment on Jan. 9, 2025 , immunizations reviewed and up to date , prescription medications filled at outpatient [facility] . Inpatient treatment reviewed .     Response- Pt belongings gathered, IV removed. Disposition is home (no HHC/DME needs), transported with daughter , taken to lobby via w/c w/ daughter , no complications.

## 2025-01-03 ENCOUNTER — CARE COORDINATION (OUTPATIENT)
Dept: CASE MANAGEMENT | Age: 80
End: 2025-01-03

## 2025-01-03 ENCOUNTER — HOSPITAL ENCOUNTER (OUTPATIENT)
Dept: VASCULAR LAB | Age: 80
DRG: 291 | End: 2025-01-03
Attending: INTERNAL MEDICINE
Payer: MEDICARE

## 2025-01-03 ENCOUNTER — APPOINTMENT (OUTPATIENT)
Dept: VASCULAR LAB | Age: 80
DRG: 291 | End: 2025-01-03
Attending: STUDENT IN AN ORGANIZED HEALTH CARE EDUCATION/TRAINING PROGRAM
Payer: MEDICARE

## 2025-01-03 DIAGNOSIS — I73.9 PAD (PERIPHERAL ARTERY DISEASE) (HCC): ICD-10-CM

## 2025-01-03 PROCEDURE — 93925 LOWER EXTREMITY STUDY: CPT

## 2025-01-03 PROCEDURE — 93971 EXTREMITY STUDY: CPT

## 2025-01-03 NOTE — CARE COORDINATION
Care Transitions Note    Initial Call - Call within 2 business days of discharge: Yes    Patient Current Location:   [facility]    Care Transition Nurse contacted the patient by telephone to perform post hospital discharge assessment, verified name and  as identifiers. Provided introduction to self, and explanation of the Care Transition Nurse role.     Patient: Shonda Hernandes    Patient : 1945   MRN: [id number]    Reason for Admission: leg edema  Discharge Date: 25  RURS: Readmission Risk Score: 15.8      Last Discharge Facility       Date Complaint Diagnosis Description Type Department Provider    24  Acute diastolic congestive heart failure (HCC) ... Admission (Discharged) [facility]W Adeel Nguyễn MD            Was this an external facility discharge? No    Additional needs identified to be addressed with provider   No needs identified             Method of communication with provider: none.      Care Summary Note: Spoke breifly with Shonda. She is currently at [facility] waiting to be taken back for some diagnostic testing. CT team will follow up on Monday (2025).            Future Appointments         Provider Specialty Dept Phone    1/3/2025  2:00 PM (Arrive by 1:30 PM) [facility] VASC LAB 3 Vascular Lab [phone]    2025 4:30 PM Adeel Nguyễn MD Internal Medicine [phone]    2025 2:00 PM Edwige Patel, APRN - CNP Cardiology [phone]    2025 10:30 AM Dany Ziegler MD Orthopedic Surgery [phone]    2025 3:30 PM Pablo Wang MD Cardiology [phone]            Care Transition Nurse provided contact information.  Plan for follow-up on next business day.  based on severity of symptoms and risk factors.  Plan for next call:  completion of CT discharge phone call.      Azalia George RN BSN  Care Transition Nurse  [phone]

## 2025-01-04 LAB
VAS LEFT ATA DIST PSV: 79 CM/S
VAS LEFT ATA MID PSV: 51 CM/S
VAS LEFT ATA PROX PSV: 61 CM/S
VAS LEFT CFA DIST PSV: 113.7 CM/S
VAS LEFT CFA PROX PSV: 110 CM/S
VAS LEFT PERONEAL MID PSV: 48.6 CM/S
VAS LEFT PFA PROX PSV: 93 CM/S
VAS LEFT POP A DIST PSV: 55.2 CM/S
VAS LEFT POP A PROX PSV: 54.1 CM/S
VAS LEFT POP A PROX VEL RATIO: 0.59
VAS LEFT PTA DIST PSV: 54 CM/S
VAS LEFT PTA MID PSV: 65 CM/S
VAS LEFT PTA PROX PSV: 53 CM/S
VAS LEFT SFA DIST PSV: 91.8 CM/S
VAS LEFT SFA DIST VEL RATIO: 0.5
VAS LEFT SFA MID PSV: 182.1 CM/S
VAS LEFT SFA MID VEL RATIO: 1.26
VAS LEFT SFA PROX PSV: 144.7 CM/S
VAS LEFT SFA PROX VEL RATIO: 1.27
VAS RIGHT ATA DIST PSV: 56 CM/S
VAS RIGHT ATA MID PSV: 34 CM/S
VAS RIGHT ATA PROX PSV: 36 CM/S
VAS RIGHT CFA DIST PSV: 103.6 CM/S
VAS RIGHT CFA PROX PSV: 141 CM/S
VAS RIGHT PERONEAL MID PSV: 17.8 CM/S
VAS RIGHT PFA PROX PSV: 158 CM/S
VAS RIGHT POP A DIST PSV: 50.8 CM/S
VAS RIGHT POP A PROX PSV: 46.4 CM/S
VAS RIGHT POP A PROX VEL RATIO: 1
VAS RIGHT PTA DIST PSV: 15 CM/S
VAS RIGHT PTA MID PSV: 46 CM/S
VAS RIGHT PTA PROX PSV: 42 CM/S
VAS RIGHT SFA DIST PSV: 46.4 CM/S
VAS RIGHT SFA DIST VEL RATIO: 0.63
VAS RIGHT SFA MID PSV: 73.9 CM/S
VAS RIGHT SFA PROX PSV: 0 CM/S
VAS RIGHT SFA PROX VEL RATIO: 0

## 2025-01-04 PROCEDURE — 93925 LOWER EXTREMITY STUDY: CPT | Performed by: SURGERY

## 2025-01-04 PROCEDURE — 93971 EXTREMITY STUDY: CPT | Performed by: SURGERY

## 2025-01-06 ENCOUNTER — CARE COORDINATION (OUTPATIENT)
Dept: CASE MANAGEMENT | Age: 80
End: 2025-01-06

## 2025-01-06 ENCOUNTER — FOLLOWUP TELEPHONE ENCOUNTER (OUTPATIENT)
Dept: ADMINISTRATIVE | Age: 80
End: 2025-01-06

## 2025-01-06 NOTE — CARE COORDINATION
Care Transitions Note    Initial Call - Call within 2 business days of discharge: Yes    Patient Current Location:  [place]    Care Transition Nurse contacted the patient by telephone to perform post hospital discharge assessment, verified name and  as identifiers. Provided introduction to self, and explanation of the Care Transition Nurse role.     Patient: Shonda Hernandes    Patient : 1945   MRN: [id number]    Reason for Admission: Leg edema  Discharge Date: 25  RURS: Readmission Risk Score: 15.8      Last Discharge Facility       Date Complaint Diagnosis Description Type Department Provider    24  Acute diastolic congestive heart failure (HCC) ... Admission (Discharged) [facility] Adeel Nguyễn MD            Was this an external facility discharge? No    Additional needs identified to be addressed with provider   No needs identified             Method of communication with provider: none.    Patients top risk factors for readmission: medical condition-leg edema    Interventions to address risk factors:   Leg edema    Care Summary Note: Shonda states she is doing \"ok.\"  Shonda states she usually checks her B/P and weight daily. She states she has not done so yet today. She states everyone is sleeping in due to the snow. Shonda states she does not feel safe getting on the scale by herself at this time. She states she is up and about for ambulation on her own. She states she does have a toilet safety frame to help her get up and down off the toilet.Shonda denies any SOB, chest pain, or edema at this time. She states she follows a low sodium/no added salt diet. She states she monitors/restricts her fluids daily. Discussed the heart failure zones. Shonda states she is in the \"green\" zone at this time. Shonda states she is currently recovering at her daughter's home in [place].    Care Transition Nurse reviewed discharge instructions with patient. The patient was given an opportunity to ask questions; all

## 2025-01-06 NOTE — PROGRESS NOTES
Late entry for 1/3/25. Care transition RN made initial contact/ pt unavailable now while at out patient vascular study.   1/6/25- Care Transition RN spoke with patient to complete follow up assessment.   HF RN to continue to follow.

## 2025-01-07 NOTE — PROGRESS NOTES
Physician Progress Note      PATIENT:               NICHELLE LORENZO  [facility] #:                  [id number]  :                       1945  ADMIT DATE:       2024 3:20 PM  DISCH DATE:        2025 3:21 PM  RESPONDING  PROVIDER #:        Adeel Nguyễn MD          QUERY TEXT:    Pt admitted with Acute on Chronic combined systolic and diastolic chf. Pt   noted to also have hypertension, ischemic cardiomyopathy, CAD and s/p aortic   valve replacement. If possible, please document in progress notes and   discharge summary the etiology of CHF, if able to be determined.    The medical record reflects the following:  Risk Factors: 80 yo. Hx- HTN, CAD, PVD, HLD, PAF, ischemic cardiomyopathy, S/p   Aortic Valve Replacement  Clinical Indicators: 24- ECHO- EF of 55 - 60%. Mitral?Valve: Moderate to   severe regurgitation....Per Cardiology consult on 24-Acute on Chronic   combined systolic and diastolic Ischemic Cardiomyopathy.  Treatment: Echo, Cardiology consult, Beta blocker, Lasix IV, AYLIN diet, Daily   weights, strict I&O,  Options provided:  -- CHF due to Hypertensive Heart Disease  -- CHF due to Hypertensive Heart Disease and CAD  -- CHF not due to Hypertension but due to CAD  -- CHF due to Hypertensive Heart Disease and ICMP  -- CHF not due to Hypertension but due to ICMP  -- CHF due to Hypertensive Heart Disease and Valvular Heart Disease  -- CHF not due to Hypertension but due to valvular heart disease  -- CHF due to HTN, CAD, ICMP and valvular disease  -- Other - I will add my own diagnosis  -- Disagree - Not applicable / Not valid  -- Disagree - Clinically unable to determine / Unknown  -- Refer to Clinical Documentation Reviewer    PROVIDER RESPONSE TEXT:    This patient has CHF due to hypertensive heart disease and CAD.    Query created by: Regine Anthony on 2024 8:52 AM      Electronically signed by:  Adeel Nguyễn MD 2025 3:01 PM

## 2025-01-09 ENCOUNTER — NURSE ONLY (OUTPATIENT)
Dept: CARDIOLOGY CLINIC | Age: 80
End: 2025-01-09

## 2025-01-09 ENCOUNTER — OFFICE VISIT (OUTPATIENT)
Dept: CARDIOLOGY CLINIC | Age: 80
End: 2025-01-09

## 2025-01-09 VITALS
SYSTOLIC BLOOD PRESSURE: 128 MMHG | HEIGHT: 60 IN | WEIGHT: 125.2 LBS | OXYGEN SATURATION: 99 % | BODY MASS INDEX: 24.58 KG/M2 | HEART RATE: 63 BPM | DIASTOLIC BLOOD PRESSURE: 72 MMHG

## 2025-01-09 DIAGNOSIS — I48.91 ATRIAL FIBRILLATION WITH RVR (HCC): ICD-10-CM

## 2025-01-09 DIAGNOSIS — R00.1 SYMPTOMATIC BRADYCARDIA: ICD-10-CM

## 2025-01-09 DIAGNOSIS — I50.42 CHRONIC COMBINED SYSTOLIC AND DIASTOLIC HEART FAILURE (HCC): ICD-10-CM

## 2025-01-09 DIAGNOSIS — I50.33 ACUTE ON CHRONIC HEART FAILURE WITH PRESERVED EJECTION FRACTION (HFPEF) (HCC): ICD-10-CM

## 2025-01-09 DIAGNOSIS — R00.1 SINUS BRADYCARDIA: ICD-10-CM

## 2025-01-09 DIAGNOSIS — Z95.0 BIVENTRICULAR CARDIAC PACEMAKER IN SITU: Primary | ICD-10-CM

## 2025-01-09 DIAGNOSIS — I50.42 CHRONIC COMBINED SYSTOLIC AND DIASTOLIC CHF (CONGESTIVE HEART FAILURE) (HCC): Primary | ICD-10-CM

## 2025-01-09 DIAGNOSIS — I48.0 PAROXYSMAL ATRIAL FIBRILLATION (HCC): ICD-10-CM

## 2025-01-09 PROBLEM — I50.9 ACUTE CONGESTIVE HEART FAILURE, UNSPECIFIED HEART FAILURE TYPE (HCC): Status: RESOLVED | Noted: 2024-12-26 | Resolved: 2025-01-09

## 2025-01-09 PROBLEM — I50.30 DIASTOLIC CHF (HCC): Status: RESOLVED | Noted: 2024-12-26 | Resolved: 2025-01-09

## 2025-01-09 RX ORDER — DAPAGLIFLOZIN 10 MG/1
10 TABLET, FILM COATED ORAL EVERY MORNING
Qty: 90 TABLET | Refills: 3 | Status: SHIPPED | OUTPATIENT
Start: 2025-01-09 | End: 2025-01-09 | Stop reason: ALTCHOICE

## 2025-01-09 RX ORDER — METOLAZONE 2.5 MG/1
TABLET ORAL
Qty: 30 TABLET | Refills: 3 | Status: SHIPPED | OUTPATIENT
Start: 2025-01-09

## 2025-01-09 RX ORDER — METOLAZONE 2.5 MG/1
TABLET ORAL
Qty: 30 TABLET | Refills: 3 | Status: SHIPPED | OUTPATIENT
Start: 2025-01-09 | End: 2025-01-09 | Stop reason: ALTCHOICE

## 2025-01-09 NOTE — PROGRESS NOTES
handihaler [tiotropium bromide monohydrate], Adhesive tape, Neosporin [bacitracin-polymyxin b], and Tylenol [acetaminophen]       LABS: Results reviewed with patient today.    CBC:   Lab Results   Component Value Date/Time    WBC 11.1 01/01/2025 03:55 AM    WBC 13.0 12/31/2024 05:10 AM    WBC 10.8 12/30/2024 05:09 AM    RBC 3.76 01/01/2025 03:55 AM    RBC 3.51 12/31/2024 05:10 AM    RBC 3.58 12/30/2024 05:09 AM    HGB 11.4 01/01/2025 03:55 AM    HGB 10.9 12/31/2024 05:10 AM    HGB 11.2 12/30/2024 05:09 AM    HCT 34.4 01/01/2025 03:55 AM    HCT 32.1 12/31/2024 05:10 AM    HCT 32.4 12/30/2024 05:09 AM    MCV 91.5 01/01/2025 03:55 AM    MCV 91.3 12/31/2024 05:10 AM    MCV 90.3 12/30/2024 05:09 AM    RDW 16.0 01/01/2025 03:55 AM    RDW 15.9 12/31/2024 05:10 AM    RDW 15.8 12/30/2024 05:09 AM     01/01/2025 03:55 AM     12/31/2024 05:10 AM     12/30/2024 05:09 AM     BMP:  Lab Results   Component Value Date/Time     01/01/2025 03:55 AM     12/31/2024 05:10 AM     12/30/2024 05:09 AM    K 3.9 01/01/2025 03:55 AM    K 3.7 12/31/2024 05:10 AM    K 3.0 12/30/2024 05:09 AM    K 4.5 08/01/2024 02:59 PM    K 4.7 11/14/2022 05:03 AM    K 2.9 11/13/2022 06:41 AM    CL 99 01/01/2025 03:55 AM     12/31/2024 05:10 AM    CL 95 12/30/2024 05:09 AM    CO2 26 01/01/2025 03:55 AM    CO2 28 12/31/2024 05:10 AM    CO2 32 12/30/2024 05:09 AM    PHOS 3.0 08/15/2024 01:51 PM    PHOS 3.4 08/07/2024 04:32 AM    PHOS 4.0 08/06/2024 09:24 PM    BUN 26 01/01/2025 03:55 AM    BUN 21 12/31/2024 05:10 AM    BUN 21 12/30/2024 05:09 AM    CREATININE 1.2 01/01/2025 03:55 AM    CREATININE 0.8 12/31/2024 05:10 AM    CREATININE 0.9 12/30/2024 05:09 AM     BNP:   Lab Results   Component Value Date/Time    PROBNP 3,818 01/01/2025 03:55 AM    PROBNP 5,134 12/30/2024 05:09 AM    PROBNP 2,347 12/28/2024 03:55 AM       Parameters:   > 450 pg/mL under age 50  > 900 pg/mL ages 50-75  > 1800 pg/mL over age 75    Iron

## 2025-01-14 ENCOUNTER — CARE COORDINATION (OUTPATIENT)
Dept: CASE MANAGEMENT | Age: 80
End: 2025-01-14

## 2025-01-14 NOTE — CARE COORDINATION
Care Transitions Note    Follow Up Call     Attempted to reach patient for transitions of care follow up.  Unable to reach patient.      Outreach Attempts:   HIPAA compliant voicemail left for patient.       Follow Up Appointment:   Future Appointments         Provider Specialty Dept Phone    1/23/2025 11:00 AM Edwige Patel, APRN - CNP Cardiology [phone]    1/31/2025 10:30 AM Dany Ziegler MD Orthopedic Surgery [phone]    2/25/2025 3:30 PM Pablo Wang MD Cardiology [phone]    3/19/2025 1:15 PM Dominic Mcbride MD Cardiology [phone]    4/14/2025 10:30 AM Adeel Nguyễn MD Internal Medicine [phone]            Plan for follow-up call in 6-10 days based on severity of symptoms and risk factors. Plan for next call:  leg edema    Azalia George RN BSN  Care Transition Nurse  [phone]

## 2025-01-15 DIAGNOSIS — I50.33 ACUTE ON CHRONIC HEART FAILURE WITH PRESERVED EJECTION FRACTION (HFPEF) (HCC): ICD-10-CM

## 2025-01-16 LAB
ANION GAP SERPL CALCULATED.3IONS-SCNC: 11 MMOL/L (ref 3–16)
BUN SERPL-MCNC: 22 MG/DL (ref 7–20)
CALCIUM SERPL-MCNC: 9.6 MG/DL (ref 8.3–10.6)
CHLORIDE SERPL-SCNC: 98 MMOL/L (ref 99–110)
CO2 SERPL-SCNC: 30 MMOL/L (ref 21–32)
CREAT SERPL-MCNC: 1.1 MG/DL (ref 0.6–1.2)
GFR SERPLBLD CREATININE-BSD FMLA CKD-EPI: 51 ML/MIN/{1.73_M2}
GLUCOSE SERPL-MCNC: 84 MG/DL (ref 70–99)
NT-PROBNP SERPL-MCNC: 1489 PG/ML (ref 0–449)
POTASSIUM SERPL-SCNC: 3.4 MMOL/L (ref 3.5–5.1)
SODIUM SERPL-SCNC: 139 MMOL/L (ref 136–145)

## 2025-01-16 RX ORDER — POTASSIUM CHLORIDE 1500 MG/1
TABLET, EXTENDED RELEASE ORAL
Qty: 70 TABLET | Refills: 3 | Status: SHIPPED | OUTPATIENT
Start: 2025-01-16

## 2025-01-17 ENCOUNTER — CARE COORDINATION (OUTPATIENT)
Dept: CASE MANAGEMENT | Age: 80
End: 2025-01-17

## 2025-01-17 NOTE — CARE COORDINATION
Care Transitions Note    Follow Up Call     Patient Current Location:  Home: [address]  [place] [address]    Care Transition Nurse contacted the patient by telephone. Verified name and  as identifiers.    Additional needs identified to be addressed with provider   No needs identified                 Method of communication with provider: none.    Care Summary Note: Shonda states she is doing \"very well.\" She states she had some labs drawn and her family doctor is happy with them. Shonda states she did not check her B/P today.  She states she has not yet weighed herself today. She states she weighs herself between 5-6pm. She states her weight yesterday = 123.0lbs. She states she has trouble weighing herself in the morning - so she does it consistently in the evening. Shonda denies any SOB, chest pain, or edema at this time.  She states she is in the \"green\" HF zone at this time. Shonda states she did fall but there was no issue - she is fine - she just had some bumps and bruises  which are almost gone. Shonda denies any needs at this time.        Advance Care Planning:   Does patient have an Advance Directive: reviewed during previous call, see note. .    Medication Review:  Needs to be done with daughter    Remote Patient Monitoring:  Offered patient enrollment in the Remote Patient Monitoring (RPM) program for in-home monitoring: Patient is not eligible for RPM program because: not able to manage the equipment on her own .    Assessments:  Care Transitions Subsequent and Final Call    Subsequent and Final Calls  Do you have any ongoing symptoms?: No  Have your medications changed?: No  Do you have any questions related to your medications?: No  Do you currently have any active services?: No  Are you currently active with any services?: Home Health  Do you have any needs or concerns that I can assist you with?: No  Identified Barriers: None  Care Transitions Interventions  Other Interventions:

## 2025-01-23 ENCOUNTER — OFFICE VISIT (OUTPATIENT)
Dept: CARDIOLOGY CLINIC | Age: 80
End: 2025-01-23
Payer: MEDICARE

## 2025-01-23 VITALS
WEIGHT: 127 LBS | HEART RATE: 53 BPM | BODY MASS INDEX: 24.8 KG/M2 | OXYGEN SATURATION: 97 % | DIASTOLIC BLOOD PRESSURE: 78 MMHG | SYSTOLIC BLOOD PRESSURE: 122 MMHG

## 2025-01-23 DIAGNOSIS — I48.92 ATRIAL FLUTTER, UNSPECIFIED TYPE (HCC): ICD-10-CM

## 2025-01-23 DIAGNOSIS — I48.0 PAF (PAROXYSMAL ATRIAL FIBRILLATION) (HCC): Chronic | ICD-10-CM

## 2025-01-23 DIAGNOSIS — I50.33 ACUTE ON CHRONIC HEART FAILURE WITH PRESERVED EJECTION FRACTION (HFPEF) (HCC): Primary | ICD-10-CM

## 2025-01-23 PROCEDURE — G8420 CALC BMI NORM PARAMETERS: HCPCS | Performed by: NURSE PRACTITIONER

## 2025-01-23 PROCEDURE — 1159F MED LIST DOCD IN RCRD: CPT | Performed by: NURSE PRACTITIONER

## 2025-01-23 PROCEDURE — 1160F RVW MEDS BY RX/DR IN RCRD: CPT | Performed by: NURSE PRACTITIONER

## 2025-01-23 PROCEDURE — 99214 OFFICE O/P EST MOD 30 MIN: CPT | Performed by: NURSE PRACTITIONER

## 2025-01-23 PROCEDURE — 3078F DIAST BP <80 MM HG: CPT | Performed by: NURSE PRACTITIONER

## 2025-01-23 PROCEDURE — 1123F ACP DISCUSS/DSCN MKR DOCD: CPT | Performed by: NURSE PRACTITIONER

## 2025-01-23 PROCEDURE — 3074F SYST BP LT 130 MM HG: CPT | Performed by: NURSE PRACTITIONER

## 2025-01-23 PROCEDURE — G8399 PT W/DXA RESULTS DOCUMENT: HCPCS | Performed by: NURSE PRACTITIONER

## 2025-01-23 PROCEDURE — G8427 DOCREV CUR MEDS BY ELIG CLIN: HCPCS | Performed by: NURSE PRACTITIONER

## 2025-01-23 PROCEDURE — 1036F TOBACCO NON-USER: CPT | Performed by: NURSE PRACTITIONER

## 2025-01-23 PROCEDURE — 1111F DSCHRG MED/CURRENT MED MERGE: CPT | Performed by: NURSE PRACTITIONER

## 2025-01-23 PROCEDURE — 1090F PRES/ABSN URINE INCON ASSESS: CPT | Performed by: NURSE PRACTITIONER

## 2025-01-23 NOTE — PROGRESS NOTES
[facility], [address]  [place] [address]  [phone]    PrimaryCare Doctor:  Adeel Nguyễn MD  Primary Cardiologist: Dr. Wang    Chief Complaint   Patient presents with    Follow-up     No cc       History of Present Illness:  Shonda Hernandes is a 79 y.o. female with PMH PAF, Pulm HTN, Aortic stenosis, CAD s/p CHILANGO to mid CX 5/2014.   S/P AVR, PVI and RENETTA exclusion by Dr Hernandez on 6/16/20. Pre-op angiogram revealed no significant restenosis in the prior stents. ECHO revealed mod - severe MR. CAMERON post discharged showed moderate MR.    Had recurrent AF RVR and HF 2021 with multiple hospital admissions.   Underwent BiV PPM 4/2022- showed ongoing AF on device interrogation. Had ablation 5/2022    Adm to [facility] 12/26-1/2/2025 with HF.   Diuresed 8L and optimized GDMT. DC wt 122 lbs    OV 2 weeks ago.   Wt up 3 lbs, Elevated optivol  Started on jardiance and resumed metolazone as needed.    Follow up today with I for heart failure.  Accompanied by her daughter.    C/O feeling SOB when laying flat at night- thinks it started after starting jardiance.     Increased BLE edema today- took metolazone today  Has taken 3 times since last OV 2 weeks ago.     Home wt 123-125lbs- stable    Denies any CP, LH, dizziness, syncope, palpitations.   Activity level improvng- getting back to routine activity.    Review of Systems:   General: Denies fever, chills  Skin: Denies skin changes, rash, itching, lesions.  HEENT: Denies headache, dizziness, vision changes, nosebleeds, sore throat, nasal drainage  RESP: Denies cough, sputum, wheeze, snoring  CARD: Denies palpitations,  murmur  GI:Denies nausea, vomiting, heartburn, loss of appetite, change in bowels  : Denies frequency, pain, incontinence, polyuria  VASC: Denies claudication, leg cramps, clots  MUSC/SKEL: Denies pain, stiffness, arthritis  PSYCH: Denies anxiety, depression, stress  NEURO: Denies numbness, tingling, weakness,change in

## 2025-01-24 ENCOUNTER — CARE COORDINATION (OUTPATIENT)
Dept: CASE MANAGEMENT | Age: 80
End: 2025-01-24

## 2025-01-24 ENCOUNTER — TELEPHONE (OUTPATIENT)
Dept: CARDIOLOGY CLINIC | Age: 80
End: 2025-01-24

## 2025-01-24 NOTE — CARE COORDINATION
Care Transitions Note    Follow Up Call     Patient Current Location:  [place]    Care Transition Nurse contacted the patient by telephone. Verified name and  as identifiers.    Additional needs identified to be addressed with provider   No needs identified                 Method of communication with provider: none.    Care Summary Note: Shonda states she is doing \"good.\" She states her weight today = 123.0lbs.  She states she weighed herself this morning. Attempted to reinforce that daily weights should be at the same time each day and in the same type of clothing. Shonda denies any SOB, chest pain, or edema at this time. She states she is in the \"green\" heart failure zone at this time. Shonda denies any falls since we last spoke. She denies any needs at this time.        Advance Care Planning:   Does patient have an Advance Directive: reviewed during previous call, see note. .    Medication Review:  Meds not reviewed. Shonda states her daughter manages her medications and is not available.    Remote Patient Monitoring:  Offered patient enrollment in the Remote Patient Monitoring (RPM) program for in-home monitoring: Yes, but did not enroll at this time: declined to enroll in the program becausestates not interested at this time .    Assessments:  Care Transitions Subsequent and Final Call    Subsequent and Final Calls  Do you have any ongoing symptoms?: No  Have your medications changed?: No  Do you have any questions related to your medications?: No  Do you currently have any active services?: No  Are you currently active with any services?: Home Health  Do you have any needs or concerns that I can assist you with?: No  Identified Barriers: None  Care Transitions Interventions  Other Interventions:              Follow Up Appointment:   Reviewed past appointments  Future Appointments         Provider Specialty Dept Phone    2025 3:00 PM Dany Ziegler MD Orthopedic Surgery [phone]    2025 3:30 PM

## 2025-01-24 NOTE — TELEPHONE ENCOUNTER
Hannah(daughter) called to relay that at OV yesterday Edwige stated that they could send in [facility] to [facility] pharmacy if they had it, and she stated that they can offer that medication.     Please sent to [facility] and call Hannah back to relay when/if it was sent. [phone]

## 2025-01-27 DIAGNOSIS — I50.33 ACUTE ON CHRONIC HEART FAILURE WITH PRESERVED EJECTION FRACTION (HFPEF) (HCC): Primary | ICD-10-CM

## 2025-01-27 RX ORDER — DAPAGLIFLOZIN 10 MG/1
10 TABLET, FILM COATED ORAL EVERY MORNING
Qty: 30 TABLET | Refills: 3 | Status: SHIPPED | OUTPATIENT
Start: 2025-01-27

## 2025-01-28 ENCOUNTER — TELEPHONE (OUTPATIENT)
Dept: CARDIOLOGY CLINIC | Age: 80
End: 2025-01-28

## 2025-01-28 PROCEDURE — 93297 REM INTERROG DEV EVAL ICPMS: CPT | Performed by: NURSE PRACTITIONER

## 2025-01-28 NOTE — TELEPHONE ENCOUNTER
Noted elevated optivol  Pt seen last week in office, she appeared hypervolemic then.   'pls advise pt to take her metolazone 3x/week until she is back to euvolemia. Remind her to take extra K on days she takes metolazone.   Will cont to monitor.

## 2025-01-28 NOTE — TELEPHONE ENCOUNTER
Please review Murj for: \"Possible OptiVol fluid accumulation: 09-Dec-2024 -- ongoing, elevated up to > 200 w correlating TI trend below reference line. Triage™ Heart Failure Risk Status on 27-Jan-2025 is High*.\"

## 2025-01-29 ENCOUNTER — OFFICE VISIT (OUTPATIENT)
Dept: ORTHOPEDIC SURGERY | Age: 80
End: 2025-01-29

## 2025-01-29 VITALS — BODY MASS INDEX: 24.8 KG/M2 | HEIGHT: 60 IN

## 2025-01-29 DIAGNOSIS — M25.511 ACUTE PAIN OF RIGHT SHOULDER: Primary | ICD-10-CM

## 2025-01-29 DIAGNOSIS — F17.200 CURRENT SMOKER: ICD-10-CM

## 2025-01-29 DIAGNOSIS — M19.011 LOCALIZED OSTEOARTHRITIS OF SHOULDER, RIGHT: ICD-10-CM

## 2025-01-29 RX ORDER — LIDOCAINE HYDROCHLORIDE 10 MG/ML
2 INJECTION, SOLUTION INFILTRATION; PERINEURAL ONCE
Status: COMPLETED | OUTPATIENT
Start: 2025-01-29 | End: 2025-01-29

## 2025-01-29 RX ORDER — TRIAMCINOLONE ACETONIDE 40 MG/ML
40 INJECTION, SUSPENSION INTRA-ARTICULAR; INTRAMUSCULAR ONCE
Status: COMPLETED | OUTPATIENT
Start: 2025-01-29 | End: 2025-01-29

## 2025-01-29 RX ADMIN — TRIAMCINOLONE ACETONIDE 40 MG: 40 INJECTION, SUSPENSION INTRA-ARTICULAR; INTRAMUSCULAR at 15:49

## 2025-01-29 RX ADMIN — LIDOCAINE HYDROCHLORIDE 2 ML: 10 INJECTION, SOLUTION INFILTRATION; PERINEURAL at 15:49

## 2025-01-30 PROCEDURE — 93296 REM INTERROG EVL PM/IDS: CPT | Performed by: INTERNAL MEDICINE

## 2025-01-30 PROCEDURE — 93294 REM INTERROG EVL PM/LDLS PM: CPT | Performed by: INTERNAL MEDICINE

## 2025-01-30 NOTE — PROGRESS NOTES
CHIEF COMPLAINT: Right shoulder pain/ cuff tendinopathy/ impingement syndrome.    HISTORY:  Ms. Hernandes 79 y.o. female right handed presents today for follow up visit for evaluation of right shoulder pain which started Feb 2024 with no injury or trauma. She had right shoulder cortisone injection on 11/1/2024 with good improvement.   She is complaining of achy pain, 8/10.  Pain is increase with elevation and decrease with rest. No radiation and no numbness and tingling sensation. She had right shoulder cortisone injection on 8/5/2024 with some improvement.  She has tried Voltaren gel, lidocaine patches and NSAIDs with no significant improvement.  No other complaint.  No h/o trauma or gout.  She is well-known to us for left distal radius nonoperative fracture on 12/16/2023. She is in pain management.    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    Macular degeneration 2018    Nonrheumatic mitral valve regurgitation     Obstructive sleep apnea     Osteoarthritis     Peripheral neuropathy     Pulmonary HTN ([facility]) 2014    PVD (peripheral vascular disease) ([facility])     Syncope 11/2022    of unknown cause    Tobacco use disorder in remission        Past Surgical History:   Procedure Laterality Date    ABLATION OF DYSRHYTHMIC FOCUS  05/09/2022    AORTIC VALVE REPLACEMENT  6/16/15    Dr. Correa - PVI, RENETTA exclusion.

## 2025-01-31 ENCOUNTER — CARE COORDINATION (OUTPATIENT)
Dept: CASE MANAGEMENT | Age: 80
End: 2025-01-31

## 2025-01-31 NOTE — CARE COORDINATION
Care Transitions Note    Final Call     Patient Current Location:  Home: [address] [place] [address] [address]  [facility] [address]    Care Transition Nurse contacted the patient by telephone. Verified name and  as identifiers.    Patient graduated from the Care Transitions program on 2025.        Advance Care Planning:   Does patient have an Advance Directive: reviewed during previous call, see note. .    Will hand off to [facility]      Care Summary Note: Shonda states she is doing \"good.\" She states she is going to need a shoulder replacement. She states she will be discussing this with her heart doctor and working to \"get all my ducks in a row.\" She states she has a good support system for transportation to therapy as needed.  Shonda states she is up and about and is \"not depressed.\" She denies any needs at this time.    Assessments:  Care Transitions Subsequent and Final Call    Subsequent and Final Calls  Are you currently active with any services?: Home Health  Care Transitions Interventions  Other Interventions:              Upcoming Appointments:    Future Appointments         Provider Specialty Dept Phone    2025 3:30 PM Pablo Wang MD Cardiology [phone]    3/19/2025 1:15 PM Dominic Mcbride MD Cardiology [phone]    2025 10:30 AM Adeel Nguyễn MD Internal Medicine [phone]    2025 3:00 PM Dany Ziegler MD Orthopedic Surgery [phone]            Patient has agreed to contact primary care provider and/or specialist for any further questions, concerns, or needs.    Azalia George RN BSN  Care Transition Nurse  [phone]

## 2025-02-07 ENCOUNTER — CARE COORDINATION (OUTPATIENT)
Dept: CARE COORDINATION | Age: 80
End: 2025-02-07

## 2025-02-07 NOTE — CARE COORDINATION
Ambulatory Care Coordination Note     2/7/2025 9:27 AM     Patient and Family, Mini  outreach attempt by this ACM today to perform care management follow up  and offer care management services. ACM was unable to reach the patient, family, Mini,  by telephone today;   left voice message requesting a return phone call to this ACM.     ACM: Edwige Mills RN     Care Summary Note: UTRx1; CTN hand off completed.    PCP/Specialist follow up:   Future Appointments         Provider Specialty Dept Phone    2/25/2025 3:30 PM Pablo Wang MD Cardiology [phone]    3/19/2025 1:15 PM Dominic Mcbride MD Cardiology [phone]    4/14/2025 10:30 AM Adeel Nguyễn MD Internal Medicine [phone]    4/30/2025 3:00 PM Dany Ziegler MD Orthopedic Surgery [phone]            Follow Up:   Plan for next ACM outreach in approximately 1 week to complete:  - CC Protocol assessments  - disease specific assessments  - SDOH assessments  - medication review  - advance care planning  - goal progression  - education   - RPM.

## 2025-02-12 ENCOUNTER — CARE COORDINATION (OUTPATIENT)
Dept: CARE COORDINATION | Age: 80
End: 2025-02-12

## 2025-02-12 NOTE — CARE COORDINATION
Ambulatory Care Coordination Note     2025 12:19 PM     Patient Current Location:  Home: [address] [place] [address] [address]  [facility] [address]     ACM contacted the spouse/partner by telephone. Verified name and  with spouse/partner as identifiers.     Patient closed (patient declined) from the High Risk Care Management program on 2025.  Patient verbalizes confidence in the ability to self-manage at this time..  Care management goals have been completed. No further Ambulatory Care Manager follow up scheduled.      ACM: Edwige Mills RN   Follow Up:   No further Ambulatory Care Management follow-up scheduled at this time.  Caregiver has Ambulatory Care Manager's contact information for any further questions, concerns or needs.

## 2025-02-24 NOTE — PROGRESS NOTES
[facility]   Office Note    CC: \" I am doing better\"    HPI: Shonda Hernandes  is a 79 y.o. patient with a past medical history significant for atrial fibrillation, pulmonary hypertension, aortic stenosis and CAD s/p CHILANGO to mid circ 5/2014. She is s/p AVR, PVI and RENETTA exclusion by Dr Hernandez on 6/16. An inpatient echocardiogram was completed revealing moderate to severe MR. Subsequently, a CAMERON was completed on 7/27/20 showing moderate MR. She was admitted 10/26/21-11/1/21 with afib with RVR and acute CHF. She converted to a regular rhythm and was diuresed prior to discharge. She was admitted to [facility] 11/11/21 with acute encephalopathy and found to have rhabdomyolysis and afib with RVR again. She underwent successful cardioversion on 11/17/21. She did experience bradycardia when in sinus rhythm with heart rate between 40-50 bpm. She was seen in the office with Diane Enzweiler, CNP for follow up on 11/23/21 and then sent to the ED for admission. She was again found in afib with RVR and fluid overloaded. She was diuresed and discharged home on both amiodarone and Toprol. She underwent implant of Bi-V pacemaker with Dr. Mcbride on 4/11/22. She had recurrence of atrial fibrillation noted on interrogation and underwent ablation on 5/9/22. She was seen in office on 02/22/2023 by Dominic Mcbride MD and device interrogation showed no AT/ AF recurrence since her last check in November 2022.    Today she presents for follow up and states she is doing well.  She is accompanied by her daughter today.  She was unable to tolerate the     Review of Systems:  Constitutional: No fatigue, weakness, night sweats or fever.   HEENT: No new vision difficulties or ringing in the ears.  Respiratory: No new SOB, PND, orthopnea or cough.   Cardiovascular: See HPI   GI: No n/v, diarrhea, constipation, abdominal pain or changes in bowel habits.  No melena, no hematochezia  : No urinary frequency, urgency, incontinence, hematuria or

## 2025-02-25 ENCOUNTER — OFFICE VISIT (OUTPATIENT)
Dept: CARDIOLOGY CLINIC | Age: 80
End: 2025-02-25

## 2025-02-25 VITALS
DIASTOLIC BLOOD PRESSURE: 50 MMHG | HEART RATE: 54 BPM | BODY MASS INDEX: 23.56 KG/M2 | OXYGEN SATURATION: 97 % | WEIGHT: 120 LBS | SYSTOLIC BLOOD PRESSURE: 110 MMHG | RESPIRATION RATE: 14 BRPM | HEIGHT: 60 IN

## 2025-02-25 DIAGNOSIS — I48.0 PAF (PAROXYSMAL ATRIAL FIBRILLATION) (HCC): ICD-10-CM

## 2025-02-25 DIAGNOSIS — I34.0 NONRHEUMATIC MITRAL VALVE REGURGITATION: ICD-10-CM

## 2025-02-25 DIAGNOSIS — I65.21 CAROTID STENOSIS, RIGHT: ICD-10-CM

## 2025-02-25 DIAGNOSIS — I73.9 PAD (PERIPHERAL ARTERY DISEASE): ICD-10-CM

## 2025-02-25 DIAGNOSIS — Z95.2 S/P AVR (AORTIC VALVE REPLACEMENT): ICD-10-CM

## 2025-02-25 DIAGNOSIS — D63.8 ANEMIA, CHRONIC DISEASE: ICD-10-CM

## 2025-02-25 DIAGNOSIS — E78.5 HYPERLIPIDEMIA LDL GOAL <70: ICD-10-CM

## 2025-02-25 DIAGNOSIS — I25.10 CORONARY ARTERY DISEASE INVOLVING NATIVE CORONARY ARTERY OF NATIVE HEART WITHOUT ANGINA PECTORIS: Primary | ICD-10-CM

## 2025-02-25 DIAGNOSIS — I50.42 CHRONIC COMBINED SYSTOLIC AND DIASTOLIC CHF (CONGESTIVE HEART FAILURE) (HCC): ICD-10-CM

## 2025-02-25 RX ORDER — AMIODARONE HYDROCHLORIDE 200 MG/1
100 TABLET ORAL DAILY
Qty: 30 TABLET | Refills: 5 | Status: SHIPPED | OUTPATIENT
Start: 2025-02-25

## 2025-02-28 PROCEDURE — 93297 REM INTERROG DEV EVAL ICPMS: CPT | Performed by: NURSE PRACTITIONER

## 2025-03-11 ENCOUNTER — TELEPHONE (OUTPATIENT)
Dept: CARDIOLOGY CLINIC | Age: 80
End: 2025-03-11

## 2025-03-11 NOTE — TELEPHONE ENCOUNTER
Please review Murj for: \"Possible OptiVol fluid accumulation: 24-Feb-2025 -- ongoing, elevated up to 120 w correlating TI trend below reference line. Triage™ Heart Failure Risk Status on 10-Mar-2025 is High*.\"

## 2025-03-12 NOTE — TELEPHONE ENCOUNTER
Called spoke with patient.       1) What type of symptoms are you having? States no sx      How long have you been having these symptoms? States no chest pain,no shortness of breath, no chest pressure.  States only pain she is having is her right shoulder. States she has to get a shoulder replacement done.     2) Any swelling? No, no bloating     3) Any weight gain?   What is your current weight? 121   What is your normal (dry) weight? 119 to 122    4) Are you taking a diuretic (water pill)?    Medication Name: torsemide  Dosage:20 mg  How often: 2 tablets in am.     Takes metolazone 2.5 mg takes on Wed and Sat.  Will take an extra potassium on these 2 days.    Last seen- 2/25/25  with Dr Wang  Next visit- 3/19/25  with Dr Mcbride and device check    Please advise

## 2025-03-13 NOTE — PROGRESS NOTES
Cardiac Electrophysiology   Date: 3/19/2025   Reason for Consultation: Atrial Fibrillation   Consult Requesting Physician: Pablo Wang MD  Primary CarePhysician: Adeel Nguyễn MD     Chief Complaint:   Chief Complaint   Patient presents with    Follow-up     PT reports dull, heavy pains in upper back on left side.        HPI: Shonda Hernandes is a 79 y.o. patient with a history of atrial fibrillation, pulmonary hypertension, aortic stenosis s/p AVR, PVI and RENETTA exclusion by Dr Hernandez on 6/2020 and CAD s/p CHILANGO to mid circ 5/2014,  AVM's which were cauterized.She presented to [facility] with complaints of back pain and while inpatient had an episode of dyspnea. An echocardiogram was completed revealing moderate to severe MR. Subsequently, a CAMERON was completed on 7/27/20 showing moderate MR. She was admitted 10/26/2021-11/1/2021 with afib with RVR and acute CHF. She converted to a regular rhythm and was diuresed prior to discharge. She was admitted to [facility] 11/11/21 with acute encephalopathy and found to have rhabdomyolysis and afib with RVR again. She underwent successful cardioversion on 11/17/2021. She did experience bradycardia when in sinus rhythm with heart rate between 40-50 bpm. She was seen in the office with Diane Enzweiler, CNP for follow up on 11/23/21 and then sent to the ED for admission. She was again found in afib with RVR and fluid overloaded. She was diuresed and discharged home on both amiodarone and Toprol.      She also had decline in LVEF from 45 to 55%, with LBBB thought to be from possibly tachycardia mediated, LBBB or both. She underwent placement of a [employer] Bi-V PPM on 4/11/2022 by me.    Patient daughter called into the office on 4/26/2022 reporting that she thinks the patient may be back in atrial fibrillation and she recorded HR between 100-105 bpm. Patient sent in a remote check on her device which showed atrial fibrillation and flutter which began at 12:42 pm. Diltiazem 120 mg

## 2025-03-14 ENCOUNTER — TELEPHONE (OUTPATIENT)
Dept: CARDIOLOGY CLINIC | Age: 80
End: 2025-03-14

## 2025-03-14 NOTE — TELEPHONE ENCOUNTER
Hannah, daughter called wanting to relay that Shonda currently gets Prednisone injection every 3 months and will sometimes get a 7 day dose of steroids to manage issue. Hannah is wanting to know if the continued regimen will affect Shonda's valve and future procedure?     She relayed that next injection would be the end of April.       Hannah's callback: [phone]

## 2025-03-19 ENCOUNTER — CLINICAL SUPPORT (OUTPATIENT)
Dept: CARDIOLOGY CLINIC | Age: 80
End: 2025-03-19

## 2025-03-19 ENCOUNTER — OFFICE VISIT (OUTPATIENT)
Dept: CARDIOLOGY CLINIC | Age: 80
End: 2025-03-19
Payer: MEDICARE

## 2025-03-19 VITALS
WEIGHT: 122 LBS | HEART RATE: 60 BPM | BODY MASS INDEX: 23.95 KG/M2 | DIASTOLIC BLOOD PRESSURE: 62 MMHG | OXYGEN SATURATION: 91 % | SYSTOLIC BLOOD PRESSURE: 128 MMHG | HEIGHT: 60 IN

## 2025-03-19 DIAGNOSIS — M19.011 LOCALIZED OSTEOARTHRITIS OF SHOULDER, RIGHT: Primary | ICD-10-CM

## 2025-03-19 DIAGNOSIS — I48.0 PAF (PAROXYSMAL ATRIAL FIBRILLATION) (HCC): Primary | ICD-10-CM

## 2025-03-19 DIAGNOSIS — I48.0 PAF (PAROXYSMAL ATRIAL FIBRILLATION) (HCC): Chronic | ICD-10-CM

## 2025-03-19 DIAGNOSIS — I48.91 ATRIAL FIBRILLATION WITH RVR (HCC): ICD-10-CM

## 2025-03-19 DIAGNOSIS — Z95.0 PACEMAKER: Primary | ICD-10-CM

## 2025-03-19 DIAGNOSIS — I48.0 PAROXYSMAL ATRIAL FIBRILLATION (HCC): ICD-10-CM

## 2025-03-19 PROCEDURE — 3078F DIAST BP <80 MM HG: CPT | Performed by: INTERNAL MEDICINE

## 2025-03-19 PROCEDURE — G8420 CALC BMI NORM PARAMETERS: HCPCS | Performed by: INTERNAL MEDICINE

## 2025-03-19 PROCEDURE — 99214 OFFICE O/P EST MOD 30 MIN: CPT | Performed by: INTERNAL MEDICINE

## 2025-03-19 PROCEDURE — G8399 PT W/DXA RESULTS DOCUMENT: HCPCS | Performed by: INTERNAL MEDICINE

## 2025-03-19 PROCEDURE — 1090F PRES/ABSN URINE INCON ASSESS: CPT | Performed by: INTERNAL MEDICINE

## 2025-03-19 PROCEDURE — 3074F SYST BP LT 130 MM HG: CPT | Performed by: INTERNAL MEDICINE

## 2025-03-19 PROCEDURE — 93000 ELECTROCARDIOGRAM COMPLETE: CPT | Performed by: INTERNAL MEDICINE

## 2025-03-19 PROCEDURE — 1123F ACP DISCUSS/DSCN MKR DOCD: CPT | Performed by: INTERNAL MEDICINE

## 2025-03-19 PROCEDURE — 4004F PT TOBACCO SCREEN RCVD TLK: CPT | Performed by: INTERNAL MEDICINE

## 2025-03-19 PROCEDURE — G8427 DOCREV CUR MEDS BY ELIG CLIN: HCPCS | Performed by: INTERNAL MEDICINE

## 2025-03-19 PROCEDURE — 1159F MED LIST DOCD IN RCRD: CPT | Performed by: INTERNAL MEDICINE

## 2025-03-19 NOTE — TELEPHONE ENCOUNTER
Prescription Refill     Medication Name:  PAIN MEDS FOR SHOULDER   Pharmacy: [facility] ON [address]   [phone]  Patient Contact Number:  [phone]

## 2025-03-20 RX ORDER — PREDNISONE 10 MG/1
TABLET ORAL
Qty: 26 TABLET | Refills: 0 | Status: SHIPPED | OUTPATIENT
Start: 2025-03-20

## 2025-03-20 NOTE — TELEPHONE ENCOUNTER
Patient is in pain management. Will discuss other treatment options with Dr. Ziegler this afternoon.

## 2025-03-20 NOTE — TELEPHONE ENCOUNTER
Patient last seen 1/29/25 (inj):     Requested Prescriptions     Pending Prescriptions Disp Refills    predniSONE (DELTASONE) 10 MG tablet 26 tablet 0     Sig: take 3 tabs PO BID x2 days, then 2 tabs PO BID x2 days, then 1 tab PO BID x2 days, then 1 tab PO daily x2 days

## 2025-03-20 NOTE — TELEPHONE ENCOUNTER
GERALDINE oscar. She stated that she was calling to request Prednisone not Pain medication. She stated that Dr. Ziegler has prescribed prednisone previously when the injection stopped working too soon.    I advised her that I would discuss her request with Dr. Ziegler this afternoon, and  call her back with an update.

## 2025-03-25 ENCOUNTER — TELEPHONE (OUTPATIENT)
Dept: CARDIOLOGY | Age: 80
End: 2025-03-25

## 2025-03-25 NOTE — TELEPHONE ENCOUNTER
Remote device check completed. Optivol reviewed. Thoracic impedence decreased indicating fluid accumulation. Please call patient and assess for S/S of HF.    Please ask:  Worsening SOB above baseline? What specifically is making them SOB? When did they first notice increased SOB?    Orthopnea/unable to lay flat without getting SOB? Do they wake up SOB?    PND (Paroxysmal nocturnal dyspnea)/ dyspnea that wakes them up at night?    Chest pain/pressure? What causes the chest discomfort?     Weight gain?:     What is their Dry weight:       Today' weight:    Edema/ swelling worse than their baseline?    Abdominal Distention/bloating? Can they button their pants?     Activity level/ not tolerating typical activity? What specific activity can they not do that they were able to do 1 week ago?    Lightheaded/syncope?    Follows 2gm Na Diet?    Follows Fluid Restriction 64 oz?     What diuretic are they currently taking (dose, frequency, skipped any doses or taken any extra?)

## 2025-03-26 NOTE — TELEPHONE ENCOUNTER
Lvm for pt regarding message below      Worsening SOB above baseline? What specifically is making them SOB? When did they first notice increased SOB?     Orthopnea/unable to lay flat without getting SOB? Do they wake up SOB?     PND (Paroxysmal nocturnal dyspnea)/ dyspnea that wakes them up at night?     Chest pain/pressure? What causes the chest discomfort?      Weight gain?:     What is their Dry weight:           Today' weight:     Edema/ swelling worse than their baseline?     Abdominal Distention/bloating? Can they button their pants?      Activity level/ not tolerating typical activity? What specific activity can they not do that they were able to do 1 week ago?     Lightheaded/syncope?     Follows 2gm Na Diet?     Follows Fluid Restriction 64 oz?      What diuretic are they currently taking (dose, frequency, skipped any doses or taken any extra?)

## 2025-03-26 NOTE — TELEPHONE ENCOUNTER
PT called back..    Worsening SOB above baseline? No  What specifically is making them SOB? N/A  When did they first notice increased SOB? N/A     Orthopnea/unable to lay flat without getting SOB? No  Do they wake up SOB? No     PND (Paroxysmal nocturnal dyspnea)/ dyspnea that wakes them up at night? No     Chest pain/pressure? No  What causes the chest discomfort? N/A     Weight gain?: No  What is their Dry weight: 122 lbs   Today' weight: 121 lbs     Edema/ swelling worse than their baseline? No      Abdominal Distention/bloating? No  Can they button their pants? Yes     Activity level/ not tolerating typical activity? Activity level is normal. No symptoms causing low activity.  What specific activity can they not do that they were able to do 1 week ago? N/A     Lightheaded/syncope? No     Follows 2gm Na Diet? Yes     Follows Fluid Restriction 64 oz? Yes    Does the pt take diuretics? If so dosage and sig Torsemide 20 mg and metolazone 2.5 mg  Any missed doses? No    Does he currently have any resp infections such as covid or PNA? None    Do we have any recs for this PT?

## 2025-03-27 ENCOUNTER — OFFICE VISIT (OUTPATIENT)
Dept: CARDIOLOGY CLINIC | Age: 80
End: 2025-03-27
Payer: MEDICARE

## 2025-03-27 ENCOUNTER — TELEPHONE (OUTPATIENT)
Dept: CARDIOLOGY CLINIC | Age: 80
End: 2025-03-27

## 2025-03-27 VITALS
DIASTOLIC BLOOD PRESSURE: 82 MMHG | OXYGEN SATURATION: 94 % | SYSTOLIC BLOOD PRESSURE: 146 MMHG | WEIGHT: 118.4 LBS | HEART RATE: 79 BPM | HEIGHT: 60 IN | BODY MASS INDEX: 23.25 KG/M2

## 2025-03-27 DIAGNOSIS — I48.0 PAF (PAROXYSMAL ATRIAL FIBRILLATION) (HCC): ICD-10-CM

## 2025-03-27 DIAGNOSIS — Z95.2 S/P AVR (AORTIC VALVE REPLACEMENT): ICD-10-CM

## 2025-03-27 DIAGNOSIS — I25.10 CORONARY ARTERY DISEASE INVOLVING NATIVE CORONARY ARTERY OF NATIVE HEART WITHOUT ANGINA PECTORIS: ICD-10-CM

## 2025-03-27 DIAGNOSIS — I34.0 NONRHEUMATIC MITRAL VALVE REGURGITATION: Primary | ICD-10-CM

## 2025-03-27 DIAGNOSIS — I50.42 CHRONIC COMBINED SYSTOLIC AND DIASTOLIC HEART FAILURE (HCC): ICD-10-CM

## 2025-03-27 PROCEDURE — 3079F DIAST BP 80-89 MM HG: CPT | Performed by: INTERNAL MEDICINE

## 2025-03-27 PROCEDURE — G8420 CALC BMI NORM PARAMETERS: HCPCS | Performed by: INTERNAL MEDICINE

## 2025-03-27 PROCEDURE — 4004F PT TOBACCO SCREEN RCVD TLK: CPT | Performed by: INTERNAL MEDICINE

## 2025-03-27 PROCEDURE — 1123F ACP DISCUSS/DSCN MKR DOCD: CPT | Performed by: INTERNAL MEDICINE

## 2025-03-27 PROCEDURE — 1090F PRES/ABSN URINE INCON ASSESS: CPT | Performed by: INTERNAL MEDICINE

## 2025-03-27 PROCEDURE — G8399 PT W/DXA RESULTS DOCUMENT: HCPCS | Performed by: INTERNAL MEDICINE

## 2025-03-27 PROCEDURE — 1159F MED LIST DOCD IN RCRD: CPT | Performed by: INTERNAL MEDICINE

## 2025-03-27 PROCEDURE — G8427 DOCREV CUR MEDS BY ELIG CLIN: HCPCS | Performed by: INTERNAL MEDICINE

## 2025-03-27 PROCEDURE — 99215 OFFICE O/P EST HI 40 MIN: CPT | Performed by: INTERNAL MEDICINE

## 2025-03-27 PROCEDURE — 3077F SYST BP >= 140 MM HG: CPT | Performed by: INTERNAL MEDICINE

## 2025-03-27 NOTE — PROGRESS NOTES
distress, appears stated age.   Head:  Normocephalic, without obvious abnormality, atraumatic.   Eyes:  Pupils equal and round. No scleral icterus.   Mouth: Moist mucosa, no pharyngeal erythema.   Nose: Nares normal. No drainage or sinus tenderness.   Neck: Supple, symmetrical, trachea midline. No adenopathy. No tenderness/mass/nodules. No carotid bruit or elevated JVD.   Lungs:   Respiratory Effort: Normal   Auscultation: Clear to auscultation bilaterally, respirations unlabored. No wheeze, rales   Chest Wall:  No tenderness or deformity.   Cardiovascular:    Pulses  Palpation: normal   Ascultation: Regular rate, S1/ S2 normal. No murmur, rub, or gallop.  2+ radial and pedal pulses, symmetric  Carotid  Femoral   Abdomen and Gastrointestinal:   Soft, non-tender, bowel sounds active.  Liver and Spleen  Masses   Musculoskeletal: No muscle wasting  Back  Gait   Extremities: Extremities normal, atraumatic. No cyanosis or edema. No cyanosis clubbing       Skin: Inspection and palpation performed, no rashes or lesions.   Pysch: Normal mood and affect. Alert and oriented to time place person   Neurologic: Normal gross motor and sensory exam.       Labs     All labs have been reviewed    Lab Results   Component Value Date/Time    WBC 9.3 03/21/2025 10:55 AM    RBC 3.85 03/21/2025 10:55 AM    HGB 11.5 03/21/2025 10:55 AM    HCT 35.4 03/21/2025 10:55 AM    MCV 91.8 03/21/2025 10:55 AM    RDW 14.0 03/21/2025 10:55 AM     03/21/2025 10:55 AM     Lab Results   Component Value Date/Time     03/21/2025 10:55 AM    K 4.3 03/21/2025 10:55 AM    K 4.5 08/01/2024 02:59 PM    CL 98 03/21/2025 10:55 AM    CO2 29 03/21/2025 10:55 AM    BUN 31 03/21/2025 10:55 AM    CREATININE 1.8 03/21/2025 10:55 AM    GFRAA >60 08/09/2022 03:27 PM    GFRAA >60 04/22/2013 05:05 AM    AGRATIO 1.3 12/26/2024 04:19 PM    LABGLOM 28 03/21/2025 10:55 AM    LABGLOM 42 03/27/2024 04:57 PM    GLUCOSE 91 03/21/2025 10:55 AM    GLUCOSE 106 06/20/2011

## 2025-03-27 NOTE — TELEPHONE ENCOUNTER
Please schedule CAMERON with Dr Henderson. Daughter will be back on 4/12 but okay to schedule prior if available date.       [facility]   The morning of your Procedure-CAMERON you will park in the hospital parking lot and report directly to the registration desk to check in.     DATE: ____________ TIME: _____________      Pre-Procedure Instructions:  Do not eat or drink anything 8 hours before your procedure.   Hold all diabetic medications the morning of the procedure including, Metfomin.    If you take Lantus/Levemir only take ½ your normal dose the evening before.  All other medications can be taken in the morning with sips of water.   Do not hold anticoagulation therapy: warfarin, eliquis, xarelto therapy.   Do not use any lotions, creams or perfume the morning of procedure.   Please arrive 1 hour prior to procedure time.  Please have a responsible adult to drive you home after procedure. It is recommended you do not drive for 24 hours after procedure.    Cath lab will provide you with all post procedure instructions.     If you have any questions regarding the procedure itself or medications please call [phone] and ask to speak with a nurse.

## 2025-04-01 ENCOUNTER — TELEPHONE (OUTPATIENT)
Dept: CARDIOLOGY CLINIC | Age: 80
End: 2025-04-01

## 2025-04-01 NOTE — TELEPHONE ENCOUNTER
Please review Murj for: \"Possible OptiVol fluid accumulation: 24-Feb-2025 -- ongoing, elevated up to 180 w correlating TI trend below reference line. Triage™ Heart Failure Risk Status on 01-Apr-2025 is High*.\"

## 2025-04-03 NOTE — TELEPHONE ENCOUNTER
Spoke with daughterMini. Reviewed all preps with full understanding.      Procedure: CAMERON - Mitral Valve  Location: [facility]  Provider: Dr. Piedra  Date: 4-4-25  Arrival: 10:00 am  Time: 11:00 am  Reps: n/a  Anesthesia: Yes    Qgenda, Snapboard, Epic updated.  Emailed FF Cath Lab.

## 2025-04-03 NOTE — TELEPHONE ENCOUNTER
Telephone call on 3/25 asking pt HF questions was done and patient had no hf symptoms and no weight gain. Plan was to continue to monitor.

## 2025-04-11 ENCOUNTER — ANESTHESIA (OUTPATIENT)
Age: 80
End: 2025-04-11
Payer: MEDICARE

## 2025-04-11 ENCOUNTER — ANESTHESIA EVENT (OUTPATIENT)
Age: 80
End: 2025-04-11
Payer: MEDICARE

## 2025-04-11 ENCOUNTER — RESULTS FOLLOW-UP (OUTPATIENT)
Dept: CARDIOLOGY CLINIC | Age: 80
End: 2025-04-11

## 2025-04-11 ENCOUNTER — HOSPITAL ENCOUNTER (OUTPATIENT)
Age: 80
Discharge: HOME | End: 2025-04-13
Attending: INTERNAL MEDICINE
Payer: MEDICARE

## 2025-04-11 VITALS
HEART RATE: 57 BPM | OXYGEN SATURATION: 100 % | RESPIRATION RATE: 19 BRPM | SYSTOLIC BLOOD PRESSURE: 130 MMHG | WEIGHT: 118 LBS | BODY MASS INDEX: 23.16 KG/M2 | HEIGHT: 60 IN | DIASTOLIC BLOOD PRESSURE: 49 MMHG

## 2025-04-11 DIAGNOSIS — I34.0 NONRHEUMATIC MITRAL VALVE REGURGITATION: ICD-10-CM

## 2025-04-11 LAB
ECHO AV MEAN GRADIENT: 5 MMHG
ECHO AV MEAN VELOCITY: 1 M/S
ECHO AV PEAK GRADIENT: 10 MMHG
ECHO AV PEAK VELOCITY: 1.6 M/S
ECHO AV VELOCITY RATIO: 0.5
ECHO AV VTI: 31.2 CM
ECHO BSA: 1.51 M2
ECHO LVOT AV VTI INDEX: 0.62
ECHO LVOT MEAN GRADIENT: 1 MMHG
ECHO LVOT PEAK GRADIENT: 2 MMHG
ECHO LVOT PEAK VELOCITY: 0.8 M/S
ECHO LVOT VTI: 19.3 CM
ECHO MV LVOT VTI INDEX: 1.75
ECHO MV MAX VELOCITY: 1.6 M/S
ECHO MV MEAN GRADIENT: 3 MMHG
ECHO MV MEAN VELOCITY: 0.8 M/S
ECHO MV PEAK GRADIENT: 10 MMHG
ECHO MV REGURGITANT PEAK GRADIENT: 117 MMHG
ECHO MV REGURGITANT PEAK VELOCITY: 5.4 M/S
ECHO MV REGURGITANT RADIUS PISA: 0.8 CM
ECHO MV REGURGITANT VTIA: 201 CM
ECHO MV VTI: 33.7 CM
ECHO PULMONARY ARTERY END DIASTOLIC PRESSURE: 6 MMHG
ECHO PV REGURGITANT MAX VELOCITY: 1.2 M/S

## 2025-04-11 PROCEDURE — 3700000001 HC ADD 15 MINUTES (ANESTHESIA): Performed by: INTERNAL MEDICINE

## 2025-04-11 PROCEDURE — 6360000002 HC RX W HCPCS: Performed by: NURSE ANESTHETIST, CERTIFIED REGISTERED

## 2025-04-11 PROCEDURE — 93319 3D ECHO IMG CGEN CAR ANOMAL: CPT

## 2025-04-11 PROCEDURE — 3700000000 HC ANESTHESIA ATTENDED CARE: Performed by: INTERNAL MEDICINE

## 2025-04-11 PROCEDURE — 7100000011 HC PHASE II RECOVERY - ADDTL 15 MIN: Performed by: INTERNAL MEDICINE

## 2025-04-11 PROCEDURE — 7100000010 HC PHASE II RECOVERY - FIRST 15 MIN: Performed by: INTERNAL MEDICINE

## 2025-04-11 RX ORDER — PROPOFOL 10 MG/ML
INJECTION, EMULSION INTRAVENOUS
Status: DISCONTINUED | OUTPATIENT
Start: 2025-04-11 | End: 2025-04-11 | Stop reason: SDUPTHER

## 2025-04-11 RX ORDER — SODIUM CHLORIDE 0.9 % (FLUSH) 0.9 %
5-40 SYRINGE (ML) INJECTION EVERY 12 HOURS SCHEDULED
Status: CANCELLED | OUTPATIENT
Start: 2025-04-11

## 2025-04-11 RX ORDER — HYDROMORPHONE HYDROCHLORIDE 2 MG/ML
0.25 INJECTION, SOLUTION INTRAMUSCULAR; INTRAVENOUS; SUBCUTANEOUS EVERY 5 MIN PRN
Refills: 0 | Status: CANCELLED | OUTPATIENT
Start: 2025-04-11

## 2025-04-11 RX ORDER — OXYCODONE HYDROCHLORIDE 5 MG/1
10 TABLET ORAL PRN
Refills: 0 | Status: CANCELLED | OUTPATIENT
Start: 2025-04-11 | End: 2025-04-11

## 2025-04-11 RX ORDER — OXYCODONE HYDROCHLORIDE 5 MG/1
5 TABLET ORAL PRN
Refills: 0 | Status: CANCELLED | OUTPATIENT
Start: 2025-04-11 | End: 2025-04-11

## 2025-04-11 RX ORDER — SODIUM CHLORIDE 0.9 % (FLUSH) 0.9 %
5-40 SYRINGE (ML) INJECTION PRN
Status: CANCELLED | OUTPATIENT
Start: 2025-04-11

## 2025-04-11 RX ORDER — SODIUM CHLORIDE 9 MG/ML
INJECTION, SOLUTION INTRAVENOUS PRN
Status: CANCELLED | OUTPATIENT
Start: 2025-04-11

## 2025-04-11 RX ORDER — NALOXONE HYDROCHLORIDE 0.4 MG/ML
INJECTION, SOLUTION INTRAMUSCULAR; INTRAVENOUS; SUBCUTANEOUS PRN
Status: CANCELLED | OUTPATIENT
Start: 2025-04-11

## 2025-04-11 RX ORDER — LIDOCAINE HYDROCHLORIDE 20 MG/ML
INJECTION, SOLUTION EPIDURAL; INFILTRATION; INTRACAUDAL; PERINEURAL
Status: DISCONTINUED | OUTPATIENT
Start: 2025-04-11 | End: 2025-04-11 | Stop reason: SDUPTHER

## 2025-04-11 RX ORDER — FENTANYL CITRATE 50 UG/ML
50 INJECTION, SOLUTION INTRAMUSCULAR; INTRAVENOUS EVERY 5 MIN PRN
Refills: 0 | Status: CANCELLED | OUTPATIENT
Start: 2025-04-11

## 2025-04-11 RX ADMIN — LIDOCAINE HYDROCHLORIDE 60 MG: 20 INJECTION, SOLUTION EPIDURAL; INFILTRATION; INTRACAUDAL; PERINEURAL at 09:05

## 2025-04-11 RX ADMIN — PROPOFOL 50 MG: 10 INJECTION, EMULSION INTRAVENOUS at 09:05

## 2025-04-11 RX ADMIN — PROPOFOL 20 MG: 10 INJECTION, EMULSION INTRAVENOUS at 09:26

## 2025-04-11 RX ADMIN — PHENYLEPHRINE HYDROCHLORIDE 50 MCG: 10 INJECTION INTRAVENOUS at 09:23

## 2025-04-11 RX ADMIN — PROPOFOL 20 MG: 10 INJECTION, EMULSION INTRAVENOUS at 09:22

## 2025-04-11 RX ADMIN — PROPOFOL 20 MG: 10 INJECTION, EMULSION INTRAVENOUS at 09:11

## 2025-04-11 RX ADMIN — PHENYLEPHRINE HYDROCHLORIDE 50 MCG: 10 INJECTION INTRAVENOUS at 09:16

## 2025-04-11 RX ADMIN — PROPOFOL 20 MG: 10 INJECTION, EMULSION INTRAVENOUS at 09:17

## 2025-04-11 ASSESSMENT — ENCOUNTER SYMPTOMS: SHORTNESS OF BREATH: 1

## 2025-04-11 NOTE — DISCHARGE INSTRUCTIONS
CAMERON DISCHARGE INSTRUCTIONS    No driving for 24 hours. We strongly recommend that a responsible adult stay with you for the next 24 hours.    Continue Medications.    Advance diet as tolerated    Contact physician office  for following symptoms:  Fever  Difficulty swallowing  Chest pain  Difficulty breathing  Bleeding

## 2025-04-11 NOTE — ANESTHESIA PRE PROCEDURE
Department of Anesthesiology  Preprocedure Note       Name:  Shodna Hernandes   Age:  79 y.o.  :  1945                                          MRN:  [id number]         Date:  2025      Surgeon: * No surgeons listed *    Procedure:     Medications prior to admission:   Prior to Admission medications    Medication Sig Start Date End Date Taking? Authorizing Provider   metoprolol succinate (TOPROL XL) 50 MG extended release tablet TAKE 1 TABLET BY MOUTH DAILY 3/28/25  Yes Hu Shaikh MD   torsemide (DEMADEX) 20 MG tablet TAKE 2 TABLETS BY MOUTH DAILY 3/28/25  Yes Hu Shaikh MD   DULoxetine (CYMBALTA) 60 MG extended release capsule TAKE 1 CAPSULE BY MOUTH 2 TIMES A DAY 3/28/25  Yes Adela Velazqeuz APRN - CNP   levothyroxine (SYNTHROID) 88 MCG tablet TAKE 1 TABLET BY MOUTH DAILY 3/28/25  Yes Adela Velazquez APRN - CNP   predniSONE (DELTASONE) 10 MG tablet take 3 tabs PO BID x2 days, then 2 tabs PO BID x2 days, then 1 tab PO BID x2 days, then 1 tab PO daily x2 days 3/20/25  Yes Dany Ziegler MD   valsartan (DIOVAN) 40 MG tablet Take 0.5 tablets by mouth daily 3/19/25  Yes Adeel Nguyễn MD   amiodarone (CORDARONE) 200 MG tablet Take 0.5 tablets by mouth daily 25  Yes Pablo Wang MD   dapagliflozin (FARXIGA) 10 MG tablet Take 1 tablet by mouth every morning 25  Yes Edwige Patel APRN - CNP   potassium chloride (KLOR-CON M) 20 MEQ extended release tablet Take 2 tab in AM and 1 tab in PM on days she takes metolazone. Take 1 tab twice daily on all other days. 25  Yes Edwige Patel APRN - CNP   metOLazone (ZAROXOLYN) 2.5 MG tablet 1 tab twice weekly as needed for swelling, SOB, weight gain 25  Yes Edwige Patel APRN - CNP   morphine (MS CONTIN) 15 MG extended release tablet Take 1 tablet by mouth 2 times daily.   Yes Provider, MD Jessy   oxyCODONE-acetaminophen (PERCOCET)  MG per tablet Take 1 tablet by mouth in the morning and 1 tablet in the

## 2025-04-11 NOTE — ANESTHESIA POSTPROCEDURE EVALUATION
Department of Anesthesiology  Postprocedure Note    Patient: Shonda Hernandes  MRN: [id number]  YOB: 1945  Date of evaluation: 4/11/2025    Procedure Summary       Date: 04/11/25 Room / Location: [facility]    Anesthesia Start: 0904 Anesthesia Stop: 0935    Procedure: CAMERON (PRN CONTRAST/BUBBLE/3D) Diagnosis: Nonrheumatic mitral valve regurgitation    Scheduled Providers: Chico Bhatia DO Responsible Provider: Chico Bhatia DO    Anesthesia Type: MAC ASA Status: 4            Anesthesia Type: No value filed.    Stacey Phase I: Stacey Score: 10    Stacey Phase II:      Anesthesia Post Evaluation    Patient location during evaluation: PACU  Patient participation: complete - patient participated  Level of consciousness: awake  Pain score: 0  Airway patency: patent  Nausea & Vomiting: no nausea and no vomiting  Cardiovascular status: blood pressure returned to baseline  Respiratory status: acceptable  Hydration status: euvolemic  Multimodal analgesia pain management approach  Pain management: adequate    No notable events documented.

## 2025-04-11 NOTE — H&P
[facility]  Cardiology Note  [phone]    History of Present Illness:  Shonda Hernandes is a 79 y.o. patient who presents for CAMERON for MR    Past Medical History:   has a past medical history of Arthritis of shoulder region, right, Atrial fibrillation (HCC), AVM (arteriovenous malformation) of duodenum, AVM (arteriovenous malformation) of stomach, Bladder cancer (HCC), CAD (coronary artery disease), Carotid stenosis, Chronic back pain, Chronic diastolic CHF (congestive heart failure) (HCC), Chronic prescription opiate use, COPD (HCC), Diverticulosis, HTN (hypertension), Hyperlipidemia, Hypothyroidism, Ischemic stroke, Lumbar spine stenosis, Macular degeneration, Nonrheumatic mitral valve regurgitation, Obstructive sleep apnea, Osteoarthritis, Peripheral neuropathy, Pulmonary HTN (HCC), PVD (peripheral vascular disease), Syncope, and Tobacco use disorder in remission.    Surgical History:   has a past surgical history that includes Cystoscopy (Feb 2014); joint replacement; Appendectomy; Aortic valve replacement (6/16/15); Upper gastrointestinal endoscopy (12/15/2017); other surgical history (02/20/2018); Coronary angioplasty (2014); Spine surgery (N/A, 3/15/2019); Cholecystectomy, laparoscopic (N/A, 4/25/2019); back surgery (03/15/2019); Pain management procedure (Bilateral, 4/1/2021); Pain management procedure (Bilateral, 7/8/2021); Pain management procedure (Bilateral, 9/9/2021); ablation of dysrhythmic focus (05/09/2022); Pacemaker insertion (04/11/2022); and Pain management procedure (Bilateral, 5/9/2024).     Social History:   reports that she has been smoking cigarettes. She started smoking about 46 years ago. She has a 45.4 pack-year smoking history. She has quit using smokeless tobacco. She reports current alcohol use. She reports that she does not use drugs.     Family History:  Family History   Problem Relation Age of Onset    Breast Cancer Daughter        Home Medications:  Were reviewed and are

## 2025-04-11 NOTE — BRIEF OP NOTE
Patient:  Shonda Hernandes   :   1945    A pre-sedation re-evaluation was performed immediately prior to beginning of  the procedure.  Procedure: CAMERON  Medications: Procedural sedation with minimal conscious sedation  Complications: none  Estimated Blood Loss: none  Specimens: Were not obtained    Preliminary CAMERON findings:  Moderate MR  Normally functioning AVR        [place] Medication and Procedural Reconciliation:  I agree that the documented medications and procedures performed are true.  The medications were given under my order.  The procedures were performed under my direct supervision.    Jass Piedra MD 2025 10:40 AM

## 2025-04-15 ENCOUNTER — TELEPHONE (OUTPATIENT)
Dept: CARDIOLOGY CLINIC | Age: 80
End: 2025-04-15

## 2025-04-15 ENCOUNTER — TELEPHONE (OUTPATIENT)
Dept: ORTHOPEDIC SURGERY | Age: 80
End: 2025-04-15

## 2025-04-15 DIAGNOSIS — I34.0 NONRHEUMATIC MITRAL VALVE REGURGITATION: Primary | ICD-10-CM

## 2025-04-15 DIAGNOSIS — M75.101 TEAR OF RIGHT ROTATOR CUFF, UNSPECIFIED TEAR EXTENT, UNSPECIFIED WHETHER TRAUMATIC: Primary | ICD-10-CM

## 2025-04-15 NOTE — TELEPHONE ENCOUNTER
----- Message from Tello BARGER sent at 4/15/2025 11:42 AM EDT -----  Specialty Referral Request    Reason for referral request: Specialty Provider    Specialist/Lab/Test/DME Item patient is requesting (if known): SHOULDER SURGEON    Specialist Phone Number (if applicable):    Additional Information: DAUGHTER CALLED STATES THAT DR PAREDESBI RECOMMENDED SURGERY SO THEY ARE ASKING FOR A REFERRAL FOR THIS PHYSICIAN  --------------------------------------------------------------------------------------------------------------------------    Relationship to Patient: Other CHILD    Call Back Info: OK to leave message on voicemail  Preferred Call Back Number: Phone [phone]

## 2025-04-15 NOTE — TELEPHONE ENCOUNTER
Advised patient's daughter referral was placed. Cancelled apt on 4/30/25 as requested.   Referral to Dr. Armendariz placed in patient's chart.

## 2025-04-15 NOTE — TELEPHONE ENCOUNTER
Nora, per Dr. Mckenzie, can you schedule pt for an echo and OV w him in 11/2025 in a TAVR spot at [place]? Ok to cancel the 7/17/25 OV with him. Thanks, Abdiel GARCIA

## 2025-04-17 ENCOUNTER — OFFICE VISIT (OUTPATIENT)
Dept: ORTHOPEDIC SURGERY | Age: 80
End: 2025-04-17

## 2025-04-17 VITALS — HEIGHT: 60 IN | WEIGHT: 118 LBS | RESPIRATION RATE: 16 BRPM | BODY MASS INDEX: 23.16 KG/M2

## 2025-04-17 DIAGNOSIS — M40.202 KYPHOSIS OF CERVICAL REGION, UNSPECIFIED KYPHOSIS TYPE: ICD-10-CM

## 2025-04-17 DIAGNOSIS — F11.90 CHRONIC, CONTINUOUS USE OF OPIOIDS: ICD-10-CM

## 2025-04-17 DIAGNOSIS — M87.021 AVASCULAR NECROSIS OF RIGHT HUMERAL HEAD: Primary | ICD-10-CM

## 2025-04-17 DIAGNOSIS — M75.121 NONTRAUMATIC COMPLETE TEAR OF RIGHT ROTATOR CUFF: ICD-10-CM

## 2025-04-17 DIAGNOSIS — Z72.0 TOBACCO ABUSE: ICD-10-CM

## 2025-04-17 NOTE — PROGRESS NOTES
Exam: Rule out rotator cuff tear Dx: Tear of right rotator cuff,  unspecified tear extent, unspecified whether traumatic M75.101 (ICD-10-CM)     78-year-old female; rule out rotator cuff tear     FINDINGS:  Exam limited due to respiratory motion.     ROTATOR CUFF: Subacromial subdeltoid bursa contiguous with the glenohumeral  joint.     Moderate to large amount of fluid in the subacromial subdeltoid bursa.     Retracted full-thickness tear of majority of supraspinatus and anterior  infraspinatus with retraction of the torn fibers measuring up to 3.3 cm on  image 11, series 4.     Shallow partial-thickness articular-surface tearing of remainder of  infraspinatus between musculotendinous junction and footplate.     Low-grade partial-thickness articular surface tearing of the insertional  fibers of subscapularis.     Teres minor muscle/tendon appears intact.     No significant atrophy or fatty degeneration of the visualized rotator cuff  musculature.     BICEPS TENDON: Moderate tendinosis and suspected thinning of the  intra-articular long head of biceps tendon.     LABRUM: Mild diffuse labral degeneration.  No paralabral cyst formation.     GLENOHUMERAL JOINT: Mild glenohumeral chondromalacia.  Inferior glenohumeral  ligament appears intact.  Small glenohumeral joint effusion.     AC JOINT AND ACROMIOCLAVICULAR ARCH: Narrowing of the acromiohumeral  distance.  Mild degenerative change of the right AC joint.  Type 2 acromion.     BONE MARROW: Subcortical cystic changes at the posterolateral humeral head.  No acute fracture or dislocation.  Bone marrow signal intensity otherwise  grossly unremarkable.     OUTLET SPACES: Suprascapular notch and quadrilateral space grossly  unremarkable in appearance.     No right axillary lymphadenopathy.     IMPRESSION:  1. Retracted full-thickness tear of majority of supraspinatus and anterior  infraspinatus between musculotendinous junction and footplate with retraction  of the torn

## 2025-04-18 DIAGNOSIS — M87.021 AVASCULAR NECROSIS OF RIGHT HUMERAL HEAD (HCC): ICD-10-CM

## 2025-04-19 ENCOUNTER — APPOINTMENT (OUTPATIENT)
Dept: CT IMAGING | Age: 80
End: 2025-04-19
Payer: MEDICARE

## 2025-04-19 ENCOUNTER — HOSPITAL ENCOUNTER (EMERGENCY)
Age: 80
Discharge: HOME | End: 2025-04-19
Attending: EMERGENCY MEDICINE
Payer: MEDICARE

## 2025-04-19 ENCOUNTER — HOSPITAL ENCOUNTER (INPATIENT)
Age: 80
LOS: 5 days | Discharge: HOME HEALTH | DRG: 543 | End: 2025-04-24
Attending: INTERNAL MEDICINE | Admitting: INTERNAL MEDICINE
Payer: MEDICARE

## 2025-04-19 ENCOUNTER — APPOINTMENT (OUTPATIENT)
Dept: GENERAL RADIOLOGY | Age: 80
End: 2025-04-19
Payer: MEDICARE

## 2025-04-19 VITALS
DIASTOLIC BLOOD PRESSURE: 72 MMHG | WEIGHT: 117.95 LBS | RESPIRATION RATE: 14 BRPM | TEMPERATURE: 99.8 F | BODY MASS INDEX: 23.03 KG/M2 | OXYGEN SATURATION: 93 % | HEART RATE: 61 BPM | SYSTOLIC BLOOD PRESSURE: 113 MMHG

## 2025-04-19 DIAGNOSIS — R93.89 ABNORMAL COMPUTED TOMOGRAPHY ANGIOGRAPHY (CTA) OF NECK: ICD-10-CM

## 2025-04-19 DIAGNOSIS — S09.90XA CLOSED HEAD INJURY, INITIAL ENCOUNTER: ICD-10-CM

## 2025-04-19 DIAGNOSIS — S12.100A TRAUMATIC CLOSED FRACTURE OF C2 VERTEBRA WITH MINIMAL DISPLACEMENT, INITIAL ENCOUNTER (HCC): Primary | ICD-10-CM

## 2025-04-19 DIAGNOSIS — S12.190A COMPRESSION FRACTURE OF C2 VERTEBRA, INITIAL ENCOUNTER (HCC): Primary | ICD-10-CM
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
## 2025-04-19 DIAGNOSIS — I50.33 ACUTE ON CHRONIC DIASTOLIC CONGESTIVE HEART FAILURE (HCC): ICD-10-CM

## 2025-04-19 LAB
ANION GAP SERPL CALCULATED.3IONS-SCNC: 15 MMOL/L (ref 3–16)
BASOPHILS # BLD: 0.1 K/UL (ref 0–0.2)
BASOPHILS NFR BLD: 1.2 %
BUN SERPL-MCNC: 21 MG/DL (ref 7–20)
CALCIUM SERPL-MCNC: 10.2 MG/DL (ref 8.3–10.6)
CHLORIDE SERPL-SCNC: 96 MMOL/L (ref 99–110)
CO2 SERPL-SCNC: 29 MMOL/L (ref 21–32)
CREAT SERPL-MCNC: 1 MG/DL (ref 0.6–1.2)
DEPRECATED RDW RBC AUTO: 14.9 % (ref 12.4–15.4)
EOSINOPHIL # BLD: 0.4 K/UL (ref 0–0.6)
EOSINOPHIL NFR BLD: 4.9 %
GFR SERPLBLD CREATININE-BSD FMLA CKD-EPI: 57 ML/MIN/{1.73_M2}
GLUCOSE SERPL-MCNC: 105 MG/DL (ref 70–99)
HCT VFR BLD AUTO: 38.9 % (ref 36–48)
HGB BLD-MCNC: 13.3 G/DL (ref 12–16)
LYMPHOCYTES # BLD: 1.3 K/UL (ref 1–5.1)
LYMPHOCYTES NFR BLD: 15.3 %
MCH RBC QN AUTO: 30 PG (ref 26–34)
MCHC RBC AUTO-ENTMCNC: 34.3 G/DL (ref 31–36)
MCV RBC AUTO: 87.5 FL (ref 80–100)
MONOCYTES # BLD: 1.1 K/UL (ref 0–1.3)
MONOCYTES NFR BLD: 13.1 %
NEUTROPHILS # BLD: 5.7 K/UL (ref 1.7–7.7)
NEUTROPHILS NFR BLD: 65.5 %
PLATELET # BLD AUTO: 297 K/UL (ref 135–450)
PMV BLD AUTO: 8 FL (ref 5–10.5)
POTASSIUM SERPL-SCNC: 3.9 MMOL/L (ref 3.5–5.1)
RBC # BLD AUTO: 4.45 M/UL (ref 4–5.2)
SODIUM SERPL-SCNC: 140 MMOL/L (ref 136–145)
WBC # BLD AUTO: 8.7 K/UL (ref 4–11)

## 2025-04-19 PROCEDURE — 70450 CT HEAD/BRAIN W/O DYE: CPT

## 2025-04-19 PROCEDURE — 6360000004 HC RX CONTRAST MEDICATION

## 2025-04-19 PROCEDURE — 99285 EMERGENCY DEPT VISIT HI MDM: CPT

## 2025-04-19 PROCEDURE — 85025 COMPLETE CBC W/AUTO DIFF WBC: CPT

## 2025-04-19 PROCEDURE — 72128 CT CHEST SPINE W/O DYE: CPT

## 2025-04-19 PROCEDURE — 70498 CT ANGIOGRAPHY NECK: CPT

## 2025-04-19 PROCEDURE — 6370000000 HC RX 637 (ALT 250 FOR IP)

## 2025-04-19 PROCEDURE — 6360000002 HC RX W HCPCS

## 2025-04-19 PROCEDURE — 73030 X-RAY EXAM OF SHOULDER: CPT

## 2025-04-19 PROCEDURE — 1200000000 HC SEMI PRIVATE

## 2025-04-19 PROCEDURE — 72125 CT NECK SPINE W/O DYE: CPT

## 2025-04-19 PROCEDURE — 96374 THER/PROPH/DIAG INJ IV PUSH: CPT

## 2025-04-19 PROCEDURE — 80048 BASIC METABOLIC PNL TOTAL CA: CPT

## 2025-04-19 RX ORDER — SODIUM CHLORIDE 9 MG/ML
INJECTION, SOLUTION INTRAVENOUS PRN
Status: DISCONTINUED | OUTPATIENT
Start: 2025-04-19 | End: 2025-04-24 | Stop reason: HOSPADM

## 2025-04-19 RX ORDER — ENOXAPARIN SODIUM 100 MG/ML
40 INJECTION SUBCUTANEOUS DAILY
Status: DISCONTINUED | OUTPATIENT
Start: 2025-04-20 | End: 2025-04-24 | Stop reason: HOSPADM

## 2025-04-19 RX ORDER — MORPHINE SULFATE 2 MG/ML
2 INJECTION, SOLUTION INTRAMUSCULAR; INTRAVENOUS ONCE
Refills: 0 | Status: COMPLETED | OUTPATIENT
Start: 2025-04-19 | End: 2025-04-19

## 2025-04-19 RX ORDER — ONDANSETRON 4 MG/1
4 TABLET, ORALLY DISINTEGRATING ORAL EVERY 8 HOURS PRN
Status: DISCONTINUED | OUTPATIENT
Start: 2025-04-19 | End: 2025-04-24 | Stop reason: HOSPADM

## 2025-04-19 RX ORDER — POLYETHYLENE GLYCOL 3350 17 G/17G
17 POWDER, FOR SOLUTION ORAL DAILY PRN
Status: DISCONTINUED | OUTPATIENT
Start: 2025-04-19 | End: 2025-04-23

## 2025-04-19 RX ORDER — SODIUM CHLORIDE 0.9 % (FLUSH) 0.9 %
5-40 SYRINGE (ML) INJECTION PRN
Status: DISCONTINUED | OUTPATIENT
Start: 2025-04-19 | End: 2025-04-24 | Stop reason: HOSPADM

## 2025-04-19 RX ORDER — ACETAMINOPHEN 650 MG/1
650 SUPPOSITORY RECTAL EVERY 6 HOURS PRN
Status: DISCONTINUED | OUTPATIENT
Start: 2025-04-19 | End: 2025-04-24 | Stop reason: HOSPADM

## 2025-04-19 RX ORDER — SODIUM CHLORIDE 0.9 % (FLUSH) 0.9 %
5-40 SYRINGE (ML) INJECTION EVERY 12 HOURS SCHEDULED
Status: DISCONTINUED | OUTPATIENT
Start: 2025-04-20 | End: 2025-04-24 | Stop reason: HOSPADM

## 2025-04-19 RX ORDER — TRAMADOL HYDROCHLORIDE 50 MG/1
50 TABLET ORAL ONCE
Status: COMPLETED | OUTPATIENT
Start: 2025-04-19 | End: 2025-04-19

## 2025-04-19 RX ORDER — ACETAMINOPHEN 325 MG/1
650 TABLET ORAL EVERY 6 HOURS PRN
Status: DISCONTINUED | OUTPATIENT
Start: 2025-04-19 | End: 2025-04-24 | Stop reason: HOSPADM

## 2025-04-19 RX ORDER — IOPAMIDOL 755 MG/ML
75 INJECTION, SOLUTION INTRAVASCULAR
Status: COMPLETED | OUTPATIENT
Start: 2025-04-19 | End: 2025-04-19

## 2025-04-19 RX ORDER — ONDANSETRON 2 MG/ML
4 INJECTION INTRAMUSCULAR; INTRAVENOUS EVERY 6 HOURS PRN
Status: DISCONTINUED | OUTPATIENT
Start: 2025-04-19 | End: 2025-04-24 | Stop reason: HOSPADM

## 2025-04-19 RX ADMIN — TRAMADOL HYDROCHLORIDE 50 MG: 50 TABLET, COATED ORAL at 17:40

## 2025-04-19 RX ADMIN — MORPHINE SULFATE 2 MG: 2 INJECTION, SOLUTION INTRAMUSCULAR; INTRAVENOUS at 21:00

## 2025-04-19 RX ADMIN — IOPAMIDOL 75 ML: 755 INJECTION, SOLUTION INTRAVENOUS at 20:18

## 2025-04-19 ASSESSMENT — PAIN DESCRIPTION - LOCATION
LOCATION: NECK
LOCATION: SHOULDER;NECK

## 2025-04-19 ASSESSMENT — LIFESTYLE VARIABLES
HOW OFTEN DO YOU HAVE A DRINK CONTAINING ALCOHOL: NEVER
HOW MANY STANDARD DRINKS CONTAINING ALCOHOL DO YOU HAVE ON A TYPICAL DAY: PATIENT DOES NOT DRINK

## 2025-04-19 ASSESSMENT — PAIN - FUNCTIONAL ASSESSMENT: PAIN_FUNCTIONAL_ASSESSMENT: 0-10

## 2025-04-19 ASSESSMENT — PAIN SCALES - GENERAL
PAINLEVEL_OUTOF10: 10
PAINLEVEL_OUTOF10: 9
PAINLEVEL_OUTOF10: 9
PAINLEVEL_OUTOF10: 5

## 2025-04-19 ASSESSMENT — PAIN DESCRIPTION - ORIENTATION: ORIENTATION: RIGHT

## 2025-04-19 NOTE — ED TRIAGE NOTES
Patient presents with neck pain and right shoulder pain after mechanical fall yesterday with no LOC or syncope. During triage, she reports needing restroom, this RN offered a bed pain or a purewick but patient refused. RN attempted to explain that due to her being a fall risk and having neck pain that has not been evaluated, this RN would not feel comfortable getting the patient out of bed until evaluated. Patient refuses. Call light given to patient and all siderails up. Patient states \"I will just hold it.\" Will offer bed pan and purewick again if needed.

## 2025-04-20 ENCOUNTER — APPOINTMENT (OUTPATIENT)
Dept: MRI IMAGING | Age: 80
DRG: 543 | End: 2025-04-20
Attending: INTERNAL MEDICINE
Payer: MEDICARE

## 2025-04-20 LAB
ALBUMIN SERPL-MCNC: 3.6 G/DL (ref 3.4–5)
ANION GAP SERPL CALCULATED.3IONS-SCNC: 13 MMOL/L (ref 3–16)
BACTERIA FLD AEROBE CULT: NORMAL
BASOPHILS # BLD: 0.1 K/UL (ref 0–0.2)
BASOPHILS NFR BLD: 0.8 %
BUN SERPL-MCNC: 21 MG/DL (ref 7–20)
CALCIUM SERPL-MCNC: 9.7 MG/DL (ref 8.3–10.6)
CHLORIDE SERPL-SCNC: 98 MMOL/L (ref 99–110)
CO2 SERPL-SCNC: 30 MMOL/L (ref 21–32)
CREAT SERPL-MCNC: 1.1 MG/DL (ref 0.6–1.2)
DEPRECATED RDW RBC AUTO: 15.1 % (ref 12.4–15.4)
EOSINOPHIL # BLD: 0.5 K/UL (ref 0–0.6)
EOSINOPHIL NFR BLD: 6.1 %
GFR SERPLBLD CREATININE-BSD FMLA CKD-EPI: 51 ML/MIN/{1.73_M2}
GLUCOSE SERPL-MCNC: 81 MG/DL (ref 70–99)
GRAM STN SPEC: NORMAL
HCT VFR BLD AUTO: 35.2 % (ref 36–48)
HGB BLD-MCNC: 12.1 G/DL (ref 12–16)
LYMPHOCYTES # BLD: 1.5 K/UL (ref 1–5.1)
LYMPHOCYTES NFR BLD: 18.9 %
MAGNESIUM SERPL-MCNC: 2.4 MG/DL (ref 1.8–2.4)
MCH RBC QN AUTO: 29.9 PG (ref 26–34)
MCHC RBC AUTO-ENTMCNC: 34.3 G/DL (ref 31–36)
MCV RBC AUTO: 87.3 FL (ref 80–100)
MONOCYTES # BLD: 1.3 K/UL (ref 0–1.3)
MONOCYTES NFR BLD: 16 %
NEUTROPHILS # BLD: 4.6 K/UL (ref 1.7–7.7)
NEUTROPHILS NFR BLD: 58.2 %
PHOSPHATE SERPL-MCNC: 4.3 MG/DL (ref 2.5–4.9)
PLATELET # BLD AUTO: 291 K/UL (ref 135–450)
PMV BLD AUTO: 8.2 FL (ref 5–10.5)
POTASSIUM SERPL-SCNC: 3 MMOL/L (ref 3.5–5.1)
RBC # BLD AUTO: 4.03 M/UL (ref 4–5.2)
SODIUM SERPL-SCNC: 141 MMOL/L (ref 136–145)
WBC # BLD AUTO: 8 K/UL (ref 4–11)

## 2025-04-20 PROCEDURE — 83735 ASSAY OF MAGNESIUM: CPT

## 2025-04-20 PROCEDURE — 6370000000 HC RX 637 (ALT 250 FOR IP)

## 2025-04-20 PROCEDURE — 6360000004 HC RX CONTRAST MEDICATION

## 2025-04-20 PROCEDURE — 2500000003 HC RX 250 WO HCPCS

## 2025-04-20 PROCEDURE — A9579 GAD-BASE MR CONTRAST NOS,1ML: HCPCS

## 2025-04-20 PROCEDURE — 80069 RENAL FUNCTION PANEL: CPT

## 2025-04-20 PROCEDURE — 36415 COLL VENOUS BLD VENIPUNCTURE: CPT

## 2025-04-20 PROCEDURE — 6360000002 HC RX W HCPCS

## 2025-04-20 PROCEDURE — 94640 AIRWAY INHALATION TREATMENT: CPT

## 2025-04-20 PROCEDURE — 1200000000 HC SEMI PRIVATE

## 2025-04-20 PROCEDURE — 2500000003 HC RX 250 WO HCPCS: Performed by: INTERNAL MEDICINE

## 2025-04-20 PROCEDURE — 85025 COMPLETE CBC W/AUTO DIFF WBC: CPT

## 2025-04-20 PROCEDURE — 72156 MRI NECK SPINE W/O & W/DYE: CPT

## 2025-04-20 RX ORDER — OXYCODONE AND ACETAMINOPHEN 10; 325 MG/1; MG/1
1 TABLET ORAL 2 TIMES DAILY
Refills: 0 | Status: DISCONTINUED | OUTPATIENT
Start: 2025-04-20 | End: 2025-04-20

## 2025-04-20 RX ORDER — PENICILLIN V POTASSIUM 250 MG/1
250 TABLET, FILM COATED ORAL 2 TIMES DAILY
Status: DISCONTINUED | OUTPATIENT
Start: 2025-04-20 | End: 2025-04-24 | Stop reason: HOSPADM

## 2025-04-20 RX ORDER — LORAZEPAM 2 MG/ML
1 INJECTION INTRAMUSCULAR ONCE
Status: COMPLETED | OUTPATIENT
Start: 2025-04-20 | End: 2025-04-20

## 2025-04-20 RX ORDER — OXYCODONE AND ACETAMINOPHEN 10; 325 MG/1; MG/1
1 TABLET ORAL 2 TIMES DAILY
Refills: 0 | Status: DISCONTINUED | OUTPATIENT
Start: 2025-04-20 | End: 2025-04-22

## 2025-04-20 RX ORDER — LEVOTHYROXINE SODIUM 88 UG/1
88 TABLET ORAL DAILY
Status: DISCONTINUED | OUTPATIENT
Start: 2025-04-20 | End: 2025-04-24 | Stop reason: HOSPADM

## 2025-04-20 RX ORDER — LIDOCAINE 4 G/G
1 PATCH TOPICAL DAILY
Status: DISCONTINUED | OUTPATIENT
Start: 2025-04-20 | End: 2025-04-24 | Stop reason: HOSPADM

## 2025-04-20 RX ORDER — DULOXETIN HYDROCHLORIDE 60 MG/1
60 CAPSULE, DELAYED RELEASE ORAL 2 TIMES DAILY
Status: DISCONTINUED | OUTPATIENT
Start: 2025-04-20 | End: 2025-04-24 | Stop reason: HOSPADM

## 2025-04-20 RX ORDER — AMIODARONE HYDROCHLORIDE 200 MG/1
100 TABLET ORAL DAILY
Status: DISCONTINUED | OUTPATIENT
Start: 2025-04-20 | End: 2025-04-24 | Stop reason: HOSPADM

## 2025-04-20 RX ORDER — IPRATROPIUM BROMIDE 21 UG/1
1 SPRAY, METERED NASAL 3 TIMES DAILY PRN
Status: DISCONTINUED | OUTPATIENT
Start: 2025-04-20 | End: 2025-04-24 | Stop reason: HOSPADM

## 2025-04-20 RX ORDER — EZETIMIBE 10 MG/1
10 TABLET ORAL NIGHTLY
Status: DISCONTINUED | OUTPATIENT
Start: 2025-04-20 | End: 2025-04-24 | Stop reason: HOSPADM

## 2025-04-20 RX ORDER — TORSEMIDE 20 MG/1
40 TABLET ORAL DAILY
Status: DISCONTINUED | OUTPATIENT
Start: 2025-04-20 | End: 2025-04-24 | Stop reason: HOSPADM

## 2025-04-20 RX ORDER — MORPHINE SULFATE 15 MG/1
15 TABLET, FILM COATED, EXTENDED RELEASE ORAL 2 TIMES DAILY
Refills: 0 | Status: DISCONTINUED | OUTPATIENT
Start: 2025-04-20 | End: 2025-04-22

## 2025-04-20 RX ORDER — ASPIRIN 81 MG/1
81 TABLET ORAL DAILY
Status: DISCONTINUED | OUTPATIENT
Start: 2025-04-20 | End: 2025-04-24 | Stop reason: HOSPADM

## 2025-04-20 RX ORDER — METOPROLOL SUCCINATE 50 MG/1
50 TABLET, EXTENDED RELEASE ORAL DAILY
Status: DISCONTINUED | OUTPATIENT
Start: 2025-04-20 | End: 2025-04-21

## 2025-04-20 RX ORDER — HYDROMORPHONE HYDROCHLORIDE 1 MG/ML
0.25 INJECTION, SOLUTION INTRAMUSCULAR; INTRAVENOUS; SUBCUTANEOUS
Status: DISCONTINUED | OUTPATIENT
Start: 2025-04-20 | End: 2025-04-21

## 2025-04-20 RX ORDER — POTASSIUM CHLORIDE 7.45 MG/ML
10 INJECTION INTRAVENOUS
Status: COMPLETED | OUTPATIENT
Start: 2025-04-20 | End: 2025-04-20

## 2025-04-20 RX ORDER — HYDROMORPHONE HYDROCHLORIDE 1 MG/ML
0.5 INJECTION, SOLUTION INTRAMUSCULAR; INTRAVENOUS; SUBCUTANEOUS
Status: DISCONTINUED | OUTPATIENT
Start: 2025-04-20 | End: 2025-04-21

## 2025-04-20 RX ORDER — HYDROMORPHONE HYDROCHLORIDE 1 MG/ML
0.25 INJECTION, SOLUTION INTRAMUSCULAR; INTRAVENOUS; SUBCUTANEOUS EVERY 12 HOURS PRN
Status: DISCONTINUED | OUTPATIENT
Start: 2025-04-20 | End: 2025-04-20

## 2025-04-20 RX ORDER — SENNA AND DOCUSATE SODIUM 50; 8.6 MG/1; MG/1
1 TABLET, FILM COATED ORAL 2 TIMES DAILY
Status: DISCONTINUED | OUTPATIENT
Start: 2025-04-20 | End: 2025-04-24 | Stop reason: HOSPADM

## 2025-04-20 RX ORDER — VALSARTAN 80 MG/1
20 TABLET ORAL DAILY
Status: DISCONTINUED | OUTPATIENT
Start: 2025-04-20 | End: 2025-04-24 | Stop reason: HOSPADM

## 2025-04-20 RX ADMIN — POTASSIUM CHLORIDE 10 MEQ: 10 INJECTION, SOLUTION INTRAVENOUS at 14:29

## 2025-04-20 RX ADMIN — ARFORMOTEROL TARTRATE: 15 SOLUTION RESPIRATORY (INHALATION) at 11:07

## 2025-04-20 RX ADMIN — HYDROMORPHONE HYDROCHLORIDE 0.25 MG: 1 INJECTION, SOLUTION INTRAMUSCULAR; INTRAVENOUS; SUBCUTANEOUS at 13:04

## 2025-04-20 RX ADMIN — LORAZEPAM 1 MG: 2 INJECTION INTRAMUSCULAR; INTRAVENOUS at 08:36

## 2025-04-20 RX ADMIN — EZETIMIBE 10 MG: 10 TABLET ORAL at 20:50

## 2025-04-20 RX ADMIN — TORSEMIDE 40 MG: 20 TABLET ORAL at 08:14

## 2025-04-20 RX ADMIN — POTASSIUM CHLORIDE 10 MEQ: 10 INJECTION, SOLUTION INTRAVENOUS at 13:07

## 2025-04-20 RX ADMIN — GADOTERIDOL 11 ML: 279.3 INJECTION, SOLUTION INTRAVENOUS at 09:38

## 2025-04-20 RX ADMIN — SENNOSIDES AND DOCUSATE SODIUM 1 TABLET: 50; 8.6 TABLET ORAL at 20:51

## 2025-04-20 RX ADMIN — POTASSIUM CHLORIDE 10 MEQ: 10 INJECTION, SOLUTION INTRAVENOUS at 11:59

## 2025-04-20 RX ADMIN — DULOXETINE 60 MG: 60 CAPSULE, DELAYED RELEASE ORAL at 08:15

## 2025-04-20 RX ADMIN — SENNOSIDES AND DOCUSATE SODIUM 1 TABLET: 50; 8.6 TABLET ORAL at 08:14

## 2025-04-20 RX ADMIN — DULOXETINE 60 MG: 60 CAPSULE, DELAYED RELEASE ORAL at 20:51

## 2025-04-20 RX ADMIN — LEVOTHYROXINE SODIUM 88 MCG: 0.09 TABLET ORAL at 08:15

## 2025-04-20 RX ADMIN — PENICILLIN V POTASSIUM 250 MG: 250 TABLET, FILM COATED ORAL at 22:20

## 2025-04-20 RX ADMIN — SODIUM CHLORIDE, PRESERVATIVE FREE 10 ML: 5 INJECTION INTRAVENOUS at 21:01

## 2025-04-20 RX ADMIN — MORPHINE SULFATE 15 MG: 15 TABLET, FILM COATED, EXTENDED RELEASE ORAL at 02:26

## 2025-04-20 RX ADMIN — OXYCODONE AND ACETAMINOPHEN 1 TABLET: 325; 10 TABLET ORAL at 15:51

## 2025-04-20 RX ADMIN — VALSARTAN 20 MG: 80 TABLET, FILM COATED ORAL at 08:15

## 2025-04-20 RX ADMIN — PENICILLIN V POTASSIUM 250 MG: 250 TABLET, FILM COATED ORAL at 11:03

## 2025-04-20 RX ADMIN — DULOXETINE 60 MG: 60 CAPSULE, DELAYED RELEASE ORAL at 02:26

## 2025-04-20 RX ADMIN — MORPHINE SULFATE 15 MG: 15 TABLET, FILM COATED, EXTENDED RELEASE ORAL at 20:51

## 2025-04-20 RX ADMIN — SODIUM CHLORIDE, PRESERVATIVE FREE 10 ML: 5 INJECTION INTRAVENOUS at 08:52

## 2025-04-20 RX ADMIN — OXYCODONE AND ACETAMINOPHEN 1 TABLET: 325; 10 TABLET ORAL at 05:19

## 2025-04-20 RX ADMIN — POTASSIUM CHLORIDE 10 MEQ: 10 INJECTION, SOLUTION INTRAVENOUS at 09:56

## 2025-04-20 RX ADMIN — AMIODARONE HYDROCHLORIDE 100 MG: 200 TABLET ORAL at 08:15

## 2025-04-20 RX ADMIN — POTASSIUM CHLORIDE 10 MEQ: 10 INJECTION, SOLUTION INTRAVENOUS at 10:54

## 2025-04-20 RX ADMIN — MORPHINE SULFATE 15 MG: 15 TABLET, FILM COATED, EXTENDED RELEASE ORAL at 08:15

## 2025-04-20 RX ADMIN — OXYCODONE AND ACETAMINOPHEN 1 TABLET: 325; 10 TABLET ORAL at 11:03

## 2025-04-20 RX ADMIN — HYDROMORPHONE HYDROCHLORIDE 0.5 MG: 1 INJECTION, SOLUTION INTRAMUSCULAR; INTRAVENOUS; SUBCUTANEOUS at 22:14

## 2025-04-20 ASSESSMENT — PAIN SCALES - GENERAL
PAINLEVEL_OUTOF10: 0
PAINLEVEL_OUTOF10: 7
PAINLEVEL_OUTOF10: 8
PAINLEVEL_OUTOF10: 5
PAINLEVEL_OUTOF10: 7
PAINLEVEL_OUTOF10: 6
PAINLEVEL_OUTOF10: 5
PAINLEVEL_OUTOF10: 9
PAINLEVEL_OUTOF10: 6
PAINLEVEL_OUTOF10: 5
PAINLEVEL_OUTOF10: 8
PAINLEVEL_OUTOF10: 7
PAINLEVEL_OUTOF10: 7
PAINLEVEL_OUTOF10: 0
PAINLEVEL_OUTOF10: 7

## 2025-04-20 ASSESSMENT — PAIN DESCRIPTION - ORIENTATION
ORIENTATION: OTHER (COMMENT);RIGHT
ORIENTATION: POSTERIOR
ORIENTATION: POSTERIOR
ORIENTATION: ANTERIOR;POSTERIOR;MID;LOWER
ORIENTATION: POSTERIOR
ORIENTATION: POSTERIOR;MID;UPPER
ORIENTATION: POSTERIOR;ANTERIOR
ORIENTATION: POSTERIOR
ORIENTATION: LOWER

## 2025-04-20 ASSESSMENT — PAIN DESCRIPTION - DESCRIPTORS
DESCRIPTORS: ACHING
DESCRIPTORS: ACHING;STABBING
DESCRIPTORS: THROBBING;ACHING;STABBING
DESCRIPTORS: ACHING;STABBING
DESCRIPTORS: ACHING;SHARP
DESCRIPTORS: THROBBING;STABBING

## 2025-04-20 ASSESSMENT — PAIN DESCRIPTION - LOCATION
LOCATION: NECK;SHOULDER
LOCATION: BACK;NECK
LOCATION: BACK
LOCATION: NECK;BACK
LOCATION: NECK;SHOULDER
LOCATION: NECK
LOCATION: NECK;SHOULDER
LOCATION: BACK;NECK
LOCATION: NECK;BACK

## 2025-04-20 ASSESSMENT — PAIN DESCRIPTION - PAIN TYPE
TYPE: ACUTE PAIN
TYPE: CHRONIC PAIN

## 2025-04-20 ASSESSMENT — PAIN - FUNCTIONAL ASSESSMENT
PAIN_FUNCTIONAL_ASSESSMENT: ACTIVITIES ARE NOT PREVENTED
PAIN_FUNCTIONAL_ASSESSMENT: PREVENTS OR INTERFERES SOME ACTIVE ACTIVITIES AND ADLS
PAIN_FUNCTIONAL_ASSESSMENT: ACTIVITIES ARE NOT PREVENTED
PAIN_FUNCTIONAL_ASSESSMENT: ACTIVITIES ARE NOT PREVENTED
PAIN_FUNCTIONAL_ASSESSMENT: PREVENTS OR INTERFERES SOME ACTIVE ACTIVITIES AND ADLS

## 2025-04-20 ASSESSMENT — PAIN SCALES - WONG BAKER: WONGBAKER_NUMERICALRESPONSE: NO HURT

## 2025-04-20 ASSESSMENT — PAIN DESCRIPTION - FREQUENCY
FREQUENCY: CONTINUOUS
FREQUENCY: CONTINUOUS

## 2025-04-20 ASSESSMENT — PAIN DESCRIPTION - ONSET
ONSET: ON-GOING
ONSET: ON-GOING

## 2025-04-20 NOTE — ED PROVIDER NOTES
[facility] EMERGENCY DEPARTMENT  EMERGENCY DEPARTMENT ENCOUNTER      Pt Name: Shonda Hernandes  MRN: [id number]  Birthdate 1945  Date of evaluation: 4/19/2025  Provider: FREDERICK RUGGIERO DO    CHIEF COMPLAINT  Chief Complaint   Patient presents with    Fall     Patient presents with mechanical fall and now has neck pain after falling forward on her forehead. Patient states she did not want to come in yesterday but took a pain pill. She denies syncope or LOC.        I have fully participated in the care of Shonda Hernandes and have had a face-to-face evaluation. I have reviewed and agree with all pertinent clinical information, and midlevel provider's history, and physical exam. I have also reviewed the labs and imaging studies and treatment plan. I have also reviewed and agree with the medications, allergies and past medical history section for this Shonda Hernandes. I agree with the diagnosis, and I concur.    I personally saw the patient and made/approved the management plan and take responsibility for the patient management.      This patient is at risk for a communicable infection.  Therefore, personal protection equipment consisting of a mask was worn for the exam.    Past Medical History:   Diagnosis Date    Arthritis of shoulder region, right 2024    advanced    Atrial fibrillation (HCC)     had watchman's    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    Bladder cancer ([facility]) 02/2014    CAD (coronary artery disease) 2014    Carotid stenosis 04/30/2014    Chronic back pain     Chronic diastolic CHF (congestive heart failure) ([facility]) 2016    Chronic prescription opiate use     COPD (HCC)     Diverticulosis     HTN (hypertension)     Hyperlipidemia     Hypothyroidism     Ischemic stroke 2013    x 2    Lumbar spine stenosis 2017    multiple procedures unsuccessful.    Macular degeneration 2018    Nonrheumatic mitral valve regurgitation     Obstructive

## 2025-04-20 NOTE — ED NOTES
Pt was transported from stretcher to bed by [facility] staff, with no complications. All paper work including EMTALA sent with patient.

## 2025-04-20 NOTE — CONSULTS
Neurosurgery Consult:    Patient Name: Shonda Hernandes YOB: 1945   Sex: Female Age: 79 yrs     Medical Record Number: [id number] Acct Number: [id number]   Room Number: 5508/5508-01 Hospital Day: Hospital Day: 2     Requesting physician: Fredy Arenas MD    Reason for consultation: C2 fracture    History of present illness: Patient is a 79 y.o. female w/ PMH of atrial fibrillation, CAD s/p CHILANGO, pulmonary hypertension, CHF, aortic stenosis s/p AVR, COPD, and active smoking who suffered a fall while walking on the grass.  She reports striking her forehead on the ground. Denies LOC. She complains of neck pain but denies any pain in the upper extremities, numbness, or paresthesias.    ROS:   Denies fever or recent illness.   Denies chest pain  Denies shortness of breath  Denies changes in bowel or bladder function    VITAL SIGNS   BP (!) 121/53   Pulse 50   Temp 98.2 °F (36.8 °C) (Oral)   Resp 16   SpO2 97%    Height     Weight          Allergies Allergies   Allergen Reactions    Benadryl [Diphenhydramine] Swelling    Doxylamine     Mucinex [Guaifenesin Er]      hallucinations    Spiriva Handihaler [Tiotropium Bromide Monohydrate]      Nausea      Adhesive Tape Rash and Swelling    Neosporin [Bacitracin-Polymyxin B] Other (See Comments)     Rash that spread across face with swelling    Tylenol [Acetaminophen] Nausea And Vomiting      NPO Status Diet NPO   Isolation No active isolations     MEDICAL HISTORY   Past Medical History       Diagnosis Date    Arthritis of shoulder region, right 2024    advanced    Atrial fibrillation (HCC)     had watchman's    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    Bladder cancer ([facility]) 02/2014    CAD (coronary artery disease) 2014    Carotid stenosis 04/30/2014    Chronic back pain     Chronic diastolic CHF (congestive heart failure) ([facility]) 2016    Chronic prescription opiate use     COPD ([facility])

## 2025-04-20 NOTE — ED NOTES
Report given to RADHA Oneill at the [facility]. She verbalized understanding of patient condition and has no further questions.

## 2025-04-20 NOTE — ED PROVIDER NOTES
[facility] EMERGENCY DEPARTMENT  EMERGENCY DEPARTMENT ENCOUNTER        Pt Name: Shonda Hernandes  MRN: [id number]  Birthdate 1945  Date of evaluation: 4/19/2025  Provider: Sherly Martins PA-C  PCP: Adeel Nguyễn MD  Note Started: 9:13 PM EDT 4/19/25       I have seen and evaluated this patient with my supervising physician Tad Noyola DO.      CHIEF COMPLAINT       Chief Complaint   Patient presents with    Fall     Patient presents with mechanical fall and now has neck pain after falling forward on her forehead. Patient states she did not want to come in yesterday but took a pain pill. She denies syncope or LOC.        HISTORY OF PRESENT ILLNESS: 1 or more Elements     History From: Patient      Shonda Hernandes is a 79 y.o. female who presents to the ED with a concern of neck pain and right shoulder pain after she had a mechanical fall yesterday, said landing on her forehead and noticed her neck hyperflexed, said that she noticed pain after this  She also mentioned having upper back pain, did not lose consciousness, no anticoagulation use  Denies numbness or tingling upper extremity, no weakness  Denies chest pain, shortness of breath, abdominal pain, lower back pain, headaches, dizziness, blurry vision  Said she is due to have right shoulder surgery coming up.    Nursing Notes were all reviewed and agreed with or any disagreements were addressed in the HPI.    REVIEW OF SYSTEMS :      Review of Systems    Positives and Pertinent negatives as per HPI.     SURGICAL HISTORY     Past Surgical History:   Procedure Laterality Date    ABLATION OF DYSRHYTHMIC FOCUS  05/09/2022    AORTIC VALVE REPLACEMENT  6/16/15    Dr. Correa - PVI, RENETTA exclusion. 19mm Maki pericardial Magna    APPENDECTOMY      BACK SURGERY  03/15/2019    superion inserted to keep back from hurting: [place]    CHOLECYSTECTOMY, LAPAROSCOPIC N/A 4/25/2019    EMERGENT; LAPAROSCOPIC CHOLECYSTECTOMY WITH GRAM performed by Dean

## 2025-04-20 NOTE — H&P
Internal Medicine H&P    Date:   2025   Patient:  Shonda Hernandes   :   1945     CC:  No chief complaint on file.       Source of HPI: Patient    Subjective     HPI:   Ms. Shonda Hernandes is a 79 y.o. female with a MHx significant for afib s/p PVI and RENETTA exclusion , CAD s/p CHILANGO 2014, CHF, pulmonary HTN, aortic stenosis s/p AVR, COPD, [employer] Bi-V PPM on 2022 current smoker who presented to the ED on  due to neck pain following fall.    Fell when stepping onto grass section outside in yard yesterday while taking trash out. She reports hitting forehead on ground. No LOC. Describes fall as \"clumsiness\" and denies any preceding chest pain, lightheadedness. Couldn't get up by herself and a neighbor saw her on ground and called daughter and granddaughter out of house. She experienced bad neck pain when she was helped up so bad that she screamed out. She just took some of her home pain medications prescribed for chronic back pain. She reports pain somewhat subsided but she would experience sharp neck pain if she turned her head in certain ways. She reports no new numbness, tingling, loss of sensation, loss of bladder or bowel control. She does report it was a bit difficult to hold glass of water with L hand. She reports chronic R shoulder pain that was being evaluated for surgery. Last fall was 3 weeks ago falling against shower door hurting her upper left arm. No back surgeries.    PMHx:      Diagnosis Date    Arthritis of shoulder region, right     advanced    Atrial fibrillation (HCC)     had watchman's    AVM (arteriovenous malformation) of duodenum 2017    AVM (arteriovenous malformation) of stomach 2017    Bladder cancer (HCC) 2014    CAD (coronary artery disease) 2014    Carotid stenosis 2014    Chronic back pain     Chronic diastolic CHF (congestive heart failure) ([facility]) 2016    Chronic prescription opiate use     COPD (HCC)     Diverticulosis     HTN (hypertension)

## 2025-04-20 NOTE — PLAN OF CARE
Problem: Safety - Adult  Goal: Free from fall injury  Outcome: Progressing   Pt remains free from injury during this shift. Pt is currently on bedrest. Encourage pt to call for all assistance. Call light is in reach, bed alarm is activated for safety, bed locked and in lowest position. Will continue to monitor.     Problem: Skin/Tissue Integrity  Goal: Skin integrity remains intact  Outcome: Progressing   Pt has bilateral knee abrasions and abrasion to the right second to. Remainder of skin is intact. Encourage pt to turn using pillows.    Problem: Pain  Goal: Verbalizes/displays adequate comfort level or baseline comfort level  Outcome: Progressing   Medicated pt per orders, please see e-Mar. Encourage pt to call if pain increases. Will continue to monitor.

## 2025-04-20 NOTE — TRANSFER CENTER NOTE
[facility] Hospitalist Transfer accept note  Transfer center PS received  Case reviewed with ER physician- admit for fall and C2 #     Patient has been accepted for transfer to [facility].   Once patient arrive please page ON CALL HOSPITALIST so patient can be seen.   If unable to reach physician on [employer] please call hospitalist phone (#[phone])     PCP: Adeel Nguyễn MD     Thanks  MISHA CABALLERO MD  Hospitalist

## 2025-04-21 ENCOUNTER — APPOINTMENT (OUTPATIENT)
Dept: GENERAL RADIOLOGY | Age: 80
DRG: 543 | End: 2025-04-21
Attending: INTERNAL MEDICINE
Payer: MEDICARE

## 2025-04-21 ENCOUNTER — NURSE ONLY (OUTPATIENT)
Dept: CARDIOLOGY CLINIC | Age: 80
End: 2025-04-21

## 2025-04-21 DIAGNOSIS — I48.0 PAF (PAROXYSMAL ATRIAL FIBRILLATION) (HCC): Chronic | ICD-10-CM

## 2025-04-21 DIAGNOSIS — Z95.0 PACEMAKER: Primary | ICD-10-CM

## 2025-04-21 DIAGNOSIS — I48.91 ATRIAL FIBRILLATION WITH RVR (HCC): ICD-10-CM

## 2025-04-21 DIAGNOSIS — I48.3 TYPICAL ATRIAL FLUTTER (HCC): ICD-10-CM

## 2025-04-21 DIAGNOSIS — R00.1 SYMPTOMATIC BRADYCARDIA: ICD-10-CM

## 2025-04-21 LAB
ALBUMIN SERPL-MCNC: 3.5 G/DL (ref 3.4–5)
ANION GAP SERPL CALCULATED.3IONS-SCNC: 14 MMOL/L (ref 3–16)
BACTERIA FLD AEROBE CULT: NORMAL
BASOPHILS # BLD: 0.1 K/UL (ref 0–0.2)
BASOPHILS NFR BLD: 0.9 %
BUN SERPL-MCNC: 22 MG/DL (ref 7–20)
CALCIUM SERPL-MCNC: 9.3 MG/DL (ref 8.3–10.6)
CHLORIDE SERPL-SCNC: 97 MMOL/L (ref 99–110)
CO2 SERPL-SCNC: 26 MMOL/L (ref 21–32)
CREAT SERPL-MCNC: 1.1 MG/DL (ref 0.6–1.2)
DEPRECATED RDW RBC AUTO: 14.9 % (ref 12.4–15.4)
EKG ATRIAL RATE: 72 BPM
EKG DIAGNOSIS: NORMAL
EKG P AXIS: 68 DEGREES
EKG P-R INTERVAL: 156 MS
EKG Q-T INTERVAL: 512 MS
EKG QRS DURATION: 154 MS
EKG QTC CALCULATION (BAZETT): 560 MS
EKG R AXIS: 22 DEGREES
EKG T AXIS: 111 DEGREES
EKG VENTRICULAR RATE: 72 BPM
EOSINOPHIL # BLD: 0.8 K/UL (ref 0–0.6)
EOSINOPHIL NFR BLD: 9.2 %
GFR SERPLBLD CREATININE-BSD FMLA CKD-EPI: 51 ML/MIN/{1.73_M2}
GLUCOSE SERPL-MCNC: 104 MG/DL (ref 70–99)
GRAM STN SPEC: NORMAL
HCT VFR BLD AUTO: 38.3 % (ref 36–48)
HGB BLD-MCNC: 12.9 G/DL (ref 12–16)
LYMPHOCYTES # BLD: 1.3 K/UL (ref 1–5.1)
LYMPHOCYTES NFR BLD: 15.6 %
MAGNESIUM SERPL-MCNC: 2.29 MG/DL (ref 1.8–2.4)
MCH RBC QN AUTO: 29.7 PG (ref 26–34)
MCHC RBC AUTO-ENTMCNC: 33.7 G/DL (ref 31–36)
MCV RBC AUTO: 88 FL (ref 80–100)
MONOCYTES # BLD: 1.2 K/UL (ref 0–1.3)
MONOCYTES NFR BLD: 14.3 %
NEUTROPHILS # BLD: 5 K/UL (ref 1.7–7.7)
NEUTROPHILS NFR BLD: 60 %
PHOSPHATE SERPL-MCNC: 3.8 MG/DL (ref 2.5–4.9)
PLATELET # BLD AUTO: 284 K/UL (ref 135–450)
PMV BLD AUTO: 7.7 FL (ref 5–10.5)
POTASSIUM SERPL-SCNC: 3.4 MMOL/L (ref 3.5–5.1)
RBC # BLD AUTO: 4.36 M/UL (ref 4–5.2)
SODIUM SERPL-SCNC: 137 MMOL/L (ref 136–145)
WBC # BLD AUTO: 8.4 K/UL (ref 4–11)

## 2025-04-21 PROCEDURE — 2500000003 HC RX 250 WO HCPCS: Performed by: INTERNAL MEDICINE

## 2025-04-21 PROCEDURE — 99232 SBSQ HOSP IP/OBS MODERATE 35: CPT | Performed by: NURSE PRACTITIONER

## 2025-04-21 PROCEDURE — 93010 ELECTROCARDIOGRAM REPORT: CPT | Performed by: INTERNAL MEDICINE

## 2025-04-21 PROCEDURE — 6370000000 HC RX 637 (ALT 250 FOR IP)

## 2025-04-21 PROCEDURE — 1200000000 HC SEMI PRIVATE

## 2025-04-21 PROCEDURE — 85025 COMPLETE CBC W/AUTO DIFF WBC: CPT

## 2025-04-21 PROCEDURE — 2500000003 HC RX 250 WO HCPCS

## 2025-04-21 PROCEDURE — 83735 ASSAY OF MAGNESIUM: CPT

## 2025-04-21 PROCEDURE — 72040 X-RAY EXAM NECK SPINE 2-3 VW: CPT

## 2025-04-21 PROCEDURE — 80069 RENAL FUNCTION PANEL: CPT

## 2025-04-21 PROCEDURE — 93005 ELECTROCARDIOGRAM TRACING: CPT

## 2025-04-21 PROCEDURE — 36415 COLL VENOUS BLD VENIPUNCTURE: CPT

## 2025-04-21 RX ORDER — METOPROLOL SUCCINATE 25 MG/1
25 TABLET, EXTENDED RELEASE ORAL DAILY
Status: DISCONTINUED | OUTPATIENT
Start: 2025-04-22 | End: 2025-04-24 | Stop reason: HOSPADM

## 2025-04-21 RX ORDER — HYDROMORPHONE HYDROCHLORIDE 2 MG/1
2 TABLET ORAL EVERY 4 HOURS PRN
Refills: 0 | Status: DISCONTINUED | OUTPATIENT
Start: 2025-04-21 | End: 2025-04-22

## 2025-04-21 RX ORDER — METHOCARBAMOL 750 MG/1
750 TABLET, FILM COATED ORAL 4 TIMES DAILY PRN
Status: DISCONTINUED | OUTPATIENT
Start: 2025-04-21 | End: 2025-04-24 | Stop reason: HOSPADM

## 2025-04-21 RX ORDER — HYDROMORPHONE HYDROCHLORIDE 2 MG/1
1 TABLET ORAL EVERY 4 HOURS PRN
Refills: 0 | Status: DISCONTINUED | OUTPATIENT
Start: 2025-04-21 | End: 2025-04-22

## 2025-04-21 RX ADMIN — MORPHINE SULFATE 15 MG: 15 TABLET, FILM COATED, EXTENDED RELEASE ORAL at 09:01

## 2025-04-21 RX ADMIN — METHOCARBAMOL 750 MG: 750 TABLET ORAL at 16:59

## 2025-04-21 RX ADMIN — OXYCODONE AND ACETAMINOPHEN 1 TABLET: 325; 10 TABLET ORAL at 16:59

## 2025-04-21 RX ADMIN — HYDROMORPHONE HYDROCHLORIDE 0.5 MG: 1 INJECTION, SOLUTION INTRAMUSCULAR; INTRAVENOUS; SUBCUTANEOUS at 01:32

## 2025-04-21 RX ADMIN — OXYCODONE AND ACETAMINOPHEN 1 TABLET: 325; 10 TABLET ORAL at 12:47

## 2025-04-21 RX ADMIN — SODIUM CHLORIDE, PRESERVATIVE FREE 10 ML: 5 INJECTION INTRAVENOUS at 20:40

## 2025-04-21 RX ADMIN — PENICILLIN V POTASSIUM 250 MG: 250 TABLET, FILM COATED ORAL at 20:42

## 2025-04-21 RX ADMIN — VALSARTAN 20 MG: 80 TABLET, FILM COATED ORAL at 09:01

## 2025-04-21 RX ADMIN — SODIUM CHLORIDE, PRESERVATIVE FREE 10 ML: 5 INJECTION INTRAVENOUS at 09:03

## 2025-04-21 RX ADMIN — PENICILLIN V POTASSIUM 250 MG: 250 TABLET, FILM COATED ORAL at 09:07

## 2025-04-21 RX ADMIN — METHOCARBAMOL 750 MG: 750 TABLET ORAL at 09:01

## 2025-04-21 RX ADMIN — LEVOTHYROXINE SODIUM 88 MCG: 0.09 TABLET ORAL at 09:01

## 2025-04-21 RX ADMIN — EZETIMIBE 10 MG: 10 TABLET ORAL at 20:39

## 2025-04-21 RX ADMIN — HYDROMORPHONE HYDROCHLORIDE 0.5 MG: 1 INJECTION, SOLUTION INTRAMUSCULAR; INTRAVENOUS; SUBCUTANEOUS at 10:12

## 2025-04-21 RX ADMIN — DULOXETINE 60 MG: 60 CAPSULE, DELAYED RELEASE ORAL at 20:39

## 2025-04-21 RX ADMIN — MORPHINE SULFATE 15 MG: 15 TABLET, FILM COATED, EXTENDED RELEASE ORAL at 20:39

## 2025-04-21 RX ADMIN — SENNOSIDES AND DOCUSATE SODIUM 1 TABLET: 50; 8.6 TABLET ORAL at 09:01

## 2025-04-21 RX ADMIN — DULOXETINE 60 MG: 60 CAPSULE, DELAYED RELEASE ORAL at 09:00

## 2025-04-21 RX ADMIN — POTASSIUM BICARBONATE 40 MEQ: 782 TABLET, EFFERVESCENT ORAL at 10:13

## 2025-04-21 RX ADMIN — HYDROMORPHONE HYDROCHLORIDE 0.5 MG: 1 INJECTION, SOLUTION INTRAMUSCULAR; INTRAVENOUS; SUBCUTANEOUS at 05:32

## 2025-04-21 RX ADMIN — TORSEMIDE 40 MG: 20 TABLET ORAL at 09:01

## 2025-04-21 RX ADMIN — HYDROMORPHONE HYDROCHLORIDE 2 MG: 2 TABLET ORAL at 22:39

## 2025-04-21 RX ADMIN — HYDROMORPHONE HYDROCHLORIDE 2 MG: 2 TABLET ORAL at 18:37

## 2025-04-21 RX ADMIN — HYDROMORPHONE HYDROCHLORIDE 0.5 MG: 1 INJECTION, SOLUTION INTRAMUSCULAR; INTRAVENOUS; SUBCUTANEOUS at 14:45

## 2025-04-21 RX ADMIN — METOPROLOL SUCCINATE 50 MG: 50 TABLET, EXTENDED RELEASE ORAL at 09:01

## 2025-04-21 RX ADMIN — SENNOSIDES AND DOCUSATE SODIUM 1 TABLET: 50; 8.6 TABLET ORAL at 20:39

## 2025-04-21 RX ADMIN — AMIODARONE HYDROCHLORIDE 100 MG: 200 TABLET ORAL at 09:00

## 2025-04-21 ASSESSMENT — PAIN DESCRIPTION - ORIENTATION
ORIENTATION: POSTERIOR
ORIENTATION: RIGHT
ORIENTATION: RIGHT;POSTERIOR
ORIENTATION: RIGHT;MID;POSTERIOR
ORIENTATION: POSTERIOR
ORIENTATION: POSTERIOR
ORIENTATION: MID;POSTERIOR;RIGHT
ORIENTATION: RIGHT;POSTERIOR
ORIENTATION: POSTERIOR
ORIENTATION: POSTERIOR

## 2025-04-21 ASSESSMENT — PAIN DESCRIPTION - LOCATION
LOCATION: NECK
LOCATION: NECK;SHOULDER
LOCATION: NECK;SHOULDER
LOCATION: NECK
LOCATION: NECK
LOCATION: NECK;SHOULDER
LOCATION: NECK;SHOULDER

## 2025-04-21 ASSESSMENT — PAIN SCALES - GENERAL
PAINLEVEL_OUTOF10: 7
PAINLEVEL_OUTOF10: 6
PAINLEVEL_OUTOF10: 5
PAINLEVEL_OUTOF10: 6
PAINLEVEL_OUTOF10: 6
PAINLEVEL_OUTOF10: 10
PAINLEVEL_OUTOF10: 5
PAINLEVEL_OUTOF10: 7
PAINLEVEL_OUTOF10: 8
PAINLEVEL_OUTOF10: 6
PAINLEVEL_OUTOF10: 9
PAINLEVEL_OUTOF10: 7
PAINLEVEL_OUTOF10: 6
PAINLEVEL_OUTOF10: 9
PAINLEVEL_OUTOF10: 8
PAINLEVEL_OUTOF10: 10
PAINLEVEL_OUTOF10: 6
PAINLEVEL_OUTOF10: 9
PAINLEVEL_OUTOF10: 9

## 2025-04-21 ASSESSMENT — PAIN - FUNCTIONAL ASSESSMENT
PAIN_FUNCTIONAL_ASSESSMENT: ACTIVITIES ARE NOT PREVENTED

## 2025-04-21 ASSESSMENT — PAIN DESCRIPTION - DESCRIPTORS
DESCRIPTORS: ACHING;STABBING;SPASM
DESCRIPTORS: THROBBING;STABBING;SHARP
DESCRIPTORS: THROBBING;STABBING
DESCRIPTORS: ACHING
DESCRIPTORS: THROBBING;STABBING
DESCRIPTORS: SHARP
DESCRIPTORS: THROBBING;STABBING;SHARP
DESCRIPTORS: ACHING;THROBBING

## 2025-04-21 ASSESSMENT — PAIN SCALES - WONG BAKER
WONGBAKER_NUMERICALRESPONSE: NO HURT

## 2025-04-21 NOTE — PLAN OF CARE
Problem: Safety - Adult  Goal: Free from fall injury  4/21/2025 0726 by Janell Granger, RN  Outcome: Progressing  All fall precautions in place. Bed locked and in lowest position with alarm on. Overbed table and personal belonings within reach. Call light within reach and patient instructed to use call light for assistance. Non-skid socks on.      Problem: Skin/Tissue Integrity  Goal: Skin integrity remains intact  Description: 1.  Monitor for areas of redness and/or skin breakdown2.  Assess vascular access sites hourly3.  Every 4-6 hours minimum:  Change oxygen saturation probe site4.  Every 4-6 hours:  If on nasal continuous positive airway pressure, respiratory therapy assess nares and determine need for appliance change or resting period  4/21/2025 0726 by Janell Granger, RN  Outcome: Progressing   Pt has no current skin integrity concerns at this time.     Problem: Pain  Goal: Verbalizes/displays adequate comfort level or baseline comfort level  4/21/2025 0726 by Janell Granger, RN  Outcome: Progressing   Pt's pain is being managed with PRN pain meds.

## 2025-04-22 ENCOUNTER — TELEPHONE (OUTPATIENT)
Dept: ORTHOPEDIC SURGERY | Age: 80
End: 2025-04-22

## 2025-04-22 LAB
ALBUMIN SERPL-MCNC: 3.7 G/DL (ref 3.4–5)
ANION GAP SERPL CALCULATED.3IONS-SCNC: 10 MMOL/L (ref 3–16)
BASOPHILS # BLD: 0 K/UL (ref 0–0.2)
BASOPHILS NFR BLD: 0.6 %
BUN SERPL-MCNC: 19 MG/DL (ref 7–20)
CALCIUM SERPL-MCNC: 9.3 MG/DL (ref 8.3–10.6)
CHLORIDE SERPL-SCNC: 94 MMOL/L (ref 99–110)
CO2 SERPL-SCNC: 34 MMOL/L (ref 21–32)
CREAT SERPL-MCNC: 1.1 MG/DL (ref 0.6–1.2)
DEPRECATED RDW RBC AUTO: 14.4 % (ref 12.4–15.4)
EOSINOPHIL # BLD: 0.8 K/UL (ref 0–0.6)
EOSINOPHIL NFR BLD: 9.8 %
GFR SERPLBLD CREATININE-BSD FMLA CKD-EPI: 51 ML/MIN/{1.73_M2}
GLUCOSE SERPL-MCNC: 110 MG/DL (ref 70–99)
HCT VFR BLD AUTO: 37.1 % (ref 36–48)
HGB BLD-MCNC: 12.6 G/DL (ref 12–16)
LYMPHOCYTES # BLD: 1.7 K/UL (ref 1–5.1)
LYMPHOCYTES NFR BLD: 21.5 %
MAGNESIUM SERPL-MCNC: 2.29 MG/DL (ref 1.8–2.4)
MCH RBC QN AUTO: 29.6 PG (ref 26–34)
MCHC RBC AUTO-ENTMCNC: 33.9 G/DL (ref 31–36)
MCV RBC AUTO: 87.1 FL (ref 80–100)
MONOCYTES # BLD: 1.2 K/UL (ref 0–1.3)
MONOCYTES NFR BLD: 14.3 %
NEUTROPHILS # BLD: 4.4 K/UL (ref 1.7–7.7)
NEUTROPHILS NFR BLD: 53.8 %
PHOSPHATE SERPL-MCNC: 3.8 MG/DL (ref 2.5–4.9)
PLATELET # BLD AUTO: 274 K/UL (ref 135–450)
PMV BLD AUTO: 8.2 FL (ref 5–10.5)
POTASSIUM SERPL-SCNC: 3.3 MMOL/L (ref 3.5–5.1)
RBC # BLD AUTO: 4.26 M/UL (ref 4–5.2)
SODIUM SERPL-SCNC: 138 MMOL/L (ref 136–145)
WBC # BLD AUTO: 8.1 K/UL (ref 4–11)

## 2025-04-22 PROCEDURE — 97530 THERAPEUTIC ACTIVITIES: CPT

## 2025-04-22 PROCEDURE — 6370000000 HC RX 637 (ALT 250 FOR IP)

## 2025-04-22 PROCEDURE — 97162 PT EVAL MOD COMPLEX 30 MIN: CPT

## 2025-04-22 PROCEDURE — 6370000000 HC RX 637 (ALT 250 FOR IP): Performed by: INTERNAL MEDICINE

## 2025-04-22 PROCEDURE — 97116 GAIT TRAINING THERAPY: CPT

## 2025-04-22 PROCEDURE — 97535 SELF CARE MNGMENT TRAINING: CPT

## 2025-04-22 PROCEDURE — 97166 OT EVAL MOD COMPLEX 45 MIN: CPT

## 2025-04-22 PROCEDURE — 1200000000 HC SEMI PRIVATE

## 2025-04-22 PROCEDURE — 80069 RENAL FUNCTION PANEL: CPT

## 2025-04-22 PROCEDURE — 85025 COMPLETE CBC W/AUTO DIFF WBC: CPT

## 2025-04-22 PROCEDURE — 36415 COLL VENOUS BLD VENIPUNCTURE: CPT

## 2025-04-22 PROCEDURE — 2500000003 HC RX 250 WO HCPCS

## 2025-04-22 PROCEDURE — 83735 ASSAY OF MAGNESIUM: CPT

## 2025-04-22 RX ORDER — HYDROMORPHONE HYDROCHLORIDE 2 MG/1
4 TABLET ORAL EVERY 4 HOURS PRN
Refills: 0 | Status: DISCONTINUED | OUTPATIENT
Start: 2025-04-22 | End: 2025-04-24 | Stop reason: HOSPADM

## 2025-04-22 RX ORDER — OXYCODONE HYDROCHLORIDE 5 MG/1
5 TABLET ORAL EVERY 4 HOURS PRN
Refills: 0 | Status: DISCONTINUED | OUTPATIENT
Start: 2025-04-22 | End: 2025-04-23

## 2025-04-22 RX ORDER — ACETAMINOPHEN 325 MG/1
650 TABLET ORAL 3 TIMES DAILY
Status: DISCONTINUED | OUTPATIENT
Start: 2025-04-22 | End: 2025-04-24 | Stop reason: HOSPADM

## 2025-04-22 RX ORDER — MORPHINE SULFATE 15 MG/1
15 TABLET, FILM COATED, EXTENDED RELEASE ORAL EVERY 12 HOURS SCHEDULED
Refills: 0 | Status: DISCONTINUED | OUTPATIENT
Start: 2025-04-22 | End: 2025-04-23

## 2025-04-22 RX ORDER — HYDROMORPHONE HYDROCHLORIDE 2 MG/1
2 TABLET ORAL EVERY 4 HOURS PRN
Refills: 0 | Status: DISCONTINUED | OUTPATIENT
Start: 2025-04-22 | End: 2025-04-24 | Stop reason: HOSPADM

## 2025-04-22 RX ORDER — OXYCODONE HYDROCHLORIDE 5 MG/1
10 TABLET ORAL EVERY 4 HOURS PRN
Refills: 0 | Status: DISCONTINUED | OUTPATIENT
Start: 2025-04-22 | End: 2025-04-23

## 2025-04-22 RX ADMIN — DULOXETINE 60 MG: 60 CAPSULE, DELAYED RELEASE ORAL at 20:33

## 2025-04-22 RX ADMIN — LEVOTHYROXINE SODIUM 88 MCG: 0.09 TABLET ORAL at 08:19

## 2025-04-22 RX ADMIN — HYDROMORPHONE HYDROCHLORIDE 2 MG: 2 TABLET ORAL at 06:04

## 2025-04-22 RX ADMIN — ACETAMINOPHEN 650 MG: 325 TABLET, FILM COATED ORAL at 14:16

## 2025-04-22 RX ADMIN — HYDROMORPHONE HYDROCHLORIDE 4 MG: 2 TABLET ORAL at 21:31

## 2025-04-22 RX ADMIN — HYDROMORPHONE HYDROCHLORIDE 2 MG: 2 TABLET ORAL at 12:17

## 2025-04-22 RX ADMIN — OXYCODONE 10 MG: 5 TABLET ORAL at 14:17

## 2025-04-22 RX ADMIN — OXYCODONE 10 MG: 5 TABLET ORAL at 23:35

## 2025-04-22 RX ADMIN — ACETAMINOPHEN 650 MG: 325 TABLET, FILM COATED ORAL at 20:34

## 2025-04-22 RX ADMIN — MORPHINE SULFATE 15 MG: 15 TABLET, FILM COATED, EXTENDED RELEASE ORAL at 08:20

## 2025-04-22 RX ADMIN — VALSARTAN 20 MG: 80 TABLET, FILM COATED ORAL at 08:21

## 2025-04-22 RX ADMIN — SENNOSIDES AND DOCUSATE SODIUM 1 TABLET: 50; 8.6 TABLET ORAL at 08:20

## 2025-04-22 RX ADMIN — EZETIMIBE 10 MG: 10 TABLET ORAL at 20:33

## 2025-04-22 RX ADMIN — OXYCODONE AND ACETAMINOPHEN 1 TABLET: 325; 10 TABLET ORAL at 08:21

## 2025-04-22 RX ADMIN — AMIODARONE HYDROCHLORIDE 100 MG: 200 TABLET ORAL at 08:19

## 2025-04-22 RX ADMIN — METHOCARBAMOL 750 MG: 750 TABLET ORAL at 06:55

## 2025-04-22 RX ADMIN — MORPHINE SULFATE 15 MG: 15 TABLET, FILM COATED, EXTENDED RELEASE ORAL at 20:33

## 2025-04-22 RX ADMIN — OXYCODONE 10 MG: 5 TABLET ORAL at 18:55

## 2025-04-22 RX ADMIN — PENICILLIN V POTASSIUM 250 MG: 250 TABLET, FILM COATED ORAL at 08:21

## 2025-04-22 RX ADMIN — SODIUM CHLORIDE, PRESERVATIVE FREE 10 ML: 5 INJECTION INTRAVENOUS at 20:34

## 2025-04-22 RX ADMIN — IPRATROPIUM BROMIDE 1 SPRAY: 21 SPRAY NASAL at 12:32

## 2025-04-22 RX ADMIN — HYDROMORPHONE HYDROCHLORIDE 4 MG: 2 TABLET ORAL at 16:10

## 2025-04-22 RX ADMIN — DULOXETINE 60 MG: 60 CAPSULE, DELAYED RELEASE ORAL at 08:20

## 2025-04-22 RX ADMIN — METOPROLOL SUCCINATE 25 MG: 25 TABLET, EXTENDED RELEASE ORAL at 08:20

## 2025-04-22 RX ADMIN — SODIUM CHLORIDE, PRESERVATIVE FREE 10 ML: 5 INJECTION INTRAVENOUS at 08:21

## 2025-04-22 RX ADMIN — METHOCARBAMOL 750 MG: 750 TABLET ORAL at 14:17

## 2025-04-22 RX ADMIN — POTASSIUM BICARBONATE 40 MEQ: 782 TABLET, EFFERVESCENT ORAL at 14:17

## 2025-04-22 RX ADMIN — PENICILLIN V POTASSIUM 250 MG: 250 TABLET, FILM COATED ORAL at 21:31

## 2025-04-22 RX ADMIN — POTASSIUM BICARBONATE 40 MEQ: 782 TABLET, EFFERVESCENT ORAL at 08:20

## 2025-04-22 ASSESSMENT — PAIN DESCRIPTION - LOCATION
LOCATION: NECK

## 2025-04-22 ASSESSMENT — PAIN - FUNCTIONAL ASSESSMENT
PAIN_FUNCTIONAL_ASSESSMENT: ACTIVITIES ARE NOT PREVENTED

## 2025-04-22 ASSESSMENT — PAIN DESCRIPTION - ORIENTATION
ORIENTATION: POSTERIOR
ORIENTATION: POSTERIOR
ORIENTATION: ANTERIOR
ORIENTATION: MID
ORIENTATION: POSTERIOR
ORIENTATION: MID

## 2025-04-22 ASSESSMENT — PAIN SCALES - GENERAL
PAINLEVEL_OUTOF10: 5
PAINLEVEL_OUTOF10: 5
PAINLEVEL_OUTOF10: 9
PAINLEVEL_OUTOF10: 7
PAINLEVEL_OUTOF10: 7
PAINLEVEL_OUTOF10: 6
PAINLEVEL_OUTOF10: 8
PAINLEVEL_OUTOF10: 8
PAINLEVEL_OUTOF10: 9
PAINLEVEL_OUTOF10: 5
PAINLEVEL_OUTOF10: 2
PAINLEVEL_OUTOF10: 8
PAINLEVEL_OUTOF10: 5
PAINLEVEL_OUTOF10: 8

## 2025-04-22 ASSESSMENT — PAIN DESCRIPTION - DESCRIPTORS
DESCRIPTORS: ACHING;SORE;TENDER
DESCRIPTORS: ACHING;DISCOMFORT
DESCRIPTORS: ACHING;DISCOMFORT
DESCRIPTORS: SHARP;THROBBING
DESCRIPTORS: ACHING;SORE;TENDER
DESCRIPTORS: THROBBING
DESCRIPTORS: ACHING;DISCOMFORT
DESCRIPTORS: ACHING;DISCOMFORT

## 2025-04-22 ASSESSMENT — PAIN DESCRIPTION - PAIN TYPE
TYPE: ACUTE PAIN
TYPE: ACUTE PAIN
TYPE: CHRONIC PAIN
TYPE: CHRONIC PAIN
TYPE: ACUTE PAIN
TYPE: ACUTE PAIN

## 2025-04-22 ASSESSMENT — PAIN DESCRIPTION - FREQUENCY
FREQUENCY: CONTINUOUS

## 2025-04-22 ASSESSMENT — PAIN DESCRIPTION - ONSET
ONSET: ON-GOING

## 2025-04-22 NOTE — TELEPHONE ENCOUNTER
I have canceled the 7-17-25 appt with AV and scheduled the echo & ov:    11-13-25 - [facility]  Echo - 1:00 pm  OV - 2:15 pm

## 2025-04-22 NOTE — TELEPHONE ENCOUNTER
----- Message from Genesis ESQUIVEL sent at 4/22/2025  1:03 PM EDT -----  Regarding: Specialty Message to Provider  Specialty Message to Provider    Relationship to Patient: DAUGHTER    Patient Message: Other PATIENT FELL AND FRACTURE HER C2 VERTEBRAE AND SHE HAS TO HEAL AND CAN NOT COMPLETE CT SCAN FOR ANOTHER 8 WEEKS. DAUGHTER WOULD LIKE TO KNOW IF THEY WOULD NEED ANOTHER MRI ORDER FOR HER SHOULDER  --------------------------------------------------------------------------------------------------------------------------    Call Back Information: OK to leave message on voicemail  Preferred Call Back Number: Phone  [phone]

## 2025-04-22 NOTE — TELEPHONE ENCOUNTER
Called and spoke with daughter patient needs to heal and get better with her C2 compression fx and then get CT shoulder done that Dr Armendariz ordered -- once CT is done make a follow up appt In the office -- daughter verbalized understanding

## 2025-04-22 NOTE — PLAN OF CARE
Problem: Chronic Conditions and Co-morbidities  Goal: Patient's chronic conditions and co-morbidity symptoms are monitored and maintained or improved  4/22/2025 1118 by Desiree Cai RN  Outcome: Progressing   Reinforcement of disease process and treatment plan recommended for chronic conditions and co-morbidities.      Problem: Discharge Planning  Goal: Discharge to home or other facility with appropriate resources  4/22/2025 1118 by Desiree Cai, RN  Outcome: Progressing  Patient actively participates in ADL's and decision making regarding plan of care.     Problem: Safety - Adult  Goal: Free from fall injury  4/22/2025 1118 by Desiree Cai, RN  Outcome: Progressing  No falls/injuries this shift, bed in lowest position, brakes on, bed alarm on, call light in reach, side rails up x2.     Problem: Skin/Tissue Integrity  Goal: Skin integrity remains intact  Description: 1.  Monitor for areas of redness and/or skin breakdown2.  Assess vascular access sites hourly3.  Every 4-6 hours minimum:  Change oxygen saturation probe site4.  Every 4-6 hours:  If on nasal continuous positive airway pressure, respiratory therapy assess nares and determine need for appliance change or resting period  4/22/2025 1118 by Desiree Cai, RN  Outcome: Progressing  No new skin breakdown noted, no new signs/symptoms of infection, continue to monitor labwork including WBC, medications administered per physician orders.     Problem: Pain  Goal: Verbalizes/displays adequate comfort level or baseline comfort level  4/22/2025 1118 by Desiree Cai, RN  Outcome: Progressing   No new signs/symptoms of pain noted, pain rating < 3 on scale 0-10, pain controlled with medication/repositioning.

## 2025-04-23 LAB
ALBUMIN SERPL-MCNC: 3.5 G/DL (ref 3.4–5)
ANION GAP SERPL CALCULATED.3IONS-SCNC: 11 MMOL/L (ref 3–16)
BASOPHILS # BLD: 0.1 K/UL (ref 0–0.2)
BASOPHILS NFR BLD: 0.8 %
BUN SERPL-MCNC: 14 MG/DL (ref 7–20)
CALCIUM SERPL-MCNC: 9.5 MG/DL (ref 8.3–10.6)
CHLORIDE SERPL-SCNC: 95 MMOL/L (ref 99–110)
CO2 SERPL-SCNC: 28 MMOL/L (ref 21–32)
CREAT SERPL-MCNC: 1 MG/DL (ref 0.6–1.2)
DEPRECATED RDW RBC AUTO: 14.8 % (ref 12.4–15.4)
EOSINOPHIL # BLD: 0.6 K/UL (ref 0–0.6)
EOSINOPHIL NFR BLD: 6.4 %
GFR SERPLBLD CREATININE-BSD FMLA CKD-EPI: 57 ML/MIN/{1.73_M2}
GLUCOSE SERPL-MCNC: 94 MG/DL (ref 70–99)
HCT VFR BLD AUTO: 40.1 % (ref 36–48)
HGB BLD-MCNC: 13.1 G/DL (ref 12–16)
LYMPHOCYTES # BLD: 2.1 K/UL (ref 1–5.1)
LYMPHOCYTES NFR BLD: 23.9 %
MCH RBC QN AUTO: 29.4 PG (ref 26–34)
MCHC RBC AUTO-ENTMCNC: 32.8 G/DL (ref 31–36)
MCV RBC AUTO: 89.8 FL (ref 80–100)
MONOCYTES # BLD: 1.1 K/UL (ref 0–1.3)
MONOCYTES NFR BLD: 12.5 %
NEUTROPHILS # BLD: 5 K/UL (ref 1.7–7.7)
NEUTROPHILS NFR BLD: 56.4 %
PHOSPHATE SERPL-MCNC: 3.1 MG/DL (ref 2.5–4.9)
PLATELET # BLD AUTO: 299 K/UL (ref 135–450)
PMV BLD AUTO: 8.1 FL (ref 5–10.5)
POTASSIUM SERPL-SCNC: 3.5 MMOL/L (ref 3.5–5.1)
RBC # BLD AUTO: 4.46 M/UL (ref 4–5.2)
SODIUM SERPL-SCNC: 134 MMOL/L (ref 136–145)
WBC # BLD AUTO: 8.8 K/UL (ref 4–11)

## 2025-04-23 PROCEDURE — 6370000000 HC RX 637 (ALT 250 FOR IP): Performed by: INTERNAL MEDICINE

## 2025-04-23 PROCEDURE — 85025 COMPLETE CBC W/AUTO DIFF WBC: CPT

## 2025-04-23 PROCEDURE — 6370000000 HC RX 637 (ALT 250 FOR IP)

## 2025-04-23 PROCEDURE — 2500000003 HC RX 250 WO HCPCS

## 2025-04-23 PROCEDURE — 80069 RENAL FUNCTION PANEL: CPT

## 2025-04-23 PROCEDURE — 1200000000 HC SEMI PRIVATE

## 2025-04-23 PROCEDURE — 36415 COLL VENOUS BLD VENIPUNCTURE: CPT

## 2025-04-23 RX ORDER — GABAPENTIN 100 MG/1
100 CAPSULE ORAL 3 TIMES DAILY
Status: DISCONTINUED | OUTPATIENT
Start: 2025-04-23 | End: 2025-04-24 | Stop reason: HOSPADM

## 2025-04-23 RX ORDER — MORPHINE SULFATE 30 MG/1
30 TABLET, FILM COATED, EXTENDED RELEASE ORAL EVERY 12 HOURS SCHEDULED
Refills: 0 | Status: DISCONTINUED | OUTPATIENT
Start: 2025-04-23 | End: 2025-04-24 | Stop reason: HOSPADM

## 2025-04-23 RX ORDER — HYDROMORPHONE HYDROCHLORIDE 1 MG/ML
0.5 INJECTION, SOLUTION INTRAMUSCULAR; INTRAVENOUS; SUBCUTANEOUS
Refills: 0 | Status: DISCONTINUED | OUTPATIENT
Start: 2025-04-23 | End: 2025-04-24 | Stop reason: HOSPADM

## 2025-04-23 RX ORDER — OXYCODONE AND ACETAMINOPHEN 10; 325 MG/1; MG/1
1 TABLET ORAL EVERY 4 HOURS PRN
Refills: 0 | Status: DISCONTINUED | OUTPATIENT
Start: 2025-04-23 | End: 2025-04-24 | Stop reason: HOSPADM

## 2025-04-23 RX ORDER — HYDROMORPHONE HYDROCHLORIDE 1 MG/ML
0.25 INJECTION, SOLUTION INTRAMUSCULAR; INTRAVENOUS; SUBCUTANEOUS
Refills: 0 | Status: DISCONTINUED | OUTPATIENT
Start: 2025-04-23 | End: 2025-04-24 | Stop reason: HOSPADM

## 2025-04-23 RX ORDER — METHOCARBAMOL 750 MG/1
750 TABLET, FILM COATED ORAL 3 TIMES DAILY
Status: DISCONTINUED | OUTPATIENT
Start: 2025-04-23 | End: 2025-04-24 | Stop reason: HOSPADM

## 2025-04-23 RX ORDER — NALOXONE HYDROCHLORIDE 0.4 MG/ML
0.4 INJECTION, SOLUTION INTRAMUSCULAR; INTRAVENOUS; SUBCUTANEOUS PRN
Status: DISCONTINUED | OUTPATIENT
Start: 2025-04-23 | End: 2025-04-24 | Stop reason: HOSPADM

## 2025-04-23 RX ORDER — POLYETHYLENE GLYCOL 3350 17 G/17G
17 POWDER, FOR SOLUTION ORAL 2 TIMES DAILY
Status: DISCONTINUED | OUTPATIENT
Start: 2025-04-23 | End: 2025-04-24 | Stop reason: HOSPADM

## 2025-04-23 RX ADMIN — MORPHINE SULFATE 30 MG: 30 TABLET, FILM COATED, EXTENDED RELEASE ORAL at 19:50

## 2025-04-23 RX ADMIN — METHOCARBAMOL 750 MG: 750 TABLET ORAL at 14:57

## 2025-04-23 RX ADMIN — EZETIMIBE 10 MG: 10 TABLET ORAL at 19:50

## 2025-04-23 RX ADMIN — POLYETHYLENE GLYCOL 3350 17 G: 17 POWDER, FOR SOLUTION ORAL at 10:35

## 2025-04-23 RX ADMIN — METOPROLOL SUCCINATE 25 MG: 25 TABLET, EXTENDED RELEASE ORAL at 09:05

## 2025-04-23 RX ADMIN — SODIUM CHLORIDE, PRESERVATIVE FREE 10 ML: 5 INJECTION INTRAVENOUS at 19:51

## 2025-04-23 RX ADMIN — IPRATROPIUM BROMIDE 1 SPRAY: 21 SPRAY NASAL at 12:09

## 2025-04-23 RX ADMIN — ACETAMINOPHEN 650 MG: 325 TABLET, FILM COATED ORAL at 19:50

## 2025-04-23 RX ADMIN — PENICILLIN V POTASSIUM 250 MG: 250 TABLET, FILM COATED ORAL at 10:36

## 2025-04-23 RX ADMIN — POLYETHYLENE GLYCOL 3350 17 G: 17 POWDER, FOR SOLUTION ORAL at 19:50

## 2025-04-23 RX ADMIN — VALSARTAN 20 MG: 80 TABLET, FILM COATED ORAL at 09:04

## 2025-04-23 RX ADMIN — HYDROMORPHONE HYDROCHLORIDE 4 MG: 2 TABLET ORAL at 04:00

## 2025-04-23 RX ADMIN — METHOCARBAMOL 750 MG: 750 TABLET ORAL at 19:50

## 2025-04-23 RX ADMIN — ACETAMINOPHEN 650 MG: 325 TABLET, FILM COATED ORAL at 09:02

## 2025-04-23 RX ADMIN — POTASSIUM BICARBONATE 40 MEQ: 782 TABLET, EFFERVESCENT ORAL at 19:50

## 2025-04-23 RX ADMIN — MORPHINE SULFATE 30 MG: 30 TABLET, FILM COATED, EXTENDED RELEASE ORAL at 09:04

## 2025-04-23 RX ADMIN — OXYCODONE 10 MG: 5 TABLET ORAL at 05:42

## 2025-04-23 RX ADMIN — SENNOSIDES AND DOCUSATE SODIUM 1 TABLET: 50; 8.6 TABLET ORAL at 19:50

## 2025-04-23 RX ADMIN — DULOXETINE 60 MG: 60 CAPSULE, DELAYED RELEASE ORAL at 19:50

## 2025-04-23 RX ADMIN — GABAPENTIN 100 MG: 100 CAPSULE ORAL at 19:50

## 2025-04-23 RX ADMIN — DULOXETINE 60 MG: 60 CAPSULE, DELAYED RELEASE ORAL at 09:04

## 2025-04-23 RX ADMIN — PENICILLIN V POTASSIUM 250 MG: 250 TABLET, FILM COATED ORAL at 19:58

## 2025-04-23 RX ADMIN — POTASSIUM BICARBONATE 40 MEQ: 782 TABLET, EFFERVESCENT ORAL at 09:06

## 2025-04-23 RX ADMIN — AMIODARONE HYDROCHLORIDE 100 MG: 200 TABLET ORAL at 09:06

## 2025-04-23 RX ADMIN — OXYCODONE AND ACETAMINOPHEN 1 TABLET: 325; 10 TABLET ORAL at 16:56

## 2025-04-23 RX ADMIN — GABAPENTIN 100 MG: 100 CAPSULE ORAL at 10:35

## 2025-04-23 RX ADMIN — GABAPENTIN 100 MG: 100 CAPSULE ORAL at 14:58

## 2025-04-23 RX ADMIN — OXYCODONE AND ACETAMINOPHEN 1 TABLET: 325; 10 TABLET ORAL at 12:51

## 2025-04-23 RX ADMIN — SENNOSIDES AND DOCUSATE SODIUM 1 TABLET: 50; 8.6 TABLET ORAL at 09:05

## 2025-04-23 RX ADMIN — METHOCARBAMOL 750 MG: 750 TABLET ORAL at 10:35

## 2025-04-23 RX ADMIN — LEVOTHYROXINE SODIUM 88 MCG: 0.09 TABLET ORAL at 09:05

## 2025-04-23 ASSESSMENT — PAIN DESCRIPTION - FREQUENCY
FREQUENCY: CONTINUOUS

## 2025-04-23 ASSESSMENT — PAIN SCALES - GENERAL
PAINLEVEL_OUTOF10: 7
PAINLEVEL_OUTOF10: 4
PAINLEVEL_OUTOF10: 2
PAINLEVEL_OUTOF10: 8
PAINLEVEL_OUTOF10: 6
PAINLEVEL_OUTOF10: 8
PAINLEVEL_OUTOF10: 4
PAINLEVEL_OUTOF10: 7
PAINLEVEL_OUTOF10: 6
PAINLEVEL_OUTOF10: 7

## 2025-04-23 ASSESSMENT — PAIN DESCRIPTION - PAIN TYPE
TYPE: CHRONIC PAIN

## 2025-04-23 ASSESSMENT — PAIN DESCRIPTION - LOCATION
LOCATION: NECK
LOCATION: NECK;SHOULDER;BACK

## 2025-04-23 ASSESSMENT — PAIN DESCRIPTION - ORIENTATION
ORIENTATION: POSTERIOR
ORIENTATION: POSTERIOR
ORIENTATION: OTHER (COMMENT)
ORIENTATION: RIGHT
ORIENTATION: OTHER (COMMENT)

## 2025-04-23 ASSESSMENT — PAIN DESCRIPTION - ONSET
ONSET: ON-GOING

## 2025-04-23 ASSESSMENT — PAIN - FUNCTIONAL ASSESSMENT
PAIN_FUNCTIONAL_ASSESSMENT: PREVENTS OR INTERFERES SOME ACTIVE ACTIVITIES AND ADLS
PAIN_FUNCTIONAL_ASSESSMENT: PREVENTS OR INTERFERES SOME ACTIVE ACTIVITIES AND ADLS
PAIN_FUNCTIONAL_ASSESSMENT: ACTIVITIES ARE NOT PREVENTED
PAIN_FUNCTIONAL_ASSESSMENT: ACTIVITIES ARE NOT PREVENTED
PAIN_FUNCTIONAL_ASSESSMENT: PREVENTS OR INTERFERES SOME ACTIVE ACTIVITIES AND ADLS

## 2025-04-23 ASSESSMENT — PAIN DESCRIPTION - DESCRIPTORS
DESCRIPTORS: ACHING;THROBBING
DESCRIPTORS: ACHING;SORE;TENDER
DESCRIPTORS: ACHING
DESCRIPTORS: ACHING;THROBBING;TENDER
DESCRIPTORS: ACHING;SORE;TENDER

## 2025-04-23 NOTE — PLAN OF CARE
Problem: Safety - Adult  Goal: Free from fall injury  4/22/2025 2220 by Nida Phan, RN  Outcome: Progressing   All fall precautions in place. Bed locked and in lowest position with alarm on. Overbed table and personal belonings within reach. Call light within reach and patient instructed to use call light for assistance. Non-skid socks on.  Problem: Pain  Goal: Verbalizes/displays adequate comfort level or baseline comfort level  4/22/2025 2220 by Nida Phan, RN  Outcome: Progressing   Pt endorsing pain to neck, R shoulder, and back. Being treated with PRN pain medication, rest, and frequent repositioning with pillow support for comfort and pressure relief. Pt reports some relief from pain with above interventions.

## 2025-04-24 VITALS
DIASTOLIC BLOOD PRESSURE: 49 MMHG | SYSTOLIC BLOOD PRESSURE: 122 MMHG | TEMPERATURE: 99 F | BODY MASS INDEX: 23.89 KG/M2 | OXYGEN SATURATION: 97 % | WEIGHT: 121.69 LBS | HEART RATE: 67 BPM | RESPIRATION RATE: 16 BRPM | HEIGHT: 60 IN

## 2025-04-24 LAB
ALBUMIN SERPL-MCNC: 3.7 G/DL (ref 3.4–5)
ANION GAP SERPL CALCULATED.3IONS-SCNC: 10 MMOL/L (ref 3–16)
BASOPHILS # BLD: 0.1 K/UL (ref 0–0.2)
BASOPHILS NFR BLD: 1.2 %
BUN SERPL-MCNC: 17 MG/DL (ref 7–20)
CALCIUM SERPL-MCNC: 9.6 MG/DL (ref 8.3–10.6)
CHLORIDE SERPL-SCNC: 89 MMOL/L (ref 99–110)
CO2 SERPL-SCNC: 33 MMOL/L (ref 21–32)
CREAT SERPL-MCNC: 1.2 MG/DL (ref 0.6–1.2)
DEPRECATED RDW RBC AUTO: 14.4 % (ref 12.4–15.4)
EOSINOPHIL # BLD: 0.6 K/UL (ref 0–0.6)
EOSINOPHIL NFR BLD: 6.8 %
GFR SERPLBLD CREATININE-BSD FMLA CKD-EPI: 46 ML/MIN/{1.73_M2}
GLUCOSE SERPL-MCNC: 79 MG/DL (ref 70–99)
HCT VFR BLD AUTO: 39.7 % (ref 36–48)
HGB BLD-MCNC: 13.3 G/DL (ref 12–16)
LYMPHOCYTES # BLD: 2.6 K/UL (ref 1–5.1)
LYMPHOCYTES NFR BLD: 30 %
MAGNESIUM SERPL-MCNC: 2.58 MG/DL (ref 1.8–2.4)
MCH RBC QN AUTO: 30.1 PG (ref 26–34)
MCHC RBC AUTO-ENTMCNC: 33.5 G/DL (ref 31–36)
MCV RBC AUTO: 89.7 FL (ref 80–100)
MONOCYTES # BLD: 1.2 K/UL (ref 0–1.3)
MONOCYTES NFR BLD: 13.8 %
NEUTROPHILS # BLD: 4.1 K/UL (ref 1.7–7.7)
NEUTROPHILS NFR BLD: 48.2 %
PHOSPHATE SERPL-MCNC: 3.8 MG/DL (ref 2.5–4.9)
PLATELET # BLD AUTO: 318 K/UL (ref 135–450)
PMV BLD AUTO: 8.5 FL (ref 5–10.5)
POTASSIUM SERPL-SCNC: 4.7 MMOL/L (ref 3.5–5.1)
RBC # BLD AUTO: 4.43 M/UL (ref 4–5.2)
SODIUM SERPL-SCNC: 132 MMOL/L (ref 136–145)
WBC # BLD AUTO: 8.5 K/UL (ref 4–11)

## 2025-04-24 PROCEDURE — 97535 SELF CARE MNGMENT TRAINING: CPT

## 2025-04-24 PROCEDURE — 80069 RENAL FUNCTION PANEL: CPT

## 2025-04-24 PROCEDURE — 85025 COMPLETE CBC W/AUTO DIFF WBC: CPT

## 2025-04-24 PROCEDURE — 6370000000 HC RX 637 (ALT 250 FOR IP)

## 2025-04-24 PROCEDURE — 2500000003 HC RX 250 WO HCPCS

## 2025-04-24 PROCEDURE — 83735 ASSAY OF MAGNESIUM: CPT

## 2025-04-24 PROCEDURE — 36415 COLL VENOUS BLD VENIPUNCTURE: CPT

## 2025-04-24 PROCEDURE — 97530 THERAPEUTIC ACTIVITIES: CPT

## 2025-04-24 PROCEDURE — 6370000000 HC RX 637 (ALT 250 FOR IP): Performed by: INTERNAL MEDICINE

## 2025-04-24 RX ORDER — ASPIRIN 81 MG/1
81 TABLET ORAL DAILY
Qty: 30 TABLET | Refills: 3 | Status: SHIPPED | OUTPATIENT
Start: 2025-04-27

## 2025-04-24 RX ORDER — METHOCARBAMOL 750 MG/1
750 TABLET, FILM COATED ORAL 3 TIMES DAILY
Qty: 15 TABLET | Refills: 0 | Status: SHIPPED | OUTPATIENT
Start: 2025-04-24 | End: 2025-04-29

## 2025-04-24 RX ORDER — GABAPENTIN 100 MG/1
100 CAPSULE ORAL 3 TIMES DAILY
Qty: 90 CAPSULE | Refills: 0 | Status: SHIPPED | OUTPATIENT
Start: 2025-04-24 | End: 2025-04-24

## 2025-04-24 RX ORDER — TORSEMIDE 20 MG/1
40 TABLET ORAL DAILY PRN
Qty: 60 TABLET | Refills: 0 | Status: SHIPPED | OUTPATIENT
Start: 2025-04-24 | End: 2025-04-25

## 2025-04-24 RX ORDER — MORPHINE SULFATE 30 MG/1
30 TABLET, FILM COATED, EXTENDED RELEASE ORAL EVERY 12 HOURS SCHEDULED
Qty: 60 TABLET | Refills: 0
Start: 2025-04-24 | End: 2025-05-24

## 2025-04-24 RX ORDER — OXYCODONE AND ACETAMINOPHEN 10; 325 MG/1; MG/1
1 TABLET ORAL EVERY 4 HOURS PRN
Qty: 24 TABLET | Refills: 0
Start: 2025-04-24 | End: 2025-04-28

## 2025-04-24 RX ORDER — METOPROLOL SUCCINATE 25 MG/1
25 TABLET, EXTENDED RELEASE ORAL DAILY
Qty: 30 TABLET | Refills: 3 | Status: SHIPPED | OUTPATIENT
Start: 2025-04-25

## 2025-04-24 RX ORDER — GABAPENTIN 100 MG/1
100 CAPSULE ORAL 3 TIMES DAILY
Qty: 15 CAPSULE | Refills: 0 | Status: SHIPPED | OUTPATIENT
Start: 2025-04-24 | End: 2025-04-29

## 2025-04-24 RX ORDER — METHOCARBAMOL 750 MG/1
750 TABLET, FILM COATED ORAL 3 TIMES DAILY
Qty: 30 TABLET | Refills: 0 | Status: SHIPPED | OUTPATIENT
Start: 2025-04-24 | End: 2025-04-24

## 2025-04-24 RX ADMIN — LEVOTHYROXINE SODIUM 88 MCG: 0.09 TABLET ORAL at 08:25

## 2025-04-24 RX ADMIN — SODIUM CHLORIDE, PRESERVATIVE FREE 10 ML: 5 INJECTION INTRAVENOUS at 08:37

## 2025-04-24 RX ADMIN — METHOCARBAMOL 750 MG: 750 TABLET ORAL at 03:33

## 2025-04-24 RX ADMIN — AMIODARONE HYDROCHLORIDE 100 MG: 200 TABLET ORAL at 08:25

## 2025-04-24 RX ADMIN — METOPROLOL SUCCINATE 25 MG: 25 TABLET, EXTENDED RELEASE ORAL at 08:26

## 2025-04-24 RX ADMIN — SENNOSIDES AND DOCUSATE SODIUM 1 TABLET: 50; 8.6 TABLET ORAL at 08:26

## 2025-04-24 RX ADMIN — METHOCARBAMOL 750 MG: 750 TABLET ORAL at 08:26

## 2025-04-24 RX ADMIN — OXYCODONE AND ACETAMINOPHEN 1 TABLET: 325; 10 TABLET ORAL at 15:27

## 2025-04-24 RX ADMIN — METHOCARBAMOL 750 MG: 750 TABLET ORAL at 13:35

## 2025-04-24 RX ADMIN — SODIUM CHLORIDE, PRESERVATIVE FREE 10 ML: 5 INJECTION INTRAVENOUS at 08:35

## 2025-04-24 RX ADMIN — PENICILLIN V POTASSIUM 250 MG: 250 TABLET, FILM COATED ORAL at 08:49

## 2025-04-24 RX ADMIN — IPRATROPIUM BROMIDE 1 SPRAY: 21 SPRAY NASAL at 10:49

## 2025-04-24 RX ADMIN — DULOXETINE 60 MG: 60 CAPSULE, DELAYED RELEASE ORAL at 08:52

## 2025-04-24 RX ADMIN — NALOXEGOL OXALATE 25 MG: 25 TABLET, FILM COATED ORAL at 07:02

## 2025-04-24 RX ADMIN — MORPHINE SULFATE 30 MG: 30 TABLET, FILM COATED, EXTENDED RELEASE ORAL at 08:25

## 2025-04-24 RX ADMIN — OXYCODONE AND ACETAMINOPHEN 1 TABLET: 325; 10 TABLET ORAL at 03:33

## 2025-04-24 RX ADMIN — OXYCODONE AND ACETAMINOPHEN 1 TABLET: 325; 10 TABLET ORAL at 10:47

## 2025-04-24 RX ADMIN — VALSARTAN 20 MG: 80 TABLET, FILM COATED ORAL at 08:26

## 2025-04-24 RX ADMIN — GABAPENTIN 100 MG: 100 CAPSULE ORAL at 08:48

## 2025-04-24 RX ADMIN — GABAPENTIN 100 MG: 100 CAPSULE ORAL at 13:36

## 2025-04-24 RX ADMIN — POLYETHYLENE GLYCOL 3350 17 G: 17 POWDER, FOR SOLUTION ORAL at 08:29

## 2025-04-24 ASSESSMENT — PAIN DESCRIPTION - FREQUENCY
FREQUENCY: CONTINUOUS

## 2025-04-24 ASSESSMENT — PAIN SCALES - GENERAL
PAINLEVEL_OUTOF10: 9
PAINLEVEL_OUTOF10: 9
PAINLEVEL_OUTOF10: 8
PAINLEVEL_OUTOF10: 5
PAINLEVEL_OUTOF10: 5
PAINLEVEL_OUTOF10: 9

## 2025-04-24 ASSESSMENT — PAIN DESCRIPTION - PAIN TYPE
TYPE: CHRONIC PAIN
TYPE: CHRONIC PAIN

## 2025-04-24 ASSESSMENT — PAIN DESCRIPTION - LOCATION
LOCATION: BACK;SHOULDER
LOCATION: BACK;SHOULDER
LOCATION: NECK;SHOULDER

## 2025-04-24 ASSESSMENT — PAIN DESCRIPTION - ORIENTATION
ORIENTATION: RIGHT

## 2025-04-24 ASSESSMENT — PAIN DESCRIPTION - DIRECTION
RADIATING_TOWARDS: NECK AND R SHOULDER
RADIATING_TOWARDS: NECK AND R SHOULDER

## 2025-04-24 ASSESSMENT — PAIN DESCRIPTION - DESCRIPTORS
DESCRIPTORS: ACHING
DESCRIPTORS: SHARP
DESCRIPTORS: SHARP

## 2025-04-24 ASSESSMENT — PAIN DESCRIPTION - ONSET
ONSET: ON-GOING

## 2025-04-24 ASSESSMENT — PAIN - FUNCTIONAL ASSESSMENT
PAIN_FUNCTIONAL_ASSESSMENT: ACTIVITIES ARE NOT PREVENTED
PAIN_FUNCTIONAL_ASSESSMENT: PREVENTS OR INTERFERES SOME ACTIVE ACTIVITIES AND ADLS
PAIN_FUNCTIONAL_ASSESSMENT: ACTIVITIES ARE NOT PREVENTED

## 2025-04-24 NOTE — PLAN OF CARE
Problem: Chronic Conditions and Co-morbidities  Goal: Patient's chronic conditions and co-morbidity symptoms are monitored and maintained or improved  4/24/2025 1120 by Stacey Altamirano RN  Outcome: Progressing     Problem: Discharge Planning  Goal: Discharge to home or other facility with appropriate resources  4/24/2025 1120 by Stacey Altamirano RN  Outcome: Progressing     Problem: Safety - Adult  Goal: Free from fall injury  Outcome: Progressing   All fall precautions in place. Bed locked and in lowest position with alarm on. Overbed table and personal belonings within reach. Call light within reach and patient instructed to use call light for assistance. Non-skid socks on.     Problem: Skin/Tissue Integrity  Goal: Skin integrity remains intact  Description: 1.  Monitor for areas of redness and/or skin breakdown2.  Assess vascular access sites hourly3.  Every 4-6 hours minimum:  Change oxygen saturation probe site4.  Every 4-6 hours:  If on nasal continuous positive airway pressure, respiratory therapy assess nares and determine need for appliance change or resting period  4/24/2025 1120 by Stacey Altamirano RN  Outcome: Progressing   Skin assessed this shift. Skin is CDI. Diana care completed as necessary. Turning patient q2h to reduce pressure and prevent injury.     Problem: Pain  Goal: Verbalizes/displays adequate comfort level or baseline comfort level  4/24/2025 1120 by Stacey Altamirano RN  Outcome: Progressing   Pt endorsing pain to neck and right shoulder. Being treated with PRN pain medication, rest, and frequent repositioning with pillow support for comfort and pressure relief. Pt reports some relief from pain with above interventions.     Problem: ABCDS Injury Assessment  Goal: Absence of physical injury  4/24/2025 1120 by Stacey Altamirano RN  Outcome: Progressing   Patient encouraged to communicate with nurse with ambulation needs. All safety equipment at bedside.

## 2025-04-24 NOTE — DISCHARGE SUMMARY
tablet  Commonly known as: DIOVAN               Where to Get Your Medications        These medications were sent to [id number] - [place], [place] - [address] - P [phone] - F [phone] [address] [facility] [address]      Phone: [phone]   aspirin 81 MG EC tablet  diclofenac sodium 1 % Gel  gabapentin 100 MG capsule  methocarbamol 750 MG tablet  metoprolol succinate 25 MG extended release tablet  naloxegol 25 MG Tabs tablet  torsemide 20 MG tablet       Information about where to get these medications is not yet available    Ask your nurse or doctor about these medications  morphine 30 MG extended release tablet  oxyCODONE-acetaminophen  MG per tablet         Activity: bedrest, wear Abeba batista.  Diet: regular diet  Wound Care: none needed      At the time of discharge, patient reported feeling stable. They were not in acute distress and vital signs were within normal limits. Further, the patient expressed appropriate understanding of, and agreement with, the discharge recommendations, medications, and plan. Patient was advised to seek immediate attention or to call 911 if they experience recurrence or worsening of symptoms.     Signed:  Tim Richmond MD, PGY-1  4/24/2025

## 2025-04-24 NOTE — CARE COORDINATION
CM following: CM met with pt and pt's daughter, Merry, at bedside. Pt continues to plan to discharge to daughter's home in [place], [place] with support from daughter, who works from home and RADHA who is retired. Pt is open to [facility] if covered by insurance though both pt and daughter report this service was not covered after last hospital stay. Pt and daughter agreeable to have [facility] ([place] run insurance when pt discharges and reach out to pt/pt's daughter with cost estimate to make decision. If service is covered pt will accept service and if not covered pt's daughter states she and her spouse will walk the pt around the house for exercise. Merry reports she will provide transportation home at discharge. CM will continue to follow for discharge planning.  Electronically signed by DANILO Prado on 4/23/2025 at 2:06 PM  [phone]  
CM following: CM met with pt at bedside. Pt was tired from working with therapy and having just taken pain medication. Pt reports that she has no interest in a SNF stay and insists that she will discharge to her daughter, Kezia, home at [address] [place] [address]. Pt agreeable to [facility] through [place] if covered by insurance. CM verified with Deana with home care who reports pt's insurance will cover the cost of [facility]. Pt reports that her daughter works close to the house and that her son-in-law is at home all day and can provide 24/7 assistance. CM will review discharge plan with pt tomorrow and reach out to family tomorrow to confirm plan.  Electronically signed by DANILO Prado on 4/22/2025 at 4:41 PM  [phone]  
Case Management Assessment  Initial Evaluation    Date/Time of Evaluation: 4/21/2025 2:32 PM  Assessment Completed by: DANILO Prado    If patient is discharged prior to next notation, then this note serves as note for discharge by case management.    Patient Name: Shonda Hernandes                   YOB: 1945  Diagnosis: C2 cervical fracture (HCC) [S12.100A]  Traumatic closed fracture of C2 vertebra with minimal displacement, initial encounter ([facility]) [S12.100A]                   Date / Time: 4/19/2025 11:06 PM    Patient Admission Status: Inpatient   Readmission Risk (Low < 19, Mod (19-27), High > 27): Readmission Risk Score: 17.6    Current PCP: Adeel Nguyễn MD  PCP verified by CM? Yes    Chart Reviewed: Yes      History Provided by: Patient  Patient Orientation: Alert and Oriented    Patient Cognition: Alert    Hospitalization in the last 30 days (Readmission):  No    If yes, Readmission Assessment in CM Navigator will be completed.    Advance Directives:      Code Status: Full Code   Patient's Primary Decision Maker is: Named in Scanned ACP Document    Primary Decision Maker: Tricia Hoover - Child - [phone]    Secondary Decision Maker: Hannah Rios - Child - [phone]    Supplemental (Other) Decision Maker: Bety Blackman - Child - [phone]    Discharge Planning:    Patient lives with: Children, Family Members Type of Home: House  Primary Care Giver: Self  Patient Support Systems include: Children, Family Members   Current Financial resources: Medicare  Current community resources: None  Current services prior to admission: Durable Medical Equipment            Current DME: Walker, Cane, Shower Chair            Type of Home Care services:  None    ADLS  Prior functional level: Independent in ADLs/IADLs  Current functional level: Other (see comment) (tbd)    PT AM-PAC:   /24  OT AM-PAC:   /24    Family can provide assistance at DC: Yes  Would you like Case Management to discuss the 
pharmacy can deliver to the bedside if staff is available. (payment due at time of pick-up or delivery - cash, check, or card accepted)     Able to afford home medications/ co-pay costs: Yes    ADLS:  Current PT AM-PAC Score: 16 /24  Current OT AM-PAC Score: 13 /24    DISCHARGE Disposition: Home with Home Health Care: [facility]     LOC at discharge: Not Applicable  JOE Completed: Not Indicated    Notification completed in HENS/PAS?:  Not Applicable    IMM Completed:   Yes, Case management has presented and reviewed IMM letter #2 to the patient and/or family/ POA. Patient and/or family/POA verbalized understanding of their medicare rights and appeal process if needed. Patient and/or family/POA verbalized understanding of DC within 4 hours of signing IMM letter #2. Patient and/or family/POA has signed and placed today's date (02/24/2025) and time (1132) on IMM letter #2 on the the appropriate lines. Patient and/or family/POA, provided copy of letter and they are aware that original copy of IMM letter #2 is available prior to discharge from the paper chart on the unit.  Electronic documentation has been entered into epic for IMM letter #2 and original paper copy has been added to the paper chart at the nurses station.  SW provided and explained IMM and 4 hour window to Pt's granddaugther . They expressed understanding of their rights to appeal and that they may/will leave the hospital w/o having received the IMM letter 4 hours prior to DC. SW provided Pt's granddaughter  a copy of IMM and placed original signed copy on the paperchart. Pt's granddaughter  is in agreement w/DC to Home today.           Transportation:  Transportation PLAN for discharge: family   Mode of Transport: Private Car  Reason for medical transport: Not Applicable  Name of Transport Company: Not Applicable  Time of Transport: n/a    Transport form completed: Not Indicated    Home Care:  Home Care ordered at discharge: Yes  Home Care Agency:

## 2025-04-24 NOTE — PLAN OF CARE
Problem: Chronic Conditions and Co-morbidities  Goal: Patient's chronic conditions and co-morbidity symptoms are monitored and maintained or improved  Outcome: Progressing     Problem: Discharge Planning  Goal: Discharge to home or other facility with appropriate resources  Outcome: Progressing  Flowsheets (Taken 4/23/2025 2030)  Discharge to home or other facility with appropriate resources:   Identify barriers to discharge with patient and caregiver   Arrange for needed discharge resources and transportation as appropriate   Identify discharge learning needs (meds, wound care, etc)     Problem: Skin/Tissue Integrity  Goal: Skin integrity remains intact  Description: 1.  Monitor for areas of redness and/or skin breakdown2.  Assess vascular access sites hourly3.  Every 4-6 hours minimum:  Change oxygen saturation probe site4.  Every 4-6 hours:  If on nasal continuous positive airway pressure, respiratory therapy assess nares and determine need for appliance change or resting period  Outcome: Progressing     Problem: Pain  Goal: Verbalizes/displays adequate comfort level or baseline comfort level  Outcome: Progressing     Problem: ABCDS Injury Assessment  Goal: Absence of physical injury  Outcome: Progressing

## 2025-04-24 NOTE — PLAN OF CARE
Called patient's pain management clinic and gave them update about the patient and she was here for a fall and C2 fracture.  Discussed that patient's MS Contin dose doubled and patient was sent home.  Patient was having 15 days of supply of pain medications.  The clinic will give her a call and follow-up with her within a week.

## 2025-04-25 ENCOUNTER — CARE COORDINATION (OUTPATIENT)
Dept: CASE MANAGEMENT | Age: 80
End: 2025-04-25

## 2025-04-25 DIAGNOSIS — S12.100A TRAUMATIC CLOSED FRACTURE OF C2 VERTEBRA WITH MINIMAL DISPLACEMENT, INITIAL ENCOUNTER (HCC): Primary | ICD-10-CM

## 2025-04-25 PROCEDURE — 1111F DSCHRG MED/CURRENT MED MERGE: CPT | Performed by: STUDENT IN AN ORGANIZED HEALTH CARE EDUCATION/TRAINING PROGRAM

## 2025-04-25 NOTE — CARE COORDINATION
Care Transitions Note    Initial Call - Call within 2 business days of discharge: Yes    Patient Current Location:  Home: [address]  [facility] [address]    Care Transition Nurse contacted the family daughter Zonia by telephone to perform post hospital discharge assessment, verified name and  as identifiers.  Provided introduction to self, and explanation of the Care Transition Nurse role.    Patient: Shonda Hernandes    Patient : 1945   MRN: [id number]    Reason for Admission: Traumatic closed fracture of C2 vertebra with minimal displacement, Falls in home  Discharge Date: 25   RURS: Readmission Risk Score: 15.4      Last Discharge Facility       Date Complaint Diagnosis Description Type Department Provider    25  Traumatic closed fracture of C2 vertebra with minimal displacement, initial encounter ([facility]) ... Admission (Discharged) Travis Escalona MD    25 Fall Compression fracture of C2 vertebra, initial encounter ([facility]) ... ED (TRANSFER) [facility] Tad Noyola, DO            Was this an external facility discharge? No    Additional needs identified to be addressed with provider   No needs identified             Method of communication with provider: none.    Patients top risk factors for readmission: functional physical ability, falls, and medical condition-Traumatic closed fracture of C2 vertebra with minimal displacement    Interventions to address risk factors:   Education: Daughter instructed to continue to monitor for any worsening gait or balance, anymore falls, any headaches, blurred vision, nausea or vomiting, reporting to MD immediately.  Review of patient management of conditions/medications: make sure patient is resting as needed, taking all medications as prescribed, make sure patient is being assisted with all ambulation, follow up with all MD's as instructed.  Home Health: CTN confirmed with [facility], referral was received.       Care

## 2025-04-26 NOTE — PROGRESS NOTES
Internal Medicine Progress Note    Date: 4/21/2025   Patient: Shonda Hernandes   Hospital Day: 2      CC: No chief complaint on file.       Interval Hx   Patient seen and examined this morning.  Sitting in the bed, without any distress.  Endorsing mild neck pain on movement.  She did not sleep well overnight, having good appetite, denies any fever, chills, chest pain, shortness of breath.  She remained vitally stable.  Labs with potassium of 3.4, replaced with Effer-K.  Other lites remained within normal limits.  CBC WNL.       HPI:   \"Ms. Shonda Hernandes is a 79 y.o. female with a MHx significant for afib s/p PVI and RENETTA exclusion 2020, CAD s/p CHILANGO 5/2014, CHF, pulmonary HTN, aortic stenosis s/p AVR, COPD, Medtronic Bi-V PPM on 4/11/2022 current smoker who presented to the ED on 4/19 due to neck pain following fall.     Fell when stepping onto grass section outside in yard yesterday while taking trash out. She reports hitting forehead on ground. No LOC. Describes fall as \"clumsiness\" and denies any preceding chest pain, lightheadedness. Couldn't get up by herself and a neighbor saw her on ground and called daughter and granddaughter out of house. She experienced bad neck pain when she was helped up so bad that she screamed out. She just took some of her home pain medications prescribed for chronic back pain. She reports pain somewhat subsided but she would experience sharp neck pain if she turned her head in certain ways. She reports no new numbness, tingling, loss of sensation, loss of bladder or bowel control. She does report it was a bit difficult to hold glass of water with L hand. She reports chronic R shoulder pain that was being evaluated for surgery. Last fall was 3 weeks ago falling against shower door hurting her upper left arm. No back surgeries.\"      Objective     Vital Signs:  Patient Vitals for the past 8 hrs:   BP Temp Temp src Pulse Resp SpO2   04/21/25 0602 -- -- -- -- 15 --   04/21/25 0515 133/73 98 °F 
     Internal Medicine Progress Note    Date: 4/23/2025   Patient: Shonda Hernandes   Hospital Day: 4      CC: No chief complaint on file.       Interval Hx   Patient refusing to ambulate and get up because of increasing pain. Pain severity came down to 2 from 7 after 10 of oxy. VSS.  She was complaining of 10/10 pain in her neck.       HPI:   \"Ms. Shonda Hernandes is a 79 y.o. female with a MHx significant for afib s/p PVI and RENETTA exclusion 2020, CAD s/p CHILANGO 5/2014, CHF, pulmonary HTN, aortic stenosis s/p AVR, COPD, Medtronic Bi-V PPM on 4/11/2022 current smoker who presented to the ED on 4/19 due to neck pain following fall.     Fell when stepping onto grass section outside in yard yesterday while taking trash out. She reports hitting forehead on ground. No LOC. Describes fall as \"clumsiness\" and denies any preceding chest pain, lightheadedness. Couldn't get up by herself and a neighbor saw her on ground and called daughter and granddaughter out of house. She experienced bad neck pain when she was helped up so bad that she screamed out. She just took some of her home pain medications prescribed for chronic back pain. She reports pain somewhat subsided but she would experience sharp neck pain if she turned her head in certain ways. She reports no new numbness, tingling, loss of sensation, loss of bladder or bowel control. She does report it was a bit difficult to hold glass of water with L hand. She reports chronic R shoulder pain that was being evaluated for surgery. Last fall was 3 weeks ago falling against shower door hurting her upper left arm. No back surgeries.\"      Objective     Vital Signs:  Patient Vitals for the past 8 hrs:   BP Temp Temp src Pulse Resp SpO2   04/23/25 0800 135/66 97.7 °F (36.5 °C) Oral 63 15 98 %   04/23/25 0612 -- -- -- -- 16 --   04/23/25 0542 -- -- -- -- 16 --   04/23/25 0430 -- -- -- -- 16 --   04/23/25 0355 132/67 97.8 °F (36.6 °C) Oral 57 16 95 %       Physical Exam  Constitutional:       
    NEUROSURGERY PROGRESS NOTE    NICHELLE LORENZO   [id number]   1945   4/21/2025    Interval History: [facility]  Hospital Day #2     Subjective:  resting in bed, complains of unchanged posterior neck pain but no new neurologic deficits     Objective:  /71   Pulse 72   Temp 98.2 °F (36.8 °C) (Oral)   Resp 16   Ht 1.524 m (5')   Wt 53.1 kg (117 lb)   SpO2 96%   BMI 22.85 kg/m²     Labs:  Recent Labs     04/19/25 1908 04/20/25  0540 04/21/25  0801    141 137   CL 96* 98* 97*   CO2 29 30 26   BUN 21* 21* 22*   CREATININE 1.0 1.1 1.1   GLUCOSE 105* 81 104*     Recent Labs     04/19/25 1908 04/20/25  0540 04/21/25  0801   WBC 8.7 8.0 8.4   RBC 4.45 4.03 4.36     MRI CERVICAL SPINE W WO CONTRAST   Final Result   1. Comminuted fractures of C2 with fracture line seen to better advantage on the CT imaging. There is an associated epidural hematoma which contributes to central canal stenosis and effacement of the CSF signal around the cord at the level of C2. There    is no significant cord signal abnormality identified.   2. Posterior ligamentous injury with diffuse edema along the C1 and C2 articulation. The transverse ligaments along the dens remain intact.   3. Underlying advanced degenerative disc disease with multilevel central canal narrowing most pronounced at C6-C7 with severe central canal stenosis as described.   4. Multilevel bilateral foraminal narrowing most pronounced at C6-C7. Additional foraminal narrowing as described above..      Electronically signed by Lalito Nava MD      XR CERVICAL SPINE (2-3 VIEWS)    (Results Pending)       Neurologic Exam:  GCS:  4 - Opens eyes on own  5 - Alert and oriented  6 - Follows simple motor commands    Mental Status: Awake, alert, oriented x 4  Sensation: Intact to all extremities to light touch  Coordination: Intact    DTRs:    Right  Left    ankle clonus  neg neg   hoffmans neg neg     Musculoskeletal:   Gait: Not tested   Tone: normal   Motor strength: 
  Physician Progress Note      PATIENT:               NICHELLE LORENZO  CSN #:                  [id number]  :                       1945  ADMIT DATE:       2025 11:06 PM  DISCH DATE:  RESPONDING  PROVIDER #:        Travis Cooper MD          QUERY TEXT:    Please clarify whether the C2 fracture documented  is related to a traumatic   or non-traumatic cause:    The clinical indicators include:  H&P--\" Fell when stepping onto grass section outside in yard yesterday while   taking trash out.  Fall-C2 fracture-Possible epidural hematoma.\"    -ns consult-- \"-I discussed the management options with the patient at   length, including 1) conservative management with bracing or 2) surgery   (likely C1-3 fusion).\"    Dexa scan--3/24--\" IMPRESSION: Osteoporosis by WHO criteria.\"    [place] J collar, xray, pain meds, smoking cessation, NS consult, CT, MRI, neuro   checks, essential home meds, supportive care  Options provided:  -- Pathological fracture related to osteoporosis  -- Other - I will add my own diagnosis  -- Disagree - Not applicable / Not valid  -- Disagree - Clinically unable to determine / Unknown  -- Refer to Clinical Documentation Reviewer    PROVIDER RESPONSE TEXT:    The patient has a pathological fracture related to osteoporosis.    Query created by: Tad Marquez on 2025 10:19 AM      Electronically signed by:  Travis Cooper MD 2025 1:02 PM          
4 Eyes Skin Assessment     NAME:  Shonda Hernandes  YOB: 1945  MEDICAL RECORD NUMBER:  [id number]    The patient is being assessed for  Admission    I agree that at least one RN has performed a thorough Head to Toe Skin Assessment on the patient. ALL assessment sites listed below have been assessed.      Areas assessed by both nurses:    Head, Face, Ears, Shoulders, Back, Chest, Arms, Elbows, Hands, Sacrum. Buttock, Coccyx, Ischium, and Legs. Feet and Heels, bilat knee abrasion, abrasion to right hand        Does the Patient have a Wound? No noted wound(s)       Dipesh Prevention initiated by RN: Yes  Wound Care Orders initiated by RN: No    Pressure Injury (Stage 3,4, Unstageable, DTI, NWPT, and Complex wounds) if present, place Wound referral order by RN under : No    New Ostomies, if present place, Ostomy referral order under : No     Nurse 1 eSignature: Electronically signed by Malika Crystal RN on 4/20/25 at 3:30 AM EDT    **SHARE this note so that the co-signing nurse can place an eSignature**    Nurse 2 eSignature: Electronically signed by Ani Shields RN on 4/20/25 at 3:33 AM EDT   
I have seen, examined and evaluated the patient as did the resident physician,  *** on 4/23/25. We have discussed the plan of care and decisions made during that discussion were incorporated into this note. I have reviewed the resident physician's note and agree with the assessment and plan of care as documented.    I personally have obtained, updated and/or reviewed the patient’s medication list on [unfilled].  Personally reviewed Lab Studies and Imaging   Discussed management of the case with *** who recommended ***  EKG interpreted personally and results ***  Imaging that was interpreted personally includes *** and results ***  Drugs that require monitoring for toxicity include *** and the method of monitoring was ***    Comment: Please note this report has been produced using speech recognition software and may contain errors related to that system including errors in grammar, punctuation, and spelling, as well as words and phrases that may be inappropriate. If there are any questions or concerns, please feel free to contact the dictating provider for clarification.     [unfilled]      Travis Cooper MD, [facility], [facility]  Hospitalist  Attending Physician    She is in severe pain 10/10 pain, and tearing and worried on how she is will do at home  Increase MS contin to 30 mg bid  Stop Oxycodone and add percocet 10 mg a 4 hrs prn (this is what she uses at home given by her pain physician), schedule gabapentin 10 mg tid and make robaxin scheduled as well  Bowel regimen while on opiate pain medications, schedule miralax, senna/docusate and added Movantik  
Occupational / Physical Therapy  Attempt   Pt politely declining therapies at this time d/t pain. Pt reporting agreeable to attempt therapies later this date. Will follow up as treatment schedule allows.     Arabella Huizar, MOT, OTR/L, CNS    Eric Barker, PT, DPT         
Occupational Therapy  Facility/Department: [facility] ORTHO/NEURO  Occupational Therapy Treatment     Name: Shonda Hernandes  : 1945  MRN: [id number]  Date of Service: 2025    Discharge Recommendations:  Home with Home health OT, 24 hour supervision or assist          Patient Diagnosis(es): The primary encounter diagnosis was Traumatic closed fracture of C2 vertebra with minimal displacement, initial encounter ([facility]). A diagnosis of Acute on chronic diastolic congestive heart failure (HCC) was also pertinent to this visit.  Past Medical History:  has a past medical history of Arthritis of shoulder region, right, Atrial fibrillation ([facility]), AVM (arteriovenous malformation) of duodenum, AVM (arteriovenous malformation) of stomach, Bladder cancer ([facility]), CAD (coronary artery disease), Carotid stenosis, Chronic back pain, Chronic diastolic CHF (congestive heart failure) ([facility]), Chronic prescription opiate use, COPD (HCC), Diverticulosis, HTN (hypertension), Hyperlipidemia, Hypothyroidism, Ischemic stroke, Lumbar spine stenosis, Macular degeneration, Nonrheumatic mitral valve regurgitation, Obstructive sleep apnea, Osteoarthritis, Peripheral neuropathy, Pulmonary HTN ([facility]), PVD (peripheral vascular disease), Syncope, and Tobacco use disorder in remission.  Past Surgical History:  has a past surgical history that includes Cystoscopy (2014); joint replacement; Appendectomy; Aortic valve replacement (6/16/15); Upper gastrointestinal endoscopy (12/15/2017); other surgical history (2018); Coronary angioplasty (); Spine surgery (N/A, 3/15/2019); Cholecystectomy, laparoscopic (N/A, 2019); back surgery (03/15/2019); Pain management procedure (Bilateral, 2021); Pain management procedure (Bilateral, 2021); Pain management procedure (Bilateral, 2021); ablation of dysrhythmic focus (2022); Pacemaker insertion (2022); and Pain management procedure (Bilateral, 2024).    Treatment 
Patient alert and oriented x4, VSS, RA. Ambulates x 1 GB/walker, tolerates well. Tolerating PO. Pain managed via MAR and non-pharm measures. Voiding via BRP. All safety precautions in place, call light/items in reach.   
Patient is A&Ox4. VSS this shift . Patient has endorsed pain that has been managed per MAR and non-pharm measures. Patient is tolerating PO diet. Pt declined ambulation today due to pain. Pain medication adjusted Voiding via adequately.No BM thus far. Patient updated on plan of care. Fall and safety precautions in place, call light within reach.    
Patient is A&Ox4. VSS this shift intermittent bp changes. Patient has endorsed pain to neck and r shoulder managed per MAR and non-pharm measures. Patient is tolerating PO diet. Tolerating ambulation x1 assist with gait belt and walker. Voiding via BRP. Patient updated on plan of care. Fall and safety precautions in place, call light within reach.     Pt to d/c to home. 1 PIV removed. D/c packet fully reviewed with Pt with emphasis on f/u appointments, medications, and home safety, Pt verbalized understanding. All belonging with Pt.        
Patient is alert and oriented x4. VSS on room air. Medications given per MAR, no side effects noted. Patient ambulating x1-2 with gait belt and walker. Complaints of pain to back, continuing to monitor and manage per MAR. Voiding well via purewick per request, no bm this shift.      Patient is currently resting in bed with bed alarm on for safety. Call light within reach and all fall precautions in place. Plan of care continues.  
Patient refused to ambulate/get up to the chair this morning due to increased pain.  
Patient's family requested lift chair prescription. Spoke with resident, \"pt needs to receive prescription from PCP and unable to obtain at this hospital.\" Patient and family made aware. Family also concerned about patients cardiac medication dapagliflozin being stopped. Resident states, \"it is stopped due to risk of orthostatic hypotension.\" Family and patient aware and instructed to speak with cardiologist if there was any further concern.   
Physical Therapy  Facility/Department: [facility] ORTHO/NEURO  Physical Therapy Initial Assessment and Treatment    Name: Shonda Hernandes  : 1945  MRN: [id number]  Date of Service: 2025    Discharge Recommendations:  Subacute/Skilled Nursing Facility (If home, 24 hour assist and home PT)   PT Equipment Recommendations  Other: Defer      Patient Diagnosis(es): There were no encounter diagnoses.  Past Medical History:  has a past medical history of Arthritis of shoulder region, right, Atrial fibrillation (HCC), AVM (arteriovenous malformation) of duodenum, AVM (arteriovenous malformation) of stomach, Bladder cancer (HCC), CAD (coronary artery disease), Carotid stenosis, Chronic back pain, Chronic diastolic CHF (congestive heart failure) (HCC), Chronic prescription opiate use, COPD (HCC), Diverticulosis, HTN (hypertension), Hyperlipidemia, Hypothyroidism, Ischemic stroke, Lumbar spine stenosis, Macular degeneration, Nonrheumatic mitral valve regurgitation, Obstructive sleep apnea, Osteoarthritis, Peripheral neuropathy, Pulmonary HTN (HCC), PVD (peripheral vascular disease), Syncope, and Tobacco use disorder in remission.  Past Surgical History:  has a past surgical history that includes Cystoscopy (2014); joint replacement; Appendectomy; Aortic valve replacement (6/16/15); Upper gastrointestinal endoscopy (12/15/2017); other surgical history (2018); Coronary angioplasty (); Spine surgery (N/A, 3/15/2019); Cholecystectomy, laparoscopic (N/A, 2019); back surgery (03/15/2019); Pain management procedure (Bilateral, 2021); Pain management procedure (Bilateral, 2021); Pain management procedure (Bilateral, 2021); ablation of dysrhythmic focus (2022); Pacemaker insertion (2022); and Pain management procedure (Bilateral, 2024).    Assessment  Assessment: Pt is 80 yo F to [facility] on  s/p fall.  Pt with C2 fx and to wear [place] J collar.  Typically, pt lives at home with family 
Pt is A/Ox4, VSS on RA, and x1 gb/walker. Pt is voiding via bathroom privileges, tolerating food and fluids, and pain is being managed with scheduled and PRN pain meds. All safety precautions in place, call light within reach, plan of care continues.   
Pt is A/Ox4, VSS on room air, and strict bedrest at this time. Pt is voiding via external catheter, tolerating food and fluids, and pain is being managed with scheduled and PRN pain meds per MAR. All safety precautions in place, call light within reach, plan of care continues.   
Pt is alert and oriented X4. VS taken and recorded. Spo2 maintained at RA. Pt  pain has been controlled with MAR. Is on oral feed and tolerating well. Pt voiding via pure wick and care provided. Q2 re-positioning done. No  BM this shift. All fall precautions in place, call light within reach, free from fall injury. Plan of care   ongoing.   
Pt is alert and oriented. VSS. Changed collar to ours. Pt tolerated well. Pt able to take pills whole with water without difficulty. Pt can be incontinent, Purwick put in place. All safety measure in place. Will continue to monitor.  
Pt rested well this shift schedule pain medication administered as well a PRN, medication was effective. Pt able to rest after receiving medication, appeared comfortable, respiration even and unlabored. Neck brace in place, she declined to be repositioned but did move slightly when uncomfortable. When awake Pt pain quickly returns with any movements.   
Pt slept well this shift, Abeba HERMOSILLO in place. Ambulated to the bathroom, states pain is worse when walking. VS stable, fall preventions in place, call light and bedside table within reach.   
72 hours.    Radiology:  MRI CERVICAL SPINE W WO CONTRAST   Final Result   1. Comminuted fractures of C2 with fracture line seen to better advantage on the CT imaging. There is an associated epidural hematoma which contributes to central canal stenosis and effacement of the CSF signal around the cord at the level of C2. There    is no significant cord signal abnormality identified.   2. Posterior ligamentous injury with diffuse edema along the C1 and C2 articulation. The transverse ligaments along the dens remain intact.   3. Underlying advanced degenerative disc disease with multilevel central canal narrowing most pronounced at C6-C7 with severe central canal stenosis as described.   4. Multilevel bilateral foraminal narrowing most pronounced at C6-C7. Additional foraminal narrowing as described above..      Electronically signed by Lalito Nava MD            Assessment & Plan   Fall  C2 fracture   Possible epidural hematoma  CT Cervical - Comminuted fractures of the C2 vertebral body extending to involve the   articular surface at the atlantoaxial articulation on the right with slight   impaction of one of the fracture fragments. The fracture extends to involve the anterior aspect of the right pedicle. There is also a nondisplaced fracture of the right transverse process of C2. A nondisplaced fracture   involves the medial aspect of the left C2 lamina and base of the spinous   process.   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 Stenosis consider possible vertebro-basilar insufficiency although patient did not report lightheadedness prior. No red flag signs or symptoms of cord compression.  - NSGY consulted   - Aspirin held   - No AC  - strict bedrest   - Maintain cervical collar  - MRI w and w/o cervical showing as above  - 
Present: No  Referring Practitioner: Dr. Richmond  Diagnosis: C2 cervical fracture  Subjective  Subjective: Pt in bed upon entry.  Pt requested to go to the bathroom.  Pain: 8/10     Social/Functional History  Social/Functional History  Lives With:  (Per pt she lives with Grandaughter and grandson. Per case management she lives with daughter and 4 grandchildren)  Type of Home: House  Home Layout: One level, Laundry in basement  Home Access: Stairs to enter without rails  Entrance Stairs - Number of Steps: 1  Bathroom Shower/Tub: Walk-in shower  Bathroom Toilet: Handicap height (sink left of toilet)  Bathroom Equipment: Shower chair  Home Equipment: Walker - 4-Wheeled, Cane, Lift chair, Reacher  Has the patient had two or more falls in the past year or any fall with injury in the past year?: Yes  Prior Level of Assist for ADLs: Independent  Prior Level of Assist for Homemaking: Needs assistance (pt does laundry)  Prior Level of Assist for Ambulation: Independent household ambulator, with or without device, Independent community ambulator, with or without device  Prior Level of Assist for Transfers: Independent  Active : No  Mode of Transportation: Family  Occupation: Retired  Type of Occupation: Cleaned houses, 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  Poor Rt UE AROM)  Strength: Within functional limits (Lft UE)  Coordination: Within functional limits  ADL  Feeding: Beverage management;Supervision  Grooming: Moderate assistance (to wash hands, standing at sink)  LE Dressing: Maximum 
compression.  - NSGY consulted, recommend [place] J collar, PRN robaxin, okay to discharge patient from neurosurgery standpoint.  - No strenuous activity. Avoid bending, twisting, or lifting.   - Aspirin on hold, restart AC after 7 days which is on 4/27.  - Maintain cervical collar  - MRI w and w/o cervical showing as above  - pain control      Cont MS Contin 30 mg BID     PRN percocet  q 4 hr      Voltaren gel      Dilaudid pain panel      Robaxin 750mg TID      Gabapentin 100 mg TID      Movantik daily and PRN narcan  - PT/OT  -  Tylenol 650 mg 3 times daily     CHRONIC MEDICAL CONDITIONS:  COPD  Not in acute exacerbation   - continue home inhalers     Afib rate controlled  not on AC due to GIB s/p RENETTA exclusion  Toprol 50mg daily for Rate control   Amiodarone 100mg daily      HFpEF   CAMERON 4/11/2025 EF 55-60% Moderate mitral regurgitation. S/p Biv PPM 2022.   - Valsartan 40mg  - Torsemide 40mg daily on hold because of orthostasis  - metoprolol 25 mg daily from 50 daily  - metolazone as needed   - jardiance held      Mitral regurgitation   Underwent CAMERON 4/2025 showing mild regurgitation getting evaluated for mitral clip.      CAD s/p CHILANGO mid circ 5/2014. No reported chest pain will obtain EKG. [facility] 2015 40% lesions mid RCA 50% mid LAD disease and 50% ostial second diagonal branch. No restenosis of circumflex.  -EKG  - ASA held     Chronic back pain  - Takes MS Contin and Percocet at home.   - Cont. MS Contin 15mg BID  - PRN Oxycodone and dilaudid.  - Avoid IV pain medications   - Lidocaine patch     Recurrent lower extremity cellulitis  - Penicillin V prophylaxis      HLD  Ezetimibe     Hypothyroidism   - Continue home Synthroid    Glycolax as a BR.    DVT Ppx: Held  Diet: ADULT DIET; Regular   Code status:  Full Code     ELOS: 1-2 days  Barriers to discharge:Neck pain  Disposition  - Preadmission: Home  - Current: GMF  - Upon discharge: TBD    Will discuss with attending physician Dr. Cooper, Travis ESQUIVEL MD 
MS Contin 15mg BID  - PRN Oxycodone and dilaudid.  - Avoid IV pain medications   - Lidocaine patch     Recurrent lower extremity cellulitis  - Penicillin V prophylaxis      HLD  Ezetimibe     Hypothyroidism   - Continue home Synthroid    DVT Ppx: Held  Diet: ADULT DIET; Regular   Code status:  Full Code     ELOS: 1-2 days  Barriers to discharge:Neck pain  Disposition  - Preadmission: Home  - Current: [facility]  - Upon discharge: TBD    Will discuss with attending physician Dr. Cooper, Travis ESQUIVEL MD     _____________________  Tim Richmond MD   Internal Medicine Resident, PGY-1

## 2025-04-28 LAB
BACTERIA FLD AEROBE CULT: NORMAL
GRAM STN SPEC: NORMAL

## 2025-04-29 RX ORDER — POTASSIUM CHLORIDE 1500 MG/1
TABLET, EXTENDED RELEASE ORAL
Qty: 70 TABLET | Refills: 4 | Status: ON HOLD | OUTPATIENT
Start: 2025-04-29

## 2025-04-30 ENCOUNTER — APPOINTMENT (OUTPATIENT)
Dept: GENERAL RADIOLOGY | Age: 80
DRG: 092 | End: 2025-04-30
Payer: MEDICARE

## 2025-04-30 ENCOUNTER — APPOINTMENT (OUTPATIENT)
Dept: CT IMAGING | Age: 80
DRG: 092 | End: 2025-04-30
Payer: MEDICARE

## 2025-04-30 ENCOUNTER — HOSPITAL ENCOUNTER (INPATIENT)
Age: 80
LOS: 7 days | Discharge: HOME | DRG: 092 | End: 2025-05-07
Attending: EMERGENCY MEDICINE | Admitting: HOSPITALIST
Payer: MEDICARE

## 2025-04-30 DIAGNOSIS — T50.905A DELIRIUM, DRUG-INDUCED: Primary | ICD-10-CM

## 2025-04-30 DIAGNOSIS — S12.110S CLOSED ODONTOID FRACTURE WITH TYPE II MORPHOLOGY AND ANTERIOR DISPLACEMENT, SEQUELA: ICD-10-CM

## 2025-04-30 DIAGNOSIS — R41.0 DELIRIUM, DRUG-INDUCED: Primary | ICD-10-CM

## 2025-04-30 PROBLEM — R44.3 HALLUCINATIONS: Status: ACTIVE | Noted: 2025-04-30

## 2025-04-30 PROBLEM — G93.40 ACUTE ENCEPHALOPATHY: Status: ACTIVE | Noted: 2025-04-30

## 2025-04-30 LAB
ALBUMIN SERPL-MCNC: 3.5 G/DL (ref 3.4–5)
ALBUMIN/GLOB SERPL: 1.1 {RATIO} (ref 1.1–2.2)
ALP SERPL-CCNC: 80 U/L (ref 40–129)
ALT SERPL-CCNC: <5 U/L (ref 10–40)
ANION GAP SERPL CALCULATED.3IONS-SCNC: 11 MMOL/L (ref 3–16)
AST SERPL-CCNC: 25 U/L (ref 15–37)
BACTERIA URNS QL MICRO: ABNORMAL /HPF
BASOPHILS # BLD: 0 K/UL (ref 0–0.2)
BASOPHILS NFR BLD: 0.5 %
BILIRUB SERPL-MCNC: 0.4 MG/DL (ref 0–1)
BILIRUB UR QL STRIP.AUTO: NEGATIVE
BUN SERPL-MCNC: 13 MG/DL (ref 7–20)
CALCIUM SERPL-MCNC: 9.7 MG/DL (ref 8.3–10.6)
CHLORIDE SERPL-SCNC: 104 MMOL/L (ref 99–110)
CLARITY UR: CLEAR
CO2 SERPL-SCNC: 22 MMOL/L (ref 21–32)
COLOR UR: YELLOW
CREAT SERPL-MCNC: 1 MG/DL (ref 0.6–1.2)
DEPRECATED RDW RBC AUTO: 15 % (ref 12.4–15.4)
EOSINOPHIL # BLD: 0.3 K/UL (ref 0–0.6)
EOSINOPHIL NFR BLD: 3.5 %
EPI CELLS #/AREA URNS HPF: ABNORMAL /HPF (ref 0–5)
GFR SERPLBLD CREATININE-BSD FMLA CKD-EPI: 57 ML/MIN/{1.73_M2}
GLUCOSE SERPL-MCNC: 91 MG/DL (ref 70–99)
GLUCOSE UR STRIP.AUTO-MCNC: NEGATIVE MG/DL
HCT VFR BLD AUTO: 37.5 % (ref 36–48)
HGB BLD-MCNC: 12.7 G/DL (ref 12–16)
HGB UR QL STRIP.AUTO: NEGATIVE
KETONES UR STRIP.AUTO-MCNC: NEGATIVE MG/DL
LEUKOCYTE ESTERASE UR QL STRIP.AUTO: NEGATIVE
LYMPHOCYTES # BLD: 1.4 K/UL (ref 1–5.1)
LYMPHOCYTES NFR BLD: 15.6 %
MCH RBC QN AUTO: 29.7 PG (ref 26–34)
MCHC RBC AUTO-ENTMCNC: 33.9 G/DL (ref 31–36)
MCV RBC AUTO: 87.6 FL (ref 80–100)
MONOCYTES # BLD: 1 K/UL (ref 0–1.3)
MONOCYTES NFR BLD: 10.5 %
NEUTROPHILS # BLD: 6.3 K/UL (ref 1.7–7.7)
NEUTROPHILS NFR BLD: 69.9 %
NITRITE UR QL STRIP.AUTO: NEGATIVE
PH UR STRIP.AUTO: 8.5 [PH] (ref 5–8)
PLATELET # BLD AUTO: 331 K/UL (ref 135–450)
PMV BLD AUTO: 7.4 FL (ref 5–10.5)
POTASSIUM SERPL-SCNC: 4.2 MMOL/L (ref 3.5–5.1)
PROT SERPL-MCNC: 6.8 G/DL (ref 6.4–8.2)
PROT UR STRIP.AUTO-MCNC: 30 MG/DL
RBC # BLD AUTO: 4.28 M/UL (ref 4–5.2)
RBC #/AREA URNS HPF: ABNORMAL /HPF (ref 0–4)
SODIUM SERPL-SCNC: 137 MMOL/L (ref 136–145)
SP GR UR STRIP.AUTO: 1.01 (ref 1–1.03)
T4 FREE SERPL-MCNC: 1.4 NG/DL (ref 0.9–1.8)
TSH SERPL DL<=0.005 MIU/L-ACNC: 6.4 UIU/ML (ref 0.27–4.2)
UA COMPLETE W REFLEX CULTURE PNL UR: ABNORMAL
UA DIPSTICK W REFLEX MICRO PNL UR: YES
URN SPEC COLLECT METH UR: ABNORMAL
UROBILINOGEN UR STRIP-ACNC: 0.2 E.U./DL
WBC # BLD AUTO: 9.1 K/UL (ref 4–11)
WBC #/AREA URNS HPF: ABNORMAL /HPF (ref 0–5)

## 2025-04-30 PROCEDURE — 84443 ASSAY THYROID STIM HORMONE: CPT

## 2025-04-30 PROCEDURE — 2500000003 HC RX 250 WO HCPCS

## 2025-04-30 PROCEDURE — 71046 X-RAY EXAM CHEST 2 VIEWS: CPT

## 2025-04-30 PROCEDURE — 84439 ASSAY OF FREE THYROXINE: CPT

## 2025-04-30 PROCEDURE — 1200000000 HC SEMI PRIVATE

## 2025-04-30 PROCEDURE — 85025 COMPLETE CBC W/AUTO DIFF WBC: CPT

## 2025-04-30 PROCEDURE — 6370000000 HC RX 637 (ALT 250 FOR IP)

## 2025-04-30 PROCEDURE — 72125 CT NECK SPINE W/O DYE: CPT

## 2025-04-30 PROCEDURE — 70450 CT HEAD/BRAIN W/O DYE: CPT

## 2025-04-30 PROCEDURE — 99285 EMERGENCY DEPT VISIT HI MDM: CPT

## 2025-04-30 PROCEDURE — 80053 COMPREHEN METABOLIC PANEL: CPT

## 2025-04-30 PROCEDURE — 81001 URINALYSIS AUTO W/SCOPE: CPT

## 2025-04-30 RX ORDER — SODIUM CHLORIDE 0.9 % (FLUSH) 0.9 %
5-40 SYRINGE (ML) INJECTION PRN
Status: DISCONTINUED | OUTPATIENT
Start: 2025-04-30 | End: 2025-05-07 | Stop reason: HOSPADM

## 2025-04-30 RX ORDER — ENOXAPARIN SODIUM 100 MG/ML
40 INJECTION SUBCUTANEOUS DAILY
Status: DISCONTINUED | OUTPATIENT
Start: 2025-04-30 | End: 2025-05-07 | Stop reason: HOSPADM

## 2025-04-30 RX ORDER — LEVOTHYROXINE SODIUM 88 UG/1
88 TABLET ORAL DAILY
Status: DISCONTINUED | OUTPATIENT
Start: 2025-05-01 | End: 2025-05-07 | Stop reason: HOSPADM

## 2025-04-30 RX ORDER — ACETAMINOPHEN 650 MG/1
650 SUPPOSITORY RECTAL EVERY 6 HOURS PRN
Status: DISCONTINUED | OUTPATIENT
Start: 2025-04-30 | End: 2025-05-07 | Stop reason: HOSPADM

## 2025-04-30 RX ORDER — ONDANSETRON 4 MG/1
4 TABLET, ORALLY DISINTEGRATING ORAL EVERY 8 HOURS PRN
Status: DISCONTINUED | OUTPATIENT
Start: 2025-04-30 | End: 2025-05-01

## 2025-04-30 RX ORDER — ACETAMINOPHEN 325 MG/1
650 TABLET ORAL EVERY 6 HOURS PRN
Status: DISCONTINUED | OUTPATIENT
Start: 2025-04-30 | End: 2025-05-07 | Stop reason: HOSPADM

## 2025-04-30 RX ORDER — AMIODARONE HYDROCHLORIDE 200 MG/1
100 TABLET ORAL DAILY
Status: DISCONTINUED | OUTPATIENT
Start: 2025-05-01 | End: 2025-05-07 | Stop reason: HOSPADM

## 2025-04-30 RX ORDER — SODIUM CHLORIDE 9 MG/ML
INJECTION, SOLUTION INTRAVENOUS PRN
Status: DISCONTINUED | OUTPATIENT
Start: 2025-04-30 | End: 2025-05-07 | Stop reason: HOSPADM

## 2025-04-30 RX ORDER — ONDANSETRON 2 MG/ML
4 INJECTION INTRAMUSCULAR; INTRAVENOUS EVERY 6 HOURS PRN
Status: DISCONTINUED | OUTPATIENT
Start: 2025-04-30 | End: 2025-05-01

## 2025-04-30 RX ORDER — DULOXETIN HYDROCHLORIDE 30 MG/1
60 CAPSULE, DELAYED RELEASE ORAL 2 TIMES DAILY
Status: DISCONTINUED | OUTPATIENT
Start: 2025-04-30 | End: 2025-04-30

## 2025-04-30 RX ORDER — HYDROMORPHONE HYDROCHLORIDE 1 MG/ML
0.5 INJECTION, SOLUTION INTRAMUSCULAR; INTRAVENOUS; SUBCUTANEOUS ONCE
Status: COMPLETED | OUTPATIENT
Start: 2025-04-30 | End: 2025-04-30

## 2025-04-30 RX ORDER — OXYCODONE HYDROCHLORIDE 5 MG/1
2.5 TABLET ORAL EVERY 6 HOURS
Refills: 0 | Status: DISCONTINUED | OUTPATIENT
Start: 2025-04-30 | End: 2025-05-01

## 2025-04-30 RX ORDER — POTASSIUM CHLORIDE 1500 MG/1
40 TABLET, EXTENDED RELEASE ORAL PRN
Status: DISCONTINUED | OUTPATIENT
Start: 2025-04-30 | End: 2025-05-07 | Stop reason: HOSPADM

## 2025-04-30 RX ORDER — SENNA AND DOCUSATE SODIUM 50; 8.6 MG/1; MG/1
1 TABLET, FILM COATED ORAL NIGHTLY PRN
Status: DISCONTINUED | OUTPATIENT
Start: 2025-04-30 | End: 2025-05-01

## 2025-04-30 RX ORDER — SODIUM CHLORIDE 0.9 % (FLUSH) 0.9 %
5-40 SYRINGE (ML) INJECTION EVERY 12 HOURS SCHEDULED
Status: DISCONTINUED | OUTPATIENT
Start: 2025-04-30 | End: 2025-05-07 | Stop reason: HOSPADM

## 2025-04-30 RX ORDER — PENICILLIN V POTASSIUM 500 MG/1
250 TABLET, FILM COATED ORAL 2 TIMES DAILY
Status: DISCONTINUED | OUTPATIENT
Start: 2025-04-30 | End: 2025-05-07 | Stop reason: HOSPADM

## 2025-04-30 RX ORDER — DULOXETIN HYDROCHLORIDE 30 MG/1
30 CAPSULE, DELAYED RELEASE ORAL 2 TIMES DAILY
Status: DISCONTINUED | OUTPATIENT
Start: 2025-04-30 | End: 2025-05-07 | Stop reason: HOSPADM

## 2025-04-30 RX ORDER — METOPROLOL SUCCINATE 25 MG/1
25 TABLET, EXTENDED RELEASE ORAL DAILY
Status: DISCONTINUED | OUTPATIENT
Start: 2025-04-30 | End: 2025-05-07 | Stop reason: HOSPADM

## 2025-04-30 RX ORDER — ASPIRIN 81 MG/1
81 TABLET ORAL DAILY
Status: DISCONTINUED | OUTPATIENT
Start: 2025-04-30 | End: 2025-05-07 | Stop reason: HOSPADM

## 2025-04-30 RX ORDER — MAGNESIUM SULFATE IN WATER 40 MG/ML
2000 INJECTION, SOLUTION INTRAVENOUS PRN
Status: DISCONTINUED | OUTPATIENT
Start: 2025-04-30 | End: 2025-05-02

## 2025-04-30 RX ORDER — MORPHINE SULFATE 15 MG/1
15 TABLET, FILM COATED, EXTENDED RELEASE ORAL EVERY 12 HOURS SCHEDULED
Status: DISCONTINUED | OUTPATIENT
Start: 2025-04-30 | End: 2025-04-30

## 2025-04-30 RX ORDER — POLYETHYLENE GLYCOL 3350 17 G/17G
17 POWDER, FOR SOLUTION ORAL DAILY PRN
Status: DISCONTINUED | OUTPATIENT
Start: 2025-04-30 | End: 2025-05-07 | Stop reason: HOSPADM

## 2025-04-30 RX ORDER — TORSEMIDE 20 MG/1
40 TABLET ORAL DAILY
Status: DISCONTINUED | OUTPATIENT
Start: 2025-05-01 | End: 2025-05-07 | Stop reason: HOSPADM

## 2025-04-30 RX ORDER — POTASSIUM CHLORIDE 7.45 MG/ML
10 INJECTION INTRAVENOUS PRN
Status: DISCONTINUED | OUTPATIENT
Start: 2025-04-30 | End: 2025-05-07 | Stop reason: HOSPADM

## 2025-04-30 RX ADMIN — SODIUM CHLORIDE, PRESERVATIVE FREE 10 ML: 5 INJECTION INTRAVENOUS at 22:47

## 2025-04-30 RX ADMIN — DULOXETINE HYDROCHLORIDE 30 MG: 30 CAPSULE, DELAYED RELEASE ORAL at 20:42

## 2025-04-30 RX ADMIN — METOPROLOL SUCCINATE 25 MG: 25 TABLET, EXTENDED RELEASE ORAL at 22:32

## 2025-04-30 RX ADMIN — HYDROMORPHONE HYDROCHLORIDE 0.5 MG: 1 INJECTION, SOLUTION INTRAMUSCULAR; INTRAVENOUS; SUBCUTANEOUS at 22:47

## 2025-04-30 RX ADMIN — HYDROMORPHONE HYDROCHLORIDE 0.5 MG: 1 INJECTION, SOLUTION INTRAMUSCULAR; INTRAVENOUS; SUBCUTANEOUS at 20:43

## 2025-04-30 RX ADMIN — OXYCODONE 2.5 MG: 5 TABLET ORAL at 19:21

## 2025-04-30 RX ADMIN — PENICILLIN V POTASSIUM 250 MG: 500 TABLET, FILM COATED ORAL at 22:31

## 2025-04-30 ASSESSMENT — PAIN SCALES - GENERAL
PAINLEVEL_OUTOF10: 8
PAINLEVEL_OUTOF10: 9
PAINLEVEL_OUTOF10: 8

## 2025-04-30 ASSESSMENT — PAIN DESCRIPTION - ONSET: ONSET: ON-GOING

## 2025-04-30 ASSESSMENT — PAIN DESCRIPTION - LOCATION: LOCATION: SHOULDER;NECK;BACK

## 2025-04-30 ASSESSMENT — PAIN DESCRIPTION - FREQUENCY: FREQUENCY: CONTINUOUS

## 2025-04-30 ASSESSMENT — PAIN - FUNCTIONAL ASSESSMENT
PAIN_FUNCTIONAL_ASSESSMENT: PREVENTS OR INTERFERES WITH MANY ACTIVE NOT PASSIVE ACTIVITIES
PAIN_FUNCTIONAL_ASSESSMENT: 0-10

## 2025-04-30 ASSESSMENT — PAIN DESCRIPTION - PAIN TYPE: TYPE: CHRONIC PAIN

## 2025-04-30 ASSESSMENT — PAIN DESCRIPTION - ORIENTATION: ORIENTATION: RIGHT;MID;POSTERIOR

## 2025-04-30 ASSESSMENT — PAIN DESCRIPTION - DESCRIPTORS: DESCRIPTORS: ACHING;SHARP

## 2025-04-30 NOTE — ED NOTES
Patient Name: Shonda Hernandes  : 1945 79 y.o.  MRN: [id number]  ED Room #: A08/A08-08     Chief complaint:   Chief Complaint   Patient presents with    Altered Mental Status     Patient was discharged from hospital 6 days ago. She started to have hallucinations and was altered upon discharge. Patient's daughter stopped pain medication to see if it was the cause, but patient did not improve      Hospital Problem/Diagnosis:   Hospital Problems           Last Modified POA    * (Principal) Acute encephalopathy 2025 Yes         O2 Flow Rate:O2 Device: None (Room air)   (if applicable)  Cardiac Rhythm:   (if applicable)  Active LDA's:   Peripheral IV 25 Distal;Left Forearm (Active)   Site Assessment Clean, dry & intact 25 1505   Line Status Blood return noted;Flushed 25 1505   Line Care Cap changed 25 1505   Phlebitis Assessment No symptoms 25 1505            How does patient ambulate? Unknown, did not assess in the Emergency Department    2. How does patient take pills? Unknown, no oral medications were given in the Emergency Department    3. Is patient alert? Alert    4. Is patient oriented? To Person, To Place, To Time, To Situation, and Follows Commands    5.   Patient arrived from:  home  Facility Name: ___________________________________________    6. If patient is disoriented or from a Skill Nursing Facility has family been notified of admission? Yes    7. Patient belongings? Belongings: Clothing    Disposition of belongings? Kept with Patient     8. Any specific patient or family belongings/needs/dynamics?   a. Pt with right shoulder pain and wants her food cut to smaller pieces cause she can't use her right shoulder, started having hallucinations since Friday     9. Miscellaneous comments/pending orders?  a. Pt normally walks on her own but with the neck fracture uses walker       If there are any additional questions please reach out to the Emergency Department.

## 2025-04-30 NOTE — ED PROVIDER NOTES
THE [facility]  EMERGENCY DEPARTMENT ENCOUNTER          ATTENDING PHYSICIAN NOTE       Date of evaluation: 4/30/2025    Chief Complaint     Altered Mental Status (Patient was discharged from hospital 6 days ago. She started to have hallucinations and was altered upon discharge. Patient's daughter stopped pain medication to see if it was the cause, but patient did not improve )      History of Present Illness     Shonda Hernandes is a 79 y.o. female who presents with AMS.   She was brought in by family because of auditory and visual hallucinations that have been going on for the last several days after the patient was started on a new medication regimen.  She also seems to be reliving movies in her head.  The patient was admitted to the hospital 4/19 through 4/24 for a C2 fracture, currently in a [place] J collar.  She was started on Robaxin and gabapentin which are new for her but prior to that she was on MS Contin and Percocet and she continues to take those but at lower doses.  The daughter stopped the gabapentin and Robaxin and decreased the dose on the MS Contin hoping that the hallucinations would improve but they have not.  They seem to be a bit better now and she is not having them currently.  Symptoms seem to be worse in the evening and in the morning.  The patient herself is alert and oriented right now and complaining of headaches, continued neck pain, and dysuria.  The patient also seems to know that she is confused and hallucinating when it is occurring.  There has been no nausea, vomiting, or diarrhea.  She has not been running a fever.  The medication regimen prescribed at discharge is below:        Cont MS Contin 30 mg BID      PRN percocet  q 4 hr      Voltaren gel      Robaxin 750mg TID for 5 days on discharge       Gabapentin 100 mg TID for 5 days on discharge       Movantik daily and PRN narcan    ASSESSMENT / PLAN  (MEDICAL DECISION MAKING)     INITIAL VITALS: BP: (!) 174/65, Temp: 98.8 °F

## 2025-04-30 NOTE — ED PROVIDER NOTES
THE [facility]  EMERGENCY DEPARTMENT ENCOUNTER          ATTENDING PHYSICIAN NOTE       Date of evaluation: 4/30/2025    ADDENDUM:      Care of this patient was assumed from Dr. Dumont.  The patient was seen for Altered Mental Status (Patient was discharged from hospital 6 days ago. She started to have hallucinations and was altered upon discharge. Patient's daughter stopped pain medication to see if it was the cause, but patient did not improve )  .  The patient's initial evaluation and plan have been discussed with the prior provider who initially evaluated the patient.  Nursing Notes, Past Medical Hx, Past Surgical Hx, Social Hx, Allergies, and Family Hx were all reviewed.    ASSESSMENT / PLAN  (MEDICAL DECISION MAKING)     Shonda Hernandes is a 79 y.o. female who presented with hallucinations in the setting of polypharmacy after recent diagnosis of a cervical fracture.  Patient continued to have intermittent hallucinations and inappropriate dialogue here in the emergency department.  At time of signout, she was awaiting a head CT and some basic screening labs.  CT without any evidence of acute bleed or stroke.  She also has no focal deficits such as doubt stroke.  She has no infectious symptoms and no elevation white count to suggest underlying infectious process.  Strong suspicion that this is polypharmacy.  Will admit to the hospital for ongoing monitoring and allow washout of her current medications.        Medical Decision Making  Amount and/or Complexity of Data Reviewed  Labs: ordered.  Radiology: ordered.    Risk  Decision regarding hospitalization.        Clinical Impression     1. Delirium, drug-induced        Disposition     PATIENT REFERRED TO:  No follow-up provider specified.    DISCHARGE MEDICATIONS:  New Prescriptions    No medications on file       DISPOSITION Admitted 04/30/2025 03:44:09 PM                 Diagnostic Results and Other Data                                   RADIOLOGY:  XR CHEST (2

## 2025-04-30 NOTE — H&P
Internal Medicine H&P    Date:   2025   Patient:  Shonda Hernandes   :   1945     CC:  Altered Mental Status (Patient was discharged from hospital 6 days ago. She started to have hallucinations and was altered upon discharge. Patient's daughter stopped pain medication to see if it was the cause, but patient did not improve )       Source of HPI: Patient, Patient Daughter    Subjective   HPI:   Ms. Shonda Hernandes is a 79 y.o. female with a MHx significant for afib s/p PVI and RENETTA exclusion , CAD s/p CHILANGO 2014, CHF, pulmonary HTN, aortic stenosis s/p AVR, COPD, Medtronic Bi-V PPM on 2022 current smoker, recent hospitalization with C2 fracture and epidural hematoma with [place] J collar for who presented to the ED for hallucinations and for taking off her MiamiJ collar.     She states since she was discharged from here on 2025 she has been having between 2-4 hallucinations per day. They are involved with reality as they they involve people in her life or what is happening on TV. She has a code phrase with her daughter which helps pull her out of these strange hallucinations. She describes them as memories occurring during real time.     During one of these episodes, she had some pain with her [place] J collar, and she tried to loosen it. When this happened, she accidentally ripped it off and caused some neck pain. She has not motor changes, but does endorse tingling in her toes and at times her hands. Currently sensation is fully intact.    Family thought it was possibly due to polypharmacy with increased pain regimen at discharge. They stopped the gabapentin and slowly came down in dose of the opiates.       PMHx:      Diagnosis Date    Arthritis of shoulder region, right     advanced    Atrial fibrillation (HCC)     had watchman's    AVM (arteriovenous malformation) of duodenum 2017    AVM (arteriovenous malformation) of stomach 2017    Bladder cancer (HCC) 2014    CAD (coronary artery

## 2025-05-01 ENCOUNTER — TELEPHONE (OUTPATIENT)
Dept: CARDIOLOGY CLINIC | Age: 80
End: 2025-05-01

## 2025-05-01 ENCOUNTER — APPOINTMENT (OUTPATIENT)
Dept: MRI IMAGING | Age: 80
DRG: 092 | End: 2025-05-01
Payer: MEDICARE

## 2025-05-01 ENCOUNTER — APPOINTMENT (OUTPATIENT)
Dept: CT IMAGING | Age: 80
DRG: 092 | End: 2025-05-01
Payer: MEDICARE

## 2025-05-01 PROBLEM — S12.110A CLOSED ANTERIOR DISPLACED TYPE II DENS FRACTURE (HCC): Status: ACTIVE | Noted: 2025-05-01

## 2025-05-01 LAB
AMMONIA PLAS-SCNC: 19 UMOL/L (ref 11–51)
ANION GAP SERPL CALCULATED.3IONS-SCNC: 10 MMOL/L (ref 3–16)
BASOPHILS # BLD: 0.1 K/UL (ref 0–0.2)
BASOPHILS NFR BLD: 0.9 %
BUN SERPL-MCNC: 11 MG/DL (ref 7–20)
CALCIUM SERPL-MCNC: 9.5 MG/DL (ref 8.3–10.6)
CHLORIDE SERPL-SCNC: 105 MMOL/L (ref 99–110)
CO2 SERPL-SCNC: 23 MMOL/L (ref 21–32)
CREAT SERPL-MCNC: 0.8 MG/DL (ref 0.6–1.2)
DEPRECATED RDW RBC AUTO: 14.5 % (ref 12.4–15.4)
EKG DIAGNOSIS: NORMAL
EKG Q-T INTERVAL: 382 MS
EKG QRS DURATION: 134 MS
EKG QTC CALCULATION (BAZETT): 570 MS
EKG R AXIS: -47 DEGREES
EKG T AXIS: 108 DEGREES
EKG VENTRICULAR RATE: 134 BPM
EOSINOPHIL # BLD: 0.4 K/UL (ref 0–0.6)
EOSINOPHIL NFR BLD: 4.8 %
GFR SERPLBLD CREATININE-BSD FMLA CKD-EPI: 75 ML/MIN/{1.73_M2}
GLUCOSE SERPL-MCNC: 91 MG/DL (ref 70–99)
HCT VFR BLD AUTO: 36.4 % (ref 36–48)
HGB BLD-MCNC: 12.4 G/DL (ref 12–16)
LYMPHOCYTES # BLD: 1.5 K/UL (ref 1–5.1)
LYMPHOCYTES NFR BLD: 17.4 %
MCH RBC QN AUTO: 29.5 PG (ref 26–34)
MCHC RBC AUTO-ENTMCNC: 34.1 G/DL (ref 31–36)
MCV RBC AUTO: 86.4 FL (ref 80–100)
MONOCYTES # BLD: 1.1 K/UL (ref 0–1.3)
MONOCYTES NFR BLD: 12.3 %
NEUTROPHILS # BLD: 5.7 K/UL (ref 1.7–7.7)
NEUTROPHILS NFR BLD: 64.6 %
PLATELET # BLD AUTO: 322 K/UL (ref 135–450)
PMV BLD AUTO: 7.4 FL (ref 5–10.5)
POTASSIUM SERPL-SCNC: 3.8 MMOL/L (ref 3.5–5.1)
RBC # BLD AUTO: 4.21 M/UL (ref 4–5.2)
SODIUM SERPL-SCNC: 138 MMOL/L (ref 136–145)
VIT B12 SERPL-MCNC: 1081 PG/ML (ref 211–911)
WBC # BLD AUTO: 8.8 K/UL (ref 4–11)

## 2025-05-01 PROCEDURE — 93010 ELECTROCARDIOGRAM REPORT: CPT | Performed by: INTERNAL MEDICINE

## 2025-05-01 PROCEDURE — 85025 COMPLETE CBC W/AUTO DIFF WBC: CPT

## 2025-05-01 PROCEDURE — 1200000000 HC SEMI PRIVATE

## 2025-05-01 PROCEDURE — 2500000003 HC RX 250 WO HCPCS

## 2025-05-01 PROCEDURE — 82607 VITAMIN B-12: CPT

## 2025-05-01 PROCEDURE — 6360000004 HC RX CONTRAST MEDICATION: Performed by: NURSE PRACTITIONER

## 2025-05-01 PROCEDURE — 6370000000 HC RX 637 (ALT 250 FOR IP)

## 2025-05-01 PROCEDURE — 6370000000 HC RX 637 (ALT 250 FOR IP): Performed by: NURSE PRACTITIONER

## 2025-05-01 PROCEDURE — 36415 COLL VENOUS BLD VENIPUNCTURE: CPT

## 2025-05-01 PROCEDURE — 2580000003 HC RX 258: Performed by: INTERNAL MEDICINE

## 2025-05-01 PROCEDURE — 93005 ELECTROCARDIOGRAM TRACING: CPT

## 2025-05-01 PROCEDURE — 80048 BASIC METABOLIC PNL TOTAL CA: CPT

## 2025-05-01 PROCEDURE — 82140 ASSAY OF AMMONIA: CPT

## 2025-05-01 PROCEDURE — 6370000000 HC RX 637 (ALT 250 FOR IP): Performed by: INTERNAL MEDICINE

## 2025-05-01 PROCEDURE — 2500000003 HC RX 250 WO HCPCS: Performed by: INTERNAL MEDICINE

## 2025-05-01 PROCEDURE — 2500000003 HC RX 250 WO HCPCS: Performed by: NURSE PRACTITIONER

## 2025-05-01 PROCEDURE — APPNB45 APP NON BILLABLE 31-45 MINUTES: Performed by: NURSE PRACTITIONER

## 2025-05-01 PROCEDURE — 70498 CT ANGIOGRAPHY NECK: CPT

## 2025-05-01 RX ORDER — HYDROMORPHONE HYDROCHLORIDE 1 MG/ML
1 INJECTION, SOLUTION INTRAMUSCULAR; INTRAVENOUS; SUBCUTANEOUS
Status: COMPLETED | OUTPATIENT
Start: 2025-05-01 | End: 2025-05-01

## 2025-05-01 RX ORDER — SODIUM CHLORIDE, SODIUM LACTATE, POTASSIUM CHLORIDE, CALCIUM CHLORIDE 600; 310; 30; 20 MG/100ML; MG/100ML; MG/100ML; MG/100ML
INJECTION, SOLUTION INTRAVENOUS CONTINUOUS
Status: ACTIVE | OUTPATIENT
Start: 2025-05-01 | End: 2025-05-02

## 2025-05-01 RX ORDER — METHOCARBAMOL 500 MG/1
500 TABLET, FILM COATED ORAL EVERY 6 HOURS PRN
Status: DISCONTINUED | OUTPATIENT
Start: 2025-05-01 | End: 2025-05-07 | Stop reason: HOSPADM

## 2025-05-01 RX ORDER — QUETIAPINE FUMARATE 25 MG/1
25 TABLET, FILM COATED ORAL ONCE
Status: DISCONTINUED | OUTPATIENT
Start: 2025-05-01 | End: 2025-05-01

## 2025-05-01 RX ORDER — IOPAMIDOL 755 MG/ML
75 INJECTION, SOLUTION INTRAVASCULAR
Status: COMPLETED | OUTPATIENT
Start: 2025-05-01 | End: 2025-05-01

## 2025-05-01 RX ORDER — HYDROMORPHONE HYDROCHLORIDE 1 MG/ML
0.75 INJECTION, SOLUTION INTRAMUSCULAR; INTRAVENOUS; SUBCUTANEOUS ONCE
Status: COMPLETED | OUTPATIENT
Start: 2025-05-01 | End: 2025-05-01

## 2025-05-01 RX ORDER — HYDROMORPHONE HYDROCHLORIDE 1 MG/ML
0.5 INJECTION, SOLUTION INTRAMUSCULAR; INTRAVENOUS; SUBCUTANEOUS ONCE
Status: DISCONTINUED | OUTPATIENT
Start: 2025-05-01 | End: 2025-05-01

## 2025-05-01 RX ORDER — ACETAMINOPHEN 325 MG/1
650 TABLET ORAL EVERY 6 HOURS
Status: DISCONTINUED | OUTPATIENT
Start: 2025-05-01 | End: 2025-05-02

## 2025-05-01 RX ORDER — LIDOCAINE 4 G/G
1 PATCH TOPICAL DAILY
Status: DISCONTINUED | OUTPATIENT
Start: 2025-05-01 | End: 2025-05-07 | Stop reason: HOSPADM

## 2025-05-01 RX ORDER — HYDROMORPHONE HYDROCHLORIDE 1 MG/ML
1 INJECTION, SOLUTION INTRAMUSCULAR; INTRAVENOUS; SUBCUTANEOUS
Status: DISCONTINUED | OUTPATIENT
Start: 2025-05-01 | End: 2025-05-03

## 2025-05-01 RX ORDER — ACETAMINOPHEN 500 MG
1000 TABLET ORAL EVERY 6 HOURS
Status: DISCONTINUED | OUTPATIENT
Start: 2025-05-01 | End: 2025-05-01

## 2025-05-01 RX ORDER — HYDROMORPHONE HYDROCHLORIDE 1 MG/ML
0.5 INJECTION, SOLUTION INTRAMUSCULAR; INTRAVENOUS; SUBCUTANEOUS
Status: DISCONTINUED | OUTPATIENT
Start: 2025-05-01 | End: 2025-05-01

## 2025-05-01 RX ORDER — HYDROMORPHONE HYDROCHLORIDE 1 MG/ML
0.75 INJECTION, SOLUTION INTRAMUSCULAR; INTRAVENOUS; SUBCUTANEOUS
Status: DISCONTINUED | OUTPATIENT
Start: 2025-05-01 | End: 2025-05-01

## 2025-05-01 RX ORDER — BISACODYL 10 MG
10 SUPPOSITORY, RECTAL RECTAL DAILY PRN
Status: DISCONTINUED | OUTPATIENT
Start: 2025-05-01 | End: 2025-05-07 | Stop reason: HOSPADM

## 2025-05-01 RX ORDER — LURASIDONE HYDROCHLORIDE 40 MG/1
40 TABLET, FILM COATED ORAL ONCE
Status: COMPLETED | OUTPATIENT
Start: 2025-05-01 | End: 2025-05-01

## 2025-05-01 RX ORDER — NALOXONE HYDROCHLORIDE 0.4 MG/ML
0.4 INJECTION, SOLUTION INTRAMUSCULAR; INTRAVENOUS; SUBCUTANEOUS PRN
Status: DISCONTINUED | OUTPATIENT
Start: 2025-05-01 | End: 2025-05-07 | Stop reason: HOSPADM

## 2025-05-01 RX ORDER — HYDROMORPHONE HYDROCHLORIDE 1 MG/ML
0.5 INJECTION, SOLUTION INTRAMUSCULAR; INTRAVENOUS; SUBCUTANEOUS
Status: ACTIVE | OUTPATIENT
Start: 2025-05-01 | End: 2025-05-03

## 2025-05-01 RX ORDER — SENNA AND DOCUSATE SODIUM 50; 8.6 MG/1; MG/1
1 TABLET, FILM COATED ORAL 2 TIMES DAILY
Status: DISCONTINUED | OUTPATIENT
Start: 2025-05-01 | End: 2025-05-07 | Stop reason: HOSPADM

## 2025-05-01 RX ORDER — METOPROLOL TARTRATE 1 MG/ML
5 INJECTION, SOLUTION INTRAVENOUS ONCE
Status: COMPLETED | OUTPATIENT
Start: 2025-05-01 | End: 2025-05-01

## 2025-05-01 RX ADMIN — HYDROMORPHONE HYDROCHLORIDE 1 MG: 1 INJECTION, SOLUTION INTRAMUSCULAR; INTRAVENOUS; SUBCUTANEOUS at 20:58

## 2025-05-01 RX ADMIN — DULOXETINE HYDROCHLORIDE 30 MG: 30 CAPSULE, DELAYED RELEASE ORAL at 08:05

## 2025-05-01 RX ADMIN — OXYCODONE 2.5 MG: 5 TABLET ORAL at 00:27

## 2025-05-01 RX ADMIN — LURASIDONE HYDROCHLORIDE 40 MG: 40 TABLET, FILM COATED ORAL at 09:57

## 2025-05-01 RX ADMIN — PENICILLIN V POTASSIUM 250 MG: 500 TABLET, FILM COATED ORAL at 20:59

## 2025-05-01 RX ADMIN — METOPROLOL TARTRATE 5 MG: 5 INJECTION INTRAVENOUS at 15:42

## 2025-05-01 RX ADMIN — AMIODARONE HYDROCHLORIDE 100 MG: 200 TABLET ORAL at 08:05

## 2025-05-01 RX ADMIN — HYDROMORPHONE HYDROCHLORIDE 1 MG: 1 INJECTION, SOLUTION INTRAMUSCULAR; INTRAVENOUS; SUBCUTANEOUS at 11:48

## 2025-05-01 RX ADMIN — OXYCODONE 2.5 MG: 5 TABLET ORAL at 06:14

## 2025-05-01 RX ADMIN — PENICILLIN V POTASSIUM 250 MG: 500 TABLET, FILM COATED ORAL at 08:13

## 2025-05-01 RX ADMIN — HYDROMORPHONE HYDROCHLORIDE 0.75 MG: 1 INJECTION, SOLUTION INTRAMUSCULAR; INTRAVENOUS; SUBCUTANEOUS at 04:16

## 2025-05-01 RX ADMIN — TORSEMIDE 40 MG: 20 TABLET ORAL at 08:05

## 2025-05-01 RX ADMIN — SODIUM CHLORIDE, SODIUM LACTATE, POTASSIUM CHLORIDE, AND CALCIUM CHLORIDE: .6; .31; .03; .02 INJECTION, SOLUTION INTRAVENOUS at 15:53

## 2025-05-01 RX ADMIN — HYDROMORPHONE HYDROCHLORIDE 0.75 MG: 1 INJECTION, SOLUTION INTRAMUSCULAR; INTRAVENOUS; SUBCUTANEOUS at 14:12

## 2025-05-01 RX ADMIN — DULOXETINE HYDROCHLORIDE 30 MG: 30 CAPSULE, DELAYED RELEASE ORAL at 20:59

## 2025-05-01 RX ADMIN — SENNOSIDES, DOCUSATE SODIUM 1 TABLET: 50; 8.6 TABLET, FILM COATED ORAL at 20:59

## 2025-05-01 RX ADMIN — SALINE NASAL SPRAY 1 SPRAY: 1.5 SOLUTION NASAL at 22:32

## 2025-05-01 RX ADMIN — LEVOTHYROXINE SODIUM 88 MCG: 0.09 TABLET ORAL at 08:05

## 2025-05-01 RX ADMIN — IOPAMIDOL 75 ML: 755 INJECTION, SOLUTION INTRAVENOUS at 17:04

## 2025-05-01 RX ADMIN — NALOXEGOL OXALATE 25 MG: 25 TABLET, FILM COATED ORAL at 06:14

## 2025-05-01 RX ADMIN — METHOCARBAMOL 500 MG: 500 TABLET ORAL at 15:38

## 2025-05-01 RX ADMIN — METOPROLOL SUCCINATE 25 MG: 25 TABLET, EXTENDED RELEASE ORAL at 08:05

## 2025-05-01 RX ADMIN — HYDROMORPHONE HYDROCHLORIDE 0.75 MG: 1 INJECTION, SOLUTION INTRAMUSCULAR; INTRAVENOUS; SUBCUTANEOUS at 17:56

## 2025-05-01 ASSESSMENT — PAIN DESCRIPTION - ORIENTATION
ORIENTATION: RIGHT
ORIENTATION: ANTERIOR
ORIENTATION: RIGHT;ANTERIOR
ORIENTATION: UPPER;MID
ORIENTATION: UPPER;MID
ORIENTATION: MID;UPPER
ORIENTATION: MID;POSTERIOR

## 2025-05-01 ASSESSMENT — PAIN SCALES - GENERAL
PAINLEVEL_OUTOF10: 7
PAINLEVEL_OUTOF10: 9
PAINLEVEL_OUTOF10: 7
PAINLEVEL_OUTOF10: 8
PAINLEVEL_OUTOF10: 10
PAINLEVEL_OUTOF10: 8
PAINLEVEL_OUTOF10: 8
PAINLEVEL_OUTOF10: 10
PAINLEVEL_OUTOF10: 9
PAINLEVEL_OUTOF10: 10
PAINLEVEL_OUTOF10: 10
PAINLEVEL_OUTOF10: 9

## 2025-05-01 ASSESSMENT — PAIN DESCRIPTION - DESCRIPTORS
DESCRIPTORS: ACHING
DESCRIPTORS: ACHING;DISCOMFORT
DESCRIPTORS: ACHING
DESCRIPTORS: ACHING

## 2025-05-01 ASSESSMENT — PAIN - FUNCTIONAL ASSESSMENT

## 2025-05-01 ASSESSMENT — PAIN DESCRIPTION - LOCATION
LOCATION: NECK
LOCATION: NECK
LOCATION: HEAD;NECK
LOCATION: NECK
LOCATION: BACK;NECK
LOCATION: NECK
LOCATION: BACK;NECK

## 2025-05-01 ASSESSMENT — PAIN DESCRIPTION - PAIN TYPE
TYPE: CHRONIC PAIN;ACUTE PAIN
TYPE: CHRONIC PAIN
TYPE: ACUTE PAIN;CHRONIC PAIN
TYPE: CHRONIC PAIN;ACUTE PAIN
TYPE: ACUTE PAIN;CHRONIC PAIN
TYPE: CHRONIC PAIN
TYPE: CHRONIC PAIN

## 2025-05-01 ASSESSMENT — PAIN DESCRIPTION - FREQUENCY
FREQUENCY: CONTINUOUS
FREQUENCY: INTERMITTENT
FREQUENCY: CONTINUOUS
FREQUENCY: CONTINUOUS
FREQUENCY: INTERMITTENT
FREQUENCY: CONTINUOUS
FREQUENCY: CONTINUOUS

## 2025-05-01 ASSESSMENT — PAIN DESCRIPTION - ONSET
ONSET: ON-GOING

## 2025-05-01 NOTE — PLAN OF CARE
Problem: Pain  Goal: Verbalizes/displays adequate comfort level or baseline comfort level  4/30/2025 2345 by Ines Hoover, RN  Outcome: Progressing   Pt endorsing pain to neck, R shoulder. Being treated with PRN pain medication, rest, and frequent repositioning with pillow support for comfort and pressure relief. Pt reports some relief from pain with above interventions.    Problem: Safety - Adult  Goal: Free from fall injury  Outcome: Progressing   All fall precautions in place. Bed locked and in lowest position with alarm on. Overbed table and personal belonings within reach. Call light within reach and patient instructed to use call light for assistance. Non-skid socks on.

## 2025-05-01 NOTE — TELEPHONE ENCOUNTER
JORDAN SUÁREZ. Please review Murj for: \"Possible OptiVol fluid accumulation: 19-Mar-2025 -- ongoing, elevated up to > 200 w decreasing TI trend below reference line. Triage™ Heart Failure Risk Status on 30-Apr-2025 is High*.\"

## 2025-05-01 NOTE — CONSULTS
NEUROSURGERY CONSULT NOTE    NICHELLE LORENZO  [id number]   1945   5/1/2025    Requesting physician: Jay Ricks MD    Reason for consultation: C2 Fracture    History of present illness: Patient is a 79 y.o. female  has a past medical history of Arthritis of shoulder region, right (2024), Atrial fibrillation ([facility]), AVM (arteriovenous malformation) of duodenum (12/2017), AVM (arteriovenous malformation) of stomach (12/2017), Bladder cancer ([facility]) (02/2014), CAD (coronary artery disease) (2014), Carotid stenosis (04/30/2014), Chronic back pain, Chronic diastolic CHF (congestive heart failure) ([facility]) (2016), Chronic prescription opiate use, COPD (HCC), Diverticulosis, HTN (hypertension), Hyperlipidemia, Hypothyroidism, Ischemic stroke (2013), Lumbar spine stenosis (2017), Macular degeneration (2018), Nonrheumatic mitral valve regurgitation, Obstructive sleep apnea, Osteoarthritis, Peripheral neuropathy, Pulmonary HTN ([facility]) (2014), PVD (peripheral vascular disease), Syncope (11/2022), and Tobacco use disorder in remission. Patient presented on 4/30/2025 to [facility] ED c/o auditory and visual hallucinations after the patient was started on a new medication regimen. The patient was admitted to the hospital 4/19 through 4/24 for a C2 fracture, and remains in a [place] J collar. The patient is complaining of headaches, and continued neck pain. Dr. Turcios saw patient during her previous admission for her C2 fracture. He discussed the management options with the patient at length, including 1) conservative management with bracing or 2) surgery (likely C1-3 fusion).  Given her severe comorbid conditions and her advanced age, as well as the absence of involvement of the facet joints. Dr. Turcios and the patient agreed to proceed conservatively at that time. He also discussed the importance of smoking cessation for proper healing of the fracture, and recommended avoiding the use of any nicotine products.  The patient, during one of

## 2025-05-01 NOTE — PLAN OF CARE
Problem: Chronic Conditions and Co-morbidities  Goal: Patient's chronic conditions and co-morbidity symptoms are monitored and maintained or improved  Outcome: Progressing  Flowsheets (Taken 5/1/2025 0742)  Care Plan - Patient's Chronic Conditions and Co-Morbidity Symptoms are Monitored and Maintained or Improved:   Monitor and assess patient's chronic conditions and comorbid symptoms for stability, deterioration, or improvement   Collaborate with multidisciplinary team to address chronic and comorbid conditions and prevent exacerbation or deterioration     Problem: Discharge Planning  Goal: Discharge to home or other facility with appropriate resources  Outcome: Progressing  Flowsheets (Taken 5/1/2025 0742)  Discharge to home or other facility with appropriate resources:   Identify barriers to discharge with patient and caregiver   Arrange for needed discharge resources and transportation as appropriate

## 2025-05-02 LAB
ANION GAP SERPL CALCULATED.3IONS-SCNC: 13 MMOL/L (ref 3–16)
BASOPHILS # BLD: 0.1 K/UL (ref 0–0.2)
BASOPHILS NFR BLD: 0.9 %
BUN SERPL-MCNC: 10 MG/DL (ref 7–20)
CALCIUM SERPL-MCNC: 8.6 MG/DL (ref 8.3–10.6)
CHLORIDE SERPL-SCNC: 102 MMOL/L (ref 99–110)
CO2 SERPL-SCNC: 21 MMOL/L (ref 21–32)
CREAT SERPL-MCNC: 0.7 MG/DL (ref 0.6–1.2)
DEPRECATED RDW RBC AUTO: 14.7 % (ref 12.4–15.4)
EKG ATRIAL RATE: 86 BPM
EKG DIAGNOSIS: NORMAL
EKG P AXIS: 65 DEGREES
EKG P-R INTERVAL: 168 MS
EKG Q-T INTERVAL: 448 MS
EKG QRS DURATION: 150 MS
EKG QTC CALCULATION (BAZETT): 536 MS
EKG R AXIS: -3 DEGREES
EKG T AXIS: 99 DEGREES
EKG VENTRICULAR RATE: 86 BPM
EOSINOPHIL # BLD: 0.2 K/UL (ref 0–0.6)
EOSINOPHIL NFR BLD: 2.5 %
GFR SERPLBLD CREATININE-BSD FMLA CKD-EPI: 88 ML/MIN/{1.73_M2}
GLUCOSE SERPL-MCNC: 84 MG/DL (ref 70–99)
HCT VFR BLD AUTO: 33.6 % (ref 36–48)
HGB BLD-MCNC: 11.4 G/DL (ref 12–16)
LYMPHOCYTES # BLD: 1.4 K/UL (ref 1–5.1)
LYMPHOCYTES NFR BLD: 17.9 %
MAGNESIUM SERPL-MCNC: 1.61 MG/DL (ref 1.8–2.4)
MCH RBC QN AUTO: 29.6 PG (ref 26–34)
MCHC RBC AUTO-ENTMCNC: 33.8 G/DL (ref 31–36)
MCV RBC AUTO: 87.6 FL (ref 80–100)
MONOCYTES # BLD: 1 K/UL (ref 0–1.3)
MONOCYTES NFR BLD: 12.6 %
NEUTROPHILS # BLD: 5.3 K/UL (ref 1.7–7.7)
NEUTROPHILS NFR BLD: 66.1 %
PLATELET # BLD AUTO: 289 K/UL (ref 135–450)
PMV BLD AUTO: 7.8 FL (ref 5–10.5)
POTASSIUM SERPL-SCNC: 3.3 MMOL/L (ref 3.5–5.1)
RBC # BLD AUTO: 3.84 M/UL (ref 4–5.2)
SODIUM SERPL-SCNC: 136 MMOL/L (ref 136–145)
WBC # BLD AUTO: 8 K/UL (ref 4–11)

## 2025-05-02 PROCEDURE — 1200000000 HC SEMI PRIVATE

## 2025-05-02 PROCEDURE — 2500000003 HC RX 250 WO HCPCS

## 2025-05-02 PROCEDURE — 93010 ELECTROCARDIOGRAM REPORT: CPT | Performed by: INTERNAL MEDICINE

## 2025-05-02 PROCEDURE — 6370000000 HC RX 637 (ALT 250 FOR IP)

## 2025-05-02 PROCEDURE — 6370000000 HC RX 637 (ALT 250 FOR IP): Performed by: NURSE PRACTITIONER

## 2025-05-02 PROCEDURE — 2580000003 HC RX 258

## 2025-05-02 PROCEDURE — 6370000000 HC RX 637 (ALT 250 FOR IP): Performed by: INTERNAL MEDICINE

## 2025-05-02 PROCEDURE — 85025 COMPLETE CBC W/AUTO DIFF WBC: CPT

## 2025-05-02 PROCEDURE — 36415 COLL VENOUS BLD VENIPUNCTURE: CPT

## 2025-05-02 PROCEDURE — 99231 SBSQ HOSP IP/OBS SF/LOW 25: CPT | Performed by: NURSE PRACTITIONER

## 2025-05-02 PROCEDURE — 6360000002 HC RX W HCPCS: Performed by: INTERNAL MEDICINE

## 2025-05-02 PROCEDURE — 2500000003 HC RX 250 WO HCPCS: Performed by: INTERNAL MEDICINE

## 2025-05-02 PROCEDURE — 6360000002 HC RX W HCPCS

## 2025-05-02 PROCEDURE — 80048 BASIC METABOLIC PNL TOTAL CA: CPT

## 2025-05-02 PROCEDURE — 83735 ASSAY OF MAGNESIUM: CPT

## 2025-05-02 RX ORDER — MORPHINE SULFATE 15 MG/1
15 TABLET, FILM COATED, EXTENDED RELEASE ORAL EVERY 12 HOURS SCHEDULED
Refills: 0 | Status: DISCONTINUED | OUTPATIENT
Start: 2025-05-02 | End: 2025-05-02

## 2025-05-02 RX ORDER — PREGABALIN 50 MG/1
50 CAPSULE ORAL NIGHTLY
Status: DISCONTINUED | OUTPATIENT
Start: 2025-05-02 | End: 2025-05-07 | Stop reason: HOSPADM

## 2025-05-02 RX ORDER — MORPHINE SULFATE 15 MG/1
15 TABLET, FILM COATED, EXTENDED RELEASE ORAL EVERY 12 HOURS SCHEDULED
Refills: 0 | Status: DISCONTINUED | OUTPATIENT
Start: 2025-05-02 | End: 2025-05-03

## 2025-05-02 RX ORDER — POTASSIUM CHLORIDE 1500 MG/1
20 TABLET, EXTENDED RELEASE ORAL
Status: DISCONTINUED | OUTPATIENT
Start: 2025-05-03 | End: 2025-05-07 | Stop reason: HOSPADM

## 2025-05-02 RX ORDER — MAGNESIUM SULFATE IN WATER 40 MG/ML
4000 INJECTION, SOLUTION INTRAVENOUS ONCE
Status: COMPLETED | OUTPATIENT
Start: 2025-05-02 | End: 2025-05-02

## 2025-05-02 RX ORDER — POTASSIUM CHLORIDE 1500 MG/1
40 TABLET, EXTENDED RELEASE ORAL ONCE
Status: COMPLETED | OUTPATIENT
Start: 2025-05-02 | End: 2025-05-02

## 2025-05-02 RX ORDER — OXYCODONE AND ACETAMINOPHEN 10; 325 MG/1; MG/1
1 TABLET ORAL EVERY 8 HOURS PRN
Refills: 0 | Status: DISCONTINUED | OUTPATIENT
Start: 2025-05-02 | End: 2025-05-03

## 2025-05-02 RX ADMIN — SODIUM CHLORIDE, PRESERVATIVE FREE 10 ML: 5 INJECTION INTRAVENOUS at 08:14

## 2025-05-02 RX ADMIN — OXYCODONE AND ACETAMINOPHEN 1 TABLET: 325; 10 TABLET ORAL at 19:38

## 2025-05-02 RX ADMIN — POTASSIUM CHLORIDE 40 MEQ: 1500 TABLET, EXTENDED RELEASE ORAL at 10:45

## 2025-05-02 RX ADMIN — METOPROLOL SUCCINATE 25 MG: 25 TABLET, EXTENDED RELEASE ORAL at 08:14

## 2025-05-02 RX ADMIN — HYDROMORPHONE HYDROCHLORIDE 1 MG: 1 INJECTION, SOLUTION INTRAMUSCULAR; INTRAVENOUS; SUBCUTANEOUS at 09:09

## 2025-05-02 RX ADMIN — NALOXEGOL OXALATE 25 MG: 25 TABLET, FILM COATED ORAL at 05:52

## 2025-05-02 RX ADMIN — PENICILLIN V POTASSIUM 250 MG: 500 TABLET, FILM COATED ORAL at 08:13

## 2025-05-02 RX ADMIN — SODIUM CHLORIDE: 0.9 INJECTION, SOLUTION INTRAVENOUS at 10:57

## 2025-05-02 RX ADMIN — SENNOSIDES, DOCUSATE SODIUM 1 TABLET: 50; 8.6 TABLET, FILM COATED ORAL at 08:14

## 2025-05-02 RX ADMIN — PENICILLIN V POTASSIUM 250 MG: 500 TABLET, FILM COATED ORAL at 20:54

## 2025-05-02 RX ADMIN — PREGABALIN 50 MG: 50 CAPSULE ORAL at 20:51

## 2025-05-02 RX ADMIN — DICLOFENAC SODIUM 4 G: 10 GEL TOPICAL at 20:55

## 2025-05-02 RX ADMIN — SENNOSIDES, DOCUSATE SODIUM 1 TABLET: 50; 8.6 TABLET, FILM COATED ORAL at 20:51

## 2025-05-02 RX ADMIN — METHOCARBAMOL 500 MG: 500 TABLET ORAL at 17:46

## 2025-05-02 RX ADMIN — LEVOTHYROXINE SODIUM 88 MCG: 0.09 TABLET ORAL at 08:13

## 2025-05-02 RX ADMIN — HYDROMORPHONE HYDROCHLORIDE 1 MG: 1 INJECTION, SOLUTION INTRAMUSCULAR; INTRAVENOUS; SUBCUTANEOUS at 16:51

## 2025-05-02 RX ADMIN — DULOXETINE HYDROCHLORIDE 30 MG: 30 CAPSULE, DELAYED RELEASE ORAL at 08:14

## 2025-05-02 RX ADMIN — OXYCODONE AND ACETAMINOPHEN 1 TABLET: 325; 10 TABLET ORAL at 11:37

## 2025-05-02 RX ADMIN — HYDROMORPHONE HYDROCHLORIDE 1 MG: 1 INJECTION, SOLUTION INTRAMUSCULAR; INTRAVENOUS; SUBCUTANEOUS at 01:02

## 2025-05-02 RX ADMIN — HYDROMORPHONE HYDROCHLORIDE 1 MG: 1 INJECTION, SOLUTION INTRAMUSCULAR; INTRAVENOUS; SUBCUTANEOUS at 05:52

## 2025-05-02 RX ADMIN — METHOCARBAMOL 500 MG: 500 TABLET ORAL at 08:13

## 2025-05-02 RX ADMIN — PREGABALIN 50 MG: 50 CAPSULE ORAL at 10:45

## 2025-05-02 RX ADMIN — METHOCARBAMOL 500 MG: 500 TABLET ORAL at 01:02

## 2025-05-02 RX ADMIN — MAGNESIUM SULFATE HEPTAHYDRATE 4000 MG: 40 INJECTION, SOLUTION INTRAVENOUS at 11:03

## 2025-05-02 RX ADMIN — DULOXETINE HYDROCHLORIDE 30 MG: 30 CAPSULE, DELAYED RELEASE ORAL at 20:51

## 2025-05-02 RX ADMIN — AMIODARONE HYDROCHLORIDE 100 MG: 200 TABLET ORAL at 08:14

## 2025-05-02 RX ADMIN — SALINE NASAL SPRAY 1 SPRAY: 1.5 SOLUTION NASAL at 08:15

## 2025-05-02 RX ADMIN — MORPHINE SULFATE 15 MG: 15 TABLET, FILM COATED, EXTENDED RELEASE ORAL at 20:51

## 2025-05-02 RX ADMIN — SODIUM CHLORIDE, PRESERVATIVE FREE 10 ML: 5 INJECTION INTRAVENOUS at 19:38

## 2025-05-02 ASSESSMENT — PAIN SCALES - GENERAL
PAINLEVEL_OUTOF10: 7
PAINLEVEL_OUTOF10: 7
PAINLEVEL_OUTOF10: 3
PAINLEVEL_OUTOF10: 6
PAINLEVEL_OUTOF10: 7
PAINLEVEL_OUTOF10: 8
PAINLEVEL_OUTOF10: 6
PAINLEVEL_OUTOF10: 2
PAINLEVEL_OUTOF10: 9
PAINLEVEL_OUTOF10: 9
PAINLEVEL_OUTOF10: 7
PAINLEVEL_OUTOF10: 9
PAINLEVEL_OUTOF10: 7
PAINLEVEL_OUTOF10: 8

## 2025-05-02 ASSESSMENT — PAIN DESCRIPTION - ONSET
ONSET: ON-GOING

## 2025-05-02 ASSESSMENT — PAIN DESCRIPTION - FREQUENCY
FREQUENCY: CONTINUOUS

## 2025-05-02 ASSESSMENT — PAIN DESCRIPTION - PAIN TYPE
TYPE: ACUTE PAIN

## 2025-05-02 ASSESSMENT — PAIN DESCRIPTION - ORIENTATION
ORIENTATION: ANTERIOR
ORIENTATION: ANTERIOR
ORIENTATION: POSTERIOR
ORIENTATION: POSTERIOR
ORIENTATION: LEFT;ANTERIOR
ORIENTATION: POSTERIOR

## 2025-05-02 ASSESSMENT — PAIN DESCRIPTION - DESCRIPTORS
DESCRIPTORS: ACHING
DESCRIPTORS: ACHING;POUNDING
DESCRIPTORS: ACHING;DISCOMFORT
DESCRIPTORS: ACHING;THROBBING
DESCRIPTORS: ACHING
DESCRIPTORS: ACHING;DISCOMFORT
DESCRIPTORS: ACHING;THROBBING
DESCRIPTORS: ACHING;SHARP
DESCRIPTORS: ACHING;SHARP

## 2025-05-02 ASSESSMENT — PAIN - FUNCTIONAL ASSESSMENT
PAIN_FUNCTIONAL_ASSESSMENT: PREVENTS OR INTERFERES SOME ACTIVE ACTIVITIES AND ADLS
PAIN_FUNCTIONAL_ASSESSMENT: ACTIVITIES ARE NOT PREVENTED
PAIN_FUNCTIONAL_ASSESSMENT: PREVENTS OR INTERFERES SOME ACTIVE ACTIVITIES AND ADLS
PAIN_FUNCTIONAL_ASSESSMENT: PREVENTS OR INTERFERES SOME ACTIVE ACTIVITIES AND ADLS
PAIN_FUNCTIONAL_ASSESSMENT: ACTIVITIES ARE NOT PREVENTED
PAIN_FUNCTIONAL_ASSESSMENT: ACTIVITIES ARE NOT PREVENTED

## 2025-05-02 ASSESSMENT — PAIN DESCRIPTION - LOCATION
LOCATION: NECK
LOCATION: NECK;HEAD
LOCATION: NECK
LOCATION: HEAD;NECK
LOCATION: NECK
LOCATION: HEAD
LOCATION: NECK;BACK

## 2025-05-02 NOTE — DISCHARGE INSTRUCTIONS
[place] J collar:   - Cervical collar to be worn at all times   - Cervical collar does NOT need to be worn when eating, bathing or showering   - Cervical collar pads to be cleaned with soap & water, air dried, and changed daily     Please follow-up with your primary care physician within one-week of discharge for hospitalization follow-up.  Please follow-up with your neurosurgeon Dr. Oswaldo Turcios MD in four weeks for cervical fracture follow-up.    I have continued your pain medications percocet 10-325mg for pain management.  You make take this medication ONCE every 4 hours as needed for pain management.  I have discontinued your MS-Contin as it was not adequately controlling your pain and causing hallucinations as a side effect.  Please continue to take your home medications as previously prescribed.  Resume taking aspirin on 5/9/25 per neurology.

## 2025-05-02 NOTE — DISCHARGE SUMMARY
INTERNAL MEDICINE DEPARTMENT AT THE [facility]  DISCHARGE SUMMARY    Patient ID: Shonda Hernandes                                             Discharge Date: 5/2/2025   Patient's PCP: Adeel Nguyễn MD                                          Discharge Physician: Millicent Thompson MD  Admit Date: 4/30/2025   Admitting Physician: Jessica Jaquez MD    PROBLEMS DURING HOSPITALIZATION:  Present on Admission:   Acute encephalopathy   Hallucinations   Closed anterior displaced type II dens fracture (HCC)      HPI:  Ms. Shonda Hernandes is a 79 y.o. female with a MHx significant for afib s/p PVI and RENETTA exclusion 2020, CAD s/p CHILANGO 5/2014, CHF, pulmonary HTN, aortic stenosis s/p AVR, COPD, Medtronic Bi-V PPM on 4/11/2022 current smoker, recent hospitalization with C2 fracture and epidural hematoma with [place] J collar for who presented to the ED for hallucinations and for taking off her [place]J collar.     She states since she was discharged from here on 4/25/2025 she has been having between 2-4 hallucinations per day. They are involved with reality as they they involve people in her life or what is happening on TV. She has a code phrase with her daughter which helps pull her out of these strange hallucinations. She describes them as memories occurring during real time.      During one of these episodes, she had some pain with her [place] J collar, and she tried to loosen it. When this happened, she accidentally ripped it off and caused some neck pain. She has not motor changes, but does endorse tingling in her toes and at times her hands. Currently sensation is fully intact.     Family thought it was possibly due to polypharmacy with increased pain regimen at discharge. They stopped the gabapentin and slowly came down in dose of the opiates.       The following issues were addressed during hospitalization:    Hallucinations 2/2 polypharmacy  Recently discharged on higher dose MS Contin, perocet, new gabapentin, naloxegol. Hallucinations

## 2025-05-02 NOTE — PLAN OF CARE
Problem: Discharge Planning  Goal: Discharge to home or other facility with appropriate resources  5/2/2025 1407 by Sandy Marinelli RN  Outcome: Progressing  Pt and family involved in discharge planning. Barriers to discharge discussed with pt. Discharge needs identified.  Discussed any additional needed resources and transportation plans.      Problem: Pain  Goal: Verbalizes/displays adequate comfort level or baseline comfort level  5/2/2025 1407 by Sandy Marinelli, RN  Outcome: Progressing  Pt endorsing pain to posterior neck. Being treated with PRN pain medication, rest, and frequent repositioning with pillow support for comfort and pressure relief. Pt reports some relief from pain with above interventions.      Problem: Safety - Adult  Goal: Free from fall injury  5/2/2025 1407 by Sandy Marinelli RN  Outcome: Progressing  All fall precautions in place. Bed locked and in lowest position with alarm on. Overbed table and personal belonings within reach. Call light within reach and patient instructed to use call light for assistance. Non-skid socks on.      Problem: ABCDS Injury Assessment  Goal: Absence of physical injury  5/2/2025 1407 by Sandy Marinelli, RN  Outcome: Progressing

## 2025-05-02 NOTE — DISCHARGE INSTR - COC
SECTION    Prognosis: {Prognosis:0791784650}    Condition at Discharge: { Patient Condition:746119079}    Rehab Potential (if transferring to Rehab): {Prognosis:2475540204}    Recommended Labs or Other Treatments After Discharge:   Abeba HERMOSILLO collar:   - Cervical collar to be worn at all times   - Cervical collar does NOT need to be worn when eating, bathing or showering   - Cervical collar pads to be cleaned with soap & water, air dried, and changed daily     Physician Certification: I certify the above information and transfer of Shonda Hernandes  is necessary for the continuing treatment of the diagnosis listed and that she requires {Admit to Appropriate Level of Care:04343} for {GREATER/LESS:951420715} 30 days.     Update Admission H&P: {CHP DME Changes in HandP:449320461}    PHYSICIAN SIGNATURE:  {Esignature:132737368}

## 2025-05-03 ENCOUNTER — APPOINTMENT (OUTPATIENT)
Dept: GENERAL RADIOLOGY | Age: 80
DRG: 092 | End: 2025-05-03
Payer: MEDICARE

## 2025-05-03 LAB
ANION GAP SERPL CALCULATED.3IONS-SCNC: 9 MMOL/L (ref 3–16)
BASOPHILS # BLD: 0.1 K/UL (ref 0–0.2)
BASOPHILS NFR BLD: 0.6 %
BUN SERPL-MCNC: 9 MG/DL (ref 7–20)
CALCIUM SERPL-MCNC: 9.1 MG/DL (ref 8.3–10.6)
CHLORIDE SERPL-SCNC: 105 MMOL/L (ref 99–110)
CO2 SERPL-SCNC: 25 MMOL/L (ref 21–32)
CREAT SERPL-MCNC: 0.9 MG/DL (ref 0.6–1.2)
DEPRECATED RDW RBC AUTO: 15 % (ref 12.4–15.4)
EOSINOPHIL # BLD: 0.5 K/UL (ref 0–0.6)
EOSINOPHIL NFR BLD: 5.6 %
GFR SERPLBLD CREATININE-BSD FMLA CKD-EPI: 65 ML/MIN/{1.73_M2}
GLUCOSE SERPL-MCNC: 86 MG/DL (ref 70–99)
HCT VFR BLD AUTO: 35.9 % (ref 36–48)
HGB BLD-MCNC: 12.3 G/DL (ref 12–16)
LYMPHOCYTES # BLD: 2.1 K/UL (ref 1–5.1)
LYMPHOCYTES NFR BLD: 25.2 %
MAGNESIUM SERPL-MCNC: 2.84 MG/DL (ref 1.8–2.4)
MCH RBC QN AUTO: 29.6 PG (ref 26–34)
MCHC RBC AUTO-ENTMCNC: 34.2 G/DL (ref 31–36)
MCV RBC AUTO: 86.6 FL (ref 80–100)
MONOCYTES # BLD: 1.1 K/UL (ref 0–1.3)
MONOCYTES NFR BLD: 12.7 %
NEUTROPHILS # BLD: 4.7 K/UL (ref 1.7–7.7)
NEUTROPHILS NFR BLD: 55.9 %
PLATELET # BLD AUTO: 314 K/UL (ref 135–450)
PMV BLD AUTO: 7.5 FL (ref 5–10.5)
POTASSIUM SERPL-SCNC: 3.9 MMOL/L (ref 3.5–5.1)
RBC # BLD AUTO: 4.14 M/UL (ref 4–5.2)
SODIUM SERPL-SCNC: 139 MMOL/L (ref 136–145)
WBC # BLD AUTO: 8.4 K/UL (ref 4–11)

## 2025-05-03 PROCEDURE — 6370000000 HC RX 637 (ALT 250 FOR IP)

## 2025-05-03 PROCEDURE — 72040 X-RAY EXAM NECK SPINE 2-3 VW: CPT

## 2025-05-03 PROCEDURE — 6370000000 HC RX 637 (ALT 250 FOR IP): Performed by: NURSE PRACTITIONER

## 2025-05-03 PROCEDURE — 36415 COLL VENOUS BLD VENIPUNCTURE: CPT

## 2025-05-03 PROCEDURE — 83735 ASSAY OF MAGNESIUM: CPT

## 2025-05-03 PROCEDURE — 2500000003 HC RX 250 WO HCPCS

## 2025-05-03 PROCEDURE — 80048 BASIC METABOLIC PNL TOTAL CA: CPT

## 2025-05-03 PROCEDURE — 6370000000 HC RX 637 (ALT 250 FOR IP): Performed by: INTERNAL MEDICINE

## 2025-05-03 PROCEDURE — 85025 COMPLETE CBC W/AUTO DIFF WBC: CPT

## 2025-05-03 PROCEDURE — 6360000002 HC RX W HCPCS: Performed by: NURSE PRACTITIONER

## 2025-05-03 PROCEDURE — 1200000000 HC SEMI PRIVATE

## 2025-05-03 RX ORDER — MORPHINE SULFATE 15 MG/1
15 TABLET, FILM COATED, EXTENDED RELEASE ORAL ONCE
Refills: 0 | Status: COMPLETED | OUTPATIENT
Start: 2025-05-03 | End: 2025-05-03

## 2025-05-03 RX ORDER — MORPHINE SULFATE 15 MG/1
15 TABLET, FILM COATED, EXTENDED RELEASE ORAL EVERY EVENING
Refills: 0 | Status: DISCONTINUED | OUTPATIENT
Start: 2025-05-03 | End: 2025-05-03

## 2025-05-03 RX ORDER — MORPHINE SULFATE 15 MG/1
15 TABLET, FILM COATED, EXTENDED RELEASE ORAL
Refills: 0 | Status: DISCONTINUED | OUTPATIENT
Start: 2025-05-04 | End: 2025-05-03

## 2025-05-03 RX ORDER — MORPHINE SULFATE 15 MG/1
15 TABLET, FILM COATED, EXTENDED RELEASE ORAL EVERY 12 HOURS SCHEDULED
Refills: 0 | Status: DISCONTINUED | OUTPATIENT
Start: 2025-05-03 | End: 2025-05-06

## 2025-05-03 RX ORDER — MORPHINE SULFATE 30 MG/1
30 TABLET, FILM COATED, EXTENDED RELEASE ORAL
Refills: 0 | Status: DISCONTINUED | OUTPATIENT
Start: 2025-05-04 | End: 2025-05-03

## 2025-05-03 RX ORDER — MORPHINE SULFATE 30 MG/1
30 TABLET, FILM COATED, EXTENDED RELEASE ORAL EVERY EVENING
Refills: 0 | Status: DISCONTINUED | OUTPATIENT
Start: 2025-05-03 | End: 2025-05-03

## 2025-05-03 RX ORDER — OXYCODONE AND ACETAMINOPHEN 10; 325 MG/1; MG/1
1 TABLET ORAL EVERY 4 HOURS PRN
Refills: 0 | Status: DISCONTINUED | OUTPATIENT
Start: 2025-05-03 | End: 2025-05-05

## 2025-05-03 RX ADMIN — MORPHINE SULFATE 15 MG: 15 TABLET, FILM COATED, EXTENDED RELEASE ORAL at 07:58

## 2025-05-03 RX ADMIN — PENICILLIN V POTASSIUM 250 MG: 500 TABLET, FILM COATED ORAL at 09:36

## 2025-05-03 RX ADMIN — SODIUM CHLORIDE, PRESERVATIVE FREE 10 ML: 5 INJECTION INTRAVENOUS at 20:53

## 2025-05-03 RX ADMIN — MORPHINE SULFATE 15 MG: 15 TABLET, FILM COATED, EXTENDED RELEASE ORAL at 20:53

## 2025-05-03 RX ADMIN — PENICILLIN V POTASSIUM 250 MG: 500 TABLET, FILM COATED ORAL at 22:16

## 2025-05-03 RX ADMIN — DULOXETINE HYDROCHLORIDE 30 MG: 30 CAPSULE, DELAYED RELEASE ORAL at 07:59

## 2025-05-03 RX ADMIN — SENNOSIDES, DOCUSATE SODIUM 1 TABLET: 50; 8.6 TABLET, FILM COATED ORAL at 07:59

## 2025-05-03 RX ADMIN — METOPROLOL SUCCINATE 25 MG: 25 TABLET, EXTENDED RELEASE ORAL at 07:59

## 2025-05-03 RX ADMIN — LEVOTHYROXINE SODIUM 88 MCG: 0.09 TABLET ORAL at 07:59

## 2025-05-03 RX ADMIN — SODIUM CHLORIDE, PRESERVATIVE FREE 10 ML: 5 INJECTION INTRAVENOUS at 08:00

## 2025-05-03 RX ADMIN — PREGABALIN 50 MG: 50 CAPSULE ORAL at 20:53

## 2025-05-03 RX ADMIN — OXYCODONE AND ACETAMINOPHEN 1 TABLET: 325; 10 TABLET ORAL at 22:14

## 2025-05-03 RX ADMIN — METHOCARBAMOL 500 MG: 500 TABLET ORAL at 08:14

## 2025-05-03 RX ADMIN — MORPHINE SULFATE 15 MG: 15 TABLET, FILM COATED, EXTENDED RELEASE ORAL at 11:11

## 2025-05-03 RX ADMIN — AMIODARONE HYDROCHLORIDE 100 MG: 200 TABLET ORAL at 07:59

## 2025-05-03 RX ADMIN — SENNOSIDES, DOCUSATE SODIUM 1 TABLET: 50; 8.6 TABLET, FILM COATED ORAL at 20:53

## 2025-05-03 RX ADMIN — OXYCODONE AND ACETAMINOPHEN 1 TABLET: 325; 10 TABLET ORAL at 17:53

## 2025-05-03 RX ADMIN — NALOXEGOL OXALATE 25 MG: 25 TABLET, FILM COATED ORAL at 05:32

## 2025-05-03 RX ADMIN — OXYCODONE AND ACETAMINOPHEN 1 TABLET: 325; 10 TABLET ORAL at 05:32

## 2025-05-03 RX ADMIN — OXYCODONE AND ACETAMINOPHEN 1 TABLET: 325; 10 TABLET ORAL at 13:32

## 2025-05-03 RX ADMIN — POTASSIUM CHLORIDE 20 MEQ: 1500 TABLET, EXTENDED RELEASE ORAL at 07:59

## 2025-05-03 RX ADMIN — ENOXAPARIN SODIUM 40 MG: 100 INJECTION SUBCUTANEOUS at 07:58

## 2025-05-03 RX ADMIN — SALINE NASAL SPRAY 1 SPRAY: 1.5 SOLUTION NASAL at 08:19

## 2025-05-03 RX ADMIN — METHOCARBAMOL 500 MG: 500 TABLET ORAL at 16:23

## 2025-05-03 RX ADMIN — DULOXETINE HYDROCHLORIDE 30 MG: 30 CAPSULE, DELAYED RELEASE ORAL at 20:53

## 2025-05-03 ASSESSMENT — PAIN DESCRIPTION - DESCRIPTORS
DESCRIPTORS: ACHING
DESCRIPTORS: ACHING;DISCOMFORT
DESCRIPTORS: ACHING;DISCOMFORT
DESCRIPTORS: ACHING
DESCRIPTORS: ACHING
DESCRIPTORS: ACHING;DISCOMFORT
DESCRIPTORS: ACHING;DISCOMFORT

## 2025-05-03 ASSESSMENT — PAIN DESCRIPTION - PAIN TYPE
TYPE: ACUTE PAIN

## 2025-05-03 ASSESSMENT — PAIN DESCRIPTION - ONSET
ONSET: ON-GOING

## 2025-05-03 ASSESSMENT — PAIN SCALES - GENERAL
PAINLEVEL_OUTOF10: 7
PAINLEVEL_OUTOF10: 9
PAINLEVEL_OUTOF10: 9
PAINLEVEL_OUTOF10: 7
PAINLEVEL_OUTOF10: 5
PAINLEVEL_OUTOF10: 8
PAINLEVEL_OUTOF10: 4
PAINLEVEL_OUTOF10: 5
PAINLEVEL_OUTOF10: 10
PAINLEVEL_OUTOF10: 6
PAINLEVEL_OUTOF10: 10
PAINLEVEL_OUTOF10: 4
PAINLEVEL_OUTOF10: 9

## 2025-05-03 ASSESSMENT — PAIN DESCRIPTION - LOCATION
LOCATION: HEAD;NECK
LOCATION: NECK
LOCATION: NECK;SHOULDER
LOCATION: NECK
LOCATION: NECK;SHOULDER
LOCATION: BACK;NECK
LOCATION: HEAD;SHOULDER
LOCATION: NECK

## 2025-05-03 ASSESSMENT — PAIN DESCRIPTION - ORIENTATION
ORIENTATION: POSTERIOR
ORIENTATION: RIGHT;LOWER
ORIENTATION: MID;LEFT
ORIENTATION: MID
ORIENTATION: POSTERIOR
ORIENTATION: MID;RIGHT
ORIENTATION: RIGHT

## 2025-05-03 ASSESSMENT — PAIN DESCRIPTION - FREQUENCY
FREQUENCY: CONTINUOUS

## 2025-05-03 ASSESSMENT — PAIN - FUNCTIONAL ASSESSMENT
PAIN_FUNCTIONAL_ASSESSMENT: ACTIVITIES ARE NOT PREVENTED

## 2025-05-03 NOTE — PLAN OF CARE
Chronic Conditions and Co-morbidities  Goal: Patient's chronic conditions and co-morbidity symptoms are monitored and maintained or improved  Outcome: Progressing   Reinforcement of disease process and treatment plan recommended for chronic conditions and co-morbidities.      Problem: Discharge Planning  Goal: Discharge to home or other facility with appropriate resources  Outcome: Progressing  Patient actively participates in ADL's and decision making regarding plan of care.     Problem: Pain  Goal: Verbalizes/displays adequate comfort level or baseline comfort level  Outcome: Progressing  No new signs/symptoms of pain noted, pain rating < 3 on scale 0-10, pain controlled with medication/repositioning.     Problem: Safety - Adult  Goal: Free from fall injury  Outcome: Progressing  No falls/injuries this shift, bed in lowest position, brakes on, bed alarm on, call light in reach, side rails up x2.     Problem: ABCDS Injury Assessment  Goal: Absence of physical injury  Outcome: Progressing

## 2025-05-03 NOTE — PLAN OF CARE
Problem: Chronic Conditions and Co-morbidities  Goal: Patient's chronic conditions and co-morbidity symptoms are monitored and maintained or improved  5/2/2025 2007 by Suha Martin RN  Outcome: Progressing     Problem: Discharge Planning  Goal: Discharge to home or other facility with appropriate resources  5/2/2025 2007 by Suha Martin RN  Outcome: Progressing  Patient will have all needs met prior to discharge      Problem: Pain  Goal: Verbalizes/displays adequate comfort level or baseline comfort level  5/2/2025 2007 by Suha Martin RN  Outcome: Progressing  Patient will have pain managed by utilizing both pharmacological and non pharmacological interventions.      Problem: Safety - Adult  Goal: Free from fall injury  5/2/2025 2007 by Suha Martin RN  Outcome: Progressing  Patient will remain fall free during stay by implementing and following all safety precautions.      Problem: ABCDS Injury Assessment  Goal: Absence of physical injury  5/2/2025 2007 by Suha Martin RN  Outcome: Progressing

## 2025-05-03 NOTE — CONSULTS
NEUROSURGERY CONSULT NOTE    NICHELLE LORENZO  [id number]   1945   5/2/2025    Requesting physician: Jessica Jaquez MD    Reason for consultation: C2 Fracture    History of present illness: Patient is a 79 y.o. female  has a past medical history of Arthritis of shoulder region, right (2024), Atrial fibrillation ([facility]), AVM (arteriovenous malformation) of duodenum (12/2017), AVM (arteriovenous malformation) of stomach (12/2017), Bladder cancer ([facility]) (02/2014), CAD (coronary artery disease) (2014), Carotid stenosis (04/30/2014), Chronic back pain, Chronic diastolic CHF (congestive heart failure) ([facility]) (2016), Chronic prescription opiate use, COPD (HCC), Diverticulosis, HTN (hypertension), Hyperlipidemia, Hypothyroidism, Ischemic stroke (2013), Lumbar spine stenosis (2017), Macular degeneration (2018), Nonrheumatic mitral valve regurgitation, Obstructive sleep apnea, Osteoarthritis, Peripheral neuropathy, Pulmonary HTN ([facility]) (2014), PVD (peripheral vascular disease), Syncope (11/2022), and Tobacco use disorder in remission. Patient presented on 4/30/2025 to [facility] ED c/o auditory and visual hallucinations after the patient was started on a new medication regimen. The patient was admitted to the hospital 4/19 through 4/24 for a C2 fracture, and remains in a [place] J collar. The patient is complaining of headaches, and continued neck pain. Dr. Turcios saw patient during her previous admission for her C2 fracture. He discussed the management options with the patient at length, including 1) conservative management with bracing or 2) surgery (likely C1-3 fusion).  Given her severe comorbid conditions and her advanced age, as well as the absence of involvement of the facet joints. Dr. Turcios and the patient agreed to proceed conservatively at that time. He also discussed the importance of smoking cessation for proper healing of the fracture, and recommended avoiding the use of any nicotine products.  The patient, during one of

## 2025-05-04 LAB
ANION GAP SERPL CALCULATED.3IONS-SCNC: 10 MMOL/L (ref 3–16)
BASOPHILS # BLD: 0.1 K/UL (ref 0–0.2)
BASOPHILS NFR BLD: 1.2 %
BILIRUB UR QL STRIP.AUTO: NEGATIVE
BUN SERPL-MCNC: 8 MG/DL (ref 7–20)
CALCIUM SERPL-MCNC: 8.9 MG/DL (ref 8.3–10.6)
CHLORIDE SERPL-SCNC: 104 MMOL/L (ref 99–110)
CLARITY UR: CLEAR
CO2 SERPL-SCNC: 21 MMOL/L (ref 21–32)
COLOR UR: YELLOW
CREAT SERPL-MCNC: 0.9 MG/DL (ref 0.6–1.2)
DEPRECATED RDW RBC AUTO: 14.9 % (ref 12.4–15.4)
EOSINOPHIL # BLD: 0.7 K/UL (ref 0–0.6)
EOSINOPHIL NFR BLD: 8.2 %
GFR SERPLBLD CREATININE-BSD FMLA CKD-EPI: 65 ML/MIN/{1.73_M2}
GLUCOSE SERPL-MCNC: 97 MG/DL (ref 70–99)
GLUCOSE UR STRIP.AUTO-MCNC: NEGATIVE MG/DL
HCT VFR BLD AUTO: 36.5 % (ref 36–48)
HGB BLD-MCNC: 12.1 G/DL (ref 12–16)
HGB UR QL STRIP.AUTO: NEGATIVE
KETONES UR STRIP.AUTO-MCNC: NEGATIVE MG/DL
LEUKOCYTE ESTERASE UR QL STRIP.AUTO: NEGATIVE
LYMPHOCYTES # BLD: 2.3 K/UL (ref 1–5.1)
LYMPHOCYTES NFR BLD: 25.7 %
MAGNESIUM SERPL-MCNC: 2.42 MG/DL (ref 1.8–2.4)
MCH RBC QN AUTO: 29.4 PG (ref 26–34)
MCHC RBC AUTO-ENTMCNC: 33.2 G/DL (ref 31–36)
MCV RBC AUTO: 88.6 FL (ref 80–100)
MONOCYTES # BLD: 1.2 K/UL (ref 0–1.3)
MONOCYTES NFR BLD: 13.4 %
NEUTROPHILS # BLD: 4.6 K/UL (ref 1.7–7.7)
NEUTROPHILS NFR BLD: 51.5 %
NITRITE UR QL STRIP.AUTO: NEGATIVE
PH UR STRIP.AUTO: 6 [PH] (ref 5–8)
PLATELET # BLD AUTO: 295 K/UL (ref 135–450)
PMV BLD AUTO: 7.6 FL (ref 5–10.5)
POTASSIUM SERPL-SCNC: 4.1 MMOL/L (ref 3.5–5.1)
PROT UR STRIP.AUTO-MCNC: NEGATIVE MG/DL
RBC # BLD AUTO: 4.12 M/UL (ref 4–5.2)
SODIUM SERPL-SCNC: 135 MMOL/L (ref 136–145)
SP GR UR STRIP.AUTO: 1.01 (ref 1–1.03)
UA COMPLETE W REFLEX CULTURE PNL UR: NORMAL
UA DIPSTICK W REFLEX MICRO PNL UR: NORMAL
URN SPEC COLLECT METH UR: NORMAL
UROBILINOGEN UR STRIP-ACNC: 0.2 E.U./DL
WBC # BLD AUTO: 8.9 K/UL (ref 4–11)

## 2025-05-04 PROCEDURE — 81003 URINALYSIS AUTO W/O SCOPE: CPT

## 2025-05-04 PROCEDURE — 80048 BASIC METABOLIC PNL TOTAL CA: CPT

## 2025-05-04 PROCEDURE — 6370000000 HC RX 637 (ALT 250 FOR IP): Performed by: INTERNAL MEDICINE

## 2025-05-04 PROCEDURE — 97530 THERAPEUTIC ACTIVITIES: CPT

## 2025-05-04 PROCEDURE — 36415 COLL VENOUS BLD VENIPUNCTURE: CPT

## 2025-05-04 PROCEDURE — 1200000000 HC SEMI PRIVATE

## 2025-05-04 PROCEDURE — 83735 ASSAY OF MAGNESIUM: CPT

## 2025-05-04 PROCEDURE — 87086 URINE CULTURE/COLONY COUNT: CPT

## 2025-05-04 PROCEDURE — 97535 SELF CARE MNGMENT TRAINING: CPT

## 2025-05-04 PROCEDURE — 97166 OT EVAL MOD COMPLEX 45 MIN: CPT

## 2025-05-04 PROCEDURE — 2500000003 HC RX 250 WO HCPCS

## 2025-05-04 PROCEDURE — 6360000002 HC RX W HCPCS: Performed by: NURSE PRACTITIONER

## 2025-05-04 PROCEDURE — 85025 COMPLETE CBC W/AUTO DIFF WBC: CPT

## 2025-05-04 PROCEDURE — 6370000000 HC RX 637 (ALT 250 FOR IP)

## 2025-05-04 PROCEDURE — 6370000000 HC RX 637 (ALT 250 FOR IP): Performed by: NURSE PRACTITIONER

## 2025-05-04 RX ADMIN — SALINE NASAL SPRAY 1 SPRAY: 1.5 SOLUTION NASAL at 06:05

## 2025-05-04 RX ADMIN — AMIODARONE HYDROCHLORIDE 100 MG: 200 TABLET ORAL at 08:30

## 2025-05-04 RX ADMIN — MORPHINE SULFATE 15 MG: 15 TABLET, FILM COATED, EXTENDED RELEASE ORAL at 20:59

## 2025-05-04 RX ADMIN — OXYCODONE AND ACETAMINOPHEN 1 TABLET: 325; 10 TABLET ORAL at 18:59

## 2025-05-04 RX ADMIN — POTASSIUM CHLORIDE 20 MEQ: 1500 TABLET, EXTENDED RELEASE ORAL at 08:30

## 2025-05-04 RX ADMIN — DULOXETINE HYDROCHLORIDE 30 MG: 30 CAPSULE, DELAYED RELEASE ORAL at 20:59

## 2025-05-04 RX ADMIN — OXYCODONE AND ACETAMINOPHEN 1 TABLET: 325; 10 TABLET ORAL at 22:28

## 2025-05-04 RX ADMIN — METOPROLOL SUCCINATE 25 MG: 25 TABLET, EXTENDED RELEASE ORAL at 08:30

## 2025-05-04 RX ADMIN — PENICILLIN V POTASSIUM 250 MG: 500 TABLET, FILM COATED ORAL at 08:31

## 2025-05-04 RX ADMIN — OXYCODONE AND ACETAMINOPHEN 1 TABLET: 325; 10 TABLET ORAL at 02:12

## 2025-05-04 RX ADMIN — MORPHINE SULFATE 15 MG: 15 TABLET, FILM COATED, EXTENDED RELEASE ORAL at 08:29

## 2025-05-04 RX ADMIN — DULOXETINE HYDROCHLORIDE 30 MG: 30 CAPSULE, DELAYED RELEASE ORAL at 08:30

## 2025-05-04 RX ADMIN — METHOCARBAMOL 500 MG: 500 TABLET ORAL at 03:29

## 2025-05-04 RX ADMIN — PREGABALIN 50 MG: 50 CAPSULE ORAL at 20:59

## 2025-05-04 RX ADMIN — PENICILLIN V POTASSIUM 250 MG: 500 TABLET, FILM COATED ORAL at 21:01

## 2025-05-04 RX ADMIN — SENNOSIDES, DOCUSATE SODIUM 1 TABLET: 50; 8.6 TABLET, FILM COATED ORAL at 20:59

## 2025-05-04 RX ADMIN — OXYCODONE AND ACETAMINOPHEN 1 TABLET: 325; 10 TABLET ORAL at 06:12

## 2025-05-04 RX ADMIN — LEVOTHYROXINE SODIUM 88 MCG: 0.09 TABLET ORAL at 08:30

## 2025-05-04 RX ADMIN — SODIUM CHLORIDE, PRESERVATIVE FREE 10 ML: 5 INJECTION INTRAVENOUS at 08:31

## 2025-05-04 RX ADMIN — METHOCARBAMOL 500 MG: 500 TABLET ORAL at 10:15

## 2025-05-04 RX ADMIN — OXYCODONE AND ACETAMINOPHEN 1 TABLET: 325; 10 TABLET ORAL at 14:30

## 2025-05-04 RX ADMIN — ENOXAPARIN SODIUM 40 MG: 100 INJECTION SUBCUTANEOUS at 08:28

## 2025-05-04 RX ADMIN — NALOXEGOL OXALATE 25 MG: 25 TABLET, FILM COATED ORAL at 06:05

## 2025-05-04 RX ADMIN — SENNOSIDES, DOCUSATE SODIUM 1 TABLET: 50; 8.6 TABLET, FILM COATED ORAL at 08:30

## 2025-05-04 RX ADMIN — OXYCODONE AND ACETAMINOPHEN 1 TABLET: 325; 10 TABLET ORAL at 10:14

## 2025-05-04 RX ADMIN — SODIUM CHLORIDE, PRESERVATIVE FREE 10 ML: 5 INJECTION INTRAVENOUS at 20:59

## 2025-05-04 ASSESSMENT — PAIN DESCRIPTION - PAIN TYPE
TYPE: ACUTE PAIN

## 2025-05-04 ASSESSMENT — PAIN SCALES - GENERAL
PAINLEVEL_OUTOF10: 7
PAINLEVEL_OUTOF10: 6
PAINLEVEL_OUTOF10: 4
PAINLEVEL_OUTOF10: 8
PAINLEVEL_OUTOF10: 6
PAINLEVEL_OUTOF10: 6
PAINLEVEL_OUTOF10: 7
PAINLEVEL_OUTOF10: 5
PAINLEVEL_OUTOF10: 6
PAINLEVEL_OUTOF10: 9
PAINLEVEL_OUTOF10: 7
PAINLEVEL_OUTOF10: 7
PAINLEVEL_OUTOF10: 6
PAINLEVEL_OUTOF10: 8
PAINLEVEL_OUTOF10: 8
PAINLEVEL_OUTOF10: 5

## 2025-05-04 ASSESSMENT — PAIN DESCRIPTION - LOCATION
LOCATION: NECK

## 2025-05-04 ASSESSMENT — PAIN - FUNCTIONAL ASSESSMENT
PAIN_FUNCTIONAL_ASSESSMENT: ACTIVITIES ARE NOT PREVENTED
PAIN_FUNCTIONAL_ASSESSMENT: ACTIVITIES ARE NOT PREVENTED
PAIN_FUNCTIONAL_ASSESSMENT: PREVENTS OR INTERFERES WITH MANY ACTIVE NOT PASSIVE ACTIVITIES
PAIN_FUNCTIONAL_ASSESSMENT: ACTIVITIES ARE NOT PREVENTED
PAIN_FUNCTIONAL_ASSESSMENT: ACTIVITIES ARE NOT PREVENTED
PAIN_FUNCTIONAL_ASSESSMENT: PREVENTS OR INTERFERES SOME ACTIVE ACTIVITIES AND ADLS
PAIN_FUNCTIONAL_ASSESSMENT: ACTIVITIES ARE NOT PREVENTED

## 2025-05-04 ASSESSMENT — PAIN DESCRIPTION - DESCRIPTORS
DESCRIPTORS: ACHING;DISCOMFORT
DESCRIPTORS: ACHING;DISCOMFORT
DESCRIPTORS: ACHING
DESCRIPTORS: ACHING
DESCRIPTORS: ACHING;DISCOMFORT
DESCRIPTORS: DISCOMFORT;ACHING
DESCRIPTORS: ACHING;DISCOMFORT;SHOOTING
DESCRIPTORS: ACHING

## 2025-05-04 ASSESSMENT — PAIN DESCRIPTION - ORIENTATION
ORIENTATION: POSTERIOR
ORIENTATION: MID;RIGHT
ORIENTATION: MID;RIGHT
ORIENTATION: RIGHT
ORIENTATION: POSTERIOR
ORIENTATION: RIGHT;MID;POSTERIOR
ORIENTATION: POSTERIOR
ORIENTATION: POSTERIOR

## 2025-05-04 ASSESSMENT — PAIN DESCRIPTION - ONSET
ONSET: ON-GOING

## 2025-05-04 ASSESSMENT — PAIN DESCRIPTION - FREQUENCY
FREQUENCY: CONTINUOUS

## 2025-05-04 NOTE — PLAN OF CARE
Problem: Chronic Conditions and Co-morbidities  Goal: Patient's chronic conditions and co-morbidity symptoms are monitored and maintained or improved  Outcome: Progressing   Reinforcement of disease process and treatment plan recommended for chronic conditions and co-morbidities.      Problem: Discharge Planning  Goal: Discharge to home or other facility with appropriate resources  Outcome: Progressing  Patient actively participates in ADL's and decision making regarding plan of care.     Problem: Pain  Goal: Verbalizes/displays adequate comfort level or baseline comfort level  5/4/2025 0916 by Desiree Cai, RN  Outcome: Progressing  No new signs/symptoms of pain noted, pain rating < 3 on scale 0-10, pain controlled with medication/repositioning.     Problem: Safety - Adult  Goal: Free from fall injury  5/4/2025 0916 by Desiree Cai, RN  Outcome: Progressing  No falls/injuries this shift, bed in lowest position, brakes on, bed alarm on, call light in reach, side rails up x2.     Problem: ABCDS Injury Assessment  Goal: Absence of physical injury  Outcome: Progressing

## 2025-05-04 NOTE — PLAN OF CARE
Problem: Pain  Goal: Verbalizes/displays adequate comfort level or baseline comfort level  Outcome: Progressing   Pain reduced from 7-8/10 to 4-5/10 after medicated per MAR  Problem: Safety - Adult  Goal: Free from fall injury  Outcome: Progressing   Pt free from injury this shift and free of falls. 2/4 rails up on bed and bed is in the lowest position.Wheels locked and bed alarm set. Socks on pt and ID bands on pt. Call light in reach of pt and pt educated to call out to get up x1 walker gb, MJ  collar. Will continue to monitor for safety.

## 2025-05-05 LAB
ANION GAP SERPL CALCULATED.3IONS-SCNC: 8 MMOL/L (ref 3–16)
BACTERIA FLD AEROBE CULT: NORMAL
BACTERIA UR CULT: ABNORMAL
BASOPHILS # BLD: 0.1 K/UL (ref 0–0.2)
BASOPHILS NFR BLD: 1.1 %
BUN SERPL-MCNC: 12 MG/DL (ref 7–20)
CALCIUM SERPL-MCNC: 9.2 MG/DL (ref 8.3–10.6)
CHLORIDE SERPL-SCNC: 107 MMOL/L (ref 99–110)
CO2 SERPL-SCNC: 24 MMOL/L (ref 21–32)
CREAT SERPL-MCNC: 0.8 MG/DL (ref 0.6–1.2)
DEPRECATED RDW RBC AUTO: 15.4 % (ref 12.4–15.4)
EOSINOPHIL # BLD: 1 K/UL (ref 0–0.6)
EOSINOPHIL NFR BLD: 11.1 %
GFR SERPLBLD CREATININE-BSD FMLA CKD-EPI: 75 ML/MIN/{1.73_M2}
GLUCOSE SERPL-MCNC: 78 MG/DL (ref 70–99)
GRAM STN SPEC: NORMAL
HCT VFR BLD AUTO: 34.5 % (ref 36–48)
HGB BLD-MCNC: 11.2 G/DL (ref 12–16)
LYMPHOCYTES # BLD: 1.9 K/UL (ref 1–5.1)
LYMPHOCYTES NFR BLD: 21.6 %
MCH RBC QN AUTO: 29.2 PG (ref 26–34)
MCHC RBC AUTO-ENTMCNC: 32.5 G/DL (ref 31–36)
MCV RBC AUTO: 89.9 FL (ref 80–100)
MONOCYTES # BLD: 1.2 K/UL (ref 0–1.3)
MONOCYTES NFR BLD: 13.8 %
NEUTROPHILS # BLD: 4.6 K/UL (ref 1.7–7.7)
NEUTROPHILS NFR BLD: 52.4 %
ORGANISM: ABNORMAL
PLATELET # BLD AUTO: 286 K/UL (ref 135–450)
PMV BLD AUTO: 8.4 FL (ref 5–10.5)
POTASSIUM SERPL-SCNC: 4.5 MMOL/L (ref 3.5–5.1)
RBC # BLD AUTO: 3.84 M/UL (ref 4–5.2)
SODIUM SERPL-SCNC: 139 MMOL/L (ref 136–145)
WBC # BLD AUTO: 8.7 K/UL (ref 4–11)

## 2025-05-05 PROCEDURE — 97116 GAIT TRAINING THERAPY: CPT

## 2025-05-05 PROCEDURE — 6370000000 HC RX 637 (ALT 250 FOR IP)

## 2025-05-05 PROCEDURE — 2500000003 HC RX 250 WO HCPCS

## 2025-05-05 PROCEDURE — 80048 BASIC METABOLIC PNL TOTAL CA: CPT

## 2025-05-05 PROCEDURE — 6370000000 HC RX 637 (ALT 250 FOR IP): Performed by: INTERNAL MEDICINE

## 2025-05-05 PROCEDURE — 6370000000 HC RX 637 (ALT 250 FOR IP): Performed by: NURSE PRACTITIONER

## 2025-05-05 PROCEDURE — 85025 COMPLETE CBC W/AUTO DIFF WBC: CPT

## 2025-05-05 PROCEDURE — 97530 THERAPEUTIC ACTIVITIES: CPT

## 2025-05-05 PROCEDURE — 36415 COLL VENOUS BLD VENIPUNCTURE: CPT

## 2025-05-05 PROCEDURE — 6360000002 HC RX W HCPCS: Performed by: NURSE PRACTITIONER

## 2025-05-05 PROCEDURE — 97162 PT EVAL MOD COMPLEX 30 MIN: CPT

## 2025-05-05 PROCEDURE — 1200000000 HC SEMI PRIVATE

## 2025-05-05 RX ORDER — OXYCODONE AND ACETAMINOPHEN 7.5; 325 MG/1; MG/1
1 TABLET ORAL EVERY 4 HOURS PRN
Refills: 0 | Status: DISCONTINUED | OUTPATIENT
Start: 2025-05-05 | End: 2025-05-06

## 2025-05-05 RX ADMIN — ENOXAPARIN SODIUM 40 MG: 100 INJECTION SUBCUTANEOUS at 09:42

## 2025-05-05 RX ADMIN — OXYCODONE HYDROCHLORIDE AND ACETAMINOPHEN 1 TABLET: 7.5; 325 TABLET ORAL at 19:31

## 2025-05-05 RX ADMIN — OXYCODONE HYDROCHLORIDE AND ACETAMINOPHEN 1 TABLET: 7.5; 325 TABLET ORAL at 15:27

## 2025-05-05 RX ADMIN — OXYCODONE HYDROCHLORIDE AND ACETAMINOPHEN 1 TABLET: 7.5; 325 TABLET ORAL at 23:26

## 2025-05-05 RX ADMIN — PENICILLIN V POTASSIUM 250 MG: 500 TABLET, FILM COATED ORAL at 19:45

## 2025-05-05 RX ADMIN — DULOXETINE HYDROCHLORIDE 30 MG: 30 CAPSULE, DELAYED RELEASE ORAL at 09:39

## 2025-05-05 RX ADMIN — SENNOSIDES, DOCUSATE SODIUM 1 TABLET: 50; 8.6 TABLET, FILM COATED ORAL at 09:40

## 2025-05-05 RX ADMIN — POTASSIUM CHLORIDE 20 MEQ: 1500 TABLET, EXTENDED RELEASE ORAL at 09:39

## 2025-05-05 RX ADMIN — PREGABALIN 50 MG: 50 CAPSULE ORAL at 19:45

## 2025-05-05 RX ADMIN — SODIUM CHLORIDE, PRESERVATIVE FREE 10 ML: 5 INJECTION INTRAVENOUS at 09:40

## 2025-05-05 RX ADMIN — OXYCODONE AND ACETAMINOPHEN 1 TABLET: 325; 10 TABLET ORAL at 02:39

## 2025-05-05 RX ADMIN — OXYCODONE AND ACETAMINOPHEN 1 TABLET: 325; 10 TABLET ORAL at 11:10

## 2025-05-05 RX ADMIN — AMIODARONE HYDROCHLORIDE 100 MG: 200 TABLET ORAL at 09:39

## 2025-05-05 RX ADMIN — METOPROLOL SUCCINATE 25 MG: 25 TABLET, EXTENDED RELEASE ORAL at 09:40

## 2025-05-05 RX ADMIN — MORPHINE SULFATE 15 MG: 15 TABLET, FILM COATED, EXTENDED RELEASE ORAL at 09:39

## 2025-05-05 RX ADMIN — OXYCODONE AND ACETAMINOPHEN 1 TABLET: 325; 10 TABLET ORAL at 06:32

## 2025-05-05 RX ADMIN — SODIUM CHLORIDE, PRESERVATIVE FREE 10 ML: 5 INJECTION INTRAVENOUS at 19:46

## 2025-05-05 RX ADMIN — TORSEMIDE 40 MG: 20 TABLET ORAL at 09:39

## 2025-05-05 RX ADMIN — NALOXEGOL OXALATE 25 MG: 25 TABLET, FILM COATED ORAL at 09:56

## 2025-05-05 RX ADMIN — LEVOTHYROXINE SODIUM 88 MCG: 0.09 TABLET ORAL at 09:39

## 2025-05-05 RX ADMIN — METHOCARBAMOL 500 MG: 500 TABLET ORAL at 02:39

## 2025-05-05 RX ADMIN — PENICILLIN V POTASSIUM 250 MG: 500 TABLET, FILM COATED ORAL at 09:48

## 2025-05-05 RX ADMIN — DICLOFENAC SODIUM 4 G: 10 GEL TOPICAL at 09:49

## 2025-05-05 RX ADMIN — DULOXETINE HYDROCHLORIDE 30 MG: 30 CAPSULE, DELAYED RELEASE ORAL at 19:45

## 2025-05-05 ASSESSMENT — PAIN SCALES - GENERAL
PAINLEVEL_OUTOF10: 9
PAINLEVEL_OUTOF10: 9
PAINLEVEL_OUTOF10: 8
PAINLEVEL_OUTOF10: 7
PAINLEVEL_OUTOF10: 7
PAINLEVEL_OUTOF10: 9
PAINLEVEL_OUTOF10: 7
PAINLEVEL_OUTOF10: 10
PAINLEVEL_OUTOF10: 5
PAINLEVEL_OUTOF10: 6
PAINLEVEL_OUTOF10: 6
PAINLEVEL_OUTOF10: 8
PAINLEVEL_OUTOF10: 7
PAINLEVEL_OUTOF10: 8

## 2025-05-05 ASSESSMENT — PAIN DESCRIPTION - FREQUENCY
FREQUENCY: CONTINUOUS

## 2025-05-05 ASSESSMENT — PAIN DESCRIPTION - PAIN TYPE
TYPE: ACUTE PAIN

## 2025-05-05 ASSESSMENT — PAIN DESCRIPTION - LOCATION
LOCATION: HEAD
LOCATION: HEAD;NECK
LOCATION: NECK
LOCATION: NECK
LOCATION: HEAD
LOCATION: NECK
LOCATION: NECK

## 2025-05-05 ASSESSMENT — PAIN DESCRIPTION - ORIENTATION
ORIENTATION: RIGHT;LEFT;POSTERIOR
ORIENTATION: RIGHT;LEFT;POSTERIOR
ORIENTATION: RIGHT
ORIENTATION: POSTERIOR
ORIENTATION: RIGHT
ORIENTATION: RIGHT
ORIENTATION: POSTERIOR

## 2025-05-05 ASSESSMENT — PAIN - FUNCTIONAL ASSESSMENT
PAIN_FUNCTIONAL_ASSESSMENT: PREVENTS OR INTERFERES SOME ACTIVE ACTIVITIES AND ADLS

## 2025-05-05 ASSESSMENT — PAIN DESCRIPTION - DESCRIPTORS
DESCRIPTORS: THROBBING
DESCRIPTORS: ACHING;DISCOMFORT
DESCRIPTORS: THROBBING
DESCRIPTORS: ACHING;DISCOMFORT

## 2025-05-05 ASSESSMENT — PAIN DESCRIPTION - ONSET
ONSET: ON-GOING

## 2025-05-05 ASSESSMENT — ENCOUNTER SYMPTOMS
GASTROINTESTINAL NEGATIVE: 1
RESPIRATORY NEGATIVE: 1

## 2025-05-05 NOTE — PLAN OF CARE
Problem: Pain  Goal: Verbalizes/displays adequate comfort level or baseline comfort level  5/4/2025 2310 by Evelyn Delgado, RN  Outcome: Progressing    Problem: Safety - Adult  Goal: Free from fall injury  5/4/2025 2310 by Evelyn Delgado, RN  Outcome: Progressing     Innumerable multiple pulmonary nodules on both lungs. This is present in similar to previous CT from 2016-nodules are chronic.

## 2025-05-06 LAB
ANION GAP SERPL CALCULATED.3IONS-SCNC: 7 MMOL/L (ref 3–16)
BASOPHILS # BLD: 0.1 K/UL (ref 0–0.2)
BASOPHILS NFR BLD: 1 %
BUN SERPL-MCNC: 15 MG/DL (ref 7–20)
CALCIUM SERPL-MCNC: 9.3 MG/DL (ref 8.3–10.6)
CHLORIDE SERPL-SCNC: 105 MMOL/L (ref 99–110)
CO2 SERPL-SCNC: 27 MMOL/L (ref 21–32)
CREAT SERPL-MCNC: 1 MG/DL (ref 0.6–1.2)
DEPRECATED RDW RBC AUTO: 15.3 % (ref 12.4–15.4)
EOSINOPHIL # BLD: 1 K/UL (ref 0–0.6)
EOSINOPHIL NFR BLD: 10.3 %
GFR SERPLBLD CREATININE-BSD FMLA CKD-EPI: 57 ML/MIN/{1.73_M2}
GLUCOSE SERPL-MCNC: 87 MG/DL (ref 70–99)
HCT VFR BLD AUTO: 34.3 % (ref 36–48)
HGB BLD-MCNC: 11.6 G/DL (ref 12–16)
LYMPHOCYTES # BLD: 2.5 K/UL (ref 1–5.1)
LYMPHOCYTES NFR BLD: 26.7 %
MCH RBC QN AUTO: 29.5 PG (ref 26–34)
MCHC RBC AUTO-ENTMCNC: 33.8 G/DL (ref 31–36)
MCV RBC AUTO: 87.3 FL (ref 80–100)
MONOCYTES # BLD: 1.2 K/UL (ref 0–1.3)
MONOCYTES NFR BLD: 12.4 %
NEUTROPHILS # BLD: 4.7 K/UL (ref 1.7–7.7)
NEUTROPHILS NFR BLD: 49.6 %
PLATELET # BLD AUTO: 274 K/UL (ref 135–450)
PMV BLD AUTO: 7.7 FL (ref 5–10.5)
POTASSIUM SERPL-SCNC: 4.2 MMOL/L (ref 3.5–5.1)
RBC # BLD AUTO: 3.94 M/UL (ref 4–5.2)
SODIUM SERPL-SCNC: 139 MMOL/L (ref 136–145)
WBC # BLD AUTO: 9.5 K/UL (ref 4–11)

## 2025-05-06 PROCEDURE — 36415 COLL VENOUS BLD VENIPUNCTURE: CPT

## 2025-05-06 PROCEDURE — 97530 THERAPEUTIC ACTIVITIES: CPT

## 2025-05-06 PROCEDURE — 6370000000 HC RX 637 (ALT 250 FOR IP): Performed by: INTERNAL MEDICINE

## 2025-05-06 PROCEDURE — 6360000002 HC RX W HCPCS: Performed by: NURSE PRACTITIONER

## 2025-05-06 PROCEDURE — 1200000000 HC SEMI PRIVATE

## 2025-05-06 PROCEDURE — 80048 BASIC METABOLIC PNL TOTAL CA: CPT

## 2025-05-06 PROCEDURE — 2500000003 HC RX 250 WO HCPCS

## 2025-05-06 PROCEDURE — 85025 COMPLETE CBC W/AUTO DIFF WBC: CPT

## 2025-05-06 PROCEDURE — 6370000000 HC RX 637 (ALT 250 FOR IP)

## 2025-05-06 PROCEDURE — 97535 SELF CARE MNGMENT TRAINING: CPT

## 2025-05-06 PROCEDURE — 6370000000 HC RX 637 (ALT 250 FOR IP): Performed by: NURSE PRACTITIONER

## 2025-05-06 RX ORDER — OXYCODONE AND ACETAMINOPHEN 10; 325 MG/1; MG/1
1 TABLET ORAL EVERY 4 HOURS PRN
Refills: 0 | Status: DISCONTINUED | OUTPATIENT
Start: 2025-05-06 | End: 2025-05-07 | Stop reason: HOSPADM

## 2025-05-06 RX ADMIN — METHOCARBAMOL 500 MG: 500 TABLET ORAL at 09:09

## 2025-05-06 RX ADMIN — SODIUM CHLORIDE, PRESERVATIVE FREE 10 ML: 5 INJECTION INTRAVENOUS at 09:15

## 2025-05-06 RX ADMIN — PENICILLIN V POTASSIUM 250 MG: 500 TABLET, FILM COATED ORAL at 09:11

## 2025-05-06 RX ADMIN — PREGABALIN 50 MG: 50 CAPSULE ORAL at 21:20

## 2025-05-06 RX ADMIN — DULOXETINE HYDROCHLORIDE 30 MG: 30 CAPSULE, DELAYED RELEASE ORAL at 09:10

## 2025-05-06 RX ADMIN — OXYCODONE HYDROCHLORIDE AND ACETAMINOPHEN 1 TABLET: 7.5; 325 TABLET ORAL at 05:30

## 2025-05-06 RX ADMIN — OXYCODONE AND ACETAMINOPHEN 1 TABLET: 325; 10 TABLET ORAL at 23:13

## 2025-05-06 RX ADMIN — OXYCODONE AND ACETAMINOPHEN 1 TABLET: 325; 10 TABLET ORAL at 18:48

## 2025-05-06 RX ADMIN — AMIODARONE HYDROCHLORIDE 100 MG: 200 TABLET ORAL at 09:11

## 2025-05-06 RX ADMIN — SENNOSIDES, DOCUSATE SODIUM 1 TABLET: 50; 8.6 TABLET, FILM COATED ORAL at 09:11

## 2025-05-06 RX ADMIN — POTASSIUM CHLORIDE 20 MEQ: 1500 TABLET, EXTENDED RELEASE ORAL at 09:11

## 2025-05-06 RX ADMIN — METHOCARBAMOL 500 MG: 500 TABLET ORAL at 18:48

## 2025-05-06 RX ADMIN — OXYCODONE HYDROCHLORIDE AND ACETAMINOPHEN 1 TABLET: 7.5; 325 TABLET ORAL at 10:02

## 2025-05-06 RX ADMIN — ENOXAPARIN SODIUM 40 MG: 100 INJECTION SUBCUTANEOUS at 09:11

## 2025-05-06 RX ADMIN — OXYCODONE AND ACETAMINOPHEN 1 TABLET: 325; 10 TABLET ORAL at 14:07

## 2025-05-06 RX ADMIN — NALOXEGOL OXALATE 25 MG: 25 TABLET, FILM COATED ORAL at 05:25

## 2025-05-06 RX ADMIN — METOPROLOL SUCCINATE 25 MG: 25 TABLET, EXTENDED RELEASE ORAL at 09:10

## 2025-05-06 RX ADMIN — LEVOTHYROXINE SODIUM 88 MCG: 0.09 TABLET ORAL at 09:11

## 2025-05-06 RX ADMIN — DULOXETINE HYDROCHLORIDE 30 MG: 30 CAPSULE, DELAYED RELEASE ORAL at 21:20

## 2025-05-06 RX ADMIN — SENNOSIDES, DOCUSATE SODIUM 1 TABLET: 50; 8.6 TABLET, FILM COATED ORAL at 21:20

## 2025-05-06 RX ADMIN — TORSEMIDE 40 MG: 20 TABLET ORAL at 09:10

## 2025-05-06 RX ADMIN — PENICILLIN V POTASSIUM 250 MG: 500 TABLET, FILM COATED ORAL at 21:20

## 2025-05-06 ASSESSMENT — PAIN DESCRIPTION - DESCRIPTORS
DESCRIPTORS: THROBBING
DESCRIPTORS: ACHING
DESCRIPTORS: ACHING;DISCOMFORT

## 2025-05-06 ASSESSMENT — PAIN SCALES - GENERAL
PAINLEVEL_OUTOF10: 7
PAINLEVEL_OUTOF10: 8
PAINLEVEL_OUTOF10: 2
PAINLEVEL_OUTOF10: 8
PAINLEVEL_OUTOF10: 9
PAINLEVEL_OUTOF10: 6
PAINLEVEL_OUTOF10: 6
PAINLEVEL_OUTOF10: 3
PAINLEVEL_OUTOF10: 8
PAINLEVEL_OUTOF10: 6
PAINLEVEL_OUTOF10: 7

## 2025-05-06 ASSESSMENT — PAIN DESCRIPTION - FREQUENCY
FREQUENCY: CONTINUOUS

## 2025-05-06 ASSESSMENT — ENCOUNTER SYMPTOMS
TROUBLE SWALLOWING: 0
BACK PAIN: 0
CHOKING: 0
RHINORRHEA: 0
ABDOMINAL DISTENTION: 0
CHEST TIGHTNESS: 0
SHORTNESS OF BREATH: 0
SORE THROAT: 0
DIARRHEA: 0
NAUSEA: 0
SINUS PAIN: 0
ABDOMINAL PAIN: 0
PHOTOPHOBIA: 0
COUGH: 0
VOMITING: 0
WHEEZING: 0
CONSTIPATION: 0

## 2025-05-06 ASSESSMENT — PAIN DESCRIPTION - ONSET
ONSET: ON-GOING

## 2025-05-06 ASSESSMENT — PAIN DESCRIPTION - PAIN TYPE
TYPE: ACUTE PAIN

## 2025-05-06 ASSESSMENT — PAIN DESCRIPTION - LOCATION
LOCATION: NECK
LOCATION: NECK
LOCATION: NECK;SHOULDER
LOCATION: SHOULDER;NECK
LOCATION: BACK
LOCATION: NECK;SHOULDER

## 2025-05-06 ASSESSMENT — PAIN DESCRIPTION - ORIENTATION
ORIENTATION: MID
ORIENTATION: RIGHT
ORIENTATION: MID
ORIENTATION: RIGHT
ORIENTATION: RIGHT
ORIENTATION: RIGHT;POSTERIOR

## 2025-05-06 NOTE — PLAN OF CARE
Problem: Pain  Goal: Verbalizes/displays adequate comfort level or baseline comfort level  5/6/2025 1910 by Ely Zurita, RN  Outcome: Progressing  Note: Pt. Managing pain per MAR.     Problem: Safety - Adult  Goal: Free from fall injury  5/6/2025 1910 by Ely Zurita, RN  Outcome: Progressing  Note: All fall precautions in place and call light is within reach.

## 2025-05-06 NOTE — PLAN OF CARE
Problem: Pain  Goal: Verbalizes/displays adequate comfort level or baseline comfort level  5/5/2025 2315 by Suzanne Aguirre, RN  Outcome: Progressing   Pt endorsing pain to back of neck. Being treated with PRN pain medication, rest, and frequent repositioning with pillow support for comfort and pressure relief. Pt reports some relief from pain with above interventions.    Problem: Safety - Adult  Goal: Free from fall injury  5/5/2025 2315 by Suzanne Aguirre, RN  Outcome: Progressing   All fall precautions in place. Bed locked and in lowest position with alarm on. Overbed table and personal belonings within reach. Call light within reach and patient instructed to use call light for assistance. Non-skid socks on.

## 2025-05-06 NOTE — PLAN OF CARE
Problem: Chronic Conditions and Co-morbidities  Goal: Patient's chronic conditions and co-morbidity symptoms are monitored and maintained or improved  5/6/2025 1024 by Desiree Cai RN  Outcome: Progressing   Reinforcement of disease process and treatment plan recommended for chronic conditions and co-morbidities.      Problem: Discharge Planning  Goal: Discharge to home or other facility with appropriate resources  5/6/2025 1024 by Desiree Cai RN  Outcome: Progressing  Patient actively participates in ADL's and decision making regarding plan of care.     Problem: Pain  Goal: Verbalizes/displays adequate comfort level or baseline comfort level  5/6/2025 1024 by Desiree Cai RN  Outcome: Progressing  No new signs/symptoms of pain noted, pain rating < 3 on scale 0-10, pain controlled with medication/repositioning.     Problem: Safety - Adult  Goal: Free from fall injury  5/6/2025 1024 by Desiree Cai RN  Outcome: Progressing  No falls/injuries this shift, bed in lowest position, brakes on, bed alarm on, call light in reach, side rails up x2.     Problem: ABCDS Injury Assessment  Goal: Absence of physical injury  5/6/2025 1024 by Desiree Cai RN  Outcome: Progressing     Problem: Skin/Tissue Integrity  Goal: Skin integrity remains intact  Description: 1.  Monitor for areas of redness and/or skin breakdown2.  Assess vascular access sites hourly3.  Every 4-6 hours minimum:  Change oxygen saturation probe site4.  Every 4-6 hours:  If on nasal continuous positive airway pressure, respiratory therapy assess nares and determine need for appliance change or resting period  5/6/2025 1024 by Desiree Cai RN  Outcome: Progressing   No new skin breakdown noted, no new signs/symptoms of infection, continue to monitor labwork including WBC, medications administered per physician orders.

## 2025-05-07 VITALS
HEIGHT: 60 IN | SYSTOLIC BLOOD PRESSURE: 150 MMHG | TEMPERATURE: 98 F | DIASTOLIC BLOOD PRESSURE: 57 MMHG | BODY MASS INDEX: 23.36 KG/M2 | RESPIRATION RATE: 16 BRPM | HEART RATE: 62 BPM | WEIGHT: 119 LBS | OXYGEN SATURATION: 98 %

## 2025-05-07 LAB
ANION GAP SERPL CALCULATED.3IONS-SCNC: 9 MMOL/L (ref 3–16)
BASOPHILS # BLD: 0.1 K/UL (ref 0–0.2)
BASOPHILS NFR BLD: 0.8 %
BUN SERPL-MCNC: 16 MG/DL (ref 7–20)
CALCIUM SERPL-MCNC: 9.3 MG/DL (ref 8.3–10.6)
CHLORIDE SERPL-SCNC: 102 MMOL/L (ref 99–110)
CO2 SERPL-SCNC: 27 MMOL/L (ref 21–32)
CREAT SERPL-MCNC: 0.9 MG/DL (ref 0.6–1.2)
DEPRECATED RDW RBC AUTO: 15.4 % (ref 12.4–15.4)
EOSINOPHIL # BLD: 0.9 K/UL (ref 0–0.6)
EOSINOPHIL NFR BLD: 10.9 %
GFR SERPLBLD CREATININE-BSD FMLA CKD-EPI: 65 ML/MIN/{1.73_M2}
GLUCOSE SERPL-MCNC: 92 MG/DL (ref 70–99)
HCT VFR BLD AUTO: 32.9 % (ref 36–48)
HGB BLD-MCNC: 11.3 G/DL (ref 12–16)
LYMPHOCYTES # BLD: 2.1 K/UL (ref 1–5.1)
LYMPHOCYTES NFR BLD: 26.6 %
MCH RBC QN AUTO: 29.8 PG (ref 26–34)
MCHC RBC AUTO-ENTMCNC: 34.4 G/DL (ref 31–36)
MCV RBC AUTO: 86.6 FL (ref 80–100)
MONOCYTES # BLD: 0.9 K/UL (ref 0–1.3)
MONOCYTES NFR BLD: 11.8 %
NEUTROPHILS # BLD: 4 K/UL (ref 1.7–7.7)
NEUTROPHILS NFR BLD: 49.9 %
PLATELET # BLD AUTO: 256 K/UL (ref 135–450)
PMV BLD AUTO: 7.6 FL (ref 5–10.5)
POTASSIUM SERPL-SCNC: 4 MMOL/L (ref 3.5–5.1)
RBC # BLD AUTO: 3.79 M/UL (ref 4–5.2)
SODIUM SERPL-SCNC: 138 MMOL/L (ref 136–145)
WBC # BLD AUTO: 7.9 K/UL (ref 4–11)

## 2025-05-07 PROCEDURE — 85025 COMPLETE CBC W/AUTO DIFF WBC: CPT

## 2025-05-07 PROCEDURE — 6370000000 HC RX 637 (ALT 250 FOR IP)

## 2025-05-07 PROCEDURE — 36415 COLL VENOUS BLD VENIPUNCTURE: CPT

## 2025-05-07 PROCEDURE — 80048 BASIC METABOLIC PNL TOTAL CA: CPT

## 2025-05-07 PROCEDURE — 6360000002 HC RX W HCPCS: Performed by: NURSE PRACTITIONER

## 2025-05-07 PROCEDURE — 6370000000 HC RX 637 (ALT 250 FOR IP): Performed by: NURSE PRACTITIONER

## 2025-05-07 PROCEDURE — 6370000000 HC RX 637 (ALT 250 FOR IP): Performed by: INTERNAL MEDICINE

## 2025-05-07 RX ORDER — OXYCODONE AND ACETAMINOPHEN 10; 325 MG/1; MG/1
1 TABLET ORAL EVERY 4 HOURS PRN
Qty: 42 TABLET | Refills: 0 | OUTPATIENT
Start: 2025-05-07 | End: 2025-05-14

## 2025-05-07 RX ORDER — PREGABALIN 50 MG/1
50 CAPSULE ORAL NIGHTLY
Qty: 7 CAPSULE | Refills: 0 | OUTPATIENT
Start: 2025-05-07 | End: 2025-05-14

## 2025-05-07 RX ORDER — OXYCODONE AND ACETAMINOPHEN 10; 325 MG/1; MG/1
1 TABLET ORAL EVERY 4 HOURS PRN
Qty: 42 TABLET | Refills: 0 | Status: SHIPPED | OUTPATIENT
Start: 2025-05-07 | End: 2025-05-14

## 2025-05-07 RX ORDER — METHOCARBAMOL 500 MG/1
500 TABLET, FILM COATED ORAL 4 TIMES DAILY
Qty: 28 TABLET | Refills: 0 | Status: SHIPPED | OUTPATIENT
Start: 2025-05-07 | End: 2025-05-14

## 2025-05-07 RX ORDER — METHOCARBAMOL 500 MG/1
500 TABLET, FILM COATED ORAL EVERY 6 HOURS PRN
Qty: 28 TABLET | Refills: 0 | OUTPATIENT
Start: 2025-05-07 | End: 2025-05-14

## 2025-05-07 RX ADMIN — ENOXAPARIN SODIUM 40 MG: 100 INJECTION SUBCUTANEOUS at 08:51

## 2025-05-07 RX ADMIN — OXYCODONE AND ACETAMINOPHEN 1 TABLET: 325; 10 TABLET ORAL at 02:58

## 2025-05-07 RX ADMIN — OXYCODONE AND ACETAMINOPHEN 1 TABLET: 325; 10 TABLET ORAL at 06:30

## 2025-05-07 RX ADMIN — METHOCARBAMOL 500 MG: 500 TABLET ORAL at 07:29

## 2025-05-07 RX ADMIN — DULOXETINE HYDROCHLORIDE 30 MG: 30 CAPSULE, DELAYED RELEASE ORAL at 08:51

## 2025-05-07 RX ADMIN — AMIODARONE HYDROCHLORIDE 100 MG: 200 TABLET ORAL at 08:51

## 2025-05-07 RX ADMIN — NALOXEGOL OXALATE 25 MG: 25 TABLET, FILM COATED ORAL at 06:30

## 2025-05-07 RX ADMIN — LEVOTHYROXINE SODIUM 88 MCG: 0.09 TABLET ORAL at 08:51

## 2025-05-07 RX ADMIN — DICLOFENAC SODIUM 4 G: 10 GEL TOPICAL at 08:53

## 2025-05-07 RX ADMIN — POTASSIUM CHLORIDE 20 MEQ: 1500 TABLET, EXTENDED RELEASE ORAL at 08:51

## 2025-05-07 RX ADMIN — TORSEMIDE 40 MG: 20 TABLET ORAL at 08:51

## 2025-05-07 RX ADMIN — PENICILLIN V POTASSIUM 250 MG: 500 TABLET, FILM COATED ORAL at 08:52

## 2025-05-07 RX ADMIN — METHOCARBAMOL 500 MG: 500 TABLET ORAL at 01:22

## 2025-05-07 RX ADMIN — SENNOSIDES, DOCUSATE SODIUM 1 TABLET: 50; 8.6 TABLET, FILM COATED ORAL at 08:51

## 2025-05-07 RX ADMIN — METOPROLOL SUCCINATE 25 MG: 25 TABLET, EXTENDED RELEASE ORAL at 08:51

## 2025-05-07 RX ADMIN — OXYCODONE AND ACETAMINOPHEN 1 TABLET: 325; 10 TABLET ORAL at 10:31

## 2025-05-07 ASSESSMENT — PAIN SCALES - GENERAL
PAINLEVEL_OUTOF10: 6
PAINLEVEL_OUTOF10: 2
PAINLEVEL_OUTOF10: 2
PAINLEVEL_OUTOF10: 3
PAINLEVEL_OUTOF10: 7
PAINLEVEL_OUTOF10: 2
PAINLEVEL_OUTOF10: 6
PAINLEVEL_OUTOF10: 9
PAINLEVEL_OUTOF10: 6

## 2025-05-07 ASSESSMENT — PAIN DESCRIPTION - DESCRIPTORS
DESCRIPTORS: ACHING
DESCRIPTORS: SHARP;ACHING;DISCOMFORT

## 2025-05-07 ASSESSMENT — PAIN DESCRIPTION - ORIENTATION
ORIENTATION: MID

## 2025-05-07 ASSESSMENT — PAIN - FUNCTIONAL ASSESSMENT
PAIN_FUNCTIONAL_ASSESSMENT: ACTIVITIES ARE NOT PREVENTED
PAIN_FUNCTIONAL_ASSESSMENT: PREVENTS OR INTERFERES SOME ACTIVE ACTIVITIES AND ADLS

## 2025-05-07 ASSESSMENT — PAIN DESCRIPTION - LOCATION
LOCATION: NECK
LOCATION: BACK

## 2025-05-07 ASSESSMENT — PAIN DESCRIPTION - FREQUENCY
FREQUENCY: CONTINUOUS

## 2025-05-07 ASSESSMENT — PAIN DESCRIPTION - PAIN TYPE
TYPE: ACUTE PAIN
TYPE: SURGICAL PAIN
TYPE: ACUTE PAIN

## 2025-05-07 ASSESSMENT — PAIN DESCRIPTION - ONSET
ONSET: ON-GOING
ONSET: PROGRESSIVE
ONSET: ON-GOING

## 2025-05-07 NOTE — PROGRESS NOTES
Internal Medicine Progress Note    Date: 5/1/2025   Patient: Shonda Hernandes   Hospital Day: 1      CC: Altered Mental Status (Patient was discharged from hospital 6 days ago. She started to have hallucinations and was altered upon discharge. Patient's daughter stopped pain medication to see if it was the cause, but patient did not improve )       Interval Hx   Patient was actively hallucinating with racing thoughts. Throwing things out, trying to remove cervical collar. Patient was given one dose of Lurasidone. Patient was seen again around afternoon, not hallucinating. Complaining of pain in neck. Labs remained stable. Started on PRN dilaudid. Oxycodone was discontinued.       HPI: Ms. Shonda Hernandes is a 79 y.o. female with a MHx significant for afib s/p PVI and RENETTA exclusion 2020, CAD s/p CHILANGO 5/2014, CHF, pulmonary HTN, aortic stenosis s/p AVR, COPD, Medtronic Bi-V PPM on 4/11/2022 current smoker, recent hospitalization with C2 fracture and epidural hematoma with [place] J collar for who presented to the ED for hallucinations and for taking off her [place] collar.     She states since she was discharged from here on 4/25/2025 she has been having between 2-4 hallucinations per day. They are involved with reality as they they involve people in her life or what is happening on TV. She has a code phrase with her daughter which helps pull her out of these strange hallucinations. She describes them as memories occurring during real time.      During one of these episodes, she had some pain with her [place] J collar, and she tried to loosen it. When this happened, she accidentally ripped it off and caused some neck pain. She has not motor changes, but does endorse tingling in her toes and at times her hands. Currently sensation is fully intact.     Family thought it was possibly due to polypharmacy with increased pain regimen at discharge. They stopped the gabapentin and slowly came down in dose of the opiates.       Objective 
     Internal Medicine Progress Note    Date: 5/2/2025   Patient: Shonda Hernandes   Hospital Day: 2      CC: Altered Mental Status (Patient was discharged from hospital 6 days ago. She started to have hallucinations and was altered upon discharge. Patient's daughter stopped pain medication to see if it was the cause, but patient did not improve )       Interval Hx   Patient was seen and examined at bedside. Complaining of neck pain 9/10. No overnight events. Remained vitally stable, BP mildly elevated 158/76. Labs with K 3.3, put replacement with Klor-Con 40 today and statring her home dose 20. Mg low to 1.61, replaced with 4g. Hb stable at 11.4. Patient complaining of restless leg, and it was very uncomfortable. Lyrica restarting today. Denies any chest pain, SOB, abdominal pain, fever, chills.  Not having any hallucinations.     HPI: Ms. Shonda Hernandes is a 79 y.o. female with a MHx significant for afib s/p PVI and RENETTA exclusion 2020, CAD s/p CHILANGO 5/2014, CHF, pulmonary HTN, aortic stenosis s/p AVR, COPD, Medtronic Bi-V PPM on 4/11/2022 current smoker, recent hospitalization with C2 fracture and epidural hematoma with [place] J collar for who presented to the ED for hallucinations and for taking off her [place] collar.     She states since she was discharged from here on 4/25/2025 she has been having between 2-4 hallucinations per day. They are involved with reality as they they involve people in her life or what is happening on TV. She has a code phrase with her daughter which helps pull her out of these strange hallucinations. She describes them as memories occurring during real time.      During one of these episodes, she had some pain with her [place] J collar, and she tried to loosen it. When this happened, she accidentally ripped it off and caused some neck pain. She has not motor changes, but does endorse tingling in her toes and at times her hands. Currently sensation is fully intact.     Family thought it was possibly due 
     Internal Medicine Progress Note    Date: 5/3/2025   Patient: Shonda Hernandes   Hospital Day: 3      CC: Altered Mental Status (Patient was discharged from hospital 6 days ago. She started to have hallucinations and was altered upon discharge. Patient's daughter stopped pain medication to see if it was the cause, but patient did not improve )       Interval Hx   Patient was seen at bedside in the AM. No hallucinations today. Received IV Dilaudid yesterday at 4 pm. Endorsing pain this morning, increased her MS contin from 15 mg BID to 30 mg at night and 15 mg in the AM. Since she is in a lot of pain, gave her another 15 of MS Contin. Underwent cervical spine XR. HALIE.    HPI: Ms. Shonda Hernandes is a 79 y.o. female with a MHx significant for afib s/p PVI and RENETTA exclusion 2020, CAD s/p CHILANGO 5/2014, CHF, pulmonary HTN, aortic stenosis s/p AVR, COPD, Medtronic Bi-V PPM on 4/11/2022 current smoker, recent hospitalization with C2 fracture and epidural hematoma with [place] J collar for who presented to the ED for hallucinations and for taking off her [place] collar.     She states since she was discharged from here on 4/25/2025 she has been having between 2-4 hallucinations per day. They are involved with reality as they they involve people in her life or what is happening on TV. She has a code phrase with her daughter which helps pull her out of these strange hallucinations. She describes them as memories occurring during real time.      During one of these episodes, she had some pain with her [place] J collar, and she tried to loosen it. When this happened, she accidentally ripped it off and caused some neck pain. She has not motor changes, but does endorse tingling in her toes and at times her hands. Currently sensation is fully intact.     Family thought it was possibly due to polypharmacy with increased pain regimen at discharge. They stopped the gabapentin and slowly came down in dose of the opiates.       Objective     Vital 
     Internal Medicine Progress Note    Date: 5/4/2025   Patient: Shonda Hernandes   Hospital Day: 4      CC: Altered Mental Status (Patient was discharged from hospital 6 days ago. She started to have hallucinations and was altered upon discharge. Patient's daughter stopped pain medication to see if it was the cause, but patient did not improve )       Interval Hx   Patient was seen at bedside in the AM. Did not receive any IV pain meds overnight. Was complaining of dysuria for which the nursing staff sent a urine culture. Ordered urinalysis this morning which was unremarkable. Feels that pain is much better controlled today but she is not comfortable with going home today and wants to wait until tomorrow. PT/OT consult placed Friday, hasn't been seen yet. No other AEON.     HPI: Ms. Shonda Hernandes is a 79 y.o. female with a MHx significant for afib s/p PVI and RENETTA exclusion 2020, CAD s/p CHILANGO 5/2014, CHF, pulmonary HTN, aortic stenosis s/p AVR, COPD, Medtronic Bi-V PPM on 4/11/2022 current smoker, recent hospitalization with C2 fracture and epidural hematoma with [place] J collar for who presented to the ED for hallucinations and for taking off her [place] collar.     She states since she was discharged from here on 4/25/2025 she has been having between 2-4 hallucinations per day. They are involved with reality as they they involve people in her life or what is happening on TV. She has a code phrase with her daughter which helps pull her out of these strange hallucinations. She describes them as memories occurring during real time.      During one of these episodes, she had some pain with her [place] J collar, and she tried to loosen it. When this happened, she accidentally ripped it off and caused some neck pain. She has not motor changes, but does endorse tingling in her toes and at times her hands. Currently sensation is fully intact.     Family thought it was possibly due to polypharmacy with increased pain regimen at 
4 Eyes Skin Assessment     NAME:  Shonda Hernandes  YOB: 1945  MEDICAL RECORD NUMBER:  [id number]    The patient is being assessed for  Admission    I agree that at least one RN has performed a thorough Head to Toe Skin Assessment on the patient. ALL assessment sites listed below have been assessed.      Areas assessed by both nurses:    Head, Face, Ears, Shoulders, Back, Chest, Arms, Elbows, Hands, Sacrum. Buttock, Coccyx, Ischium, Legs. Feet and Heels, and Under Medical Devices         Does the Patient have a Wound? Yes wound(s) were present on assessment. LDA wound assessment was Initiated and completed by RN     -scattered abrasions on shins and arms  -healed stage 2's, one on each buttock  -open and non-blanchable red skin under chest of cervical collar, blanchable redness behind ears and on shoulders underneath cervical collar  -blanchable redness on heels    Dipesh Prevention initiated by RN: Yes  Wound Care Orders initiated by RN: No    Pressure Injury (Stage 3,4, Unstageable, DTI, NWPT, and Complex wounds) if present, place Wound referral order by RN under : No    New Ostomies, if present place, Ostomy referral order under : No     Nurse 1 eSignature: Electronically signed by RANDY LAWSON RN on 4/30/25 at 11:14 PM EDT    **SHARE this note so that the co-signing nurse can place an eSignature**    Nurse 2 eSignature: {Esignature:[id number]}   
A&Ox4. VSS on room air. Ambulates x1 walker GB. Neck brace in place at all times. Voids well per BRP. Endorses pain to posterior neck and shoulder, medicated per MAR. Standard safety measures in place.   
AVS summary reviewed with patient and daughter. All belongings left with patient. IV removed. Scripts sent with patient. No further needs at this time.   
Occupational Therapy  Facility/Department: [facility] ORTHO/NEURO  Occupational Therapy Treatment    Name: Shonda Hernandes  : 1945  MRN: [id number]  Date of Service: 2025    Discharge Recommendations:  24 hour supervision or assist, Home with Home health OT  OT Equipment Recommendations  Equipment Needed: No       Patient Diagnosis(es): The encounter diagnosis was Delirium, drug-induced.  Past Medical History:  has a past medical history of Arthritis of shoulder region, right, Atrial fibrillation (HCC), AVM (arteriovenous malformation) of duodenum, AVM (arteriovenous malformation) of stomach, Bladder cancer (HCC), CAD (coronary artery disease), Carotid stenosis, Chronic back pain, Chronic diastolic CHF (congestive heart failure) (HCC), Chronic prescription opiate use, COPD (HCC), Diverticulosis, Encounter for imaging to screen for metal prior to magnetic resonance imaging (MRI), HTN (hypertension), Hyperlipidemia, Hypothyroidism, Ischemic stroke, Lumbar spine stenosis, Macular degeneration, Nonrheumatic mitral valve regurgitation, Obstructive sleep apnea, Osteoarthritis, Peripheral neuropathy, Pulmonary HTN (HCC), PVD (peripheral vascular disease), Syncope, and Tobacco use disorder in remission.  Past Surgical History:  has a past surgical history that includes Cystoscopy (2014); joint replacement; Appendectomy; Aortic valve replacement (6/16/15); Upper gastrointestinal endoscopy (12/15/2017); other surgical history (2018); Coronary angioplasty (); Spine surgery (N/A, 3/15/2019); Cholecystectomy, laparoscopic (N/A, 2019); back surgery (03/15/2019); Pain management procedure (Bilateral, 2021); Pain management procedure (Bilateral, 2021); Pain management procedure (Bilateral, 2021); ablation of dysrhythmic focus (2022); Pacemaker insertion (2022); and Pain management procedure (Bilateral, 2024).    Treatment Diagnosis: Decreased ADL status and functional mobility 2/2 
Patient alert and oriented x4, VSS, RA. Ambulates x 1 GB/walker, tolerates fairly well. [place] collar to worn at all times, with exception to eating/bathing. Patient endorses pain to neck/right shoulder, MAR and and non-pharm measures utilized to help comfort patient. Tolerating PO. Voiding via BRP. No BM this shift. All safety precautions in place, call light/items in reach. Will continue to monitor.   
Patient alert and oriented x4, VSS, RA. Ambulates x 1 GB/walker, tolerates well. [facility] neck brace on at all times. Voiding via BRP. Tolerating PO. Pain managed via MAR and non-pharm measures. All safety precautions in place, call light/items in reach.   
Patient alert and oriented x4, VSS, RA. Ambulates x 1 assist GB/walker, tolerates well. Neck brace to to be worn at all times. Tolerating PO. Voiding adequately via BRP. Pain managed via MAR and non-pharm measures. All safety precautions in place, call light/items in reach.   
Patient is A&Ox4. VSS on RA. Patient has endorsed pain to head managed  per MAR and non-pharm measures. Patient is tolerating PO diet. Tolerating ambulation x1 assist with gb/walker. Voiding via BRP/external catheter(at night only). Pt denies any needs at this time. [place] J collar on and aligned. Patient updated on plan of care. Fall and safety precautions in place, call light within reach. Continuing care.  
Patient is A&Ox4. VSS this shift with exception to heart rate. Patient has endorsed pain to neck and shoulders managed poorly per MAR and non-pharm measures. Patient is tolerating PO diet, encouraging oral intake. Tolerating ambulation x1 assist with walker. Voiding via bathroom privileges. Patient updated on plan of care. Fall and safety precautions in place, call light within reach.   
Patient is alert and oriented x4. VSS on RA. Endorsing pain to posterior neck and back. Pain being managed with medications per MAR. Tolerating diet appropriately. Voiding via BRP. Ambulating x1 walker and gait belt. [place] J collar worn at all times. Denies any needs at this time. Plan of care to continue.   
Physical Therapy  Facility/Department: [facility] ORTHO/NEURO  Physical Therapy Initial Assessment / Treatment    Name: Shonda Hernandes  : 1945  MRN: [id number]  Date of Service: 2025    Discharge Recommendations:  24 hour supervision or assist, Home with Home health PT   PT Equipment Recommendations  Other: gait belt issued to pt/family  - both verb good understanding of use of belt      Patient Diagnosis(es): The encounter diagnosis was Delirium, drug-induced.  Past Medical History:  has a past medical history of Arthritis of shoulder region, right, Atrial fibrillation (HCC), AVM (arteriovenous malformation) of duodenum, AVM (arteriovenous malformation) of stomach, Bladder cancer (HCC), CAD (coronary artery disease), Carotid stenosis, Chronic back pain, Chronic diastolic CHF (congestive heart failure) (HCC), Chronic prescription opiate use, COPD (HCC), Diverticulosis, Encounter for imaging to screen for metal prior to magnetic resonance imaging (MRI), HTN (hypertension), Hyperlipidemia, Hypothyroidism, Ischemic stroke, Lumbar spine stenosis, Macular degeneration, Nonrheumatic mitral valve regurgitation, Obstructive sleep apnea, Osteoarthritis, Peripheral neuropathy, Pulmonary HTN (HCC), PVD (peripheral vascular disease), Syncope, and Tobacco use disorder in remission.  Past Surgical History:  has a past surgical history that includes Cystoscopy (2014); joint replacement; Appendectomy; Aortic valve replacement (6/16/15); Upper gastrointestinal endoscopy (12/15/2017); other surgical history (2018); Coronary angioplasty (); Spine surgery (N/A, 3/15/2019); Cholecystectomy, laparoscopic (N/A, 2019); back surgery (03/15/2019); Pain management procedure (Bilateral, 2021); Pain management procedure (Bilateral, 2021); Pain management procedure (Bilateral, 2021); ablation of dysrhythmic focus (2022); Pacemaker insertion (2022); and Pain management procedure (Bilateral, 
Pt A&Ox4, VSS on RA. Pt ambulates x1 walker/GB. Pt voiding and tolerating PO fluids. Managing pain with PRN pain meds per MAR. C-collar on and in place. Fall precautions in place. Bed alarm on and in lowest position. Plan of care continues.   
Pt A&Ox4. VSS on RA. Pain managed via medication per MAR and nonpharm measures. C-collar remains on. Tolerating PO diet. Voiding well via BRP. Ambulation x1 walker GB. Denies needs at this time. Fall precautions in place, call light within reach.   
Pt arrives to MRI for MRI of cervical neck without contrast. Pt has medtronic pacemaker. Initial VS 85 HR and 156/65 BP. When pt pacemaker was placed in MRI safe mode pt started to have what appears to be runs of 4-6 beats of ectopy and then returned to SR and would then have another 4-6 beats of ectopy. HR varies from 80s-108. Pt pacemaker placed back in regular mode and continued to have ectopy but not as often. This RN spoke with 55 RN to relay info. EKG was done earlier today that showed atrial sensed and ventricular paced at 86 but no ectopy.   
Pt. Oriented x4. VSS on RA. Pt. Managing pain per MAR. Voiding well via BRP. All fall precautions in place and call light is within reach.   
Shift assessment done. All night time medications given per MAR. Patient took all her oral medications whole with water, tolerated well. [place] J Collar in place. All fall precautions implemented. All needs attended. Electronically signed by Mya Jackson RN on 5/2/2025 at 12:53 AM   
VSS on RA. Pt A+O x4. PT voiding via purewick, pt has not ambulated yet this shift. Pt endorses pain to neck and R shoulder, pain controlled per MAR. Pt tolerating PO diet/fluids. All fall precautions in place, call light within reach.    
VSS. MJ collar on and aligned. Pain reduced from 8/10 to 5/10 after interventions. Pain medicated per MAR with PO RTC. Voids WNL. Tolerates diet now that pain is better controlled. Pt c/o burning with urination. Urine culture sent. Resident perfect served to see if additional urine groups are needed. Pt up x1 walker Gb with MJ collar and tolerates well. Bed alarm set. Call light in reach. Will continue to monitor.    
While this RN was transporting pt back to 5506 after canceled MRI due to MRI safe pacemaker mode issues Tim Muñoz, stopped this RN and pt in hallway to discuss ectopy seen in MRI after placing pts pacer in MRI safe mode. This RN does not feel comfortable monitoring pt while pt is having 4-6 beat runs of ectopy off and on throughout test as well as pt being admitted for delirium.     Per Tim Muñoz request this RN took pt back to MRI with Tim Muñoz  and had pt placed in MRI safe mode again to have Tim Muñoz see rhythm. Pt again, showed changed rhythm while in MRI safe mode. Ectopy runs of 4-6 beats again showing off and on. Tim [place], Tim Muñoz  confirmed that it is not safe to continue with exam. Tim Muñoz asked staff to place pt's pacemaker back in regular mode which was done and pt no longer had ectopy.     This RN took pt back up to 5506 again and Tim Muñoz called this RN after speaking with a cardiologist regarding the rhythm. Tim Muñoz states that cardiologist thinks rhythm is a narrow complex tachycardia. Tim Muñoz will reassess need for MRI of cervical neck.  
chronic small vessel ischemic changes in the white matter.      CT CERVICAL SPINE WO CONTRAST  Result Date: 4/30/2025  1.  Comminuted C2 fracture as described above with minimally increased displacement of fracture planes at the left lamina of C2 and rightward posterior aspect of the C2 vertebral body compared to CT from 4/19/2025.  2.  Mild persistent central canal stenosis at the C2 level without evidence for worsening compressive abnormality/epidural hematoma, although this would be better evaluated with MRI.  3.  No new fracture. Multilevel cervical spondylosis without significant change.     CTA HEAD NECK W CONTRAST  Result Date: 5/1/2025  1. No vertebral artery dissection.  2. More focal dorsal epidural hematoma at C1-C2 contributing to moderate spinal stenosis with possible mild cord compression. Overall, given limitations of CT and differences in technique, mild progression of epidural hematoma compared to April 20, 2025.     Labs:  Recent Labs     05/02/25  0410   WBC 8.0   HGB 11.4*   HCT 33.6*          Recent Labs     05/02/25  0410      K 3.3*      CO2 21   BUN 10   CREATININE 0.7   GLUCOSE 84   CALCIUM 8.6   MG 1.61*       No results for input(s): \"PROTIME\", \"INR\", \"APTT\" in the last 72 hours.    Patient Active Problem List    Diagnosis Date Noted    Sinus bradycardia     Acute metabolic encephalopathy 08/11/2016    Left hand weakness 06/17/2015    S/P AVR (aortic valve replacement)     Acute on chronic heart failure with preserved ejection fraction (HFpEF) ([facility])     Pulmonary HTN ([facility])     Adjustment reaction with anxiety 05/01/2014    Intermittent claudication 04/30/2014    Stenosis of right carotid artery     Pulmonary emphysema ([facility]) 04/28/2014    Current smoker 04/28/2014    Closed sacral fracture ([facility]) 04/28/2014    Hyperlipidemia LDL goal <70     Opiate withdrawal ([facility]) 01/11/2023    TIA (transient ischemic attack) 11/13/2022    Syncope and collapse 11/12/2022    Hypokalemia 
  Minimally displaced type II odontoid fracture. Mild prevertebral soft tissue   swelling at C1-C2.      Osteopenia.      Moderate multilevel degenerative disc disease.            Electronically signed by Hnay Jaffe      CTA HEAD NECK W CONTRAST   Final Result      1. No vertebral artery dissection.   2. More focal dorsal epidural hematoma at C1-C2 contributing to moderate spinal stenosis with possible mild cord compression. Overall, given limitations of CT and differences in technique, mild progression of epidural hematoma compared to April 20, 2025.      Electronically signed by Paul Colunga MD      CT CERVICAL SPINE WO CONTRAST   Final Result      1.  Comminuted C2 fracture as described above with minimally increased displacement of fracture planes at the left lamina of C2 and rightward posterior aspect of the C2 vertebral body compared to CT from 4/19/2025.   2.  Mild persistent central canal stenosis at the C2 level without evidence for worsening compressive abnormality/epidural hematoma, although this would be better evaluated with MRI.   3.  No new fracture. Multilevel cervical spondylosis without significant change.      Electronically signed by Oc Tavarez      XR CHEST (2 VW)   Final Result   1. No airspace disease.      Electronically signed by Mike Christine      CT Head W/O Contrast   Final Result      1. No evidence of recent intracranial hemorrhage.   2. Parenchymal volume loss and chronic small vessel ischemic changes in the   white matter.       Electronically signed by Mike Christine            Assessment & Plan   Hallucinations 2/2 polypharmacy  Recently discharged on higher dose MS Contin, perocet, new gabapentin, naloxegol. Hallucinations may have slightly improved with stopping gabapentin and lower dose of opiates to less frequent.  -CT head negative  - Discontinue gabapentin.   -Will consider metabolic causes of AMS and workup with labs (tsh, b12, etc)  - PRN Percocet, decreased to 
Contrast   Final Result      1. No evidence of recent intracranial hemorrhage.   2. Parenchymal volume loss and chronic small vessel ischemic changes in the   white matter.       Electronically signed by Mike Christine            Assessment & Plan   Hallucinations 2/2 polypharmacy (improving)  Recently discharged on higher dose MS Contin, perocet, new gabapentin, naloxegol. Hallucinations may have slightly improved with stopping gabapentin and lower dose of opiates to less frequent.  -CT head negative  - Discontinue gabapentin.   - PRN Percocet, decreased to 7.5-325 mg as patient is hallucinating with higher dose.  - Multimodal pain control with lidocaine patch, Voltaren gel, steroids.   - Spot dose Lurasidone for hallucinations.  - MS Contin d/c 5/7, as pain well controlled with percocet and pt w/o hallucinations     Recent C2 Fracture and [facility]ural [facility]ematoma [facility]   -currently in [facility]  -Consult Neurosurgery who placed at first, see if need for new imaging or different brace  - Unable to get MRI cervical and MRA neck to evaluate for any changes in ligamentous injury, hemorrhage and/or vascular injury seen on previous MRI 2/2 heart rhythm issue in pacemaker's Safe Mode. CTA head and neck with more focal dorsal epidural hematoma at C1-C2 contributing to moderate spinal stenosis with possible mild cord compression.  No plans for any surgical intervention as per NSGY.  - Hold on aspirin        CHRONIC MEDICAL CONDITIONS:     COPD  Not in acute exacerbation   - continue home inhalers     Afib rate controlled  not on AC due to GIB s/p RENETTA exclusion  Toprol 50mg daily for Rate control   Amiodarone 100mg daily      HFpEF   CAMERON 4/11/2025 EF 55-60% Moderate mitral regurgitation. S/p Biv PPM 2022.   - Valsartan 40mg  - Torsemide 40mg daily   - metoprolol 25 mg daily f  - metolazone as needed   - jardiance held      Chronic back pain  - Cont pain regimin at old lower dose  - Lidocaine patch     Recurrent lower 
Training;Orientation  Education Method: Demonstration;Verbal  Barriers to Learning: None  Education Outcome: Verbalized understanding                     G-Code     OutComes Score                                                  AM-PAC - ADL  AM-PAC Daily Activity - Inpatient   How much help is needed for putting on and taking off regular lower body clothing?: A Little  How much help is needed for bathing (which includes washing, rinsing, drying)?: A Little  How much help is needed for toileting (which includes using toilet, bedpan, or urinal)?: A Little  How much help is needed for putting on and taking off regular upper body clothing?: A Little  How much help is needed for taking care of personal grooming?: A Little  How much help for eating meals?: None  AM-PAC Inpatient Daily Activity Raw Score: 19  AM-PAC Inpatient ADL T-Scale Score : 40.22  ADL Inpatient CMS 0-100% Score: 42.8  ADL Inpatient CMS G-Code Modifier : CK    Tinneti Score       Goals  Short Term Goals  Time Frame for Short Term Goals: By D/C  Short Term Goal 1: Pt will complete grooming in stance I'ly  Short Term Goal 2: Pt will verbalize spinal precautions and miami J precautions I'ly  Short Term Goal 3: Pt will complete toilet transfer I'ly  Patient Goals   Patient goals : no more pain      Therapy Time   Individual Concurrent Group Co-treatment   Time In 1122         Time Out 1200         Minutes 38         Timed Code Treatment Minutes: 23 Minutes       Ananya Porter OT

## 2025-05-07 NOTE — PLAN OF CARE
Problem: Pain  Goal: Verbalizes/displays adequate comfort level or baseline comfort level  Outcome: Adequate for Discharge  Note: Pt encouraged to monitor pain and request assistance. Appropriate pain scale is being used. Absence of hallucinations related to pain medication present.     Problem: Safety - Adult  Goal: Free from fall injury  Outcome: Adequate for Discharge  Note: Pt free of fall injury this shift. All proper safety precautions are in place. Call light is within reach. Bed alarm is on.     Problem: Skin/Tissue Integrity  Goal: Skin integrity remains intact  Description: 1.  Monitor for areas of redness and/or skin breakdown2.  Assess vascular access sites hourly3.  Every 4-6 hours minimum:  Change oxygen saturation probe site4.  Every 4-6 hours:  If on nasal continuous positive airway pressure, respiratory therapy assess nares and determine need for appliance change or resting period  Outcome: Adequate for Discharge  Note: Skin assessed frequently. Frequent turning and offloading utilized.

## 2025-05-07 NOTE — CARE COORDINATION
Case Management Assessment            Discharge Note                    Date / Time of Note: 5/7/2025 8:53 AM                  Discharge Note Completed by: DANILO Prado    Patient Name: Shonda Hernandes   YOB: 1945  Diagnosis: Hallucinations [R44.3]  Delirium, drug-induced [R41.0, T50.905A]  Acute encephalopathy [G93.40]   Date / Time: 4/30/2025  1:41 PM    Current PCP: Adeel Nguyễn MD  Clinic patient: No    Hospitalization in the last 30 days: Yes  Readmission Assessment  Number of Days since last admission?: 1-7 days  Previous Disposition: Home with Home Health  Who is being Interviewed: Patient, Caregiver  What was the patient's/caregiver's perception as to why they think they needed to return back to the hospital?: Other (Comment) (new onset confusion/hallucinations)  Did you visit your Primary Care Physician after you left the hospital, before you returned this time?: No  Why weren't you able to visit your PCP?: Did not have an appointment  Did you see a specialist, such as Cardiac, Pulmonary, Orthopedic Physician, etc. after you left the hospital?: No  Who advised the patient to return to the hospital?: Caregiver  Does the patient report anything that got in the way of taking their medications?: No  In our efforts to provide the best possible care to you and others like you, can you think of anything that we could have done to help you after you left the hospital the first time, so that you might not have needed to return so soon?: Other (Comment) (family plans for return home with daughter and HHC through [facility])    Advance Directives:  Code Status: Full Code  [place] DNR form completed and on chart: Not Indicated    Financial:  Payor: HUMANA MEDICARE / Plan: HUMANA GOLD PLUS HMO / Product Type: *No Product type* /      Pharmacy:    [place] [address] [address] [phone] - F [phone]  [id number] 
CM Following: CM met with pt's daughter, Tricia, and Granddaughter, Amanda at bedside. Plan is for pt to return to daughter Merry's house in [place], [place] at discharge with ROS through [facility] (PT & OT). No other CM needs anticipated for discharge at this time. CM will continue to follow for discharge planning.  Electronically signed by DANILO Prado on 5/2/2025 at 4:26 PM  [phone]  
CM following: CM met with pt at bedside, no family at bedside. Pt reports that she had a difficult morning with pain management, but is feeling much better this afternoon. Pt is in agreement with returning to her daughter's home at discharge with PT/OT services through [facility]. CM will continue to follow for discharge planning.  Electronically signed by DANILO Prado on 5/5/2025 at 3:04 PM  [phone]  
CM following: Pt is from home with daughter and will discharge to Merry sanders's home in [place], [place]. Daughter works from home and RADHA is home full time. Pt was active with [facility] for PT & OT and will resume services at discharge. No additional  HHC, DME or CM needs anticipated at discharge. CM will continue to follow for discharge planning.  Electronically signed by DANILO Prado on 5/6/2025 at 3:08 PM  [phone]  
/24    Family can provide assistance at DC: Yes  Would you like Case Management to discuss the discharge plan with any other family members/significant others, and if so, who? Yes  Plans to Return to Present Housing: Unknown at present  Other Identified Issues/Barriers to RETURNING to current housing: tbd  Potential Assistance needed at discharge: Home Care            Potential DME:    Patient expects to discharge to: House  Plan for transportation at discharge: Family    Financial    Payor: HUMANA MEDICARE / Plan: HUMANA GOLD PLUS HMO / Product Type: *No Product type* /     Does insurance require precert for SNF: Yes    Potential assistance Purchasing Medications: No  Meds-to-Beds request:        [facility] [address] [facility] -  [phone] - F [phone]  [address] [place] [address]  Phone: [phone] Fax: [phone]    [facility] [address] - P [phone] - F [phone]  [address]  [facility] [address]  Phone: [phone] Fax: [phone]      Notes:    Factors facilitating achievement of predicted outcomes: Family support, Motivated, Cooperative, and Pleasant    Barriers to discharge: nrsgy following, CTA pending, PT/OT evals pending    Additional Case Management Notes: CM met with pt and pt's daughter, Merry at bedside. Pt recently discharged to Merry's home with [facility] services through [facility]. Merry confirms that services were started with [facility] and they would like them to continue at discharge (PT & OT). Pt has a walker, cane and shower chair at home currently. Pt spends time living at home with either of her daughters listed in the medical record and is currently at home with Merry in [place], [place]. CM will continue to follow for discharge planning.    The Plan for Transition of Care is related to the following treatment goals of Hallucinations

## 2025-05-08 ENCOUNTER — TELEPHONE (OUTPATIENT)
Dept: CARDIOLOGY CLINIC | Age: 80
End: 2025-05-08

## 2025-05-08 ENCOUNTER — CARE COORDINATION (OUTPATIENT)
Dept: CASE MANAGEMENT | Age: 80
End: 2025-05-08

## 2025-05-08 DIAGNOSIS — R44.3 HALLUCINATIONS: Primary | ICD-10-CM

## 2025-05-08 PROCEDURE — 1111F DSCHRG MED/CURRENT MED MERGE: CPT | Performed by: STUDENT IN AN ORGANIZED HEALTH CARE EDUCATION/TRAINING PROGRAM

## 2025-05-08 NOTE — DISCHARGE SUMMARY
INTERNAL MEDICINE DEPARTMENT AT THE [facility]  DISCHARGE SUMMARY    Patient ID: Shonda Hernandes                                             Discharge Date: 5/8/2025   Patient's PCP: Adeel Nguyễn MD                                          Discharge Physician: Millicent Thompson MD  Admit Date: 4/30/2025   Admitting Physician: Jay Ricks MD    PROBLEMS DURING HOSPITALIZATION:  Present on Admission:   Acute encephalopathy   Hallucinations   Closed anterior displaced type II dens fracture (HCC)      HPI:  Ms. Shonda Hernandes is a 79 y.o. female with a MHx significant for afib s/p PVI and RENETTA exclusion 2020, CAD s/p CHILANGO 5/2014, CHF, pulmonary HTN, aortic stenosis s/p AVR, COPD, [employer] Bi-V PPM on 4/11/2022 current smoker, recent hospitalization with C2 fracture and epidural hematoma with Imperial J collar for who presented to the ED for hallucinations and for taking off her MiamiJ collar.     She states since she was discharged from here on 4/25/2025 she has been having between 2-4 hallucinations per day. They are involved with reality as they they involve people in her life or what is happening on TV. She has a code phrase with her daughter which helps pull her out of these strange hallucinations. She describes them as memories occurring during real time.      During one of these episodes, she had some pain with her Imperial J collar, and she tried to loosen it. When this happened, she accidentally ripped it off and caused some neck pain. She has not motor changes, but does endorse tingling in her toes and at times her hands. Currently sensation is fully intact.     Family thought it was possibly due to polypharmacy with increased pain regimen at discharge. They stopped the gabapentin and slowly came down in dose of the opiates.       The following issues were addressed during hospitalization:    Hallucinations 2/2 polypharmacy  Recently discharged on higher dose MS Contin, perocet, new gabapentin, naloxegol. Hallucinations

## 2025-05-08 NOTE — CARE COORDINATION
Care Transitions Note    Initial Call - Call within 2 business days of discharge: Yes    Patient Current Location:  Home: [address]  [place] [address]    Care Transition Nurse contacted the family, daughter Alonzo  by telephone to perform post hospital discharge assessment, verified name and  as identifiers.  Provided introduction to self, and explanation of the Care Transition Nurse role.    Patient: Shonda Hernandes    Patient : 1945   MRN: [id number]    Reason for Admission: Hallucinations, Cervical Fracture C2, Epidural Hematoma  Discharge Date: 25   RURS: Readmission Risk Score: 20.7      Last Discharge Facility       Date Complaint Diagnosis Description Type Department Provider    25 Altered Mental Status Delirium, drug-induced ... ED to Hosp-Admission (Discharged) (ADMITTED) [facility] Jay Ricks MD; Kenny Dumont...            Was this an external facility discharge? No    Additional needs identified to be addressed with provider   No needs identified             Method of communication with provider: none.    Patients top risk factors for readmission: medical condition-Hallucinations, Cervical Fracture C2, Epidural Hematoma    Interventions to address risk factors:   Education: daughter instructed to continue to monitor patient for any worsening pain, any returning confusion, hallucinations, forgetfulness, reporting to MD immediately.   Review of patient management of conditions/medications: make sure to rest as needed, taking all medications as prescribed.  Home Health: CTN confirmed with [facility], referral for [facility] services was received.      Care Summary Note: CTN spoke with patients daughter this afternoon for initial 24 hour discharge follow up CTN call.  Daughter states patient is doing better, still slightly confused or forgetful in the am, upon waking, but none through out the day.  No reports of any fever chills, nausea, vomiting, chest pain, SOB or cough.

## 2025-05-08 NOTE — TELEPHONE ENCOUNTER
Worsening SOB above baseline? No     Pt fell and broke C2 on 4/19 and just came home from the hospital..  Only symptom was HR was elevated and would not do MRI.   Hospital stopped Farxiga. Has not resumed is wanting to know if she should start again. Stopped torsemide for a few days but just resumed. Was not taking aspirin but was told to resume tomorrow.     Chest pain/pressure? NO       Weight gain?:None       Edema/ swelling worse than their baseline? No     Is on Oxycodone 10/325 and a Robaxin are the only two new medications       Lightheaded/syncope?  A little     Follows 2gm Na Diet? Yes         Follows Fluid Restriction 64 oz? Yes    Patient denies any other changes.  Please advise on Farxiga and any other recommendations

## 2025-05-08 NOTE — TELEPHONE ENCOUNTER
Please review Murj for: \" Possible OptiVol fluid accumulation: 19-Mar-2025 -- ongoing, elevated up to > 200 w correlating TI trend below reference line. Triage™ Heart Failure Risk Status on 07-May-2025 is High*.\"

## 2025-05-12 LAB
BACTERIA FLD AEROBE CULT: NORMAL
GRAM STN SPEC: NORMAL

## 2025-05-15 ENCOUNTER — CARE COORDINATION (OUTPATIENT)
Dept: CASE MANAGEMENT | Age: 80
End: 2025-05-15

## 2025-05-15 NOTE — CARE COORDINATION
and Final Calls  Do you have any ongoing symptoms?: No  Have your medications changed?: No  Do you have any questions related to your medications?: No  Do you currently have any active services?: Yes  Are you currently active with any services?: Home Health  Do you have any needs or concerns that I can assist you with?: No  Identified Barriers: None  Care Transitions Interventions  No Identified Needs  Other Interventions:              Follow Up Appointment:   Reviewed upcoming appointment(s). and DOREEN appointment attended as scheduled   Future Appointments         Provider Specialty Dept Phone    6/26/2025 1:00 PM Adeel Nguyễn MD Internal Medicine [phone]    8/26/2025 3:45 PM Pablo Wang MD Cardiology [phone]    10/1/2025 1:15 PM Dominic Mcbride MD Cardiology [phone]    11/13/2025 1:00 PM (Arrive by 12:45 PM) [facility] 2 Cardiology [phone]    11/13/2025 2:15 PM Russell Mckenzie MD Cardiology [phone]            Care Transition Nurse provided contact information.  Plan for follow-up call in 6-10 days based on severity of symptoms and risk factors.  Plan for next call: symptom management--  self management--      Thank You,    Johana Licea RN  Care Transition Coordinator  Contact Number:[phone]

## 2025-05-21 NOTE — TELEPHONE ENCOUNTER
Device interrogation suggests fluid overload. Advise to take 4 tabs daily X4 days then resume 2 tabs daily

## 2025-05-21 NOTE — TELEPHONE ENCOUNTER
Please review Murj for most recent Optivol reading: \"Possible OptiVol fluid accumulation: 19-Mar-2025 -- ongoing, elevated up to > 200 w correlating TI trend below reference line. Triage™ Heart Failure Risk Status on 21-May-2025 is High*.\"

## 2025-05-23 ENCOUNTER — CARE COORDINATION (OUTPATIENT)
Dept: CASE MANAGEMENT | Age: 80
End: 2025-05-23

## 2025-05-23 NOTE — CARE COORDINATION
Care Transitions Note    Follow Up Call     Patient Current Location:  Home: [address]  [place] [address]    Care Transition Nurse contacted the daughter Merry by telephone. Verified name and  as identifiers.    Additional needs identified to be addressed with provider   No needs identified                 Method of communication with provider: none.    Care Summary Note: CTN spoke with patients daughter Merry this afternoon for follow up CTN call.  Daughter states patient is doing well, states her confusion and forgetfulness is much better, pain still present, taking PRN's as instructed.  No reports of any fever chills, nausea, vomiting, chest pain, SOB or cough.   Patient with no congestion, difficulty emptying bladder, LE edema, feeling lightheaded, dizziness, and heart palpitations.  PT with [facility] will be in home later today, SN was in home earlier today.  No issues or concerns, no new or changed medications at this time.    Plan of care updates since last contact:  Education: daughter instructed to continue to monitor patient for any returning confusion, or forgetfulness, any falls, any worsening pain, reporting to MD immediately.  Review of patient management of conditions/medications: make sure patient is resting as needed, taking all medications as prescribed.        Advance Care Planning:   Does patient have an Advance Directive: reviewed during previous call, see note.  and   Primary Decision Maker: Tricia Hoover - Child - [phone]    Secondary Decision Maker: Merry Rios - Child - [phone]    Supplemental (Other) Decision Maker: Bety Blackman - Child - [phone] .    Medication Review:  No changes since last call.     Assessments:  Care Transitions Subsequent and Final Call    Schedule Follow Up Appointment with PCP: Completed  Subsequent and Final Calls  Do you have any ongoing symptoms?: No  Have your medications changed?: No  Do you have any questions

## 2025-05-28 ENCOUNTER — CARE COORDINATION (OUTPATIENT)
Dept: CASE MANAGEMENT | Age: 80
End: 2025-05-28

## 2025-05-28 NOTE — CARE COORDINATION
Yes  Are you currently active with any services?: Home Health  Do you have any needs or concerns that I can assist you with?: No  Identified Barriers: None  Care Transitions Interventions  No Identified Needs  Other Interventions:              Follow Up Appointment:   Reviewed upcoming appointment(s). and DOREEN appointment attended as scheduled   Future Appointments         Provider Specialty Dept Phone    6/26/2025 1:00 PM Adeel Nguyễn MD Internal Medicine [phone]    8/26/2025 3:45 PM Pablo Wang MD Cardiology [phone]    10/1/2025 1:15 PM Dominic Mcbride MD Cardiology [phone]    11/13/2025 1:00 PM (Arrive by 12:45 PM) [facility] Cardiology [phone]    11/13/2025 2:15 PM Russell Mckenzie MD Cardiology [phone]            Care Transition Nurse provided contact information.  Plan for follow-up call in 6-10 days based on severity of symptoms and risk factors.  Plan for next call: symptom management--  self management--      Thank You,    Johana Licea RN  Care Transition Coordinator  Contact Number:[phone]

## 2025-06-05 ENCOUNTER — CARE COORDINATION (OUTPATIENT)
Dept: CASE MANAGEMENT | Age: 80
End: 2025-06-05

## 2025-06-05 DIAGNOSIS — I50.33 ACUTE ON CHRONIC HEART FAILURE WITH PRESERVED EJECTION FRACTION (HFPEF) (HCC): ICD-10-CM

## 2025-06-05 RX ORDER — DAPAGLIFLOZIN 10 MG/1
10 TABLET, FILM COATED ORAL EVERY MORNING
Qty: 90 TABLET | Refills: 3 | Status: SHIPPED | OUTPATIENT
Start: 2025-06-05

## 2025-06-05 NOTE — TELEPHONE ENCOUNTER
Shonda called in requesting Farxiga, I dont see that on her list of medications,she would like it to go to [facility].      She can be reached at [phone].

## 2025-06-05 NOTE — CARE COORDINATION
Care Transitions Note    Final Call     Patient Current Location:  [place]    Care Transition Nurse contacted the family, daughter and son in law  by telephone. Verified name and  as identifiers.    Patient graduated from the Care Transitions program on 2025.  Patient/family progressing towards self-management. .      Advance Care Planning:   Does patient have an Advance Directive: reviewed during previous call, see note.  and   Primary Decision Maker: Tricia Hoover - Child - [phone]    Secondary Decision Maker: Merry Rios - Child - [phone]    Supplemental (Other) Decision Maker: Bety Blackman - Child - [phone] .    Handoff:   Patient was not referred to the ACM team due to no additional needs identified.       Care Summary Note: CTN spoke with patients daughter and son in law this afternoon for follow up CTN call.  Son in law and daughter states patient is having lot's of pain still in back and neck.  Son in law states patients PRN's for pain were recently decreased to Q6H, from Q4H.  Patient not able to go the 6 hours before pain is severe again, this plan just started 2 days ago, though.  Patient is declining to take Robaxin as she is worried that she will get confused and forgetful again, as this is what sent her back to the ER.  Was noted to likely be because of the PRN's she was taking at that time.  All of those were stopped, but patient is just very worried about adding other medications.  She is not having any fever chills, nausea, vomiting, chest pain, SOB or cough.   Patient with no congestion, difficulty emptying bladder, LE edema, feeling lightheaded, dizziness, and heart palpitations.  PT and OT with [facility] have signed off, SN will come out once more before signing off as well.  No other issues or concerns at this time.   Son in law instructed to try and get patient to try taking Robaxin in between to see if this helps with breakthrough pain, also instructed to

## 2025-06-05 NOTE — TELEPHONE ENCOUNTER
Last OV: 3/27/25  Next OV: 8/26/25  Last refill: 1/27/25 #30 3 R/F  Most recent Labs: 5/7/25  Last EKG (if needed): 5/1/25

## 2025-06-13 ENCOUNTER — HOSPITAL ENCOUNTER (OUTPATIENT)
Dept: GENERAL RADIOLOGY | Age: 80
Discharge: HOME | End: 2025-06-13
Attending: PHYSICAL MEDICINE & REHABILITATION
Payer: MEDICARE

## 2025-06-13 ENCOUNTER — HOSPITAL ENCOUNTER (OUTPATIENT)
Age: 80
Discharge: HOME | End: 2025-06-13
Payer: MEDICARE

## 2025-06-13 PROCEDURE — 72040 X-RAY EXAM NECK SPINE 2-3 VW: CPT

## 2025-07-08 ENCOUNTER — TRANSCRIBE ORDERS (OUTPATIENT)
Dept: ADMINISTRATIVE | Age: 80
End: 2025-07-08

## 2025-07-08 DIAGNOSIS — S02.91XS: Primary | ICD-10-CM

## 2025-07-08 DIAGNOSIS — S12.9XXS: Primary | ICD-10-CM

## 2025-07-14 NOTE — CARE COORDINATION
[facility] Transitions Follow Up Call    2021    Patient: Velia Saini  Patient : 1945   MRN: [id number]  Reason for Admission: Afib  Discharge Date: 21 RARS: Readmission Risk Score: 18.9 ( )         Spoke with: [address] Transitions Subsequent and Final Call    Subsequent and Final Calls  Do you have any ongoing symptoms?: No  Have your medications changed?: No  Do you have any questions related to your medications?: No  Do you currently have any active services?: No  Are you currently active with any services?: Home Health  Do you have any needs or concerns that I can assist you with?: No  Identified Barriers: None  Care Transitions Interventions   Home Care Waiver: Completed     Other Interventions: Follow Up  Future Appointments   Date Time Provider Vickie Wilson   2022  4:00 PM Jarrett Michelle MD [facility]   2022  3:20 PM Stephon Perry MD [facility]   2022  3:00 PM Brittney Vazquez MD [facility]   2/15/2022  3:30 PM Mary Lou Rodriguez MD [facility]       Pt reports to be doing well today. Denies any cp, sob or heart palpitations. States she did gain 2 lbs from eating an extra piece of toast but she is watching her weight daily. She did not give any value at this time. Pt had a follow up with dr Bessie Minaya and dr Coral Vail. No medication changes and both were pleased at this time. Pt denied needing HC and dr Coral Vail agreed according to pt. No needs at this time. Will continue to follow.     Chaya Mcqueen, BSN, RN   Care Transition Nurse  Mobile: [phone]
Yes

## 2025-07-22 ENCOUNTER — HOSPITAL ENCOUNTER (OUTPATIENT)
Dept: CT IMAGING | Age: 80
Discharge: HOME | End: 2025-07-22
Payer: MEDICARE

## 2025-07-22 DIAGNOSIS — S02.91XS: ICD-10-CM

## 2025-07-22 DIAGNOSIS — S12.9XXS: ICD-10-CM

## 2025-07-22 PROCEDURE — 72125 CT NECK SPINE W/O DYE: CPT

## 2025-08-14 ENCOUNTER — OFFICE VISIT (OUTPATIENT)
Dept: ORTHOPEDIC SURGERY | Age: 80
End: 2025-08-14
Payer: MEDICARE

## 2025-08-14 VITALS — WEIGHT: 122 LBS | HEIGHT: 60 IN | BODY MASS INDEX: 23.95 KG/M2 | RESPIRATION RATE: 16 BRPM

## 2025-08-14 DIAGNOSIS — M87.021 AVASCULAR NECROSIS OF RIGHT HUMERAL HEAD (HCC): Primary | ICD-10-CM

## 2025-08-14 DIAGNOSIS — M75.121 NONTRAUMATIC COMPLETE TEAR OF RIGHT ROTATOR CUFF: ICD-10-CM

## 2025-08-14 DIAGNOSIS — R29.6 RECURRENT FALLS: ICD-10-CM

## 2025-08-14 DIAGNOSIS — M40.202 KYPHOSIS OF CERVICAL REGION, UNSPECIFIED KYPHOSIS TYPE: ICD-10-CM

## 2025-08-14 DIAGNOSIS — F11.90 CHRONIC, CONTINUOUS USE OF OPIOIDS: ICD-10-CM

## 2025-08-14 DIAGNOSIS — Z72.0 TOBACCO ABUSE: ICD-10-CM

## 2025-08-14 PROCEDURE — 99215 OFFICE O/P EST HI 40 MIN: CPT | Performed by: ORTHOPAEDIC SURGERY

## 2025-08-14 PROCEDURE — G8399 PT W/DXA RESULTS DOCUMENT: HCPCS | Performed by: ORTHOPAEDIC SURGERY

## 2025-08-14 PROCEDURE — 1125F AMNT PAIN NOTED PAIN PRSNT: CPT | Performed by: ORTHOPAEDIC SURGERY

## 2025-08-14 PROCEDURE — 1159F MED LIST DOCD IN RCRD: CPT | Performed by: ORTHOPAEDIC SURGERY

## 2025-08-14 PROCEDURE — 1090F PRES/ABSN URINE INCON ASSESS: CPT | Performed by: ORTHOPAEDIC SURGERY

## 2025-08-14 PROCEDURE — 1123F ACP DISCUSS/DSCN MKR DOCD: CPT | Performed by: ORTHOPAEDIC SURGERY

## 2025-08-14 PROCEDURE — G8420 CALC BMI NORM PARAMETERS: HCPCS | Performed by: ORTHOPAEDIC SURGERY

## 2025-08-14 PROCEDURE — G8427 DOCREV CUR MEDS BY ELIG CLIN: HCPCS | Performed by: ORTHOPAEDIC SURGERY

## 2025-08-14 PROCEDURE — 4004F PT TOBACCO SCREEN RCVD TLK: CPT | Performed by: ORTHOPAEDIC SURGERY

## 2025-08-26 ENCOUNTER — OFFICE VISIT (OUTPATIENT)
Dept: CARDIOLOGY CLINIC | Age: 80
End: 2025-08-26
Payer: MEDICARE

## 2025-08-26 VITALS
HEIGHT: 60 IN | RESPIRATION RATE: 15 BRPM | WEIGHT: 125 LBS | HEART RATE: 85 BPM | SYSTOLIC BLOOD PRESSURE: 126 MMHG | DIASTOLIC BLOOD PRESSURE: 64 MMHG | BODY MASS INDEX: 24.54 KG/M2 | OXYGEN SATURATION: 93 %

## 2025-08-26 DIAGNOSIS — I65.21 CAROTID STENOSIS, RIGHT: ICD-10-CM

## 2025-08-26 DIAGNOSIS — I50.42 CHRONIC COMBINED SYSTOLIC AND DIASTOLIC HEART FAILURE (HCC): ICD-10-CM

## 2025-08-26 DIAGNOSIS — I48.0 PAF (PAROXYSMAL ATRIAL FIBRILLATION) (HCC): ICD-10-CM

## 2025-08-26 DIAGNOSIS — I25.10 CORONARY ARTERY DISEASE INVOLVING NATIVE CORONARY ARTERY OF NATIVE HEART WITHOUT ANGINA PECTORIS: Primary | ICD-10-CM

## 2025-08-26 DIAGNOSIS — E78.2 MIXED HYPERLIPIDEMIA: ICD-10-CM

## 2025-08-26 DIAGNOSIS — I73.9 PAD (PERIPHERAL ARTERY DISEASE): ICD-10-CM

## 2025-08-26 DIAGNOSIS — Z95.2 S/P AVR (AORTIC VALVE REPLACEMENT): ICD-10-CM

## 2025-08-26 PROCEDURE — 93000 ELECTROCARDIOGRAM COMPLETE: CPT | Performed by: INTERNAL MEDICINE

## 2025-08-26 PROCEDURE — 3074F SYST BP LT 130 MM HG: CPT | Performed by: INTERNAL MEDICINE

## 2025-08-26 PROCEDURE — 1159F MED LIST DOCD IN RCRD: CPT | Performed by: INTERNAL MEDICINE

## 2025-08-26 PROCEDURE — 1123F ACP DISCUSS/DSCN MKR DOCD: CPT | Performed by: INTERNAL MEDICINE

## 2025-08-26 PROCEDURE — 1090F PRES/ABSN URINE INCON ASSESS: CPT | Performed by: INTERNAL MEDICINE

## 2025-08-26 PROCEDURE — G2211 COMPLEX E/M VISIT ADD ON: HCPCS | Performed by: INTERNAL MEDICINE

## 2025-08-26 PROCEDURE — 99214 OFFICE O/P EST MOD 30 MIN: CPT | Performed by: INTERNAL MEDICINE

## 2025-08-26 PROCEDURE — G8420 CALC BMI NORM PARAMETERS: HCPCS | Performed by: INTERNAL MEDICINE

## 2025-08-26 PROCEDURE — G8427 DOCREV CUR MEDS BY ELIG CLIN: HCPCS | Performed by: INTERNAL MEDICINE

## 2025-08-26 PROCEDURE — 3078F DIAST BP <80 MM HG: CPT | Performed by: INTERNAL MEDICINE

## 2025-08-26 PROCEDURE — 4004F PT TOBACCO SCREEN RCVD TLK: CPT | Performed by: INTERNAL MEDICINE

## 2025-08-26 PROCEDURE — G8399 PT W/DXA RESULTS DOCUMENT: HCPCS | Performed by: INTERNAL MEDICINE

## 2025-08-28 ENCOUNTER — HOSPITAL ENCOUNTER (OUTPATIENT)
Dept: PHYSICAL THERAPY | Age: 80
Setting detail: THERAPIES SERIES
Discharge: HOME | End: 2025-08-28
Attending: ORTHOPAEDIC SURGERY
Payer: MEDICARE

## 2025-08-28 PROCEDURE — 97161 PT EVAL LOW COMPLEX 20 MIN: CPT

## 2025-08-28 PROCEDURE — 97110 THERAPEUTIC EXERCISES: CPT

## 2025-08-28 PROCEDURE — 97530 THERAPEUTIC ACTIVITIES: CPT

## 2025-09-02 ENCOUNTER — HOSPITAL ENCOUNTER (OUTPATIENT)
Dept: PHYSICAL THERAPY | Age: 80
Setting detail: THERAPIES SERIES
Discharge: HOME | End: 2025-09-02
Attending: ORTHOPAEDIC SURGERY
Payer: MEDICARE

## 2025-09-02 PROCEDURE — 97530 THERAPEUTIC ACTIVITIES: CPT

## 2025-09-02 PROCEDURE — 97110 THERAPEUTIC EXERCISES: CPT

## (undated) DEVICE — COVER LT HNDL BLU PLAS

## (undated) DEVICE — ADTEC SINGLE USE HOOK SCISSORS, SHAFT ONLY, MONOPOLAR, STRAIGHT, WORKING LENGTH: 12 1/4", (310 MM), DIAM. 5 MM, BLUNT/BLUNT, INSULATED, SINGLE ACTION, STERILE, DISPOSABLE, PACKAGE OF 10 PIECES: Brand: AESCULAP

## (undated) DEVICE — NEEDLE SPNL 22GA L3.5IN BLK HUB S STL REG WALL FIT STYL W/

## (undated) DEVICE — APPLICATOR MEDICATED 26 CC SOLUTION HI LT ORNG CHLORAPREP

## (undated) DEVICE — SKIN AFFIX SURG ADHESIVE 72/CS 0.55ML: Brand: MEDLINE

## (undated) DEVICE — ELECTRODE PT RET AD L9FT HI MOIST COND ADH HYDRGEL CORDED

## (undated) DEVICE — NEEDLE SPNL 25GA L3.5IN BLU HUB S STL REG WALL FIT STYL W/

## (undated) DEVICE — SOLUTION IV IRRIG POUR BRL 0.9% SODIUM CHL 2F7124

## (undated) DEVICE — NEEDLE RF 20GA L10CM TIP L10MM CVD ACT TIP SL

## (undated) DEVICE — SUTURE MCRYL SZ 4-0 L27IN ABSRB UD L19MM PS-2 1/2 CIR PRIM Y426H

## (undated) DEVICE — STERILE POLYISOPRENE POWDER-FREE SURGICAL GLOVES: Brand: PROTEXIS

## (undated) DEVICE — SHEET,DRAPE,53X77,STERILE: Brand: MEDLINE

## (undated) DEVICE — TROCAR: Brand: KII SLEEVE

## (undated) DEVICE — GAUZE,SPONGE,4"X4",8PLY,STRL,LF,10/TRAY: Brand: MEDLINE

## (undated) DEVICE — HYPODERMIC SAFETY NEEDLE: Brand: MAGELLAN

## (undated) DEVICE — GLOVE ORANGE PI 8   MSG9080

## (undated) DEVICE — PACK PROCEDURE SURG EXTREMITY MFFOP CUST

## (undated) DEVICE — KIT OR ROOM TURNOVER W/STRAP

## (undated) DEVICE — NEEDLE INSUF L120MM DIA2MM DISP FOR PNEUMOPERI ENDOPATH

## (undated) DEVICE — NEEDLE SPNL 22GA L3.5IN BLK HUB S STL REG WALL FIT STYL

## (undated) DEVICE — VERTIFLEX INSTRUMENT PLATFORM INCLUDES A DILATOR ASSEMBLY (140-9501), CANNULA ASSEMBLY (140-9502), & REAMER (140-9503): Brand: VERTIFLEX INSTRUMENT PLATFORM

## (undated) DEVICE — MASTISOL ADHESIVE LIQ 2/3ML

## (undated) DEVICE — SYRINGE MED 10ML LUERLOCK TIP W/O SFTY DISP

## (undated) DEVICE — ROOM TURNOVER KIT W/ ARM STRP

## (undated) DEVICE — AVANOS* EXTENSION SETS: Brand: EXTENSION SET, MINI BORE, 12" LENGTH, 0.50ML VOLUME 25

## (undated) DEVICE — MEDICINE CUP, GRADUATED, STER: Brand: MEDLINE

## (undated) DEVICE — TOWEL,OR,DSP,ST,BLUE,STD,4/PK,20PK/CS: Brand: MEDLINE

## (undated) DEVICE — NEEDLE HYPO 25GA L1.5IN BVL ORIENTED ECLIPSE

## (undated) DEVICE — GARMENT COMPR STD FOR 17IN CALF UNIF THER FLOTRN

## (undated) DEVICE — GOWN,AURORA,NONREINF,RAGLAN,XXL,STERILE: Brand: MEDLINE

## (undated) DEVICE — C-ARMOR C-ARM EQUIPMENT COVERS CLEAR STERILE UNIVERSAL FIT 12 PER CASE: Brand: C-ARMOR

## (undated) DEVICE — 3M™ STERI-STRIP™ REINFORCED ADHESIVE SKIN CLOSURES, R1547, 1/2 IN X 4 IN (12 MM X 100 MM), 6 STRIPS/ENVELOPE: Brand: 3M™ STERI-STRIP™

## (undated) DEVICE — LAPAROSCOPY PK

## (undated) DEVICE — INDIRECT DECOMPRESSION SYSTEM KIT SUI INCLUDES A DRIVER (102-9108), INSERTER (102-9110), & INTERSPINOUS GAUGE (102-9115): Brand: SUPERION IDS KIT

## (undated) DEVICE — TROCAR: Brand: KII SHIELDED BLADED ACCESS SYSTEM

## (undated) DEVICE — STANDARD HYPODERMIC NEEDLE,POLYPROPYLENE HUB: Brand: MONOJECT

## (undated) DEVICE — 1010 S-DRAPE TOWEL DRAPE 10/BX: Brand: STERI-DRAPE™

## (undated) DEVICE — SUTURE SZ 0 27IN 5/8 CIR UR-6  TAPER PT VIOLET ABSRB VICRYL J603H

## (undated) DEVICE — YANKAUER,BULB TIP,W/O VENT,RIGID,STERILE: Brand: MEDLINE

## (undated) DEVICE — TRAY ANES SUPP NO DRUG CUST

## (undated) DEVICE — TROCAR ENDOSCP L100MM DIA11MM DIL TIP STBL SL DISP ENDOPATH

## (undated) DEVICE — CHLORAPREP 26ML ORANGE

## (undated) DEVICE — MEDIA CONTRAST RX ISOVUE-300 61% 30ML VIALS

## (undated) DEVICE — PEN: MARKING STD 100/CS: Brand: MEDICAL ACTION INDUSTRIES

## (undated) DEVICE — GLOVE SURG SZ 8 L12IN FNGR THK79MIL GRN LTX FREE

## (undated) DEVICE — Device: Brand: JELCO

## (undated) DEVICE — DRAPE C ARM UNIV W41XL74IN CLR PLAS XR VELC CLSR POLY STRP

## (undated) DEVICE — CANISTER, RIGID, 1200CC: Brand: MEDLINE INDUSTRIES, INC.

## (undated) DEVICE — SUTURE VCRL SZ 3-0 L18IN ABSRB UD L26MM SH 1/2 CIR J864D

## (undated) DEVICE — 7496-8Z MINI-PLUS KIT: Brand: DEVON

## (undated) DEVICE — SYRINGE MED 3ML CLR PLAS LUERLOCK CONN VI ACCS INTLNK 15GA

## (undated) DEVICE — Device

## (undated) DEVICE — GLOVE ORANGE PI 7 1/2   MSG9075

## (undated) DEVICE — [HIGH FLOW INSUFFLATOR,  DO NOT USE IF PACKAGE IS DAMAGED,  KEEP DRY,  KEEP AWAY FROM SUNLIGHT,  PROTECT FROM HEAT AND RADIOACTIVE SOURCES.]: Brand: PNEUMOSURE

## (undated) DEVICE — PORT VLV 2 W NDL FREE SMRTSITE

## (undated) DEVICE — DRAPE,LAP,CHOLE,W/TROUGHS,STERILE: Brand: MEDLINE

## (undated) DEVICE — INTENDED FOR TISSUE SEPARATION, AND OTHER PROCEDURES THAT REQUIRE A SHARP SURGICAL BLADE TO PUNCTURE OR CUT.: Brand: BARD-PARKER ® STAINLESS STEEL BLADES

## (undated) DEVICE — SOLUTION IV IRRIG 500ML 0.9% SODIUM CHL 2F7123

## (undated) DEVICE — SYRINGE 20ML LL S/C 50

## (undated) DEVICE — 3M™ IOBAN™ 2 ANTIMICROBIAL INCISE DRAPE 6650EZ: Brand: IOBAN™ 2

## (undated) DEVICE — SYRINGE MED 30ML STD CLR PLAS LUERLOCK TIP N CTRL DISP

## (undated) DEVICE — PAD GRND NEUT ELECTRD AD DISP

## (undated) DEVICE — APPLIER CLP M L L11.4IN DIA10MM ENDOSCP ROT MULT FOR LIG

## (undated) DEVICE — SYRINGE MED 10ML TRNSLUC BRL PLUNG BLK MRK POLYPR CTRL

## (undated) DEVICE — SUTURE VCRL SZ 4-0 L18IN ABSRB UD L19MM PS-2 3/8 CIR PRIM J496H

## (undated) DEVICE — DRAPE,SPLIT ,77X120: Brand: MEDLINE

## (undated) DEVICE — GLOVE SURG SZ 75 L12IN FNGR THK87MIL WHT LTX FREE